# Patient Record
Sex: FEMALE | Race: WHITE | NOT HISPANIC OR LATINO | Employment: UNEMPLOYED | RURAL
[De-identification: names, ages, dates, MRNs, and addresses within clinical notes are randomized per-mention and may not be internally consistent; named-entity substitution may affect disease eponyms.]

---

## 2019-03-21 ENCOUNTER — HISTORICAL (OUTPATIENT)
Dept: ADMINISTRATIVE | Facility: HOSPITAL | Age: 49
End: 2019-03-21

## 2019-03-27 LAB
LAB AP CLINICAL INFORMATION: NORMAL
LAB AP DIAGNOSIS - HISTORICAL: NORMAL
LAB AP GROSS PATHOLOGY - HISTORICAL: NORMAL
LAB AP SPECIMEN SUBMITTED - HISTORICAL: NORMAL

## 2020-05-23 ENCOUNTER — HISTORICAL (OUTPATIENT)
Dept: ADMINISTRATIVE | Facility: HOSPITAL | Age: 50
End: 2020-05-23

## 2020-06-09 ENCOUNTER — HISTORICAL (OUTPATIENT)
Dept: ADMINISTRATIVE | Facility: HOSPITAL | Age: 50
End: 2020-06-09

## 2020-06-09 LAB
GLUCOSE SERPL-MCNC: 166 MG/DL (ref 70–105)
GLUCOSE SERPL-MCNC: 267 MG/DL (ref 70–105)
GLUCOSE SERPL-MCNC: 98 MG/DL (ref 70–105)

## 2020-06-16 ENCOUNTER — HISTORICAL (OUTPATIENT)
Dept: ADMINISTRATIVE | Facility: HOSPITAL | Age: 50
End: 2020-06-16

## 2020-06-16 LAB
GLUCOSE SERPL-MCNC: 183 MG/DL (ref 70–105)
GLUCOSE SERPL-MCNC: 256 MG/DL (ref 70–105)

## 2020-06-18 LAB
REPORT: 25
REPORT: NORMAL

## 2020-07-15 ENCOUNTER — HISTORICAL (OUTPATIENT)
Dept: ADMINISTRATIVE | Facility: HOSPITAL | Age: 50
End: 2020-07-15

## 2020-07-15 LAB — GLUCOSE SERPL-MCNC: 222 MG/DL (ref 70–105)

## 2020-07-17 LAB
REPORT: NORMAL

## 2020-11-22 ENCOUNTER — HISTORICAL (OUTPATIENT)
Dept: ADMINISTRATIVE | Facility: HOSPITAL | Age: 50
End: 2020-11-22

## 2021-06-03 RX ORDER — GABAPENTIN 300 MG/1
CAPSULE ORAL
COMMUNITY
Start: 2021-05-04 | End: 2021-06-03 | Stop reason: SDUPTHER

## 2021-06-03 RX ORDER — GABAPENTIN 300 MG/1
300 CAPSULE ORAL 3 TIMES DAILY
Qty: 90 CAPSULE | Refills: 2 | Status: SHIPPED | OUTPATIENT
Start: 2021-06-03 | End: 2021-08-13 | Stop reason: ALTCHOICE

## 2021-06-23 DIAGNOSIS — Z79.4 TYPE 2 DIABETES MELLITUS WITH HYPERGLYCEMIA, WITH LONG-TERM CURRENT USE OF INSULIN: Primary | ICD-10-CM

## 2021-06-23 DIAGNOSIS — E11.65 TYPE 2 DIABETES MELLITUS WITH HYPERGLYCEMIA, WITH LONG-TERM CURRENT USE OF INSULIN: Primary | ICD-10-CM

## 2021-06-23 RX ORDER — INSULIN ASPART 100 [IU]/ML
INJECTION, SOLUTION INTRAVENOUS; SUBCUTANEOUS
COMMUNITY
Start: 2021-02-08 | End: 2021-06-23 | Stop reason: SDUPTHER

## 2021-06-23 RX ORDER — INSULIN GLARGINE 100 [IU]/ML
INJECTION, SOLUTION SUBCUTANEOUS
COMMUNITY
Start: 2021-02-08 | End: 2021-06-23 | Stop reason: SDUPTHER

## 2021-06-24 RX ORDER — INSULIN ASPART 100 [IU]/ML
10 INJECTION, SOLUTION INTRAVENOUS; SUBCUTANEOUS 4 TIMES DAILY
Qty: 1 BOX | Refills: 2 | Status: SHIPPED | OUTPATIENT
Start: 2021-06-24 | End: 2022-01-04 | Stop reason: SDUPTHER

## 2021-06-24 RX ORDER — INSULIN GLARGINE 100 [IU]/ML
75 INJECTION, SOLUTION SUBCUTANEOUS NIGHTLY
Qty: 1 BOX | Refills: 2 | Status: SHIPPED | OUTPATIENT
Start: 2021-06-24 | End: 2021-08-13 | Stop reason: ALTCHOICE

## 2021-08-09 RX ORDER — DULOXETIN HYDROCHLORIDE 60 MG/1
CAPSULE, DELAYED RELEASE ORAL
COMMUNITY
Start: 2021-05-26 | End: 2021-08-09 | Stop reason: SDUPTHER

## 2021-08-10 RX ORDER — DULOXETIN HYDROCHLORIDE 60 MG/1
60 CAPSULE, DELAYED RELEASE ORAL DAILY
Qty: 90 CAPSULE | Refills: 1 | Status: SHIPPED | OUTPATIENT
Start: 2021-08-10 | End: 2021-10-29 | Stop reason: SDUPTHER

## 2021-08-13 ENCOUNTER — HOSPITAL ENCOUNTER (EMERGENCY)
Facility: HOSPITAL | Age: 51
Discharge: HOME | End: 2021-08-13
Payer: MEDICARE

## 2021-08-13 ENCOUNTER — HOSPITAL ENCOUNTER (EMERGENCY)
Facility: HOSPITAL | Age: 51
Discharge: ANOTHER HOSPITAL | End: 2021-08-13
Attending: INTERNAL MEDICINE
Payer: MEDICARE

## 2021-08-13 VITALS
BODY MASS INDEX: 27.32 KG/M2 | HEART RATE: 83 BPM | SYSTOLIC BLOOD PRESSURE: 115 MMHG | WEIGHT: 170 LBS | DIASTOLIC BLOOD PRESSURE: 59 MMHG | OXYGEN SATURATION: 96 % | RESPIRATION RATE: 15 BRPM | TEMPERATURE: 98 F | HEIGHT: 66 IN

## 2021-08-13 VITALS
DIASTOLIC BLOOD PRESSURE: 46 MMHG | TEMPERATURE: 99 F | BODY MASS INDEX: 27.32 KG/M2 | WEIGHT: 170 LBS | HEIGHT: 66 IN | OXYGEN SATURATION: 100 % | HEART RATE: 68 BPM | SYSTOLIC BLOOD PRESSURE: 108 MMHG | RESPIRATION RATE: 18 BRPM

## 2021-08-13 DIAGNOSIS — R10.9 ABDOMINAL PAIN, UNSPECIFIED ABDOMINAL LOCATION: ICD-10-CM

## 2021-08-13 DIAGNOSIS — R11.2 NON-INTRACTABLE VOMITING WITH NAUSEA, UNSPECIFIED VOMITING TYPE: ICD-10-CM

## 2021-08-13 DIAGNOSIS — K35.30 ACUTE APPENDICITIS WITH LOCALIZED PERITONITIS, WITHOUT PERFORATION, ABSCESS, OR GANGRENE: Primary | ICD-10-CM

## 2021-08-13 DIAGNOSIS — U07.1 COVID-19: ICD-10-CM

## 2021-08-13 DIAGNOSIS — R10.30 LOWER ABDOMINAL PAIN: Primary | ICD-10-CM

## 2021-08-13 DIAGNOSIS — R10.13 EPIGASTRIC PAIN: ICD-10-CM

## 2021-08-13 DIAGNOSIS — U07.1 COVID-19 VIRUS INFECTION: ICD-10-CM

## 2021-08-13 DIAGNOSIS — R07.9 CHEST PAIN: ICD-10-CM

## 2021-08-13 DIAGNOSIS — R11.0 NAUSEA: ICD-10-CM

## 2021-08-13 LAB
ALBUMIN SERPL BCP-MCNC: 3 G/DL (ref 3.5–5)
ALBUMIN/GLOB SERPL: 0.7 {RATIO}
ALP SERPL-CCNC: 72 U/L (ref 41–108)
ALT SERPL W P-5'-P-CCNC: 26 U/L (ref 13–56)
AMPHET UR QL SCN: POSITIVE
ANION GAP SERPL CALCULATED.3IONS-SCNC: 12 MMOL/L (ref 7–16)
AST SERPL W P-5'-P-CCNC: 20 U/L (ref 15–37)
BACTERIA #/AREA URNS HPF: ABNORMAL /HPF
BARBITURATES UR QL SCN: NEGATIVE
BASOPHILS # BLD AUTO: 0.01 K/UL (ref 0–0.2)
BASOPHILS NFR BLD AUTO: 0.2 % (ref 0–1)
BENZODIAZ METAB UR QL SCN: NEGATIVE
BILIRUB SERPL-MCNC: 0.4 MG/DL (ref 0–1.2)
BILIRUB UR QL STRIP: NEGATIVE
BUN SERPL-MCNC: 13 MG/DL (ref 7–18)
BUN/CREAT SERPL: 15 (ref 6–20)
CALCIUM SERPL-MCNC: 8.5 MG/DL (ref 8.5–10.1)
CANNABINOIDS UR QL SCN: POSITIVE
CHLORIDE SERPL-SCNC: 98 MMOL/L (ref 98–107)
CLARITY UR: CLEAR
CO2 SERPL-SCNC: 31 MMOL/L (ref 21–32)
COCAINE UR QL SCN: NEGATIVE
COLOR UR: YELLOW
CREAT SERPL-MCNC: 0.84 MG/DL (ref 0.55–1.02)
CRP SERPL-MCNC: 0.4 MG/DL (ref 0–0.8)
DIFFERENTIAL METHOD BLD: ABNORMAL
EOSINOPHIL # BLD AUTO: 0.13 K/UL (ref 0–0.5)
EOSINOPHIL NFR BLD AUTO: 2.4 % (ref 1–4)
ERYTHROCYTE [DISTWIDTH] IN BLOOD BY AUTOMATED COUNT: 14 % (ref 11.5–14.5)
FLUAV AG UPPER RESP QL IA.RAPID: NEGATIVE
FLUBV AG UPPER RESP QL IA.RAPID: NEGATIVE
GLOBULIN SER-MCNC: 4.1 G/DL (ref 2–4)
GLUCOSE SERPL-MCNC: 325 MG/DL (ref 74–106)
GLUCOSE UR STRIP-MCNC: >=1000 MG/DL
HCT VFR BLD AUTO: 45.5 % (ref 38–47)
HGB BLD-MCNC: 14.9 G/DL (ref 12–16)
IMM GRANULOCYTES # BLD AUTO: 0.01 K/UL (ref 0–0.04)
IMM GRANULOCYTES NFR BLD: 0.2 % (ref 0–0.4)
KETONES UR STRIP-SCNC: NEGATIVE MG/DL
LEUKOCYTE ESTERASE UR QL STRIP: NEGATIVE
LIPASE SERPL-CCNC: 108 U/L (ref 73–393)
LIPASE SERPL-CCNC: 82 U/L (ref 73–393)
LYMPHOCYTES # BLD AUTO: 1.24 K/UL (ref 1–4.8)
LYMPHOCYTES NFR BLD AUTO: 23.4 % (ref 27–41)
MCH RBC QN AUTO: 27.9 PG (ref 27–31)
MCHC RBC AUTO-ENTMCNC: 32.7 G/DL (ref 32–36)
MCV RBC AUTO: 85.2 FL (ref 80–96)
MONOCYTES # BLD AUTO: 0.47 K/UL (ref 0–0.8)
MONOCYTES NFR BLD AUTO: 8.9 % (ref 2–6)
MPC BLD CALC-MCNC: 11.7 FL (ref 9.4–12.4)
NEUTROPHILS # BLD AUTO: 3.45 K/UL (ref 1.8–7.7)
NEUTROPHILS NFR BLD AUTO: 64.9 % (ref 53–65)
NITRITE UR QL STRIP: NEGATIVE
OPIATES UR QL SCN: NEGATIVE
PCP UR QL SCN: NEGATIVE
PH UR STRIP: 5.5 PH UNITS
PLATELET # BLD AUTO: 143 K/UL (ref 150–400)
POTASSIUM SERPL-SCNC: 3.1 MMOL/L (ref 3.5–5.1)
PROT SERPL-MCNC: 7.1 G/DL (ref 6.4–8.2)
PROT UR QL STRIP: NEGATIVE
RBC # BLD AUTO: 5.34 M/UL (ref 4.2–5.4)
RBC # UR STRIP: ABNORMAL /UL
RBC #/AREA URNS HPF: ABNORMAL /HPF
SARS-COV+SARS-COV-2 AG RESP QL IA.RAPID: POSITIVE
SODIUM SERPL-SCNC: 138 MMOL/L (ref 136–145)
SP GR UR STRIP: 1.02
SQUAMOUS #/AREA URNS LPF: ABNORMAL /LPF
TROPONIN I SERPL-MCNC: <0.017 NG/ML
TROPONIN I SERPL-MCNC: <0.017 NG/ML
UROBILINOGEN UR STRIP-ACNC: 0.2 MG/DL
WBC # BLD AUTO: 5.31 K/UL (ref 4.5–11)
WBC #/AREA URNS HPF: ABNORMAL /HPF

## 2021-08-13 PROCEDURE — 87428 SARSCOV & INF VIR A&B AG IA: CPT | Performed by: INTERNAL MEDICINE

## 2021-08-13 PROCEDURE — 83690 ASSAY OF LIPASE: CPT | Performed by: INTERNAL MEDICINE

## 2021-08-13 PROCEDURE — 93005 ELECTROCARDIOGRAM TRACING: CPT

## 2021-08-13 PROCEDURE — 25000003 PHARM REV CODE 250: Performed by: INTERNAL MEDICINE

## 2021-08-13 PROCEDURE — 81001 URINALYSIS AUTO W/SCOPE: CPT | Performed by: INTERNAL MEDICINE

## 2021-08-13 PROCEDURE — 99284 EMERGENCY DEPT VISIT MOD MDM: CPT | Performed by: INTERNAL MEDICINE

## 2021-08-13 PROCEDURE — 99285 EMERGENCY DEPT VISIT HI MDM: CPT | Mod: 25,CS

## 2021-08-13 PROCEDURE — 83690 ASSAY OF LIPASE: CPT | Performed by: REGISTERED NURSE

## 2021-08-13 PROCEDURE — 84484 ASSAY OF TROPONIN QUANT: CPT | Performed by: REGISTERED NURSE

## 2021-08-13 PROCEDURE — 36415 COLL VENOUS BLD VENIPUNCTURE: CPT | Performed by: INTERNAL MEDICINE

## 2021-08-13 PROCEDURE — 81003 URINALYSIS AUTO W/O SCOPE: CPT | Performed by: INTERNAL MEDICINE

## 2021-08-13 PROCEDURE — 87040 BLOOD CULTURE FOR BACTERIA: CPT | Performed by: REGISTERED NURSE

## 2021-08-13 PROCEDURE — 93010 EKG 12-LEAD: ICD-10-PCS | Mod: ,,, | Performed by: HOSPITALIST

## 2021-08-13 PROCEDURE — 99283 PR EMERGENCY DEPT VISIT,LEVEL III: ICD-10-PCS | Mod: CR,,, | Performed by: REGISTERED NURSE

## 2021-08-13 PROCEDURE — 99284 EMERGENCY DEPT VISIT MOD MDM: CPT | Mod: ,,, | Performed by: SURGERY

## 2021-08-13 PROCEDURE — 99285 EMERGENCY DEPT VISIT HI MDM: CPT | Mod: 25

## 2021-08-13 PROCEDURE — 93010 ELECTROCARDIOGRAM REPORT: CPT | Performed by: INTERNAL MEDICINE

## 2021-08-13 PROCEDURE — 84484 ASSAY OF TROPONIN QUANT: CPT | Performed by: INTERNAL MEDICINE

## 2021-08-13 PROCEDURE — 96374 THER/PROPH/DIAG INJ IV PUSH: CPT

## 2021-08-13 PROCEDURE — 93010 ELECTROCARDIOGRAM REPORT: CPT | Mod: ,,, | Performed by: HOSPITALIST

## 2021-08-13 PROCEDURE — 86140 C-REACTIVE PROTEIN: CPT | Performed by: REGISTERED NURSE

## 2021-08-13 PROCEDURE — 63600175 PHARM REV CODE 636 W HCPCS: Performed by: INTERNAL MEDICINE

## 2021-08-13 PROCEDURE — 85025 COMPLETE CBC W/AUTO DIFF WBC: CPT | Performed by: INTERNAL MEDICINE

## 2021-08-13 PROCEDURE — 80053 COMPREHEN METABOLIC PANEL: CPT | Performed by: INTERNAL MEDICINE

## 2021-08-13 PROCEDURE — 99283 EMERGENCY DEPT VISIT LOW MDM: CPT | Mod: CR,,, | Performed by: REGISTERED NURSE

## 2021-08-13 PROCEDURE — 99284 PR EMERGENCY DEPT VISIT,LEVEL IV: ICD-10-PCS | Mod: ,,, | Performed by: SURGERY

## 2021-08-13 PROCEDURE — 80307 DRUG TEST PRSMV CHEM ANLYZR: CPT | Performed by: INTERNAL MEDICINE

## 2021-08-13 RX ORDER — SODIUM CHLORIDE 9 MG/ML
INJECTION, SOLUTION INTRAVENOUS
Status: COMPLETED | OUTPATIENT
Start: 2021-08-13 | End: 2021-08-13

## 2021-08-13 RX ORDER — DULAGLUTIDE 0.75 MG/.5ML
INJECTION, SOLUTION SUBCUTANEOUS
COMMUNITY
Start: 2021-08-04 | End: 2021-11-11 | Stop reason: SDUPTHER

## 2021-08-13 RX ORDER — ONDANSETRON 2 MG/ML
4 INJECTION INTRAMUSCULAR; INTRAVENOUS
Status: COMPLETED | OUTPATIENT
Start: 2021-08-13 | End: 2021-08-13

## 2021-08-13 RX ADMIN — SODIUM CHLORIDE: 0.9 INJECTION, SOLUTION INTRAVENOUS at 12:08

## 2021-08-13 RX ADMIN — ONDANSETRON 4 MG: 2 INJECTION INTRAMUSCULAR; INTRAVENOUS at 11:08

## 2021-08-20 ENCOUNTER — TELEPHONE (OUTPATIENT)
Dept: PRIMARY CARE CLINIC | Facility: CLINIC | Age: 51
End: 2021-08-20

## 2021-08-20 LAB
BACTERIA BLD CULT: NORMAL
BACTERIA BLD CULT: NORMAL

## 2021-10-29 DIAGNOSIS — F32.A DEPRESSION, UNSPECIFIED DEPRESSION TYPE: Primary | ICD-10-CM

## 2021-10-29 RX ORDER — DULOXETIN HYDROCHLORIDE 60 MG/1
60 CAPSULE, DELAYED RELEASE ORAL DAILY
Qty: 30 CAPSULE | Refills: 1 | Status: SHIPPED | OUTPATIENT
Start: 2021-10-29 | End: 2022-01-04 | Stop reason: SDUPTHER

## 2021-11-11 DIAGNOSIS — Z79.4 TYPE 2 DIABETES MELLITUS WITHOUT COMPLICATION, WITH LONG-TERM CURRENT USE OF INSULIN: Primary | ICD-10-CM

## 2021-11-11 DIAGNOSIS — E11.9 TYPE 2 DIABETES MELLITUS WITHOUT COMPLICATION, WITH LONG-TERM CURRENT USE OF INSULIN: Primary | ICD-10-CM

## 2021-11-11 RX ORDER — DULAGLUTIDE 0.75 MG/.5ML
0.75 INJECTION, SOLUTION SUBCUTANEOUS
Qty: 4 PEN | Refills: 0 | Status: SHIPPED | OUTPATIENT
Start: 2021-11-11 | End: 2021-12-06 | Stop reason: SDUPTHER

## 2021-11-29 ENCOUNTER — HOSPITAL ENCOUNTER (EMERGENCY)
Facility: HOSPITAL | Age: 51
Discharge: HOME | End: 2021-11-29
Attending: FAMILY MEDICINE
Payer: MEDICARE

## 2021-11-29 VITALS
HEIGHT: 66 IN | WEIGHT: 170 LBS | BODY MASS INDEX: 27.32 KG/M2 | RESPIRATION RATE: 18 BRPM | DIASTOLIC BLOOD PRESSURE: 91 MMHG | TEMPERATURE: 99 F | SYSTOLIC BLOOD PRESSURE: 142 MMHG | HEART RATE: 100 BPM | OXYGEN SATURATION: 98 %

## 2021-11-29 DIAGNOSIS — S61.412A LACERATION OF LEFT HAND WITHOUT FOREIGN BODY, INITIAL ENCOUNTER: Primary | ICD-10-CM

## 2021-11-29 PROCEDURE — 90715 TDAP VACCINE 7 YRS/> IM: CPT | Performed by: FAMILY MEDICINE

## 2021-11-29 PROCEDURE — 90471 IMMUNIZATION ADMIN: CPT | Performed by: FAMILY MEDICINE

## 2021-11-29 PROCEDURE — 99284 EMERGENCY DEPT VISIT MOD MDM: CPT

## 2021-11-29 PROCEDURE — 99282 EMERGENCY DEPT VISIT SF MDM: CPT | Mod: 25 | Performed by: FAMILY MEDICINE

## 2021-11-29 PROCEDURE — 25000003 PHARM REV CODE 250: Performed by: FAMILY MEDICINE

## 2021-11-29 PROCEDURE — 63600175 PHARM REV CODE 636 W HCPCS: Performed by: FAMILY MEDICINE

## 2021-11-29 RX ORDER — LIDOCAINE HYDROCHLORIDE 10 MG/ML
5 INJECTION, SOLUTION EPIDURAL; INFILTRATION; INTRACAUDAL; PERINEURAL
Status: COMPLETED | OUTPATIENT
Start: 2021-11-29 | End: 2021-11-29

## 2021-11-29 RX ADMIN — TETANUS TOXOID, REDUCED DIPHTHERIA TOXOID AND ACELLULAR PERTUSSIS VACCINE, ADSORBED 0.5 ML: 5; 2.5; 8; 8; 2.5 SUSPENSION INTRAMUSCULAR at 04:11

## 2021-11-29 RX ADMIN — LIDOCAINE HYDROCHLORIDE 50 MG: 10 INJECTION, SOLUTION EPIDURAL; INFILTRATION; INTRACAUDAL; PERINEURAL at 04:11

## 2021-11-30 ENCOUNTER — TELEPHONE (OUTPATIENT)
Dept: EMERGENCY MEDICINE | Facility: HOSPITAL | Age: 51
End: 2021-11-30
Payer: MEDICARE

## 2021-12-06 DIAGNOSIS — E11.9 TYPE 2 DIABETES MELLITUS WITHOUT COMPLICATION, WITH LONG-TERM CURRENT USE OF INSULIN: ICD-10-CM

## 2021-12-06 DIAGNOSIS — Z79.4 TYPE 2 DIABETES MELLITUS WITHOUT COMPLICATION, WITH LONG-TERM CURRENT USE OF INSULIN: ICD-10-CM

## 2021-12-06 RX ORDER — DULAGLUTIDE 0.75 MG/.5ML
0.75 INJECTION, SOLUTION SUBCUTANEOUS
Qty: 4 PEN | Refills: 0 | Status: SHIPPED | OUTPATIENT
Start: 2021-12-06 | End: 2022-01-04 | Stop reason: SDUPTHER

## 2022-01-04 ENCOUNTER — OFFICE VISIT (OUTPATIENT)
Dept: PRIMARY CARE CLINIC | Facility: CLINIC | Age: 52
End: 2022-01-04
Payer: MEDICARE

## 2022-01-04 VITALS
BODY MASS INDEX: 24.91 KG/M2 | TEMPERATURE: 97 F | RESPIRATION RATE: 20 BRPM | OXYGEN SATURATION: 98 % | HEIGHT: 66 IN | WEIGHT: 155 LBS | SYSTOLIC BLOOD PRESSURE: 140 MMHG | HEART RATE: 100 BPM | DIASTOLIC BLOOD PRESSURE: 72 MMHG

## 2022-01-04 DIAGNOSIS — M19.90 ARTHRITIS: ICD-10-CM

## 2022-01-04 DIAGNOSIS — Z79.4 TYPE 2 DIABETES MELLITUS WITH HYPERGLYCEMIA, WITH LONG-TERM CURRENT USE OF INSULIN: ICD-10-CM

## 2022-01-04 DIAGNOSIS — Z79.4 TYPE 2 DIABETES MELLITUS WITHOUT COMPLICATION, WITH LONG-TERM CURRENT USE OF INSULIN: ICD-10-CM

## 2022-01-04 DIAGNOSIS — E11.65 TYPE 2 DIABETES MELLITUS WITH HYPERGLYCEMIA, WITH LONG-TERM CURRENT USE OF INSULIN: ICD-10-CM

## 2022-01-04 DIAGNOSIS — E13.9 DIABETES 1.5, MANAGED AS TYPE 2: Primary | ICD-10-CM

## 2022-01-04 DIAGNOSIS — F32.A DEPRESSION, UNSPECIFIED DEPRESSION TYPE: ICD-10-CM

## 2022-01-04 DIAGNOSIS — E11.9 TYPE 2 DIABETES MELLITUS WITHOUT COMPLICATION, WITH LONG-TERM CURRENT USE OF INSULIN: ICD-10-CM

## 2022-01-04 LAB
ALBUMIN SERPL BCP-MCNC: 3.2 G/DL (ref 3.5–5)
ALBUMIN/GLOB SERPL: 0.7 {RATIO}
ALP SERPL-CCNC: 86 U/L (ref 41–108)
ALT SERPL W P-5'-P-CCNC: 21 U/L (ref 13–56)
ANION GAP SERPL CALCULATED.3IONS-SCNC: 10 MMOL/L (ref 7–16)
AST SERPL W P-5'-P-CCNC: 6 U/L (ref 15–37)
BASOPHILS # BLD AUTO: 0.08 K/UL (ref 0–0.2)
BASOPHILS NFR BLD AUTO: 0.8 % (ref 0–1)
BILIRUB SERPL-MCNC: 0.2 MG/DL (ref 0–1.2)
BUN SERPL-MCNC: 16 MG/DL (ref 7–18)
BUN/CREAT SERPL: 20 (ref 6–20)
CALCIUM SERPL-MCNC: 9.8 MG/DL (ref 8.5–10.1)
CHLORIDE SERPL-SCNC: 99 MMOL/L (ref 98–107)
CO2 SERPL-SCNC: 29 MMOL/L (ref 21–32)
CREAT SERPL-MCNC: 0.79 MG/DL (ref 0.55–1.02)
DIFFERENTIAL METHOD BLD: ABNORMAL
EOSINOPHIL # BLD AUTO: 0.3 K/UL (ref 0–0.5)
EOSINOPHIL NFR BLD AUTO: 3 % (ref 1–4)
ERYTHROCYTE [DISTWIDTH] IN BLOOD BY AUTOMATED COUNT: 13.4 % (ref 11.5–14.5)
EST. AVERAGE GLUCOSE BLD GHB EST-MCNC: 320 MG/DL
GLOBULIN SER-MCNC: 4.6 G/DL (ref 2–4)
GLUCOSE SERPL-MCNC: 434 MG/DL (ref 74–106)
GLUCOSE SERPL-MCNC: 435 MG/DL (ref 70–110)
HBA1C MFR BLD HPLC: 12.2 % (ref 4.5–6.6)
HCT VFR BLD AUTO: 48 % (ref 38–47)
HGB BLD-MCNC: 15.9 G/DL (ref 12–16)
IMM GRANULOCYTES # BLD AUTO: 0.02 K/UL (ref 0–0.04)
IMM GRANULOCYTES NFR BLD: 0.2 % (ref 0–0.4)
LYMPHOCYTES # BLD AUTO: 2.26 K/UL (ref 1–4.8)
LYMPHOCYTES NFR BLD AUTO: 22.6 % (ref 27–41)
MCH RBC QN AUTO: 29 PG (ref 27–31)
MCHC RBC AUTO-ENTMCNC: 33.1 G/DL (ref 32–36)
MCV RBC AUTO: 87.4 FL (ref 80–96)
MONOCYTES # BLD AUTO: 0.61 K/UL (ref 0–0.8)
MONOCYTES NFR BLD AUTO: 6.1 % (ref 2–6)
MPC BLD CALC-MCNC: 11.5 FL (ref 9.4–12.4)
NEUTROPHILS # BLD AUTO: 6.73 K/UL (ref 1.8–7.7)
NEUTROPHILS NFR BLD AUTO: 67.3 % (ref 53–65)
NRBC # BLD AUTO: 0 X10E3/UL
NRBC, AUTO (.00): 0 %
PLATELET # BLD AUTO: 247 K/UL (ref 150–400)
POTASSIUM SERPL-SCNC: 4.5 MMOL/L (ref 3.5–5.1)
PROT SERPL-MCNC: 7.8 G/DL (ref 6.4–8.2)
RBC # BLD AUTO: 5.49 M/UL (ref 4.2–5.4)
SODIUM SERPL-SCNC: 133 MMOL/L (ref 136–145)
WBC # BLD AUTO: 10 K/UL (ref 4.5–11)

## 2022-01-04 PROCEDURE — 83036 HEMOGLOBIN GLYCOSYLATED A1C: CPT | Mod: ,,, | Performed by: CLINICAL MEDICAL LABORATORY

## 2022-01-04 PROCEDURE — 85025 COMPLETE CBC W/AUTO DIFF WBC: CPT | Mod: ,,, | Performed by: CLINICAL MEDICAL LABORATORY

## 2022-01-04 PROCEDURE — 99214 OFFICE O/P EST MOD 30 MIN: CPT | Mod: 25,,, | Performed by: FAMILY MEDICINE

## 2022-01-04 PROCEDURE — 96372 THER/PROPH/DIAG INJ SC/IM: CPT | Mod: ,,, | Performed by: FAMILY MEDICINE

## 2022-01-04 PROCEDURE — 80053 COMPREHENSIVE METABOLIC PANEL: ICD-10-PCS | Mod: ,,, | Performed by: CLINICAL MEDICAL LABORATORY

## 2022-01-04 PROCEDURE — 83036 HEMOGLOBIN A1C: ICD-10-PCS | Mod: ,,, | Performed by: CLINICAL MEDICAL LABORATORY

## 2022-01-04 PROCEDURE — 82962 GLUCOSE BLOOD TEST: CPT | Mod: RHCUB | Performed by: FAMILY MEDICINE

## 2022-01-04 PROCEDURE — 80053 COMPREHEN METABOLIC PANEL: CPT | Mod: ,,, | Performed by: CLINICAL MEDICAL LABORATORY

## 2022-01-04 PROCEDURE — 85025 CBC WITH DIFFERENTIAL: ICD-10-PCS | Mod: ,,, | Performed by: CLINICAL MEDICAL LABORATORY

## 2022-01-04 PROCEDURE — 96372 PR INJECTION,THERAP/PROPH/DIAG2ST, IM OR SUBCUT: ICD-10-PCS | Mod: ,,, | Performed by: FAMILY MEDICINE

## 2022-01-04 PROCEDURE — 99214 PR OFFICE/OUTPT VISIT, EST, LEVL IV, 30-39 MIN: ICD-10-PCS | Mod: 25,,, | Performed by: FAMILY MEDICINE

## 2022-01-04 RX ORDER — INSULIN ASPART 100 [IU]/ML
20 INJECTION, SOLUTION INTRAVENOUS; SUBCUTANEOUS
Status: COMPLETED | OUTPATIENT
Start: 2022-01-04 | End: 2022-01-04

## 2022-01-04 RX ORDER — KETOROLAC TROMETHAMINE 30 MG/ML
60 INJECTION, SOLUTION INTRAMUSCULAR; INTRAVENOUS
Status: COMPLETED | OUTPATIENT
Start: 2022-01-04 | End: 2022-01-04

## 2022-01-04 RX ORDER — DULOXETIN HYDROCHLORIDE 60 MG/1
60 CAPSULE, DELAYED RELEASE ORAL DAILY
Qty: 90 CAPSULE | Refills: 3 | Status: SHIPPED | OUTPATIENT
Start: 2022-01-04

## 2022-01-04 RX ORDER — INSULIN ASPART 100 [IU]/ML
10 INJECTION, SOLUTION INTRAVENOUS; SUBCUTANEOUS 2 TIMES DAILY
Qty: 5 EACH | Refills: 11 | Status: SHIPPED | OUTPATIENT
Start: 2022-01-04 | End: 2022-08-01 | Stop reason: SDUPTHER

## 2022-01-04 RX ORDER — DULAGLUTIDE 0.75 MG/.5ML
0.75 INJECTION, SOLUTION SUBCUTANEOUS
Qty: 4 PEN | Refills: 11 | Status: ON HOLD | OUTPATIENT
Start: 2022-01-04 | End: 2023-03-20 | Stop reason: ALTCHOICE

## 2022-01-04 RX ADMIN — INSULIN ASPART 20 UNITS: 100 INJECTION, SOLUTION INTRAVENOUS; SUBCUTANEOUS at 12:01

## 2022-01-04 RX ADMIN — KETOROLAC TROMETHAMINE 60 MG: 30 INJECTION, SOLUTION INTRAMUSCULAR; INTRAVENOUS at 12:01

## 2022-01-04 NOTE — PROGRESS NOTES
Subjective:       Patient ID: Monica Walker is a 51 y.o. female.    Chief Complaint: Diabetes (Checkup, bloodwork, and refills.) and Back Pain      Pt. Ran out of her insulin. States has been falling. Legs don't feel right. BS-435 mg%    Diabetes  Pertinent negatives for hypoglycemia include no confusion or headaches. Pertinent negatives for diabetes include no chest pain and no fatigue.   Back Pain  Associated symptoms include leg pain. Pertinent negatives include no bladder incontinence, chest pain, fever or headaches.     Review of Systems   Constitutional: Negative for fatigue and fever.   HENT: Negative for nasal congestion and dental problem.    Eyes: Negative for discharge.   Respiratory: Negative for cough and shortness of breath.    Cardiovascular: Negative for chest pain.   Gastrointestinal: Negative for constipation, diarrhea, nausea and vomiting.   Genitourinary: Negative for bladder incontinence, difficulty urinating and hot flashes.   Musculoskeletal: Positive for arthralgias, back pain and leg pain.   Allergic/Immunologic: Negative for environmental allergies.   Neurological: Negative for headaches.   Psychiatric/Behavioral: Negative for behavioral problems and confusion.         Objective:      Physical Exam  Vitals and nursing note reviewed.   Constitutional:       Appearance: Normal appearance. She is normal weight.   HENT:      Head: Normocephalic and atraumatic.      Right Ear: Tympanic membrane normal.      Left Ear: Tympanic membrane normal.      Nose: Nose normal.      Mouth/Throat:      Mouth: Mucous membranes are moist.   Eyes:      Extraocular Movements: Extraocular movements intact.      Conjunctiva/sclera: Conjunctivae normal.      Pupils: Pupils are equal, round, and reactive to light.   Cardiovascular:      Rate and Rhythm: Normal rate and regular rhythm.      Pulses: Normal pulses.   Pulmonary:      Effort: Pulmonary effort is normal.      Breath sounds: Normal breath sounds.    Abdominal:      General: Abdomen is flat. Bowel sounds are normal.      Palpations: Abdomen is soft.   Musculoskeletal:         General: Normal range of motion.      Cervical back: Normal range of motion and neck supple.      Comments: Multiple site arthritis   Skin:     General: Skin is warm and dry.   Neurological:      General: No focal deficit present.      Mental Status: She is alert and oriented to person, place, and time.   Psychiatric:         Mood and Affect: Mood normal.         Assessment:       Diabetes 1.5, managed as type 2  -     CBC Auto Differential; Future; Expected date: 01/04/2022  -     Hemoglobin A1C; Future; Expected date: 01/04/2022  -     Comprehensive Metabolic Panel; Future; Expected date: 01/04/2022    Depression, unspecified depression type    Type 2 diabetes mellitus with hyperglycemia, with long-term current use of insulin    Type 2 diabetes mellitus without complication, with long-term current use of insulin

## 2022-01-05 NOTE — PROGRESS NOTES
Please give pt. Results - totally uncontrolled - not just from no insulin for a few days. Offer diabetic education referral

## 2022-01-06 ENCOUNTER — TELEPHONE (OUTPATIENT)
Dept: PRIMARY CARE CLINIC | Facility: CLINIC | Age: 52
End: 2022-01-06
Payer: MEDICARE

## 2022-01-06 NOTE — TELEPHONE ENCOUNTER
----- Message from Hannah Schulz MD sent at 1/5/2022  8:23 AM CST -----  Please give pt. Results - totally uncontrolled - not just from no insulin for a few days. Offer diabetic education referral

## 2022-03-10 ENCOUNTER — HOSPITAL ENCOUNTER (EMERGENCY)
Facility: HOSPITAL | Age: 52
Discharge: HOME | End: 2022-03-10
Attending: INTERNAL MEDICINE
Payer: MEDICARE

## 2022-03-10 VITALS
RESPIRATION RATE: 18 BRPM | BODY MASS INDEX: 25.31 KG/M2 | TEMPERATURE: 98 F | DIASTOLIC BLOOD PRESSURE: 80 MMHG | HEART RATE: 73 BPM | SYSTOLIC BLOOD PRESSURE: 147 MMHG | WEIGHT: 157.5 LBS | OXYGEN SATURATION: 98 % | HEIGHT: 66 IN

## 2022-03-10 DIAGNOSIS — Z79.4 TYPE 2 DIABETES MELLITUS WITH HYPERGLYCEMIA, WITH LONG-TERM CURRENT USE OF INSULIN: ICD-10-CM

## 2022-03-10 DIAGNOSIS — S09.90XA INJURY OF HEAD, INITIAL ENCOUNTER: ICD-10-CM

## 2022-03-10 DIAGNOSIS — S09.90XA TRAUMATIC INJURY OF HEAD, INITIAL ENCOUNTER: ICD-10-CM

## 2022-03-10 DIAGNOSIS — E11.65 TYPE 2 DIABETES MELLITUS WITH HYPERGLYCEMIA, WITH LONG-TERM CURRENT USE OF INSULIN: ICD-10-CM

## 2022-03-10 DIAGNOSIS — M54.2 MUSCULOSKELETAL NECK PAIN: ICD-10-CM
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
## 2022-03-10 DIAGNOSIS — S40.012A CONTUSION OF LEFT SHOULDER, INITIAL ENCOUNTER: Primary | ICD-10-CM

## 2022-03-10 DIAGNOSIS — T14.90XA TRAUMA: ICD-10-CM

## 2022-03-10 LAB
ACETONE SERPL QL SCN: NEGATIVE
ANION GAP SERPL CALCULATED.3IONS-SCNC: 11 MMOL/L (ref 7–16)
BASOPHILS # BLD AUTO: 0.03 K/UL (ref 0–0.2)
BASOPHILS NFR BLD AUTO: 0.3 % (ref 0–1)
BUN SERPL-MCNC: 13 MG/DL (ref 7–18)
BUN/CREAT SERPL: 15 (ref 6–20)
CALCIUM SERPL-MCNC: 9.6 MG/DL (ref 8.5–10.1)
CHLORIDE SERPL-SCNC: 93 MMOL/L (ref 98–107)
CO2 SERPL-SCNC: 27 MMOL/L (ref 21–32)
CREAT SERPL-MCNC: 0.84 MG/DL (ref 0.55–1.02)
DIFFERENTIAL METHOD BLD: ABNORMAL
EOSINOPHIL # BLD AUTO: 0.29 K/UL (ref 0–0.5)
EOSINOPHIL NFR BLD AUTO: 2.6 % (ref 1–4)
ERYTHROCYTE [DISTWIDTH] IN BLOOD BY AUTOMATED COUNT: 13.6 % (ref 11.5–14.5)
GLUCOSE SERPL-MCNC: 271 MG/DL (ref 70–105)
GLUCOSE SERPL-MCNC: 438 MG/DL (ref 74–106)
HCT VFR BLD AUTO: 46.7 % (ref 38–47)
HGB BLD-MCNC: 16 G/DL (ref 12–16)
IMM GRANULOCYTES # BLD AUTO: 0.03 K/UL (ref 0–0.04)
IMM GRANULOCYTES NFR BLD: 0.3 % (ref 0–0.4)
LYMPHOCYTES # BLD AUTO: 2.62 K/UL (ref 1–4.8)
LYMPHOCYTES NFR BLD AUTO: 23 % (ref 27–41)
MCH RBC QN AUTO: 28.9 PG (ref 27–31)
MCHC RBC AUTO-ENTMCNC: 34.3 G/DL (ref 32–36)
MCV RBC AUTO: 84.4 FL (ref 80–96)
MONOCYTES # BLD AUTO: 0.9 K/UL (ref 0–0.8)
MONOCYTES NFR BLD AUTO: 7.9 % (ref 2–6)
MPC BLD CALC-MCNC: 11.6 FL (ref 9.4–12.4)
NEUTROPHILS # BLD AUTO: 7.5 K/UL (ref 1.8–7.7)
NEUTROPHILS NFR BLD AUTO: 65.9 % (ref 53–65)
PLATELET # BLD AUTO: 183 K/UL (ref 150–400)
POTASSIUM SERPL-SCNC: 4.1 MMOL/L (ref 3.5–5.1)
RBC # BLD AUTO: 5.53 M/UL (ref 4.2–5.4)
SODIUM SERPL-SCNC: 127 MMOL/L (ref 136–145)
WBC # BLD AUTO: 11.37 K/UL (ref 4.5–11)

## 2022-03-10 PROCEDURE — 85025 COMPLETE CBC W/AUTO DIFF WBC: CPT | Performed by: INTERNAL MEDICINE

## 2022-03-10 PROCEDURE — 99284 EMERGENCY DEPT VISIT MOD MDM: CPT | Performed by: INTERNAL MEDICINE

## 2022-03-10 PROCEDURE — 36415 COLL VENOUS BLD VENIPUNCTURE: CPT | Performed by: INTERNAL MEDICINE

## 2022-03-10 PROCEDURE — 96361 HYDRATE IV INFUSION ADD-ON: CPT

## 2022-03-10 PROCEDURE — 80048 BASIC METABOLIC PNL TOTAL CA: CPT | Performed by: INTERNAL MEDICINE

## 2022-03-10 PROCEDURE — 99285 EMERGENCY DEPT VISIT HI MDM: CPT | Mod: 25

## 2022-03-10 PROCEDURE — 93005 ELECTROCARDIOGRAM TRACING: CPT

## 2022-03-10 PROCEDURE — 82962 GLUCOSE BLOOD TEST: CPT

## 2022-03-10 PROCEDURE — 96374 THER/PROPH/DIAG INJ IV PUSH: CPT

## 2022-03-10 PROCEDURE — 25000003 PHARM REV CODE 250: Performed by: INTERNAL MEDICINE

## 2022-03-10 PROCEDURE — 96375 TX/PRO/DX INJ NEW DRUG ADDON: CPT

## 2022-03-10 PROCEDURE — 93010 ELECTROCARDIOGRAM REPORT: CPT | Performed by: INTERNAL MEDICINE

## 2022-03-10 PROCEDURE — 63600175 PHARM REV CODE 636 W HCPCS: Performed by: INTERNAL MEDICINE

## 2022-03-10 PROCEDURE — 82009 KETONE BODYS QUAL: CPT | Performed by: INTERNAL MEDICINE

## 2022-03-10 RX ORDER — MELOXICAM 7.5 MG/1
7.5 TABLET ORAL DAILY
Qty: 15 TABLET | Refills: 0 | Status: SHIPPED | OUTPATIENT
Start: 2022-03-10 | End: 2022-03-25

## 2022-03-10 RX ORDER — KETOROLAC TROMETHAMINE 15 MG/ML
15 INJECTION, SOLUTION INTRAMUSCULAR; INTRAVENOUS
Status: COMPLETED | OUTPATIENT
Start: 2022-03-10 | End: 2022-03-10

## 2022-03-10 RX ORDER — PEN NEEDLE, DIABETIC 31 GX5/16"
NEEDLE, DISPOSABLE MISCELLANEOUS
COMMUNITY
Start: 2022-01-04

## 2022-03-10 RX ADMIN — HUMAN INSULIN 10 UNITS: 100 INJECTION, SOLUTION SUBCUTANEOUS at 07:03

## 2022-03-10 RX ADMIN — KETOROLAC TROMETHAMINE 15 MG: 15 INJECTION, SOLUTION INTRAMUSCULAR; INTRAVENOUS at 08:03

## 2022-03-10 RX ADMIN — SODIUM CHLORIDE 1000 ML: 9 INJECTION, SOLUTION INTRAVENOUS at 07:03

## 2022-03-10 NOTE — ED TRIAGE NOTES
Pt states that she fell approximately 10 feet out of a tractor bucket while putting tin on her roof yesterday evening. C/o left shoulder pain. Weak . Pt states she is unable to move left arm without extreme pain. No bruising noted.

## 2022-03-10 NOTE — ED PROVIDER NOTES
Encounter Date: 3/10/2022       History     Chief Complaint   Patient presents with    Shoulder Injury     PATIENT PRESENTS WITH COMPLAINTS OF LEFT SHOULDER PAIN AFTER A FALL YESTERDAY.  PATIENT STATES SHE WAS IN A LIFT BUCKET AT LEAST 10 FT IN THE AIR REPAIRING A ROOF.  SHE STATES SHE FELL OUT ONTO HER LEFT SHOULDER.  SHE COMPLAINS OF LEFT SHOULDER AND NECK PAIN TODAY.  SHE DENIES ANY HEAD TRAUMA LOSS OF CONSCIOUSNESS, INCONTINENCE URINE OR BOWEL, UP FOCAL NEUROLOGICAL DEFICITS.  SHE DENIES ANY CHEST PAIN ABDOMINAL PAIN NAUSEA VOMITING.    THE PATIENT HAS INSULIN-DEPENDENT DIABETES MELLITUS THAT ACCORDING TO RECORDS IS UNCONTROLLED.  HER BLOOD SUGAR NOW .         Review of patient's allergies indicates:   Allergen Reactions    Opioids - morphine analogues Hives     Past Medical History:   Diagnosis Date    Diabetes mellitus     Hypertension      Past Surgical History:   Procedure Laterality Date    BACK SURGERY      HYSTERECTOMY      TOTAL HIP ARTHROPLASTY      TOTAL KNEE ARTHROPLASTY       History reviewed. No pertinent family history.  Social History     Tobacco Use    Smoking status: Current Some Day Smoker    Smokeless tobacco: Current User     Types: Snuff   Substance Use Topics    Alcohol use: Never    Drug use: Never     Review of Systems   Constitutional: Negative for fever.   HENT: Negative for sore throat.    Respiratory: Negative for shortness of breath.    Cardiovascular: Negative for chest pain.   Gastrointestinal: Negative for nausea.   Genitourinary: Negative for dysuria.   Musculoskeletal: Negative for back pain.   Skin: Negative for rash.   Neurological: Negative for weakness.   Hematological: Does not bruise/bleed easily.       Physical Exam     Initial Vitals [03/10/22 0701]   BP Pulse Resp Temp SpO2   (!) 165/93 90 18 97.7 °F (36.5 °C) 98 %      MAP       --         Physical Exam    Vitals reviewed.  Constitutional: She appears well-developed.   Eyes: Pupils are equal, round,  and reactive to light.   Neck: Trachea normal. Neck supple.       Normal range of motion.  Cardiovascular: Normal rate, regular rhythm, normal heart sounds and normal pulses.   Pulmonary/Chest: Breath sounds normal. She exhibits no tenderness.     Abdominal: Abdomen is soft and flat. Bowel sounds are normal. There is no abdominal tenderness.   Musculoskeletal:      Left shoulder: Tenderness present. Decreased range of motion.        Arms:       Cervical back: Normal range of motion and neck supple. Muscular tenderness present. No spinous process tenderness. Normal range of motion.     Neurological: She is alert. She has normal strength and normal reflexes. No cranial nerve deficit or sensory deficit. GCS eye subscore is 4. GCS verbal subscore is 5. GCS motor subscore is 6.   Skin: Skin is warm.   Psychiatric: She has a normal mood and affect.         Medical Screening Exam   See Full Note    ED Course   Procedures  Labs Reviewed   BASIC METABOLIC PANEL - Abnormal; Notable for the following components:       Result Value    Sodium 127 (*)     Chloride 93 (*)     Glucose 438 (*)     All other components within normal limits   CBC WITH DIFFERENTIAL - Abnormal; Notable for the following components:    WBC 11.37 (*)     RBC 5.53 (*)     Neutrophils % 65.9 (*)     Lymphocytes % 23.0 (*)     Monocytes % 7.9 (*)     Monocytes, Absolute 0.90 (*)     All other components within normal limits   POCT GLUCOSE MONITORING CONTINUOUS - Abnormal; Notable for the following components:    POC Glucose 271 (*)     All other components within normal limits   CBC W/ AUTO DIFFERENTIAL    Narrative:     The following orders were created for panel order CBC auto differential.  Procedure                               Abnormality         Status                     ---------                               -----------         ------                     CBC with Differential[[id number]]        Abnormal            Final result                 Please  view results for these tests on the individual orders.   ACETONE        ECG Results          EKG 12-lead (Final result)  Result time 03/10/22 14:06:15    Final result by Zulma Andrade (03/10/22 14:06:15)                 Narrative:    Test Reason : W10.8XXA,    Vent. Rate : 079 BPM     Atrial Rate : 079 BPM     P-R Int : 138 ms          QRS Dur : 082 ms      QT Int : 372 ms       P-R-T Axes : 061 077 075 degrees     QTc Int : 426 ms    Normal sinus rhythm  Normal ECG  When compared with ECG of 13-AUG-2021 14:34,  PREVIOUS ECG IS PRESENT  Confirmed by Matthew German MD ([id number]) on 3/10/2022 10:35:27 AM    Referred By: [email or web address]   SELF           Confirmed By:Matthew German MD                  ED Interpretation by Matthew German MD (03/10/22 07:17:20, [facility] Department, Emergency Medicine)    NORMAL SINUS OF 79 AND NO ACUTE ISCHEMIC CHANGES                            Imaging Results          X-Ray Chest PA And Lateral (Final result)  Result time 03/10/22 08:04:13    Final result by Jacek Whitfield MD (03/10/22 08:04:13)                 Impression:      No acute findings.      Electronically signed by: Jacek Whitfield  Date:    03/10/2022  Time:    08:04             Narrative:    EXAMINATION:  XR CHEST PA AND LATERAL    CLINICAL HISTORY:  Fall (on) (from) other stairs and steps, initial encounter    TECHNIQUE:  PA and lateral views of the chest were performed.    COMPARISON:  08/13/2021    FINDINGS:  Heart size normal.  Lungs clear.  No pneumothorax or pleural effusion.  Pulmonary vessels show normal caliber.                               X-Ray Shoulder 2 or More Views Left (Final result)  Result time 03/10/22 08:07:27    Final result by Jackson Richardson MD (03/10/22 08:07:27)                 Impression:      No acute fracture    Degenerative changes of the left shoulder and thoracic spine      Electronically signed by: Jackson Richardson  Date:    03/10/2022  Time:    08:07             Narrative:     EXAMINATION:  XR SHOULDER COMPLETE 2 OR MORE VIEWS LEFT    CLINICAL HISTORY:  FALL;.    Blunt trauma    COMPARISON:  No previous similar    TECHNIQUE:  Left shoulder three views    FINDINGS:  There is no fracture or dislocation.  There is moderate osteophyte formation of the glenohumeral joint.  There is mild degenerative joint space narrowing and osteophyte formation of the acromioclavicular joint.  There is moderate thoracic spondylosis.                               CT Head Without Contrast (Final result)  Result time 03/10/22 07:59:34    Final result by Jackson Richardson MD (03/10/22 07:59:34)                 Impression:      No acute intracranial process      Electronically signed by: Jackson Richardson  Date:    03/10/2022  Time:    07:59             Narrative:    EXAMINATION:  CT head without contrast    CLINICAL HISTORY:  FALL; blunt trauma    TECHNIQUE:  Transaxial CT sections were obtained through the brain without contrast.    The CT examination was performed using one or more of the following dose reduction techniques: Automated exposure control, adjustment of the mA and kV according to patient's size, use of acute or iterative reconstruction techniques.    COMPARISON:  August 17, 2015 head CT    FINDINGS:  The ventricles are midline in position without evidence of hydrocephalus. There is no mass or area of parenchymal hemorrhage. There is no gross CT evidence of acute cortical stroke. There is no extra-axial hematoma. The partially visualized sinuses are generally clear. There is no obvious skull fracture.                               CT Cervical Spine Without Contrast (Final result)  Result time 03/10/22 08:04:19    Final result by Jackson Richardson MD (03/10/22 08:04:19)                 Impression:      No acute fracture    Degenerative disc disease      Electronically signed by: Jackson Richardson  Date:    03/10/2022  Time:    08:04             Narrative:    EXAMINATION:  CT CERVICAL SPINE WITHOUT  CONTRAST    CLINICAL HISTORY:  Ataxia, cervical trauma;FALL; blunt trauma    TECHNIQUE:  Thin spiral CT sections were obtained through the cervical spine without contrast. Multiplanar reconstruction images are also evaluated.    The CT examination was performed using one or more of the following dose reduction techniques: Automated exposure control, adjustment of the mA and kV according to patient's size, use of acute or iterative reconstruction techniques.    COMPARISON:  No previous similar    FINDINGS:  No acute fracture seen.  There is no subluxation.  There is scattered mild degenerative disc narrowing and mild to moderate anterior spondylosis throughout the cervical spine.    There is no gross disc extrusion or obvious focal high-grade spinal stenosis.  There is mild narrowing of spinal canal at C5-C6 and C6-C7 secondary to posterior bulging disc and osteophyte complex.                                 Medications   sodium chloride 0.9% bolus 1,000 mL (0 mLs Intravenous Stopped 3/10/22 0811)   insulin regular injection 10 Units (10 Units Intravenous Given 3/10/22 0720)   ketorolac injection 15 mg (15 mg Intravenous Given 3/10/22 0822)                 ED Course as of 03/21/22 2340   Thu Mar 10, 2022   0741 Basic metabolic panel(!) [PW]   0741 CBC auto differential(!) [PW]   0812 X-Ray Shoulder 2 or More Views Left [PW]   0812 CT Cervical Spine Without Contrast [PW]   0812 X-Ray Chest PA And Lateral [PW]   0812 CT Head Without Contrast [PW]   0812 POC Glucose(!): 271 [PW]   0816 Acetone, Bld: Negative [PW]      ED Course User Index  [PW] Matthew German MD          Clinical Impression:   Final diagnoses:  [W10.8XXA] Fall (on) (from) other stairs and steps, initial encounter  [S40.012A] Contusion of left shoulder, initial encounter (Primary)  [W19.XXXA] Fall, initial encounter  [M54.2] Musculoskeletal neck pain  [E11.65, Z79.4] Type 2 diabetes mellitus with hyperglycemia, with long-term current use of  insulin  [S09.90XA] Injury of head, initial encounter          ED Disposition Condition    Discharge Stable        ED Prescriptions     Medication Sig Dispense Start Date End Date Auth. Provider    meloxicam (MOBIC) 7.5 MG tablet Take 1 tablet (7.5 mg total) by mouth once daily. for 15 days 15 tablet 3/10/2022 3/25/2022 Matthew German MD        Follow-up Information     Follow up With Specialties Details Why Contact Info    Hannah Schulz MD Family Medicine In 3 days  [address] [facility] [address] [phone]             Matthew German MD  03/10/22 0815       Matthew German MD  03/10/22 0816       Matthew German MD  03/21/22 0840       Matthew German MD  03/24/22 7026

## 2022-03-11 ENCOUNTER — TELEPHONE (OUTPATIENT)
Dept: EMERGENCY MEDICINE | Facility: HOSPITAL | Age: 52
End: 2022-03-11
Payer: MEDICARE

## 2022-03-11 DIAGNOSIS — Z71.89 COMPLEX CARE COORDINATION: ICD-10-CM

## 2022-08-01 DIAGNOSIS — E11.65 TYPE 2 DIABETES MELLITUS WITH HYPERGLYCEMIA, WITH LONG-TERM CURRENT USE OF INSULIN: ICD-10-CM

## 2022-08-01 DIAGNOSIS — Z79.4 TYPE 2 DIABETES MELLITUS WITH HYPERGLYCEMIA, WITH LONG-TERM CURRENT USE OF INSULIN: ICD-10-CM

## 2022-08-01 RX ORDER — INSULIN ASPART 100 [IU]/ML
10 INJECTION, SOLUTION INTRAVENOUS; SUBCUTANEOUS 2 TIMES DAILY
Qty: 5 EACH | Refills: 11 | Status: ON HOLD | OUTPATIENT
Start: 2022-08-01 | End: 2023-02-08 | Stop reason: SDUPTHER

## 2022-08-07 ENCOUNTER — HOSPITAL ENCOUNTER (EMERGENCY)
Facility: HOSPITAL | Age: 52
Discharge: HOME | End: 2022-08-07
Attending: SPECIALIST
Payer: MEDICARE

## 2022-08-07 VITALS
RESPIRATION RATE: 16 BRPM | HEIGHT: 66 IN | BODY MASS INDEX: 25.66 KG/M2 | SYSTOLIC BLOOD PRESSURE: 129 MMHG | OXYGEN SATURATION: 99 % | WEIGHT: 159.63 LBS | HEART RATE: 86 BPM | DIASTOLIC BLOOD PRESSURE: 68 MMHG | TEMPERATURE: 98 F

## 2022-08-07 DIAGNOSIS — L02.415 ABSCESS OF RIGHT THIGH: Primary | ICD-10-CM

## 2022-08-07 DIAGNOSIS — E11.00 TYPE 2 DIABETES MELLITUS WITH HYPEROSMOLAR NONKETOTIC HYPERGLYCEMIA: ICD-10-CM

## 2022-08-07 DIAGNOSIS — E86.1 HYPOVOLEMIA: ICD-10-CM

## 2022-08-07 LAB
ACETONE SERPL QL SCN: NEGATIVE
ALBUMIN SERPL BCP-MCNC: 3.1 G/DL (ref 3.5–5)
ALBUMIN/GLOB SERPL: 0.8 {RATIO}
ALP SERPL-CCNC: 91 U/L (ref 41–108)
ALT SERPL W P-5'-P-CCNC: 25 U/L (ref 13–56)
ANION GAP SERPL CALCULATED.3IONS-SCNC: 15 MMOL/L (ref 7–16)
AST SERPL W P-5'-P-CCNC: 34 U/L (ref 15–37)
BASOPHILS # BLD AUTO: 0.06 K/UL (ref 0–0.2)
BASOPHILS NFR BLD AUTO: 0.6 % (ref 0–1)
BILIRUB SERPL-MCNC: 0.4 MG/DL (ref 0–1.2)
BUN SERPL-MCNC: 14 MG/DL (ref 7–18)
BUN/CREAT SERPL: 14 (ref 6–20)
CALCIUM SERPL-MCNC: 8.4 MG/DL (ref 8.5–10.1)
CHLORIDE SERPL-SCNC: 96 MMOL/L (ref 98–107)
CO2 SERPL-SCNC: 29 MMOL/L (ref 21–32)
CREAT SERPL-MCNC: 1.02 MG/DL (ref 0.55–1.02)
DIFFERENTIAL METHOD BLD: ABNORMAL
EOSINOPHIL # BLD AUTO: 0.37 K/UL (ref 0–0.5)
EOSINOPHIL NFR BLD AUTO: 3.8 % (ref 1–4)
ERYTHROCYTE [DISTWIDTH] IN BLOOD BY AUTOMATED COUNT: 13.8 % (ref 11.5–14.5)
GLOBULIN SER-MCNC: 4 G/DL (ref 2–4)
GLUCOSE SERPL-MCNC: 198 MG/DL (ref 70–105)
GLUCOSE SERPL-MCNC: 422 MG/DL (ref 70–105)
GLUCOSE SERPL-MCNC: 605 MG/DL (ref 74–106)
HCT VFR BLD AUTO: 43 % (ref 38–47)
HGB BLD-MCNC: 14.6 G/DL (ref 12–16)
IMM GRANULOCYTES # BLD AUTO: 0.01 K/UL (ref 0–0.04)
IMM GRANULOCYTES NFR BLD: 0.1 % (ref 0–0.4)
LACTATE SERPL-SCNC: 2.3 MMOL/L (ref 0.4–2)
LACTATE SERPL-SCNC: 2.4 MMOL/L (ref 0.4–2)
LYMPHOCYTES # BLD AUTO: 2.57 K/UL (ref 1–4.8)
LYMPHOCYTES NFR BLD AUTO: 26.2 % (ref 27–41)
MAGNESIUM SERPL-MCNC: 2 MG/DL (ref 1.7–2.3)
MCH RBC QN AUTO: 29.6 PG (ref 27–31)
MCHC RBC AUTO-ENTMCNC: 34 G/DL (ref 32–36)
MCV RBC AUTO: 87 FL (ref 80–96)
MONOCYTES # BLD AUTO: 0.98 K/UL (ref 0–0.8)
MONOCYTES NFR BLD AUTO: 10 % (ref 2–6)
MPC BLD CALC-MCNC: 12.1 FL (ref 9.4–12.4)
NEUTROPHILS # BLD AUTO: 5.81 K/UL (ref 1.8–7.7)
NEUTROPHILS NFR BLD AUTO: 59.3 % (ref 53–65)
PLATELET # BLD AUTO: 195 K/UL (ref 150–400)
POTASSIUM SERPL-SCNC: 4.1 MMOL/L (ref 3.5–5.1)
PROT SERPL-MCNC: 7.1 G/DL (ref 6.4–8.2)
RBC # BLD AUTO: 4.94 M/UL (ref 4.2–5.4)
SODIUM SERPL-SCNC: 136 MMOL/L (ref 136–145)
WBC # BLD AUTO: 9.8 K/UL (ref 4.5–11)

## 2022-08-07 PROCEDURE — 85025 COMPLETE CBC W/AUTO DIFF WBC: CPT | Performed by: SPECIALIST

## 2022-08-07 PROCEDURE — 96365 THER/PROPH/DIAG IV INF INIT: CPT

## 2022-08-07 PROCEDURE — 63600175 PHARM REV CODE 636 W HCPCS: Performed by: SPECIALIST

## 2022-08-07 PROCEDURE — 82962 GLUCOSE BLOOD TEST: CPT | Mod: 91

## 2022-08-07 PROCEDURE — 83735 ASSAY OF MAGNESIUM: CPT | Performed by: SPECIALIST

## 2022-08-07 PROCEDURE — 87040 BLOOD CULTURE FOR BACTERIA: CPT | Performed by: SPECIALIST

## 2022-08-07 PROCEDURE — 83605 ASSAY OF LACTIC ACID: CPT | Performed by: SPECIALIST

## 2022-08-07 PROCEDURE — 99283 EMERGENCY DEPT VISIT LOW MDM: CPT | Performed by: SPECIALIST

## 2022-08-07 PROCEDURE — 96376 TX/PRO/DX INJ SAME DRUG ADON: CPT

## 2022-08-07 PROCEDURE — 99284 EMERGENCY DEPT VISIT MOD MDM: CPT | Mod: 25

## 2022-08-07 PROCEDURE — 36415 COLL VENOUS BLD VENIPUNCTURE: CPT | Performed by: SPECIALIST

## 2022-08-07 PROCEDURE — 25000003 PHARM REV CODE 250: Performed by: SPECIALIST

## 2022-08-07 PROCEDURE — 82009 KETONE BODYS QUAL: CPT | Performed by: SPECIALIST

## 2022-08-07 PROCEDURE — 96375 TX/PRO/DX INJ NEW DRUG ADDON: CPT

## 2022-08-07 PROCEDURE — 80053 COMPREHEN METABOLIC PANEL: CPT | Performed by: SPECIALIST

## 2022-08-07 RX ORDER — CLINDAMYCIN PHOSPHATE 600 MG/50ML
600 INJECTION, SOLUTION INTRAVENOUS
Status: COMPLETED | OUTPATIENT
Start: 2022-08-07 | End: 2022-08-07

## 2022-08-07 RX ORDER — KETOROLAC TROMETHAMINE 15 MG/ML
15 INJECTION, SOLUTION INTRAMUSCULAR; INTRAVENOUS
Status: COMPLETED | OUTPATIENT
Start: 2022-08-07 | End: 2022-08-07

## 2022-08-07 RX ORDER — PHENTERMINE HYDROCHLORIDE 37.5 MG/1
37.5 CAPSULE ORAL EVERY MORNING
Status: ON HOLD | COMMUNITY
End: 2022-11-13 | Stop reason: HOSPADM

## 2022-08-07 RX ORDER — HYDROCODONE BITARTRATE AND ACETAMINOPHEN 10; 325 MG/1; MG/1
1 TABLET ORAL
Status: COMPLETED | OUTPATIENT
Start: 2022-08-07 | End: 2022-08-07

## 2022-08-07 RX ADMIN — CLINDAMYCIN PHOSPHATE 600 MG: 600 INJECTION, SOLUTION INTRAVENOUS at 04:08

## 2022-08-07 RX ADMIN — HUMAN INSULIN 15 UNITS: 100 INJECTION, SOLUTION SUBCUTANEOUS at 03:08

## 2022-08-07 RX ADMIN — SODIUM CHLORIDE 1000 ML: 9 INJECTION, SOLUTION INTRAVENOUS at 03:08

## 2022-08-07 RX ADMIN — KETOROLAC TROMETHAMINE 15 MG: 15 INJECTION, SOLUTION INTRAMUSCULAR; INTRAVENOUS at 03:08

## 2022-08-07 RX ADMIN — HUMAN INSULIN 15 UNITS: 100 INJECTION, SOLUTION SUBCUTANEOUS at 05:08

## 2022-08-07 RX ADMIN — HYDROCODONE BITARTRATE AND ACETAMINOPHEN 1 TABLET: 10; 325 TABLET ORAL at 04:08

## 2022-08-07 NOTE — ED TRIAGE NOTES
Pt states she snaggedn her hip on a screw on set 3 days noted inflammed area on back of right leg. Pain 10 on 0-10 scale.

## 2022-08-07 NOTE — DISCHARGE INSTRUCTIONS
Get your antibiotics as soon as pharmacy is open and start them immediately  Follow up with ER in 24 - 48 h for cellulitis and abscess reevaluation - when the area is soft we will do an I & D  Check BS frequently

## 2022-08-07 NOTE — ED PROVIDER NOTES
"Encounter Date: 8/7/2022       History     Chief Complaint   Patient presents with    Abscess     Patient is a 53 yo diabetic patient who comes in after "a while" for an area of infection posterior right thight approximately 6 cm x 5.5 cm that is hardened and painful.  Patient states that she thinks that a "screw caught my leg" and therefore it became infected.  Her blood sugars are running in 500's per patient for "2 weeks".  Patient is alert and oriented x 4 and cooperative and communicative.  Tetanus is UTD.        Review of patient's allergies indicates:   Allergen Reactions    Opioids - morphine analogues Hives     Past Medical History:   Diagnosis Date    Diabetes mellitus     Hypertension      Past Surgical History:   Procedure Laterality Date    BACK SURGERY      HYSTERECTOMY      TOTAL HIP ARTHROPLASTY      TOTAL KNEE ARTHROPLASTY       History reviewed. No pertinent family history.  Social History     Tobacco Use    Smoking status: Current Some Day Smoker    Smokeless tobacco: Current User     Types: Snuff   Substance Use Topics    Alcohol use: Never    Drug use: Never     Review of Systems   Constitutional: Negative.    HENT: Negative.    Eyes: Negative.    Respiratory: Negative.    Cardiovascular: Negative.    Gastrointestinal: Negative.    Endocrine: Negative.    Genitourinary: Negative.    Musculoskeletal: Negative.    Skin: Positive for wound.   Allergic/Immunologic: Negative.    Neurological: Negative.    Hematological: Negative.    Psychiatric/Behavioral: Negative.    All other systems reviewed and are negative.      Physical Exam     Initial Vitals [08/07/22 1512]   BP Pulse Resp Temp SpO2   (!) 157/79 102 16 98 °F (36.7 °C) 99 %      MAP       --         Physical Exam    Nursing note and vitals reviewed.  Constitutional: She appears well-developed and well-nourished. No distress.   HENT:   Head: Normocephalic and atraumatic.   Eyes: Pupils are equal, round, and reactive to light. "   Neck:   Normal range of motion.  Cardiovascular: Regular rhythm.   tachycardia   Pulmonary/Chest: Breath sounds normal.   Musculoskeletal:         General: Tenderness present. Normal range of motion.      Cervical back: Normal range of motion.      Comments: 6 cm x 5.5 firm indurated area (appears to have been there for a long time) on the right posterior thigh. No drainage.  Appears chronic     Neurological: She is alert and oriented to person, place, and time. She has normal strength. GCS score is 15. GCS eye subscore is 4. GCS verbal subscore is 5. GCS motor subscore is 6.   Skin: Skin is warm. Capillary refill takes 2 to 3 seconds.   Psychiatric: She has a normal mood and affect. Her behavior is normal. Judgment and thought content normal.         Medical Screening Exam   See Full Note    ED Course   Procedures  Labs Reviewed   COMPREHENSIVE METABOLIC PANEL - Abnormal; Notable for the following components:       Result Value    Chloride 96 (*)     Glucose 605 (*)     Calcium 8.4 (*)     Albumin 3.1 (*)     All other components within normal limits   CBC WITH DIFFERENTIAL - Abnormal; Notable for the following components:    Lymphocytes % 26.2 (*)     Monocytes % 10.0 (*)     Monocytes, Absolute 0.98 (*)     All other components within normal limits   LACTIC ACID, PLASMA - Abnormal; Notable for the following components:    Lactic Acid 2.3 (*)     All other components within normal limits   LACTIC ACID, PLASMA - Abnormal; Notable for the following components:    Lactic Acid 2.4 (*)     All other components within normal limits   POCT GLUCOSE MONITORING CONTINUOUS - Abnormal; Notable for the following components:    POC Glucose 422 (*)     All other components within normal limits   POCT GLUCOSE MONITORING CONTINUOUS - Abnormal; Notable for the following components:    POC Glucose 198 (*)     All other components within normal limits   MAGNESIUM - Normal   CULTURE, BLOOD   CULTURE, BLOOD   CBC W/ AUTO DIFFERENTIAL     Narrative:     The following orders were created for panel order CBC auto differential.  Procedure                               Abnormality         Status                     ---------                               -----------         ------                     CBC with Differential[[id number]]        Abnormal            Final result                 Please view results for these tests on the individual orders.   ACETONE   URINALYSIS, REFLEX TO URINE CULTURE   POCT GLUCOSE MONITORING CONTINUOUS          Imaging Results    None          Medications   sodium chloride 0.9% bolus 1,000 mL (has no administration in time range)   sodium chloride 0.9% bolus 1,000 mL (0 mLs Intravenous Stopped 8/7/22 1705)   clindamycin in D5W 600 mg/50 mL IVPB 600 mg (0 mg Intravenous Stopped 8/7/22 1705)   ketorolac injection 15 mg (15 mg Intravenous Given 8/7/22 1559)   insulin regular injection 15 Units 0.15 mL (15 Units Intravenous Given 8/7/22 1558)   HYDROcodone-acetaminophen  mg per tablet 1 tablet (1 tablet Oral Given 8/7/22 1600)   insulin regular injection 15 Units 0.15 mL (15 Units Intravenous Given 8/7/22 1706)                       Clinical Impression:   Final diagnoses:  [L02.415] Abscess of right thigh (Primary)  [E11.00] Type 2 diabetes mellitus with hyperosmolar nonketotic hyperglycemia  [E86.1] Hypovolemia          ED Disposition Condition    Discharge Stable        ED Prescriptions     None        Follow-up Information     Follow up With Specialties Details Why Contact Info    Hannah Schulz MD Family Medicine In 2 days  [address] [facility] [address] [phone]      [facility] - Emergency Department Emergency Medicine In 2 days For wound re-check [address] [place] [id number] [phone]           Rosalina Barth MD  08/07/22 6043

## 2022-08-08 ENCOUNTER — TELEPHONE (OUTPATIENT)
Dept: EMERGENCY MEDICINE | Facility: HOSPITAL | Age: 52
End: 2022-08-08
Payer: MEDICARE

## 2022-08-08 LAB — GLUCOSE SERPL-MCNC: 523 MG/DL (ref 70–105)

## 2022-08-09 ENCOUNTER — HOSPITAL ENCOUNTER (EMERGENCY)
Facility: HOSPITAL | Age: 52
Discharge: HOME | End: 2022-08-09
Attending: EMERGENCY MEDICINE
Payer: MEDICARE

## 2022-08-09 VITALS
HEIGHT: 66 IN | BODY MASS INDEX: 25.79 KG/M2 | DIASTOLIC BLOOD PRESSURE: 81 MMHG | RESPIRATION RATE: 16 BRPM | OXYGEN SATURATION: 97 % | WEIGHT: 160.5 LBS | HEART RATE: 83 BPM | SYSTOLIC BLOOD PRESSURE: 166 MMHG | TEMPERATURE: 98 F

## 2022-08-09 DIAGNOSIS — L02.91 ABSCESS: Primary | ICD-10-CM

## 2022-08-09 DIAGNOSIS — E11.65 UNCONTROLLED TYPE 2 DIABETES MELLITUS WITH HYPERGLYCEMIA: ICD-10-CM

## 2022-08-09 LAB
GLUCOSE SERPL-MCNC: 210 MG/DL (ref 70–105)
GLUCOSE SERPL-MCNC: 304 MG/DL (ref 70–105)
GLUCOSE SERPL-MCNC: 409 MG/DL (ref 70–105)

## 2022-08-09 PROCEDURE — 96376 TX/PRO/DX INJ SAME DRUG ADON: CPT

## 2022-08-09 PROCEDURE — 10061 I&D ABSCESS COMP/MULTIPLE: CPT

## 2022-08-09 PROCEDURE — 99283 EMERGENCY DEPT VISIT LOW MDM: CPT | Mod: 25

## 2022-08-09 PROCEDURE — 96365 THER/PROPH/DIAG IV INF INIT: CPT | Mod: 59

## 2022-08-09 PROCEDURE — 25000003 PHARM REV CODE 250: Performed by: EMERGENCY MEDICINE

## 2022-08-09 PROCEDURE — 82962 GLUCOSE BLOOD TEST: CPT

## 2022-08-09 PROCEDURE — 10060 I&D ABSCESS SIMPLE/SINGLE: CPT

## 2022-08-09 PROCEDURE — 63600175 PHARM REV CODE 636 W HCPCS: Performed by: EMERGENCY MEDICINE

## 2022-08-09 PROCEDURE — 99284 EMERGENCY DEPT VISIT MOD MDM: CPT | Mod: 25

## 2022-08-09 PROCEDURE — 87070 CULTURE OTHR SPECIMN AEROBIC: CPT | Performed by: EMERGENCY MEDICINE

## 2022-08-09 PROCEDURE — 96375 TX/PRO/DX INJ NEW DRUG ADDON: CPT

## 2022-08-09 RX ORDER — CLINDAMYCIN PHOSPHATE 900 MG/50ML
900 INJECTION, SOLUTION INTRAVENOUS
Status: COMPLETED | OUTPATIENT
Start: 2022-08-09 | End: 2022-08-09

## 2022-08-09 RX ORDER — SULFAMETHOXAZOLE AND TRIMETHOPRIM 800; 160 MG/1; MG/1
1 TABLET ORAL 2 TIMES DAILY
Qty: 14 TABLET | Refills: 0 | Status: SHIPPED | OUTPATIENT
Start: 2022-08-09 | End: 2022-08-16

## 2022-08-09 RX ADMIN — HUMAN INSULIN 10 UNITS: 100 INJECTION, SOLUTION SUBCUTANEOUS at 04:08

## 2022-08-09 RX ADMIN — CLINDAMYCIN PHOSPHATE 900 MG: 900 INJECTION, SOLUTION INTRAVENOUS at 04:08

## 2022-08-09 RX ADMIN — SODIUM CHLORIDE 1000 ML: 9 INJECTION, SOLUTION INTRAVENOUS at 04:08

## 2022-08-09 RX ADMIN — HUMAN INSULIN 10 UNITS: 100 INJECTION, SOLUTION SUBCUTANEOUS at 05:08

## 2022-08-09 NOTE — ED TRIAGE NOTES
Problem: OCCUPATIONAL THERAPY ADULT  Goal: Performs self-care activities at highest level of function for planned discharge setting  See evaluation for individualized goals  Description: Treatment Interventions: ADL retraining, Functional transfer training, Endurance training, Patient/family training, Cognitive reorientation, Equipment evaluation/education, Compensatory technique education, Continued evaluation, Energy conservation, Activityengagement          See flowsheet documentation for full assessment, interventions and recommendations  Note: Limitation: Decreased ADL status, Decreased cognition, Decreased endurance, Decreased high-level ADLs, Decreased self-care trans  Prognosis: Good  Assessment: Patient is a 65 y o  male seen for OT evaluation s/p admit to [address] [facility] on 4/4/2021 w/CIERA (acute kidney injury) ([facility] [address] )  Commorbidities affecting patient's functional performance at time of assessment include:hyperlipidemia, hypertension, type 2 DM, tobacco abuse, CAD, hyperparathyroidism, history of aortic regurgitation, hyperkalemia, metabolic acidosis, UTI, hematuria, and dehydration with hyponatremia  Orders placed for OT evaluation and treatment  Performed at least two patient identifiers during session including name and wristband  Prior to admission, patient reported independent with ADls/ IADLs, lives with spouse in a 2 story house, 3 INES and full flight to second floor bedroom  patient ambulates with walker PRN  Personal factors affecting patient at time of initial evaluation include: steps to enter, decreased initiation and engagement, difficulty performing ADLs and difficulty performing IADLs  Upon evaluation, patient requires minimal  assist for UB ADLs, moderate and maximal assist for LB ADLs, transfers and functional ambulation in room and bathroom with moderate assist, with the use of Rolling Walker    Patient is alert, oriented to name,, oriented to place,, disoriented to time, Pt back to the ER for a 2 day wound recheck. Noted inflamed and redness to abscess on right thigh noted drainage. Pt states pain 10 on 0-10 scale. Onset of symptoms 1.5 weeks   and disoriented to situation,, presents with limited ability to focus on a task over time (attention span) and limited ability to recall information after a brief period of time (short term memory) / working memory   Therapist completed Short Blessed Cognitive test, patient obtained a score of 9 indicative of need for further assessment (evaluate for dementing disorder)  Occupational performance is affected by the following deficits: orientation, degenerative arthritic joint changes, impaired gross motor coordination, dynamic sit/ stand balance deficit with poor standing tolerance time for self care and functional mobility, decreased activity tolerance, impaired memory and decreased safety awareness  Patient to benefit from continued Occupational Therapy treatment while in the hospital to address deficits as defined above and maximize level of functional independence with ADLs and functional mobility  Occupational Performance areas to address include: grooming , bathing/ shower, dressing, toilet hygiene, transfer to all surfaces, functional ambulation, functional mobility, health maintenance, medication routine/ management, IADLS: Household maintenance, IADLs: safety procedures, Leisure Participation and Social participation        OT Discharge Recommendation: Post-Acute Rehabilitation Services

## 2022-08-09 NOTE — ED PROVIDER NOTES
Encounter Date: 8/9/2022       History     Chief Complaint   Patient presents with    Wound Check    Abscess     Patient returns for recheck of abscess right posterolateral thigh, was seen in the emergency dark apartment 2 days ago and given clindamycin and had her sugars corrected with insulin.  Patient does not appear to be on any oral antibiotic as an outpatient.  Area was felt to be chronic per the note and no incision and drainage was attempted.  Review of previous visits indicates patient has had MRSA infection in the past resistant to multiple antibiotics, sensitive only to vancomycin and Bactrim in the past in 2020.         Review of patient's allergies indicates:   Allergen Reactions    Opioids - morphine analogues Hives     Past Medical History:   Diagnosis Date    Diabetes mellitus     Hypertension      Past Surgical History:   Procedure Laterality Date    BACK SURGERY      HYSTERECTOMY      TOTAL HIP ARTHROPLASTY      TOTAL KNEE ARTHROPLASTY       History reviewed. No pertinent family history.  Social History     Tobacco Use    Smoking status: Current Some Day Smoker    Smokeless tobacco: Current User     Types: Snuff   Substance Use Topics    Alcohol use: Never    Drug use: Never     Review of Systems   Constitutional: Negative for fever.   HENT: Negative.    Eyes: Negative.    Respiratory: Negative.    Cardiovascular: Negative.    Gastrointestinal: Negative.    Genitourinary: Negative.    Musculoskeletal: Negative.    Skin: Positive for wound ( patient reports abscess of posterior right thigh.).   Neurological: Negative.    Psychiatric/Behavioral: Negative.    All other systems reviewed and are negative.      Physical Exam     Initial Vitals [08/09/22 1609]   BP Pulse Resp Temp SpO2   (!) 148/83 94 16 98.2 °F (36.8 °C) 97 %      MAP       --         Physical Exam    Nursing note and vitals reviewed.  Constitutional: She appears well-developed and well-nourished.   HENT:   Mouth/Throat:  Oropharynx is clear and moist.   Neck: Neck supple.   Normal range of motion.  Cardiovascular: Normal rate, regular rhythm and normal heart sounds.   No murmur heard.  Pulmonary/Chest: Breath sounds normal. No respiratory distress. She has no wheezes. She has no rhonchi. She has no rales.   Abdominal: Abdomen is soft. Bowel sounds are normal. She exhibits no distension. There is no abdominal tenderness.   Musculoskeletal:         General: No edema. Normal range of motion.      Cervical back: Normal range of motion and neck supple.     Neurological: She is alert. She has normal strength. No cranial nerve deficit. GCS score is 15. GCS eye subscore is 4. GCS verbal subscore is 5. GCS motor subscore is 6.   Skin: Skin is warm and dry. Capillary refill takes less than 2 seconds. Abscess (Patient has an indurated area of erythema measuring 4-5 cm in diameter with some superficial erosions overlying this area.  No active drainage noted.  There is minimal fluctuance at the center.) noted.   Psychiatric: She has a normal mood and affect. Her behavior is normal.         Medical Screening Exam   See Full Note    ED Course   I & D - Incision and Drainage    Date/Time: 8/9/2022 4:33 PM  Location procedure was performed: [facility] EMERGENCY DEPARTMENT  Performed by: Jadiel Robert DO  Authorized by: Jadiel Robert DO   Pre-operative diagnosis: Abscess  Post-operative diagnosis: Cellulitis  Consent Done: Emergent Situation  Type: abscess  Body area: lower extremity  Location details: right buttock  Description of findings: Minimal bloody drainage obtained   Scalpel size: 11  Incision type: single straight  Complexity: simple  Drainage: bloody  Drainage amount: scant  Wound treatment: wound packed  Complications: No  Estimated blood loss (mL): 0  DO NOT USE SPECIMENS: Culture obtained.  Patient tolerance: Patient tolerated the procedure well with no immediate complications        Labs Reviewed   POCT GLUCOSE MONITORING  CONTINUOUS - Abnormal; Notable for the following components:       Result Value    POC Glucose 409 (*)     All other components within normal limits   POCT GLUCOSE MONITORING CONTINUOUS - Abnormal; Notable for the following components:    POC Glucose 304 (*)     All other components within normal limits   POCT GLUCOSE MONITORING CONTINUOUS - Abnormal; Notable for the following components:    POC Glucose 210 (*)     All other components within normal limits   CULTURE, WOUND   POCT GLUCOSE MONITORING CONTINUOUS   POCT GLUCOSE MONITORING CONTINUOUS          Imaging Results    None          Medications   sodium chloride 0.9% bolus 1,000 mL (0 mLs Intravenous Stopped 8/9/22 1731)   clindamycin in D5W 900 mg/50 mL IVPB 900 mg (0 mg Intravenous Stopped 8/9/22 1731)   insulin regular injection 10 Units 0.1 mL (10 Units Intravenous Given 8/9/22 1658)   insulin regular injection 10 Units 0.1 mL (10 Units Intravenous Given 8/9/22 1741)     Medical Decision Making:   ED Management:  Patient was given IV clindamycin and will be prescribed oral Bactrim for outpatient treatment, with follow-up with her primary physician in 2 days for packing removal.                   Clinical Impression:   Final diagnoses:  [L02.91] Abscess (Primary)  [E11.65] Uncontrolled type 2 diabetes mellitus with hyperglycemia          ED Disposition Condition    Discharge Stable        ED Prescriptions     Medication Sig Dispense Start Date End Date Auth. Provider    sulfamethoxazole-trimethoprim 800-160mg (BACTRIM DS) 800-160 mg Tab Take 1 tablet by mouth 2 (two) times daily. for 7 days 14 tablet 8/9/2022 8/16/2022 Jadiel Robert DO    mupirocin calcium 2% nasl oint (BACTROBAN) 2 % Oint by Nasal route 2 (two) times daily. for 5 days 10 g 8/9/2022 8/14/2022 Jadiel Beltran Robert DO        Follow-up Information     Follow up With Specialties Details Why Contact Info    Hannah Schulz MD Family Medicine Schedule an appointment as soon as possible  for a visit in 2 days To recheck and have packing removed. [id number] [id number]  [phone]             Jadiel Robert DO  08/09/22 1819

## 2022-08-11 ENCOUNTER — TELEPHONE (OUTPATIENT)
Dept: EMERGENCY MEDICINE | Facility: HOSPITAL | Age: 52
End: 2022-08-11
Payer: MEDICARE

## 2022-08-12 ENCOUNTER — TELEPHONE (OUTPATIENT)
Dept: EMERGENCY MEDICINE | Facility: HOSPITAL | Age: 52
End: 2022-08-12
Payer: MEDICARE

## 2022-08-12 LAB — MICROORGANISM SPEC CULT: ABNORMAL

## 2022-08-14 LAB
BACTERIA BLD CULT: NORMAL
BACTERIA BLD CULT: NORMAL

## 2022-10-09 DIAGNOSIS — Z71.89 COMPLEX CARE COORDINATION: ICD-10-CM

## 2022-11-09 ENCOUNTER — HOSPITAL ENCOUNTER (OUTPATIENT)
Facility: HOSPITAL | Age: 52
Discharge: HOME | End: 2022-11-13
Attending: EMERGENCY MEDICINE | Admitting: SPECIALIST
Payer: MEDICARE

## 2022-11-09 DIAGNOSIS — R07.9 CHEST PAIN: ICD-10-CM

## 2022-11-09 DIAGNOSIS — N12 PYELONEPHRITIS: Primary | ICD-10-CM

## 2022-11-09 PROBLEM — E11.65 UNCONTROLLED TYPE 2 DIABETES MELLITUS WITH HYPERGLYCEMIA: Status: ACTIVE | Noted: 2022-11-09

## 2022-11-09 LAB
ALBUMIN SERPL BCP-MCNC: 2 G/DL (ref 3.5–5)
ALBUMIN/GLOB SERPL: 0.4 {RATIO}
ALP SERPL-CCNC: 165 U/L (ref 41–108)
ALT SERPL W P-5'-P-CCNC: 27 U/L (ref 13–56)
AMORPH PHOS CRY #/AREA URNS LPF: ABNORMAL /LPF
AMPHET UR QL SCN: POSITIVE
AMYLASE SERPL-CCNC: 16 U/L (ref 25–115)
ANION GAP SERPL CALCULATED.3IONS-SCNC: 16 MMOL/L (ref 7–16)
AST SERPL W P-5'-P-CCNC: 15 U/L (ref 15–37)
BACTERIA #/AREA URNS HPF: ABNORMAL /HPF
BARBITURATES UR QL SCN: NEGATIVE
BASOPHILS # BLD AUTO: 0.04 K/UL (ref 0–0.2)
BASOPHILS NFR BLD AUTO: 0.2 % (ref 0–1)
BENZODIAZ METAB UR QL SCN: NEGATIVE
BILIRUB SERPL-MCNC: 0.7 MG/DL (ref ?–1.2)
BILIRUB UR QL STRIP: ABNORMAL
BUN SERPL-MCNC: 11 MG/DL (ref 7–18)
BUN/CREAT SERPL: 15 (ref 6–20)
CALCIUM SERPL-MCNC: 8.9 MG/DL (ref 8.5–10.1)
CANNABINOIDS UR QL SCN: POSITIVE
CHLORIDE SERPL-SCNC: 88 MMOL/L (ref 98–107)
CLARITY UR: ABNORMAL
CO2 SERPL-SCNC: 28 MMOL/L (ref 21–32)
COCAINE UR QL SCN: NEGATIVE
COLOR UR: YELLOW
CREAT SERPL-MCNC: 0.75 MG/DL (ref 0.55–1.02)
DIFFERENTIAL METHOD BLD: ABNORMAL
EGFR (NO RACE VARIABLE) (RUSH/TITUS): 95 ML/MIN/1.73M²
EOSINOPHIL # BLD AUTO: 0.22 K/UL (ref 0–0.5)
EOSINOPHIL NFR BLD AUTO: 1 % (ref 1–4)
ERYTHROCYTE [DISTWIDTH] IN BLOOD BY AUTOMATED COUNT: 12.8 % (ref 11.5–14.5)
EST. AVERAGE GLUCOSE BLD GHB EST-MCNC: 327 MG/DL
GLOBULIN SER-MCNC: 5.4 G/DL (ref 2–4)
GLUCOSE SERPL-MCNC: 316 MG/DL (ref 70–105)
GLUCOSE SERPL-MCNC: 318 MG/DL (ref 70–105)
GLUCOSE SERPL-MCNC: 330 MG/DL (ref 70–105)
GLUCOSE SERPL-MCNC: 358 MG/DL (ref 70–105)
GLUCOSE SERPL-MCNC: 410 MG/DL (ref 70–105)
GLUCOSE SERPL-MCNC: 428 MG/DL (ref 74–106)
GLUCOSE UR STRIP-MCNC: 500 MG/DL
HBA1C MFR BLD HPLC: 12.4 % (ref 4.5–6.6)
HCT VFR BLD AUTO: 39.1 % (ref 38–47)
HGB BLD-MCNC: 13 G/DL (ref 12–16)
IMM GRANULOCYTES # BLD AUTO: 0.16 K/UL (ref 0–0.04)
IMM GRANULOCYTES NFR BLD: 0.7 % (ref 0–0.4)
KETONES UR STRIP-SCNC: >=160 MG/DL
LEUKOCYTE ESTERASE UR QL STRIP: NEGATIVE
LIPASE SERPL-CCNC: 62 U/L (ref 73–393)
LYMPHOCYTES # BLD AUTO: 0.82 K/UL (ref 1–4.8)
LYMPHOCYTES NFR BLD AUTO: 3.6 % (ref 27–41)
LYMPHOCYTES NFR BLD MANUAL: 5 % (ref 27–41)
MAGNESIUM SERPL-MCNC: 2.9 MG/DL (ref 1.7–2.3)
MCH RBC QN AUTO: 28.6 PG (ref 27–31)
MCHC RBC AUTO-ENTMCNC: 33.2 G/DL (ref 32–36)
MCV RBC AUTO: 86.1 FL (ref 80–96)
MONOCYTES # BLD AUTO: 1.75 K/UL (ref 0–0.8)
MONOCYTES NFR BLD AUTO: 7.8 % (ref 2–6)
MONOCYTES NFR BLD MANUAL: 5 % (ref 2–6)
MPC BLD CALC-MCNC: 10.7 FL (ref 9.4–12.4)
NEUTROPHILS # BLD AUTO: 19.53 K/UL (ref 1.8–7.7)
NEUTROPHILS NFR BLD AUTO: 86.7 % (ref 53–65)
NEUTS BAND NFR BLD MANUAL: 20 % (ref 1–5)
NEUTS SEG NFR BLD MANUAL: 70 % (ref 50–62)
NITRITE UR QL STRIP: NEGATIVE
NRBC BLD MANUAL-RTO: ABNORMAL %
OPIATES UR QL SCN: NEGATIVE
PCP UR QL SCN: NEGATIVE
PH UR STRIP: 5.5 PH UNITS
PLATELET # BLD AUTO: 247 K/UL (ref 150–400)
POTASSIUM SERPL-SCNC: 4.2 MMOL/L (ref 3.5–5.1)
PROT SERPL-MCNC: 7.4 G/DL (ref 6.4–8.2)
PROT UR QL STRIP: 30
RBC # BLD AUTO: 4.54 M/UL (ref 4.2–5.4)
RBC # UR STRIP: ABNORMAL /UL
RBC #/AREA URNS HPF: ABNORMAL /HPF
SODIUM SERPL-SCNC: 128 MMOL/L (ref 136–145)
SP GR UR STRIP: 1.02
SQUAMOUS #/AREA URNS LPF: ABNORMAL /LPF
TOXIC GRANULES BLD QL SMEAR: ABNORMAL
UROBILINOGEN UR STRIP-ACNC: 0.2 MG/DL
WBC # BLD AUTO: 22.52 K/UL (ref 4.5–11)
WBC #/AREA URNS HPF: ABNORMAL /HPF
WBC TOXIC VACUOLES BLD QL SMEAR: ABNORMAL

## 2022-11-09 PROCEDURE — 83735 ASSAY OF MAGNESIUM: CPT | Performed by: EMERGENCY MEDICINE

## 2022-11-09 PROCEDURE — 63600175 PHARM REV CODE 636 W HCPCS: Performed by: EMERGENCY MEDICINE

## 2022-11-09 PROCEDURE — 25000003 PHARM REV CODE 250: Performed by: EMERGENCY MEDICINE

## 2022-11-09 PROCEDURE — 99284 EMERGENCY DEPT VISIT MOD MDM: CPT | Performed by: EMERGENCY MEDICINE

## 2022-11-09 PROCEDURE — G0378 HOSPITAL OBSERVATION PER HR: HCPCS

## 2022-11-09 PROCEDURE — 81003 URINALYSIS AUTO W/O SCOPE: CPT | Performed by: EMERGENCY MEDICINE

## 2022-11-09 PROCEDURE — 82962 GLUCOSE BLOOD TEST: CPT

## 2022-11-09 PROCEDURE — 85025 COMPLETE CBC W/AUTO DIFF WBC: CPT | Performed by: EMERGENCY MEDICINE

## 2022-11-09 PROCEDURE — 81001 URINALYSIS AUTO W/SCOPE: CPT | Performed by: EMERGENCY MEDICINE

## 2022-11-09 PROCEDURE — 82150 ASSAY OF AMYLASE: CPT | Performed by: EMERGENCY MEDICINE

## 2022-11-09 PROCEDURE — 36592 COLLECT BLOOD FROM PICC: CPT | Performed by: EMERGENCY MEDICINE

## 2022-11-09 PROCEDURE — 94761 N-INVAS EAR/PLS OXIMETRY MLT: CPT

## 2022-11-09 PROCEDURE — 83690 ASSAY OF LIPASE: CPT | Performed by: EMERGENCY MEDICINE

## 2022-11-09 PROCEDURE — 96375 TX/PRO/DX INJ NEW DRUG ADDON: CPT

## 2022-11-09 PROCEDURE — 99285 EMERGENCY DEPT VISIT HI MDM: CPT | Mod: 25

## 2022-11-09 PROCEDURE — 80307 DRUG TEST PRSMV CHEM ANLYZR: CPT | Performed by: EMERGENCY MEDICINE

## 2022-11-09 PROCEDURE — 96365 THER/PROPH/DIAG IV INF INIT: CPT

## 2022-11-09 PROCEDURE — 83036 HEMOGLOBIN GLYCOSYLATED A1C: CPT | Performed by: EMERGENCY MEDICINE

## 2022-11-09 PROCEDURE — 99219 PR INITIAL OBSERVATION CARE,LEVL II: CPT | Mod: GT,25 | Performed by: EMERGENCY MEDICINE

## 2022-11-09 PROCEDURE — 96361 HYDRATE IV INFUSION ADD-ON: CPT

## 2022-11-09 PROCEDURE — 87086 URINE CULTURE/COLONY COUNT: CPT | Performed by: EMERGENCY MEDICINE

## 2022-11-09 PROCEDURE — 80053 COMPREHEN METABOLIC PANEL: CPT | Performed by: EMERGENCY MEDICINE

## 2022-11-09 RX ORDER — DULOXETIN HYDROCHLORIDE 30 MG/1
60 CAPSULE, DELAYED RELEASE ORAL DAILY
Status: DISCONTINUED | OUTPATIENT
Start: 2022-11-09 | End: 2022-11-13 | Stop reason: HOSPADM

## 2022-11-09 RX ORDER — GLUCAGON 1 MG
1 KIT INJECTION
Status: DISCONTINUED | OUTPATIENT
Start: 2022-11-09 | End: 2022-11-13 | Stop reason: HOSPADM

## 2022-11-09 RX ORDER — ONDANSETRON 2 MG/ML
4 INJECTION INTRAMUSCULAR; INTRAVENOUS EVERY 8 HOURS PRN
Status: DISCONTINUED | OUTPATIENT
Start: 2022-11-09 | End: 2022-11-13 | Stop reason: HOSPADM

## 2022-11-09 RX ORDER — IBUPROFEN 200 MG
16 TABLET ORAL
Status: DISCONTINUED | OUTPATIENT
Start: 2022-11-09 | End: 2022-11-13 | Stop reason: HOSPADM

## 2022-11-09 RX ORDER — SODIUM CHLORIDE 0.9 % (FLUSH) 0.9 %
10 SYRINGE (ML) INJECTION
Status: DISCONTINUED | OUTPATIENT
Start: 2022-11-09 | End: 2022-11-13 | Stop reason: HOSPADM

## 2022-11-09 RX ORDER — AMOXICILLIN 250 MG
1 CAPSULE ORAL 2 TIMES DAILY
Status: DISCONTINUED | OUTPATIENT
Start: 2022-11-09 | End: 2022-11-13 | Stop reason: HOSPADM

## 2022-11-09 RX ORDER — POLYETHYLENE GLYCOL 3350 17 G/17G
17 POWDER, FOR SOLUTION ORAL DAILY
Status: DISCONTINUED | OUTPATIENT
Start: 2022-11-09 | End: 2022-11-13 | Stop reason: HOSPADM

## 2022-11-09 RX ORDER — ONDANSETRON 4 MG/1
8 TABLET, ORALLY DISINTEGRATING ORAL EVERY 8 HOURS PRN
Status: DISCONTINUED | OUTPATIENT
Start: 2022-11-09 | End: 2022-11-13 | Stop reason: HOSPADM

## 2022-11-09 RX ORDER — GABAPENTIN 300 MG/1
300 CAPSULE ORAL 2 TIMES DAILY
Status: ON HOLD | COMMUNITY
End: 2023-05-09

## 2022-11-09 RX ORDER — DEXTROMETHORPHAN POLISTIREX 30 MG/5 ML
1 SUSPENSION, EXTENDED RELEASE 12 HR ORAL DAILY PRN
Status: DISCONTINUED | OUTPATIENT
Start: 2022-11-09 | End: 2022-11-11

## 2022-11-09 RX ORDER — IBUPROFEN 200 MG
24 TABLET ORAL
Status: DISCONTINUED | OUTPATIENT
Start: 2022-11-09 | End: 2022-11-13 | Stop reason: HOSPADM

## 2022-11-09 RX ORDER — IBUPROFEN 200 MG
600 TABLET ORAL EVERY 6 HOURS PRN
Status: DISCONTINUED | OUTPATIENT
Start: 2022-11-09 | End: 2022-11-13 | Stop reason: HOSPADM

## 2022-11-09 RX ORDER — KETOROLAC TROMETHAMINE 30 MG/ML
15 INJECTION, SOLUTION INTRAMUSCULAR; INTRAVENOUS
Status: COMPLETED | OUTPATIENT
Start: 2022-11-09 | End: 2022-11-09

## 2022-11-09 RX ORDER — FAMOTIDINE 20 MG/1
20 TABLET, FILM COATED ORAL 2 TIMES DAILY
Status: DISCONTINUED | OUTPATIENT
Start: 2022-11-09 | End: 2022-11-13 | Stop reason: HOSPADM

## 2022-11-09 RX ORDER — ENOXAPARIN SODIUM 100 MG/ML
40 INJECTION SUBCUTANEOUS EVERY 24 HOURS
Status: DISCONTINUED | OUTPATIENT
Start: 2022-11-09 | End: 2022-11-13 | Stop reason: HOSPADM

## 2022-11-09 RX ORDER — INSULIN ASPART 100 [IU]/ML
1-10 INJECTION, SOLUTION INTRAVENOUS; SUBCUTANEOUS
Status: DISCONTINUED | OUTPATIENT
Start: 2022-11-09 | End: 2022-11-13 | Stop reason: HOSPADM

## 2022-11-09 RX ORDER — TALC
6 POWDER (GRAM) TOPICAL NIGHTLY PRN
Status: DISCONTINUED | OUTPATIENT
Start: 2022-11-09 | End: 2022-11-13 | Stop reason: HOSPADM

## 2022-11-09 RX ADMIN — IBUPROFEN 600 MG: 200 TABLET, FILM COATED ORAL at 03:11

## 2022-11-09 RX ADMIN — INSULIN ASPART 8 UNITS: 100 INJECTION, SOLUTION INTRAVENOUS; SUBCUTANEOUS at 03:11

## 2022-11-09 RX ADMIN — FAMOTIDINE 20 MG: 20 TABLET ORAL at 12:11

## 2022-11-09 RX ADMIN — IBUPROFEN 600 MG: 200 TABLET, FILM COATED ORAL at 10:11

## 2022-11-09 RX ADMIN — FAMOTIDINE 20 MG: 20 TABLET ORAL at 08:11

## 2022-11-09 RX ADMIN — SODIUM CHLORIDE 1000 ML: 9 INJECTION, SOLUTION INTRAVENOUS at 09:11

## 2022-11-09 RX ADMIN — SENNOSIDES AND DOCUSATE SODIUM 1 TABLET: 50; 8.6 TABLET ORAL at 12:11

## 2022-11-09 RX ADMIN — POLYETHYLENE GLYCOL 3350 17 G: 17 POWDER, FOR SOLUTION ORAL at 12:11

## 2022-11-09 RX ADMIN — SENNOSIDES AND DOCUSATE SODIUM 1 TABLET: 50; 8.6 TABLET ORAL at 08:11

## 2022-11-09 RX ADMIN — INSULIN ASPART 4 UNITS: 100 INJECTION, SOLUTION INTRAVENOUS; SUBCUTANEOUS at 08:11

## 2022-11-09 RX ADMIN — HUMAN INSULIN 10 UNITS: 100 INJECTION, SOLUTION SUBCUTANEOUS at 11:11

## 2022-11-09 RX ADMIN — DULOXETINE 60 MG: 30 CAPSULE, DELAYED RELEASE ORAL at 12:11

## 2022-11-09 RX ADMIN — CEFTRIAXONE SODIUM 1 G: 1 INJECTION, POWDER, FOR SOLUTION INTRAMUSCULAR; INTRAVENOUS at 11:11

## 2022-11-09 RX ADMIN — KETOROLAC TROMETHAMINE 15 MG: 30 INJECTION, SOLUTION INTRAMUSCULAR at 09:11

## 2022-11-09 NOTE — PLAN OF CARE
Problem: Adult Inpatient Plan of Care  Goal: Plan of Care Review  Outcome: Ongoing, Progressing  Goal: Patient-Specific Goal (Individualized)  Outcome: Ongoing, Progressing     Problem: Fall Injury Risk  Goal: Absence of Fall and Fall-Related Injury  Outcome: Ongoing, Progressing  Intervention: Identify and Manage Contributors  Flowsheets (Taken 11/9/2022 1753)  Self-Care Promotion:   independence encouraged   BADL personal objects within reach   BADL personal routines maintained  Medication Review/Management:   medications reviewed   high-risk medications identified  Intervention: Promote Injury-Free Environment  Flowsheets (Taken 11/9/2022 1753)  Safety Promotion/Fall Prevention:   assistive device/personal item within reach   diversional activities provided   high risk medications identified   medications reviewed   muscle strengthening facilitated   nonskid shoes/socks when out of bed   side rails raised x 3   instructed to call staff for mobility

## 2022-11-09 NOTE — NURSING
1215 Rec'd pt to room via stretcher. Pt drowsy but alert to voice. A&O x4. R AC #20 CDI. Pt c/o lower back pain + lower abd pain upon palpation ( BS hypoactive).Pt requesting food. Diabetic diet tray ordered. Call bell in reach. Bed in lowest position. All safety measures met at this time.

## 2022-11-09 NOTE — ASSESSMENT & PLAN NOTE
Patient's FSGs are uncontrolled due to hyperglycemia on current medication regimen.  Last A1c reviewed-   Lab Results   Component Value Date    HGBA1C 12.2 (H) 01/04/2022     Most recent fingerstick glucose reviewed- No results for input(s): POCTGLUCOSE in the last 24 hours.  Current correctional scale  Medium  Maintain anti-hyperglycemic dose as follows-   Antihyperglycemics (From admission, onward)    None        Hold Oral hypoglycemics while patient is in the hospital.

## 2022-11-09 NOTE — H&P
"[facility] - Emergency Department    History & Physical      Patient Name: Monica Walker  MRN: [id number]  Admission Date: 11/9/2022  Attending Physician: Jadiel Robert DO   Primary Care Provider: Hannah Schulz MD         Patient information was obtained from patient, parent, past medical records, and ER records.     Subjective:     Principal Problem:Pyelonephritis    Chief Complaint:   Chief Complaint   Patient presents with    Nephrolithiasis    Back Pain     C/o back pain for 2 days        HPI:  Patient admitted from the emergency department where she presented with right flank and midline low back pain for 2 days.  Patient found to have uncontrolled type 2 diabetes as well as pyelonephritis on the left.  Patient is admitted for IV fluids, IV antibiotics, and blood sugar control.    Past Medical History:   Diagnosis Date    Diabetes mellitus     Hypertension        Past Surgical History:   Procedure Laterality Date    BACK SURGERY      HYSTERECTOMY      TOTAL HIP ARTHROPLASTY      TOTAL KNEE ARTHROPLASTY         Review of patient's allergies indicates:   Allergen Reactions    Opioids - morphine analogues Hives       No current facility-administered medications on file prior to encounter.     Current Outpatient Medications on File Prior to Encounter   Medication Sig    BD ULTRA-FINE SHORT PEN NEEDLE 31 gauge x 5/16" Ndle     DULoxetine (CYMBALTA) 60 MG capsule Take 1 capsule (60 mg total) by mouth once daily.    gabapentin (NEURONTIN ORAL) Take by mouth 2 (two) times a day.    NOVOLOG FLEXPEN U-100 INSULIN 100 unit/mL (3 mL) InPn pen Inject 10 Units into the skin 2 (two) times a day.    phentermine 37.5 MG capsule Take 37.5 mg by mouth every morning.    TRULICITY 0.75 mg/0.5 mL pen injector Inject 0.75 mg into the skin every 7 days.     Family History    None       Tobacco Use    Smoking status: Some Days     Types: Cigarettes    Smokeless tobacco: Current     Types: Snuff   Substance " and Sexual Activity    Alcohol use: Never    Drug use: Never    Sexual activity: Not on file     Review of Systems   Constitutional: Negative.  Negative for fatigue and fever.   HENT: Negative.     Eyes: Negative.    Respiratory: Negative.  Negative for apnea, cough, choking, chest tightness, shortness of breath, wheezing and stridor.    Cardiovascular: Negative.  Negative for chest pain, palpitations and leg swelling.   Gastrointestinal:  Positive for abdominal pain (Right flank and right mid abdominal pain reported.) and constipation (patient reports she thought she was constipated when the pain started.). Negative for blood in stool, diarrhea, nausea and vomiting.   Genitourinary:  Positive for flank pain. Negative for decreased urine volume, difficulty urinating, dysuria, frequency, hematuria, urgency, vaginal bleeding, vaginal discharge and vaginal pain.   Musculoskeletal:  Positive for back pain. Negative for gait problem, joint swelling, myalgias, neck pain and neck stiffness.   Skin:  Negative for color change, pallor, rash and wound.   Neurological:  Negative for dizziness, tremors, seizures, syncope, facial asymmetry, speech difficulty, weakness, light-headedness, numbness and headaches.   Hematological: Negative.    Psychiatric/Behavioral: Negative.     All other systems reviewed and are negative.  Objective:     Vital Signs (Most Recent):  Temp: 98 °F (36.7 °C) (11/09/22 0857)  Pulse: 93 (11/09/22 1115)  Resp: 18 (11/09/22 1115)  BP: (!) 145/67 (11/09/22 1115)  SpO2: 95 % (11/09/22 1115)   Vital Signs (24h Range):  Temp:  [98 °F (36.7 °C)] 98 °F (36.7 °C)  Pulse:  [90-95] 93  Resp:  [18-20] 18  SpO2:  [94 %-97 %] 95 %  BP: (139-150)/(67-75) 145/67     Weight: 65.8 kg (145 lb)  Body mass index is 23.4 kg/m².    Physical Exam  Vitals and nursing note reviewed.   Constitutional:       General: She is not in acute distress.     Appearance: Normal appearance. She is ill-appearing. She is not toxic-appearing.    HENT:      Head: Normocephalic.      Right Ear: Tympanic membrane, ear canal and external ear normal.      Left Ear: Tympanic membrane, ear canal and external ear normal.      Nose: Nose normal. No congestion or rhinorrhea.      Mouth/Throat:      Mouth: Mucous membranes are moist.      Pharynx: No oropharyngeal exudate or posterior oropharyngeal erythema.   Eyes:      General: No scleral icterus.        Right eye: No discharge.         Left eye: No discharge.      Extraocular Movements: Extraocular movements intact.      Conjunctiva/sclera: Conjunctivae normal.      Pupils: Pupils are equal, round, and reactive to light.   Cardiovascular:      Rate and Rhythm: Normal rate and regular rhythm.      Pulses: Normal pulses.      Heart sounds: Normal heart sounds. No murmur heard.    No friction rub. No gallop.   Pulmonary:      Effort: Pulmonary effort is normal. No respiratory distress.      Breath sounds: Normal breath sounds. No stridor. No wheezing, rhonchi or rales.   Abdominal:      General: Abdomen is flat. Bowel sounds are normal. There is no distension.      Palpations: Abdomen is soft. There is no mass.      Tenderness: There is abdominal tenderness (patient has right CVA tenderness on percussion and right mid abdominal pain which is mild.). There is right CVA tenderness. There is no left CVA tenderness, guarding or rebound.      Hernia: No hernia is present.   Musculoskeletal:         General: Tenderness (positive tenderness midline lower back on palpation.) present. No swelling, deformity or signs of injury. Normal range of motion.      Cervical back: Normal range of motion and neck supple. No rigidity or tenderness.      Right lower leg: No edema.      Left lower leg: No edema.   Lymphadenopathy:      Cervical: No cervical adenopathy.   Skin:     General: Skin is warm and dry.      Capillary Refill: Capillary refill takes less than 2 seconds.      Coloration: Skin is not jaundiced or pale.      Findings:  No bruising, erythema, lesion or rash.   Neurological:      General: No focal deficit present.      Mental Status: She is alert and oriented to person, place, and time.      Cranial Nerves: No cranial nerve deficit.      Sensory: No sensory deficit.      Motor: No weakness.      Coordination: Coordination normal.   Psychiatric:         Mood and Affect: Mood normal.         Behavior: Behavior normal.         CRANIAL NERVES     CN III, IV, VI   Pupils are equal, round, and reactive to light.    Significant Labs: All pertinent labs within the past 24 hours have been reviewed.    Significant Imaging: I have reviewed all pertinent imaging results/findings within the past 24 hours.    Assessment/Plan:     Active Diagnoses:    Diagnosis Date Noted POA    PRINCIPAL PROBLEM:  Pyelonephritis [N12] 11/09/2022 Yes    Uncontrolled type 2 diabetes mellitus with hyperglycemia [E11.65] 11/09/2022 Yes      Problems Resolved During this Admission:     VTE Risk Mitigation (From admission, onward)      Mary Robert DO  Department of Hospital Medicine   [facility] - Emergency Department

## 2022-11-09 NOTE — PLAN OF CARE
Problem: Infection  Goal: Absence of Infection Signs and Symptoms  Outcome: Ongoing, Progressing     Problem: Adult Inpatient Plan of Care  Goal: Plan of Care Review  Outcome: Ongoing, Progressing  Goal: Patient-Specific Goal (Individualized)  Outcome: Ongoing, Progressing  Goal: Absence of Hospital-Acquired Illness or Injury  Outcome: Ongoing, Progressing  Goal: Optimal Comfort and Wellbeing  Outcome: Ongoing, Progressing     Problem: Diabetes Comorbidity  Goal: Blood Glucose Level Within Targeted Range  Outcome: Ongoing, Progressing

## 2022-11-09 NOTE — ED NOTES
Pt asked to given urine sample on arrival - pt states I cannot pee at this time - pt asked again - pt states I can't go right now but maybe later- explained the need for urine sample- explained would have to cath if unable to give sample

## 2022-11-09 NOTE — ED NOTES
Dr walters reviewed all findings and spoke with pt and mother- pt and mother aware of md elects to admit pt - new orders received-

## 2022-11-09 NOTE — ED PROVIDER NOTES
Encounter Date: 11/9/2022       History     Chief Complaint   Patient presents with    Nephrolithiasis    Back Pain     C/o back pain for 2 days     Patient presents with right flank and midline low back pain.  Pain radiates to right mid and lower abdomen to a small degree.  No nausea or vomiting.  No history of renal stones.    Review of patient's allergies indicates:   Allergen Reactions    Opioids - morphine analogues Hives     Past Medical History:   Diagnosis Date    Diabetes mellitus     Hypertension      Past Surgical History:   Procedure Laterality Date    BACK SURGERY      HYSTERECTOMY      TOTAL HIP ARTHROPLASTY      TOTAL KNEE ARTHROPLASTY       History reviewed. No pertinent family history.  Social History     Tobacco Use    Smoking status: Some Days     Types: Cigarettes    Smokeless tobacco: Current     Types: Snuff   Substance Use Topics    Alcohol use: Never    Drug use: Never     Review of Systems   Constitutional: Negative.    HENT: Negative.     Eyes: Negative.    Respiratory: Negative.     Cardiovascular: Negative.    Gastrointestinal:  Positive for abdominal pain (Right flank pain) and constipation (patient reports she thought she was constipated when this pain started 2 days ago). Negative for abdominal distention, blood in stool, diarrhea, nausea, rectal pain and vomiting.   Genitourinary:  Positive for flank pain. Negative for decreased urine volume, difficulty urinating, dysuria, frequency, hematuria, pelvic pain, urgency, vaginal bleeding, vaginal discharge and vaginal pain.   Musculoskeletal:  Positive for back pain (right flank and low back pain.). Negative for gait problem, joint swelling, myalgias, neck pain and neck stiffness.   Skin: Negative.    Neurological: Negative.    Psychiatric/Behavioral: Negative.     All other systems reviewed and are negative.    Physical Exam     Initial Vitals [11/09/22 0857]   BP Pulse Resp Temp SpO2   139/67 90 20 98 °F (36.7 °C) 97 %      MAP       --          Physical Exam    Nursing note and vitals reviewed.  Constitutional: She appears well-developed and well-nourished. She is not diaphoretic. No distress.   Patient appears to be in significant discomfort.     HENT:   Head: Normocephalic.   Right Ear: External ear normal.   Left Ear: External ear normal.   Nose: Nose normal.   Mouth/Throat: Oropharynx is clear and moist. No oropharyngeal exudate.   Eyes: Conjunctivae and EOM are normal. Pupils are equal, round, and reactive to light. Right eye exhibits no discharge. Left eye exhibits no discharge. No scleral icterus.   Neck: Neck supple. No JVD present.   Normal range of motion.  Cardiovascular:  Normal rate, regular rhythm, normal heart sounds and intact distal pulses.           No murmur heard.  Pulmonary/Chest: Breath sounds normal. No stridor. No respiratory distress. She has no wheezes. She has no rhonchi. She has no rales.   Abdominal: Abdomen is soft. Bowel sounds are normal. She exhibits no mass. There is abdominal tenderness (patient has right mid abdominal tenderness, mild, no peritoneal signs.). There is no rebound and no guarding.   Musculoskeletal:         General: Tenderness (patient has tenderness on percussion of the right flank area, has subjective tenderness as well with palpation of the upper lumbar spine at the L1-L2 level in the midline and just to the right of the midline.) present. No edema. Normal range of motion.      Cervical back: Normal range of motion and neck supple.     Lymphadenopathy:     She has no cervical adenopathy.   Neurological: She is alert and oriented to person, place, and time. She has normal strength. No cranial nerve deficit. GCS score is 15. GCS eye subscore is 4. GCS verbal subscore is 5. GCS motor subscore is 6.   Skin: Skin is warm and dry. Capillary refill takes less than 2 seconds. No rash noted. No erythema. No pallor.   Psychiatric: She has a normal mood and affect. Her behavior is normal.       Medical  Screening Exam   See Full Note    ED Course   Procedures  Labs Reviewed   COMPREHENSIVE METABOLIC PANEL - Abnormal; Notable for the following components:       Result Value    Sodium 128 (*)     Chloride 88 (*)     Glucose 428 (*)     Albumin 2.0 (*)     Globulin 5.4 (*)     Alk Phos 165 (*)     All other components within normal limits   DRUG SCREEN, URINE (BEAKER) - Abnormal; Notable for the following components:    Amphetamine Positive (*)     Cannabinoid, Urine Positive (*)     All other components within normal limits    Narrative:     The results of screening tests should be considered presumptive. Confirmatory testing is available upon request.    Cutoff Points:  PCP:         25ng/mL  AMPH:        500ng/mL  ELVIRA:        200ng/mL  DEJUAN:        200ng/mL  THC:         50ng/mL  SAMANTHA:         300ng/mL  OPI:         2000ng/mL   AMYLASE - Abnormal; Notable for the following components:    Amylase 16 (*)     All other components within normal limits   LIPASE - Abnormal; Notable for the following components:    Lipase 62 (*)     All other components within normal limits   MAGNESIUM - Abnormal; Notable for the following components:    Magnesium 2.9 (*)     All other components within normal limits   URINALYSIS, REFLEX TO URINE CULTURE - Abnormal; Notable for the following components:    Protein, UA 30 (*)     Glucose,  (*)     Ketones, UA >=160 (*)     Bilirubin, UA Small (*)     Blood, UA Trace-Lysed (*)     All other components within normal limits   CBC WITH DIFFERENTIAL - Abnormal; Notable for the following components:    WBC 22.52 (*)     Neutrophils % 86.7 (*)     Lymphocytes % 3.6 (*)     Neutrophils, Abs 19.53 (*)     Lymphocytes, Absolute 0.82 (*)     Monocytes % 7.8 (*)     Immature Granulocytes % 0.7 (*)     Monocytes, Absolute 1.75 (*)     Immature Granulocytes, Absolute 0.16 (*)     All other components within normal limits   MANUAL DIFFERENTIAL - Abnormal; Notable for the following components:     Segmented Neutrophils, Man % 70 (*)     Bands, Man % 20 (*)     Lymphocytes, Man % 5 (*)     All other components within normal limits   URINALYSIS, MICROSCOPIC - Abnormal; Notable for the following components:    Bacteria, UA Many (*)     Squamous Epithelial Cells, UA Moderate (*)     Amorphous Crystals, UA Moderate (*)     All other components within normal limits   POCT GLUCOSE MONITORING CONTINUOUS - Abnormal; Notable for the following components:    POC Glucose 410 (*)     All other components within normal limits   POCT GLUCOSE MONITORING CONTINUOUS - Abnormal; Notable for the following components:    POC Glucose 358 (*)     All other components within normal limits   CULTURE, URINE   CBC W/ AUTO DIFFERENTIAL    Narrative:     The following orders were created for panel order CBC auto differential.  Procedure                               Abnormality         Status                     ---------                               -----------         ------                     CBC with Differential[[id number]]        Abnormal            Final result               Manual Differential[[id number]]          Abnormal            Final result                 Please view results for these tests on the individual orders.   POCT GLUCOSE MONITORING CONTINUOUS          Imaging Results              CT Renal Stone Study ABD Pelvis WO (Edited Result - FINAL)  Result time 11/09/22 10:32:22      Addendum (preliminary) 1 of 1 by Corey Boateng II, MD (11/09/22 10:32:22)      Surgical changes at L4-5 appear within normal limits.  Minimal degenerative changes present the remaining lumbar spine.      Electronically signed by: Corey Boateng  Date:    11/09/2022  Time:    10:32                 Final result by Corey Boateng II, MD (11/09/22 10:10:26)                   Impression:      Mild-to-moderate left hydronephrosis without obstructing calculus visualized.  There is perinephric stranding enlargement of the left kidney, renal  infection cannot be excluded.      Electronically signed by: Corey Boateng  Date:    11/09/2022  Time:    10:10               Narrative:    EXAMINATION:  CT RENAL STONE STUDY ABD PELVIS WO    CLINICAL HISTORY:  Flank pain, kidney stone suspected;Patient also has low back pain.;    TECHNIQUE:  Axial CT imaging of the abdomen and pelvis is performed without contrast.    CT dose reduction technique used - Dose Rite and tube current modulation.    COMPARISON:  13 August 2021    FINDINGS:  Cardiac and lung bases are within normal limits    CT abdomen: The liver, spleen, pancreas and adrenal glands are normal in size and density.  No evidence of focal lesion is demonstrated in these solid organs.    There is mild-to-moderate left hydronephrosis with enlargement of the left kidney and perinephric stranding.  There is minimal hydroureter without ureteral calculus identified.    The bowel caliber is normal and no wall thickening or adjacent inflammatory change is seen.  No evidence of free fluid or free air is present.  Appendix is normal.    CT pelvis: The bowel and bladder appear within normal limits.  The pelvic organs show no evidence of abnormality                                       Medications   insulin regular injection 10 Units 0.1 mL (has no administration in time range)   cefTRIAXone (ROCEPHIN) 1 g in dextrose 5 % in water (D5W) 5 % 50 mL IVPB (MB+) (has no administration in time range)   sodium chloride 0.9% bolus 1,000 mL (0 mLs Intravenous Stopped 11/9/22 1001)   ketorolac injection 15 mg (15 mg Intravenous Given 11/9/22 0924)     Medical Decision Making:   Clinical Tests:   Lab Tests: Reviewed       <> Summary of Lab: Urinalysis is consistent urinary infection.  Radiological Study: Reviewed  ED Management:  Patient treated with IV fluids, IV insulin and IV Rocephin.  Recommended admission for continued IV antibiotics and blood sugar control.            Radiologist report for CT scan of the abdomen for  renal stone search as well as review of bony lumbar spine, indicates left-sided hydronephrosis and some stranding suggestive of renal infection.  Bony spine shows previous surgical changes and minimal or mild degenerative changes.  No other abnormality reported.       Clinical Impression:   Final diagnoses:  [N12] Pyelonephritis (Primary)      ED Disposition Condition    Observation Stable                Jadiel Robert DO  11/09/22 111

## 2022-11-10 LAB
ALBUMIN SERPL BCP-MCNC: 1.7 G/DL (ref 3.5–5)
ALBUMIN/GLOB SERPL: 0.3 {RATIO}
ALP SERPL-CCNC: 174 U/L (ref 41–108)
ALT SERPL W P-5'-P-CCNC: 25 U/L (ref 13–56)
ANION GAP SERPL CALCULATED.3IONS-SCNC: 16 MMOL/L (ref 7–16)
AST SERPL W P-5'-P-CCNC: 16 U/L (ref 15–37)
BASOPHILS # BLD AUTO: 0.03 K/UL (ref 0–0.2)
BASOPHILS NFR BLD AUTO: 0.2 % (ref 0–1)
BILIRUB SERPL-MCNC: 0.6 MG/DL (ref ?–1.2)
BUN SERPL-MCNC: 15 MG/DL (ref 7–18)
BUN/CREAT SERPL: 21 (ref 6–20)
CALCIUM SERPL-MCNC: 8.5 MG/DL (ref 8.5–10.1)
CHLORIDE SERPL-SCNC: 93 MMOL/L (ref 98–107)
CO2 SERPL-SCNC: 25 MMOL/L (ref 21–32)
CREAT SERPL-MCNC: 0.73 MG/DL (ref 0.55–1.02)
DIFFERENTIAL METHOD BLD: ABNORMAL
EGFR (NO RACE VARIABLE) (RUSH/TITUS): 98 ML/MIN/1.73M²
EOSINOPHIL # BLD AUTO: 0.22 K/UL (ref 0–0.5)
EOSINOPHIL NFR BLD AUTO: 1.1 % (ref 1–4)
ERYTHROCYTE [DISTWIDTH] IN BLOOD BY AUTOMATED COUNT: 13 % (ref 11.5–14.5)
GLOBULIN SER-MCNC: 5.2 G/DL (ref 2–4)
GLUCOSE SERPL-MCNC: 301 MG/DL (ref 70–105)
GLUCOSE SERPL-MCNC: 319 MG/DL (ref 70–105)
GLUCOSE SERPL-MCNC: 326 MG/DL (ref 74–106)
GLUCOSE SERPL-MCNC: 422 MG/DL (ref 70–105)
HCT VFR BLD AUTO: 38.2 % (ref 38–47)
HGB BLD-MCNC: 12.6 G/DL (ref 12–16)
IMM GRANULOCYTES # BLD AUTO: 0.09 K/UL (ref 0–0.04)
IMM GRANULOCYTES NFR BLD: 0.5 % (ref 0–0.4)
LYMPHOCYTES # BLD AUTO: 0.62 K/UL (ref 1–4.8)
LYMPHOCYTES NFR BLD AUTO: 3.2 % (ref 27–41)
MCH RBC QN AUTO: 28.4 PG (ref 27–31)
MCHC RBC AUTO-ENTMCNC: 33 G/DL (ref 32–36)
MCV RBC AUTO: 86 FL (ref 80–96)
MONOCYTES # BLD AUTO: 1.05 K/UL (ref 0–0.8)
MONOCYTES NFR BLD AUTO: 5.4 % (ref 2–6)
MPC BLD CALC-MCNC: 10.3 FL (ref 9.4–12.4)
NEUTROPHILS # BLD AUTO: 17.28 K/UL (ref 1.8–7.7)
NEUTROPHILS NFR BLD AUTO: 89.6 % (ref 53–65)
PLATELET # BLD AUTO: 219 K/UL (ref 150–400)
POTASSIUM SERPL-SCNC: 3.5 MMOL/L (ref 3.5–5.1)
PROT SERPL-MCNC: 6.9 G/DL (ref 6.4–8.2)
RBC # BLD AUTO: 4.44 M/UL (ref 4.2–5.4)
SODIUM SERPL-SCNC: 130 MMOL/L (ref 136–145)
WBC # BLD AUTO: 19.29 K/UL (ref 4.5–11)

## 2022-11-10 PROCEDURE — 63600175 PHARM REV CODE 636 W HCPCS: Performed by: FAMILY MEDICINE

## 2022-11-10 PROCEDURE — G0378 HOSPITAL OBSERVATION PER HR: HCPCS

## 2022-11-10 PROCEDURE — 99225 PR SUBSEQUENT OBSERVATION CARE,LEVEL II: CPT | Mod: ,,, | Performed by: FAMILY MEDICINE

## 2022-11-10 PROCEDURE — 80053 COMPREHEN METABOLIC PANEL: CPT | Performed by: EMERGENCY MEDICINE

## 2022-11-10 PROCEDURE — 63600175 PHARM REV CODE 636 W HCPCS: Mod: GZ | Performed by: EMERGENCY MEDICINE

## 2022-11-10 PROCEDURE — 85025 COMPLETE CBC W/AUTO DIFF WBC: CPT | Performed by: EMERGENCY MEDICINE

## 2022-11-10 PROCEDURE — 82962 GLUCOSE BLOOD TEST: CPT | Mod: 91

## 2022-11-10 PROCEDURE — 25000003 PHARM REV CODE 250: Performed by: EMERGENCY MEDICINE

## 2022-11-10 PROCEDURE — 99225 PR SUBSEQUENT OBSERVATION CARE,LEVEL II: ICD-10-PCS | Mod: ,,, | Performed by: FAMILY MEDICINE

## 2022-11-10 PROCEDURE — 94761 N-INVAS EAR/PLS OXIMETRY MLT: CPT

## 2022-11-10 PROCEDURE — 36415 COLL VENOUS BLD VENIPUNCTURE: CPT | Performed by: EMERGENCY MEDICINE

## 2022-11-10 RX ORDER — KETOROLAC TROMETHAMINE 30 MG/ML
60 INJECTION, SOLUTION INTRAMUSCULAR; INTRAVENOUS ONCE
Status: COMPLETED | OUTPATIENT
Start: 2022-11-10 | End: 2022-11-10

## 2022-11-10 RX ADMIN — FAMOTIDINE 20 MG: 20 TABLET ORAL at 08:11

## 2022-11-10 RX ADMIN — SENNOSIDES AND DOCUSATE SODIUM 1 TABLET: 50; 8.6 TABLET ORAL at 08:11

## 2022-11-10 RX ADMIN — INSULIN ASPART 8 UNITS: 100 INJECTION, SOLUTION INTRAVENOUS; SUBCUTANEOUS at 06:11

## 2022-11-10 RX ADMIN — INSULIN ASPART 8 UNITS: 100 INJECTION, SOLUTION INTRAVENOUS; SUBCUTANEOUS at 04:11

## 2022-11-10 RX ADMIN — INSULIN ASPART 10 UNITS: 100 INJECTION, SOLUTION INTRAVENOUS; SUBCUTANEOUS at 10:11

## 2022-11-10 RX ADMIN — ENOXAPARIN SODIUM 40 MG: 40 INJECTION SUBCUTANEOUS at 04:11

## 2022-11-10 RX ADMIN — DULOXETINE 60 MG: 30 CAPSULE, DELAYED RELEASE ORAL at 08:11

## 2022-11-10 RX ADMIN — INSULIN DETEMIR 40 UNITS: 100 INJECTION, SOLUTION SUBCUTANEOUS at 08:11

## 2022-11-10 RX ADMIN — CEFTRIAXONE SODIUM 1 G: 1 INJECTION, POWDER, FOR SOLUTION INTRAMUSCULAR; INTRAVENOUS at 10:11

## 2022-11-10 RX ADMIN — IBUPROFEN 600 MG: 200 TABLET, FILM COATED ORAL at 08:11

## 2022-11-10 RX ADMIN — INSULIN ASPART 4 UNITS: 100 INJECTION, SOLUTION INTRAVENOUS; SUBCUTANEOUS at 08:11

## 2022-11-10 RX ADMIN — KETOROLAC TROMETHAMINE 60 MG: 30 INJECTION, SOLUTION INTRAMUSCULAR at 10:11

## 2022-11-10 NOTE — PROGRESS NOTES
[facility] Medicine  Progress Note    Patient Name: Monica Walker  MRN: [id number]  Patient Class: OP- Observation   Admission Date: 11/9/2022  Length of Stay: 0 days  Attending Physician: Jadiel Robert DO  Primary Care Provider: Hannah Schulz MD        Subjective:     Principal Problem:Pyelonephritis        HPI:  No notes on file    Overview/Hospital Course:  11/10/22 - pt. Is lying in bed. Does not feel well. Urine culture is pending.      Interval History: not feeling well. Will continue present treatment.    Review of Systems  Objective:     Vital Signs (Most Recent):  Temp: 97.8 °F (36.6 °C) (11/09/22 1915)  Pulse: 97 (11/10/22 0715)  Resp: 20 (11/10/22 0715)  BP: 138/76 (11/10/22 0400)  SpO2: 96 % (11/10/22 0715) Vital Signs (24h Range):  Temp:  [97.6 °F (36.4 °C)-99.2 °F (37.3 °C)] 97.8 °F (36.6 °C)  Pulse:  [] 97  Resp:  [17-20] 20  SpO2:  [94 %-97 %] 96 %  BP: (127-150)/(67-82) 138/76     Weight: 65.8 kg (145 lb)  Body mass index is 23.4 kg/m².    Intake/Output Summary (Last 24 hours) at 11/10/2022 0811  Last data filed at 11/9/2022 1625  Gross per 24 hour   Intake 1290 ml   Output --   Net 1290 ml      Physical Exam    Significant Labs: All pertinent labs within the past 24 hours have been reviewed.    Significant Imaging: I have reviewed all pertinent imaging results/findings within the past 24 hours.      Assessment/Plan:      * Pyelonephritis    Admit for IV fluids, IV Rocephin, await urine culture and sensitivity results.    Uncontrolled type 2 diabetes mellitus with hyperglycemia  Patient's FSGs are uncontrolled due to hyperglycemia on current medication regimen.  Last A1c reviewed-   Lab Results   Component Value Date    HGBA1C 12.2 (H) 01/04/2022     Most recent fingerstick glucose reviewed- No results for input(s): POCTGLUCOSE in the last 24 hours.  Current correctional scale  Medium  Maintain anti-hyperglycemic dose as follows-    Antihyperglycemics (From admission, onward)    None        Hold Oral hypoglycemics while patient is in the hospital.      VTE Risk Mitigation (From admission, onward)         Ordered     enoxaparin injection 40 mg  Daily         11/09/22 1214     IP VTE HIGH RISK PATIENT  Once         11/09/22 1214                Discharge Planning   SUZANNA:      Code Status: Full Code   Is the patient medically ready for discharge?:     Reason for patient still in hospital (select all that apply): Patient trending condition                     Hannah Schulz MD  Department of Hospital Medicine   [facility]

## 2022-11-10 NOTE — SUBJECTIVE & OBJECTIVE
Interval History: not feeling well. Will continue present treatment.    Review of Systems  Objective:     Vital Signs (Most Recent):  Temp: 97.8 °F (36.6 °C) (11/09/22 1915)  Pulse: 97 (11/10/22 0715)  Resp: 20 (11/10/22 0715)  BP: 138/76 (11/10/22 0400)  SpO2: 96 % (11/10/22 0715) Vital Signs (24h Range):  Temp:  [97.6 °F (36.4 °C)-99.2 °F (37.3 °C)] 97.8 °F (36.6 °C)  Pulse:  [] 97  Resp:  [17-20] 20  SpO2:  [94 %-97 %] 96 %  BP: (127-150)/(67-82) 138/76     Weight: 65.8 kg (145 lb)  Body mass index is 23.4 kg/m².    Intake/Output Summary (Last 24 hours) at 11/10/2022 0811  Last data filed at 11/9/2022 1625  Gross per 24 hour   Intake 1290 ml   Output --   Net 1290 ml      Physical Exam    Significant Labs: All pertinent labs within the past 24 hours have been reviewed.    Significant Imaging: I have reviewed all pertinent imaging results/findings within the past 24 hours.

## 2022-11-10 NOTE — HOSPITAL COURSE
11/10/22 - pt. Is lying in bed. Does not feel well. Urine culture is pending.    11/11/22 - no growth in culture to date. She ate junk food yesterday and had another blood sugar over 400 mg%. States that she is staying in pain. Orders revised.    11/13/22: patient is ready for discharge this am.  Patient has reached maximum benefit of hospital services.  She has pain issues and will need to see if her primary can refer her for psychological services to help her addiction issues.  Throughout her hospital stay she has repeatedly asked for pain medicines and despite multiple physicians stating that this is not necessary she continued pressuring techniques seeking narcotics.

## 2022-11-11 LAB
ALBUMIN SERPL BCP-MCNC: 1.5 G/DL (ref 3.5–5)
ALBUMIN/GLOB SERPL: 0.3 {RATIO}
ALP SERPL-CCNC: 215 U/L (ref 41–108)
ALT SERPL W P-5'-P-CCNC: 37 U/L (ref 13–56)
ANION GAP SERPL CALCULATED.3IONS-SCNC: 11 MMOL/L (ref 7–16)
AST SERPL W P-5'-P-CCNC: 39 U/L (ref 15–37)
BASOPHILS # BLD AUTO: 0.05 K/UL (ref 0–0.2)
BASOPHILS NFR BLD AUTO: 0.3 % (ref 0–1)
BILIRUB SERPL-MCNC: 0.4 MG/DL (ref ?–1.2)
BUN SERPL-MCNC: 18 MG/DL (ref 7–18)
BUN/CREAT SERPL: 22 (ref 6–20)
CALCIUM SERPL-MCNC: 8.3 MG/DL (ref 8.5–10.1)
CHLORIDE SERPL-SCNC: 94 MMOL/L (ref 98–107)
CO2 SERPL-SCNC: 28 MMOL/L (ref 21–32)
CREAT SERPL-MCNC: 0.83 MG/DL (ref 0.55–1.02)
DIFFERENTIAL METHOD BLD: ABNORMAL
EGFR (NO RACE VARIABLE) (RUSH/TITUS): 84 ML/MIN/1.73M²
EOSINOPHIL # BLD AUTO: 0.23 K/UL (ref 0–0.5)
EOSINOPHIL NFR BLD AUTO: 1.2 % (ref 1–4)
EOSINOPHIL NFR BLD MANUAL: 2 % (ref 1–4)
ERYTHROCYTE [DISTWIDTH] IN BLOOD BY AUTOMATED COUNT: 13.3 % (ref 11.5–14.5)
GLOBULIN SER-MCNC: 5.1 G/DL (ref 2–4)
GLUCOSE SERPL-MCNC: 208 MG/DL (ref 74–106)
GLUCOSE SERPL-MCNC: 285 MG/DL (ref 70–105)
GLUCOSE SERPL-MCNC: 338 MG/DL (ref 70–105)
GLUCOSE SERPL-MCNC: 350 MG/DL (ref 70–105)
HCT VFR BLD AUTO: 37.3 % (ref 38–47)
HGB BLD-MCNC: 12.4 G/DL (ref 12–16)
IMM GRANULOCYTES # BLD AUTO: 0.27 K/UL (ref 0–0.04)
IMM GRANULOCYTES NFR BLD: 1.4 % (ref 0–0.4)
LYMPHOCYTES # BLD AUTO: 0.92 K/UL (ref 1–4.8)
LYMPHOCYTES NFR BLD AUTO: 4.7 % (ref 27–41)
LYMPHOCYTES NFR BLD MANUAL: 4 % (ref 27–41)
MCH RBC QN AUTO: 28.2 PG (ref 27–31)
MCHC RBC AUTO-ENTMCNC: 33.2 G/DL (ref 32–36)
MCV RBC AUTO: 84.8 FL (ref 80–96)
METAMYELOCYTES NFR BLD MANUAL: 1 %
MONOCYTES # BLD AUTO: 1.25 K/UL (ref 0–0.8)
MONOCYTES NFR BLD AUTO: 6.4 % (ref 2–6)
MONOCYTES NFR BLD MANUAL: 4 % (ref 2–6)
MPC BLD CALC-MCNC: 10 FL (ref 9.4–12.4)
NEUTROPHILS # BLD AUTO: 16.8 K/UL (ref 1.8–7.7)
NEUTROPHILS NFR BLD AUTO: 86 % (ref 53–65)
NEUTS BAND NFR BLD MANUAL: 6 % (ref 1–5)
NEUTS SEG NFR BLD MANUAL: 83 % (ref 50–62)
NRBC BLD MANUAL-RTO: ABNORMAL %
PLATELET # BLD AUTO: 262 K/UL (ref 150–400)
PLATELET MORPHOLOGY: NORMAL
POTASSIUM SERPL-SCNC: 3.3 MMOL/L (ref 3.5–5.1)
PROT SERPL-MCNC: 6.6 G/DL (ref 6.4–8.2)
RBC # BLD AUTO: 4.4 M/UL (ref 4.2–5.4)
RBC MORPH BLD: NORMAL
SODIUM SERPL-SCNC: 130 MMOL/L (ref 136–145)
UA COMPLETE W REFLEX CULTURE PNL UR: NO GROWTH
WBC # BLD AUTO: 19.52 K/UL (ref 4.5–11)

## 2022-11-11 PROCEDURE — 25000003 PHARM REV CODE 250: Performed by: FAMILY MEDICINE

## 2022-11-11 PROCEDURE — 99225 PR SUBSEQUENT OBSERVATION CARE,LEVEL II: ICD-10-PCS | Mod: ,,, | Performed by: FAMILY MEDICINE

## 2022-11-11 PROCEDURE — G0378 HOSPITAL OBSERVATION PER HR: HCPCS

## 2022-11-11 PROCEDURE — 82962 GLUCOSE BLOOD TEST: CPT | Mod: 91

## 2022-11-11 PROCEDURE — 99225 PR SUBSEQUENT OBSERVATION CARE,LEVEL II: CPT | Mod: ,,, | Performed by: FAMILY MEDICINE

## 2022-11-11 PROCEDURE — 63600175 PHARM REV CODE 636 W HCPCS: Performed by: EMERGENCY MEDICINE

## 2022-11-11 PROCEDURE — 94761 N-INVAS EAR/PLS OXIMETRY MLT: CPT

## 2022-11-11 PROCEDURE — 36415 COLL VENOUS BLD VENIPUNCTURE: CPT | Performed by: EMERGENCY MEDICINE

## 2022-11-11 PROCEDURE — 80053 COMPREHEN METABOLIC PANEL: CPT | Performed by: EMERGENCY MEDICINE

## 2022-11-11 PROCEDURE — 25000003 PHARM REV CODE 250: Performed by: EMERGENCY MEDICINE

## 2022-11-11 PROCEDURE — 85025 COMPLETE CBC W/AUTO DIFF WBC: CPT | Performed by: EMERGENCY MEDICINE

## 2022-11-11 PROCEDURE — 63600175 PHARM REV CODE 636 W HCPCS: Performed by: FAMILY MEDICINE

## 2022-11-11 RX ORDER — TRAMADOL HYDROCHLORIDE 50 MG/1
50 TABLET ORAL EVERY 6 HOURS PRN
Status: DISCONTINUED | OUTPATIENT
Start: 2022-11-11 | End: 2022-11-13 | Stop reason: HOSPADM

## 2022-11-11 RX ADMIN — IBUPROFEN 600 MG: 200 TABLET, FILM COATED ORAL at 11:11

## 2022-11-11 RX ADMIN — INSULIN ASPART 3 UNITS: 100 INJECTION, SOLUTION INTRAVENOUS; SUBCUTANEOUS at 09:11

## 2022-11-11 RX ADMIN — TRAMADOL HYDROCHLORIDE 50 MG: 50 TABLET, COATED ORAL at 08:11

## 2022-11-11 RX ADMIN — POLYETHYLENE GLYCOL 3350 17 G: 17 POWDER, FOR SOLUTION ORAL at 08:11

## 2022-11-11 RX ADMIN — INSULIN DETEMIR 40 UNITS: 100 INJECTION, SOLUTION SUBCUTANEOUS at 08:11

## 2022-11-11 RX ADMIN — INSULIN ASPART 8 UNITS: 100 INJECTION, SOLUTION INTRAVENOUS; SUBCUTANEOUS at 10:11

## 2022-11-11 RX ADMIN — DULOXETINE 60 MG: 30 CAPSULE, DELAYED RELEASE ORAL at 08:11

## 2022-11-11 RX ADMIN — INSULIN ASPART 4 UNITS: 100 INJECTION, SOLUTION INTRAVENOUS; SUBCUTANEOUS at 06:11

## 2022-11-11 RX ADMIN — CEFTRIAXONE SODIUM 1 G: 1 INJECTION, POWDER, FOR SOLUTION INTRAMUSCULAR; INTRAVENOUS at 11:11

## 2022-11-11 RX ADMIN — SENNOSIDES AND DOCUSATE SODIUM 1 TABLET: 50; 8.6 TABLET ORAL at 08:11

## 2022-11-11 RX ADMIN — FAMOTIDINE 20 MG: 20 TABLET ORAL at 08:11

## 2022-11-11 RX ADMIN — IBUPROFEN 600 MG: 200 TABLET, FILM COATED ORAL at 07:11

## 2022-11-11 RX ADMIN — TRAMADOL HYDROCHLORIDE 50 MG: 50 TABLET, COATED ORAL at 02:11

## 2022-11-11 RX ADMIN — TRAMADOL HYDROCHLORIDE 50 MG: 50 TABLET, COATED ORAL at 09:11

## 2022-11-11 RX ADMIN — INSULIN ASPART 8 UNITS: 100 INJECTION, SOLUTION INTRAVENOUS; SUBCUTANEOUS at 05:11

## 2022-11-11 RX ADMIN — ENOXAPARIN SODIUM 40 MG: 40 INJECTION SUBCUTANEOUS at 05:11

## 2022-11-11 NOTE — PROGRESS NOTES
[facility] Medicine  Progress Note    Patient Name: Monica Walker  MRN: [id number]  Patient Class: OP- Observation   Admission Date: 11/9/2022  Length of Stay: 0 days  Attending Physician: Jadiel Robert DO  Primary Care Provider: Hannah Schulz MD        Subjective:     Principal Problem:Pyelonephritis        HPI:  No notes on file    Overview/Hospital Course:  11/10/22 - pt. Is lying in bed. Does not feel well. Urine culture is pending.    11/11/22 - no growth in culture to date. She ate junk food yesterday and had another blood sugar over 400 mg%. States that she is staying in pain. Orders revised.      Interval History: staying in pain. Orders revised.    Review of Systems  Objective:     Vital Signs (Most Recent):  Temp: 98.8 °F (37.1 °C) (11/11/22 0734)  Pulse: 98 (11/11/22 0734)  Resp: 20 (11/11/22 0734)  BP: (!) 116/58 (11/11/22 0734)  SpO2: 96 % (11/11/22 0734)   Vital Signs (24h Range):  Temp:  [97.5 °F (36.4 °C)-98.8 °F (37.1 °C)] 98.8 °F (37.1 °C)  Pulse:  [88-98] 98  Resp:  [16-20] 20  SpO2:  [94 %-98 %] 96 %  BP: (107-158)/(58-81) 116/58     Weight: 65.8 kg (145 lb)  Body mass index is 23.4 kg/m².  No intake or output data in the 24 hours ending 11/11/22 0834   Physical Exam    Significant Labs: All pertinent labs within the past 24 hours have been reviewed.    Significant Imaging: I have reviewed all pertinent imaging results/findings within the past 24 hours.      Assessment/Plan:      * Pyelonephritis    Admit for IV fluids, IV Rocephin, await urine culture and sensitivity results.    Uncontrolled type 2 diabetes mellitus with hyperglycemia  Patient's FSGs are uncontrolled due to hyperglycemia on current medication regimen.  Last A1c reviewed-   Lab Results   Component Value Date    HGBA1C 12.2 (H) 01/04/2022     Most recent fingerstick glucose reviewed- No results for input(s): POCTGLUCOSE in the last 24 hours.  Current correctional scale   Medium  Maintain anti-hyperglycemic dose as follows-   Antihyperglycemics (From admission, onward)    None        Hold Oral hypoglycemics while patient is in the hospital.      VTE Risk Mitigation (From admission, onward)         Ordered     enoxaparin injection 40 mg  Daily         11/09/22 1214     IP VTE HIGH RISK PATIENT  Once         11/09/22 1214                Discharge Planning   SUZANNA:      Code Status: Full Code   Is the patient medically ready for discharge?:     Reason for patient still in hospital (select all that apply): Patient trending condition                     Hannah Schulz MD  Department of Hospital Medicine   [facility]

## 2022-11-11 NOTE — PLAN OF CARE
Problem: Adult Inpatient Plan of Care  Goal: Plan of Care Review  Outcome: Ongoing, Progressing  Goal: Patient-Specific Goal (Individualized)  Outcome: Ongoing, Progressing     Problem: Fall Injury Risk  Goal: Absence of Fall and Fall-Related Injury  Outcome: Ongoing, Progressing  Intervention: Identify and Manage Contributors  Flowsheets (Taken 11/10/2022 1806)  Self-Care Promotion:   independence encouraged   BADL personal objects within reach   BADL personal routines maintained  Medication Review/Management:   medications reviewed   high-risk medications identified  Intervention: Promote Injury-Free Environment  Flowsheets (Taken 11/10/2022 1806)  Safety Promotion/Fall Prevention:   assistive device/personal item within reach   diversional activities provided   high risk medications identified   medications reviewed   side rails raised x 3   instructed to call staff for mobility   muscle strengthening facilitated   nonskid shoes/socks when out of bed

## 2022-11-11 NOTE — PLAN OF CARE
Problem: Diabetes Comorbidity  Goal: Blood Glucose Level Within Targeted Range  Outcome: Ongoing, Progressing  Intervention: Monitor and Manage Glycemia  Flowsheets (Taken 11/11/2022 1710)  Glycemic Management: blood glucose monitored     Problem: Fall Injury Risk  Goal: Absence of Fall and Fall-Related Injury  Outcome: Ongoing, Progressing  Intervention: Identify and Manage Contributors  Flowsheets (Taken 11/11/2022 1710)  Self-Care Promotion: independence encouraged  Medication Review/Management: medications reviewed  Intervention: Promote Injury-Free Environment  Flowsheets (Taken 11/11/2022 1710)  Safety Promotion/Fall Prevention: assistive device/personal item within reach

## 2022-11-11 NOTE — SUBJECTIVE & OBJECTIVE
Interval History: staying in pain. Orders revised.    Review of Systems  Objective:     Vital Signs (Most Recent):  Temp: 98.8 °F (37.1 °C) (11/11/22 0734)  Pulse: 98 (11/11/22 0734)  Resp: 20 (11/11/22 0734)  BP: (!) 116/58 (11/11/22 0734)  SpO2: 96 % (11/11/22 0734)   Vital Signs (24h Range):  Temp:  [97.5 °F (36.4 °C)-98.8 °F (37.1 °C)] 98.8 °F (37.1 °C)  Pulse:  [88-98] 98  Resp:  [16-20] 20  SpO2:  [94 %-98 %] 96 %  BP: (107-158)/(58-81) 116/58     Weight: 65.8 kg (145 lb)  Body mass index is 23.4 kg/m².  No intake or output data in the 24 hours ending 11/11/22 0834   Physical Exam    Significant Labs: All pertinent labs within the past 24 hours have been reviewed.    Significant Imaging: I have reviewed all pertinent imaging results/findings within the past 24 hours.

## 2022-11-12 PROBLEM — N12 PYELONEPHRITIS: Status: RESOLVED | Noted: 2022-11-09 | Resolved: 2022-11-12

## 2022-11-12 LAB
ALBUMIN SERPL BCP-MCNC: 1.7 G/DL (ref 3.5–5)
ALBUMIN/GLOB SERPL: 0.3 {RATIO}
ALP SERPL-CCNC: 250 U/L (ref 41–108)
ALT SERPL W P-5'-P-CCNC: 44 U/L (ref 13–56)
ANION GAP SERPL CALCULATED.3IONS-SCNC: 12 MMOL/L (ref 7–16)
AST SERPL W P-5'-P-CCNC: 23 U/L (ref 15–37)
BASOPHILS # BLD AUTO: 0.08 K/UL (ref 0–0.2)
BASOPHILS NFR BLD AUTO: 0.4 % (ref 0–1)
BILIRUB SERPL-MCNC: 0.4 MG/DL (ref ?–1.2)
BUN SERPL-MCNC: 18 MG/DL (ref 7–18)
BUN/CREAT SERPL: 19 (ref 6–20)
CALCIUM SERPL-MCNC: 9.1 MG/DL (ref 8.5–10.1)
CHLORIDE SERPL-SCNC: 93 MMOL/L (ref 98–107)
CO2 SERPL-SCNC: 30 MMOL/L (ref 21–32)
CREAT SERPL-MCNC: 0.94 MG/DL (ref 0.55–1.02)
DIFFERENTIAL METHOD BLD: ABNORMAL
EGFR (NO RACE VARIABLE) (RUSH/TITUS): 72 ML/MIN/1.73M²
EOSINOPHIL # BLD AUTO: 0.29 K/UL (ref 0–0.5)
EOSINOPHIL NFR BLD AUTO: 1.6 % (ref 1–4)
ERYTHROCYTE [DISTWIDTH] IN BLOOD BY AUTOMATED COUNT: 13.9 % (ref 11.5–14.5)
GLOBULIN SER-MCNC: 5.8 G/DL (ref 2–4)
GLUCOSE SERPL-MCNC: 237 MG/DL (ref 70–105)
GLUCOSE SERPL-MCNC: 290 MG/DL (ref 70–105)
GLUCOSE SERPL-MCNC: 291 MG/DL (ref 70–105)
GLUCOSE SERPL-MCNC: 337 MG/DL (ref 74–106)
GLUCOSE SERPL-MCNC: 403 MG/DL (ref 70–105)
HCT VFR BLD AUTO: 41.8 % (ref 38–47)
HGB BLD-MCNC: 13.8 G/DL (ref 12–16)
IMM GRANULOCYTES # BLD AUTO: 0.43 K/UL (ref 0–0.04)
IMM GRANULOCYTES NFR BLD: 2.4 % (ref 0–0.4)
LYMPHOCYTES # BLD AUTO: 1.46 K/UL (ref 1–4.8)
LYMPHOCYTES NFR BLD AUTO: 8 % (ref 27–41)
LYMPHOCYTES NFR BLD MANUAL: 7 % (ref 27–41)
MCH RBC QN AUTO: 28.6 PG (ref 27–31)
MCHC RBC AUTO-ENTMCNC: 33 G/DL (ref 32–36)
MCV RBC AUTO: 86.7 FL (ref 80–96)
MONOCYTES # BLD AUTO: 1.71 K/UL (ref 0–0.8)
MONOCYTES NFR BLD AUTO: 9.4 % (ref 2–6)
MONOCYTES NFR BLD MANUAL: 9 % (ref 2–6)
MPC BLD CALC-MCNC: 10.1 FL (ref 9.4–12.4)
NEUTROPHILS # BLD AUTO: 14.27 K/UL (ref 1.8–7.7)
NEUTROPHILS NFR BLD AUTO: 78.2 % (ref 53–65)
NEUTS BAND NFR BLD MANUAL: 7 % (ref 1–5)
NEUTS SEG NFR BLD MANUAL: 77 % (ref 50–62)
NRBC BLD MANUAL-RTO: ABNORMAL %
PLATELET # BLD AUTO: 275 K/UL (ref 150–400)
POTASSIUM SERPL-SCNC: 3.2 MMOL/L (ref 3.5–5.1)
PROT SERPL-MCNC: 7.5 G/DL (ref 6.4–8.2)
RBC # BLD AUTO: 4.82 M/UL (ref 4.2–5.4)
SODIUM SERPL-SCNC: 132 MMOL/L (ref 136–145)
TROPONIN I SERPL HS-MCNC: <4 PG/ML
WBC # BLD AUTO: 18.24 K/UL (ref 4.5–11)

## 2022-11-12 PROCEDURE — 25000003 PHARM REV CODE 250: Performed by: FAMILY MEDICINE

## 2022-11-12 PROCEDURE — 94761 N-INVAS EAR/PLS OXIMETRY MLT: CPT

## 2022-11-12 PROCEDURE — 36415 COLL VENOUS BLD VENIPUNCTURE: CPT | Performed by: EMERGENCY MEDICINE

## 2022-11-12 PROCEDURE — 93005 ELECTROCARDIOGRAM TRACING: CPT

## 2022-11-12 PROCEDURE — 93010 ELECTROCARDIOGRAM REPORT: CPT | Mod: ,,, | Performed by: INTERNAL MEDICINE

## 2022-11-12 PROCEDURE — 25000003 PHARM REV CODE 250: Performed by: SPECIALIST

## 2022-11-12 PROCEDURE — S0030 INJECTION, METRONIDAZOLE: HCPCS | Performed by: FAMILY MEDICINE

## 2022-11-12 PROCEDURE — 84484 ASSAY OF TROPONIN QUANT: CPT | Performed by: SPECIALIST

## 2022-11-12 PROCEDURE — G0378 HOSPITAL OBSERVATION PER HR: HCPCS

## 2022-11-12 PROCEDURE — 27000221 HC OXYGEN, UP TO 24 HOURS

## 2022-11-12 PROCEDURE — 82962 GLUCOSE BLOOD TEST: CPT | Mod: 91

## 2022-11-12 PROCEDURE — 80053 COMPREHEN METABOLIC PANEL: CPT | Performed by: EMERGENCY MEDICINE

## 2022-11-12 PROCEDURE — 63600175 PHARM REV CODE 636 W HCPCS: Mod: GZ | Performed by: FAMILY MEDICINE

## 2022-11-12 PROCEDURE — 63600175 PHARM REV CODE 636 W HCPCS: Performed by: EMERGENCY MEDICINE

## 2022-11-12 PROCEDURE — 25000003 PHARM REV CODE 250: Performed by: EMERGENCY MEDICINE

## 2022-11-12 PROCEDURE — 93010 EKG 12-LEAD: ICD-10-PCS | Mod: ,,, | Performed by: INTERNAL MEDICINE

## 2022-11-12 PROCEDURE — 85025 COMPLETE CBC W/AUTO DIFF WBC: CPT | Performed by: EMERGENCY MEDICINE

## 2022-11-12 RX ORDER — METRONIDAZOLE 500 MG/100ML
500 INJECTION, SOLUTION INTRAVENOUS
Status: DISCONTINUED | OUTPATIENT
Start: 2022-11-12 | End: 2022-11-13 | Stop reason: HOSPADM

## 2022-11-12 RX ORDER — SIMETHICONE 80 MG
1 TABLET,CHEWABLE ORAL 3 TIMES DAILY PRN
Status: DISCONTINUED | OUTPATIENT
Start: 2022-11-12 | End: 2022-11-13 | Stop reason: HOSPADM

## 2022-11-12 RX ADMIN — FAMOTIDINE 20 MG: 20 TABLET ORAL at 08:11

## 2022-11-12 RX ADMIN — ENOXAPARIN SODIUM 40 MG: 40 INJECTION SUBCUTANEOUS at 05:11

## 2022-11-12 RX ADMIN — SIMETHICONE 80 MG: 80 TABLET, CHEWABLE ORAL at 05:11

## 2022-11-12 RX ADMIN — INSULIN ASPART 10 UNITS: 100 INJECTION, SOLUTION INTRAVENOUS; SUBCUTANEOUS at 10:11

## 2022-11-12 RX ADMIN — IBUPROFEN 600 MG: 200 TABLET, FILM COATED ORAL at 12:11

## 2022-11-12 RX ADMIN — TRAMADOL HYDROCHLORIDE 50 MG: 50 TABLET, COATED ORAL at 03:11

## 2022-11-12 RX ADMIN — TRAMADOL HYDROCHLORIDE 50 MG: 50 TABLET, COATED ORAL at 11:11

## 2022-11-12 RX ADMIN — POLYETHYLENE GLYCOL 3350 17 G: 17 POWDER, FOR SOLUTION ORAL at 08:11

## 2022-11-12 RX ADMIN — CEFTRIAXONE SODIUM 1 G: 1 INJECTION, POWDER, FOR SOLUTION INTRAMUSCULAR; INTRAVENOUS at 10:11

## 2022-11-12 RX ADMIN — TRAMADOL HYDROCHLORIDE 50 MG: 50 TABLET, COATED ORAL at 08:11

## 2022-11-12 RX ADMIN — METRONIDAZOLE 500 MG: 500 INJECTION, SOLUTION INTRAVENOUS at 09:11

## 2022-11-12 RX ADMIN — IBUPROFEN 600 MG: 200 TABLET, FILM COATED ORAL at 04:11

## 2022-11-12 RX ADMIN — TRAMADOL HYDROCHLORIDE 50 MG: 50 TABLET, COATED ORAL at 02:11

## 2022-11-12 RX ADMIN — SENNOSIDES AND DOCUSATE SODIUM 1 TABLET: 50; 8.6 TABLET ORAL at 08:11

## 2022-11-12 RX ADMIN — INSULIN ASPART 4 UNITS: 100 INJECTION, SOLUTION INTRAVENOUS; SUBCUTANEOUS at 05:11

## 2022-11-12 RX ADMIN — DULOXETINE 60 MG: 30 CAPSULE, DELAYED RELEASE ORAL at 08:11

## 2022-11-12 RX ADMIN — INSULIN DETEMIR 40 UNITS: 100 INJECTION, SOLUTION SUBCUTANEOUS at 08:11

## 2022-11-12 RX ADMIN — IBUPROFEN 600 MG: 200 TABLET, FILM COATED ORAL at 06:11

## 2022-11-12 NOTE — PLAN OF CARE
Problem: Infection  Goal: Absence of Infection Signs and Symptoms  Outcome: Ongoing, Progressing     Problem: Adult Inpatient Plan of Care  Goal: Plan of Care Review  Outcome: Ongoing, Progressing  Goal: Patient-Specific Goal (Individualized)  Outcome: Ongoing, Progressing  Goal: Absence of Hospital-Acquired Illness or Injury  Outcome: Ongoing, Progressing  Goal: Optimal Comfort and Wellbeing  Outcome: Ongoing, Progressing  Goal: Readiness for Transition of Care  Outcome: Ongoing, Progressing     Problem: Fall Injury Risk  Goal: Absence of Fall and Fall-Related Injury  Outcome: Ongoing, Progressing     Problem: Diabetes Comorbidity  Goal: Blood Glucose Level Within Targeted Range  Outcome: Ongoing, Not Progressing

## 2022-11-12 NOTE — PLAN OF CARE
Problem: Diabetes Comorbidity  Goal: Blood Glucose Level Within Targeted Range  Outcome: Ongoing, Progressing     Problem: Fall Injury Risk  Goal: Absence of Fall and Fall-Related Injury  Outcome: Ongoing, Progressing

## 2022-11-12 NOTE — NURSING
"0435 Pt c/o of chest pain + "cant catch her breath and needs a coke." Pt states this is not a new symptom. /92 HR 85. O2 90% on RA, 2L NC applied 96%. STAT EKG ordered. Blood glucose 299.  Ashley HUBBARD at bedside. Pt thinks it may be "gas or indigestion". PRN Simethicone ordered.    0445 Pt walking around room c/o SOB. Pt instructed to sit down and relax + O2 reapplied. Education provided referring caffeine intake with chest pain/diabetes.    0505 Pt lying in bed. Eyes closed.Resp even/unlabored. Lights dimmed. Calm environment provided  "

## 2022-11-12 NOTE — NURSING
Rec'd pt resting in bed with eyes closed. Resp even/unlabored. Pt c/o pain in upper abd. Bowel sounds active with tenderness to palpation. All safety measures met.

## 2022-11-13 VITALS
HEIGHT: 66 IN | DIASTOLIC BLOOD PRESSURE: 80 MMHG | BODY MASS INDEX: 23.84 KG/M2 | OXYGEN SATURATION: 95 % | HEART RATE: 90 BPM | RESPIRATION RATE: 18 BRPM | WEIGHT: 148.38 LBS | SYSTOLIC BLOOD PRESSURE: 142 MMHG | TEMPERATURE: 98 F

## 2022-11-13 LAB
ALBUMIN SERPL BCP-MCNC: 1.4 G/DL (ref 3.5–5)
ALBUMIN/GLOB SERPL: 0.3 {RATIO}
ALP SERPL-CCNC: 240 U/L (ref 41–108)
ALT SERPL W P-5'-P-CCNC: 52 U/L (ref 13–56)
ANION GAP SERPL CALCULATED.3IONS-SCNC: 11 MMOL/L (ref 7–16)
AST SERPL W P-5'-P-CCNC: 35 U/L (ref 15–37)
BASOPHILS # BLD AUTO: 0.05 K/UL (ref 0–0.2)
BASOPHILS NFR BLD AUTO: 0.4 % (ref 0–1)
BILIRUB SERPL-MCNC: 0.3 MG/DL (ref ?–1.2)
BUN SERPL-MCNC: 17 MG/DL (ref 7–18)
BUN/CREAT SERPL: 20 (ref 6–20)
CALCIUM SERPL-MCNC: 8.7 MG/DL (ref 8.5–10.1)
CHLORIDE SERPL-SCNC: 96 MMOL/L (ref 98–107)
CO2 SERPL-SCNC: 32 MMOL/L (ref 21–32)
CREAT SERPL-MCNC: 0.83 MG/DL (ref 0.55–1.02)
DIFFERENTIAL METHOD BLD: ABNORMAL
EGFR (NO RACE VARIABLE) (RUSH/TITUS): 84 ML/MIN/1.73M²
EOSINOPHIL # BLD AUTO: 0.31 K/UL (ref 0–0.5)
EOSINOPHIL NFR BLD AUTO: 2.2 % (ref 1–4)
EOSINOPHIL NFR BLD MANUAL: 2 % (ref 1–4)
ERYTHROCYTE [DISTWIDTH] IN BLOOD BY AUTOMATED COUNT: 14.3 % (ref 11.5–14.5)
GLOBULIN SER-MCNC: 5.5 G/DL (ref 2–4)
GLUCOSE SERPL-MCNC: 208 MG/DL (ref 70–105)
GLUCOSE SERPL-MCNC: 215 MG/DL (ref 74–106)
HCT VFR BLD AUTO: 39.7 % (ref 38–47)
HGB BLD-MCNC: 13.1 G/DL (ref 12–16)
IMM GRANULOCYTES # BLD AUTO: 0.5 K/UL (ref 0–0.04)
IMM GRANULOCYTES NFR BLD: 3.6 % (ref 0–0.4)
LYMPHOCYTES # BLD AUTO: 1.07 K/UL (ref 1–4.8)
LYMPHOCYTES NFR BLD AUTO: 7.6 % (ref 27–41)
LYMPHOCYTES NFR BLD MANUAL: 9 % (ref 27–41)
MCH RBC QN AUTO: 28.4 PG (ref 27–31)
MCHC RBC AUTO-ENTMCNC: 33 G/DL (ref 32–36)
MCV RBC AUTO: 85.9 FL (ref 80–96)
MONOCYTES # BLD AUTO: 1.73 K/UL (ref 0–0.8)
MONOCYTES NFR BLD AUTO: 12.3 % (ref 2–6)
MONOCYTES NFR BLD MANUAL: 11 % (ref 2–6)
MPC BLD CALC-MCNC: 9.7 FL (ref 9.4–12.4)
NEUTROPHILS # BLD AUTO: 10.38 K/UL (ref 1.8–7.7)
NEUTROPHILS NFR BLD AUTO: 73.9 % (ref 53–65)
NEUTS BAND NFR BLD MANUAL: 8 % (ref 1–5)
NEUTS SEG NFR BLD MANUAL: 70 % (ref 50–62)
NRBC BLD MANUAL-RTO: ABNORMAL %
PLATELET # BLD AUTO: 262 K/UL (ref 150–400)
POTASSIUM SERPL-SCNC: 3.2 MMOL/L (ref 3.5–5.1)
PROT SERPL-MCNC: 6.9 G/DL (ref 6.4–8.2)
RBC # BLD AUTO: 4.62 M/UL (ref 4.2–5.4)
SODIUM SERPL-SCNC: 136 MMOL/L (ref 136–145)
WBC # BLD AUTO: 14.04 K/UL (ref 4.5–11)

## 2022-11-13 PROCEDURE — 27000221 HC OXYGEN, UP TO 24 HOURS

## 2022-11-13 PROCEDURE — 85025 COMPLETE CBC W/AUTO DIFF WBC: CPT | Performed by: EMERGENCY MEDICINE

## 2022-11-13 PROCEDURE — 63600175 PHARM REV CODE 636 W HCPCS: Performed by: EMERGENCY MEDICINE

## 2022-11-13 PROCEDURE — G0378 HOSPITAL OBSERVATION PER HR: HCPCS

## 2022-11-13 PROCEDURE — 25000003 PHARM REV CODE 250: Performed by: EMERGENCY MEDICINE

## 2022-11-13 PROCEDURE — 63600175 PHARM REV CODE 636 W HCPCS: Performed by: FAMILY MEDICINE

## 2022-11-13 PROCEDURE — 25000003 PHARM REV CODE 250: Performed by: FAMILY MEDICINE

## 2022-11-13 PROCEDURE — 80053 COMPREHEN METABOLIC PANEL: CPT | Performed by: EMERGENCY MEDICINE

## 2022-11-13 PROCEDURE — 25000003 PHARM REV CODE 250: Performed by: SPECIALIST

## 2022-11-13 PROCEDURE — 82962 GLUCOSE BLOOD TEST: CPT

## 2022-11-13 PROCEDURE — 36415 COLL VENOUS BLD VENIPUNCTURE: CPT | Performed by: EMERGENCY MEDICINE

## 2022-11-13 PROCEDURE — 94761 N-INVAS EAR/PLS OXIMETRY MLT: CPT

## 2022-11-13 PROCEDURE — S0030 INJECTION, METRONIDAZOLE: HCPCS | Performed by: FAMILY MEDICINE

## 2022-11-13 RX ORDER — METRONIDAZOLE 500 MG/1
500 TABLET ORAL 3 TIMES DAILY
Qty: 21 TABLET | Refills: 0 | Status: SHIPPED | OUTPATIENT
Start: 2022-11-13 | End: 2022-11-19 | Stop reason: CLARIF

## 2022-11-13 RX ORDER — NITROFURANTOIN 25; 75 MG/1; MG/1
100 CAPSULE ORAL 2 TIMES DAILY
Qty: 10 CAPSULE | Refills: 0 | Status: SHIPPED | OUTPATIENT
Start: 2022-11-13 | End: 2022-11-18

## 2022-11-13 RX ORDER — NITROFURANTOIN 25; 75 MG/1; MG/1
100 CAPSULE ORAL EVERY 12 HOURS
Status: DISCONTINUED | OUTPATIENT
Start: 2022-11-13 | End: 2022-11-13 | Stop reason: HOSPADM

## 2022-11-13 RX ADMIN — NITROFURANTOIN (MONOHYDRATE/MACROCRYSTALS) 100 MG: 75; 25 CAPSULE ORAL at 09:11

## 2022-11-13 RX ADMIN — INSULIN DETEMIR 40 UNITS: 100 INJECTION, SOLUTION SUBCUTANEOUS at 08:11

## 2022-11-13 RX ADMIN — DULOXETINE 60 MG: 30 CAPSULE, DELAYED RELEASE ORAL at 08:11

## 2022-11-13 RX ADMIN — METRONIDAZOLE 500 MG: 500 INJECTION, SOLUTION INTRAVENOUS at 04:11

## 2022-11-13 RX ADMIN — FAMOTIDINE 20 MG: 20 TABLET ORAL at 08:11

## 2022-11-13 RX ADMIN — SENNOSIDES AND DOCUSATE SODIUM 1 TABLET: 50; 8.6 TABLET ORAL at 08:11

## 2022-11-13 RX ADMIN — POLYETHYLENE GLYCOL 3350 17 G: 17 POWDER, FOR SOLUTION ORAL at 08:11

## 2022-11-13 RX ADMIN — INSULIN ASPART 4 UNITS: 100 INJECTION, SOLUTION INTRAVENOUS; SUBCUTANEOUS at 05:11

## 2022-11-13 RX ADMIN — TRAMADOL HYDROCHLORIDE 50 MG: 50 TABLET, COATED ORAL at 06:11

## 2022-11-13 RX ADMIN — SIMETHICONE 80 MG: 80 TABLET, CHEWABLE ORAL at 06:11

## 2022-11-13 NOTE — PROGRESS NOTES
[facility]  Hospital Medicine  Progress Note    Patient Name: Monica Walker  MRN: [id number]  Patient Class: OP- Observation   Admission Date: 11/9/2022  Length of Stay: 0 days  Attending Physician: Felicitas Sanchez MD  Primary Care Provider: Hannah Schulz MD        Subjective:     Principal Problem:Pyelonephritis        HPI:  No notes on file    Overview/Hospital Course:  11/10/22 - pt. Is lying in bed. Does not feel well. Urine culture is pending.    11/11/22 - no growth in culture to date. She ate junk food yesterday and had another blood sugar over 400 mg%. States that she is staying in pain. Orders revised.    11/12/22 - Patient reports that she continue to have abdominal pain and distention. Patient report constipation for few days  Subjective & objective note cannot be loaded without a specified hospital service.    Physical exam:    Patient alert and oriented x 3  Lungs clear to Auscultation bilateral  Heat regular diane and rhythm  Abdomen: Distended, tympanic, mild tenderness  Neurology: Grossly intact  Lower extremity: No edema or clubbing  Skin: No rash          Assessment/Plan:      Pyelonephritis     Continue IV fluids, IV Rocephin, urine culture no growth for 3 days     Uncontrolled type 2 diabetes mellitus with hyperglycemia  Patient's FSGs are uncontrolled due to hyperglycemia on current medication regimen.  Last A1c reviewed-         Lab Results   Component Value Date     HGBA1C 12.2 (H) 01/04/2022      Most recent fingerstick glucose reviewed- No results for input(s): POCTGLUCOSE in the last 24 hours.  Current correctional scale  Medium  Maintain anti-hyperglycemic dose as follows-       Antihyperglycemics (From admission, onward)     None          Hold Oral hypoglycemics while patient is in the hospital.    VTE Risk Mitigation (From admission, onward)           Ordered     enoxaparin injection 40 mg  Daily         11/09/22 1214     IP VTE HIGH RISK  PATIENT  Once         11/09/22 1214                    Discharge Planning   SUZANNA:  Pending culture and sensitivity    Code Status: Full Code   Is the patient medically ready for discharge?:     Reason for patient still in hospital (select all that apply): Patient trending condition                     Felicitas Sanchez MD  Department of Hospital Medicine   [facility]

## 2022-11-13 NOTE — SUBJECTIVE & OBJECTIVE
Interval History: Patient is ready for discharge after uneventful stay for the diagnosis of pyleonephritis.  Patient is stable to go home.  All of her health concerns will be managed at home with  oral medications at this time.      Review of Systems   Gastrointestinal:         Abdominal gas   All other systems reviewed and are negative.  Objective:     Vital Signs (Most Recent):  Temp: 98.4 °F (36.9 °C) (11/13/22 0723)  Pulse: 85 (11/13/22 0723)  Resp: 18 (11/13/22 0723)  BP: (!) 142/80 (11/13/22 0723)  SpO2: 96 % (11/13/22 0723)   Vital Signs (24h Range):  Temp:  [97.9 °F (36.6 °C)-98.8 °F (37.1 °C)] 98.4 °F (36.9 °C)  Pulse:  [72-85] 85  Resp:  [18-20] 18  SpO2:  [92 %-96 %] 96 %  BP: (120-143)/(65-88) 142/80     Weight: 67.3 kg (148 lb 5.9 oz)  Body mass index is 23.95 kg/m².  No intake or output data in the 24 hours ending 11/13/22 0839   Physical Exam  Vitals and nursing note reviewed. Exam conducted with a chaperone present.   Constitutional:       Appearance: Normal appearance. She is not ill-appearing, toxic-appearing or diaphoretic.   HENT:      Head: Normocephalic and atraumatic.      Mouth/Throat:      Mouth: Mucous membranes are moist.   Eyes:      Pupils: Pupils are equal, round, and reactive to light.   Cardiovascular:      Rate and Rhythm: Normal rate.   Pulmonary:      Effort: Pulmonary effort is normal.   Abdominal:      Palpations: Abdomen is soft.   Musculoskeletal:         General: Normal range of motion.      Cervical back: Normal range of motion and neck supple.   Skin:     General: Skin is warm.   Neurological:      General: No focal deficit present.      Mental Status: She is alert and oriented to person, place, and time. Mental status is at baseline.   Psychiatric:         Thought Content: Thought content normal.       Significant Labs: All pertinent labs within the past 24 hours have been reviewed.    Significant Imaging: I have reviewed all pertinent imaging results/findings within the  past 24 hours.

## 2022-11-13 NOTE — PROGRESS NOTES
[facility] Medicine  Progress Note    Patient Name: Monica Walker  MRN: [id number]  Patient Class: OP- Observation   Admission Date: 11/9/2022  Length of Stay: 0 days  Attending Physician: Jadiel Robert DO  Primary Care Provider: Hannah Schulz MD        Subjective:     Principal Problem:Pyelonephritis        HPI:  No notes on file    Overview/Hospital Course:  11/10/22 - pt. Is lying in bed. Does not feel well. Urine culture is pending.    11/11/22 - no growth in culture to date. She ate junk food yesterday and had another blood sugar over 400 mg%. States that she is staying in pain. Orders revised.    11/13/22: patient is ready for discharge this am.  Patient has reached maximum benefit of hospital services.  She has pain issues and will need to see if her primary can refer her for psychological services to help her addiction issues.  Throughout her hospital stay she has repeatedly asked for pain medicines and despite multiple physicians stating that this is not necessary she continued pressuring techniques seeking narcotics.       Interval History: Patient is ready for discharge after uneventful stay for the diagnosis of pyleonephritis.  Patient is stable to go home.  All of her health concerns will be managed at home with  oral medications at this time.      Review of Systems   Gastrointestinal:         Abdominal gas   All other systems reviewed and are negative.  Objective:     Vital Signs (Most Recent):  Temp: 98.4 °F (36.9 °C) (11/13/22 0723)  Pulse: 85 (11/13/22 0723)  Resp: 18 (11/13/22 0723)  BP: (!) 142/80 (11/13/22 0723)  SpO2: 96 % (11/13/22 0723)   Vital Signs (24h Range):  Temp:  [97.9 °F (36.6 °C)-98.8 °F (37.1 °C)] 98.4 °F (36.9 °C)  Pulse:  [72-85] 85  Resp:  [18-20] 18  SpO2:  [92 %-96 %] 96 %  BP: (120-143)/(65-88) 142/80     Weight: 67.3 kg (148 lb 5.9 oz)  Body mass index is 23.95 kg/m².  No intake or output data in the 24 hours  ending 11/13/22 0839   Physical Exam  Vitals and nursing note reviewed. Exam conducted with a chaperone present.   Constitutional:       Appearance: Normal appearance. She is not ill-appearing, toxic-appearing or diaphoretic.   HENT:      Head: Normocephalic and atraumatic.      Mouth/Throat:      Mouth: Mucous membranes are moist.   Eyes:      Pupils: Pupils are equal, round, and reactive to light.   Cardiovascular:      Rate and Rhythm: Normal rate.   Pulmonary:      Effort: Pulmonary effort is normal.   Abdominal:      Palpations: Abdomen is soft.   Musculoskeletal:         General: Normal range of motion.      Cervical back: Normal range of motion and neck supple.   Skin:     General: Skin is warm.   Neurological:      General: No focal deficit present.      Mental Status: She is alert and oriented to person, place, and time. Mental status is at baseline.   Psychiatric:         Thought Content: Thought content normal.       Significant Labs: All pertinent labs within the past 24 hours have been reviewed.    Significant Imaging: I have reviewed all pertinent imaging results/findings within the past 24 hours.      Assessment/Plan:      Uncontrolled type 2 diabetes mellitus with hyperglycemia  Patient's FSGs are uncontrolled due to hyperglycemia on current medication regimen.  Last A1c reviewed-   Lab Results   Component Value Date    HGBA1C 12.2 (H) 01/04/2022     Most recent fingerstick glucose reviewed- No results for input(s): POCTGLUCOSE in the last 24 hours.  Current correctional scale  Medium  Maintain anti-hyperglycemic dose as follows-   Antihyperglycemics (From admission, onward)    None        Hold Oral hypoglycemics while patient is in the hospital.      VTE Risk Mitigation (From admission, onward)         Ordered     enoxaparin injection 40 mg  Daily         11/09/22 1214     IP VTE HIGH RISK PATIENT  Once         11/09/22 1214                Discharge Planning   SUZANNA: 11/13/2022     Code Status: Full  Code   Is the patient medically ready for discharge?: Yes    Reason for patient still in hospital (select all that apply): Pending disposition                     Rosalina Barth MD  Department of Hospital Medicine   [facility]

## 2022-11-13 NOTE — DISCHARGE INSTRUCTIONS
Pt being sent home with macrobid and flagyl continue taking as ordered.    Pt needs to call and followup with Dr. Schulz next week.

## 2022-11-13 NOTE — PLAN OF CARE
Problem: Infection  Goal: Absence of Infection Signs and Symptoms  Outcome: Ongoing, Progressing     Problem: Adult Inpatient Plan of Care  Goal: Plan of Care Review  Outcome: Ongoing, Progressing  Goal: Patient-Specific Goal (Individualized)  Outcome: Ongoing, Progressing  Goal: Absence of Hospital-Acquired Illness or Injury  Outcome: Ongoing, Progressing  Goal: Optimal Comfort and Wellbeing  Outcome: Ongoing, Progressing  Goal: Readiness for Transition of Care  Outcome: Ongoing, Progressing     Problem: Diabetes Comorbidity  Goal: Blood Glucose Level Within Targeted Range  Outcome: Ongoing, Not Progressing  Intervention: Monitor and Manage Glycemia  Flowsheets (Taken 11/13/2022 5465)  Glycemic Management:   blood glucose monitored   carbohydrate replacement provided     Problem: Fall Injury Risk  Goal: Absence of Fall and Fall-Related Injury  Outcome: Ongoing, Progressing

## 2022-11-14 ENCOUNTER — OFFICE VISIT (OUTPATIENT)
Dept: PRIMARY CARE CLINIC | Facility: CLINIC | Age: 52
End: 2022-11-14
Payer: MEDICARE

## 2022-11-14 VITALS
BODY MASS INDEX: 24.59 KG/M2 | WEIGHT: 153 LBS | DIASTOLIC BLOOD PRESSURE: 80 MMHG | RESPIRATION RATE: 20 BRPM | HEIGHT: 66 IN | SYSTOLIC BLOOD PRESSURE: 120 MMHG | OXYGEN SATURATION: 96 % | TEMPERATURE: 98 F | HEART RATE: 88 BPM

## 2022-11-14 DIAGNOSIS — Z79.4 TYPE 2 DIABETES MELLITUS WITH HYPERGLYCEMIA, WITH LONG-TERM CURRENT USE OF INSULIN: Primary | ICD-10-CM

## 2022-11-14 DIAGNOSIS — E11.65 TYPE 2 DIABETES MELLITUS WITH HYPERGLYCEMIA, WITH LONG-TERM CURRENT USE OF INSULIN: Primary | ICD-10-CM

## 2022-11-14 DIAGNOSIS — M19.90 ARTHRITIS: ICD-10-CM

## 2022-11-14 LAB — GLUCOSE SERPL-MCNC: 301 MG/DL (ref 70–110)

## 2022-11-14 PROCEDURE — 82962 GLUCOSE BLOOD TEST: CPT | Mod: RHCUB | Performed by: FAMILY MEDICINE

## 2022-11-14 PROCEDURE — 99213 PR OFFICE/OUTPT VISIT, EST, LEVL III, 20-29 MIN: ICD-10-PCS | Mod: ,,, | Performed by: FAMILY MEDICINE

## 2022-11-14 PROCEDURE — 99213 OFFICE O/P EST LOW 20 MIN: CPT | Mod: ,,, | Performed by: FAMILY MEDICINE

## 2022-11-14 RX ORDER — INSULIN GLARGINE 100 [IU]/ML
40 INJECTION, SOLUTION SUBCUTANEOUS DAILY
Qty: 4 EACH | Refills: 5 | Status: ON HOLD | OUTPATIENT
Start: 2022-11-14 | End: 2023-02-08 | Stop reason: SDUPTHER

## 2022-11-14 NOTE — DISCHARGE SUMMARY
"[facility]  Discharge Note  Short Stay    Patient had a rather uneventful stay in hospital for "pyleonephritis" and is now ready for discharge on oral medications.  Patient's mother will be helping her at home.  She has had no fever or abnormal labs since 24 hrs.  Patient will be rechecked in her primary doctors clinic.  Sending her home on Macrodantin 100 mg bid x 5 ds and Flagyl 500 mg q 8 hrs x 7 days to complete medication protocol.        OUTCOME: Condition has improved and patient is now ready for discharge.    DISPOSITION: Home or Self Care    FINAL DIAGNOSIS:  Pyelonephritis    FOLLOWUP: In clinic    DISCHARGE INSTRUCTIONS:  No discharge procedures on file.     TIME SPENT ON DISCHARGE: 10  minutes  "

## 2022-11-14 NOTE — DISCHARGE SUMMARY
[facility]  Hospital Medicine  Discharge Summary      Patient Name: Monica Walker  MRN: [id number]  [facility]: [id number]  Patient Class: OP- Observation  Admission Date: 11/9/2022  Hospital Length of Stay: 0 days  Discharge Date and Time: 11/13/2022  9:00 AM  Attending Physician: No att. providers found   Discharging Provider: Rosalina Barth MD  Primary Care Provider: Hannah Schulz MD    Primary Care Team: Networked reference to record PCT     HPI:   No notes on file    * No surgery found *      Hospital Course:   11/10/22 - pt. Is lying in bed. Does not feel well. Urine culture is pending.    11/11/22 - no growth in culture to date. She ate junk food yesterday and had another blood sugar over 400 mg%. States that she is staying in pain. Orders revised.    11/13/22: patient is ready for discharge this am.  Patient has reached maximum benefit of hospital services.  She has pain issues and will need to see if her primary can refer her for psychological services to help her addiction issues.  Throughout her hospital stay she has repeatedly asked for pain medicines and despite multiple physicians stating that this is not necessary she continued pressuring techniques seeking narcotics.        Goals of Care Treatment Preferences:  Code Status: Full Code      Consults:     No new Assessment & Plan notes have been filed under this hospital service since the last note was generated.  Service: Hospital Medicine    Final Active Diagnoses:    Diagnosis Date Noted POA    Uncontrolled type 2 diabetes mellitus with hyperglycemia [E11.65] 11/09/2022 Yes      Problems Resolved During this Admission:    Diagnosis Date Noted Date Resolved POA    PRINCIPAL PROBLEM:  Pyelonephritis [N12] 11/09/2022 11/12/2022 Yes       Discharged Condition: stable    Disposition: Home or Self Care    Follow Up:   Follow-up Information     Hannah Schulz MD Follow up.    Specialty: Family  "Medicine  Why: pt needs to call and make followup appt for next week.  Contact information:  [address] [facility] [phone]                       Patient Instructions:   No discharge procedures on file.    Significant Diagnostic Studies: Labs: All labs within the past 24 hours have been reviewed    Pending Diagnostic Studies:     None         Medications:  Reconciled Home Medications:      Medication List      START taking these medications    metroNIDAZOLE 500 MG tablet  Commonly known as: FLAGYL  Take 1 tablet (500 mg total) by mouth 3 (three) times daily. for 7 days     nitrofurantoin (macrocrystal-monohydrate) 100 MG capsule  Commonly known as: MACROBID  Take 1 capsule (100 mg total) by mouth 2 (two) times daily. for 5 days        CONTINUE taking these medications    BD ULTRA-FINE SHORT PEN NEEDLE 31 gauge x 5/16" Ndle  Generic drug: pen needle, diabetic     DULoxetine 60 MG capsule  Commonly known as: CYMBALTA  Take 1 capsule (60 mg total) by mouth once daily.     NEURONTIN ORAL  Take by mouth 2 (two) times a day.     NovoLOG Flexpen U-100 Insulin 100 unit/mL (3 mL) Inpn pen  Generic drug: insulin aspart U-100  Inject 10 Units into the skin 2 (two) times a day.     TRULICITY 0.75 mg/0.5 mL pen injector  Generic drug: dulaglutide  Inject 0.75 mg into the skin every 7 days.        STOP taking these medications    phentermine 37.5 MG capsule            Indwelling Lines/Drains at time of discharge:   Lines/Drains/Airways     None                 Time spent on the discharge of patient: 10 minutes         Rosalina Barth MD  Department of Hospital Medicine  [facility]  "

## 2022-11-14 NOTE — PROGRESS NOTES
Subjective:       Patient ID: Monica Walker is a 52 y.o. female.    Chief Complaint: Hospital Follow Up (From [facility]) and Fatigue    Pt. States that she is weak. Pt. Is acedotic. States that she has not checked her BS since leaving the hospital.    Fatigue  Associated symptoms include arthralgias, fatigue and weakness. Pertinent negatives include no chest pain, congestion, coughing, fever, headaches, nausea or vomiting.   Review of Systems   Constitutional:  Positive for fatigue. Negative for fever.   HENT:  Negative for nasal congestion and dental problem.    Eyes:  Negative for discharge.   Respiratory:  Negative for cough and shortness of breath.    Cardiovascular:  Negative for chest pain.   Gastrointestinal:  Negative for constipation, diarrhea, nausea and vomiting.   Genitourinary:  Negative for bladder incontinence, difficulty urinating and hot flashes.   Musculoskeletal:  Positive for arthralgias.   Allergic/Immunologic: Negative for environmental allergies.   Neurological:  Positive for weakness. Negative for headaches.   Psychiatric/Behavioral:  Negative for behavioral problems and confusion.        Objective:      Physical Exam  Vitals and nursing note reviewed.   Constitutional:       Appearance: Normal appearance. She is normal weight.   HENT:      Head: Normocephalic and atraumatic.      Right Ear: Tympanic membrane normal.      Left Ear: Tympanic membrane normal.      Nose: Nose normal.      Mouth/Throat:      Mouth: Mucous membranes are moist.   Eyes:      Extraocular Movements: Extraocular movements intact.      Conjunctiva/sclera: Conjunctivae normal.      Pupils: Pupils are equal, round, and reactive to light.   Cardiovascular:      Rate and Rhythm: Normal rate and regular rhythm.      Pulses: Normal pulses.   Pulmonary:      Effort: Pulmonary effort is normal.      Breath sounds: Normal breath sounds.   Abdominal:      General: Abdomen is flat. Bowel sounds are normal.      Palpations: Abdomen is  soft.   Musculoskeletal:         General: Normal range of motion.      Cervical back: Normal range of motion and neck supple.      Comments: Multiple site arthritis   Skin:     General: Skin is warm and dry.   Neurological:      General: No focal deficit present.      Mental Status: She is alert and oriented to person, place, and time.   Psychiatric:         Mood and Affect: Mood normal.       Assessment:       Problem List Items Addressed This Visit          Endocrine    Type 2 diabetes mellitus with hyperglycemia, with long-term current use of insulin - Primary    Relevant Medications    insulin (LANTUS SOLOSTAR U-100 INSULIN) glargine 100 units/mL SubQ pen       Orthopedic    Arthritis       Plan:       Pt. Has not been taking her insulin. Long discussion. She will continue to lose muscle mass and have nerve impairment as long as she does not control her diabetes. Literature given to patient.  RTC 2 weeks or before if needed

## 2022-11-19 ENCOUNTER — HOSPITAL ENCOUNTER (EMERGENCY)
Facility: HOSPITAL | Age: 52
Discharge: ANOTHER HOSPITAL | End: 2022-11-20
Attending: EMERGENCY MEDICINE
Payer: MEDICARE

## 2022-11-19 DIAGNOSIS — J85.2 ABSCESS OF LOWER LOBE OF LEFT LUNG WITHOUT PNEUMONIA: ICD-10-CM

## 2022-11-19 DIAGNOSIS — D72.9 NEUTROPHILIA: ICD-10-CM

## 2022-11-19 DIAGNOSIS — J85.2: Primary | ICD-10-CM

## 2022-11-19 DIAGNOSIS — R10.9 RIGHT FLANK PAIN: ICD-10-CM

## 2022-11-19 DIAGNOSIS — M25.562 ACUTE PAIN OF LEFT KNEE: ICD-10-CM

## 2022-11-19 DIAGNOSIS — E83.42 HYPOMAGNESEMIA: ICD-10-CM

## 2022-11-19 DIAGNOSIS — E11.65 UNCONTROLLED TYPE 2 DIABETES MELLITUS WITH HYPERGLYCEMIA: ICD-10-CM

## 2022-11-19 LAB
ALBUMIN SERPL BCP-MCNC: 1.2 G/DL (ref 3.5–5)
ALBUMIN/GLOB SERPL: 0.2 {RATIO}
ALP SERPL-CCNC: 245 U/L (ref 41–108)
ALT SERPL W P-5'-P-CCNC: 18 U/L (ref 13–56)
AMPHET UR QL SCN: POSITIVE
ANION GAP SERPL CALCULATED.3IONS-SCNC: 13 MMOL/L (ref 7–16)
AST SERPL W P-5'-P-CCNC: 26 U/L (ref 15–37)
BARBITURATES UR QL SCN: NEGATIVE
BASOPHILS # BLD AUTO: 0.06 K/UL (ref 0–0.2)
BASOPHILS NFR BLD AUTO: 0.2 % (ref 0–1)
BENZODIAZ METAB UR QL SCN: NEGATIVE
BILIRUB SERPL-MCNC: 0.4 MG/DL (ref ?–1.2)
BILIRUB UR QL STRIP: NEGATIVE
BUN SERPL-MCNC: 17 MG/DL (ref 7–18)
BUN/CREAT SERPL: 22 (ref 6–20)
CALCIUM SERPL-MCNC: 8.3 MG/DL (ref 8.5–10.1)
CANNABINOIDS UR QL SCN: NEGATIVE
CHLORIDE SERPL-SCNC: 90 MMOL/L (ref 98–107)
CLARITY UR: CLEAR
CO2 SERPL-SCNC: 32 MMOL/L (ref 21–32)
COCAINE UR QL SCN: NEGATIVE
COLOR UR: YELLOW
CREAT SERPL-MCNC: 0.78 MG/DL (ref 0.55–1.02)
DIFFERENTIAL METHOD BLD: ABNORMAL
EGFR (NO RACE VARIABLE) (RUSH/TITUS): 90 ML/MIN/1.73M²
EOSINOPHIL # BLD AUTO: 0.35 K/UL (ref 0–0.5)
EOSINOPHIL NFR BLD AUTO: 1.1 % (ref 1–4)
EOSINOPHIL NFR BLD MANUAL: 2 % (ref 1–4)
ERYTHROCYTE [DISTWIDTH] IN BLOOD BY AUTOMATED COUNT: 14.6 % (ref 11.5–14.5)
GLOBULIN SER-MCNC: 6 G/DL (ref 2–4)
GLUCOSE SERPL-MCNC: 381 MG/DL (ref 70–105)
GLUCOSE SERPL-MCNC: 416 MG/DL (ref 74–106)
GLUCOSE SERPL-MCNC: 446 MG/DL (ref 70–105)
GLUCOSE UR STRIP-MCNC: 500 MG/DL
HCT VFR BLD AUTO: 34.8 % (ref 38–47)
HGB BLD-MCNC: 11.3 G/DL (ref 12–16)
IMM GRANULOCYTES # BLD AUTO: 0.45 K/UL (ref 0–0.04)
IMM GRANULOCYTES NFR BLD: 1.4 % (ref 0–0.4)
KETONES UR STRIP-SCNC: 80 MG/DL
LACTATE SERPL-SCNC: 1.6 MMOL/L (ref 0.4–2)
LEUKOCYTE ESTERASE UR QL STRIP: NEGATIVE
LYMPHOCYTES # BLD AUTO: 1.62 K/UL (ref 1–4.8)
LYMPHOCYTES NFR BLD AUTO: 5 % (ref 27–41)
LYMPHOCYTES NFR BLD MANUAL: 6 % (ref 27–41)
MAGNESIUM SERPL-MCNC: 1.5 MG/DL (ref 1.7–2.3)
MCH RBC QN AUTO: 27.8 PG (ref 27–31)
MCHC RBC AUTO-ENTMCNC: 32.5 G/DL (ref 32–36)
MCV RBC AUTO: 85.7 FL (ref 80–96)
MONOCYTES # BLD AUTO: 1.41 K/UL (ref 0–0.8)
MONOCYTES NFR BLD AUTO: 4.3 % (ref 2–6)
MONOCYTES NFR BLD MANUAL: 2 % (ref 2–6)
MPC BLD CALC-MCNC: 9.5 FL (ref 9.4–12.4)
NEUTROPHILS # BLD AUTO: 28.56 K/UL (ref 1.8–7.7)
NEUTROPHILS NFR BLD AUTO: 88 % (ref 53–65)
NEUTS SEG NFR BLD MANUAL: 90 % (ref 50–62)
NITRITE UR QL STRIP: NEGATIVE
NRBC BLD MANUAL-RTO: ABNORMAL %
OPIATES UR QL SCN: NEGATIVE
PCP UR QL SCN: NEGATIVE
PH UR STRIP: 6.5 PH UNITS
PLATELET # BLD AUTO: 408 K/UL (ref 150–400)
PLATELET MORPHOLOGY: ABNORMAL
POTASSIUM SERPL-SCNC: 3.8 MMOL/L (ref 3.5–5.1)
PROT SERPL-MCNC: 7.2 G/DL (ref 6.4–8.2)
PROT UR QL STRIP: NEGATIVE
RBC # BLD AUTO: 4.06 M/UL (ref 4.2–5.4)
RBC # UR STRIP: NEGATIVE /UL
RBC MORPH BLD: NORMAL
SODIUM SERPL-SCNC: 131 MMOL/L (ref 136–145)
SP GR UR STRIP: 1.01
UROBILINOGEN UR STRIP-ACNC: 0.2 MG/DL
WBC # BLD AUTO: 32.45 K/UL (ref 4.5–11)

## 2022-11-19 PROCEDURE — 99285 EMERGENCY DEPT VISIT HI MDM: CPT | Performed by: EMERGENCY MEDICINE

## 2022-11-19 PROCEDURE — 63600175 PHARM REV CODE 636 W HCPCS: Performed by: EMERGENCY MEDICINE

## 2022-11-19 PROCEDURE — 87040 BLOOD CULTURE FOR BACTERIA: CPT | Performed by: EMERGENCY MEDICINE

## 2022-11-19 PROCEDURE — 81003 URINALYSIS AUTO W/O SCOPE: CPT | Performed by: EMERGENCY MEDICINE

## 2022-11-19 PROCEDURE — 83735 ASSAY OF MAGNESIUM: CPT | Performed by: EMERGENCY MEDICINE

## 2022-11-19 PROCEDURE — 83605 ASSAY OF LACTIC ACID: CPT | Performed by: EMERGENCY MEDICINE

## 2022-11-19 PROCEDURE — 25000003 PHARM REV CODE 250: Performed by: EMERGENCY MEDICINE

## 2022-11-19 PROCEDURE — 96365 THER/PROPH/DIAG IV INF INIT: CPT

## 2022-11-19 PROCEDURE — 96376 TX/PRO/DX INJ SAME DRUG ADON: CPT

## 2022-11-19 PROCEDURE — 80307 DRUG TEST PRSMV CHEM ANLYZR: CPT | Performed by: EMERGENCY MEDICINE

## 2022-11-19 PROCEDURE — 96366 THER/PROPH/DIAG IV INF ADDON: CPT

## 2022-11-19 PROCEDURE — 96361 HYDRATE IV INFUSION ADD-ON: CPT

## 2022-11-19 PROCEDURE — 96375 TX/PRO/DX INJ NEW DRUG ADDON: CPT

## 2022-11-19 PROCEDURE — 36415 COLL VENOUS BLD VENIPUNCTURE: CPT | Performed by: EMERGENCY MEDICINE

## 2022-11-19 PROCEDURE — 82009 KETONE BODYS QUAL: CPT | Performed by: EMERGENCY MEDICINE

## 2022-11-19 PROCEDURE — 82962 GLUCOSE BLOOD TEST: CPT | Mod: 91

## 2022-11-19 PROCEDURE — 96368 THER/DIAG CONCURRENT INF: CPT

## 2022-11-19 PROCEDURE — 80053 COMPREHEN METABOLIC PANEL: CPT | Performed by: EMERGENCY MEDICINE

## 2022-11-19 PROCEDURE — 87149 DNA/RNA DIRECT PROBE: CPT | Mod: 59 | Performed by: EMERGENCY MEDICINE

## 2022-11-19 PROCEDURE — 96367 TX/PROPH/DG ADDL SEQ IV INF: CPT

## 2022-11-19 PROCEDURE — 85025 COMPLETE CBC W/AUTO DIFF WBC: CPT | Performed by: EMERGENCY MEDICINE

## 2022-11-19 PROCEDURE — 99285 EMERGENCY DEPT VISIT HI MDM: CPT | Mod: 25

## 2022-11-19 RX ORDER — KETOROLAC TROMETHAMINE 30 MG/ML
15 INJECTION, SOLUTION INTRAMUSCULAR; INTRAVENOUS
Status: COMPLETED | OUTPATIENT
Start: 2022-11-19 | End: 2022-11-19

## 2022-11-19 RX ORDER — MAGNESIUM SULFATE HEPTAHYDRATE 40 MG/ML
2 INJECTION, SOLUTION INTRAVENOUS
Status: COMPLETED | OUTPATIENT
Start: 2022-11-19 | End: 2022-11-20

## 2022-11-19 RX ADMIN — MAGNESIUM SULFATE HEPTAHYDRATE 2 G: 40 INJECTION, SOLUTION INTRAVENOUS at 10:11

## 2022-11-19 RX ADMIN — DEXTROSE MONOHYDRATE 1750 MG: 50 INJECTION, SOLUTION INTRAVENOUS at 10:11

## 2022-11-19 RX ADMIN — HUMAN INSULIN 10 UNITS: 100 INJECTION, SOLUTION SUBCUTANEOUS at 10:11

## 2022-11-19 RX ADMIN — KETOROLAC TROMETHAMINE 15 MG: 30 INJECTION, SOLUTION INTRAMUSCULAR at 10:11

## 2022-11-19 RX ADMIN — SODIUM CHLORIDE 1000 ML: 9 INJECTION, SOLUTION INTRAVENOUS at 09:11

## 2022-11-19 RX ADMIN — HUMAN INSULIN 10 UNITS: 100 INJECTION, SOLUTION SUBCUTANEOUS at 11:11

## 2022-11-19 RX ADMIN — SODIUM CHLORIDE 1000 ML: 9 INJECTION, SOLUTION INTRAVENOUS at 11:11

## 2022-11-20 ENCOUNTER — HOSPITAL ENCOUNTER (INPATIENT)
Facility: HOSPITAL | Age: 52
LOS: 16 days | Discharge: NURSING, REHAB OR LONG-TERM CARE | DRG: 853 | End: 2022-12-06
Attending: HOSPITALIST | Admitting: INTERNAL MEDICINE
Payer: MEDICARE

## 2022-11-20 VITALS
DIASTOLIC BLOOD PRESSURE: 77 MMHG | WEIGHT: 154.31 LBS | HEIGHT: 66 IN | HEART RATE: 111 BPM | BODY MASS INDEX: 24.8 KG/M2 | SYSTOLIC BLOOD PRESSURE: 166 MMHG | RESPIRATION RATE: 20 BRPM | OXYGEN SATURATION: 95 % | TEMPERATURE: 98 F

## 2022-11-20 DIAGNOSIS — M00.9 SEPTIC ARTHRITIS, DUE TO UNSPECIFIED ORGANISM, SEPTIC ARTHRITIS OF UNSPECIFIED LOCATION: ICD-10-CM

## 2022-11-20 DIAGNOSIS — L02.415 ABSCESS OF RIGHT THIGH: ICD-10-CM

## 2022-11-20 DIAGNOSIS — F32.A DEPRESSION, UNSPECIFIED DEPRESSION TYPE: ICD-10-CM

## 2022-11-20 DIAGNOSIS — R78.81 BACTEREMIA: ICD-10-CM

## 2022-11-20 DIAGNOSIS — J85.2 LUNG ABSCESS: ICD-10-CM

## 2022-11-20 DIAGNOSIS — J85.1 ABSCESS OF LUNG WITH PNEUMONIA, UNSPECIFIED LATERALITY: Primary | ICD-10-CM

## 2022-11-20 DIAGNOSIS — E87.0: ICD-10-CM

## 2022-11-20 DIAGNOSIS — E10.69: ICD-10-CM

## 2022-11-20 DIAGNOSIS — M00.9: ICD-10-CM

## 2022-11-20 DIAGNOSIS — E10.65: ICD-10-CM

## 2022-11-20 DIAGNOSIS — M00.9 SEPTIC JOINT OF LEFT KNEE JOINT: ICD-10-CM

## 2022-11-20 DIAGNOSIS — R00.0 TACHYCARDIA: ICD-10-CM

## 2022-11-20 DIAGNOSIS — L02.416 ABSCESS OF LEFT THIGH: ICD-10-CM

## 2022-11-20 DIAGNOSIS — R07.9 CHEST PAIN: ICD-10-CM

## 2022-11-20 PROBLEM — E11.9 DM (DIABETES MELLITUS): Status: ACTIVE | Noted: 2022-11-20

## 2022-11-20 LAB
ACETONE SERPL QL SCN: NEGATIVE
CLARITY FLD: ABNORMAL
COLOR FLD: YELLOW
CRYSTALS SNV MICRO: NORMAL
GLUCOSE SERPL-MCNC: 277 MG/DL (ref 70–105)
GLUCOSE SERPL-MCNC: 287 MG/DL (ref 70–105)
GLUCOSE SERPL-MCNC: 318 MG/DL (ref 70–105)
GRANULOCYTES NFR SNV MANUAL: 96 % (ref 0–25)
HAV IGM SER QL: NORMAL
HBV CORE IGM SER QL: NORMAL
HBV SURFACE AG SERPL QL IA: NORMAL
HCV AB SER QL: NORMAL
HIV 1+O+2 AB SERPL QL: NORMAL
LYMPHOCYTES NFR SNV MANUAL: 1 %
MONOCYTES NFR SNV MANUAL: 3 %
RBC # SNV MANUAL: 8000 /CUMM
WBC # SNV MANUAL: ABNORMAL /CUMM

## 2022-11-20 PROCEDURE — 25000003 PHARM REV CODE 250: Performed by: HOSPITALIST

## 2022-11-20 PROCEDURE — 87389 HIV-1 AG W/HIV-1&-2 AB AG IA: CPT | Performed by: INTERNAL MEDICINE

## 2022-11-20 PROCEDURE — 25500020 PHARM REV CODE 255: Performed by: EMERGENCY MEDICINE

## 2022-11-20 PROCEDURE — 93005 ELECTROCARDIOGRAM TRACING: CPT

## 2022-11-20 PROCEDURE — 25000003 PHARM REV CODE 250: Performed by: EMERGENCY MEDICINE

## 2022-11-20 PROCEDURE — 36415 COLL VENOUS BLD VENIPUNCTURE: CPT | Performed by: INTERNAL MEDICINE

## 2022-11-20 PROCEDURE — 82962 GLUCOSE BLOOD TEST: CPT

## 2022-11-20 PROCEDURE — 97165 OT EVAL LOW COMPLEX 30 MIN: CPT

## 2022-11-20 PROCEDURE — 11000001 HC ACUTE MED/SURG PRIVATE ROOM

## 2022-11-20 PROCEDURE — 93010 ELECTROCARDIOGRAM REPORT: CPT | Mod: ,,, | Performed by: INTERNAL MEDICINE

## 2022-11-20 PROCEDURE — 63600175 PHARM REV CODE 636 W HCPCS: Performed by: HOSPITALIST

## 2022-11-20 PROCEDURE — 97162 PT EVAL MOD COMPLEX 30 MIN: CPT

## 2022-11-20 PROCEDURE — 89060 EXAM SYNOVIAL FLUID CRYSTALS: CPT | Performed by: ORTHOPAEDIC SURGERY

## 2022-11-20 PROCEDURE — 25000003 PHARM REV CODE 250: Performed by: GENERAL PRACTICE

## 2022-11-20 PROCEDURE — 89050 BODY FLUID CELL COUNT: CPT | Performed by: ORTHOPAEDIC SURGERY

## 2022-11-20 PROCEDURE — 63600175 PHARM REV CODE 636 W HCPCS: Performed by: EMERGENCY MEDICINE

## 2022-11-20 PROCEDURE — 80074 ACUTE HEPATITIS PANEL: CPT | Performed by: INTERNAL MEDICINE

## 2022-11-20 PROCEDURE — 63600175 PHARM REV CODE 636 W HCPCS: Performed by: GENERAL PRACTICE

## 2022-11-20 PROCEDURE — 63600175 PHARM REV CODE 636 W HCPCS

## 2022-11-20 PROCEDURE — 93010 EKG 12-LEAD: ICD-10-PCS | Mod: ,,, | Performed by: INTERNAL MEDICINE

## 2022-11-20 RX ORDER — SODIUM CHLORIDE 0.9 % (FLUSH) 0.9 %
10 SYRINGE (ML) INJECTION EVERY 12 HOURS PRN
Status: DISCONTINUED | OUTPATIENT
Start: 2022-11-20 | End: 2022-12-06 | Stop reason: HOSPADM

## 2022-11-20 RX ORDER — SODIUM CHLORIDE 9 MG/ML
1000 INJECTION, SOLUTION INTRAVENOUS
Status: COMPLETED | OUTPATIENT
Start: 2022-11-20 | End: 2022-11-20

## 2022-11-20 RX ORDER — IBUPROFEN 200 MG
16 TABLET ORAL
Status: DISCONTINUED | OUTPATIENT
Start: 2022-11-20 | End: 2022-11-20

## 2022-11-20 RX ORDER — MAGNESIUM SULFATE HEPTAHYDRATE 40 MG/ML
2 INJECTION, SOLUTION INTRAVENOUS ONCE
Status: COMPLETED | OUTPATIENT
Start: 2022-11-20 | End: 2022-11-20

## 2022-11-20 RX ORDER — IBUPROFEN 200 MG
24 TABLET ORAL
Status: DISCONTINUED | OUTPATIENT
Start: 2022-11-20 | End: 2022-11-22

## 2022-11-20 RX ORDER — IBUPROFEN 200 MG
16 TABLET ORAL
Status: DISCONTINUED | OUTPATIENT
Start: 2022-11-20 | End: 2022-11-22

## 2022-11-20 RX ORDER — DULOXETIN HYDROCHLORIDE 30 MG/1
60 CAPSULE, DELAYED RELEASE ORAL DAILY
Status: DISCONTINUED | OUTPATIENT
Start: 2022-11-20 | End: 2022-12-06 | Stop reason: HOSPADM

## 2022-11-20 RX ORDER — POLYETHYLENE GLYCOL 3350 17 G/17G
17 POWDER, FOR SOLUTION ORAL DAILY
Status: DISCONTINUED | OUTPATIENT
Start: 2022-11-20 | End: 2022-11-27

## 2022-11-20 RX ORDER — NALOXONE HCL 0.4 MG/ML
0.02 VIAL (ML) INJECTION
Status: DISCONTINUED | OUTPATIENT
Start: 2022-11-20 | End: 2022-12-06 | Stop reason: HOSPADM

## 2022-11-20 RX ORDER — INSULIN ASPART 100 [IU]/ML
5 INJECTION, SOLUTION INTRAVENOUS; SUBCUTANEOUS
Status: DISCONTINUED | OUTPATIENT
Start: 2022-11-20 | End: 2022-11-22

## 2022-11-20 RX ORDER — OXYCODONE HYDROCHLORIDE 5 MG/1
5 TABLET ORAL EVERY 4 HOURS PRN
Status: DISCONTINUED | OUTPATIENT
Start: 2022-11-20 | End: 2022-11-21

## 2022-11-20 RX ORDER — BISACODYL 5 MG
10 TABLET, DELAYED RELEASE (ENTERIC COATED) ORAL DAILY PRN
Status: DISCONTINUED | OUTPATIENT
Start: 2022-11-20 | End: 2022-12-06 | Stop reason: HOSPADM

## 2022-11-20 RX ORDER — GLUCAGON 1 MG
1 KIT INJECTION
Status: DISCONTINUED | OUTPATIENT
Start: 2022-11-20 | End: 2022-11-22

## 2022-11-20 RX ORDER — SODIUM CHLORIDE 9 MG/ML
INJECTION, SOLUTION INTRAVENOUS CONTINUOUS
Status: DISCONTINUED | OUTPATIENT
Start: 2022-11-20 | End: 2022-11-22

## 2022-11-20 RX ORDER — ACETAMINOPHEN 500 MG
1000 TABLET ORAL EVERY 6 HOURS PRN
Status: DISCONTINUED | OUTPATIENT
Start: 2022-11-20 | End: 2022-12-06 | Stop reason: HOSPADM

## 2022-11-20 RX ORDER — DIPHENHYDRAMINE HYDROCHLORIDE 50 MG/ML
25 INJECTION INTRAMUSCULAR; INTRAVENOUS ONCE
Status: DISCONTINUED | OUTPATIENT
Start: 2022-11-20 | End: 2022-11-20

## 2022-11-20 RX ORDER — SIMETHICONE 80 MG
1 TABLET,CHEWABLE ORAL 3 TIMES DAILY PRN
Status: DISCONTINUED | OUTPATIENT
Start: 2022-11-20 | End: 2022-12-06 | Stop reason: HOSPADM

## 2022-11-20 RX ORDER — ENOXAPARIN SODIUM 100 MG/ML
40 INJECTION SUBCUTANEOUS EVERY 24 HOURS
Status: DISPENSED | OUTPATIENT
Start: 2022-11-20 | End: 2022-11-21

## 2022-11-20 RX ORDER — TRAZODONE HYDROCHLORIDE 50 MG/1
50 TABLET ORAL NIGHTLY PRN
Status: DISCONTINUED | OUTPATIENT
Start: 2022-11-20 | End: 2022-12-06 | Stop reason: HOSPADM

## 2022-11-20 RX ORDER — MORPHINE SULFATE 4 MG/ML
4 INJECTION, SOLUTION INTRAMUSCULAR; INTRAVENOUS ONCE
Status: COMPLETED | OUTPATIENT
Start: 2022-11-20 | End: 2022-11-20

## 2022-11-20 RX ORDER — SODIUM CHLORIDE 0.9 % (FLUSH) 0.9 %
10 SYRINGE (ML) INJECTION EVERY 12 HOURS PRN
Status: DISCONTINUED | OUTPATIENT
Start: 2022-11-20 | End: 2022-11-22

## 2022-11-20 RX ORDER — MUPIROCIN 20 MG/G
OINTMENT TOPICAL 2 TIMES DAILY
Status: DISPENSED | OUTPATIENT
Start: 2022-11-20 | End: 2022-11-25

## 2022-11-20 RX ORDER — DIPHENHYDRAMINE HYDROCHLORIDE 50 MG/ML
50 INJECTION INTRAMUSCULAR; INTRAVENOUS ONCE
Status: COMPLETED | OUTPATIENT
Start: 2022-11-20 | End: 2022-11-20

## 2022-11-20 RX ORDER — GUAIFENESIN/DEXTROMETHORPHAN 100-10MG/5
10 SYRUP ORAL EVERY 6 HOURS PRN
Status: DISCONTINUED | OUTPATIENT
Start: 2022-11-20 | End: 2022-12-06 | Stop reason: HOSPADM

## 2022-11-20 RX ORDER — INSULIN ASPART 100 [IU]/ML
1-10 INJECTION, SOLUTION INTRAVENOUS; SUBCUTANEOUS
Status: DISCONTINUED | OUTPATIENT
Start: 2022-11-20 | End: 2022-11-22

## 2022-11-20 RX ORDER — ONDANSETRON 2 MG/ML
8 INJECTION INTRAMUSCULAR; INTRAVENOUS EVERY 6 HOURS PRN
Status: DISCONTINUED | OUTPATIENT
Start: 2022-11-20 | End: 2022-12-06 | Stop reason: HOSPADM

## 2022-11-20 RX ORDER — IBUPROFEN 200 MG
24 TABLET ORAL
Status: DISCONTINUED | OUTPATIENT
Start: 2022-11-20 | End: 2022-11-20

## 2022-11-20 RX ORDER — LEVOFLOXACIN 500 MG/1
500 TABLET, FILM COATED ORAL DAILY
Status: DISCONTINUED | OUTPATIENT
Start: 2022-11-20 | End: 2022-11-25

## 2022-11-20 RX ADMIN — OXYCODONE HYDROCHLORIDE 5 MG: 5 TABLET ORAL at 02:11

## 2022-11-20 RX ADMIN — VANCOMYCIN HYDROCHLORIDE 1500 MG: 1 INJECTION, POWDER, LYOPHILIZED, FOR SOLUTION INTRAVENOUS at 03:11

## 2022-11-20 RX ADMIN — MAGNESIUM SULFATE HEPTAHYDRATE 2 G: 40 INJECTION, SOLUTION INTRAVENOUS at 11:11

## 2022-11-20 RX ADMIN — INSULIN ASPART 6 UNITS: 100 INJECTION, SOLUTION INTRAVENOUS; SUBCUTANEOUS at 05:11

## 2022-11-20 RX ADMIN — OXYCODONE HYDROCHLORIDE 5 MG: 5 TABLET ORAL at 08:11

## 2022-11-20 RX ADMIN — INSULIN ASPART 5 UNITS: 100 INJECTION, SOLUTION INTRAVENOUS; SUBCUTANEOUS at 09:11

## 2022-11-20 RX ADMIN — TRAZODONE HYDROCHLORIDE 50 MG: 50 TABLET ORAL at 08:11

## 2022-11-20 RX ADMIN — ENOXAPARIN SODIUM 40 MG: 100 INJECTION SUBCUTANEOUS at 04:11

## 2022-11-20 RX ADMIN — INSULIN ASPART 5 UNITS: 100 INJECTION, SOLUTION INTRAVENOUS; SUBCUTANEOUS at 05:11

## 2022-11-20 RX ADMIN — OXYCODONE HYDROCHLORIDE 5 MG: 5 TABLET ORAL at 09:11

## 2022-11-20 RX ADMIN — HUMAN INSULIN 10 UNITS: 100 INJECTION, SOLUTION SUBCUTANEOUS at 01:11

## 2022-11-20 RX ADMIN — MUPIROCIN: 20 OINTMENT TOPICAL at 08:11

## 2022-11-20 RX ADMIN — INSULIN ASPART 6 UNITS: 100 INJECTION, SOLUTION INTRAVENOUS; SUBCUTANEOUS at 12:11

## 2022-11-20 RX ADMIN — MEROPENEM 500 MG: 500 INJECTION, POWDER, FOR SOLUTION INTRAVENOUS at 01:11

## 2022-11-20 RX ADMIN — PIPERACILLIN AND TAZOBACTAM 4.5 G: 4; .5 INJECTION, POWDER, LYOPHILIZED, FOR SOLUTION INTRAVENOUS; PARENTERAL at 08:11

## 2022-11-20 RX ADMIN — MUPIROCIN: 20 OINTMENT TOPICAL at 09:11

## 2022-11-20 RX ADMIN — INSULIN ASPART 5 UNITS: 100 INJECTION, SOLUTION INTRAVENOUS; SUBCUTANEOUS at 12:11

## 2022-11-20 RX ADMIN — INSULIN DETEMIR 20 UNITS: 100 INJECTION, SOLUTION SUBCUTANEOUS at 08:11

## 2022-11-20 RX ADMIN — PIPERACILLIN AND TAZOBACTAM 4.5 G: 4; .5 INJECTION, POWDER, LYOPHILIZED, FOR SOLUTION INTRAVENOUS; PARENTERAL at 02:11

## 2022-11-20 RX ADMIN — SODIUM CHLORIDE 1000 ML: 9 INJECTION, SOLUTION INTRAVENOUS at 01:11

## 2022-11-20 RX ADMIN — INSULIN ASPART 2 UNITS: 100 INJECTION, SOLUTION INTRAVENOUS; SUBCUTANEOUS at 08:11

## 2022-11-20 RX ADMIN — MORPHINE SULFATE 4 MG: 4 INJECTION INTRAVENOUS at 04:11

## 2022-11-20 RX ADMIN — LEVOFLOXACIN 500 MG: 500 TABLET, FILM COATED ORAL at 09:11

## 2022-11-20 RX ADMIN — IOPAMIDOL 85 ML: 755 INJECTION, SOLUTION INTRAVENOUS at 01:11

## 2022-11-20 RX ADMIN — DULOXETINE 60 MG: 30 CAPSULE, DELAYED RELEASE ORAL at 09:11

## 2022-11-20 RX ADMIN — HUMAN INSULIN 5 UNITS: 100 INJECTION, SOLUTION SUBCUTANEOUS at 01:11

## 2022-11-20 RX ADMIN — POLYETHYLENE GLYCOL 3350 17 G: 17 POWDER, FOR SOLUTION ORAL at 09:11

## 2022-11-20 RX ADMIN — OXYCODONE HYDROCHLORIDE 5 MG: 5 TABLET ORAL at 05:11

## 2022-11-20 RX ADMIN — SODIUM CHLORIDE: 9 INJECTION, SOLUTION INTRAVENOUS at 04:11

## 2022-11-20 RX ADMIN — PIPERACILLIN AND TAZOBACTAM 4.5 G: 4; .5 INJECTION, POWDER, LYOPHILIZED, FOR SOLUTION INTRAVENOUS; PARENTERAL at 05:11

## 2022-11-20 RX ADMIN — DIPHENHYDRAMINE HYDROCHLORIDE 50 MG: 50 INJECTION, SOLUTION INTRAMUSCULAR; INTRAVENOUS at 06:11

## 2022-11-20 NOTE — SUBJECTIVE & OBJECTIVE
"Past Medical History:   Diagnosis Date    Abscess of right thigh + MRSA 08/20/2021    healed    Adnexal cyst     Degenerative disc disease     Depression 10/29/2021    Everyday Smoker/Tobacco User     Gastroenteritis 11/12/2022    Hypertension     Osteoarthritis     Uncontrolled Type 2 Diabetes mellitus        Past Surgical History:   Procedure Laterality Date    BACK SURGERY      HYSTERECTOMY N/A     TOTAL HIP ARTHROPLASTY Left     TOTAL KNEE ARTHROPLASTY Bilateral        Review of patient's allergies indicates:  No Known Allergies    Current Facility-Administered Medications on File Prior to Encounter   Medication    [COMPLETED] 0.9%  NaCl infusion    [COMPLETED] insulin regular injection 10 Units 0.1 mL    [COMPLETED] insulin regular injection 10 Units 0.1 mL    [COMPLETED] insulin regular injection 10 Units 0.1 mL    [COMPLETED] insulin regular injection 5 Units 0.05 mL    [COMPLETED] iopamidoL (ISOVUE-370) injection 100 mL    [COMPLETED] ketorolac injection 15 mg    [COMPLETED] magnesium sulfate 2g in water 50mL IVPB (premix)    [COMPLETED] meropenem (MERREM) 500 mg in sodium chloride 0.9 % 100 mL IVPB (MB+)    [COMPLETED] sodium chloride 0.9% bolus 1,000 mL    [COMPLETED] sodium chloride 0.9% bolus 1,000 mL    [COMPLETED] vancomycin (VANCOCIN) 1,750 mg in dextrose 5 % 500 mL IVPB    [DISCONTINUED] sodium chloride 0.9% bolus 1,000 mL     Current Outpatient Medications on File Prior to Encounter   Medication Sig    BD ULTRA-FINE SHORT PEN NEEDLE 31 gauge x 5/16" Ndle     DULoxetine (CYMBALTA) 60 MG capsule Take 1 capsule (60 mg total) by mouth once daily.    insulin (LANTUS SOLOSTAR U-100 INSULIN) glargine 100 units/mL SubQ pen Inject 40 Units into the skin once daily.    NOVOLOG FLEXPEN U-100 INSULIN 100 unit/mL (3 mL) InPn pen Inject 10 Units into the skin 2 (two) times a day.    gabapentin (NEURONTIN ORAL) Take by mouth 2 (two) times a day.    TRULICITY 0.75 mg/0.5 mL pen injector Inject 0.75 mg into the skin " every 7 days.     Family History    None       Tobacco Use    Smoking status: Every Day     Types: Cigarettes    Smokeless tobacco: Current     Types: Snuff   Substance and Sexual Activity    Alcohol use: Never    Drug use: Never    Sexual activity: Not on file     Review of Systems   Constitutional:  Positive for activity change and appetite change. Negative for chills and fever.   HENT:  Negative for hearing loss, nosebleeds, rhinorrhea, sinus pressure, sneezing, sore throat and trouble swallowing.    Eyes:  Negative for photophobia, pain, discharge, itching and visual disturbance.   Respiratory:  Negative for apnea, cough, chest tightness and shortness of breath.    Cardiovascular:  Negative for chest pain, palpitations and leg swelling.   Gastrointestinal:  Negative for abdominal distention, abdominal pain, blood in stool, constipation, diarrhea, nausea and vomiting.   Endocrine: Negative for polydipsia, polyphagia and polyuria.   Genitourinary:  Negative for difficulty urinating, dysuria, frequency, hematuria and urgency. Vaginal bleeding: left knee.  Musculoskeletal:  Positive for arthralgias (left knee), back pain and joint swelling (left knee). Negative for myalgias.   Skin:  Negative for rash.   Neurological:  Negative for dizziness, tremors, seizures, syncope, light-headedness, numbness and headaches.   Psychiatric/Behavioral:  Negative for agitation, confusion, hallucinations and sleep disturbance. The patient is not nervous/anxious.    Objective:     Vital Signs (Most Recent):  Temp: 97.5 °F (36.4 °C) (11/20/22 0330)  Pulse: (!) 118 (11/20/22 0330)  Resp: 20 (11/20/22 0416)  BP: (!) 178/81 (11/20/22 0330)  SpO2: 95 % (11/20/22 0330)   Vital Signs (24h Range):  Temp:  [97.5 °F (36.4 °C)-98.2 °F (36.8 °C)] 97.5 °F (36.4 °C)  Pulse:  [111-118] 118  Resp:  [20] 20  SpO2:  [94 %-97 %] 95 %  BP: (162-186)/(77-89) 178/81     Weight: 70 kg (154 lb 5.2 oz)  Body mass index is 24.91 kg/m².    Physical Exam  Vitals  and nursing note reviewed.   Constitutional:       General: She is not in acute distress.     Appearance: Normal appearance. She is not ill-appearing, toxic-appearing or diaphoretic.   HENT:      Head: Normocephalic and atraumatic.      Right Ear: External ear normal.      Left Ear: External ear normal.      Nose: Nose normal. No congestion or rhinorrhea.      Mouth/Throat:      Pharynx: Oropharynx is clear.   Eyes:      Extraocular Movements: Extraocular movements intact.      Conjunctiva/sclera: Conjunctivae normal.      Pupils: Pupils are equal, round, and reactive to light.   Cardiovascular:      Rate and Rhythm: Normal rate and regular rhythm.      Pulses: Normal pulses.      Heart sounds: Normal heart sounds. No murmur heard.  Pulmonary:      Effort: Pulmonary effort is normal. No respiratory distress.      Breath sounds: Normal breath sounds. No wheezing, rhonchi or rales.   Abdominal:      General: Bowel sounds are normal. There is no distension.      Tenderness: There is no abdominal tenderness. There is no guarding or rebound.   Musculoskeletal:         General: Swelling (left knee) and tenderness (left knee) present. Normal range of motion.      Cervical back: Normal range of motion and neck supple.   Skin:     General: Skin is warm and dry.      Capillary Refill: Capillary refill takes less than 2 seconds.      Findings: No rash.   Neurological:      General: No focal deficit present.      Mental Status: She is alert and oriented to person, place, and time. Mental status is at baseline.      Cranial Nerves: No cranial nerve deficit.   Psychiatric:         Mood and Affect: Mood normal.         Behavior: Behavior normal.         Thought Content: Thought content normal.         CRANIAL NERVES     CN III, IV, VI   Pupils are equal, round, and reactive to light.     Significant Labs: All pertinent labs within the past 24 hours have been reviewed.  Recent Lab Results  (Last 5 results in the past 24 hours)         11/20/22  0137   11/19/22  2356   11/19/22  2308   11/19/22  2200   11/19/22  2159        Benzodiazepines         Negative       Cocaine         Negative       BARBITURATES         Negative       Albumin/Globulin Ratio       0.2         Acetone, Bld       Negative         Albumin       1.2         Alkaline Phosphatase       245         ALT       18         Amphetamine         Positive       Anion Gap       13         Appearance, UA         Clear       AST       26         Baso #       0.06         Basophil %       0.2         Bilirubin (UA)         Negative       BILIRUBIN TOTAL       0.4         BUN       17         BUN/CREAT RATIO       22         Calcium       8.3         Cannabinoid Scrn, Ur         Negative       Chloride       90         CO2       32         Color, UA         Yellow       Creatinine       0.78         Differential Type       Manual         eGFR       90         Eos #       0.35         Eosinophil %       1.1                2         Globulin, Total       6.0         Glucose       416         Glucose, UA         500       Hematocrit       34.8         Hemoglobin       11.3         Immature Grans (Abs)       0.45         Immature Granulocytes       1.4         Ketones, UA         80       Lactate, Chase       1.6         Leukocytes, UA         Negative       Lymph #       1.62         Lymph %       5.0                6         Magnesium       1.5         MCH       27.8         MCHC       32.5         MCV       85.7         Mono #       1.41         Mono %       4.3                2         MPV       9.5         Neutrophils, Abs       28.56         Neutrophils Relative       88.0         NITRITE UA         Negative       nRBC               Occult Blood UA         Negative       Opiate Scrn, Ur         Negative       pH, UA         6.5       Phencyclidine         Negative       PLATELET MORPHOLOGY       Increased         Platelets       408         POC Glucose 277   318   381           Potassium        3.8         PROTEIN TOTAL       7.2         Protein, UA         Negative       RBC       4.06         RBC Morphology       Normal         RDW       14.6         Segmented Neutrophils, Man %       90         Sodium       131         Specific Cardale, UA         1.010       UROBILINOGEN UA         0.2       WBC       32.45                                Significant Imaging: I have reviewed all pertinent imaging results/findings within the past 24 hours.  I have reviewed and interpreted all pertinent imaging results/findings within the past 24 hours.

## 2022-11-20 NOTE — ASSESSMENT & PLAN NOTE
Patient's FSGs are uncontrolled due to hyperglycemia on current medication regimen.  Last A1c reviewed-   Lab Results   Component Value Date    HGBA1C 12.4 (H) 11/09/2022     Most recent fingerstick glucose reviewed- No results for input(s): POCTGLUCOSE in the last 24 hours.  Current correctional scale  Medium  Increase anti-hyperglycemic dose as follows-   Antihyperglycemics (From admission, onward)    Start     Stop Route Frequency Ordered    11/20/22 2100  insulin detemir U-100 injection 20 Units         -- SubQ Nightly 11/20/22 0535    11/20/22 0715  insulin aspart U-100 injection 5 Units         -- SubQ 3 times daily with meals 11/20/22 0535    11/20/22 0634  insulin aspart U-100 injection 1-10 Units         -- SubQ Before meals & nightly PRN 11/20/22 0535        Continue nutritional and correctional insulin  Diabetic educator consult

## 2022-11-20 NOTE — ASSESSMENT & PLAN NOTE
- Concern for septic arthritis. S/p fall onto L knee.  - Orthopedics consulted. Dr. Mendoza aspirated off cloudy fluid and sent it for culture and analysis. Patient has past history of TKA done by Dr. Noyola. He is going to discuss with him further when fluid analysis come back about plan

## 2022-11-20 NOTE — PROGRESS NOTES
Pharmacy consulted to assist with management of vancomycin for pneumonia in this 51 y/o female patient.    A one-time dose of 1750 mg was given in the ER at [place].    Will begin 1500mg every 18 hours and draw a trough before the 4th dose.    Pharmacy will continue to monitor and make adjustments as needed.    Thank you for the consult,  Tammy Young, PharmD  [phone]

## 2022-11-20 NOTE — ASSESSMENT & PLAN NOTE
CT scan of the abdomen pelvis and chest with IV contrast indicates bilateral peripherally dense cavitary lesions left lower lobe and right middle lobe bilateral pleural effusions left greater than right with possible loculation these areas are concerning for abscesses although a neoplastic process can not be entirely excluded.  Abnormal appearance of the left kidney concerning for pyelonephritis.     Started IV Zosyn, Vancomycin and Levofloxacin  IR to be consulted for possible drainage on Monday  Will continue to monitor vital signs and physical exam

## 2022-11-20 NOTE — HPI
Patient is a 51 yo female who presents to [facility] transferred from [facility] c/o L knee and R flank pain x 2 weeks. R flank pain is 10/10, squeezing in quality, without radiation, no aggravating or relieving factors. L knee pain started after a fall, which she is unable to recall date of the injury. The knee pain is 10/10, located in the medial joint line, aggravated by movement and weight-bearing, squeezing in quality, radiates down to L foot and without relieving factors including heat and ice. Patient has a history of type 2 diabetes, poorly controlled.    10 days ago patient was admitted at [facility] and treated for pyelonephritis on the left side with IV antibiotics although her urine culture did not grow any bacteria. 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 Today a CT scan of the abdomen pelvis and chest with IV contrast indicates bilateral peripherally dense cavitary lesions left lower lobe and right middle lobe bilateral pleural effusions left greater than right with possible loculation these areas are concerning for abscesses although a neoplastic process can not be entirely excluded. Abnormal appearance of the left kidney concerning for pyelonephritis. Patient was transferred to [facility] for further medical evaluation and management.

## 2022-11-20 NOTE — ASSESSMENT & PLAN NOTE
MRSA thigh abscess   Tender left knee concerning for septic arthritis    IV antibiotics started  Orthopedics to be consulted in the AM (Dr. Mendoza)  Will continue to monitor vital signs and physical exam

## 2022-11-20 NOTE — PROGRESS NOTES
11/20/22 0331        Altered Skin Integrity 11/20/22 0330 Right anterior;lower;proximal Arm   Date First Assessed/Time First Assessed: 11/20/22 0330   Side: Right  Orientation: anterior;lower;proximal  Location: Arm   Wound Image Images linked

## 2022-11-20 NOTE — NURSING
"0324 Patient to room 467 via [facility]. Pt is alert and oriented, complains of right flank pain and left leg pain. VS as followed: /81, P 118, RR 20, T 97.5, O2 95% room air. Dr. Batista notified of patient arrival. Upon 4 eye skin assessment, marks to right AC noted. Pt states, "I got these from thorns yesterday." Marks at this time are fresh looking with small amount of blood seeping out still. No IV or blood draw was attempted at this site. Picture taken and placed in chart under altered skin integrity. 4 eye skin assessment completed with HAYDEE Youssef.Safety measures ongoing.   "

## 2022-11-20 NOTE — PROGRESS NOTES
[facility]  [facility] Medicine  Progress Note    Patient Name: Monica Walker  MRN: [id number]  Patient Class: IP- Inpatient   Admission Date: 11/20/2022  Length of Stay: 0 days  Attending Physician: Cecil Chappell MD  Primary Care Provider: Hannah Schulz MD        Subjective:     Principal Problem:Lung abscess        HPI:  Patient is a 51 yo female who presents to [facility] transferred from [facility] c/o L knee and R flank pain x 2 weeks. R flank pain is 10/10, squeezing in quality, without radiation, no aggravating or relieving factors. L knee pain started after a fall, which she is unable to recall date of the injury. The knee pain is 10/10, located in the medial joint line, aggravated by movement and weight-bearing, squeezing in quality, radiates down to L foot and without relieving factors including heat and ice. Patient has a history of type 2 diabetes, poorly controlled.    10 days ago patient was admitted at [facility] and treated for pyelonephritis on the left side with IV antibiotics although her urine culture did not grow any bacteria. 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 Today a CT scan of the abdomen pelvis and chest with IV contrast indicates bilateral peripherally dense cavitary lesions left lower lobe and right middle lobe bilateral pleural effusions left greater than right with possible loculation these areas are concerning for abscesses although a neoplastic process can not be entirely excluded. Abnormal appearance of the left kidney concerning for pyelonephritis. Patient was transferred to [facility] for further medical evaluation and management.         Overview/Hospital Course:  No notes on file    Interval History: Patient appeared slightly altered. She was a little out of this morning  on morning rounds. She was concerned about her breakfast and did not really want to answer any of my questions.     Review of Systems   Constitutional:  Positive for activity change and appetite change. Negative for chills and fever.   HENT:  Negative for hearing loss, nosebleeds, rhinorrhea, sinus pressure, sneezing, sore throat and trouble swallowing.    Eyes:  Negative for photophobia, pain, discharge, itching and visual disturbance.   Respiratory:  Negative for apnea, cough, chest tightness and shortness of breath.    Cardiovascular:  Negative for chest pain, palpitations and leg swelling.   Gastrointestinal:  Negative for abdominal distention, abdominal pain, blood in stool, constipation, diarrhea, nausea and vomiting.   Endocrine: Negative for polydipsia, polyphagia and polyuria.   Genitourinary:  Negative for difficulty urinating, dysuria, frequency, hematuria and urgency.   Musculoskeletal:  Positive for arthralgias (left knee), back pain and joint swelling (left knee). Negative for myalgias.   Skin:  Negative for rash.   Neurological:  Negative for dizziness, tremors, seizures, syncope, light-headedness, numbness and headaches.   Psychiatric/Behavioral:  Negative for agitation, confusion, hallucinations and sleep disturbance. The patient is not nervous/anxious.    Objective:     Vital Signs (Most Recent):  Temp: 97.5 °F (36.4 °C) (11/20/22 0330)  Pulse: (!) 118 (11/20/22 0330)  Resp: 18 (11/20/22 0932)  BP: (!) 178/81 (11/20/22 0330)  SpO2: 95 % (11/20/22 0330)   Vital Signs (24h Range):  Temp:  [97.5 °F (36.4 °C)-98.2 °F (36.8 °C)] 97.5 °F (36.4 °C)  Pulse:  [111-118] 118  Resp:  [18-20] 18  SpO2:  [94 %-97 %] 95 %  BP: (162-186)/(77-89) 178/81     Weight: 70 kg (154 lb 5.2 oz)  Body mass index is 24.91 kg/m².  No intake or output data in the 24 hours ending 11/20/22 1116   Physical Exam  Vitals and nursing note reviewed.   Constitutional:       General: She is not in acute distress.     Appearance: Normal  appearance. She is not ill-appearing, toxic-appearing or diaphoretic.   HENT:      Head: Normocephalic and atraumatic.      Right Ear: External ear normal.      Left Ear: External ear normal.      Nose: Nose normal. No congestion or rhinorrhea.      Mouth/Throat:      Pharynx: Oropharynx is clear.   Eyes:      Extraocular Movements: Extraocular movements intact.      Conjunctiva/sclera: Conjunctivae normal.      Pupils: Pupils are equal, round, and reactive to light.   Cardiovascular:      Rate and Rhythm: Normal rate and regular rhythm.      Pulses: Normal pulses.      Heart sounds: Normal heart sounds. No murmur heard.  Pulmonary:      Effort: Pulmonary effort is normal. No respiratory distress.      Breath sounds: Normal breath sounds. No wheezing, rhonchi or rales.   Abdominal:      General: Bowel sounds are normal. There is no distension.      Tenderness: There is no abdominal tenderness. There is no guarding or rebound.   Musculoskeletal:         General: Swelling (left knee) and tenderness (left knee) present. Normal range of motion.      Cervical back: Normal range of motion and neck supple.   Skin:     General: Skin is warm and dry.      Capillary Refill: Capillary refill takes less than 2 seconds.      Findings: No rash.   Neurological:      Mental Status: She is alert and oriented to person, place, and time.      Cranial Nerves: No cranial nerve deficit.       Significant Labs: All pertinent labs within the past 24 hours have been reviewed.    Significant Imaging: I have reviewed all pertinent imaging results/findings within the past 24 hours.      Assessment/Plan:      * Lung abscess  CT scan of the abdomen pelvis and chest with IV contrast indicates bilateral peripherally dense cavitary lesions left lower lobe and right middle lobe bilateral pleural effusions left greater than right with possible loculation these areas are concerning for abscesses although a neoplastic process can not be entirely  excluded.  Abnormal appearance of the left kidney concerning for pyelonephritis.     Started IV Zosyn, Vancomycin and Levofloxacin  Pulmonology  consulted for possible drainage on Monday.   Will continue to monitor vital signs and physical exam            DM (diabetes mellitus)  Patient's FSGs are uncontrolled due to hyperglycemia on current medication regimen.  Last A1c reviewed-   Lab Results   Component Value Date    HGBA1C 12.4 (H) 11/09/2022     Most recent fingerstick glucose reviewed- No results for input(s): POCTGLUCOSE in the last 24 hours.  Current correctional scale  Medium  Increase anti-hyperglycemic dose as follows-   Antihyperglycemics (From admission, onward)    Start     Stop Route Frequency Ordered    11/20/22 2100  insulin detemir U-100 injection 20 Units         -- SubQ Nightly 11/20/22 0535 11/20/22 0715  insulin aspart U-100 injection 5 Units         -- SubQ 3 times daily with meals 11/20/22 0535 11/20/22 0634  insulin aspart U-100 injection 1-10 Units         -- SubQ Before meals & nightly PRN 11/20/22 0535        Continue nutritional and correctional insulin  Diabetic educator consult    Arthritis  - Concern for septic arthritis. S/p fall onto L knee.  - Orthopedics consulted. Dr. Mendoza aspirated off cloudy fluid and sent it for culture and analysis. Patient has past history of TKA done by Dr. Noyola. He is going to discuss with him further when fluid analysis come back about plan    Depression  Patient has persistent depression which is unknown and is currently controlled. Will Continue anti-depressant medications. We will not consult psychiatry at this time. Patient does not display psychosis at this time. Continue to monitor closely and adjust plan of care as needed.    - Continue home Cymbalta          VTE Risk Mitigation (From admission, onward)         Ordered     enoxaparin injection 40 mg  Daily         11/20/22 0519     IP VTE LOW RISK PATIENT  Once         11/20/22 0519      Place sequential compression device  Until discontinued         11/20/22 0519     IP VTE LOW RISK PATIENT  Once         11/20/22 0519                Discharge Planning   SUZANNA:      Code Status: Full Code   Is the patient medically ready for discharge?:     Reason for patient still in hospital (select all that apply): Treatment                     Brett Chinchilla DO  Department of Hospital Medicine   [facility]

## 2022-11-20 NOTE — ASSESSMENT & PLAN NOTE
CT scan of the abdomen pelvis and chest with IV contrast indicates bilateral peripherally dense cavitary lesions left lower lobe and right middle lobe bilateral pleural effusions left greater than right with possible loculation these areas are concerning for abscesses although a neoplastic process can not be entirely excluded.  Abnormal appearance of the left kidney concerning for pyelonephritis.     Started IV Zosyn, Vancomycin and Levofloxacin  Pulmonology  consulted for possible drainage on Monday.   Will continue to monitor vital signs and physical exam

## 2022-11-20 NOTE — ED NOTES
PT OFF UNIT WITH [facility]/NS BOLUS INFUSING/MERREM 500MG/100ML INFUSING TO LEFT HAND 20G ON TRANSFER

## 2022-11-20 NOTE — ASSESSMENT & PLAN NOTE
Patient has persistent depression which is unknown and is currently controlled. Will Continue anti-depressant medications. We will not consult psychiatry at this time. Patient does not display psychosis at this time. Continue to monitor closely and adjust plan of care as needed.    - Continue home Cymbalta

## 2022-11-20 NOTE — SUBJECTIVE & OBJECTIVE
Past Medical History:   Diagnosis Date    Abscess of right thigh + MRSA 08/20/2021    healed    Adnexal cyst     Degenerative disc disease     Depression 10/29/2021    Hypertension     Nicotine dependence     Osteoarthritis        Past Surgical History:   Procedure Laterality Date    BACK SURGERY      HYSTERECTOMY N/A     TOTAL HIP ARTHROPLASTY Left     TOTAL KNEE ARTHROPLASTY Bilateral        Review of patient's allergies indicates:  No Known Allergies    Current Facility-Administered Medications   Medication    0.9%  NaCl infusion    acetaminophen tablet 1,000 mg    bisacodyL EC tablet 10 mg    dextromethorphan-guaiFENesin  mg/5 ml liquid 10 mL    dextrose 50% injection 12.5 g    dextrose 50% injection 12.5 g    dextrose 50% injection 12.5 g    dextrose 50% injection 25 g    dextrose 50% injection 25 g    dextrose 50% injection 25 g    DULoxetine DR capsule 60 mg    enoxaparin injection 40 mg    glucagon (human recombinant) injection 1 mg    glucagon (human recombinant) injection 1 mg    glucagon (human recombinant) injection 1 mg    glucose chewable tablet 16 g    glucose chewable tablet 16 g    glucose chewable tablet 24 g    glucose chewable tablet 24 g    insulin aspart U-100 injection 1-10 Units    insulin aspart U-100 injection 5 Units    insulin detemir U-100 injection 20 Units    levoFLOXacin tablet 500 mg    mupirocin 2 % ointment    naloxone 0.4 mg/mL injection 0.02 mg    ondansetron injection 8 mg    oxyCODONE immediate release tablet 5 mg    piperacillin-tazobactam (ZOSYN) 4.5 g in dextrose 5 % in water (D5W) 5 % 100 mL IVPB (MB+)    polyethylene glycol packet 17 g    simethicone chewable tablet 80 mg    sodium chloride 0.9% flush 10 mL    sodium chloride 0.9% flush 10 mL    traZODone tablet 50 mg    vancomycin (VANCOCIN) 1,500 mg in dextrose 5 % 250 mL IVPB    vancomycin - pharmacy to dose     Family History    None       Tobacco Use    Smoking status: Every Day     Types: Cigarettes     "Smokeless tobacco: Current     Types: Snuff   Substance and Sexual Activity    Alcohol use: Never    Drug use: Never    Sexual activity: Not on file     Review of Systems   Musculoskeletal:  Positive for joint pain and joint swelling.   Objective:     Vital Signs (Most Recent):  Temp: 97.5 °F (36.4 °C) (11/20/22 0330)  Pulse: (!) 118 (11/20/22 0330)  Resp: 20 (11/20/22 0519)  BP: (!) 178/81 (11/20/22 0330)  SpO2: 95 % (11/20/22 0330)   Vital Signs (24h Range):  Temp:  [97.5 °F (36.4 °C)-98.2 °F (36.8 °C)] 97.5 °F (36.4 °C)  Pulse:  [111-118] 118  Resp:  [20] 20  SpO2:  [94 %-97 %] 95 %  BP: (162-186)/(77-89) 178/81     Weight: 70 kg (154 lb 5.2 oz)  Height: 5' 6" (167.6 cm)  Body mass index is 24.91 kg/m².    No intake or output data in the 24 hours ending 11/20/22 0831                Left Knee Exam     Inspection   Effusion: present    Tenderness   The patient tender to palpation of the medial joint line and lateral joint line.    Other   Sensation: normal    Vascular Exam       Left Pulses  Dorsalis Pedis:      2+          Significant Labs: All pertinent labs within the past 24 hours have been reviewed.    Significant Imaging: I have reviewed all pertinent imaging results/findings.  "

## 2022-11-20 NOTE — PT/OT/SLP EVAL
Physical Therapy Evaluation    Patient Name:  Monica Walker   MRN:  [id number]    Recommendations:     Discharge Recommendations:  home with home health, home health PT, nursing facility, skilled, rehabilitation facility   Discharge Equipment Recommendations: none   Barriers to discharge:  ongoing medical treatment    Assessment:     Monica Walker is a 52 y.o. female admitted with a medical diagnosis of Lung abscess; possible septic left knee (remote TKR)  She presents with the following impairments/functional limitations:  weakness, impaired endurance, impaired self care skills, impaired functional mobility, gait instability, impaired balance, impaired cognition, decreased upper extremity function, decreased lower extremity function, decreased safety awareness, pain, decreased ROM, impaired skin, edema .    Patient demonstrated better than anticipated mobility given multiple medical problems but is far from independent baseline mobility. PT to follow whilst pt hospitalized. D/c disposition to be determined by medical necessity (IV Abx)    Rehab Prognosis: Good; patient would benefit from acute skilled PT services to address these deficits and reach maximum level of function.    Recent Surgery: * No surgery found *      Plan:     During this hospitalization, patient to be seen 5 x/week to address the identified rehab impairments via gait training, therapeutic activities, therapeutic exercises and progress toward the following goals:    Plan of Care Expires:  12/20/22    Subjective     Chief Complaint: cavitary pneumonia; probable septic left knee (remote TKR)  Patient/Family Comments/goals: Patient agreeable to PT treatment; mildly impulsive  Pain/Comfort:  Pain Rating 1: 4/10 (at rest but up to 8/10 with certain movements)  Location - Orientation 1: lower  Location 1: back (left knee; left shoulder)  Pain Addressed 1: Pre-medicate for activity, Reposition, Distraction, Cessation of Activity, Nurse  notified  Pain Rating Post-Intervention 1: 4/10    Patients cultural, spiritual, Bahai conflicts given the current situation: no    Living Environment:  Pt lives in a single level home with 1 step to enter. Pt son, girlfriend live with pt but pt's mother reports this is not the best situation and she plans to have pt come to her home at d/c ( single level, 1 step)  Prior to admission, patients level of function was independent but declined sharply over the last week with weakness, AMS, knee pain.  Equipment used at home: walker, rolling, bedside commode, shower chair, cane, straight.  DME owned (not currently used): none.  Upon discharge, patient will have assistance from mother and HHPT vs swing bed staff.    Objective:     Communicated with Alba Jean Baptiste RN prior to session.  Patient found supine with peripheral IV, telemetry  upon PT entry to room.    General Precautions: Standard, fall, contact (hx of MRSA and likely septic L TKR)   Orthopedic Precautions:N/A   Braces: N/A  Respiratory Status: Room air    Exams:  Cognitive Exam:  Patient is oriented to Person, Place, and inconsistent historian, easily distracted, mildly impulsive, questionable safety awareness  Sensation:    -       Impaired  peripheral neuropathy with decreased sensation bilateral feet and absent proprioception left great toe  Skin Integrity/Edema:      -       Skin integrity: redness left lateral shin/knee   -       Edema: Moderate left knee  RLE ROM: WFL  RLE Strength: Deficits: 4+/5  LLE ROM: WFL  LLE Strength: Deficits: hip 4+/5; knee 4/5; anle 4+/5    Functional Mobility:  Bed Mobility:     Supine to Sit: minimum assistance  Transfers:     Sit to Stand:  contact guard assistance with rolling walker  Bed to Chair: contact guard assistance with  rolling walker  using  Step Transfer  Gait: 20' using RW, step to/antalgic pattern, slow theo, mild axial flexion, no acute LOB       AM-PAC 6 CLICK MOBILITY  Total  Score:16       Treatment & Education:  Pt educated on PT role/POC.   Importance of OOB activity with staff assistance.  Importance of sitting up in the chair throughout the day as tolerated, especially for meals   Safety during functional t/f and mobility with use of RW  White board updated   Multiple self-care tasks/functional mobility completed- assistance level noted above   All questions/concerns answered within PT scope of practice     Patient left up in chair with all lines intact, call button in reach, Alba Jean Baptiste, RN notified, and mother present.    GOALS:   Multidisciplinary Problems       Physical Therapy Goals          Problem: Physical Therapy    Goal Priority Disciplines Outcome Goal Variances Interventions   Physical Therapy Goal     PT, PT/OT Ongoing, Progressing     Description: Short Term Goals to be met by: 2022    Patient will increase functional independence with mobility by performin. Supine to sit with independently  2. Sit to stand transfer with independently using Rolling walker  3. Bed to chair transfer with independently using Rolling walker  4. Gait  x 200 feet with independently using Rolling walker  5. Lower extremity exercise program x30 reps per handout, with assistance as needed    Long Term Goals to be met by: 2022    Pt will regain full independent functional mobility with lowest level of assistive device to return to home situation and prior activities of daily living.                        History:     Past Medical History:   Diagnosis Date    Abscess of right thigh + MRSA 2021    healed    Adnexal cyst     Degenerative disc disease     Depression 10/29/2021    Hypertension     Nicotine dependence     Osteoarthritis        Past Surgical History:   Procedure Laterality Date    BACK SURGERY      HYSTERECTOMY N/A     TOTAL HIP ARTHROPLASTY Left     TOTAL KNEE ARTHROPLASTY Bilateral        Time Tracking:     PT Received On: 22  PT Start Time: 1400      PT Stop Time: 1421  PT Total Time (min): 21 min     Billable Minutes: Evaluation Moderate complexity      11/20/2022

## 2022-11-20 NOTE — PLAN OF CARE
Problem: Occupational Therapy  Goal: Occupational Therapy Goal  Description: STG:  Pt will perform grooming with access to supplies  Pt will bathe with setup and CGA  Pt will perform UE dressing with setup  Pt will perform LE dressing with CGA and AD if needed  Pt will transfer bed/chair/bsc with RW and SBA  Pt will perform standing task x 2 min with SBA and RW  Pt will tolerate 15 minutes of tx without fatigue      LT.Restore to max I with self care and mobility.     Outcome: Ongoing, Progressing     OT low complexity evaluation performed. Pt is limited primarily by severe (L) knee pain, and back pain. Ot will treat pt daily 5x/wk to address the above treatment goals. Pt plans to D/C to her mother's home when medically ready.

## 2022-11-20 NOTE — PLAN OF CARE
Problem: Physical Therapy  Goal: Physical Therapy Goal  Description: Short Term Goals to be met by: 2022    Patient will increase functional independence with mobility by performin. Supine to sit with independently  2. Sit to stand transfer with independently using Rolling walker  3. Bed to chair transfer with independently using Rolling walker  4. Gait  x 200 feet with independently using Rolling walker  5. Lower extremity exercise program x30 reps per handout, with assistance as needed    Long Term Goals to be met by: 2022    Pt will regain full independent functional mobility with lowest level of assistive device to return to home situation and prior activities of daily living.   Outcome: Ongoing, Progressing     Patient demonstrated better than anticipated mobility given multiple medical problems but is far from independent baseline mobility. PT to follow whilst pt hospitalized. D/c disposition to be determined by medical necessity (IV Abx)

## 2022-11-20 NOTE — CONSULTS
[facility]  Orthopedics  Consult Note    Patient Name: Monica Walker  MRN: [id number]  Admission Date: 11/20/2022  Hospital Length of Stay: 0 days  Attending Provider: Cecil Chappell MD  Primary Care Provider: Hannah Schulz MD    Patient information was obtained from patient and ER records.     Consults  Subjective:     Principal Problem:Lung abscess    Chief Complaint:   Chief Complaint   Patient presents with    Flank Pain        HPI:   52-year-old female who has been complaining of left knee pain for proximally 3 weeks.  She has a total knee arthroplasty in place by Dr. STOVER. at this time her left lower extremity she has an effusion.  I aspirated off some cloudy fluid.  She has some slight increased warmth.  No instability in the total knee is noted.  She does have tenderness over the joint.  Some pain extremes of motion.  Will obtain x-rays of the knee.  At this time is no instability.  Sent the fluid for culture cell count and crystal analysis.  Patient has lung abscesses is on antibiotics.  If patient does have septic knee she has a total knee arthroplasty in place.  Will discuss with her primary physician whether he would like to do the 2 stage procedure will possibly do a arthroscopic irrigation debridement of the knee until definitive surgical intervention planned.  Will obtain x-ray of the knee.  Awaiting culture and cell count results.      Past Medical History:   Diagnosis Date    Abscess of right thigh + MRSA 08/20/2021    healed    Adnexal cyst     Degenerative disc disease     Depression 10/29/2021    Hypertension     Nicotine dependence     Osteoarthritis        Past Surgical History:   Procedure Laterality Date    BACK SURGERY      HYSTERECTOMY N/A     TOTAL HIP ARTHROPLASTY Left     TOTAL KNEE ARTHROPLASTY Bilateral        Review of patient's allergies indicates:  No Known Allergies    Current Facility-Administered Medications   Medication    0.9%  NaCl  infusion    acetaminophen tablet 1,000 mg    bisacodyL EC tablet 10 mg    dextromethorphan-guaiFENesin  mg/5 ml liquid 10 mL    dextrose 50% injection 12.5 g    dextrose 50% injection 12.5 g    dextrose 50% injection 12.5 g    dextrose 50% injection 25 g    dextrose 50% injection 25 g    dextrose 50% injection 25 g    DULoxetine DR capsule 60 mg    enoxaparin injection 40 mg    glucagon (human recombinant) injection 1 mg    glucagon (human recombinant) injection 1 mg    glucagon (human recombinant) injection 1 mg    glucose chewable tablet 16 g    glucose chewable tablet 16 g    glucose chewable tablet 24 g    glucose chewable tablet 24 g    insulin aspart U-100 injection 1-10 Units    insulin aspart U-100 injection 5 Units    insulin detemir U-100 injection 20 Units    levoFLOXacin tablet 500 mg    mupirocin 2 % ointment    naloxone 0.4 mg/mL injection 0.02 mg    ondansetron injection 8 mg    oxyCODONE immediate release tablet 5 mg    piperacillin-tazobactam (ZOSYN) 4.5 g in dextrose 5 % in water (D5W) 5 % 100 mL IVPB (MB+)    polyethylene glycol packet 17 g    simethicone chewable tablet 80 mg    sodium chloride 0.9% flush 10 mL    sodium chloride 0.9% flush 10 mL    traZODone tablet 50 mg    vancomycin (VANCOCIN) 1,500 mg in dextrose 5 % 250 mL IVPB    vancomycin - pharmacy to dose     Family History    None       Tobacco Use    Smoking status: Every Day     Types: Cigarettes    Smokeless tobacco: Current     Types: Snuff   Substance and Sexual Activity    Alcohol use: Never    Drug use: Never    Sexual activity: Not on file     Review of Systems   Musculoskeletal:  Positive for joint pain and joint swelling.   Objective:     Vital Signs (Most Recent):  Temp: 97.5 °F (36.4 °C) (11/20/22 0330)  Pulse: (!) 118 (11/20/22 0330)  Resp: 20 (11/20/22 0519)  BP: (!) 178/81 (11/20/22 0330)  SpO2: 95 % (11/20/22 0330)   Vital Signs (24h Range):  Temp:  [97.5 °F (36.4 °C)-98.2 °F  "(36.8 °C)] 97.5 °F (36.4 °C)  Pulse:  [111-118] 118  Resp:  [20] 20  SpO2:  [94 %-97 %] 95 %  BP: (162-186)/(77-89) 178/81     Weight: 70 kg (154 lb 5.2 oz)  Height: 5' 6" (167.6 cm)  Body mass index is 24.91 kg/m².    No intake or output data in the 24 hours ending 11/20/22 0831                Left Knee Exam     Inspection   Effusion: present    Tenderness   The patient tender to palpation of the medial joint line and lateral joint line.    Other   Sensation: normal    Vascular Exam       Left Pulses  Dorsalis Pedis:      2+          Significant Labs: All pertinent labs within the past 24 hours have been reviewed.    Significant Imaging: I have reviewed all pertinent imaging results/findings.    Assessment/Plan:     No notes have been filed under this hospital service.  Service: Orthopedic Surgery      Thank you for your consult. Will obtain x-ray of left knee as well as await culture and cell count results.  Continue antibiotics currently.    Papa Mendoza MD  Orthopedics  [facility]      "

## 2022-11-20 NOTE — ED PROVIDER NOTES
Encounter Date: 11/19/2022       History     Chief Complaint   Patient presents with    Flank Pain     RIGHT     Patient presents with report of right flank pain for the past few days, as well as severe left knee pain that started 1-2 weeks ago.  Patient was admitted here and treated for pyelonephritis on the left side 10 days ago and improved on IV antibiotics although her urine culture did not grow any bacteria.  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  Patient has a history of type 2 diabetes, poorly controlled.  Arrived via EMS who reported her blood sugar was over 500 and she was given Zofran for nausea and morphine for pain by EMS.    Review of patient's allergies indicates:  No Known Allergies  Past Medical History:   Diagnosis Date    Abscess of right thigh + MRSA 08/20/2021    healed    Adnexal cyst     Degenerative disc disease     Depression 10/29/2021    Everyday Smoker/Tobacco User     Gastroenteritis 11/12/2022    Hypertension     Osteoarthritis     Uncontrolled Type 2 Diabetes mellitus      Past Surgical History:   Procedure Laterality Date    BACK SURGERY      HYSTERECTOMY N/A     TOTAL HIP ARTHROPLASTY Left     TOTAL KNEE ARTHROPLASTY Bilateral      History reviewed. No pertinent family history.  Social History     Tobacco Use    Smoking status: Every Day     Types: Cigarettes    Smokeless tobacco: Current     Types: Snuff   Substance Use Topics    Alcohol use: Never    Drug use: Never     Review of Systems   Constitutional:  Positive for fatigue. Negative for activity change, appetite change, chills, diaphoresis and fever.        Reports malaise and body aches for several days.   HENT: Negative.     Eyes: Negative.    Respiratory: Negative.  Negative for apnea, cough, choking, chest tightness, shortness of breath, wheezing and stridor.     Cardiovascular: Negative.  Negative for chest pain, palpitations and leg swelling.   Gastrointestinal:  Positive for abdominal pain (reports right flank pain) and nausea. Negative for abdominal distention, blood in stool, constipation, diarrhea and vomiting.   Genitourinary:  Positive for flank pain (reports right flank pain). Negative for difficulty urinating, dysuria, frequency, hematuria, pelvic pain, urgency, vaginal bleeding, vaginal discharge and vaginal pain.   Musculoskeletal:  Positive for gait problem (patient states she can not stand to ambulate due to the severe left knee pain) and joint swelling (reports left knee severe pain and mild swelling and redness.  Patient states for 1-2 weeks). Negative for neck pain and neck stiffness.   Skin:  Positive for color change (reports redness of the left lateral knee area.). Negative for pallor, rash and wound.   Neurological:  Negative for dizziness, tremors, seizures, syncope, facial asymmetry, speech difficulty, weakness, light-headedness, numbness and headaches.   Hematological: Negative.    Psychiatric/Behavioral: Negative.     All other systems reviewed and are negative.    Physical Exam     Initial Vitals [11/19/22 2126]   BP Pulse Resp Temp SpO2   (!) 166/77 (!) 111 20 98.2 °F (36.8 °C) 97 %      MAP       --         Physical Exam    Nursing note and vitals reviewed.  Constitutional: She appears well-developed and well-nourished. She is not diaphoretic. She appears ill. No distress.   HENT:   Head: Normocephalic.   Right Ear: External ear normal.   Left Ear: External ear normal.   Nose: Nose normal.   Mouth/Throat: Mucous membranes are dry.   Eyes: Conjunctivae and EOM are normal. Pupils are equal, round, and reactive to light.   Neck: Neck supple. No tracheal deviation present. No JVD present.   Normal range of motion.  Cardiovascular:  Regular rhythm and normal heart sounds.   Tachycardia present.         No murmur heard.  Pulmonary/Chest: Breath sounds  normal. No stridor. No respiratory distress. She has no wheezes. She has no rhonchi. She has no rales.   Abdominal: Abdomen is soft. Bowel sounds are normal. She exhibits no distension. There is no abdominal tenderness.   Musculoskeletal:         General: Tenderness (patient has tenderness of the left knee area, limited range of motion of left knee due to pain and lack of patient cooperation.  Patient resists attempts at passive range of motion left knee.  No knee effusion or swelling noted.) present. No edema.      Cervical back: Normal range of motion and neck supple.     Lymphadenopathy:     She has no cervical adenopathy.   Neurological: She is alert and oriented to person, place, and time. She has normal strength. No cranial nerve deficit or sensory deficit. GCS score is 15. GCS eye subscore is 4. GCS verbal subscore is 5. GCS motor subscore is 6.   Skin: Skin is warm and dry. There is erythema (there is erythema of the skin of the left lateral knee area with tenderness in that area as well.).       Medical Screening Exam   See Full Note    ED Course   Procedures  Labs Reviewed   COMPREHENSIVE METABOLIC PANEL - Abnormal; Notable for the following components:       Result Value    Sodium 131 (*)     Chloride 90 (*)     Glucose 416 (*)     BUN/Creatinine Ratio 22 (*)     Calcium 8.3 (*)     Albumin 1.2 (*)     Globulin 6.0 (*)     Alk Phos 245 (*)     All other components within normal limits   DRUG SCREEN, URINE (BEAKER) - Abnormal; Notable for the following components:    Amphetamine Positive (*)     All other components within normal limits    Narrative:     The results of screening tests should be considered presumptive. Confirmatory testing is available upon request.    Cutoff Points:  PCP:         25ng/mL  AMPH:        500ng/mL  ELVIRA:        200ng/mL  DEJUAN:        200ng/mL  THC:         50ng/mL  SAMANTHA:         300ng/mL  OPI:         2000ng/mL   URINALYSIS, REFLEX TO URINE CULTURE - Abnormal; Notable for the  following components:    Glucose,  (*)     Ketones, UA 80 (*)     All other components within normal limits   MAGNESIUM - Abnormal; Notable for the following components:    Magnesium 1.5 (*)     All other components within normal limits   CBC WITH DIFFERENTIAL - Abnormal; Notable for the following components:    WBC 32.45 (*)     RBC 4.06 (*)     Hemoglobin 11.3 (*)     Hematocrit 34.8 (*)     RDW 14.6 (*)     Platelet Count 408 (*)     Neutrophils % 88.0 (*)     Lymphocytes % 5.0 (*)     Neutrophils, Abs 28.56 (*)     Immature Granulocytes % 1.4 (*)     Monocytes, Absolute 1.41 (*)     Immature Granulocytes, Absolute 0.45 (*)     All other components within normal limits   MANUAL DIFFERENTIAL - Abnormal; Notable for the following components:    Segmented Neutrophils, Man % 90 (*)     Lymphocytes, Man % 6 (*)     Platelet Morphology Increased (*)     All other components within normal limits   POCT GLUCOSE MONITORING CONTINUOUS - Abnormal; Notable for the following components:    POC Glucose 446 (*)     All other components within normal limits   POCT GLUCOSE MONITORING CONTINUOUS - Abnormal; Notable for the following components:    POC Glucose 381 (*)     All other components within normal limits   POCT GLUCOSE MONITORING CONTINUOUS - Abnormal; Notable for the following components:    POC Glucose 318 (*)     All other components within normal limits   LACTIC ACID, PLASMA - Normal   CULTURE, BLOOD   CULTURE, BLOOD   CBC W/ AUTO DIFFERENTIAL    Narrative:     The following orders were created for panel order CBC auto differential.  Procedure                               Abnormality         Status                     ---------                               -----------         ------                     CBC with Differential[[id number]]        Abnormal            Final result               Manual Differential[[id number]]          Abnormal            Final result                 Please view results for these tests  on the individual orders.   ACETONE   POCT GLUCOSE MONITORING CONTINUOUS   POCT GLUCOSE MONITORING CONTINUOUS   POCT GLUCOSE MONITORING CONTINUOUS          Imaging Results              X-Ray Chest AP Portable (In process)                      CT Renal Stone Study ABD Pelvis WO (In process)                      Medications   meropenem (MERREM) 500 mg in sodium chloride 0.9 % 100 mL IVPB (MB+) (has no administration in time range)   insulin regular injection 10 Units 0.1 mL (has no administration in time range)   sodium chloride 0.9% bolus 1,000 mL (has no administration in time range)   sodium chloride 0.9% bolus 1,000 mL (0 mLs Intravenous Stopped 11/19/22 2310)   vancomycin (VANCOCIN) 1,750 mg in dextrose 5 % 500 mL IVPB (1,750 mg Intravenous New Bag 11/19/22 2237)   insulin regular injection 10 Units 0.1 mL (10 Units Intravenous Given 11/19/22 2232)   magnesium sulfate 2g in water 50mL IVPB (premix) (0 g Intravenous Stopped 11/20/22 0042)   ketorolac injection 15 mg (15 mg Intravenous Given 11/19/22 2249)   sodium chloride 0.9% bolus 1,000 mL (0 mLs Intravenous Stopped 11/20/22 0042)   insulin regular injection 10 Units 0.1 mL (10 Units Intravenous Given 11/19/22 2329)   0.9%  NaCl infusion (1,000 mLs Intravenous New Bag 11/20/22 0045)     Medical Decision Making:   Clinical Tests:   Radiological Study: Reviewed  ED Management:  Patient was treated with IV meropenem and IV vancomycin, she was also given IV magnesium for borderline low magnesium level.  She was given 2 L IV normal saline bolus as well as IV normal saline infusion.  She was given multiple doses of IV insulin and glucose is trending downward.  Patient will need transfer to higher level of care, as CT scan of the abdomen and pelvis does indicate probable bilateral pulmonary abscesses or other cavitary process and will need further evaluation by pulmonology.  Clinically patient may have an infection of the left knee joint as well.  Patient does have a  history of MRSA in the past.  Patient denies IV drug use.    CT chest abdomen and pelvis with IV contrast ordered at request of Dr. Batista,the accepting physician for transfer.  Other:   I have discussed this case with another health care provider.       <> Summary of the Discussion: Patient discussed with  at [facility] in [place], patient also discussed with Dr. Mendoza on-call for Orthopedics.  Patient accepted by hospitalist Dr. Batista in transfer. radiologist's report for CT scan of the abdomen and pelvis indicates mild hydronephrosis left kidney without an obstructing stone, findings concerning for pulmonary abscess or other cavitary process right and left lung with bilateral pleural effusions., neoplasm can not be excluded.    Radiologist's report for CT scan of the abdomen pelvis and chest with IV contrast indicates bilateral peripherally dense cavitary lesions left lower lobe and right middle lobe bilateral pleural effusions left greater than right with possible loculation these areas are concerning for abscesses although a neoplastic process can not be entirely excluded.  Abnormal appearance of the left kidney concerning for pyelonephritis.                 Clinical Impression:   Final diagnoses:  [R10.9] Right flank pain  [D72.9] Neutrophilia - Rule out sepsis, patient with a known history of recurrent MRSA.  [E83.42] Hypomagnesemia  [E11.65] Uncontrolled type 2 diabetes mellitus with hyperglycemia  [M25.562] Acute pain of left knee - Needs evaluation to rule out septic joint  [J85.2] Abscess of middle lobe of right lung without pneumonia (Primary)  [J85.2] Abscess of lower lobe of left lung without pneumonia      ED Disposition Condition    Transfer to Another Facility Stable                Jadiel Beltran Robert, DO  11/20/22 0100       Jadiel Robert, DO  11/20/22 0100       Jadiel Robert, DO  11/20/22 0109       Jadiel Robert, DO  11/20/22 0209

## 2022-11-20 NOTE — HPI
52-year-old female who has been complaining of left knee pain for proximally 3 weeks.  She has a total knee arthroplasty in place by Dr. STOVER. at this time her left lower extremity she has an effusion.  I aspirated off some cloudy fluid.  She has some slight increased warmth.  No instability in the total knee is noted.  She does have tenderness over the joint.  Some pain extremes of motion.  Will obtain x-rays of the knee.  At this time is no instability.  Sent the fluid for culture cell count and crystal analysis.  Patient has lung abscesses is on antibiotics.  If patient does have septic knee she has a total knee arthroplasty in place.  Will discuss with her primary physician whether he would like to do the 2 stage procedure will possibly do a arthroscopic irrigation debridement of the knee until definitive surgical intervention planned.  Will obtain x-ray of the knee.  Awaiting culture and cell count results.

## 2022-11-20 NOTE — ED NOTES
[facility] RETURNED CALL: DR BENTLEY ACCEPTING PT ROOM 467 [phone]/CYDNEY SPOKE WITH DR BENTLEY AND WE MAY SEND PT WITHOUT CT CHEST, ABD, PELVIS REPORT, TO FAX TO UNIT.

## 2022-11-20 NOTE — ED TRIAGE NOTES
PT BROUGHT IN BY BY [facility] WITH C/O RIGHT FLANK PAIN AND LEFT KNEE RED AND SWOLLEN/PT REC'D MORPHINE 2MG, ZOFRAN 4MG AND [id number] BOLUS TO LEFT HAND 20G PER [facility]

## 2022-11-20 NOTE — SUBJECTIVE & OBJECTIVE
Interval History: Patient appeared slightly altered. She was a little out of this morning on morning rounds. She was concerned about her breakfast and did not really want to answer any of my questions.     Review of Systems   Constitutional:  Positive for activity change and appetite change. Negative for chills and fever.   HENT:  Negative for hearing loss, nosebleeds, rhinorrhea, sinus pressure, sneezing, sore throat and trouble swallowing.    Eyes:  Negative for photophobia, pain, discharge, itching and visual disturbance.   Respiratory:  Negative for apnea, cough, chest tightness and shortness of breath.    Cardiovascular:  Negative for chest pain, palpitations and leg swelling.   Gastrointestinal:  Negative for abdominal distention, abdominal pain, blood in stool, constipation, diarrhea, nausea and vomiting.   Endocrine: Negative for polydipsia, polyphagia and polyuria.   Genitourinary:  Negative for difficulty urinating, dysuria, frequency, hematuria and urgency.   Musculoskeletal:  Positive for arthralgias (left knee), back pain and joint swelling (left knee). Negative for myalgias.   Skin:  Negative for rash.   Neurological:  Negative for dizziness, tremors, seizures, syncope, light-headedness, numbness and headaches.   Psychiatric/Behavioral:  Negative for agitation, confusion, hallucinations and sleep disturbance. The patient is not nervous/anxious.    Objective:     Vital Signs (Most Recent):  Temp: 97.5 °F (36.4 °C) (11/20/22 0330)  Pulse: (!) 118 (11/20/22 0330)  Resp: 18 (11/20/22 0932)  BP: (!) 178/81 (11/20/22 0330)  SpO2: 95 % (11/20/22 0330)   Vital Signs (24h Range):  Temp:  [97.5 °F (36.4 °C)-98.2 °F (36.8 °C)] 97.5 °F (36.4 °C)  Pulse:  [111-118] 118  Resp:  [18-20] 18  SpO2:  [94 %-97 %] 95 %  BP: (162-186)/(77-89) 178/81     Weight: 70 kg (154 lb 5.2 oz)  Body mass index is 24.91 kg/m².  No intake or output data in the 24 hours ending 11/20/22 1116   Physical Exam  Vitals and nursing note  reviewed.   Constitutional:       General: She is not in acute distress.     Appearance: Normal appearance. She is not ill-appearing, toxic-appearing or diaphoretic.   HENT:      Head: Normocephalic and atraumatic.      Right Ear: External ear normal.      Left Ear: External ear normal.      Nose: Nose normal. No congestion or rhinorrhea.      Mouth/Throat:      Pharynx: Oropharynx is clear.   Eyes:      Extraocular Movements: Extraocular movements intact.      Conjunctiva/sclera: Conjunctivae normal.      Pupils: Pupils are equal, round, and reactive to light.   Cardiovascular:      Rate and Rhythm: Normal rate and regular rhythm.      Pulses: Normal pulses.      Heart sounds: Normal heart sounds. No murmur heard.  Pulmonary:      Effort: Pulmonary effort is normal. No respiratory distress.      Breath sounds: Normal breath sounds. No wheezing, rhonchi or rales.   Abdominal:      General: Bowel sounds are normal. There is no distension.      Tenderness: There is no abdominal tenderness. There is no guarding or rebound.   Musculoskeletal:         General: Swelling (left knee) and tenderness (left knee) present. Normal range of motion.      Cervical back: Normal range of motion and neck supple.   Skin:     General: Skin is warm and dry.      Capillary Refill: Capillary refill takes less than 2 seconds.      Findings: No rash.   Neurological:      Mental Status: She is alert and oriented to person, place, and time.      Cranial Nerves: No cranial nerve deficit.       Significant Labs: All pertinent labs within the past 24 hours have been reviewed.    Significant Imaging: I have reviewed all pertinent imaging results/findings within the past 24 hours.

## 2022-11-20 NOTE — H&P
[facility]  [facility]  History & Physical    Patient Name: Monica Walker  MRN: [id number]  Patient Class: IP- Inpatient  Admission Date: 11/20/2022  Attending Physician: Cecil Chappell MD   Primary Care Provider: Hannah Schulz MD         Patient information was obtained from patient, past medical records and ER records.     Subjective:     Principal Problem:Lung abscess    Chief Complaint:   Chief Complaint   Patient presents with    Flank Pain        HPI: Patient is a 53 yo female who presents to [facility] transferred from [facility] c/o L knee and R flank pain x 2 weeks. R flank pain is 10/10, squeezing in quality, without radiation, no aggravating or relieving factors. L knee pain started after a fall, which she is unable to recall date of the injury. The knee pain is 10/10, located in the medial joint line, aggravated by movement and weight-bearing, squeezing in quality, radiates down to L foot and without relieving factors including heat and ice. Patient has a history of type 2 diabetes, poorly controlled.    10 days ago patient was admitted at [facility] and treated for pyelonephritis on the left side with IV antibiotics although her urine culture did not grow any bacteria. 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 Today a CT scan of the abdomen pelvis and chest with IV contrast indicates bilateral peripherally dense cavitary lesions left lower lobe and right middle lobe bilateral pleural effusions left greater than right with possible loculation these areas are concerning for abscesses although a neoplastic process can not be entirely excluded. Abnormal appearance of the left kidney concerning for pyelonephritis. Patient was transferred to [facility] for further medical evaluation and management.    "      Past Medical History:   Diagnosis Date    Abscess of right thigh + MRSA 08/20/2021    healed    Adnexal cyst     Degenerative disc disease     Depression 10/29/2021    Everyday Smoker/Tobacco User     Gastroenteritis 11/12/2022    Hypertension     Osteoarthritis     Uncontrolled Type 2 Diabetes mellitus        Past Surgical History:   Procedure Laterality Date    BACK SURGERY      HYSTERECTOMY N/A     TOTAL HIP ARTHROPLASTY Left     TOTAL KNEE ARTHROPLASTY Bilateral        Review of patient's allergies indicates:  No Known Allergies    Current Facility-Administered Medications on File Prior to Encounter   Medication    [COMPLETED] 0.9%  NaCl infusion    [COMPLETED] insulin regular injection 10 Units 0.1 mL    [COMPLETED] insulin regular injection 10 Units 0.1 mL    [COMPLETED] insulin regular injection 10 Units 0.1 mL    [COMPLETED] insulin regular injection 5 Units 0.05 mL    [COMPLETED] iopamidoL (ISOVUE-370) injection 100 mL    [COMPLETED] ketorolac injection 15 mg    [COMPLETED] magnesium sulfate 2g in water 50mL IVPB (premix)    [COMPLETED] meropenem (MERREM) 500 mg in sodium chloride 0.9 % 100 mL IVPB (MB+)    [COMPLETED] sodium chloride 0.9% bolus 1,000 mL    [COMPLETED] sodium chloride 0.9% bolus 1,000 mL    [COMPLETED] vancomycin (VANCOCIN) 1,750 mg in dextrose 5 % 500 mL IVPB    [DISCONTINUED] sodium chloride 0.9% bolus 1,000 mL     Current Outpatient Medications on File Prior to Encounter   Medication Sig    BD ULTRA-FINE SHORT PEN NEEDLE 31 gauge x 5/16" Ndle     DULoxetine (CYMBALTA) 60 MG capsule Take 1 capsule (60 mg total) by mouth once daily.    insulin (LANTUS SOLOSTAR U-100 INSULIN) glargine 100 units/mL SubQ pen Inject 40 Units into the skin once daily.    NOVOLOG FLEXPEN U-100 INSULIN 100 unit/mL (3 mL) InPn pen Inject 10 Units into the skin 2 (two) times a day.    gabapentin (NEURONTIN ORAL) Take by mouth 2 (two) times a day.    TRULICITY 0.75 mg/0.5 mL pen injector Inject 0.75 mg into " the skin every 7 days.     Family History    None       Tobacco Use    Smoking status: Every Day     Types: Cigarettes    Smokeless tobacco: Current     Types: Snuff   Substance and Sexual Activity    Alcohol use: Never    Drug use: Never    Sexual activity: Not on file     Review of Systems   Constitutional:  Positive for activity change and appetite change. Negative for chills and fever.   HENT:  Negative for hearing loss, nosebleeds, rhinorrhea, sinus pressure, sneezing, sore throat and trouble swallowing.    Eyes:  Negative for photophobia, pain, discharge, itching and visual disturbance.   Respiratory:  Negative for apnea, cough, chest tightness and shortness of breath.    Cardiovascular:  Negative for chest pain, palpitations and leg swelling.   Gastrointestinal:  Negative for abdominal distention, abdominal pain, blood in stool, constipation, diarrhea, nausea and vomiting.   Endocrine: Negative for polydipsia, polyphagia and polyuria.   Genitourinary:  Negative for difficulty urinating, dysuria, frequency, hematuria and urgency. Vaginal bleeding: left knee.  Musculoskeletal:  Positive for arthralgias (left knee), back pain and joint swelling (left knee). Negative for myalgias.   Skin:  Negative for rash.   Neurological:  Negative for dizziness, tremors, seizures, syncope, light-headedness, numbness and headaches.   Psychiatric/Behavioral:  Negative for agitation, confusion, hallucinations and sleep disturbance. The patient is not nervous/anxious.    Objective:     Vital Signs (Most Recent):  Temp: 97.5 °F (36.4 °C) (11/20/22 0330)  Pulse: (!) 118 (11/20/22 0330)  Resp: 20 (11/20/22 0416)  BP: (!) 178/81 (11/20/22 0330)  SpO2: 95 % (11/20/22 0330)   Vital Signs (24h Range):  Temp:  [97.5 °F (36.4 °C)-98.2 °F (36.8 °C)] 97.5 °F (36.4 °C)  Pulse:  [111-118] 118  Resp:  [20] 20  SpO2:  [94 %-97 %] 95 %  BP: (162-186)/(77-89) 178/81     Weight: 70 kg (154 lb 5.2 oz)  Body mass index is 24.91 kg/m².    Physical  Exam  Vitals and nursing note reviewed.   Constitutional:       General: She is not in acute distress.     Appearance: Normal appearance. She is not ill-appearing, toxic-appearing or diaphoretic.   HENT:      Head: Normocephalic and atraumatic.      Right Ear: External ear normal.      Left Ear: External ear normal.      Nose: Nose normal. No congestion or rhinorrhea.      Mouth/Throat:      Pharynx: Oropharynx is clear.   Eyes:      Extraocular Movements: Extraocular movements intact.      Conjunctiva/sclera: Conjunctivae normal.      Pupils: Pupils are equal, round, and reactive to light.   Cardiovascular:      Rate and Rhythm: Normal rate and regular rhythm.      Pulses: Normal pulses.      Heart sounds: Normal heart sounds. No murmur heard.  Pulmonary:      Effort: Pulmonary effort is normal. No respiratory distress.      Breath sounds: Normal breath sounds. No wheezing, rhonchi or rales.   Abdominal:      General: Bowel sounds are normal. There is no distension.      Tenderness: There is no abdominal tenderness. There is no guarding or rebound.   Musculoskeletal:         General: Swelling (left knee) and tenderness (left knee) present. Normal range of motion.      Cervical back: Normal range of motion and neck supple.   Skin:     General: Skin is warm and dry.      Capillary Refill: Capillary refill takes less than 2 seconds.      Findings: No rash.   Neurological:      General: No focal deficit present.      Mental Status: She is alert and oriented to person, place, and time. Mental status is at baseline.      Cranial Nerves: No cranial nerve deficit.   Psychiatric:         Mood and Affect: Mood normal.         Behavior: Behavior normal.         Thought Content: Thought content normal.         CRANIAL NERVES     CN III, IV, VI   Pupils are equal, round, and reactive to light.     Significant Labs: All pertinent labs within the past 24 hours have been reviewed.  Recent Lab Results  (Last 5 results in the past  24 hours)        11/20/22  0137   11/19/22  2356   11/19/22  2308   11/19/22  2200   11/19/22  2159        Benzodiazepines         Negative       Cocaine         Negative       BARBITURATES         Negative       Albumin/Globulin Ratio       0.2         Acetone, Bld       Negative         Albumin       1.2         Alkaline Phosphatase       245         ALT       18         Amphetamine         Positive       Anion Gap       13         Appearance, UA         Clear       AST       26         Baso #       0.06         Basophil %       0.2         Bilirubin (UA)         Negative       BILIRUBIN TOTAL       0.4         BUN       17         BUN/CREAT RATIO       22         Calcium       8.3         Cannabinoid Scrn, Ur         Negative       Chloride       90         CO2       32         Color, UA         Yellow       Creatinine       0.78         Differential Type       Manual         eGFR       90         Eos #       0.35         Eosinophil %       1.1                2         Globulin, Total       6.0         Glucose       416         Glucose, UA         500       Hematocrit       34.8         Hemoglobin       11.3         Immature Grans (Abs)       0.45         Immature Granulocytes       1.4         Ketones, UA         80       Lactate, [place]       1.6         Leukocytes, UA         Negative       Lymph #       1.62         Lymph %       5.0                6         Magnesium       1.5         MCH       27.8         MCHC       32.5         MCV       85.7         Mono #       1.41         Mono %       4.3                2         MPV       9.5         Neutrophils, Abs       28.56         Neutrophils Relative       88.0         NITRITE UA         Negative       nRBC               Occult Blood UA         Negative       Opiate Scrn, Ur         Negative       pH, UA         6.5       Phencyclidine         Negative       PLATELET MORPHOLOGY       Increased         Platelets       408         POC Glucose 277   318   381            Potassium       3.8         PROTEIN TOTAL       7.2         Protein, UA         Negative       RBC       4.06         RBC Morphology       Normal         RDW       14.6         Segmented Neutrophils, Man %       90         Sodium       131         Specific [place], UA         1.010       UROBILINOGEN UA         0.2       WBC       32.45                                Significant Imaging: I have reviewed all pertinent imaging results/findings within the past 24 hours.  I have reviewed and interpreted all pertinent imaging results/findings within the past 24 hours.    Assessment/Plan:     * Lung abscess  CT scan of the abdomen pelvis and chest with IV contrast indicates bilateral peripherally dense cavitary lesions left lower lobe and right middle lobe bilateral pleural effusions left greater than right with possible loculation these areas are concerning for abscesses although a neoplastic process can not be entirely excluded.  Abnormal appearance of the left kidney concerning for pyelonephritis.     Started IV Zosyn, Vancomycin and Levofloxacin  IR to be consulted for possible drainage on Monday  Will continue to monitor vital signs and physical exam            Arthritis  - Concern for septic arthritis. S/p fall onto L knee.  - Orthopedics consulted    DM (diabetes mellitus)  Patient's FSGs are uncontrolled due to hyperglycemia on current medication regimen.  Last A1c reviewed-   Lab Results   Component Value Date    HGBA1C 12.4 (H) 11/09/2022     Most recent fingerstick glucose reviewed- No results for input(s): POCTGLUCOSE in the last 24 hours.  Current correctional scale  Medium  Increase anti-hyperglycemic dose as follows-   Antihyperglycemics (From admission, onward)      Start     Stop Route Frequency Ordered    11/20/22 2100  insulin detemir U-100 injection 20 Units         -- SubQ Nightly 11/20/22 0535    11/20/22 0715  insulin aspart U-100 injection 5 Units         -- SubQ 3 times daily with meals  11/20/22 0535 11/20/22 0634  insulin aspart U-100 injection 1-10 Units         -- SubQ Before meals & nightly PRN 11/20/22 0535          Continue nutritional and correctional insulin  Diabetic educator consult    Depression  Patient has persistent depression which is unknown and is currently controlled. Will Continue anti-depressant medications. We will not consult psychiatry at this time. Patient does not display psychosis at this time. Continue to monitor closely and adjust plan of care as needed.    - Continue home Cymbalta          VTE Risk Mitigation (From admission, onward)           Ordered     enoxaparin injection 40 mg  Daily         11/20/22 0519     IP VTE LOW RISK PATIENT  Once         11/20/22 0519     Place sequential compression device  Until discontinued         11/20/22 0519     IP VTE LOW RISK PATIENT  Once         11/20/22 0519                       Adama Romano MD  Department of Hospital Medicine   [facility]

## 2022-11-21 ENCOUNTER — ANESTHESIA EVENT (OUTPATIENT)
Dept: SURGERY | Facility: HOSPITAL | Age: 52
DRG: 853 | End: 2022-11-21
Payer: MEDICARE

## 2022-11-21 PROBLEM — R78.81 BACTEREMIA: Status: ACTIVE | Noted: 2022-11-21

## 2022-11-21 PROBLEM — M00.9 SEPTIC ARTHRITIS OF KNEE, LEFT: Status: ACTIVE | Noted: 2022-01-04

## 2022-11-21 LAB
ANION GAP SERPL CALCULATED.3IONS-SCNC: 10 MMOL/L (ref 7–16)
AORTIC ROOT ANNULUS: 2.2 CM
AORTIC VALVE CUSP SEPERATION: 1.81 CM
AV INDEX (PROSTH): 0.73
AV MEAN GRADIENT: 7 MMHG
AV PEAK GRADIENT: 10 MMHG
AV VALVE AREA: 2.29 CM2
AV VELOCITY RATIO: 0.69
BASOPHILS # BLD AUTO: 0.06 K/UL (ref 0–0.2)
BASOPHILS NFR BLD AUTO: 0.2 % (ref 0–1)
BSA FOR ECHO PROCEDURE: 1.81 M2
BUN SERPL-MCNC: 6 MG/DL (ref 7–18)
BUN/CREAT SERPL: 11 (ref 6–20)
CALCIUM SERPL-MCNC: 7.3 MG/DL (ref 8.5–10.1)
CHLORIDE SERPL-SCNC: 88 MMOL/L (ref 98–107)
CO2 SERPL-SCNC: 34 MMOL/L (ref 21–32)
CREAT SERPL-MCNC: 0.57 MG/DL (ref 0.55–1.02)
CRP SERPL-MCNC: 32.7 MG/DL (ref 0–0.8)
CV ECHO LV RWT: 0.39 CM
DIFFERENTIAL METHOD BLD: ABNORMAL
DOP CALC AO PEAK VEL: 1.6 M/S
DOP CALC AO VTI: 26 CM
DOP CALC LVOT AREA: 3.1 CM2
DOP CALC LVOT DIAMETER: 2 CM
DOP CALC LVOT PEAK VEL: 1.1 M/S
DOP CALC LVOT STROKE VOLUME: 59.66 CM3
DOP CALCLVOT PEAK VEL VTI: 19 CM
E WAVE DECELERATION TIME: 146 MSEC
ECHO EF ESTIMATED: 55 %
ECHO LV POSTERIOR WALL: 0.89 CM (ref 0.6–1.1)
EGFR (NO RACE VARIABLE) (RUSH/TITUS): 108 ML/MIN/1.73M²
EJECTION FRACTION: 55 %
EOSINOPHIL # BLD AUTO: 0.15 K/UL (ref 0–0.5)
EOSINOPHIL NFR BLD AUTO: 0.5 % (ref 1–4)
ERYTHROCYTE [DISTWIDTH] IN BLOOD BY AUTOMATED COUNT: 14.5 % (ref 11.5–14.5)
ERYTHROCYTE [SEDIMENTATION RATE] IN BLOOD BY WESTERGREN METHOD: 128 MM/HR (ref 0–30)
FRACTIONAL SHORTENING: 25 % (ref 28–44)
GLUCOSE SERPL-MCNC: 155 MG/DL (ref 70–105)
GLUCOSE SERPL-MCNC: 164 MG/DL (ref 70–105)
GLUCOSE SERPL-MCNC: 164 MG/DL (ref 70–105)
GLUCOSE SERPL-MCNC: 192 MG/DL (ref 70–105)
GLUCOSE SERPL-MCNC: 205 MG/DL (ref 70–105)
GLUCOSE SERPL-MCNC: 212 MG/DL (ref 70–105)
GLUCOSE SERPL-MCNC: 220 MG/DL (ref 74–106)
GLUCOSE SERPL-MCNC: 225 MG/DL (ref 70–105)
GLUCOSE SERPL-MCNC: 225 MG/DL (ref 70–105)
HCT VFR BLD AUTO: 29.8 % (ref 38–47)
HGB BLD-MCNC: 9.8 G/DL (ref 12–16)
IMM GRANULOCYTES # BLD AUTO: 0.42 K/UL (ref 0–0.04)
IMM GRANULOCYTES NFR BLD: 1.5 % (ref 0–0.4)
INDIRECT COOMBS: NORMAL
INR BLD: 1.28
INTERVENTRICULAR SEPTUM: 0.87 CM (ref 0.6–1.1)
IVC OSTIUM: 0.8 CM
LEFT ATRIUM SIZE: 2.9 CM
LEFT INTERNAL DIMENSION IN SYSTOLE: 3.4 CM (ref 2.1–4)
LEFT VENTRICLE DIASTOLIC VOLUME INDEX: 52.46 ML/M2
LEFT VENTRICLE DIASTOLIC VOLUME: 93.9 ML
LEFT VENTRICLE MASS INDEX: 73 G/M2
LEFT VENTRICLE SYSTOLIC VOLUME INDEX: 26.5 ML/M2
LEFT VENTRICLE SYSTOLIC VOLUME: 47.4 ML
LEFT VENTRICULAR INTERNAL DIMENSION IN DIASTOLE: 4.53 CM (ref 3.5–6)
LEFT VENTRICULAR MASS: 130.31 G
LVOT MG: 3 MMHG
LYMPHOCYTES # BLD AUTO: 1.29 K/UL (ref 1–4.8)
LYMPHOCYTES NFR BLD AUTO: 4.7 % (ref 27–41)
LYMPHOCYTES NFR BLD MANUAL: 6 % (ref 27–41)
MCH RBC QN AUTO: 27.9 PG (ref 27–31)
MCHC RBC AUTO-ENTMCNC: 32.9 G/DL (ref 32–36)
MCV RBC AUTO: 84.9 FL (ref 80–96)
MONOCYTES # BLD AUTO: 1.64 K/UL (ref 0–0.8)
MONOCYTES NFR BLD AUTO: 6 % (ref 2–6)
MONOCYTES NFR BLD MANUAL: 4 % (ref 2–6)
MPC BLD CALC-MCNC: 9.7 FL (ref 9.4–12.4)
MV PEAK E VEL: 0.73 M/S
NEUTROPHILS # BLD AUTO: 23.99 K/UL (ref 1.8–7.7)
NEUTROPHILS NFR BLD AUTO: 87.1 % (ref 53–65)
NEUTS BAND NFR BLD MANUAL: 6 % (ref 1–5)
NEUTS SEG NFR BLD MANUAL: 84 % (ref 50–62)
NRBC # BLD AUTO: 0 X10E3/UL
NRBC, AUTO (.00): 0 %
PLATELET # BLD AUTO: 415 K/UL (ref 150–400)
PLATELET MORPHOLOGY: ABNORMAL
POTASSIUM SERPL-SCNC: 2.9 MMOL/L (ref 3.5–5.1)
PROTHROMBIN TIME: 15.5 SECONDS (ref 11.7–14.7)
RA MAJOR: 2.9 CM
RA PRESSURE: 3 MMHG
RBC # BLD AUTO: 3.51 M/UL (ref 4.2–5.4)
RBC MORPH BLD: NORMAL
RH BLD: NORMAL
RIGHT VENTRICULAR END-DIASTOLIC DIMENSION: 2.7 CM
SODIUM SERPL-SCNC: 129 MMOL/L (ref 136–145)
TRICUSPID ANNULAR PLANE SYSTOLIC EXCURSION: 1.9 CM
VERIGENE RESULT: ABNORMAL
WBC # BLD AUTO: 27.55 K/UL (ref 4.5–11)

## 2022-11-21 PROCEDURE — 87040 BLOOD CULTURE FOR BACTERIA: CPT | Performed by: GENERAL PRACTICE

## 2022-11-21 PROCEDURE — 63600175 PHARM REV CODE 636 W HCPCS: Performed by: GENERAL PRACTICE

## 2022-11-21 PROCEDURE — 99222 1ST HOSP IP/OBS MODERATE 55: CPT | Mod: ,,, | Performed by: INTERNAL MEDICINE

## 2022-11-21 PROCEDURE — 99222 PR INITIAL HOSPITAL CARE,LEVL II: ICD-10-PCS | Mod: ,,, | Performed by: INTERNAL MEDICINE

## 2022-11-21 PROCEDURE — 11000001 HC ACUTE MED/SURG PRIVATE ROOM

## 2022-11-21 PROCEDURE — 36415 COLL VENOUS BLD VENIPUNCTURE: CPT

## 2022-11-21 PROCEDURE — 82962 GLUCOSE BLOOD TEST: CPT

## 2022-11-21 PROCEDURE — 36415 COLL VENOUS BLD VENIPUNCTURE: CPT | Performed by: GENERAL PRACTICE

## 2022-11-21 PROCEDURE — 99223 1ST HOSP IP/OBS HIGH 75: CPT | Mod: ,,, | Performed by: INTERNAL MEDICINE

## 2022-11-21 PROCEDURE — 94761 N-INVAS EAR/PLS OXIMETRY MLT: CPT

## 2022-11-21 PROCEDURE — A9575 INJ GADOTERATE MEGLUMI 0.1ML: HCPCS | Performed by: INTERNAL MEDICINE

## 2022-11-21 PROCEDURE — 85651 RBC SED RATE NONAUTOMATED: CPT | Performed by: HOSPITALIST

## 2022-11-21 PROCEDURE — 99232 SBSQ HOSP IP/OBS MODERATE 35: CPT | Mod: ,,, | Performed by: INTERNAL MEDICINE

## 2022-11-21 PROCEDURE — 99232 PR SUBSEQUENT HOSPITAL CARE,LEVL II: ICD-10-PCS | Mod: ,,, | Performed by: INTERNAL MEDICINE

## 2022-11-21 PROCEDURE — 85025 COMPLETE CBC W/AUTO DIFF WBC: CPT

## 2022-11-21 PROCEDURE — 25000003 PHARM REV CODE 250: Performed by: GENERAL PRACTICE

## 2022-11-21 PROCEDURE — 86140 C-REACTIVE PROTEIN: CPT | Performed by: HOSPITALIST

## 2022-11-21 PROCEDURE — 86901 BLOOD TYPING SEROLOGIC RH(D): CPT | Performed by: GENERAL PRACTICE

## 2022-11-21 PROCEDURE — 99223 PR INITIAL HOSPITAL CARE,LEVL III: ICD-10-PCS | Mod: ,,, | Performed by: INTERNAL MEDICINE

## 2022-11-21 PROCEDURE — 85610 PROTHROMBIN TIME: CPT | Performed by: GENERAL PRACTICE

## 2022-11-21 PROCEDURE — 25500020 PHARM REV CODE 255: Performed by: INTERNAL MEDICINE

## 2022-11-21 PROCEDURE — 25000003 PHARM REV CODE 250: Performed by: INTERNAL MEDICINE

## 2022-11-21 PROCEDURE — 80048 BASIC METABOLIC PNL TOTAL CA: CPT | Performed by: HOSPITALIST

## 2022-11-21 RX ORDER — OXYCODONE HYDROCHLORIDE 5 MG/1
5 TABLET ORAL EVERY 6 HOURS PRN
Status: DISCONTINUED | OUTPATIENT
Start: 2022-11-21 | End: 2022-11-24

## 2022-11-21 RX ORDER — POTASSIUM CHLORIDE 20 MEQ/1
40 TABLET, EXTENDED RELEASE ORAL 2 TIMES DAILY
Status: COMPLETED | OUTPATIENT
Start: 2022-11-21 | End: 2022-11-22

## 2022-11-21 RX ORDER — GADOTERATE MEGLUMINE 376.9 MG/ML
14 INJECTION INTRAVENOUS
Status: COMPLETED | OUTPATIENT
Start: 2022-11-21 | End: 2022-11-21

## 2022-11-21 RX ADMIN — OXYCODONE HYDROCHLORIDE 5 MG: 5 TABLET ORAL at 07:11

## 2022-11-21 RX ADMIN — POTASSIUM CHLORIDE 40 MEQ: 1500 TABLET, EXTENDED RELEASE ORAL at 08:11

## 2022-11-21 RX ADMIN — SODIUM CHLORIDE: 9 INJECTION, SOLUTION INTRAVENOUS at 07:11

## 2022-11-21 RX ADMIN — INSULIN DETEMIR 20 UNITS: 100 INJECTION, SOLUTION SUBCUTANEOUS at 08:11

## 2022-11-21 RX ADMIN — GADOTERATE MEGLUMINE 14 ML: 376.9 INJECTION, SOLUTION INTRAVENOUS at 07:11

## 2022-11-21 RX ADMIN — VANCOMYCIN HYDROCHLORIDE 1500 MG: 1 INJECTION, POWDER, LYOPHILIZED, FOR SOLUTION INTRAVENOUS at 09:11

## 2022-11-21 RX ADMIN — OXYCODONE HYDROCHLORIDE 5 MG: 5 TABLET ORAL at 08:11

## 2022-11-21 RX ADMIN — INSULIN ASPART 2 UNITS: 100 INJECTION, SOLUTION INTRAVENOUS; SUBCUTANEOUS at 11:11

## 2022-11-21 RX ADMIN — INSULIN ASPART 5 UNITS: 100 INJECTION, SOLUTION INTRAVENOUS; SUBCUTANEOUS at 06:11

## 2022-11-21 RX ADMIN — OXYCODONE HYDROCHLORIDE 5 MG: 5 TABLET ORAL at 05:11

## 2022-11-21 RX ADMIN — MUPIROCIN: 20 OINTMENT TOPICAL at 08:11

## 2022-11-21 RX ADMIN — LEVOFLOXACIN 500 MG: 500 TABLET, FILM COATED ORAL at 09:11

## 2022-11-21 RX ADMIN — INSULIN ASPART 5 UNITS: 100 INJECTION, SOLUTION INTRAVENOUS; SUBCUTANEOUS at 11:11

## 2022-11-21 RX ADMIN — INSULIN ASPART 2 UNITS: 100 INJECTION, SOLUTION INTRAVENOUS; SUBCUTANEOUS at 08:11

## 2022-11-21 RX ADMIN — TRAZODONE HYDROCHLORIDE 50 MG: 50 TABLET ORAL at 07:11

## 2022-11-21 RX ADMIN — INSULIN ASPART 5 UNITS: 100 INJECTION, SOLUTION INTRAVENOUS; SUBCUTANEOUS at 04:11

## 2022-11-21 RX ADMIN — PIPERACILLIN AND TAZOBACTAM 4.5 G: 4; .5 INJECTION, POWDER, LYOPHILIZED, FOR SOLUTION INTRAVENOUS; PARENTERAL at 01:11

## 2022-11-21 RX ADMIN — PIPERACILLIN AND TAZOBACTAM 4.5 G: 4; .5 INJECTION, POWDER, LYOPHILIZED, FOR SOLUTION INTRAVENOUS; PARENTERAL at 05:11

## 2022-11-21 RX ADMIN — POLYETHYLENE GLYCOL 3350 17 G: 17 POWDER, FOR SOLUTION ORAL at 08:11

## 2022-11-21 RX ADMIN — ACETAMINOPHEN 1000 MG: 500 TABLET ORAL at 01:11

## 2022-11-21 RX ADMIN — DULOXETINE 60 MG: 30 CAPSULE, DELAYED RELEASE ORAL at 08:11

## 2022-11-21 RX ADMIN — OXYCODONE HYDROCHLORIDE 5 MG: 5 TABLET ORAL at 02:11

## 2022-11-21 RX ADMIN — MUPIROCIN: 20 OINTMENT TOPICAL at 09:11

## 2022-11-21 NOTE — PT/OT/SLP PROGRESS
Occupational Therapy      Patient Name:  Monica Walker   MRN:  [id number]    Patient not seen today secondary to Patient fatigue (pt reports fatigue and not feeling well following bathing). Will follow-up 11/22/22.    11/21/2022

## 2022-11-21 NOTE — ASSESSMENT & PLAN NOTE
Patient has past history of TKA done by Dr. Noyola.  Arthrocentesis showed WBC 85.000     Orthopedics (Dr. Mendoza) planning for irrigation/debridement tomorrow.  Will continue to monitor vital signs,  physical exam and BMP and cultures

## 2022-11-21 NOTE — PROGRESS NOTES
[facility]  Pulmonology  Progress Note    Patient Name: Monica Walker  MRN: [id number]  Admission Date: 11/20/2022  Hospital Length of Stay: 1 days  Code Status: Full Code  Attending Provider: Cecil Chappell MD  Primary Care Provider: Hannah Schulz MD   Principal Problem: Lung abscess    Subjective:     Past Medical History:   Diagnosis Date    Abscess of right thigh + MRSA 08/20/2021    healed    Adnexal cyst     Degenerative disc disease     Depression 10/29/2021    Hypertension     Nicotine dependence     Osteoarthritis        Past Surgical History:   Procedure Laterality Date    BACK SURGERY      HYSTERECTOMY N/A     TOTAL HIP ARTHROPLASTY Left     TOTAL KNEE ARTHROPLASTY Bilateral        Review of patient's allergies indicates:  No Known Allergies    Family History    None       Tobacco Use    Smoking status: Every Day     Types: Cigarettes    Smokeless tobacco: Current     Types: Snuff   Substance and Sexual Activity    Alcohol use: Never    Drug use: Never    Sexual activity: Not on file         Review of Systems   Constitutional:  Negative for activity change and appetite change.   HENT:  Negative for congestion and dental problem.    Eyes:  Negative for discharge and itching.   Respiratory:  Negative for apnea and chest tightness.    Cardiovascular:  Negative for chest pain and leg swelling.   Gastrointestinal:  Negative for abdominal distention.   Endocrine: Negative for cold intolerance and heat intolerance.   Genitourinary:  Negative for difficulty urinating and dysuria.   Musculoskeletal:  Negative for arthralgias and back pain.   Skin:  Negative for color change.   Allergic/Immunologic: Negative for environmental allergies.   Neurological:  Negative for dizziness and facial asymmetry.   Hematological:  Negative for adenopathy. Does not bruise/bleed easily.   Psychiatric/Behavioral:  Negative for agitation and behavioral problems.    Objective:     Vital  Signs (Most Recent):  Temp: 97.4 °F (36.3 °C) (11/21/22 0400)  Pulse: (!) 117 (11/21/22 0400)  Resp: 20 (11/21/22 0512)  BP: (!) 164/80 (11/21/22 0400)  SpO2: (!) 93 % (11/21/22 0400)   Vital Signs (24h Range):  Temp:  [97.4 °F (36.3 °C)-99.1 °F (37.3 °C)] 97.4 °F (36.3 °C)  Pulse:  [111-117] 117  Resp:  [17-21] 20  SpO2:  [90 %-97 %] 93 %  BP: (146-168)/(68-89) 164/80     Weight: 70 kg (154 lb 5.2 oz)  Body mass index is 24.91 kg/m².      Intake/Output Summary (Last 24 hours) at 11/21/2022 0550  Last data filed at 11/20/2022 1559  Gross per 24 hour   Intake 1000 ml   Output --   Net 1000 ml       Physical Exam  Vitals reviewed.   Constitutional:       Appearance: Normal appearance.      Interventions: She is not intubated.  HENT:      Head: Normocephalic and atraumatic.      Nose: Nose normal.      Mouth/Throat:      Mouth: Mucous membranes are dry.      Pharynx: Oropharynx is clear.   Eyes:      Extraocular Movements: Extraocular movements intact.      Conjunctiva/sclera: Conjunctivae normal.      Pupils: Pupils are equal, round, and reactive to light.   Cardiovascular:      Rate and Rhythm: Normal rate.      Heart sounds: Normal heart sounds. No murmur heard.  Pulmonary:      Effort: Pulmonary effort is normal. She is not intubated.      Breath sounds: Normal breath sounds.   Abdominal:      General: Abdomen is flat. Bowel sounds are normal.      Palpations: Abdomen is soft.   Musculoskeletal:         General: Normal range of motion.      Cervical back: Normal range of motion and neck supple.      Right lower leg: No edema.      Left lower leg: No edema.   Skin:     General: Skin is warm and dry.      Capillary Refill: Capillary refill takes less than 2 seconds.   Neurological:      General: No focal deficit present.      Mental Status: She is alert and oriented to person, place, and time.   Psychiatric:         Mood and Affect: Mood normal.         Behavior: Behavior normal.       Vents:        Lines/Drains/Airways       Peripheral Intravenous Line  Duration                  Peripheral IV - Single Lumen 11/20/22 0336 20 G Left;Posterior Hand 1 day                    Significant Labs:    CBC/Anemia Profile:  Recent Labs   Lab 11/19/22 2200 11/21/22 0303   WBC 32.45* 27.55*   HGB 11.3* 9.8*   HCT 34.8* 29.8*   * 415*   MCV 85.7 84.9   RDW 14.6* 14.5        Chemistries:  Recent Labs   Lab 11/19/22 2200 11/21/22 0303   * 129*   K 3.8 2.9*   CL 90* 88*   CO2 32 34*   BUN 17 6*   CREATININE 0.78 0.57   CALCIUM 8.3* 7.3*   ALBUMIN 1.2*  --    PROT 7.2  --    BILITOT 0.4  --    ALKPHOS 245*  --    ALT 18  --    AST 26  --    MG 1.5*  --        Recent Lab Results  (Last 5 results in the past 24 hours)        11/21/22  0303   11/21/22  0109   11/20/22  1157   11/20/22  0959   11/20/22  0836        SED RATE (WESTERGREN) 128               Anion Gap 10               Bands 6               Baso # 0.06               Basophil % 0.2               BUN 6               BUN/CREAT RATIO 11               Calcium 7.3               Cell Count Excluding RBCs         85,270       Chloride 88               Clarity, Synovial         Turbid       CO2 34               Color, Synovial         Yellow       Creatinine 0.57               CRP 32.70               Crystals, Synovial         None Seen       Differential Type Manual               eGFR 108               Eos # 0.15               Eosinophil % 0.5               Glucose 220               Hematocrit 29.8               Hemoglobin 9.8               Hep A IgM       Non-Reactive         Hep B C IgM       Non-Reactive         Hepatitis B Surface Ag       Non-Reactive         Hepatitis C Ab       Non-Reactive         HIV 1/2 Ag/Ab       Non-Reactive         Immature Grans (Abs) 0.42               Immature Granulocytes 1.5               Lymph # 1.29               Lymph % 4.7                6               Lymphocytes, Syn Man %         1       MCH 27.9               MCHC  32.9               MCV 84.9               Mono # 1.64               Mono % 6.0                4               Monocytes, Syn Man %         3       MPV 9.7               Neutrophils, Abs 23.99               Neutrophils Relative 87.1               nRBC 0.0               NUCLEATED RBC ABSOLUTE 0.00               PLATELET MORPHOLOGY Large & Giant Platelets               Platelets 415               POC Glucose   155   287           Polys, Syn Man %         96       Potassium 2.9               RBC 3.51               RBC Morphology Normal               RBC, Synovial         8,000       RDW 14.5               Segmented Neutrophils, Man % 84               Sodium 129               WBC 27.55                                      Significant Imaging:   I have reviewed all pertinent imaging results/findings within the past 24 hours.    Assessment/Plan:     * Lung abscess  Patient has bilateral peripheral cavitary lesions.  Patient has a positive culture so these could be infectious certainly this is what staff would look like although I am told that it was Streptococcus I do not see that on the computer.  Emboli can do this check Dopplers but there were no emboli on CT.  Patient is a smoker and you have to consider malignancy all proceed with bronchoscopy in the morning.                   aSvage Sylvester MD  Pulmonology  [facility]

## 2022-11-21 NOTE — NURSING
Secure chat Dr. Knox regarding MRI and patient still having headache's. Paged Dr. Knox awaiting call back at this time.

## 2022-11-21 NOTE — SUBJECTIVE & OBJECTIVE
Past Medical History:   Diagnosis Date    Abscess of right thigh + MRSA 08/20/2021    healed    Adnexal cyst     Degenerative disc disease     Depression 10/29/2021    Hypertension     Nicotine dependence     Osteoarthritis        Past Surgical History:   Procedure Laterality Date    BACK SURGERY      HYSTERECTOMY N/A     TOTAL HIP ARTHROPLASTY Left     TOTAL KNEE ARTHROPLASTY Bilateral        Review of patient's allergies indicates:  No Known Allergies    Family History    None       Tobacco Use    Smoking status: Every Day     Types: Cigarettes    Smokeless tobacco: Current     Types: Snuff   Substance and Sexual Activity    Alcohol use: Never    Drug use: Never    Sexual activity: Not on file         Review of Systems   Constitutional:  Negative for activity change and appetite change.   HENT:  Negative for congestion and dental problem.    Eyes:  Negative for discharge and itching.   Respiratory:  Negative for apnea and chest tightness.    Cardiovascular:  Negative for chest pain and leg swelling.   Gastrointestinal:  Negative for abdominal distention.   Endocrine: Negative for cold intolerance and heat intolerance.   Genitourinary:  Negative for difficulty urinating and dysuria.   Musculoskeletal:  Negative for arthralgias and back pain.   Skin:  Negative for color change.   Allergic/Immunologic: Negative for environmental allergies.   Neurological:  Negative for dizziness and facial asymmetry.   Hematological:  Negative for adenopathy. Does not bruise/bleed easily.   Psychiatric/Behavioral:  Negative for agitation and behavioral problems.    Objective:     Vital Signs (Most Recent):  Temp: 97.4 °F (36.3 °C) (11/21/22 0400)  Pulse: (!) 117 (11/21/22 0400)  Resp: 20 (11/21/22 0512)  BP: (!) 164/80 (11/21/22 0400)  SpO2: (!) 93 % (11/21/22 0400)   Vital Signs (24h Range):  Temp:  [97.4 °F (36.3 °C)-99.1 °F (37.3 °C)] 97.4 °F (36.3 °C)  Pulse:  [111-117] 117  Resp:  [17-21] 20  SpO2:  [90 %-97 %] 93 %  BP:  (146-168)/(68-89) 164/80     Weight: 70 kg (154 lb 5.2 oz)  Body mass index is 24.91 kg/m².      Intake/Output Summary (Last 24 hours) at 11/21/2022 0607  Last data filed at 11/20/2022 1559  Gross per 24 hour   Intake 1000 ml   Output --   Net 1000 ml       Physical Exam  Vitals reviewed.   Constitutional:       Appearance: Normal appearance.      Interventions: She is not intubated.  HENT:      Head: Normocephalic and atraumatic.      Nose: Nose normal.      Mouth/Throat:      Mouth: Mucous membranes are dry.      Pharynx: Oropharynx is clear.   Eyes:      Extraocular Movements: Extraocular movements intact.      Conjunctiva/sclera: Conjunctivae normal.      Pupils: Pupils are equal, round, and reactive to light.   Cardiovascular:      Rate and Rhythm: Normal rate.      Heart sounds: Normal heart sounds. No murmur heard.  Pulmonary:      Effort: Pulmonary effort is normal. She is not intubated.      Breath sounds: Normal breath sounds.   Abdominal:      General: Abdomen is flat. Bowel sounds are normal.      Palpations: Abdomen is soft.   Musculoskeletal:         General: Normal range of motion.      Cervical back: Normal range of motion and neck supple.      Right lower leg: No edema.      Left lower leg: No edema.   Skin:     General: Skin is warm and dry.      Capillary Refill: Capillary refill takes less than 2 seconds.   Neurological:      General: No focal deficit present.      Mental Status: She is alert and oriented to person, place, and time.   Psychiatric:         Mood and Affect: Mood normal.         Behavior: Behavior normal.       Vents:       Lines/Drains/Airways       Peripheral Intravenous Line  Duration                  Peripheral IV - Single Lumen 11/20/22 0336 20 G Left;Posterior Hand 1 day                    Significant Labs:    CBC/Anemia Profile:  Recent Labs   Lab 11/19/22  2200 11/21/22  0303   WBC 32.45* 27.55*   HGB 11.3* 9.8*   HCT 34.8* 29.8*   * 415*   MCV 85.7 84.9   RDW 14.6*  14.5        Chemistries:  Recent Labs   Lab 11/19/22  2200 11/21/22  0303   * 129*   K 3.8 2.9*   CL 90* 88*   CO2 32 34*   BUN 17 6*   CREATININE 0.78 0.57   CALCIUM 8.3* 7.3*   ALBUMIN 1.2*  --    PROT 7.2  --    BILITOT 0.4  --    ALKPHOS 245*  --    ALT 18  --    AST 26  --    MG 1.5*  --        Recent Lab Results  (Last 5 results in the past 24 hours)        11/21/22  0303   11/21/22  0109   11/20/22  1157   11/20/22  0959   11/20/22  0836        SED RATE (WESTERGREN) 128               Anion Gap 10               Bands 6               Baso # 0.06               Basophil % 0.2               BUN 6               BUN/CREAT RATIO 11               Calcium 7.3               Cell Count Excluding RBCs         85,270       Chloride 88               Clarity, Synovial         Turbid       CO2 34               Color, Synovial         Yellow       Creatinine 0.57               CRP 32.70               Crystals, Synovial         None Seen       Differential Type Manual               eGFR 108               Eos # 0.15               Eosinophil % 0.5               Glucose 220               Hematocrit 29.8               Hemoglobin 9.8               Hep A IgM       Non-Reactive         Hep B C IgM       Non-Reactive         Hepatitis B Surface Ag       Non-Reactive         Hepatitis C Ab       Non-Reactive         HIV 1/2 Ag/Ab       Non-Reactive         Immature Grans (Abs) 0.42               Immature Granulocytes 1.5               Lymph # 1.29               Lymph % 4.7                6               Lymphocytes, Syn Man %         1       MCH 27.9               MCHC 32.9               MCV 84.9               Mono # 1.64               Mono % 6.0                4               Monocytes, Syn Man %         3       MPV 9.7               Neutrophils, Abs 23.99               Neutrophils Relative 87.1               nRBC 0.0               NUCLEATED RBC ABSOLUTE 0.00               PLATELET MORPHOLOGY Large & Giant Platelets                Platelets 415               POC Glucose   155   287           Polys, Syn Man %         96       Potassium 2.9               RBC 3.51               RBC Morphology Normal               RBC, Synovial         8,000       RDW 14.5               Segmented Neutrophils, Man % 84               Sodium 129               WBC 27.55                                      Significant Imaging:   I have reviewed all pertinent imaging results/findings within the past 24 hours.

## 2022-11-21 NOTE — ASSESSMENT & PLAN NOTE
Blood glucose improving from admission  Last Hb A1C 12.4 (11/9)  Antihyperglycemics (From admission, onward)    Start     Stop Route Frequency Ordered    11/20/22 2100  insulin detemir U-100 injection 20 Units         -- SubQ Nightly 11/20/22 0535    11/20/22 0715  insulin aspart U-100 injection 5 Units         -- SubQ 3 times daily with meals 11/20/22 0535    11/20/22 0634  insulin aspart U-100 injection 1-10 Units         -- SubQ Before meals & nightly PRN 11/20/22 0535        Continue medium correctional scale insulin  Continue IVF  Diabetic educator consulted  Will continue to monitor blood glucose

## 2022-11-21 NOTE — PT/OT/SLP PROGRESS
Physical Therapy      Patient Name:  Monica Walker   MRN:  [id number]    Patient not seen today secondary to Patient unwilling to participate (Pt refused PT stating she was hurting too bad in her knee and is having I&D of left knee tomorrow). Will follow-up 11/22/22.

## 2022-11-21 NOTE — PLAN OF CARE
[facility]  Initial Discharge Assessment       Primary Care Provider: Hannah Schulz MD    Admission Diagnosis: Lung abscess [J85.2]  Septic joint of left knee joint [M00.9]    Admission Date: 11/20/2022  Expected Discharge Date:     Discharge Barriers Identified: None    Payor: MEDICARE / Plan: MEDICARE PART A & B / Product Type: Government /     Extended Emergency Contact Information  Primary Emergency Contact: Isai,April  Mobile Phone: [phone]  Relation: Sister  Preferred language: English   needed? No  Secondary Emergency Contact: Alejandrina Walker  Home Phone: [phone]  Relation: Mother  Preferred language: English   needed? No    Discharge Plan A: Home Health, Home with family  Discharge Plan B: Home with family, Home Health      [facility] [place] [address]  [address]  [place] [address]  Phone: [phone] Fax: [phone]      Initial Assessment (most recent)       Adult Discharge Assessment - 11/21/22 1010          Discharge Assessment    Assessment Type Discharge Planning Assessment     Source of Information family     If unable to respond/provide information was family/caregiver contacted? Yes     Contact Name/Number Alejandrina Walker (mother) [phone]     Lives With alone     Do you expect to return to your current living situation? No     Do you have help at home or someone to help you manage your care at home? No     Prior to hospitilization cognitive status: Alert/Oriented     Walking or Climbing Stairs Difficulty none     Dressing/Bathing Difficulty none     Home Accessibility not wheelchair accessible     Home Layout Able to live on 1st floor     Equipment Currently Used at Home none     Readmission within 30 days? Yes     Patient currently being followed by outpatient case management? Unable to determine (comments)     Do you currently have service(s) that help you manage your care at home? No      Do you take prescription medications? Yes     Do you have prescription coverage? Yes     Do you have any problems affording any of your prescribed medications? No     Who is going to help you get home at discharge? Alejandrina Walker (mother) [phone]     How do you get to doctors appointments? car, drives self;family or friend will provide     Are you on dialysis? No     Do you take coumadin? No     Discharge Plan A Home Health;Home with family     Discharge Plan B Home with family;Home Health     Discharge Plan discussed with: Parent(s)     Name(s) and Number(s) Alejandrina Walker (mother) [phone]     Discharge Barriers Identified None        Physical Activity    On average, how many days per week do you engage in moderate to strenuous exercise (like a brisk walk)? 3 days     On average, how many minutes do you engage in exercise at this level? 30 min        Financial Resource Strain    How hard is it for you to pay for the very basics like food, housing, medical care, and heating? Not very hard        Housing Stability    In the last 12 months, was there a time when you were not able to pay the mortgage or rent on time? No     In the last 12 months, how many places have you lived? 1     In the last 12 months, was there a time when you did not have a steady place to sleep or slept in a shelter (including now)? No        Transportation Needs    In the past 12 months, has lack of transportation kept you from medical appointments or from getting medications? No     In the past 12 months, has lack of transportation kept you from meetings, work, or from getting things needed for daily living? No        Food Insecurity    Within the past 12 months, you worried that your food would run out before you got the money to buy more. Never true     Within the past 12 months, the food you bought just didn't last and you didn't have money to get more. Never true        Stress    Do you feel stress - tense, restless, nervous, or  anxious, or unable to sleep at night because your mind is troubled all the time - these days? To some extent        Social Connections    In a typical week, how many times do you talk on the phone with family, friends, or neighbors? More than three times a week     How often do you get together with friends or relatives? More than three times a week     How often do you attend Caodaism or Confucianism services? Never     Do you belong to any clubs or organizations such as Caodaism groups, unions, fraternal or athletic groups, or school groups? No     How often do you attend meetings of the clubs or organizations you belong to? Never     Are you , , , , never , or living with a partner? Never         Alcohol Use    Q1: How often do you have a drink containing alcohol? Never     Q2: How many drinks containing alcohol do you have on a typical day when you are drinking? Patient does not drink     Q3: How often do you have six or more drinks on one occasion? Never                      SW spoke with pt and pt's mother (Alejandrina Walker [phone]) at bedside to complete dcp initial assessment. Pt lives alone with no hh nor dme. Dcp is home with mother and hh services, if recommended by MD. IMM reviewed and signature obtained from pt's mother. SS following for dc needs.

## 2022-11-21 NOTE — ASSESSMENT & PLAN NOTE
Patient has bilateral peripheral cavitary lesions.  Patient has a positive culture so these could be infectious certainly this is what staff would look like although I am told that it was Streptococcus I do not see that on the computer.  Emboli can do this check Dopplers but there were no emboli on CT.  Patient is a smoker and you have to consider malignancy all proceed with bronchoscopy in the morning.

## 2022-11-21 NOTE — SUBJECTIVE & OBJECTIVE
Interval History: Patient seen and evaluated during AM rounds. Patient is in NAD. Patient is somnolent this morning c/o pain left knee. Orthopedics planning irrigation/debridement tomorrow. Pulmonology planning for bronchoscopy tomorrow. NPO after midnight. Pre op labs done. Ech pending. Continuing IV antibiotics. Repeat blood cultures. ID consulted. Will continue to monitor patient.    Review of Systems   Constitutional:  Positive for activity change and appetite change. Negative for chills and fever.   HENT:  Negative for hearing loss, nosebleeds, rhinorrhea, sinus pressure, sneezing, sore throat and trouble swallowing.    Eyes:  Negative for photophobia, pain, discharge, itching and visual disturbance.   Respiratory:  Negative for apnea, cough, chest tightness and shortness of breath.    Cardiovascular:  Negative for chest pain, palpitations and leg swelling.   Gastrointestinal:  Negative for abdominal distention, abdominal pain, blood in stool, constipation, diarrhea, nausea and vomiting.   Endocrine: Negative for polydipsia, polyphagia and polyuria.   Genitourinary:  Negative for difficulty urinating, dysuria, frequency, hematuria and urgency. Vaginal bleeding: left knee.  Musculoskeletal:  Positive for arthralgias (left knee), back pain and joint swelling (left knee). Negative for myalgias.   Skin:  Negative for rash.   Neurological:  Negative for dizziness, tremors, seizures, syncope, light-headedness, numbness and headaches.   Psychiatric/Behavioral:  Negative for agitation, confusion, hallucinations and sleep disturbance. The patient is not nervous/anxious.    Objective:     Vital Signs (Most Recent):  Temp: 99.7 °F (37.6 °C) (11/21/22 0630)  Pulse: 109 (11/21/22 0630)  Resp: 18 (11/21/22 0849)  BP: (!) 153/76 (11/21/22 0630)  SpO2: (!) 94 % (11/21/22 0630)   Vital Signs (24h Range):  Temp:  [97.4 °F (36.3 °C)-99.7 °F (37.6 °C)] 99.7 °F (37.6 °C)  Pulse:  [109-117] 109  Resp:  [17-21] 18  SpO2:  [90 %-97 %]  94 %  BP: (146-168)/(68-89) 153/76     Weight: 70 kg (154 lb 5.2 oz)  Body mass index is 24.91 kg/m².    Intake/Output Summary (Last 24 hours) at 11/21/2022 0936  Last data filed at 11/20/2022 1559  Gross per 24 hour   Intake 1000 ml   Output --   Net 1000 ml      Physical Exam  Vitals and nursing note reviewed.   Constitutional:       General: She is not in acute distress.     Appearance: Normal appearance. She is not ill-appearing, toxic-appearing or diaphoretic.   HENT:      Head: Normocephalic and atraumatic.      Right Ear: External ear normal.      Left Ear: External ear normal.      Nose: Nose normal. No congestion or rhinorrhea.      Mouth/Throat:      Pharynx: Oropharynx is clear.   Eyes:      Extraocular Movements: Extraocular movements intact.      Conjunctiva/sclera: Conjunctivae normal.      Pupils: Pupils are equal, round, and reactive to light.   Cardiovascular:      Rate and Rhythm: Normal rate and regular rhythm.      Pulses: Normal pulses.      Heart sounds: Normal heart sounds. No murmur heard.  Pulmonary:      Effort: Pulmonary effort is normal. No respiratory distress.      Breath sounds: Normal breath sounds. No wheezing, rhonchi or rales.   Abdominal:      General: Bowel sounds are normal. There is no distension.      Tenderness: There is no abdominal tenderness. There is no guarding or rebound.   Musculoskeletal:         General: Swelling (left knee) and tenderness (left knee) present. Normal range of motion.      Cervical back: Normal range of motion and neck supple.   Skin:     General: Skin is warm and dry.      Capillary Refill: Capillary refill takes less than 2 seconds.      Findings: No rash.   Neurological:      General: No focal deficit present.      Mental Status: She is alert and oriented to person, place, and time. Mental status is at baseline.      Cranial Nerves: No cranial nerve deficit.   Psychiatric:         Mood and Affect: Mood normal.         Behavior: Behavior normal.          Thought Content: Thought content normal.       Significant Labs: All pertinent labs within the past 24 hours have been reviewed.  Recent Lab Results  (Last 5 results in the past 24 hours)        11/21/22  0619   11/21/22  0303   11/21/22  0109   11/20/22  1157   11/20/22  0959        SED RATE (WESTERGREN)   128             Anion Gap   10             Bands   6             Baso #   0.06             Basophil %   0.2             BUN   6             BUN/CREAT RATIO   11             Calcium   7.3             Chloride   88             CO2   34             Creatinine   0.57             CRP   32.70             Differential Type   Manual             eGFR   108             Eos #   0.15             Eosinophil %   0.5             Glucose   220             Hematocrit   29.8             Hemoglobin   9.8             Hep A IgM         Non-Reactive       Hep B C IgM         Non-Reactive       Hepatitis B Surface Ag         Non-Reactive       Hepatitis C Ab         Non-Reactive       HIV 1/2 Ag/Ab         Non-Reactive       Immature Grans (Abs)   0.42             Immature Granulocytes   1.5             Lymph #   1.29             Lymph %   4.7                6             MCH   27.9             MCHC   32.9             MCV   84.9             Mono #   1.64             Mono %   6.0                4             MPV   9.7             Neutrophils, Abs   23.99             Neutrophils Relative   87.1             nRBC   0.0             NUCLEATED RBC ABSOLUTE   0.00             PLATELET MORPHOLOGY   Large & Giant Platelets             Platelets   415             POC Glucose 164     155   287         Potassium   2.9             RBC   3.51             RBC Morphology   Normal             RDW   14.5             Segmented Neutrophils, Man %   84             Sodium   129             WBC   27.55                                    Significant Imaging: I have reviewed all pertinent imaging results/findings within the past 24 hours.  I have reviewed  and interpreted all pertinent imaging results/findings within the past 24 hours.

## 2022-11-21 NOTE — CONSULTS
INFECTIOUS DISEASE CONSULT NOTE   Admit Date: 11/20/2022  CONSULT FOR :  MRSA bacteremia with lung abscess  Chief Complaint:  Bacteremia    HPI:      History obtained mainly from the patient's mother as she was drowsy.  52-year-old with longstanding severely uncontrolled diabetes who generally does not take care of herself according to her mother, was admitted to an outside hospital about 2 weeks ago because of left-sided pyelonephritis.  After discharge home she continued to be unwell and subsequently developed worsening left knee pain.  She has history of bilateral knee and left hip replacements.  Because of the inability to walk she was brought to the outside hospital by her mother and subsequently transferred to our facility.  Arthrocentesis was done yesterday which showed over 80,000 white blood cells which were predominantly neutrophils.  CT chest showed cavitary infiltrates.  Patient currently on vancomycin, Zosyn and levofloxacin and I am asked to assist with management.  Of note patient has long history of meth use but she denies intravenous drug injection.    ROS:      Constitutional: + fever - chills, + mental status changes (on and off mild disorientation per her mother)  Eyes: no visual changes. No redness or jaundice, no blurred vision   Neck: Normal range of motion. No neck stiffiness   ENT: no nasal congestion or sore throat, no nose bleeds, no ear ache   Respiratory: no cough or shortness of breath, no wheezing, no stridor   Cardiovascular: no chest pain or palpitations  Gastrointestinal: no nausea / vomiting / diarrhea, no abdominal pain, no constipation   Genitourinary: no hematuria or dysuria, no frequency or urgency, however she is still having left flank tenderness although much improved from before  Hematologic/Lymphatic: no easy bruising or lymphadenopathy   Musculoskeletal:  Severe left shoulder pain with decreased range of motion over the past 2 weeks.  She has chronic  arthritis of left shoulder but it acutely worsened    Neurological: no seizures or tremors, no  loss of consciousness  Behavioral/Psych: no auditory or visual hallucinations, said mood   Endocrine: no heat or cold intolerance   All 14 systems reviewed and negative except as noted above.    PMHx:      Past Medical History:   Diagnosis Date    Abscess of right thigh + MRSA 08/20/2021    healed    Adnexal cyst     Degenerative disc disease     Depression 10/29/2021    Hypertension     Nicotine dependence     Osteoarthritis         PMSx:      Past Surgical History:   Procedure Laterality Date    BACK SURGERY      HYSTERECTOMY N/A     TOTAL HIP ARTHROPLASTY Left     TOTAL KNEE ARTHROPLASTY Bilateral         Social Hx:      Social History     Socioeconomic History    Marital status: Single   Tobacco Use    Smoking status: Every Day     Types: Cigarettes    Smokeless tobacco: Current     Types: Snuff   Substance and Sexual Activity    Alcohol use: Never    Drug use: Never     Social Determinants of Health     Financial Resource Strain: Low Risk     Difficulty of Paying Living Expenses: Not very hard   Food Insecurity: No Food Insecurity    Worried About Running Out of Food in the Last Year: Never true    Ran Out of Food in the Last Year: Never true   Transportation Needs: No Transportation Needs    Lack of Transportation (Medical): No    Lack of Transportation (Non-Medical): No   Physical Activity: Insufficiently Active    Days of Exercise per Week: 3 days    Minutes of Exercise per Session: 30 min   Stress: Stress Concern Present    Feeling of Stress : To some extent   Social Connections: Socially Isolated    Frequency of Communication with Friends and Family: More than three times a week    Frequency of Social Gatherings with Friends and Family: More than three times a week    Attends Restorationist Services: Never    Active Member of Clubs or Organizations: No    Attends Club or Organization Meetings: Never    Marital  Status: Never    Housing Stability: Low Risk     Unable to Pay for Housing in the Last Year: No    Number of Places Lived in the Last Year: 1    Unstable Housing in the Last Year: No        Family Hx:      History reviewed. No pertinent family history. `  Review of patient's allergies indicates:  No Known Allergies    Medications:      Current Facility-Administered Medications   Medication    0.9%  NaCl infusion    acetaminophen tablet 1,000 mg    bisacodyL EC tablet 10 mg    dextromethorphan-guaiFENesin  mg/5 ml liquid 10 mL    dextrose 50% injection 12.5 g    dextrose 50% injection 12.5 g    dextrose 50% injection 12.5 g    dextrose 50% injection 25 g    dextrose 50% injection 25 g    dextrose 50% injection 25 g    DULoxetine DR capsule 60 mg    enoxaparin injection 40 mg    glucagon (human recombinant) injection 1 mg    glucagon (human recombinant) injection 1 mg    glucagon (human recombinant) injection 1 mg    glucose chewable tablet 16 g    glucose chewable tablet 16 g    glucose chewable tablet 24 g    glucose chewable tablet 24 g    insulin aspart U-100 injection 1-10 Units    insulin aspart U-100 injection 5 Units    insulin detemir U-100 injection 20 Units    levoFLOXacin tablet 500 mg    mupirocin 2 % ointment    naloxone 0.4 mg/mL injection 0.02 mg    ondansetron injection 8 mg    oxyCODONE immediate release tablet 5 mg    piperacillin-tazobactam (ZOSYN) 4.5 g in dextrose 5 % in water (D5W) 5 % 100 mL IVPB (MB+)    polyethylene glycol packet 17 g    potassium chloride SA CR tablet 40 mEq    simethicone chewable tablet 80 mg    sodium chloride 0.9% flush 10 mL    sodium chloride 0.9% flush 10 mL    traZODone tablet 50 mg    vancomycin (VANCOCIN) 1,500 mg in dextrose 5 % 250 mL IVPB    vancomycin - pharmacy to dose       VITALS:      Vital Signs Range (Last 24H):  Temp:  [96.7 °F (35.9 °C)-100.6 °F (38.1 °C)]   Pulse:  [109-117]   Resp:  [17-21]   BP: (146-169)/(68-80)   SpO2:  [89 %-94 %]      I & O (Last 24H):    Intake/Output Summary (Last 24 hours) at 11/21/2022 1404  Last data filed at 11/20/2022 1559  Gross per 24 hour   Intake 350 ml   Output --   Net 350 ml       Physical Exam   Constitutional: Pt is oriented to person and place. Appears well-developed and overweight, chronically ill.    Head: Normocephalic and atraumatic.   Eyes, nose and throat:  Pale moist mucosa, anicteric and acyanotic, JUNE, no oral or pharyngeal exudates, most of her teeth are missing  Neck: Neck supple, no cervical lymphadenopathy, no thyroid gland enlargement   Cardiovascular: Normal rate and regular rhythm.  S1 and S2 appreciated by ascultation, no murmurs appreciated  Pulmonary/Chest: Effort normal, no crepitations or wheezes auscultated   Abdomen:  Soft, not distended, normal bowel sounds. Soft. Non-tender.  No organomegaly or mass appreciated.  No CVA tenderness appreciated.  Neurological: Pt is alert and oriented to person, place, and time.  No obvious focal deficits.  Extremities: No edema. No clubbing or cyanosis  Musculoskeletal:  Left shoulder appears slightly swollen compared to the right in my opinion, significantly reduced range of motion because of severe pain.  Left knee is swollen and warm but not red, also bit painful with range of motion.  No tenderness on palpation along the length of her spine, healed spinal surgical wounds noted.  Skin: Warm and dry. No rashes but she has some excoriations to her forearms with mild surrounding erythema.  Psychiatric:  Flat mood and affect, judgment questionable    Laboratory Data:  Reviewed and noted in plan where applicable- Please see chart for full laboratory data.    No results for input(s): CPK, CPKMB, TROPONINI, MB in the last 24 hours. No results for input(s): POCTGLUCOSE in the last 24 hours.     Lab Results   Component Value Date    INR 1.28 11/21/2022       Lab Results   Component Value Date    WBC 27.55 (HH) 11/21/2022    HGB 9.8 (L) 11/21/2022    HCT  29.8 (L) 11/21/2022    MCV 84.9 11/21/2022     (H) 11/21/2022       BMP  Recent Labs   Lab 11/21/22  0303   *   *   K 2.9*   CL 88*   CO2 34*   BUN 6*   CREATININE 0.57   CALCIUM 7.3*     Microbiology Results (last 7 days)       Procedure Component Value Units Date/Time    Blood culture (site 1) [[id number]] Collected: 11/21/22 0735    Order Status: Sent Specimen: Blood Updated: 11/21/22 0743    Blood culture (site 2) [[id number]] Collected: 11/21/22 0738    Order Status: Sent Specimen: Blood Updated: 11/21/22 0743          Admission blood cultures x2 positive for MRSA    Radiology:  Reviewed and noted in plan where applicable-TTE negative for endocarditis, CT chest images personally reviewed with cavitary infiltrate seen.      ASSESSMENT/PLAN:     ACTIVE PROBLEMS:    Patient Active Problem List   Diagnosis    Depression    Septic arthritis of knee, left    Lung abscess    DM (diabetes mellitus)    Bacteremia       ASSESSMENT:  MRSA bacteremia.  TTE negative for endocarditis but given extensive foci of presumed MRSA infection, I do not think we can rule out endocarditis without a MONICA.  Hardware associated left knee septic arthritis  Acute worsening of left shoulder pain with history of solar surgery, I am concerned about possible septic arthritis to the left shoulder  Cavitary bilateral pneumonia, most likely due to same MRSA in blood    PLAN:  Agree with vancomycin  Discontinue Zosyn  Continue levofloxacin for now until we get back the rest of the cultures and after bronchoscopy  MRI left shoulder to get a better idea of the presence of septic arthritis (per my communication with Dr. Mendoza)  MONICA when able  Repeat blood cultures in 48 hours    Thank you very much for the consult.  Will follow.    Family discussion - YES, discussed with the patient's mother at bedside

## 2022-11-21 NOTE — SUBJECTIVE & OBJECTIVE
Past Medical History:   Diagnosis Date    Abscess of right thigh + MRSA 08/20/2021    healed    Adnexal cyst     Degenerative disc disease     Depression 10/29/2021    Hypertension     Nicotine dependence     Osteoarthritis        Past Surgical History:   Procedure Laterality Date    BACK SURGERY      HYSTERECTOMY N/A     TOTAL HIP ARTHROPLASTY Left     TOTAL KNEE ARTHROPLASTY Bilateral        Review of patient's allergies indicates:  No Known Allergies    Family History    None       Tobacco Use    Smoking status: Every Day     Types: Cigarettes    Smokeless tobacco: Current     Types: Snuff   Substance and Sexual Activity    Alcohol use: Never    Drug use: Never    Sexual activity: Not on file         Review of Systems   Constitutional:  Negative for activity change and appetite change.   HENT:  Negative for congestion and dental problem.    Eyes:  Negative for discharge and itching.   Respiratory:  Negative for apnea and chest tightness.    Cardiovascular:  Negative for chest pain and leg swelling.   Gastrointestinal:  Negative for abdominal distention.   Endocrine: Negative for cold intolerance and heat intolerance.   Genitourinary:  Negative for difficulty urinating and dysuria.   Musculoskeletal:  Negative for arthralgias and back pain.   Skin:  Negative for color change.   Allergic/Immunologic: Negative for environmental allergies.   Neurological:  Negative for dizziness and facial asymmetry.   Hematological:  Negative for adenopathy. Does not bruise/bleed easily.   Psychiatric/Behavioral:  Negative for agitation and behavioral problems.    Objective:     Vital Signs (Most Recent):  Temp: 97.4 °F (36.3 °C) (11/21/22 0400)  Pulse: (!) 117 (11/21/22 0400)  Resp: 20 (11/21/22 0512)  BP: (!) 164/80 (11/21/22 0400)  SpO2: (!) 93 % (11/21/22 0400)   Vital Signs (24h Range):  Temp:  [97.4 °F (36.3 °C)-99.1 °F (37.3 °C)] 97.4 °F (36.3 °C)  Pulse:  [111-117] 117  Resp:  [17-21] 20  SpO2:  [90 %-97 %] 93 %  BP:  (146-168)/(68-89) 164/80     Weight: 70 kg (154 lb 5.2 oz)  Body mass index is 24.91 kg/m².      Intake/Output Summary (Last 24 hours) at 11/21/2022 0550  Last data filed at 11/20/2022 1559  Gross per 24 hour   Intake 1000 ml   Output --   Net 1000 ml       Physical Exam  Vitals reviewed.   Constitutional:       Appearance: Normal appearance.      Interventions: She is not intubated.  HENT:      Head: Normocephalic and atraumatic.      Nose: Nose normal.      Mouth/Throat:      Mouth: Mucous membranes are dry.      Pharynx: Oropharynx is clear.   Eyes:      Extraocular Movements: Extraocular movements intact.      Conjunctiva/sclera: Conjunctivae normal.      Pupils: Pupils are equal, round, and reactive to light.   Cardiovascular:      Rate and Rhythm: Normal rate.      Heart sounds: Normal heart sounds. No murmur heard.  Pulmonary:      Effort: Pulmonary effort is normal. She is not intubated.      Breath sounds: Normal breath sounds.   Abdominal:      General: Abdomen is flat. Bowel sounds are normal.      Palpations: Abdomen is soft.   Musculoskeletal:         General: Normal range of motion.      Cervical back: Normal range of motion and neck supple.      Right lower leg: No edema.      Left lower leg: No edema.   Skin:     General: Skin is warm and dry.      Capillary Refill: Capillary refill takes less than 2 seconds.   Neurological:      General: No focal deficit present.      Mental Status: She is alert and oriented to person, place, and time.   Psychiatric:         Mood and Affect: Mood normal.         Behavior: Behavior normal.       Vents:       Lines/Drains/Airways       Peripheral Intravenous Line  Duration                  Peripheral IV - Single Lumen 11/20/22 0336 20 G Left;Posterior Hand 1 day                    Significant Labs:    CBC/Anemia Profile:  Recent Labs   Lab 11/19/22  2200 11/21/22  0303   WBC 32.45* 27.55*   HGB 11.3* 9.8*   HCT 34.8* 29.8*   * 415*   MCV 85.7 84.9   RDW 14.6*  14.5        Chemistries:  Recent Labs   Lab 11/19/22  2200 11/21/22  0303   * 129*   K 3.8 2.9*   CL 90* 88*   CO2 32 34*   BUN 17 6*   CREATININE 0.78 0.57   CALCIUM 8.3* 7.3*   ALBUMIN 1.2*  --    PROT 7.2  --    BILITOT 0.4  --    ALKPHOS 245*  --    ALT 18  --    AST 26  --    MG 1.5*  --        Recent Lab Results  (Last 5 results in the past 24 hours)        11/21/22  0303   11/21/22  0109   11/20/22  1157   11/20/22  0959   11/20/22  0836        SED RATE (WESTERGREN) 128               Anion Gap 10               Bands 6               Baso # 0.06               Basophil % 0.2               BUN 6               BUN/CREAT RATIO 11               Calcium 7.3               Cell Count Excluding RBCs         85,270       Chloride 88               Clarity, Synovial         Turbid       CO2 34               Color, Synovial         Yellow       Creatinine 0.57               CRP 32.70               Crystals, Synovial         None Seen       Differential Type Manual               eGFR 108               Eos # 0.15               Eosinophil % 0.5               Glucose 220               Hematocrit 29.8               Hemoglobin 9.8               Hep A IgM       Non-Reactive         Hep B C IgM       Non-Reactive         Hepatitis B Surface Ag       Non-Reactive         Hepatitis C Ab       Non-Reactive         HIV 1/2 Ag/Ab       Non-Reactive         Immature Grans (Abs) 0.42               Immature Granulocytes 1.5               Lymph # 1.29               Lymph % 4.7                6               Lymphocytes, Syn Man %         1       MCH 27.9               MCHC 32.9               MCV 84.9               Mono # 1.64               Mono % 6.0                4               Monocytes, Syn Man %         3       MPV 9.7               Neutrophils, Abs 23.99               Neutrophils Relative 87.1               nRBC 0.0               NUCLEATED RBC ABSOLUTE 0.00               PLATELET MORPHOLOGY Large & Giant Platelets                Platelets 415               POC Glucose   155   287           Polys, Syn Man %         96       Potassium 2.9               RBC 3.51               RBC Morphology Normal               RBC, Synovial         8,000       RDW 14.5               Segmented Neutrophils, Man % 84               Sodium 129               WBC 27.55                                      Significant Imaging:   I have reviewed all pertinent imaging results/findings within the past 24 hours.

## 2022-11-21 NOTE — ASSESSMENT & PLAN NOTE
BC positive for methicillin resistant Staph Aureus    Repeat BC   Continue IV Zosyn, Vancomycin and Levofloxacin  Continue IV NS at 75cc/h  ID consulted, thanks for the assistance  Will continue to monitor vital signs, physical exam, AM labs and cultures/sensitivity

## 2022-11-21 NOTE — PROGRESS NOTES
[facility]  [facility] Medicine  Progress Note    Patient Name: Monica Walker  MRN: [id number]  Patient Class: IP- Inpatient   Admission Date: 11/20/2022  Length of Stay: 1 days  Attending Physician: Cecil Chappell MD  Primary Care Provider: Hannah Schulz MD        Subjective:     Principal Problem:Bacteremia        HPI:  Patient is a 51 yo female who presents to [facility] transferred from [facility] c/o L knee and R flank pain x 2 weeks. R flank pain is 10/10, squeezing in quality, without radiation, no aggravating or relieving factors. L knee pain started after a fall, which she is unable to recall date of the injury. The knee pain is 10/10, located in the medial joint line, aggravated by movement and weight-bearing, squeezing in quality, radiates down to L foot and without relieving factors including heat and ice. Patient has a history of type 2 diabetes, poorly controlled.    10 days ago patient was admitted at [facility] and treated for pyelonephritis on the left side with IV antibiotics although her urine culture did not grow any bacteria. 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 Today a CT scan of the abdomen pelvis and chest with IV contrast indicates bilateral peripherally dense cavitary lesions left lower lobe and right middle lobe bilateral pleural effusions left greater than right with possible loculation these areas are concerning for abscesses although a neoplastic process can not be entirely excluded. Abnormal appearance of the left kidney concerning for pyelonephritis. Patient was transferred to [facility] for further medical evaluation and management.       Interval History: Patient seen and evaluated during AM rounds. Patient is in NAD. Patient is somnolent this morning c/o pain left knee.  Orthopedics planning irrigation/debridement tomorrow. Pulmonology planning for bronchoscopy tomorrow. NPO after midnight. Pre op labs done. Echo pending. Continuing IV antibiotics. Repeat blood cultures. ID consulted. Will continue to monitor patient.    Review of Systems   Constitutional:  Positive for activity change and appetite change. Negative for chills and fever.   HENT:  Negative for hearing loss, nosebleeds, rhinorrhea, sinus pressure, sneezing, sore throat and trouble swallowing.    Eyes:  Negative for photophobia, pain, discharge, itching and visual disturbance.   Respiratory:  Negative for apnea, cough, chest tightness and shortness of breath.    Cardiovascular:  Negative for chest pain, palpitations and leg swelling.   Gastrointestinal:  Negative for abdominal distention, abdominal pain, blood in stool, constipation, diarrhea, nausea and vomiting.   Endocrine: Negative for polydipsia, polyphagia and polyuria.   Genitourinary:  Negative for difficulty urinating, dysuria, frequency, hematuria and urgency. Vaginal bleeding: left knee.  Musculoskeletal:  Positive for arthralgias (left knee), back pain and joint swelling (left knee). Negative for myalgias.   Skin:  Negative for rash.   Neurological:  Negative for dizziness, tremors, seizures, syncope, light-headedness, numbness and headaches.   Psychiatric/Behavioral:  Negative for agitation, confusion, hallucinations and sleep disturbance. The patient is not nervous/anxious.    Objective:     Vital Signs (Most Recent):  Temp: 99.7 °F (37.6 °C) (11/21/22 0630)  Pulse: 109 (11/21/22 0630)  Resp: 18 (11/21/22 0849)  BP: (!) 153/76 (11/21/22 0630)  SpO2: (!) 94 % (11/21/22 0630)   Vital Signs (24h Range):  Temp:  [97.4 °F (36.3 °C)-99.7 °F (37.6 °C)] 99.7 °F (37.6 °C)  Pulse:  [109-117] 109  Resp:  [17-21] 18  SpO2:  [90 %-97 %] 94 %  BP: (146-168)/(68-89) 153/76     Weight: 70 kg (154 lb 5.2 oz)  Body mass index is 24.91 kg/m².    Intake/Output Summary (Last  24 hours) at 11/21/2022 0936  Last data filed at 11/20/2022 1559  Gross per 24 hour   Intake 1000 ml   Output --   Net 1000 ml      Physical Exam  Vitals and nursing note reviewed.   Constitutional:       General: She is not in acute distress.     Appearance: Normal appearance. She is not ill-appearing, toxic-appearing or diaphoretic.   HENT:      Head: Normocephalic and atraumatic.      Right Ear: External ear normal.      Left Ear: External ear normal.      Nose: Nose normal. No congestion or rhinorrhea.      Mouth/Throat:      Pharynx: Oropharynx is clear.   Eyes:      Extraocular Movements: Extraocular movements intact.      Conjunctiva/sclera: Conjunctivae normal.      Pupils: Pupils are equal, round, and reactive to light.   Cardiovascular:      Rate and Rhythm: Normal rate and regular rhythm.      Pulses: Normal pulses.      Heart sounds: Normal heart sounds. No murmur heard.  Pulmonary:      Effort: Pulmonary effort is normal. No respiratory distress.      Breath sounds: Normal breath sounds. No wheezing, rhonchi or rales.   Abdominal:      General: Bowel sounds are normal. There is no distension.      Tenderness: There is no abdominal tenderness. There is no guarding or rebound.   Musculoskeletal:         General: Swelling (left knee) and tenderness (left knee) present. Normal range of motion.      Cervical back: Normal range of motion and neck supple.   Skin:     General: Skin is warm and dry.      Capillary Refill: Capillary refill takes less than 2 seconds.      Findings: No rash.   Neurological:      General: No focal deficit present.      Mental Status: She is alert and oriented to person, place, and time. Mental status is at baseline.      Cranial Nerves: No cranial nerve deficit.   Psychiatric:         Mood and Affect: Mood normal.         Behavior: Behavior normal.         Thought Content: Thought content normal.       Significant Labs: All pertinent labs within the past 24 hours have been  reviewed.  Recent Lab Results  (Last 5 results in the past 24 hours)        11/21/22  0619   11/21/22  0303   11/21/22  0109   11/20/22  1157   11/20/22  0959        SED RATE (WESTERGREN)   128             Anion Gap   10             Bands   6             Baso #   0.06             Basophil %   0.2             BUN   6             BUN/CREAT RATIO   11             Calcium   7.3             Chloride   88             CO2   34             Creatinine   0.57             CRP   32.70             Differential Type   Manual             eGFR   108             Eos #   0.15             Eosinophil %   0.5             Glucose   220             Hematocrit   29.8             Hemoglobin   9.8             Hep A IgM         Non-Reactive       Hep B C IgM         Non-Reactive       Hepatitis B Surface Ag         Non-Reactive       Hepatitis C Ab         Non-Reactive       HIV 1/2 Ag/Ab         Non-Reactive       Immature Grans (Abs)   0.42             Immature Granulocytes   1.5             Lymph #   1.29             Lymph %   4.7                6             MCH   27.9             MCHC   32.9             MCV   84.9             Mono #   1.64             Mono %   6.0                4             MPV   9.7             Neutrophils, Abs   23.99             Neutrophils Relative   87.1             nRBC   0.0             NUCLEATED RBC ABSOLUTE   0.00             PLATELET MORPHOLOGY   Large & Giant Platelets             Platelets   415             POC Glucose 164     155   287         Potassium   2.9             RBC   3.51             RBC Morphology   Normal             RDW   14.5             Segmented Neutrophils, Man %   84             Sodium   129             WBC   27.55                                    Significant Imaging: I have reviewed all pertinent imaging results/findings within the past 24 hours.  I have reviewed and interpreted all pertinent imaging results/findings within the past 24 hours.      Assessment/Plan:      * Bacteremia  BC  positive for methicillin resistant Staph Aureus    Repeat BC   Continue IV Zosyn, Vancomycin and Levofloxacin  Continue IV NS at 75cc/h  ID consulted, thanks for the assistance  Will continue to monitor vital signs, physical exam, AM labs and cultures/sensitivity      Lung abscess  CT scan of the abdomen pelvis and chest concerning for abscesses although a neoplastic process can not be entirely excluded.    Pulmonology (Dr. Sylvester) planning for bronchoscopy tomorrow  Continue IV antibiotics  Will continue to monitor vital signs, physical exam AM labs and cultures      Septic arthritis of knee, left  Patient has past history of TKA done by Dr. Noyola.  Arthrocentesis showed WBC 85.000     Orthopedics (Dr. Mendoza) planning for irrigation/debridement tomorrow.  Will continue to monitor vital signs,  physical exam and BMP and cultures    DM (diabetes mellitus)  Blood glucose improving from admission  Last Hb A1C 12.4 (11/9)  Antihyperglycemics (From admission, onward)      Start     Stop Route Frequency Ordered    11/20/22 2100  insulin detemir U-100 injection 20 Units         -- SubQ Nightly 11/20/22 0535    11/20/22 0715  insulin aspart U-100 injection 5 Units         -- SubQ 3 times daily with meals 11/20/22 0535    11/20/22 0634  insulin aspart U-100 injection 1-10 Units         -- SubQ Before meals & nightly PRN 11/20/22 0535          Continue medium correctional scale insulin  Continue IVF  Diabetic educator consulted  Will continue to monitor blood glucose    Depression  Continue home Cymbalta  Continue to monitor         VTE Risk Mitigation (From admission, onward)           Ordered     enoxaparin injection 40 mg  Daily         11/20/22 0519     IP VTE LOW RISK PATIENT  Once         11/20/22 0519     Place sequential compression device  Until discontinued         11/20/22 0519     IP VTE LOW RISK PATIENT  Once         11/20/22 0519                    Discharge Planning   SUZANNA:      Code Status: Full Code   Is  the patient medically ready for discharge?:     Reason for patient still in hospital (select all that apply): Treatment  Discharge Plan A: Home Health, Home with family                  Adama Romano MD  Department of Hospital Medicine   [facility]

## 2022-11-21 NOTE — HPI
52-year-old female presents to the hospital chart talked with right flank pain for 2 weeks and left knee pain.  Recently treated for pyelonephritis with negative cultures.  Patient currently is moist without complaints.  Patient has a history of diabetes.  She continues to smoke

## 2022-11-21 NOTE — CONSULTS
[facility]  Pulmonology  Consult Note    Patient Name: Monica Walker  MRN: [id number]  Admission Date: 11/20/2022  Hospital Length of Stay: 1 days  Code Status: Full Code  Attending Physician: Cecil Chappell MD  Primary Care Provider: Hannah Schulz MD   Principal Problem: Lung abscess    Consults  Subjective:     HPI:  52-year-old female presents to the hospital chart talked with right flank pain for 2 weeks and left knee pain.  Recently treated for pyelonephritis with negative cultures.  Patient currently is moist without complaints.  Patient has a history of diabetes.  She continues to smoke      Past Medical History:   Diagnosis Date    Abscess of right thigh + MRSA 08/20/2021    healed    Adnexal cyst     Degenerative disc disease     Depression 10/29/2021    Hypertension     Nicotine dependence     Osteoarthritis        Past Surgical History:   Procedure Laterality Date    BACK SURGERY      HYSTERECTOMY N/A     TOTAL HIP ARTHROPLASTY Left     TOTAL KNEE ARTHROPLASTY Bilateral        Review of patient's allergies indicates:  No Known Allergies    Family History    None       Tobacco Use    Smoking status: Every Day     Types: Cigarettes    Smokeless tobacco: Current     Types: Snuff   Substance and Sexual Activity    Alcohol use: Never    Drug use: Never    Sexual activity: Not on file         Review of Systems   Constitutional:  Negative for activity change and appetite change.   HENT:  Negative for congestion and dental problem.    Eyes:  Negative for discharge and itching.   Respiratory:  Negative for apnea and chest tightness.    Cardiovascular:  Negative for chest pain and leg swelling.   Gastrointestinal:  Negative for abdominal distention.   Endocrine: Negative for cold intolerance and heat intolerance.   Genitourinary:  Negative for difficulty urinating and dysuria.   Musculoskeletal:  Negative for arthralgias and back pain.   Skin:  Negative for color  change.   Allergic/Immunologic: Negative for environmental allergies.   Neurological:  Negative for dizziness and facial asymmetry.   Hematological:  Negative for adenopathy. Does not bruise/bleed easily.   Psychiatric/Behavioral:  Negative for agitation and behavioral problems.    Objective:     Vital Signs (Most Recent):  Temp: 97.4 °F (36.3 °C) (11/21/22 0400)  Pulse: (!) 117 (11/21/22 0400)  Resp: 20 (11/21/22 0512)  BP: (!) 164/80 (11/21/22 0400)  SpO2: (!) 93 % (11/21/22 0400)   Vital Signs (24h Range):  Temp:  [97.4 °F (36.3 °C)-99.1 °F (37.3 °C)] 97.4 °F (36.3 °C)  Pulse:  [111-117] 117  Resp:  [17-21] 20  SpO2:  [90 %-97 %] 93 %  BP: (146-168)/(68-89) 164/80     Weight: 70 kg (154 lb 5.2 oz)  Body mass index is 24.91 kg/m².      Intake/Output Summary (Last 24 hours) at 11/21/2022 0607  Last data filed at 11/20/2022 1559  Gross per 24 hour   Intake 1000 ml   Output --   Net 1000 ml       Physical Exam  Vitals reviewed.   Constitutional:       Appearance: Normal appearance.      Interventions: She is not intubated.  HENT:      Head: Normocephalic and atraumatic.      Nose: Nose normal.      Mouth/Throat:      Mouth: Mucous membranes are dry.      Pharynx: Oropharynx is clear.   Eyes:      Extraocular Movements: Extraocular movements intact.      Conjunctiva/sclera: Conjunctivae normal.      Pupils: Pupils are equal, round, and reactive to light.   Cardiovascular:      Rate and Rhythm: Normal rate.      Heart sounds: Normal heart sounds. No murmur heard.  Pulmonary:      Effort: Pulmonary effort is normal. She is not intubated.      Breath sounds: Normal breath sounds.   Abdominal:      General: Abdomen is flat. Bowel sounds are normal.      Palpations: Abdomen is soft.   Musculoskeletal:         General: Normal range of motion.      Cervical back: Normal range of motion and neck supple.      Right lower leg: No edema.      Left lower leg: No edema.   Skin:     General: Skin is warm and dry.      Capillary  Refill: Capillary refill takes less than 2 seconds.   Neurological:      General: No focal deficit present.      Mental Status: She is alert and oriented to person, place, and time.   Psychiatric:         Mood and Affect: Mood normal.         Behavior: Behavior normal.       Vents:       Lines/Drains/Airways       Peripheral Intravenous Line  Duration                  Peripheral IV - Single Lumen 11/20/22 0336 20 G Left;Posterior Hand 1 day                    Significant Labs:    CBC/Anemia Profile:  Recent Labs   Lab 11/19/22 2200 11/21/22  0303   WBC 32.45* 27.55*   HGB 11.3* 9.8*   HCT 34.8* 29.8*   * 415*   MCV 85.7 84.9   RDW 14.6* 14.5        Chemistries:  Recent Labs   Lab 11/19/22 2200 11/21/22  0303   * 129*   K 3.8 2.9*   CL 90* 88*   CO2 32 34*   BUN 17 6*   CREATININE 0.78 0.57   CALCIUM 8.3* 7.3*   ALBUMIN 1.2*  --    PROT 7.2  --    BILITOT 0.4  --    ALKPHOS 245*  --    ALT 18  --    AST 26  --    MG 1.5*  --        Recent Lab Results  (Last 5 results in the past 24 hours)        11/21/22  0303   11/21/22  0109   11/20/22  1157   11/20/22  0959   11/20/22  0836        SED RATE (WESTERGREN) 128               Anion Gap 10               Bands 6               Baso # 0.06               Basophil % 0.2               BUN 6               BUN/CREAT RATIO 11               Calcium 7.3               Cell Count Excluding RBCs         85,270       Chloride 88               Clarity, Synovial         Turbid       CO2 34               Color, Synovial         Yellow       Creatinine 0.57               CRP 32.70               Crystals, Synovial         None Seen       Differential Type Manual               eGFR 108               Eos # 0.15               Eosinophil % 0.5               Glucose 220               Hematocrit 29.8               Hemoglobin 9.8               Hep A IgM       Non-Reactive         Hep B C IgM       Non-Reactive         Hepatitis B Surface Ag       Non-Reactive         Hepatitis C Ab        Non-Reactive         HIV 1/2 Ag/Ab       Non-Reactive         Immature Grans (Abs) 0.42               Immature Granulocytes 1.5               Lymph # 1.29               Lymph % 4.7                6               Lymphocytes, Syn Man %         1       MCH 27.9               MCHC 32.9               MCV 84.9               Mono # 1.64               Mono % 6.0                4               Monocytes, Syn Man %         3       MPV 9.7               Neutrophils, Abs 23.99               Neutrophils Relative 87.1               nRBC 0.0               NUCLEATED RBC ABSOLUTE 0.00               PLATELET MORPHOLOGY Large & Giant Platelets               Platelets 415               POC Glucose   155   287           Polys, Syn Man %         96       Potassium 2.9               RBC 3.51               RBC Morphology Normal               RBC, Synovial         8,000       RDW 14.5               Segmented Neutrophils, Man % 84               Sodium 129               WBC 27.55                                      Significant Imaging:   I have reviewed all pertinent imaging results/findings within the past 24 hours.    Assessment/Plan:     * Lung abscess  Patient has bilateral peripheral cavitary lesions.  Patient has a positive culture so these could be infectious certainly this is what staff would look like although I am told that it was Streptococcus I do not see that on the computer.  Emboli can do this check Dopplers but there were no emboli on CT.  Patient is a smoker and you have to consider malignancy all proceed with bronchoscopy in the morning.            Thank you for your consult. I will follow-up with patient. Please contact us if you have any additional questions.     Savage Sylvester MD  Pulmonology  [facility]

## 2022-11-21 NOTE — ASSESSMENT & PLAN NOTE
CT scan of the abdomen pelvis and chest concerning for abscesses although a neoplastic process can not be entirely excluded.    Pulmonology planning for bronchoscopy tomorrow  Continue IV antibiotics  Will continue to monitor vital signs, physical exam AM labs and cultures

## 2022-11-21 NOTE — CARE UPDATE
Patient has aspiration revealed 85,000 white cells.  Patient has a unicompartment knee in place.  Will plan on irrigation debridement in the operating room tomorrow.  Patient has the implant in place.  Will do the irrigation and debridement to get some relief of the knee pain see if we can get this to calm down with IV antibiotics.  If not she will need a revision with removal of the implants and later reimplantation with a total knee arthroplasty.  Will place her on IV antibiotics after cultures were obtained.

## 2022-11-22 ENCOUNTER — ANESTHESIA (OUTPATIENT)
Dept: SURGERY | Facility: HOSPITAL | Age: 52
DRG: 853 | End: 2022-11-22
Payer: MEDICARE

## 2022-11-22 VITALS — RESPIRATION RATE: 10 BRPM

## 2022-11-22 LAB
AFB SMEAR (FA): NORMAL
ANION GAP SERPL CALCULATED.3IONS-SCNC: 3 MMOL/L (ref 7–16)
ANION GAP SERPL CALCULATED.3IONS-SCNC: 7 MMOL/L (ref 7–16)
BASOPHILS # BLD AUTO: 0.07 K/UL (ref 0–0.2)
BASOPHILS # BLD AUTO: 0.08 K/UL (ref 0–0.2)
BASOPHILS NFR BLD AUTO: 0.3 % (ref 0–1)
BASOPHILS NFR BLD AUTO: 0.4 % (ref 0–1)
BUN SERPL-MCNC: 5 MG/DL (ref 7–18)
BUN SERPL-MCNC: 6 MG/DL (ref 7–18)
BUN/CREAT SERPL: 11 (ref 6–20)
BUN/CREAT SERPL: 11 (ref 6–20)
CALCIUM SERPL-MCNC: 7.2 MG/DL (ref 8.5–10.1)
CALCIUM SERPL-MCNC: 8.2 MG/DL (ref 8.5–10.1)
CHLORIDE SERPL-SCNC: 101 MMOL/L (ref 98–107)
CHLORIDE SERPL-SCNC: 98 MMOL/L (ref 98–107)
CLARITY FLD: ABNORMAL
CO2 SERPL-SCNC: 32 MMOL/L (ref 21–32)
CO2 SERPL-SCNC: 35 MMOL/L (ref 21–32)
COLOR FLD: YELLOW
CREAT SERPL-MCNC: 0.44 MG/DL (ref 0.55–1.02)
CREAT SERPL-MCNC: 0.57 MG/DL (ref 0.55–1.02)
DIFFERENTIAL METHOD BLD: ABNORMAL
DIFFERENTIAL METHOD BLD: ABNORMAL
DIRECT PREP: NORMAL
EGFR (NO RACE VARIABLE) (RUSH/TITUS): 108 ML/MIN/1.73M²
EGFR (NO RACE VARIABLE) (RUSH/TITUS): 115 ML/MIN/1.73M²
EOSINOPHIL # BLD AUTO: 0.24 K/UL (ref 0–0.5)
EOSINOPHIL # BLD AUTO: 0.28 K/UL (ref 0–0.5)
EOSINOPHIL NFR BLD AUTO: 1.2 % (ref 1–4)
EOSINOPHIL NFR BLD AUTO: 1.3 % (ref 1–4)
ERYTHROCYTE [DISTWIDTH] IN BLOOD BY AUTOMATED COUNT: 14.6 % (ref 11.5–14.5)
ERYTHROCYTE [DISTWIDTH] IN BLOOD BY AUTOMATED COUNT: 14.8 % (ref 11.5–14.5)
GLUCOSE SERPL-MCNC: 104 MG/DL (ref 70–105)
GLUCOSE SERPL-MCNC: 111 MG/DL (ref 70–105)
GLUCOSE SERPL-MCNC: 134 MG/DL (ref 70–105)
GLUCOSE SERPL-MCNC: 151 MG/DL (ref 74–106)
GLUCOSE SERPL-MCNC: 179 MG/DL (ref 74–106)
GLUCOSE SERPL-MCNC: 60 MG/DL (ref 70–105)
GRANULOCYTES NFR SNV MANUAL: 98 % (ref 0–25)
HCO3 UR-SCNC: 33.9 MMOL/L (ref 21–28)
HCT VFR BLD AUTO: 27.4 % (ref 38–47)
HCT VFR BLD AUTO: 29.9 % (ref 38–47)
HGB BLD-MCNC: 8.7 G/DL (ref 12–16)
HGB BLD-MCNC: 9.6 G/DL (ref 12–16)
IMM GRANULOCYTES # BLD AUTO: 0.22 K/UL (ref 0–0.04)
IMM GRANULOCYTES # BLD AUTO: 0.28 K/UL (ref 0–0.04)
IMM GRANULOCYTES NFR BLD: 1 % (ref 0–0.4)
IMM GRANULOCYTES NFR BLD: 1.4 % (ref 0–0.4)
LYMPHOCYTES # BLD AUTO: 0.84 K/UL (ref 1–4.8)
LYMPHOCYTES # BLD AUTO: 1.29 K/UL (ref 1–4.8)
LYMPHOCYTES NFR BLD AUTO: 4.1 % (ref 27–41)
LYMPHOCYTES NFR BLD AUTO: 5.8 % (ref 27–41)
MCH RBC QN AUTO: 28 PG (ref 27–31)
MCH RBC QN AUTO: 28 PG (ref 27–31)
MCHC RBC AUTO-ENTMCNC: 31.8 G/DL (ref 32–36)
MCHC RBC AUTO-ENTMCNC: 32.1 G/DL (ref 32–36)
MCV RBC AUTO: 87.2 FL (ref 80–96)
MCV RBC AUTO: 88.1 FL (ref 80–96)
MONOCYTES # BLD AUTO: 1.41 K/UL (ref 0–0.8)
MONOCYTES # BLD AUTO: 1.48 K/UL (ref 0–0.8)
MONOCYTES NFR BLD AUTO: 6.7 % (ref 2–6)
MONOCYTES NFR BLD AUTO: 6.8 % (ref 2–6)
MONOCYTES NFR SNV MANUAL: 2 %
MPC BLD CALC-MCNC: 9.1 FL (ref 9.4–12.4)
MPC BLD CALC-MCNC: 9.3 FL (ref 9.4–12.4)
NEUTROPHILS # BLD AUTO: 17.86 K/UL (ref 1.8–7.7)
NEUTROPHILS # BLD AUTO: 18.72 K/UL (ref 1.8–7.7)
NEUTROPHILS NFR BLD AUTO: 84.9 % (ref 53–65)
NEUTROPHILS NFR BLD AUTO: 86.1 % (ref 53–65)
NRBC # BLD AUTO: 0 X10E3/UL
NRBC # BLD AUTO: 0 X10E3/UL
NRBC, AUTO (.00): 0 %
NRBC, AUTO (.00): 0 %
PCO2 BLDA: 56 MMHG (ref 35–48)
PH SMN: 7.39 [PH] (ref 7.35–7.45)
PLATELET # BLD AUTO: 495 K/UL (ref 150–400)
PLATELET # BLD AUTO: 499 K/UL (ref 150–400)
PO2 BLDA: 252 MMHG (ref 83–108)
POC BASE EXCESS: 7.3 MMOL/L (ref -2–3)
POC SATURATED O2: 100 % (ref 95–98)
POTASSIUM SERPL-SCNC: 3.4 MMOL/L (ref 3.5–5.1)
POTASSIUM SERPL-SCNC: 4.1 MMOL/L (ref 3.5–5.1)
RBC # BLD AUTO: 3.11 M/UL (ref 4.2–5.4)
RBC # BLD AUTO: 3.43 M/UL (ref 4.2–5.4)
RBC # SNV MANUAL: ABNORMAL /CUMM
SODIUM SERPL-SCNC: 133 MMOL/L (ref 136–145)
SODIUM SERPL-SCNC: 136 MMOL/L (ref 136–145)
VANCOMYCIN TROUGH SERPL-MCNC: 5.1 ΜG/ML (ref 10–20)
WBC # BLD AUTO: 20.71 K/UL (ref 4.5–11)
WBC # BLD AUTO: 22.06 K/UL (ref 4.5–11)
WBC # SNV MANUAL: ABNORMAL /CUMM

## 2022-11-22 PROCEDURE — 80048 BASIC METABOLIC PNL TOTAL CA: CPT | Performed by: ORTHOPAEDIC SURGERY

## 2022-11-22 PROCEDURE — 20000000 HC ICU ROOM

## 2022-11-22 PROCEDURE — 36000710: Performed by: ORTHOPAEDIC SURGERY

## 2022-11-22 PROCEDURE — 87106 FUNGI IDENTIFICATION YEAST: CPT | Performed by: INTERNAL MEDICINE

## 2022-11-22 PROCEDURE — 87205 SMEAR GRAM STAIN: CPT | Performed by: INTERNAL MEDICINE

## 2022-11-22 PROCEDURE — 87653 STREP B DNA AMP PROBE: CPT | Performed by: ORTHOPAEDIC SURGERY

## 2022-11-22 PROCEDURE — D9220A PRA ANESTHESIA: ICD-10-PCS | Mod: CRNA,,, | Performed by: NURSE ANESTHETIST, CERTIFIED REGISTERED

## 2022-11-22 PROCEDURE — 88108 CYTOPATH CONCENTRATE TECH: CPT | Mod: 26,,, | Performed by: PATHOLOGY

## 2022-11-22 PROCEDURE — 36415 COLL VENOUS BLD VENIPUNCTURE: CPT | Performed by: ORTHOPAEDIC SURGERY

## 2022-11-22 PROCEDURE — 31622 DX BRONCHOSCOPE/WASH: CPT

## 2022-11-22 PROCEDURE — 25000003 PHARM REV CODE 250: Performed by: INTERNAL MEDICINE

## 2022-11-22 PROCEDURE — 25000003 PHARM REV CODE 250: Performed by: ORTHOPAEDIC SURGERY

## 2022-11-22 PROCEDURE — 88305 NON-GYN CYTOLOGY: ICD-10-PCS | Mod: 26,,, | Performed by: PATHOLOGY

## 2022-11-22 PROCEDURE — 27000165 HC TUBE, ETT CUFFED: Performed by: NURSE ANESTHETIST, CERTIFIED REGISTERED

## 2022-11-22 PROCEDURE — 85025 COMPLETE CBC W/AUTO DIFF WBC: CPT

## 2022-11-22 PROCEDURE — 85025 COMPLETE CBC W/AUTO DIFF WBC: CPT | Performed by: ORTHOPAEDIC SURGERY

## 2022-11-22 PROCEDURE — 99231 PR SUBSEQUENT HOSPITAL CARE,LEVL I: ICD-10-PCS | Mod: 25,,, | Performed by: INTERNAL MEDICINE

## 2022-11-22 PROCEDURE — 27000689 HC BLADE LARYNGOSCOPE ANY SIZE: Performed by: NURSE ANESTHETIST, CERTIFIED REGISTERED

## 2022-11-22 PROCEDURE — 94761 N-INVAS EAR/PLS OXIMETRY MLT: CPT

## 2022-11-22 PROCEDURE — 27201142 HC SET RADIAL ARTERY: Performed by: ANESTHESIOLOGY

## 2022-11-22 PROCEDURE — 63600175 PHARM REV CODE 636 W HCPCS: Performed by: GENERAL PRACTICE

## 2022-11-22 PROCEDURE — C1776 JOINT DEVICE (IMPLANTABLE): HCPCS | Performed by: ORTHOPAEDIC SURGERY

## 2022-11-22 PROCEDURE — 94002 VENT MGMT INPAT INIT DAY: CPT

## 2022-11-22 PROCEDURE — 99233 PR SUBSEQUENT HOSPITAL CARE,LEVL III: ICD-10-PCS | Mod: ,,, | Performed by: INTERNAL MEDICINE

## 2022-11-22 PROCEDURE — D9220A PRA ANESTHESIA: ICD-10-PCS | Mod: ANES,,, | Performed by: ANESTHESIOLOGY

## 2022-11-22 PROCEDURE — 25000003 PHARM REV CODE 250

## 2022-11-22 PROCEDURE — 63600175 PHARM REV CODE 636 W HCPCS: Performed by: ORTHOPAEDIC SURGERY

## 2022-11-22 PROCEDURE — 87206 SMEAR FLUORESCENT/ACID STAI: CPT | Performed by: INTERNAL MEDICINE

## 2022-11-22 PROCEDURE — 87015 SPECIMEN INFECT AGNT CONCNTJ: CPT | Performed by: INTERNAL MEDICINE

## 2022-11-22 PROCEDURE — 87070 CULTURE OTHR SPECIMN AEROBIC: CPT | Performed by: ORTHOPAEDIC SURGERY

## 2022-11-22 PROCEDURE — 63600175 PHARM REV CODE 636 W HCPCS: Performed by: NURSE ANESTHETIST, CERTIFIED REGISTERED

## 2022-11-22 PROCEDURE — 88305 TISSUE EXAM BY PATHOLOGIST: CPT | Mod: MCY | Performed by: INTERNAL MEDICINE

## 2022-11-22 PROCEDURE — 82962 GLUCOSE BLOOD TEST: CPT

## 2022-11-22 PROCEDURE — 27000716 HC OXISENSOR PROBE, ANY SIZE: Performed by: NURSE ANESTHETIST, CERTIFIED REGISTERED

## 2022-11-22 PROCEDURE — 27000221 HC OXYGEN, UP TO 24 HOURS

## 2022-11-22 PROCEDURE — D9220A PRA ANESTHESIA: Mod: ANES,,, | Performed by: ANESTHESIOLOGY

## 2022-11-22 PROCEDURE — 82803 BLOOD GASES ANY COMBINATION: CPT

## 2022-11-22 PROCEDURE — 37000009 HC ANESTHESIA EA ADD 15 MINS: Performed by: ORTHOPAEDIC SURGERY

## 2022-11-22 PROCEDURE — P9045 ALBUMIN (HUMAN), 5%, 250 ML: HCPCS | Performed by: NURSE ANESTHETIST, CERTIFIED REGISTERED

## 2022-11-22 PROCEDURE — 88305 TISSUE EXAM BY PATHOLOGIST: CPT | Mod: 26,,, | Performed by: PATHOLOGY

## 2022-11-22 PROCEDURE — 36410 PERIPHERAL IV INSERTION: ICD-10-PCS | Mod: ,,, | Performed by: NURSE ANESTHETIST, CERTIFIED REGISTERED

## 2022-11-22 PROCEDURE — 36410 VNPNXR 3YR/> PHY/QHP DX/THER: CPT | Mod: ,,, | Performed by: NURSE ANESTHETIST, CERTIFIED REGISTERED

## 2022-11-22 PROCEDURE — 87075 CULTR BACTERIA EXCEPT BLOOD: CPT | Performed by: ORTHOPAEDIC SURGERY

## 2022-11-22 PROCEDURE — 25000003 PHARM REV CODE 250: Performed by: NURSE ANESTHETIST, CERTIFIED REGISTERED

## 2022-11-22 PROCEDURE — 27000510 HC BLANKET BAIR HUGGER ANY SIZE: Performed by: NURSE ANESTHETIST, CERTIFIED REGISTERED

## 2022-11-22 PROCEDURE — 89050 BODY FLUID CELL COUNT: CPT | Performed by: ORTHOPAEDIC SURGERY

## 2022-11-22 PROCEDURE — 25000003 PHARM REV CODE 250: Performed by: GENERAL PRACTICE

## 2022-11-22 PROCEDURE — 99900035 HC TECH TIME PER 15 MIN (STAT)

## 2022-11-22 PROCEDURE — 31622 PR BRONCHOSCOPY,DIAGNOSTIC: ICD-10-PCS | Mod: ,,, | Performed by: INTERNAL MEDICINE

## 2022-11-22 PROCEDURE — 63600175 PHARM REV CODE 636 W HCPCS: Performed by: INTERNAL MEDICINE

## 2022-11-22 PROCEDURE — 87102 FUNGUS ISOLATION CULTURE: CPT | Performed by: INTERNAL MEDICINE

## 2022-11-22 PROCEDURE — 80048 BASIC METABOLIC PNL TOTAL CA: CPT | Performed by: HOSPITALIST

## 2022-11-22 PROCEDURE — 37000008 HC ANESTHESIA 1ST 15 MINUTES: Performed by: ORTHOPAEDIC SURGERY

## 2022-11-22 PROCEDURE — 88108 NON-GYN CYTOLOGY: ICD-10-PCS | Mod: 26,,, | Performed by: PATHOLOGY

## 2022-11-22 PROCEDURE — C1713 ANCHOR/SCREW BN/BN,TIS/BN: HCPCS | Performed by: ORTHOPAEDIC SURGERY

## 2022-11-22 PROCEDURE — 99233 SBSQ HOSP IP/OBS HIGH 50: CPT | Mod: ,,, | Performed by: INTERNAL MEDICINE

## 2022-11-22 PROCEDURE — 27000666 HC INFUSOR PRESSURE BAG: Performed by: ANESTHESIOLOGY

## 2022-11-22 PROCEDURE — D9220A PRA ANESTHESIA: Mod: CRNA,,, | Performed by: NURSE ANESTHETIST, CERTIFIED REGISTERED

## 2022-11-22 PROCEDURE — 99231 SBSQ HOSP IP/OBS SF/LOW 25: CPT | Mod: 25,,, | Performed by: INTERNAL MEDICINE

## 2022-11-22 PROCEDURE — 27201423 OPTIME MED/SURG SUP & DEVICES STERILE SUPPLY: Performed by: ORTHOPAEDIC SURGERY

## 2022-11-22 PROCEDURE — 80202 ASSAY OF VANCOMYCIN: CPT | Performed by: INTERNAL MEDICINE

## 2022-11-22 PROCEDURE — 63600175 PHARM REV CODE 636 W HCPCS: Performed by: NURSE PRACTITIONER

## 2022-11-22 PROCEDURE — 36415 COLL VENOUS BLD VENIPUNCTURE: CPT

## 2022-11-22 PROCEDURE — 31622 DX BRONCHOSCOPE/WASH: CPT | Mod: ,,, | Performed by: INTERNAL MEDICINE

## 2022-11-22 PROCEDURE — 25000003 PHARM REV CODE 250: Performed by: NURSE PRACTITIONER

## 2022-11-22 PROCEDURE — 87210 SMEAR WET MOUNT SALINE/INK: CPT | Performed by: INTERNAL MEDICINE

## 2022-11-22 PROCEDURE — 27000655: Performed by: NURSE ANESTHETIST, CERTIFIED REGISTERED

## 2022-11-22 PROCEDURE — 27201074 HC S PRESSURE TRANSDUCER: Performed by: ANESTHESIOLOGY

## 2022-11-22 PROCEDURE — 36000711: Performed by: ORTHOPAEDIC SURGERY

## 2022-11-22 DEVICE — TIBIAL ONLAY INSRT MCK SZ5 8MM: Type: IMPLANTABLE DEVICE | Site: KNEE | Status: FUNCTIONAL

## 2022-11-22 RX ORDER — PROPOFOL 10 MG/ML
VIAL (ML) INTRAVENOUS
Status: DISCONTINUED | OUTPATIENT
Start: 2022-11-22 | End: 2022-11-22

## 2022-11-22 RX ORDER — LIDOCAINE HYDROCHLORIDE 20 MG/ML
INJECTION, SOLUTION EPIDURAL; INFILTRATION; INTRACAUDAL; PERINEURAL
Status: DISCONTINUED | OUTPATIENT
Start: 2022-11-22 | End: 2022-11-22

## 2022-11-22 RX ORDER — MEPERIDINE HYDROCHLORIDE 100 MG/ML
INJECTION INTRAMUSCULAR; INTRAVENOUS; SUBCUTANEOUS
Status: COMPLETED | OUTPATIENT
Start: 2022-11-22 | End: 2022-11-22

## 2022-11-22 RX ORDER — ROCURONIUM BROMIDE 10 MG/ML
INJECTION, SOLUTION INTRAVENOUS
Status: DISCONTINUED | OUTPATIENT
Start: 2022-11-22 | End: 2022-11-22

## 2022-11-22 RX ORDER — SODIUM CHLORIDE 9 MG/ML
75 INJECTION, SOLUTION INTRAVENOUS CONTINUOUS
Status: DISCONTINUED | OUTPATIENT
Start: 2022-11-22 | End: 2022-11-24

## 2022-11-22 RX ORDER — ENOXAPARIN SODIUM 100 MG/ML
30 INJECTION SUBCUTANEOUS EVERY 12 HOURS
Status: DISCONTINUED | OUTPATIENT
Start: 2022-11-23 | End: 2022-12-06 | Stop reason: HOSPADM

## 2022-11-22 RX ORDER — DOCUSATE SODIUM 100 MG/1
100 CAPSULE, LIQUID FILLED ORAL EVERY 12 HOURS
Status: DISCONTINUED | OUTPATIENT
Start: 2022-11-22 | End: 2022-12-06 | Stop reason: HOSPADM

## 2022-11-22 RX ORDER — BISACODYL 10 MG
10 SUPPOSITORY, RECTAL RECTAL DAILY PRN
Status: DISCONTINUED | OUTPATIENT
Start: 2022-11-22 | End: 2022-12-06 | Stop reason: HOSPADM

## 2022-11-22 RX ORDER — LIDOCAINE HYDROCHLORIDE 20 MG/ML
10 SOLUTION OROPHARYNGEAL ONCE
Status: COMPLETED | OUTPATIENT
Start: 2022-11-22 | End: 2022-11-22

## 2022-11-22 RX ORDER — FENTANYL CITRATE 50 UG/ML
INJECTION, SOLUTION INTRAMUSCULAR; INTRAVENOUS
Status: DISCONTINUED | OUTPATIENT
Start: 2022-11-22 | End: 2022-11-22

## 2022-11-22 RX ORDER — PANTOPRAZOLE SODIUM 40 MG/10ML
40 INJECTION, POWDER, LYOPHILIZED, FOR SOLUTION INTRAVENOUS DAILY
Status: DISCONTINUED | OUTPATIENT
Start: 2022-11-23 | End: 2022-11-30

## 2022-11-22 RX ORDER — LABETALOL HYDROCHLORIDE 5 MG/ML
10 INJECTION, SOLUTION INTRAVENOUS EVERY 6 HOURS PRN
Status: DISCONTINUED | OUTPATIENT
Start: 2022-11-22 | End: 2022-12-06 | Stop reason: HOSPADM

## 2022-11-22 RX ORDER — MORPHINE SULFATE 4 MG/ML
4 INJECTION, SOLUTION INTRAMUSCULAR; INTRAVENOUS EVERY 4 HOURS PRN
Status: DISCONTINUED | OUTPATIENT
Start: 2022-11-22 | End: 2022-11-22

## 2022-11-22 RX ORDER — PHENYLEPHRINE HYDROCHLORIDE 10 MG/ML
INJECTION INTRAVENOUS
Status: DISCONTINUED | OUTPATIENT
Start: 2022-11-22 | End: 2022-11-22

## 2022-11-22 RX ORDER — ONDANSETRON 2 MG/ML
INJECTION INTRAMUSCULAR; INTRAVENOUS
Status: DISCONTINUED | OUTPATIENT
Start: 2022-11-22 | End: 2022-11-22

## 2022-11-22 RX ORDER — HYDROCODONE BITARTRATE AND ACETAMINOPHEN 5; 325 MG/1; MG/1
1 TABLET ORAL EVERY 4 HOURS PRN
Status: DISCONTINUED | OUTPATIENT
Start: 2022-11-22 | End: 2022-11-24

## 2022-11-22 RX ORDER — ONDANSETRON 2 MG/ML
4 INJECTION INTRAMUSCULAR; INTRAVENOUS EVERY 8 HOURS PRN
Status: DISCONTINUED | OUTPATIENT
Start: 2022-11-22 | End: 2022-12-06 | Stop reason: HOSPADM

## 2022-11-22 RX ORDER — PROPOFOL 10 MG/ML
0-50 INJECTION, EMULSION INTRAVENOUS CONTINUOUS
Status: DISCONTINUED | OUTPATIENT
Start: 2022-11-22 | End: 2022-11-23

## 2022-11-22 RX ORDER — MORPHINE SULFATE 2 MG/ML
2 INJECTION, SOLUTION INTRAMUSCULAR; INTRAVENOUS
Status: DISCONTINUED | OUTPATIENT
Start: 2022-11-22 | End: 2022-11-26

## 2022-11-22 RX ORDER — MIDAZOLAM HYDROCHLORIDE 1 MG/ML
INJECTION INTRAMUSCULAR; INTRAVENOUS
Status: COMPLETED | OUTPATIENT
Start: 2022-11-22 | End: 2022-11-22

## 2022-11-22 RX ORDER — LIDOCAINE HYDROCHLORIDE 40 MG/ML
SOLUTION TOPICAL
Status: DISCONTINUED | OUTPATIENT
Start: 2022-11-22 | End: 2022-11-22

## 2022-11-22 RX ORDER — POTASSIUM CHLORIDE 20 MEQ/1
40 TABLET, EXTENDED RELEASE ORAL ONCE
Status: CANCELLED | OUTPATIENT
Start: 2022-11-22 | End: 2022-11-22

## 2022-11-22 RX ORDER — ALBUMIN HUMAN 50 G/1000ML
SOLUTION INTRAVENOUS CONTINUOUS PRN
Status: DISCONTINUED | OUTPATIENT
Start: 2022-11-22 | End: 2022-11-22

## 2022-11-22 RX ORDER — LIDOCAINE HYDROCHLORIDE 10 MG/ML
1 INJECTION INFILTRATION; PERINEURAL ONCE
Status: COMPLETED | OUTPATIENT
Start: 2022-11-22 | End: 2022-11-22

## 2022-11-22 RX ADMIN — SODIUM CHLORIDE: 9 INJECTION, SOLUTION INTRAVENOUS at 12:11

## 2022-11-22 RX ADMIN — PROPOFOL 25 MG: 10 INJECTION, EMULSION INTRAVENOUS at 02:11

## 2022-11-22 RX ADMIN — DEXTROSE MONOHYDRATE 12.5 G: 25 INJECTION, SOLUTION INTRAVENOUS at 01:11

## 2022-11-22 RX ADMIN — MIDAZOLAM HYDROCHLORIDE 2 MG: 1 INJECTION, SOLUTION INTRAMUSCULAR; INTRAVENOUS at 07:11

## 2022-11-22 RX ADMIN — DEXTROSE MONOHYDRATE 12.5 G: 25 INJECTION, SOLUTION INTRAVENOUS at 04:11

## 2022-11-22 RX ADMIN — LEVOFLOXACIN 500 MG: 500 TABLET, FILM COATED ORAL at 10:11

## 2022-11-22 RX ADMIN — SODIUM CHLORIDE, POTASSIUM CHLORIDE, SODIUM LACTATE AND CALCIUM CHLORIDE: 600; 310; 30; 20 INJECTION, SOLUTION INTRAVENOUS at 02:11

## 2022-11-22 RX ADMIN — ROCURONIUM BROMIDE 20 MG: 10 INJECTION, SOLUTION INTRAVENOUS at 03:11

## 2022-11-22 RX ADMIN — Medication 25 MG: at 07:11

## 2022-11-22 RX ADMIN — VANCOMYCIN HYDROCHLORIDE 1500 MG: 1 INJECTION, POWDER, LYOPHILIZED, FOR SOLUTION INTRAVENOUS at 02:11

## 2022-11-22 RX ADMIN — ALBUMIN (HUMAN): 12.5 INJECTION, SOLUTION INTRAVENOUS at 02:11

## 2022-11-22 RX ADMIN — POTASSIUM CHLORIDE 40 MEQ: 1500 TABLET, EXTENDED RELEASE ORAL at 10:11

## 2022-11-22 RX ADMIN — FENTANYL CITRATE 100 MCG: 50 INJECTION INTRAMUSCULAR; INTRAVENOUS at 02:11

## 2022-11-22 RX ADMIN — LABETALOL HYDROCHLORIDE 10 MG: 5 INJECTION INTRAVENOUS at 06:11

## 2022-11-22 RX ADMIN — LIDOCAINE HYDROCHLORIDE 1 ML: 10 INJECTION, SOLUTION INFILTRATION; PERINEURAL at 07:11

## 2022-11-22 RX ADMIN — HYDROCORTISONE SODIUM SUCCINATE 50 MG: 100 INJECTION, POWDER, FOR SOLUTION INTRAMUSCULAR; INTRAVENOUS at 04:11

## 2022-11-22 RX ADMIN — TRAZODONE HYDROCHLORIDE 50 MG: 50 TABLET ORAL at 08:11

## 2022-11-22 RX ADMIN — LIDOCAINE HYDROCHLORIDE 10 ML: 20 SOLUTION ORAL at 07:11

## 2022-11-22 RX ADMIN — PHENYLEPHRINE HYDROCHLORIDE 200 MCG: 10 INJECTION INTRAVENOUS at 03:11

## 2022-11-22 RX ADMIN — PHENYLEPHRINE HYDROCHLORIDE 200 MCG: 10 INJECTION INTRAVENOUS at 02:11

## 2022-11-22 RX ADMIN — MORPHINE SULFATE 2 MG: 2 INJECTION, SOLUTION INTRAMUSCULAR; INTRAVENOUS at 09:11

## 2022-11-22 RX ADMIN — CALCIUM CHLORIDE 0.5 G: 100 INJECTION, SOLUTION INTRAVENOUS at 03:11

## 2022-11-22 RX ADMIN — ONDANSETRON 4 MG: 2 INJECTION INTRAMUSCULAR; INTRAVENOUS at 02:11

## 2022-11-22 RX ADMIN — SODIUM CHLORIDE 75 ML/HR: 9 INJECTION, SOLUTION INTRAVENOUS at 05:11

## 2022-11-22 RX ADMIN — OXYCODONE HYDROCHLORIDE 5 MG: 5 TABLET ORAL at 01:11

## 2022-11-22 RX ADMIN — DOCUSATE SODIUM 100 MG: 100 CAPSULE, LIQUID FILLED ORAL at 08:11

## 2022-11-22 RX ADMIN — VANCOMYCIN HYDROCHLORIDE 1500 MG: 1 INJECTION, POWDER, LYOPHILIZED, FOR SOLUTION INTRAVENOUS at 03:11

## 2022-11-22 RX ADMIN — LIDOCAINE HYDROCHLORIDE 120 MG: 40 SOLUTION TOPICAL at 02:11

## 2022-11-22 RX ADMIN — PROPOFOL 5 MCG/KG/MIN: 10 INJECTION, EMULSION INTRAVENOUS at 05:11

## 2022-11-22 RX ADMIN — ROCURONIUM BROMIDE 30 MG: 10 INJECTION, SOLUTION INTRAVENOUS at 04:11

## 2022-11-22 RX ADMIN — MORPHINE SULFATE 4 MG: 4 INJECTION INTRAVENOUS at 05:11

## 2022-11-22 RX ADMIN — LIDOCAINE HYDROCHLORIDE 80 MG: 20 INJECTION, SOLUTION INTRAVENOUS at 02:11

## 2022-11-22 RX ADMIN — MUPIROCIN: 20 OINTMENT TOPICAL at 08:11

## 2022-11-22 RX ADMIN — MUPIROCIN: 20 OINTMENT TOPICAL at 10:11

## 2022-11-22 RX ADMIN — ROCURONIUM BROMIDE 50 MG: 10 INJECTION, SOLUTION INTRAVENOUS at 02:11

## 2022-11-22 RX ADMIN — OXYCODONE HYDROCHLORIDE 5 MG: 5 TABLET ORAL at 10:11

## 2022-11-22 RX ADMIN — DULOXETINE 60 MG: 30 CAPSULE, DELAYED RELEASE ORAL at 10:11

## 2022-11-22 NOTE — PROGRESS NOTES
[facility]  Pulmonology  Progress Note    Patient Name: Monica Walker  MRN: [id number]  Admission Date: 11/20/2022  Hospital Length of Stay: 2 days  Code Status: Full Code  Attending Provider: Cecil Chappell MD  Primary Care Provider: Hannah Schulz MD   Principal Problem: Bacteremia    Subjective:     Interval History:  Patient without complaints this morning    Objective:     Vital Signs (Most Recent):  Temp: 98.3 °F (36.8 °C) (11/21/22 2300)  Pulse: 105 (11/21/22 2300)  Resp: 18 (11/22/22 0137)  BP: (!) 146/72 (11/21/22 2300)  SpO2: 95 % (11/21/22 2300)   Vital Signs (24h Range):  Temp:  [96.4 °F (35.8 °C)-100.6 °F (38.1 °C)] 98.3 °F (36.8 °C)  Pulse:  [] 105  Resp:  [18-20] 18  SpO2:  [89 %-95 %] 95 %  BP: (117-169)/(59-78) 146/72     Weight: 70 kg (154 lb 5.2 oz)  Body mass index is 24.91 kg/m².    No intake or output data in the 24 hours ending 11/22/22 0522    Physical Exam  Vitals reviewed.   Constitutional:       Appearance: Normal appearance.      Interventions: She is not intubated.  HENT:      Head: Normocephalic and atraumatic.      Nose: Nose normal.      Mouth/Throat:      Mouth: Mucous membranes are dry.      Pharynx: Oropharynx is clear.   Eyes:      Extraocular Movements: Extraocular movements intact.      Conjunctiva/sclera: Conjunctivae normal.      Pupils: Pupils are equal, round, and reactive to light.   Cardiovascular:      Rate and Rhythm: Normal rate.      Heart sounds: Normal heart sounds. No murmur heard.  Pulmonary:      Effort: Pulmonary effort is normal. She is not intubated.      Breath sounds: Normal breath sounds.   Abdominal:      General: Abdomen is flat. Bowel sounds are normal.      Palpations: Abdomen is soft.   Musculoskeletal:         General: Normal range of motion.      Cervical back: Normal range of motion and neck supple.      Right lower leg: No edema.      Left lower leg: No edema.   Skin:     General: Skin is warm and dry.       Capillary Refill: Capillary refill takes less than 2 seconds.   Neurological:      General: No focal deficit present.      Mental Status: She is alert and oriented to person, place, and time.   Psychiatric:         Mood and Affect: Mood normal.         Behavior: Behavior normal.       Vents:       Lines/Drains/Airways       Peripheral Intravenous Line  Duration                  Peripheral IV - Single Lumen 11/20/22 0336 20 G Left;Posterior Hand 2 days                    Significant Labs:    CBC/Anemia Profile:  Recent Labs   Lab 11/21/22 0303 11/22/22 0239   WBC 27.55* 22.06*   HGB 9.8* 9.6*   HCT 29.8* 29.9*   * 499*   MCV 84.9 87.2   RDW 14.5 14.6*        Chemistries:  Recent Labs   Lab 11/21/22 0303 11/22/22 0239   * 133*   K 2.9* 3.4*   CL 88* 98   CO2 34* 35*   BUN 6* 6*   CREATININE 0.57 0.57   CALCIUM 7.3* 7.2*       Recent Lab Results  (Last 5 results in the past 24 hours)        11/22/22  0239   11/21/22  1944   11/21/22  1529   11/21/22  1038   11/21/22  1028        Anion Gap 3               Ao root annulus         2.20       Ao peak gaetano         1.6       Ao VTI         26.00       AV valve area         2.29       AORTIC VALVE CUSP SEPERATION         1.81       AV mean gradient         7       AV index (prosthetic)         0.73       AV peak gradient         10       AV Velocity Ratio         0.69       Baso # 0.07               Basophil % 0.3               BSA         1.81       BUN 6               BUN/CREAT RATIO 11               Calcium 7.2               Chloride 98               CO2 35               Creatinine 0.57               Left Ventricle Relative Wall Thickness         0.39       Differential Type Auto               Echo EF Estimated         55       eGFR 108               EF         55       Eos # 0.28               Eosinophil % 1.3               E wave deceleration time         146.00       FS         25       Glucose 179               Gram Stain Result               Group & Rh                Hematocrit 29.9               Hemoglobin 9.6               Immature Grans (Abs) 0.22               Immature Granulocytes 1.0               INDIRECT SHIRLEY               INR               IVC ostium         0.8       IVSd         0.87       LA size         2.90       LVOT area         3.1       LV EDV BP         93.90       LV Diastolic Volume Index         52.46       LVIDd         4.53       LVIDs         3.40       LV mass         130.31       LV Mass Index         73       Left Ventricular Outflow Tract Mean Gradient         3.00       LVOT diameter         2.00       LVOT peak kingsley         1.1       LVOT stroke volume         59.66       LVOT peak VTI         19.00       LV ESV BP         47.40       LV Systolic Volume Index         26.5       Lymph # 1.29               Lymph % 5.8               MCH 28.0               MCHC 32.1               MCV 87.2               Mono # 1.48               Mono % 6.7               MPV 9.3               MV Peak E Kingsley         0.73       Neutrophils, Abs 18.72               Neutrophils Relative 84.9               nRBC 0.0               NUCLEATED RBC ABSOLUTE 0.00               Platelets 499               POC Glucose   205   164   192         Potassium 3.4               Protime               Posterior Wall         0.89       Right Atrial Pressure (from IVC)         3       RA Major Axis         2.90       RBC 3.43               RDW 14.6               RVDD         2.70       Sodium 133               TAPSE         1.90       Vancomycin-Trough 5.1               WBC 22.06                                      Significant Imaging:  I have reviewed all pertinent imaging results/findings within the past 24 hours.    Assessment/Plan:     Lung abscess  Patient has bilateral peripheral cavitary lesions.  Patient has a positive culture so these could be infectious certainly this is what staff would look like although I am told that it was Streptococcus I do not see that on the  computer.  Emboli can do this check Dopplers but there were no emboli on CT.  Patient is a smoker and you have to consider malignancy. bronchoscopy this morning                  Savage Sylvester MD  Pulmonology  [facility]

## 2022-11-22 NOTE — TRANSFER OF CARE
"Anesthesia Transfer of Care Note    Patient: Monica Walker    Procedure(s) Performed: Procedure(s) (LRB):  IRRIGATION AND DEBRIDEMENT, LOWER EXTREMITY (Left)  IRRIGATION AND DEBRIDEMENT, UPPER EXTREMITY (Left)    Patient location: ICU    Anesthesia Type: general    Transport from OR: Upon arrival to PACU/ICU, patient attached to ventilator and auscultated to confirm bilateral breath sounds and adequate TV. Transported from OR intubated on 100% O2 by AMBU with adequate controlled ventilation. Continuous SpO2 monitoring in transport. Continuous ECG monitoring in transport. Continuos invasive BP monitoring in transport    Post pain: adequate analgesia    Post assessment: no apparent anesthetic complications    Post vital signs: stable    Level of consciousness: sedated    Nausea/Vomiting: no nausea/vomiting    Complications: none    Transfer of care protocol was followedComments: To ICU 12, connected to Vent, settings deferred to RT at bedside, report to RT and receiving RN.  VSS, NAD noted.  Offered time and opportunity for any questions.  Receiving staff voiced appreciation.      Last vitals:   Visit Vitals  BP (!) 164/85 (BP Location: Right arm, Patient Position: Lying)   Pulse 90   Temp 36.9 °C (98.5 °F)   Resp 13   Ht 5' 6" (1.676 m)   Wt 70 kg (154 lb 5.2 oz)   SpO2 100%   Breastfeeding No   BMI 24.91 kg/m²     "

## 2022-11-22 NOTE — CARE UPDATE
Complaining left shoulder pain today.  She has a fusion left shoulder.  MRI shows large effusion she does have rotator cuff tear.  I obtained consent from the patient aspirated off proximally 2-3 cc of cloudy purulent material.  I will send this for culture and cell count.  Plan on doing an I and D of her left knee I will also do an irrigation debridement arthroscopically of her left shoulder while in operating room.  I will not plan on doing a rotator cuff repair her MRI shows a cuff tear but in the face of infection mobile not put hardware.  Plan irrigation debridement later today.  Continue antibiotics.  Awaiting culture results.  At this time with her history of MRSA will treat empirically for that at this time.

## 2022-11-22 NOTE — PROGRESS NOTES
Vancomycin trough subtherapeutic @5.1.     Will change regimen to 1500mg every 12 hours.    Pharmacy will continue to monitor and make adjustments as needed.    Tammy Young, PharmD  [id number]

## 2022-11-22 NOTE — ANESTHESIA PROCEDURE NOTES
Peripheral IV Insertion    Diagnosis: I87.9 Disorder of vein, unspecified.    Patient location during procedure: OR  Procedure start time: 11/22/2022 3:00 PM  Timeout: 11/22/2022 3:00 PM  Procedure end time: 11/22/2022 3:03 PM    Staffing  Authorizing Provider: Haroldo Aldridge MD  Performing Provider: Adams Anne CRNA    Anesthesiologist was present at the time of the procedure.    Preanesthetic Checklist  Completed: IV checkedPeripheral IV Insertion  Skin Prep: alcohol swabs  Orientation: right  Location: forearm  Catheter Type: peripheral IV (single lumen)  Catheter Size: 20 G  Catheter placement by Anatomical landmarks. Heme positive aspiration all ports. Insertion Attempts: 1  Assessment  Dressing: secured with tape and tegaderm  Patient: Tolerated wellLine did not flush easily

## 2022-11-22 NOTE — ASSESSMENT & PLAN NOTE
Patient has bilateral peripheral cavitary lesions.  Patient has a positive culture so these could be infectious certainly this is what staff would look like although I am told that it was Streptococcus I do not see that on the computer.  Emboli can do this check Dopplers but there were no emboli on CT.  Patient is a smoker and you have to consider malignancy. bronchoscopy this morning

## 2022-11-22 NOTE — SUBJECTIVE & OBJECTIVE
Interval History:  Patient without complaints this morning    Objective:     Vital Signs (Most Recent):  Temp: 98.3 °F (36.8 °C) (11/21/22 2300)  Pulse: 105 (11/21/22 2300)  Resp: 18 (11/22/22 0137)  BP: (!) 146/72 (11/21/22 2300)  SpO2: 95 % (11/21/22 2300)   Vital Signs (24h Range):  Temp:  [96.4 °F (35.8 °C)-100.6 °F (38.1 °C)] 98.3 °F (36.8 °C)  Pulse:  [] 105  Resp:  [18-20] 18  SpO2:  [89 %-95 %] 95 %  BP: (117-169)/(59-78) 146/72     Weight: 70 kg (154 lb 5.2 oz)  Body mass index is 24.91 kg/m².    No intake or output data in the 24 hours ending 11/22/22 0522    Physical Exam  Vitals reviewed.   Constitutional:       Appearance: Normal appearance.      Interventions: She is not intubated.  HENT:      Head: Normocephalic and atraumatic.      Nose: Nose normal.      Mouth/Throat:      Mouth: Mucous membranes are dry.      Pharynx: Oropharynx is clear.   Eyes:      Extraocular Movements: Extraocular movements intact.      Conjunctiva/sclera: Conjunctivae normal.      Pupils: Pupils are equal, round, and reactive to light.   Cardiovascular:      Rate and Rhythm: Normal rate.      Heart sounds: Normal heart sounds. No murmur heard.  Pulmonary:      Effort: Pulmonary effort is normal. She is not intubated.      Breath sounds: Normal breath sounds.   Abdominal:      General: Abdomen is flat. Bowel sounds are normal.      Palpations: Abdomen is soft.   Musculoskeletal:         General: Normal range of motion.      Cervical back: Normal range of motion and neck supple.      Right lower leg: No edema.      Left lower leg: No edema.   Skin:     General: Skin is warm and dry.      Capillary Refill: Capillary refill takes less than 2 seconds.   Neurological:      General: No focal deficit present.      Mental Status: She is alert and oriented to person, place, and time.   Psychiatric:         Mood and Affect: Mood normal.         Behavior: Behavior normal.       Vents:       Lines/Drains/Airways       Peripheral  Intravenous Line  Duration                  Peripheral IV - Single Lumen 11/20/22 0336 20 G Left;Posterior Hand 2 days                    Significant Labs:    CBC/Anemia Profile:  Recent Labs   Lab 11/21/22 0303 11/22/22 0239   WBC 27.55* 22.06*   HGB 9.8* 9.6*   HCT 29.8* 29.9*   * 499*   MCV 84.9 87.2   RDW 14.5 14.6*        Chemistries:  Recent Labs   Lab 11/21/22 0303 11/22/22 0239   * 133*   K 2.9* 3.4*   CL 88* 98   CO2 34* 35*   BUN 6* 6*   CREATININE 0.57 0.57   CALCIUM 7.3* 7.2*       Recent Lab Results  (Last 5 results in the past 24 hours)        11/22/22 0239 11/21/22  1944   11/21/22  1529   11/21/22  1038   11/21/22  1028        Anion Gap 3               Ao root annulus         2.20       Ao peak gaetano         1.6       Ao VTI         26.00       AV valve area         2.29       AORTIC VALVE CUSP SEPERATION         1.81       AV mean gradient         7       AV index (prosthetic)         0.73       AV peak gradient         10       AV Velocity Ratio         0.69       Baso # 0.07               Basophil % 0.3               BSA         1.81       BUN 6               BUN/CREAT RATIO 11               Calcium 7.2               Chloride 98               CO2 35               Creatinine 0.57               Left Ventricle Relative Wall Thickness         0.39       Differential Type Auto               Echo EF Estimated         55       eGFR 108               EF         55       Eos # 0.28               Eosinophil % 1.3               E wave deceleration time         146.00       FS         25       Glucose 179               Gram Stain Result               Group & Rh               Hematocrit 29.9               Hemoglobin 9.6               Immature Grans (Abs) 0.22               Immature Granulocytes 1.0               INDIRECT SHIRLEY               INR               IVC ostium         0.8       IVSd         0.87       LA size         2.90       LVOT area         3.1       LV EDV BP         93.90        LV Diastolic Volume Index         52.46       LVIDd         4.53       LVIDs         3.40       LV mass         130.31       LV Mass Index         73       Left Ventricular Outflow Tract Mean Gradient         3.00       LVOT diameter         2.00       LVOT peak kingsley         1.1       LVOT stroke volume         59.66       LVOT peak VTI         19.00       LV ESV BP         47.40       LV Systolic Volume Index         26.5       Lymph # 1.29               Lymph % 5.8               MCH 28.0               MCHC 32.1               MCV 87.2               Mono # 1.48               Mono % 6.7               MPV 9.3               MV Peak E Kingsley         0.73       Neutrophils, Abs 18.72               Neutrophils Relative 84.9               nRBC 0.0               NUCLEATED RBC ABSOLUTE 0.00               Platelets 499               POC Glucose   205   164   192         Potassium 3.4               Protime               Posterior Wall         0.89       Right Atrial Pressure (from IVC)         3       RA Major Axis         2.90       RBC 3.43               RDW 14.6               RVDD         2.70       Sodium 133               TAPSE         1.90       Vancomycin-Trough 5.1               WBC 22.06                                      Significant Imaging:  I have reviewed all pertinent imaging results/findings within the past 24 hours.

## 2022-11-22 NOTE — OP NOTE
[facility] ICU  General Surgery  Operative Note    SUMMARY     Date of Procedure: 11/22/2022     Procedure: Procedure(s) (LRB):  IRRIGATION AND DEBRIDEMENT, LOWER EXTREMITY (Left)  IRRIGATION AND DEBRIDEMENT, UPPER EXTREMITY (Left)       Surgeon(s) and Role:     * Papa Mendoza MD - Primary    Assisting Surgeon: None    Pre-Operative Diagnosis: Septic joint of left knee joint [M00.9]    Post-Operative Diagnosis: Post-Op Diagnosis Codes:     * Septic joint of left knee joint [M00.9]     * Septic joint of left shoulder region [M00.9]    Anesthesia: GETA    Operative Findings (including complications, if any):  After risks and benefits of procedure explained at length patient family patient stating she understands the risks and benefits written informed consent was obtained patient was taken operating room placed in supine position on operative table anesthesia induced per Anesthesia.  She was then positioned beach chair position left upper extremity prepped draped sterile fashion.  Posterior anterior portals for shoulder arthroscopy marked on the skin at this time posterior portal made with a 11. Blade placing it 1 cm inferior 1 cm medial to the posterolateral corner the left acromion.  Blunt trocar used to introduce the cannula into the shoulder joint.  Camera introduced prior to placing the camera with cannula in place fluid was cultured.  Camera was placed in shoulder inflated with saline there was noted be a lot of turbid fluid large amount of synovitis noted.  There was large rotator cuff tear this was did not appear acute.  At this time no glenohumeral arthritis noted.  Biceps tendon appeared intact there was some fraying of the superior labrum the rotator cuff tear was noted there was large amount of synovitis.  An anterior portal was made probe placed in supraspinatus and infraspinatus tendon tear noted subscapularis tendon intact biceps anchor was intact biceps was intact there was some  fraying of the superior labrum type 1 slap tear anterior-inferior labrum intact at this time shaver placed in debridement of synovitis as well as the superior labrum and the rotator cuff were performed through the arthroscope.  The shoulder was flushed with copious amounts normal saline through the arthroscope once this was completed a medium Hemovac drain was placed in the shoulder joint all the instruments been removed the shoulder portals were closed with 4-0 nylon suture.  Sterile occlusive dressing placed.  Patient was next positioned supine on the table and left lower extremities prepped draped sterile fashion after tourniquet was placed over the proximal thigh.  Leg was elevated flexed and tourniquet inflated 250 mm Hg.  This time going through the previous medial incision on the knee on the anterior medial aspect the skin was opened with 15. Blade for approximately 10 cm.  Medial parapatellar arthrotomy was then performed.  And there was large amount of purulent fluid in the knee joint this was cultured.  There was noted to be a small area of fluid collection over the anterior medial aspect of the knee near the pes anserine insertion.  This was also debrided culture sent.  At this time the polyethylene was removed from the medial unicompartmental arthroplasty.  Knee was thoroughly debrided using a curette and rongeur.  Wound was then irrigated out copious amounts normal saline.  Then had a mixture of saline peroxide and Betadine placed in allowed to sit for 5 minutes.  This was then flushed out normal saline.  A bag of Bactisure was then irrigated into the knee.  It was once again irrigated out with copious amounts normal saline.  The polyethylene was then reimplanted an 8 x 5 for the restore his unicompartmental knee was then locked into position on the tibia.  Antibiotic dissolvable beads were placed into the knee 2 medium Hemovac drains placed in the knee and the medial arthrotomy was then repaired with  interrupted 1. PDS suture subcuticular 2-0 Monocryl followed by skin staples.  Tourniquet limb been let down prior to wound closure.  Hemostasis maintained Bovie electrocautery.  All counts correct.  There were no complications.  Patient was less than left intubated and taken to the intensive care unit.    Description of Technical Procedures:     Significant Surgical Tasks Conducted by the Assistant(s), if Applicable:     Estimated Blood Loss (EBL): 50 mL           Implants:   Implant Name Type Inv. Item Serial No.  Lot No. LRB No. Used Action   [employer] SELECT BEADS     [id number] Left 1 Implanted   TIBIAL ONLAY INSRT MCK SZ5 8MM - [id number]  TIBIAL ONLAY INSRT MCK SZ5 8MM  [facility] SURGICAL [id number] Left 1 Implanted       Specimens:   Specimen (24h ago, onward)       Start     Ordered    11/22/22 0752  Non-Gyn Cytology  RELEASE UPON ORDERING        Question:  Release to patient  Answer:  Immediate    11/22/22 1582                            Condition: Good    Disposition: ICU - intubated and hemodynamically stable.    Attestation: I was present and scrubbed for the entire procedure.

## 2022-11-22 NOTE — ANESTHESIA PREPROCEDURE EVALUATION
11/22/2022  Monica Walker is a 52 y.o., female.      Pre-op Assessment    I have reviewed the Patient Summary Reports.     I have reviewed the Nursing Notes. I have reviewed the NPO Status.   I have reviewed the Medications.     Review of Systems  Anesthesia Hx:  No problems with previous Anesthesia  Denies Family Hx of Anesthesia complications.   Denies Personal Hx of Anesthesia complications.   Social:  Smoker, No Alcohol Use  Tobacco Use:  of cigarette Illicit Drug Use: Types of drugs include Methamphetamines,   Cardiovascular:   Exercise tolerance: poor Hypertension ECG has been reviewed.    Pulmonary:   Shortness of breath    Musculoskeletal:   Arthritis     Endocrine:   Diabetes, poorly controlled    Psych:   Psychiatric History          Physical Exam  General: Well nourished, Cooperative and Alert    Airway:  Mallampati: II   Mouth Opening: Normal  TM Distance: Normal  Tongue: Normal  Neck ROM: Normal ROM    Dental:  Intact    Chest/Lungs:  Clear to auscultation, Normal Respiratory Rate    Heart:  Rate: Normal  Rhythm: Regular Rhythm        Chemistry        Component Value Date/Time     (L) 11/22/2022 0239    K 3.4 (L) 11/22/2022 0239    CL 98 11/22/2022 0239    CO2 35 (H) 11/22/2022 0239    BUN 6 (L) 11/22/2022 0239    CREATININE 0.57 11/22/2022 0239     (H) 11/22/2022 0239        Component Value Date/Time    CALCIUM 7.2 (L) 11/22/2022 0239    ALKPHOS 245 (H) 11/19/2022 2200    AST 26 11/19/2022 2200    ALT 18 11/19/2022 2200    BILITOT 0.4 11/19/2022 2200    EGFRNONAA 60 08/07/2022 1547        Lab Results   Component Value Date    WBC 22.06 (H) 11/22/2022    RBC 3.43 (L) 11/22/2022    HGB 9.6 (L) 11/22/2022    MCV 87.2 11/22/2022    MCH 28.0 11/22/2022    MCHC 32.1 11/22/2022    RDW 14.6 (H) 11/22/2022     (H) 11/22/2022    MPV 9.3 (L) 11/22/2022    LYMPH 5.8 (L) 11/22/2022     LYMPH 1.29 11/22/2022    MONO 6.7 (H) 11/22/2022    EOS 0.28 11/22/2022    BASO 0.07 11/22/2022     Results for orders placed or performed during the hospital encounter of 11/20/22   EKG 12-lead    Collection Time: 11/20/22 12:42 PM    Narrative    Test Reason : R00.0,    Vent. Rate : 123 BPM     Atrial Rate : 123 BPM     P-R Int : 140 ms          QRS Dur : 078 ms      QT Int : 360 ms       P-R-T Axes : 061 059 030 degrees     QTc Int : 515 ms    Sinus tachycardia  Nonspecific ST and T wave abnormality  Abnormal ECG  When compared with ECG of 12-NOV-2022 04:44,  Vent. rate has increased BY  42 BPM  Nonspecific T wave abnormality now evident in Anterior leads    Referred By: GINA VIERA           Confirmed By:      TTE 11/21/22  Summary     The left ventricle is normal in size with normal systolic function.   The estimated ejection fraction is 55%.   Normal right ventricular size with normal right ventricular systolic function.   Mild mitral regurgitation.   Normal central venous pressure (3 mmHg).   No obvious vegetation noted on aortic or mitral leaflet; cannot exclude a small vegetation. Consider MONICA if clinical suspicion remains high for endocarditits           Anesthesia Plan  Type of Anesthesia, risks & benefits discussed:    Anesthesia Type: Gen Supraglottic Airway  Intra-op Monitoring Plan: Standard ASA Monitors  Post Op Pain Control Plan: multimodal analgesia  Induction:  IV  Airway Plan: Direct, Post-Induction  Informed Consent: Informed consent signed with the Patient and all parties understand the risks and agree with anesthesia plan.  All questions answered.   ASA Score: 3  Day of Surgery Review of History & Physical: H&P Update referred to the surgeon/provider.I have interviewed and examined the patient. I have reviewed the patient's H&P dated: There are no significant changes.   Anesthesia Plan Notes: 52yF with h/o MRSA, bilateral lung abscesses, multiple joint effusions with infectious material -  patient on IV Vancomycin scheduled for washouts.    Bacteremia vs sepsis - BP stable with mild tachycardia    Ready For Surgery From Anesthesia Perspective.     .

## 2022-11-22 NOTE — PT/OT/SLP PROGRESS
Physical Therapy      Patient Name:  Monica Walker   MRN:  [id number]    Patient not seen today secondary to Off the floor for procedure/surgery (Pt off the floor for knee and shoulder I&D). Will follow-up 11/23/22.

## 2022-11-22 NOTE — PLAN OF CARE
LTAC consult acknowledged. SW will speak with pt post operation, on tomorrow to discuss LTAC placement. LTAC likely will be arranged for next week. SS following for further dc planning.

## 2022-11-22 NOTE — PROGRESS NOTES
[facility]  [facility] Medicine  Progress Note    Patient Name: Monica Walker  MRN: [id number]  Patient Class: IP- Inpatient   Admission Date: 11/20/2022  Length of Stay: 2 days  Attending Physician: Cecil Chappell MD  Primary Care Provider: Hannah Schulz MD        Subjective:     Principal Problem:Bacteremia        HPI:  Patient is a 53 yo female who presents to [facility] transferred from [facility] c/o L knee and R flank pain x 2 weeks. R flank pain is 10/10, squeezing in quality, without radiation, no aggravating or relieving factors. L knee pain started after a fall, which she is unable to recall date of the injury. The knee pain is 10/10, located in the medial joint line, aggravated by movement and weight-bearing, squeezing in quality, radiates down to L foot and without relieving factors including heat and ice. Patient has a history of type 2 diabetes, poorly controlled.    10 days ago patient was admitted at [facility] and treated for pyelonephritis on the left side with IV antibiotics although her urine culture did not grow any bacteria. 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 Today a CT scan of the abdomen pelvis and chest with IV contrast indicates bilateral peripherally dense cavitary lesions left lower lobe and right middle lobe bilateral pleural effusions left greater than right with possible loculation these areas are concerning for abscesses although a neoplastic process can not be entirely excluded. Abnormal appearance of the left kidney concerning for pyelonephritis. Patient was transferred to [facility] for further medical evaluation and management.         Interval History: Patient seen and evaluated during AM rounds. Patient c/o left knee and left shoulder pain. Orthopedics planning  irrigation/debridement of knee and shoulder today. Pulmonology onboard for bronchoscopy. Cardiology planning for MONICA tomorrow. Will keep NPO. Continuing with IV Vancomycin and Levofloxacin. Repeat blood cultures growing Gram positive cocci. ID onboard recommended repeat BC in 48 hours. Will continue to monitor patient.    Review of Systems   Constitutional:  Positive for activity change and appetite change. Negative for chills and fever.   HENT:  Negative for hearing loss, nosebleeds, rhinorrhea, sinus pressure, sneezing, sore throat and trouble swallowing.    Eyes:  Negative for photophobia, pain, discharge, itching and visual disturbance.   Respiratory:  Negative for apnea, cough, chest tightness and shortness of breath.    Cardiovascular:  Negative for chest pain, palpitations and leg swelling.   Gastrointestinal:  Negative for abdominal distention, abdominal pain, blood in stool, constipation, diarrhea, nausea and vomiting.   Endocrine: Negative for polydipsia, polyphagia and polyuria.   Genitourinary:  Negative for difficulty urinating, dysuria, frequency, hematuria and urgency. Vaginal bleeding: left knee.  Musculoskeletal:  Positive for arthralgias (left knee and left shoulder), back pain and joint swelling (left knee). Negative for myalgias.   Skin:  Negative for rash.   Neurological:  Negative for dizziness, tremors, seizures, syncope, light-headedness, numbness and headaches.   Psychiatric/Behavioral:  Negative for agitation, confusion, hallucinations and sleep disturbance. The patient is not nervous/anxious.    Objective:     Vital Signs (Most Recent):  Temp: 98.5 °F (36.9 °C) (11/22/22 0400)  Pulse: (!) 114 (11/22/22 0821)  Resp: 17 (11/22/22 1027)  BP: (!) 113/53 (11/22/22 0819)  SpO2: (!) 94 % (11/22/22 0821)   Vital Signs (24h Range):  Temp:  [96.4 °F (35.8 °C)-100.6 °F (38.1 °C)] 98.5 °F (36.9 °C)  Pulse:  [] 114  Resp:  [11-34] 17  SpO2:  [89 %-97 %] 94 %  BP: ()/() 113/53      Weight: 70 kg (154 lb 5.2 oz)  Body mass index is 24.91 kg/m².    Intake/Output Summary (Last 24 hours) at 11/22/2022 1048  Last data filed at 11/22/2022 0814  Gross per 24 hour   Intake 0 ml   Output --   Net 0 ml      Physical Exam  Vitals and nursing note reviewed.   Constitutional:       General: She is not in acute distress.     Appearance: Normal appearance. She is not ill-appearing, toxic-appearing or diaphoretic.   HENT:      Head: Normocephalic and atraumatic.      Right Ear: External ear normal.      Left Ear: External ear normal.      Nose: Nose normal. No congestion or rhinorrhea.      Mouth/Throat:      Pharynx: Oropharynx is clear.   Eyes:      Extraocular Movements: Extraocular movements intact.      Conjunctiva/sclera: Conjunctivae normal.      Pupils: Pupils are equal, round, and reactive to light.   Cardiovascular:      Rate and Rhythm: Normal rate and regular rhythm.      Pulses: Normal pulses.      Heart sounds: Normal heart sounds. No murmur heard.  Pulmonary:      Effort: Pulmonary effort is normal. No respiratory distress.      Breath sounds: Normal breath sounds. No wheezing, rhonchi or rales.   Abdominal:      General: Bowel sounds are normal. There is no distension.      Tenderness: There is no abdominal tenderness. There is no guarding or rebound.   Musculoskeletal:         General: Swelling (left knee) and tenderness (left knee and left shoulder) present. Normal range of motion.      Cervical back: Normal range of motion and neck supple.   Skin:     General: Skin is warm and dry.      Capillary Refill: Capillary refill takes less than 2 seconds.      Findings: No rash.   Neurological:      General: No focal deficit present.      Mental Status: She is alert and oriented to person, place, and time. Mental status is at baseline.      Cranial Nerves: No cranial nerve deficit.   Psychiatric:         Mood and Affect: Mood normal.         Behavior: Behavior normal.         Thought Content:  Thought content normal.       Significant Labs: All pertinent labs within the past 24 hours have been reviewed.    Significant Imaging: I have reviewed all pertinent imaging results/findings within the past 24 hours.      Assessment/Plan:      * Bacteremia  BC positive for methicillin resistant Staph Aureus. Repeated BC growing Repeat blood cultures growing Gram positive cocci.    Pulmonology onboard for bronchoscopy  Orthopedics onboard planning for left knee and left shoulder irrigation/debridement   ID onboard recommending stop Zosyn, continue IV Vancomycin and Levofloxacin and repeat BC in 48H  Cardiology planning for MONICA tomorrow to r/u endocarditis  IV NS at 150cc/h  Will continue to monitor vital signs, physical exam, AM labs and cultures/sensitivity      Lung abscess  CT scan of the abdomen pelvis and chest concerning for abscesses although a neoplastic process can not be entirely excluded.    Pulmonology onboard for bronchoscopy   Continue IV antibiotics  Will continue to monitor vital signs, physical exam AM labs and cultures            Septic arthritis  Patient has past history of left TKA. Arthrocentesis showed WBC 85.000   Arthrocentesis of left shoulder performed pending synovial studies per Ortho    Orthopedics (Dr. Mendoza) planning irrigation/debridement of left knee and left shoulder today.  Continue IV antibiotics  Will continue to monitor vital signs,  physical exam and BMP and cultures    DM (diabetes mellitus)  Blood glucose improving from admission  Last Hb A1C 12.4 (11/9)  Antihyperglycemics (From admission, onward)    Start     Stop Route Frequency Ordered    11/20/22 2100  insulin detemir U-100 injection 20 Units         -- SubQ Nightly 11/20/22 0535    11/20/22 0715  insulin aspart U-100 injection 5 Units         -- SubQ 3 times daily with meals 11/20/22 0535    11/20/22 0634  insulin aspart U-100 injection 1-10 Units         -- SubQ Before meals & nightly PRN 11/20/22 0535        Continue  medium correctional scale insulin  Continue IVF  Diabetic educator consulted  Will continue to monitor blood glucose    Depression  Continue home Cymbalta  Continue to monitor         VTE Risk Mitigation (From admission, onward)         Ordered     enoxaparin injection 40 mg  Daily         11/20/22 0519     IP VTE LOW RISK PATIENT  Once         11/20/22 0519     Place sequential compression device  Until discontinued         11/20/22 0519     IP VTE LOW RISK PATIENT  Once         11/20/22 0519                Discharge Planning   SUZANNA:      Code Status: Full Code   Is the patient medically ready for discharge?:     Reason for patient still in hospital (select all that apply): Treatment  Discharge Plan A: Home Health, Home with family                  Adama Romano MD  Department of Hospital Medicine   [facility]

## 2022-11-22 NOTE — ASSESSMENT & PLAN NOTE
CT scan of the abdomen pelvis and chest concerning for abscesses although a neoplastic process can not be entirely excluded.    Pulmonology onboard for bronchoscopy   Continue IV antibiotics  Will continue to monitor vital signs, physical exam AM labs and cultures

## 2022-11-22 NOTE — NURSING
1918 Patient back from MRI to room 467 via wheelchair and nurse assist X1. Pt is alert and oriented. Pt states she is having pain and would like pain medication. See MAR for further details. Pt assisted to bed. HOB elevated 35-40 degrees, side rails up X2, bed in lowest position, call bell in reach and family member at bedside. Telemetry placed back on patient at this time. Safety measures ongoing.

## 2022-11-22 NOTE — PT/OT/SLP PROGRESS
Occupational Therapy      Patient Name:  Monica Walker   MRN:  [id number]    Patient not seen today secondary to Off the floor for procedure/surgery (pt gone for bronchoscopy AM and left shoulder/left knee irrigation and debridement PM). Will follow-up 11/23/22.    11/22/2022

## 2022-11-22 NOTE — SUBJECTIVE & OBJECTIVE
Interval History: Patient seen and evaluated during AM rounds. Patient c/o left knee and left shoulder pain. Orthopedics planning irrigation/debridement of knee and shoulder today. Pulmonology onboard for bronchoscopy. Cardiology planning for MONICA tomorrow. Will keep NPO. Continuing with IV Vancomycin and Levofloxacin. Repeat blood cultures growing Gram positive cocci. ID onboard recommended repeat BC in 48 hours. Will continue to monitor patient.    Review of Systems   Constitutional:  Positive for activity change and appetite change. Negative for chills and fever.   HENT:  Negative for hearing loss, nosebleeds, rhinorrhea, sinus pressure, sneezing, sore throat and trouble swallowing.    Eyes:  Negative for photophobia, pain, discharge, itching and visual disturbance.   Respiratory:  Negative for apnea, cough, chest tightness and shortness of breath.    Cardiovascular:  Negative for chest pain, palpitations and leg swelling.   Gastrointestinal:  Negative for abdominal distention, abdominal pain, blood in stool, constipation, diarrhea, nausea and vomiting.   Endocrine: Negative for polydipsia, polyphagia and polyuria.   Genitourinary:  Negative for difficulty urinating, dysuria, frequency, hematuria and urgency. Vaginal bleeding: left knee.  Musculoskeletal:  Positive for arthralgias (left knee and left shoulder), back pain and joint swelling (left knee). Negative for myalgias.   Skin:  Negative for rash.   Neurological:  Negative for dizziness, tremors, seizures, syncope, light-headedness, numbness and headaches.   Psychiatric/Behavioral:  Negative for agitation, confusion, hallucinations and sleep disturbance. The patient is not nervous/anxious.    Objective:     Vital Signs (Most Recent):  Temp: 98.5 °F (36.9 °C) (11/22/22 0400)  Pulse: (!) 114 (11/22/22 0821)  Resp: 17 (11/22/22 1027)  BP: (!) 113/53 (11/22/22 0819)  SpO2: (!) 94 % (11/22/22 0821)   Vital Signs (24h Range):  Temp:  [96.4 °F (35.8 °C)-100.6 °F  (38.1 °C)] 98.5 °F (36.9 °C)  Pulse:  [] 114  Resp:  [11-34] 17  SpO2:  [89 %-97 %] 94 %  BP: ()/() 113/53     Weight: 70 kg (154 lb 5.2 oz)  Body mass index is 24.91 kg/m².    Intake/Output Summary (Last 24 hours) at 11/22/2022 1048  Last data filed at 11/22/2022 0814  Gross per 24 hour   Intake 0 ml   Output --   Net 0 ml      Physical Exam  Vitals and nursing note reviewed.   Constitutional:       General: She is not in acute distress.     Appearance: Normal appearance. She is not ill-appearing, toxic-appearing or diaphoretic.   HENT:      Head: Normocephalic and atraumatic.      Right Ear: External ear normal.      Left Ear: External ear normal.      Nose: Nose normal. No congestion or rhinorrhea.      Mouth/Throat:      Pharynx: Oropharynx is clear.   Eyes:      Extraocular Movements: Extraocular movements intact.      Conjunctiva/sclera: Conjunctivae normal.      Pupils: Pupils are equal, round, and reactive to light.   Cardiovascular:      Rate and Rhythm: Normal rate and regular rhythm.      Pulses: Normal pulses.      Heart sounds: Normal heart sounds. No murmur heard.  Pulmonary:      Effort: Pulmonary effort is normal. No respiratory distress.      Breath sounds: Normal breath sounds. No wheezing, rhonchi or rales.   Abdominal:      General: Bowel sounds are normal. There is no distension.      Tenderness: There is no abdominal tenderness. There is no guarding or rebound.   Musculoskeletal:         General: Swelling (left knee) and tenderness (left knee and left shoulder) present. Normal range of motion.      Cervical back: Normal range of motion and neck supple.   Skin:     General: Skin is warm and dry.      Capillary Refill: Capillary refill takes less than 2 seconds.      Findings: No rash.   Neurological:      General: No focal deficit present.      Mental Status: She is alert and oriented to person, place, and time. Mental status is at baseline.      Cranial Nerves: No cranial  nerve deficit.   Psychiatric:         Mood and Affect: Mood normal.         Behavior: Behavior normal.         Thought Content: Thought content normal.       Significant Labs: All pertinent labs within the past 24 hours have been reviewed.    Significant Imaging: I have reviewed all pertinent imaging results/findings within the past 24 hours.

## 2022-11-22 NOTE — ASSESSMENT & PLAN NOTE
BC positive for methicillin resistant Staph Aureus. Repeated BC growing Repeat blood cultures growing Gram positive cocci.    Pulmonology onboard for bronchoscopy  Orthopedics onboard planning for left knee and left shoulder irrigation/debridement   ID onboard recommending stop Zosyn, continue IV Vancomycin and Levofloxacin and repeat BC in 48H  Cardiology planning for MONICA tomorrow to r/u endocarditis  IV NS at 150cc/h  Will continue to monitor vital signs, physical exam, AM labs and cultures/sensitivity

## 2022-11-22 NOTE — ANESTHESIA PROCEDURE NOTES
Intubation    Date/Time: 11/22/2022 2:45 PM  Performed by: Adams Anne CRNA  Authorized by: Haroldo Aldridge MD     Intubation:     Induction:  Intravenous    Intubated:  Postinduction    Mask Ventilation:  Easy mask    Attempts:  1    Attempted By:  CRNA    Method of Intubation:  Direct    Blade:  Celso 4    Laryngeal View Grade: Grade IIA - cords partially seen      Difficult Airway Encountered?: No      Complications:  None    Airway Device:  Oral endotracheal tube    Airway Device Size:  7.5    Style/Cuff Inflation:  Cuffed    Inflation Amount (mL):  8    Tube secured:  20    Secured at:  The lips    Placement Verified By:  Capnometry    Complicating Factors:  None    Findings Post-Intubation:  BS equal bilateral and atraumatic/condition of teeth unchanged

## 2022-11-22 NOTE — ASSESSMENT & PLAN NOTE
Patient has past history of left TKA. Arthrocentesis showed WBC 85.000   Arthrocentesis of left shoulder performed pending synovial studies per Ortho    Orthopedics (Dr. Mendoza) planning irrigation/debridement of left knee and left shoulder today.  Continue IV antibiotics  Will continue to monitor vital signs,  physical exam and BMP and cultures

## 2022-11-23 LAB
ALBUMIN SERPL BCP-MCNC: 1.2 G/DL (ref 3.5–5)
ALBUMIN/GLOB SERPL: 0.3 {RATIO}
ALP SERPL-CCNC: 317 U/L (ref 41–108)
ALT SERPL W P-5'-P-CCNC: 19 U/L (ref 13–56)
ANION GAP SERPL CALCULATED.3IONS-SCNC: 13 MMOL/L (ref 7–16)
AST SERPL W P-5'-P-CCNC: 19 U/L (ref 15–37)
BACTERIA BLD CULT: ABNORMAL
BASOPHILS # BLD AUTO: 0.09 K/UL (ref 0–0.2)
BASOPHILS NFR BLD AUTO: 0.4 % (ref 0–1)
BILIRUB SERPL-MCNC: 0.7 MG/DL (ref ?–1.2)
BSA FOR ECHO PROCEDURE: 1.81 M2
BUN SERPL-MCNC: 9 MG/DL (ref 7–18)
BUN/CREAT SERPL: 18 (ref 6–20)
CALCIUM SERPL-MCNC: 8 MG/DL (ref 8.5–10.1)
CHLORIDE SERPL-SCNC: 98 MMOL/L (ref 98–107)
CO2 SERPL-SCNC: 28 MMOL/L (ref 21–32)
CREAT SERPL-MCNC: 0.5 MG/DL (ref 0.55–1.02)
DIFFERENTIAL METHOD BLD: ABNORMAL
EGFR (NO RACE VARIABLE) (RUSH/TITUS): 112 ML/MIN/1.73M²
EJECTION FRACTION: 60 %
EOSINOPHIL # BLD AUTO: 0.01 K/UL (ref 0–0.5)
EOSINOPHIL NFR BLD AUTO: 0 % (ref 1–4)
ERYTHROCYTE [DISTWIDTH] IN BLOOD BY AUTOMATED COUNT: 15 % (ref 11.5–14.5)
GLOBULIN SER-MCNC: 4.5 G/DL (ref 2–4)
GLUCOSE SERPL-MCNC: 402 MG/DL (ref 74–106)
GLUCOSE SERPL-MCNC: 63 MG/DL (ref 70–105)
GRAM STN SPEC: ABNORMAL
GRAM STN SPEC: ABNORMAL
HCT VFR BLD AUTO: 30.7 % (ref 38–47)
HGB BLD-MCNC: 9.6 G/DL (ref 12–16)
IMM GRANULOCYTES # BLD AUTO: 0.26 K/UL (ref 0–0.04)
IMM GRANULOCYTES NFR BLD: 1.2 % (ref 0–0.4)
INSULIN SERPL-ACNC: NORMAL U[IU]/ML
LAB AP GROSS DESCRIPTION: NORMAL
LAB AP LABORATORY NOTES: NORMAL
LAB AP SPECIMEN A NON-GYN GENERAL CATEGORIZATION: NORMAL
LAB AP SPECIMEN A NON-GYN INTERPETATION: NORMAL
LYMPHOCYTES # BLD AUTO: 1.39 K/UL (ref 1–4.8)
LYMPHOCYTES NFR BLD AUTO: 6.4 % (ref 27–41)
MCH RBC QN AUTO: 27.7 PG (ref 27–31)
MCHC RBC AUTO-ENTMCNC: 31.3 G/DL (ref 32–36)
MCV RBC AUTO: 88.7 FL (ref 80–96)
MONOCYTES # BLD AUTO: 1.44 K/UL (ref 0–0.8)
MONOCYTES NFR BLD AUTO: 6.7 % (ref 2–6)
MPC BLD CALC-MCNC: 9.7 FL (ref 9.4–12.4)
NEUTROPHILS # BLD AUTO: 18.38 K/UL (ref 1.8–7.7)
NEUTROPHILS NFR BLD AUTO: 85.3 % (ref 53–65)
NRBC # BLD AUTO: 0 X10E3/UL
NRBC, AUTO (.00): 0 %
PLATELET # BLD AUTO: 619 K/UL (ref 150–400)
POTASSIUM SERPL-SCNC: 5 MMOL/L (ref 3.5–5.1)
PROT SERPL-MCNC: 5.7 G/DL (ref 6.4–8.2)
RBC # BLD AUTO: 3.46 M/UL (ref 4.2–5.4)
SODIUM SERPL-SCNC: 134 MMOL/L (ref 136–145)
WBC # BLD AUTO: 21.57 K/UL (ref 4.5–11)

## 2022-11-23 PROCEDURE — 80053 COMPREHEN METABOLIC PANEL: CPT | Performed by: ORTHOPAEDIC SURGERY

## 2022-11-23 PROCEDURE — 25000003 PHARM REV CODE 250: Performed by: GENERAL PRACTICE

## 2022-11-23 PROCEDURE — 99152 MOD SED SAME PHYS/QHP 5/>YRS: CPT | Performed by: STUDENT IN AN ORGANIZED HEALTH CARE EDUCATION/TRAINING PROGRAM

## 2022-11-23 PROCEDURE — 87040 BLOOD CULTURE FOR BACTERIA: CPT | Performed by: NURSE PRACTITIONER

## 2022-11-23 PROCEDURE — 11000001 HC ACUTE MED/SURG PRIVATE ROOM

## 2022-11-23 PROCEDURE — 63600175 PHARM REV CODE 636 W HCPCS: Performed by: ORTHOPAEDIC SURGERY

## 2022-11-23 PROCEDURE — 25000003 PHARM REV CODE 250: Performed by: INTERNAL MEDICINE

## 2022-11-23 PROCEDURE — 85025 COMPLETE CBC W/AUTO DIFF WBC: CPT | Performed by: ORTHOPAEDIC SURGERY

## 2022-11-23 PROCEDURE — 99233 SBSQ HOSP IP/OBS HIGH 50: CPT | Mod: ,,, | Performed by: INTERNAL MEDICINE

## 2022-11-23 PROCEDURE — 25000003 PHARM REV CODE 250: Performed by: ORTHOPAEDIC SURGERY

## 2022-11-23 PROCEDURE — 63600175 PHARM REV CODE 636 W HCPCS: Performed by: INTERNAL MEDICINE

## 2022-11-23 PROCEDURE — 99233 PR SUBSEQUENT HOSPITAL CARE,LEVL III: ICD-10-PCS | Mod: ,,, | Performed by: INTERNAL MEDICINE

## 2022-11-23 PROCEDURE — C9113 INJ PANTOPRAZOLE SODIUM, VIA: HCPCS | Performed by: NURSE PRACTITIONER

## 2022-11-23 PROCEDURE — 25000003 PHARM REV CODE 250: Performed by: STUDENT IN AN ORGANIZED HEALTH CARE EDUCATION/TRAINING PROGRAM

## 2022-11-23 PROCEDURE — 36415 COLL VENOUS BLD VENIPUNCTURE: CPT | Performed by: ORTHOPAEDIC SURGERY

## 2022-11-23 PROCEDURE — 36591 DRAW BLOOD OFF VENOUS DEVICE: CPT | Performed by: NURSE PRACTITIONER

## 2022-11-23 PROCEDURE — 63600175 PHARM REV CODE 636 W HCPCS: Performed by: STUDENT IN AN ORGANIZED HEALTH CARE EDUCATION/TRAINING PROGRAM

## 2022-11-23 PROCEDURE — 63600175 PHARM REV CODE 636 W HCPCS: Performed by: NURSE PRACTITIONER

## 2022-11-23 PROCEDURE — 87149 DNA/RNA DIRECT PROBE: CPT | Performed by: NURSE PRACTITIONER

## 2022-11-23 PROCEDURE — 97110 THERAPEUTIC EXERCISES: CPT

## 2022-11-23 RX ORDER — LIDOCAINE HYDROCHLORIDE 20 MG/ML
SOLUTION OROPHARYNGEAL
Status: DISCONTINUED | OUTPATIENT
Start: 2022-11-23 | End: 2022-11-23 | Stop reason: HOSPADM

## 2022-11-23 RX ORDER — MORPHINE SULFATE 2 MG/ML
INJECTION, SOLUTION INTRAMUSCULAR; INTRAVENOUS
Status: ACTIVE
Start: 2022-11-23 | End: 2022-11-24

## 2022-11-23 RX ORDER — MIDAZOLAM HYDROCHLORIDE 1 MG/ML
INJECTION INTRAMUSCULAR; INTRAVENOUS
Status: DISCONTINUED | OUTPATIENT
Start: 2022-11-23 | End: 2022-11-23 | Stop reason: HOSPADM

## 2022-11-23 RX ORDER — FENTANYL CITRATE 50 UG/ML
INJECTION, SOLUTION INTRAMUSCULAR; INTRAVENOUS
Status: DISCONTINUED | OUTPATIENT
Start: 2022-11-23 | End: 2022-11-23 | Stop reason: HOSPADM

## 2022-11-23 RX ADMIN — DULOXETINE 60 MG: 30 CAPSULE, DELAYED RELEASE ORAL at 09:11

## 2022-11-23 RX ADMIN — PANTOPRAZOLE SODIUM 40 MG: 40 INJECTION, POWDER, LYOPHILIZED, FOR SOLUTION INTRAVENOUS at 09:11

## 2022-11-23 RX ADMIN — ENOXAPARIN SODIUM 30 MG: 30 INJECTION SUBCUTANEOUS at 09:11

## 2022-11-23 RX ADMIN — MORPHINE SULFATE 2 MG: 2 INJECTION, SOLUTION INTRAMUSCULAR; INTRAVENOUS at 02:11

## 2022-11-23 RX ADMIN — MORPHINE SULFATE 2 MG: 2 INJECTION, SOLUTION INTRAMUSCULAR; INTRAVENOUS at 07:11

## 2022-11-23 RX ADMIN — SODIUM CHLORIDE 75 ML/HR: 9 INJECTION, SOLUTION INTRAVENOUS at 08:11

## 2022-11-23 RX ADMIN — DOCUSATE SODIUM 100 MG: 100 CAPSULE, LIQUID FILLED ORAL at 09:11

## 2022-11-23 RX ADMIN — MUPIROCIN: 20 OINTMENT TOPICAL at 08:11

## 2022-11-23 RX ADMIN — TRAZODONE HYDROCHLORIDE 50 MG: 50 TABLET ORAL at 08:11

## 2022-11-23 RX ADMIN — MUPIROCIN: 20 OINTMENT TOPICAL at 09:11

## 2022-11-23 RX ADMIN — VANCOMYCIN HYDROCHLORIDE 1500 MG: 1 INJECTION, POWDER, LYOPHILIZED, FOR SOLUTION INTRAVENOUS at 02:11

## 2022-11-23 RX ADMIN — DOCUSATE SODIUM 100 MG: 100 CAPSULE, LIQUID FILLED ORAL at 08:11

## 2022-11-23 RX ADMIN — VANCOMYCIN HYDROCHLORIDE 1500 MG: 1 INJECTION, POWDER, LYOPHILIZED, FOR SOLUTION INTRAVENOUS at 04:11

## 2022-11-23 RX ADMIN — OXYCODONE HYDROCHLORIDE 5 MG: 5 TABLET ORAL at 06:11

## 2022-11-23 RX ADMIN — ENOXAPARIN SODIUM 30 MG: 30 INJECTION SUBCUTANEOUS at 08:11

## 2022-11-23 RX ADMIN — MORPHINE SULFATE 2 MG: 2 INJECTION, SOLUTION INTRAMUSCULAR; INTRAVENOUS at 09:11

## 2022-11-23 RX ADMIN — LEVOFLOXACIN 500 MG: 500 TABLET, FILM COATED ORAL at 09:11

## 2022-11-23 NOTE — NURSING
"1935- family member at bedside running out of patients room stating "the patient is pulling that thing out of her mouth" upon assessment pt was self extubating with ett bulb intact and in patients mouth. Diprivan gtt stopped. Pt immediately instructed to cough all phlegm up into tonsil tip suction. Pt has weak cough but placed on 02 at 2L nc and tolerating well. 02 sats 94-95%. Pt is aaox4 but groggy. Pt complains of pain.     1944- dr orosco notified of extubation and pain complaints. Prn morphine not given at this time as it was recently given. Awaiting new orders from dr orosco.  "

## 2022-11-23 NOTE — PROGRESS NOTES
[facility]  Pulmonology  Progress Note    Patient Name: Monica Walker  MRN: [id number]  Admission Date: 11/20/2022  Hospital Length of Stay: 3 days  Code Status: Full Code  Attending Provider: Savage Sylvester MD  Primary Care Provider: Hannah Schulz MD   Principal Problem: Bacteremia    Subjective:     Interval History:  Patient without complaints    Objective:     Vital Signs (Most Recent):  Temp: 98.8 °F (37.1 °C) (11/23/22 0315)  Pulse: 98 (11/23/22 0600)  Resp: (!) 22 (11/23/22 0600)  BP: (!) 143/69 (11/23/22 0600)  SpO2: (!) 94 % (11/23/22 0600)   Vital Signs (24h Range):  Temp:  [97.6 °F (36.4 °C)-98.8 °F (37.1 °C)] 98.8 °F (37.1 °C)  Pulse:  [] 98  Resp:  [10-45] 22  SpO2:  [84 %-100 %] 94 %  BP: ()/() 143/69     Weight: 73.8 kg (162 lb 11.2 oz)  Body mass index is 26.26 kg/m².      Intake/Output Summary (Last 24 hours) at 11/23/2022 0657  Last data filed at 11/23/2022 0603  Gross per 24 hour   Intake 3217.13 ml   Output 2700 ml   Net 517.13 ml       Physical Exam  Vitals reviewed.   Constitutional:       Appearance: Normal appearance.      Interventions: She is not intubated.  HENT:      Head: Normocephalic and atraumatic.      Nose: Nose normal.      Mouth/Throat:      Mouth: Mucous membranes are dry.      Pharynx: Oropharynx is clear.   Eyes:      Extraocular Movements: Extraocular movements intact.      Conjunctiva/sclera: Conjunctivae normal.      Pupils: Pupils are equal, round, and reactive to light.   Cardiovascular:      Rate and Rhythm: Normal rate.      Heart sounds: Normal heart sounds. No murmur heard.  Pulmonary:      Effort: Pulmonary effort is normal. She is not intubated.      Breath sounds: Normal breath sounds.   Abdominal:      General: Abdomen is flat. Bowel sounds are normal.      Palpations: Abdomen is soft.   Musculoskeletal:         General: Normal range of motion.      Cervical back: Normal range of motion and neck supple.       Right lower leg: No edema.      Left lower leg: No edema.   Skin:     General: Skin is warm and dry.      Capillary Refill: Capillary refill takes less than 2 seconds.   Neurological:      General: No focal deficit present.      Mental Status: She is alert and oriented to person, place, and time.   Psychiatric:         Mood and Affect: Mood normal.         Behavior: Behavior normal.       Vents:  Vent Mode: A/C (11/22/22 1700)  Ventilator Initiated: Yes (11/22/22 1700)  Set Rate: 14 BPM (11/22/22 1700)  Vt Set: 500 mL (11/22/22 1700)  PEEP/CPAP: 5 cmH20 (11/22/22 1700)  Oxygen Concentration (%): 70 (11/22/22 1915)  Peak Airway Pressure: 27 cmH20 (11/22/22 1700)  Total Ve: 6.2 L/m (11/22/22 1700)    Lines/Drains/Airways       Drain  Duration                  Closed/Suction Drain 11/22/22 1600 Left;Anterior Shoulder Accordion <1 day         Closed/Suction Drain 11/22/22 1600 Left;Proximal;Anterior Knee Accordion <1 day         Urethral Catheter 11/22/22 1515 <1 day              Arterial Line  Duration             Arterial Line 11/22/22 1510 Right Radial <1 day              Peripheral Intravenous Line  Duration                  Peripheral IV - Single Lumen 11/22/22 1500 20 G Right Forearm <1 day                    Significant Labs:    CBC/Anemia Profile:  Recent Labs   Lab 11/22/22 0239 11/22/22 1816 11/23/22  0528   WBC 22.06* 20.71* 21.57*   HGB 9.6* 8.7* 9.6*   HCT 29.9* 27.4* 30.7*   * 495* 619*   MCV 87.2 88.1 88.7   RDW 14.6* 14.8* 15.0*        Chemistries:  Recent Labs   Lab 11/22/22 0239 11/22/22 1816 11/23/22  0528   * 136 134*   K 3.4* 4.1 5.0   CL 98 101 98   CO2 35* 32 28   BUN 6* 5* 9   CREATININE 0.57 0.44* 0.50*   CALCIUM 7.2* 8.2* 8.0*   ALBUMIN  --   --  1.2*   PROT  --   --  5.7*   BILITOT  --   --  0.7   ALKPHOS  --   --  317*   ALT  --   --  19   AST  --   --  19       Recent Lab Results  (Last 5 results in the past 24 hours)        11/23/22  0528   11/22/22  1824   11/22/22  1816    11/22/22  1726   11/22/22  1346        Albumin/Globulin Ratio 0.3               Albumin 1.2               Alkaline Phosphatase 317               ALT 19               Anion Gap 13     7           AST 19               Baso # 0.09     0.08           Basophil % 0.4     0.4           BILIRUBIN TOTAL 0.7               BUN 9     5           BUN/CREAT RATIO 18     11           Calcium 8.0     8.2           Cell Count Excluding RBCs               Chloride 98     101           Clarity, Synovial               CO2 28     32           Color, Synovial               Creatinine 0.50     0.44           Differential Type Auto     Auto           eGFR 112     115           Eos # 0.01     0.24           Eosinophil % 0.0     1.2           Globulin, Total 4.5               Glucose 402     151           Hematocrit 30.7     27.4           Hemoglobin 9.6     8.7           Immature Grans (Abs) 0.26     0.28           Immature Granulocytes 1.2     1.4           Lymph # 1.39     0.84           Lymph % 6.4     4.1           MCH 27.7     28.0           MCHC 31.3     31.8           MCV 88.7     88.1           Mono # 1.44     1.41           Mono % 6.7     6.8           Monocytes, Syn Man %               MPV 9.7     9.1           Neutrophils, Abs 18.38     17.86           Neutrophils Relative 85.3     86.1           nRBC 0.0     0.0           NUCLEATED RBC ABSOLUTE 0.00     0.00           Platelets 619     495           POC Base Excess   7.3             POC Glucose       111   104       POC HCO3   33.9             POC PCO2   56             POC PH   7.39             POC PO2   252             POC SATURATED O2   100             Polys, Syn Man %               Potassium 5.0     4.1           PROTEIN TOTAL 5.7               RBC 3.46     3.11           RBC, Synovial               RDW 15.0     14.8           Sodium 134     136           WBC 21.57     20.71                                  Significant Imaging:  I have reviewed all pertinent imaging  results/findings within the past 24 hours.    Assessment/Plan:     * Bacteremia  Patient has methicillin resistance Staph aureus in knee probably in the lung and in the shoulder status post surgery yesterday continue broad-spectrum antibiotics after a MONICA looking for endocarditis she can go to the floor today    Lung abscess  Patient has bilateral peripheral cavitary lesions.  Patient has a positive culture so these could be infectious certainly this is what staff would look like although I am told that it was Streptococcus I do not see that on the computer.  Emboli can do this check Dopplers but there were no emboli on CT.  Bronchoscopy done yesterday waiting results    Septic arthritis  Status post surgery yesterday                 Savage Sylvester MD  Pulmonology  [facility]

## 2022-11-23 NOTE — PROGRESS NOTES
Progress Note                                                           Infectious disease   Admit Date: 11/20/2022    SUBJECTIVE:     Follow-up For:  Bacteremia    HPI/Interval history:  Patient without fever for the past 24 hours, she was extubated this morning.  Apart from left knee pain had no complaints.  Drains removed from left shoulder and left knee this morning by ortho.      Review of Systems:    To obtain as patient is too ill      OBJECTIVE:     Vital Signs Range (Last 24H):  Temp:  [97.2 °F (36.2 °C)-98.8 °F (37.1 °C)]   Pulse:  []   Resp:  [10-45]   BP: (126-173)/(62-89)   SpO2:  [84 %-100 %]     Physical Exam:  Constitutional:  Drowsy but arousable, responding appropriately to simple questions.  HENT: supple   Head: normocephalic, atraumatic   Eyes: Conjunctivae normal. Pupils are equal, round, and reactive to light, no drainage   Cardiovascular: regular rate and rhythm, S1, S2 normal, no murmur, click, rub or gallop  Pulmonary: normal respiratory effort, no crepitations or wheezing  Gastrointestinal: non-distended, bowel sounds normal; non-tender, no masses or organomegaly appreciated  Muscular/Skeletal:  Left shoulder bandaged, left knee bandaged  Skin: Skin color, texture normal. No rashes, ulcers or new lesions. Skin warm and dry.     Laboratory:  CBC:   Recent Labs   Lab 11/23/22  0528   WBC 21.57*   RBC 3.46*   HGB 9.6*   HCT 30.7*   *   MCV 88.7   MCH 27.7   MCHC 31.3*     BMP:   Recent Labs   Lab 11/19/22  2200 11/21/22  0303 11/23/22  0528   *   < > 402*   *   < > 134*   K 3.8   < > 5.0   CL 90*   < > 98   CO2 32   < > 28   BUN 17   < > 9   CREATININE 0.78   < > 0.50*   CALCIUM 8.3*   < > 8.0*   MG 1.5*  --   --     < > = values in this interval not displayed.     CMP:   Recent Labs   Lab 11/23/22  0528   *   CALCIUM 8.0*   ALBUMIN 1.2*   PROT 5.7*   *   K 5.0   CO2 28   CL 98   BUN 9   CREATININE 0.50*   ALKPHOS 317*   ALT 19   AST 19   BILITOT 0.7      Microbiology Results (last 7 days)       Procedure Component Value Units Date/Time    Culture, Lower Respiratory [[id number]]  (Abnormal) Collected: 11/22/22 0751    Order Status: Completed Specimen: Respiratory from Lung, Right Updated: 11/23/22 0750     Culture, Lower Respiratory Light Growth Staphylococcus aureus     Comment: Susceptibility To Follow        Gram Stain Result No organisms seen      Rare WBC seen    Culture, Body Fluid [[id number]]  (Abnormal) Collected: 11/22/22 1029    Order Status: Completed Specimen: Body Fluid from Synovial Fluid Updated: 11/23/22 0731     Culture, Body Fluid Moderate Growth Staphylococcus aureus     Comment: Susceptibility To Follow       Blood culture [[id number]] Collected: 11/23/22 0533    Order Status: Sent Specimen: Blood Updated: 11/23/22 0551    Blood culture [[id number]] Collected: 11/23/22 0528    Order Status: Sent Specimen: Blood Updated: 11/23/22 0551    Blood culture (site 1) [[id number]]  (Abnormal) Collected: 11/21/22 0735    Order Status: Completed Specimen: Blood Updated: 11/23/22 0550     Culture, Blood Staphylococcus aureus     Comment: Susceptibility To Follow        Gram Stain Result Gram positive cocci     Comment: From Aerobic Bottle         Gram positive cocci     Comment: From Anaerobe bottle       Blood culture (site 2) [[id number]]  (Abnormal) Collected: 11/21/22 0738    Order Status: Completed Specimen: Blood Updated: 11/23/22 0549     Culture, Blood Staphylococcus aureus     Comment: For Susceptibility See Previous Report        Gram Stain Result Gram positive cocci     Comment: From Aerobic Bottle         Gram positive cocci     Comment: From Anaerobe bottle       AFB Smear Only [[id number]] Collected: 11/22/22 0751    Order Status: Completed Specimen: Respiratory from Lung, Right Updated: 11/22/22 2328     AFB Smear No acid fast bacilli seen    Culture, Anaerobe [[id number]] Collected: 11/22/22 1530    Order Status: Sent Specimen: Wound from Knee,  Left Updated: 11/22/22 1727    Culture, Wound [[id number]] Collected: 11/22/22 1530    Order Status: Sent Specimen: Wound from Knee, Left Updated: 11/22/22 1727    Culture, Wound [[id number]] Collected: 11/22/22 1527    Order Status: Sent Specimen: Wound from Shoulder, Left Updated: 11/22/22 1727    Culture, Anaerobe [[id number]] Collected: 11/22/22 1527    Order Status: Sent Specimen: Wound from Shoulder, Left Updated: 11/22/22 1727    Culture, Wound [[id number]]     Order Status: Sent Specimen: Wound from Shoulder, Left     Culture, Wound [[id number]]     Order Status: Sent Specimen: Wound from Knee, Left     Culture, Anaerobe [[id number]]     Order Status: Sent Specimen: Body Fluid from Shoulder, Left     Culture, Anaerobe [[id number]]     Order Status: Sent Specimen: Body Fluid from Knee, Left     Direct Prep (Other) Fungus [[id number]] Collected: 11/22/22 0751    Order Status: Completed Specimen: Respiratory from Lung, Right Updated: 11/22/22 1116     Direct Prep No fungal elements seen    Culture, AFB [[id number]] Collected: 11/22/22 0751    Order Status: Resulted Specimen: Respiratory from Lung, Right Updated: 11/22/22 1036    Culture, Fungus (Other) [[id number]] Collected: 11/22/22 0751    Order Status: Resulted Specimen: Respiratory from Lung, Right Updated: 11/22/22 1036            Diagnostic Results:  MRI left shoulder showed enhancing collection and significant inflammation of the joint  Labs: Reviewed    ASSESSMENT/PLAN:     Active Hospital Problems    Diagnosis  POA    *Bacteremia [R78.81]  Yes    Lung abscess [J85.2]  Yes    DM (diabetes mellitus) [E11.9]  Yes    Septic arthritis [M00.9]  Yes    Depression [F32.A]  Yes      Resolved Hospital Problems    Diagnosis Date Resolved POA    Abscess of left thigh [L02.416] 11/20/2022 Yes    DM hyperosmolarity type I, uncontrolled [E10.69, E10.65, E87.0] 11/20/2022 Unknown    Abscess of right thigh [L02.415] 11/20/2022 Yes       ASSESSMENT:  MRSA bacteremia.  TTE  negative for endocarditis but given extensive foci of MRSA infection, MONICA requested and scheduled for this afternoon.  Hardware associated left knee septic arthritis, status post I&D 11/22  Septic arthritis to left shoulder status post I&D 11/22  Cavitary bilateral pneumonia, most likely due to same MRSA in blood     PLAN:  Continue with vancomycin to achieve a goal trough level of 15-20; pharmacy increase dose today due to subtherapeutic trough level  Discontinue levofloxacin after dose tomorrow   Follow-up results of repeat blood cultures from today  Vancomycin therapy will be needed for 6 weeks from the date of her 1st set of negative blood cultures  Hold PICC placement if possible until we document negative blood cultures  After completion of vancomycin I would put the patient on an oral antibiotic for 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 b.i.d. and she can stay on that indefinitely or until instructed to discontinue by orthopedic surgeon.

## 2022-11-23 NOTE — NURSING
1720 rec'd from OR to ICUS 13. Orally intubated. Peterson to gravity. Pt remains under anesthesia. Ng tube inserted. A line leveled and zeroed. Good wave form.  1735 propofol started.  1757 grimacing some, heart rate and b/p elevated. Morphine 4 mg ivp given for pain.  1811 still hypertensive. Labetalol 10mg ivp given for same  1820 abg's done and reported to JHOAN Nelson. Heart rate and b/p improved.  1825 decreased fio2 to 70% per orders. No other vent changes. Family in to see pt. Update give. Dr cho here and spoke with family..

## 2022-11-23 NOTE — PLAN OF CARE
Problem: Fall Injury Risk  Goal: Absence of Fall and Fall-Related Injury  Outcome: Ongoing, Progressing  Intervention: Identify and Manage Contributors  Flowsheets (Taken 11/22/2022 1930)  Self-Care Promotion:   independence encouraged   safe use of adaptive equipment encouraged  Medication Review/Management:   medications reviewed   high-risk medications identified  Intervention: Promote Injury-Free Environment  Flowsheets (Taken 11/22/2022 1930)  Safety Promotion/Fall Prevention:   assistive device/personal item within reach   bed alarm set   Fall Risk reviewed with patient/family   Fall Risk signage in place   pulse ox   side rails raised x 2   instructed to call staff for mobility     Problem: Restraint, Nonbehavioral (Nonviolent)  Goal: Absence of Harm or Injury  Outcome: Ongoing, Progressing  Intervention: Implement Least Restrictive Safety Strategies  Flowsheets (Taken 11/22/2022 1930)  Medical Device Protection:   tubing secured   IV pole/bag removed from visual field  Less Restrictive Alternative:   security enhancements provided   self-care activities encouraged   sensory stimulation limited  Diversional Activities: television  De-Escalation Techniques:   stimulation decreased   quiet time facilitated   reoriented  Intervention: Protect Dignity, Rights, and Personal Wellbeing  Flowsheets (Taken 11/22/2022 1930)  Trust Relationship/Rapport:   reassurance provided   care explained  Intervention: Protect Skin and Joint Integrity  Flowsheets (Taken 11/22/2022 1930)  Body Position: turned  Range of Motion: ROM (range of motion) performed

## 2022-11-23 NOTE — ANESTHESIA POSTPROCEDURE EVALUATION
Anesthesia Post Evaluation    Patient: Monica Walker    Procedure(s) Performed: Procedure(s) (LRB):  IRRIGATION AND DEBRIDEMENT, LOWER EXTREMITY (Left)  IRRIGATION AND DEBRIDEMENT, UPPER EXTREMITY (Left)    Final Anesthesia Type: general      Patient location during evaluation: ICU  Patient participation: No - Unable to Participate, Intubation  Level of consciousness: sedated  Post-procedure vital signs: reviewed and stable  Pain management: adequate  Airway patency: patent  MARILU mitigation strategies: Multimodal analgesia  PONV status at discharge: No PONV  Anesthetic complications: no      Cardiovascular status: blood pressure returned to baseline  Respiratory status: ventilator and ETT  Hydration status: euvolemic  Follow-up not needed.          Vitals Value Taken Time   /65 11/22/22 2000   Temp 36.4 °C (97.6 °F) 11/22/22 1745   Pulse 84 11/22/22 2030   Resp 36 11/22/22 2030   SpO2 94 % 11/22/22 2030   Vitals shown include unvalidated device data.      No case tracking events are documented in the log.      Pain/Inder Score: Pain Rating Prior to Med Admin: 7 (11/22/2022  8:23 PM)  Pain Rating Post Med Admin: 5 (11/22/2022  2:37 AM)  Inder Score: 6 (11/22/2022  6:10 PM)

## 2022-11-23 NOTE — PLAN OF CARE
Per IDT meeting pt will need ABX for 6 weeks. Possibly will have PICC line placed this day. Chronic MRSA. Continue monitoring labs with tx prn. Will follow dc needs as arise.

## 2022-11-23 NOTE — CARE UPDATE
Patient awake alert without any new complaints today.  Drain is DC from the left shoulder and left knee.  Cultures were obtained.  She is had I&D.  At this time continue IV antibiotics.  Would suggest a PICC line for her continued antibiotics she will need 6 weeks of IV antibiotics to preserve the knee implant.  She will be followed as needed by orthopedics.  We will notify Dr. STOVER of patient's admission to the hospital next week.

## 2022-11-23 NOTE — NURSING
Report given to PERFECTO Zazueta. Pt transferred to room 562 from 13. Pt vitals stable before departure no sign of distress. Pt transferred to 562 bed without incident and adjusted per pt request.

## 2022-11-23 NOTE — ASSESSMENT & PLAN NOTE
Patient has bilateral peripheral cavitary lesions.  Patient has a positive culture so these could be infectious certainly this is what staff would look like although I am told that it was Streptococcus I do not see that on the computer.  Emboli can do this check Dopplers but there were no emboli on CT.  Bronchoscopy done yesterday waiting results

## 2022-11-23 NOTE — PT/OT/SLP PROGRESS
Occupational Therapy      Patient Name:  Monica Walker   MRN:  [id number]    Patient not seen today secondary to Pain (OT attempted tx this PM; pt reports 10/10 pain. OT returned following administration of pain medication and pt unarousable to participate in OT tx. Pt family member in room instructed on A/AAROM of LUE elbow, wrist, and hand.). Will follow-up 11/25/22.    11/23/2022

## 2022-11-23 NOTE — SUBJECTIVE & OBJECTIVE
Interval History:  Patient without complaints    Objective:     Vital Signs (Most Recent):  Temp: 98.8 °F (37.1 °C) (11/23/22 0315)  Pulse: 98 (11/23/22 0600)  Resp: (!) 22 (11/23/22 0600)  BP: (!) 143/69 (11/23/22 0600)  SpO2: (!) 94 % (11/23/22 0600)   Vital Signs (24h Range):  Temp:  [97.6 °F (36.4 °C)-98.8 °F (37.1 °C)] 98.8 °F (37.1 °C)  Pulse:  [] 98  Resp:  [10-45] 22  SpO2:  [84 %-100 %] 94 %  BP: ()/() 143/69     Weight: 73.8 kg (162 lb 11.2 oz)  Body mass index is 26.26 kg/m².      Intake/Output Summary (Last 24 hours) at 11/23/2022 0657  Last data filed at 11/23/2022 0603  Gross per 24 hour   Intake 3217.13 ml   Output 2700 ml   Net 517.13 ml       Physical Exam  Vitals reviewed.   Constitutional:       Appearance: Normal appearance.      Interventions: She is not intubated.  HENT:      Head: Normocephalic and atraumatic.      Nose: Nose normal.      Mouth/Throat:      Mouth: Mucous membranes are dry.      Pharynx: Oropharynx is clear.   Eyes:      Extraocular Movements: Extraocular movements intact.      Conjunctiva/sclera: Conjunctivae normal.      Pupils: Pupils are equal, round, and reactive to light.   Cardiovascular:      Rate and Rhythm: Normal rate.      Heart sounds: Normal heart sounds. No murmur heard.  Pulmonary:      Effort: Pulmonary effort is normal. She is not intubated.      Breath sounds: Normal breath sounds.   Abdominal:      General: Abdomen is flat. Bowel sounds are normal.      Palpations: Abdomen is soft.   Musculoskeletal:         General: Normal range of motion.      Cervical back: Normal range of motion and neck supple.      Right lower leg: No edema.      Left lower leg: No edema.   Skin:     General: Skin is warm and dry.      Capillary Refill: Capillary refill takes less than 2 seconds.   Neurological:      General: No focal deficit present.      Mental Status: She is alert and oriented to person, place, and time.   Psychiatric:         Mood and Affect: Mood  normal.         Behavior: Behavior normal.       Vents:  Vent Mode: A/C (11/22/22 1700)  Ventilator Initiated: Yes (11/22/22 1700)  Set Rate: 14 BPM (11/22/22 1700)  Vt Set: 500 mL (11/22/22 1700)  PEEP/CPAP: 5 cmH20 (11/22/22 1700)  Oxygen Concentration (%): 70 (11/22/22 1915)  Peak Airway Pressure: 27 cmH20 (11/22/22 1700)  Total Ve: 6.2 L/m (11/22/22 1700)    Lines/Drains/Airways       Drain  Duration                  Closed/Suction Drain 11/22/22 1600 Left;Anterior Shoulder Accordion <1 day         Closed/Suction Drain 11/22/22 1600 Left;Proximal;Anterior Knee Accordion <1 day         Urethral Catheter 11/22/22 1515 <1 day              Arterial Line  Duration             Arterial Line 11/22/22 1510 Right Radial <1 day              Peripheral Intravenous Line  Duration                  Peripheral IV - Single Lumen 11/22/22 1500 20 G Right Forearm <1 day                    Significant Labs:    CBC/Anemia Profile:  Recent Labs   Lab 11/22/22 0239 11/22/22 1816 11/23/22  0528   WBC 22.06* 20.71* 21.57*   HGB 9.6* 8.7* 9.6*   HCT 29.9* 27.4* 30.7*   * 495* 619*   MCV 87.2 88.1 88.7   RDW 14.6* 14.8* 15.0*        Chemistries:  Recent Labs   Lab 11/22/22 0239 11/22/22 1816 11/23/22  0528   * 136 134*   K 3.4* 4.1 5.0   CL 98 101 98   CO2 35* 32 28   BUN 6* 5* 9   CREATININE 0.57 0.44* 0.50*   CALCIUM 7.2* 8.2* 8.0*   ALBUMIN  --   --  1.2*   PROT  --   --  5.7*   BILITOT  --   --  0.7   ALKPHOS  --   --  317*   ALT  --   --  19   AST  --   --  19       Recent Lab Results  (Last 5 results in the past 24 hours)        11/23/22 0528 11/22/22 1824 11/22/22 1816 11/22/22  1726   11/22/22  1346        Albumin/Globulin Ratio 0.3               Albumin 1.2               Alkaline Phosphatase 317               ALT 19               Anion Gap 13     7           AST 19               Baso # 0.09     0.08           Basophil % 0.4     0.4           BILIRUBIN TOTAL 0.7               BUN 9     5            BUN/CREAT RATIO 18     11           Calcium 8.0     8.2           Cell Count Excluding RBCs               Chloride 98     101           Clarity, Synovial               CO2 28     32           Color, Synovial               Creatinine 0.50     0.44           Differential Type Auto     Auto           eGFR 112     115           Eos # 0.01     0.24           Eosinophil % 0.0     1.2           Globulin, Total 4.5               Glucose 402     151           Hematocrit 30.7     27.4           Hemoglobin 9.6     8.7           Immature Grans (Abs) 0.26     0.28           Immature Granulocytes 1.2     1.4           Lymph # 1.39     0.84           Lymph % 6.4     4.1           MCH 27.7     28.0           MCHC 31.3     31.8           MCV 88.7     88.1           Mono # 1.44     1.41           Mono % 6.7     6.8           Monocytes, Syn Man %               MPV 9.7     9.1           Neutrophils, Abs 18.38     17.86           Neutrophils Relative 85.3     86.1           nRBC 0.0     0.0           NUCLEATED RBC ABSOLUTE 0.00     0.00           Platelets 619     495           POC Base Excess   7.3             POC Glucose       111   104       POC HCO3   33.9             POC PCO2   56             POC PH   7.39             POC PO2   252             POC SATURATED O2   100             Polys, Syn Man %               Potassium 5.0     4.1           PROTEIN TOTAL 5.7               RBC 3.46     3.11           RBC, Synovial               RDW 15.0     14.8           Sodium 134     136           WBC 21.57     20.71                                  Significant Imaging:  I have reviewed all pertinent imaging results/findings within the past 24 hours.

## 2022-11-23 NOTE — ASSESSMENT & PLAN NOTE
Patient has methicillin resistance Staph aureus in knee probably in the lung and in the shoulder status post surgery yesterday continue broad-spectrum antibiotics after a MONICA looking for endocarditis she can go to the floor today

## 2022-11-23 NOTE — PROGRESS NOTES
Progress Note                                                           Infectious disease   Admit Date: 11/20/2022    SUBJECTIVE:     Follow-up For:  Bacteremia    HPI/Interval history:  Patient had arthrocentesis of left shoulder with sidney pus aspirated.  Taken to operating room today for I and D of left shoulder and left knee.  Seen this evening in the ICU postop.    Review of Systems:    To obtain as patient is sedated on the vent      OBJECTIVE:     Vital Signs Range (Last 24H):  Temp:  [97.3 °F (36.3 °C)-98.5 °F (36.9 °C)]   Pulse:  []   Resp:  [10-34]   BP: ()/()   SpO2:  [90 %-100 %]     Physical Exam:  Constitutional:  Sedated on the vent  HENT: supple   Head: normocephalic, atraumatic   Eyes: Conjunctivae normal. Pupils are equal, round, and reactive to light, no drainage   Cardiovascular: regular rate and rhythm, S1, S2 normal, no murmur, click, rub or gallop  Pulmonary: normal respiratory effort, no crepitations or wheezing  Gastrointestinal: non-distended, bowel sounds normal; non-tender, no masses or organomegaly appreciated  Muscular/Skeletal:  Left shoulder bandaged, left knee bandaged, Hemovac in-situ  Skin: Skin color, texture normal. No rashes, ulcers or new lesions. Skin warm and dry.     Laboratory:  CBC:   Recent Labs   Lab 11/22/22 1816   WBC 20.71*   RBC 3.11*   HGB 8.7*   HCT 27.4*   *   MCV 88.1   MCH 28.0   MCHC 31.8*     BMP:   Recent Labs   Lab 11/19/22 2200 11/21/22 0303 11/22/22 1816   *   < > 151*   *   < > 136   K 3.8   < > 4.1   CL 90*   < > 101   CO2 32   < > 32   BUN 17   < > 5*   CREATININE 0.78   < > 0.44*   CALCIUM 8.3*   < > 8.2*   MG 1.5*  --   --     < > = values in this interval not displayed.     CMP:   Recent Labs   Lab 11/19/22 2200 11/21/22 0303 11/22/22 1816   *   < > 151*   CALCIUM 8.3*   < > 8.2*   ALBUMIN 1.2*  --   --    PROT 7.2  --   --    *   < > 136   K 3.8   < > 4.1   CO2 32   < > 32   CL 90*   < > 101    BUN 17   < > 5*   CREATININE 0.78   < > 0.44*   ALKPHOS 245*  --   --    ALT 18  --   --    AST 26  --   --    BILITOT 0.4  --   --     < > = values in this interval not displayed.     Microbiology Results (last 7 days)       Procedure Component Value Units Date/Time    Culture, Anaerobe [[id number]] Collected: 11/22/22 1530    Order Status: Sent Specimen: Wound from Knee, Left Updated: 11/22/22 1727    Culture, Wound [[id number]] Collected: 11/22/22 1530    Order Status: Sent Specimen: Wound from Knee, Left Updated: 11/22/22 1727    Culture, Wound [[id number]] Collected: 11/22/22 1527    Order Status: Sent Specimen: Wound from Shoulder, Left Updated: 11/22/22 1727    Culture, Anaerobe [[id number]] Collected: 11/22/22 1527    Order Status: Sent Specimen: Wound from Shoulder, Left Updated: 11/22/22 1727    Blood culture (site 1) [[id number]]  (Abnormal) Collected: 11/21/22 0735    Order Status: Completed Specimen: Blood Updated: 11/22/22 1609     Gram Stain Result Gram positive cocci     Comment: From Aerobic Bottle         Gram positive cocci     Comment: From Anaerobe bottle       Culture, Wound [[id number]]     Order Status: Sent Specimen: Wound from Shoulder, Left     Culture, Wound [[id number]]     Order Status: Sent Specimen: Wound from Knee, Left     Culture, Anaerobe [[id number]]     Order Status: Sent Specimen: Body Fluid from Shoulder, Left     Culture, Anaerobe [[id number]]     Order Status: Sent Specimen: Body Fluid from Knee, Left     Culture, Body Fluid [[id number]] Collected: 11/22/22 1029    Order Status: Resulted Specimen: Body Fluid from Synovial Fluid Updated: 11/22/22 1224    Direct Prep (Other) Fungus [[id number]] Collected: 11/22/22 0751    Order Status: Completed Specimen: Respiratory from Lung, Right Updated: 11/22/22 1116     Direct Prep No fungal elements seen    Culture, AFB [[id number]] Collected: 11/22/22 0751    Order Status: Resulted Specimen: Respiratory from Lung, Right Updated: 11/22/22 1036     AFB Smear Only [[id number]] Collected: 11/22/22 0751    Order Status: Sent Specimen: Respiratory from Lung, Right Updated: 11/22/22 1036    Culture, Lower Respiratory [[id number]] Collected: 11/22/22 0751    Order Status: Resulted Specimen: Respiratory from Lung, Right Updated: 11/22/22 1036    Culture, Fungus (Other) [[id number]] Collected: 11/22/22 0751    Order Status: Resulted Specimen: Respiratory from Lung, Right Updated: 11/22/22 1036    Blood culture (site 2) [[id number]]  (Abnormal) Collected: 11/21/22 0738    Order Status: Completed Specimen: Blood Updated: 11/22/22 0415     Gram Stain Result Gram positive cocci     Comment: From Aerobic Bottle         Gram positive cocci     Comment: From Anaerobe bottle               Diagnostic Results:  MRI left shoulder showed enhancing collection and significant inflammation of the joint  Labs: Reviewed    ASSESSMENT/PLAN:     Active Hospital Problems    Diagnosis  POA    *Bacteremia [R78.81]  Yes    Lung abscess [J85.2]  Yes    DM (diabetes mellitus) [E11.9]  Yes    Septic arthritis [M00.9]  Yes    Depression [F32.A]  Yes      Resolved Hospital Problems    Diagnosis Date Resolved POA    Abscess of left thigh [L02.416] 11/20/2022 Yes    DM hyperosmolarity type I, uncontrolled [E10.69, E10.65, E87.0] 11/20/2022 Unknown    Abscess of right thigh [L02.415] 11/20/2022 Yes       ASSESSMENT:  MRSA bacteremia.  TTE negative for endocarditis but given extensive foci of MRSA infection, we need a MONICA to definitively rule out endocarditis.  Hardware associated left knee septic arthritis, status post I&D today  Septic arthritis to left shoulder status post I&D today  Cavitary bilateral pneumonia, most likely due to same MRSA in blood     PLAN:  Continue with vancomycin; pharmacy increase dose today due to subtherapeutic trough level  Complete 5 days of empiric levofloxacin   Repeat blood cultures again tomorrow  MONICA when able    Discussed with the patient's family (mother, sister  and friend) at bedside    Discussed with Dr. Mendoza

## 2022-11-24 LAB
ALBUMIN SERPL BCP-MCNC: 1.2 G/DL (ref 3.5–5)
ALBUMIN/GLOB SERPL: 0.2 {RATIO}
ALP SERPL-CCNC: 388 U/L (ref 41–108)
ALT SERPL W P-5'-P-CCNC: 18 U/L (ref 13–56)
ANION GAP SERPL CALCULATED.3IONS-SCNC: 8 MMOL/L (ref 7–16)
AST SERPL W P-5'-P-CCNC: 20 U/L (ref 15–37)
BACTERIA SPEC BFLD CULT: ABNORMAL
BASOPHILS # BLD AUTO: 0.08 K/UL (ref 0–0.2)
BASOPHILS NFR BLD AUTO: 0.5 % (ref 0–1)
BILIRUB SERPL-MCNC: 0.5 MG/DL (ref ?–1.2)
BUN SERPL-MCNC: 7 MG/DL (ref 7–18)
BUN/CREAT SERPL: 11 (ref 6–20)
CALCIUM SERPL-MCNC: 7.9 MG/DL (ref 8.5–10.1)
CHLORIDE SERPL-SCNC: 93 MMOL/L (ref 98–107)
CO2 SERPL-SCNC: 31 MMOL/L (ref 21–32)
CREAT SERPL-MCNC: 0.61 MG/DL (ref 0.55–1.02)
CULTURE, LOWER RESPIRATORY: ABNORMAL
DIFFERENTIAL METHOD BLD: ABNORMAL
EGFR (NO RACE VARIABLE) (RUSH/TITUS): 106 ML/MIN/1.73M²
EOSINOPHIL # BLD AUTO: 0.14 K/UL (ref 0–0.5)
EOSINOPHIL NFR BLD AUTO: 0.9 % (ref 1–4)
ERYTHROCYTE [DISTWIDTH] IN BLOOD BY AUTOMATED COUNT: 14.8 % (ref 11.5–14.5)
GLOBULIN SER-MCNC: 5 G/DL (ref 2–4)
GLUCOSE SERPL-MCNC: 277 MG/DL (ref 74–106)
GLUCOSE SERPL-MCNC: 290 MG/DL (ref 70–105)
GLUCOSE SERPL-MCNC: 333 MG/DL (ref 70–105)
GLUCOSE SERPL-MCNC: 372 MG/DL (ref 70–105)
GLUCOSE SERPL-MCNC: 393 MG/DL (ref 70–105)
GLUCOSE SERPL-MCNC: 398 MG/DL (ref 70–105)
GRAM STN SPEC: ABNORMAL
GRAM STN SPEC: ABNORMAL
HCT VFR BLD AUTO: 30.7 % (ref 38–47)
HGB BLD-MCNC: 9.5 G/DL (ref 12–16)
IMM GRANULOCYTES # BLD AUTO: 0.2 K/UL (ref 0–0.04)
IMM GRANULOCYTES NFR BLD: 1.2 % (ref 0–0.4)
LYMPHOCYTES # BLD AUTO: 1.57 K/UL (ref 1–4.8)
LYMPHOCYTES NFR BLD AUTO: 9.6 % (ref 27–41)
MCH RBC QN AUTO: 27.2 PG (ref 27–31)
MCHC RBC AUTO-ENTMCNC: 30.9 G/DL (ref 32–36)
MCV RBC AUTO: 88 FL (ref 80–96)
MICROORGANISM SPEC CULT: ABNORMAL
MICROORGANISM SPEC CULT: ABNORMAL
MONOCYTES # BLD AUTO: 1.63 K/UL (ref 0–0.8)
MONOCYTES NFR BLD AUTO: 10 % (ref 2–6)
MPC BLD CALC-MCNC: 9.4 FL (ref 9.4–12.4)
NEUTROPHILS # BLD AUTO: 12.7 K/UL (ref 1.8–7.7)
NEUTROPHILS NFR BLD AUTO: 77.8 % (ref 53–65)
NRBC # BLD AUTO: 0 X10E3/UL
NRBC, AUTO (.00): 0 %
PLATELET # BLD AUTO: 572 K/UL (ref 150–400)
POTASSIUM SERPL-SCNC: 4.1 MMOL/L (ref 3.5–5.1)
PROT SERPL-MCNC: 6.2 G/DL (ref 6.4–8.2)
RBC # BLD AUTO: 3.49 M/UL (ref 4.2–5.4)
SODIUM SERPL-SCNC: 128 MMOL/L (ref 136–145)
VANCOMYCIN TROUGH SERPL-MCNC: 13.5 ΜG/ML (ref 10–20)
VERIGENE RESULT: ABNORMAL
WBC # BLD AUTO: 16.32 K/UL (ref 4.5–11)

## 2022-11-24 PROCEDURE — 97110 THERAPEUTIC EXERCISES: CPT

## 2022-11-24 PROCEDURE — 97530 THERAPEUTIC ACTIVITIES: CPT

## 2022-11-24 PROCEDURE — 11000001 HC ACUTE MED/SURG PRIVATE ROOM

## 2022-11-24 PROCEDURE — 99232 PR SUBSEQUENT HOSPITAL CARE,LEVL II: ICD-10-PCS | Mod: ,,, | Performed by: STUDENT IN AN ORGANIZED HEALTH CARE EDUCATION/TRAINING PROGRAM

## 2022-11-24 PROCEDURE — 80053 COMPREHEN METABOLIC PANEL: CPT | Performed by: ORTHOPAEDIC SURGERY

## 2022-11-24 PROCEDURE — 25000003 PHARM REV CODE 250: Performed by: GENERAL PRACTICE

## 2022-11-24 PROCEDURE — 63600175 PHARM REV CODE 636 W HCPCS: Performed by: ORTHOPAEDIC SURGERY

## 2022-11-24 PROCEDURE — 63600175 PHARM REV CODE 636 W HCPCS: Performed by: INTERNAL MEDICINE

## 2022-11-24 PROCEDURE — 99232 SBSQ HOSP IP/OBS MODERATE 35: CPT | Mod: ,,, | Performed by: STUDENT IN AN ORGANIZED HEALTH CARE EDUCATION/TRAINING PROGRAM

## 2022-11-24 PROCEDURE — 82962 GLUCOSE BLOOD TEST: CPT

## 2022-11-24 PROCEDURE — 25000003 PHARM REV CODE 250: Performed by: ORTHOPAEDIC SURGERY

## 2022-11-24 PROCEDURE — 63600175 PHARM REV CODE 636 W HCPCS: Performed by: STUDENT IN AN ORGANIZED HEALTH CARE EDUCATION/TRAINING PROGRAM

## 2022-11-24 PROCEDURE — 36415 COLL VENOUS BLD VENIPUNCTURE: CPT | Performed by: ORTHOPAEDIC SURGERY

## 2022-11-24 PROCEDURE — 87040 BLOOD CULTURE FOR BACTERIA: CPT | Performed by: STUDENT IN AN ORGANIZED HEALTH CARE EDUCATION/TRAINING PROGRAM

## 2022-11-24 PROCEDURE — 80202 ASSAY OF VANCOMYCIN: CPT | Performed by: INTERNAL MEDICINE

## 2022-11-24 PROCEDURE — 25000003 PHARM REV CODE 250: Performed by: INTERNAL MEDICINE

## 2022-11-24 PROCEDURE — 63600175 PHARM REV CODE 636 W HCPCS: Performed by: NURSE PRACTITIONER

## 2022-11-24 PROCEDURE — 25000003 PHARM REV CODE 250: Performed by: STUDENT IN AN ORGANIZED HEALTH CARE EDUCATION/TRAINING PROGRAM

## 2022-11-24 PROCEDURE — C9113 INJ PANTOPRAZOLE SODIUM, VIA: HCPCS | Performed by: NURSE PRACTITIONER

## 2022-11-24 PROCEDURE — 85025 COMPLETE CBC W/AUTO DIFF WBC: CPT | Performed by: ORTHOPAEDIC SURGERY

## 2022-11-24 PROCEDURE — 36415 COLL VENOUS BLD VENIPUNCTURE: CPT | Performed by: STUDENT IN AN ORGANIZED HEALTH CARE EDUCATION/TRAINING PROGRAM

## 2022-11-24 RX ORDER — GLUCAGON 1 MG
1 KIT INJECTION
Status: DISCONTINUED | OUTPATIENT
Start: 2022-11-24 | End: 2022-12-06 | Stop reason: HOSPADM

## 2022-11-24 RX ORDER — DEXTROSE MONOHYDRATE 100 MG/ML
12.5 INJECTION, SOLUTION INTRAVENOUS
Status: DISCONTINUED | OUTPATIENT
Start: 2022-11-24 | End: 2022-12-06 | Stop reason: HOSPADM

## 2022-11-24 RX ORDER — DEXTROSE MONOHYDRATE 100 MG/ML
25 INJECTION, SOLUTION INTRAVENOUS
Status: DISCONTINUED | OUTPATIENT
Start: 2022-11-24 | End: 2022-12-06 | Stop reason: HOSPADM

## 2022-11-24 RX ORDER — OXYCODONE HYDROCHLORIDE 5 MG/1
10 TABLET ORAL EVERY 6 HOURS PRN
Status: DISCONTINUED | OUTPATIENT
Start: 2022-11-24 | End: 2022-12-06 | Stop reason: HOSPADM

## 2022-11-24 RX ORDER — INSULIN ASPART 100 [IU]/ML
1-10 INJECTION, SOLUTION INTRAVENOUS; SUBCUTANEOUS EVERY 4 HOURS PRN
Status: DISCONTINUED | OUTPATIENT
Start: 2022-11-24 | End: 2022-12-06 | Stop reason: HOSPADM

## 2022-11-24 RX ORDER — IBUPROFEN 200 MG
24 TABLET ORAL
Status: DISCONTINUED | OUTPATIENT
Start: 2022-11-24 | End: 2022-12-06 | Stop reason: HOSPADM

## 2022-11-24 RX ORDER — IBUPROFEN 200 MG
16 TABLET ORAL
Status: DISCONTINUED | OUTPATIENT
Start: 2022-11-24 | End: 2022-12-06 | Stop reason: HOSPADM

## 2022-11-24 RX ADMIN — MORPHINE SULFATE 2 MG: 2 INJECTION, SOLUTION INTRAMUSCULAR; INTRAVENOUS at 07:11

## 2022-11-24 RX ADMIN — SODIUM CHLORIDE 75 ML/HR: 9 INJECTION, SOLUTION INTRAVENOUS at 02:11

## 2022-11-24 RX ADMIN — ENOXAPARIN SODIUM 30 MG: 30 INJECTION SUBCUTANEOUS at 09:11

## 2022-11-24 RX ADMIN — MORPHINE SULFATE 2 MG: 2 INJECTION, SOLUTION INTRAMUSCULAR; INTRAVENOUS at 11:11

## 2022-11-24 RX ADMIN — TRAZODONE HYDROCHLORIDE 50 MG: 50 TABLET ORAL at 09:11

## 2022-11-24 RX ADMIN — VANCOMYCIN HYDROCHLORIDE 1500 MG: 1 INJECTION, POWDER, LYOPHILIZED, FOR SOLUTION INTRAVENOUS at 04:11

## 2022-11-24 RX ADMIN — INSULIN ASPART 2 UNITS: 100 INJECTION, SOLUTION INTRAVENOUS; SUBCUTANEOUS at 09:11

## 2022-11-24 RX ADMIN — POLYETHYLENE GLYCOL 3350 17 G: 17 POWDER, FOR SOLUTION ORAL at 09:11

## 2022-11-24 RX ADMIN — INSULIN ASPART 10 UNITS: 100 INJECTION, SOLUTION INTRAVENOUS; SUBCUTANEOUS at 11:11

## 2022-11-24 RX ADMIN — DOCUSATE SODIUM 100 MG: 100 CAPSULE, LIQUID FILLED ORAL at 09:11

## 2022-11-24 RX ADMIN — DULOXETINE 60 MG: 30 CAPSULE, DELAYED RELEASE ORAL at 09:11

## 2022-11-24 RX ADMIN — OXYCODONE HYDROCHLORIDE 10 MG: 5 TABLET ORAL at 09:11

## 2022-11-24 RX ADMIN — MORPHINE SULFATE 2 MG: 2 INJECTION, SOLUTION INTRAMUSCULAR; INTRAVENOUS at 01:11

## 2022-11-24 RX ADMIN — INSULIN ASPART 10 UNITS: 100 INJECTION, SOLUTION INTRAVENOUS; SUBCUTANEOUS at 02:11

## 2022-11-24 RX ADMIN — INSULIN ASPART 5 UNITS: 100 INJECTION, SOLUTION INTRAVENOUS; SUBCUTANEOUS at 09:11

## 2022-11-24 RX ADMIN — MORPHINE SULFATE 2 MG: 2 INJECTION, SOLUTION INTRAMUSCULAR; INTRAVENOUS at 04:11

## 2022-11-24 RX ADMIN — VANCOMYCIN HYDROCHLORIDE 1500 MG: 1 INJECTION, POWDER, LYOPHILIZED, FOR SOLUTION INTRAVENOUS at 02:11

## 2022-11-24 RX ADMIN — PANTOPRAZOLE SODIUM 40 MG: 40 INJECTION, POWDER, LYOPHILIZED, FOR SOLUTION INTRAVENOUS at 09:11

## 2022-11-24 RX ADMIN — LEVOFLOXACIN 500 MG: 500 TABLET, FILM COATED ORAL at 09:11

## 2022-11-24 RX ADMIN — INSULIN DETEMIR 10 UNITS: 100 INJECTION, SOLUTION SUBCUTANEOUS at 06:11

## 2022-11-24 NOTE — ASSESSMENT & PLAN NOTE
S/P irrigation and debridement of left shoulder and knee  No veg on MONICA  Vanco  Repeat cultures pending

## 2022-11-24 NOTE — PLAN OF CARE
Problem: Adult Inpatient Plan of Care  Goal: Plan of Care Review  Outcome: Ongoing, Progressing  Goal: Patient-Specific Goal (Individualized)  Outcome: Ongoing, Progressing  Goal: Absence of Hospital-Acquired Illness or Injury  Outcome: Ongoing, Progressing  Goal: Optimal Comfort and Wellbeing  Outcome: Ongoing, Progressing  Goal: Readiness for Transition of Care  Outcome: Ongoing, Progressing     Problem: Diabetes Comorbidity  Goal: Blood Glucose Level Within Targeted Range  Outcome: Ongoing, Progressing     Problem: Impaired Wound Healing  Goal: Optimal Wound Healing  Outcome: Ongoing, Progressing     Problem: Fall Injury Risk  Goal: Absence of Fall and Fall-Related Injury  Outcome: Ongoing, Progressing     Problem: Restraint, Nonbehavioral (Nonviolent)  Goal: Absence of Harm or Injury  Outcome: Ongoing, Progressing     Problem: Infection  Goal: Absence of Infection Signs and Symptoms  Outcome: Ongoing, Progressing     Problem: Communication Impairment (Mechanical Ventilation, Invasive)  Goal: Effective Communication  Outcome: Ongoing, Progressing     Problem: Device-Related Complication Risk (Mechanical Ventilation, Invasive)  Goal: Optimal Device Function  Outcome: Ongoing, Progressing     Problem: Inability to Wean (Mechanical Ventilation, Invasive)  Goal: Mechanical Ventilation Liberation  Outcome: Ongoing, Progressing     Problem: Nutrition Impairment (Mechanical Ventilation, Invasive)  Goal: Optimal Nutrition Delivery  Outcome: Ongoing, Progressing     Problem: Skin and Tissue Injury (Mechanical Ventilation, Invasive)  Goal: Absence of Device-Related Skin and Tissue Injury  Outcome: Ongoing, Progressing     Problem: Ventilator-Induced Lung Injury (Mechanical Ventilation, Invasive)  Goal: Absence of Ventilator-Induced Lung Injury  Outcome: Ongoing, Progressing     Problem: Communication Impairment (Artificial Airway)  Goal: Effective Communication  Outcome: Ongoing, Progressing     Problem: Device-Related  Complication Risk (Artificial Airway)  Goal: Optimal Device Function  Outcome: Ongoing, Progressing     Problem: Skin and Tissue Injury (Artificial Airway)  Goal: Absence of Device-Related Skin or Tissue Injury  Outcome: Ongoing, Progressing     Problem: Skin Injury Risk Increased  Goal: Skin Health and Integrity  Outcome: Ongoing, Progressing

## 2022-11-24 NOTE — PROGRESS NOTES
[facility] - [address] [place] [address] Medicine  Progress Note    Patient Name: Monica Walker  MRN: [id number]  Patient Class: IP- Inpatient   Admission Date: 11/20/2022  Length of Stay: 4 days  Attending Physician: Adilson Fontanez DO  Primary Care Provider: Hannah Schulz MD        Subjective:     Principal Problem:Bacteremia        HPI:  Patient is a 53 yo female who presents to [facility] transferred from [facility] c/o L knee and R flank pain x 2 weeks. R flank pain is 10/10, squeezing in quality, without radiation, no aggravating or relieving factors. L knee pain started after a fall, which she is unable to recall date of the injury. The knee pain is 10/10, located in the medial joint line, aggravated by movement and weight-bearing, squeezing in quality, radiates down to L foot and without relieving factors including heat and ice. Patient has a history of type 2 diabetes, poorly controlled.    10 days ago patient was admitted at [facility] and treated for pyelonephritis on the left side with IV antibiotics although her urine culture did not grow any bacteria. 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 Today a CT scan of the abdomen pelvis and chest with IV contrast indicates bilateral peripherally dense cavitary lesions left lower lobe and right middle lobe bilateral pleural effusions left greater than right with possible loculation these areas are concerning for abscesses although a neoplastic process can not be entirely excluded. Abnormal appearance of the left kidney concerning for pyelonephritis. Patient was transferred to [facility] for further medical evaluation and management.         Overview/Hospital Course:  11/24-will need IV abx for 6 weeks from first negative blood cultures. Repeat today.  Requesting  swingbed in [place]. Will ask CM to assist      Interval History: NAEO    Review of Systems   Constitutional:  Positive for appetite change. Negative for chills and fever.   HENT:  Negative for hearing loss, nosebleeds, rhinorrhea, sinus pressure, sneezing, sore throat and trouble swallowing.    Eyes:  Negative for photophobia, pain, discharge, itching and visual disturbance.   Respiratory:  Negative for apnea, cough, chest tightness and shortness of breath.    Cardiovascular:  Negative for chest pain, palpitations and leg swelling.   Gastrointestinal:  Negative for abdominal distention, abdominal pain, blood in stool, constipation, diarrhea, nausea and vomiting.   Endocrine: Negative for polydipsia, polyphagia and polyuria.   Genitourinary:  Negative for difficulty urinating, dysuria, frequency, hematuria and urgency. Vaginal bleeding: left knee.  Musculoskeletal:  Positive for arthralgias (left knee and left shoulder), back pain and joint swelling (left knee). Negative for myalgias.   Skin:  Negative for rash.   Neurological:  Negative for dizziness, tremors, seizures, syncope, light-headedness, numbness and headaches.   Psychiatric/Behavioral:  Negative for agitation, confusion, hallucinations and sleep disturbance. The patient is not nervous/anxious.    Objective:     Vital Signs (Most Recent):  Temp: 98.3 °F (36.8 °C) (11/24/22 1200)  Pulse: (!) 113 (11/24/22 1200)  Resp: 18 (11/24/22 1200)  BP: (!) 149/69 (11/24/22 1200)  SpO2: (!) 94 % (11/24/22 1200)   Vital Signs (24h Range):  Temp:  [97.9 °F (36.6 °C)-98.4 °F (36.9 °C)] 98.3 °F (36.8 °C)  Pulse:  [] 113  Resp:  [18-20] 18  SpO2:  [93 %-95 %] 94 %  BP: (123-154)/(50-70) 149/69     Weight: 73.8 kg (162 lb 11.2 oz)  Body mass index is 26.26 kg/m².    Intake/Output Summary (Last 24 hours) at 11/24/2022 1541  Last data filed at 11/24/2022 1306  Gross per 24 hour   Intake 250 ml   Output 400 ml   Net -150 ml      Physical Exam  Vitals reviewed.    Constitutional:       Appearance: Normal appearance.   HENT:      Head: Normocephalic and atraumatic.      Right Ear: External ear normal.      Left Ear: External ear normal.   Eyes:      Extraocular Movements: Extraocular movements intact.      Conjunctiva/sclera: Conjunctivae normal.   Cardiovascular:      Rate and Rhythm: Normal rate and regular rhythm.      Pulses: Normal pulses.   Pulmonary:      Effort: Pulmonary effort is normal.   Abdominal:      General: Bowel sounds are normal.   Musculoskeletal:         General: Swelling (left knee) and tenderness (left knee and left shoulder) present.      Cervical back: Normal range of motion and neck supple.      Comments: Normal tone, ROM limited by pain   Skin:     General: Skin is warm.      Capillary Refill: Capillary refill takes less than 2 seconds.   Neurological:      General: No focal deficit present.      Mental Status: She is alert and oriented to person, place, and time.   Psychiatric:         Mood and Affect: Mood normal.         Behavior: Behavior normal.         Thought Content: Thought content normal.       Significant Labs: All pertinent labs within the past 24 hours have been reviewed.    Significant Imaging: I have reviewed all pertinent imaging results/findings within the past 24 hours.      Assessment/Plan:      * Bacteremia  Repeat cultures today  Continue anneliese murillo    DM (diabetes mellitus)  Basal, SSI  Prandial if needed    Lung abscess  Likely seeding  Continue vanco            Septic arthritis  S/P irrigation and debridement of left shoulder and knee  No veg on MONICA  Vanco  Repeat cultures pending      Depression  Continue home Cymbalta  Continue to monitor           VTE Risk Mitigation (From admission, onward)         Ordered     enoxaparin injection 30 mg  Every 12 hours         11/22/22 1524     IP VTE HIGH RISK PATIENT  Once         11/22/22 1524     Place DONOVAN hose  Until discontinued         11/22/22 1524     enoxaparin injection  40 mg  Daily         11/20/22 0519     Place sequential compression device  Until discontinued         11/20/22 0519                Discharge Planning   SUZANNA:      Code Status: Full Code   Is the patient medically ready for discharge?:     Reason for patient still in hospital (select all that apply): Treatment  Discharge Plan A: Home Health, Home with family                  Adilson Fontanez DO  Department of Hospital Medicine   [facility] - [address] [place] [address]

## 2022-11-24 NOTE — PT/OT/SLP PROGRESS
Physical Therapy Treatment    Patient Name:  Monica Walker   MRN:  [id number]    Recommendations:     Discharge Recommendations:  rehabilitation facility, home health PT, home with hospice   Discharge Equipment Recommendations: none   Barriers to discharge: None    Assessment:     Monica Walker is a 52 y.o. female admitted with a medical diagnosis of Bacteremia.  She presents with the following impairments/functional limitations:    Patient s/p I/D of left shoulder and left knee. Patient having moderate pain. Currently on pressors with rocco awaiting heart cath. Patient is agreeable to a little exercise in bed. .    Rehab Prognosis: Good; patient would benefit from acute skilled PT services to address these deficits and reach maximum level of function.    Recent Surgery: Procedure(s) (LRB):  Transesophageal echo (MONICA) intra-procedure log documentation (N/A) Day of Surgery    Plan:     During this hospitalization, patient to be seen daily to address the identified rehab impairments via gait training, therapeutic activities and progress toward the following goals:    Plan of Care Expires:  12/20/22    Subjective     Chief Complaint: post op soreness  Patient/Family Comments/goals: Patient having heart cath later today  Pain/Comfort:  Pain Rating 1: 4/10  Location - Side 1: Left  Location 1: leg  Pain Addressed 2: Cessation of Activity      Objective:     Communicated with nruse prior to session.  Patient found supine with peripheral IV, telemetry upon PT entry to room.     General Precautions: Standard, fall, contact (hx of MRSA and likely septic L TKR)   Orthopedic Precautions:N/A   Braces:    Respiratory Status: nasal canula 2L/min     Functional Mobility:  NT due to low BP and awaiting heart cath      AM-PAC 6 CLICK MOBILITY  Turning over in bed (including adjusting bedclothes, sheets and blankets)?: 3  Sitting down on and standing up from a chair with arms (e.g., wheelchair, bedside commode, etc.): 3  Moving  from lying on back to sitting on the side of the bed?: 3  Moving to and from a bed to a chair (including a wheelchair)?: 3  Need to walk in hospital room?: 3  Climbing 3-5 steps with a railing?: 3  Basic Mobility Total Score: 18       Treatment & Education:  LLE: aps, slr, abd-add, qs 3x10    Patient left supine with all lines intact and call button in reach..    GOALS:   Multidisciplinary Problems       Physical Therapy Goals          Problem: Physical Therapy    Goal Priority Disciplines Outcome Goal Variances Interventions   Physical Therapy Goal     PT, PT/OT Ongoing, Progressing     Description: Short Term Goals to be met by: 2022    Patient will increase functional independence with mobility by performin. Supine to sit with independently  2. Sit to stand transfer with independently using Rolling walker  3. Bed to chair transfer with independently using Rolling walker  4. Gait  x 200 feet with independently using Rolling walker  5. Lower extremity exercise program x30 reps per handout, with assistance as needed    Long Term Goals to be met by: 2022    Pt will regain full independent functional mobility with lowest level of assistive device to return to home situation and prior activities of daily living.                        Time Tracking:     PT Received On: 22  PT Start Time: 1145     PT Stop Time: 1200  PT Total Time (min): 15 min     Billable Minutes: Therapeutic Exercise 10    Treatment Type: Treatment  PT/PTA: PT     PTA Visit Number: 0     2022

## 2022-11-24 NOTE — HOSPITAL COURSE
11/24-will need IV abx for 6 weeks from first negative blood cultures. Repeat today.  Requesting swingbed in [place]. Will ask CM to assist  11/25- pain better controlled today. Will need definitive plan from Ortho prior to DC  11/26-febrile overnight, continues to have pain. Suspect poor source control.  Repeat cultures  11/27- afebrile, having some right hip pain today.  Worse lying flat, better with flexion  11/28-if blood cultures remain negative will place picc tomorrow and DC to [place] swingbed for 6 weeks abx  11/29- persistently positive blood cultures.  2 days now of complaining of right lower abdominal pain and right hip pain.  Exam not consistent with septic arthritis however given persistently positive cultures will scan for effusion.  Psoas abscess consideration as well. Discussed with Radiology.  11/30-awaiting fluid from right hip.  12/1-no infection in right hip, still denies back pain. Will check for epidural abscess if most recent blood cultures positive.  12/2- Discussed case with Dr. Mendoza yesterday.  Recommending tagged WBC scan.  He believes this may be a discitis or osteo seeding- will await latest blood cultures. Order this imaging if they return positive.  Staff reports some tenderness and drainage from left knee.  Ortho will see today.  12/3- blood cultures remain negative.  Lower knee incision red and tender with some drainage.  12/4-blood cultures negative. Less drainage today from left knee.  12/5/2022 -patient complains of left knee pain.  Dressing noted over the left knee.  Right lower extremity pain present.  Continue current pain regimen.  Aggressive physical therapy.  Patient blood cultures negative for 72 hours, collected at 11/30.  We will order PICC line today.   has arranged placement at [place].    12/6: PICC line placed today. Patient will be discharged to [facility] for 6 weeks of IV ATB therapy with Vancomycin. 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

## 2022-11-24 NOTE — NURSING
Patient is alert and orientedx4 lying in bed with her girlfriend. Resp even and unlabored. Patient complains of left knee pain rated as a 7/10 which has been addressed with prn morphine. Patient has a dry and intact dressing to left shoulder and her left knee is in a brace.both of the patients heels are padded with foam dressings.vitals are stable. Patient does not have on a heart monitor her girlfriend stated that she was taken off her heart monitor yesterday by the nurse. Bed low, cb in reach,

## 2022-11-24 NOTE — SUBJECTIVE & OBJECTIVE
Interval History: NAEO    Review of Systems   Constitutional:  Positive for appetite change. Negative for chills and fever.   HENT:  Negative for hearing loss, nosebleeds, rhinorrhea, sinus pressure, sneezing, sore throat and trouble swallowing.    Eyes:  Negative for photophobia, pain, discharge, itching and visual disturbance.   Respiratory:  Negative for apnea, cough, chest tightness and shortness of breath.    Cardiovascular:  Negative for chest pain, palpitations and leg swelling.   Gastrointestinal:  Negative for abdominal distention, abdominal pain, blood in stool, constipation, diarrhea, nausea and vomiting.   Endocrine: Negative for polydipsia, polyphagia and polyuria.   Genitourinary:  Negative for difficulty urinating, dysuria, frequency, hematuria and urgency. Vaginal bleeding: left knee.  Musculoskeletal:  Positive for arthralgias (left knee and left shoulder), back pain and joint swelling (left knee). Negative for myalgias.   Skin:  Negative for rash.   Neurological:  Negative for dizziness, tremors, seizures, syncope, light-headedness, numbness and headaches.   Psychiatric/Behavioral:  Negative for agitation, confusion, hallucinations and sleep disturbance. The patient is not nervous/anxious.    Objective:     Vital Signs (Most Recent):  Temp: 98.3 °F (36.8 °C) (11/24/22 1200)  Pulse: (!) 113 (11/24/22 1200)  Resp: 18 (11/24/22 1200)  BP: (!) 149/69 (11/24/22 1200)  SpO2: (!) 94 % (11/24/22 1200)   Vital Signs (24h Range):  Temp:  [97.9 °F (36.6 °C)-98.4 °F (36.9 °C)] 98.3 °F (36.8 °C)  Pulse:  [] 113  Resp:  [18-20] 18  SpO2:  [93 %-95 %] 94 %  BP: (123-154)/(50-70) 149/69     Weight: 73.8 kg (162 lb 11.2 oz)  Body mass index is 26.26 kg/m².    Intake/Output Summary (Last 24 hours) at 11/24/2022 1541  Last data filed at 11/24/2022 1306  Gross per 24 hour   Intake 250 ml   Output 400 ml   Net -150 ml      Physical Exam  Vitals reviewed.   Constitutional:       Appearance: Normal appearance.    HENT:      Head: Normocephalic and atraumatic.      Right Ear: External ear normal.      Left Ear: External ear normal.   Eyes:      Extraocular Movements: Extraocular movements intact.      Conjunctiva/sclera: Conjunctivae normal.   Cardiovascular:      Rate and Rhythm: Normal rate and regular rhythm.      Pulses: Normal pulses.   Pulmonary:      Effort: Pulmonary effort is normal.   Abdominal:      General: Bowel sounds are normal.   Musculoskeletal:         General: Swelling (left knee) and tenderness (left knee and left shoulder) present.      Cervical back: Normal range of motion and neck supple.      Comments: Normal tone, ROM limited by pain   Skin:     General: Skin is warm.      Capillary Refill: Capillary refill takes less than 2 seconds.   Neurological:      General: No focal deficit present.      Mental Status: She is alert and oriented to person, place, and time.   Psychiatric:         Mood and Affect: Mood normal.         Behavior: Behavior normal.         Thought Content: Thought content normal.       Significant Labs: All pertinent labs within the past 24 hours have been reviewed.    Significant Imaging: I have reviewed all pertinent imaging results/findings within the past 24 hours.

## 2022-11-24 NOTE — PT/OT/SLP PROGRESS
Physical Therapy Treatment    Patient Name:  Monica Walker   MRN:  [id number]    Recommendations:     Discharge Recommendations:  nursing facility, skilled, home health PT, LTACH (long-term acute care hospital)   Discharge Equipment Recommendations: walker, rolling   Barriers to discharge: Inaccessible home    Assessment:     Monica Walker is a 52 y.o. female admitted with a medical diagnosis of Bacteremia.  She presents with the following impairments/functional limitations:  weakness, impaired endurance, impaired functional mobility, gait instability, decreased lower extremity function, decreased upper extremity function, pain, impaired balance, orthopedic precautions Pt was more alert today and wanting to get out of bed. Still having pain to left LE. Pt was able to stand and step to chair with minimal assistance using rolling walker. She is somewhat impulsive and requires assistance for safety. Will need antibiotics for 6 weeks per MD. Will progress mobility as able    Rehab Prognosis: Fair; patient would benefit from acute skilled PT services to address these deficits and reach maximum level of function.    Recent Surgery: Procedure(s) (LRB):  Transesophageal echo (MONICA) intra-procedure log documentation (N/A) 1 Day Post-Op    Plan:     During this hospitalization, patient to be seen daily to address the identified rehab impairments via gait training, therapeutic activities, therapeutic exercises and progress toward the following goals:    Plan of Care Expires:  12/20/22    Subjective     Chief Complaint: bacteremia  Patient/Family Comments/goals: Pt wants to get out of bed  Pain/Comfort:  Pain Rating 1: 7/10  Location - Side 1: Left  Location - Orientation 1: lower  Location 1: back  Pain Addressed 1: Reposition, Pre-medicate for activity  Pain Rating Post-Intervention 1: 7/10      Objective:     Communicated with ETHAN Damon RN prior to session.  Patient found supine with peripheral IV, telemetry, knee  immobilizer upon PT entry to room.     General Precautions: Standard, fall   Orthopedic Precautions:N/A   Braces: N/A  Respiratory Status: Room air     Functional Mobility:  Bed Mobility:     Scooting: contact guard assistance  Supine to Sit: minimum assistance  Transfers:     Sit to Stand:  minimum assistance with rolling walker  Bed to Chair: minimum assistance with  rolling walker  using  Step Transfer  Gait: 5 ft minimal assistance with rolling walker, step to pattern  Balance: fair      AM-PAC 6 CLICK MOBILITY  Turning over in bed (including adjusting bedclothes, sheets and blankets)?: 3  Sitting down on and standing up from a chair with arms (e.g., wheelchair, bedside commode, etc.): 3  Moving from lying on back to sitting on the side of the bed?: 3  Moving to and from a bed to a chair (including a wheelchair)?: 3  Need to walk in hospital room?: 3  Climbing 3-5 steps with a railing?: 2  Basic Mobility Total Score: 17       Treatment & Education:  Pt performed left LE: ankle pumps, Quad sets, hip abduction/adduction, and straight leg raises x 30 each  Pt assisted with bed mobility for hygiene due to wet brief. Pt was able to stand with minimal assistance with verbal cues for sequencing, sit to stand x 3 trials, assisted with dressing, stepped to chair with minimal assistance       Patient left up in chair with all lines intact and call button in reach..    GOALS:   Multidisciplinary Problems       Physical Therapy Goals          Problem: Physical Therapy    Goal Priority Disciplines Outcome Goal Variances Interventions   Physical Therapy Goal     PT, PT/OT Ongoing, Progressing     Description: Short Term Goals to be met by: 2022    Patient will increase functional independence with mobility by performin. Supine to sit with independently  2. Sit to stand transfer with independently using Rolling walker  3. Bed to chair transfer with independently using Rolling walker  4. Gait  x 200 feet with  independently using Rolling walker  5. Lower extremity exercise program x30 reps per handout, with assistance as needed    Long Term Goals to be met by: 1/20/2022    Pt will regain full independent functional mobility with lowest level of assistive device to return to home situation and prior activities of daily living.                        Time Tracking:     PT Received On: 11/24/22  PT Start Time: 0912     PT Stop Time: 0945  PT Total Time (min): 33 min     Billable Minutes: Therapeutic Activity 15 and Therapeutic Exercise 10    Treatment Type: Treatment  PT/PTA: PT     PTA Visit Number: 0     11/24/2022

## 2022-11-25 LAB
ANION GAP SERPL CALCULATED.3IONS-SCNC: 8 MMOL/L (ref 7–16)
BACTERIA SPEC ANAEROBE CULT: NORMAL
BACTERIA SPEC ANAEROBE CULT: NORMAL
BUN SERPL-MCNC: 5 MG/DL (ref 7–18)
BUN/CREAT SERPL: 9 (ref 6–20)
CALCIUM SERPL-MCNC: 8.2 MG/DL (ref 8.5–10.1)
CHLORIDE SERPL-SCNC: 90 MMOL/L (ref 98–107)
CO2 SERPL-SCNC: 33 MMOL/L (ref 21–32)
CREAT SERPL-MCNC: 0.58 MG/DL (ref 0.55–1.02)
EGFR (NO RACE VARIABLE) (RUSH/TITUS): 108 ML/MIN/1.73M²
GLUCOSE SERPL-MCNC: 235 MG/DL (ref 70–105)
GLUCOSE SERPL-MCNC: 239 MG/DL (ref 74–106)
GLUCOSE SERPL-MCNC: 266 MG/DL (ref 70–105)
GLUCOSE SERPL-MCNC: 268 MG/DL (ref 70–105)
GLUCOSE SERPL-MCNC: 324 MG/DL (ref 70–105)
GLUCOSE SERPL-MCNC: 381 MG/DL (ref 70–105)
GLUCOSE SERPL-MCNC: 513 MG/DL (ref 70–105)
POTASSIUM SERPL-SCNC: 3.2 MMOL/L (ref 3.5–5.1)
SODIUM SERPL-SCNC: 128 MMOL/L (ref 136–145)
VANCOMYCIN TROUGH SERPL-MCNC: 15.1 ΜG/ML (ref 10–20)

## 2022-11-25 PROCEDURE — 99232 PR SUBSEQUENT HOSPITAL CARE,LEVL II: ICD-10-PCS | Mod: ,,, | Performed by: STUDENT IN AN ORGANIZED HEALTH CARE EDUCATION/TRAINING PROGRAM

## 2022-11-25 PROCEDURE — 11000001 HC ACUTE MED/SURG PRIVATE ROOM

## 2022-11-25 PROCEDURE — 80048 BASIC METABOLIC PNL TOTAL CA: CPT | Performed by: HOSPITALIST

## 2022-11-25 PROCEDURE — 97110 THERAPEUTIC EXERCISES: CPT | Mod: CO

## 2022-11-25 PROCEDURE — 63600175 PHARM REV CODE 636 W HCPCS: Performed by: STUDENT IN AN ORGANIZED HEALTH CARE EDUCATION/TRAINING PROGRAM

## 2022-11-25 PROCEDURE — 63600175 PHARM REV CODE 636 W HCPCS: Performed by: NURSE PRACTITIONER

## 2022-11-25 PROCEDURE — C9113 INJ PANTOPRAZOLE SODIUM, VIA: HCPCS | Performed by: NURSE PRACTITIONER

## 2022-11-25 PROCEDURE — 36415 COLL VENOUS BLD VENIPUNCTURE: CPT | Performed by: HOSPITALIST

## 2022-11-25 PROCEDURE — 25000003 PHARM REV CODE 250: Performed by: GENERAL PRACTICE

## 2022-11-25 PROCEDURE — 94761 N-INVAS EAR/PLS OXIMETRY MLT: CPT

## 2022-11-25 PROCEDURE — 63600175 PHARM REV CODE 636 W HCPCS: Performed by: INTERNAL MEDICINE

## 2022-11-25 PROCEDURE — 82962 GLUCOSE BLOOD TEST: CPT

## 2022-11-25 PROCEDURE — 99232 SBSQ HOSP IP/OBS MODERATE 35: CPT | Mod: ,,, | Performed by: STUDENT IN AN ORGANIZED HEALTH CARE EDUCATION/TRAINING PROGRAM

## 2022-11-25 PROCEDURE — 25000003 PHARM REV CODE 250: Performed by: ORTHOPAEDIC SURGERY

## 2022-11-25 PROCEDURE — 97110 THERAPEUTIC EXERCISES: CPT

## 2022-11-25 PROCEDURE — 63600175 PHARM REV CODE 636 W HCPCS: Performed by: ORTHOPAEDIC SURGERY

## 2022-11-25 PROCEDURE — 25000003 PHARM REV CODE 250: Performed by: STUDENT IN AN ORGANIZED HEALTH CARE EDUCATION/TRAINING PROGRAM

## 2022-11-25 PROCEDURE — 27000221 HC OXYGEN, UP TO 24 HOURS

## 2022-11-25 PROCEDURE — 80202 ASSAY OF VANCOMYCIN: CPT | Performed by: STUDENT IN AN ORGANIZED HEALTH CARE EDUCATION/TRAINING PROGRAM

## 2022-11-25 RX ADMIN — PANTOPRAZOLE SODIUM 40 MG: 40 INJECTION, POWDER, LYOPHILIZED, FOR SOLUTION INTRAVENOUS at 09:11

## 2022-11-25 RX ADMIN — VANCOMYCIN HYDROCHLORIDE 1250 MG: 1 INJECTION, POWDER, LYOPHILIZED, FOR SOLUTION INTRAVENOUS at 04:11

## 2022-11-25 RX ADMIN — ENOXAPARIN SODIUM 30 MG: 30 INJECTION SUBCUTANEOUS at 09:11

## 2022-11-25 RX ADMIN — OXYCODONE HYDROCHLORIDE 10 MG: 5 TABLET ORAL at 09:11

## 2022-11-25 RX ADMIN — VANCOMYCIN HYDROCHLORIDE 1250 MG: 1 INJECTION, POWDER, LYOPHILIZED, FOR SOLUTION INTRAVENOUS at 08:11

## 2022-11-25 RX ADMIN — VANCOMYCIN HYDROCHLORIDE 1250 MG: 1 INJECTION, POWDER, LYOPHILIZED, FOR SOLUTION INTRAVENOUS at 01:11

## 2022-11-25 RX ADMIN — INSULIN ASPART 4 UNITS: 100 INJECTION, SOLUTION INTRAVENOUS; SUBCUTANEOUS at 09:11

## 2022-11-25 RX ADMIN — INSULIN ASPART 10 UNITS: 100 INJECTION, SOLUTION INTRAVENOUS; SUBCUTANEOUS at 12:11

## 2022-11-25 RX ADMIN — TRAZODONE HYDROCHLORIDE 50 MG: 50 TABLET ORAL at 08:11

## 2022-11-25 RX ADMIN — DULOXETINE 60 MG: 30 CAPSULE, DELAYED RELEASE ORAL at 09:11

## 2022-11-25 RX ADMIN — ENOXAPARIN SODIUM 30 MG: 30 INJECTION SUBCUTANEOUS at 08:11

## 2022-11-25 RX ADMIN — INSULIN ASPART 6 UNITS: 100 INJECTION, SOLUTION INTRAVENOUS; SUBCUTANEOUS at 06:11

## 2022-11-25 RX ADMIN — INSULIN ASPART 4 UNITS: 100 INJECTION, SOLUTION INTRAVENOUS; SUBCUTANEOUS at 04:11

## 2022-11-25 RX ADMIN — VANCOMYCIN HYDROCHLORIDE 1250 MG: 1 INJECTION, POWDER, LYOPHILIZED, FOR SOLUTION INTRAVENOUS at 11:11

## 2022-11-25 RX ADMIN — OXYCODONE HYDROCHLORIDE 10 MG: 5 TABLET ORAL at 04:11

## 2022-11-25 RX ADMIN — OXYCODONE HYDROCHLORIDE 10 MG: 5 TABLET ORAL at 11:11

## 2022-11-25 RX ADMIN — INSULIN ASPART 3 UNITS: 100 INJECTION, SOLUTION INTRAVENOUS; SUBCUTANEOUS at 01:11

## 2022-11-25 RX ADMIN — INSULIN DETEMIR 15 UNITS: 100 INJECTION, SOLUTION SUBCUTANEOUS at 06:11

## 2022-11-25 RX ADMIN — LEVOFLOXACIN 500 MG: 500 TABLET, FILM COATED ORAL at 09:11

## 2022-11-25 RX ADMIN — DOCUSATE SODIUM 100 MG: 100 CAPSULE, LIQUID FILLED ORAL at 09:11

## 2022-11-25 NOTE — PLAN OF CARE
SS spoke with patient regarding LTAC/swb placement. Patient gave choice for [facility] or [facility] in [place]. SS spoke with Dr. Fontanez, patient not ready for discharge. SS will send referral once closer to discharge. SS following.

## 2022-11-25 NOTE — PROGRESS NOTES
[facility] [address] [place] [address] Medicine  Progress Note    Patient Name: Monica Walker  MRN: [id number]  Patient Class: IP- Inpatient   Admission Date: 11/20/2022  Length of Stay: 5 days  Attending Physician: Adilson Fontanez DO  Primary Care Provider: Hannah Schulz MD        Subjective:     Principal Problem:Bacteremia        HPI:  Patient is a 51 yo female who presents to [facility] transferred from [facility] c/o L knee and R flank pain x 2 weeks. R flank pain is 10/10, squeezing in quality, without radiation, no aggravating or relieving factors. L knee pain started after a fall, which she is unable to recall date of the injury. The knee pain is 10/10, located in the medial joint line, aggravated by movement and weight-bearing, squeezing in quality, radiates down to L foot and without relieving factors including heat and ice. Patient has a history of type 2 diabetes, poorly controlled.    10 days ago patient was admitted at [facility] and treated for pyelonephritis on the left side with IV antibiotics although her urine culture did not grow any bacteria. 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 Today a CT scan of the abdomen pelvis and chest with IV contrast indicates bilateral peripherally dense cavitary lesions left lower lobe and right middle lobe bilateral pleural effusions left greater than right with possible loculation these areas are concerning for abscesses although a neoplastic process can not be entirely excluded. Abnormal appearance of the left kidney concerning for pyelonephritis. Patient was transferred to [facility] for further medical evaluation and management.         Overview/Hospital Course:  11/24-will need IV abx for 6 weeks from first negative blood cultures. Repeat today.  Requesting  swingbed in [place]. Will ask CM to assist  11/25- pain better controlled today. Will need definitive plan from Ortho prior to DC      Interval History: NAEO    Review of Systems   Constitutional:  Positive for appetite change. Negative for chills and fever.   HENT:  Negative for hearing loss, nosebleeds, rhinorrhea, sinus pressure, sneezing, sore throat and trouble swallowing.    Eyes:  Negative for photophobia, pain, discharge, itching and visual disturbance.   Respiratory:  Negative for apnea, cough, chest tightness and shortness of breath.    Cardiovascular:  Negative for chest pain, palpitations and leg swelling.   Gastrointestinal:  Negative for abdominal distention, abdominal pain, blood in stool, constipation, diarrhea, nausea and vomiting.   Endocrine: Negative for polydipsia, polyphagia and polyuria.   Genitourinary:  Negative for difficulty urinating, dysuria, frequency, hematuria and urgency. Vaginal bleeding: left knee.  Musculoskeletal:  Positive for arthralgias (left knee and left shoulder), back pain and joint swelling (left knee). Negative for myalgias.   Skin:  Negative for rash.   Neurological:  Negative for dizziness, tremors, seizures, syncope, light-headedness, numbness and headaches.   Psychiatric/Behavioral:  Negative for agitation, confusion, hallucinations and sleep disturbance. The patient is not nervous/anxious.    Objective:     Vital Signs (Most Recent):  Temp: 97.8 °F (36.6 °C) (11/25/22 1200)  Pulse: 98 (11/25/22 1200)  Resp: 18 (11/25/22 1200)  BP: (!) 154/79 (11/25/22 1200)  SpO2: (!) 94 % (11/25/22 1200)   Vital Signs (24h Range):  Temp:  [97.8 °F (36.6 °C)-99.9 °F (37.7 °C)] 97.8 °F (36.6 °C)  Pulse:  [] 98  Resp:  [18-20] 18  SpO2:  [94 %-99 %] 94 %  BP: (117-162)/(57-85) 154/79     Weight: 73.8 kg (162 lb 11.2 oz)  Body mass index is 26.26 kg/m².  No intake or output data in the 24 hours ending 11/25/22 1610     Physical Exam  Vitals reviewed.   Constitutional:        Appearance: Normal appearance.   HENT:      Head: Normocephalic and atraumatic.      Right Ear: External ear normal.      Left Ear: External ear normal.   Eyes:      Extraocular Movements: Extraocular movements intact.      Conjunctiva/sclera: Conjunctivae normal.   Cardiovascular:      Rate and Rhythm: Normal rate and regular rhythm.      Pulses: Normal pulses.   Pulmonary:      Effort: Pulmonary effort is normal.   Abdominal:      General: Bowel sounds are normal.   Musculoskeletal:         General: Swelling (left knee) and tenderness (left knee and left shoulder) present.      Cervical back: Normal range of motion and neck supple.      Comments: Normal tone, ROM limited by pain   Skin:     General: Skin is warm.      Capillary Refill: Capillary refill takes less than 2 seconds.   Neurological:      General: No focal deficit present.      Mental Status: She is alert and oriented to person, place, and time.   Psychiatric:         Mood and Affect: Mood normal.         Behavior: Behavior normal.         Thought Content: Thought content normal.       Significant Labs: All pertinent labs within the past 24 hours have been reviewed.    Significant Imaging: I have reviewed all pertinent imaging results/findings within the past 24 hours.      Assessment/Plan:      * Bacteremia  Repeat cultures without growth  Continue vanco  Bronchial cultures with MRSA, stop Levaquin    DM (diabetes mellitus)  Basal, SSI  Prandial if needed    Lung abscess  Likely seeding  Continue vanco            Septic arthritis  S/P irrigation and debridement of left shoulder and knee  No veg on MONICA  Vanco  Repeat cultures pending      Depression  Continue home Cymbalta            VTE Risk Mitigation (From admission, onward)         Ordered     enoxaparin injection 30 mg  Every 12 hours         11/22/22 1524     IP VTE HIGH RISK PATIENT  Once         11/22/22 1524     Place DONOVAN hose  Until discontinued         11/22/22 1524     enoxaparin injection  40 mg  Daily         11/20/22 0519     Place sequential compression device  Until discontinued         11/20/22 0519                Discharge Planning   SUZANNA:      Code Status: Full Code   Is the patient medically ready for discharge?:     Reason for patient still in hospital (select all that apply): Treatment  Discharge Plan A: Home Health, Home with family                  Adilson Fontanez DO  Department of Hospital Medicine   [facility] - [address] [place] [address]

## 2022-11-25 NOTE — PROGRESS NOTES
Pharmacy consulted for vancomycin dosing. Currently dosing vancomycin 1500 mg IV every 12 hours which resulted in a subtherapeutic trough. We will increase to vancomycin 1250 mg IV every 8 hours and check another trough 11/25/22 1530. Pharmacy will monitor daily and adjust as necessary.

## 2022-11-25 NOTE — SUBJECTIVE & OBJECTIVE
Interval History: NAEO    Review of Systems   Constitutional:  Positive for appetite change. Negative for chills and fever.   HENT:  Negative for hearing loss, nosebleeds, rhinorrhea, sinus pressure, sneezing, sore throat and trouble swallowing.    Eyes:  Negative for photophobia, pain, discharge, itching and visual disturbance.   Respiratory:  Negative for apnea, cough, chest tightness and shortness of breath.    Cardiovascular:  Negative for chest pain, palpitations and leg swelling.   Gastrointestinal:  Negative for abdominal distention, abdominal pain, blood in stool, constipation, diarrhea, nausea and vomiting.   Endocrine: Negative for polydipsia, polyphagia and polyuria.   Genitourinary:  Negative for difficulty urinating, dysuria, frequency, hematuria and urgency. Vaginal bleeding: left knee.  Musculoskeletal:  Positive for arthralgias (left knee and left shoulder), back pain and joint swelling (left knee). Negative for myalgias.   Skin:  Negative for rash.   Neurological:  Negative for dizziness, tremors, seizures, syncope, light-headedness, numbness and headaches.   Psychiatric/Behavioral:  Negative for agitation, confusion, hallucinations and sleep disturbance. The patient is not nervous/anxious.    Objective:     Vital Signs (Most Recent):  Temp: 97.8 °F (36.6 °C) (11/25/22 1200)  Pulse: 98 (11/25/22 1200)  Resp: 18 (11/25/22 1200)  BP: (!) 154/79 (11/25/22 1200)  SpO2: (!) 94 % (11/25/22 1200)   Vital Signs (24h Range):  Temp:  [97.8 °F (36.6 °C)-99.9 °F (37.7 °C)] 97.8 °F (36.6 °C)  Pulse:  [] 98  Resp:  [18-20] 18  SpO2:  [94 %-99 %] 94 %  BP: (117-162)/(57-85) 154/79     Weight: 73.8 kg (162 lb 11.2 oz)  Body mass index is 26.26 kg/m².  No intake or output data in the 24 hours ending 11/25/22 1610     Physical Exam  Vitals reviewed.   Constitutional:       Appearance: Normal appearance.   HENT:      Head: Normocephalic and atraumatic.      Right Ear: External ear normal.      Left Ear: External  ear normal.   Eyes:      Extraocular Movements: Extraocular movements intact.      Conjunctiva/sclera: Conjunctivae normal.   Cardiovascular:      Rate and Rhythm: Normal rate and regular rhythm.      Pulses: Normal pulses.   Pulmonary:      Effort: Pulmonary effort is normal.   Abdominal:      General: Bowel sounds are normal.   Musculoskeletal:         General: Swelling (left knee) and tenderness (left knee and left shoulder) present.      Cervical back: Normal range of motion and neck supple.      Comments: Normal tone, ROM limited by pain   Skin:     General: Skin is warm.      Capillary Refill: Capillary refill takes less than 2 seconds.   Neurological:      General: No focal deficit present.      Mental Status: She is alert and oriented to person, place, and time.   Psychiatric:         Mood and Affect: Mood normal.         Behavior: Behavior normal.         Thought Content: Thought content normal.       Significant Labs: All pertinent labs within the past 24 hours have been reviewed.    Significant Imaging: I have reviewed all pertinent imaging results/findings within the past 24 hours.

## 2022-11-25 NOTE — PT/OT/SLP PROGRESS
Physical Therapy Treatment    Patient Name:  Monica Walker   MRN:  [id number]    Recommendations:     Discharge Recommendations:  nursing facility, skilled, home health PT, LTACH (long-term acute care hospital)   Discharge Equipment Recommendations: walker, rolling   Barriers to discharge: Inaccessible home and Decreased caregiver support    Assessment:     Monica Walker is a 52 y.o. female admitted with a medical diagnosis of Bacteremia.  She presents with the following impairments/functional limitations:  weakness, impaired endurance, impaired functional mobility, gait instability, decreased lower extremity function, decreased upper extremity function, pain, impaired balance, orthopedic precautions Pt agreeable to bed exercises only as she has been out of bed multiple times today. Pt is improving in LLE strength but still painful. Will continue to increase activity per pt tolerance.    Rehab Prognosis: Fair; patient would benefit from acute skilled PT services to address these deficits and reach maximum level of function.    Recent Surgery: Procedure(s) (LRB):  Transesophageal echo (MONICA) intra-procedure log documentation (N/A) 2 Days Post-Op    Plan:     During this hospitalization, patient to be seen daily to address the identified rehab impairments via gait training, therapeutic activities, therapeutic exercises and progress toward the following goals:    Plan of Care Expires:  12/20/22    Subjective     Chief Complaint: sepsis  Patient/Family Comments/goals: Pt doesn't want to get out of bed   Pain/Comfort:  Pain Rating 1: 7/10  Location - Side 1: Left  Location 1: knee  Pain Addressed 1: Pre-medicate for activity  Pain Rating Post-Intervention 1: 7/10      Objective:     Communicated with MOSES West RN prior to session.  Patient found supine with peripheral IV, telemetry, knee immobilizer upon PT entry to room.     General Precautions: Standard, fall   Orthopedic Precautions:N/A   Braces: N/A  Respiratory  Status: Room air     Functional Mobility:  Not performed      AM-PAC 6 CLICK MOBILITY  Turning over in bed (including adjusting bedclothes, sheets and blankets)?: 3  Sitting down on and standing up from a chair with arms (e.g., wheelchair, bedside commode, etc.): 3  Moving from lying on back to sitting on the side of the bed?: 3  Moving to and from a bed to a chair (including a wheelchair)?: 3  Need to walk in hospital room?: 3  Climbing 3-5 steps with a railing?: 2  Basic Mobility Total Score: 17       Treatment & Education:  Pt performed left LE: ankle pumps, Quad sets, hip abduction/adduction, and straight leg raises x 30 each  DF stretch, HS stretch 2 x 30 seconds each    Patient left supine with all lines intact and call button in reach..    GOALS:   Multidisciplinary Problems       Physical Therapy Goals          Problem: Physical Therapy    Goal Priority Disciplines Outcome Goal Variances Interventions   Physical Therapy Goal     PT, PT/OT Ongoing, Progressing     Description: Short Term Goals to be met by: 2022    Patient will increase functional independence with mobility by performin. Supine to sit with independently  2. Sit to stand transfer with independently using Rolling walker  3. Bed to chair transfer with independently using Rolling walker  4. Gait  x 200 feet with independently using Rolling walker  5. Lower extremity exercise program x30 reps per handout, with assistance as needed    Long Term Goals to be met by: 2022    Pt will regain full independent functional mobility with lowest level of assistive device to return to home situation and prior activities of daily living.                        Time Tracking:     PT Received On: 22  PT Start Time: 1345     PT Stop Time: 1402  PT Total Time (min): 17 min     Billable Minutes: Therapeutic Exercise 15    Treatment Type: Treatment  PT/PTA: PT     PTA Visit Number: 0     2022

## 2022-11-25 NOTE — PT/OT/SLP PROGRESS
Occupational Therapy   Treatment    Name: Monica Walker  MRN: [id number]  Admitting Diagnosis:  Bacteremia  2 Days Post-Op    Recommendations:     Discharge Recommendations: nursing facility, skilled, home with home health, LTACH (long-term acute care hospital)  Discharge Equipment Recommendations:  none  Barriers to discharge:       Assessment:     Monica Walker is a 52 y.o. female with a medical diagnosis of Bacteremia.  Performance deficits affecting function are weakness, impaired endurance, impaired self care skills.     Rehab Prognosis:  Good; patient would benefit from acute skilled OT services to address these deficits and reach maximum level of function.       Plan:     Patient to be seen 5 x/week to address the above listed problems via self-care/home management, therapeutic activities, therapeutic exercises  Plan of Care Expires: 12/26/22  Plan of Care Reviewed with: patient, mother    Subjective     Pain/Comfort:  Pain Rating 1: 0/10    Objective:     Communicated with: HAYDEE West prior to session.  Patient found HOB elevated with peripheral IV, telemetry, knee immobilizer upon OT entry to room.    General Precautions: Standard, fall, contact   Orthopedic Precautions:N/A   Braces: N/A  Respiratory Status: Room air     Occupational Performance:     Bed Mobility:         Functional Mobility/Transfers:    Functional Mobility:     Activities of Daily Living:        [facility] 6 Click ADL:      Treatment & Education:  Pt performed rom ex's 2 lb wt on R 15 reps x 2 shld flex,abd/add,elbow flex/ext,wrist flex/ext,sup/pron, rom ex's w/o wt 15 reps xc 2 on L elbow flex/ext,wrist flex/ext, hand helper with 2 rubber band resistances 20 reps.  Pt drowsy form pain medicine but tolerated tx well      Patient left HOB elevated with all lines intact, call button in reach, and family present    GOALS:   Multidisciplinary Problems       Occupational Therapy Goals          Problem: Occupational Therapy    Goal Priority  Disciplines Outcome Interventions   Occupational Therapy Goal     OT, PT/OT Ongoing, Progressing    Description: STG:  Pt will perform grooming with access to supplies  Pt will bathe with setup and CGA  Pt will perform UE dressing with setup  Pt will perform LE dressing with CGA and AD if needed  Pt will transfer bed/chair/bsc with RW and SBA  Pt will perform standing task x 2 min with SBA and RW  Pt will tolerate 15 minutes of tx without fatigue      LT.Restore to max I with self care and mobility.                          Time Tracking:     OT Date of Treatment: 22  OT Start Time: 1322  OT Stop Time: 1337  OT Total Time (min): 15 min    Billable Minutes:Therapeutic Exercise 14    OT/TRISH: TRISH          2022

## 2022-11-26 LAB
ANION GAP SERPL CALCULATED.3IONS-SCNC: 6 MMOL/L (ref 7–16)
BUN SERPL-MCNC: 7 MG/DL (ref 7–18)
BUN/CREAT SERPL: 10 (ref 6–20)
CALCIUM SERPL-MCNC: 8.1 MG/DL (ref 8.5–10.1)
CHLORIDE SERPL-SCNC: 89 MMOL/L (ref 98–107)
CO2 SERPL-SCNC: 32 MMOL/L (ref 21–32)
CREAT SERPL-MCNC: 0.67 MG/DL (ref 0.55–1.02)
EGFR (NO RACE VARIABLE) (RUSH/TITUS): 104 ML/MIN/1.73M²
GLUCOSE SERPL-MCNC: 195 MG/DL (ref 70–105)
GLUCOSE SERPL-MCNC: 290 MG/DL (ref 70–105)
GLUCOSE SERPL-MCNC: 295 MG/DL (ref 70–105)
GLUCOSE SERPL-MCNC: 301 MG/DL (ref 70–105)
GLUCOSE SERPL-MCNC: 315 MG/DL (ref 74–106)
GLUCOSE SERPL-MCNC: 332 MG/DL (ref 70–105)
POTASSIUM SERPL-SCNC: 2.6 MMOL/L (ref 3.5–5.1)
SODIUM SERPL-SCNC: 124 MMOL/L (ref 136–145)
VANCOMYCIN TROUGH SERPL-MCNC: 22.8 ΜG/ML (ref 10–20)

## 2022-11-26 PROCEDURE — 63600175 PHARM REV CODE 636 W HCPCS: Performed by: ORTHOPAEDIC SURGERY

## 2022-11-26 PROCEDURE — 94761 N-INVAS EAR/PLS OXIMETRY MLT: CPT

## 2022-11-26 PROCEDURE — 25000003 PHARM REV CODE 250: Performed by: GENERAL PRACTICE

## 2022-11-26 PROCEDURE — 11000001 HC ACUTE MED/SURG PRIVATE ROOM

## 2022-11-26 PROCEDURE — 36415 COLL VENOUS BLD VENIPUNCTURE: CPT | Performed by: HOSPITALIST

## 2022-11-26 PROCEDURE — 63600175 PHARM REV CODE 636 W HCPCS: Performed by: NURSE PRACTITIONER

## 2022-11-26 PROCEDURE — 99233 PR SUBSEQUENT HOSPITAL CARE,LEVL III: ICD-10-PCS | Mod: ,,, | Performed by: STUDENT IN AN ORGANIZED HEALTH CARE EDUCATION/TRAINING PROGRAM

## 2022-11-26 PROCEDURE — 80048 BASIC METABOLIC PNL TOTAL CA: CPT | Performed by: HOSPITALIST

## 2022-11-26 PROCEDURE — 97110 THERAPEUTIC EXERCISES: CPT

## 2022-11-26 PROCEDURE — 25000003 PHARM REV CODE 250: Performed by: STUDENT IN AN ORGANIZED HEALTH CARE EDUCATION/TRAINING PROGRAM

## 2022-11-26 PROCEDURE — 82962 GLUCOSE BLOOD TEST: CPT

## 2022-11-26 PROCEDURE — C9113 INJ PANTOPRAZOLE SODIUM, VIA: HCPCS | Performed by: NURSE PRACTITIONER

## 2022-11-26 PROCEDURE — 63600175 PHARM REV CODE 636 W HCPCS: Performed by: INTERNAL MEDICINE

## 2022-11-26 PROCEDURE — 87040 BLOOD CULTURE FOR BACTERIA: CPT | Performed by: STUDENT IN AN ORGANIZED HEALTH CARE EDUCATION/TRAINING PROGRAM

## 2022-11-26 PROCEDURE — 63600175 PHARM REV CODE 636 W HCPCS: Performed by: STUDENT IN AN ORGANIZED HEALTH CARE EDUCATION/TRAINING PROGRAM

## 2022-11-26 PROCEDURE — 25000003 PHARM REV CODE 250: Performed by: ORTHOPAEDIC SURGERY

## 2022-11-26 PROCEDURE — 36415 COLL VENOUS BLD VENIPUNCTURE: CPT | Performed by: STUDENT IN AN ORGANIZED HEALTH CARE EDUCATION/TRAINING PROGRAM

## 2022-11-26 PROCEDURE — 99233 SBSQ HOSP IP/OBS HIGH 50: CPT | Mod: ,,, | Performed by: STUDENT IN AN ORGANIZED HEALTH CARE EDUCATION/TRAINING PROGRAM

## 2022-11-26 PROCEDURE — 80202 ASSAY OF VANCOMYCIN: CPT | Performed by: STUDENT IN AN ORGANIZED HEALTH CARE EDUCATION/TRAINING PROGRAM

## 2022-11-26 RX ORDER — INSULIN ASPART 100 [IU]/ML
3 INJECTION, SOLUTION INTRAVENOUS; SUBCUTANEOUS
Status: DISCONTINUED | OUTPATIENT
Start: 2022-11-26 | End: 2022-11-26

## 2022-11-26 RX ORDER — SODIUM CHLORIDE 9 MG/ML
INJECTION, SOLUTION INTRAVENOUS CONTINUOUS
Status: DISPENSED | OUTPATIENT
Start: 2022-11-26 | End: 2022-11-26

## 2022-11-26 RX ORDER — MORPHINE SULFATE 4 MG/ML
4 INJECTION, SOLUTION INTRAMUSCULAR; INTRAVENOUS EVERY 4 HOURS PRN
Status: DISCONTINUED | OUTPATIENT
Start: 2022-11-26 | End: 2022-12-05

## 2022-11-26 RX ORDER — POTASSIUM CHLORIDE 20 MEQ/1
40 TABLET, EXTENDED RELEASE ORAL EVERY 4 HOURS
Status: COMPLETED | OUTPATIENT
Start: 2022-11-26 | End: 2022-11-26

## 2022-11-26 RX ADMIN — OXYCODONE HYDROCHLORIDE 10 MG: 5 TABLET ORAL at 07:11

## 2022-11-26 RX ADMIN — INSULIN ASPART 1 UNITS: 100 INJECTION, SOLUTION INTRAVENOUS; SUBCUTANEOUS at 09:11

## 2022-11-26 RX ADMIN — DOCUSATE SODIUM 100 MG: 100 CAPSULE, LIQUID FILLED ORAL at 08:11

## 2022-11-26 RX ADMIN — PANTOPRAZOLE SODIUM 40 MG: 40 INJECTION, POWDER, LYOPHILIZED, FOR SOLUTION INTRAVENOUS at 10:11

## 2022-11-26 RX ADMIN — ENOXAPARIN SODIUM 30 MG: 30 INJECTION SUBCUTANEOUS at 10:11

## 2022-11-26 RX ADMIN — VANCOMYCIN HYDROCHLORIDE 1250 MG: 1 INJECTION, POWDER, LYOPHILIZED, FOR SOLUTION INTRAVENOUS at 07:11

## 2022-11-26 RX ADMIN — MORPHINE SULFATE 2 MG: 2 INJECTION, SOLUTION INTRAMUSCULAR; INTRAVENOUS at 02:11

## 2022-11-26 RX ADMIN — OXYCODONE HYDROCHLORIDE 10 MG: 5 TABLET ORAL at 02:11

## 2022-11-26 RX ADMIN — DULOXETINE 60 MG: 30 CAPSULE, DELAYED RELEASE ORAL at 10:11

## 2022-11-26 RX ADMIN — TRAZODONE HYDROCHLORIDE 50 MG: 50 TABLET ORAL at 08:11

## 2022-11-26 RX ADMIN — INSULIN ASPART 6 UNITS: 100 INJECTION, SOLUTION INTRAVENOUS; SUBCUTANEOUS at 02:11

## 2022-11-26 RX ADMIN — POTASSIUM CHLORIDE 40 MEQ: 1500 TABLET, EXTENDED RELEASE ORAL at 10:11

## 2022-11-26 RX ADMIN — SODIUM CHLORIDE: 9 INJECTION, SOLUTION INTRAVENOUS at 02:11

## 2022-11-26 RX ADMIN — VANCOMYCIN HYDROCHLORIDE 1250 MG: 1 INJECTION, POWDER, LYOPHILIZED, FOR SOLUTION INTRAVENOUS at 05:11

## 2022-11-26 RX ADMIN — MORPHINE SULFATE 4 MG: 4 INJECTION, SOLUTION INTRAMUSCULAR; INTRAVENOUS at 05:11

## 2022-11-26 RX ADMIN — POTASSIUM CHLORIDE 40 MEQ: 1500 TABLET, EXTENDED RELEASE ORAL at 02:11

## 2022-11-26 RX ADMIN — POTASSIUM CHLORIDE 40 MEQ: 1500 TABLET, EXTENDED RELEASE ORAL at 06:11

## 2022-11-26 RX ADMIN — DOCUSATE SODIUM 100 MG: 100 CAPSULE, LIQUID FILLED ORAL at 10:11

## 2022-11-26 RX ADMIN — INSULIN ASPART 8 UNITS: 100 INJECTION, SOLUTION INTRAVENOUS; SUBCUTANEOUS at 05:11

## 2022-11-26 RX ADMIN — OXYCODONE HYDROCHLORIDE 10 MG: 5 TABLET ORAL at 08:11

## 2022-11-26 RX ADMIN — ENOXAPARIN SODIUM 30 MG: 30 INJECTION SUBCUTANEOUS at 08:11

## 2022-11-26 RX ADMIN — MORPHINE SULFATE 2 MG: 2 INJECTION, SOLUTION INTRAMUSCULAR; INTRAVENOUS at 12:11

## 2022-11-26 RX ADMIN — INSULIN ASPART 3 UNITS: 100 INJECTION, SOLUTION INTRAVENOUS; SUBCUTANEOUS at 01:11

## 2022-11-26 RX ADMIN — POLYETHYLENE GLYCOL 3350 17 G: 17 POWDER, FOR SOLUTION ORAL at 10:11

## 2022-11-26 NOTE — SUBJECTIVE & OBJECTIVE
Interval History: NAEO    Review of Systems   Constitutional:  Positive for appetite change. Negative for chills and fever.   HENT:  Negative for hearing loss, nosebleeds, rhinorrhea, sinus pressure, sneezing, sore throat and trouble swallowing.    Eyes:  Negative for photophobia, pain, discharge, itching and visual disturbance.   Respiratory:  Negative for apnea, cough, chest tightness and shortness of breath.    Cardiovascular:  Negative for chest pain, palpitations and leg swelling.   Gastrointestinal:  Negative for abdominal distention, abdominal pain, blood in stool, constipation, diarrhea, nausea and vomiting.   Endocrine: Negative for polydipsia, polyphagia and polyuria.   Genitourinary:  Negative for difficulty urinating, dysuria, frequency, hematuria and urgency. Vaginal bleeding: left knee.  Musculoskeletal:  Positive for arthralgias (left knee and left shoulder), back pain and joint swelling (left knee). Negative for myalgias.   Skin:  Negative for rash.   Neurological:  Negative for dizziness, tremors, seizures, syncope, light-headedness, numbness and headaches.   Psychiatric/Behavioral:  Negative for agitation, confusion, hallucinations and sleep disturbance. The patient is not nervous/anxious.    Objective:     Vital Signs (Most Recent):  Temp: 99.2 °F (37.3 °C) (11/26/22 1100)  Pulse: 105 (11/26/22 1100)  Resp: 18 (11/26/22 1213)  BP: (!) 179/82 (11/26/22 1100)  SpO2: 97 % (11/26/22 1100)   Vital Signs (24h Range):  Temp:  [98.1 °F (36.7 °C)-101 °F (38.3 °C)] 99.2 °F (37.3 °C)  Pulse:  [] 105  Resp:  [18-21] 18  SpO2:  [91 %-98 %] 97 %  BP: (123-179)/(54-82) 179/82     Weight: 71.8 kg (158 lb 4.6 oz)  Body mass index is 25.55 kg/m².    Intake/Output Summary (Last 24 hours) at 11/26/2022 1352  Last data filed at 11/26/2022 0102  Gross per 24 hour   Intake 500.05 ml   Output --   Net 500.05 ml        Physical Exam  Vitals reviewed.   Constitutional:       Appearance: Normal appearance.   HENT:       Head: Normocephalic and atraumatic.      Right Ear: External ear normal.      Left Ear: External ear normal.   Eyes:      Extraocular Movements: Extraocular movements intact.      Conjunctiva/sclera: Conjunctivae normal.   Cardiovascular:      Rate and Rhythm: Normal rate and regular rhythm.      Pulses: Normal pulses.   Pulmonary:      Effort: Pulmonary effort is normal.   Abdominal:      General: Bowel sounds are normal.   Musculoskeletal:         General: Swelling (left knee) and tenderness (left knee and left shoulder) present.      Cervical back: Normal range of motion and neck supple.      Comments: Normal tone, ROM limited by pain   Skin:     General: Skin is warm.      Capillary Refill: Capillary refill takes less than 2 seconds.   Neurological:      General: No focal deficit present.      Mental Status: She is alert and oriented to person, place, and time.   Psychiatric:         Mood and Affect: Mood normal.         Behavior: Behavior normal.         Thought Content: Thought content normal.       Significant Labs: All pertinent labs within the past 24 hours have been reviewed.    Significant Imaging: I have reviewed all pertinent imaging results/findings within the past 24 hours.

## 2022-11-26 NOTE — PROGRESS NOTES
Pharmacy dosing vancomycin 1250 mg IV every 8 hours which resulted in an elevated trough. We will hold vancomycin for now and resume when vancomycin level < 20 mcg/mL. Pharmacy will continue to monitor and adjust.

## 2022-11-26 NOTE — PROGRESS NOTES
[facility] - [address] [place] [address] Medicine  Progress Note    Patient Name: Monica Walker  MRN: [id number]  Patient Class: IP- Inpatient   Admission Date: 11/20/2022  Length of Stay: 6 days  Attending Physician: Adilson Fontanez DO  Primary Care Provider: Hannah Schulz MD        Subjective:     Principal Problem:Bacteremia        HPI:  Patient is a 51 yo female who presents to [facility] transferred from [facility] c/o L knee and R flank pain x 2 weeks. R flank pain is 10/10, squeezing in quality, without radiation, no aggravating or relieving factors. L knee pain started after a fall, which she is unable to recall date of the injury. The knee pain is 10/10, located in the medial joint line, aggravated by movement and weight-bearing, squeezing in quality, radiates down to L foot and without relieving factors including heat and ice. Patient has a history of type 2 diabetes, poorly controlled.    10 days ago patient was admitted at [facility] and treated for pyelonephritis on the left side with IV antibiotics although her urine culture did not grow any bacteria. 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 Today a CT scan of the abdomen pelvis and chest with IV contrast indicates bilateral peripherally dense cavitary lesions left lower lobe and right middle lobe bilateral pleural effusions left greater than right with possible loculation these areas are concerning for abscesses although a neoplastic process can not be entirely excluded. Abnormal appearance of the left kidney concerning for pyelonephritis. Patient was transferred to [facility] for further medical evaluation and management.         Overview/Hospital Course:  11/24-will need IV abx for 6 weeks from first negative blood cultures. Repeat today.  Requesting  swingbed in [place]. Will ask CM to assist  11/25- pain better controlled today. Will need definitive plan from Ortho prior to DC  11/26-febrile overnight, continues to have pain. Suspect poor source control.  Repeat cultures      Interval History: NAEO    Review of Systems   Constitutional:  Positive for appetite change. Negative for chills and fever.   HENT:  Negative for hearing loss, nosebleeds, rhinorrhea, sinus pressure, sneezing, sore throat and trouble swallowing.    Eyes:  Negative for photophobia, pain, discharge, itching and visual disturbance.   Respiratory:  Negative for apnea, cough, chest tightness and shortness of breath.    Cardiovascular:  Negative for chest pain, palpitations and leg swelling.   Gastrointestinal:  Negative for abdominal distention, abdominal pain, blood in stool, constipation, diarrhea, nausea and vomiting.   Endocrine: Negative for polydipsia, polyphagia and polyuria.   Genitourinary:  Negative for difficulty urinating, dysuria, frequency, hematuria and urgency. Vaginal bleeding: left knee.  Musculoskeletal:  Positive for arthralgias (left knee and left shoulder), back pain and joint swelling (left knee). Negative for myalgias.   Skin:  Negative for rash.   Neurological:  Negative for dizziness, tremors, seizures, syncope, light-headedness, numbness and headaches.   Psychiatric/Behavioral:  Negative for agitation, confusion, hallucinations and sleep disturbance. The patient is not nervous/anxious.    Objective:     Vital Signs (Most Recent):  Temp: 99.2 °F (37.3 °C) (11/26/22 1100)  Pulse: 105 (11/26/22 1100)  Resp: 18 (11/26/22 1213)  BP: (!) 179/82 (11/26/22 1100)  SpO2: 97 % (11/26/22 1100)   Vital Signs (24h Range):  Temp:  [98.1 °F (36.7 °C)-101 °F (38.3 °C)] 99.2 °F (37.3 °C)  Pulse:  [] 105  Resp:  [18-21] 18  SpO2:  [91 %-98 %] 97 %  BP: (123-179)/(54-82) 179/82     Weight: 71.8 kg (158 lb 4.6 oz)  Body mass index is 25.55 kg/m².    Intake/Output Summary (Last 24  hours) at 11/26/2022 1352  Last data filed at 11/26/2022 0102  Gross per 24 hour   Intake 500.05 ml   Output --   Net 500.05 ml        Physical Exam  Vitals reviewed.   Constitutional:       Appearance: Normal appearance.   HENT:      Head: Normocephalic and atraumatic.      Right Ear: External ear normal.      Left Ear: External ear normal.   Eyes:      Extraocular Movements: Extraocular movements intact.      Conjunctiva/sclera: Conjunctivae normal.   Cardiovascular:      Rate and Rhythm: Normal rate and regular rhythm.      Pulses: Normal pulses.   Pulmonary:      Effort: Pulmonary effort is normal.   Abdominal:      General: Bowel sounds are normal.   Musculoskeletal:         General: Swelling (left knee) and tenderness (left knee and left shoulder) present.      Cervical back: Normal range of motion and neck supple.      Comments: Normal tone, ROM limited by pain   Skin:     General: Skin is warm.      Capillary Refill: Capillary refill takes less than 2 seconds.   Neurological:      General: No focal deficit present.      Mental Status: She is alert and oriented to person, place, and time.   Psychiatric:         Mood and Affect: Mood normal.         Behavior: Behavior normal.         Thought Content: Thought content normal.       Significant Labs: All pertinent labs within the past 24 hours have been reviewed.    Significant Imaging: I have reviewed all pertinent imaging results/findings within the past 24 hours.      Assessment/Plan:      * Bacteremia  Repeat cultures +  Continue vanco  Bronchial cultures with MRSA, stop Levaquin    DM (diabetes mellitus)  Basal, SSI  Prandial if needed    Lung abscess  Likely seeding  Continue vanco            Septic arthritis  S/P irrigation and debridement of left shoulder and knee  No veg on MONICA  Vanco  Cultures positive  repeat        Depression  Continue home Cymbalta            VTE Risk Mitigation (From admission, onward)         Ordered     enoxaparin injection 30 mg   Every 12 hours         11/22/22 1524     IP VTE HIGH RISK PATIENT  Once         11/22/22 1524     Place DONOVAN hose  Until discontinued         11/22/22 1524     enoxaparin injection 40 mg  Daily         11/20/22 0519     Place sequential compression device  Until discontinued         11/20/22 0519                Discharge Planning   SUZANNA:      Code Status: Full Code   Is the patient medically ready for discharge?:     Reason for patient still in hospital (select all that apply): Treatment  Discharge Plan A: Home Health, Home with family                  Adilson Fontanez DO  Department of [facility] Medicine   [facility] - [facility] Medical Telemetry

## 2022-11-26 NOTE — PT/OT/SLP PROGRESS
Physical Therapy Treatment    Patient Name:  Monica Walker   MRN:  [id number]    Recommendations:     Discharge Recommendations:  nursing facility, skilled, home health PT, LTACH (long-term acute care hospital)   Discharge Equipment Recommendations: walker, rolling   Barriers to discharge: None    Assessment:     Monica Walker is a 52 y.o. female admitted with a medical diagnosis of Bacteremia.  She presents with the following impairments/functional limitations:    decreased strength, increased pain, impaired LE function, decreased ambulation ability overall limited functional independence. Attempted gait with pt, pt refused stating she has already been up to chair today and recently got back in bed. Pt stated she has recently had pain meds and is having difficulty remaining awake. Nurse Marguerite West RN confirmed.    Rehab Prognosis: Good; patient would benefit from acute skilled PT services to address these deficits and reach maximum level of function.    Recent Surgery: Procedure(s) (LRB):  Transesophageal echo (MONICA) intra-procedure log documentation (N/A) 3 Days Post-Op    Plan:     During this hospitalization, patient to be seen daily to address the identified rehab impairments via gait training, therapeutic activities, therapeutic exercises and progress toward the following goals:    Plan of Care Expires:  12/20/22    Subjective     Chief Complaint: bacteremia  Patient/Family Comments/goals: agreeable to supine Ex  Pain/Comfort: 5/10 back         Objective:     Communicated with Marguerite West RN prior to session.  Patient found supine with   upon PT entry to room.     General Precautions: Standard, fall   Orthopedic Precautions:N/A   Braces:    Respiratory Status: Room air     Functional Mobility:  Not assessed per pt request      AM-PAC 6 CLICK MOBILITY          Treatment & Education:  Bilateral lower extremity exercise x 20 reps: ankle pumps, Quad sets, glut sets, hip abduction/adduction, and straight leg  raises with active assist ROM and with LLE  and intermittent resting b/w sets.    Patient left supine with all lines intact, call button in reach, RN notified, and mother present..    GOALS:   Multidisciplinary Problems       Physical Therapy Goals          Problem: Physical Therapy    Goal Priority Disciplines Outcome Goal Variances Interventions   Physical Therapy Goal     PT, PT/OT Ongoing, Progressing     Description: Short Term Goals to be met by: 2022    Patient will increase functional independence with mobility by performin. Supine to sit with independently  2. Sit to stand transfer with independently using Rolling walker  3. Bed to chair transfer with independently using Rolling walker  4. Gait  x 200 feet with independently using Rolling walker  5. Lower extremity exercise program x30 reps per handout, with assistance as needed    Long Term Goals to be met by: 2022    Pt will regain full independent functional mobility with lowest level of assistive device to return to home situation and prior activities of daily living.                        Time Tracking:     PT Received On: 22  PT Start Time: 1505     PT Stop Time: 1522  PT Total Time (min): 17 min     Billable Minutes: Therapeutic Exercise 15             PTA Visit Number: 0     2022

## 2022-11-26 NOTE — ASSESSMENT & PLAN NOTE
S/P irrigation and debridement of left shoulder and knee  No veg on MONICA  Vanco  Cultures positive  repeat

## 2022-11-27 LAB
ANION GAP SERPL CALCULATED.3IONS-SCNC: 8 MMOL/L (ref 7–16)
BASOPHILS # BLD AUTO: 0.04 K/UL (ref 0–0.2)
BASOPHILS NFR BLD AUTO: 0.3 % (ref 0–1)
BUN SERPL-MCNC: 5 MG/DL (ref 7–18)
BUN/CREAT SERPL: 10 (ref 6–20)
CALCIUM SERPL-MCNC: 8.5 MG/DL (ref 8.5–10.1)
CHLORIDE SERPL-SCNC: 93 MMOL/L (ref 98–107)
CO2 SERPL-SCNC: 31 MMOL/L (ref 21–32)
CREAT SERPL-MCNC: 0.49 MG/DL (ref 0.55–1.02)
DIFFERENTIAL METHOD BLD: ABNORMAL
EGFR (NO RACE VARIABLE) (RUSH/TITUS): 112 ML/MIN/1.73M²
EOSINOPHIL # BLD AUTO: 0.47 K/UL (ref 0–0.5)
EOSINOPHIL NFR BLD AUTO: 3.2 % (ref 1–4)
ERYTHROCYTE [DISTWIDTH] IN BLOOD BY AUTOMATED COUNT: 14.5 % (ref 11.5–14.5)
GLUCOSE SERPL-MCNC: 235 MG/DL (ref 74–106)
GLUCOSE SERPL-MCNC: 297 MG/DL (ref 70–105)
GLUCOSE SERPL-MCNC: 332 MG/DL (ref 70–105)
GLUCOSE SERPL-MCNC: 355 MG/DL (ref 70–105)
GLUCOSE SERPL-MCNC: 364 MG/DL (ref 70–105)
HCT VFR BLD AUTO: 29.8 % (ref 38–47)
HGB BLD-MCNC: 9.4 G/DL (ref 12–16)
IMM GRANULOCYTES # BLD AUTO: 0.19 K/UL (ref 0–0.04)
IMM GRANULOCYTES NFR BLD: 1.3 % (ref 0–0.4)
LYMPHOCYTES # BLD AUTO: 1.61 K/UL (ref 1–4.8)
LYMPHOCYTES NFR BLD AUTO: 10.8 % (ref 27–41)
MCH RBC QN AUTO: 27.3 PG (ref 27–31)
MCHC RBC AUTO-ENTMCNC: 31.5 G/DL (ref 32–36)
MCV RBC AUTO: 86.6 FL (ref 80–96)
MONOCYTES # BLD AUTO: 1.55 K/UL (ref 0–0.8)
MONOCYTES NFR BLD AUTO: 10.4 % (ref 2–6)
MPC BLD CALC-MCNC: 9.2 FL (ref 9.4–12.4)
NEUTROPHILS # BLD AUTO: 11.03 K/UL (ref 1.8–7.7)
NEUTROPHILS NFR BLD AUTO: 74 % (ref 53–65)
NRBC # BLD AUTO: 0 X10E3/UL
NRBC, AUTO (.00): 0 %
PLATELET # BLD AUTO: 584 K/UL (ref 150–400)
POTASSIUM SERPL-SCNC: 3.9 MMOL/L (ref 3.5–5.1)
RBC # BLD AUTO: 3.44 M/UL (ref 4.2–5.4)
SODIUM SERPL-SCNC: 128 MMOL/L (ref 136–145)
VANCOMYCIN SERPL-MCNC: 4.8 ΜG/ML (ref 0–20)
WBC # BLD AUTO: 14.89 K/UL (ref 4.5–11)

## 2022-11-27 PROCEDURE — 63600175 PHARM REV CODE 636 W HCPCS: Performed by: INTERNAL MEDICINE

## 2022-11-27 PROCEDURE — 85025 COMPLETE CBC W/AUTO DIFF WBC: CPT | Performed by: STUDENT IN AN ORGANIZED HEALTH CARE EDUCATION/TRAINING PROGRAM

## 2022-11-27 PROCEDURE — 36415 COLL VENOUS BLD VENIPUNCTURE: CPT | Performed by: HOSPITALIST

## 2022-11-27 PROCEDURE — 25000003 PHARM REV CODE 250: Performed by: ORTHOPAEDIC SURGERY

## 2022-11-27 PROCEDURE — 82962 GLUCOSE BLOOD TEST: CPT

## 2022-11-27 PROCEDURE — 80048 BASIC METABOLIC PNL TOTAL CA: CPT | Performed by: HOSPITALIST

## 2022-11-27 PROCEDURE — 36415 COLL VENOUS BLD VENIPUNCTURE: CPT | Performed by: STUDENT IN AN ORGANIZED HEALTH CARE EDUCATION/TRAINING PROGRAM

## 2022-11-27 PROCEDURE — 63600175 PHARM REV CODE 636 W HCPCS: Performed by: NURSE PRACTITIONER

## 2022-11-27 PROCEDURE — C9113 INJ PANTOPRAZOLE SODIUM, VIA: HCPCS | Performed by: NURSE PRACTITIONER

## 2022-11-27 PROCEDURE — 11000001 HC ACUTE MED/SURG PRIVATE ROOM

## 2022-11-27 PROCEDURE — 80202 ASSAY OF VANCOMYCIN: CPT | Performed by: STUDENT IN AN ORGANIZED HEALTH CARE EDUCATION/TRAINING PROGRAM

## 2022-11-27 PROCEDURE — 25000003 PHARM REV CODE 250: Performed by: STUDENT IN AN ORGANIZED HEALTH CARE EDUCATION/TRAINING PROGRAM

## 2022-11-27 PROCEDURE — 99232 SBSQ HOSP IP/OBS MODERATE 35: CPT | Mod: ,,, | Performed by: STUDENT IN AN ORGANIZED HEALTH CARE EDUCATION/TRAINING PROGRAM

## 2022-11-27 PROCEDURE — 97530 THERAPEUTIC ACTIVITIES: CPT

## 2022-11-27 PROCEDURE — 97110 THERAPEUTIC EXERCISES: CPT

## 2022-11-27 PROCEDURE — 27000221 HC OXYGEN, UP TO 24 HOURS

## 2022-11-27 PROCEDURE — 25000003 PHARM REV CODE 250: Performed by: INTERNAL MEDICINE

## 2022-11-27 PROCEDURE — 99232 PR SUBSEQUENT HOSPITAL CARE,LEVL II: ICD-10-PCS | Mod: ,,, | Performed by: STUDENT IN AN ORGANIZED HEALTH CARE EDUCATION/TRAINING PROGRAM

## 2022-11-27 PROCEDURE — 63600175 PHARM REV CODE 636 W HCPCS: Performed by: ORTHOPAEDIC SURGERY

## 2022-11-27 PROCEDURE — 63600175 PHARM REV CODE 636 W HCPCS: Performed by: STUDENT IN AN ORGANIZED HEALTH CARE EDUCATION/TRAINING PROGRAM

## 2022-11-27 PROCEDURE — 94761 N-INVAS EAR/PLS OXIMETRY MLT: CPT

## 2022-11-27 PROCEDURE — 25000003 PHARM REV CODE 250: Performed by: GENERAL PRACTICE

## 2022-11-27 RX ORDER — POLYETHYLENE GLYCOL 3350 17 G/17G
34 POWDER, FOR SOLUTION ORAL 2 TIMES DAILY
Status: DISCONTINUED | OUTPATIENT
Start: 2022-11-27 | End: 2022-12-06 | Stop reason: HOSPADM

## 2022-11-27 RX ORDER — POLYETHYLENE GLYCOL 3350 17 G/17G
34 POWDER, FOR SOLUTION ORAL 2 TIMES DAILY
Status: DISCONTINUED | OUTPATIENT
Start: 2022-11-27 | End: 2022-11-27

## 2022-11-27 RX ORDER — SENNOSIDES 8.6 MG/1
8.6 TABLET ORAL DAILY
Status: DISCONTINUED | OUTPATIENT
Start: 2022-11-27 | End: 2022-12-06 | Stop reason: HOSPADM

## 2022-11-27 RX ORDER — SODIUM CHLORIDE 9 MG/ML
INJECTION, SOLUTION INTRAVENOUS CONTINUOUS
Status: DISPENSED | OUTPATIENT
Start: 2022-11-27 | End: 2022-11-27

## 2022-11-27 RX ADMIN — INSULIN ASPART 3 UNITS: 100 INJECTION, SOLUTION INTRAVENOUS; SUBCUTANEOUS at 01:11

## 2022-11-27 RX ADMIN — MORPHINE SULFATE 4 MG: 4 INJECTION, SOLUTION INTRAMUSCULAR; INTRAVENOUS at 08:11

## 2022-11-27 RX ADMIN — VANCOMYCIN HYDROCHLORIDE 1500 MG: 1 INJECTION, POWDER, LYOPHILIZED, FOR SOLUTION INTRAVENOUS at 03:11

## 2022-11-27 RX ADMIN — OXYCODONE HYDROCHLORIDE 10 MG: 5 TABLET ORAL at 12:11

## 2022-11-27 RX ADMIN — SENNOSIDES 8.6 MG: 8.6 TABLET, FILM COATED ORAL at 02:11

## 2022-11-27 RX ADMIN — MORPHINE SULFATE 4 MG: 4 INJECTION, SOLUTION INTRAMUSCULAR; INTRAVENOUS at 01:11

## 2022-11-27 RX ADMIN — INSULIN DETEMIR 30 UNITS: 100 INJECTION, SOLUTION SUBCUTANEOUS at 09:11

## 2022-11-27 RX ADMIN — MORPHINE SULFATE 4 MG: 4 INJECTION, SOLUTION INTRAMUSCULAR; INTRAVENOUS at 05:11

## 2022-11-27 RX ADMIN — PANTOPRAZOLE SODIUM 40 MG: 40 INJECTION, POWDER, LYOPHILIZED, FOR SOLUTION INTRAVENOUS at 09:11

## 2022-11-27 RX ADMIN — ENOXAPARIN SODIUM 30 MG: 30 INJECTION SUBCUTANEOUS at 08:11

## 2022-11-27 RX ADMIN — SODIUM CHLORIDE: 9 INJECTION, SOLUTION INTRAVENOUS at 09:11

## 2022-11-27 RX ADMIN — POLYETHYLENE GLYCOL 3350 17 G: 17 POWDER, FOR SOLUTION ORAL at 09:11

## 2022-11-27 RX ADMIN — INSULIN ASPART 5 UNITS: 100 INJECTION, SOLUTION INTRAVENOUS; SUBCUTANEOUS at 08:11

## 2022-11-27 RX ADMIN — INSULIN ASPART 8 UNITS: 100 INJECTION, SOLUTION INTRAVENOUS; SUBCUTANEOUS at 05:11

## 2022-11-27 RX ADMIN — POLYETHYLENE GLYCOL 3350 34 G: 17 POWDER, FOR SOLUTION ORAL at 02:11

## 2022-11-27 RX ADMIN — DOCUSATE SODIUM 100 MG: 100 CAPSULE, LIQUID FILLED ORAL at 08:11

## 2022-11-27 RX ADMIN — OXYCODONE HYDROCHLORIDE 10 MG: 5 TABLET ORAL at 03:11

## 2022-11-27 RX ADMIN — DOCUSATE SODIUM 100 MG: 100 CAPSULE, LIQUID FILLED ORAL at 09:11

## 2022-11-27 RX ADMIN — DULOXETINE 60 MG: 30 CAPSULE, DELAYED RELEASE ORAL at 09:11

## 2022-11-27 RX ADMIN — BISACODYL 10 MG: 5 TABLET, COATED ORAL at 05:11

## 2022-11-27 RX ADMIN — INSULIN ASPART 8 UNITS: 100 INJECTION, SOLUTION INTRAVENOUS; SUBCUTANEOUS at 12:11

## 2022-11-27 RX ADMIN — ENOXAPARIN SODIUM 30 MG: 30 INJECTION SUBCUTANEOUS at 09:11

## 2022-11-27 NOTE — ASSESSMENT & PLAN NOTE
Repeat cultures +  Continue vanco  Bronchial cultures with MRSA, stop Levaquin    Now with right hip pain- not painful with ROM so I do not suspect septic arthritis.  Relieved with urination- she has been constipated, could be related to this. Given description of alleviating and aggravating factors with bacteremia, psoas abscess a consideration.  Will see how she does overnight.  May need imaging.

## 2022-11-27 NOTE — PROGRESS NOTES
[facility] - [address] [place] [address] Medicine  Progress Note    Patient Name: Monica Walker  MRN: [id number]  Patient Class: IP- Inpatient   Admission Date: 11/20/2022  Length of Stay: 7 days  Attending Physician: Adilson Fontanez DO  Primary Care Provider: Hannah Schulz MD        Subjective:     Principal Problem:Bacteremia        HPI:  Patient is a 53 yo female who presents to [facility] transferred from [facility] c/o L knee and R flank pain x 2 weeks. R flank pain is 10/10, squeezing in quality, without radiation, no aggravating or relieving factors. L knee pain started after a fall, which she is unable to recall date of the injury. The knee pain is 10/10, located in the medial joint line, aggravated by movement and weight-bearing, squeezing in quality, radiates down to L foot and without relieving factors including heat and ice. Patient has a history of type 2 diabetes, poorly controlled.    10 days ago patient was admitted at [facility] and treated for pyelonephritis on the left side with IV antibiotics although her urine culture did not grow any bacteria. 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 Today a CT scan of the abdomen pelvis and chest with IV contrast indicates bilateral peripherally dense cavitary lesions left lower lobe and right middle lobe bilateral pleural effusions left greater than right with possible loculation these areas are concerning for abscesses although a neoplastic process can not be entirely excluded. Abnormal appearance of the left kidney concerning for pyelonephritis. Patient was transferred to [facility] for further medical evaluation and management.         Overview/Hospital Course:  11/24-will need IV abx for 6 weeks from first negative blood cultures. Repeat today.  Requesting  swingbed in [place]. Will ask CM to assist  11/25- pain better controlled today. Will need definitive plan from Ortho prior to DC  11/26-febrile overnight, continues to have pain. Suspect poor source control.  Repeat cultures  11/27- afebrile, having some right hip pain today.  Worse lying flat, better with flexion        Interval History: NAEO    Review of Systems   Constitutional:  Positive for appetite change. Negative for chills and fever.   HENT:  Negative for hearing loss, nosebleeds, rhinorrhea, sinus pressure, sneezing, sore throat and trouble swallowing.    Eyes:  Negative for photophobia, pain, discharge, itching and visual disturbance.   Respiratory:  Negative for apnea, cough, chest tightness and shortness of breath.    Cardiovascular:  Negative for chest pain, palpitations and leg swelling.   Gastrointestinal:  Negative for abdominal distention, abdominal pain, blood in stool, constipation, diarrhea, nausea and vomiting.   Endocrine: Negative for polydipsia, polyphagia and polyuria.   Genitourinary:  Negative for difficulty urinating, dysuria, frequency, hematuria and urgency. Vaginal bleeding: left knee.  Musculoskeletal:  Positive for arthralgias (left knee and left shoulder), back pain and joint swelling (left knee). Negative for myalgias.   Skin:  Negative for rash.   Neurological:  Negative for dizziness, tremors, seizures, syncope, light-headedness, numbness and headaches.   Psychiatric/Behavioral:  Negative for agitation, confusion, hallucinations and sleep disturbance. The patient is not nervous/anxious.    Objective:     Vital Signs (Most Recent):  Temp: 98.1 °F (36.7 °C) (11/27/22 1055)  Pulse: 99 (11/27/22 1055)  Resp: 18 (11/27/22 1215)  BP: (!) 144/71 (11/27/22 1055)  SpO2: 95 % (11/27/22 1055)   Vital Signs (24h Range):  Temp:  [97.9 °F (36.6 °C)-98.6 °F (37 °C)] 98.1 °F (36.7 °C)  Pulse:  [] 99  Resp:  [16-19] 18  SpO2:  [91 %-100 %] 95 %  BP: (115-171)/(66-80) 144/71     Weight:  71.8 kg (158 lb 4.6 oz)  Body mass index is 25.55 kg/m².    Intake/Output Summary (Last 24 hours) at 11/27/2022 1427  Last data filed at 11/26/2022 2230  Gross per 24 hour   Intake 1250 ml   Output --   Net 1250 ml        Physical Exam  Vitals reviewed.   Constitutional:       Appearance: Normal appearance.   HENT:      Head: Normocephalic and atraumatic.      Right Ear: External ear normal.      Left Ear: External ear normal.   Eyes:      Extraocular Movements: Extraocular movements intact.      Conjunctiva/sclera: Conjunctivae normal.   Cardiovascular:      Rate and Rhythm: Normal rate and regular rhythm.      Pulses: Normal pulses.   Pulmonary:      Effort: Pulmonary effort is normal.   Abdominal:      General: Bowel sounds are normal.   Musculoskeletal:         General: Swelling (left knee) and tenderness (left knee and left shoulder) present.      Cervical back: Normal range of motion and neck supple.      Comments: Normal tone, ROM limited by pain   Skin:     General: Skin is warm.      Capillary Refill: Capillary refill takes less than 2 seconds.   Neurological:      General: No focal deficit present.      Mental Status: She is alert and oriented to person, place, and time.   Psychiatric:         Mood and Affect: Mood normal.         Behavior: Behavior normal.         Thought Content: Thought content normal.       Significant Labs: All pertinent labs within the past 24 hours have been reviewed.    Significant Imaging: I have reviewed all pertinent imaging results/findings within the past 24 hours.      Assessment/Plan:      * Bacteremia  Repeat cultures +  Continue vanco  Bronchial cultures with MRSA, stop Levaquin    Now with right hip pain- not painful with ROM so I do not suspect septic arthritis.  Relieved with urination- she has been constipated, could be related to this. Given description of alleviating and aggravating factors with bacteremia, psoas abscess a consideration.  Will see how she does  overnight.  May need imaging.    DM (diabetes mellitus)  Basal, SSI  Prandial if needed    Lung abscess  Likely seeding  Continue vanco            Septic arthritis  S/P irrigation and debridement of left shoulder and knee  No veg on MONICA  Vanco  Cultures positive  Repeat cultures pending        Depression  Continue home Cymbalta          VTE Risk Mitigation (From admission, onward)         Ordered     enoxaparin injection 30 mg  Every 12 hours         11/22/22 1524     IP VTE HIGH RISK PATIENT  Once         11/22/22 1524     Place DONOVAN hose  Until discontinued         11/22/22 1524     enoxaparin injection 40 mg  Daily         11/20/22 0519     Place sequential compression device  Until discontinued         11/20/22 0519                Discharge Planning   SUZANNA:      Code Status: Full Code   Is the patient medically ready for discharge?:     Reason for patient still in hospital (select all that apply): Treatment  Discharge Plan A: Home Health, Home with family                  Adilson Fontanez DO  Department of Hospital Medicine   [facility] - [facility] Medical Telemetry

## 2022-11-27 NOTE — PT/OT/SLP PROGRESS
"Physical Therapy Treatment    Patient Name:  Monica Walker   MRN:  [id number]    Recommendations:     Discharge Recommendations:  nursing facility, skilled, rehabilitation facility, LTACH (long-term acute care hospital)   Discharge Equipment Recommendations: walker, rolling, bedside commode   Barriers to discharge:  ongoing medical treatment; decreased functional mobility    Assessment:     Monica Walker is a 52 y.o. female admitted with a medical diagnosis of Bacteremia.  She presents with the following impairments/functional limitations:  weakness, impaired endurance, impaired self care skills, impaired functional mobility, gait instability, impaired balance, decreased lower extremity function, decreased upper extremity function, decreased safety awareness, pain .    Patient demonstrates a decline in functional mobility compared with the last time this therapist worked with patient due to c/o increased right hip/anterior thigh pain during attempted stance. Patient wanting left UE in her sling for attempted stance even though she was using left UE freely in the bed. Patient will require swing bed at d/c to assist with return to independent mobility as patient currently unsafe/unable to return to home situation.    Rehab Prognosis: Good; patient would benefit from acute skilled PT services to address these deficits and reach maximum level of function.    Recent Surgery: Procedure(s) (LRB):  Transesophageal echo (MONICA) intra-procedure log documentation (N/A) 4 Days Post-Op    Plan:     During this hospitalization, patient to be seen daily to address the identified rehab impairments via gait training, therapeutic activities, therapeutic exercises and progress toward the following goals:    Plan of Care Expires:  12/20/22    Subjective     Chief Complaint: bacteremia, septic arthritis  Patient/Family Comments/goals: "I want my left arm in the sling. I don't know if my right leg will hold me up." Left LE in " KI.  Pain/Comfort:  Pain Rating 1: 4/10 (at rest; increased to 8/10 with attempted stance)  Location - Side 1: Right  Location 1: hip (and anterior thigh)  Pain Addressed 1: Reposition, Distraction, Cessation of Activity  Pain Rating Post-Intervention 1: 4/10      Objective:     Communicated with Marguerite West RN prior to session.  Patient found supine with peripheral IV, telemetry, knee immobilizer upon PT entry to room.     General Precautions: Standard, fall, contact (MRSA)   Orthopedic Precautions:N/A   Braces: Knee immobilizer  Respiratory Status: Room air     Functional Mobility:  Bed Mobility:     Supine to Sit: contact guard assistance  Sit to Supine: minimum assistance  Transfers:     Sit to Stand:  moderate assistance, of 2 persons, and heavy verbal/tactile cues with eloy-walker. Patient unable to achieve upright posture and reports pain in right hip/anterior thigh is the limiting factor  Gait: unable to take steps      AM-PAC 6 CLICK MOBILITY  Turning over in bed (including adjusting bedclothes, sheets and blankets)?: 3  Sitting down on and standing up from a chair with arms (e.g., wheelchair, bedside commode, etc.): 2  Moving from lying on back to sitting on the side of the bed?: 3  Moving to and from a bed to a chair (including a wheelchair)?: 1  Need to walk in hospital room?: 1  Climbing 3-5 steps with a railing?: 1  Basic Mobility Total Score: 11       Treatment & Education:  Sitting EOB x 10 minutes, SBA, pt tending to lean posteriorly and to the right to decrease right hip flexion  Sit to stand x 4 attempts using HW; left UE sling; left knee in KI. Unable to achieve upright posture due to c/o right hip/anterior thigh pain    Bilateral lower extremity exercise x 30 reps: ankle pumps, Quad sets, glut sets, hip abduction/adduction, and straight leg raises with active assist ROM, verbal cues, and tactile cues   Right lower extremity exercise x 30 reps: heel slides with active assist ROM, verbal cues, and  tactile cues       Patient left supine with all lines intact, call button in reach, bed alarm on, Marguerite West RN notified, and friend present..    GOALS:   Multidisciplinary Problems       Physical Therapy Goals          Problem: Physical Therapy    Goal Priority Disciplines Outcome Goal Variances Interventions   Physical Therapy Goal     PT, PT/OT Ongoing, Progressing     Description: Short Term Goals to be met by: 2022    Patient will increase functional independence with mobility by performin. Supine to sit with independently  2. Sit to stand transfer with independently using Rolling walker  3. Bed to chair transfer with independently using Rolling walker  4. Gait  x 200 feet with independently using Rolling walker  5. Lower extremity exercise program x30 reps per handout, with assistance as needed    Long Term Goals to be met by: 2022    Pt will regain full independent functional mobility with lowest level of assistive device to return to home situation and prior activities of daily living.                        Time Tracking:     PT Received On: 22  PT Start Time: 1300     PT Stop Time: 1327  PT Total Time (min): 27 min     Billable Minutes: Therapeutic Activity 15 minutes and Therapeutic Exercise 10 minutes    Treatment Type: Treatment  PT/PTA: PT     PTA Visit Number: 0     2022

## 2022-11-27 NOTE — PROGRESS NOTES
Vancomycin level now < 20 mcg/mL. We will resume with vancomycin 1500 mg IV every 12 hours and check another trough 11/29/22 1430. Pharmacy will continue to monitor daily and adjust as necessary.

## 2022-11-27 NOTE — SUBJECTIVE & OBJECTIVE
Interval History: NAEO    Review of Systems   Constitutional:  Positive for appetite change. Negative for chills and fever.   HENT:  Negative for hearing loss, nosebleeds, rhinorrhea, sinus pressure, sneezing, sore throat and trouble swallowing.    Eyes:  Negative for photophobia, pain, discharge, itching and visual disturbance.   Respiratory:  Negative for apnea, cough, chest tightness and shortness of breath.    Cardiovascular:  Negative for chest pain, palpitations and leg swelling.   Gastrointestinal:  Negative for abdominal distention, abdominal pain, blood in stool, constipation, diarrhea, nausea and vomiting.   Endocrine: Negative for polydipsia, polyphagia and polyuria.   Genitourinary:  Negative for difficulty urinating, dysuria, frequency, hematuria and urgency. Vaginal bleeding: left knee.  Musculoskeletal:  Positive for arthralgias (left knee and left shoulder), back pain and joint swelling (left knee). Negative for myalgias.   Skin:  Negative for rash.   Neurological:  Negative for dizziness, tremors, seizures, syncope, light-headedness, numbness and headaches.   Psychiatric/Behavioral:  Negative for agitation, confusion, hallucinations and sleep disturbance. The patient is not nervous/anxious.    Objective:     Vital Signs (Most Recent):  Temp: 98.1 °F (36.7 °C) (11/27/22 1055)  Pulse: 99 (11/27/22 1055)  Resp: 18 (11/27/22 1215)  BP: (!) 144/71 (11/27/22 1055)  SpO2: 95 % (11/27/22 1055)   Vital Signs (24h Range):  Temp:  [97.9 °F (36.6 °C)-98.6 °F (37 °C)] 98.1 °F (36.7 °C)  Pulse:  [] 99  Resp:  [16-19] 18  SpO2:  [91 %-100 %] 95 %  BP: (115-171)/(66-80) 144/71     Weight: 71.8 kg (158 lb 4.6 oz)  Body mass index is 25.55 kg/m².    Intake/Output Summary (Last 24 hours) at 11/27/2022 1427  Last data filed at 11/26/2022 2230  Gross per 24 hour   Intake 1250 ml   Output --   Net 1250 ml        Physical Exam  Vitals reviewed.   Constitutional:       Appearance: Normal appearance.   HENT:       Head: Normocephalic and atraumatic.      Right Ear: External ear normal.      Left Ear: External ear normal.   Eyes:      Extraocular Movements: Extraocular movements intact.      Conjunctiva/sclera: Conjunctivae normal.   Cardiovascular:      Rate and Rhythm: Normal rate and regular rhythm.      Pulses: Normal pulses.   Pulmonary:      Effort: Pulmonary effort is normal.   Abdominal:      General: Bowel sounds are normal.   Musculoskeletal:         General: Swelling (left knee) and tenderness (left knee and left shoulder) present.      Cervical back: Normal range of motion and neck supple.      Comments: Normal tone, ROM limited by pain   Skin:     General: Skin is warm.      Capillary Refill: Capillary refill takes less than 2 seconds.   Neurological:      General: No focal deficit present.      Mental Status: She is alert and oriented to person, place, and time.   Psychiatric:         Mood and Affect: Mood normal.         Behavior: Behavior normal.         Thought Content: Thought content normal.       Significant Labs: All pertinent labs within the past 24 hours have been reviewed.    Significant Imaging: I have reviewed all pertinent imaging results/findings within the past 24 hours.

## 2022-11-27 NOTE — ASSESSMENT & PLAN NOTE
S/P irrigation and debridement of left shoulder and knee  No veg on MONICA  Vanco  Cultures positive  Repeat cultures pending

## 2022-11-28 LAB
ANION GAP SERPL CALCULATED.3IONS-SCNC: 11 MMOL/L (ref 7–16)
BACTERIA BLD CULT: ABNORMAL
BASOPHILS # BLD AUTO: 0.09 K/UL (ref 0–0.2)
BASOPHILS NFR BLD AUTO: 0.6 % (ref 0–1)
BUN SERPL-MCNC: 6 MG/DL (ref 7–18)
BUN/CREAT SERPL: 12 (ref 6–20)
CALCIUM SERPL-MCNC: 8.1 MG/DL (ref 8.5–10.1)
CHLORIDE SERPL-SCNC: 90 MMOL/L (ref 98–107)
CO2 SERPL-SCNC: 28 MMOL/L (ref 21–32)
CREAT SERPL-MCNC: 0.51 MG/DL (ref 0.55–1.02)
DIFFERENTIAL METHOD BLD: ABNORMAL
EGFR (NO RACE VARIABLE) (RUSH/TITUS): 111 ML/MIN/1.73M²
EOSINOPHIL # BLD AUTO: 0.37 K/UL (ref 0–0.5)
EOSINOPHIL NFR BLD AUTO: 2.3 % (ref 1–4)
ERYTHROCYTE [DISTWIDTH] IN BLOOD BY AUTOMATED COUNT: 14.5 % (ref 11.5–14.5)
GLUCOSE SERPL-MCNC: 100 MG/DL (ref 70–105)
GLUCOSE SERPL-MCNC: 215 MG/DL (ref 70–105)
GLUCOSE SERPL-MCNC: 274 MG/DL (ref 74–106)
GLUCOSE SERPL-MCNC: 284 MG/DL (ref 70–105)
GLUCOSE SERPL-MCNC: 331 MG/DL (ref 70–105)
GLUCOSE SERPL-MCNC: 371 MG/DL (ref 70–105)
GRAM STN SPEC: ABNORMAL
HCT VFR BLD AUTO: 28.1 % (ref 38–47)
HGB BLD-MCNC: 8.8 G/DL (ref 12–16)
IMM GRANULOCYTES # BLD AUTO: 0.26 K/UL (ref 0–0.04)
IMM GRANULOCYTES NFR BLD: 1.6 % (ref 0–0.4)
LYMPHOCYTES # BLD AUTO: 1.9 K/UL (ref 1–4.8)
LYMPHOCYTES NFR BLD AUTO: 11.9 % (ref 27–41)
MCH RBC QN AUTO: 27.2 PG (ref 27–31)
MCHC RBC AUTO-ENTMCNC: 31.3 G/DL (ref 32–36)
MCV RBC AUTO: 87 FL (ref 80–96)
MONOCYTES # BLD AUTO: 1.77 K/UL (ref 0–0.8)
MONOCYTES NFR BLD AUTO: 11.1 % (ref 2–6)
MPC BLD CALC-MCNC: 9.4 FL (ref 9.4–12.4)
NEUTROPHILS # BLD AUTO: 11.55 K/UL (ref 1.8–7.7)
NEUTROPHILS NFR BLD AUTO: 72.5 % (ref 53–65)
NRBC # BLD AUTO: 0 X10E3/UL
NRBC, AUTO (.00): 0 %
PLATELET # BLD AUTO: 617 K/UL (ref 150–400)
POTASSIUM SERPL-SCNC: 3.5 MMOL/L (ref 3.5–5.1)
RBC # BLD AUTO: 3.23 M/UL (ref 4.2–5.4)
SODIUM SERPL-SCNC: 125 MMOL/L (ref 136–145)
WBC # BLD AUTO: 15.94 K/UL (ref 4.5–11)

## 2022-11-28 PROCEDURE — 36415 COLL VENOUS BLD VENIPUNCTURE: CPT | Performed by: HOSPITALIST

## 2022-11-28 PROCEDURE — 63600175 PHARM REV CODE 636 W HCPCS: Performed by: INTERNAL MEDICINE

## 2022-11-28 PROCEDURE — 63600175 PHARM REV CODE 636 W HCPCS: Performed by: STUDENT IN AN ORGANIZED HEALTH CARE EDUCATION/TRAINING PROGRAM

## 2022-11-28 PROCEDURE — 63600175 PHARM REV CODE 636 W HCPCS: Performed by: NURSE PRACTITIONER

## 2022-11-28 PROCEDURE — 85025 COMPLETE CBC W/AUTO DIFF WBC: CPT | Performed by: STUDENT IN AN ORGANIZED HEALTH CARE EDUCATION/TRAINING PROGRAM

## 2022-11-28 PROCEDURE — 99232 PR SUBSEQUENT HOSPITAL CARE,LEVL II: ICD-10-PCS | Mod: ,,, | Performed by: STUDENT IN AN ORGANIZED HEALTH CARE EDUCATION/TRAINING PROGRAM

## 2022-11-28 PROCEDURE — 82962 GLUCOSE BLOOD TEST: CPT

## 2022-11-28 PROCEDURE — 25000003 PHARM REV CODE 250: Performed by: GENERAL PRACTICE

## 2022-11-28 PROCEDURE — 11000001 HC ACUTE MED/SURG PRIVATE ROOM

## 2022-11-28 PROCEDURE — C9113 INJ PANTOPRAZOLE SODIUM, VIA: HCPCS | Performed by: NURSE PRACTITIONER

## 2022-11-28 PROCEDURE — 80048 BASIC METABOLIC PNL TOTAL CA: CPT | Performed by: HOSPITALIST

## 2022-11-28 PROCEDURE — 25000003 PHARM REV CODE 250: Performed by: INTERNAL MEDICINE

## 2022-11-28 PROCEDURE — 27000221 HC OXYGEN, UP TO 24 HOURS

## 2022-11-28 PROCEDURE — 63600175 PHARM REV CODE 636 W HCPCS: Performed by: ORTHOPAEDIC SURGERY

## 2022-11-28 PROCEDURE — 94761 N-INVAS EAR/PLS OXIMETRY MLT: CPT

## 2022-11-28 PROCEDURE — 99232 SBSQ HOSP IP/OBS MODERATE 35: CPT | Mod: ,,, | Performed by: STUDENT IN AN ORGANIZED HEALTH CARE EDUCATION/TRAINING PROGRAM

## 2022-11-28 PROCEDURE — 25000003 PHARM REV CODE 250: Performed by: ORTHOPAEDIC SURGERY

## 2022-11-28 PROCEDURE — 25000003 PHARM REV CODE 250: Performed by: STUDENT IN AN ORGANIZED HEALTH CARE EDUCATION/TRAINING PROGRAM

## 2022-11-28 RX ORDER — INSULIN ASPART 100 [IU]/ML
3 INJECTION, SOLUTION INTRAVENOUS; SUBCUTANEOUS
Status: DISCONTINUED | OUTPATIENT
Start: 2022-11-28 | End: 2022-12-02

## 2022-11-28 RX ORDER — SODIUM CHLORIDE 9 MG/ML
INJECTION, SOLUTION INTRAVENOUS CONTINUOUS
Status: DISPENSED | OUTPATIENT
Start: 2022-11-28 | End: 2022-11-28

## 2022-11-28 RX ADMIN — PANTOPRAZOLE SODIUM 40 MG: 40 INJECTION, POWDER, LYOPHILIZED, FOR SOLUTION INTRAVENOUS at 09:11

## 2022-11-28 RX ADMIN — INSULIN ASPART 4 UNITS: 100 INJECTION, SOLUTION INTRAVENOUS; SUBCUTANEOUS at 04:11

## 2022-11-28 RX ADMIN — DULOXETINE 60 MG: 30 CAPSULE, DELAYED RELEASE ORAL at 09:11

## 2022-11-28 RX ADMIN — INSULIN DETEMIR 40 UNITS: 100 INJECTION, SOLUTION SUBCUTANEOUS at 09:11

## 2022-11-28 RX ADMIN — INSULIN ASPART 3 UNITS: 100 INJECTION, SOLUTION INTRAVENOUS; SUBCUTANEOUS at 04:11

## 2022-11-28 RX ADMIN — INSULIN ASPART 3 UNITS: 100 INJECTION, SOLUTION INTRAVENOUS; SUBCUTANEOUS at 12:11

## 2022-11-28 RX ADMIN — ENOXAPARIN SODIUM 30 MG: 30 INJECTION SUBCUTANEOUS at 08:11

## 2022-11-28 RX ADMIN — INSULIN ASPART 6 UNITS: 100 INJECTION, SOLUTION INTRAVENOUS; SUBCUTANEOUS at 12:11

## 2022-11-28 RX ADMIN — OXYCODONE HYDROCHLORIDE 10 MG: 5 TABLET ORAL at 05:11

## 2022-11-28 RX ADMIN — OXYCODONE HYDROCHLORIDE 10 MG: 5 TABLET ORAL at 03:11

## 2022-11-28 RX ADMIN — DOCUSATE SODIUM 100 MG: 100 CAPSULE, LIQUID FILLED ORAL at 08:11

## 2022-11-28 RX ADMIN — ENOXAPARIN SODIUM 30 MG: 30 INJECTION SUBCUTANEOUS at 09:11

## 2022-11-28 RX ADMIN — SODIUM CHLORIDE: 9 INJECTION, SOLUTION INTRAVENOUS at 12:11

## 2022-11-28 RX ADMIN — VANCOMYCIN HYDROCHLORIDE 1500 MG: 1 INJECTION, POWDER, LYOPHILIZED, FOR SOLUTION INTRAVENOUS at 03:11

## 2022-11-28 RX ADMIN — TRAZODONE HYDROCHLORIDE 50 MG: 50 TABLET ORAL at 12:11

## 2022-11-28 RX ADMIN — INSULIN ASPART 5 UNITS: 100 INJECTION, SOLUTION INTRAVENOUS; SUBCUTANEOUS at 12:11

## 2022-11-28 RX ADMIN — INSULIN ASPART 8 UNITS: 100 INJECTION, SOLUTION INTRAVENOUS; SUBCUTANEOUS at 05:11

## 2022-11-28 RX ADMIN — MORPHINE SULFATE 4 MG: 4 INJECTION, SOLUTION INTRAMUSCULAR; INTRAVENOUS at 12:11

## 2022-11-28 RX ADMIN — MORPHINE SULFATE 4 MG: 4 INJECTION, SOLUTION INTRAMUSCULAR; INTRAVENOUS at 08:11

## 2022-11-28 RX ADMIN — MORPHINE SULFATE 4 MG: 4 INJECTION, SOLUTION INTRAMUSCULAR; INTRAVENOUS at 09:11
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
## 2022-11-28 NOTE — SUBJECTIVE & OBJECTIVE
Interval History: NAEO    Review of Systems   Constitutional:  Positive for appetite change. Negative for chills and fever.   HENT:  Negative for hearing loss, nosebleeds, rhinorrhea, sinus pressure, sneezing, sore throat and trouble swallowing.    Eyes:  Negative for photophobia, pain, discharge, itching and visual disturbance.   Respiratory:  Negative for apnea, cough, chest tightness and shortness of breath.    Cardiovascular:  Negative for chest pain, palpitations and leg swelling.   Gastrointestinal:  Negative for abdominal distention, abdominal pain, blood in stool, constipation, diarrhea, nausea and vomiting.   Endocrine: Negative for polydipsia, polyphagia and polyuria.   Genitourinary:  Negative for difficulty urinating, dysuria, frequency, hematuria and urgency. Vaginal bleeding: left knee.  Musculoskeletal:  Positive for back pain. Negative for myalgias. Arthralgias: left knee and left shoulder. Joint swelling: left knee.  Skin:  Negative for rash.   Neurological:  Negative for dizziness, tremors, seizures, syncope, light-headedness, numbness and headaches.   Psychiatric/Behavioral:  Negative for agitation, confusion, hallucinations and sleep disturbance. The patient is not nervous/anxious.    Objective:     Vital Signs (Most Recent):  Temp: 98.2 °F (36.8 °C) (11/28/22 1200)  Pulse: 97 (11/28/22 1200)  Resp: 18 (11/28/22 1200)  BP: (!) 150/74 (11/28/22 1200)  SpO2: 97 % (11/28/22 0701)   Vital Signs (24h Range):  Temp:  [98.2 °F (36.8 °C)-99.6 °F (37.6 °C)] 98.2 °F (36.8 °C)  Pulse:  [] 97  Resp:  [18-19] 18  SpO2:  [93 %-100 %] 97 %  BP: (150-175)/(74-88) 150/74     Weight: 71.8 kg (158 lb 4.6 oz)  Body mass index is 25.55 kg/m².  No intake or output data in the 24 hours ending 11/28/22 1216     Physical Exam  Vitals reviewed.   Constitutional:       Appearance: Normal appearance.   HENT:      Head: Normocephalic and atraumatic.      Right Ear: External ear normal.      Left Ear: External ear  normal.   Eyes:      Extraocular Movements: Extraocular movements intact.      Conjunctiva/sclera: Conjunctivae normal.   Cardiovascular:      Rate and Rhythm: Normal rate and regular rhythm.      Pulses: Normal pulses.   Pulmonary:      Effort: Pulmonary effort is normal.   Abdominal:      General: Bowel sounds are normal.   Musculoskeletal:         General: Swelling (left knee) and tenderness (left knee and left shoulder) present.      Cervical back: Normal range of motion and neck supple.      Comments: Normal tone, ROM limited by pain   Skin:     General: Skin is warm.      Capillary Refill: Capillary refill takes less than 2 seconds.   Neurological:      General: No focal deficit present.      Mental Status: She is alert and oriented to person, place, and time.   Psychiatric:         Mood and Affect: Mood normal.         Behavior: Behavior normal.         Thought Content: Thought content normal.       Significant Labs: All pertinent labs within the past 24 hours have been reviewed.    Significant Imaging: I have reviewed all pertinent imaging results/findings within the past 24 hours.

## 2022-11-28 NOTE — PT/OT/SLP PROGRESS
Occupational Therapy      Patient Name:  Monica Walker   MRN:  [id number]    Patient not seen today secondary to Unarousable. Will follow-up on next treatment day.    11/28/2022

## 2022-11-28 NOTE — CARE UPDATE
Patient awake alert without any new complaints.  Has weakness in left shoulder due to rotator cuff tear.  As far as motion on her shoulder she has no pain on passive motion.  Minimal if any swelling.  No drainage from her wounds.  No redness.  From her knee wound there is no drainage no redness.  Swelling is decreased in the knee swelling decreased shoulder she is doing well on her IV antibiotics.  Recommend 6 weeks of IV antibiotics.  Patient can go home with home health with her antibiotics recommendation per Infectious Disease.  Will need a PICC line.  Can follow up with Orthopedics in 2 weeks for wound check.

## 2022-11-28 NOTE — PLAN OF CARE
SS spoke with Dr. Fontanez, patient possibly ready for discharge tomorrow. Patient would like to go to [facility] for swingbed. SS sent referral to Kim at [facility] at this time.     [id number] SS spoke with Desire at [facility], she has received referral and is reviewing.

## 2022-11-28 NOTE — PT/OT/SLP PROGRESS
Physical Therapy      Patient Name:  Monica Walker   MRN:  [id number]    Patient not seen today secondary to Patient fatigue, Patient unwilling to participate. Patient roused easily but briefly to verbal stimulation and quickly dropped off to sleep. Patient refused any and all offers of PT interventions. Will follow-up tomorrow.

## 2022-11-28 NOTE — PROGRESS NOTES
[facility] - [address] [place] [address] Medicine  Progress Note    Patient Name: Monica Walker  MRN: [id number]  Patient Class: IP- Inpatient   Admission Date: 11/20/2022  Length of Stay: 8 days  Attending Physician: Adilson Fontanez DO  Primary Care Provider: Hannah Schulz MD        Subjective:     Principal Problem:Bacteremia        HPI:  Patient is a 53 yo female who presents to [facility] transferred from [facility] c/o L knee and R flank pain x 2 weeks. R flank pain is 10/10, squeezing in quality, without radiation, no aggravating or relieving factors. L knee pain started after a fall, which she is unable to recall date of the injury. The knee pain is 10/10, located in the medial joint line, aggravated by movement and weight-bearing, squeezing in quality, radiates down to L foot and without relieving factors including heat and ice. Patient has a history of type 2 diabetes, poorly controlled.    10 days ago patient was admitted at [facility] and treated for pyelonephritis on the left side with IV antibiotics although her urine culture did not grow any bacteria. 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 Today a CT scan of the abdomen pelvis and chest with IV contrast indicates bilateral peripherally dense cavitary lesions left lower lobe and right middle lobe bilateral pleural effusions left greater than right with possible loculation these areas are concerning for abscesses although a neoplastic process can not be entirely excluded. Abnormal appearance of the left kidney concerning for pyelonephritis. Patient was transferred to [facility] for further medical evaluation and management.         Overview/Hospital Course:  11/24-will need IV abx for 6 weeks from first negative blood cultures. Repeat today.  Requesting  swingbed in [place]. Will ask CM to assist  11/25- pain better controlled today. Will need definitive plan from Ortho prior to DC  11/26-febrile overnight, continues to have pain. Suspect poor source control.  Repeat cultures  11/27- afebrile, having some right hip pain today.  Worse lying flat, better with flexion  11/28-if blood cultures remain negative will place picc tomorrow and DC to [place] swingbed for 6 weeks abx      Interval History: NAEO    Review of Systems   Constitutional:  Positive for appetite change. Negative for chills and fever.   HENT:  Negative for hearing loss, nosebleeds, rhinorrhea, sinus pressure, sneezing, sore throat and trouble swallowing.    Eyes:  Negative for photophobia, pain, discharge, itching and visual disturbance.   Respiratory:  Negative for apnea, cough, chest tightness and shortness of breath.    Cardiovascular:  Negative for chest pain, palpitations and leg swelling.   Gastrointestinal:  Negative for abdominal distention, abdominal pain, blood in stool, constipation, diarrhea, nausea and vomiting.   Endocrine: Negative for polydipsia, polyphagia and polyuria.   Genitourinary:  Negative for difficulty urinating, dysuria, frequency, hematuria and urgency. Vaginal bleeding: left knee.  Musculoskeletal:  Positive for back pain. Negative for myalgias. Arthralgias: left knee and left shoulder. Joint swelling: left knee.  Skin:  Negative for rash.   Neurological:  Negative for dizziness, tremors, seizures, syncope, light-headedness, numbness and headaches.   Psychiatric/Behavioral:  Negative for agitation, confusion, hallucinations and sleep disturbance. The patient is not nervous/anxious.    Objective:     Vital Signs (Most Recent):  Temp: 98.2 °F (36.8 °C) (11/28/22 1200)  Pulse: 97 (11/28/22 1200)  Resp: 18 (11/28/22 1200)  BP: (!) 150/74 (11/28/22 1200)  SpO2: 97 % (11/28/22 0701)   Vital Signs (24h Range):  Temp:  [98.2 °F (36.8 °C)-99.6 °F (37.6 °C)] 98.2 °F (36.8 °C)  Pulse:   [] 97  Resp:  [18-19] 18  SpO2:  [93 %-100 %] 97 %  BP: (150-175)/(74-88) 150/74     Weight: 71.8 kg (158 lb 4.6 oz)  Body mass index is 25.55 kg/m².  No intake or output data in the 24 hours ending 11/28/22 1216     Physical Exam  Vitals reviewed.   Constitutional:       Appearance: Normal appearance.   HENT:      Head: Normocephalic and atraumatic.      Right Ear: External ear normal.      Left Ear: External ear normal.   Eyes:      Extraocular Movements: Extraocular movements intact.      Conjunctiva/sclera: Conjunctivae normal.   Cardiovascular:      Rate and Rhythm: Normal rate and regular rhythm.      Pulses: Normal pulses.   Pulmonary:      Effort: Pulmonary effort is normal.   Abdominal:      General: Bowel sounds are normal.   Musculoskeletal:         General: Swelling (left knee) and tenderness (left knee and left shoulder) present.      Cervical back: Normal range of motion and neck supple.      Comments: Normal tone, ROM limited by pain   Skin:     General: Skin is warm.      Capillary Refill: Capillary refill takes less than 2 seconds.   Neurological:      General: No focal deficit present.      Mental Status: She is alert and oriented to person, place, and time.   Psychiatric:         Mood and Affect: Mood normal.         Behavior: Behavior normal.         Thought Content: Thought content normal.       Significant Labs: All pertinent labs within the past 24 hours have been reviewed.    Significant Imaging: I have reviewed all pertinent imaging results/findings within the past 24 hours.      Assessment/Plan:      * Bacteremia    Continue vanco  Bronchial cultures with MRSA          DM (diabetes mellitus)  Basal, SSI  Prandial if needed    Lung abscess  Likely seeding  Continue vanco            Septic arthritis  [place] swingbed tomorrow if cultures remain negative.   Will need 6 weeks from day of negative blood cultures    Depression  Continue home Cymbalta            VTE Risk Mitigation (From  admission, onward)         Ordered     enoxaparin injection 30 mg  Every 12 hours         11/22/22 1524     IP VTE HIGH RISK PATIENT  Once         11/22/22 1524     Place DONOVAN hose  Until discontinued         11/22/22 1524     enoxaparin injection 40 mg  Daily         11/20/22 0519     Place sequential compression device  Until discontinued         11/20/22 0519                Discharge Planning   SUZANNA:      Code Status: Full Code   Is the patient medically ready for discharge?:     Reason for patient still in hospital (select all that apply): Treatment  Discharge Plan A: Home Health, Home with family                  Adilson Fontanez DO  Department of Hospital Medicine   [facility] - [facility]

## 2022-11-28 NOTE — ASSESSMENT & PLAN NOTE
[place] swingbed tomorrow if cultures remain negative.   Will need 6 weeks from day of negative blood cultures

## 2022-11-29 LAB
ANION GAP SERPL CALCULATED.3IONS-SCNC: 9 MMOL/L (ref 7–16)
BACTERIA BLD CULT: NORMAL
BASOPHILS # BLD AUTO: 0.08 K/UL (ref 0–0.2)
BASOPHILS NFR BLD AUTO: 0.5 % (ref 0–1)
BUN SERPL-MCNC: 7 MG/DL (ref 7–18)
BUN/CREAT SERPL: 13 (ref 6–20)
CALCIUM SERPL-MCNC: 8.4 MG/DL (ref 8.5–10.1)
CHLORIDE SERPL-SCNC: 95 MMOL/L (ref 98–107)
CO2 SERPL-SCNC: 30 MMOL/L (ref 21–32)
CREAT SERPL-MCNC: 0.56 MG/DL (ref 0.55–1.02)
DIFFERENTIAL METHOD BLD: ABNORMAL
EGFR (NO RACE VARIABLE) (RUSH/TITUS): 109 ML/MIN/1.73M²
EOSINOPHIL # BLD AUTO: 0.44 K/UL (ref 0–0.5)
EOSINOPHIL NFR BLD AUTO: 2.7 % (ref 1–4)
ERYTHROCYTE [DISTWIDTH] IN BLOOD BY AUTOMATED COUNT: 14.5 % (ref 11.5–14.5)
GLUCOSE SERPL-MCNC: 100 MG/DL (ref 70–105)
GLUCOSE SERPL-MCNC: 102 MG/DL (ref 70–105)
GLUCOSE SERPL-MCNC: 175 MG/DL (ref 70–105)
GLUCOSE SERPL-MCNC: 325 MG/DL (ref 70–105)
GLUCOSE SERPL-MCNC: 50 MG/DL (ref 70–105)
GLUCOSE SERPL-MCNC: 88 MG/DL (ref 74–106)
GLUCOSE SERPL-MCNC: 92 MG/DL (ref 70–105)
GLUCOSE SERPL-MCNC: 93 MG/DL (ref 70–105)
HCT VFR BLD AUTO: 28.7 % (ref 38–47)
HGB BLD-MCNC: 8.9 G/DL (ref 12–16)
IMM GRANULOCYTES # BLD AUTO: 0.27 K/UL (ref 0–0.04)
IMM GRANULOCYTES NFR BLD: 1.7 % (ref 0–0.4)
LYMPHOCYTES # BLD AUTO: 2.05 K/UL (ref 1–4.8)
LYMPHOCYTES NFR BLD AUTO: 12.8 % (ref 27–41)
MCH RBC QN AUTO: 27.1 PG (ref 27–31)
MCHC RBC AUTO-ENTMCNC: 31 G/DL (ref 32–36)
MCV RBC AUTO: 87.2 FL (ref 80–96)
MONOCYTES # BLD AUTO: 1.68 K/UL (ref 0–0.8)
MONOCYTES NFR BLD AUTO: 10.5 % (ref 2–6)
MPC BLD CALC-MCNC: 9.2 FL (ref 9.4–12.4)
NEUTROPHILS # BLD AUTO: 11.53 K/UL (ref 1.8–7.7)
NEUTROPHILS NFR BLD AUTO: 71.8 % (ref 53–65)
NRBC # BLD AUTO: 0 X10E3/UL
NRBC, AUTO (.00): 0 %
PLATELET # BLD AUTO: 692 K/UL (ref 150–400)
POTASSIUM SERPL-SCNC: 2.9 MMOL/L (ref 3.5–5.1)
RBC # BLD AUTO: 3.29 M/UL (ref 4.2–5.4)
SODIUM SERPL-SCNC: 131 MMOL/L (ref 136–145)
VANCOMYCIN TROUGH SERPL-MCNC: 14.2 ΜG/ML (ref 10–20)
WBC # BLD AUTO: 16.05 K/UL (ref 4.5–11)

## 2022-11-29 PROCEDURE — 63600175 PHARM REV CODE 636 W HCPCS: Performed by: INTERNAL MEDICINE

## 2022-11-29 PROCEDURE — 11000001 HC ACUTE MED/SURG PRIVATE ROOM

## 2022-11-29 PROCEDURE — 25000003 PHARM REV CODE 250: Performed by: ORTHOPAEDIC SURGERY

## 2022-11-29 PROCEDURE — 82962 GLUCOSE BLOOD TEST: CPT

## 2022-11-29 PROCEDURE — 99232 SBSQ HOSP IP/OBS MODERATE 35: CPT | Mod: ,,, | Performed by: INTERNAL MEDICINE

## 2022-11-29 PROCEDURE — 63600175 PHARM REV CODE 636 W HCPCS: Performed by: STUDENT IN AN ORGANIZED HEALTH CARE EDUCATION/TRAINING PROGRAM

## 2022-11-29 PROCEDURE — 97110 THERAPEUTIC EXERCISES: CPT

## 2022-11-29 PROCEDURE — 99232 PR SUBSEQUENT HOSPITAL CARE,LEVL II: ICD-10-PCS | Mod: ,,, | Performed by: INTERNAL MEDICINE

## 2022-11-29 PROCEDURE — C9113 INJ PANTOPRAZOLE SODIUM, VIA: HCPCS | Performed by: NURSE PRACTITIONER

## 2022-11-29 PROCEDURE — 97530 THERAPEUTIC ACTIVITIES: CPT

## 2022-11-29 PROCEDURE — 94761 N-INVAS EAR/PLS OXIMETRY MLT: CPT

## 2022-11-29 PROCEDURE — 36415 COLL VENOUS BLD VENIPUNCTURE: CPT | Performed by: STUDENT IN AN ORGANIZED HEALTH CARE EDUCATION/TRAINING PROGRAM

## 2022-11-29 PROCEDURE — 25000003 PHARM REV CODE 250: Performed by: GENERAL PRACTICE

## 2022-11-29 PROCEDURE — 25000003 PHARM REV CODE 250: Performed by: INTERNAL MEDICINE

## 2022-11-29 PROCEDURE — 99233 PR SUBSEQUENT HOSPITAL CARE,LEVL III: ICD-10-PCS | Mod: ,,, | Performed by: STUDENT IN AN ORGANIZED HEALTH CARE EDUCATION/TRAINING PROGRAM

## 2022-11-29 PROCEDURE — 36415 COLL VENOUS BLD VENIPUNCTURE: CPT | Performed by: HOSPITALIST

## 2022-11-29 PROCEDURE — 25000003 PHARM REV CODE 250: Performed by: STUDENT IN AN ORGANIZED HEALTH CARE EDUCATION/TRAINING PROGRAM

## 2022-11-29 PROCEDURE — 85025 COMPLETE CBC W/AUTO DIFF WBC: CPT | Performed by: STUDENT IN AN ORGANIZED HEALTH CARE EDUCATION/TRAINING PROGRAM

## 2022-11-29 PROCEDURE — 99233 SBSQ HOSP IP/OBS HIGH 50: CPT | Mod: ,,, | Performed by: STUDENT IN AN ORGANIZED HEALTH CARE EDUCATION/TRAINING PROGRAM

## 2022-11-29 PROCEDURE — 63600175 PHARM REV CODE 636 W HCPCS: Performed by: ORTHOPAEDIC SURGERY

## 2022-11-29 PROCEDURE — 80048 BASIC METABOLIC PNL TOTAL CA: CPT | Performed by: HOSPITALIST

## 2022-11-29 PROCEDURE — 63600175 PHARM REV CODE 636 W HCPCS: Performed by: NURSE PRACTITIONER

## 2022-11-29 PROCEDURE — 80202 ASSAY OF VANCOMYCIN: CPT | Performed by: INTERNAL MEDICINE

## 2022-11-29 RX ORDER — POTASSIUM CHLORIDE 20 MEQ/1
40 TABLET, EXTENDED RELEASE ORAL EVERY 4 HOURS
Status: COMPLETED | OUTPATIENT
Start: 2022-11-29 | End: 2022-11-29

## 2022-11-29 RX ADMIN — MORPHINE SULFATE 4 MG: 4 INJECTION, SOLUTION INTRAMUSCULAR; INTRAVENOUS at 10:11

## 2022-11-29 RX ADMIN — POTASSIUM CHLORIDE 40 MEQ: 1500 TABLET, EXTENDED RELEASE ORAL at 06:11

## 2022-11-29 RX ADMIN — DULOXETINE 60 MG: 30 CAPSULE, DELAYED RELEASE ORAL at 08:11

## 2022-11-29 RX ADMIN — MORPHINE SULFATE 4 MG: 4 INJECTION, SOLUTION INTRAMUSCULAR; INTRAVENOUS at 06:11

## 2022-11-29 RX ADMIN — INSULIN ASPART 3 UNITS: 100 INJECTION, SOLUTION INTRAVENOUS; SUBCUTANEOUS at 04:11

## 2022-11-29 RX ADMIN — POTASSIUM CHLORIDE 40 MEQ: 1500 TABLET, EXTENDED RELEASE ORAL at 10:11

## 2022-11-29 RX ADMIN — INSULIN ASPART 2 UNITS: 100 INJECTION, SOLUTION INTRAVENOUS; SUBCUTANEOUS at 04:11

## 2022-11-29 RX ADMIN — INSULIN DETEMIR 40 UNITS: 100 INJECTION, SOLUTION SUBCUTANEOUS at 08:11

## 2022-11-29 RX ADMIN — MORPHINE SULFATE 4 MG: 4 INJECTION, SOLUTION INTRAMUSCULAR; INTRAVENOUS at 11:11

## 2022-11-29 RX ADMIN — VANCOMYCIN HYDROCHLORIDE 1500 MG: 1 INJECTION, POWDER, LYOPHILIZED, FOR SOLUTION INTRAVENOUS at 03:11

## 2022-11-29 RX ADMIN — ENOXAPARIN SODIUM 30 MG: 30 INJECTION SUBCUTANEOUS at 08:11

## 2022-11-29 RX ADMIN — PANTOPRAZOLE SODIUM 40 MG: 40 INJECTION, POWDER, LYOPHILIZED, FOR SOLUTION INTRAVENOUS at 08:11

## 2022-11-29 RX ADMIN — POTASSIUM CHLORIDE 40 MEQ: 1500 TABLET, EXTENDED RELEASE ORAL at 02:11

## 2022-11-29 RX ADMIN — OXYCODONE HYDROCHLORIDE 10 MG: 5 TABLET ORAL at 08:11

## 2022-11-29 RX ADMIN — DOCUSATE SODIUM 100 MG: 100 CAPSULE, LIQUID FILLED ORAL at 08:11

## 2022-11-29 RX ADMIN — MORPHINE SULFATE 4 MG: 4 INJECTION, SOLUTION INTRAMUSCULAR; INTRAVENOUS at 05:11

## 2022-11-29 RX ADMIN — OXYCODONE HYDROCHLORIDE 10 MG: 5 TABLET ORAL at 04:11

## 2022-11-29 RX ADMIN — INSULIN ASPART 3 UNITS: 100 INJECTION, SOLUTION INTRAVENOUS; SUBCUTANEOUS at 06:11

## 2022-11-29 RX ADMIN — Medication 16 G: at 07:11

## 2022-11-29 NOTE — SUBJECTIVE & OBJECTIVE
Interval History:  Patient without complaint except it hurts to stand on her right leg is weak    Objective:     Vital Signs (Most Recent):  Temp: 98.5 °F (36.9 °C) (11/29/22 0000)  Pulse: 100 (11/29/22 0000)  Resp: 17 (11/29/22 0503)  BP: (!) 154/70 (11/29/22 0000)  SpO2: (!) 93 % (11/29/22 0000)   Vital Signs (24h Range):  Temp:  [98.1 °F (36.7 °C)-99 °F (37.2 °C)] 98.5 °F (36.9 °C)  Pulse:  [] 100  Resp:  [17-20] 17  SpO2:  [93 %-97 %] 93 %  BP: (145-161)/(59-80) 154/70     Weight: 70 kg (154 lb 5.2 oz)  Body mass index is 24.91 kg/m².    No intake or output data in the 24 hours ending 11/29/22 0544    Physical Exam  Vitals reviewed.   Constitutional:       Appearance: Normal appearance.      Interventions: She is not intubated.  HENT:      Head: Normocephalic and atraumatic.      Nose: Nose normal.      Mouth/Throat:      Mouth: Mucous membranes are dry.      Pharynx: Oropharynx is clear.   Eyes:      Extraocular Movements: Extraocular movements intact.      Conjunctiva/sclera: Conjunctivae normal.      Pupils: Pupils are equal, round, and reactive to light.   Cardiovascular:      Rate and Rhythm: Normal rate.      Heart sounds: Normal heart sounds. No murmur heard.  Pulmonary:      Effort: Pulmonary effort is normal. She is not intubated.      Breath sounds: Normal breath sounds.   Abdominal:      General: Abdomen is flat. Bowel sounds are normal.      Palpations: Abdomen is soft.   Musculoskeletal:         General: Normal range of motion.      Cervical back: Normal range of motion and neck supple.      Right lower leg: No edema.      Left lower leg: No edema.   Skin:     General: Skin is warm and dry.      Capillary Refill: Capillary refill takes less than 2 seconds.   Neurological:      General: No focal deficit present.      Mental Status: She is alert and oriented to person, place, and time.   Psychiatric:         Mood and Affect: Mood normal.         Behavior: Behavior normal.       Vents:  Vent  Mode: A/C (11/22/22 1700)  Ventilator Initiated: Yes (11/22/22 1700)  Set Rate: 14 BPM (11/22/22 1700)  Vt Set: 500 mL (11/22/22 1700)  PEEP/CPAP: 5 cmH20 (11/22/22 1700)  Oxygen Concentration (%): 28 (11/27/22 0720)  Peak Airway Pressure: 27 cmH20 (11/22/22 1700)  Total Ve: 6.2 L/m (11/22/22 1700)    Lines/Drains/Airways       Drain  Duration                  Closed/Suction Drain 11/22/22 1600 Left;Anterior Shoulder Accordion 6 days         Closed/Suction Drain 11/22/22 1600 Left;Proximal;Anterior Knee Accordion 6 days              Peripheral Intravenous Line  Duration                  Peripheral IV - Single Lumen 11/27/22 1540 20 G Anterior;Distal;Right Forearm 1 day                    Significant Labs:    CBC/Anemia Profile:  Recent Labs   Lab 11/28/22  0343 11/29/22  0329   WBC 15.94* 16.05*   HGB 8.8* 8.9*   HCT 28.1* 28.7*   * 692*   MCV 87.0 87.2   RDW 14.5 14.5        Chemistries:  Recent Labs   Lab 11/28/22  0343   *   K 3.5   CL 90*   CO2 28   BUN 6*   CREATININE 0.51*   CALCIUM 8.1*       Recent Lab Results  (Last 5 results in the past 24 hours)        11/29/22  0435   11/29/22  0329   11/29/22  0027   11/28/22  2041   11/28/22  1604        Baso #   0.08             Basophil %   0.5             Differential Type   Auto             Eos #   0.44             Eosinophil %   2.7             Hematocrit   28.7             Hemoglobin   8.9             Immature Grans (Abs)   0.27             Immature Granulocytes   1.7             Lymph #   2.05             Lymph %   12.8             MCH   27.1             MCHC   31.0             MCV   87.2             Mono #   1.68             Mono %   10.5             MPV   9.2             Neutrophils, Abs   11.53             Neutrophils Relative   71.8             nRBC   0.0             NUCLEATED RBC ABSOLUTE   0.00             Platelets   692             POC Glucose 102     93   100   215       RBC   3.29             RDW   14.5             WBC   16.05                                     Significant Imaging:  I have reviewed all pertinent imaging results/findings within the past 24 hours.

## 2022-11-29 NOTE — PROGRESS NOTES
Vancomycin trough resulted as 14.2, drawn appropriately before dose. Continue current regimen of 1500 mg IV every 12 hours. BUN/Scr stable. Trough ordered for 12/2 1430. Pharmacy will continue to follow and make necessary adjustments.    Alesia Carey, PharmD  Ext. 2028

## 2022-11-29 NOTE — PROGRESS NOTES
[facility] - [facility]  Pulmonology  Progress Note    Patient Name: Monica Walker  MRN: [id number]  Admission Date: 11/20/2022  Hospital Length of Stay: 9 days  Code Status: Full Code  Attending Provider: Adilson Fontanez DO  Primary Care Provider: Hannah Schulz MD   Principal Problem: Bacteremia    Subjective:     Interval History:  Patient without complaint except it hurts to stand on her right leg is weak    Objective:     Vital Signs (Most Recent):  Temp: 98.5 °F (36.9 °C) (11/29/22 0000)  Pulse: 100 (11/29/22 0000)  Resp: 17 (11/29/22 0503)  BP: (!) 154/70 (11/29/22 0000)  SpO2: (!) 93 % (11/29/22 0000)   Vital Signs (24h Range):  Temp:  [98.1 °F (36.7 °C)-99 °F (37.2 °C)] 98.5 °F (36.9 °C)  Pulse:  [] 100  Resp:  [17-20] 17  SpO2:  [93 %-97 %] 93 %  BP: (145-161)/(59-80) 154/70     Weight: 70 kg (154 lb 5.2 oz)  Body mass index is 24.91 kg/m².    No intake or output data in the 24 hours ending 11/29/22 0544    Physical Exam  Vitals reviewed.   Constitutional:       Appearance: Normal appearance.      Interventions: She is not intubated.  HENT:      Head: Normocephalic and atraumatic.      Nose: Nose normal.      Mouth/Throat:      Mouth: Mucous membranes are dry.      Pharynx: Oropharynx is clear.   Eyes:      Extraocular Movements: Extraocular movements intact.      Conjunctiva/sclera: Conjunctivae normal.      Pupils: Pupils are equal, round, and reactive to light.   Cardiovascular:      Rate and Rhythm: Normal rate.      Heart sounds: Normal heart sounds. No murmur heard.  Pulmonary:      Effort: Pulmonary effort is normal. She is not intubated.      Breath sounds: Normal breath sounds.   Abdominal:      General: Abdomen is flat. Bowel sounds are normal.      Palpations: Abdomen is soft.   Musculoskeletal:         General: Normal range of motion.      Cervical back: Normal range of motion and neck supple.      Right lower leg: No edema.      Left lower leg: No edema.    Skin:     General: Skin is warm and dry.      Capillary Refill: Capillary refill takes less than 2 seconds.   Neurological:      General: No focal deficit present.      Mental Status: She is alert and oriented to person, place, and time.   Psychiatric:         Mood and Affect: Mood normal.         Behavior: Behavior normal.       Vents:  Vent Mode: A/C (11/22/22 1700)  Ventilator Initiated: Yes (11/22/22 1700)  Set Rate: 14 BPM (11/22/22 1700)  Vt Set: 500 mL (11/22/22 1700)  PEEP/CPAP: 5 cmH20 (11/22/22 1700)  Oxygen Concentration (%): 28 (11/27/22 0720)  Peak Airway Pressure: 27 cmH20 (11/22/22 1700)  Total Ve: 6.2 L/m (11/22/22 1700)    Lines/Drains/Airways       Drain  Duration                  Closed/Suction Drain 11/22/22 1600 Left;Anterior Shoulder Accordion 6 days         Closed/Suction Drain 11/22/22 1600 Left;Proximal;Anterior Knee Accordion 6 days              Peripheral Intravenous Line  Duration                  Peripheral IV - Single Lumen 11/27/22 1540 20 G Anterior;Distal;Right Forearm 1 day                    Significant Labs:    CBC/Anemia Profile:  Recent Labs   Lab 11/28/22  0343 11/29/22  0329   WBC 15.94* 16.05*   HGB 8.8* 8.9*   HCT 28.1* 28.7*   * 692*   MCV 87.0 87.2   RDW 14.5 14.5        Chemistries:  Recent Labs   Lab 11/28/22  0343   *   K 3.5   CL 90*   CO2 28   BUN 6*   CREATININE 0.51*   CALCIUM 8.1*       Recent Lab Results  (Last 5 results in the past 24 hours)        11/29/22  0435   11/29/22  0329   11/29/22  0027   11/28/22  2041   11/28/22  1604        Baso #   0.08             Basophil %   0.5             Differential Type   Auto             Eos #   0.44             Eosinophil %   2.7             Hematocrit   28.7             Hemoglobin   8.9             Immature Grans (Abs)   0.27             Immature Granulocytes   1.7             Lymph #   2.05             Lymph %   12.8             MCH   27.1             MCHC   31.0             MCV   87.2             Mono #    1.68             Mono %   10.5             MPV   9.2             Neutrophils, Abs   11.53             Neutrophils Relative   71.8             nRBC   0.0             NUCLEATED RBC ABSOLUTE   0.00             Platelets   692             POC Glucose 102     93   100   215       RBC   3.29             RDW   14.5             WBC   16.05                                    Significant Imaging:  I have reviewed all pertinent imaging results/findings within the past 24 hours.    Assessment/Plan:     Lung abscess  Patient has bilateral peripheral cavitary lesions.  Bronchoscopy was negative there were some atypical cells and grew out methicillin-resistant Staph aureus continue antibiotics.  Also patient has new pain in her right leg wonder if she could have a leave epidural will discuss with primary                 Savage Sylvester MD  Pulmonology  [facility] - [address] [place] [address]

## 2022-11-29 NOTE — PROGRESS NOTES
[facility] - [address] [place] [address] Medicine  Progress Note    Patient Name: Monica Walker  MRN: [id number]  Patient Class: IP- Inpatient   Admission Date: 11/20/2022  Length of Stay: 9 days  Attending Physician: Adilson Fontanez DO  Primary Care Provider: Hannah Schulz MD        Subjective:     Principal Problem:Bacteremia        HPI:  Patient is a 51 yo female who presents to [facility] transferred from [facility] c/o L knee and R flank pain x 2 weeks. R flank pain is 10/10, squeezing in quality, without radiation, no aggravating or relieving factors. L knee pain started after a fall, which she is unable to recall date of the injury. The knee pain is 10/10, located in the medial joint line, aggravated by movement and weight-bearing, squeezing in quality, radiates down to L foot and without relieving factors including heat and ice. Patient has a history of type 2 diabetes, poorly controlled.    10 days ago patient was admitted at [facility] and treated for pyelonephritis on the left side with IV antibiotics although her urine culture did not grow any bacteria. 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 Today a CT scan of the abdomen pelvis and chest with IV contrast indicates bilateral peripherally dense cavitary lesions left lower lobe and right middle lobe bilateral pleural effusions left greater than right with possible loculation these areas are concerning for abscesses although a neoplastic process can not be entirely excluded. Abnormal appearance of the left kidney concerning for pyelonephritis. Patient was transferred to [facility] for further medical evaluation and management.         Overview/Hospital Course:  11/24-will need IV abx for 6 weeks from first negative blood cultures. Repeat today.  Requesting  swingbed in [place]. Will ask CM to assist  11/25- pain better controlled today. Will need definitive plan from Ortho prior to DC  11/26-febrile overnight, continues to have pain. Suspect poor source control.  Repeat cultures  11/27- afebrile, having some right hip pain today.  Worse lying flat, better with flexion  11/28-if blood cultures remain negative will place picc tomorrow and DC to [place] swingbed for 6 weeks abx  11/29- persistently positive blood cultures.  2 days now of complaining of right lower abdominal pain and right hip pain.  Exam not consistent with septic arthritis however given persistently positive cultures will scan for effusion.  Psoas abscess consideration as well. Discussed with Radiology.      Interval History: NAEO    Review of Systems   Constitutional:  Positive for appetite change. Negative for chills and fever.   HENT:  Negative for hearing loss, nosebleeds, rhinorrhea, sinus pressure, sneezing, sore throat and trouble swallowing.    Eyes:  Negative for photophobia, pain, discharge, itching and visual disturbance.   Respiratory:  Negative for apnea, cough, chest tightness and shortness of breath.    Cardiovascular:  Negative for chest pain, palpitations and leg swelling.   Gastrointestinal:  Negative for abdominal distention, abdominal pain, blood in stool, constipation, diarrhea, nausea and vomiting.   Endocrine: Negative for polydipsia, polyphagia and polyuria.   Genitourinary:  Negative for difficulty urinating, dysuria, frequency, hematuria and urgency. Vaginal bleeding: left knee.  Musculoskeletal:  Positive for arthralgias (right hip. Denies back pain). Negative for myalgias. Joint swelling: left knee.  Skin:  Negative for rash.   Neurological:  Negative for dizziness, tremors, seizures, syncope, light-headedness, numbness and headaches.   Psychiatric/Behavioral:  Negative for agitation, confusion, hallucinations and sleep disturbance. The patient is not nervous/anxious.     Objective:     Vital Signs (Most Recent):  Temp: 97.7 °F (36.5 °C) (11/29/22 0701)  Pulse: 91 (11/29/22 0701)  Resp: 18 (11/29/22 0839)  BP: (!) 159/78 (11/29/22 0701)  SpO2: (!) 94 % (11/29/22 0701)   Vital Signs (24h Range):  Temp:  [97.5 °F (36.4 °C)-99 °F (37.2 °C)] 97.7 °F (36.5 °C)  Pulse:  [] 91  Resp:  [17-20] 18  SpO2:  [93 %-96 %] 94 %  BP: (145-163)/(59-80) 159/78     Weight: 70 kg (154 lb 5.2 oz)  Body mass index is 24.91 kg/m².  No intake or output data in the 24 hours ending 11/29/22 1035     Physical Exam  Vitals reviewed.   Constitutional:       Appearance: Normal appearance.   HENT:      Head: Normocephalic and atraumatic.      Right Ear: External ear normal.      Left Ear: External ear normal.   Eyes:      Extraocular Movements: Extraocular movements intact.      Conjunctiva/sclera: Conjunctivae normal.   Cardiovascular:      Rate and Rhythm: Normal rate and regular rhythm.      Pulses: Normal pulses.   Pulmonary:      Effort: Pulmonary effort is normal.   Abdominal:      General: Bowel sounds are normal.   Musculoskeletal:         General: Swelling (left knee) and tenderness (left knee and left shoulder) present.      Cervical back: Normal range of motion and neck supple.      Comments: Normal tone, ROM limited by pain    Negative for point tenderness anywhere along the spinal column.    Skin:     General: Skin is warm.      Capillary Refill: Capillary refill takes less than 2 seconds.   Neurological:      General: No focal deficit present.      Mental Status: She is alert and oriented to person, place, and time.   Psychiatric:         Mood and Affect: Mood normal.         Behavior: Behavior normal.         Thought Content: Thought content normal.       Significant Labs: All pertinent labs within the past 24 hours have been reviewed.    Significant Imaging: I have reviewed all pertinent imaging results/findings within the past 24 hours.      Assessment/Plan:      * Bacteremia    Continue  vanco  Bronchial cultures with MRSA          DM (diabetes mellitus)  Basal, SSI  Prandial if needed    Lung abscess  Likely seeding  Continue vanco            Septic arthritis  Re imaging today  Blood cultures positive.    Depression  Continue home Cymbalta            VTE Risk Mitigation (From admission, onward)         Ordered     enoxaparin injection 30 mg  Every 12 hours         11/22/22 1524     IP VTE HIGH RISK PATIENT  Once         11/22/22 1524     Place DONOVAN hose  Until discontinued         11/22/22 1524     enoxaparin injection 40 mg  Daily         11/20/22 0519     Place sequential compression device  Until discontinued         11/20/22 0519                Discharge Planning   SUZANNA:      Code Status: Full Code   Is the patient medically ready for discharge?:     Reason for patient still in hospital (select all that apply): Treatment  Discharge Plan A: Home Health, Home with family                  Adilson Fontanez DO  Department of Hospital Medicine   [facility] - [facility]

## 2022-11-29 NOTE — PLAN OF CARE
0921  spoke with Kim at [facility]. She states they can accept patient today. However, they will only be able to give patient oral pain medicine (they can not do IV morphine). Dr. Fontanez notified of the above. Packet made and placed in patient's cubby.     0928 SS spoke with Dr. Fontanez, patient's blood cultures turned positive last night. Patient not ready for discharge to [facility]. SS notified Kim at [facility]. SS to notify Kim when patient is ready for discharge.

## 2022-11-29 NOTE — PLAN OF CARE
Problem: Adult Inpatient Plan of Care  Goal: Plan of Care Review  Outcome: Ongoing, Progressing  Goal: Patient-Specific Goal (Individualized)  Outcome: Ongoing, Progressing  Flowsheets (Taken 11/28/2022 3412)  Individualized Care Needs: increase activity and indepant performance of ADL's, clear infection  Patient-Specific Goals (Include Timeframe): d/c to swing bed when stable  Goal: Absence of Hospital-Acquired Illness or Injury  Outcome: Ongoing, Progressing  Goal: Optimal Comfort and Wellbeing  Outcome: Ongoing, Progressing  Goal: Readiness for Transition of Care  Outcome: Ongoing, Progressing     Problem: Diabetes Comorbidity  Goal: Blood Glucose Level Within Targeted Range  Outcome: Ongoing, Progressing     Problem: Impaired Wound Healing  Goal: Optimal Wound Healing  Outcome: Ongoing, Progressing     Problem: Fall Injury Risk  Goal: Absence of Fall and Fall-Related Injury  Outcome: Ongoing, Progressing     Problem: Restraint, Nonbehavioral (Nonviolent)  Goal: Absence of Harm or Injury  Outcome: Ongoing, Progressing     Problem: Infection  Goal: Absence of Infection Signs and Symptoms  Outcome: Ongoing, Progressing     Problem: Communication Impairment (Mechanical Ventilation, Invasive)  Goal: Effective Communication  Outcome: Ongoing, Progressing     Problem: Device-Related Complication Risk (Mechanical Ventilation, Invasive)  Goal: Optimal Device Function  Outcome: Ongoing, Progressing     Problem: Inability to Wean (Mechanical Ventilation, Invasive)  Goal: Mechanical Ventilation Liberation  Outcome: Ongoing, Progressing     Problem: Nutrition Impairment (Mechanical Ventilation, Invasive)  Goal: Optimal Nutrition Delivery  Outcome: Ongoing, Progressing     Problem: Skin and Tissue Injury (Mechanical Ventilation, Invasive)  Goal: Absence of Device-Related Skin and Tissue Injury  Outcome: Ongoing, Progressing     Problem: Ventilator-Induced Lung Injury (Mechanical Ventilation, Invasive)  Goal: Absence of  Ventilator-Induced Lung Injury  Outcome: Ongoing, Progressing     Problem: Communication Impairment (Artificial Airway)  Goal: Effective Communication  Outcome: Ongoing, Progressing     Problem: Device-Related Complication Risk (Artificial Airway)  Goal: Optimal Device Function  Outcome: Ongoing, Progressing     Problem: Skin and Tissue Injury (Artificial Airway)  Goal: Absence of Device-Related Skin or Tissue Injury  Outcome: Ongoing, Progressing     Problem: Skin Injury Risk Increased  Goal: Skin Health and Integrity  Outcome: Ongoing, Progressing  POC reviewed and continues

## 2022-11-29 NOTE — SUBJECTIVE & OBJECTIVE
Interval History: NAEO    Review of Systems   Constitutional:  Positive for appetite change. Negative for chills and fever.   HENT:  Negative for hearing loss, nosebleeds, rhinorrhea, sinus pressure, sneezing, sore throat and trouble swallowing.    Eyes:  Negative for photophobia, pain, discharge, itching and visual disturbance.   Respiratory:  Negative for apnea, cough, chest tightness and shortness of breath.    Cardiovascular:  Negative for chest pain, palpitations and leg swelling.   Gastrointestinal:  Negative for abdominal distention, abdominal pain, blood in stool, constipation, diarrhea, nausea and vomiting.   Endocrine: Negative for polydipsia, polyphagia and polyuria.   Genitourinary:  Negative for difficulty urinating, dysuria, frequency, hematuria and urgency. Vaginal bleeding: left knee.  Musculoskeletal:  Positive for arthralgias (right hip. Denies back pain). Negative for myalgias. Joint swelling: left knee.  Skin:  Negative for rash.   Neurological:  Negative for dizziness, tremors, seizures, syncope, light-headedness, numbness and headaches.   Psychiatric/Behavioral:  Negative for agitation, confusion, hallucinations and sleep disturbance. The patient is not nervous/anxious.    Objective:     Vital Signs (Most Recent):  Temp: 97.7 °F (36.5 °C) (11/29/22 0701)  Pulse: 91 (11/29/22 0701)  Resp: 18 (11/29/22 0839)  BP: (!) 159/78 (11/29/22 0701)  SpO2: (!) 94 % (11/29/22 0701)   Vital Signs (24h Range):  Temp:  [97.5 °F (36.4 °C)-99 °F (37.2 °C)] 97.7 °F (36.5 °C)  Pulse:  [] 91  Resp:  [17-20] 18  SpO2:  [93 %-96 %] 94 %  BP: (145-163)/(59-80) 159/78     Weight: 70 kg (154 lb 5.2 oz)  Body mass index is 24.91 kg/m².  No intake or output data in the 24 hours ending 11/29/22 1035     Physical Exam  Vitals reviewed.   Constitutional:       Appearance: Normal appearance.   HENT:      Head: Normocephalic and atraumatic.      Right Ear: External ear normal.      Left Ear: External ear normal.   Eyes:       Extraocular Movements: Extraocular movements intact.      Conjunctiva/sclera: Conjunctivae normal.   Cardiovascular:      Rate and Rhythm: Normal rate and regular rhythm.      Pulses: Normal pulses.   Pulmonary:      Effort: Pulmonary effort is normal.   Abdominal:      General: Bowel sounds are normal.   Musculoskeletal:         General: Swelling (left knee) and tenderness (left knee and left shoulder) present.      Cervical back: Normal range of motion and neck supple.      Comments: Normal tone, ROM limited by pain    Negative for point tenderness anywhere along the spinal column.    Skin:     General: Skin is warm.      Capillary Refill: Capillary refill takes less than 2 seconds.   Neurological:      General: No focal deficit present.      Mental Status: She is alert and oriented to person, place, and time.   Psychiatric:         Mood and Affect: Mood normal.         Behavior: Behavior normal.         Thought Content: Thought content normal.       Significant Labs: All pertinent labs within the past 24 hours have been reviewed.    Significant Imaging: I have reviewed all pertinent imaging results/findings within the past 24 hours.

## 2022-11-29 NOTE — PT/OT/SLP PROGRESS
"Physical Therapy Treatment    Patient Name:  Monica Walker   MRN:  [id number]    Recommendations:     Discharge Recommendations:  rehabilitation facility, nursing facility, skilled   Discharge Equipment Recommendations: other (see comments) (to be determined)   Barriers to discharge:  positive blood cultures; ongoing medical work up    Assessment:     Monica Walker is a 52 y.o. female admitted with a medical diagnosis of Bacteremia.  She presents with the following impairments/functional limitations:  weakness, impaired endurance, impaired self care skills, impaired functional mobility, gait instability, impaired balance, impaired cognition, decreased upper extremity function, decreased lower extremity function, decreased safety awareness, pain .    Patient reluctant to sit EOB and became very vocal re: pain and returning to bed once sitting EOB. Once back in supine pt immediately calm and although reported pain, no outward signs of same.  Strong encouragement for patient to tolerate limited time sitting EOB. Patient able to complete LE exercises with AAROM to keep pt on task and minimal c/o pain    Rehab Prognosis: Good and Fair; patient would benefit from acute skilled PT services to address these deficits and reach maximum level of function.    Recent Surgery: Procedure(s) (LRB):  Transesophageal echo (MONICA) intra-procedure log documentation (N/A) 6 Days Post-Op    Plan:     During this hospitalization, patient to be seen 5 x/week to address the identified rehab impairments via gait training, therapeutic activities, therapeutic exercises and progress toward the following goals:    Plan of Care Expires:  12/20/22    Subjective     Chief Complaint: bacteremia  Patient/Family Comments/goals: "I can't do anything." Patient convinced to attempt PT with strong family encouragement  Pain/Comfort:  Pain Rating 1: 0/10 (patient sleeping on PT arrival when pt roused, pt reported pain 8/10)  Location - Side 1: " Right  Location 1: hip  Pain Addressed 1: Reposition, Distraction, Cessation of Activity, Pre-medicate for activity  Pain Rating Post-Intervention 1: 0/10 (patient back asleep once LE ex completed)      Objective:     Communicated with Michelet De Anda RN prior to session.  Patient found supine with peripheral IV, telemetry upon PT entry to room. Patient had pulled gown off.    General Precautions: Standard, fall, contact (MRSA)   Orthopedic Precautions:N/A   Braces: N/A (d/c of KI per Dr Mendoza- family report)  Respiratory Status: Room air     Functional Mobility:  Bed Mobility:     Supine to Sit: maximal assistance and of 2 persons  Sit to Supine: minimum assistance and of 2 persons  Transfers:     Sit to Stand:  patient refused to attempt       AM-PAC 6 CLICK MOBILITY  Turning over in bed (including adjusting bedclothes, sheets and blankets)?: 2  Sitting down on and standing up from a chair with arms (e.g., wheelchair, bedside commode, etc.): 1  Moving from lying on back to sitting on the side of the bed?: 2  Moving to and from a bed to a chair (including a wheelchair)?: 1  Need to walk in hospital room?: 1  Climbing 3-5 steps with a railing?: 1  Basic Mobility Total Score: 8       Treatment & Education:  Sitting EOB, patient attempting to return to supine immediately upon sitting EOB but managed to last 5 minutes with strong encouragement from PT and family  Bilateral lower extremity exercise x 30 reps: ankle pumps, Quad sets, glut sets, short arc quads, heel slides, hip abduction/adduction, and straight leg raises with active assist ROM, verbal cues, and tactile cues     Patient left supine with all lines intact, call button in reach, and Michelet De Anda RN notified, family present    GOALS:   Multidisciplinary Problems       Physical Therapy Goals          Problem: Physical Therapy    Goal Priority Disciplines Outcome Goal Variances Interventions   Physical Therapy Goal     PT, PT/OT Ongoing, Progressing      Description: Short Term Goals to be met by: 2022    Patient will increase functional independence with mobility by performin. Supine to sit with independently  2. Sit to stand transfer with independently using Rolling walker  3. Bed to chair transfer with independently using Rolling walker  4. Gait  x 200 feet with independently using Rolling walker  5. Lower extremity exercise program x30 reps per handout, with assistance as needed    Long Term Goals to be met by: 2022    Pt will regain full independent functional mobility with lowest level of assistive device to return to home situation and prior activities of daily living.                        Time Tracking:     PT Received On: 22  PT Start Time: 0948     PT Stop Time: 1014  PT Total Time (min): 26 min     Billable Minutes: Therapeutic Activity 8 minutes and Therapeutic Exercise 15 minutes    Treatment Type: Treatment  PT/PTA: PT     PTA Visit Number: 0     2022

## 2022-11-29 NOTE — ASSESSMENT & PLAN NOTE
Patient has bilateral peripheral cavitary lesions.  Bronchoscopy was negative there were some atypical cells and grew out methicillin-resistant Staph aureus continue antibiotics.  Also patient has new pain in her right leg wonder if she could have a leave epidural will discuss with primary

## 2022-11-30 LAB
ANION GAP SERPL CALCULATED.3IONS-SCNC: 12 MMOL/L (ref 7–16)
BACTERIA BLD CULT: ABNORMAL
BASOPHILS # BLD AUTO: 0.1 K/UL (ref 0–0.2)
BASOPHILS NFR BLD AUTO: 0.7 % (ref 0–1)
BUN SERPL-MCNC: 8 MG/DL (ref 7–18)
BUN/CREAT SERPL: 11 (ref 6–20)
CALCIUM SERPL-MCNC: 8.5 MG/DL (ref 8.5–10.1)
CHLORIDE SERPL-SCNC: 94 MMOL/L (ref 98–107)
CLARITY FLD: CLEAR
CO2 SERPL-SCNC: 29 MMOL/L (ref 21–32)
COLOR FLD: COLORLESS
CREAT SERPL-MCNC: 0.74 MG/DL (ref 0.55–1.02)
DIFFERENTIAL METHOD BLD: ABNORMAL
EGFR (NO RACE VARIABLE) (RUSH/TITUS): 96 ML/MIN/1.73M²
EOSINOPHIL # BLD AUTO: 0.41 K/UL (ref 0–0.5)
EOSINOPHIL NFR BLD AUTO: 2.8 % (ref 1–4)
ERYTHROCYTE [DISTWIDTH] IN BLOOD BY AUTOMATED COUNT: 14.8 % (ref 11.5–14.5)
GLUCOSE SERPL-MCNC: 139 MG/DL (ref 70–105)
GLUCOSE SERPL-MCNC: 191 MG/DL (ref 70–105)
GLUCOSE SERPL-MCNC: 229 MG/DL (ref 70–105)
GLUCOSE SERPL-MCNC: 232 MG/DL (ref 74–106)
GLUCOSE SERPL-MCNC: 260 MG/DL (ref 70–105)
GLUCOSE SERPL-MCNC: 47 MG/DL (ref 70–105)
GLUCOSE SERPL-MCNC: 72 MG/DL (ref 70–105)
GRAM STN SPEC: ABNORMAL
GRAM STN SPEC: NORMAL
GRANULOCYTES NFR SNV MANUAL: NORMAL %
HCT VFR BLD AUTO: 33 % (ref 38–47)
HGB BLD-MCNC: 10.1 G/DL (ref 12–16)
IMM GRANULOCYTES # BLD AUTO: 0.35 K/UL (ref 0–0.04)
IMM GRANULOCYTES NFR BLD: 2.4 % (ref 0–0.4)
LYMPHOCYTES # BLD AUTO: 2.36 K/UL (ref 1–4.8)
LYMPHOCYTES NFR BLD AUTO: 16.2 % (ref 27–41)
MCH RBC QN AUTO: 27.2 PG (ref 27–31)
MCHC RBC AUTO-ENTMCNC: 30.6 G/DL (ref 32–36)
MCV RBC AUTO: 88.7 FL (ref 80–96)
MONOCYTES # BLD AUTO: 1.67 K/UL (ref 0–0.8)
MONOCYTES NFR BLD AUTO: 11.5 % (ref 2–6)
MPC BLD CALC-MCNC: 9.2 FL (ref 9.4–12.4)
NEUTROPHILS # BLD AUTO: 9.69 K/UL (ref 1.8–7.7)
NEUTROPHILS NFR BLD AUTO: 66.4 % (ref 53–65)
NRBC # BLD AUTO: 0 X10E3/UL
NRBC, AUTO (.00): 0 %
PLATELET # BLD AUTO: 706 K/UL (ref 150–400)
POTASSIUM SERPL-SCNC: 4.8 MMOL/L (ref 3.5–5.1)
RBC # BLD AUTO: 3.72 M/UL (ref 4.2–5.4)
RBC # SNV MANUAL: <3000 /CUMM
SODIUM SERPL-SCNC: 130 MMOL/L (ref 136–145)
WBC # BLD AUTO: 14.58 K/UL (ref 4.5–11)
WBC # SNV MANUAL: <2 /CUMM

## 2022-11-30 PROCEDURE — 89050 BODY FLUID CELL COUNT: CPT | Performed by: STUDENT IN AN ORGANIZED HEALTH CARE EDUCATION/TRAINING PROGRAM

## 2022-11-30 PROCEDURE — 63600175 PHARM REV CODE 636 W HCPCS: Performed by: INTERNAL MEDICINE

## 2022-11-30 PROCEDURE — 87653 STREP B DNA AMP PROBE: CPT | Performed by: STUDENT IN AN ORGANIZED HEALTH CARE EDUCATION/TRAINING PROGRAM

## 2022-11-30 PROCEDURE — 99232 PR SUBSEQUENT HOSPITAL CARE,LEVL II: ICD-10-PCS | Mod: ,,, | Performed by: STUDENT IN AN ORGANIZED HEALTH CARE EDUCATION/TRAINING PROGRAM

## 2022-11-30 PROCEDURE — 94761 N-INVAS EAR/PLS OXIMETRY MLT: CPT

## 2022-11-30 PROCEDURE — 99232 SBSQ HOSP IP/OBS MODERATE 35: CPT | Mod: ,,, | Performed by: STUDENT IN AN ORGANIZED HEALTH CARE EDUCATION/TRAINING PROGRAM

## 2022-11-30 PROCEDURE — 25000003 PHARM REV CODE 250: Performed by: INTERNAL MEDICINE

## 2022-11-30 PROCEDURE — 97530 THERAPEUTIC ACTIVITIES: CPT

## 2022-11-30 PROCEDURE — 85025 COMPLETE CBC W/AUTO DIFF WBC: CPT | Performed by: STUDENT IN AN ORGANIZED HEALTH CARE EDUCATION/TRAINING PROGRAM

## 2022-11-30 PROCEDURE — 25000003 PHARM REV CODE 250: Performed by: STUDENT IN AN ORGANIZED HEALTH CARE EDUCATION/TRAINING PROGRAM

## 2022-11-30 PROCEDURE — 97110 THERAPEUTIC EXERCISES: CPT | Mod: CQ

## 2022-11-30 PROCEDURE — 36415 COLL VENOUS BLD VENIPUNCTURE: CPT | Performed by: HOSPITALIST

## 2022-11-30 PROCEDURE — 82962 GLUCOSE BLOOD TEST: CPT

## 2022-11-30 PROCEDURE — 87205 SMEAR GRAM STAIN: CPT | Performed by: STUDENT IN AN ORGANIZED HEALTH CARE EDUCATION/TRAINING PROGRAM

## 2022-11-30 PROCEDURE — C9113 INJ PANTOPRAZOLE SODIUM, VIA: HCPCS | Performed by: NURSE PRACTITIONER

## 2022-11-30 PROCEDURE — 87040 BLOOD CULTURE FOR BACTERIA: CPT | Performed by: STUDENT IN AN ORGANIZED HEALTH CARE EDUCATION/TRAINING PROGRAM

## 2022-11-30 PROCEDURE — 25000003 PHARM REV CODE 250: Performed by: ORTHOPAEDIC SURGERY

## 2022-11-30 PROCEDURE — 25000003 PHARM REV CODE 250: Performed by: GENERAL PRACTICE

## 2022-11-30 PROCEDURE — 97110 THERAPEUTIC EXERCISES: CPT

## 2022-11-30 PROCEDURE — 80048 BASIC METABOLIC PNL TOTAL CA: CPT | Performed by: HOSPITALIST

## 2022-11-30 PROCEDURE — 36415 COLL VENOUS BLD VENIPUNCTURE: CPT | Performed by: STUDENT IN AN ORGANIZED HEALTH CARE EDUCATION/TRAINING PROGRAM

## 2022-11-30 PROCEDURE — 63600175 PHARM REV CODE 636 W HCPCS: Performed by: STUDENT IN AN ORGANIZED HEALTH CARE EDUCATION/TRAINING PROGRAM

## 2022-11-30 PROCEDURE — 63600175 PHARM REV CODE 636 W HCPCS: Performed by: NURSE PRACTITIONER

## 2022-11-30 PROCEDURE — 11000001 HC ACUTE MED/SURG PRIVATE ROOM

## 2022-11-30 PROCEDURE — 63600175 PHARM REV CODE 636 W HCPCS: Performed by: ORTHOPAEDIC SURGERY

## 2022-11-30 RX ORDER — PANTOPRAZOLE SODIUM 40 MG/1
40 TABLET, DELAYED RELEASE ORAL DAILY
Status: DISCONTINUED | OUTPATIENT
Start: 2022-12-01 | End: 2022-12-06 | Stop reason: HOSPADM

## 2022-11-30 RX ADMIN — ENOXAPARIN SODIUM 30 MG: 30 INJECTION SUBCUTANEOUS at 08:11

## 2022-11-30 RX ADMIN — PANTOPRAZOLE SODIUM 40 MG: 40 INJECTION, POWDER, LYOPHILIZED, FOR SOLUTION INTRAVENOUS at 08:11

## 2022-11-30 RX ADMIN — MORPHINE SULFATE 4 MG: 4 INJECTION, SOLUTION INTRAMUSCULAR; INTRAVENOUS at 01:11

## 2022-11-30 RX ADMIN — INSULIN DETEMIR 40 UNITS: 100 INJECTION, SOLUTION SUBCUTANEOUS at 08:11

## 2022-11-30 RX ADMIN — DOCUSATE SODIUM 100 MG: 100 CAPSULE, LIQUID FILLED ORAL at 08:11

## 2022-11-30 RX ADMIN — MORPHINE SULFATE 4 MG: 4 INJECTION, SOLUTION INTRAMUSCULAR; INTRAVENOUS at 08:11

## 2022-11-30 RX ADMIN — OXYCODONE HYDROCHLORIDE 10 MG: 5 TABLET ORAL at 08:11

## 2022-11-30 RX ADMIN — INSULIN ASPART 4 UNITS: 100 INJECTION, SOLUTION INTRAVENOUS; SUBCUTANEOUS at 11:11

## 2022-11-30 RX ADMIN — VANCOMYCIN HYDROCHLORIDE 1500 MG: 1 INJECTION, POWDER, LYOPHILIZED, FOR SOLUTION INTRAVENOUS at 03:11

## 2022-11-30 RX ADMIN — INSULIN ASPART 3 UNITS: 100 INJECTION, SOLUTION INTRAVENOUS; SUBCUTANEOUS at 11:11

## 2022-11-30 RX ADMIN — DULOXETINE 60 MG: 30 CAPSULE, DELAYED RELEASE ORAL at 08:11

## 2022-11-30 RX ADMIN — SENNOSIDES 8.6 MG: 8.6 TABLET, FILM COATED ORAL at 08:11

## 2022-11-30 RX ADMIN — INSULIN ASPART 6 UNITS: 100 INJECTION, SOLUTION INTRAVENOUS; SUBCUTANEOUS at 05:11

## 2022-11-30 RX ADMIN — INSULIN ASPART 3 UNITS: 100 INJECTION, SOLUTION INTRAVENOUS; SUBCUTANEOUS at 08:11

## 2022-11-30 RX ADMIN — MORPHINE SULFATE 4 MG: 4 INJECTION, SOLUTION INTRAMUSCULAR; INTRAVENOUS at 05:11

## 2022-11-30 RX ADMIN — TRAZODONE HYDROCHLORIDE 50 MG: 50 TABLET ORAL at 08:11

## 2022-11-30 RX ADMIN — POLYETHYLENE GLYCOL 3350 34 G: 17 POWDER, FOR SOLUTION ORAL at 08:11

## 2022-11-30 NOTE — NURSING
Tech reported a blood sugar of 47. Patient awake and alert stating she was hungry. Apple juice and shane crackers given until supper trays arrive. Will recheck blood sugar in 15 minutes.    1600: blood sugar increased to 72. Will hold the 3 units scheduled at 1630 and recheck after eating supper.

## 2022-11-30 NOTE — PROGRESS NOTES
[facility] - [address] [place] [address] Medicine  Progress Note    Patient Name: Monica Walker  MRN: [id number]  Patient Class: IP- Inpatient   Admission Date: 11/20/2022  Length of Stay: 10 days  Attending Physician: Adilson Fontanez DO  Primary Care Provider: Hannah Schulz MD        Subjective:     Principal Problem:Bacteremia        HPI:  Patient is a 53 yo female who presents to [facility] transferred from [facility] c/o L knee and R flank pain x 2 weeks. R flank pain is 10/10, squeezing in quality, without radiation, no aggravating or relieving factors. L knee pain started after a fall, which she is unable to recall date of the injury. The knee pain is 10/10, located in the medial joint line, aggravated by movement and weight-bearing, squeezing in quality, radiates down to L foot and without relieving factors including heat and ice. Patient has a history of type 2 diabetes, poorly controlled.    10 days ago patient was admitted at [facility] and treated for pyelonephritis on the left side with IV antibiotics although her urine culture did not grow any bacteria. 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 Today a CT scan of the abdomen pelvis and chest with IV contrast indicates bilateral peripherally dense cavitary lesions left lower lobe and right middle lobe bilateral pleural effusions left greater than right with possible loculation these areas are concerning for abscesses although a neoplastic process can not be entirely excluded. Abnormal appearance of the left kidney concerning for pyelonephritis. Patient was transferred to [facility] for further medical evaluation and management.         Overview/Hospital Course:  11/24-will need IV abx for 6 weeks from first negative blood cultures. Repeat today.  Requesting  swingbed in [place]. Will ask CM to assist  11/25- pain better controlled today. Will need definitive plan from Ortho prior to DC  11/26-febrile overnight, continues to have pain. Suspect poor source control.  Repeat cultures  11/27- afebrile, having some right hip pain today.  Worse lying flat, better with flexion  11/28-if blood cultures remain negative will place picc tomorrow and DC to [place] swingbed for 6 weeks abx  11/29- persistently positive blood cultures.  2 days now of complaining of right lower abdominal pain and right hip pain.  Exam not consistent with septic arthritis however given persistently positive cultures will scan for effusion.  Psoas abscess consideration as well. Discussed with Radiology.  11/30-awaiting fluid from right hip.      Interval History: NAEO    Review of Systems   Constitutional:  Positive for appetite change. Negative for chills and fever.   HENT:  Negative for hearing loss, nosebleeds, rhinorrhea, sinus pressure, sneezing, sore throat and trouble swallowing.    Eyes:  Negative for photophobia, pain, discharge, itching and visual disturbance.   Respiratory:  Negative for apnea, cough, chest tightness and shortness of breath.    Cardiovascular:  Negative for chest pain, palpitations and leg swelling.   Gastrointestinal:  Negative for abdominal distention, abdominal pain, blood in stool, constipation, diarrhea, nausea and vomiting.   Endocrine: Negative for polydipsia, polyphagia and polyuria.   Genitourinary:  Negative for difficulty urinating, dysuria, frequency, hematuria and urgency. Vaginal bleeding: left knee.  Musculoskeletal:  Positive for arthralgias (right hip. Denies back pain). Negative for myalgias. Joint swelling: left knee.  Skin:  Negative for rash.   Neurological:  Negative for dizziness, tremors, seizures, syncope, light-headedness, numbness and headaches.   Psychiatric/Behavioral:  Negative for agitation, confusion, hallucinations and sleep disturbance. The  patient is not nervous/anxious.    Objective:     Vital Signs (Most Recent):  Temp: 97.8 °F (36.6 °C) (11/30/22 1100)  Pulse: 92 (11/30/22 1100)  Resp: 18 (11/30/22 1100)  BP: (!) 163/83 (11/30/22 1100)  SpO2: 97 % (11/30/22 1100)   Vital Signs (24h Range):  Temp:  [96.7 °F (35.9 °C)-99.6 °F (37.6 °C)] 97.8 °F (36.6 °C)  Pulse:  [] 92  Resp:  [17-20] 18  SpO2:  [93 %-100 %] 97 %  BP: (133-167)/(64-84) 163/83     Weight: 70 kg (154 lb 5.2 oz)  Body mass index is 24.91 kg/m².  No intake or output data in the 24 hours ending 11/30/22 1305     Physical Exam  Vitals reviewed.   Constitutional:       Appearance: Normal appearance.   HENT:      Head: Normocephalic and atraumatic.      Right Ear: External ear normal.      Left Ear: External ear normal.   Eyes:      Extraocular Movements: Extraocular movements intact.      Conjunctiva/sclera: Conjunctivae normal.   Cardiovascular:      Rate and Rhythm: Normal rate and regular rhythm.      Pulses: Normal pulses.   Pulmonary:      Effort: Pulmonary effort is normal.   Abdominal:      General: Bowel sounds are normal.   Musculoskeletal:         General: Swelling (left knee) and tenderness (left knee and left shoulder) present.      Cervical back: Normal range of motion and neck supple.      Comments: Normal tone, ROM limited by pain    Negative for point tenderness anywhere along the spinal column.    Skin:     General: Skin is warm.      Capillary Refill: Capillary refill takes less than 2 seconds.   Neurological:      General: No focal deficit present.      Mental Status: She is alert and oriented to person, place, and time.   Psychiatric:         Mood and Affect: Mood normal.         Behavior: Behavior normal.         Thought Content: Thought content normal.       Significant Labs: All pertinent labs within the past 24 hours have been reviewed.    Significant Imaging: I have reviewed all pertinent imaging results/findings within the past 24 hours.      Assessment/Plan:       * Bacteremia    Continue vanco  Bronchial cultures with MRSA          DM (diabetes mellitus)  Basal, SSI  Prandial if needed    Lung abscess  Cause of persistent positive cutlures??  Improved on imaging yesterday            Septic arthritis  Right hip fluid today  Repeat cultures    Depression  Continue home Cymbalta            VTE Risk Mitigation (From admission, onward)         Ordered     enoxaparin injection 30 mg  Every 12 hours         11/22/22 1524     IP VTE HIGH RISK PATIENT  Once         11/22/22 1524     Place DONOVAN hose  Until discontinued         11/22/22 1524     enoxaparin injection 40 mg  Daily         11/20/22 0519     Place sequential compression device  Until discontinued         11/20/22 0519                Discharge Planning   SUZANNA:      Code Status: Full Code   Is the patient medically ready for discharge?:     Reason for patient still in hospital (select all that apply): Treatment  Discharge Plan A: Home Health, Home with family                  Adilson Fontanez DO  Department of Hospital Medicine   [facility] - [facility] Telemetry

## 2022-11-30 NOTE — PT/OT/SLP PROGRESS
Occupational Therapy   Treatment    Name: Monica Walker  MRN: [id number]  Admitting Diagnosis:  Bacteremia  6 Days Post-Op    Recommendations:     Discharge Recommendations: nursing facility, skilled, rehabilitation facility  Discharge Equipment Recommendations:   (to be determined)  Barriers to discharge:       Assessment:     Monica Walker is a 52 y.o. female with a medical diagnosis of Bacteremia.  She presents with alert and agreeable to treatment. Performance deficits affecting function are weakness, impaired endurance, impaired functional mobility, impaired self care skills, gait instability, impaired balance, pain, decreased lower extremity function, decreased upper extremity function, decreased ROM.     Rehab Prognosis:  Good; patient would benefit from acute skilled OT services to address these deficits and reach maximum level of function.       Plan:     Patient to be seen 5 x/week to address the above listed problems via self-care/home management, therapeutic exercises, therapeutic activities  Plan of Care Expires: 12/26/22  Plan of Care Reviewed with: patient, family    Subjective     Pain/Comfort:  Pain Rating 1: 2/10  Location - Side 1: Right  Location 1: hip  Pain Addressed 1: Reposition, Distraction, Cessation of Activity, Pre-medicate for activity  Pain Rating Post-Intervention 1: 2/10    Objective:     Communicated with: HAYDEE De Anda prior to session.  Patient found supine with peripheral IV, telemetry upon OT entry to room.    General Precautions: Standard, fall, contact   Orthopedic Precautions:N/A   Braces: N/A  Respiratory Status: Room air     Occupational Performance:     Bed Mobility:    Pt refused    Functional Mobility/Transfers:  Pt refused, stating that she had already been up today    Activities of Daily Living:  Feeding:  independence        [facility] 6 Click ADL:      Treatment & Education:  Pt performed UE exercises for 20 reps of each:  (R) shoulder flexion with 2# db  (R)  shoulder ER/IR with 2# db  (B) elbow flexion with 2# db  (B) supination/pronation with 2# db  (B) wrist extension with 2# db  (B) handhelper with 3 bands of resistance  (L) shoulder gentle low level flexion/extension self-ROM      Patient left supine with all lines intact, call button in reach, bed alarm on, RN Michelet notified, and sister present    GOALS:   Multidisciplinary Problems       Occupational Therapy Goals          Problem: Occupational Therapy    Goal Priority Disciplines Outcome Interventions   Occupational Therapy Goal     OT, PT/OT Ongoing, Progressing    Description: STG:  Pt will perform grooming with access to supplies  Pt will bathe with setup and CGA  Pt will perform UE dressing with setup  Pt will perform LE dressing with CGA and AD if needed  Pt will transfer bed/chair/bsc with RW and SBA  Pt will perform standing task x 2 min with SBA and RW  Pt will tolerate 15 minutes of tx without fatigue      LT.Restore to max I with self care and mobility.                          Time Tracking:     OT Date of Treatment: 22  OT Start Time: 1327  OT Stop Time: 1336  OT Total Time (min): 9 min    Billable Minutes:Therapeutic Exercise 9 min    OT/TRISH: OT          2022

## 2022-11-30 NOTE — SUBJECTIVE & OBJECTIVE
Interval History: NAEO    Review of Systems   Constitutional:  Positive for appetite change. Negative for chills and fever.   HENT:  Negative for hearing loss, nosebleeds, rhinorrhea, sinus pressure, sneezing, sore throat and trouble swallowing.    Eyes:  Negative for photophobia, pain, discharge, itching and visual disturbance.   Respiratory:  Negative for apnea, cough, chest tightness and shortness of breath.    Cardiovascular:  Negative for chest pain, palpitations and leg swelling.   Gastrointestinal:  Negative for abdominal distention, abdominal pain, blood in stool, constipation, diarrhea, nausea and vomiting.   Endocrine: Negative for polydipsia, polyphagia and polyuria.   Genitourinary:  Negative for difficulty urinating, dysuria, frequency, hematuria and urgency. Vaginal bleeding: left knee.  Musculoskeletal:  Positive for arthralgias (right hip. Denies back pain). Negative for myalgias. Joint swelling: left knee.  Skin:  Negative for rash.   Neurological:  Negative for dizziness, tremors, seizures, syncope, light-headedness, numbness and headaches.   Psychiatric/Behavioral:  Negative for agitation, confusion, hallucinations and sleep disturbance. The patient is not nervous/anxious.    Objective:     Vital Signs (Most Recent):  Temp: 97.8 °F (36.6 °C) (11/30/22 1100)  Pulse: 92 (11/30/22 1100)  Resp: 18 (11/30/22 1100)  BP: (!) 163/83 (11/30/22 1100)  SpO2: 97 % (11/30/22 1100)   Vital Signs (24h Range):  Temp:  [96.7 °F (35.9 °C)-99.6 °F (37.6 °C)] 97.8 °F (36.6 °C)  Pulse:  [] 92  Resp:  [17-20] 18  SpO2:  [93 %-100 %] 97 %  BP: (133-167)/(64-84) 163/83     Weight: 70 kg (154 lb 5.2 oz)  Body mass index is 24.91 kg/m².  No intake or output data in the 24 hours ending 11/30/22 1305     Physical Exam  Vitals reviewed.   Constitutional:       Appearance: Normal appearance.   HENT:      Head: Normocephalic and atraumatic.      Right Ear: External ear normal.      Left Ear: External ear normal.   Eyes:       Extraocular Movements: Extraocular movements intact.      Conjunctiva/sclera: Conjunctivae normal.   Cardiovascular:      Rate and Rhythm: Normal rate and regular rhythm.      Pulses: Normal pulses.   Pulmonary:      Effort: Pulmonary effort is normal.   Abdominal:      General: Bowel sounds are normal.   Musculoskeletal:         General: Swelling (left knee) and tenderness (left knee and left shoulder) present.      Cervical back: Normal range of motion and neck supple.      Comments: Normal tone, ROM limited by pain    Negative for point tenderness anywhere along the spinal column.    Skin:     General: Skin is warm.      Capillary Refill: Capillary refill takes less than 2 seconds.   Neurological:      General: No focal deficit present.      Mental Status: She is alert and oriented to person, place, and time.   Psychiatric:         Mood and Affect: Mood normal.         Behavior: Behavior normal.         Thought Content: Thought content normal.       Significant Labs: All pertinent labs within the past 24 hours have been reviewed.    Significant Imaging: I have reviewed all pertinent imaging results/findings within the past 24 hours.

## 2022-11-30 NOTE — PLAN OF CARE
Problem: Adult Inpatient Plan of Care  Goal: Plan of Care Review  Outcome: Ongoing, Progressing  Goal: Patient-Specific Goal (Individualized)  Outcome: Ongoing, Progressing  Goal: Absence of Hospital-Acquired Illness or Injury  Outcome: Ongoing, Progressing  Goal: Optimal Comfort and Wellbeing  Outcome: Ongoing, Progressing  Goal: Readiness for Transition of Care  Outcome: Ongoing, Progressing     Problem: Diabetes Comorbidity  Goal: Blood Glucose Level Within Targeted Range  Outcome: Ongoing, Progressing     Problem: Impaired Wound Healing  Goal: Optimal Wound Healing  Outcome: Ongoing, Progressing     Problem: Fall Injury Risk  Goal: Absence of Fall and Fall-Related Injury  Outcome: Ongoing, Progressing     Problem: Restraint, Nonbehavioral (Nonviolent)  Goal: Absence of Harm or Injury  Outcome: Ongoing, Progressing     Problem: Infection  Goal: Absence of Infection Signs and Symptoms  Outcome: Ongoing, Progressing     Problem: Communication Impairment (Mechanical Ventilation, Invasive)  Goal: Effective Communication  Outcome: Ongoing, Progressing     Problem: Device-Related Complication Risk (Mechanical Ventilation, Invasive)  Goal: Optimal Device Function  Outcome: Ongoing, Progressing     Problem: Inability to Wean (Mechanical Ventilation, Invasive)  Goal: Mechanical Ventilation Liberation  Outcome: Ongoing, Progressing     Problem: Nutrition Impairment (Mechanical Ventilation, Invasive)  Goal: Optimal Nutrition Delivery  Outcome: Ongoing, Progressing     Problem: Skin and Tissue Injury (Mechanical Ventilation, Invasive)  Goal: Absence of Device-Related Skin and Tissue Injury  Outcome: Ongoing, Progressing     Problem: Ventilator-Induced Lung Injury (Mechanical Ventilation, Invasive)  Goal: Absence of Ventilator-Induced Lung Injury  Outcome: Ongoing, Progressing     Problem: Communication Impairment (Artificial Airway)  Goal: Effective Communication  Outcome: Ongoing, Progressing     Problem: Device-Related  Complication Risk (Artificial Airway)  Goal: Optimal Device Function  Outcome: Ongoing, Progressing     Problem: Skin and Tissue Injury (Artificial Airway)  Goal: Absence of Device-Related Skin or Tissue Injury  Outcome: Ongoing, Progressing     Problem: Skin Injury Risk Increased  Goal: Skin Health and Integrity  Outcome: Ongoing, Progressing  POC reviewed and continues

## 2022-11-30 NOTE — PROCEDURES
Informed consent obtained.  Procedure performed by Dr. Richardson with assistance by Ernesto REYES.  Patient was prepped with chlorprep and draped in a sterile manner. 5 cc of 1% lidocaine infused. With CT guidance a 18 gauge needle was advanced into right hip and less than 1 cc of fluid was aspirated. Needle withdrawn and pressure held on puncture site. Bandaid then placed. Patient tolerated procedure well with no immediate complication.

## 2022-11-30 NOTE — PLAN OF CARE
SS spoke with Dr. Fontanez, awaiting culture results before patient can discharge to [facility]. Patient may need to be seen again by orthopedic surgeon, therefore, patient can not go to [facility]. SS updated Kim at [facility] via secure chat. SS to notify Kim when patient is ready for discharge. SS following.

## 2022-11-30 NOTE — PT/OT/SLP PROGRESS
Physical Therapy Treatment    Patient Name:  Monica Walker   MRN:  [id number]    Recommendations:     Discharge Recommendations:  nursing facility, skilled, rehabilitation facility   Discharge Equipment Recommendations: other (see comments) (to be determined)   Barriers to discharge: Inaccessible home and Decreased caregiver support    Assessment:     Monica Walker is a 52 y.o. female admitted with a medical diagnosis of Bacteremia.  She presents with the following impairments/functional limitations:  weakness, impaired endurance, impaired functional mobility, impaired self care skills, gait instability, impaired balance, pain, decreased lower extremity function, decreased upper extremity function, decreased ROM.    Rehab Prognosis: Fair; patient would benefit from acute skilled PT services to address these deficits and reach maximum level of function.    Recent Surgery: Procedure(s) (LRB):  Transesophageal echo ([facility]) intra-procedure log documentation (N/A) 7 Days Post-Op    Plan:     During this hospitalization, patient to be seen 5 x/week to address the identified rehab impairments via gait training, therapeutic activities, therapeutic exercises and progress toward the following goals:    Plan of Care Expires:  12/20/22    Subjective     Chief Complaint: R hip Pain  Patient/Family Comments/goals: Pt. Reports unable to stand, states she needs to lay down due to pain, leans back to relieve R hip Pain during sitting/ is unable to stand due to pain.   Pain/Comfort:  Pain Rating 1: 8/10  Location - Side 1: Right  Location 1: hip  Pain Addressed 1: Reposition, Distraction, Cessation of Activity  Pain Rating Post-Intervention 1: 8/10      Objective:     Communicated with    Michelet De Anda RN    prior to session.  Patient found supine with peripheral IV, telemetry upon PT entry to room.     General Precautions: Standard, fall, contact   Orthopedic Precautions:N/A   Braces: N/A  Respiratory Status: Room air      Functional Mobility:  Bed Mobility:     Rolling Right: stand by assistance  Scooting: minimum assistance  Supine to Sit: minimum assistance  Sit to Supine: moderate assistance  Transfers:     Sit to Stand:  maximal assistance and of 2 persons with hand-held assist      AM-PAC 6 CLICK MOBILITY  Turning over in bed (including adjusting bedclothes, sheets and blankets)?: 3  Sitting down on and standing up from a chair with arms (e.g., wheelchair, bedside commode, etc.): 2  Moving from lying on back to sitting on the side of the bed?: 3  Moving to and from a bed to a chair (including a wheelchair)?: 1  Need to walk in hospital room?: 1  Climbing 3-5 steps with a railing?: 1  Basic Mobility Total Score: 11       Treatment & Education:  Pt. Participated in limited bed ex's after mobility per above; Heel Slides and Hip ABD/ ADD with assist x 10 reps each, AP x 20     Patient left supine with all lines intact and call button in reach..    GOALS:   Multidisciplinary Problems       Physical Therapy Goals          Problem: Physical Therapy    Goal Priority Disciplines Outcome Goal Variances Interventions   Physical Therapy Goal     PT, PT/OT Ongoing, Progressing     Description: Short Term Goals to be met by: 2022    Patient will increase functional independence with mobility by performin. Supine to sit with independently  2. Sit to stand transfer with independently using Rolling walker  3. Bed to chair transfer with independently using Rolling walker  4. Gait  x 200 feet with independently using Rolling walker  5. Lower extremity exercise program x30 reps per handout, with assistance as needed    Long Term Goals to be met by: 2022    Pt will regain full independent functional mobility with lowest level of assistive device to return to home situation and prior activities of daily living.                        Time Tracking:     PT Received On: 22  PT Start Time: 1345     PT Stop Time: 1420  PT Total  Time (min): 35 min     Billable Minutes: Therapeutic Exercise 20    Treatment Type: Treatment  PT/PTA: PTA     PTA Visit Number: 1     11/30/2022

## 2022-11-30 NOTE — PLAN OF CARE
Problem: Adult Inpatient Plan of Care  Goal: Plan of Care Review  Outcome: Ongoing, Progressing  Goal: Patient-Specific Goal (Individualized)  Outcome: Ongoing, Progressing  Flowsheets (Taken 11/29/2022 7586)  Anxieties, Fears or Concerns: pain  Goal: Absence of Hospital-Acquired Illness or Injury  Outcome: Ongoing, Progressing  Goal: Optimal Comfort and Wellbeing  Outcome: Ongoing, Progressing  Goal: Readiness for Transition of Care  Outcome: Ongoing, Progressing     Problem: Diabetes Comorbidity  Goal: Blood Glucose Level Within Targeted Range  Outcome: Ongoing, Progressing     Problem: Impaired Wound Healing  Goal: Optimal Wound Healing  Outcome: Ongoing, Progressing     Problem: Fall Injury Risk  Goal: Absence of Fall and Fall-Related Injury  Outcome: Ongoing, Progressing     Problem: Restraint, Nonbehavioral (Nonviolent)  Goal: Absence of Harm or Injury  Outcome: Ongoing, Progressing     Problem: Infection  Goal: Absence of Infection Signs and Symptoms  Outcome: Ongoing, Progressing     Problem: Communication Impairment (Mechanical Ventilation, Invasive)  Goal: Effective Communication  Outcome: Ongoing, Progressing     Problem: Device-Related Complication Risk (Mechanical Ventilation, Invasive)  Goal: Optimal Device Function  Outcome: Ongoing, Progressing     Problem: Inability to Wean (Mechanical Ventilation, Invasive)  Goal: Mechanical Ventilation Liberation  Outcome: Ongoing, Progressing     Problem: Nutrition Impairment (Mechanical Ventilation, Invasive)  Goal: Optimal Nutrition Delivery  Outcome: Ongoing, Progressing     Problem: Skin and Tissue Injury (Mechanical Ventilation, Invasive)  Goal: Absence of Device-Related Skin and Tissue Injury  Outcome: Ongoing, Progressing     Problem: Ventilator-Induced Lung Injury (Mechanical Ventilation, Invasive)  Goal: Absence of Ventilator-Induced Lung Injury  Outcome: Ongoing, Progressing     Problem: Communication Impairment (Artificial Airway)  Goal: Effective  Communication  Outcome: Ongoing, Progressing     Problem: Device-Related Complication Risk (Artificial Airway)  Goal: Optimal Device Function  Outcome: Ongoing, Progressing     Problem: Skin and Tissue Injury (Artificial Airway)  Goal: Absence of Device-Related Skin or Tissue Injury  Outcome: Ongoing, Progressing     Problem: Skin Injury Risk Increased  Goal: Skin Health and Integrity  Outcome: Ongoing, Progressing  POC reviewed and continues

## 2022-12-01 LAB
ANION GAP SERPL CALCULATED.3IONS-SCNC: 11 MMOL/L (ref 7–16)
BASOPHILS # BLD AUTO: 0.1 K/UL (ref 0–0.2)
BASOPHILS NFR BLD AUTO: 0.8 % (ref 0–1)
BUN SERPL-MCNC: 9 MG/DL (ref 7–18)
BUN/CREAT SERPL: 13 (ref 6–20)
CALCIUM SERPL-MCNC: 8.7 MG/DL (ref 8.5–10.1)
CHLORIDE SERPL-SCNC: 95 MMOL/L (ref 98–107)
CO2 SERPL-SCNC: 30 MMOL/L (ref 21–32)
CREAT SERPL-MCNC: 0.72 MG/DL (ref 0.55–1.02)
DIFFERENTIAL METHOD BLD: ABNORMAL
EGFR (NO RACE VARIABLE) (RUSH/TITUS): 100 ML/MIN/1.73M²
EOSINOPHIL # BLD AUTO: 0.43 K/UL (ref 0–0.5)
EOSINOPHIL NFR BLD AUTO: 3.5 % (ref 1–4)
ERYTHROCYTE [DISTWIDTH] IN BLOOD BY AUTOMATED COUNT: 14.9 % (ref 11.5–14.5)
GLUCOSE SERPL-MCNC: 121 MG/DL (ref 70–105)
GLUCOSE SERPL-MCNC: 121 MG/DL (ref 70–105)
GLUCOSE SERPL-MCNC: 180 MG/DL (ref 70–105)
GLUCOSE SERPL-MCNC: 353 MG/DL (ref 70–105)
GLUCOSE SERPL-MCNC: 53 MG/DL (ref 70–105)
GLUCOSE SERPL-MCNC: 74 MG/DL (ref 70–105)
GLUCOSE SERPL-MCNC: 90 MG/DL (ref 74–106)
HCT VFR BLD AUTO: 32.6 % (ref 38–47)
HGB BLD-MCNC: 9.8 G/DL (ref 12–16)
IMM GRANULOCYTES # BLD AUTO: 0.45 K/UL (ref 0–0.04)
IMM GRANULOCYTES NFR BLD: 3.6 % (ref 0–0.4)
LYMPHOCYTES # BLD AUTO: 2.21 K/UL (ref 1–4.8)
LYMPHOCYTES NFR BLD AUTO: 17.8 % (ref 27–41)
MCH RBC QN AUTO: 26.4 PG (ref 27–31)
MCHC RBC AUTO-ENTMCNC: 30.1 G/DL (ref 32–36)
MCV RBC AUTO: 87.9 FL (ref 80–96)
MONOCYTES # BLD AUTO: 1.27 K/UL (ref 0–0.8)
MONOCYTES NFR BLD AUTO: 10.2 % (ref 2–6)
MPC BLD CALC-MCNC: 8.7 FL (ref 9.4–12.4)
NEUTROPHILS # BLD AUTO: 7.98 K/UL (ref 1.8–7.7)
NEUTROPHILS NFR BLD AUTO: 64.1 % (ref 53–65)
NRBC # BLD AUTO: 0 X10E3/UL
NRBC, AUTO (.00): 0 %
PLATELET # BLD AUTO: 675 K/UL (ref 150–400)
POTASSIUM SERPL-SCNC: 4.2 MMOL/L (ref 3.5–5.1)
RBC # BLD AUTO: 3.71 M/UL (ref 4.2–5.4)
SODIUM SERPL-SCNC: 132 MMOL/L (ref 136–145)
WBC # BLD AUTO: 12.44 K/UL (ref 4.5–11)

## 2022-12-01 PROCEDURE — 80048 BASIC METABOLIC PNL TOTAL CA: CPT | Performed by: HOSPITALIST

## 2022-12-01 PROCEDURE — 63600175 PHARM REV CODE 636 W HCPCS: Performed by: INTERNAL MEDICINE

## 2022-12-01 PROCEDURE — 85025 COMPLETE CBC W/AUTO DIFF WBC: CPT | Performed by: STUDENT IN AN ORGANIZED HEALTH CARE EDUCATION/TRAINING PROGRAM

## 2022-12-01 PROCEDURE — 94761 N-INVAS EAR/PLS OXIMETRY MLT: CPT

## 2022-12-01 PROCEDURE — 25000003 PHARM REV CODE 250: Performed by: ORTHOPAEDIC SURGERY

## 2022-12-01 PROCEDURE — 36415 COLL VENOUS BLD VENIPUNCTURE: CPT | Performed by: HOSPITALIST

## 2022-12-01 PROCEDURE — 99232 SBSQ HOSP IP/OBS MODERATE 35: CPT | Mod: ,,, | Performed by: STUDENT IN AN ORGANIZED HEALTH CARE EDUCATION/TRAINING PROGRAM

## 2022-12-01 PROCEDURE — 63600175 PHARM REV CODE 636 W HCPCS: Performed by: ORTHOPAEDIC SURGERY

## 2022-12-01 PROCEDURE — 97110 THERAPEUTIC EXERCISES: CPT

## 2022-12-01 PROCEDURE — 11000001 HC ACUTE MED/SURG PRIVATE ROOM

## 2022-12-01 PROCEDURE — 97530 THERAPEUTIC ACTIVITIES: CPT

## 2022-12-01 PROCEDURE — 82962 GLUCOSE BLOOD TEST: CPT

## 2022-12-01 PROCEDURE — 99232 PR SUBSEQUENT HOSPITAL CARE,LEVL II: ICD-10-PCS | Mod: ,,, | Performed by: STUDENT IN AN ORGANIZED HEALTH CARE EDUCATION/TRAINING PROGRAM

## 2022-12-01 PROCEDURE — 63600175 PHARM REV CODE 636 W HCPCS: Performed by: STUDENT IN AN ORGANIZED HEALTH CARE EDUCATION/TRAINING PROGRAM

## 2022-12-01 PROCEDURE — 25000003 PHARM REV CODE 250: Performed by: INTERNAL MEDICINE

## 2022-12-01 PROCEDURE — 25000003 PHARM REV CODE 250: Performed by: GENERAL PRACTICE

## 2022-12-01 PROCEDURE — 25000003 PHARM REV CODE 250: Performed by: STUDENT IN AN ORGANIZED HEALTH CARE EDUCATION/TRAINING PROGRAM

## 2022-12-01 RX ADMIN — VANCOMYCIN HYDROCHLORIDE 1500 MG: 1 INJECTION, POWDER, LYOPHILIZED, FOR SOLUTION INTRAVENOUS at 03:12

## 2022-12-01 RX ADMIN — MORPHINE SULFATE 4 MG: 4 INJECTION, SOLUTION INTRAMUSCULAR; INTRAVENOUS at 03:12

## 2022-12-01 RX ADMIN — INSULIN DETEMIR 45 UNITS: 100 INJECTION, SOLUTION SUBCUTANEOUS at 10:12

## 2022-12-01 RX ADMIN — ENOXAPARIN SODIUM 30 MG: 30 INJECTION SUBCUTANEOUS at 10:12

## 2022-12-01 RX ADMIN — ENOXAPARIN SODIUM 30 MG: 30 INJECTION SUBCUTANEOUS at 08:12

## 2022-12-01 RX ADMIN — DULOXETINE 60 MG: 30 CAPSULE, DELAYED RELEASE ORAL at 10:12

## 2022-12-01 RX ADMIN — INSULIN ASPART 10 UNITS: 100 INJECTION, SOLUTION INTRAVENOUS; SUBCUTANEOUS at 10:12

## 2022-12-01 RX ADMIN — MORPHINE SULFATE 4 MG: 4 INJECTION, SOLUTION INTRAMUSCULAR; INTRAVENOUS at 10:12

## 2022-12-01 RX ADMIN — INSULIN ASPART 3 UNITS: 100 INJECTION, SOLUTION INTRAVENOUS; SUBCUTANEOUS at 10:12

## 2022-12-01 RX ADMIN — PANTOPRAZOLE SODIUM 40 MG: 40 TABLET, DELAYED RELEASE ORAL at 10:12

## 2022-12-01 RX ADMIN — DOCUSATE SODIUM 100 MG: 100 CAPSULE, LIQUID FILLED ORAL at 08:12

## 2022-12-01 NOTE — NURSING
Moderate amount of drainage noted to the lower 1/4 of dressing to left knee. Dressing removed and drainage appeared purulent. Cleaned staples with vashe and applied barrier spray and new dressing. Dr. Fontanez notified.

## 2022-12-01 NOTE — SUBJECTIVE & OBJECTIVE
Interval History: NAEO    Review of Systems   Constitutional:  Positive for appetite change. Negative for chills and fever.   HENT:  Negative for hearing loss, nosebleeds, rhinorrhea, sinus pressure, sneezing, sore throat and trouble swallowing.    Eyes:  Negative for photophobia, pain, discharge, itching and visual disturbance.   Respiratory:  Negative for apnea, cough, chest tightness and shortness of breath.    Cardiovascular:  Negative for chest pain, palpitations and leg swelling.   Gastrointestinal:  Negative for abdominal distention, abdominal pain, blood in stool, constipation, diarrhea, nausea and vomiting.   Endocrine: Negative for polydipsia, polyphagia and polyuria.   Genitourinary:  Negative for difficulty urinating, dysuria, frequency, hematuria and urgency. Vaginal bleeding: left knee.  Musculoskeletal:  Positive for arthralgias (right hip. Denies back pain). Negative for myalgias. Joint swelling: left knee.  Skin:  Negative for rash.   Neurological:  Negative for dizziness, tremors, seizures, syncope, light-headedness, numbness and headaches.   Psychiatric/Behavioral:  Negative for agitation, confusion, hallucinations and sleep disturbance. The patient is not nervous/anxious.    Objective:     Vital Signs (Most Recent):  Temp: 98.7 °F (37.1 °C) (12/01/22 0812)  Pulse: 102 (12/01/22 0812)  Resp: 18 (12/01/22 1022)  BP: (!) 143/73 (12/01/22 0812)  SpO2: 97 % (12/01/22 0812)   Vital Signs (24h Range):  Temp:  [97.8 °F (36.6 °C)-98.7 °F (37.1 °C)] 98.7 °F (37.1 °C)  Pulse:  [] 102  Resp:  [18-20] 18  SpO2:  [95 %-97 %] 97 %  BP: (143-163)/(71-83) 143/73     Weight: 70 kg (154 lb 5.2 oz)  Body mass index is 24.91 kg/m².  No intake or output data in the 24 hours ending 12/01/22 1047     Physical Exam  Vitals reviewed.   Constitutional:       Appearance: Normal appearance.   HENT:      Head: Normocephalic and atraumatic.      Right Ear: External ear normal.      Left Ear: External ear normal.   Eyes:       Extraocular Movements: Extraocular movements intact.      Conjunctiva/sclera: Conjunctivae normal.   Cardiovascular:      Rate and Rhythm: Normal rate and regular rhythm.      Pulses: Normal pulses.   Pulmonary:      Effort: Pulmonary effort is normal.   Abdominal:      General: Bowel sounds are normal.   Musculoskeletal:         General: Swelling (left knee) and tenderness (left knee and left shoulder) present.      Cervical back: Normal range of motion and neck supple.      Comments: Normal tone, ROM limited by pain    Negative for point tenderness anywhere along the spinal column.    Skin:     General: Skin is warm.      Capillary Refill: Capillary refill takes less than 2 seconds.   Neurological:      General: No focal deficit present.      Mental Status: She is alert and oriented to person, place, and time.   Psychiatric:         Mood and Affect: Mood normal.         Behavior: Behavior normal.         Thought Content: Thought content normal.       Significant Labs: All pertinent labs within the past 24 hours have been reviewed.    Significant Imaging: I have reviewed all pertinent imaging results/findings within the past 24 hours.

## 2022-12-01 NOTE — PT/OT/SLP PROGRESS
"Physical Therapy Treatment    Patient Name:  Monica Walker   MRN:  [id number]    Recommendations:     Discharge Recommendations:  nursing facility, skilled, rehabilitation facility   Discharge Equipment Recommendations: other (see comments) (to be determined)   Barriers to discharge:  ongoing IV abx    Assessment:     Monica Walker is a 52 y.o. female admitted with a medical diagnosis of Bacteremia.  She presents with the following impairments/functional limitations:  weakness, impaired endurance, impaired self care skills, impaired functional mobility, gait instability, impaired balance, decreased upper extremity function, decreased lower extremity function, decreased safety awareness, pain, impaired skin .    Patient able to transfer to the EOB much more easily than on previous treatments with decreased c/o pain. Patient required substantial assistance to transfer to the chair but was able to get OOB today. Pain continues to be a limiting factor. Patient will require extended rehab to regain independent function. Drainage noted from left knee dressing- Michelet De Anda RN advised and will address.    Rehab Prognosis: Good; patient would benefit from acute skilled PT services to address these deficits and reach maximum level of function.    Recent Surgery: Procedure(s) (LRB):  Transesophageal echo ([facility]) intra-procedure log documentation (N/A) 8 Days Post-Op    Plan:     During this hospitalization, patient to be seen 5 x/week to address the identified rehab impairments via gait training, therapeutic activities, therapeutic exercises and progress toward the following goals:    Plan of Care Expires:  01/20/23    Subjective     Chief Complaint: bacteremia; septic joints  Patient/Family Comments/goals: "I am waiting for some pain medicine." Michelet De Anda RN reported she was bringing pt pain medication  Pain/Comfort:  Pain Rating 1: 6/10  Location - Side 1: Right  Location 1: hip  Pain Addressed 1: Reposition, " Distraction, Cessation of Activity, Nurse notified  Pain Rating Post-Intervention 2: 6/10      Objective:     Communicated with Michelet De Anda RN prior to session.  Patient found supine with peripheral IV upon PT entry to room.     General Precautions: Standard, fall, contact   Orthopedic Precautions:LLE weight bearing as tolerated   Braces: N/A  Respiratory Status: Room air     Functional Mobility:  Bed Mobility:     Supine to Sit: minimum assistance  Transfers:     Sit to Stand:  moderate assistance with manual assist via gait belt  Bed to Chair: moderate assistance with  manual assist  using  Step Transfer and tiny steps with hip/knee flexion   Gait: bed to chair only  Several different chair positions trialled but pt reported she was most comfortable upright with feet on the floor. Table in front of pt for ease of access to water etc.      AM-PAC 6 CLICK MOBILITY  Turning over in bed (including adjusting bedclothes, sheets and blankets)?: 3  Sitting down on and standing up from a chair with arms (e.g., wheelchair, bedside commode, etc.): 2  Moving from lying on back to sitting on the side of the bed?: 3  Moving to and from a bed to a chair (including a wheelchair)?: 2  Need to walk in hospital room?: 1  Climbing 3-5 steps with a railing?: 1  Basic Mobility Total Score: 12       Treatment & Education:  Transfers as above  Sitting EOB patient did not report any excessive increase in pain and was calm throughout  Bilateral lower extremity exercise x 30 reps: ankle pumps, heel slides, hip abduction/adduction, and straight leg raises with active assist ROM, verbal cues, and tactile cues     Patient left up in chair with all lines intact, call button in reach, and Michelet De Anda RN notified..    GOALS:   Multidisciplinary Problems       Physical Therapy Goals          Problem: Physical Therapy    Goal Priority Disciplines Outcome Goal Variances Interventions   Physical Therapy Goal     PT, PT/OT Ongoing, Progressing      Description: Short Term Goals to be met by: 2022    Patient will increase functional independence with mobility by performin. Supine to sit with independently  2. Sit to stand transfer with independently using Rolling walker  3. Bed to chair transfer with independently using Rolling walker  4. Gait  x 200 feet with independently using Rolling walker  5. Lower extremity exercise program x30 reps per handout, with assistance as needed    Long Term Goals to be met by: 2022    Pt will regain full independent functional mobility with lowest level of assistive device to return to home situation and prior activities of daily living.                        Time Tracking:     PT Received On: 22  PT Start Time: 939     PT Stop Time: 1003  PT Total Time (min): 24 min     Billable Minutes: Therapeutic Activity 15 minutes and Therapeutic Exercise 8 minutes    Treatment Type: Treatment  PT/PTA: PT     PTA Visit Number: 0     2022

## 2022-12-01 NOTE — PROGRESS NOTES
[facility] - [address] [place] [address] Medicine  Progress Note    Patient Name: Monica Walker  MRN: [id number]  Patient Class: IP- Inpatient   Admission Date: 11/20/2022  Length of Stay: 11 days  Attending Physician: Adilson Fontanez DO  Primary Care Provider: Hannah Schulz MD        Subjective:     Principal Problem:Bacteremia        HPI:  Patient is a 51 yo female who presents to [facility] transferred from [facility] c/o L knee and R flank pain x 2 weeks. R flank pain is 10/10, squeezing in quality, without radiation, no aggravating or relieving factors. L knee pain started after a fall, which she is unable to recall date of the injury. The knee pain is 10/10, located in the medial joint line, aggravated by movement and weight-bearing, squeezing in quality, radiates down to L foot and without relieving factors including heat and ice. Patient has a history of type 2 diabetes, poorly controlled.    10 days ago patient was admitted at [facility] and treated for pyelonephritis on the left side with IV antibiotics although her urine culture did not grow any bacteria. 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 Today a CT scan of the abdomen pelvis and chest with IV contrast indicates bilateral peripherally dense cavitary lesions left lower lobe and right middle lobe bilateral pleural effusions left greater than right with possible loculation these areas are concerning for abscesses although a neoplastic process can not be entirely excluded. Abnormal appearance of the left kidney concerning for pyelonephritis. Patient was transferred to [facility] for further medical evaluation and management.         Overview/Hospital Course:  11/24-will need IV abx for 6 weeks from first negative blood cultures. Repeat today.  Requesting  swingbed in [place]. Will ask CM to assist  11/25- pain better controlled today. Will need definitive plan from Ortho prior to DC  11/26-febrile overnight, continues to have pain. Suspect poor source control.  Repeat cultures  11/27- afebrile, having some right hip pain today.  Worse lying flat, better with flexion  11/28-if blood cultures remain negative will place picc tomorrow and DC to [place] swingbed for 6 weeks abx  11/29- persistently positive blood cultures.  2 days now of complaining of right lower abdominal pain and right hip pain.  Exam not consistent with septic arthritis however given persistently positive cultures will scan for effusion.  Psoas abscess consideration as well. Discussed with Radiology.  11/30-awaiting fluid from right hip.  12/1-no infection in right hip, still denies back pain. Will check for epidural abscess if most recent blood cultures positive.      Interval History: NAEO    Review of Systems   Constitutional:  Positive for appetite change. Negative for chills and fever.   HENT:  Negative for hearing loss, nosebleeds, rhinorrhea, sinus pressure, sneezing, sore throat and trouble swallowing.    Eyes:  Negative for photophobia, pain, discharge, itching and visual disturbance.   Respiratory:  Negative for apnea, cough, chest tightness and shortness of breath.    Cardiovascular:  Negative for chest pain, palpitations and leg swelling.   Gastrointestinal:  Negative for abdominal distention, abdominal pain, blood in stool, constipation, diarrhea, nausea and vomiting.   Endocrine: Negative for polydipsia, polyphagia and polyuria.   Genitourinary:  Negative for difficulty urinating, dysuria, frequency, hematuria and urgency. Vaginal bleeding: left knee.  Musculoskeletal:  Positive for arthralgias (right hip. Denies back pain). Negative for myalgias. Joint swelling: left knee.  Skin:  Negative for rash.   Neurological:  Negative for dizziness, tremors, seizures, syncope, light-headedness,  numbness and headaches.   Psychiatric/Behavioral:  Negative for agitation, confusion, hallucinations and sleep disturbance. The patient is not nervous/anxious.    Objective:     Vital Signs (Most Recent):  Temp: 98.7 °F (37.1 °C) (12/01/22 0812)  Pulse: 102 (12/01/22 0812)  Resp: 18 (12/01/22 1022)  BP: (!) 143/73 (12/01/22 0812)  SpO2: 97 % (12/01/22 0812)   Vital Signs (24h Range):  Temp:  [97.8 °F (36.6 °C)-98.7 °F (37.1 °C)] 98.7 °F (37.1 °C)  Pulse:  [] 102  Resp:  [18-20] 18  SpO2:  [95 %-97 %] 97 %  BP: (143-163)/(71-83) 143/73     Weight: 70 kg (154 lb 5.2 oz)  Body mass index is 24.91 kg/m².  No intake or output data in the 24 hours ending 12/01/22 1047     Physical Exam  Vitals reviewed.   Constitutional:       Appearance: Normal appearance.   HENT:      Head: Normocephalic and atraumatic.      Right Ear: External ear normal.      Left Ear: External ear normal.   Eyes:      Extraocular Movements: Extraocular movements intact.      Conjunctiva/sclera: Conjunctivae normal.   Cardiovascular:      Rate and Rhythm: Normal rate and regular rhythm.      Pulses: Normal pulses.   Pulmonary:      Effort: Pulmonary effort is normal.   Abdominal:      General: Bowel sounds are normal.   Musculoskeletal:         General: Swelling (left knee) and tenderness (left knee and left shoulder) present.      Cervical back: Normal range of motion and neck supple.      Comments: Normal tone, ROM limited by pain    Negative for point tenderness anywhere along the spinal column.    Skin:     General: Skin is warm.      Capillary Refill: Capillary refill takes less than 2 seconds.   Neurological:      General: No focal deficit present.      Mental Status: She is alert and oriented to person, place, and time.   Psychiatric:         Mood and Affect: Mood normal.         Behavior: Behavior normal.         Thought Content: Thought content normal.       Significant Labs: All pertinent labs within the past 24 hours have been  reviewed.    Significant Imaging: I have reviewed all pertinent imaging results/findings within the past 24 hours.      Assessment/Plan:      * Bacteremia    Continue vanco  Bronchial cultures with MRSA          DM (diabetes mellitus)  Basal, SSI  Prandial if needed    Lung abscess  Cause of persistent positive cutlures??  Improved on imaging yesterday            Septic arthritis  Right hip fluid not infected  Repeat cultures    Depression  Continue home Cymbalta            VTE Risk Mitigation (From admission, onward)         Ordered     enoxaparin injection 30 mg  Every 12 hours         11/22/22 1524     IP VTE HIGH RISK PATIENT  Once         11/22/22 1524     Place DONOVAN hose  Until discontinued         11/22/22 1524     enoxaparin injection 40 mg  Daily         11/20/22 0519     Place sequential compression device  Until discontinued         11/20/22 0519                Discharge Planning   SUZANNA:      Code Status: Full Code   Is the patient medically ready for discharge?:     Reason for patient still in hospital (select all that apply): Treatment  Discharge Plan A: Home Health, Home with family                  Adilson Fontanez DO  Department of Hospital Medicine   [facility] - [facility] Medical Telemetry

## 2022-12-01 NOTE — PT/OT/SLP PROGRESS
Occupational Therapy   Treatment    Name: Monica Walker  MRN: [id number]  Admitting Diagnosis:  Bacteremia  7 Days Post-Op    Recommendations:     Discharge Recommendations: nursing facility, skilled, rehabilitation facility  Discharge Equipment Recommendations:   (to be determined)  Barriers to discharge:  None    Assessment:     Monica Walker is a 52 y.o. female with a medical diagnosis of Bacteremia.  She presents with complaint of (L) knee pain. Pt agreed to OT treatment. Performance deficits affecting function are weakness, impaired endurance, impaired self care skills, impaired functional mobility, gait instability, impaired balance, pain.     Rehab Prognosis:  Good; patient would benefit from acute skilled OT services to address these deficits and reach maximum level of function.       Plan:     Patient to be seen 5 x/week to address the above listed problems via therapeutic activities, therapeutic exercises  Plan of Care Expires: 12/26/22  Plan of Care Reviewed with: patient    Subjective     Pain/Comfort:  Pain Rating 1: 8/10  Location - Side 1: Left  Location 1: knee  Pain Rating Post-Intervention 1: 8/10  Pain Addressed 2: Reposition, Distraction, Cessation of Activity    Objective:     Communicated with: HAYDEE De Anda prior to session.  Patient found HOB elevated with peripheral IV, telemetry upon OT entry to room.    General Precautions: Standard, fall   Orthopedic Precautions:N/A   Braces: N/A  Respiratory Status: Room air     Occupational Performance:     Bed Mobility:    Patient completed Supine to Sit with minimum assistance  Patient completed Sit to Supine with moderate assistance     Functional Mobility/Transfers:  Patient completed Sit <> Stand Transfer with maximal assistance and of x 2 persons  with  rolling walker and gait belt for assistance   Functional Mobility: Unable  Pt sat EOB x 10 min with strong encouragement with level of assistance  CGA to Mod a to correct balance due to  leaning posteriorly.    Activities of Daily Living:  Grooming: independence washing face      AMPAC 6 Click ADL:      Treatment & Education:  Pt performed UE strengthening 2 lb hand wt x 2 sets of 15 reps (R) shoulder flexion/extension,adduction/abduction and (B) elbow and wrist flexion/extension. Hand exerciser x 2 sets of 25 reps (B) x 3 bands.   Pt with improved participation with tx today. Plan is to continue tx addressing goals.    Patient left HOB elevated with all lines intact, call button in reach, and PT  present    GOALS:   Multidisciplinary Problems       Occupational Therapy Goals          Problem: Occupational Therapy    Goal Priority Disciplines Outcome Interventions   Occupational Therapy Goal     OT, PT/OT Ongoing, Progressing    Description: STG:  Pt will perform grooming with access to supplies  Pt will bathe with setup and CGA  Pt will perform UE dressing with setup  Pt will perform LE dressing with CGA and AD if needed  Pt will transfer bed/chair/bsc with RW and SBA  Pt will perform standing task x 2 min with SBA and RW  Pt will tolerate 15 minutes of tx without fatigue      LT.Restore to max I with self care and mobility.                          Time Tracking:     OT Date of Treatment: 22  OT Start Time: 1343  OT Stop Time: 1416  OT Total Time (min): 33 min    Billable Minutes:Therapeutic Activity 15  Therapeutic Exercise 15               2022

## 2022-12-02 LAB
ANION GAP SERPL CALCULATED.3IONS-SCNC: 12 MMOL/L (ref 7–16)
BACTERIA BLD CULT: NORMAL
BACTERIA SPEC BFLD CULT: NORMAL
BASOPHILS # BLD AUTO: 0.13 K/UL (ref 0–0.2)
BASOPHILS NFR BLD AUTO: 1 % (ref 0–1)
BUN SERPL-MCNC: 9 MG/DL (ref 7–18)
BUN/CREAT SERPL: 13 (ref 6–20)
CALCIUM SERPL-MCNC: 8.9 MG/DL (ref 8.5–10.1)
CHLORIDE SERPL-SCNC: 95 MMOL/L (ref 98–107)
CO2 SERPL-SCNC: 28 MMOL/L (ref 21–32)
CREAT SERPL-MCNC: 0.71 MG/DL (ref 0.55–1.02)
DIFFERENTIAL METHOD BLD: ABNORMAL
EGFR (NO RACE VARIABLE) (RUSH/TITUS): 101 ML/MIN/1.73M²
EOSINOPHIL # BLD AUTO: 0.4 K/UL (ref 0–0.5)
EOSINOPHIL NFR BLD AUTO: 3.1 % (ref 1–4)
ERYTHROCYTE [DISTWIDTH] IN BLOOD BY AUTOMATED COUNT: 14.9 % (ref 11.5–14.5)
GLUCOSE SERPL-MCNC: 122 MG/DL (ref 70–105)
GLUCOSE SERPL-MCNC: 187 MG/DL (ref 70–105)
GLUCOSE SERPL-MCNC: 328 MG/DL (ref 70–105)
GLUCOSE SERPL-MCNC: 359 MG/DL (ref 70–105)
GLUCOSE SERPL-MCNC: 89 MG/DL (ref 74–106)
GLUCOSE SERPL-MCNC: 92 MG/DL (ref 70–105)
GLUCOSE SERPL-MCNC: 96 MG/DL (ref 70–105)
HCT VFR BLD AUTO: 32.2 % (ref 38–47)
HGB BLD-MCNC: 9.7 G/DL (ref 12–16)
IMM GRANULOCYTES # BLD AUTO: 0.32 K/UL (ref 0–0.04)
IMM GRANULOCYTES NFR BLD: 2.5 % (ref 0–0.4)
LYMPHOCYTES # BLD AUTO: 2.61 K/UL (ref 1–4.8)
LYMPHOCYTES NFR BLD AUTO: 20.1 % (ref 27–41)
MCH RBC QN AUTO: 26.8 PG (ref 27–31)
MCHC RBC AUTO-ENTMCNC: 30.1 G/DL (ref 32–36)
MCV RBC AUTO: 89 FL (ref 80–96)
MONOCYTES # BLD AUTO: 1.39 K/UL (ref 0–0.8)
MONOCYTES NFR BLD AUTO: 10.7 % (ref 2–6)
MPC BLD CALC-MCNC: 9 FL (ref 9.4–12.4)
NEUTROPHILS # BLD AUTO: 8.13 K/UL (ref 1.8–7.7)
NEUTROPHILS NFR BLD AUTO: 62.6 % (ref 53–65)
NRBC # BLD AUTO: 0 X10E3/UL
NRBC, AUTO (.00): 0 %
PLATELET # BLD AUTO: 730 K/UL (ref 150–400)
POTASSIUM SERPL-SCNC: 4 MMOL/L (ref 3.5–5.1)
RBC # BLD AUTO: 3.62 M/UL (ref 4.2–5.4)
SODIUM SERPL-SCNC: 131 MMOL/L (ref 136–145)
VANCOMYCIN TROUGH SERPL-MCNC: 23.9 ΜG/ML (ref 10–20)
WBC # BLD AUTO: 12.98 K/UL (ref 4.5–11)

## 2022-12-02 PROCEDURE — 99231 SBSQ HOSP IP/OBS SF/LOW 25: CPT | Mod: ,,, | Performed by: INTERNAL MEDICINE

## 2022-12-02 PROCEDURE — 82962 GLUCOSE BLOOD TEST: CPT

## 2022-12-02 PROCEDURE — 25000003 PHARM REV CODE 250: Performed by: GENERAL PRACTICE

## 2022-12-02 PROCEDURE — 63600175 PHARM REV CODE 636 W HCPCS: Performed by: INTERNAL MEDICINE

## 2022-12-02 PROCEDURE — 11000001 HC ACUTE MED/SURG PRIVATE ROOM

## 2022-12-02 PROCEDURE — 99231 PR SUBSEQUENT HOSPITAL CARE,LEVL I: ICD-10-PCS | Mod: ,,, | Performed by: INTERNAL MEDICINE

## 2022-12-02 PROCEDURE — 99232 PR SUBSEQUENT HOSPITAL CARE,LEVL II: ICD-10-PCS | Mod: ,,, | Performed by: STUDENT IN AN ORGANIZED HEALTH CARE EDUCATION/TRAINING PROGRAM

## 2022-12-02 PROCEDURE — 25000003 PHARM REV CODE 250: Performed by: INTERNAL MEDICINE

## 2022-12-02 PROCEDURE — 99232 SBSQ HOSP IP/OBS MODERATE 35: CPT | Mod: ,,, | Performed by: STUDENT IN AN ORGANIZED HEALTH CARE EDUCATION/TRAINING PROGRAM

## 2022-12-02 PROCEDURE — 80202 ASSAY OF VANCOMYCIN: CPT | Performed by: STUDENT IN AN ORGANIZED HEALTH CARE EDUCATION/TRAINING PROGRAM

## 2022-12-02 PROCEDURE — 80048 BASIC METABOLIC PNL TOTAL CA: CPT | Performed by: HOSPITALIST

## 2022-12-02 PROCEDURE — 25000003 PHARM REV CODE 250: Performed by: ORTHOPAEDIC SURGERY

## 2022-12-02 PROCEDURE — 63600175 PHARM REV CODE 636 W HCPCS: Performed by: ORTHOPAEDIC SURGERY

## 2022-12-02 PROCEDURE — 63600175 PHARM REV CODE 636 W HCPCS: Performed by: STUDENT IN AN ORGANIZED HEALTH CARE EDUCATION/TRAINING PROGRAM

## 2022-12-02 PROCEDURE — 97530 THERAPEUTIC ACTIVITIES: CPT

## 2022-12-02 PROCEDURE — 85025 COMPLETE CBC W/AUTO DIFF WBC: CPT | Performed by: STUDENT IN AN ORGANIZED HEALTH CARE EDUCATION/TRAINING PROGRAM

## 2022-12-02 PROCEDURE — 36415 COLL VENOUS BLD VENIPUNCTURE: CPT | Performed by: HOSPITALIST

## 2022-12-02 PROCEDURE — 36415 COLL VENOUS BLD VENIPUNCTURE: CPT | Performed by: STUDENT IN AN ORGANIZED HEALTH CARE EDUCATION/TRAINING PROGRAM

## 2022-12-02 PROCEDURE — 25000003 PHARM REV CODE 250: Performed by: STUDENT IN AN ORGANIZED HEALTH CARE EDUCATION/TRAINING PROGRAM

## 2022-12-02 PROCEDURE — 97110 THERAPEUTIC EXERCISES: CPT

## 2022-12-02 RX ADMIN — ENOXAPARIN SODIUM 30 MG: 30 INJECTION SUBCUTANEOUS at 09:12

## 2022-12-02 RX ADMIN — INSULIN ASPART 3 UNITS: 100 INJECTION, SOLUTION INTRAVENOUS; SUBCUTANEOUS at 12:12

## 2022-12-02 RX ADMIN — DULOXETINE 60 MG: 30 CAPSULE, DELAYED RELEASE ORAL at 09:12

## 2022-12-02 RX ADMIN — VANCOMYCIN HYDROCHLORIDE 1500 MG: 1 INJECTION, POWDER, LYOPHILIZED, FOR SOLUTION INTRAVENOUS at 03:12

## 2022-12-02 RX ADMIN — MORPHINE SULFATE 4 MG: 4 INJECTION, SOLUTION INTRAMUSCULAR; INTRAVENOUS at 12:12

## 2022-12-02 RX ADMIN — ENOXAPARIN SODIUM 30 MG: 30 INJECTION SUBCUTANEOUS at 08:12

## 2022-12-02 RX ADMIN — OXYCODONE HYDROCHLORIDE 10 MG: 5 TABLET ORAL at 01:12

## 2022-12-02 RX ADMIN — MORPHINE SULFATE 4 MG: 4 INJECTION, SOLUTION INTRAMUSCULAR; INTRAVENOUS at 09:12

## 2022-12-02 RX ADMIN — MORPHINE SULFATE 4 MG: 4 INJECTION, SOLUTION INTRAMUSCULAR; INTRAVENOUS at 08:12

## 2022-12-02 RX ADMIN — INSULIN DETEMIR 45 UNITS: 100 INJECTION, SOLUTION SUBCUTANEOUS at 09:12

## 2022-12-02 RX ADMIN — MORPHINE SULFATE 4 MG: 4 INJECTION, SOLUTION INTRAMUSCULAR; INTRAVENOUS at 04:12

## 2022-12-02 RX ADMIN — VANCOMYCIN HYDROCHLORIDE 1500 MG: 1 INJECTION, POWDER, LYOPHILIZED, FOR SOLUTION INTRAVENOUS at 02:12

## 2022-12-02 RX ADMIN — MORPHINE SULFATE 4 MG: 4 INJECTION, SOLUTION INTRAMUSCULAR; INTRAVENOUS at 05:12

## 2022-12-02 RX ADMIN — PANTOPRAZOLE SODIUM 40 MG: 40 TABLET, DELAYED RELEASE ORAL at 09:12

## 2022-12-02 RX ADMIN — INSULIN ASPART 1 UNITS: 100 INJECTION, SOLUTION INTRAVENOUS; SUBCUTANEOUS at 08:12

## 2022-12-02 RX ADMIN — INSULIN ASPART 10 UNITS: 100 INJECTION, SOLUTION INTRAVENOUS; SUBCUTANEOUS at 12:12

## 2022-12-02 RX ADMIN — DOCUSATE SODIUM 100 MG: 100 CAPSULE, LIQUID FILLED ORAL at 08:12

## 2022-12-02 RX ADMIN — TRAZODONE HYDROCHLORIDE 50 MG: 50 TABLET ORAL at 08:12

## 2022-12-02 NOTE — PROGRESS NOTES
Pharmacy Consult    Consulted to assist in the management of  Vanc  therapy.  Patient is currently receiving Vanc 1500mg iv q12hr. Trough drawn at 1411 on 12/2 reported as 23.9 mcg/ml.    BUN/SCR = 0/0.71.  Based on this lab data, will change Vanc  to 1500mg iv q18hr. Will recheck trough prior to 3rd dose of new regimen and make further adjustments as necessary.    PUSHPA Molina.Ph.

## 2022-12-02 NOTE — ASSESSMENT & PLAN NOTE
Patient has bilateral peripheral cavitary lesions.  Bronchoscopy was negative there were some atypical cells and grew out methicillin-resistant Staph aureus continue antibiotics.  Have patient follow me in clinic 6 weeks after discharge with a chest x-ray may needle these lesions if they do not continue to resolve

## 2022-12-02 NOTE — PROGRESS NOTES
[facility] - [facility] Tele[facility]  Wound Care    Patient Name:  Monica Walker   MRN:  [id number]  Date: 12/2/2022  Diagnosis: Bacteremia    History:     Past Medical History:   Diagnosis Date    Abscess of right thigh + MRSA 08/20/2021    healed    Adnexal cyst     Degenerative disc disease     Depression 10/29/2021    Hypertension     Nicotine dependence     Osteoarthritis        Social History     Socioeconomic History    Marital status: Single   Tobacco Use    Smoking status: Every Day     Types: Cigarettes    Smokeless tobacco: Current     Types: Snuff   Substance and Sexual Activity    Alcohol use: Never    Drug use: Never     Social Determinants of Health     Financial Resource Strain: Low Risk     Difficulty of Paying Living Expenses: Not very hard   Food Insecurity: No Food Insecurity    Worried About Running Out of Food in the Last Year: Never true    Ran Out of Food in the Last Year: Never true   Transportation Needs: No Transportation Needs    Lack of Transportation (Medical): No    Lack of Transportation (Non-Medical): No   Physical Activity: Insufficiently Active    Days of Exercise per Week: 3 days    Minutes of Exercise per Session: 30 min   Stress: Stress Concern Present    Feeling of Stress : To some extent   Social Connections: Socially Isolated    Frequency of Communication with Friends and Family: More than three times a week    Frequency of Social Gatherings with Friends and Family: More than three times a week    Attends Church Services: Never    Active Member of Clubs or Organizations: No    Attends Club or Organization Meetings: Never    Marital Status: Never    Housing Stability: Low Risk     Unable to Pay for Housing in the Last Year: No    Number of Places Lived in the Last Year: 1    Unstable Housing in the Last Year: No       Precautions:     Allergies as of 11/20/2022    (No Known Allergies)       WO Assessment Details/Treatment        12/02/22 1012   WO  Assessment   WOCN Total Time (mins) 75   Visit Date 12/02/22   Visit Time 0830   Consult Type New   WOCN Speciality Wound   Wound moisture;shearing   Number of Wounds 1   Intervention chart review;assessed;changed;applied;coordination of care;team conference;orders   Teaching complication   Pressure Injury Prevention    Check Moisture Management Pad Done        Altered Skin Integrity 12/01/22 1926 posterior;midline Coccyx #1 Other (comment)    Date First Assessed/Time First Assessed: 12/01/22 1926   Altered Skin Integrity Present on Admission: (c)   Orientation: posterior;midline  Location: Coccyx  Wound Number: #1  Primary Wound Type: (c) Other (comment)  Description of Altered Skin Integr...   Wound Image    Dressing Appearance Open to air   Drainage Amount None   Appearance Pink;Red   Tissue loss description Partial thickness   Care Cleansed with:;Applied:;Skin Barrier  (pH balanced foam)       [facility] Team consulted for altered skin integrity sacrum    Narrative: Pt received alert and oriented. Sacral area cleansed with pH balanced wipes; Removed zinc barrier; skin sloughing noted. Instructed pt re: increased risk of skin breakdown due to immobility and remaining positioned on back.  Inst to turn side to side and prone if possible to decrease amount of time spent on back.  Verbalizes understanding of risk for additional skin breakdown and pressure injury.  Per primary nurse, Kayy pt refuses to use bedside commode and requests bedpan; refuses to spend significant time out of bed.  Nurse also reports change in drainage characteristic to knee debridement site.  Dr. Mendoza, Orthopedist had written dressing change orders [facility] to notify primary  Or Dr. Mendoza for change in orders, or consultation to Wound Care.  Nurse verbalized understanding.    Active Wounds: IAD, Shearing, Pressure to sacral area & buttocks.    Goals per TIME Model:  Moisture management; decreased pressure; skin protection       Barriers to Wound Healing: fragile skin history of poor compliance smoking and immobility    Recommendations made to primary team for Increased activity, frequent position changes, routine skin care. Avoid bedpan use. Notify Primary or Ortho re: knee    Orders placed.    Thank you for the consult.     We will continue to follow. See additional note under Notes TAB for tentative f/u plan/dates.        12/02/2022

## 2022-12-02 NOTE — PLAN OF CARE
SS spoke with Dr. Fontanez, still awaiting culture results. Patient to discharge to [facility] once medically stable. SS spoke with Kim at [facility] to see if they could accept patient over the weekend if patient discharges Saturday or Sunday. Kim stated that she would ask and call me back. SS has not received a call back. SS attempted to reach Kim and Dory at [facility], no answer, left message.

## 2022-12-02 NOTE — SUBJECTIVE & OBJECTIVE
Interval History:  Patient without complaints    Objective:     Vital Signs (Most Recent):  Temp: 97.6 °F (36.4 °C) (12/02/22 0400)  Pulse: 97 (12/02/22 0400)  Resp: 20 (12/02/22 0446)  BP: 132/72 (12/02/22 0400)  SpO2: 97 % (12/02/22 0400) Vital Signs (24h Range):  Temp:  [97.6 °F (36.4 °C)-98.7 °F (37.1 °C)] 97.6 °F (36.4 °C)  Pulse:  [] 97  Resp:  [18-20] 20  SpO2:  [96 %-97 %] 97 %  BP: (120-143)/(60-73) 132/72     Weight: 70 kg (154 lb 5.2 oz)  Body mass index is 24.91 kg/m².      Intake/Output Summary (Last 24 hours) at 12/2/2022 0536  Last data filed at 12/1/2022 1926  Gross per 24 hour   Intake 480 ml   Output --   Net 480 ml       Physical Exam  Vitals reviewed.   Constitutional:       Appearance: Normal appearance.      Interventions: She is not intubated.  HENT:      Head: Normocephalic and atraumatic.      Nose: Nose normal.      Mouth/Throat:      Mouth: Mucous membranes are dry.      Pharynx: Oropharynx is clear.   Eyes:      Extraocular Movements: Extraocular movements intact.      Conjunctiva/sclera: Conjunctivae normal.      Pupils: Pupils are equal, round, and reactive to light.   Cardiovascular:      Rate and Rhythm: Normal rate.      Heart sounds: Normal heart sounds. No murmur heard.  Pulmonary:      Effort: Pulmonary effort is normal. She is not intubated.      Breath sounds: Normal breath sounds.   Abdominal:      General: Abdomen is flat. Bowel sounds are normal.      Palpations: Abdomen is soft.   Musculoskeletal:         General: Normal range of motion.      Cervical back: Normal range of motion and neck supple.      Right lower leg: No edema.      Left lower leg: No edema.   Skin:     General: Skin is warm and dry.      Capillary Refill: Capillary refill takes less than 2 seconds.   Neurological:      General: No focal deficit present.      Mental Status: She is alert and oriented to person, place, and time.   Psychiatric:         Mood and Affect: Mood normal.         Behavior:  Behavior normal.       Vents:  Vent Mode: A/C (11/22/22 1700)  Ventilator Initiated: Yes (11/22/22 1700)  Set Rate: 14 BPM (11/22/22 1700)  Vt Set: 500 mL (11/22/22 1700)  PEEP/CPAP: 5 cmH20 (11/22/22 1700)  Oxygen Concentration (%): 28 (11/27/22 0720)  Peak Airway Pressure: 27 cmH20 (11/22/22 1700)  Total Ve: 6.2 L/m (11/22/22 1700)    Lines/Drains/Airways       Peripheral Intravenous Line  Duration                  Peripheral IV - Single Lumen 11/30/22 0425 20 G Anterior;Proximal;Right Antecubital 2 days                    Significant Labs:    CBC/Anemia Profile:  Recent Labs   Lab 12/01/22 0342 12/02/22  0513   WBC 12.44* 12.98*   HGB 9.8* 9.7*   HCT 32.6* 32.2*   * 730*   MCV 87.9 89.0   RDW 14.9* 14.9*        Chemistries:  Recent Labs   Lab 12/01/22 0342   *   K 4.2   CL 95*   CO2 30   BUN 9   CREATININE 0.72   CALCIUM 8.7       Recent Lab Results  (Last 5 results in the past 24 hours)        12/02/22  0513   12/02/22  0405   12/02/22  0003   12/01/22 2050 12/01/22  1915        Baso # 0.13               Basophil % 1.0               Differential Type Auto               Eos # 0.40               Eosinophil % 3.1               Hematocrit 32.2               Hemoglobin 9.7               Immature Grans (Abs) 0.32               Immature Granulocytes 2.5               Lymph # 2.61               Lymph % 20.1               MCH 26.8               MCHC 30.1               MCV 89.0               Mono # 1.39               Mono % 10.7               MPV 9.0               Neutrophils, Abs 8.13               Neutrophils Relative 62.6               nRBC 0.0               NUCLEATED RBC ABSOLUTE 0.00               Platelets 730               POC Glucose   122   96   180   53       RBC 3.62               RDW 14.9               WBC 12.98                                      Significant Imaging:  I have reviewed all pertinent imaging results/findings within the past 24 hours.

## 2022-12-02 NOTE — PT/OT/SLP PROGRESS
Occupational Therapy   Treatment    Name: Monica Walker  MRN: [id number]  Admitting Diagnosis:  Bacteremia  9 Days Post-Op    Recommendations:     Discharge Recommendations: nursing facility, skilled, rehabilitation facility  Discharge Equipment Recommendations:   (to be determined)  Barriers to discharge:  None    Assessment:     Monica Walker is a 52 y.o. female with a medical diagnosis of Bacteremia.  She presents lethargic, c/o pain, requiring heavy encouragement to participate with OT. Performance deficits affecting function are weakness, impaired endurance, impaired functional mobility, impaired self care skills, impaired skin, impaired cognition, decreased ROM, decreased upper extremity function, pain.     Rehab Prognosis:  Good and Fair; patient would benefit from acute skilled OT services to address these deficits and reach maximum level of function.       Plan:     Patient to be seen 5 x/week to address the above listed problems via self-care/home management, therapeutic activities, therapeutic exercises  Plan of Care Expires: 12/26/22  Plan of Care Reviewed with: patient    Subjective     Pain/Comfort:       Objective:     Communicated with: HAYDEE De Anda prior to session.  Patient found supine with peripheral IV upon OT entry to room.    General Precautions: Standard, fall, contact   Orthopedic Precautions:LLE weight bearing as tolerated   Braces:    Respiratory Status: Room air     Occupational Performance:     Bed Mobility:    NT    Functional Mobility/Transfers:  NT    Activities of Daily Living:  NT      [facility] 6 Click ADL:      Treatment & Education:  Pt performed (R) UE ex for 2x10 reps:  Shoulder flexion with 2# db  Elbow flexion with 2# db  Shoulder IR/ ER with 2# db  Supination/pronation with 1# db  Wrist flexion with 1# db  Handhelper with 2 bands of resistance for (B) UE for 2x15 reps    (L) UE AROM/AAROM x 15 reps of each:  Shoulder flexion (within comfort range)  Elbow flexion/  extension  Supination/pronation with 1# db  Wrist flexion with 1# db    Patient left supine with all lines intact, call button in reach, bed alarm on, and RN Michelet notified    GOALS:   Multidisciplinary Problems       Occupational Therapy Goals          Problem: Occupational Therapy    Goal Priority Disciplines Outcome Interventions   Occupational Therapy Goal     OT, PT/OT Ongoing, Progressing    Description: STG:  Pt will perform grooming with access to supplies  Pt will bathe with setup and CGA  Pt will perform UE dressing with setup  Pt will perform LE dressing with CGA and AD if needed  Pt will transfer bed/chair/bsc with RW and SBA  Pt will perform standing task x 2 min with SBA and RW  Pt will tolerate 15 minutes of tx without fatigue      LT.Restore to max I with self care and mobility.                          Time Tracking:     OT Date of Treatment: 22  OT Start Time: 1448  OT Stop Time: 1505  OT Total Time (min): 17 min    Billable Minutes:Therapeutic Exercise 17 min    OT/TRISH: OT          2022

## 2022-12-02 NOTE — SUBJECTIVE & OBJECTIVE
Interval History: NAEO    Review of Systems   Constitutional:  Positive for appetite change. Negative for chills and fever.   HENT:  Negative for hearing loss, nosebleeds, rhinorrhea, sinus pressure, sneezing, sore throat and trouble swallowing.    Eyes:  Negative for photophobia, pain, discharge, itching and visual disturbance.   Respiratory:  Negative for apnea, cough, chest tightness and shortness of breath.    Cardiovascular:  Negative for chest pain, palpitations and leg swelling.   Gastrointestinal:  Negative for abdominal distention, abdominal pain, blood in stool, constipation, diarrhea, nausea and vomiting.   Endocrine: Negative for polydipsia, polyphagia and polyuria.   Genitourinary:  Negative for difficulty urinating, dysuria, frequency, hematuria and urgency. Vaginal bleeding: left knee.  Musculoskeletal:  Positive for arthralgias (right hip. Denies back pain). Negative for myalgias. Joint swelling: left knee.  Skin:  Negative for rash.   Neurological:  Negative for dizziness, tremors, seizures, syncope, light-headedness, numbness and headaches.   Psychiatric/Behavioral:  Negative for agitation, confusion, hallucinations and sleep disturbance. The patient is not nervous/anxious.    Objective:     Vital Signs (Most Recent):  Temp: 98 °F (36.7 °C) (12/02/22 0701)  Pulse: 90 (12/02/22 0701)  Resp: 18 (12/02/22 0929)  BP: (!) 149/74 (12/02/22 0701)  SpO2: 96 % (12/02/22 0701)   Vital Signs (24h Range):  Temp:  [97.6 °F (36.4 °C)-98 °F (36.7 °C)] 98 °F (36.7 °C)  Pulse:  [] 90  Resp:  [18-20] 18  SpO2:  [96 %-97 %] 96 %  BP: (123-149)/(61-74) 149/74     Weight: 70 kg (154 lb 5.2 oz)  Body mass index is 24.91 kg/m².    Intake/Output Summary (Last 24 hours) at 12/2/2022 1129  Last data filed at 12/1/2022 1926  Gross per 24 hour   Intake 480 ml   Output --   Net 480 ml        Physical Exam  Vitals reviewed.   Constitutional:       Appearance: Normal appearance.   HENT:      Head: Normocephalic and  atraumatic.      Right Ear: External ear normal.      Left Ear: External ear normal.   Eyes:      Extraocular Movements: Extraocular movements intact.      Conjunctiva/sclera: Conjunctivae normal.   Cardiovascular:      Rate and Rhythm: Normal rate and regular rhythm.      Pulses: Normal pulses.   Pulmonary:      Effort: Pulmonary effort is normal.   Abdominal:      General: Bowel sounds are normal.   Musculoskeletal:         General: Swelling (left knee) and tenderness (left knee and left shoulder) present.      Cervical back: Normal range of motion and neck supple.      Comments: Normal tone, ROM limited by pain    Negative for point tenderness anywhere along the spinal column.    Skin:     General: Skin is warm.      Capillary Refill: Capillary refill takes less than 2 seconds.   Neurological:      General: No focal deficit present.      Mental Status: She is alert and oriented to person, place, and time.   Psychiatric:         Mood and Affect: Mood normal.         Behavior: Behavior normal.         Thought Content: Thought content normal.       Significant Labs: All pertinent labs within the past 24 hours have been reviewed.    Significant Imaging: I have reviewed all pertinent imaging results/findings within the past 24 hours.

## 2022-12-02 NOTE — PROGRESS NOTES
12/02/22 1024   Wound Care Follow Up   Wound Care Follow-up? Yes   Wound Care- Next Visit Date 12/06/22   Follow Up Plan skin assessment and evaluation of plan

## 2022-12-02 NOTE — PROGRESS NOTES
[facility] - [address] [place] [address] Medicine  Progress Note    Patient Name: Monica Walker  MRN: [id number]  Patient Class: IP- Inpatient   Admission Date: 11/20/2022  Length of Stay: 12 days  Attending Physician: Adilson Fontanez DO  Primary Care Provider: Hannah Schulz MD        Subjective:     Principal Problem:Bacteremia        HPI:  Patient is a 51 yo female who [facility] from [facility] c/o L knee and R flank pain x 2 weeks. R flank pain is 10/10, squeezing in quality, without radiation, no aggravating or relieving factors. L knee pain started after a fall, which she is unable to recall date of the injury. The knee pain is 10/10, located in the medial joint line, aggravated by movement and weight-bearing, squeezing in quality, radiates down to L foot and without relieving factors including heat and ice. Patient has a history of type 2 diabetes, poorly controlled.    10 days ago patient was admitted at [facility] and treated for pyelonephritis on the left side with IV antibiotics although her urine culture did not grow any bacteria. 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 Today a CT scan of the abdomen pelvis and chest with IV contrast indicates bilateral peripherally dense cavitary lesions left lower lobe and right middle lobe bilateral pleural effusions left greater than right with possible loculation these areas are concerning for abscesses although a neoplastic process can not be entirely excluded. Abnormal appearance of the left kidney concerning for pyelonephritis. Patient was transferred to [facility] for further medical evaluation and management.         Overview/Hospital Course:  11/24-will need IV abx for 6 weeks from first negative blood cultures. Repeat today.  Requesting  swingbed in [place]. Will ask CM to assist  11/25- pain better controlled today. Will need definitive plan from Ortho prior to DC  11/26-febrile overnight, continues to have pain. Suspect poor source control.  Repeat cultures  11/27- afebrile, having some right hip pain today.  Worse lying flat, better with flexion  11/28-if blood cultures remain negative will place picc tomorrow and DC to [facility] for 6 weeks abx  11/29- persistently positive blood cultures.  2 days now of complaining of right lower abdominal pain and right hip pain.  Exam not consistent with septic arthritis however given persistently positive cultures will scan for effusion.  Psoas abscess consideration as well. Discussed with Radiology.  11/30-awaiting fluid from right hip.  12/1-no infection in right hip, still denies back pain. Will check for epidural abscess if most recent blood cultures positive.  12/2- Discussed case with Dr. Mendoza yesterday.  Recommending tagged WBC scan.  He believes this may be a discitis or osteo seeding- will await latest blood cultures. Order this imaging if they return positive.  Staff reports some tenderness and drainage from left knee.  Ortho will see today.      Interval History: NAEO    Review of Systems   Constitutional:  Positive for appetite change. Negative for chills and fever.   HENT:  Negative for hearing loss, nosebleeds, rhinorrhea, sinus pressure, sneezing, sore throat and trouble swallowing.    Eyes:  Negative for photophobia, pain, discharge, itching and visual disturbance.   Respiratory:  Negative for apnea, cough, chest tightness and shortness of breath.    Cardiovascular:  Negative for chest pain, palpitations and leg swelling.   Gastrointestinal:  Negative for abdominal distention, abdominal pain, blood in stool, constipation, diarrhea, nausea and vomiting.   Endocrine: Negative for polydipsia, polyphagia and polyuria.   Genitourinary:  Negative for difficulty urinating, dysuria, frequency,  hematuria and urgency. Vaginal bleeding: left knee.  Musculoskeletal:  Positive for arthralgias (right hip. Denies back pain). Negative for myalgias. Joint swelling: left knee.  Skin:  Negative for rash.   Neurological:  Negative for dizziness, tremors, seizures, syncope, light-headedness, numbness and headaches.   Psychiatric/Behavioral:  Negative for agitation, confusion, hallucinations and sleep disturbance. The patient is not nervous/anxious.    Objective:     Vital Signs (Most Recent):  Temp: 98 °F (36.7 °C) (12/02/22 0701)  Pulse: 90 (12/02/22 0701)  Resp: 18 (12/02/22 0929)  BP: (!) 149/74 (12/02/22 0701)  SpO2: 96 % (12/02/22 0701)   Vital Signs (24h Range):  Temp:  [97.6 °F (36.4 °C)-98 °F (36.7 °C)] 98 °F (36.7 °C)  Pulse:  [] 90  Resp:  [18-20] 18  SpO2:  [96 %-97 %] 96 %  BP: (123-149)/(61-74) 149/74     Weight: 70 kg (154 lb 5.2 oz)  Body mass index is 24.91 kg/m².    Intake/Output Summary (Last 24 hours) at 12/2/2022 1129  Last data filed at 12/1/2022 1926  Gross per 24 hour   Intake 480 ml   Output --   Net 480 ml        Physical Exam  Vitals reviewed.   Constitutional:       Appearance: Normal appearance.   HENT:      Head: Normocephalic and atraumatic.      Right Ear: External ear normal.      Left Ear: External ear normal.   Eyes:      Extraocular Movements: Extraocular movements intact.      Conjunctiva/sclera: Conjunctivae normal.   Cardiovascular:      Rate and Rhythm: Normal rate and regular rhythm.      Pulses: Normal pulses.   Pulmonary:      Effort: Pulmonary effort is normal.   Abdominal:      General: Bowel sounds are normal.   Musculoskeletal:         General: Swelling (left knee) and tenderness (left knee and left shoulder) present.      Cervical back: Normal range of motion and neck supple.      Comments: Normal tone, ROM limited by pain    Negative for point tenderness anywhere along the spinal column.    Skin:     General: Skin is warm.      Capillary Refill: Capillary refill  takes less than 2 seconds.   Neurological:      General: No focal deficit present.      Mental Status: She is alert and oriented to person, place, and time.   Psychiatric:         Mood and Affect: Mood normal.         Behavior: Behavior normal.         Thought Content: Thought content normal.       Significant Labs: All pertinent labs within the past 24 hours have been reviewed.    Significant Imaging: I have reviewed all pertinent imaging results/findings within the past 24 hours.      Assessment/Plan:      * Bacteremia    Continue vanco  Bronchial cultures with MRSA          DM (diabetes mellitus)  Basal, SSI  Prandial if needed    Lung abscess  OP follow with Dr. Sylvester  Continue abx          Septic arthritis  Right hip fluid not infected  Repeat cultures    Depression  Continue home Cymbalta            VTE Risk Mitigation (From admission, onward)         Ordered     enoxaparin injection 30 mg  Every 12 hours         11/22/22 1524     IP VTE HIGH RISK PATIENT  Once         11/22/22 1524     Place DONOVAN hose  Until discontinued         11/22/22 1524     enoxaparin injection 40 mg  Daily         11/20/22 0519     Place sequential compression device  Until discontinued         11/20/22 0519                Discharge Planning   SUZANNA:      Code Status: Full Code   Is the patient medically ready for discharge?:     Reason for patient still in hospital (select all that apply): Treatment  Discharge Plan A: Home Health, Home with family                  Adilson Fontanez DO  Department of Hospital Medicine   [facility] - [facility]

## 2022-12-02 NOTE — PROGRESS NOTES
[facility]  Pulmonology  Progress Note    Patient Name: Monica Walker  MRN: [id number]  Admission Date: 11/20/2022  Hospital Length of Stay: 12 days  Code Status: Full Code  Attending Provider: Adilson Fontanez DO  Primary Care Provider: Hannah Schulz MD   Principal Problem: Bacteremia    Subjective:     Interval History:  Patient without complaints    Objective:     Vital Signs (Most Recent):  Temp: 97.6 °F (36.4 °C) (12/02/22 0400)  Pulse: 97 (12/02/22 0400)  Resp: 20 (12/02/22 0446)  BP: 132/72 (12/02/22 0400)  SpO2: 97 % (12/02/22 0400) Vital Signs (24h Range):  Temp:  [97.6 °F (36.4 °C)-98.7 °F (37.1 °C)] 97.6 °F (36.4 °C)  Pulse:  [] 97  Resp:  [18-20] 20  SpO2:  [96 %-97 %] 97 %  BP: (120-143)/(60-73) 132/72     Weight: 70 kg (154 lb 5.2 oz)  Body mass index is 24.91 kg/m².      Intake/Output Summary (Last 24 hours) at 12/2/2022 0536  Last data filed at 12/1/2022 1926  Gross per 24 hour   Intake 480 ml   Output --   Net 480 ml       Physical Exam  Vitals reviewed.   Constitutional:       Appearance: Normal appearance.      Interventions: She is not intubated.  HENT:      Head: Normocephalic and atraumatic.      Nose: Nose normal.      Mouth/Throat:      Mouth: Mucous membranes are dry.      Pharynx: Oropharynx is clear.   Eyes:      Extraocular Movements: Extraocular movements intact.      Conjunctiva/sclera: Conjunctivae normal.      Pupils: Pupils are equal, round, and reactive to light.   Cardiovascular:      Rate and Rhythm: Normal rate.      Heart sounds: Normal heart sounds. No murmur heard.  Pulmonary:      Effort: Pulmonary effort is normal. She is not intubated.      Breath sounds: Normal breath sounds.   Abdominal:      General: Abdomen is flat. Bowel sounds are normal.      Palpations: Abdomen is soft.   Musculoskeletal:         General: Normal range of motion.      Cervical back: Normal range of motion and neck supple.      Right lower leg: No  edema.      Left lower leg: No edema.   Skin:     General: Skin is warm and dry.      Capillary Refill: Capillary refill takes less than 2 seconds.   Neurological:      General: No focal deficit present.      Mental Status: She is alert and oriented to person, place, and time.   Psychiatric:         Mood and Affect: Mood normal.         Behavior: Behavior normal.       Vents:  Vent Mode: A/C (11/22/22 1700)  Ventilator Initiated: Yes (11/22/22 1700)  Set Rate: 14 BPM (11/22/22 1700)  Vt Set: 500 mL (11/22/22 1700)  PEEP/CPAP: 5 cmH20 (11/22/22 1700)  Oxygen Concentration (%): 28 (11/27/22 0720)  Peak Airway Pressure: 27 cmH20 (11/22/22 1700)  Total Ve: 6.2 L/m (11/22/22 1700)    Lines/Drains/Airways       Peripheral Intravenous Line  Duration                  Peripheral IV - Single Lumen 11/30/22 0425 20 G Anterior;Proximal;Right Antecubital 2 days                    Significant Labs:    CBC/Anemia Profile:  Recent Labs   Lab 12/01/22  0342 12/02/22  0513   WBC 12.44* 12.98*   HGB 9.8* 9.7*   HCT 32.6* 32.2*   * 730*   MCV 87.9 89.0   RDW 14.9* 14.9*        Chemistries:  Recent Labs   Lab 12/01/22 0342   *   K 4.2   CL 95*   CO2 30   BUN 9   CREATININE 0.72   CALCIUM 8.7       Recent Lab Results  (Last 5 results in the past 24 hours)        12/02/22  0513   12/02/22  0405   12/02/22  0003   12/01/22 2050 12/01/22  1915        Baso # 0.13               Basophil % 1.0               Differential Type Auto               Eos # 0.40               Eosinophil % 3.1               Hematocrit 32.2               Hemoglobin 9.7               Immature Grans (Abs) 0.32               Immature Granulocytes 2.5               Lymph # 2.61               Lymph % 20.1               MCH 26.8               MCHC 30.1               MCV 89.0               Mono # 1.39               Mono % 10.7               MPV 9.0               Neutrophils, Abs 8.13               Neutrophils Relative 62.6               nRBC 0.0                NUCLEATED RBC ABSOLUTE 0.00               Platelets 730               POC Glucose   122   96   180   53       RBC 3.62               RDW 14.9               WBC 12.98                                      Significant Imaging:  I have reviewed all pertinent imaging results/findings within the past 24 hours.    Assessment/Plan:     Lung abscess  Patient has bilateral peripheral cavitary lesions.  Bronchoscopy was negative there were some atypical cells and grew out methicillin-resistant Staph aureus continue antibiotics.  Have patient follow me in clinic 6 weeks after discharge with a chest x-ray may needle these lesions if they do not continue to resolve                 Savage Sylvester MD  Pulmonology  [facility] - [address] [place] [address]

## 2022-12-02 NOTE — PT/OT/SLP PROGRESS
"Physical Therapy Treatment    Patient Name:  Monica Walker   MRN:  [id number]    Recommendations:     Discharge Recommendations:  nursing facility, skilled, rehabilitation facility   Discharge Equipment Recommendations: other (see comments) (to be determined)   Barriers to discharge:  ongoing medical work up    Assessment:     Monica Walker is a 52 y.o. female admitted with a medical diagnosis of Bacteremia.  She presents with the following impairments/functional limitations:  weakness, impaired endurance, impaired self care skills, impaired functional mobility, gait instability, impaired balance, decreased upper extremity function, decreased lower extremity function, pain, impaired skin .    Patient able to use the RW to help t/f bed to BSC to recliner but limited ability to weight bear on right LE due to pain/weakness. Hip aspiration showed no infection. Patient demonstrating drainage from left knee incision on dressing. RN aware.     Rehab Prognosis: Fair; patient would benefit from acute skilled PT services to address these deficits and reach maximum level of function.    Recent Surgery: Procedure(s) (LRB):  Transesophageal echo (MONICA) intra-procedure log documentation (N/A) 9 Days Post-Op    Plan:     During this hospitalization, patient to be seen 5 x/week to address the identified rehab impairments via gait training, therapeutic activities, therapeutic exercises and progress toward the following goals:    Plan of Care Expires:  01/20/23    Subjective     Chief Complaint: bacteremia  Patient/Family Comments/goals: "I need to go pee." Patient convinced to use the BSC with assistance  Pain/Comfort:  Pain Rating 1: 6/10  Location - Side 1: Right  Location 1: hip  Pain Addressed 1: Reposition, Distraction, Cessation of Activity, Pre-medicate for activity  Pain Rating Post-Intervention 1: 6/10      Objective:     Communicated with Michelet De Anda RN prior to session.  Patient found supine with peripheral IV upon " PT entry to room.     General Precautions: Standard, fall, contact (MRSA)   Orthopedic Precautions:LLE weight bearing as tolerated   Braces: N/A  Respiratory Status: Room air     Functional Mobility:  Bed Mobility:     Supine to Sit: stand by assistance  Transfers:     Sit to Stand:  moderate assistance with rolling walker  Bed to BSC to chair transfer: moderate assistance with  rolling walker  using  Step Transfer.patient required assist to complete hygiene      AM-PAC 6 CLICK MOBILITY  Turning over in bed (including adjusting bedclothes, sheets and blankets)?: 4  Sitting down on and standing up from a chair with arms (e.g., wheelchair, bedside commode, etc.): 2  Moving from lying on back to sitting on the side of the bed?: 4  Moving to and from a bed to a chair (including a wheelchair)?: 2  Need to walk in hospital room?: 1  Climbing 3-5 steps with a railing?: 1  Basic Mobility Total Score: 14       Treatment & Education:  Transfers as above  Bilateral lower extremity exercise x 30 reps: ankle pumps, Quad sets, glut sets, heel slides, hip abduction/adduction, straight leg raises, and Long arc quads with active assist ROM, verbal cues, and tactile cues     Patient left up in chair with all lines intact, call button in reach, and Michelet De Anda RN notified..    GOALS:   Multidisciplinary Problems       Physical Therapy Goals          Problem: Physical Therapy    Goal Priority Disciplines Outcome Goal Variances Interventions   Physical Therapy Goal     PT, PT/OT Ongoing, Progressing     Description: Short Term Goals to be met by: 2022    Patient will increase functional independence with mobility by performin. Supine to sit with independently  2. Sit to stand transfer with independently using Rolling walker  3. Bed to chair transfer with independently using Rolling walker  4. Gait  x 200 feet with independently using Rolling walker  5. Lower extremity exercise program x30 reps per handout, with  assistance as needed    Long Term Goals to be met by: 1/20/2022    Pt will regain full independent functional mobility with lowest level of assistive device to return to home situation and prior activities of daily living.                        Time Tracking:     PT Received On: 12/02/22  PT Start Time: 1111     PT Stop Time: 1137  PT Total Time (min): 26 min     Billable Minutes: Therapeutic Activity 10 minutes and Therapeutic Exercise 15 minutes    Treatment Type: Treatment  PT/PTA: PT     PTA Visit Number: 0     12/02/2022

## 2022-12-03 LAB
ANION GAP SERPL CALCULATED.3IONS-SCNC: 9 MMOL/L (ref 7–16)
BASOPHILS # BLD AUTO: 0.1 K/UL (ref 0–0.2)
BASOPHILS NFR BLD AUTO: 0.8 % (ref 0–1)
BUN SERPL-MCNC: 12 MG/DL (ref 7–18)
BUN/CREAT SERPL: 15 (ref 6–20)
CALCIUM SERPL-MCNC: 8.8 MG/DL (ref 8.5–10.1)
CHLORIDE SERPL-SCNC: 96 MMOL/L (ref 98–107)
CO2 SERPL-SCNC: 30 MMOL/L (ref 21–32)
CREAT SERPL-MCNC: 0.8 MG/DL (ref 0.55–1.02)
DIFFERENTIAL METHOD BLD: ABNORMAL
EGFR (NO RACE VARIABLE) (RUSH/TITUS): 88 ML/MIN/1.73M²
EOSINOPHIL # BLD AUTO: 0.38 K/UL (ref 0–0.5)
EOSINOPHIL NFR BLD AUTO: 2.9 % (ref 1–4)
ERYTHROCYTE [DISTWIDTH] IN BLOOD BY AUTOMATED COUNT: 14.5 % (ref 11.5–14.5)
GLUCOSE SERPL-MCNC: 123 MG/DL (ref 70–105)
GLUCOSE SERPL-MCNC: 124 MG/DL (ref 74–106)
GLUCOSE SERPL-MCNC: 153 MG/DL (ref 70–105)
GLUCOSE SERPL-MCNC: 181 MG/DL (ref 70–105)
GLUCOSE SERPL-MCNC: 282 MG/DL (ref 70–105)
GLUCOSE SERPL-MCNC: 348 MG/DL (ref 70–105)
GLUCOSE SERPL-MCNC: 70 MG/DL (ref 70–105)
HCT VFR BLD AUTO: 32.4 % (ref 38–47)
HGB BLD-MCNC: 9.6 G/DL (ref 12–16)
IMM GRANULOCYTES # BLD AUTO: 0.33 K/UL (ref 0–0.04)
IMM GRANULOCYTES NFR BLD: 2.5 % (ref 0–0.4)
LYMPHOCYTES # BLD AUTO: 2.8 K/UL (ref 1–4.8)
LYMPHOCYTES NFR BLD AUTO: 21.1 % (ref 27–41)
MCH RBC QN AUTO: 26.2 PG (ref 27–31)
MCHC RBC AUTO-ENTMCNC: 29.6 G/DL (ref 32–36)
MCV RBC AUTO: 88.3 FL (ref 80–96)
MONOCYTES # BLD AUTO: 1.44 K/UL (ref 0–0.8)
MONOCYTES NFR BLD AUTO: 10.9 % (ref 2–6)
MPC BLD CALC-MCNC: 8.6 FL (ref 9.4–12.4)
NEUTROPHILS # BLD AUTO: 8.21 K/UL (ref 1.8–7.7)
NEUTROPHILS NFR BLD AUTO: 61.8 % (ref 53–65)
NRBC # BLD AUTO: 0 X10E3/UL
NRBC, AUTO (.00): 0 %
PLATELET # BLD AUTO: 680 K/UL (ref 150–400)
POTASSIUM SERPL-SCNC: 4.2 MMOL/L (ref 3.5–5.1)
RBC # BLD AUTO: 3.67 M/UL (ref 4.2–5.4)
SODIUM SERPL-SCNC: 131 MMOL/L (ref 136–145)
WBC # BLD AUTO: 13.26 K/UL (ref 4.5–11)

## 2022-12-03 PROCEDURE — 94761 N-INVAS EAR/PLS OXIMETRY MLT: CPT

## 2022-12-03 PROCEDURE — 63600175 PHARM REV CODE 636 W HCPCS: Performed by: STUDENT IN AN ORGANIZED HEALTH CARE EDUCATION/TRAINING PROGRAM

## 2022-12-03 PROCEDURE — 25000003 PHARM REV CODE 250: Performed by: STUDENT IN AN ORGANIZED HEALTH CARE EDUCATION/TRAINING PROGRAM

## 2022-12-03 PROCEDURE — 97110 THERAPEUTIC EXERCISES: CPT

## 2022-12-03 PROCEDURE — 63600175 PHARM REV CODE 636 W HCPCS: Performed by: ORTHOPAEDIC SURGERY

## 2022-12-03 PROCEDURE — 82962 GLUCOSE BLOOD TEST: CPT

## 2022-12-03 PROCEDURE — 99232 PR SUBSEQUENT HOSPITAL CARE,LEVL II: ICD-10-PCS | Mod: ,,, | Performed by: STUDENT IN AN ORGANIZED HEALTH CARE EDUCATION/TRAINING PROGRAM

## 2022-12-03 PROCEDURE — 99232 SBSQ HOSP IP/OBS MODERATE 35: CPT | Mod: ,,, | Performed by: STUDENT IN AN ORGANIZED HEALTH CARE EDUCATION/TRAINING PROGRAM

## 2022-12-03 PROCEDURE — 85025 COMPLETE CBC W/AUTO DIFF WBC: CPT | Performed by: STUDENT IN AN ORGANIZED HEALTH CARE EDUCATION/TRAINING PROGRAM

## 2022-12-03 PROCEDURE — 80048 BASIC METABOLIC PNL TOTAL CA: CPT | Performed by: HOSPITALIST

## 2022-12-03 PROCEDURE — 11000001 HC ACUTE MED/SURG PRIVATE ROOM

## 2022-12-03 PROCEDURE — 25000003 PHARM REV CODE 250: Performed by: ORTHOPAEDIC SURGERY

## 2022-12-03 PROCEDURE — 63600175 PHARM REV CODE 636 W HCPCS: Performed by: INTERNAL MEDICINE

## 2022-12-03 PROCEDURE — 25000003 PHARM REV CODE 250: Performed by: GENERAL PRACTICE

## 2022-12-03 PROCEDURE — 36415 COLL VENOUS BLD VENIPUNCTURE: CPT | Performed by: HOSPITALIST

## 2022-12-03 RX ADMIN — OXYCODONE HYDROCHLORIDE 10 MG: 5 TABLET ORAL at 09:12

## 2022-12-03 RX ADMIN — MORPHINE SULFATE 4 MG: 4 INJECTION, SOLUTION INTRAMUSCULAR; INTRAVENOUS at 06:12

## 2022-12-03 RX ADMIN — MORPHINE SULFATE 4 MG: 4 INJECTION, SOLUTION INTRAMUSCULAR; INTRAVENOUS at 10:12

## 2022-12-03 RX ADMIN — INSULIN DETEMIR 40 UNITS: 100 INJECTION, SOLUTION SUBCUTANEOUS at 09:12

## 2022-12-03 RX ADMIN — DOCUSATE SODIUM 100 MG: 100 CAPSULE, LIQUID FILLED ORAL at 07:12

## 2022-12-03 RX ADMIN — INSULIN ASPART 8 UNITS: 100 INJECTION, SOLUTION INTRAVENOUS; SUBCUTANEOUS at 12:12

## 2022-12-03 RX ADMIN — ENOXAPARIN SODIUM 30 MG: 30 INJECTION SUBCUTANEOUS at 07:12

## 2022-12-03 RX ADMIN — ENOXAPARIN SODIUM 30 MG: 30 INJECTION SUBCUTANEOUS at 09:12

## 2022-12-03 RX ADMIN — MORPHINE SULFATE 4 MG: 4 INJECTION, SOLUTION INTRAMUSCULAR; INTRAVENOUS at 07:12

## 2022-12-03 RX ADMIN — DULOXETINE 60 MG: 30 CAPSULE, DELAYED RELEASE ORAL at 09:12

## 2022-12-03 RX ADMIN — PANTOPRAZOLE SODIUM 40 MG: 40 TABLET, DELAYED RELEASE ORAL at 09:12

## 2022-12-03 RX ADMIN — TRAZODONE HYDROCHLORIDE 50 MG: 50 TABLET ORAL at 07:12

## 2022-12-03 RX ADMIN — VANCOMYCIN HYDROCHLORIDE 1500 MG: 1 INJECTION, POWDER, LYOPHILIZED, FOR SOLUTION INTRAVENOUS at 09:12

## 2022-12-03 RX ADMIN — MORPHINE SULFATE 4 MG: 4 INJECTION, SOLUTION INTRAMUSCULAR; INTRAVENOUS at 02:12

## 2022-12-03 RX ADMIN — MORPHINE SULFATE 4 MG: 4 INJECTION, SOLUTION INTRAMUSCULAR; INTRAVENOUS at 01:12

## 2022-12-03 NOTE — SUBJECTIVE & OBJECTIVE
Interval History: NAEO    Review of Systems   Constitutional:  Positive for appetite change. Negative for chills and fever.   HENT:  Negative for hearing loss, nosebleeds, rhinorrhea, sinus pressure, sneezing, sore throat and trouble swallowing.    Eyes:  Negative for photophobia, pain, discharge, itching and visual disturbance.   Respiratory:  Negative for apnea, cough, chest tightness and shortness of breath.    Cardiovascular:  Negative for chest pain, palpitations and leg swelling.   Gastrointestinal:  Negative for abdominal distention, abdominal pain, blood in stool, constipation, diarrhea, nausea and vomiting.   Endocrine: Negative for polydipsia, polyphagia and polyuria.   Genitourinary:  Negative for difficulty urinating, dysuria, frequency, hematuria and urgency. Vaginal bleeding: left knee.  Musculoskeletal:  Positive for arthralgias (right hip. Denies back pain). Negative for myalgias. Joint swelling: left knee.  Skin:  Negative for rash.   Neurological:  Negative for dizziness, tremors, seizures, syncope, light-headedness, numbness and headaches.   Psychiatric/Behavioral:  Negative for agitation, confusion, hallucinations and sleep disturbance. The patient is not nervous/anxious.    Objective:     Vital Signs (Most Recent):  Temp: 98.4 °F (36.9 °C) (12/03/22 0701)  Pulse: 98 (12/03/22 0701)  Resp: 18 (12/03/22 0907)  BP: 128/72 (12/03/22 0701)  SpO2: (!) 93 % (12/03/22 0701)   Vital Signs (24h Range):  Temp:  [97.1 °F (36.2 °C)-98.7 °F (37.1 °C)] 98.4 °F (36.9 °C)  Pulse:  [] 98  Resp:  [18-20] 18  SpO2:  [93 %-96 %] 93 %  BP: (109-138)/(64-72) 128/72     Weight: 65.4 kg (144 lb 2.9 oz)  Body mass index is 23.27 kg/m².  No intake or output data in the 24 hours ending 12/03/22 1026     Physical Exam  Vitals reviewed.   Constitutional:       Appearance: Normal appearance.   HENT:      Head: Normocephalic and atraumatic.      Right Ear: External ear normal.      Left Ear: External ear normal.   Eyes:       Extraocular Movements: Extraocular movements intact.      Conjunctiva/sclera: Conjunctivae normal.   Cardiovascular:      Rate and Rhythm: Normal rate and regular rhythm.      Pulses: Normal pulses.   Pulmonary:      Effort: Pulmonary effort is normal.   Abdominal:      General: Bowel sounds are normal.   Musculoskeletal:         General: Swelling (left knee) and tenderness (left knee and left shoulder) present.      Cervical back: Normal range of motion and neck supple.      Comments: Normal tone, ROM limited by pain    Negative for point tenderness anywhere along the spinal column.    Skin:     General: Skin is warm.      Capillary Refill: Capillary refill takes less than 2 seconds.   Neurological:      General: No focal deficit present.      Mental Status: She is alert and oriented to person, place, and time.   Psychiatric:         Mood and Affect: Mood normal.         Behavior: Behavior normal.         Thought Content: Thought content normal.       Significant Labs: All pertinent labs within the past 24 hours have been reviewed.    Significant Imaging: I have reviewed all pertinent imaging results/findings within the past 24 hours.

## 2022-12-03 NOTE — PLAN OF CARE
Problem: Diabetes Comorbidity  Goal: Blood Glucose Level Within Targeted Range  Outcome: Ongoing, Progressing  Intervention: Monitor and Manage Glycemia  Flowsheets (Taken 12/3/2022 0252)  Glycemic Management:   blood glucose monitored   supplemental insulin given     Problem: Impaired Wound Healing  Goal: Optimal Wound Healing  Outcome: Ongoing, Progressing  Intervention: Promote Wound Healing  Flowsheets (Taken 12/3/2022 0252)  Oral Nutrition Promotion: rest periods promoted  Sleep/Rest Enhancement:   awakenings minimized   regular sleep/rest pattern promoted  Pain Management Interventions:   position adjusted   around-the-clock dosing utilized     Problem: Infection  Goal: Absence of Infection Signs and Symptoms  Outcome: Ongoing, Progressing  Intervention: Prevent or Manage Infection  Flowsheets (Taken 12/3/2022 0252)  Infection Management: aseptic technique maintained  Isolation Precautions: precautions maintained

## 2022-12-03 NOTE — PROGRESS NOTES
[facility] - [address] [place] [address] Medicine  Progress Note    Patient Name: Monica Walker  MRN: [id number]  Patient Class: IP- Inpatient   Admission Date: 11/20/2022  Length of Stay: 13 days  Attending Physician: Adilson Fontanez DO  Primary Care Provider: Hannah Schulz MD        Subjective:     Principal Problem:Bacteremia        HPI:  Patient is a 51 yo female who presents to [facility] transferred from [facility] c/o L knee and R flank pain x 2 weeks. R flank pain is 10/10, squeezing in quality, without radiation, no aggravating or relieving factors. L knee pain started after a fall, which she is unable to recall date of the injury. The knee pain is 10/10, located in the medial joint line, aggravated by movement and weight-bearing, squeezing in quality, radiates down to L foot and without relieving factors including heat and ice. Patient has a history of type 2 diabetes, poorly controlled.    10 days ago patient was admitted at [facility] and treated for pyelonephritis on the left side with IV antibiotics although her urine culture did not grow any bacteria. 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 Today a CT scan of the abdomen pelvis and chest with IV contrast indicates bilateral peripherally dense cavitary lesions left lower lobe and right middle lobe bilateral pleural effusions left greater than right with possible loculation these areas are concerning for abscesses although a neoplastic process can not be entirely excluded. Abnormal appearance of the left kidney concerning for pyelonephritis. Patient was transferred to [facility] for further medical evaluation and management.         Overview/Hospital Course:  11/24-will need IV abx for 6 weeks from first negative blood cultures. Repeat today.  Requesting  swingbed in [place]. Will ask CM to assist  11/25- pain better controlled today. Will need definitive plan from Ortho prior to DC  11/26-febrile overnight, continues to have pain. Suspect poor source control.  Repeat cultures  11/27- afebrile, having some right hip pain today.  Worse lying flat, better with flexion  11/28-if blood cultures remain negative will place picc tomorrow and DC to [place] swingbed for 6 weeks abx  11/29- persistently positive blood cultures.  2 days now of complaining of right lower abdominal pain and right hip pain.  Exam not consistent with septic arthritis however given persistently positive cultures will scan for effusion.  Psoas abscess consideration as well. Discussed with Radiology.  11/30-awaiting fluid from right hip.  12/1-no infection in right hip, still denies back pain. Will check for epidural abscess if most recent blood cultures positive.  12/2- Discussed case with Dr. Mendoza yesterday.  Recommending tagged WBC scan.  He believes this may be a discitis or osteo seeding- will await latest blood cultures. Order this imaging if they return positive.  Staff reports some tenderness and drainage from left knee.  Ortho will see today.  12/3- blood cultures remain negative.  Lower knee incision red and tender with some drainage.      Interval History: NAEO    Review of Systems   Constitutional:  Positive for appetite change. Negative for chills and fever.   HENT:  Negative for hearing loss, nosebleeds, rhinorrhea, sinus pressure, sneezing, sore throat and trouble swallowing.    Eyes:  Negative for photophobia, pain, discharge, itching and visual disturbance.   Respiratory:  Negative for apnea, cough, chest tightness and shortness of breath.    Cardiovascular:  Negative for chest pain, palpitations and leg swelling.   Gastrointestinal:  Negative for abdominal distention, abdominal pain, blood in stool, constipation, diarrhea, nausea and vomiting.   Endocrine: Negative for polydipsia,  polyphagia and polyuria.   Genitourinary:  Negative for difficulty urinating, dysuria, frequency, hematuria and urgency. Vaginal bleeding: left knee.  Musculoskeletal:  Positive for arthralgias (right hip. Denies back pain). Negative for myalgias. Joint swelling: left knee.  Skin:  Negative for rash.   Neurological:  Negative for dizziness, tremors, seizures, syncope, light-headedness, numbness and headaches.   Psychiatric/Behavioral:  Negative for agitation, confusion, hallucinations and sleep disturbance. The patient is not nervous/anxious.    Objective:     Vital Signs (Most Recent):  Temp: 98.4 °F (36.9 °C) (12/03/22 0701)  Pulse: 98 (12/03/22 0701)  Resp: 18 (12/03/22 0907)  BP: 128/72 (12/03/22 0701)  SpO2: (!) 93 % (12/03/22 0701)   Vital Signs (24h Range):  Temp:  [97.1 °F (36.2 °C)-98.7 °F (37.1 °C)] 98.4 °F (36.9 °C)  Pulse:  [] 98  Resp:  [18-20] 18  SpO2:  [93 %-96 %] 93 %  BP: (109-138)/(64-72) 128/72     Weight: 65.4 kg (144 lb 2.9 oz)  Body mass index is 23.27 kg/m².  No intake or output data in the 24 hours ending 12/03/22 1026     Physical Exam  Vitals reviewed.   Constitutional:       Appearance: Normal appearance.   HENT:      Head: Normocephalic and atraumatic.      Right Ear: External ear normal.      Left Ear: External ear normal.   Eyes:      Extraocular Movements: Extraocular movements intact.      Conjunctiva/sclera: Conjunctivae normal.   Cardiovascular:      Rate and Rhythm: Normal rate and regular rhythm.      Pulses: Normal pulses.   Pulmonary:      Effort: Pulmonary effort is normal.   Abdominal:      General: Bowel sounds are normal.   Musculoskeletal:         General: Swelling (left knee) and tenderness (left knee and left shoulder) present.      Cervical back: Normal range of motion and neck supple.      Comments: Normal tone, ROM limited by pain    Negative for point tenderness anywhere along the spinal column.    Skin:     General: Skin is warm.      Capillary Refill:  Capillary refill takes less than 2 seconds.   Neurological:      General: No focal deficit present.      Mental Status: She is alert and oriented to person, place, and time.   Psychiatric:         Mood and Affect: Mood normal.         Behavior: Behavior normal.         Thought Content: Thought content normal.       Significant Labs: All pertinent labs within the past 24 hours have been reviewed.    Significant Imaging: I have reviewed all pertinent imaging results/findings within the past 24 hours.      Assessment/Plan:      * Bacteremia    Continue vanco            DM (diabetes mellitus)  Basal, SSI  Prandial if needed    Lung abscess  OP follow with Dr. Sylvester  Continue abx          Septic arthritis  Cultures remain negative    Depression  Continue home Cymbalta            VTE Risk Mitigation (From admission, onward)         Ordered     enoxaparin injection 30 mg  Every 12 hours         11/22/22 1524     IP VTE HIGH RISK PATIENT  Once         11/22/22 1524     Place DONOVAN hose  Until discontinued         11/22/22 1524     enoxaparin injection 40 mg  Daily         11/20/22 0519     Place sequential compression device  Until discontinued         11/20/22 0519                Discharge Planning   SUZANNA:      Code Status: Full Code   Is the patient medically ready for discharge?:     Reason for patient still in hospital (select all that apply): Treatment  Discharge Plan A: Home Health, Home with family                  Adilson Fontanez DO  Department of Hospital Medicine   [facility] - [facility] [address] [facility] Tele[facility]

## 2022-12-04 LAB
ANION GAP SERPL CALCULATED.3IONS-SCNC: 10 MMOL/L (ref 7–16)
BASOPHILS # BLD AUTO: 0.09 K/UL (ref 0–0.2)
BASOPHILS NFR BLD AUTO: 0.8 % (ref 0–1)
BUN SERPL-MCNC: 10 MG/DL (ref 7–18)
BUN/CREAT SERPL: 13 (ref 6–20)
CALCIUM SERPL-MCNC: 8.3 MG/DL (ref 8.5–10.1)
CHLORIDE SERPL-SCNC: 95 MMOL/L (ref 98–107)
CO2 SERPL-SCNC: 31 MMOL/L (ref 21–32)
CREAT SERPL-MCNC: 0.79 MG/DL (ref 0.55–1.02)
DIFFERENTIAL METHOD BLD: ABNORMAL
EGFR (NO RACE VARIABLE) (RUSH/TITUS): 89 ML/MIN/1.73M²
EOSINOPHIL # BLD AUTO: 0.43 K/UL (ref 0–0.5)
EOSINOPHIL NFR BLD AUTO: 3.8 % (ref 1–4)
ERYTHROCYTE [DISTWIDTH] IN BLOOD BY AUTOMATED COUNT: 14.2 % (ref 11.5–14.5)
GLUCOSE SERPL-MCNC: 206 MG/DL (ref 70–105)
GLUCOSE SERPL-MCNC: 208 MG/DL (ref 74–106)
GLUCOSE SERPL-MCNC: 211 MG/DL (ref 70–105)
GLUCOSE SERPL-MCNC: 250 MG/DL (ref 70–105)
GLUCOSE SERPL-MCNC: 291 MG/DL (ref 70–105)
GLUCOSE SERPL-MCNC: 58 MG/DL (ref 70–105)
GLUCOSE SERPL-MCNC: 89 MG/DL (ref 70–105)
HCT VFR BLD AUTO: 30.4 % (ref 38–47)
HGB BLD-MCNC: 9.4 G/DL (ref 12–16)
IMM GRANULOCYTES # BLD AUTO: 0.2 K/UL (ref 0–0.04)
IMM GRANULOCYTES NFR BLD: 1.7 % (ref 0–0.4)
LYMPHOCYTES # BLD AUTO: 2.38 K/UL (ref 1–4.8)
LYMPHOCYTES NFR BLD AUTO: 20.8 % (ref 27–41)
MCH RBC QN AUTO: 26.6 PG (ref 27–31)
MCHC RBC AUTO-ENTMCNC: 30.9 G/DL (ref 32–36)
MCV RBC AUTO: 85.9 FL (ref 80–96)
MONOCYTES # BLD AUTO: 1.09 K/UL (ref 0–0.8)
MONOCYTES NFR BLD AUTO: 9.5 % (ref 2–6)
MPC BLD CALC-MCNC: 8.7 FL (ref 9.4–12.4)
NEUTROPHILS # BLD AUTO: 7.27 K/UL (ref 1.8–7.7)
NEUTROPHILS NFR BLD AUTO: 63.4 % (ref 53–65)
NRBC # BLD AUTO: 0 X10E3/UL
NRBC, AUTO (.00): 0 %
PLATELET # BLD AUTO: 616 K/UL (ref 150–400)
POTASSIUM SERPL-SCNC: 4.7 MMOL/L (ref 3.5–5.1)
RBC # BLD AUTO: 3.54 M/UL (ref 4.2–5.4)
SODIUM SERPL-SCNC: 131 MMOL/L (ref 136–145)
VANCOMYCIN TROUGH SERPL-MCNC: 15.1 ΜG/ML (ref 10–20)
VANCOMYCIN TROUGH SERPL-MCNC: 37.2 ΜG/ML (ref 10–20)
WBC # BLD AUTO: 11.46 K/UL (ref 4.5–11)

## 2022-12-04 PROCEDURE — 99232 SBSQ HOSP IP/OBS MODERATE 35: CPT | Mod: ,,, | Performed by: STUDENT IN AN ORGANIZED HEALTH CARE EDUCATION/TRAINING PROGRAM

## 2022-12-04 PROCEDURE — 82962 GLUCOSE BLOOD TEST: CPT

## 2022-12-04 PROCEDURE — 80202 ASSAY OF VANCOMYCIN: CPT | Performed by: STUDENT IN AN ORGANIZED HEALTH CARE EDUCATION/TRAINING PROGRAM

## 2022-12-04 PROCEDURE — 80048 BASIC METABOLIC PNL TOTAL CA: CPT | Performed by: HOSPITALIST

## 2022-12-04 PROCEDURE — 25000003 PHARM REV CODE 250: Performed by: ORTHOPAEDIC SURGERY

## 2022-12-04 PROCEDURE — 85025 COMPLETE CBC W/AUTO DIFF WBC: CPT | Performed by: STUDENT IN AN ORGANIZED HEALTH CARE EDUCATION/TRAINING PROGRAM

## 2022-12-04 PROCEDURE — 25000003 PHARM REV CODE 250: Performed by: STUDENT IN AN ORGANIZED HEALTH CARE EDUCATION/TRAINING PROGRAM

## 2022-12-04 PROCEDURE — 99232 PR SUBSEQUENT HOSPITAL CARE,LEVL II: ICD-10-PCS | Mod: ,,, | Performed by: STUDENT IN AN ORGANIZED HEALTH CARE EDUCATION/TRAINING PROGRAM

## 2022-12-04 PROCEDURE — 11000001 HC ACUTE MED/SURG PRIVATE ROOM

## 2022-12-04 PROCEDURE — 25000003 PHARM REV CODE 250: Performed by: GENERAL PRACTICE

## 2022-12-04 PROCEDURE — 63600175 PHARM REV CODE 636 W HCPCS: Performed by: ORTHOPAEDIC SURGERY

## 2022-12-04 PROCEDURE — 63600175 PHARM REV CODE 636 W HCPCS: Performed by: INTERNAL MEDICINE

## 2022-12-04 PROCEDURE — 36415 COLL VENOUS BLD VENIPUNCTURE: CPT | Performed by: STUDENT IN AN ORGANIZED HEALTH CARE EDUCATION/TRAINING PROGRAM

## 2022-12-04 PROCEDURE — 63600175 PHARM REV CODE 636 W HCPCS: Performed by: STUDENT IN AN ORGANIZED HEALTH CARE EDUCATION/TRAINING PROGRAM

## 2022-12-04 RX ADMIN — DOCUSATE SODIUM 100 MG: 100 CAPSULE, LIQUID FILLED ORAL at 08:12

## 2022-12-04 RX ADMIN — ENOXAPARIN SODIUM 30 MG: 30 INJECTION SUBCUTANEOUS at 09:12

## 2022-12-04 RX ADMIN — PANTOPRAZOLE SODIUM 40 MG: 40 TABLET, DELAYED RELEASE ORAL at 09:12

## 2022-12-04 RX ADMIN — TRAZODONE HYDROCHLORIDE 50 MG: 50 TABLET ORAL at 08:12

## 2022-12-04 RX ADMIN — VANCOMYCIN HYDROCHLORIDE 1500 MG: 1 INJECTION, POWDER, LYOPHILIZED, FOR SOLUTION INTRAVENOUS at 08:12

## 2022-12-04 RX ADMIN — VANCOMYCIN HYDROCHLORIDE 1500 MG: 1 INJECTION, POWDER, LYOPHILIZED, FOR SOLUTION INTRAVENOUS at 03:12

## 2022-12-04 RX ADMIN — INSULIN ASPART 4 UNITS: 100 INJECTION, SOLUTION INTRAVENOUS; SUBCUTANEOUS at 05:12

## 2022-12-04 RX ADMIN — MORPHINE SULFATE 4 MG: 4 INJECTION, SOLUTION INTRAMUSCULAR; INTRAVENOUS at 04:12

## 2022-12-04 RX ADMIN — INSULIN DETEMIR 30 UNITS: 100 INJECTION, SOLUTION SUBCUTANEOUS at 09:12

## 2022-12-04 RX ADMIN — ENOXAPARIN SODIUM 30 MG: 30 INJECTION SUBCUTANEOUS at 08:12

## 2022-12-04 RX ADMIN — OXYCODONE HYDROCHLORIDE 10 MG: 5 TABLET ORAL at 08:12

## 2022-12-04 RX ADMIN — INSULIN ASPART 4 UNITS: 100 INJECTION, SOLUTION INTRAVENOUS; SUBCUTANEOUS at 04:12

## 2022-12-04 RX ADMIN — DULOXETINE 60 MG: 30 CAPSULE, DELAYED RELEASE ORAL at 09:12

## 2022-12-04 RX ADMIN — MORPHINE SULFATE 4 MG: 4 INJECTION, SOLUTION INTRAMUSCULAR; INTRAVENOUS at 12:12

## 2022-12-04 RX ADMIN — INSULIN ASPART 4 UNITS: 100 INJECTION, SOLUTION INTRAVENOUS; SUBCUTANEOUS at 12:12

## 2022-12-04 RX ADMIN — MORPHINE SULFATE 4 MG: 4 INJECTION, SOLUTION INTRAMUSCULAR; INTRAVENOUS at 05:12

## 2022-12-04 RX ADMIN — MORPHINE SULFATE 4 MG: 4 INJECTION, SOLUTION INTRAMUSCULAR; INTRAVENOUS at 01:12

## 2022-12-04 RX ADMIN — MORPHINE SULFATE 4 MG: 4 INJECTION, SOLUTION INTRAMUSCULAR; INTRAVENOUS at 09:12

## 2022-12-04 NOTE — PROGRESS NOTES
Vancomycin trough drawn while drug infusing resulting in level of 37.2.    Will schedule another level with next dose.    Pharmacy will continue to monitor and make adjustments as needed.    Tammy Young, PharmD  [phone]

## 2022-12-04 NOTE — PROGRESS NOTES
[facility] - [address] [place] [address] Medicine  Progress Note    Patient Name: Monica Walker  MRN: [id number]  Patient Class: IP- Inpatient   Admission Date: 11/20/2022  Length of Stay: 14 days  Attending Physician: Adilson Fontanez DO  Primary Care Provider: Hannah Schulz MD        Subjective:     Principal Problem:Bacteremia        HPI:  Patient is a 53 yo female who presents to [facility] transferred from [facility] c/o L knee and R flank pain x 2 weeks. R flank pain is 10/10, squeezing in quality, without radiation, no aggravating or relieving factors. L knee pain started after a fall, which she is unable to recall date of the injury. The knee pain is 10/10, located in the medial joint line, aggravated by movement and weight-bearing, squeezing in quality, radiates down to L foot and without relieving factors including heat and ice. Patient has a history of type 2 diabetes, poorly controlled.    10 days ago patient was admitted at [facility] and treated for pyelonephritis on the left side with IV antibiotics although her urine culture did not grow any bacteria. 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 Today a CT scan of the abdomen pelvis and chest with IV contrast indicates bilateral peripherally dense cavitary lesions left lower lobe and right middle lobe bilateral pleural effusions left greater than right with possible loculation these areas are concerning for abscesses although a neoplastic process can not be entirely excluded. Abnormal appearance of the left kidney concerning for pyelonephritis. Patient was transferred to [facility] for further medical evaluation and management.         Overview/Hospital Course:  11/24-will need IV abx for 6 weeks from first negative blood cultures. Repeat today.  Requesting  swingbed in [place]. Will ask CM to assist  11/25- pain better controlled today. Will need definitive plan from Ortho prior to DC  11/26-febrile overnight, continues to have pain. Suspect poor source control.  Repeat cultures  11/27- afebrile, having some right hip pain today.  Worse lying flat, better with flexion  11/28-if blood cultures remain negative will place picc tomorrow and DC to [place] swingbed for 6 weeks abx  11/29- persistently positive blood cultures.  2 days now of complaining of right lower abdominal pain and right hip pain.  Exam not consistent with septic arthritis however given persistently positive cultures will scan for effusion.  Psoas abscess consideration as well. Discussed with Radiology.  11/30-awaiting fluid from right hip.  12/1-no infection in right hip, still denies back pain. Will check for epidural abscess if most recent blood cultures positive.  12/2- Discussed case with Dr. Mendoza yesterday.  Recommending tagged WBC scan.  He believes this may be a discitis or osteo seeding- will await latest blood cultures. Order this imaging if they return positive.  Staff reports some tenderness and drainage from left knee.  Ortho will see today.  12/3- blood cultures remain negative.  Lower knee incision red and tender with some drainage.  12/4-blood cultures negative. Less drainage today from left knee.      Interval History: NAEO    Review of Systems   Constitutional:  Positive for appetite change. Negative for chills and fever.   HENT:  Negative for hearing loss, nosebleeds, rhinorrhea, sinus pressure, sneezing, sore throat and trouble swallowing.    Eyes:  Negative for photophobia, pain, discharge, itching and visual disturbance.   Respiratory:  Negative for apnea, cough, chest tightness and shortness of breath.    Cardiovascular:  Negative for chest pain, palpitations and leg swelling.   Gastrointestinal:  Negative for abdominal distention, abdominal pain, blood in stool, constipation,  diarrhea, nausea and vomiting.   Endocrine: Negative for polydipsia, polyphagia and polyuria.   Genitourinary:  Negative for difficulty urinating, dysuria, frequency, hematuria and urgency. Vaginal bleeding: left knee.  Musculoskeletal:  Positive for arthralgias (right hip. Denies back pain). Negative for myalgias. Joint swelling: left knee.  Skin:  Negative for rash.   Neurological:  Negative for dizziness, tremors, seizures, syncope, light-headedness, numbness and headaches.   Psychiatric/Behavioral:  Negative for agitation, confusion, hallucinations and sleep disturbance. The patient is not nervous/anxious.    Objective:     Vital Signs (Most Recent):  Temp: 98.2 °F (36.8 °C) (12/04/22 1200)  Pulse: 105 (12/04/22 1200)  Resp: 18 (12/04/22 1211)  BP: 139/75 (12/04/22 1200)  SpO2: (!) 93 % (12/04/22 1200)   Vital Signs (24h Range):  Temp:  [97.1 °F (36.2 °C)-99.1 °F (37.3 °C)] 98.2 °F (36.8 °C)  Pulse:  [] 105  Resp:  [18] 18  SpO2:  [93 %-97 %] 93 %  BP: (120-152)/(61-75) 139/75     Weight: 65.4 kg (144 lb 2.9 oz)  Body mass index is 23.27 kg/m².  No intake or output data in the 24 hours ending 12/04/22 1249     Physical Exam  Vitals reviewed.   Constitutional:       Appearance: Normal appearance.   HENT:      Head: Normocephalic and atraumatic.      Right Ear: External ear normal.      Left Ear: External ear normal.   Eyes:      Extraocular Movements: Extraocular movements intact.      Conjunctiva/sclera: Conjunctivae normal.   Cardiovascular:      Rate and Rhythm: Normal rate and regular rhythm.      Pulses: Normal pulses.   Pulmonary:      Effort: Pulmonary effort is normal.   Abdominal:      General: Bowel sounds are normal.   Musculoskeletal:         General: Swelling (left knee) and tenderness (left knee and left shoulder) present.      Cervical back: Normal range of motion and neck supple.      Comments: Normal tone, ROM limited by pain    Negative for point tenderness anywhere along the spinal  column.    Skin:     General: Skin is warm.      Capillary Refill: Capillary refill takes less than 2 seconds.   Neurological:      General: No focal deficit present.      Mental Status: She is alert and oriented to person, place, and time.   Psychiatric:         Mood and Affect: Mood normal.         Behavior: Behavior normal.         Thought Content: Thought content normal.       Significant Labs: All pertinent labs within the past 24 hours have been reviewed.    Significant Imaging: I have reviewed all pertinent imaging results/findings within the past 24 hours.      Assessment/Plan:      * Bacteremia    Continue vanco            DM (diabetes mellitus)  Basal, SSI  Prandial if needed    Lung abscess  OP follow with Dr. Sylvester  Continue abx          Septic arthritis  Cultures remain negative    Depression  Continue home Cymbalta            VTE Risk Mitigation (From admission, onward)         Ordered     enoxaparin injection 30 mg  Every 12 hours         11/22/22 1524     IP VTE HIGH RISK PATIENT  Once         11/22/22 1524     Place DONOVAN hose  Until discontinued         11/22/22 1524     enoxaparin injection 40 mg  Daily         11/20/22 0519     Place sequential compression device  Until discontinued         11/20/22 0519                Discharge Planning   SUZANNA:      Code Status: Full Code   Is the patient medically ready for discharge?:     Reason for patient still in hospital (select all that apply): Treatment  Discharge Plan A: Home Health, Home with family                  Adilson Fontanez DO  Department of Hospital Medicine   [facility] - [facility] Tele[facility]

## 2022-12-04 NOTE — PLAN OF CARE
Problem: Diabetes Comorbidity  Goal: Blood Glucose Level Within Targeted Range  Outcome: Ongoing, Progressing  Intervention: Monitor and Manage Glycemia  Flowsheets (Taken 12/4/2022 0057)  Glycemic Management: (held 2000 insulin)   blood glucose monitored   carbohydrate replacement provided   other (see comments)     Problem: Impaired Wound Healing  Goal: Optimal Wound Healing  Outcome: Ongoing, Progressing  Intervention: Promote Wound Healing  Flowsheets (Taken 12/4/2022 0057)  Oral Nutrition Promotion:   rest periods promoted   calorie-dense foods provided  Sleep/Rest Enhancement:   awakenings minimized   regular sleep/rest pattern promoted  Pain Management Interventions:   care clustered   pain management plan reviewed with patient/caregiver   position adjusted   quiet environment facilitated     Problem: Fall Injury Risk  Goal: Absence of Fall and Fall-Related Injury  Outcome: Ongoing, Progressing  Intervention: Identify and Manage Contributors  Flowsheets (Taken 12/4/2022 0057)  Self-Care Promotion: independence encouraged  Intervention: Promote Injury-Free Environment  Flowsheets (Taken 12/4/2022 0057)  Safety Promotion/Fall Prevention:   assistive device/personal item within reach   medications reviewed     Problem: Infection  Goal: Absence of Infection Signs and Symptoms  Outcome: Ongoing, Progressing  Intervention: Prevent or Manage Infection  Flowsheets (Taken 12/4/2022 0057)  Infection Management: aseptic technique maintained  Isolation Precautions: precautions maintained

## 2022-12-04 NOTE — NURSING
1940 HTT assessment performed. IV to RAC INT. Flush w/o difficulty. Tele in place. NSR. HR 88. Dressing to L knee with drainage noted. Area marked. Reports pain is 10 out of 10. Will bring pain medication shortly. Family at side. Cb and personal items in reach.     1947 Administered hs medications early per patient request. Administered PRN IVP pain medication, as well as trazadone for sleep. . Refused one unit of insulin, as her blood glucose has a tendency to drop quickly over night. V/u. Left comfortable in bed with NADN. CB and personal items in reach. Family at bedside.  Will f/u using pain scale to ensure efficacy within the hour.     2040 f/u on prn pain med admin and sleep med admin. Pt is resting in bed with NADN. Eyes closed. Appears to be sleeping with rise and fall of chest noted. Family at side. CB and personal items in reach.    0040 Midnight accucheck 58. Gave two juices with sugar packets and shane crackers with peanut butter. Denies further needs. Cb and personal items in reach    0116 administered ivp morphine for pain per patient request. Changed dressing. Cleaned with vashe and applied new aquacel ag to L knee. Will f/u within hour to ensure efficacy. Family at side. Cb and personal items in reach.     0210 f/u on prn pain med admin. Resting in bed with eyes closed.nadn. cb and personal items in reach.f amily at side.    0305 hung iv vanc. Denies needs. Cb and personal items in reach. Family at side    0435 int'd line. Denies needs. Cb and personal items in reach.    0525 messaged Dr. Batista via secure chat to inform of 37.2 vanc trough. Trough was drawn at 0335 while vanc was hung. Awaiting response.    0535 adminsiterd 4 units of ss insulin for fsg of [address]. Ave ivp morphine. Denies needs. Cb and personal items in reach    0540 md aware. Pharmacy is retiming draw with next admin.

## 2022-12-04 NOTE — SUBJECTIVE & OBJECTIVE
Interval History: NAEO    Review of Systems   Constitutional:  Positive for appetite change. Negative for chills and fever.   HENT:  Negative for hearing loss, nosebleeds, rhinorrhea, sinus pressure, sneezing, sore throat and trouble swallowing.    Eyes:  Negative for photophobia, pain, discharge, itching and visual disturbance.   Respiratory:  Negative for apnea, cough, chest tightness and shortness of breath.    Cardiovascular:  Negative for chest pain, palpitations and leg swelling.   Gastrointestinal:  Negative for abdominal distention, abdominal pain, blood in stool, constipation, diarrhea, nausea and vomiting.   Endocrine: Negative for polydipsia, polyphagia and polyuria.   Genitourinary:  Negative for difficulty urinating, dysuria, frequency, hematuria and urgency. Vaginal bleeding: left knee.  Musculoskeletal:  Positive for arthralgias (right hip. Denies back pain). Negative for myalgias. Joint swelling: left knee.  Skin:  Negative for rash.   Neurological:  Negative for dizziness, tremors, seizures, syncope, light-headedness, numbness and headaches.   Psychiatric/Behavioral:  Negative for agitation, confusion, hallucinations and sleep disturbance. The patient is not nervous/anxious.    Objective:     Vital Signs (Most Recent):  Temp: 98.2 °F (36.8 °C) (12/04/22 1200)  Pulse: 105 (12/04/22 1200)  Resp: 18 (12/04/22 1211)  BP: 139/75 (12/04/22 1200)  SpO2: (!) 93 % (12/04/22 1200)   Vital Signs (24h Range):  Temp:  [97.1 °F (36.2 °C)-99.1 °F (37.3 °C)] 98.2 °F (36.8 °C)  Pulse:  [] 105  Resp:  [18] 18  SpO2:  [93 %-97 %] 93 %  BP: (120-152)/(61-75) 139/75     Weight: 65.4 kg (144 lb 2.9 oz)  Body mass index is 23.27 kg/m².  No intake or output data in the 24 hours ending 12/04/22 1249     Physical Exam  Vitals reviewed.   Constitutional:       Appearance: Normal appearance.   HENT:      Head: Normocephalic and atraumatic.      Right Ear: External ear normal.      Left Ear: External ear normal.   Eyes:       Extraocular Movements: Extraocular movements intact.      Conjunctiva/sclera: Conjunctivae normal.   Cardiovascular:      Rate and Rhythm: Normal rate and regular rhythm.      Pulses: Normal pulses.   Pulmonary:      Effort: Pulmonary effort is normal.   Abdominal:      General: Bowel sounds are normal.   Musculoskeletal:         General: Swelling (left knee) and tenderness (left knee and left shoulder) present.      Cervical back: Normal range of motion and neck supple.      Comments: Normal tone, ROM limited by pain    Negative for point tenderness anywhere along the spinal column.    Skin:     General: Skin is warm.      Capillary Refill: Capillary refill takes less than 2 seconds.   Neurological:      General: No focal deficit present.      Mental Status: She is alert and oriented to person, place, and time.   Psychiatric:         Mood and Affect: Mood normal.         Behavior: Behavior normal.         Thought Content: Thought content normal.       Significant Labs: All pertinent labs within the past 24 hours have been reviewed.    Significant Imaging: I have reviewed all pertinent imaging results/findings within the past 24 hours.

## 2022-12-04 NOTE — PT/OT/SLP PROGRESS
Occupational Therapy   Treatment    Name: Monica Walker  MRN: [id number]  Admitting Diagnosis:  Bacteremia  10 Days Post-Op    Recommendations:     Discharge Recommendations: nursing facility, skilled, rehabilitation facility  Discharge Equipment Recommendations:   (to be determined)  Barriers to discharge:  None    Assessment:     Monica Walker is a 52 y.o. female with a medical diagnosis of Bacteremia.  She presents alert, but c/o pain, asking for pain medication, but is not eligible to take more for 1 hour.. Performance deficits affecting function are weakness, impaired balance, impaired cognition, impaired endurance, impaired functional mobility, impaired self care skills, decreased lower extremity function, decreased upper extremity function, decreased ROM, pain, orthopedic precautions.     Rehab Prognosis:  Fair; patient would benefit from acute skilled OT services to address these deficits and reach maximum level of function.       Plan:     Patient to be seen 5 x/week to address the above listed problems via self-care/home management, therapeutic activities, therapeutic exercises  Plan of Care Expires: 12/26/22  Plan of Care Reviewed with: patient    Subjective     Pain/Comfort:  Pain Rating 1: 8/10  Location - Side 1: Right  Location 1: hip  Pain Addressed 1: Nurse notified  Pain Rating Post-Intervention 1: 8/10    Objective:     Communicated with: HAYDEE De Anda prior to session.  Patient found supine with peripheral IV upon OT entry to room.    General Precautions: Standard, fall, contact   Orthopedic Precautions:LLE weight bearing as tolerated   Braces: N/A  Respiratory Status: Room air     Occupational Performance:     Bed Mobility:    NT due to pain    Functional Mobility/Transfers:  NT due to pain    Activities of Daily Living:  NT      [facility] 6 Click ADL:      Treatment & Education:  Pt performed (R)  UE ex for 2x15 reps of each:  Shoulder flexion with 2# db  Elbow flexion with 2# db  Shoulder  horizontal abd/adduction with 2# db   Supination/pronation with 2# db  Wrist flex/extension with 2# db    (L) elbow flexion /extension AROM x 15 reps  Supination/pronation with 1#db x 15 reps  Wrist flexion/extension with 1#db x 15 reps    (B) handhelper with 2 bands of resistance for 25 reps    Patient left supine with all lines intact, call button in reach, bed alarm on, RN Michelet notified, and mother present    GOALS:   Multidisciplinary Problems       Occupational Therapy Goals          Problem: Occupational Therapy    Goal Priority Disciplines Outcome Interventions   Occupational Therapy Goal     OT, PT/OT Ongoing, Progressing    Description: STG:  Pt will perform grooming with access to supplies  Pt will bathe with setup and CGA  Pt will perform UE dressing with setup  Pt will perform LE dressing with CGA and AD if needed  Pt will transfer bed/chair/bsc with RW and SBA  Pt will perform standing task x 2 min with SBA and RW  Pt will tolerate 15 minutes of tx without fatigue      LT.Restore to max I with self care and mobility.                          Time Tracking:     OT Date of Treatment: 22  OT Start Time: 1352  OT Stop Time: 1402  OT Total Time (min): 10 min    Billable Minutes:Therapeutic Exercise 10 min    OT/TRISH: OT          12/3/2022

## 2022-12-05 LAB
ANION GAP SERPL CALCULATED.3IONS-SCNC: 10 MMOL/L (ref 7–16)
BASOPHILS # BLD AUTO: 0.1 K/UL (ref 0–0.2)
BASOPHILS NFR BLD AUTO: 0.9 % (ref 0–1)
BUN SERPL-MCNC: 11 MG/DL (ref 7–18)
BUN/CREAT SERPL: 13 (ref 6–20)
CALCIUM SERPL-MCNC: 9.6 MG/DL (ref 8.5–10.1)
CHLORIDE SERPL-SCNC: 96 MMOL/L (ref 98–107)
CO2 SERPL-SCNC: 34 MMOL/L (ref 21–32)
CREAT SERPL-MCNC: 0.84 MG/DL (ref 0.55–1.02)
DIFFERENTIAL METHOD BLD: ABNORMAL
EGFR (NO RACE VARIABLE) (RUSH/TITUS): 83 ML/MIN/1.73M²
EOSINOPHIL # BLD AUTO: 0.38 K/UL (ref 0–0.5)
EOSINOPHIL NFR BLD AUTO: 3.5 % (ref 1–4)
ERYTHROCYTE [DISTWIDTH] IN BLOOD BY AUTOMATED COUNT: 14.1 % (ref 11.5–14.5)
GLUCOSE SERPL-MCNC: 138 MG/DL (ref 74–106)
GLUCOSE SERPL-MCNC: 147 MG/DL (ref 70–105)
GLUCOSE SERPL-MCNC: 167 MG/DL (ref 70–105)
GLUCOSE SERPL-MCNC: 216 MG/DL (ref 70–105)
GLUCOSE SERPL-MCNC: 257 MG/DL (ref 70–105)
GLUCOSE SERPL-MCNC: 383 MG/DL (ref 70–105)
HCT VFR BLD AUTO: 33.9 % (ref 38–47)
HGB BLD-MCNC: 10.2 G/DL (ref 12–16)
IMM GRANULOCYTES # BLD AUTO: 0.16 K/UL (ref 0–0.04)
IMM GRANULOCYTES NFR BLD: 1.5 % (ref 0–0.4)
LYMPHOCYTES # BLD AUTO: 2.21 K/UL (ref 1–4.8)
LYMPHOCYTES NFR BLD AUTO: 20.3 % (ref 27–41)
MCH RBC QN AUTO: 26.3 PG (ref 27–31)
MCHC RBC AUTO-ENTMCNC: 30.1 G/DL (ref 32–36)
MCV RBC AUTO: 87.4 FL (ref 80–96)
MONOCYTES # BLD AUTO: 1 K/UL (ref 0–0.8)
MONOCYTES NFR BLD AUTO: 9.2 % (ref 2–6)
MPC BLD CALC-MCNC: 8.6 FL (ref 9.4–12.4)
NEUTROPHILS # BLD AUTO: 7.06 K/UL (ref 1.8–7.7)
NEUTROPHILS NFR BLD AUTO: 64.6 % (ref 53–65)
NRBC # BLD AUTO: 0 X10E3/UL
NRBC, AUTO (.00): 0 %
PLATELET # BLD AUTO: 708 K/UL (ref 150–400)
POTASSIUM SERPL-SCNC: 4.8 MMOL/L (ref 3.5–5.1)
RBC # BLD AUTO: 3.88 M/UL (ref 4.2–5.4)
SODIUM SERPL-SCNC: 135 MMOL/L (ref 136–145)
WBC # BLD AUTO: 10.91 K/UL (ref 4.5–11)

## 2022-12-05 PROCEDURE — 63600175 PHARM REV CODE 636 W HCPCS: Performed by: STUDENT IN AN ORGANIZED HEALTH CARE EDUCATION/TRAINING PROGRAM

## 2022-12-05 PROCEDURE — 97535 SELF CARE MNGMENT TRAINING: CPT

## 2022-12-05 PROCEDURE — 82962 GLUCOSE BLOOD TEST: CPT

## 2022-12-05 PROCEDURE — 63600175 PHARM REV CODE 636 W HCPCS: Performed by: INTERNAL MEDICINE

## 2022-12-05 PROCEDURE — 36415 COLL VENOUS BLD VENIPUNCTURE: CPT | Performed by: STUDENT IN AN ORGANIZED HEALTH CARE EDUCATION/TRAINING PROGRAM

## 2022-12-05 PROCEDURE — 25000003 PHARM REV CODE 250: Performed by: STUDENT IN AN ORGANIZED HEALTH CARE EDUCATION/TRAINING PROGRAM

## 2022-12-05 PROCEDURE — 94761 N-INVAS EAR/PLS OXIMETRY MLT: CPT

## 2022-12-05 PROCEDURE — 25000003 PHARM REV CODE 250: Performed by: ORTHOPAEDIC SURGERY

## 2022-12-05 PROCEDURE — 63600175 PHARM REV CODE 636 W HCPCS: Performed by: ORTHOPAEDIC SURGERY

## 2022-12-05 PROCEDURE — 99233 SBSQ HOSP IP/OBS HIGH 50: CPT | Mod: ,,, | Performed by: FAMILY MEDICINE

## 2022-12-05 PROCEDURE — 25000003 PHARM REV CODE 250: Performed by: GENERAL PRACTICE

## 2022-12-05 PROCEDURE — 11000001 HC ACUTE MED/SURG PRIVATE ROOM

## 2022-12-05 PROCEDURE — 63600175 PHARM REV CODE 636 W HCPCS: Performed by: FAMILY MEDICINE

## 2022-12-05 PROCEDURE — 99233 PR SUBSEQUENT HOSPITAL CARE,LEVL III: ICD-10-PCS | Mod: ,,, | Performed by: FAMILY MEDICINE

## 2022-12-05 PROCEDURE — 85025 COMPLETE CBC W/AUTO DIFF WBC: CPT | Performed by: STUDENT IN AN ORGANIZED HEALTH CARE EDUCATION/TRAINING PROGRAM

## 2022-12-05 PROCEDURE — 80048 BASIC METABOLIC PNL TOTAL CA: CPT | Performed by: HOSPITALIST

## 2022-12-05 PROCEDURE — 97530 THERAPEUTIC ACTIVITIES: CPT

## 2022-12-05 PROCEDURE — 97110 THERAPEUTIC EXERCISES: CPT

## 2022-12-05 RX ORDER — MORPHINE SULFATE 2 MG/ML
2 INJECTION, SOLUTION INTRAMUSCULAR; INTRAVENOUS EVERY 4 HOURS PRN
Status: DISCONTINUED | OUTPATIENT
Start: 2022-12-05 | End: 2022-12-06

## 2022-12-05 RX ADMIN — MORPHINE SULFATE 4 MG: 4 INJECTION, SOLUTION INTRAMUSCULAR; INTRAVENOUS at 04:12

## 2022-12-05 RX ADMIN — ENOXAPARIN SODIUM 30 MG: 30 INJECTION SUBCUTANEOUS at 08:12

## 2022-12-05 RX ADMIN — TRAZODONE HYDROCHLORIDE 50 MG: 50 TABLET ORAL at 08:12

## 2022-12-05 RX ADMIN — OXYCODONE HYDROCHLORIDE 10 MG: 5 TABLET ORAL at 12:12

## 2022-12-05 RX ADMIN — MORPHINE SULFATE 2 MG: 2 INJECTION, SOLUTION INTRAMUSCULAR; INTRAVENOUS at 08:12

## 2022-12-05 RX ADMIN — INSULIN ASPART 8 UNITS: 100 INJECTION, SOLUTION INTRAVENOUS; SUBCUTANEOUS at 03:12

## 2022-12-05 RX ADMIN — PANTOPRAZOLE SODIUM 40 MG: 40 TABLET, DELAYED RELEASE ORAL at 08:12

## 2022-12-05 RX ADMIN — INSULIN DETEMIR 30 UNITS: 100 INJECTION, SOLUTION SUBCUTANEOUS at 08:12

## 2022-12-05 RX ADMIN — DOCUSATE SODIUM 100 MG: 100 CAPSULE, LIQUID FILLED ORAL at 08:12

## 2022-12-05 RX ADMIN — MORPHINE SULFATE 4 MG: 4 INJECTION, SOLUTION INTRAMUSCULAR; INTRAVENOUS at 08:12

## 2022-12-05 RX ADMIN — SENNOSIDES 8.6 MG: 8.6 TABLET, FILM COATED ORAL at 08:12

## 2022-12-05 RX ADMIN — INSULIN ASPART 6 UNITS: 100 INJECTION, SOLUTION INTRAVENOUS; SUBCUTANEOUS at 11:12

## 2022-12-05 RX ADMIN — DULOXETINE 60 MG: 30 CAPSULE, DELAYED RELEASE ORAL at 08:12

## 2022-12-05 RX ADMIN — VANCOMYCIN HYDROCHLORIDE 1500 MG: 1 INJECTION, POWDER, LYOPHILIZED, FOR SOLUTION INTRAVENOUS at 03:12

## 2022-12-05 RX ADMIN — MORPHINE SULFATE 4 MG: 4 INJECTION, SOLUTION INTRAMUSCULAR; INTRAVENOUS at 03:12

## 2022-12-05 NOTE — PROGRESS NOTES
[facility] - [facility]  Adult Nutrition  Follow-up Note         Reason for Assessment  Reason For Assessment: identified at risk by screening criteria   Nutrition Risk Screen: no indicators present    Patient seen for follow up.   She continues on a diabetic diet with 50-75% meal intake per flowsheets. Patient being treated for bacteremia.   11/29- persistently positive blood cultures.  2 days now of complaining of right lower abdominal pain and right hip pain.  Exam not consistent with septic arthritis however given persistently positive cultures will scan for effusion.  Psoas abscess consideration as well. Discussed with Radiology.  11/30-awaiting fluid from right hip.  12/1-no infection in right hip, still denies back pain. Will check for epidural abscess if most recent blood cultures positive.  12/2- Discussed case with Dr. Mendoza yesterday.  Recommending tagged WBC scan.  He believes this may be a discitis or osteo seeding- will await latest blood cultures. Order this imaging if they return positive.  Staff reports some tenderness and drainage from left knee.  Ortho will see today.  12/3- blood cultures remain negative.  Lower knee incision red and tender with some drainage.  12/4-blood cultures negative. Less drainage today from left knee.      Malnutrition  Is Patient Malnourished: No  Skin Integrity  De Risk Assessment  Sensory Perception: 3-->slightly limited  Moisture: 3-->occasionally moist  Activity: 2-->chairfast  Mobility: 2-->very limited  Nutrition: 2-->probably inadequate  Friction and Shear: 2-->potential problem  De Score: 14    Nutrition Diagnosis  Altered Nutrition Related Lab Values   related to Diabetes Mellitus as evidenced by elevated blood glucose  Nutrition Diagnosis Status: Chronic/ continues        Nutrition Risk  Level of Risk/Frequency of Follow-up: low  Comments on nutrition risk: intake adequate   Recent Labs   Lab 12/05/22  0437 12/05/22  0506   GLU   --  138*   POCGLU 216*  --      Comments on Glucose: elevated with dx of diabetes  Nutrition Prescription / Recommendations  Recommendation/Intervention: Recommend continue with diabetic diet  Goals: weight maintenance, intake %  Nutrition Goal Status: new  Current Diet Order: diabetic diet  Chewing or Swallowing Difficulty?: No Chewing or swallowing difficulty  Recommended Diet: Consistent Carbohydrate 1800 (60g Carbs)  Recommended Oral Supplement: No Oral Supplements  Is Nutrition Support Recommended: No  Is Education Recommended: No  Monitor and Evaluation  % current Intake: P.O. intake of 50 - 75 %  % intake to meet estimated needs: 75 - 100 %  Food and Nutrient Intake: food and beverage intake  Food and Nutrient Adminstration: diet order  Anthropometric Measurements: weight, weight change, body mass index  Biochemical Data, Medical Tests and Procedures: electrolyte and renal panel, glucose/endocrine profile, lipid profile, gastrointestinal profile, inflammatory profile  Nutrition-Focused Physical Findings: overall appearance     Current Medical Diagnosis and Past Medical History     Past Medical History:   Diagnosis Date    Abscess of right thigh + MRSA 08/20/2021    healed    Adnexal cyst     Degenerative disc disease     Depression 10/29/2021    Hypertension     Nicotine dependence     Osteoarthritis      Nutrition/Diet History  Spiritual, Cultural Beliefs, Synagogue Practices, Values that Affect Care: no  Food Allergies: NKFA  Factors Affecting Nutritional Intake: None identified at this time  Lab/Procedures/Meds  Recent Labs   Lab 12/05/22  0506   *   K 4.8   BUN 11   CREATININE 0.84   CALCIUM 9.6   CL 96*     Last A1c:   Lab Results   Component Value Date    HGBA1C 12.4 (H) 11/09/2022     Lab Results   Component Value Date    RBC 3.88 (L) 12/05/2022    HGB 10.2 (L) 12/05/2022    HCT 33.9 (L) 12/05/2022    MCV 87.4 12/05/2022    MCH 26.3 (L) 12/05/2022    MCHC 30.1 (L) 12/05/2022  "    Pertinent Labs Reviewed: reviewed  Pertinent Labs Comments: 11/28/22 03:43  Sodium: 125 (L)  Potassium: 3.5  Chloride: 90 (L)  CO2: 28  Anion Gap: 11  BUN: 6 (L)  Creatinine: 0.51 (L)  BUN/CREAT RATIO: 12  eGFR: 111  Glucose: 274 (H)  Calcium: 8.1 (L)      (L): Data is abnormally low  (H): Data is abnormally high  Pertinent Medications Reviewed: reviewed  Pertinent Medications Comments: insulin, vanc, senna, enoxaparin  Anthropometrics  Temp: 98.3 °F (36.8 °C)  Height Method: Stated  Height: 5' 6" (167.6 cm)  Height (inches): 66 in  Weight Method: Bed Scale  Weight: 65 kg (143 lb 4.8 oz)  Weight (lb): 143.3 lb  Ideal Body Weight (IBW), Female: 130 lb  % Ideal Body Weight, Female (lb): 118.72 %  BMI (Calculated): 23.1     Estimated/Assessed Needs    Total Ve: 6.2 L/m Temp: 98.3 °F (36.8 °C)Oral  Weight Used For Calorie Calculations: 71.8 kg (158 lb 4.6 oz)   Energy Need Method: Kcal/kg Energy Calorie Requirements (kcal): 2470-1841  Weight Used For Protein Calculations: 71.8 kg (158 lb 4.6 oz)  Protein Requirements: 71-86  Estimated Fluid Requirement Method: RDA Method    RDA Method (mL): 1795     Nutrition by Nursing  Diet/Nutrition Received: consistent carb/diabetic diet  Intake (%): 50%  Diet/Feeding Assistance: none     Last Bowel Movement: 12/01/22              Nutrition Follow-Up  RD Follow-up?: Yes    "

## 2022-12-05 NOTE — PLAN OF CARE
SS spoke with Dr. Black, patient not ready for discharge to swingbed yet. Patient has been accepted to [facility] and will discharge there for swingbed once medically stable. SS gave update to Dory at [facility]. SS following.

## 2022-12-05 NOTE — SUBJECTIVE & OBJECTIVE
Interval History: NAEO    Review of Systems   Constitutional:  Positive for appetite change. Negative for chills and fever.   HENT:  Negative for hearing loss, nosebleeds, rhinorrhea, sinus pressure, sneezing, sore throat and trouble swallowing.    Eyes:  Negative for photophobia, pain, discharge, itching and visual disturbance.   Respiratory:  Negative for apnea, cough, chest tightness and shortness of breath.    Cardiovascular:  Negative for chest pain, palpitations and leg swelling.   Gastrointestinal:  Negative for abdominal distention, abdominal pain, blood in stool, constipation, diarrhea, nausea and vomiting.   Endocrine: Negative for polydipsia, polyphagia and polyuria.   Genitourinary:  Negative for difficulty urinating, dysuria, frequency, hematuria and urgency. Vaginal bleeding: left knee.  Musculoskeletal:  Positive for arthralgias (right hip. Denies back pain). Negative for myalgias. Joint swelling: left knee.  Skin:  Negative for rash.   Neurological:  Negative for dizziness, tremors, seizures, syncope, light-headedness, numbness and headaches.   Psychiatric/Behavioral:  Negative for agitation, confusion, hallucinations and sleep disturbance. The patient is not nervous/anxious.    Objective:     Vital Signs (Most Recent):  Temp: 97.7 °F (36.5 °C) (12/05/22 1223)  Pulse: 96 (12/05/22 1223)  Resp: 18 (12/05/22 1505)  BP: (!) 128/97 (12/05/22 1223)  SpO2: 97 % (12/05/22 1223)   Vital Signs (24h Range):  Temp:  [97 °F (36.1 °C)-98.9 °F (37.2 °C)] 97.7 °F (36.5 °C)  Pulse:  [] 96  Resp:  [18-20] 18  SpO2:  [94 %-100 %] 97 %  BP: (116-143)/(68-97) 128/97     Weight: 65 kg (143 lb 4.8 oz)  Body mass index is 23.13 kg/m².  No intake or output data in the 24 hours ending 12/05/22 1617     Physical Exam  Vitals reviewed.   Constitutional:       Appearance: Normal appearance.   HENT:      Head: Normocephalic and atraumatic.      Right Ear: External ear normal.      Left Ear: External ear normal.   Eyes:       Extraocular Movements: Extraocular movements intact.      Conjunctiva/sclera: Conjunctivae normal.   Cardiovascular:      Rate and Rhythm: Normal rate and regular rhythm.      Pulses: Normal pulses.   Pulmonary:      Effort: Pulmonary effort is normal.   Abdominal:      General: Bowel sounds are normal.   Musculoskeletal:         General: Swelling (left knee) and tenderness (left knee and left shoulder) present.      Cervical back: Normal range of motion and neck supple.      Comments: Normal tone, ROM limited by pain    Negative for point tenderness anywhere along the spinal column.    Skin:     General: Skin is warm.      Capillary Refill: Capillary refill takes less than 2 seconds.   Neurological:      General: No focal deficit present.      Mental Status: She is alert and oriented to person, place, and time.   Psychiatric:         Mood and Affect: Mood normal.         Behavior: Behavior normal.         Thought Content: Thought content normal.       Significant Labs: All pertinent labs within the past 24 hours have been reviewed.    Significant Imaging: I have reviewed all pertinent imaging results/findings within the past 24 hours.

## 2022-12-05 NOTE — PLAN OF CARE
Problem: Adult Inpatient Plan of Care  Goal: Plan of Care Review  Outcome: Ongoing, Progressing  Flowsheets (Taken 12/5/2022 1714)  Plan of Care Reviewed With: patient  Goal: Patient-Specific Goal (Individualized)  Outcome: Ongoing, Progressing  Flowsheets (Taken 12/5/2022 1714)  Anxieties, Fears or Concerns: Pain control  Individualized Care Needs: pain control with activity  Patient-Specific Goals (Include Timeframe): DC to swingbed and get IV abts  Goal: Absence of Hospital-Acquired Illness or Injury  Outcome: Ongoing, Progressing  Goal: Optimal Comfort and Wellbeing  Outcome: Ongoing, Progressing  Goal: Readiness for Transition of Care  Outcome: Ongoing, Progressing

## 2022-12-05 NOTE — PT/OT/SLP PROGRESS
"Physical Therapy Treatment    Patient Name:  Monica Walker   MRN:  [id number]    Recommendations:     Discharge Recommendations: nursing facility, skilled, rehabilitation facility  Discharge Equipment Recommendations: other (see comments) (to be determined)  Barriers to discharge:  ongoingmedical work up    Assessment:     Monica Walker is a 52 y.o. female admitted with a medical diagnosis of Bacteremia.  She presents with the following impairments/functional limitations: weakness, impaired endurance, impaired self care skills, impaired functional mobility, gait instability, impaired balance, decreased lower extremity function, decreased upper extremity function, pain, impaired skin .    Raised red area over knee scope access site left lateral knee. Patient able to demo muscle recruitment bilateral LE's during LE exercises with no c/o pain but when attempting to stand/take steps pt L> R  LE buckling with pt unable to stabilize self to take steps. Patient declined in stance and transfer ability from last treatment. Patient unsafe to transfer to the chair today. Discussed with Dr Black.     Rehab Prognosis: Good; patient would benefit from acute skilled PT services to address these deficits and reach maximum level of function.    Recent Surgery: Procedure(s) (LRB):  Transesophageal echo (MONICA) intra-procedure log documentation (N/A) 12 Days Post-Op    Plan:     During this hospitalization, patient to be seen 5 x/week to address the identified rehab impairments via gait training, therapeutic activities, therapeutic exercises and progress toward the following goals:    Plan of Care Expires:  01/20/23    Subjective     Chief Complaint: bacteremia  Patient/Family Comments/goals: "I can not make this leg take any steps or hold me. I am really trying"  Pain/Comfort:  Pain Rating 1: 0/10 (pt asleep on PT arrival. once awake pt reports "a little bit of pain")  Location - Orientation 1: generalized  Pain Addressed 1: " Pre-medicate for activity, Reposition, Distraction  Pain Rating Post-Intervention 1: 0/10 (patient dozing off once PT treatment completed)      Objective:     Communicated with Saeed Batista LPN prior to session.  Patient found supine with peripheral IV, telemetry upon PT entry to room.     General Precautions: Standard, fall, contact  Orthopedic Precautions: LLE weight bearing as tolerated  Braces: N/A  Respiratory Status: Room air     Functional Mobility:  Bed Mobility:     Supine to Sit: minimum assistance  Sit to Supine: minimum assistance  Transfers:     Sit to Stand:  moderate assistance, maximal assistance, and verbal cues, L>R LE's buckling with rolling walker  Gait: unable to take steps today      AM-PAC 6 CLICK MOBILITY  Turning over in bed (including adjusting bedclothes, sheets and blankets)?: 4  Sitting down on and standing up from a chair with arms (e.g., wheelchair, bedside commode, etc.): 2  Moving from lying on back to sitting on the side of the bed?: 3  Moving to and from a bed to a chair (including a wheelchair)?: 1  Need to walk in hospital room?: 1  Climbing 3-5 steps with a railing?: 1  Basic Mobility Total Score: 12       Treatment & Education:  Sitting EOB x 8 minutes SBA  Sit to stand x 3 attempts with RW L>R LE buckling mod to max A  Bilateral lower extremity exercise x 30 reps: ankle pumps, Quad sets, glut sets, heel slides, hip abduction/adduction, straight leg raises, and Long arc quads with active assist ROM, verbal cues, tactile cues, and no c/o pain during exercises      Patient left supine with all lines intact, call button in reach, and Saeed Batista LPN notified..    GOALS:   Multidisciplinary Problems       Physical Therapy Goals          Problem: Physical Therapy    Goal Priority Disciplines Outcome Goal Variances Interventions   Physical Therapy Goal     PT, PT/OT Ongoing, Progressing     Description: Short Term Goals to be met by: 12/4/2022    Patient will increase  functional independence with mobility by performin. Supine to sit with independently  2. Sit to stand transfer with independently using Rolling walker  3. Bed to chair transfer with independently using Rolling walker  4. Gait  x 200 feet with independently using Rolling walker  5. Lower extremity exercise program x30 reps per handout, with assistance as needed    Long Term Goals to be met by: 2022    Pt will regain full independent functional mobility with lowest level of assistive device to return to home situation and prior activities of daily living.                        Time Tracking:     PT Received On: 22  PT Start Time: 935     PT Stop Time: 958  PT Total Time (min): 23 min     Billable Minutes: Gait Training 15 minutes and Therapeutic Activity 8 minutes    Treatment Type: Treatment  PT/PTA: PT     PTA Visit Number: 0     2022

## 2022-12-06 ENCOUNTER — HOSPITAL ENCOUNTER (INPATIENT)
Facility: HOSPITAL | Age: 52
LOS: 7 days | Discharge: HOME HEALTH | DRG: 549 | End: 2022-12-13
Attending: FAMILY MEDICINE | Admitting: FAMILY MEDICINE
Payer: MEDICARE

## 2022-12-06 VITALS
RESPIRATION RATE: 18 BRPM | DIASTOLIC BLOOD PRESSURE: 72 MMHG | OXYGEN SATURATION: 96 % | HEIGHT: 66 IN | HEART RATE: 94 BPM | BODY MASS INDEX: 23.03 KG/M2 | TEMPERATURE: 99 F | WEIGHT: 143.31 LBS | SYSTOLIC BLOOD PRESSURE: 132 MMHG

## 2022-12-06 DIAGNOSIS — M00.9 SEPTIC ARTHRITIS: ICD-10-CM

## 2022-12-06 DIAGNOSIS — M00.9 PYOGENIC ARTHRITIS OF MULTIPLE SITES, DUE TO UNSPECIFIED ORGANISM: Primary | ICD-10-CM
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
## 2022-12-06 DIAGNOSIS — R07.89 CHEST DISCOMFORT: ICD-10-CM

## 2022-12-06 DIAGNOSIS — Z79.2 ENCOUNTER FOR AFTERCARE FOR LONG-TERM (CURRENT) USE OF ANTIBIOTICS: ICD-10-CM

## 2022-12-06 DIAGNOSIS — R07.9 CHEST PAIN: ICD-10-CM

## 2022-12-06 LAB
ANION GAP SERPL CALCULATED.3IONS-SCNC: 5 MMOL/L (ref 7–16)
BACTERIA BLD CULT: NORMAL
BACTERIA BLD CULT: NORMAL
BASOPHILS # BLD AUTO: 0.04 K/UL (ref 0–0.2)
BASOPHILS # BLD AUTO: 0.1 K/UL (ref 0–0.2)
BASOPHILS NFR BLD AUTO: 0.3 % (ref 0–1)
BASOPHILS NFR BLD AUTO: 0.8 % (ref 0–1)
BUN SERPL-MCNC: 10 MG/DL (ref 7–18)
BUN/CREAT SERPL: 13 (ref 6–20)
CALCIUM SERPL-MCNC: 9.3 MG/DL (ref 8.5–10.1)
CHLORIDE SERPL-SCNC: 95 MMOL/L (ref 98–107)
CO2 SERPL-SCNC: 33 MMOL/L (ref 21–32)
CREAT SERPL-MCNC: 0.76 MG/DL (ref 0.55–1.02)
DIFFERENTIAL METHOD BLD: ABNORMAL
DIFFERENTIAL METHOD BLD: ABNORMAL
EGFR (NO RACE VARIABLE) (RUSH/TITUS): 93 ML/MIN/1.73M²
EOSINOPHIL # BLD AUTO: 0.27 K/UL (ref 0–0.5)
EOSINOPHIL # BLD AUTO: 0.39 K/UL (ref 0–0.5)
EOSINOPHIL NFR BLD AUTO: 2.2 % (ref 1–4)
EOSINOPHIL NFR BLD AUTO: 3.2 % (ref 1–4)
ERYTHROCYTE [DISTWIDTH] IN BLOOD BY AUTOMATED COUNT: 14 % (ref 11.5–14.5)
ERYTHROCYTE [DISTWIDTH] IN BLOOD BY AUTOMATED COUNT: 14.1 % (ref 11.5–14.5)
GLUCOSE SERPL-MCNC: 155 MG/DL (ref 74–106)
GLUCOSE SERPL-MCNC: 205 MG/DL (ref 70–105)
GLUCOSE SERPL-MCNC: 219 MG/DL (ref 70–105)
GLUCOSE SERPL-MCNC: 234 MG/DL (ref 70–105)
GLUCOSE SERPL-MCNC: 429 MG/DL (ref 70–105)
HCT VFR BLD AUTO: 29.1 % (ref 38–47)
HCT VFR BLD AUTO: 31.2 % (ref 38–47)
HGB BLD-MCNC: 9 G/DL (ref 12–16)
HGB BLD-MCNC: 9.4 G/DL (ref 12–16)
IMM GRANULOCYTES # BLD AUTO: 0.07 K/UL (ref 0–0.04)
IMM GRANULOCYTES # BLD AUTO: 0.19 K/UL (ref 0–0.04)
IMM GRANULOCYTES NFR BLD: 0.6 % (ref 0–0.4)
IMM GRANULOCYTES NFR BLD: 1.5 % (ref 0–0.4)
LYMPHOCYTES # BLD AUTO: 2.48 K/UL (ref 1–4.8)
LYMPHOCYTES # BLD AUTO: 2.49 K/UL (ref 1–4.8)
LYMPHOCYTES NFR BLD AUTO: 20.1 % (ref 27–41)
LYMPHOCYTES NFR BLD AUTO: 20.6 % (ref 27–41)
MCH RBC QN AUTO: 26.2 PG (ref 27–31)
MCH RBC QN AUTO: 26.2 PG (ref 27–31)
MCHC RBC AUTO-ENTMCNC: 30.1 G/DL (ref 32–36)
MCHC RBC AUTO-ENTMCNC: 30.9 G/DL (ref 32–36)
MCV RBC AUTO: 84.6 FL (ref 80–96)
MCV RBC AUTO: 86.9 FL (ref 80–96)
MONOCYTES # BLD AUTO: 1.15 K/UL (ref 0–0.8)
MONOCYTES # BLD AUTO: 1.22 K/UL (ref 0–0.8)
MONOCYTES NFR BLD AUTO: 10.1 % (ref 2–6)
MONOCYTES NFR BLD AUTO: 9.3 % (ref 2–6)
MPC BLD CALC-MCNC: 8.4 FL (ref 9.4–12.4)
MPC BLD CALC-MCNC: 8.8 FL (ref 9.4–12.4)
NEUTROPHILS # BLD AUTO: 8 K/UL (ref 1.8–7.7)
NEUTROPHILS # BLD AUTO: 8.01 K/UL (ref 1.8–7.7)
NEUTROPHILS NFR BLD AUTO: 65.1 % (ref 53–65)
NEUTROPHILS NFR BLD AUTO: 66.2 % (ref 53–65)
NRBC # BLD AUTO: 0 X10E3/UL
NRBC, AUTO (.00): 0 %
PLATELET # BLD AUTO: 574 K/UL (ref 150–400)
PLATELET # BLD AUTO: 609 K/UL (ref 150–400)
POTASSIUM SERPL-SCNC: 4 MMOL/L (ref 3.5–5.1)
RBC # BLD AUTO: 3.44 M/UL (ref 4.2–5.4)
RBC # BLD AUTO: 3.59 M/UL (ref 4.2–5.4)
SODIUM SERPL-SCNC: 129 MMOL/L (ref 136–145)
WBC # BLD AUTO: 12.1 K/UL (ref 4.5–11)
WBC # BLD AUTO: 12.31 K/UL (ref 4.5–11)

## 2022-12-06 PROCEDURE — 99239 PR HOSPITAL DISCHARGE DAY,>30 MIN: ICD-10-PCS | Mod: ,,, | Performed by: FAMILY MEDICINE

## 2022-12-06 PROCEDURE — 99239 HOSP IP/OBS DSCHRG MGMT >30: CPT | Mod: ,,, | Performed by: FAMILY MEDICINE

## 2022-12-06 PROCEDURE — 82962 GLUCOSE BLOOD TEST: CPT

## 2022-12-06 PROCEDURE — 63600175 PHARM REV CODE 636 W HCPCS: Performed by: STUDENT IN AN ORGANIZED HEALTH CARE EDUCATION/TRAINING PROGRAM

## 2022-12-06 PROCEDURE — 94761 N-INVAS EAR/PLS OXIMETRY MLT: CPT

## 2022-12-06 PROCEDURE — 63600175 PHARM REV CODE 636 W HCPCS: Performed by: ORTHOPAEDIC SURGERY

## 2022-12-06 PROCEDURE — 36415 COLL VENOUS BLD VENIPUNCTURE: CPT | Performed by: HOSPITALIST

## 2022-12-06 PROCEDURE — 80048 BASIC METABOLIC PNL TOTAL CA: CPT | Performed by: HOSPITALIST

## 2022-12-06 PROCEDURE — 36415 COLL VENOUS BLD VENIPUNCTURE: CPT | Performed by: FAMILY MEDICINE

## 2022-12-06 PROCEDURE — 80048 BASIC METABOLIC PNL TOTAL CA: CPT | Performed by: FAMILY MEDICINE

## 2022-12-06 PROCEDURE — 85025 COMPLETE CBC W/AUTO DIFF WBC: CPT | Performed by: STUDENT IN AN ORGANIZED HEALTH CARE EDUCATION/TRAINING PROGRAM

## 2022-12-06 PROCEDURE — 99305 1ST NF CARE MODERATE MDM 35: CPT | Mod: ,,, | Performed by: INTERNAL MEDICINE

## 2022-12-06 PROCEDURE — 11000004 HC SNF PRIVATE

## 2022-12-06 PROCEDURE — 25000003 PHARM REV CODE 250: Performed by: ORTHOPAEDIC SURGERY

## 2022-12-06 PROCEDURE — 25000003 PHARM REV CODE 250: Performed by: STUDENT IN AN ORGANIZED HEALTH CARE EDUCATION/TRAINING PROGRAM

## 2022-12-06 PROCEDURE — 63600175 PHARM REV CODE 636 W HCPCS: Performed by: INTERNAL MEDICINE

## 2022-12-06 PROCEDURE — 25000003 PHARM REV CODE 250: Performed by: FAMILY MEDICINE

## 2022-12-06 PROCEDURE — 25000003 PHARM REV CODE 250: Performed by: INTERNAL MEDICINE

## 2022-12-06 PROCEDURE — 25000003 PHARM REV CODE 250: Performed by: GENERAL PRACTICE

## 2022-12-06 PROCEDURE — 99305 PR NURSING FACILITY CARE, INIT, MOD SEVERITY: ICD-10-PCS | Mod: ,,, | Performed by: INTERNAL MEDICINE

## 2022-12-06 PROCEDURE — 85025 COMPLETE CBC W/AUTO DIFF WBC: CPT | Performed by: FAMILY MEDICINE

## 2022-12-06 RX ORDER — ONDANSETRON 2 MG/ML
4 INJECTION INTRAMUSCULAR; INTRAVENOUS EVERY 8 HOURS PRN
Status: DISCONTINUED | OUTPATIENT
Start: 2022-12-06 | End: 2022-12-13 | Stop reason: HOSPADM

## 2022-12-06 RX ORDER — INSULIN ASPART 100 [IU]/ML
1 INJECTION, SOLUTION INTRAVENOUS; SUBCUTANEOUS EVERY 4 HOURS PRN
Status: DISCONTINUED | OUTPATIENT
Start: 2022-12-06 | End: 2022-12-07

## 2022-12-06 RX ORDER — MUPIROCIN 20 MG/G
OINTMENT TOPICAL 2 TIMES DAILY
Status: DISPENSED | OUTPATIENT
Start: 2022-12-06 | End: 2022-12-11

## 2022-12-06 RX ORDER — LABETALOL HYDROCHLORIDE 5 MG/ML
10 INJECTION, SOLUTION INTRAVENOUS EVERY 6 HOURS PRN
Status: DISCONTINUED | OUTPATIENT
Start: 2022-12-06 | End: 2022-12-07

## 2022-12-06 RX ORDER — SODIUM CHLORIDE 0.9 % (FLUSH) 0.9 %
10 SYRINGE (ML) INJECTION EVERY 12 HOURS PRN
Status: DISCONTINUED | OUTPATIENT
Start: 2022-12-06 | End: 2022-12-13 | Stop reason: HOSPADM

## 2022-12-06 RX ORDER — CALCIUM CARBONATE 200(500)MG
500 TABLET,CHEWABLE ORAL 2 TIMES DAILY PRN
Status: DISCONTINUED | OUTPATIENT
Start: 2022-12-06 | End: 2022-12-13 | Stop reason: HOSPADM

## 2022-12-06 RX ORDER — IBUPROFEN 200 MG
16 TABLET ORAL
Status: DISCONTINUED | OUTPATIENT
Start: 2022-12-06 | End: 2022-12-07

## 2022-12-06 RX ORDER — DULOXETIN HYDROCHLORIDE 30 MG/1
60 CAPSULE, DELAYED RELEASE ORAL DAILY
Status: DISCONTINUED | OUTPATIENT
Start: 2022-12-07 | End: 2022-12-13 | Stop reason: HOSPADM

## 2022-12-06 RX ORDER — POLYETHYLENE GLYCOL 3350 17 G/17G
34 POWDER, FOR SOLUTION ORAL 2 TIMES DAILY
Status: DISCONTINUED | OUTPATIENT
Start: 2022-12-06 | End: 2022-12-13

## 2022-12-06 RX ORDER — ACETAMINOPHEN 325 MG/1
650 TABLET ORAL EVERY 6 HOURS PRN
Status: DISCONTINUED | OUTPATIENT
Start: 2022-12-06 | End: 2022-12-13 | Stop reason: HOSPADM

## 2022-12-06 RX ORDER — GLUCAGON 1 MG
1 KIT INJECTION
Status: DISCONTINUED | OUTPATIENT
Start: 2022-12-06 | End: 2022-12-07

## 2022-12-06 RX ORDER — NALOXONE HCL 0.4 MG/ML
0.02 VIAL (ML) INJECTION
Status: DISCONTINUED | OUTPATIENT
Start: 2022-12-06 | End: 2022-12-07

## 2022-12-06 RX ORDER — GUAIFENESIN/DEXTROMETHORPHAN 100-10MG/5
10 SYRUP ORAL EVERY 6 HOURS PRN
Status: DISCONTINUED | OUTPATIENT
Start: 2022-12-06 | End: 2022-12-13 | Stop reason: HOSPADM

## 2022-12-06 RX ORDER — DEXTROSE MONOHYDRATE 100 MG/ML
INJECTION, SOLUTION INTRAVENOUS
Status: DISCONTINUED | OUTPATIENT
Start: 2022-12-06 | End: 2022-12-07

## 2022-12-06 RX ORDER — OXYCODONE HYDROCHLORIDE 5 MG/1
10 TABLET ORAL EVERY 6 HOURS PRN
Status: DISCONTINUED | OUTPATIENT
Start: 2022-12-06 | End: 2022-12-11

## 2022-12-06 RX ORDER — IBUPROFEN 200 MG
24 TABLET ORAL
Status: DISCONTINUED | OUTPATIENT
Start: 2022-12-06 | End: 2022-12-07

## 2022-12-06 RX ORDER — DOCUSATE SODIUM 100 MG/1
100 CAPSULE, LIQUID FILLED ORAL EVERY 12 HOURS
Status: DISCONTINUED | OUTPATIENT
Start: 2022-12-06 | End: 2022-12-13

## 2022-12-06 RX ORDER — ACETAMINOPHEN 500 MG
1000 TABLET ORAL EVERY 6 HOURS PRN
Status: DISCONTINUED | OUTPATIENT
Start: 2022-12-06 | End: 2022-12-13 | Stop reason: HOSPADM

## 2022-12-06 RX ORDER — ENOXAPARIN SODIUM 100 MG/ML
65 INJECTION SUBCUTANEOUS EVERY 12 HOURS
Status: DISCONTINUED | OUTPATIENT
Start: 2022-12-06 | End: 2022-12-07

## 2022-12-06 RX ORDER — PANTOPRAZOLE SODIUM 40 MG/1
40 TABLET, DELAYED RELEASE ORAL DAILY
Status: DISCONTINUED | OUTPATIENT
Start: 2022-12-07 | End: 2022-12-13 | Stop reason: HOSPADM

## 2022-12-06 RX ORDER — BISACODYL 5 MG
10 TABLET, DELAYED RELEASE (ENTERIC COATED) ORAL DAILY PRN
Status: DISCONTINUED | OUTPATIENT
Start: 2022-12-06 | End: 2022-12-13

## 2022-12-06 RX ORDER — AMOXICILLIN 250 MG
1 CAPSULE ORAL 2 TIMES DAILY
Status: DISCONTINUED | OUTPATIENT
Start: 2022-12-06 | End: 2022-12-13

## 2022-12-06 RX ORDER — ONDANSETRON 2 MG/ML
8 INJECTION INTRAMUSCULAR; INTRAVENOUS EVERY 6 HOURS PRN
Status: DISCONTINUED | OUTPATIENT
Start: 2022-12-06 | End: 2022-12-07

## 2022-12-06 RX ORDER — TALC
6 POWDER (GRAM) TOPICAL NIGHTLY PRN
Status: DISCONTINUED | OUTPATIENT
Start: 2022-12-06 | End: 2022-12-13 | Stop reason: HOSPADM

## 2022-12-06 RX ORDER — TRAZODONE HYDROCHLORIDE 50 MG/1
50 TABLET ORAL NIGHTLY PRN
Status: DISCONTINUED | OUTPATIENT
Start: 2022-12-06 | End: 2022-12-13 | Stop reason: HOSPADM

## 2022-12-06 RX ADMIN — DOCUSATE SODIUM 100 MG: 100 CAPSULE, LIQUID FILLED ORAL at 08:12

## 2022-12-06 RX ADMIN — OXYCODONE HYDROCHLORIDE 10 MG: 5 TABLET ORAL at 01:12

## 2022-12-06 RX ADMIN — MUPIROCIN 1 G: 20 OINTMENT TOPICAL at 11:12

## 2022-12-06 RX ADMIN — TRAZODONE HYDROCHLORIDE 50 MG: 50 TABLET ORAL at 11:12

## 2022-12-06 RX ADMIN — DULOXETINE 60 MG: 30 CAPSULE, DELAYED RELEASE ORAL at 08:12

## 2022-12-06 RX ADMIN — VANCOMYCIN HYDROCHLORIDE 1500 MG: 1 INJECTION, POWDER, LYOPHILIZED, FOR SOLUTION INTRAVENOUS at 08:12

## 2022-12-06 RX ADMIN — OXYCODONE HYDROCHLORIDE 10 MG: 5 TABLET ORAL at 11:12

## 2022-12-06 RX ADMIN — INSULIN ASPART 10 UNITS: 100 INJECTION, SOLUTION INTRAVENOUS; SUBCUTANEOUS at 11:12

## 2022-12-06 RX ADMIN — OXYCODONE HYDROCHLORIDE 10 MG: 5 TABLET ORAL at 09:12

## 2022-12-06 RX ADMIN — INSULIN ASPART 10 UNITS: 100 INJECTION, SOLUTION INTRAVENOUS; SUBCUTANEOUS at 05:12

## 2022-12-06 RX ADMIN — INSULIN DETEMIR 30 UNITS: 100 INJECTION, SOLUTION SUBCUTANEOUS at 08:12

## 2022-12-06 RX ADMIN — OXYCODONE HYDROCHLORIDE 10 MG: 5 TABLET ORAL at 03:12

## 2022-12-06 RX ADMIN — SENNOSIDES AND DOCUSATE SODIUM 1 TABLET: 50; 8.6 TABLET ORAL at 11:12

## 2022-12-06 RX ADMIN — DOCUSATE SODIUM 100 MG: 100 CAPSULE, LIQUID FILLED ORAL at 11:12

## 2022-12-06 RX ADMIN — ENOXAPARIN SODIUM 30 MG: 30 INJECTION SUBCUTANEOUS at 08:12

## 2022-12-06 RX ADMIN — ACETAMINOPHEN 650 MG: 325 TABLET ORAL at 09:12

## 2022-12-06 RX ADMIN — PANTOPRAZOLE SODIUM 40 MG: 40 TABLET, DELAYED RELEASE ORAL at 08:12

## 2022-12-06 NOTE — PT/OT/SLP PROGRESS
Occupational Therapy   Treatment    Name: Monica Walker  MRN: [id number]  Admitting Diagnosis:  Bacteremia  12 Days Post-Op    Recommendations:     Discharge Recommendations: nursing facility, skilled, rehabilitation facility, LTACH (long-term acute care hospital)  Discharge Equipment Recommendations:   (to be determined)  Barriers to discharge:  None    Assessment:     Monica Walker is a 52 y.o. female with a medical diagnosis of Bacteremia.  She presents with alert, but c/o not feeling well, painful and tired. Performance deficits affecting function are weakness, impaired endurance, impaired functional mobility, impaired self care skills, impaired skin, decreased ROM, pain.     Rehab Prognosis:  Good and Fair; patient would benefit from acute skilled OT services to address these deficits and reach maximum level of function.       Plan:     Patient to be seen 5 x/week to address the above listed problems via self-care/home management, therapeutic activities, therapeutic exercises  Plan of Care Expires: 12/26/22  Plan of Care Reviewed with: patient    Subjective     Pain/Comfort:  Pain Rating 1: 6/10  Location - Side 1: Right  Location 1: hip  Pain Addressed 1: Pre-medicate for activity, Distraction  Pain Rating Post-Intervention 1: 6/10    Objective:     Communicated with: HAYDEE Batista prior to session.  Patient found supine with peripheral IV, telemetry upon OT entry to room.    General Precautions: Standard, fall, contact    Orthopedic Precautions:LLE weight bearing as tolerated  Braces: N/A  Respiratory Status: Room air     Occupational Performance:     Bed Mobility:    NT    Functional Mobility/Transfers:  NT    Activities of Daily Living:  Grooming: moderate assistance to wash with shampoo shower cap and to towel dry hair. Pt combed hair independently      [facility] 6 Click ADL:      Treatment & Education:  Pt educated on OT role/POC.   Importance of sitting up in the chair throughout the day as  tolerated, especially for meals   Importance of assisting with self-care activities   All questions/concerns answered within OT scope of practice     Patient left supine with all lines intact, call button in reach, and bed alarm on    GOALS:   Multidisciplinary Problems       Occupational Therapy Goals          Problem: Occupational Therapy    Goal Priority Disciplines Outcome Interventions   Occupational Therapy Goal     OT, PT/OT Ongoing, Progressing    Description: STG:  Pt will perform grooming with access to supplies  Pt will bathe with setup and CGA  Pt will perform UE dressing with setup  Pt will perform LE dressing with CGA and AD if needed  Pt will transfer bed/chair/bsc with RW and SBA  Pt will perform standing task x 2 min with SBA and RW  Pt will tolerate 15 minutes of tx without fatigue      LT.Restore to max I with self care and mobility.                          Time Tracking:     OT Date of Treatment: 22  OT Start Time: 1552  OT Stop Time: 1605  OT Total Time (min): 13 min    Billable Minutes:Self Care/Home Management 13 min    OT/TRISH: OT          2022

## 2022-12-06 NOTE — PLAN OF CARE
[facility] - [facility] Telemetry  Discharge Final Note    Primary Care Provider: Hannah Schulz MD    Expected Discharge Date: 12/6/2022    Final Discharge Note (most recent)       Final Note - 12/06/22 1417          Final Note    Assessment Type Final Discharge Note     Anticipated Discharge Disposition Swing Bed     What phone number can be called within the next 1-3 days to see how you are doing after discharge? [phone]        Post-Acute Status    Post-Acute Authorization Placement     Post-Acute Placement Status Set-up Complete/Auth obtained     Patient choice form signed by patient/caregiver List from CMS Compare;List from System Post-Acute Care;List with quality metrics by geographic area provided     Discharge Delays None known at this time                   Patient discharging to [facility] today. Per notes, PICC line has been placed. Packet is in patient's cubby. Patient aware of discharge. SS attempted to contact sister and mother to notify them of discharge, no answer, unable to leave message. No further discharge needs noted.     1430 SS spoke with patient's sister, April, and notified her of patient's discharge.     Important Message from Medicare  Important Message from Medicare regarding Discharge Appeal Rights: Explained to patient/caregiver     Date IMM was signed: 12/06/22  Time IMM was signed: 1416

## 2022-12-06 NOTE — PLAN OF CARE
0930 SS spoke with Dr. Black, patient will be discharged to [facility] for swingbed today. Patient will need PICC line placement prior to discharge. SS discussed with floor nurse, Nila, she is checking with PICC team to see what time PICC line will be placed. Dory at [facility] notified.

## 2022-12-06 NOTE — DISCHARGE SUMMARY
[facility] - [address] [place] [address] Medicine  Discharge Summary      Patient Name: Monica Walker  MRN: [id number]  [facility]: [id number]  Patient Class: IP- Inpatient  Admission Date: 11/20/2022  Hospital Length of Stay: 16 days  Discharge Date and Time:  12/06/2022 3:09 PM  Attending Physician: Jose Black MD   Discharging Provider: NATALIA Cervantes  Primary Care Provider: Hannah Schulz MD    Primary Care Team: Networked reference to record PCT     HPI:   Patient is a 53 yo female who presents to [facility] transferred from [facility] c/o L knee and R flank pain x 2 weeks. R flank pain is 10/10, squeezing in quality, without radiation, no aggravating or relieving factors. L knee pain started after a fall, which she is unable to recall date of the injury. The knee pain is 10/10, located in the medial joint line, aggravated by movement and weight-bearing, squeezing in quality, radiates down to L foot and without relieving factors including heat and ice. Patient has a history of type 2 diabetes, poorly controlled.    10 days ago patient was admitted at [facility] and treated for pyelonephritis on the left side with IV antibiotics although her urine culture did not grow any bacteria. 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 Today a CT scan of the abdomen pelvis and chest with IV contrast indicates bilateral peripherally dense cavitary lesions left lower lobe and right middle lobe bilateral pleural effusions left greater than right with possible loculation these areas are concerning for abscesses although a neoplastic process can not be entirely excluded. Abnormal appearance of the left kidney concerning for pyelonephritis. Patient was transferred to [facility] for further medical evaluation and management.          Procedure(s) (LRB):  Transesophageal echo (MONICA) intra-procedure log documentation (N/A)      Hospital Course:   11/24-will need IV abx for 6 weeks from first negative blood cultures. Repeat today.  Requesting swingbed in [place]. Will ask CM to assist  11/25- pain better controlled today. Will need definitive plan from Ortho prior to DC  11/26-febrile overnight, continues to have pain. Suspect poor source control.  Repeat cultures  11/27- afebrile, having some right hip pain today.  Worse lying flat, better with flexion  11/28-if blood cultures remain negative will place picc tomorrow and DC to [place] swingbed for 6 weeks abx  11/29- persistently positive blood cultures.  2 days now of complaining of right lower abdominal pain and right hip pain.  Exam not consistent with septic arthritis however given persistently positive cultures will scan for effusion.  Psoas abscess consideration as well. Discussed with Radiology.  11/30-awaiting fluid from right hip.  12/1-no infection in right hip, still denies back pain. Will check for epidural abscess if most recent blood cultures positive.  12/2- Discussed case with Dr. Mendoza yesterday.  Recommending tagged WBC scan.  He believes this may be a discitis or osteo seeding- will await latest blood cultures. Order this imaging if they return positive.  Staff reports some tenderness and drainage from left knee.  Ortho will see today.  12/3- blood cultures remain negative.  Lower knee incision red and tender with some drainage.  12/4-blood cultures negative. Less drainage today from left knee.  12/5/2022 -patient complains of left knee pain.  Dressing noted over the left knee.  Right lower extremity pain present.  Continue current pain regimen.  Aggressive physical therapy.  Patient blood cultures negative for 72 hours, collected at 11/30.  We will order PICC line today.   has arranged placement at [place] [facility].    12/6: PICC line placed today. Patient will be  discharged to [facility] for 6 weeks of IV ATB therapy with Vancomycin. 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        Goals of Care Treatment Preferences:  Code Status: Full Code      Consults:   Consults (From admission, onward)        Status Ordering Provider     Inpatient consult to PICC team ([facility])  Once        Provider:  (Not yet assigned)    Acknowledged STEFFANIE KWAN     IP consult case management/social work  Once        Provider:  (Not yet assigned)    Completed ERIN THAKKAR     Inpatient consult to Social Work  Once        Provider:  (Not yet assigned)    Completed ZION, REHMAT U     Inpatient consult to Infectious Diseases  Once        Provider:  Oneida Guidry MD    Completed ZION, REHMAT U     Inpatient consult to Pulmonology  Once        Provider:  Savage Sylvester MD    Acknowledged ZION, REHMAT U     Inpatient consult to Orthopedic Surgery  Once        Provider:  (Not yet assigned)    Acknowledged BILLY BENTLEY     Pharmacy to dose Vancomycin consult  Once        Provider:  (Not yet assigned)    Acknowledged STAN CEJA          * Bacteremia  Continue vancomycin            DM (diabetes mellitus)  Basal, SSI  Prandial if needed    Lung abscess  OP follow with Dr. Sylvester 6 weeks from d/c with repeat CXR  Continue abx          Septic arthritis  Cultures remain negative  Moderate PICC line to be placed 12/05/2022 12/6: PICC line placed today.    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 b.i.d. and she can stay on that indefinitely or until instructed to discontinue by orthopedic surgeon.    Depression  Continue home Cymbalta            Final Active Diagnoses:    Diagnosis Date Noted POA    PRINCIPAL PROBLEM:  Bacteremia [R78.81] 11/21/2022 Yes    Lung abscess [J85.2] 11/20/2022 Yes    DM (diabetes mellitus) [E11.9] 11/20/2022 Yes    Septic arthritis [M00.9] 01/04/2022 Yes    Depression [F32.A] 01/04/2022 Yes      Problems Resolved During this Admission:    Diagnosis Date Noted Date Resolved POA    Abscess of left thigh [L02.416] 11/20/2022 11/20/2022 Yes    DM hyperosmolarity type I, uncontrolled [E10.69, E10.65, E87.0] 11/20/2022 11/20/2022 Yes    Abscess of right thigh [L02.415] 08/07/2022 11/20/2022 Yes       Discharged Condition: stable    Disposition: Skilled Nursing Facility    Follow Up:   Follow-up Information     [facility] Follow up.    Why: Continued IV ATB/SWB  Contact information:  [address] [place] [id number]  [phone]           Hannah Schulz MD. Schedule an appointment as soon as possible for a visit in 2 week(s).    Specialty: Family Medicine  Why: [facility] d/c follow up bacteremia  Contact information:  [address] [place] [address]  [phone]             Savage Sylvester MD Follow up in 6 week(s).    Specialty: Pulmonary Disease  Why: Hospital d/c follow up bilateral peripheral cavitary lesions with repeat CXR  Contact information:  [address] [place] [address] [facility]  [facility] [address]  [phone]                       Patient Instructions:      Diet diabetic     Notify your health care provider if you experience any of the following:  temperature >100.4     Notify your health care provider if you experience any of the following:  persistent nausea and vomiting or diarrhea     Notify your health care provider if you experience any of the following:  severe  "uncontrolled pain     Notify your health care provider if you experience any of the following:  difficulty breathing or increased cough     Notify your health care provider if you experience any of the following:  persistent dizziness, light-headedness, or visual disturbances     Notify your health care provider if you experience any of the following:  increased confusion or weakness     Activity as tolerated       Significant Diagnostic Studies: Labs:   BMP:   Recent Labs   Lab 12/05/22  0506 12/06/22  0155   * 155*   * 129*   K 4.8 4.0   CL 96* 95*   CO2 34* 33*   BUN 11 10   CREATININE 0.84 0.76   CALCIUM 9.6 9.3   , CBC   Recent Labs   Lab 12/05/22  0506 12/06/22  0155   WBC 10.91 12.31*   HGB 10.2* 9.4*   HCT 33.9* 31.2*   * 609*   , A1C:   Recent Labs   Lab 11/09/22  0925   HGBA1C 12.4*    and All labs within the past 24 hours have been reviewed     Medications:  Reconciled Home Medications:      Medication List      CONTINUE taking these medications    BD ULTRA-FINE SHORT PEN NEEDLE 31 gauge x 5/16" Ndle  Generic drug: pen needle, diabetic     DULoxetine 60 MG capsule  Commonly known as: CYMBALTA  Take 1 capsule (60 mg total) by mouth once daily.     insulin glargine 100 units/mL SubQ pen  Commonly known as: LANTUS SOLOSTAR U-100 INSULIN  Inject 40 Units into the skin once daily.     NEURONTIN ORAL  Take by mouth 2 (two) times a day.     NovoLOG Flexpen U-100 Insulin 100 unit/mL (3 mL) Inpn pen  Generic drug: insulin aspart U-100  Inject 10 Units into the skin 2 (two) times a day.     TRULICITY 0.75 mg/0.5 mL pen injector  Generic drug: dulaglutide  Inject 0.75 mg into the skin every 7 days.            Indwelling Lines/Drains at time of discharge:   Lines/Drains/Airways     Peripherally Inserted Central Catheter Line  Duration           PICC Double Lumen 12/06/22 1344 <1 day            The patient has been seen and examined by both myself and Dr Jose Black MD on the date of discharge " and determined to be suitable/stable for discharge.    Time spent on the discharge of patient: Greater than 30 minutes         NATALIA Cervantes  Department of Hospital Medicine  [facility] - [address] [place] [address]

## 2022-12-06 NOTE — PLAN OF CARE
Problem: Adult Inpatient Plan of Care  Goal: Plan of Care Review  Outcome: Ongoing, Progressing  Goal: Patient-Specific Goal (Individualized)  Outcome: Ongoing, Progressing  Goal: Absence of Hospital-Acquired Illness or Injury  Outcome: Ongoing, Progressing  Goal: Optimal Comfort and Wellbeing  Outcome: Ongoing, Progressing  Goal: Readiness for Transition of Care  Outcome: Ongoing, Progressing     Problem: Diabetes Comorbidity  Goal: Blood Glucose Level Within Targeted Range  Outcome: Ongoing, Progressing     Problem: Impaired Wound Healing  Goal: Optimal Wound Healing  Outcome: Ongoing, Progressing     Problem: Fall Injury Risk  Goal: Absence of Fall and Fall-Related Injury  Outcome: Ongoing, Progressing     Problem: Infection  Goal: Absence of Infection Signs and Symptoms  Outcome: Ongoing, Progressing     Problem: Skin and Tissue Injury (Artificial Airway)  Goal: Absence of Device-Related Skin or Tissue Injury  Outcome: Ongoing, Progressing     Problem: Skin Injury Risk Increased  Goal: Skin Health and Integrity  Outcome: Ongoing, Progressing

## 2022-12-06 NOTE — ASSESSMENT & PLAN NOTE
Cultures remain negative  Moderate PICC line to be placed 12/05/2022 12/6: PICC line placed today.    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 b.i.d. and she can stay on that indefinitely or until instructed to discontinue by orthopedic surgeon.

## 2022-12-07 LAB
ALBUMIN SERPL BCP-MCNC: 1.7 G/DL (ref 3.5–5)
ALBUMIN/GLOB SERPL: 0.2 {RATIO}
ALP SERPL-CCNC: 190 U/L (ref 41–108)
ALT SERPL W P-5'-P-CCNC: 46 U/L (ref 13–56)
ANION GAP SERPL CALCULATED.3IONS-SCNC: 10 MMOL/L (ref 7–16)
ANION GAP SERPL CALCULATED.3IONS-SCNC: 15 MMOL/L (ref 7–16)
AST SERPL W P-5'-P-CCNC: 24 U/L (ref 15–37)
BILIRUB SERPL-MCNC: 0.3 MG/DL (ref ?–1.2)
BILIRUB UR QL STRIP: NEGATIVE
BUN SERPL-MCNC: 14 MG/DL (ref 7–18)
BUN SERPL-MCNC: 17 MG/DL (ref 7–18)
BUN/CREAT SERPL: 18 (ref 6–20)
BUN/CREAT SERPL: 22 (ref 6–20)
CALCIUM SERPL-MCNC: 8.9 MG/DL (ref 8.5–10.1)
CALCIUM SERPL-MCNC: 9.1 MG/DL (ref 8.5–10.1)
CHLORIDE SERPL-SCNC: 92 MMOL/L (ref 98–107)
CHLORIDE SERPL-SCNC: 94 MMOL/L (ref 98–107)
CLARITY UR: CLEAR
CO2 SERPL-SCNC: 28 MMOL/L (ref 21–32)
CO2 SERPL-SCNC: 32 MMOL/L (ref 21–32)
COLOR UR: YELLOW
CREAT SERPL-MCNC: 0.76 MG/DL (ref 0.55–1.02)
CREAT SERPL-MCNC: 0.78 MG/DL (ref 0.55–1.02)
EGFR (NO RACE VARIABLE) (RUSH/TITUS): 90 ML/MIN/1.73M²
EGFR (NO RACE VARIABLE) (RUSH/TITUS): 93 ML/MIN/1.73M²
GLOBULIN SER-MCNC: 7.3 G/DL (ref 2–4)
GLUCOSE SERPL-MCNC: 173 MG/DL (ref 70–105)
GLUCOSE SERPL-MCNC: 234 MG/DL (ref 74–106)
GLUCOSE SERPL-MCNC: 295 MG/DL (ref 70–105)
GLUCOSE SERPL-MCNC: 339 MG/DL (ref 74–106)
GLUCOSE SERPL-MCNC: 405 MG/DL (ref 70–105)
GLUCOSE UR STRIP-MCNC: 250 MG/DL
KETONES UR STRIP-SCNC: NEGATIVE MG/DL
LEUKOCYTE ESTERASE UR QL STRIP: NEGATIVE
NITRITE UR QL STRIP: NEGATIVE
PH UR STRIP: 6.5 PH UNITS
POTASSIUM SERPL-SCNC: 4.2 MMOL/L (ref 3.5–5.1)
POTASSIUM SERPL-SCNC: 4.4 MMOL/L (ref 3.5–5.1)
PROT SERPL-MCNC: 9 G/DL (ref 6.4–8.2)
PROT UR QL STRIP: NEGATIVE
RBC # UR STRIP: NEGATIVE /UL
SODIUM SERPL-SCNC: 131 MMOL/L (ref 136–145)
SODIUM SERPL-SCNC: 132 MMOL/L (ref 136–145)
SP GR UR STRIP: 1.01
UROBILINOGEN UR STRIP-ACNC: 0.2 MG/DL
VANCOMYCIN TROUGH SERPL-MCNC: 15.4 ΜG/ML (ref 10–20)

## 2022-12-07 PROCEDURE — 11000004 HC SNF PRIVATE

## 2022-12-07 PROCEDURE — 97165 OT EVAL LOW COMPLEX 30 MIN: CPT

## 2022-12-07 PROCEDURE — 99308 SBSQ NF CARE LOW MDM 20: CPT | Mod: ,,, | Performed by: FAMILY MEDICINE

## 2022-12-07 PROCEDURE — 82962 GLUCOSE BLOOD TEST: CPT

## 2022-12-07 PROCEDURE — 25000003 PHARM REV CODE 250: Performed by: FAMILY MEDICINE

## 2022-12-07 PROCEDURE — 94761 N-INVAS EAR/PLS OXIMETRY MLT: CPT

## 2022-12-07 PROCEDURE — 36415 COLL VENOUS BLD VENIPUNCTURE: CPT | Performed by: FAMILY MEDICINE

## 2022-12-07 PROCEDURE — 80053 COMPREHEN METABOLIC PANEL: CPT | Performed by: FAMILY MEDICINE

## 2022-12-07 PROCEDURE — 63600175 PHARM REV CODE 636 W HCPCS: Performed by: INTERNAL MEDICINE

## 2022-12-07 PROCEDURE — 80202 ASSAY OF VANCOMYCIN: CPT | Performed by: FAMILY MEDICINE

## 2022-12-07 PROCEDURE — 99308 PR NURSING FAC CARE, SUBSEQ, MINOR COMPLIC: ICD-10-PCS | Mod: ,,, | Performed by: FAMILY MEDICINE

## 2022-12-07 PROCEDURE — 97162 PT EVAL MOD COMPLEX 30 MIN: CPT

## 2022-12-07 PROCEDURE — 25000003 PHARM REV CODE 250: Performed by: INTERNAL MEDICINE

## 2022-12-07 PROCEDURE — 81003 URINALYSIS AUTO W/O SCOPE: CPT | Performed by: FAMILY MEDICINE

## 2022-12-07 PROCEDURE — 63600175 PHARM REV CODE 636 W HCPCS: Performed by: FAMILY MEDICINE

## 2022-12-07 RX ORDER — ENOXAPARIN SODIUM 100 MG/ML
30 INJECTION SUBCUTANEOUS EVERY 24 HOURS
Status: DISCONTINUED | OUTPATIENT
Start: 2022-12-07 | End: 2022-12-13 | Stop reason: HOSPADM

## 2022-12-07 RX ORDER — IBUPROFEN 200 MG
16 TABLET ORAL
Status: DISCONTINUED | OUTPATIENT
Start: 2022-12-07 | End: 2022-12-13 | Stop reason: HOSPADM

## 2022-12-07 RX ORDER — IBUPROFEN 200 MG
24 TABLET ORAL
Status: DISCONTINUED | OUTPATIENT
Start: 2022-12-07 | End: 2022-12-13 | Stop reason: HOSPADM

## 2022-12-07 RX ORDER — ONDANSETRON 4 MG/1
4 TABLET, ORALLY DISINTEGRATING ORAL EVERY 6 HOURS PRN
Status: DISCONTINUED | OUTPATIENT
Start: 2022-12-07 | End: 2022-12-13 | Stop reason: HOSPADM

## 2022-12-07 RX ORDER — GLUCAGON 1 MG
1 KIT INJECTION
Status: DISCONTINUED | OUTPATIENT
Start: 2022-12-07 | End: 2022-12-13 | Stop reason: HOSPADM

## 2022-12-07 RX ORDER — INSULIN ASPART 100 [IU]/ML
1-10 INJECTION, SOLUTION INTRAVENOUS; SUBCUTANEOUS
Status: DISCONTINUED | OUTPATIENT
Start: 2022-12-07 | End: 2022-12-13 | Stop reason: HOSPADM

## 2022-12-07 RX ADMIN — OXYCODONE HYDROCHLORIDE 10 MG: 5 TABLET ORAL at 08:12

## 2022-12-07 RX ADMIN — DOCUSATE SODIUM 100 MG: 100 CAPSULE, LIQUID FILLED ORAL at 08:12

## 2022-12-07 RX ADMIN — VANCOMYCIN HYDROCHLORIDE 1500 MG: 1.5 INJECTION, POWDER, LYOPHILIZED, FOR SOLUTION INTRAVENOUS at 02:12

## 2022-12-07 RX ADMIN — DULOXETINE 60 MG: 30 CAPSULE, DELAYED RELEASE ORAL at 08:12

## 2022-12-07 RX ADMIN — SENNOSIDES AND DOCUSATE SODIUM 1 TABLET: 50; 8.6 TABLET ORAL at 08:12

## 2022-12-07 RX ADMIN — MUPIROCIN: 20 OINTMENT TOPICAL at 09:12

## 2022-12-07 RX ADMIN — OXYCODONE HYDROCHLORIDE 10 MG: 5 TABLET ORAL at 02:12

## 2022-12-07 RX ADMIN — INSULIN DETEMIR 30 UNITS: 100 INJECTION, SOLUTION SUBCUTANEOUS at 08:12

## 2022-12-07 RX ADMIN — ENOXAPARIN SODIUM 30 MG: 30 INJECTION SUBCUTANEOUS at 05:12

## 2022-12-07 RX ADMIN — POLYETHYLENE GLYCOL 3350 34 G: 17 POWDER, FOR SOLUTION ORAL at 08:12

## 2022-12-07 RX ADMIN — INSULIN ASPART 2 UNITS: 100 INJECTION, SOLUTION INTRAVENOUS; SUBCUTANEOUS at 05:12

## 2022-12-07 RX ADMIN — TRAZODONE HYDROCHLORIDE 50 MG: 50 TABLET ORAL at 08:12

## 2022-12-07 RX ADMIN — VANCOMYCIN HYDROCHLORIDE 1500 MG: 1.5 INJECTION, POWDER, LYOPHILIZED, FOR SOLUTION INTRAVENOUS at 09:12

## 2022-12-07 RX ADMIN — INSULIN ASPART 10 UNITS: 100 INJECTION, SOLUTION INTRAVENOUS; SUBCUTANEOUS at 10:12

## 2022-12-07 RX ADMIN — PANTOPRAZOLE SODIUM 40 MG: 40 TABLET, DELAYED RELEASE ORAL at 08:12

## 2022-12-07 NOTE — H&P
The patient is a 52-year-old white female that was transferred [facility] to the [facility] bed for general physical therapy and for IV vancomycin for 6 weeks.  The patient was diagnosed with sepsis secondary to seeding abscess from MRSA.  She had infection and drainage in her left shoulder, left knee, right hip, and bilateral lungs with pleural effusion and cavitary lesions.  She was seen by infectious disease and by ortho Pediatrics with Dr. Mendoza recommend the IV antibiotics and transfer to [facility].  The patient has history of bilateral knee prostheses, with the infection recently in the left knee prosthesis which was drained.    Past medical history:  See old chart     Review of systems:  12 point review of systems noncontributory except for present illness     Physical exam:  Patient is alert orient no acute distress and lying in bed without any complaints     HEENT:  NEMO EOMI    Neck:  Supple no JVD bruits or masses     Lungs:  Some few scattered rhonchi but no wheezes or bronchospasm     Cardiovascular:  Normal sinus rhythm without murmur gallop rub     Abdomen:  Soft bowel sounds normal nontender     Neurology:  No gross focal neurologic deficits     Musculoskeletal:  Left shoulder so sutures in place but no evidence any drainage.  Left knee shows dressing in place without saturation.  Right hip shows wound with no evidence of draining.    Assessment:  Sepsis secondary to MRSA secondary seeding to lungs, left shoulder, left knee, and right hip.  Generalized weakness     Plan: IV vancomycin for 6 weeks then doxycycline indefinitely:  And physical therapy occupational therapy

## 2022-12-07 NOTE — NURSING
Spoke with pt's mother at bedside and notified her of pt's f/u appts., appts written on white board

## 2022-12-07 NOTE — NURSING
2100- Patient received to floor via ambulance for admission to . She is a/a/o x3. She voices no complaints upon arrival and no acute distress noted at this time.

## 2022-12-07 NOTE — SUBJECTIVE & OBJECTIVE
Interval History: pt. Not feeling well. Orders revised.    Review of Systems  Objective:     Vital Signs (Most Recent):  Temp: 98.4 °F (36.9 °C) (12/07/22 0700)  Pulse: 94 (12/07/22 0718)  Resp: 19 (12/07/22 0839)  BP: 123/70 (12/07/22 0700)  SpO2: (!) 94 % (12/07/22 0718)   Vital Signs (24h Range):  Temp:  [98.4 °F (36.9 °C)-99.5 °F (37.5 °C)] 98.4 °F (36.9 °C)  Pulse:  [] 94  Resp:  [18-20] 19  SpO2:  [94 %-96 %] 94 %  BP: (123-139)/(66-75) 123/70     Weight: 66.2 kg (146 lb)  Body mass index is 23.57 kg/m².  No intake or output data in the 24 hours ending 12/07/22 0848   Physical Exam    Significant Labs: All pertinent labs within the past 24 hours have been reviewed.    Significant Imaging: I have reviewed all pertinent imaging results/findings within the past 24 hours.

## 2022-12-07 NOTE — PT/OT/SLP EVAL
Occupational Therapy   Evaluation    Name: Monica Walker  MRN: [id number]  Admitting Diagnosis: Septic arthritis  Recent Surgery: * No surgery found *      Recommendations:     Discharge Recommendations: nursing facility, skilled  Discharge Equipment Recommendations:     Barriers to discharge:       Assessment:     Monica Walker is a 52 y.o. female with a medical diagnosis of Septic arthritis. Performance deficits affecting function: weakness, impaired endurance, impaired self care skills, impaired functional mobility, impaired balance, decreased lower extremity function, decreased safety awareness, decreased upper extremity function, pain.      Rehab Prognosis: Fair; patient would benefit from acute skilled OT services to address these deficits and reach maximum level of function.       Plan:     Patient to be seen 5 x/week to address the above listed problems via self-care/home management, therapeutic activities, therapeutic exercises  Plan of Care Expires:    Plan of Care Reviewed with: patient    Subjective     Chief Complaint: Pain  Patient/Family Comments/goals: Get stronger and go home    Occupational Profile:  Living Environment: alone  Previous level of function: independent  Roles and Routines: self-care, home management  Equipment Used at Home: none  Assistance upon Discharge: She will be moving in with her mother    Pain/Comfort:  Pain Rating 1: 6/10    Patients cultural, spiritual, Jehovah's witness conflicts given the current situation: no    Objective:     Communicated with: RN prior to session.  Patient found supine with peripheral IV upon OT entry to room.    General Precautions: Standard, fall, contact  Orthopedic Precautions: LLE weight bearing as tolerated  Braces:    Respiratory Status: Room air    Occupational Performance:    Bed Mobility:    Patient completed Rolling/Turning to Right with contact guard assistance    Functional Mobility/Transfers:  Pt refused to complete standing  Functional  Mobility: N/A due to refusal     Activities of Daily Living:  Feeding:  supervision      Cognitive/Visual Perceptual:  Cognitive/Psychosocial Skills:     -       Oriented to: Person, Place, Time, and Situation     Physical Exam:  Upper Extremity Range of Motion:     -       Right Upper Extremity: WFL  -       Left Upper Extremity: very limited ROM  Upper Extremity Strength:    -       Right Upper Extremity: WFL  -       Left Upper Extremity: NT due to pain    AMPAC 6 Click ADL:  AMPA Total Score: 16    Treatment & Education:  Pt educated on OT role/POC.   Importance of OOB activity with staff assistance.  Importance of sitting up in the chair throughout the day as tolerated, especially for meals   Safety during functional t/f and mobility  Importance of assisting with self-care activities       Patient left supine with call button in reach    GOALS:   Multidisciplinary Problems       Occupational Therapy Goals          Problem: Occupational Therapy    Goal Priority Disciplines Outcome Interventions   Occupational Therapy Goal     OT, PT/OT     Description: Description: Grooming Status:   Short Term Goal: Pt will perform grooming with s/u sitting EOB.   Long Term Goal: Pt will perform grooming/oral hygiene standing at sink with min a     LE dressing Status:   Short Term Goal: Pt will perform LE dressing with mod A.   Long Term Goal: Pt will perform LE dressing with min A.    Toileting Status:   Short Term Goal: Pt will perform toilet hygiene on BSC with mod a.  Long Term Goal: Pt will perform toilet hygiene on toilet with no AE with min a.    Commode Transfer:   Short Term Goal: Pt will perform BSC t/f with mod a.  Long Term Goal:  Pt will perform toilet t/f in bathroom with  min a.     Bathing Status:   Long Term Goal: Pt will perform sponge bath with s/u with no unsafe fatigue.     Strength Status:   Long Term Goal: Pt to perform BUE strengthening with weights and/or body weight to increase ADL independence and  safety    Endurance Status:   Short Term Goal:pt to perform 15 min OT treatment with 5 or greater rest breaks  Long Term Goal: pt to perform 30 min OT treat with 3 or less rest breaks                       History:     Past Medical History:   Diagnosis Date    Abscess of right thigh + MRSA 08/20/2021    healed    Adnexal cyst     Degenerative disc disease     Depression 10/29/2021    Hypertension     Nicotine dependence     Osteoarthritis          Past Surgical History:   Procedure Laterality Date    BACK SURGERY      HYSTERECTOMY N/A     IRRIGATION AND DEBRIDEMENT OF LOWER EXTREMITY Left 11/22/2022    Procedure: IRRIGATION AND DEBRIDEMENT, LOWER EXTREMITY;  Surgeon: Papa Mendzoa MD;  Location: [facility];  Service: Orthopedics;  Laterality: Left;    IRRIGATION AND DEBRIDEMENT OF UPPER EXTREMITY Left 11/22/2022    Procedure: IRRIGATION AND DEBRIDEMENT, UPPER EXTREMITY;  Surgeon: Papa Mendoza MD;  Location: [facility];  Service: Orthopedics;  Laterality: Left;    TOTAL HIP ARTHROPLASTY Left     TOTAL KNEE ARTHROPLASTY Bilateral        Time Tracking:     OT Date of Treatment: 12/07/22  OT Start Time: 1443  OT Stop Time: 1453  OT Total Time (min): 10 min    Billable Minutes:Evaluation 10 min  Kirti Bolton OTR/L      12/7/2022

## 2022-12-07 NOTE — PT/OT/SLP EVAL
"Physical Therapy Evaluation    Patient Name:  Monica Walker   MRN:  [id number]    Recommendations:     Discharge Recommendations: nursing facility, skilled   Discharge Equipment Recommendations: 3-in-1 commode, walker, rolling   Barriers to discharge: Decreased caregiver support    Assessment:     Monica Walker is a 52 y.o. female admitted with a medical diagnosis of Septic arthritis.  She presents with the following impairments/functional limitations: weakness, impaired endurance, impaired self care skills, impaired functional mobility, impaired balance, decreased lower extremity function, decreased safety awareness, decreased upper extremity function, pain.    Rehab Prognosis: Fair; patient would benefit from acute skilled PT services to address these deficits and reach maximum level of function.    Recent Surgery: * No surgery found *      Plan:     During this hospitalization, patient to be seen 5 x/week to address the identified rehab impairments via gait training, therapeutic activities, therapeutic exercises and progress toward the following goals:    Plan of Care Expires:  01/20/23    Subjective     Chief Complaint: "I hurt so bad."   Patient/Family Comments/goals: return home  Pain/Comfort:  Pain Rating 1: 7/10  Location - Side 1: Right  Location 1: knee  Pain Addressed 1: Nurse notified    Patients cultural, spiritual, Alevism conflicts given the current situation:      Living Environment:  Lives alone  Prior to admission, patients level of function was Independent.  Equipment used at home: none.  DME owned (not currently used):  unknown .  Upon discharge, patient will have assistance from mother.    Objective:     Communicated with RN prior to session.  Patient found supine with PICC line  upon PT entry to room.    General Precautions: Standard, fall  Orthopedic Precautions:LLE weight bearing as tolerated   Braces:    Respiratory Status: Room air    Exams:  RLE ROM: WFL  RLE Strength: 3-/5  LLE " ROM: WFL  LLE Strength: 3-/5    Functional Mobility:  Bed Mobility:     Rolling Right: stand by assistance  Supine to Sit: stand by assistance  Sit to Supine: moderate assistance      AM-PAC 6 CLICK MOBILITY  Total Score:11       Treatment & Education:  Evaluation only    Patient left supine with call button in reach.    GOALS:   Multidisciplinary Problems       Physical Therapy Goals          Problem: Physical Therapy    Goal Priority Disciplines Outcome Goal Variances Interventions   Physical Therapy Goal     PT, PT/OT      Description: Short Term Goals  1. Patient will complete 15 reps of B LE exercises with correct form.   2. Patient will complete sit<>stand transfers with min A x 1.  3. Patient will ambulate 20 feet with RW on level surfaces with CGA.     Long Term Goals   1. Patient will complete 30 reps of B LE exercises with correct form.   2. Patient will ambulate 80 feet with RW on level surfaces with CGA.   3. Patient will complete all functional transfers with CGA.                       History:     Past Medical History:   Diagnosis Date    Abscess of right thigh + MRSA 08/20/2021    healed    Adnexal cyst     Degenerative disc disease     Depression 10/29/2021    Hypertension     Nicotine dependence     Osteoarthritis        Past Surgical History:   Procedure Laterality Date    BACK SURGERY      HYSTERECTOMY N/A     IRRIGATION AND DEBRIDEMENT OF LOWER EXTREMITY Left 11/22/2022    Procedure: IRRIGATION AND DEBRIDEMENT, LOWER EXTREMITY;  Surgeon: Papa Mendoza MD;  Location: [facility];  Service: Orthopedics;  Laterality: Left;    IRRIGATION AND DEBRIDEMENT OF UPPER EXTREMITY Left 11/22/2022    Procedure: IRRIGATION AND DEBRIDEMENT, UPPER EXTREMITY;  Surgeon: Papa Mendoza MD;  Location: [facility];  Service: Orthopedics;  Laterality: Left;    TOTAL HIP ARTHROPLASTY Left     TOTAL KNEE ARTHROPLASTY Bilateral        Time Tracking:     PT Received On: 12/07/22  PT Start Time: 1500     PT  Stop Time: 1510  PT Total Time (min): 10 min     Billable Minutes: Evaluation 10    Kalina Ac, PT, DPT  12/07/2022

## 2022-12-07 NOTE — NURSING
Ems services her to take pt to [facility] at [place], picc line noted to left arm, drsg intact, pt left with all pt belongings, all discharge instructions given to , report called on previous shift

## 2022-12-07 NOTE — PLAN OF CARE
Description: Grooming Status:   Short Term Goal: Pt will perform grooming with s/u sitting EOB.   Long Term Goal: Pt will perform grooming/oral hygiene standing at sink with min a     LE dressing Status:   Short Term Goal: Pt will perform LE dressing with mod A.   Long Term Goal: Pt will perform LE dressing with min A.    Toileting Status:   Short Term Goal: Pt will perform toilet hygiene on BSC with mod a.  Long Term Goal: Pt will perform toilet hygiene on toilet with no AE with min a.    Commode Transfer:   Short Term Goal: Pt will perform BSC t/f with mod a.  Long Term Goal:  Pt will perform toilet t/f in bathroom with  min a.     Bathing Status:   Long Term Goal: Pt will perform sponge bath with s/u with no unsafe fatigue.     Strength Status:   Long Term Goal: Pt to perform BUE strengthening with weights and/or body weight to increase ADL independence and safety    Endurance Status:   Short Term Goal:pt to perform 15 min OT treatment with 5 or greater rest breaks  Long Term Goal: pt to perform 30 min OT treat with 3 or less rest breaks

## 2022-12-07 NOTE — HOSPITAL COURSE
12/7/22 - pt. Was admitted last night for continued IV antibiotics for a septic joint. She feels terrible. Orders revised.    12/9/22 - pt. Got a shower this morning. First time in a month according to patient. Requesting a toradol shot.    12/10/22- patient fell in shower this am stating knees feel painful. She has had a left knee surgery.     12/12/22 - pt. Wants to get antibiotics at home. She had a fall this weekend. Will continue vancomycin at home per patient's request.    12/13/22 - pt.'s family could not take her home yesterday. Pt. Is mad about this. Sister is here and states that they will try to get her today.

## 2022-12-07 NOTE — PLAN OF CARE
Problem: Adult Inpatient Plan of Care  Goal: Plan of Care Review  Outcome: Ongoing, Progressing  Goal: Patient-Specific Goal (Individualized)  Outcome: Ongoing, Progressing     Problem: Fall Injury Risk  Goal: Absence of Fall and Fall-Related Injury  Outcome: Ongoing, Progressing  Intervention: Identify and Manage Contributors  Flowsheets (Taken 12/7/2022 1728)  Self-Care Promotion:   independence encouraged   BADL personal objects within reach   BADL personal routines maintained  Medication Review/Management:   medications reviewed   high-risk medications identified  Intervention: Promote Injury-Free Environment  Flowsheets (Taken 12/7/2022 1728)  Safety Promotion/Fall Prevention:   assistive device/personal item within reach   diversional activities provided   high risk medications identified   medications reviewed   muscle strengthening facilitated   nonskid shoes/socks when out of bed   side rails raised x 3   instructed to call staff for mobility

## 2022-12-07 NOTE — PROGRESS NOTES
Pharmacy is assisting with management of vancomycin for bacteremia and abscesses in this 53 y/o female patient transferred from main [facility].    Will continue current vancomycin as ordered at [facility], 1500mg every 18 hours.    Troughs ordered appropriately.    Pharmacy will continue to monitor and make adjustments as needed.    Thank you for the consult,  Tammy Young, PharmD  [phone]

## 2022-12-07 NOTE — PT/OT/SLP PROGRESS
Occupational Therapy Treatment note    OT attempted treatment with pt. She is awaiting D/C to another facility. Pt is only agreeable to donning her shirt. Pt required max(A) to don pull over shirt. Pt educated to don (L) UE first due to (L) shoulder being very limited and painful. Pt declined any other activity. No charge for this session. Pt was left with HOB elevated, call bell in reach, family present and HAYDEE Tam notified.

## 2022-12-07 NOTE — PROGRESS NOTES
[facility] - Medical Surgical Unit  Hospital Medicine  Progress Note    Patient Name: Monica Walker  MRN: [id number]  Patient Class: IP- Swing   Admission Date: 12/6/2022  Length of Stay: 1 days  Attending Physician: Hannah Schulz, *  Primary Care Provider: Hannah Schulz MD        Subjective:     Principal Problem:Septic arthritis        HPI:  No notes on file    Overview/Hospital Course:  12/7/22 - pt. Was admitted last night for continued IV antibiotics for a septic joint. She feels terrible. Orders revised.      Interval History: pt. Not feeling well. Orders revised.    Review of Systems  Objective:     Vital Signs (Most Recent):  Temp: 98.4 °F (36.9 °C) (12/07/22 0700)  Pulse: 94 (12/07/22 0718)  Resp: 19 (12/07/22 0839)  BP: 123/70 (12/07/22 0700)  SpO2: (!) 94 % (12/07/22 0718)   Vital Signs (24h Range):  Temp:  [98.4 °F (36.9 °C)-99.5 °F (37.5 °C)] 98.4 °F (36.9 °C)  Pulse:  [] 94  Resp:  [18-20] 19  SpO2:  [94 %-96 %] 94 %  BP: (123-139)/(66-75) 123/70     Weight: 66.2 kg (146 lb)  Body mass index is 23.57 kg/m².  No intake or output data in the 24 hours ending 12/07/22 0848   Physical Exam    Significant Labs: All pertinent labs within the past 24 hours have been reviewed.    Significant Imaging: I have reviewed all pertinent imaging results/findings within the past 24 hours.      Assessment/Plan:      * Septic arthritis          VTE Risk Mitigation (From admission, onward)         Ordered     enoxaparin injection 70 mg  Every 12 hours         12/06/22 2242                Discharge Planning   SUZANNA:      Code Status: Full Code   Is the patient medically ready for discharge?:     Reason for patient still in hospital (select all that apply): Patient trending condition                     Hannah Schulz MD  Department of Hospital Medicine   [facility] - [facility] Surgical Unit

## 2022-12-07 NOTE — PLAN OF CARE
Problem: Adult Inpatient Plan of Care  Goal: Plan of Care Review  Outcome: Ongoing, Progressing  Goal: Patient-Specific Goal (Individualized)  Outcome: Ongoing, Progressing  Goal: Absence of Hospital-Acquired Illness or Injury  Outcome: Ongoing, Progressing  Goal: Optimal Comfort and Wellbeing  Outcome: Ongoing, Progressing     Problem: Infection  Goal: Absence of Infection Signs and Symptoms  Outcome: Ongoing, Progressing     Problem: Impaired Wound Healing  Goal: Optimal Wound Healing  Outcome: Ongoing, Progressing     Problem: Skin Injury Risk Increased  Goal: Skin Health and Integrity  Outcome: Ongoing, Progressing

## 2022-12-07 NOTE — NURSING
2110- Upon arrival to floor pt has dressing to left knee clean, dry, and intact. She also has sutures intact to left shoulder.

## 2022-12-08 LAB
ANION GAP SERPL CALCULATED.3IONS-SCNC: 11 MMOL/L (ref 7–16)
BUN SERPL-MCNC: 13 MG/DL (ref 7–18)
BUN/CREAT SERPL: 15 (ref 6–20)
CALCIUM SERPL-MCNC: 8.6 MG/DL (ref 8.5–10.1)
CHLORIDE SERPL-SCNC: 93 MMOL/L (ref 98–107)
CO2 SERPL-SCNC: 30 MMOL/L (ref 21–32)
CREAT SERPL-MCNC: 0.84 MG/DL (ref 0.55–1.02)
D DIMER PPP FEU-MCNC: 4.16 ΜG/ML (ref 0–0.47)
DIFFERENTIAL METHOD BLD: ABNORMAL
EGFR (NO RACE VARIABLE) (RUSH/TITUS): 83 ML/MIN/1.73M²
ERYTHROCYTE [DISTWIDTH] IN BLOOD BY AUTOMATED COUNT: 14.4 % (ref 11.5–14.5)
GLUCOSE SERPL-MCNC: 161 MG/DL (ref 70–105)
GLUCOSE SERPL-MCNC: 252 MG/DL (ref 70–105)
GLUCOSE SERPL-MCNC: 278 MG/DL (ref 70–105)
GLUCOSE SERPL-MCNC: 341 MG/DL (ref 74–106)
GLUCOSE SERPL-MCNC: 371 MG/DL (ref 70–105)
HCT VFR BLD AUTO: 29.7 % (ref 38–47)
HGB BLD-MCNC: 9.2 G/DL (ref 12–16)
MCH RBC QN AUTO: 26.3 PG (ref 27–31)
MCHC RBC AUTO-ENTMCNC: 31 G/DL (ref 32–36)
MCV RBC AUTO: 84.9 FL (ref 80–96)
MPC BLD CALC-MCNC: 8.5 FL (ref 9.4–12.4)
PLATELET # BLD AUTO: 526 K/UL (ref 150–400)
POTASSIUM SERPL-SCNC: 4.1 MMOL/L (ref 3.5–5.1)
RBC # BLD AUTO: 3.5 M/UL (ref 4.2–5.4)
SODIUM SERPL-SCNC: 130 MMOL/L (ref 136–145)
TROPONIN I SERPL HS-MCNC: 6.8 PG/ML
WBC # BLD AUTO: 10.65 K/UL (ref 4.5–11)

## 2022-12-08 PROCEDURE — 36415 COLL VENOUS BLD VENIPUNCTURE: CPT | Performed by: INTERNAL MEDICINE

## 2022-12-08 PROCEDURE — 80048 BASIC METABOLIC PNL TOTAL CA: CPT | Performed by: INTERNAL MEDICINE

## 2022-12-08 PROCEDURE — 82962 GLUCOSE BLOOD TEST: CPT

## 2022-12-08 PROCEDURE — 93010 EKG 12-LEAD: ICD-10-PCS | Mod: ,,, | Performed by: INTERNAL MEDICINE

## 2022-12-08 PROCEDURE — 25000003 PHARM REV CODE 250: Performed by: INTERNAL MEDICINE

## 2022-12-08 PROCEDURE — 97110 THERAPEUTIC EXERCISES: CPT

## 2022-12-08 PROCEDURE — 93005 ELECTROCARDIOGRAM TRACING: CPT

## 2022-12-08 PROCEDURE — 94761 N-INVAS EAR/PLS OXIMETRY MLT: CPT

## 2022-12-08 PROCEDURE — 63600175 PHARM REV CODE 636 W HCPCS: Performed by: FAMILY MEDICINE

## 2022-12-08 PROCEDURE — 25500020 PHARM REV CODE 255: Performed by: FAMILY MEDICINE

## 2022-12-08 PROCEDURE — 93010 ELECTROCARDIOGRAM REPORT: CPT | Mod: ,,, | Performed by: INTERNAL MEDICINE

## 2022-12-08 PROCEDURE — 99308 PR NURSING FAC CARE, SUBSEQ, MINOR COMPLIC: ICD-10-PCS | Mod: ,,, | Performed by: INTERNAL MEDICINE

## 2022-12-08 PROCEDURE — 85025 COMPLETE CBC W/AUTO DIFF WBC: CPT | Performed by: INTERNAL MEDICINE

## 2022-12-08 PROCEDURE — 97110 THERAPEUTIC EXERCISES: CPT | Mod: CQ

## 2022-12-08 PROCEDURE — 25000003 PHARM REV CODE 250: Performed by: FAMILY MEDICINE

## 2022-12-08 PROCEDURE — 84484 ASSAY OF TROPONIN QUANT: CPT | Performed by: INTERNAL MEDICINE

## 2022-12-08 PROCEDURE — 63600175 PHARM REV CODE 636 W HCPCS: Performed by: INTERNAL MEDICINE

## 2022-12-08 PROCEDURE — 97530 THERAPEUTIC ACTIVITIES: CPT | Mod: CQ

## 2022-12-08 PROCEDURE — 85379 FIBRIN DEGRADATION QUANT: CPT | Performed by: INTERNAL MEDICINE

## 2022-12-08 PROCEDURE — 11000004 HC SNF PRIVATE

## 2022-12-08 PROCEDURE — 99308 SBSQ NF CARE LOW MDM 20: CPT | Mod: ,,, | Performed by: INTERNAL MEDICINE

## 2022-12-08 RX ORDER — KETOROLAC TROMETHAMINE 30 MG/ML
15 INJECTION, SOLUTION INTRAMUSCULAR; INTRAVENOUS
Status: COMPLETED | OUTPATIENT
Start: 2022-12-08 | End: 2022-12-08

## 2022-12-08 RX ADMIN — DULOXETINE 60 MG: 30 CAPSULE, DELAYED RELEASE ORAL at 08:12

## 2022-12-08 RX ADMIN — VANCOMYCIN HYDROCHLORIDE 1500 MG: 1.5 INJECTION, POWDER, LYOPHILIZED, FOR SOLUTION INTRAVENOUS at 03:12

## 2022-12-08 RX ADMIN — PANTOPRAZOLE SODIUM 40 MG: 40 TABLET, DELAYED RELEASE ORAL at 08:12

## 2022-12-08 RX ADMIN — IOPAMIDOL 80 ML: 755 INJECTION, SOLUTION INTRAVENOUS at 07:12

## 2022-12-08 RX ADMIN — INSULIN ASPART 6 UNITS: 100 INJECTION, SOLUTION INTRAVENOUS; SUBCUTANEOUS at 07:12

## 2022-12-08 RX ADMIN — OXYCODONE HYDROCHLORIDE 10 MG: 5 TABLET ORAL at 08:12

## 2022-12-08 RX ADMIN — INSULIN ASPART 1 UNITS: 100 INJECTION, SOLUTION INTRAVENOUS; SUBCUTANEOUS at 09:12

## 2022-12-08 RX ADMIN — INSULIN ASPART 10 UNITS: 100 INJECTION, SOLUTION INTRAVENOUS; SUBCUTANEOUS at 05:12

## 2022-12-08 RX ADMIN — OXYCODONE HYDROCHLORIDE 10 MG: 5 TABLET ORAL at 12:12

## 2022-12-08 RX ADMIN — DOCUSATE SODIUM 100 MG: 100 CAPSULE, LIQUID FILLED ORAL at 08:12

## 2022-12-08 RX ADMIN — TRAZODONE HYDROCHLORIDE 50 MG: 50 TABLET ORAL at 08:12

## 2022-12-08 RX ADMIN — INSULIN DETEMIR 30 UNITS: 100 INJECTION, SOLUTION SUBCUTANEOUS at 08:12

## 2022-12-08 RX ADMIN — MUPIROCIN 1 G: 20 OINTMENT TOPICAL at 08:12

## 2022-12-08 RX ADMIN — INSULIN ASPART 6 UNITS: 100 INJECTION, SOLUTION INTRAVENOUS; SUBCUTANEOUS at 11:12

## 2022-12-08 RX ADMIN — KETOROLAC TROMETHAMINE 15 MG: 30 INJECTION, SOLUTION INTRAMUSCULAR at 04:12

## 2022-12-08 RX ADMIN — SENNOSIDES AND DOCUSATE SODIUM 1 TABLET: 50; 8.6 TABLET ORAL at 08:12

## 2022-12-08 RX ADMIN — ENOXAPARIN SODIUM 30 MG: 30 INJECTION SUBCUTANEOUS at 04:12

## 2022-12-08 RX ADMIN — OXYCODONE HYDROCHLORIDE 10 MG: 5 TABLET ORAL at 07:12

## 2022-12-08 RX ADMIN — Medication 6 MG: at 08:12

## 2022-12-08 NOTE — PLAN OF CARE
Problem: Impaired Wound Healing  Goal: Optimal Wound Healing  Outcome: Ongoing, Progressing  Intervention: Promote Wound Healing  Flowsheets (Taken 12/8/2022 0028)  Sleep/Rest Enhancement:   awakenings minimized   natural light exposure provided   relaxation techniques promoted   room darkened  Activity Management:   Rolling - L1   Arm raise - L1  Pain Management Interventions:   medication offered   pain management plan reviewed with patient/caregiver   pillow support provided     Problem: Diabetes Comorbidity  Goal: Blood Glucose Level Within Targeted Range  Outcome: Ongoing, Progressing  Intervention: Monitor and Manage Glycemia  Flowsheets (Taken 12/8/2022 0028)  Glycemic Management: blood glucose monitored     Problem: Adult Inpatient Plan of Care  Goal: Plan of Care Review  Outcome: Ongoing, Progressing  Goal: Patient-Specific Goal (Individualized)  Outcome: Ongoing, Progressing  Goal: Absence of Hospital-Acquired Illness or Injury  Outcome: Ongoing, Progressing  Goal: Optimal Comfort and Wellbeing  Outcome: Ongoing, Progressing  Goal: Readiness for Transition of Care  Outcome: Ongoing, Progressing

## 2022-12-08 NOTE — NURSING
In room to change PICC line dressing. During this time glucose was checked with a result of 371. Once finished with patient care, I was cleaning up in room, picking up candy wrappers off the floor. Explained to patient that she did not need to be eating candy, she says the candy belonged to her nephew that visited yesterday. I opened the night stand drawer looking for candy and snacks, there was a clear, blue box of bullets in the back far left corner of the drawer. JOHN Silva RN, DON was notified.

## 2022-12-08 NOTE — PROGRESS NOTES
Patient is a 52-year-old female male with a in the swing bed unit for sepsis and IV antibiotics complained of chest pain all over her anterior chest.  Said it started this morning.  Said it comes and goes sharp and intermittent.  Said this associated with some shortness of breath but no nausea vomiting.  Patient denies any cardiac history including coronary artery disease.  Patient denies any pulmonary lung disease.    Past medical history:  On old chart     Review of systems: See other notes     On physical exam:  Patient is alert oriented she is in no acute distress    Neck:  Supple no JVD bruits or masses     Lungs:  Clear no wheeze rhonchi bronchospasm     Heart:  Normal sinus rhythm without murmur gallop rub     Neurological:  No focal neurologic deficits    EKG showed a sinus tachycardia 103 with no acute ischemic changes     Assessment:  Anterior wall chest pain nonspecific     Plan:  CBC, BMP, troponin, D-dimer, chest x-ray, and give IV Toradol      ADDENDUM 0628 AM 12/9/2022:    THE PATIENT CARDIAC WORKUP WAS NEGATIVE.  THE D-DIMER WAS ELEVATED, CTA OF THE CHEST WAS DONE THAT SHOWED NO PE BUT THERE WAS ATELECTASIS AND POSSIBLY SOME INFILTRATIVE CHANGES THAT WERE NOT SIGNIFICANTLY CHANGED FROM PREVIOUS CHEST X-RAY AT [facility].  PATIENT ALREADY ON ANTIBIOTICS.  APPARENTLY RESPONDED TO TORADOL IV THIS MAY BE PRURITIC TYPE PAIN.

## 2022-12-08 NOTE — NURSING
Patient c/o shooting chest pain to middle left side that takes her breath away, hurting to take a deep breath. Reports it hurts to breathe. VSS, /68, Pulse 102, 02 sat 95% on room air. EKG done and brought to Dr. German, shows sinus tach. Dr. German at bedside after viewing the EKG, says he will order some labs and he will place those orders in [facility].

## 2022-12-08 NOTE — PT/OT/SLP PROGRESS
"Physical Therapy  Treatment    Monica Walker   MRN: [id number]   Admitting Diagnosis: Septic arthritis    PT Received On: 12/08/22  PT Start Time: 1021     PT Stop Time: 1049    PT Total Time (min): 28 min       Billable Minutes:  Gait Training 8, Therapeutic Activity 10, and Therapeutic Exercise 10    Treatment Type: Treatment  PT/PTA: PTA     PTA Visit Number: 1       General Precautions: Standard, fall  Orthopedic Precautions: LLE weight bearing as tolerated  Braces:    Respiratory Status: Room air    Spiritual, Cultural Beliefs, Anabaptism Practices, Values that Affect Care: no    Subjective:  Pt is agreeable to PT tx session.      Pain/Comfort  Pain Rating 1: 7/10  Location - Side 1: Right  Location - Orientation 1: generalized  Location 1: knee  Pain Addressed 1: Distraction    Objective:        Functional Mobility:  Bed Mobility: Supine<>Sit x 1 and Sit<>Supine x 1 with mod A        Transfers:Sit<>Stand 5 x with rolling walker and Mod A       Gait: Pt ambulated approximately 28 feet with rolling walker and CONTACT GUARD ASSIST        Stairs: Unable           Balance:   Static Sit: FAIR+: Able to take MINIMAL challenges from all directions  Dynamic Sit: FAIR+: Maintains balance through MINIMAL excursions of active trunk motion  Static Stand: POOR: Needs MODERATE assist to maintain  Dynamic stand: POOR: Needs MOD (moderate) assist during gait       Treatment and Education:  Ther Ex Reps   Hip ADD  30 x 3"   Hip ABD 3 x 10 yellow TB   Hamstring Curls 2 x 10 yellow TB   SAQ 2 x 10 mod A LLE   Seated Marching 2 x 10 B   Ther Act Reps   Sit<>Stand 5 x        AM-PAC 6 CLICK MOBILITY  How much help from another person does this patient currently need?   1 = Unable, Total/Dependent Assistance  2 = A lot, Maximum/Moderate Assistance  3 = A little, Minimum/Contact Guard/Supervision  4 = None, Modified [place]/Independent    Turning over in bed (including adjusting bedclothes, sheets and blankets)?: 4  Sitting down " on and standing up from a chair with arms (e.g., wheelchair, bedside commode, etc.): 2  Moving from lying on back to sitting on the side of the bed?: 3  Moving to and from a bed to a chair (including a wheelchair)?: 2  Need to walk in hospital room?: 2  Climbing 3-5 steps with a railing?: 1  Basic Mobility Total Score: 14    AM-PAC Raw Score CMS G-Code Modifier Level of Impairment Assistance   6 % Total / Unable   7 - 9 CM 80 - 100% Maximal Assist   10 - 14 CL 60 - 80% Moderate Assist   15 - 19 CK 40 - 60% Moderate Assist   20 - 22 CJ 20 - 40% Minimal Assist   23 CI 1-20% SBA / CGA   24 CH 0% Independent/ Mod I     Patient left supine with call button in reach.    Assessment:  Monica Walker is a 52 y.o. female with a medical diagnosis of Septic arthritis and presents with weakness, impaired endurance, impaired self care skills, impaired functional mobility, impaired balance, decreased lower extremity function, decreased upper extremity function, decreased safety awareness, pain. LPTA treated pt inside hospital room due to decreased pt mobility. Pt displayed increased weakness in LLE during seated marching but was able to complete the exercise. Pt displayed decreased ROM in LLE throughout tx. Pt required mod visual, verbal, and tactile cues for proper posture, form, hold times, and count. Pt required multiple rest breaks secondary to decreased endurance.  Pt presents with forward stooped posture upon initial standing. Pt ambulates with antalgic gait but ambulated 28 feet before having to sit secondary to pain.  Upon completion of tx session, pt left supine in bed with call bell handy.    Rehab identified problem list/impairments: weakness, impaired endurance, impaired self care skills, impaired functional mobility, impaired balance, decreased lower extremity function, decreased upper extremity function, decreased safety awareness, pain    Rehab potential is fair.    Activity tolerance: Fair    Discharge  recommendations: nursing facility, skilled      Barriers to discharge:      Equipment recommendations: 3-in-1 commode, walker, rolling     GOALS:   Multidisciplinary Problems       Physical Therapy Goals          Problem: Physical Therapy    Goal Priority Disciplines Outcome Goal Variances Interventions   Physical Therapy Goal     PT, PT/OT      Description: Short Term Goals  1. Patient will complete 15 reps of B LE exercises with correct form.   2. Patient will complete sit<>stand transfers with min A x 1.  3. Patient will ambulate 20 feet with RW on level surfaces with CGA.     Long Term Goals   1. Patient will complete 30 reps of B LE exercises with correct form.   2. Patient will ambulate 80 feet with RW on level surfaces with CGA.   3. Patient will complete all functional transfers with CGA.                       PLAN:    Patient to be seen 5 x/week to address the above listed problems via gait training, therapeutic exercises, therapeutic activities  Plan of Care expires: 01/20/23  Plan of Care reviewed with: patient    ZEINAB Scott  12/08/2022

## 2022-12-08 NOTE — PT/OT/SLP PROGRESS
"Occupational Therapy  Treatment    Monica Walker   MRN: [id number]   Admitting Diagnosis: Septic arthritis    OT Date of Treatment: 12/08/22   OT Start Time: 1308  OT Stop Time: 1317  OT Total Time (min): 9 min    Billable Minutes:  Therapeutic Exercise 9 min               General Precautions: Standard, fall  Orthopedic Precautions: LLE weight bearing as tolerated  Braces:    Respiratory Status: Room air         Subjective:  Communicated with RN prior to session.    Pain/Comfort  Pain Rating 1: 7/10    Objective:  Patient found with: PICC line     Functional Mobility:  Bed Mobility:       Transfers:        Functional Ambulation: Not attempted due to safety    Activities of Daily Living:         Therapeutic Activities and Exercises:  Patient completed the following for increased strength to increase I with ADLs: 2# DB- RUE chest press, bicep curls x 30, LUE bicep curls x 30; yellow TB- tricep extension BUE x 30; PROM and AAROM to RUE shoulder flexion     AM-PAC 6 CLICK ADL   How much help from another person does this patient currently need?   1 = Unable, Total/Dependent Assistance  2 = A lot, Maximum/Moderate Assistance  3 = A little, Minimum/Contact Guard/Supervision  4 = None, Modified [place]/Independent    Putting on and taking off regular lower body clothing? : 2  Bathing (including washing, rinsing, drying)?: 2  Toileting, which includes using toilet, bedpan, or urinal? : 3  Putting on and taking off regular upper body clothing?: 3  Taking care of personal grooming such as brushing teeth?: 4  Eating meals?: 4  Daily Activity Total Score: 18     AM-PAC Raw Score CMS "G-Code Modifier Level of Impairment Assistance   6 % Total / Unable   7 - 8 CM 80 - 100% Maximal Assist   9-13 CL 60 - 80% Moderate Assist   14 - 19 CK 40 - 60% Moderate Assist   20 - 22 CJ 20 - 40% Minimal Assist   23 CI 1-20% SBA / CGA   24 CH 0% Independent/ Mod I       Patient left supine with call button in " reach    ASSESSMENT:  Monica Walker is a 52 y.o. female with a medical diagnosis of Septic arthritis and presents with decreased tolerance to pain, decreased mobility, decreased strength, decreased endurance.    Rehab identified problem list/impairments:  weakness, impaired endurance, impaired self care skills, impaired functional mobility, impaired balance, decreased lower extremity function, decreased upper extremity function, decreased safety awareness    Rehab potential is fair.    Activity tolerance: Fair    Discharge recommendations: home with home health   Barriers to discharge:      Equipment recommendations: 3-in-1 commode, walker, rolling    GOALS:   Multidisciplinary Problems       Occupational Therapy Goals          Problem: Occupational Therapy    Goal Priority Disciplines Outcome Interventions   Occupational Therapy Goal     OT, PT/OT     Description: Description: Grooming Status:   Short Term Goal: Pt will perform grooming with s/u sitting EOB.   Long Term Goal: Pt will perform grooming/oral hygiene standing at sink with min a     LE dressing Status:   Short Term Goal: Pt will perform LE dressing with mod A.   Long Term Goal: Pt will perform LE dressing with min A.    Toileting Status:   Short Term Goal: Pt will perform toilet hygiene on BSC with mod a.  Long Term Goal: Pt will perform toilet hygiene on toilet with no AE with min a.    Commode Transfer:   Short Term Goal: Pt will perform BSC t/f with mod a.  Long Term Goal:  Pt will perform toilet t/f in bathroom with  min a.     Bathing Status:   Long Term Goal: Pt will perform sponge bath with s/u with no unsafe fatigue.     Strength Status:   Long Term Goal: Pt to perform BUE strengthening with weights and/or body weight to increase ADL independence and safety    Endurance Status:   Short Term Goal:pt to perform 15 min OT treatment with 5 or greater rest breaks  Long Term Goal: pt to perform 30 min OT treat with 3 or less rest breaks                        Plan:  Patient to be seen 5 x/week to address the above listed problems via therapeutic exercises  Plan of Care expires:    Plan of Care reviewed with: patient       Kirti Che, OTR/ETHAN    12/08/2022

## 2022-12-09 LAB
GLUCOSE SERPL-MCNC: 124 MG/DL (ref 70–105)
GLUCOSE SERPL-MCNC: 171 MG/DL (ref 70–105)
GLUCOSE SERPL-MCNC: 366 MG/DL (ref 70–105)
GLUCOSE SERPL-MCNC: 378 MG/DL (ref 70–105)

## 2022-12-09 PROCEDURE — 82962 GLUCOSE BLOOD TEST: CPT

## 2022-12-09 PROCEDURE — 11000004 HC SNF PRIVATE

## 2022-12-09 PROCEDURE — 25000003 PHARM REV CODE 250: Performed by: INTERNAL MEDICINE

## 2022-12-09 PROCEDURE — 99308 PR NURSING FAC CARE, SUBSEQ, MINOR COMPLIC: ICD-10-PCS | Mod: ,,, | Performed by: FAMILY MEDICINE

## 2022-12-09 PROCEDURE — 97116 GAIT TRAINING THERAPY: CPT | Mod: CQ

## 2022-12-09 PROCEDURE — 97110 THERAPEUTIC EXERCISES: CPT

## 2022-12-09 PROCEDURE — 99308 SBSQ NF CARE LOW MDM 20: CPT | Mod: ,,, | Performed by: FAMILY MEDICINE

## 2022-12-09 PROCEDURE — 63600175 PHARM REV CODE 636 W HCPCS: Performed by: FAMILY MEDICINE

## 2022-12-09 PROCEDURE — 25000003 PHARM REV CODE 250: Performed by: FAMILY MEDICINE

## 2022-12-09 PROCEDURE — 97110 THERAPEUTIC EXERCISES: CPT | Mod: CQ

## 2022-12-09 PROCEDURE — 94761 N-INVAS EAR/PLS OXIMETRY MLT: CPT

## 2022-12-09 PROCEDURE — 63600175 PHARM REV CODE 636 W HCPCS: Performed by: INTERNAL MEDICINE

## 2022-12-09 RX ORDER — KETOROLAC TROMETHAMINE 30 MG/ML
60 INJECTION, SOLUTION INTRAMUSCULAR; INTRAVENOUS ONCE
Status: COMPLETED | OUTPATIENT
Start: 2022-12-09 | End: 2022-12-09

## 2022-12-09 RX ADMIN — INSULIN DETEMIR 30 UNITS: 100 INJECTION, SOLUTION SUBCUTANEOUS at 08:12

## 2022-12-09 RX ADMIN — VANCOMYCIN HYDROCHLORIDE 1500 MG: 1.5 INJECTION, POWDER, LYOPHILIZED, FOR SOLUTION INTRAVENOUS at 08:12

## 2022-12-09 RX ADMIN — OXYCODONE HYDROCHLORIDE 10 MG: 5 TABLET ORAL at 11:12

## 2022-12-09 RX ADMIN — INSULIN ASPART 5 UNITS: 100 INJECTION, SOLUTION INTRAVENOUS; SUBCUTANEOUS at 07:12

## 2022-12-09 RX ADMIN — PANTOPRAZOLE SODIUM 40 MG: 40 TABLET, DELAYED RELEASE ORAL at 08:12

## 2022-12-09 RX ADMIN — OXYCODONE HYDROCHLORIDE 10 MG: 5 TABLET ORAL at 05:12

## 2022-12-09 RX ADMIN — DULOXETINE 60 MG: 30 CAPSULE, DELAYED RELEASE ORAL at 08:12

## 2022-12-09 RX ADMIN — KETOROLAC TROMETHAMINE 60 MG: 30 INJECTION, SOLUTION INTRAMUSCULAR at 08:12

## 2022-12-09 RX ADMIN — POLYETHYLENE GLYCOL 3350 34 G: 17 POWDER, FOR SOLUTION ORAL at 08:12

## 2022-12-09 RX ADMIN — SENNOSIDES AND DOCUSATE SODIUM 1 TABLET: 50; 8.6 TABLET ORAL at 08:12

## 2022-12-09 RX ADMIN — OXYCODONE HYDROCHLORIDE 10 MG: 5 TABLET ORAL at 07:12

## 2022-12-09 RX ADMIN — INSULIN ASPART 10 UNITS: 100 INJECTION, SOLUTION INTRAVENOUS; SUBCUTANEOUS at 10:12

## 2022-12-09 RX ADMIN — ENOXAPARIN SODIUM 30 MG: 30 INJECTION SUBCUTANEOUS at 05:12

## 2022-12-09 RX ADMIN — DOCUSATE SODIUM 100 MG: 100 CAPSULE, LIQUID FILLED ORAL at 08:12

## 2022-12-09 NOTE — NURSING
Notified Dr. German of elevated D-Dimer. He asked what her creatine level was and said to order CTA. X-ray notified of new order.

## 2022-12-09 NOTE — NURSING
Jacek Ordaz and I at bedside, 1/2 a box of bullets was removed from patient's room and placed in the purple bin in the med room. Upon looking, there doesn't seem to be a firearm in the room. Patient denies having a firearm. She says that someone will come get the bullets.

## 2022-12-09 NOTE — SUBJECTIVE & OBJECTIVE
Interval History: requesting a toradol shot.    Review of Systems  Objective:     Vital Signs (Most Recent):  Temp: 98.5 °F (36.9 °C) (12/09/22 0805)  Pulse: 96 (12/09/22 0805)  Resp: 18 (12/09/22 0805)  BP: 111/74 (12/09/22 0805)  SpO2: 98 % (12/09/22 0805) Vital Signs (24h Range):  Temp:  [97.3 °F (36.3 °C)-98.5 °F (36.9 °C)] 98.5 °F (36.9 °C)  Pulse:  [] 96  Resp:  [18-21] 18  SpO2:  [93 %-98 %] 98 %  BP: (111-123)/(68-82) 111/74     Weight: 58.5 kg (129 lb)  Body mass index is 20.82 kg/m².    Intake/Output Summary (Last 24 hours) at 12/9/2022 0826  Last data filed at 12/8/2022 1726  Gross per 24 hour   Intake 720 ml   Output --   Net 720 ml      Physical Exam    Significant Labs: All pertinent labs within the past 24 hours have been reviewed.    Significant Imaging: I have reviewed all pertinent imaging results/findings within the past 24 hours.

## 2022-12-09 NOTE — PT/OT/SLP PROGRESS
"Physical Therapy  Treatment    Monica Walker   MRN: [id number]   Admitting Diagnosis: Septic arthritis    PT Received On: 12/09/22  PT Start Time: 1305     PT Stop Time: 1342    PT Total Time (min): 37 min       Billable Minutes:  Gait Training 8, Therapeutic Activity 10, Therapeutic Exercise 19    Treatment Type: Treatment  PT/PTA: PTA     PTA Visit Number: 2       General Precautions: Standard, fall  Orthopedic Precautions: LLE weight bearing as tolerated  Braces: N/A  Respiratory Status: Room air    Spiritual, Cultural Beliefs, Lutheran Practices, Values that Affect Care: no    Subjective:  Pt reports minimal pain and is agreeable to PT tx.      Pain/Comfort  Pain Rating 1: 7/10  Location - Side 1: Left  Location - Orientation 1: generalized  Location 1: knee  Pain Addressed 1: Distraction    Objective:   Patient found with: PICC line    Functional Mobility:  Bed Mobility: Not Completed       Transfers:Sit<>Stand 5 x with rolling walker and Mod A       Gait: Pt ambulated approximately 22 feet with rolling walker and CONTACT GUARD ASSIST        Stairs: Unable           Balance:   Static Sit: FAIR+: Able to take MINIMAL challenges from all directions  Dynamic Sit: FAIR+: Maintains balance through MINIMAL excursions of active trunk motion  Static Stand: POOR: Needs MODERATE assist to maintain  Dynamic stand: POOR: Needs MOD (moderate) assist during gait       Treatment and Education:  Ther Ex Reps   Ankle Pumps 30 x   Quad Sets 30 x 3"   Glute Sets 30 x 3"   Hip ADD  30 x 3"   Hip ABD 3 x 10 yellow TB   Hamstring Curls 2 x 10 yellow TB   SAQ 2 x 10 mod A LLE   Seated Marching 2 x 10 B   Ther Act Reps   Sit<>Stand 5 x        AM-PAC 6 CLICK MOBILITY  How much help from another person does this patient currently need?   1 = Unable, Total/Dependent Assistance  2 = A lot, Maximum/Moderate Assistance  3 = A little, Minimum/Contact Guard/Supervision  4 = None, Modified [place]/Independent    Turning over in bed " (including adjusting bedclothes, sheets and blankets)?: 4  Sitting down on and standing up from a chair with arms (e.g., wheelchair, bedside commode, etc.): 2  Moving from lying on back to sitting on the side of the bed?: 3  Moving to and from a bed to a chair (including a wheelchair)?: 2  Need to walk in hospital room?: 2  Climbing 3-5 steps with a railing?: 1  Basic Mobility Total Score: 14    AM-PAC Raw Score CMS G-Code Modifier Level of Impairment Assistance   6 % Total / Unable   7 - 9 CM 80 - 100% Maximal Assist   10 - 14 CL 60 - 80% Moderate Assist   15 - 19 CK 40 - 60% Moderate Assist   20 - 22 CJ 20 - 40% Minimal Assist   23 CI 1-20% SBA / CGA   24 CH 0% Independent/ Mod I     Patient left supine with call button in reach.    Assessment:  Monica Walker is a 52 y.o. female with a medical diagnosis of Septic arthritis and presents with weakness, impaired endurance, impaired self care skills, impaired functional mobility, impaired balance, decreased lower extremity function, decreased upper extremity function, decreased safety awareness, pain. Pt required increased time and effort to progress through tx secondary to pain and lack of focus. Pt displayed increased strength in BLE compared to previous tx.  Pt required mod visual, verbal, and tactile cues for proper posture, form, hold times, count, and focus. Pt required multiple rest breaks secondary to decreased endurance.  Pt presents with forward stooped posture upon initial standing but corrected it after cueing.  Upon completion of tx session, pt returned to room and left on toilet in bathroom with Nurse Rivera notified.    Rehab identified problem list/impairments: weakness, impaired endurance, impaired self care skills, impaired functional mobility, impaired balance, decreased upper extremity function, decreased lower extremity function, decreased safety awareness    Rehab potential is fair.    Activity tolerance: Fair    Discharge  recommendations: home with home health      Barriers to discharge:      Equipment recommendations: 3-in-1 commode, walker, rolling     GOALS:   Multidisciplinary Problems       Physical Therapy Goals          Problem: Physical Therapy    Goal Priority Disciplines Outcome Goal Variances Interventions   Physical Therapy Goal     PT, PT/OT      Description: Short Term Goals  1. Patient will complete 15 reps of B LE exercises with correct form.   2. Patient will complete sit<>stand transfers with min A x 1.  3. Patient will ambulate 20 feet with RW on level surfaces with CGA.     Long Term Goals   1. Patient will complete 30 reps of B LE exercises with correct form.   2. Patient will ambulate 80 feet with RW on level surfaces with CGA.   3. Patient will complete all functional transfers with CGA.                       PLAN:    Patient to be seen 5 x/week to address the above listed problems via gait training, therapeutic exercises  Plan of Care expires: 01/20/23  Plan of Care reviewed with: ZEINAB Davison  12/09/2022

## 2022-12-09 NOTE — PLAN OF CARE
Problem: Infection  Goal: Absence of Infection Signs and Symptoms  Outcome: Ongoing, Progressing  Intervention: Prevent or Manage Infection  Flowsheets (Taken 12/9/2022 1407)  Fever Reduction/Comfort Measures: fluid intake increased  Infection Management: aseptic technique maintained  Isolation Precautions: precautions maintained

## 2022-12-09 NOTE — CONSULTS
"2  [facility]  Adult Nutrition  Consult Note    SUMMARY     Recommendations    Recommendation/Intervention: 1. Arginaid powder BID 2. F/U for intake support 3. MVM daily  Goals: Meet EEN >75%. BG <200mg/dL  Nutrition Goal Status: new    Assessment and Plan    No new Assessment & Plan notes have been filed under this hospital service since the last note was generated.  Service: Nutrition       Malnutrition Assessment  Malnutrition Type: chronic illness, acute illness or injury  Hair/Scalp (Micronutrient): dry, dull               Wallace Region (Muscle Loss): moderate depletion  Clavicle Bone Region (Muscle Loss): moderate depletion            Severe Weight Loss (Malnutrition): greater than 5% in 1 month (20% WL)    Reason for Assessment    Reason For Assessment: consult (swing bed)  Diagnosis: diabetes diagnosis/complications (septic arthritis with recent drainage of L shoulder and knee, skin breakdown to buttocks, hx of drug abuse)  Relevant Medical History: Depression, drug abuse, tobacco abuse, HTN, DM, recent gastroenteritis  Interdisciplinary Rounds: did not attend  General Information Comments: RDN visited pt today and she talked RDN but kept her eyes closed. She stated that her UBW: ~160#.  Meal intake ~45%.   She denied complaints or requests.    Nutrition Risk Screen    Nutrition Risk Screen: large or nonhealing wound, burn or pressure injury    Nutrition/Diet History    Patient Reported Diet/Restrictions/Preferences: general  Spiritual, Cultural Beliefs, Cheondoism Practices, Values that Affect Care: no  Food Allergies: NKFA  Factors Affecting Nutritional Intake: decreased appetite    Anthropometrics    Temp: 98.5 °F (36.9 °C)  Height Method: Stated  Height: 5' 6" (167.6 cm)  Height (inches): 66 in  Weight Method: Standard Scale  Weight: 66 kg (145 lb 8.1 oz)  Weight (lb): 145.51 lb  Ideal Body Weight (IBW), Female: 130 lb  % Ideal Body Weight, Female (lb): 111.93 %  BMI " (Calculated): 23.5  BMI Grade: 18.5-24.9 - normal  Weight Loss: unintentional  Usual Body Weight (UBW), k kg  % Usual Body Weight: 90.6  % Weight Change From Usual Weight: -9.59 %       Lab/Procedures/Meds    Pertinent Labs Reviewed: reviewed  Pertinent Labs Comments: B-378, Hgb 9.2, Hct 29.7, , Cl 93, Mg 1.5, alb 1.7  Pertinent Medications Reviewed: reviewed  Pertinent Medications Comments: Protonix, Insulin, Vancyomycin    Physical Findings/Assessment         Estimated/Assessed Needs    Weight Used For Calorie Calculations: 59 kg (130 lb)  Energy Calorie Requirements (kcal): 4386-2833  Energy Need Method: Kcal/kg  Protein Requirements: 59-71  Weight Used For Protein Calculations: 59 kg (130 lb 1.1 oz)     Estimated Fluid Requirement Method: RDA Method  RDA Method (mL): 1769         Nutrition Prescription Ordered    Current Diet Order: Diabetic diet    Evaluation of Received Nutrient/Fluid Intake    Energy Calories Required: not meeting needs  Protein Required: not meeting needs  Fluid Required: not meeting needs  Comments: RDN enc intake and voicing prefs  Tolerance: tolerating  % Intake of Estimated Energy Needs: 25 - 50 %  % Meal Intake: 25 - 50 %    Nutrition Risk    Level of Risk/Frequency of Follow-up: high       Monitor and Evaluation    Food and Nutrient Intake: food and beverage intake  Anthropometric Measurements: weight  Biochemical Data, Medical Tests and Procedures: electrolyte and renal panel, glucose/endocrine profile  Nutrition-Focused Physical Findings: overall appearance, skin       Nutrition Follow-Up    RD Follow-up?: Yes

## 2022-12-09 NOTE — PLAN OF CARE
Problem: Adult Inpatient Plan of Care  Goal: Plan of Care Review  Outcome: Ongoing, Progressing  Flowsheets (Taken 12/9/2022 0616)  Plan of Care Reviewed With: patient  Goal: Absence of Hospital-Acquired Illness or Injury  Outcome: Ongoing, Progressing  Intervention: Identify and Manage Fall Risk  Flowsheets (Taken 12/9/2022 0616)  Safety Promotion/Fall Prevention:   assistive device/personal item within reach   instructed to call staff for mobility   muscle strengthening facilitated   high risk medications identified   bedside commode chair   commode/urinal/bedpan at bedside   diversional activities provided   Fall Risk reviewed with patient/family   medications reviewed   side rails raised x 2   toileting scheduled  Intervention: Prevent Skin Injury  Flowsheets (Taken 12/9/2022 0616)  Body Position: position changed independently  Intervention: Prevent and Manage VTE (Venous Thromboembolism) Risk  Flowsheets (Taken 12/9/2022 0616)  Activity Management: Ambulated to bathroom - L4  VTE Prevention/Management: ROM (active) performed  Range of Motion: active ROM (range of motion) encouraged  Intervention: Prevent Infection  Flowsheets (Taken 12/9/2022 0616)  Infection Prevention: hand hygiene promoted  Goal: Optimal Comfort and Wellbeing  Outcome: Ongoing, Progressing  Intervention: Monitor Pain and Promote Comfort  Flowsheets (Taken 12/9/2022 0616)  Pain Management Interventions:   medication offered   pain management plan reviewed with patient/caregiver   pillow support provided     Problem: Diabetes Comorbidity  Goal: Blood Glucose Level Within Targeted Range  Outcome: Ongoing, Progressing  Intervention: Monitor and Manage Glycemia  Flowsheets (Taken 12/9/2022 0616)  Glycemic Management: blood glucose monitored     Problem: Infection  Goal: Absence of Infection Signs and Symptoms  Intervention: Prevent or Manage Infection  Flowsheets (Taken 12/9/2022 0616)  Isolation Precautions: precautions maintained

## 2022-12-09 NOTE — NURSING
During shift change with bedside report,ing patient was eating popcorn and box of [employer]. Nurse educated member on how sweets elevates blood sugar. Patient reported that she was not going to eat anymore [employer].

## 2022-12-09 NOTE — PT/OT/SLP PROGRESS
"Occupational Therapy  Treatment    Monica Walker   MRN: [id number]   Admitting Diagnosis: Septic arthritis    OT Date of Treatment: 12/09/22   OT Start Time: 1227  OT Stop Time: 1255  OT Total Time (min): 28 min    Billable Minutes:  Therapeutic Exercise 28 min               General Precautions: Standard, fall  Orthopedic Precautions: LLE weight bearing as tolerated  Braces:    Respiratory Status: Room air         Subjective:  Communicated with RN prior to session.    Pain/Comfort  Pain Rating 1: 7/10    Objective:  Patient found with: PICC line     Functional Mobility:  Bed Mobility:       Transfers: Bed to chair t/f x min a        Functional Ambulation:     Activities of Daily Living:         Therapeutic Activities and Exercises:  Patient completed the following for increased strength to increase I with ADLs: 2# DB- RUE shoulder press, chest press, bicep curls x 30, LUE 0# shoulder press, chest press, bicep curls x 30; yellow TB- tricep extension BUE x 30;     AM-PAC 6 CLICK ADL   How much help from another person does this patient currently need?   1 = Unable, Total/Dependent Assistance  2 = A lot, Maximum/Moderate Assistance  3 = A little, Minimum/Contact Guard/Supervision  4 = None, Modified [place]/Independent    Putting on and taking off regular lower body clothing? : 2  Bathing (including washing, rinsing, drying)?: 2  Toileting, which includes using toilet, bedpan, or urinal? : 3  Putting on and taking off regular upper body clothing?: 3  Taking care of personal grooming such as brushing teeth?: 4  Eating meals?: 4  Daily Activity Total Score: 18     AM-PAC Raw Score CMS "G-Code Modifier Level of Impairment Assistance   6 % Total / Unable   7 - 8 CM 80 - 100% Maximal Assist   9-13 CL 60 - 80% Moderate Assist   14 - 19 CK 40 - 60% Moderate Assist   20 - 22 CJ 20 - 40% Minimal Assist   23 CI 1-20% SBA / CGA   24 CH 0% Independent/ Mod I       Patient left supine with  PT " notified    ASSESSMENT:  Monica Walker is a 52 y.o. female with a medical diagnosis of Septic arthritis and presents with decreased tolerance to pain, decreased mobility, decreased strength, decreased endurance.    Rehab identified problem list/impairments:  weakness, impaired endurance, impaired self care skills, impaired functional mobility, impaired balance, decreased lower extremity function, decreased upper extremity function, decreased safety awareness    Rehab potential is fair.    Activity tolerance: Good    Discharge recommendations: home with home health   Barriers to discharge:      Equipment recommendations: 3-in-1 commode    GOALS:   Multidisciplinary Problems       Occupational Therapy Goals          Problem: Occupational Therapy    Goal Priority Disciplines Outcome Interventions   Occupational Therapy Goal     OT, PT/OT     Description: Description: Grooming Status:   Short Term Goal: Pt will perform grooming with s/u sitting EOB.   Long Term Goal: Pt will perform grooming/oral hygiene standing at sink with min a     LE dressing Status:   Short Term Goal: Pt will perform LE dressing with mod A.   Long Term Goal: Pt will perform LE dressing with min A.    Toileting Status:   Short Term Goal: Pt will perform toilet hygiene on BSC with mod a.  Long Term Goal: Pt will perform toilet hygiene on toilet with no AE with min a.    Commode Transfer:   Short Term Goal: Pt will perform BSC t/f with mod a.  Long Term Goal:  Pt will perform toilet t/f in bathroom with  min a.     Bathing Status:   Long Term Goal: Pt will perform sponge bath with s/u with no unsafe fatigue.     Strength Status:   Long Term Goal: Pt to perform BUE strengthening with weights and/or body weight to increase ADL independence and safety    Endurance Status:   Short Term Goal:pt to perform 15 min OT treatment with 5 or greater rest breaks  Long Term Goal: pt to perform 30 min OT treat with 3 or less rest breaks                        Plan:  Patient to be seen 5 x/week to address the above listed problems via therapeutic exercises  Plan of Care expires:    Plan of Care reviewed with: patient       Kirti Che, OTR/ETHAN    12/09/2022

## 2022-12-09 NOTE — NURSING
Pt told OT that she was ready to go home. I spoke with pt who states she wants to go home with home health, pt requested [facility], I contacted LOW Keen[place] with [facility] about referral. Information faxed to [facility] regarding Vancomycin infusions, Nursing Staff spoke with pt's mother who is willing to learn to give iv infusion but is currently taking care of sick children. It was discussed with pt and agreed on that it would be best to wait until Monday to d/c home.

## 2022-12-10 PROBLEM — M25.561 ACUTE PAIN OF BOTH KNEES: Status: ACTIVE | Noted: 2022-12-10

## 2022-12-10 PROBLEM — M25.562 ACUTE PAIN OF BOTH KNEES: Status: ACTIVE | Noted: 2022-12-10

## 2022-12-10 LAB
GLUCOSE SERPL-MCNC: 209 MG/DL (ref 70–105)
GLUCOSE SERPL-MCNC: 236 MG/DL (ref 70–105)
GLUCOSE SERPL-MCNC: 243 MG/DL (ref 70–105)
GLUCOSE SERPL-MCNC: 82 MG/DL (ref 70–105)

## 2022-12-10 PROCEDURE — 63600175 PHARM REV CODE 636 W HCPCS: Performed by: FAMILY MEDICINE

## 2022-12-10 PROCEDURE — 25000003 PHARM REV CODE 250: Performed by: INTERNAL MEDICINE

## 2022-12-10 PROCEDURE — 94761 N-INVAS EAR/PLS OXIMETRY MLT: CPT

## 2022-12-10 PROCEDURE — 25000003 PHARM REV CODE 250: Performed by: FAMILY MEDICINE

## 2022-12-10 PROCEDURE — 82962 GLUCOSE BLOOD TEST: CPT

## 2022-12-10 PROCEDURE — 63600175 PHARM REV CODE 636 W HCPCS: Performed by: INTERNAL MEDICINE

## 2022-12-10 PROCEDURE — 11000004 HC SNF PRIVATE

## 2022-12-10 RX ADMIN — MUPIROCIN 1 G: 20 OINTMENT TOPICAL at 08:12

## 2022-12-10 RX ADMIN — VANCOMYCIN HYDROCHLORIDE 1500 MG: 1.5 INJECTION, POWDER, LYOPHILIZED, FOR SOLUTION INTRAVENOUS at 09:12

## 2022-12-10 RX ADMIN — INSULIN ASPART 4 UNITS: 100 INJECTION, SOLUTION INTRAVENOUS; SUBCUTANEOUS at 10:12

## 2022-12-10 RX ADMIN — OXYCODONE HYDROCHLORIDE 10 MG: 5 TABLET ORAL at 03:12

## 2022-12-10 RX ADMIN — PANTOPRAZOLE SODIUM 40 MG: 40 TABLET, DELAYED RELEASE ORAL at 08:12

## 2022-12-10 RX ADMIN — OXYCODONE HYDROCHLORIDE 10 MG: 5 TABLET ORAL at 08:12

## 2022-12-10 RX ADMIN — ENOXAPARIN SODIUM 30 MG: 30 INJECTION SUBCUTANEOUS at 04:12

## 2022-12-10 RX ADMIN — INSULIN DETEMIR 30 UNITS: 100 INJECTION, SOLUTION SUBCUTANEOUS at 08:12

## 2022-12-10 RX ADMIN — VANCOMYCIN HYDROCHLORIDE 1500 MG: 1.5 INJECTION, POWDER, LYOPHILIZED, FOR SOLUTION INTRAVENOUS at 03:12

## 2022-12-10 RX ADMIN — OXYCODONE HYDROCHLORIDE 10 MG: 5 TABLET ORAL at 09:12

## 2022-12-10 RX ADMIN — OXYCODONE HYDROCHLORIDE 10 MG: 5 TABLET ORAL at 02:12

## 2022-12-10 RX ADMIN — DULOXETINE 60 MG: 30 CAPSULE, DELAYED RELEASE ORAL at 08:12

## 2022-12-10 NOTE — PROGRESS NOTES
[facility]  Hospital Medicine  Progress Note    Patient Name: Monica Walker  MRN: [id number]  Patient Class: IP- Swing   Admission Date: 12/6/2022  Length of Stay: 4 days  Attending Physician: Hannah Schulz, *  Primary Care Provider: Hannah Schulz MD        Subjective:     Principal Problem:Septic arthritis        HPI:  No notes on file    Overview/Hospital Course:  12/7/22 - pt. Was admitted last night for continued IV antibiotics for a septic joint. She feels terrible. Orders revised.    12/9/22 - pt. Got a shower this morning. First time in a month according to patient. Requesting a toradol shot.    12/10/22- patient fell in shower this am stating knees feel painful. She has had a left knee surgery.       Interval History: Patient fell in shower onto buttocks and right knee. No other complaints    Review of Systems   Constitutional: Negative.    HENT: Negative.     Eyes: Negative.    Respiratory: Negative.     Endocrine: Negative.    Genitourinary: Negative.    Musculoskeletal: Negative.         Left knee: soaked bandage no swelling  Right knee: no swelling or bruising noted   Skin: Negative.    Allergic/Immunologic: Negative.    Neurological: Negative.    Hematological: Negative.    Psychiatric/Behavioral: Negative.     All other systems reviewed and are negative.  Objective:     Vital Signs (Most Recent):  Temp: 98.9 °F (37.2 °C) (12/09/22 1930)  Pulse: 97 (12/09/22 1930)  Resp: 18 (12/10/22 0206)  BP: 132/72 (12/09/22 1930)  SpO2: 99 % (12/09/22 1930) Vital Signs (24h Range):  Temp:  [97.6 °F (36.4 °C)-98.9 °F (37.2 °C)] 98.9 °F (37.2 °C)  Pulse:  [86-97] 97  Resp:  [16-21] 18  SpO2:  [93 %-99 %] 99 %  BP: (111-132)/(72-82) 132/72     Weight: 66 kg (145 lb 8.1 oz)  Body mass index is 23.48 kg/m².  No intake or output data in the 24 hours ending 12/10/22 0523   Physical Exam  Vitals and nursing note reviewed. Exam conducted with a chaperone present.    Constitutional:       Appearance: Normal appearance.   HENT:      Head: Normocephalic and atraumatic.      Nose: Nose normal.      Mouth/Throat:      Mouth: Mucous membranes are moist.   Eyes:      Pupils: Pupils are equal, round, and reactive to light.   Pulmonary:      Effort: Pulmonary effort is normal.   Musculoskeletal:         General: Tenderness present. No swelling or deformity. Normal range of motion.      Cervical back: Normal range of motion and neck supple.      Right lower leg: No edema.      Left lower leg: No edema.      Comments: Left knee bandaged - nursing to change due to water soaked   Right knee: no swelling tender  ( patient fell directly on this knee)   Neurological:      General: No focal deficit present.      Mental Status: She is alert.   Psychiatric:         Thought Content: Thought content normal.       Significant Labs: All pertinent labs within the past 24 hours have been reviewed.    Significant Imaging: I have reviewed all pertinent imaging results/findings within the past 24 hours.      Assessment/Plan:      * Septic arthritis          VTE Risk Mitigation (From admission, onward)         Ordered     enoxaparin injection 30 mg  Daily         12/07/22 0855                Discharge Planning   SUZANNA:      Code Status: Full Code   Is the patient medically ready for discharge?:     Reason for patient still in hospital (select all that apply): Patient new problem  Discharge Plan A: Home with family                  Rosalina Barth MD  Department of Hospital Medicine   [facility]

## 2022-12-10 NOTE — NURSING
"0515: Nurse to room to help patient get out of shower. Patient got out of shower with nurse and staff X1. Patient dried off with assistance and attempted to put underwear on. Patient let go of walker and fell backwards and landed on buttocks and then rolled to her right side. Patient started crying and states that "my right knee hurts". Pupils are reactive and round, patient is A&OX4. D. Byron to room and helped this nurse get patient up to shower chair and notified physician. Patient denies any head injury. She states that her right knee no longer hurts but her left knee is hurting now. Xray ordered to assess for injury since patient is post op for left knee surgery. Assessment completed. No new swelling noted to either knee. No bruising, no new lacerations noted. VSS. Nurse to change knee dressing. Patients mother notified.   "

## 2022-12-10 NOTE — SUBJECTIVE & OBJECTIVE
Interval History: Patient fell in shower onto buttocks and right knee. No other complaints    Review of Systems   Constitutional: Negative.    HENT: Negative.     Eyes: Negative.    Respiratory: Negative.     Endocrine: Negative.    Genitourinary: Negative.    Musculoskeletal: Negative.         Left knee: soaked bandage no swelling  Right knee: no swelling or bruising noted   Skin: Negative.    Allergic/Immunologic: Negative.    Neurological: Negative.    Hematological: Negative.    Psychiatric/Behavioral: Negative.     All other systems reviewed and are negative.  Objective:     Vital Signs (Most Recent):  Temp: 98.9 °F (37.2 °C) (12/09/22 1930)  Pulse: 97 (12/09/22 1930)  Resp: 18 (12/10/22 0206)  BP: 132/72 (12/09/22 1930)  SpO2: 99 % (12/09/22 1930) Vital Signs (24h Range):  Temp:  [97.6 °F (36.4 °C)-98.9 °F (37.2 °C)] 98.9 °F (37.2 °C)  Pulse:  [86-97] 97  Resp:  [16-21] 18  SpO2:  [93 %-99 %] 99 %  BP: (111-132)/(72-82) 132/72     Weight: 66 kg (145 lb 8.1 oz)  Body mass index is 23.48 kg/m².  No intake or output data in the 24 hours ending 12/10/22 0523   Physical Exam  Vitals and nursing note reviewed. Exam conducted with a chaperone present.   Constitutional:       Appearance: Normal appearance.   HENT:      Head: Normocephalic and atraumatic.      Nose: Nose normal.      Mouth/Throat:      Mouth: Mucous membranes are moist.   Eyes:      Pupils: Pupils are equal, round, and reactive to light.   Pulmonary:      Effort: Pulmonary effort is normal.   Musculoskeletal:         General: Tenderness present. No swelling or deformity. Normal range of motion.      Cervical back: Normal range of motion and neck supple.      Right lower leg: No edema.      Left lower leg: No edema.      Comments: Left knee bandaged - nursing to change due to water soaked   Right knee: no swelling tender  ( patient fell directly on this knee)   Neurological:      General: No focal deficit present.      Mental Status: She is alert.    Psychiatric:         Thought Content: Thought content normal.       Significant Labs: All pertinent labs within the past 24 hours have been reviewed.    Significant Imaging: I have reviewed all pertinent imaging results/findings within the past 24 hours.

## 2022-12-10 NOTE — NURSING
Gosper a noise. Nurse went to room 109. Denise HUBBARD already in room. Noted patient lying on  right side on floor part way in shower. Assist patient up on shower chair per 2 nurses. Dr Barth notified.

## 2022-12-11 LAB
ANION GAP SERPL CALCULATED.3IONS-SCNC: 11 MMOL/L (ref 7–16)
BASOPHILS # BLD AUTO: 0.03 K/UL (ref 0–0.2)
BASOPHILS NFR BLD AUTO: 0.3 % (ref 0–1)
BUN SERPL-MCNC: 16 MG/DL (ref 7–18)
BUN/CREAT SERPL: 19 (ref 6–20)
CALCIUM SERPL-MCNC: 8.6 MG/DL (ref 8.5–10.1)
CHLORIDE SERPL-SCNC: 96 MMOL/L (ref 98–107)
CO2 SERPL-SCNC: 31 MMOL/L (ref 21–32)
CREAT SERPL-MCNC: 0.86 MG/DL (ref 0.55–1.02)
DIFFERENTIAL METHOD BLD: ABNORMAL
EGFR (NO RACE VARIABLE) (RUSH/TITUS): 80 ML/MIN/1.73M²
EOSINOPHIL # BLD AUTO: 0.26 K/UL (ref 0–0.5)
EOSINOPHIL NFR BLD AUTO: 3 % (ref 1–4)
ERYTHROCYTE [DISTWIDTH] IN BLOOD BY AUTOMATED COUNT: 14.5 % (ref 11.5–14.5)
GLUCOSE SERPL-MCNC: 217 MG/DL (ref 74–106)
GLUCOSE SERPL-MCNC: 225 MG/DL (ref 70–105)
GLUCOSE SERPL-MCNC: 256 MG/DL (ref 70–105)
GLUCOSE SERPL-MCNC: 326 MG/DL (ref 70–105)
GLUCOSE SERPL-MCNC: 343 MG/DL (ref 70–105)
HCT VFR BLD AUTO: 27.9 % (ref 38–47)
HGB BLD-MCNC: 8.4 G/DL (ref 12–16)
IMM GRANULOCYTES # BLD AUTO: 0.02 K/UL (ref 0–0.04)
IMM GRANULOCYTES NFR BLD: 0.2 % (ref 0–0.4)
LYMPHOCYTES # BLD AUTO: 1.94 K/UL (ref 1–4.8)
LYMPHOCYTES NFR BLD AUTO: 22 % (ref 27–41)
MCH RBC QN AUTO: 26 PG (ref 27–31)
MCHC RBC AUTO-ENTMCNC: 30.1 G/DL (ref 32–36)
MCV RBC AUTO: 86.4 FL (ref 80–96)
MONOCYTES # BLD AUTO: 0.86 K/UL (ref 0–0.8)
MONOCYTES NFR BLD AUTO: 9.8 % (ref 2–6)
MPC BLD CALC-MCNC: 8.9 FL (ref 9.4–12.4)
NEUTROPHILS # BLD AUTO: 5.7 K/UL (ref 1.8–7.7)
NEUTROPHILS NFR BLD AUTO: 64.7 % (ref 53–65)
PLATELET # BLD AUTO: 412 K/UL (ref 150–400)
POTASSIUM SERPL-SCNC: 4.2 MMOL/L (ref 3.5–5.1)
RBC # BLD AUTO: 3.23 M/UL (ref 4.2–5.4)
SODIUM SERPL-SCNC: 134 MMOL/L (ref 136–145)
WBC # BLD AUTO: 8.81 K/UL (ref 4.5–11)

## 2022-12-11 PROCEDURE — 25000003 PHARM REV CODE 250: Performed by: EMERGENCY MEDICINE

## 2022-12-11 PROCEDURE — 63600175 PHARM REV CODE 636 W HCPCS: Performed by: INTERNAL MEDICINE

## 2022-12-11 PROCEDURE — 80048 BASIC METABOLIC PNL TOTAL CA: CPT | Performed by: FAMILY MEDICINE

## 2022-12-11 PROCEDURE — 25000003 PHARM REV CODE 250: Performed by: INTERNAL MEDICINE

## 2022-12-11 PROCEDURE — 11000004 HC SNF PRIVATE

## 2022-12-11 PROCEDURE — 82962 GLUCOSE BLOOD TEST: CPT

## 2022-12-11 PROCEDURE — 85025 COMPLETE CBC W/AUTO DIFF WBC: CPT | Performed by: FAMILY MEDICINE

## 2022-12-11 PROCEDURE — 94761 N-INVAS EAR/PLS OXIMETRY MLT: CPT

## 2022-12-11 PROCEDURE — 36415 COLL VENOUS BLD VENIPUNCTURE: CPT | Performed by: FAMILY MEDICINE

## 2022-12-11 PROCEDURE — 63600175 PHARM REV CODE 636 W HCPCS: Performed by: FAMILY MEDICINE

## 2022-12-11 RX ORDER — HYDROCODONE BITARTRATE AND ACETAMINOPHEN 5; 325 MG/1; MG/1
1 TABLET ORAL EVERY 6 HOURS PRN
Status: DISCONTINUED | OUTPATIENT
Start: 2022-12-11 | End: 2022-12-13 | Stop reason: HOSPADM

## 2022-12-11 RX ADMIN — INSULIN ASPART 3 UNITS: 100 INJECTION, SOLUTION INTRAVENOUS; SUBCUTANEOUS at 08:12

## 2022-12-11 RX ADMIN — INSULIN DETEMIR 30 UNITS: 100 INJECTION, SOLUTION SUBCUTANEOUS at 08:12

## 2022-12-11 RX ADMIN — VANCOMYCIN HYDROCHLORIDE 1500 MG: 1.5 INJECTION, POWDER, LYOPHILIZED, FOR SOLUTION INTRAVENOUS at 03:12

## 2022-12-11 RX ADMIN — HYDROCODONE BITARTRATE AND ACETAMINOPHEN 1 TABLET: 5; 325 TABLET ORAL at 04:12

## 2022-12-11 RX ADMIN — DULOXETINE 60 MG: 30 CAPSULE, DELAYED RELEASE ORAL at 08:12

## 2022-12-11 RX ADMIN — HYDROCODONE BITARTRATE AND ACETAMINOPHEN 1 TABLET: 5; 325 TABLET ORAL at 09:12

## 2022-12-11 RX ADMIN — ENOXAPARIN SODIUM 30 MG: 30 INJECTION SUBCUTANEOUS at 04:12

## 2022-12-11 RX ADMIN — PANTOPRAZOLE SODIUM 40 MG: 40 TABLET, DELAYED RELEASE ORAL at 08:12

## 2022-12-11 RX ADMIN — INSULIN ASPART 8 UNITS: 100 INJECTION, SOLUTION INTRAVENOUS; SUBCUTANEOUS at 05:12

## 2022-12-11 RX ADMIN — INSULIN ASPART 8 UNITS: 100 INJECTION, SOLUTION INTRAVENOUS; SUBCUTANEOUS at 10:12

## 2022-12-12 LAB
GLUCOSE SERPL-MCNC: 226 MG/DL (ref 70–105)
GLUCOSE SERPL-MCNC: 232 MG/DL (ref 70–105)
GLUCOSE SERPL-MCNC: 335 MG/DL (ref 70–105)
GLUCOSE SERPL-MCNC: 339 MG/DL (ref 70–105)
VANCOMYCIN TROUGH SERPL-MCNC: 15.3 ΜG/ML (ref 10–20)

## 2022-12-12 PROCEDURE — 80202 ASSAY OF VANCOMYCIN: CPT | Performed by: FAMILY MEDICINE

## 2022-12-12 PROCEDURE — 25000003 PHARM REV CODE 250: Performed by: INTERNAL MEDICINE

## 2022-12-12 PROCEDURE — 63600175 PHARM REV CODE 636 W HCPCS: Performed by: FAMILY MEDICINE

## 2022-12-12 PROCEDURE — 94761 N-INVAS EAR/PLS OXIMETRY MLT: CPT

## 2022-12-12 PROCEDURE — 82962 GLUCOSE BLOOD TEST: CPT

## 2022-12-12 PROCEDURE — 99315 PR NURSING FAC DISCHRGE DAY,1-30 MIN: ICD-10-PCS | Mod: ,,, | Performed by: FAMILY MEDICINE

## 2022-12-12 PROCEDURE — 25000003 PHARM REV CODE 250: Performed by: EMERGENCY MEDICINE

## 2022-12-12 PROCEDURE — 97110 THERAPEUTIC EXERCISES: CPT

## 2022-12-12 PROCEDURE — 99315 NF DSCHRG MGMT 30 MIN/LESS: CPT | Mod: ,,, | Performed by: FAMILY MEDICINE

## 2022-12-12 PROCEDURE — 63600175 PHARM REV CODE 636 W HCPCS: Performed by: INTERNAL MEDICINE

## 2022-12-12 PROCEDURE — 11000004 HC SNF PRIVATE

## 2022-12-12 RX ADMIN — INSULIN DETEMIR 30 UNITS: 100 INJECTION, SOLUTION SUBCUTANEOUS at 09:12

## 2022-12-12 RX ADMIN — HYDROCODONE BITARTRATE AND ACETAMINOPHEN 1 TABLET: 5; 325 TABLET ORAL at 09:12

## 2022-12-12 RX ADMIN — ENOXAPARIN SODIUM 30 MG: 30 INJECTION SUBCUTANEOUS at 04:12

## 2022-12-12 RX ADMIN — DULOXETINE 60 MG: 30 CAPSULE, DELAYED RELEASE ORAL at 08:12

## 2022-12-12 RX ADMIN — PANTOPRAZOLE SODIUM 40 MG: 40 TABLET, DELAYED RELEASE ORAL at 08:12

## 2022-12-12 RX ADMIN — VANCOMYCIN HYDROCHLORIDE 1500 MG: 1.5 INJECTION, POWDER, LYOPHILIZED, FOR SOLUTION INTRAVENOUS at 09:12

## 2022-12-12 RX ADMIN — INSULIN ASPART 4 UNITS: 100 INJECTION, SOLUTION INTRAVENOUS; SUBCUTANEOUS at 04:12

## 2022-12-12 RX ADMIN — INSULIN ASPART 4 UNITS: 100 INJECTION, SOLUTION INTRAVENOUS; SUBCUTANEOUS at 06:12

## 2022-12-12 RX ADMIN — INSULIN ASPART 8 UNITS: 100 INJECTION, SOLUTION INTRAVENOUS; SUBCUTANEOUS at 10:12

## 2022-12-12 RX ADMIN — INSULIN ASPART 2 UNITS: 100 INJECTION, SOLUTION INTRAVENOUS; SUBCUTANEOUS at 08:12

## 2022-12-12 RX ADMIN — HYDROCODONE BITARTRATE AND ACETAMINOPHEN 1 TABLET: 5; 325 TABLET ORAL at 03:12

## 2022-12-12 NOTE — PLAN OF CARE
Problem: Adult Inpatient Plan of Care  Goal: Plan of Care Review  Outcome: Ongoing, Progressing  Goal: Absence of Hospital-Acquired Illness or Injury  Outcome: Ongoing, Progressing  Goal: Optimal Comfort and Wellbeing  Outcome: Ongoing, Progressing  Goal: Readiness for Transition of Care  Outcome: Ongoing, Progressing     Problem: Infection  Goal: Absence of Infection Signs and Symptoms  Outcome: Ongoing, Progressing     Problem: Impaired Wound Healing  Goal: Optimal Wound Healing  Outcome: Ongoing, Progressing     Problem: Skin Injury Risk Increased  Goal: Skin Health and Integrity  Outcome: Ongoing, Progressing     Problem: Fall Injury Risk  Goal: Absence of Fall and Fall-Related Injury  Outcome: Ongoing, Progressing

## 2022-12-12 NOTE — DISCHARGE SUMMARY
[facility]  Hospital Medicine  Discharge Summary      Patient Name: Monica Walker  MRN: [id number]  FAVIAN: [id number]  Patient Class: IP- Swing  Admission Date: 12/6/2022  Hospital Length of Stay: 6 days  Discharge Date and Time:  12/12/2022 8:37 AM  Attending Physician: Eliseo Freeman, *   Discharging Provider: Eliseo Freeman MD  Primary Care Provider: Eliseo Freeman MD    Primary Care Team: Networked reference to record PCT     HPI:   No notes on file    * No surgery found *      Hospital Course:   12/7/22 - pt. Was admitted last night for continued IV antibiotics for a septic joint. She feels terrible. Orders revised.    12/9/22 - pt. Got a shower this morning. First time in a month according to patient. Requesting a toradol shot.    12/10/22- patient fell in shower this am stating knees feel painful. She has had a left knee surgery.     12/12/22 - pt. Wants to get antibiotics at home. She had a fall this weekend. Will continue vancomycin at home per patient's request.       Goals of Care Treatment Preferences:  Code Status: Full Code      Consults:   Consults (From admission, onward)        Status Ordering Provider     Inpatient consult to Social Work  Once        Provider:  (Not yet assigned)    Acknowledged ELISEO FREEMAN     Inpatient consult to Registered Dietitian/Nutritionist  Once        Provider:  (Not yet assigned)    Completed ELSIEO FREEMAN          No new Assessment & Plan notes have been filed under this hospital service since the last note was generated.  Service: Hospital Medicine    Final Active Diagnoses:    Diagnosis Date Noted POA    PRINCIPAL PROBLEM:  Septic arthritis [M00.9] 01/04/2022 Yes    Acute pain of both knees [M25.561, M25.562] 12/10/2022 Unknown      Problems Resolved During this Admission:       Discharged Condition: stable    Disposition:     Follow Up:    Patient Instructions:   No discharge  "procedures on file.    Significant Diagnostic Studies: Labs: All labs within the past 24 hours have been reviewed    Pending Diagnostic Studies:     Procedure Component Value Units Date/Time    VANCOMYCIN, TROUGH [[id number]] Collected: 12/12/22 0835    Order Status: Sent Lab Status: In process Updated: 12/12/22 0837    Specimen: Blood          Medications:  Reconciled Home Medications:      Medication List      CONTINUE taking these medications    BD ULTRA-FINE SHORT PEN NEEDLE 31 gauge x 5/16" Ndle  Generic drug: pen needle, diabetic     DULoxetine 60 MG capsule  Commonly known as: CYMBALTA  Take 1 capsule (60 mg total) by mouth once daily.     insulin glargine 100 units/mL SubQ pen  Commonly known as: LANTUS SOLOSTAR U-100 INSULIN  Inject 40 Units into the skin once daily.     NEURONTIN ORAL  Take by mouth 2 (two) times a day.     NovoLOG Flexpen U-100 Insulin 100 unit/mL (3 mL) Inpn pen  Generic drug: insulin aspart U-100  Inject 10 Units into the skin 2 (two) times a day.     TRULICITY 0.75 mg/0.5 mL pen injector  Generic drug: dulaglutide  Inject 0.75 mg into the skin every 7 days.            Indwelling Lines/Drains at time of discharge:   Lines/Drains/Airways     Peripherally Inserted Central Catheter Line  Duration           PICC Double Lumen 12/06/22 1344 left basilic 5 days                Time spent on the discharge of patient: 30 minutes         Hannah Schulz MD  Department of Hospital Medicine  [facility]  "

## 2022-12-12 NOTE — PLAN OF CARE
Problem: Occupational Therapy  Goal: Occupational Therapy Goal  Description: Description: Grooming Status:   Short Term Goal: Pt will perform grooming with s/u sitting EOB.   Long Term Goal: Pt will perform grooming/oral hygiene standing at sink with min a     LE dressing Status:   Short Term Goal: Pt will perform LE dressing with mod A.   Long Term Goal: Pt will perform LE dressing with min A.    Toileting Status:   Short Term Goal: Pt will perform toilet hygiene on BSC with mod a.  Long Term Goal: Pt will perform toilet hygiene on toilet with no AE with min a.    Commode Transfer:   Short Term Goal: Pt will perform BSC t/f with mod a.  Long Term Goal:  Pt will perform toilet t/f in bathroom with  min a.     Bathing Status:   Long Term Goal: Pt will perform sponge bath with s/u with no unsafe fatigue.     Strength Status:   Long Term Goal: Pt to perform BUE strengthening with weights and/or body weight to increase ADL independence and safety    Endurance Status:   Short Term Goal:pt to perform 15 min OT treatment with 5 or greater rest breaks  Long Term Goal: pt to perform 30 min OT treat with 3 or less rest breaks  Outcome: Ongoing, Not Progressing

## 2022-12-12 NOTE — PT/OT/SLP PROGRESS
"Occupational Therapy  Treatment    Monica Walker   MRN: [id number]   Admitting Diagnosis: Septic arthritis    OT Date of Treatment: 12/12/22   OT Start Time: 0956  OT Stop Time: 1039  OT Total Time (min): 43 min    Billable Minutes:  Therapeutic Exercise 43 min               General Precautions: Standard, fall  Orthopedic Precautions: LLE weight bearing as tolerated  Braces:    Respiratory Status: Room air         Subjective:  Communicated with RN prior to session.    Pain/Comfort  Pain Rating 1: 7/10    Objective:  Patient found with: PICC line     Functional Mobility:  Bed Mobility:       Transfers: Bed to chair t/f x min a        Functional Ambulation:     Activities of Daily Living:         Therapeutic Activities and Exercises:  Patient completed the following for increased strength to increase I with ADLs: 2# DB- RUE shoulder press, chest press, bicep curls x 30, LUE 0# shoulder press, chest press, bicep curls x 30; yellow TB- tricep extension BUE x 30; UBE 6 min; yellow theraflex x 40 both ways    AM-PAC 6 CLICK ADL   How much help from another person does this patient currently need?   1 = Unable, Total/Dependent Assistance  2 = A lot, Maximum/Moderate Assistance  3 = A little, Minimum/Contact Guard/Supervision  4 = None, Modified [place]/Independent    Putting on and taking off regular lower body clothing? : 2  Bathing (including washing, rinsing, drying)?: 2  Toileting, which includes using toilet, bedpan, or urinal? : 3  Putting on and taking off regular upper body clothing?: 3  Taking care of personal grooming such as brushing teeth?: 4  Eating meals?: 4  Daily Activity Total Score: 18     AM-PAC Raw Score CMS "G-Code Modifier Level of Impairment Assistance   6 % Total / Unable   7 - 8 CM 80 - 100% Maximal Assist   9-13 CL 60 - 80% Moderate Assist   14 - 19 CK 40 - 60% Moderate Assist   20 - 22 CJ 20 - 40% Minimal Assist   23 CI 1-20% SBA / CGA   24 CH 0% Independent/ Mod I       Patient left " supine with  PT notified    ASSESSMENT:  Monica Walker is a 52 y.o. female with a medical diagnosis of Septic arthritis and presents with decreased tolerance to pain, decreased mobility, decreased strength, decreased endurance.    Rehab identified problem list/impairments:  weakness, impaired endurance, impaired self care skills, impaired functional mobility, impaired balance, decreased lower extremity function, decreased upper extremity function, decreased safety awareness    Rehab potential is fair.    Activity tolerance: Good    Discharge recommendations: home with home health   Barriers to discharge:      Equipment recommendations: 3-in-1 commode    GOALS:   Multidisciplinary Problems       Occupational Therapy Goals          Problem: Occupational Therapy    Goal Priority Disciplines Outcome Interventions   Occupational Therapy Goal     OT, PT/OT Ongoing, Not Progressing    Description: Description: Grooming Status:   Short Term Goal: Pt will perform grooming with s/u sitting EOB.   Long Term Goal: Pt will perform grooming/oral hygiene standing at sink with min a     LE dressing Status:   Short Term Goal: Pt will perform LE dressing with mod A.   Long Term Goal: Pt will perform LE dressing with min A.    Toileting Status:   Short Term Goal: Pt will perform toilet hygiene on BSC with mod a.  Long Term Goal: Pt will perform toilet hygiene on toilet with no AE with min a.    Commode Transfer:   Short Term Goal: Pt will perform BSC t/f with mod a.  Long Term Goal:  Pt will perform toilet t/f in bathroom with  min a.     Bathing Status:   Long Term Goal: Pt will perform sponge bath with s/u with no unsafe fatigue.     Strength Status:   Long Term Goal: Pt to perform BUE strengthening with weights and/or body weight to increase ADL independence and safety    Endurance Status:   Short Term Goal:pt to perform 15 min OT treatment with 5 or greater rest breaks  Long Term Goal: pt to perform 30 min OT treat with 3 or  less rest breaks                       Plan:  Patient to be seen 5 x/week to address the above listed problems via therapeutic exercises  Plan of Care expires:    Plan of Care reviewed with: patient       Kirtialan Bolton, OTR/ETHAN    12/12/2022

## 2022-12-13 VITALS
SYSTOLIC BLOOD PRESSURE: 125 MMHG | RESPIRATION RATE: 18 BRPM | OXYGEN SATURATION: 97 % | TEMPERATURE: 99 F | WEIGHT: 145.5 LBS | HEART RATE: 92 BPM | BODY MASS INDEX: 23.38 KG/M2 | DIASTOLIC BLOOD PRESSURE: 60 MMHG | HEIGHT: 66 IN

## 2022-12-13 PROBLEM — N12 PYELONEPHRITIS: Status: ACTIVE | Noted: 2022-12-13

## 2022-12-13 LAB
GLUCOSE SERPL-MCNC: 209 MG/DL (ref 70–105)
GLUCOSE SERPL-MCNC: 254 MG/DL (ref 70–105)

## 2022-12-13 PROCEDURE — 25000003 PHARM REV CODE 250: Performed by: EMERGENCY MEDICINE

## 2022-12-13 PROCEDURE — 82962 GLUCOSE BLOOD TEST: CPT

## 2022-12-13 PROCEDURE — 99308 PR NURSING FAC CARE, SUBSEQ, MINOR COMPLIC: ICD-10-PCS | Mod: ,,, | Performed by: FAMILY MEDICINE

## 2022-12-13 PROCEDURE — 27201920 HC DRESSING, AQUACEL AG

## 2022-12-13 PROCEDURE — 63600175 PHARM REV CODE 636 W HCPCS: Performed by: FAMILY MEDICINE

## 2022-12-13 PROCEDURE — 25000003 PHARM REV CODE 250: Performed by: INTERNAL MEDICINE

## 2022-12-13 PROCEDURE — 94761 N-INVAS EAR/PLS OXIMETRY MLT: CPT

## 2022-12-13 PROCEDURE — 99308 SBSQ NF CARE LOW MDM 20: CPT | Mod: ,,, | Performed by: FAMILY MEDICINE

## 2022-12-13 PROCEDURE — 63600175 PHARM REV CODE 636 W HCPCS: Performed by: INTERNAL MEDICINE

## 2022-12-13 RX ORDER — KETOROLAC TROMETHAMINE 30 MG/ML
60 INJECTION, SOLUTION INTRAMUSCULAR; INTRAVENOUS ONCE
Status: COMPLETED | OUTPATIENT
Start: 2022-12-13 | End: 2022-12-13

## 2022-12-13 RX ADMIN — INSULIN DETEMIR 30 UNITS: 100 INJECTION, SOLUTION SUBCUTANEOUS at 08:12

## 2022-12-13 RX ADMIN — VANCOMYCIN HYDROCHLORIDE 1500 MG: 1.5 INJECTION, POWDER, LYOPHILIZED, FOR SOLUTION INTRAVENOUS at 03:12

## 2022-12-13 RX ADMIN — INSULIN ASPART 6 UNITS: 100 INJECTION, SOLUTION INTRAVENOUS; SUBCUTANEOUS at 10:12

## 2022-12-13 RX ADMIN — KETOROLAC TROMETHAMINE 60 MG: 30 INJECTION, SOLUTION INTRAMUSCULAR at 09:12

## 2022-12-13 RX ADMIN — PANTOPRAZOLE SODIUM 40 MG: 40 TABLET, DELAYED RELEASE ORAL at 08:12

## 2022-12-13 RX ADMIN — INSULIN ASPART 4 UNITS: 100 INJECTION, SOLUTION INTRAVENOUS; SUBCUTANEOUS at 06:12

## 2022-12-13 RX ADMIN — DULOXETINE 60 MG: 30 CAPSULE, DELAYED RELEASE ORAL at 08:12

## 2022-12-13 RX ADMIN — HYDROCODONE BITARTRATE AND ACETAMINOPHEN 1 TABLET: 5; 325 TABLET ORAL at 06:12

## 2022-12-13 NOTE — NURSING
Discharge instructions reviewed with pt and family, found that IV antibiotics were not listed, added those and  family took pt out of the building and into vehicle,contacted home [facility], [employer] to advise that pt is being discharged to home and her dose of Vancomycin is due tonight at 2100, [facility] is already aware of this patient. Pt has all of her personal belongings with her and is discharged to home via w/c in pov with family. PICC line in place at time of d/c

## 2022-12-13 NOTE — NURSING
Spoke with Home Health RN Lynn Elizalde to discuss plan of care to continue with precert of vancomycin and they will follow up with pt once the medication is approved

## 2022-12-13 NOTE — NURSING
Pt's sister April is speaking with Dr Schulz at Nurses' station about discharging the pt this am. Pt is insistent on going home this am and pt's mother will come to get the patient following her doctors appt this am per April.  Discharge was discussed on the previous day and an order was entered but pt stayed through the night and this am the pt and family have made the decision to d/c to home and are very adamant that they will do so.   1159:  Reviewing d/c instructions in room with pt and her mother and sister and other family members, found that the order for vancomycin was not showing on d/c instructions and went out to Nurses' station to correct. Pt was in a wheelchair in the room and RN advised she would be back. Family members pushed pt out of the facility in the wheel chair and pt got into pov. Pt's mother stood at the Nurses' station awaiting the corrected instructions, RN Janet advised that the pt is not yet discharge and does not need to leave until discharge is complete. Pt's mother stated that the pt had gone to get her prescription filled but then stated the pt is still outside. Discharge instructions given to pt's mother and she left the facility. A family member returned the wheelchair to the building.  [facility] was contacted and is already aware of this pt. All of pt's belongings with pt and PICC line in place at time of d/c

## 2022-12-13 NOTE — DISCHARGE INSTRUCTIONS
[facility] will contact you to arrange home visits, if they have not contacted you by the am, then you should contact them; your home antibiotics will be filled by [facility]. You are being given a prescription for Norco 5mg for a total of 30 pills with no refills by Dr Schulz with instructions to use q 6 hr prn. For further doses you will need to keep your appt with Dr Donald on 12/20/22.

## 2022-12-13 NOTE — NURSING
Phone call to the pt and advised an update that the home antibiotic is still pending approval/ precert and [facility] is aware and is handling this process and is also going to contact Ortho doctor to see if an order for a different abx can be obtained. Advised that Home Health is also aware and will not visit pt today as they cannot visit pt on the day she is d/c from hospital, and they will make first visit once the medication is received. Pt verbalized understanding and voiced appreciation for call.

## 2022-12-13 NOTE — NURSING
Spoke with Dr Schulz in reference to possible riddle of abx to daptomycin, Dr Schulz advised that this will need to be ordered by infectios disease or original ordering physician.

## 2022-12-13 NOTE — NURSING
Spoke with Nora at [facility], advised that we are not able to get order changed to daptomycin so please proceed with precert for vanco and will need to seek change of abx with ortho doctor. Nora voiced agreement in plan.

## 2022-12-13 NOTE — NURSING
Spoke with Kimmie from [facility], she advised that vancomycin for home use required prior authorization and that has not yet been received. Updated that the pt has already discharged to home and Home Health has been notified. Will discuss with Swing coordiantor and  supervisor.

## 2022-12-13 NOTE — PROGRESS NOTES
[facility]  Hospital Medicine  Progress Note    Patient Name: Monica Walker  MRN: [id number]  Patient Class: IP- Swing   Admission Date: 12/6/2022  Length of Stay: 7 days  Attending Physician: Hannah Schulz, *  Primary Care Provider: Hannah Schulz MD        Subjective:     Principal Problem:Septic arthritis        HPI:  No notes on file    Overview/Hospital Course:  12/7/22 - pt. Was admitted last night for continued IV antibiotics for a septic joint. She feels terrible. Orders revised.    12/9/22 - pt. Got a shower this morning. First time in a month according to patient. Requesting a toradol shot.    12/10/22- patient fell in shower this am stating knees feel painful. She has had a left knee surgery.     12/12/22 - pt. Wants to get antibiotics at home. She had a fall this weekend. Will continue vancomycin at home per patient's request.    12/13/22 - pt.'s family could not take her home yesterday. Pt. Is mad about this. Sister is here and states that they will try to get her today.      Interval History: family still trying to get home ready for patient.    Review of Systems  Objective:     Vital Signs (Most Recent):  Temp: 99.1 °F (37.3 °C) (12/12/22 1921)  Pulse: 92 (12/13/22 0646)  Resp: 18 (12/13/22 0646)  BP: 125/60 (12/12/22 1921)  SpO2: 97 % (12/13/22 0646) Vital Signs (24h Range):  Temp:  [99.1 °F (37.3 °C)] 99.1 °F (37.3 °C)  Pulse:  [] 92  Resp:  [18-20] 18  SpO2:  [96 %-98 %] 97 %  BP: (125)/(60) 125/60     Weight: 66 kg (145 lb 8.1 oz)  Body mass index is 23.48 kg/m².  No intake or output data in the 24 hours ending 12/13/22 0839   Physical Exam    Significant Labs: All pertinent labs within the past 24 hours have been reviewed.    Significant Imaging: I have reviewed all pertinent imaging results/findings within the past 24 hours.      Assessment/Plan:      * Septic arthritis          VTE Risk Mitigation (From admission, onward)          Ordered     enoxaparin injection 30 mg  Daily         12/07/22 0855                Discharge Planning   SUZANNA: 12/12/2022     Code Status: Full Code   Is the patient medically ready for discharge?:     Reason for patient still in hospital (select all that apply): Patient trending condition  Discharge Plan A: Home with family                  Hannah Schulz MD  Department of Hospital Medicine   [facility]

## 2022-12-13 NOTE — SUBJECTIVE & OBJECTIVE
Interval History: family still trying to get home ready for patient.    Review of Systems  Objective:     Vital Signs (Most Recent):  Temp: 99.1 °F (37.3 °C) (12/12/22 1921)  Pulse: 92 (12/13/22 0646)  Resp: 18 (12/13/22 0646)  BP: 125/60 (12/12/22 1921)  SpO2: 97 % (12/13/22 0646) Vital Signs (24h Range):  Temp:  [99.1 °F (37.3 °C)] 99.1 °F (37.3 °C)  Pulse:  [] 92  Resp:  [18-20] 18  SpO2:  [96 %-98 %] 97 %  BP: (125)/(60) 125/60     Weight: 66 kg (145 lb 8.1 oz)  Body mass index is 23.48 kg/m².  No intake or output data in the 24 hours ending 12/13/22 0839   Physical Exam    Significant Labs: All pertinent labs within the past 24 hours have been reviewed.    Significant Imaging: I have reviewed all pertinent imaging results/findings within the past 24 hours.

## 2022-12-13 NOTE — PLAN OF CARE
Problem: Adult Inpatient Plan of Care  Goal: Optimal Comfort and Wellbeing  Outcome: Ongoing, Progressing  Goal: Readiness for Transition of Care  Outcome: Ongoing, Progressing     Problem: Diabetes Comorbidity  Goal: Blood Glucose Level Within Targeted Range  Outcome: Ongoing, Progressing     Problem: Impaired Wound Healing  Goal: Optimal Wound Healing  Outcome: Ongoing, Progressing     Problem: Fall Injury Risk  Goal: Absence of Fall and Fall-Related Injury  Outcome: Ongoing, Progressing

## 2022-12-14 ENCOUNTER — TELEPHONE (OUTPATIENT)
Dept: PRIMARY CARE CLINIC | Facility: CLINIC | Age: 52
End: 2022-12-14

## 2022-12-14 NOTE — NURSING
12/14/22 1314  Spoke with both pt and sister and they decided to wait on home health and antibiotics

## 2022-12-14 NOTE — TELEPHONE ENCOUNTER
Spoke with pt for [facility] call. Pt reports she is doing okay. Pt reports he continues to have knee pain, rates pain 7-9 prior to pain med. She reports hydrocodone brings pain level down. Discussed elevated A1C level of 12.4%. Pt reports she is working on getting it down. She reports she understands a diabetic diet. Pt reports blood sugar this morning was 139. Pt currently has a PICC line in place. Discussed care of PICC line, s/s of infection and when to notify HH or dr. Pt ely of all f/u appts. Pt reports she lives with her mother and has good support. She reports she has a walker and diabetic supplies, she denies any other DME needs. Medications reviewed. IV vancomycin is pending insurance approval. Instructed pt to notify her dr for any changes in her condition. She ely.

## 2022-12-14 NOTE — PLAN OF CARE
Pt demanded to leave before [facility] care had the PA for the home vancomycin, pt and family aware that home health will not be able to come out on the same day as d/c and it may take a few days to get approval for home vancomycin

## 2022-12-14 NOTE — NURSING
Pt's sister April called asking why home health hasn't seen pt and she why she hasn't had her antibiotics. I explained to her that pt and her mother were told on multiple occasions that if she left sb early that it might take days to get her home vancomycin approved.     I reached out to [facility] and spoke to René, [facility] is still waiting to hear back from Dr. Noyola's office with the authorization for a PA for vanc    I called April back and relayed the message, April states she wants to cancel the antibiotics and home health. I told her that I had to speak directly to the pt for that to happen, April is to call back later today when she is with her sister to further discuss this option.

## 2022-12-14 NOTE — NURSING
24 hour post d/c call: pt states she is doing fine but still doesn't have her antibiotic, pt reminded that it could take a few days for the PA for the home vancomycin and that is why we had encouraged her to stay in swingbed until that could be arranged, pt also informed that she still had bullets locked up in the med room and she could have someone pick those up, pt encouraged to call back for any questions

## 2022-12-15 NOTE — NURSING
Pt and sister had called back yesterday evening and left a message on my phone requesting hospice. I notified pt's sister today that they will need to follow up with pcp to have a referral sent for hospice and to have PICC line removed

## 2022-12-20 PROBLEM — G89.18 POSTOPERATIVE PAIN OF LEFT KNEE: Status: ACTIVE | Noted: 2022-12-10

## 2022-12-20 PROBLEM — Z09 S/P ORTHOPEDIC SURGERY, FOLLOW-UP EXAM: Status: ACTIVE | Noted: 2022-12-20

## 2022-12-20 NOTE — NURSING
Pt's sister called stating that pt had followed up with Dr. Noyola today and he wanted her to have home health, I informed the sister that she would have to follow up with a pcp to have that restarted because the pt had stated last week that she didn't want the home health or antibiotic

## 2022-12-21 ENCOUNTER — OFFICE VISIT (OUTPATIENT)
Dept: PRIMARY CARE CLINIC | Facility: CLINIC | Age: 52
End: 2022-12-21
Payer: MEDICARE

## 2022-12-21 ENCOUNTER — HOSPITAL ENCOUNTER (EMERGENCY)
Facility: HOSPITAL | Age: 52
Discharge: HOME | End: 2022-12-21
Payer: MEDICARE

## 2022-12-21 VITALS
SYSTOLIC BLOOD PRESSURE: 139 MMHG | BODY MASS INDEX: 19.29 KG/M2 | WEIGHT: 120 LBS | HEIGHT: 66 IN | DIASTOLIC BLOOD PRESSURE: 75 MMHG | OXYGEN SATURATION: 98 % | TEMPERATURE: 99 F | HEART RATE: 108 BPM | RESPIRATION RATE: 18 BRPM

## 2022-12-21 VITALS
OXYGEN SATURATION: 98 % | HEART RATE: 105 BPM | SYSTOLIC BLOOD PRESSURE: 140 MMHG | DIASTOLIC BLOOD PRESSURE: 78 MMHG | TEMPERATURE: 99 F

## 2022-12-21 DIAGNOSIS — M19.90 ARTHRITIS: Primary | ICD-10-CM

## 2022-12-21 DIAGNOSIS — Z78.9 PROBLEM WITH VASCULAR ACCESS: Primary | ICD-10-CM

## 2022-12-21 DIAGNOSIS — E11.69 TYPE 2 DIABETES MELLITUS WITH OTHER SPECIFIED COMPLICATION, WITH LONG-TERM CURRENT USE OF INSULIN: ICD-10-CM

## 2022-12-21 DIAGNOSIS — Z79.4 TYPE 2 DIABETES MELLITUS WITH OTHER SPECIFIED COMPLICATION, WITH LONG-TERM CURRENT USE OF INSULIN: ICD-10-CM

## 2022-12-21 PROCEDURE — 99282 PR EMERGENCY DEPT VISIT,LEVEL II: ICD-10-PCS | Mod: ,,, | Performed by: NURSE PRACTITIONER

## 2022-12-21 PROCEDURE — 99212 PR OFFICE/OUTPT VISIT, EST, LEVL II, 10-19 MIN: ICD-10-PCS | Mod: ,,, | Performed by: FAMILY MEDICINE

## 2022-12-21 PROCEDURE — 99281 EMR DPT VST MAYX REQ PHY/QHP: CPT

## 2022-12-21 PROCEDURE — 99212 OFFICE O/P EST SF 10 MIN: CPT | Mod: ,,, | Performed by: FAMILY MEDICINE

## 2022-12-21 PROCEDURE — 99282 EMERGENCY DEPT VISIT SF MDM: CPT | Mod: ,,, | Performed by: NURSE PRACTITIONER

## 2022-12-21 NOTE — ED TRIAGE NOTES
PRESENTS TO ER WITH REPORT OF BEING SENT HOME ON HOME HEALTH. REPORTS HOME HEALTH HASNT SHOWED UP AND HER PICC HASNT BEEN ACCESSED OR CLEANED SINCE IT WAS PLACED

## 2022-12-21 NOTE — NURSING
Unable to reach pt on her home phone but I was able to contact pt's sister April and informed her that pt is to be at [facility] ED tomorrow at 10:00 for her first dose of daptomycin and Luis Antonio  will follow after that to train pt and family how to do home infusions

## 2022-12-21 NOTE — DISCHARGE INSTRUCTIONS
Follow up c [facility] tomorrow 12/22/22 at 0945 for your first dose if IV antibotics. Do not miss this appointment.

## 2022-12-21 NOTE — NURSING
Per Dr. Schulz's request I reached out to [facility] to check the status of pt's home vancomycin, René with [email or web address] states that if could be after the first of the year before the vanc would be approved, he stated if I could verify that [facility] is still willing to accept pt he would reach out to Dr. Noyola's office and try to have the order changed to Daptomycin.    I spoke with Emily from [facility] and she verified that they would except pt but they couldn't give the first dose, René from [facility] was able to get order from Dr. Noyola's office for Daptomycin and was going to forward an order for the first dose to be given at [facility] ED.

## 2022-12-21 NOTE — PROGRESS NOTES
Subjective:       Patient ID: Monica Walker is a 52 y.o. female.    Chief Complaint: Hospital Follow Up and Consult (About Home Health)    This patient is supposed to receive vancomycin IV for about 1 month. However, she left the hospital AMA before arrangements could be made for her to get the antibiotics at home. Apparently, the arrangements with [facility] did not proceed due to the patient signing out AMA. Now no one over here knows how to rectify this. I suggested that she go back to [facility] and start over.    Review of Systems   Constitutional:  Negative for fatigue and fever.   HENT:  Negative for nasal congestion and dental problem.    Eyes:  Negative for discharge.   Respiratory:  Negative for cough and shortness of breath.    Cardiovascular:  Negative for chest pain.   Gastrointestinal:  Negative for constipation, diarrhea, nausea and vomiting.   Genitourinary:  Negative for bladder incontinence, difficulty urinating and hot flashes.   Musculoskeletal:  Positive for arthralgias and leg pain.   Allergic/Immunologic: Negative for environmental allergies.   Neurological:  Negative for headaches.   Psychiatric/Behavioral:  Negative for behavioral problems and confusion.        Objective:      Physical Exam  Vitals and nursing note reviewed.   Constitutional:       Appearance: Normal appearance. She is normal weight.   HENT:      Head: Normocephalic and atraumatic.      Right Ear: Tympanic membrane normal.      Left Ear: Tympanic membrane normal.      Nose: Nose normal.      Mouth/Throat:      Mouth: Mucous membranes are moist.   Eyes:      Extraocular Movements: Extraocular movements intact.      Conjunctiva/sclera: Conjunctivae normal.      Pupils: Pupils are equal, round, and reactive to light.   Cardiovascular:      Rate and Rhythm: Normal rate and regular rhythm.      Pulses: Normal pulses.   Pulmonary:      Effort: Pulmonary effort is normal.      Breath sounds: Normal breath sounds.    Abdominal:      General: Abdomen is flat. Bowel sounds are normal.      Palpations: Abdomen is soft.   Musculoskeletal:         General: Normal range of motion.        Arms:       Cervical back: Normal range of motion and neck supple.      Comments: Left leg pain   Skin:     General: Skin is warm and dry.   Neurological:      General: No focal deficit present.      Mental Status: She is alert and oriented to person, place, and time.   Psychiatric:         Mood and Affect: Mood normal.       Assessment:       Problem List Items Addressed This Visit          Endocrine    Type 2 diabetes mellitus, with long-term current use of insulin       Orthopedic    Arthritis - Primary       Plan:       Strongly recommended that she go back to the doctor that started all of this and begin a new plan.

## 2022-12-21 NOTE — ED PROVIDER NOTES
Encounter Date: 12/21/2022       History     Chief Complaint   Patient presents with    Vascular Access Problem     Ms. Walker presents to the ED from her PCP office needing her PICC line flushed and HH orders set up. I attempted to get the full history from patient and it appears she was discharged from [facility] to a swing bed in [place], [place] and was discharged from there to go home on  to receive home IV infusions. She states no one has came out to see her. Ms. Walker states she hasn't heard from  or [facility] and she has not called them either.     Review of patient's allergies indicates:  No Known Allergies  Past Medical History:   Diagnosis Date    Abscess of right thigh + MRSA 08/20/2021    healed    Adnexal cyst     Degenerative disc disease     Depression 10/29/2021    Hypertension     Nicotine dependence     Osteoarthritis      Past Surgical History:   Procedure Laterality Date    BACK SURGERY      HYSTERECTOMY N/A     IRRIGATION AND DEBRIDEMENT OF LOWER EXTREMITY Left 11/22/2022    Procedure: IRRIGATION AND DEBRIDEMENT, LOWER EXTREMITY;  Surgeon: Papa Mendoza MD;  Location: [facility] OR;  Service: Orthopedics;  Laterality: Left;    IRRIGATION AND DEBRIDEMENT OF UPPER EXTREMITY Left 11/22/2022    Procedure: IRRIGATION AND DEBRIDEMENT, UPPER EXTREMITY;  Surgeon: Papa Mendoza MD;  Location: [facility] OR;  Service: Orthopedics;  Laterality: Left;    TOTAL HIP ARTHROPLASTY Left     TOTAL KNEE ARTHROPLASTY Bilateral      Family History   Problem Relation Age of Onset    Hypertension Father     Diabetes Father      Social History     Tobacco Use    Smoking status: Every Day     Types: Cigarettes    Smokeless tobacco: Current     Types: Snuff   Substance Use Topics    Alcohol use: Never    Drug use: Never     Review of Systems   All other systems reviewed and are negative.    Physical Exam     Initial Vitals [12/21/22 1417]   BP Pulse Resp Temp SpO2   139/75 108 18 98.6 °F (37 °C) 98 %      MAP        --         Physical Exam    Vitals reviewed.  Constitutional: She appears well-developed.   HENT:   Head: Normocephalic.   Eyes: Pupils are equal, round, and reactive to light.   Neck:   Normal range of motion.  Cardiovascular:  Normal rate.           Pulmonary/Chest: Breath sounds normal.   Abdominal: Abdomen is soft.   Musculoskeletal:         General: Normal range of motion.      Cervical back: Normal range of motion.     Neurological: She is alert. GCS score is 15. GCS eye subscore is 4. GCS verbal subscore is 5. GCS motor subscore is 6.   Skin: Skin is warm. Capillary refill takes less than 2 seconds.   Psychiatric: She has a normal mood and affect. Her behavior is normal. Judgment and thought content normal.       Medical Screening Exam   See Full Note    ED Course   Procedures  Labs Reviewed - No data to display       Imaging Results    None          Medications - No data to display              ED Course as of 12/21/22 1533   Wed Dec 21, 2022   1437 I called [facility]. They are going to check on the status of her admission and figure out why they have not came to visit her yet.  [SW]   1512 I called Ms. Dory Méndez at [facility] and she states Ms. Walker has been non complaint for years.  agencies have discharged her and will not re-admit her due to non compliance. Ms. Connors states she has her first dose of IV medication to be given to her tomorrow at 0945 and states she has to be here. I notified Ms. Walker and family member of the importance of this.  [SW]      ED Course User Index  [SW] NATALIA Kaur          Clinical Impression:   Final diagnoses:  [Z78.9] Problem with vascular access (Primary)        ED Disposition Condition    Discharge Stable          ED Prescriptions    None       Follow-up Information    None          NATALIA Kaur  12/21/22 1448       NATALIA Kaur  12/21/22 1533

## 2022-12-21 NOTE — NURSING
I was notified by Emily from [facility] that they are now unable to take the pt because her insurance is changing on 1/1/23 to a [employer] that they aren't in network with, I contacted pt's sister and advised her of this change, [facility] contacted and asked if they would be able to take the pt    Fatimah with [facility] called back and excepted the pt, facesheet and clinicals faxed to [phone]

## 2022-12-22 ENCOUNTER — INFUSION (OUTPATIENT)
Dept: INFUSION THERAPY | Facility: HOSPITAL | Age: 52
End: 2022-12-22
Attending: FAMILY MEDICINE
Payer: MEDICARE

## 2022-12-22 VITALS
OXYGEN SATURATION: 97 % | RESPIRATION RATE: 16 BRPM | HEART RATE: 98 BPM | SYSTOLIC BLOOD PRESSURE: 107 MMHG | TEMPERATURE: 98 F | DIASTOLIC BLOOD PRESSURE: 64 MMHG

## 2022-12-22 DIAGNOSIS — A49.02 MRSA (METHICILLIN RESISTANT STAPHYLOCOCCUS AUREUS): Primary | ICD-10-CM

## 2022-12-22 DIAGNOSIS — R78.81 BACTEREMIA: Primary | ICD-10-CM

## 2022-12-22 PROCEDURE — 63600175 PHARM REV CODE 636 W HCPCS: Performed by: NURSE PRACTITIONER

## 2022-12-22 PROCEDURE — 96365 THER/PROPH/DIAG IV INF INIT: CPT

## 2022-12-22 PROCEDURE — 63600175 PHARM REV CODE 636 W HCPCS: Performed by: ORTHOPAEDIC SURGERY

## 2022-12-22 PROCEDURE — 25000003 PHARM REV CODE 250: Performed by: ORTHOPAEDIC SURGERY

## 2022-12-22 PROCEDURE — 96375 TX/PRO/DX INJ NEW DRUG ADDON: CPT

## 2022-12-22 RX ORDER — SODIUM CHLORIDE 0.9 % (FLUSH) 0.9 %
10 SYRINGE (ML) INJECTION
Status: CANCELLED | OUTPATIENT
Start: 2022-12-22

## 2022-12-22 RX ORDER — HEPARIN SODIUM,PORCINE/PF 10 UNIT/ML
100 SYRINGE (ML) INTRAVENOUS
Status: DISCONTINUED | OUTPATIENT
Start: 2022-12-22 | End: 2023-02-08 | Stop reason: HOSPADM

## 2022-12-22 RX ORDER — HEPARIN SODIUM (PORCINE) LOCK FLUSH IV SOLN 100 UNIT/ML 100 UNIT/ML
100 SOLUTION INTRAVENOUS
Status: DISCONTINUED | OUTPATIENT
Start: 2022-12-22 | End: 2023-06-21 | Stop reason: HOSPADM

## 2022-12-22 RX ORDER — SODIUM CHLORIDE 0.9 % (FLUSH) 0.9 %
10 SYRINGE (ML) INJECTION
Status: DISCONTINUED | OUTPATIENT
Start: 2022-12-22 | End: 2022-12-22 | Stop reason: HOSPADM

## 2022-12-22 RX ADMIN — HEPARIN, PORCINE (PF) 10 UNIT/ML INTRAVENOUS SYRINGE 100 UNITS: at 11:12

## 2022-12-22 RX ADMIN — DAPTOMYCIN 400 MG: 500 INJECTION, POWDER, LYOPHILIZED, FOR SOLUTION INTRAVENOUS at 10:12

## 2022-12-29 ENCOUNTER — LAB REQUISITION (OUTPATIENT)
Dept: LAB | Facility: HOSPITAL | Age: 52
End: 2022-12-29
Attending: FAMILY MEDICINE
Payer: MEDICARE

## 2022-12-29 DIAGNOSIS — B95.62 METHICILLIN RESISTANT STAPHYLOCOCCUS AUREUS INFECTION AS THE CAUSE OF DISEASES CLASSIFIED ELSEWHERE: ICD-10-CM

## 2022-12-29 DIAGNOSIS — I10 ESSENTIAL (PRIMARY) HYPERTENSION: ICD-10-CM

## 2022-12-29 DIAGNOSIS — M00.862 ARTHRITIS DUE TO OTHER BACTERIA, LEFT KNEE: ICD-10-CM

## 2022-12-29 DIAGNOSIS — M00.812 ARTHRITIS DUE TO OTHER BACTERIA, LEFT SHOULDER: ICD-10-CM

## 2022-12-29 LAB
ALBUMIN SERPL BCP-MCNC: 2 G/DL (ref 3.5–5)
ALBUMIN/GLOB SERPL: 0.4 {RATIO}
ALP SERPL-CCNC: 127 U/L (ref 41–108)
ALT SERPL W P-5'-P-CCNC: 21 U/L (ref 13–56)
ANION GAP SERPL CALCULATED.3IONS-SCNC: 13 MMOL/L (ref 7–16)
AST SERPL W P-5'-P-CCNC: 15 U/L (ref 15–37)
BASOPHILS # BLD AUTO: 0.02 K/UL (ref 0–0.2)
BASOPHILS NFR BLD AUTO: 0.2 % (ref 0–1)
BILIRUB SERPL-MCNC: 0.3 MG/DL (ref ?–1.2)
BUN SERPL-MCNC: 10 MG/DL (ref 7–18)
BUN/CREAT SERPL: 16 (ref 6–20)
CALCIUM SERPL-MCNC: 8.4 MG/DL (ref 8.5–10.1)
CHLORIDE SERPL-SCNC: 99 MMOL/L (ref 98–107)
CO2 SERPL-SCNC: 29 MMOL/L (ref 21–32)
CREAT SERPL-MCNC: 0.62 MG/DL (ref 0.55–1.02)
CRP SERPL-MCNC: 14.6 MG/DL (ref 0–0.8)
DIFFERENTIAL METHOD BLD: ABNORMAL
EGFR (NO RACE VARIABLE) (RUSH/TITUS): 106 ML/MIN/1.73M²
EOSINOPHIL # BLD AUTO: 0.39 K/UL (ref 0–0.5)
EOSINOPHIL NFR BLD AUTO: 3.6 % (ref 1–4)
ERYTHROCYTE [DISTWIDTH] IN BLOOD BY AUTOMATED COUNT: 14.8 % (ref 11.5–14.5)
ERYTHROCYTE [SEDIMENTATION RATE] IN BLOOD BY WESTERGREN METHOD: 140 MM/HR (ref 0–30)
GLOBULIN SER-MCNC: 5.6 G/DL (ref 2–4)
GLUCOSE SERPL-MCNC: 138 MG/DL (ref 74–106)
HCT VFR BLD AUTO: 26.5 % (ref 38–47)
HGB BLD-MCNC: 7.9 G/DL (ref 12–16)
IMM GRANULOCYTES # BLD AUTO: 0.03 K/UL (ref 0–0.04)
IMM GRANULOCYTES NFR BLD: 0.3 % (ref 0–0.4)
LYMPHOCYTES # BLD AUTO: 2.65 K/UL (ref 1–4.8)
LYMPHOCYTES NFR BLD AUTO: 24.8 % (ref 27–41)
MCH RBC QN AUTO: 23.7 PG (ref 27–31)
MCHC RBC AUTO-ENTMCNC: 29.8 G/DL (ref 32–36)
MCV RBC AUTO: 79.6 FL (ref 80–96)
MONOCYTES # BLD AUTO: 0.88 K/UL (ref 0–0.8)
MONOCYTES NFR BLD AUTO: 8.2 % (ref 2–6)
MPC BLD CALC-MCNC: 8.4 FL (ref 9.4–12.4)
NEUTROPHILS # BLD AUTO: 6.73 K/UL (ref 1.8–7.7)
NEUTROPHILS NFR BLD AUTO: 62.9 % (ref 53–65)
PLATELET # BLD AUTO: 604 K/UL (ref 150–400)
POTASSIUM SERPL-SCNC: 3.6 MMOL/L (ref 3.5–5.1)
PROT SERPL-MCNC: 7.6 G/DL (ref 6.4–8.2)
RBC # BLD AUTO: 3.33 M/UL (ref 4.2–5.4)
SODIUM SERPL-SCNC: 137 MMOL/L (ref 136–145)
WBC # BLD AUTO: 10.7 K/UL (ref 4.5–11)

## 2022-12-29 PROCEDURE — 80053 COMPREHEN METABOLIC PANEL: CPT | Performed by: FAMILY MEDICINE

## 2022-12-29 PROCEDURE — 85651 RBC SED RATE NONAUTOMATED: CPT | Performed by: FAMILY MEDICINE

## 2022-12-29 PROCEDURE — 86140 C-REACTIVE PROTEIN: CPT | Performed by: FAMILY MEDICINE

## 2022-12-29 PROCEDURE — 85025 COMPLETE CBC W/AUTO DIFF WBC: CPT | Performed by: FAMILY MEDICINE

## 2023-01-01 ENCOUNTER — HOSPITAL ENCOUNTER (EMERGENCY)
Facility: HOSPITAL | Age: 53
Discharge: HOME | End: 2023-01-01
Payer: MEDICARE

## 2023-01-01 VITALS
WEIGHT: 140 LBS | RESPIRATION RATE: 16 BRPM | OXYGEN SATURATION: 100 % | BODY MASS INDEX: 22.5 KG/M2 | HEIGHT: 66 IN | HEART RATE: 87 BPM | DIASTOLIC BLOOD PRESSURE: 88 MMHG | TEMPERATURE: 98 F | SYSTOLIC BLOOD PRESSURE: 170 MMHG

## 2023-01-01 DIAGNOSIS — G89.4 CHRONIC PAIN SYNDROME: Primary | ICD-10-CM

## 2023-01-01 DIAGNOSIS — G89.29 CHRONIC LOW BACK PAIN WITH SCIATICA, SCIATICA LATERALITY UNSPECIFIED, UNSPECIFIED BACK PAIN LATERALITY: ICD-10-CM

## 2023-01-01 DIAGNOSIS — M54.40 CHRONIC LOW BACK PAIN WITH SCIATICA, SCIATICA LATERALITY UNSPECIFIED, UNSPECIFIED BACK PAIN LATERALITY: ICD-10-CM

## 2023-01-01 LAB
CULTURE, FUNGUS (OTHER): ABNORMAL
CULTURE, FUNGUS (OTHER): ABNORMAL
GLUCOSE SERPL-MCNC: 179 MG/DL (ref 70–105)

## 2023-01-01 PROCEDURE — 63600175 PHARM REV CODE 636 W HCPCS: Performed by: SPECIALIST

## 2023-01-01 PROCEDURE — 96372 THER/PROPH/DIAG INJ SC/IM: CPT | Performed by: SPECIALIST

## 2023-01-01 PROCEDURE — 25000003 PHARM REV CODE 250: Performed by: SPECIALIST

## 2023-01-01 PROCEDURE — 99284 EMERGENCY DEPT VISIT MOD MDM: CPT

## 2023-01-01 PROCEDURE — 99284 EMERGENCY DEPT VISIT MOD MDM: CPT | Mod: EDII,,, | Performed by: SPECIALIST

## 2023-01-01 PROCEDURE — 82962 GLUCOSE BLOOD TEST: CPT

## 2023-01-01 PROCEDURE — 99284 PR EMERGENCY DEPT VISIT,LEVEL IV: ICD-10-PCS | Mod: EDII,,, | Performed by: SPECIALIST

## 2023-01-01 RX ORDER — HYDROCODONE BITARTRATE AND ACETAMINOPHEN 10; 325 MG/1; MG/1
1 TABLET ORAL
Status: COMPLETED | OUTPATIENT
Start: 2023-01-01 | End: 2023-01-01

## 2023-01-01 RX ORDER — ORPHENADRINE CITRATE 30 MG/ML
60 INJECTION INTRAMUSCULAR; INTRAVENOUS
Status: COMPLETED | OUTPATIENT
Start: 2023-01-01 | End: 2023-01-01

## 2023-01-01 RX ORDER — KETOROLAC TROMETHAMINE 30 MG/ML
60 INJECTION, SOLUTION INTRAMUSCULAR; INTRAVENOUS
Status: COMPLETED | OUTPATIENT
Start: 2023-01-01 | End: 2023-01-01

## 2023-01-01 RX ADMIN — KETOROLAC TROMETHAMINE 60 MG: 30 INJECTION, SOLUTION INTRAMUSCULAR at 03:01

## 2023-01-01 RX ADMIN — HYDROCODONE BITARTRATE AND ACETAMINOPHEN 1 TABLET: 10; 325 TABLET ORAL at 03:01

## 2023-01-01 RX ADMIN — ORPHENADRINE CITRATE 60 MG: 30 INJECTION INTRAMUSCULAR; INTRAVENOUS at 03:01

## 2023-01-01 NOTE — ED PROVIDER NOTES
Encounter Date: 1/1/2023       History     Chief Complaint   Patient presents with    Back Pain     Patient is a 51 yo with CPS/Chronic low back pain who is out of her NORCO.  She has called the ambulance to get her to the ER to obtain her medications.  I have explained that I can only give her shots and one pill here.  I can not write for her chronic pain meds due to her pain management contract.     Review of patient's allergies indicates:  No Known Allergies  Past Medical History:   Diagnosis Date    Abscess of right thigh + MRSA 08/20/2021    healed    Adnexal cyst     Degenerative disc disease     Depression 10/29/2021    Hypertension     Nicotine dependence     Osteoarthritis      Past Surgical History:   Procedure Laterality Date    BACK SURGERY      HYSTERECTOMY N/A     IRRIGATION AND DEBRIDEMENT OF LOWER EXTREMITY Left 11/22/2022    Procedure: IRRIGATION AND DEBRIDEMENT, LOWER EXTREMITY;  Surgeon: Papa Mendoza MD;  Location: [facility] OR;  Service: Orthopedics;  Laterality: Left;    IRRIGATION AND DEBRIDEMENT OF UPPER EXTREMITY Left 11/22/2022    Procedure: IRRIGATION AND DEBRIDEMENT, UPPER EXTREMITY;  Surgeon: Papa Mendoza MD;  Location: [facility] OR;  Service: Orthopedics;  Laterality: Left;    TOTAL HIP ARTHROPLASTY Left     TOTAL KNEE ARTHROPLASTY Bilateral      Family History   Problem Relation Age of Onset    Hypertension Father     Diabetes Father      Social History     Tobacco Use    Smoking status: Every Day     Types: Cigarettes    Smokeless tobacco: Current     Types: Snuff   Substance Use Topics    Alcohol use: Never    Drug use: Never     Review of Systems   Musculoskeletal:  Positive for back pain.        Chronic back pain out of her NORCO   All other systems reviewed and are negative.    Physical Exam     Initial Vitals [01/01/23 1527]   BP Pulse Resp Temp SpO2   (!) 159/72 88 16 98.2 °F (36.8 °C) 98 %      MAP       --         Physical Exam    Nursing note and vitals  reviewed.  Constitutional: She appears well-developed and well-nourished.   HENT:   Head: Normocephalic and atraumatic.   Eyes: Pupils are equal, round, and reactive to light.   Neck: Neck supple.   Normal range of motion.  Pulmonary/Chest: Breath sounds normal.   Abdominal: Abdomen is soft.   Musculoskeletal:         General: Normal range of motion.      Cervical back: Normal range of motion and neck supple.     Neurological: She is alert and oriented to person, place, and time. She has normal strength. GCS score is 15. GCS eye subscore is 4. GCS verbal subscore is 5. GCS motor subscore is 6.   Skin: Skin is warm.   Psychiatric: She has a normal mood and affect. Thought content normal.       Medical Screening Exam   See Full Note    ED Course   Procedures  Labs Reviewed - No data to display       Imaging Results    None          Medications   ketorolac injection 60 mg (has no administration in time range)   orphenadrine injection 60 mg (has no administration in time range)   HYDROcodone-acetaminophen  mg per tablet 1 tablet (has no administration in time range)                       Clinical Impression:   Final diagnoses:  [G89.4] Chronic pain syndrome (Primary)  [M54.40, G89.29] Chronic low back pain with sciatica, sciatica laterality unspecified, unspecified back pain laterality        ED Disposition Condition    Discharge Stable          ED Prescriptions    None       Follow-up Information       Follow up With Specialties Details Why Contact Info    Hannah Schulz MD Family Medicine In 1 day If symptoms worsen [address] [facility] [id number] [phone]               Rosalina Barth MD  01/01/23 9223

## 2023-01-01 NOTE — ED TRIAGE NOTES
PATIENT PRESENTED TO ER VIA AMBULANCE WITH C/O CHRONIC BACK PAIN & IS CURRENTLY OUT OF HER PAIN MEDICATION; PATIENT IS CURRENTLY BEING TREATED BY HOME HEALTH FOR SEPSIS

## 2023-01-03 ENCOUNTER — LAB REQUISITION (OUTPATIENT)
Dept: LAB | Facility: HOSPITAL | Age: 53
End: 2023-01-03
Attending: FAMILY MEDICINE
Payer: MEDICARE

## 2023-01-03 DIAGNOSIS — Z79.2 LONG TERM (CURRENT) USE OF ANTIBIOTICS: ICD-10-CM

## 2023-01-03 LAB
ALBUMIN SERPL BCP-MCNC: 1.8 G/DL (ref 3.5–5)
ALBUMIN/GLOB SERPL: 0.3 {RATIO}
ALP SERPL-CCNC: 124 U/L (ref 41–108)
ALT SERPL W P-5'-P-CCNC: 19 U/L (ref 13–56)
ANION GAP SERPL CALCULATED.3IONS-SCNC: 13 MMOL/L (ref 7–16)
AST SERPL W P-5'-P-CCNC: 16 U/L (ref 15–37)
BASOPHILS # BLD AUTO: 0.03 K/UL (ref 0–0.2)
BASOPHILS NFR BLD AUTO: 0.3 % (ref 0–1)
BILIRUB SERPL-MCNC: 0.3 MG/DL (ref ?–1.2)
BUN SERPL-MCNC: 10 MG/DL (ref 7–18)
BUN/CREAT SERPL: 15 (ref 6–20)
CALCIUM SERPL-MCNC: 8.5 MG/DL (ref 8.5–10.1)
CHLORIDE SERPL-SCNC: 96 MMOL/L (ref 98–107)
CO2 SERPL-SCNC: 30 MMOL/L (ref 21–32)
CREAT SERPL-MCNC: 0.68 MG/DL (ref 0.55–1.02)
CRP SERPL-MCNC: 14 MG/DL (ref 0–0.8)
DIFFERENTIAL METHOD BLD: ABNORMAL
EGFR (NO RACE VARIABLE) (RUSH/TITUS): 104 ML/MIN/1.73M²
EOSINOPHIL # BLD AUTO: 0.4 K/UL (ref 0–0.5)
EOSINOPHIL NFR BLD AUTO: 3.9 % (ref 1–4)
EOSINOPHIL NFR BLD MANUAL: 4 % (ref 1–4)
ERYTHROCYTE [DISTWIDTH] IN BLOOD BY AUTOMATED COUNT: 14.9 % (ref 11.5–14.5)
GLOBULIN SER-MCNC: 5.8 G/DL (ref 2–4)
GLUCOSE SERPL-MCNC: 280 MG/DL (ref 74–106)
HCT VFR BLD AUTO: 26.3 % (ref 38–47)
HGB BLD-MCNC: 7.9 G/DL (ref 12–16)
IMM GRANULOCYTES # BLD AUTO: 0.02 K/UL (ref 0–0.04)
IMM GRANULOCYTES NFR BLD: 0.2 % (ref 0–0.4)
LYMPHOCYTES # BLD AUTO: 2.77 K/UL (ref 1–4.8)
LYMPHOCYTES NFR BLD AUTO: 26.8 % (ref 27–41)
LYMPHOCYTES NFR BLD MANUAL: 23 % (ref 27–41)
MCH RBC QN AUTO: 23.3 PG (ref 27–31)
MCHC RBC AUTO-ENTMCNC: 30 G/DL (ref 32–36)
MCV RBC AUTO: 77.6 FL (ref 80–96)
MONOCYTES # BLD AUTO: 0.93 K/UL (ref 0–0.8)
MONOCYTES NFR BLD AUTO: 9 % (ref 2–6)
MONOCYTES NFR BLD MANUAL: 10 % (ref 2–6)
MPC BLD CALC-MCNC: 8.6 FL (ref 9.4–12.4)
NEUTROPHILS # BLD AUTO: 6.17 K/UL (ref 1.8–7.7)
NEUTROPHILS NFR BLD AUTO: 59.8 % (ref 53–65)
NEUTS SEG NFR BLD MANUAL: 63 % (ref 50–62)
NRBC BLD MANUAL-RTO: ABNORMAL %
PLATELET # BLD AUTO: 584 K/UL (ref 150–400)
PLATELET MORPHOLOGY: ABNORMAL
POTASSIUM SERPL-SCNC: 3.5 MMOL/L (ref 3.5–5.1)
PROT SERPL-MCNC: 7.6 G/DL (ref 6.4–8.2)
RBC # BLD AUTO: 3.39 M/UL (ref 4.2–5.4)
RBC MORPH BLD: NORMAL
SODIUM SERPL-SCNC: 135 MMOL/L (ref 136–145)
WBC # BLD AUTO: 10.32 K/UL (ref 4.5–11)

## 2023-01-03 PROCEDURE — 85651 RBC SED RATE NONAUTOMATED: CPT | Performed by: FAMILY MEDICINE

## 2023-01-03 PROCEDURE — 85025 COMPLETE CBC W/AUTO DIFF WBC: CPT | Performed by: FAMILY MEDICINE

## 2023-01-03 PROCEDURE — 86140 C-REACTIVE PROTEIN: CPT | Performed by: FAMILY MEDICINE

## 2023-01-03 PROCEDURE — 80053 COMPREHEN METABOLIC PANEL: CPT | Performed by: FAMILY MEDICINE

## 2023-01-04 ENCOUNTER — OFFICE VISIT (OUTPATIENT)
Dept: ORTHOPEDICS | Facility: CLINIC | Age: 53
End: 2023-01-04
Payer: MEDICARE

## 2023-01-04 ENCOUNTER — HOSPITAL ENCOUNTER (OUTPATIENT)
Dept: RADIOLOGY | Facility: HOSPITAL | Age: 53
Discharge: HOME | End: 2023-01-04
Attending: ORTHOPAEDIC SURGERY
Payer: MEDICARE

## 2023-01-04 DIAGNOSIS — Z47.89 ORTHOPEDIC AFTERCARE: ICD-10-CM

## 2023-01-04 DIAGNOSIS — M00.062 STAPHYLOCOCCAL ARTHRITIS OF LEFT KNEE: Primary | ICD-10-CM

## 2023-01-04 DIAGNOSIS — M54.16 LUMBAR RADICULOPATHY: ICD-10-CM

## 2023-01-04 PROCEDURE — 73562 X-RAY EXAM OF KNEE 3: CPT | Mod: TC,LT

## 2023-01-04 PROCEDURE — 73562 XR KNEE AP LAT WITH SUNRISE LEFT: ICD-10-PCS | Mod: 26,LT,, | Performed by: ORTHOPAEDIC SURGERY

## 2023-01-04 PROCEDURE — 73562 X-RAY EXAM OF KNEE 3: CPT | Mod: 26,LT,, | Performed by: ORTHOPAEDIC SURGERY

## 2023-01-04 PROCEDURE — 99203 PR OFFICE/OUTPT VISIT, NEW, LEVL III, 30-44 MIN: ICD-10-PCS | Mod: S$PBB,,, | Performed by: ORTHOPAEDIC SURGERY

## 2023-01-04 PROCEDURE — 99203 OFFICE O/P NEW LOW 30 MIN: CPT | Mod: S$PBB,,, | Performed by: ORTHOPAEDIC SURGERY

## 2023-01-04 PROCEDURE — 73562 X-RAY EXAM OF KNEE 3: CPT | Mod: PBBFAC | Performed by: ORTHOPAEDIC SURGERY

## 2023-01-04 PROCEDURE — 99214 OFFICE O/P EST MOD 30 MIN: CPT | Mod: PBBFAC,25 | Performed by: ORTHOPAEDIC SURGERY

## 2023-01-04 RX ORDER — HYDROCODONE BITARTRATE AND ACETAMINOPHEN 7.5; 325 MG/1; MG/1
1 TABLET ORAL EVERY 6 HOURS PRN
Qty: 28 TABLET | Refills: 0 | Status: SHIPPED | OUTPATIENT
Start: 2023-01-04 | End: 2023-01-13 | Stop reason: DRUGHIGH

## 2023-01-04 NOTE — PROGRESS NOTES
CLINIC NOTE       Chief Complaint   Patient presents with    Left Knee - Post-op Evaluation        Monica Walker is a 52 y.o. female seen today for evaluation of her left knee.  She is known to me having undergone Jovan robotic medial unicompartment replacement of the left knee performed 09/15/2015.  She did well postoperatively until 6 weeks ago.  She presented to the emergency room with hematogenous sepsis seating her left shoulder and left knee.  She underwent arthroscopic debridement and lavage of the left shoulder and open debridement and polyethylene exchange for her left knee performed by Dr. Papa Mendoza.  She is reportedly in her last week of IV antibiotic therapy.  Her left shoulder and left knee are improved.  She is having the greatest pain related to her low back region.  She has a history of chronic low back pain and for 5 surgeries in the past performed by Dr. Daniels and [facility].  She has not had an MRI examination of her lumbar spine.  Recommended MRI of the lumbar spine due to the possibility of abscess in that region.  She is in need of pain medications and is agreeable to Pain Center referral.  She would like to see Dr. Kirk.  Rx Norco 7.5 mg number 30 sent for use in the interim.  CRP yesterday is 14 decreased from 30/2 0.7.  ESR remains elevated at 140.  X-rays left knee today 01/04/2023 bones be adequate mineralized.  Metallic arthroplasty components are in expected position alignment for medial compartment replacement arthroplasty.  There appears to be some anterior translation of the femur on the tibia suggesting possible interim development of PCL deficiency.  Past Medical History:   Diagnosis Date    Abscess of right thigh + MRSA 08/20/2021    healed    Adnexal cyst     Degenerative disc disease     Depression 10/29/2021    Hypertension     Nicotine dependence     Osteoarthritis      Family History   Problem Relation Age of Onset    Hypertension Father     Diabetes Father   "    Current Outpatient Medications on File Prior to Visit   Medication Sig Dispense Refill    BD ULTRA-FINE SHORT PEN NEEDLE 31 gauge x 5/16" Ndle       DULoxetine (CYMBALTA) 60 MG capsule Take 1 capsule (60 mg total) by mouth once daily. 90 capsule 3    gabapentin (NEURONTIN ORAL) Take by mouth 2 (two) times a day.      HYDROcodone-acetaminophen (NORCO) 5-325 mg per tablet Take 1 tablet by mouth every 6 (six) hours as needed for Pain. 28 tablet 0    insulin (LANTUS SOLOSTAR U-100 INSULIN) glargine 100 units/mL SubQ pen Inject 40 Units into the skin once daily. 4 each 5    NOVOLOG FLEXPEN U-100 INSULIN 100 unit/mL (3 mL) InPn pen Inject 10 Units into the skin 2 (two) times a day. 5 each 11    TRULICITY 0.75 mg/0.5 mL pen injector Inject 0.75 mg into the skin every 7 days. 4 pen 11    vancomycin HCl (VANCOMYCIN 1.5 G/250 ML D5W) Inject 1,500 mg into the vein As instructed. Vancomycin q 18 hrs , last dose given at 0305 am, next dose due at 12/13/2100       Current Facility-Administered Medications on File Prior to Visit   Medication Dose Route Frequency Provider Last Rate Last Admin    heparin lock flush (porcine) injection 100 Units  100 Units Intravenous PRN NATALIA Ward        heparin, porcine (PF) injection 100 Units  100 Units Intravenous PRN NATALIA Ward   100 Units at 12/22/22 1130       ROS     There were no vitals filed for this visit.    Past Surgical History:   Procedure Laterality Date    BACK SURGERY      HYSTERECTOMY N/A     IRRIGATION AND DEBRIDEMENT OF LOWER EXTREMITY Left 11/22/2022    Procedure: IRRIGATION AND DEBRIDEMENT, LOWER EXTREMITY;  Surgeon: Papa Mendoza MD;  Location: [facility] OR;  Service: Orthopedics;  Laterality: Left;    IRRIGATION AND DEBRIDEMENT OF UPPER EXTREMITY Left 11/22/2022    Procedure: IRRIGATION AND DEBRIDEMENT, UPPER EXTREMITY;  Surgeon: Papa Mendoza MD;  Location: [facility] OR;  Service: Orthopedics;  Laterality: Left;    TOTAL HIP " ARTHROPLASTY Left     TOTAL KNEE ARTHROPLASTY Bilateral         Review of patient's allergies indicates:  No Known Allergies     Ortho Exam :  Well-developed well-nourished  female no acute distress.  She is alert oriented cooperative.  Neck is supple without JVD.  Breathing is regular nonlabored.  Skin is warm and dry no lesions seen.  She allows good gentle motion left shoulder with minimal discomfort.  Exam left knee shows healing longitudinal incision over the anterior aspect of the knee medial to the patella.  There is minimal effusion.  She is maintaining good knee motion and stability.    Assessment and Plan  Patient Active Problem List    Diagnosis Date Noted    S/P orthopedic surgery, follow-up exam 12/20/2022    Pyelonephritis 12/13/2022    Postoperative pain of left knee 12/10/2022    Bacteremia 11/21/2022    Lung abscess 11/20/2022    Type 2 diabetes mellitus, with long-term current use of insulin 11/20/2022    Depression 01/04/2022    Arthritis 01/04/2022    Impression:  Sepsis with seeding left shoulder and left unicompartmental knee replacement with MRSA.  Acute on chronic low back pain.  Plan 1. Rx Norco 7.5 mg 2.  Schedule MRI lumbar spine 3. Refer to Pain Center-Dr. Kirk 4. Recheck 3 weeks with repeat labs 5. We will start chronic suppressive                                  Radiology Interpretation        Patient Name: Monica Walker  Date: 1/4/2023  YOB: 1970  MRN# [id number]        ORDERING DIAGNOSIS:    Encounter Diagnosis   Name Primary?    Lumbar radiculopathy Yes             X-rays left knee today 01/04/2023 bones be adequate mineralized.  Metallic arthroplasty components are in expected position alignment for medial compartment replacement arthroplasty.  There appears to be some anterior translation of the femur on the tibia suggesting possible interim development of PCL deficiency.             Jacek Noyola MD                  oral antibiotic therapy in 1  week when IV therapy completed.    Jacek Noyola M.D.

## 2023-01-06 NOTE — ADDENDUM NOTE
Encounter addended by: oZe Levy on: 1/6/2023 9:51 AM   Actions taken: SmartForm saved, Flowsheet accepted

## 2023-01-09 ENCOUNTER — HOSPITAL ENCOUNTER (EMERGENCY)
Facility: HOSPITAL | Age: 53
Discharge: HOME | End: 2023-01-09
Attending: FAMILY MEDICINE
Payer: MEDICARE

## 2023-01-09 VITALS
TEMPERATURE: 98 F | SYSTOLIC BLOOD PRESSURE: 147 MMHG | HEART RATE: 90 BPM | DIASTOLIC BLOOD PRESSURE: 74 MMHG | RESPIRATION RATE: 16 BRPM | BODY MASS INDEX: 22.6 KG/M2 | HEIGHT: 66 IN | OXYGEN SATURATION: 95 %

## 2023-01-09 DIAGNOSIS — G89.29 CHRONIC LOW BACK PAIN, UNSPECIFIED BACK PAIN LATERALITY, UNSPECIFIED WHETHER SCIATICA PRESENT: ICD-10-CM

## 2023-01-09 DIAGNOSIS — E86.0 DEHYDRATION: ICD-10-CM

## 2023-01-09 DIAGNOSIS — R41.82 ALTERED MENTAL STATUS: ICD-10-CM

## 2023-01-09 DIAGNOSIS — M54.50 CHRONIC LOW BACK PAIN, UNSPECIFIED BACK PAIN LATERALITY, UNSPECIFIED WHETHER SCIATICA PRESENT: ICD-10-CM

## 2023-01-09 DIAGNOSIS — G89.4 CHRONIC PAIN SYNDROME: Primary | ICD-10-CM

## 2023-01-09 LAB
ALBUMIN SERPL BCP-MCNC: 2 G/DL (ref 3.5–5)
ALBUMIN/GLOB SERPL: 0.3 {RATIO}
ALP SERPL-CCNC: 141 U/L (ref 41–108)
ALT SERPL W P-5'-P-CCNC: 18 U/L (ref 13–56)
AMPHET UR QL SCN: NEGATIVE
ANION GAP SERPL CALCULATED.3IONS-SCNC: 13 MMOL/L (ref 7–16)
AST SERPL W P-5'-P-CCNC: 16 U/L (ref 15–37)
BACTERIA #/AREA URNS HPF: ABNORMAL /HPF
BARBITURATES UR QL SCN: NEGATIVE
BASOPHILS # BLD AUTO: 0.03 K/UL (ref 0–0.2)
BASOPHILS NFR BLD AUTO: 0.3 % (ref 0–1)
BENZODIAZ METAB UR QL SCN: NEGATIVE
BILIRUB SERPL-MCNC: 0.2 MG/DL (ref ?–1.2)
BILIRUB UR QL STRIP: ABNORMAL
BUN SERPL-MCNC: 13 MG/DL (ref 7–18)
BUN/CREAT SERPL: 15 (ref 6–20)
CALCIUM SERPL-MCNC: 9.4 MG/DL (ref 8.5–10.1)
CANNABINOIDS UR QL SCN: NEGATIVE
CHLORIDE SERPL-SCNC: 98 MMOL/L (ref 98–107)
CLARITY UR: CLEAR
CO2 SERPL-SCNC: 33 MMOL/L (ref 21–32)
COCAINE UR QL SCN: NEGATIVE
COLOR UR: YELLOW
CREAT SERPL-MCNC: 0.87 MG/DL (ref 0.55–1.02)
DIFFERENTIAL METHOD BLD: ABNORMAL
EGFR (NO RACE VARIABLE) (RUSH/TITUS): 79 ML/MIN/1.73M²
EOSINOPHIL # BLD AUTO: 0.41 K/UL (ref 0–0.5)
EOSINOPHIL NFR BLD AUTO: 3.8 % (ref 1–4)
EOSINOPHIL NFR BLD MANUAL: 1 % (ref 1–4)
ERYTHROCYTE [DISTWIDTH] IN BLOOD BY AUTOMATED COUNT: 15.5 % (ref 11.5–14.5)
ETHANOL, BLOOD (CATEGORY): NOT DETECTED
GLOBULIN SER-MCNC: 7.2 G/DL (ref 2–4)
GLUCOSE SERPL-MCNC: 241 MG/DL (ref 74–106)
GLUCOSE SERPL-MCNC: 287 MG/DL (ref 70–105)
GLUCOSE UR STRIP-MCNC: 100 MG/DL
HCT VFR BLD AUTO: 31.6 % (ref 38–47)
HGB BLD-MCNC: 9.4 G/DL (ref 12–16)
IMM GRANULOCYTES # BLD AUTO: 0.03 K/UL (ref 0–0.04)
IMM GRANULOCYTES NFR BLD: 0.3 % (ref 0–0.4)
KETONES UR STRIP-SCNC: NEGATIVE MG/DL
LEUKOCYTE ESTERASE UR QL STRIP: NEGATIVE
LYMPHOCYTES # BLD AUTO: 2.78 K/UL (ref 1–4.8)
LYMPHOCYTES NFR BLD AUTO: 25.7 % (ref 27–41)
LYMPHOCYTES NFR BLD MANUAL: 26 % (ref 27–41)
MCH RBC QN AUTO: 22.7 PG (ref 27–31)
MCHC RBC AUTO-ENTMCNC: 29.7 G/DL (ref 32–36)
MCV RBC AUTO: 76.3 FL (ref 80–96)
MICROCYTES BLD QL SMEAR: ABNORMAL
MONOCYTES # BLD AUTO: 0.91 K/UL (ref 0–0.8)
MONOCYTES NFR BLD AUTO: 8.4 % (ref 2–6)
MONOCYTES NFR BLD MANUAL: 7 % (ref 2–6)
MPC BLD CALC-MCNC: 8.8 FL (ref 9.4–12.4)
NEUTROPHILS # BLD AUTO: 6.65 K/UL (ref 1.8–7.7)
NEUTROPHILS NFR BLD AUTO: 61.5 % (ref 53–65)
NEUTS SEG NFR BLD MANUAL: 66 % (ref 50–62)
NITRITE UR QL STRIP: NEGATIVE
NRBC BLD MANUAL-RTO: ABNORMAL %
OPIATES UR QL SCN: POSITIVE
PCP UR QL SCN: NEGATIVE
PH UR STRIP: 5.5 PH UNITS
PLATELET # BLD AUTO: 607 K/UL (ref 150–400)
PLATELET MORPHOLOGY: ABNORMAL
POTASSIUM SERPL-SCNC: 3.8 MMOL/L (ref 3.5–5.1)
PROT SERPL-MCNC: 9.2 G/DL (ref 6.4–8.2)
PROT UR QL STRIP: 30
RBC # BLD AUTO: 4.14 M/UL (ref 4.2–5.4)
RBC # UR STRIP: NEGATIVE /UL
RBC #/AREA URNS HPF: ABNORMAL /HPF
SODIUM SERPL-SCNC: 140 MMOL/L (ref 136–145)
SP GR UR STRIP: >=1.03
SQUAMOUS #/AREA URNS LPF: ABNORMAL /LPF
STOMATOCYTES BLD QL SMEAR: ABNORMAL
UROBILINOGEN UR STRIP-ACNC: 1 MG/DL
WBC # BLD AUTO: 10.81 K/UL (ref 4.5–11)
WBC #/AREA URNS HPF: ABNORMAL /HPF

## 2023-01-09 PROCEDURE — 63600175 PHARM REV CODE 636 W HCPCS: Performed by: FAMILY MEDICINE

## 2023-01-09 PROCEDURE — 82077 ASSAY SPEC XCP UR&BREATH IA: CPT | Performed by: FAMILY MEDICINE

## 2023-01-09 PROCEDURE — 93010 ELECTROCARDIOGRAM REPORT: CPT | Mod: ,,, | Performed by: INTERNAL MEDICINE

## 2023-01-09 PROCEDURE — 82962 GLUCOSE BLOOD TEST: CPT

## 2023-01-09 PROCEDURE — 80307 DRUG TEST PRSMV CHEM ANLYZR: CPT | Performed by: FAMILY MEDICINE

## 2023-01-09 PROCEDURE — 80053 COMPREHEN METABOLIC PANEL: CPT | Performed by: FAMILY MEDICINE

## 2023-01-09 PROCEDURE — 25000003 PHARM REV CODE 250: Performed by: FAMILY MEDICINE

## 2023-01-09 PROCEDURE — 93005 ELECTROCARDIOGRAM TRACING: CPT

## 2023-01-09 PROCEDURE — 99284 EMERGENCY DEPT VISIT MOD MDM: CPT | Performed by: FAMILY MEDICINE

## 2023-01-09 PROCEDURE — 85025 COMPLETE CBC W/AUTO DIFF WBC: CPT | Performed by: FAMILY MEDICINE

## 2023-01-09 PROCEDURE — 99285 EMERGENCY DEPT VISIT HI MDM: CPT | Mod: 25

## 2023-01-09 PROCEDURE — 93010 EKG 12-LEAD: ICD-10-PCS | Mod: ,,, | Performed by: INTERNAL MEDICINE

## 2023-01-09 PROCEDURE — 81003 URINALYSIS AUTO W/O SCOPE: CPT | Mod: 59 | Performed by: FAMILY MEDICINE

## 2023-01-09 PROCEDURE — 96374 THER/PROPH/DIAG INJ IV PUSH: CPT

## 2023-01-09 RX ORDER — KETOROLAC TROMETHAMINE 30 MG/ML
30 INJECTION, SOLUTION INTRAMUSCULAR; INTRAVENOUS
Status: COMPLETED | OUTPATIENT
Start: 2023-01-09 | End: 2023-01-09

## 2023-01-09 RX ADMIN — SODIUM CHLORIDE 1000 ML: 9 INJECTION, SOLUTION INTRAVENOUS at 10:01

## 2023-01-09 RX ADMIN — KETOROLAC TROMETHAMINE 30 MG: 30 INJECTION, SOLUTION INTRAMUSCULAR; INTRAVENOUS at 12:01

## 2023-01-09 NOTE — ED NOTES
PATIENT REMAINS RESTING QUIETLY IN BED WITH EYES CLOSED; RESP EVEN/NON LABORED; NO S/S OF PAIN/DISTRESS NOTED @ THIS TIME

## 2023-01-09 NOTE — ED TRIAGE NOTES
PATIENT PRESENTED TO ER VIA EMS WITH C/O AMS & CHRONIC PAIN; PATIENT WAS SEEN ON 1/4/23 BY DR. AL FOR STAPHYLOCOCCAL ARTHRITIS OF LEFT KNEE & WAS PRESCRIBED 28 NORCO; EMS STATES PATIENT ONLY HAD 4 LEFT IN BOTTLE

## 2023-01-09 NOTE — ED NOTES
PATIENT APPEARS TO BE IN NO DISTRESS OR DISCOMFORT; WILL ANSWER QUESTIONS WITH ONE WORD ANSWERS; PATIENT KEEPS CLOSING EYES & RESTING WHILE TALKING WITH HER

## 2023-01-09 NOTE — ED NOTES
PATIENT RECEIVES DAPTOMYCIN DAILY @ HOME; THIS NURSE CLEANED/FLUSHED PICC LINE WITH 10CC NS; ADMINISTERED DAPTOMYCIN 400MG/100ML OVER 30 MINUTES; AFTER COMPLETION OF ANTIBIOTICS PORT CLEANED, FLUSHED WITH 10CC NS FOLLOWED BY HEPARIN FLUSH; CAP REPLACED & SECURED UNDER MESH SLEEVE

## 2023-01-09 NOTE — ED PROVIDER NOTES
Encounter Date: 1/9/2023       History     Chief Complaint   Patient presents with    Altered Mental Status    Chronic Pain     Pt brought to ED by EMS for altered MS (specifically increased grogginess) and pt c/o ongoing pain to her low back.  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 6-7 weeks ago, and was seen for routine follow up, and prescribed #28 [place] on 4Jan23.  The orthopedic surgeon following her up on that date noted that her shoulder and left knee sites were both greatly improved and that she was complaining mainly of LBP at that time.  Pt had #4 Norco left in the bottle this am, so she is using them mildly faster than prescribed.  Pt has been referred to pain management.    Review of patient's allergies indicates:  No Known Allergies  Past Medical History:   Diagnosis Date    Abscess of right thigh + MRSA 08/20/2021    healed    Adnexal cyst     Degenerative disc disease     Depression 10/29/2021    Hypertension     Nicotine dependence     Osteoarthritis      Past Surgical History:   Procedure Laterality Date    BACK SURGERY      HYSTERECTOMY N/A     IRRIGATION AND DEBRIDEMENT OF LOWER EXTREMITY Left 11/22/2022    Procedure: IRRIGATION AND DEBRIDEMENT, LOWER EXTREMITY;  Surgeon: Papa Mendoza MD;  Location: [facility];  Service: Orthopedics;  Laterality: Left;    IRRIGATION AND DEBRIDEMENT OF UPPER EXTREMITY Left 11/22/2022    Procedure: IRRIGATION AND DEBRIDEMENT, UPPER EXTREMITY;  Surgeon: Papa Mendoza MD;  Location: [facility];  Service: Orthopedics;  Laterality: Left;    TOTAL HIP ARTHROPLASTY Left     TOTAL KNEE ARTHROPLASTY Bilateral      Family History   Problem Relation Age of Onset    Hypertension Father     Diabetes Father      Social History     Tobacco Use    Smoking status: Every Day     Types: Cigarettes    Smokeless tobacco: Current     Types: Snuff   Substance Use Topics    Alcohol use: Never    Drug use: Never      Review of Systems   Constitutional:  Positive for fatigue.   Musculoskeletal:         Left knee pain   All other systems reviewed and are negative.    Physical Exam     Initial Vitals [01/09/23 0909]   BP Pulse Resp Temp SpO2   137/78 89 13 98.1 °F (36.7 °C) 98 %      MAP       --         Physical Exam    Nursing note and vitals reviewed.  Constitutional: She appears well-developed and well-nourished.   Pt is very groggy.  She awakens and responds to voice, follows basic commands and answers questions appropriately, but falls right back asleep as soon as she is left alone.     HENT:   Head: Normocephalic and atraumatic.   Mouth/Throat: Mucous membranes are dry.   Eyes: Conjunctivae and EOM are normal. Pupils are equal, round, and reactive to light.   Pupils are roughly midpoint at rest, and are reactive light with both direct and consensual reflexes normal.    Neck: Neck supple. No tracheal deviation present. No JVD present.   Cardiovascular:  Normal rate, regular rhythm, normal heart sounds and intact distal pulses.     Exam reveals no gallop and no friction rub.       No murmur heard.  Pulmonary/Chest: Breath sounds normal. No respiratory distress. She has no wheezes. She has no rhonchi. She has no rales.   Abdominal: Abdomen is soft. Bowel sounds are normal. She exhibits no distension. There is no abdominal tenderness. There is no rebound and no guarding.   Musculoskeletal:         General: No tenderness or edema. Normal range of motion.      Cervical back: Neck supple.     Neurological: She is alert and oriented to person, place, and time.   Skin: Skin is warm and dry. Capillary refill takes less than 2 seconds.   Psychiatric: She has a normal mood and affect.       Medical Screening Exam   See Full Note    ED Course   Procedures  Labs Reviewed   COMPREHENSIVE METABOLIC PANEL - Abnormal; Notable for the following components:       Result Value    CO2 33 (*)     Glucose 241 (*)     Total Protein 9.2 (*)      Albumin 2.0 (*)     Globulin 7.2 (*)     Alk Phos 141 (*)     All other components within normal limits   DRUG SCREEN, URINE (BEAKER) - Abnormal; Notable for the following components:    Opiates, Urine Positive (*)     All other components within normal limits    Narrative:     The results of screening tests should be considered presumptive. Confirmatory testing is available upon request.    Cutoff Points:  PCP:         25ng/mL  AMPH:        500ng/mL  ELVIRA:        200ng/mL  DEJUAN:        200ng/mL  THC:         50ng/mL  SAMANTHA:         300ng/mL  OPI:         2000ng/mL   URINALYSIS, REFLEX TO URINE CULTURE - Abnormal; Notable for the following components:    Protein, UA 30 (*)     Glucose,  (*)     Bilirubin, UA Small (*)     Specific Gravity, UA >=1.030 (*)     All other components within normal limits   CBC WITH DIFFERENTIAL - Abnormal; Notable for the following components:    RBC 4.14 (*)     Hemoglobin 9.4 (*)     Hematocrit 31.6 (*)     MCV 76.3 (*)     MCH 22.7 (*)     MCHC 29.7 (*)     RDW 15.5 (*)     Platelet Count 607 (*)     MPV 8.8 (*)     Lymphocytes % 25.7 (*)     Monocytes % 8.4 (*)     Monocytes, Absolute 0.91 (*)     All other components within normal limits   URINALYSIS, MICROSCOPIC - Abnormal; Notable for the following components:    Bacteria, UA Few (*)     Squamous Epithelial Cells, UA Few (*)     All other components within normal limits   MANUAL DIFFERENTIAL - Abnormal; Notable for the following components:    Segmented Neutrophils, Man % 66 (*)     Lymphocytes, Man % 26 (*)     Monocytes, Man % 7 (*)     Platelet Morphology Increased (*)     All other components within normal limits   POCT GLUCOSE MONITORING CONTINUOUS - Abnormal; Notable for the following components:    POC Glucose 287 (*)     All other components within normal limits   CBC W/ AUTO DIFFERENTIAL    Narrative:     The following orders were created for panel order CBC auto differential.  Procedure                                Abnormality         Status                     ---------                               -----------         ------                     CBC with Differential[[id number]]        Abnormal            Final result               Manual Differential[[id number]]          Abnormal            Final result                 Please view results for these tests on the individual orders.   ALCOHOL,MEDICAL (ETHANOL)        ECG Results              EKG 12-lead (Preliminary result)  Result time 01/09/23 09:36:56      ED Interpretation by Landon Headley MD (01/09/23 09:36:56, [facility] - Emergency Department, Emergency Medicine)    NSR, rate of 90 bpm.  Short DE interval.  Otherwise normal ECG.  No evidence of ischemia/infarct, pericarditis, PE or malignant dysrhythmia.                                    Imaging Results              CT Head Without Contrast (Final result)  Result time 01/09/23 12:01:59      Final result by Jacek Whitfield MD (01/09/23 12:01:59)                   Impression:      No acute intracranial abnormality.      Electronically signed by: Jacek Whitfield  Date:    01/09/2023  Time:    12:01               Narrative:    EXAMINATION:  CT HEAD WITHOUT CONTRAST    CLINICAL HISTORY:  Mental status change, unknown cause;    TECHNIQUE:  Low dose axial images were obtained through the head.  Coronal and sagittal reformations were also performed. Contrast was not administered.  The CT examination was performed using one or more of the following dose reduction techniques: Automated exposure control, adjustment of the mA and kV according to patient's size, use of acute or iterative reconstruction techniques.    COMPARISON:  03/10/2022    FINDINGS:  No acute intracranial hemorrhage, mass, infarct, or fluid collection.  Ventricles, sulci, and cisterns appear normal.  No mass effect or midline shift.  Calvarium intact.  Mastoid air cells and paranasal sinuses clear.                                       Medications    sodium chloride 0.9% bolus 1,000 mL 1,000 mL (1,000 mLs Intravenous New Bag 1/9/23 1004)   ketorolac injection 30 mg (30 mg Intravenous Given 1/9/23 1238)                       Clinical Impression:   Final diagnoses:  [R41.82] Altered mental status  [G89.4] Chronic pain syndrome (Primary)  [E86.0] Dehydration  [M54.50, G89.29] Chronic low back pain, unspecified back pain laterality, unspecified whether sciatica present        ED Disposition Condition    Discharge Stable          ED Prescriptions    None       Follow-up Information    None          Landno Headley MD  01/09/23 4265

## 2023-01-09 NOTE — ED NOTES
PATIENT REMAINS RESTING QUIETLY IN BED WITH EYES CLOSED; FAMILY @ BEDSIDE; FAMILY REQUESTED PATIENT BE ADMITTED TO HOSPITAL SINCE SHE HAS MRI TOMORROW & MAY NOT HAVE A WAY TO COME

## 2023-01-09 NOTE — DISCHARGE INSTRUCTIONS
Follow up with your pain management doctor for all narcotic pain medications when they are necessary.  If you get narcotic pain medicine from anyone else, it could get you kicked out of your pain management program that you get your pain medicines from.  Otherwise follow up with PCP for other non-emergent problems and keep any appointments already made.

## 2023-01-10 ENCOUNTER — HOSPITAL ENCOUNTER (OUTPATIENT)
Dept: RADIOLOGY | Facility: HOSPITAL | Age: 53
Discharge: HOME | End: 2023-01-10
Attending: ORTHOPAEDIC SURGERY
Payer: MEDICARE

## 2023-01-10 DIAGNOSIS — M54.16 LUMBAR RADICULOPATHY: ICD-10-CM

## 2023-01-10 DIAGNOSIS — M54.16 LUMBAR RADICULOPATHY: Primary | ICD-10-CM

## 2023-01-10 PROCEDURE — 72148 MRI LUMBAR SPINE W/O DYE: CPT | Mod: TC

## 2023-01-11 DIAGNOSIS — M54.9 BACK PAIN, UNSPECIFIED BACK LOCATION, UNSPECIFIED BACK PAIN LATERALITY, UNSPECIFIED CHRONICITY: ICD-10-CM

## 2023-01-11 DIAGNOSIS — M54.16 LUMBAR RADICULOPATHY: Primary | ICD-10-CM

## 2023-01-12 ENCOUNTER — LAB REQUISITION (OUTPATIENT)
Dept: LAB | Facility: HOSPITAL | Age: 53
End: 2023-01-12
Payer: MEDICARE

## 2023-01-12 DIAGNOSIS — Z79.2 LONG TERM (CURRENT) USE OF ANTIBIOTICS: ICD-10-CM

## 2023-01-12 LAB
ALBUMIN SERPL BCP-MCNC: 1.8 G/DL (ref 3.5–5)
ALBUMIN/GLOB SERPL: 0.3 {RATIO}
ALP SERPL-CCNC: 118 U/L (ref 41–108)
ALT SERPL W P-5'-P-CCNC: 16 U/L (ref 13–56)
ANION GAP SERPL CALCULATED.3IONS-SCNC: 13 MMOL/L (ref 7–16)
AST SERPL W P-5'-P-CCNC: 11 U/L (ref 15–37)
BASOPHILS # BLD AUTO: 0.03 K/UL (ref 0–0.2)
BASOPHILS NFR BLD AUTO: 0.3 % (ref 0–1)
BILIRUB SERPL-MCNC: 0.3 MG/DL (ref ?–1.2)
BUN SERPL-MCNC: 10 MG/DL (ref 7–18)
BUN/CREAT SERPL: 14 (ref 6–20)
CALCIUM SERPL-MCNC: 8.7 MG/DL (ref 8.5–10.1)
CHLORIDE SERPL-SCNC: 98 MMOL/L (ref 98–107)
CO2 SERPL-SCNC: 30 MMOL/L (ref 21–32)
CREAT SERPL-MCNC: 0.71 MG/DL (ref 0.55–1.02)
CRP SERPL-MCNC: 13.3 MG/DL (ref 0–0.8)
DIFFERENTIAL METHOD BLD: ABNORMAL
EGFR (NO RACE VARIABLE) (RUSH/TITUS): 101 ML/MIN/1.73M²
EOSINOPHIL # BLD AUTO: 0.41 K/UL (ref 0–0.5)
EOSINOPHIL NFR BLD AUTO: 3.9 % (ref 1–4)
ERYTHROCYTE [DISTWIDTH] IN BLOOD BY AUTOMATED COUNT: 15.9 % (ref 11.5–14.5)
ERYTHROCYTE [SEDIMENTATION RATE] IN BLOOD BY WESTERGREN METHOD: 128 MM/HR (ref 0–30)
GLOBULIN SER-MCNC: 6.4 G/DL (ref 2–4)
GLUCOSE SERPL-MCNC: 220 MG/DL (ref 74–106)
HCT VFR BLD AUTO: 28.3 % (ref 38–47)
HGB BLD-MCNC: 8.4 G/DL (ref 12–16)
IMM GRANULOCYTES # BLD AUTO: 0.03 K/UL (ref 0–0.04)
IMM GRANULOCYTES NFR BLD: 0.3 % (ref 0–0.4)
LYMPHOCYTES # BLD AUTO: 2.37 K/UL (ref 1–4.8)
LYMPHOCYTES NFR BLD AUTO: 22.7 % (ref 27–41)
MCH RBC QN AUTO: 22.6 PG (ref 27–31)
MCHC RBC AUTO-ENTMCNC: 29.7 G/DL (ref 32–36)
MCV RBC AUTO: 76.3 FL (ref 80–96)
MONOCYTES # BLD AUTO: 0.87 K/UL (ref 0–0.8)
MONOCYTES NFR BLD AUTO: 8.3 % (ref 2–6)
MPC BLD CALC-MCNC: 9 FL (ref 9.4–12.4)
NEUTROPHILS # BLD AUTO: 6.73 K/UL (ref 1.8–7.7)
NEUTROPHILS NFR BLD AUTO: 64.5 % (ref 53–65)
PLATELET # BLD AUTO: 500 K/UL (ref 150–400)
POTASSIUM SERPL-SCNC: 3.7 MMOL/L (ref 3.5–5.1)
PROT SERPL-MCNC: 8.2 G/DL (ref 6.4–8.2)
RBC # BLD AUTO: 3.71 M/UL (ref 4.2–5.4)
SODIUM SERPL-SCNC: 137 MMOL/L (ref 136–145)
WBC # BLD AUTO: 10.44 K/UL (ref 4.5–11)

## 2023-01-12 PROCEDURE — 85651 RBC SED RATE NONAUTOMATED: CPT | Performed by: FAMILY MEDICINE

## 2023-01-12 PROCEDURE — 80053 COMPREHEN METABOLIC PANEL: CPT | Performed by: FAMILY MEDICINE

## 2023-01-12 PROCEDURE — 86140 C-REACTIVE PROTEIN: CPT | Performed by: FAMILY MEDICINE

## 2023-01-12 PROCEDURE — 85025 COMPLETE CBC W/AUTO DIFF WBC: CPT | Performed by: FAMILY MEDICINE

## 2023-01-13 ENCOUNTER — HOSPITAL ENCOUNTER (OUTPATIENT)
Dept: RADIOLOGY | Facility: HOSPITAL | Age: 53
Discharge: HOME | End: 2023-01-13
Attending: ORTHOPAEDIC SURGERY
Payer: MEDICARE

## 2023-01-13 ENCOUNTER — OFFICE VISIT (OUTPATIENT)
Dept: SPINE | Facility: CLINIC | Age: 53
End: 2023-01-13
Payer: MEDICARE

## 2023-01-13 DIAGNOSIS — M54.9 BACK PAIN, UNSPECIFIED BACK LOCATION, UNSPECIFIED BACK PAIN LATERALITY, UNSPECIFIED CHRONICITY: ICD-10-CM

## 2023-01-13 DIAGNOSIS — M46.24 OSTEOMYELITIS OF THORACIC REGION: Primary | ICD-10-CM

## 2023-01-13 DIAGNOSIS — M54.16 LUMBAR RADICULOPATHY: ICD-10-CM

## 2023-01-13 LAB — ERYTHROCYTE [SEDIMENTATION RATE] IN BLOOD BY WESTERGREN METHOD: >140 MM/HR (ref 0–30)

## 2023-01-13 PROCEDURE — 3051F PR MOST RECENT HEMOGLOBIN A1C LEVEL 7.0 - < 8.0%: ICD-10-PCS | Mod: CPTII,,, | Performed by: ORTHOPAEDIC SURGERY

## 2023-01-13 PROCEDURE — 72120 X-RAY BEND ONLY L-S SPINE: CPT | Mod: TC,59

## 2023-01-13 PROCEDURE — 3051F HG A1C>EQUAL 7.0%<8.0%: CPT | Mod: CPTII,,, | Performed by: ORTHOPAEDIC SURGERY

## 2023-01-13 PROCEDURE — 72082 X-RAY EXAM ENTIRE SPI 2/3 VW: CPT | Mod: 26,,, | Performed by: ORTHOPAEDIC SURGERY

## 2023-01-13 PROCEDURE — 99204 OFFICE O/P NEW MOD 45 MIN: CPT | Mod: 25,S$PBB,, | Performed by: ORTHOPAEDIC SURGERY

## 2023-01-13 PROCEDURE — 99406 PR TOBACCO USE CESSATION INTERMEDIATE 3-10 MINUTES: ICD-10-PCS | Mod: S$PBB,,, | Performed by: ORTHOPAEDIC SURGERY

## 2023-01-13 PROCEDURE — 99204 PR OFFICE/OUTPT VISIT, NEW, LEVL IV, 45-59 MIN: ICD-10-PCS | Mod: 25,S$PBB,, | Performed by: ORTHOPAEDIC SURGERY

## 2023-01-13 PROCEDURE — 99214 OFFICE O/P EST MOD 30 MIN: CPT | Mod: PBBFAC | Performed by: ORTHOPAEDIC SURGERY

## 2023-01-13 PROCEDURE — 72082 X-RAY EXAM ENTIRE SPI 2/3 VW: CPT | Mod: TC

## 2023-01-13 PROCEDURE — 99406 BEHAV CHNG SMOKING 3-10 MIN: CPT | Mod: S$PBB,,, | Performed by: ORTHOPAEDIC SURGERY

## 2023-01-13 PROCEDURE — 72082 XR SCOLIOSIS COMPLETE: ICD-10-PCS | Mod: 26,,, | Performed by: ORTHOPAEDIC SURGERY

## 2023-01-13 NOTE — PROGRESS NOTES
AP and lateral views scoliosis series reviewed    On the AP there is normal coronal alignment of the thoracic and lumbar spine.  There are 5 non-rib-bearing lumbar vertebra.  On the lateral view there is decreased thoracic kyphosis and lumbar lordosis.  Thoracic and lumbar spondylosis noted.  T12 compression deformity seen.  Prior L4-5 spinous process wiring noted.  There is been left total hip arthroplasty and posterior acetabular fixation.      Impression:  Thoracolumbar spondylosis.  T12 compression deformity.  Prior L4-5 spinous process wiring

## 2023-01-13 NOTE — PROGRESS NOTES
Smoking Cessation counseling    Time spent:  3-10 minutes ([id number])    We discussed the importance, over both the short and long-term, of smoking cessation; reviewed the patient's willingness to quit; overall history and current use of tobacco smoking products; that there are a variety of methods to help with smoking diminution and cessation (stopping alone, using nicotine substitution medications/gums, Chantix, other medications, etcetera, which the patient will also discuss with his or her primary care physician); that the patient should set a date for smoking cessation; and the patient will follow up with his or her primary care physician for further support and oversight.    We also discussed that for the patient to have surgery while using nicotine, wound healing may be compromised relative to those who do not smoke; that recovery from anesthesia may sometimes be more difficult; and that quitting may decrease the heart, vascular, pulmonary effects in the short term as well as over the long term.  Smoking cessation may be useful and important for any patient who will have a fusion as part of surgery or have bone or tissue that has regrown heal (bone fusions, wound closures, etc.)  since surgical results with respect to wound healing, susceptibility to recovery from infection, bone fusion, and tissue and blood vessel health may be impaired or less optimal in smokers than nonsmokers.  We talked about the elevated risk of surgical bone fusion failure in the setting of smoking and the potential need for a higher-risk revision surgery should fusion not occur.    I reviewed this with the patient in detail and all questions were answered.  We discussed nature of the patient's condition; real alternatives and realistic options; pros and cons, risks and benefits of all the options, in detail.  I believe the patient understands the above and wishes to proceed as outlined.

## 2023-01-13 NOTE — ADDENDUM NOTE
Encounter addended by: Zoe Levy on: 1/13/2023 3:12 PM   Actions taken: SmartForm saved, Flowsheet accepted
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
## 2023-01-16 LAB — MYCOBACTERIUM SPEC CULT: NORMAL

## 2023-01-17 ENCOUNTER — TELEPHONE (OUTPATIENT)
Dept: ORTHOPEDICS | Facility: CLINIC | Age: 53
End: 2023-01-17
Payer: MEDICARE

## 2023-01-17 ENCOUNTER — LAB REQUISITION (OUTPATIENT)
Dept: LAB | Facility: HOSPITAL | Age: 53
End: 2023-01-17
Attending: FAMILY MEDICINE
Payer: MEDICARE

## 2023-01-17 ENCOUNTER — OUTSIDE PLACE OF SERVICE (OUTPATIENT)
Dept: ORTHOPEDICS | Facility: CLINIC | Age: 53
End: 2023-01-17
Payer: MEDICARE

## 2023-01-17 DIAGNOSIS — Z79.2 LONG TERM (CURRENT) USE OF ANTIBIOTICS: ICD-10-CM

## 2023-01-17 LAB
ALBUMIN SERPL BCP-MCNC: 2 G/DL (ref 3.5–5)
ALBUMIN/GLOB SERPL: 0.3 {RATIO}
ALP SERPL-CCNC: 149 U/L (ref 41–108)
ALT SERPL W P-5'-P-CCNC: 13 U/L (ref 13–56)
ANION GAP SERPL CALCULATED.3IONS-SCNC: 16 MMOL/L (ref 7–16)
AST SERPL W P-5'-P-CCNC: 9 U/L (ref 15–37)
BASOPHILS # BLD AUTO: 0.02 K/UL (ref 0–0.2)
BASOPHILS NFR BLD AUTO: 0.2 % (ref 0–1)
BILIRUB SERPL-MCNC: 0.2 MG/DL (ref ?–1.2)
BUN SERPL-MCNC: 11 MG/DL (ref 7–18)
BUN/CREAT SERPL: 14 (ref 6–20)
CALCIUM SERPL-MCNC: 8.5 MG/DL (ref 8.5–10.1)
CHLORIDE SERPL-SCNC: 95 MMOL/L (ref 98–107)
CO2 SERPL-SCNC: 29 MMOL/L (ref 21–32)
CREAT SERPL-MCNC: 0.77 MG/DL (ref 0.55–1.02)
CRP SERPL-MCNC: 12.9 MG/DL (ref 0–0.8)
DIFFERENTIAL METHOD BLD: ABNORMAL
EGFR (NO RACE VARIABLE) (RUSH/TITUS): 92 ML/MIN/1.73M²
EOSINOPHIL # BLD AUTO: 0.52 K/UL (ref 0–0.5)
EOSINOPHIL NFR BLD AUTO: 4.8 % (ref 1–4)
ERYTHROCYTE [DISTWIDTH] IN BLOOD BY AUTOMATED COUNT: 16.2 % (ref 11.5–14.5)
ERYTHROCYTE [SEDIMENTATION RATE] IN BLOOD BY WESTERGREN METHOD: 124 MM/HR (ref 0–30)
GLOBULIN SER-MCNC: 6.3 G/DL (ref 2–4)
GLUCOSE SERPL-MCNC: 277 MG/DL (ref 74–106)
HCT VFR BLD AUTO: 30.8 % (ref 38–47)
HGB BLD-MCNC: 9.1 G/DL (ref 12–16)
IMM GRANULOCYTES # BLD AUTO: 0.03 K/UL (ref 0–0.04)
IMM GRANULOCYTES NFR BLD: 0.3 % (ref 0–0.4)
LYMPHOCYTES # BLD AUTO: 2.38 K/UL (ref 1–4.8)
LYMPHOCYTES NFR BLD AUTO: 22 % (ref 27–41)
LYMPHOCYTES NFR BLD MANUAL: 21 % (ref 27–41)
MCH RBC QN AUTO: 22.5 PG (ref 27–31)
MCHC RBC AUTO-ENTMCNC: 29.5 G/DL (ref 32–36)
MCV RBC AUTO: 76 FL (ref 80–96)
MONOCYTES # BLD AUTO: 0.74 K/UL (ref 0–0.8)
MONOCYTES NFR BLD AUTO: 6.9 % (ref 2–6)
MONOCYTES NFR BLD MANUAL: 6 % (ref 2–6)
MPC BLD CALC-MCNC: 8.8 FL (ref 9.4–12.4)
NEUTROPHILS # BLD AUTO: 7.11 K/UL (ref 1.8–7.7)
NEUTROPHILS NFR BLD AUTO: 65.8 % (ref 53–65)
NEUTS BAND NFR BLD MANUAL: 1 % (ref 1–5)
NEUTS SEG NFR BLD MANUAL: 72 % (ref 50–62)
NRBC BLD MANUAL-RTO: ABNORMAL %
PLATELET # BLD AUTO: 661 K/UL (ref 150–400)
PLATELET MORPHOLOGY: ABNORMAL
POTASSIUM SERPL-SCNC: 4.1 MMOL/L (ref 3.5–5.1)
PROT SERPL-MCNC: 8.3 G/DL (ref 6.4–8.2)
RBC # BLD AUTO: 4.05 M/UL (ref 4.2–5.4)
RBC MORPH BLD: NORMAL
SODIUM SERPL-SCNC: 136 MMOL/L (ref 136–145)
WBC # BLD AUTO: 10.8 K/UL (ref 4.5–11)

## 2023-01-17 PROCEDURE — 86140 C-REACTIVE PROTEIN: CPT | Performed by: FAMILY MEDICINE

## 2023-01-17 PROCEDURE — 85651 RBC SED RATE NONAUTOMATED: CPT | Performed by: FAMILY MEDICINE

## 2023-01-17 PROCEDURE — 85025 COMPLETE CBC W/AUTO DIFF WBC: CPT | Performed by: FAMILY MEDICINE

## 2023-01-17 PROCEDURE — 80053 COMPREHEN METABOLIC PANEL: CPT | Performed by: FAMILY MEDICINE

## 2023-01-17 NOTE — TELEPHONE ENCOUNTER
1/17/23@ 1300 spoke with Dr. STOVER about pt completing iv antibiotic. New order rec'd to start po antibiotics ceftin 500mg one tab po bid #60 with 2 refills.   1/17/23 @ 1550 called [facility] spoke with bert new rx called in  1/17/23 @ 1555 called [phone] pt was unable to come to phone spoke with pt. Mother. Jasmeet left for pt to start po antibiotic today and to pick them up from [facility] [place].  1/17/23 @ 1600 called [facility] and spoke with geeta. No new orders for iv antibiotic. No new orders for flushes.  1/17/23 @ 1605 new order rec'd from Dr. STOVER to d/c picc line  1/17/23 @ 1608 called [facility] and spoke with sadiq. New order given to d/c picc line

## 2023-01-19 ENCOUNTER — OUTSIDE PLACE OF SERVICE (OUTPATIENT)
Dept: ADMINISTRATIVE | Facility: HOSPITAL | Age: 53
End: 2023-01-19
Payer: MEDICARE

## 2023-01-19 ENCOUNTER — HOSPITAL ENCOUNTER (INPATIENT)
Facility: HOSPITAL | Age: 53
LOS: 20 days | Discharge: HOME HEALTH | DRG: 029 | End: 2023-02-08
Attending: EMERGENCY MEDICINE | Admitting: ORTHOPAEDIC SURGERY
Payer: MEDICARE

## 2023-01-19 DIAGNOSIS — Z79.4 TYPE 2 DIABETES MELLITUS WITH HYPERGLYCEMIA, WITH LONG-TERM CURRENT USE OF INSULIN: ICD-10-CM

## 2023-01-19 DIAGNOSIS — G06.2 EPIDURAL ABSCESS: ICD-10-CM

## 2023-01-19 DIAGNOSIS — R78.81 BACTEREMIA: ICD-10-CM

## 2023-01-19 DIAGNOSIS — E11.65 TYPE 2 DIABETES MELLITUS WITH HYPERGLYCEMIA, WITH LONG-TERM CURRENT USE OF INSULIN: ICD-10-CM

## 2023-01-19 DIAGNOSIS — M25.552 LEFT HIP PAIN: ICD-10-CM

## 2023-01-19 DIAGNOSIS — M46.44 DISCITIS OF THORACIC REGION: Primary | ICD-10-CM

## 2023-01-19 DIAGNOSIS — D50.9 MICROCYTIC ANEMIA: ICD-10-CM

## 2023-01-19 DIAGNOSIS — R07.9 CHEST PAIN: ICD-10-CM

## 2023-01-19 LAB
ALBUMIN SERPL BCP-MCNC: 1.7 G/DL (ref 3.5–5)
ALBUMIN/GLOB SERPL: 0.2 {RATIO}
ALP SERPL-CCNC: 129 U/L (ref 41–108)
ALT SERPL W P-5'-P-CCNC: 10 U/L (ref 13–56)
ANION GAP SERPL CALCULATED.3IONS-SCNC: 12 MMOL/L (ref 7–16)
ANISOCYTOSIS BLD QL SMEAR: ABNORMAL
APTT PPP: 37.4 SECONDS (ref 25.2–37.3)
AST SERPL W P-5'-P-CCNC: 10 U/L (ref 15–37)
BASOPHILS # BLD AUTO: 0.05 K/UL (ref 0–0.2)
BASOPHILS # BLD AUTO: 0.05 K/UL (ref 0–0.2)
BASOPHILS NFR BLD AUTO: 0.3 % (ref 0–1)
BASOPHILS NFR BLD AUTO: 0.4 % (ref 0–1)
BILIRUB SERPL-MCNC: 0.4 MG/DL (ref ?–1.2)
BILIRUB UR QL STRIP: NEGATIVE
BUN SERPL-MCNC: 8 MG/DL (ref 7–18)
BUN/CREAT SERPL: 15 (ref 6–20)
CALCIUM SERPL-MCNC: 8.7 MG/DL (ref 8.5–10.1)
CHLORIDE SERPL-SCNC: 96 MMOL/L (ref 98–107)
CLARITY UR: CLEAR
CO2 SERPL-SCNC: 29 MMOL/L (ref 21–32)
COLOR UR: ABNORMAL
CREAT SERPL-MCNC: 0.54 MG/DL (ref 0.55–1.02)
CRP SERPL-MCNC: 22.9 MG/DL (ref 0–0.8)
DIFFERENTIAL METHOD BLD: ABNORMAL
DIFFERENTIAL METHOD BLD: ABNORMAL
EGFR (NO RACE VARIABLE) (RUSH/TITUS): 110 ML/MIN/1.73M²
EOSINOPHIL # BLD AUTO: 0.35 K/UL (ref 0–0.5)
EOSINOPHIL # BLD AUTO: 0.45 K/UL (ref 0–0.5)
EOSINOPHIL NFR BLD AUTO: 2.6 % (ref 1–4)
EOSINOPHIL NFR BLD AUTO: 3.1 % (ref 1–4)
ERYTHROCYTE [DISTWIDTH] IN BLOOD BY AUTOMATED COUNT: 16.2 % (ref 11.5–14.5)
ERYTHROCYTE [DISTWIDTH] IN BLOOD BY AUTOMATED COUNT: 16.6 % (ref 11.5–14.5)
ERYTHROCYTE [SEDIMENTATION RATE] IN BLOOD BY WESTERGREN METHOD: 130 MM/HR (ref 0–30)
EST. AVERAGE GLUCOSE BLD GHB EST-MCNC: 177 MG/DL
FERRITIN SERPL-MCNC: 380 NG/ML (ref 8–252)
GLOBULIN SER-MCNC: 6.9 G/DL (ref 2–4)
GLUCOSE SERPL-MCNC: 155 MG/DL (ref 70–105)
GLUCOSE SERPL-MCNC: 171 MG/DL (ref 74–106)
GLUCOSE SERPL-MCNC: 84 MG/DL (ref 70–105)
GLUCOSE UR STRIP-MCNC: NORMAL MG/DL
HBA1C MFR BLD HPLC: 7.9 % (ref 4.5–6.6)
HCT VFR BLD AUTO: 27.1 % (ref 38–47)
HCT VFR BLD AUTO: 27.6 % (ref 38–47)
HGB BLD-MCNC: 8 G/DL (ref 12–16)
HGB BLD-MCNC: 8.2 G/DL (ref 12–16)
HYPOCHROMIA BLD QL SMEAR: ABNORMAL
IMM GRANULOCYTES # BLD AUTO: 0.07 K/UL (ref 0–0.04)
IMM GRANULOCYTES # BLD AUTO: 0.07 K/UL (ref 0–0.04)
IMM GRANULOCYTES NFR BLD: 0.5 % (ref 0–0.4)
IMM GRANULOCYTES NFR BLD: 0.5 % (ref 0–0.4)
INR BLD: 1.11
INR BLD: 1.11
IRON SATN MFR SERPL: 6 % (ref 14–50)
IRON SERPL-MCNC: 12 ΜG/DL (ref 50–170)
KETONES UR STRIP-SCNC: NEGATIVE MG/DL
LEUKOCYTE ESTERASE UR QL STRIP: NEGATIVE
LYMPHOCYTES # BLD AUTO: 2.5 K/UL (ref 1–4.8)
LYMPHOCYTES # BLD AUTO: 2.69 K/UL (ref 1–4.8)
LYMPHOCYTES NFR BLD AUTO: 18.4 % (ref 27–41)
LYMPHOCYTES NFR BLD AUTO: 18.8 % (ref 27–41)
MAGNESIUM SERPL-MCNC: 1.4 MG/DL (ref 1.7–2.3)
MCH RBC QN AUTO: 21.7 PG (ref 27–31)
MCH RBC QN AUTO: 22.5 PG (ref 27–31)
MCHC RBC AUTO-ENTMCNC: 29 G/DL (ref 32–36)
MCHC RBC AUTO-ENTMCNC: 30.3 G/DL (ref 32–36)
MCV RBC AUTO: 74.2 FL (ref 80–96)
MCV RBC AUTO: 75 FL (ref 80–96)
MICROCYTES BLD QL SMEAR: ABNORMAL
MONOCYTES # BLD AUTO: 1.15 K/UL (ref 0–0.8)
MONOCYTES # BLD AUTO: 1.18 K/UL (ref 0–0.8)
MONOCYTES NFR BLD AUTO: 8.1 % (ref 2–6)
MONOCYTES NFR BLD AUTO: 8.7 % (ref 2–6)
MPC BLD CALC-MCNC: 8.6 FL (ref 9.4–12.4)
MPC BLD CALC-MCNC: 8.7 FL (ref 9.4–12.4)
NEUTROPHILS # BLD AUTO: 10.21 K/UL (ref 1.8–7.7)
NEUTROPHILS # BLD AUTO: 9.16 K/UL (ref 1.8–7.7)
NEUTROPHILS NFR BLD AUTO: 69 % (ref 53–65)
NEUTROPHILS NFR BLD AUTO: 69.6 % (ref 53–65)
NITRITE UR QL STRIP: NEGATIVE
NRBC # BLD AUTO: 0 X10E3/UL
NRBC # BLD AUTO: 0 X10E3/UL
NRBC, AUTO (.00): 0 %
NRBC, AUTO (.00): 0 %
PH UR STRIP: 8 PH UNITS
PLATELET # BLD AUTO: 544 K/UL (ref 150–400)
PLATELET # BLD AUTO: 572 K/UL (ref 150–400)
PLATELET MORPHOLOGY: ABNORMAL
POTASSIUM SERPL-SCNC: 3.5 MMOL/L (ref 3.5–5.1)
PROT SERPL-MCNC: 8.6 G/DL (ref 6.4–8.2)
PROT UR QL STRIP: NEGATIVE
PROTHROMBIN TIME: 13.9 SECONDS (ref 11.7–14.7)
PROTHROMBIN TIME: 13.9 SECONDS (ref 11.7–14.7)
RBC # BLD AUTO: 3.65 M/UL (ref 4.2–5.4)
RBC # BLD AUTO: 3.68 M/UL (ref 4.2–5.4)
RBC # UR STRIP: NEGATIVE /UL
SARS-COV-2 RDRP RESP QL NAA+PROBE: NEGATIVE
SODIUM SERPL-SCNC: 133 MMOL/L (ref 136–145)
SP GR UR STRIP: 1.01
TIBC SERPL-MCNC: 188 ΜG/DL (ref 250–450)
UROBILINOGEN UR STRIP-ACNC: 2 MG/DL
WBC # BLD AUTO: 13.28 K/UL (ref 4.5–11)
WBC # BLD AUTO: 14.65 K/UL (ref 4.5–11)

## 2023-01-19 PROCEDURE — 87040 BLOOD CULTURE FOR BACTERIA: CPT

## 2023-01-19 PROCEDURE — 83540 ASSAY OF IRON: CPT

## 2023-01-19 PROCEDURE — 82962 GLUCOSE BLOOD TEST: CPT

## 2023-01-19 PROCEDURE — 63600175 PHARM REV CODE 636 W HCPCS: Performed by: FAMILY MEDICINE

## 2023-01-19 PROCEDURE — 99285 EMERGENCY DEPT VISIT HI MDM: CPT | Mod: 25

## 2023-01-19 PROCEDURE — 36415 COLL VENOUS BLD VENIPUNCTURE: CPT

## 2023-01-19 PROCEDURE — 85025 COMPLETE CBC W/AUTO DIFF WBC: CPT | Performed by: EMERGENCY MEDICINE

## 2023-01-19 PROCEDURE — 80053 COMPREHEN METABOLIC PANEL: CPT | Performed by: EMERGENCY MEDICINE

## 2023-01-19 PROCEDURE — 63600175 PHARM REV CODE 636 W HCPCS

## 2023-01-19 PROCEDURE — 11000001 HC ACUTE MED/SURG PRIVATE ROOM

## 2023-01-19 PROCEDURE — 85610 PROTHROMBIN TIME: CPT | Performed by: NURSE PRACTITIONER

## 2023-01-19 PROCEDURE — 85025 COMPLETE CBC W/AUTO DIFF WBC: CPT | Performed by: NURSE PRACTITIONER

## 2023-01-19 PROCEDURE — 81003 URINALYSIS AUTO W/O SCOPE: CPT | Performed by: EMERGENCY MEDICINE

## 2023-01-19 PROCEDURE — 63600175 PHARM REV CODE 636 W HCPCS: Performed by: EMERGENCY MEDICINE

## 2023-01-19 PROCEDURE — 25000003 PHARM REV CODE 250

## 2023-01-19 PROCEDURE — 25000003 PHARM REV CODE 250: Performed by: FAMILY MEDICINE

## 2023-01-19 PROCEDURE — 86140 C-REACTIVE PROTEIN: CPT | Performed by: EMERGENCY MEDICINE

## 2023-01-19 PROCEDURE — 85651 RBC SED RATE NONAUTOMATED: CPT | Performed by: EMERGENCY MEDICINE

## 2023-01-19 PROCEDURE — 82728 ASSAY OF FERRITIN: CPT

## 2023-01-19 PROCEDURE — 83550 IRON BINDING TEST: CPT

## 2023-01-19 PROCEDURE — 99291 CRITICAL CARE FIRST HOUR: CPT | Mod: ,,, | Performed by: EMERGENCY MEDICINE

## 2023-01-19 PROCEDURE — 83735 ASSAY OF MAGNESIUM: CPT | Performed by: FAMILY MEDICINE

## 2023-01-19 PROCEDURE — 36415 COLL VENOUS BLD VENIPUNCTURE: CPT | Performed by: EMERGENCY MEDICINE

## 2023-01-19 PROCEDURE — 96374 THER/PROPH/DIAG INJ IV PUSH: CPT

## 2023-01-19 PROCEDURE — 87635 SARS-COV-2 COVID-19 AMP PRB: CPT | Performed by: EMERGENCY MEDICINE

## 2023-01-19 PROCEDURE — 99223 PR INITIAL HOSPITAL CARE,LEVL III: ICD-10-PCS | Mod: AI,,, | Performed by: INTERNAL MEDICINE

## 2023-01-19 PROCEDURE — 63600175 PHARM REV CODE 636 W HCPCS: Performed by: INTERNAL MEDICINE

## 2023-01-19 PROCEDURE — 83036 HEMOGLOBIN GLYCOSYLATED A1C: CPT

## 2023-01-19 PROCEDURE — 25000003 PHARM REV CODE 250: Performed by: INTERNAL MEDICINE

## 2023-01-19 PROCEDURE — 99291 PR CRITICAL CARE, E/M 30-74 MINUTES: ICD-10-PCS | Mod: ,,, | Performed by: EMERGENCY MEDICINE

## 2023-01-19 PROCEDURE — 99223 1ST HOSP IP/OBS HIGH 75: CPT | Mod: AI,,, | Performed by: INTERNAL MEDICINE

## 2023-01-19 RX ORDER — ACETAMINOPHEN 500 MG
1000 TABLET ORAL EVERY 8 HOURS PRN
Status: DISCONTINUED | OUTPATIENT
Start: 2023-01-19 | End: 2023-02-08 | Stop reason: HOSPADM

## 2023-01-19 RX ORDER — MUPIROCIN 20 MG/G
OINTMENT TOPICAL 2 TIMES DAILY
Status: COMPLETED | OUTPATIENT
Start: 2023-01-19 | End: 2023-01-23

## 2023-01-19 RX ORDER — MORPHINE SULFATE 2 MG/ML
2 INJECTION, SOLUTION INTRAMUSCULAR; INTRAVENOUS EVERY 4 HOURS PRN
Status: DISCONTINUED | OUTPATIENT
Start: 2023-01-19 | End: 2023-01-20

## 2023-01-19 RX ORDER — SODIUM CHLORIDE 0.9 % (FLUSH) 0.9 %
10 SYRINGE (ML) INJECTION EVERY 12 HOURS PRN
Status: DISCONTINUED | OUTPATIENT
Start: 2023-01-19 | End: 2023-02-08 | Stop reason: HOSPADM

## 2023-01-19 RX ORDER — GLUCAGON 1 MG
1 KIT INJECTION
Status: DISCONTINUED | OUTPATIENT
Start: 2023-01-19 | End: 2023-02-08 | Stop reason: HOSPADM

## 2023-01-19 RX ORDER — TALC
6 POWDER (GRAM) TOPICAL NIGHTLY PRN
Status: DISCONTINUED | OUTPATIENT
Start: 2023-01-19 | End: 2023-02-08 | Stop reason: HOSPADM

## 2023-01-19 RX ORDER — NALOXONE HCL 0.4 MG/ML
0.02 VIAL (ML) INJECTION
Status: DISCONTINUED | OUTPATIENT
Start: 2023-01-19 | End: 2023-02-08 | Stop reason: HOSPADM

## 2023-01-19 RX ORDER — DULOXETIN HYDROCHLORIDE 30 MG/1
60 CAPSULE, DELAYED RELEASE ORAL DAILY
Status: DISCONTINUED | OUTPATIENT
Start: 2023-01-19 | End: 2023-02-08 | Stop reason: HOSPADM

## 2023-01-19 RX ORDER — INSULIN ASPART 100 [IU]/ML
1-10 INJECTION, SOLUTION INTRAVENOUS; SUBCUTANEOUS EVERY 6 HOURS PRN
Status: DISCONTINUED | OUTPATIENT
Start: 2023-01-19 | End: 2023-01-24

## 2023-01-19 RX ORDER — INSULIN ASPART 100 [IU]/ML
0-5 INJECTION, SOLUTION INTRAVENOUS; SUBCUTANEOUS EVERY 6 HOURS PRN
Status: DISCONTINUED | OUTPATIENT
Start: 2023-01-19 | End: 2023-01-19

## 2023-01-19 RX ORDER — POTASSIUM CHLORIDE 20 MEQ/1
40 TABLET, EXTENDED RELEASE ORAL ONCE
Status: COMPLETED | OUTPATIENT
Start: 2023-01-19 | End: 2023-01-19

## 2023-01-19 RX ORDER — GLUCAGON 1 MG
1 KIT INJECTION
Status: DISCONTINUED | OUTPATIENT
Start: 2023-01-19 | End: 2023-01-25

## 2023-01-19 RX ORDER — HYDROCODONE BITARTRATE AND ACETAMINOPHEN 5; 325 MG/1; MG/1
1 TABLET ORAL EVERY 6 HOURS PRN
Status: DISCONTINUED | OUTPATIENT
Start: 2023-01-19 | End: 2023-01-20

## 2023-01-19 RX ORDER — SODIUM CHLORIDE, SODIUM LACTATE, POTASSIUM CHLORIDE, CALCIUM CHLORIDE 600; 310; 30; 20 MG/100ML; MG/100ML; MG/100ML; MG/100ML
INJECTION, SOLUTION INTRAVENOUS ONCE
Status: COMPLETED | OUTPATIENT
Start: 2023-01-19 | End: 2023-01-19

## 2023-01-19 RX ORDER — LANOLIN ALCOHOL/MO/W.PET/CERES
1 CREAM (GRAM) TOPICAL DAILY
Status: DISCONTINUED | OUTPATIENT
Start: 2023-01-19 | End: 2023-02-08 | Stop reason: HOSPADM

## 2023-01-19 RX ORDER — SODIUM CHLORIDE, SODIUM LACTATE, POTASSIUM CHLORIDE, CALCIUM CHLORIDE 600; 310; 30; 20 MG/100ML; MG/100ML; MG/100ML; MG/100ML
INJECTION, SOLUTION INTRAVENOUS CONTINUOUS
Status: DISCONTINUED | OUTPATIENT
Start: 2023-01-19 | End: 2023-01-19

## 2023-01-19 RX ORDER — KETOROLAC TROMETHAMINE 30 MG/ML
30 INJECTION, SOLUTION INTRAMUSCULAR; INTRAVENOUS
Status: COMPLETED | OUTPATIENT
Start: 2023-01-19 | End: 2023-01-19

## 2023-01-19 RX ORDER — MAGNESIUM SULFATE HEPTAHYDRATE 40 MG/ML
4 INJECTION, SOLUTION INTRAVENOUS ONCE
Status: COMPLETED | OUTPATIENT
Start: 2023-01-19 | End: 2023-01-19

## 2023-01-19 RX ORDER — SODIUM CHLORIDE 9 MG/ML
INJECTION, SOLUTION INTRAVENOUS CONTINUOUS
Status: DISCONTINUED | OUTPATIENT
Start: 2023-01-19 | End: 2023-01-21

## 2023-01-19 RX ORDER — ONDANSETRON 2 MG/ML
4 INJECTION INTRAMUSCULAR; INTRAVENOUS EVERY 8 HOURS PRN
Status: DISCONTINUED | OUTPATIENT
Start: 2023-01-19 | End: 2023-02-08 | Stop reason: HOSPADM

## 2023-01-19 RX ADMIN — Medication 1 EACH: at 06:01

## 2023-01-19 RX ADMIN — DULOXETINE 60 MG: 30 CAPSULE, DELAYED RELEASE ORAL at 08:01

## 2023-01-19 RX ADMIN — HYDROCODONE BITARTRATE AND ACETAMINOPHEN 1 TABLET: 5; 325 TABLET ORAL at 04:01

## 2023-01-19 RX ADMIN — SODIUM CHLORIDE, POTASSIUM CHLORIDE, SODIUM LACTATE AND CALCIUM CHLORIDE: 600; 310; 30; 20 INJECTION, SOLUTION INTRAVENOUS at 05:01

## 2023-01-19 RX ADMIN — KETOROLAC TROMETHAMINE 30 MG: 30 INJECTION, SOLUTION INTRAMUSCULAR; INTRAVENOUS at 12:01

## 2023-01-19 RX ADMIN — MUPIROCIN: 20 OINTMENT TOPICAL at 08:01

## 2023-01-19 RX ADMIN — VANCOMYCIN HYDROCHLORIDE 1250 MG: 500 INJECTION, POWDER, LYOPHILIZED, FOR SOLUTION INTRAVENOUS at 05:01

## 2023-01-19 RX ADMIN — POTASSIUM CHLORIDE 40 MEQ: 1500 TABLET, EXTENDED RELEASE ORAL at 05:01

## 2023-01-19 RX ADMIN — CEFTRIAXONE SODIUM 1 G: 1 INJECTION, POWDER, FOR SOLUTION INTRAMUSCULAR; INTRAVENOUS at 04:01

## 2023-01-19 RX ADMIN — HYDROCODONE BITARTRATE AND ACETAMINOPHEN 1 TABLET: 5; 325 TABLET ORAL at 09:01

## 2023-01-19 RX ADMIN — MAGNESIUM SULFATE HEPTAHYDRATE 4 G: 40 INJECTION, SOLUTION INTRAVENOUS at 08:01

## 2023-01-19 RX ADMIN — PIPERACILLIN AND TAZOBACTAM 4.5 G: 4; .5 INJECTION, POWDER, FOR SOLUTION INTRAVENOUS; PARENTERAL at 06:01

## 2023-01-19 RX ADMIN — VANCOMYCIN HYDROCHLORIDE 1250 MG: 500 INJECTION, POWDER, LYOPHILIZED, FOR SOLUTION INTRAVENOUS at 11:01

## 2023-01-19 RX ADMIN — MORPHINE SULFATE 2 MG: 2 INJECTION, SOLUTION INTRAMUSCULAR; INTRAVENOUS at 10:01

## 2023-01-19 RX ADMIN — SODIUM CHLORIDE: 9 INJECTION, SOLUTION INTRAVENOUS at 06:01

## 2023-01-19 RX ADMIN — MORPHINE SULFATE 2 MG: 2 INJECTION, SOLUTION INTRAMUSCULAR; INTRAVENOUS at 08:01

## 2023-01-19 RX ADMIN — MORPHINE SULFATE 2 MG: 2 INJECTION, SOLUTION INTRAMUSCULAR; INTRAVENOUS at 04:01

## 2023-01-19 RX ADMIN — Medication 6 MG: at 09:01

## 2023-01-19 NOTE — HPI
"Patient is a 51 y/o Female presenting to [facility] ED with a cc of back pain. She states the pain began to worsen approximately 16 hours ago. She states the pain is in her Mid back area and radiates to both legs. She describes the pain as "throbbing, constant and a 9/10." She states the pain is blunted by taking Hydrocodone but not relieved. She reports the pain is made worse by movement and standing.    Patient reports she was hospitalized in November for sepsis secondary to Pyelonephritis. She has been hospitalized several times over the past 3 months. She is currently being treated at home with IV Abx therapy for Osteomyelitis of the spine.  MRI of Lumbar spine on 1/10/2023 (Per radiology report). Findings indicative of osteomyelitis involving T11-12 with epidural abscess extending from the T11-12 disc level to the L1 level eccentric towards the left.  Additional site of infection of the right L2-3 facet joint as well as osteomyelitis of the right L3 transverse process.    Significant findings in the ED include:  Vitals: /84, , RR 16, Temp 98.2 F, O2 98% on RA  Labs: WBC 14.6, HGB 8.2, HCT 27.1, MCV 74.2, Na 133, K 3.5, Bun 8, Crt0.54, , Glucose 171, UA normal.  today;124 2 days ago, CRP  22.90 Today; 12.9 2 days ago  Interventions: Toradol 30 mg IV    Pt was subsequently admitted to the Inpatient Hospitalist Service for further evaluation and management.           "

## 2023-01-19 NOTE — ED PROVIDER NOTES
Encounter Date: 1/19/2023       History     Chief Complaint   Patient presents with    Back Pain     A 52-year-old female patient is brought to the emergency department via EMS because of back pain.  The patient states that she was diagnosed with infection in her back and she is being treated for it.  The patient states that her pain is getting worse.  And she states that she can not walk on her own.  Currently she does not have fever or chills.  There is no nausea or vomiting.    The history is provided by the patient. No  was used.   Review of patient's allergies indicates:  No Known Allergies  Past Medical History:   Diagnosis Date    Abscess of right thigh + MRSA 08/20/2021    healed    Adnexal cyst     Degenerative disc disease     Depression 10/29/2021    Hypertension     Nicotine dependence     Osteoarthritis      Past Surgical History:   Procedure Laterality Date    BACK SURGERY      HYSTERECTOMY N/A     IRRIGATION AND DEBRIDEMENT OF LOWER EXTREMITY Left 11/22/2022    Procedure: IRRIGATION AND DEBRIDEMENT, LOWER EXTREMITY;  Surgeon: Papa Mendoza MD;  Location: [facility];  Service: Orthopedics;  Laterality: Left;    IRRIGATION AND DEBRIDEMENT OF UPPER EXTREMITY Left 11/22/2022    Procedure: IRRIGATION AND DEBRIDEMENT, UPPER EXTREMITY;  Surgeon: Papa Mendoza MD;  Location: [facility];  Service: Orthopedics;  Laterality: Left;    TOTAL HIP ARTHROPLASTY Left     TOTAL KNEE ARTHROPLASTY Bilateral      Family History   Problem Relation Age of Onset    Hypertension Father     Diabetes Father      Social History     Tobacco Use    Smoking status: Every Day     Types: Cigarettes    Smokeless tobacco: Current     Types: Snuff   Substance Use Topics    Alcohol use: Never    Drug use: Never     Review of Systems   Constitutional:  Negative for fever.   HENT:  Negative for sore throat.    Respiratory:  Negative for shortness of breath.    Cardiovascular:  Negative for chest  pain.   Gastrointestinal:  Negative for nausea.   Genitourinary:  Negative for dysuria.   Musculoskeletal:  Negative for back pain.   Skin:  Negative for rash.   Neurological:  Negative for weakness.   Hematological:  Does not bruise/bleed easily.     Physical Exam     Initial Vitals   BP Pulse Resp Temp SpO2   01/19/23 0041 01/19/23 0041 01/19/23 0044 01/19/23 0044 01/19/23 0041   (!) 160/84 102 18 98.2 °F (36.8 °C) 98 %      MAP       --                Physical Exam    Nursing note and vitals reviewed.  Constitutional: She appears well-developed and well-nourished.   HENT:   Head: Normocephalic and atraumatic.   Eyes: EOM are normal. Pupils are equal, round, and reactive to light.   Neck: Neck supple. No thyromegaly present.   Normal range of motion.  Cardiovascular:  Normal rate.           No murmur heard.  Pulmonary/Chest: Breath sounds normal. No respiratory distress.   Abdominal: Abdomen is soft. Bowel sounds are normal. There is no abdominal tenderness.   Musculoskeletal:         General: No tenderness or edema. Normal range of motion.      Cervical back: Normal range of motion and neck supple.        Back:      Neurological: She is alert and oriented to person, place, and time. She has normal strength. No cranial nerve deficit. GCS score is 15. GCS eye subscore is 4. GCS verbal subscore is 5. GCS motor subscore is 6.   Reflex Scores:       Patellar reflexes are 0 on the right side and 0 on the left side.       Achilles reflexes are 0 on the right side and 0 on the left side.  Skin: Skin is warm and dry. Capillary refill takes less than 2 seconds. No rash noted.   Psychiatric: She has a normal mood and affect.       Medical Screening Exam   See Full Note    ED Course   Procedures  Labs Reviewed   COMPREHENSIVE METABOLIC PANEL - Abnormal; Notable for the following components:       Result Value    Sodium 133 (*)     Chloride 96 (*)     Glucose 171 (*)     Creatinine 0.54 (*)     Total Protein 8.6 (*)      Albumin 1.7 (*)     Globulin 6.9 (*)     Alk Phos 129 (*)     ALT 10 (*)     AST 10 (*)     All other components within normal limits   URINALYSIS, REFLEX TO URINE CULTURE - Abnormal; Notable for the following components:    Urobilinogen, UA 2 (*)     All other components within normal limits   SEDIMENTATION RATE, AUTOMATED - Abnormal; Notable for the following components:    ESR Westergren 130 (*)     All other components within normal limits   C-REACTIVE PROTEIN - Abnormal; Notable for the following components:    CRP 22.90 (*)     All other components within normal limits   CBC WITH DIFFERENTIAL - Abnormal; Notable for the following components:    WBC 14.65 (*)     RBC 3.65 (*)     Hemoglobin 8.2 (*)     Hematocrit 27.1 (*)     MCV 74.2 (*)     MCH 22.5 (*)     MCHC 30.3 (*)     RDW 16.2 (*)     Platelet Count 572 (*)     MPV 8.6 (*)     Neutrophils % 69.6 (*)     Lymphocytes % 18.4 (*)     Monocytes % 8.1 (*)     Immature Granulocytes % 0.5 (*)     Neutrophils, Abs 10.21 (*)     Monocytes, Absolute 1.18 (*)     Immature Granulocytes, Absolute 0.07 (*)     All other components within normal limits   CBC MORPHOLOGY - Abnormal; Notable for the following components:    Platelet Morphology Increased (*)     All other components within normal limits   SARS-COV-2 RNA AMPLIFICATION, QUAL - Normal    Narrative:     Negative SARS-CoV results should not be used as the sole basis for treatment or patient management decisions; negative results should be considered in the context of a patient's recent exposures, history and the presene of clinical signs and symptoms consistent with COVID-19.  Negative results should be treated as presumptive and confirmed by molecular assay, if necessary for patient management.   CBC W/ AUTO DIFFERENTIAL    Narrative:     The following orders were created for panel order CBC auto differential.  Procedure                               Abnormality         Status                     ---------                                -----------         ------                     CBC with Differential[[id number]]        Abnormal            Final result                 Please view results for these tests on the individual orders.   EXTRA TUBES    Narrative:     The following orders were created for panel order EXTRA TUBES.  Procedure                               Abnormality         Status                     ---------                               -----------         ------                     Light Blue Top Hold[[id number]]                              In process                 Gold Top Hold[[id number]]                                    In process                   Please view results for these tests on the individual orders.   LIGHT BLUE TOP HOLD   GOLD TOP HOLD          Imaging Results              MRI Thoracic Spine Without Contrast (Final result)  Result time 01/19/23 13:16:28      Final result by Jacek Whitfield MD (01/19/23 13:16:28)                   Impression:      Findings suggestive of osteomyelitis discitis of T11-12 with epidural phlegmon or extruded disc contributing to moderate spinal canal stenosis.  Additional epidural phlegmon extending in the left paracentral location and of the left T11-12 and T12-L1 foramina.      Electronically signed by: Jacek Whitfield  Date:    01/19/2023  Time:    13:16               Narrative:    EXAMINATION:  MRI THORACIC SPINE WITHOUT CONTRAST    CLINICAL HISTORY:  Osteomyelitis, thoracic;    TECHNIQUE:  Multiplanar, multisequence images were performed through the thoracic spine.  Contrast was not administered.    COMPARISON:  Lumbar spine MRI 01/10/2023    FINDINGS:  Better seen on this exam is diffuse abnormal hypointense T1 signal of T12 and also extending to involve the T11 vertebral body with corresponding T1 and STIR hyperintensity.  This also appears to involve the disc which has hyperintense T2 signal.  There is height loss of the T12 vertebral body with some retropulsion  of either disc or phlegmon into the ventral epidural space.  This posteriorly displaces the conus without discrete abnormal cord signal.  In total there is moderate spinal canal stenosis of the T11-12 disc level.  There is also some epidural phlegmon to the left anterior epidural space at the T12 level and abnormal signal of the left T11-12 and T12-L1 foramina.    The remainder of the vertebral body heights, alignment, and signal intensity are normal.  The radial to the disc signal normal.    Small bilateral pleural effusions partially assessed.                                       Medications   sodium chloride 0.9% flush 10 mL (has no administration in time range)   naloxone 0.4 mg/mL injection 0.02 mg (has no administration in time range)   glucagon (human recombinant) injection 1 mg (has no administration in time range)   dextrose 10% bolus 125 mL 125 mL (has no administration in time range)   dextrose 10% bolus 250 mL 250 mL (has no administration in time range)   acetaminophen tablet 1,000 mg (has no administration in time range)   ondansetron injection 4 mg (has no administration in time range)   melatonin tablet 6 mg (6 mg Oral Given 1/22/23 2254)   mupirocin 2 % ointment ( Nasal Given 1/22/23 2255)   vancomycin - pharmacy to dose (has no administration in time range)   DULoxetine DR capsule 60 mg (60 mg Oral Given 1/22/23 0817)   glucagon (human recombinant) injection 1 mg (has no administration in time range)   insulin aspart U-100 injection 1-10 Units (6 Units Subcutaneous Given 1/22/23 1213)   ferrous sulfate tablet 1 each (1 each Oral Given 1/22/23 0817)   HYDROmorphone (PF) injection 1 mg (1 mg Intravenous Given 1/22/23 2207)   HYDROcodone-acetaminophen 5-325 mg per tablet 1 tablet (1 tablet Oral Given 1/22/23 1903)   enoxaparin injection 40 mg (40 mg Subcutaneous Given 1/22/23 1649)   vancomycin (VANCOCIN) 1,250 mg in dextrose 5 % 250 mL IVPB (0 mg Intravenous Stopped 1/22/23 1343)   docusate sodium  capsule 100 mg (100 mg Oral Given 1/22/23 1354)   piperacillin-tazobactam (ZOSYN) 4.5 g in dextrose 5 % in water (D5W) 5 % 100 mL IVPB (MB+) (has no administration in time range)   potassium chloride SA CR tablet 40 mEq (40 mEq Oral Given 1/22/23 2254)   ketorolac injection 30 mg (30 mg Intravenous Given 1/19/23 0059)   potassium chloride SA CR tablet 40 mEq (40 mEq Oral Given 1/19/23 0529)   lactated ringers infusion ( Intravenous New Bag 1/19/23 0530)   magnesium sulfate 2g in water 50mL IVPB (premix) (0 g Intravenous Stopped 1/19/23 1217)   midazolam (VERSED) 1 mg/mL injection (1 mg  Given 1/20/23 1017)   fentaNYL 50 mcg/mL injection (50 mcg Intravenous Given 1/20/23 1018)   magnesium sulfate 2g in water 50mL IVPB (premix) (0 g Intravenous Stopped 1/21/23 0914)   magnesium sulfate 2g in water 50mL IVPB (premix) (0 g Intravenous Stopped 1/22/23 1017)     Medical Decision Making:   Clinical Tests:   Lab Tests: Ordered and Reviewed  ED Management:  The patient is a 50-year-old female patient with history of epidural abscess.  She came to the emergency department because of increasing pain in her middle back.  She has tenderness in the lower thoracic upper lumbar region  I ordered CBC which showed elevated WBC, elevated ESR and elevated CRP  Other:   I have discussed this case with another health care provider.       <> Summary of the Discussion: Discussed with  to admit the patient          Attending Attestation:         Attending Critical Care:   Critical Care Times:   ==============================================================  Total Critical Care Time - exclusive of procedural time: 35 minutes.  ==============================================================              Clinical Impression:   Final diagnoses:  [G06.2] Epidural abscess        ED Disposition Condition    Admit                 Nghia Garcia MD  01/22/23 6182

## 2023-01-19 NOTE — ASSESSMENT & PLAN NOTE
Patient's FSGs are uncontrolled due to hyperglycemia on current medication regimen.  Last A1c reviewed-   Lab Results   Component Value Date    HGBA1C 12.4 (H) 11/09/2022     Most recent fingerstick glucose reviewed- No results for input(s): POCTGLUCOSE in the last 24 hours.  Current correctional scale  Medium  Maintain anti-hyperglycemic dose as follows-   Antihyperglycemics (From admission, onward)    Start     Stop Route Frequency Ordered    01/19/23 0610  insulin aspart U-100 injection 1-10 Units         -- SubQ Every 6 hours PRN 01/19/23 0510        Hold Oral hypoglycemics while patient is in the hospital.  Diabetic Educator consulted, appreciate their help

## 2023-01-19 NOTE — PROGRESS NOTES
[facility] - [facility] Medical Telemetry  Adult Nutrition  First Assessment Note         Reason for Assessment  Reason For Assessment: identified at risk by screening criteria (MST score of 3)   Nutrition Risk Screen: no indicators present    Assessment and Plan  Pt is a 51 y/o Female presenting to [facility] with a cc of back pain. Patient reports she was hospitalized in November for sepsis secondary to Pyelonephritis. She has been hospitalized several times over the past 3 months. She is currently being treated at home with IV Abx therapy for Osteomyelitis of the spine.  MRI of Lumbar spine on 1/10/2023 (Per radiology report). Findings indicative of osteomyelitis involving T11-12 with epidural abscess extending from the T11-12 disc level to the L1 level eccentric towards the left.  Additional site of infection of the right L2-3 facet joint as well as osteomyelitis of the right L3 transverse process. Pt was subsequently admitted to the Inpatient Hospitalist Service for further evaluation and management.    RD assessment of pt due to MST score of 2. Pt weight history reviewed:   11/9: 145 lbs  12/6: 146 lbs  12/9: 145 lbs  1/4 139.7 lbs  1/19: 137 lbs    Loss of 8-9 lbs/5.4-5.5% body weight x 1 month, significant weight loss. RD visited pt today for full NFPE. Pt with mild-moderate fat mass loss present and moderate to severe muscle mass loss(severe in temporals and lower extremities). She reports a normal appetite, but currently NPO. Pt meeting criteria for moderate to severe acute protein calorie malnutrition.     NPO at this time. Recommend when appropriate, advance to a diabetic diet given PMH Dm2. Depending on pt intakes, could add Glucerna shakes with meals to supplement kcal/PRO intakes and aid in meeting needs. If pt unable to advance to an oral diet in the nexy 72 hours, recommend consider alternate means of nutrition support.    Last BM 1/17 per flowsheets. Monitor labs, meds, weights, diet  advancement/po intakes. RD following.     Malnutrition  Is Patient Malnourished: Yes Malnutrition Assessment  Malnutrition Type: acute illness or injury, chronic illness  Skin (Micronutrient): turgor reduced  Neck/Chest (Micronutrient): muscle wasting, subcutaneous fat loss  Musculoskeletal/Lower Extremities: muscle wasting, subcutaneous fat loss       Weight Loss (Malnutrition): 5% in 1 month  Subcutaneous Fat (Malnutrition): moderate depletion  Muscle Mass (Malnutrition): severe depletion   Orbital Region (Subcutaneous Fat Loss): mild depletion  Upper Arm Region (Subcutaneous Fat Loss): moderate depletion  Thoracic and Lumbar Region: moderate depletion   [place] Region (Muscle Loss): severe depletion  Clavicle Bone Region (Muscle Loss): moderate depletion  Clavicle and Acromion Bone Region (Muscle Loss): moderate depletion  Scapular Bone Region (Muscle Loss): moderate depletion  Dorsal Hand (Muscle Loss): moderate depletion  Patellar Region (Muscle Loss): severe depletion  Anterior Thigh Region (Muscle Loss): severe depletion  Posterior Calf Region (Muscle Loss): severe depletion       Subcutaneous Fat Loss (Final Summary): moderate protein-calorie malnutrition  Muscle Loss Evaluation (Final Summary): severe protein-calorie malnutrition    Moderate Weight Loss (Malnutrition): 5% in 1 month  Skin Integrity  De Risk Assessment  Sensory Perception: 3-->slightly limited  Moisture: 4-->rarely moist  Activity: 3-->walks occasionally  Mobility: 3-->slightly limited  Nutrition: 3-->adequate  Friction and Shear: 3-->no apparent problem  De Score: 19    Nutrition Diagnosis  Malnutrition (Moderate)   related to Chronic infection/ sepsis as evidenced by epidural abscess/osteomyelitis on IV abx, increased nutrition needs    Nutrition Diagnosis Status: Chronic/ continues    Nutrition Risk  Level of Risk/Frequency of Follow-up: high    Recent Labs   Lab 01/19/23  0123 01/19/23  1126   *  --    POCGLU  --  84        Nutrition Prescription / Recommendations  Recommendation/Intervention: NPO at this time. Recommend when appropriate, advance to a diabetic diet given PMH Dm2. Depending on pt intakes, could add Glucerna shakes with meals to supplement kcal/PRO intakes and aid in meeting needs. If pt unable to advance to an oral diet in the nexy 72 hours, recommend consider alternate means of nutrition support.  Goals: diet advancement or alternate means of nutrition support, weight stability/maintenance  Nutrition Goal Status: new  Current Diet Order: NPO  Nutrition Order Comments: given pt PMH, will recommend diabetic diet when appropriate for diet advancement  Chewing or Swallowing Difficulty?: No Chewing or swallowing difficulty  Recommended Diet: Consistent Carbohydrate 2000 (75g Carbs)  Recommended Oral Supplement: Glucerna Shake [220 kcals, 10g Protein, 26g Carbs(4g Fiber, 7g Sugar), 9g Fat] daily  Is Nutrition Support Recommended: No  Is Education Recommended: No  Monitor and Evaluation  % current Intake: NPO  % intake to meet estimated needs: 75 - 100 %  Food and Nutrient Intake: energy intake  Food and Nutrient Adminstration: diet order  Anthropometric Measurements: weight, weight change  Biochemical Data, Medical Tests and Procedures: electrolyte and renal panel, gastrointestinal profile, glucose/endocrine profile, inflammatory profile, lipid profile  Nutrition-Focused Physical Findings: overall appearance     Current Medical Diagnosis and Past Medical History  Diagnosis: other (see comments) (epidural abscess)  Past Medical History:   Diagnosis Date    Abscess of right thigh + MRSA 08/20/2021    healed    Adnexal cyst     Degenerative disc disease     Depression 10/29/2021    Hypertension     Nicotine dependence     Osteoarthritis      Nutrition/Diet History     Lab/Procedures/Meds  Recent Labs   Lab 01/19/23  0123   *   K 3.5   BUN 8   CREATININE 0.54*   CALCIUM 8.7   ALBUMIN 1.7*   CL 96*   ALT 10*   AST 10*  "    Last A1c:   Lab Results   Component Value Date    HGBA1C 7.9 (H) 01/19/2023     Lab Results   Component Value Date    RBC 3.65 (L) 01/19/2023    HGB 8.2 (L) 01/19/2023    HCT 27.1 (L) 01/19/2023    MCV 74.2 (L) 01/19/2023    MCH 22.5 (L) 01/19/2023    MCHC 30.3 (L) 01/19/2023    TIBC 188 (L) 01/19/2023     Pertinent Labs Reviewed: reviewed  Pertinent Labs Comments: Na 133(L), Cl 96(L), Creat 0.54(L)-possibly r/t volume status; (H), Alk Phos 129(H), Alb 1.7(H), AST 10(L), ALT 10(L), CRP 22.90(H)-suspect r/t inflammatory response; total PRO 8.6(H), globulin 6.9(H), Mg 1.4(L)-unsure etiology, will monitor trends  Pertinent Medications Reviewed: reviewed  Pertinent Medications Comments: rocephin, duloxetine DR, magnesium sulfate, vancomycin, 0.9%NaCl  Anthropometrics  Temp: 98.2 °F (36.8 °C)  Height Method: Stated  Height: 5' 6" (167.6 cm)  Height (inches): 66 in  Weight Method: Bed Scale  Weight: 62.2 kg (137 lb 3.2 oz)  Weight (lb): 137.2 lb  Ideal Body Weight (IBW), Female: 130 lb  % Ideal Body Weight, Female (lb): 105.54 %  BMI (Calculated): 22.2     Estimated/Assessed Needs  Weight Used For Calorie Calculations: 62.2 kg (137 lb 2 oz)   Energy Need Method: Kcal/kg Energy Calorie Requirements (kcal): 0808-7818 kcal (25-30 kcal/kg)  Weight Used For Protein Calculations: 62.2 kg (137 lb 2 oz)  Protein Requirements: 62-74 g PRO (1.0-1.2 g PRO/kg)       RDA Method (mL): 1555     Nutrition by Nursing    Last Bowel Movement: 01/17/23     Nutrition Follow-Up  RD Follow-up?: Yes        "

## 2023-01-19 NOTE — CONSULTS
Pharmacy consulted to dose Vancomycin.     Based on pt wt of 59 kg and Scr 0.54, the following therapy will be initiated:    Vancomycin 1250 mg IV q 18 hrs.    Trough prior 4th dose on 1/21.    Pharmacy to follow daily and make necessary adjustments.     Goal trough is 15-20 range.     Thank you.

## 2023-01-19 NOTE — SUBJECTIVE & OBJECTIVE
"Past Medical History:   Diagnosis Date    Abscess of right thigh + MRSA 08/20/2021    healed    Adnexal cyst     Degenerative disc disease     Depression 10/29/2021    Hypertension     Nicotine dependence     Osteoarthritis        Past Surgical History:   Procedure Laterality Date    BACK SURGERY      HYSTERECTOMY N/A     IRRIGATION AND DEBRIDEMENT OF LOWER EXTREMITY Left 11/22/2022    Procedure: IRRIGATION AND DEBRIDEMENT, LOWER EXTREMITY;  Surgeon: Papa Mendoza MD;  Location: [facility] OR;  Service: Orthopedics;  Laterality: Left;    IRRIGATION AND DEBRIDEMENT OF UPPER EXTREMITY Left 11/22/2022    Procedure: IRRIGATION AND DEBRIDEMENT, UPPER EXTREMITY;  Surgeon: Papa Mendoza MD;  Location: [facility] OR;  Service: Orthopedics;  Laterality: Left;    TOTAL HIP ARTHROPLASTY Left     TOTAL KNEE ARTHROPLASTY Bilateral        Review of patient's allergies indicates:  No Known Allergies    Current Facility-Administered Medications on File Prior to Encounter   Medication    heparin lock flush (porcine) injection 100 Units    heparin, porcine (PF) injection 100 Units     Current Outpatient Medications on File Prior to Encounter   Medication Sig    BD ULTRA-FINE SHORT PEN NEEDLE 31 gauge x 5/16" Ndle     DULoxetine (CYMBALTA) 60 MG capsule Take 1 capsule (60 mg total) by mouth once daily.    gabapentin (NEURONTIN ORAL) Take by mouth 2 (two) times a day.    HYDROcodone-acetaminophen (NORCO) 5-325 mg per tablet Take 1 tablet by mouth every 6 (six) hours as needed for Pain.    insulin (LANTUS SOLOSTAR U-100 INSULIN) glargine 100 units/mL SubQ pen Inject 40 Units into the skin once daily.    NOVOLOG FLEXPEN U-100 INSULIN 100 unit/mL (3 mL) InPn pen Inject 10 Units into the skin 2 (two) times a day.    TRULICITY 0.75 mg/0.5 mL pen injector Inject 0.75 mg into the skin every 7 days.    vancomycin HCl (VANCOMYCIN 1.5 G/250 ML D5W) Inject 1,500 mg into the vein As instructed. Vancomycin q 18 hrs , last dose given " at 0305 am, next dose due at 12/13/2100     Family History       Problem Relation (Age of Onset)    Diabetes Father    Hypertension Father          Tobacco Use    Smoking status: Every Day     Types: Cigarettes    Smokeless tobacco: Current     Types: Snuff   Substance and Sexual Activity    Alcohol use: Never    Drug use: Never    Sexual activity: Not on file     Review of Systems   Constitutional:  Negative for chills, diaphoresis, fatigue and fever.   HENT:  Negative for congestion, rhinorrhea, sinus pressure, sinus pain and sore throat.    Respiratory:  Negative for cough, chest tightness, shortness of breath and wheezing.    Cardiovascular:  Negative for chest pain, palpitations and leg swelling.   Gastrointestinal:  Negative for abdominal distention, abdominal pain, constipation, diarrhea, nausea and vomiting.   Musculoskeletal:  Positive for back pain. Negative for arthralgias and myalgias.   Skin:  Negative for rash and wound.   Neurological:  Positive for weakness. Negative for dizziness, syncope, light-headedness, numbness and headaches.   All other systems reviewed and are negative.  Objective:     Vital Signs (Most Recent):  Temp: 98.2 °F (36.8 °C) (01/19/23 0044)  Pulse: 99 (01/19/23 0231)  Resp: 18 (01/19/23 0424)  BP: (!) 158/83 (01/19/23 0231)  SpO2: 95 % (01/19/23 0131)   Vital Signs (24h Range):  Temp:  [98.2 °F (36.8 °C)] 98.2 °F (36.8 °C)  Pulse:  [] 99  Resp:  [18] 18  SpO2:  [95 %-98 %] 95 %  BP: (158-172)/(81-93) 158/83     Weight: 59 kg (130 lb)  Body mass index is 20.98 kg/m².    Physical Exam  Vitals reviewed.   Constitutional:       Appearance: She is normal weight.   HENT:      Head: Normocephalic and atraumatic.      Right Ear: External ear normal.      Left Ear: External ear normal.      Mouth/Throat:      Mouth: Mucous membranes are moist.      Pharynx: Oropharynx is clear.   Eyes:      Extraocular Movements: Extraocular movements intact.      Pupils: Pupils are equal, round, and  reactive to light.   Cardiovascular:      Rate and Rhythm: Regular rhythm.      Pulses: Normal pulses.      Heart sounds: Normal heart sounds.   Pulmonary:      Effort: Pulmonary effort is normal.      Breath sounds: Normal breath sounds.   Abdominal:      General: Bowel sounds are normal.      Palpations: Abdomen is soft.   Musculoskeletal:      Cervical back: Neck supple.      Thoracic back: Tenderness present.      Comments: tenderness to palpation from T6 to sacral area   Skin:            Comments: Stage 1 pressure wound over sacrum   Neurological:      Mental Status: She is alert.         CRANIAL NERVES     CN III, IV, VI   Pupils are equal, round, and reactive to light.     Significant Labs: All pertinent labs within the past 24 hours have been reviewed.  Recent Lab Results         01/19/23  0353   01/19/23  0123        SED RATE (WESTERGREN)   130       Albumin/Globulin Ratio   0.2       Albumin   1.7       Alkaline Phosphatase   129       ALT   10       Anion Gap   12       Aniso   1+       Appearance, UA   Clear       AST   10       Baso #   0.05       Basophil %   0.3       Bilirubin (UA)   Negative       BILIRUBIN TOTAL   0.4       BUN   8       BUN/CREAT RATIO   15       Calcium   8.7       Chloride   96       CO2   29       Color, UA   Light Yellow       ID NOW COVID-19, (FRANK) Negative         Creatinine   0.54       CRP   22.90       Differential Type   Scan Smear       eGFR   110       Eos #   0.45       Eosinophil %   3.1       Globulin, Total   6.9       Glucose   171       Glucose, UA   Normal       Hematocrit   27.1       Hemoglobin   8.2       Hypo   1+       Immature Grans (Abs)   0.07       Immature Granulocytes   0.5       Ketones, UA   Negative       Leukocytes, UA   Negative       Lymph #   2.69       Lymph %   18.4       MCH   22.5       MCHC   30.3       MCV   74.2       Microcytosis   1+       Mono #   1.18       Mono %   8.1       MPV   8.6       Neutrophils, Abs   10.21       Neutrophils  Relative   69.6       NITRITE UA   Negative       nRBC   0.0       NUCLEATED RBC ABSOLUTE   0.00       Occult Blood UA   Negative       pH, UA   8.0       PLATELET MORPHOLOGY   Increased       Platelets   572       Potassium   3.5       PROTEIN TOTAL   8.6       Protein, UA   Negative       RBC   3.65       RDW   16.2       Sodium   133       Specific Lares, UA   1.013       UROBILINOGEN UA   2       WBC   14.65               Significant Imaging: I have reviewed all pertinent imaging results/findings within the past 24 hours.

## 2023-01-19 NOTE — H&P
"[facility] - Emergency Department  Hospital Medicine  History & Physical    Patient Name: Monica Walker  MRN: [id number]  Patient Class: IP- Inpatient  Admission Date: 1/19/2023  Attending Physician: Vinay Rosen Jr., MD   Primary Care Provider: Hannah Schulz MD         Patient information was obtained from patient, past medical records and ER records.     Subjective:     Principal Problem:Epidural abscess    Chief Complaint:   Chief Complaint   Patient presents with    Back Pain        HPI: Patient is a 53 y/o Female presenting to [facility] with a cc of back pain. She states the pain began to worsen approximately 16 hours ago. She states the pain is in her Mid back area and radiates to both legs. She describes the pain as "throbbing, constant and a 9/10." She states the pain is blunted by taking Hydrocodone but not relieved. She reports the pain is made worse by movement and standing.    Patient reports she was hospitalized in November for sepsis secondary to Pyelonephritis. She has been hospitalized several times over the past 3 months. She is currently being treated at home with IV Abx therapy for Osteomyelitis of the spine.  MRI of Lumbar spine on 1/10/2023 (Per radiology report). Findings indicative of osteomyelitis involving T11-12 with epidural abscess extending from the T11-12 disc level to the L1 level eccentric towards the left.  Additional site of infection of the right L2-3 facet joint as well as osteomyelitis of the right L3 transverse process.    Significant findings in the ED include:  Vitals: /84, , RR 16, Temp 98.2 F, O2 98% on RA  Labs: WBC 14.6, HGB 8.2, HCT 27.1, MCV 74.2, Na 133, K 3.5, Bun 8, Crt0.54, , Glucose 171, UA normal.  today;124 2 days ago, CRP  22.90 Today; 12.9 2 days ago  Interventions: Toradol 30 mg IV    Pt was subsequently admitted to the Inpatient Hospitalist Service for further evaluation and management.     " "          Past Medical History:   Diagnosis Date    Abscess of right thigh + MRSA 08/20/2021    healed    Adnexal cyst     Degenerative disc disease     Depression 10/29/2021    Hypertension     Nicotine dependence     Osteoarthritis        Past Surgical History:   Procedure Laterality Date    BACK SURGERY      HYSTERECTOMY N/A     IRRIGATION AND DEBRIDEMENT OF LOWER EXTREMITY Left 11/22/2022    Procedure: IRRIGATION AND DEBRIDEMENT, LOWER EXTREMITY;  Surgeon: Papa Mendoza MD;  Location: [facility] OR;  Service: Orthopedics;  Laterality: Left;    IRRIGATION AND DEBRIDEMENT OF UPPER EXTREMITY Left 11/22/2022    Procedure: IRRIGATION AND DEBRIDEMENT, UPPER EXTREMITY;  Surgeon: Papa Mendoza MD;  Location: [facility] OR;  Service: Orthopedics;  Laterality: Left;    TOTAL HIP ARTHROPLASTY Left     TOTAL KNEE ARTHROPLASTY Bilateral        Review of patient's allergies indicates:  No Known Allergies    Current Facility-Administered Medications on File Prior to Encounter   Medication    heparin lock flush (porcine) injection 100 Units    heparin, porcine (PF) injection 100 Units     Current Outpatient Medications on File Prior to Encounter   Medication Sig    BD ULTRA-FINE SHORT PEN NEEDLE 31 gauge x 5/16" Ndle     DULoxetine (CYMBALTA) 60 MG capsule Take 1 capsule (60 mg total) by mouth once daily.    gabapentin (NEURONTIN ORAL) Take by mouth 2 (two) times a day.    HYDROcodone-acetaminophen (NORCO) 5-325 mg per tablet Take 1 tablet by mouth every 6 (six) hours as needed for Pain.    insulin (LANTUS SOLOSTAR U-100 INSULIN) glargine 100 units/mL SubQ pen Inject 40 Units into the skin once daily.    NOVOLOG FLEXPEN U-100 INSULIN 100 unit/mL (3 mL) InPn pen Inject 10 Units into the skin 2 (two) times a day.    TRULICITY 0.75 mg/0.5 mL pen injector Inject 0.75 mg into the skin every 7 days.    vancomycin HCl (VANCOMYCIN 1.5 G/250 ML D5W) Inject 1,500 mg into the vein As instructed. " Vancomycin q 18 hrs , last dose given at 0305 am, next dose due at 12/13/2100     Family History       Problem Relation (Age of Onset)    Diabetes Father    Hypertension Father          Tobacco Use    Smoking status: Every Day     Types: Cigarettes    Smokeless tobacco: Current     Types: Snuff   Substance and Sexual Activity    Alcohol use: Never    Drug use: Never    Sexual activity: Not on file     Review of Systems   Constitutional:  Negative for chills, diaphoresis, fatigue and fever.   HENT:  Negative for congestion, rhinorrhea, sinus pressure, sinus pain and sore throat.    Respiratory:  Negative for cough, chest tightness, shortness of breath and wheezing.    Cardiovascular:  Negative for chest pain, palpitations and leg swelling.   Gastrointestinal:  Negative for abdominal distention, abdominal pain, constipation, diarrhea, nausea and vomiting.   Musculoskeletal:  Positive for back pain. Negative for arthralgias and myalgias.   Skin:  Negative for rash and wound.   Neurological:  Positive for weakness. Negative for dizziness, syncope, light-headedness, numbness and headaches.   All other systems reviewed and are negative.  Objective:     Vital Signs (Most Recent):  Temp: 98.2 °F (36.8 °C) (01/19/23 0044)  Pulse: 99 (01/19/23 0231)  Resp: 18 (01/19/23 0424)  BP: (!) 158/83 (01/19/23 0231)  SpO2: 95 % (01/19/23 0131)   Vital Signs (24h Range):  Temp:  [98.2 °F (36.8 °C)] 98.2 °F (36.8 °C)  Pulse:  [] 99  Resp:  [18] 18  SpO2:  [95 %-98 %] 95 %  BP: (158-172)/(81-93) 158/83     Weight: 59 kg (130 lb)  Body mass index is 20.98 kg/m².    Physical Exam  Vitals reviewed.   Constitutional:       Appearance: She is normal weight.   HENT:      Head: Normocephalic and atraumatic.      Right Ear: External ear normal.      Left Ear: External ear normal.      Mouth/Throat:      Mouth: Mucous membranes are moist.      Pharynx: Oropharynx is clear.   Eyes:      Extraocular Movements: Extraocular movements  intact.      Pupils: Pupils are equal, round, and reactive to light.   Cardiovascular:      Rate and Rhythm: Regular rhythm.      Pulses: Normal pulses.      Heart sounds: Normal heart sounds.   Pulmonary:      Effort: Pulmonary effort is normal.      Breath sounds: Normal breath sounds.   Abdominal:      General: Bowel sounds are normal.      Palpations: Abdomen is soft.   Musculoskeletal:      Cervical back: Neck supple.      Thoracic back: Tenderness present.      Comments: tenderness to palpation from T6 to sacral area   Skin:            Comments: Stage 1 pressure wound over sacrum   Neurological:      Mental Status: She is alert.         CRANIAL NERVES     CN III, IV, VI   Pupils are equal, round, and reactive to light.     Significant Labs: All pertinent labs within the past 24 hours have been reviewed.  Recent Lab Results         01/19/23  0353   01/19/23  0123        SED RATE (WESTERGREN)   130       Albumin/Globulin Ratio   0.2       Albumin   1.7       Alkaline Phosphatase   129       ALT   10       Anion Gap   12       Aniso   1+       Appearance, UA   Clear       AST   10       Baso #   0.05       Basophil %   0.3       Bilirubin (UA)   Negative       BILIRUBIN TOTAL   0.4       BUN   8       BUN/CREAT RATIO   15       Calcium   8.7       Chloride   96       CO2   29       Color, UA   Light Yellow       ID NOW COVID-19, (FRANK) Negative         Creatinine   0.54       CRP   22.90       Differential Type   Scan Smear       eGFR   110       Eos #   0.45       Eosinophil %   3.1       Globulin, Total   6.9       Glucose   171       Glucose, UA   Normal       Hematocrit   27.1       Hemoglobin   8.2       Hypo   1+       Immature Grans (Abs)   0.07       Immature Granulocytes   0.5       Ketones, UA   Negative       Leukocytes, UA   Negative       Lymph #   2.69       Lymph %   18.4       MCH   22.5       MCHC   30.3       MCV   74.2       Microcytosis   1+       Mono #   1.18       Mono %   8.1       MPV    8.6       Neutrophils, Abs   10.21       Neutrophils Relative   69.6       NITRITE UA   Negative       nRBC   0.0       NUCLEATED RBC ABSOLUTE   0.00       Occult Blood UA   Negative       pH, UA   8.0       PLATELET MORPHOLOGY   Increased       Platelets   572       Potassium   3.5       PROTEIN TOTAL   8.6       Protein, UA   Negative       RBC   3.65       RDW   16.2       Sodium   133       Specific [place], UA   1.013       UROBILINOGEN UA   2       WBC   14.65               Significant Imaging: I have reviewed all pertinent imaging results/findings within the past 24 hours.    Assessment/Plan:     * Epidural abscess  - Pt known to Dr. Hernandes, has been receiving IV abx outpatient  - Empiric Vanc and Rocephin started  - Norco 5mg q6h for pain  - MRI Thoracic w/o Contrast Pending  - Blood Cultures x2 pending  - Pt NPO pending Ortho Spine Eval - Will need diabetic diet when appropriate  - Consult Dr. Hernandes, appreciate his expertise -- Awaiting Recommendations      Microcytic anemia  - Ferritin, Iron, TIBC pending        Type 2 diabetes mellitus, with long-term current use of insulin  Patient's FSGs are uncontrolled due to hyperglycemia on current medication regimen.  Last A1c reviewed-   Lab Results   Component Value Date    HGBA1C 12.4 (H) 11/09/2022     Most recent fingerstick glucose reviewed- No results for input(s): POCTGLUCOSE in the last 24 hours.  Current correctional scale  Medium  Maintain anti-hyperglycemic dose as follows-   Antihyperglycemics (From admission, onward)    Start     Stop Route Frequency Ordered    01/19/23 0610  insulin aspart U-100 injection 1-10 Units         -- SubQ Every 6 hours PRN 01/19/23 0510        Hold Oral hypoglycemics while patient is in the hospital.  Diabetic Educator consulted, appreciate their help    Depression  - Continue Cymbalta        VTE Risk Mitigation (From admission, onward)         Ordered     IP VTE LOW RISK PATIENT  Once         01/19/23 0417     Place  sequential compression device  Until discontinued         01/19/23 0417                   Duncan Patricio MD  Department of Hospital Medicine   [facility] - Emergency Department

## 2023-01-19 NOTE — PLAN OF CARE
[facility]  Initial Discharge Assessment       Primary Care Provider: Hannah Schulz MD    Admission Diagnosis: Epidural abscess [G06.2]  Chest pain [R07.9]    Admission Date: 1/19/2023  Expected Discharge Date:     Discharge Barriers Identified: None    Payor: HU[employer] MANAGED MEDICARE / Plan: HU[employer]NA SNP (SPECIAL NEEDS PLAN) / Product Type: Medicare Advantage /     Extended Emergency Contact Information  Primary Emergency Contact: Wilson,April  Mobile Phone: [phone]  Relation: Sister  Preferred language: English   needed? No  Secondary Emergency Contact: Alejandrina Walker  Home Phone: [phone]  Relation: Mother  Preferred language: English   needed? No    Discharge Plan A: Home with family, Home Health  Discharge Plan B: Home with family, Home Health      [facility] [place] - [address]  [address]  [place] [address]  Phone: [phone] Fax: [phone]    [facility] at [address] [address]  Phone: [phone] Fax: [phone]      Initial Assessment (most recent)       Adult Discharge Assessment - 01/19/23 1047          Discharge Assessment    Assessment Type Discharge Planning Assessment     Confirmed/corrected address, phone number and insurance Yes     Confirmed Demographics Correct on Facesheet     Source of Information patient;family     Communicated SUZANNA with patient/caregiver Date not available/Unable to determine     People in Home parent(s)     Facility Arrived From: home     Do you expect to return to your current living situation? Yes     Do you have help at home or someone to help you manage your care at home? Yes     Who are your caregiver(s) and their phone number(s)? Alejandrina Walker- mother [phone]     Prior to hospitilization cognitive status: Unable to Assess     Current cognitive status: Alert/Oriented     Walking or Climbing  Stairs ambulation difficulty, requires equipment     Mobility Management walker,crutches and wheelchair     Dressing/Bathing bathing difficulty, assistance 1 person     Home Layout Able to live on 1st floor     Equipment Currently Used at Home crutches;wheelchair;walker, rolling     Patient currently being followed by outpatient case management? No     Do you currently have service(s) that help you manage your care at home? Yes     Name and Contact number of agency unsure     Is the pt/caregiver preference to resume services with current agency Yes     Do you take prescription medications? Yes     Do you have prescription coverage? Yes     Coverage [employer]     Do you have any problems affording any of your prescribed medications? No     Is the patient taking medications as prescribed? yes     Who is going to help you get home at discharge? family     How do you get to doctors appointments? family or friend will provide     Are you on dialysis? No     Do you take coumadin? No     Discharge Plan A Home with family;Home Health     Discharge Plan B Home with family;Home Health     DME Needed Upon Discharge  none     Discharge Plan discussed with: Patient;Parent(s)     Discharge Barriers Identified None                      SS spoke with pt and family in room. Pt lives at home with her mom. Pt has home health but does not know the name of HH company. SS will attempt to find out. Pt has walker, wheelchair and crutches that she uses. IM obtained. SS will follow for discharge needs.
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
## 2023-01-19 NOTE — ED TRIAGE NOTES
Pt presents cc of pain in back, hips, and both legs. EMS brought her in for this pain and reported pt taking 5mg Norco an hour prior to them arriving.

## 2023-01-19 NOTE — ASSESSMENT & PLAN NOTE
- Pt known to Dr. Hernandes, has been receiving IV abx outpatient  - Empiric Vanc and Rocephin started  - Norco 5mg q6h for pain  - MRI Thoracic w/o Contrast Pending  - Blood Cultures x2 pending  - Pt NPO pending Ortho Spine Eval - Will need diabetic diet when appropriate  - Consult Dr. Hernandes, appreciate his expertise -- Awaiting Recommendations

## 2023-01-20 ENCOUNTER — TELEPHONE (OUTPATIENT)
Dept: PULMONOLOGY | Facility: CLINIC | Age: 53
End: 2023-01-20
Payer: MEDICARE

## 2023-01-20 PROBLEM — E83.42 HYPOMAGNESEMIA: Status: ACTIVE | Noted: 2023-01-20

## 2023-01-20 LAB
ANION GAP SERPL CALCULATED.3IONS-SCNC: 12 MMOL/L (ref 7–16)
ANISOCYTOSIS BLD QL SMEAR: ABNORMAL
BASOPHILS # BLD AUTO: 0.03 K/UL (ref 0–0.2)
BASOPHILS NFR BLD AUTO: 0.3 % (ref 0–1)
BUN SERPL-MCNC: 10 MG/DL (ref 7–18)
BUN/CREAT SERPL: 14 (ref 6–20)
CALCIUM SERPL-MCNC: 8.4 MG/DL (ref 8.5–10.1)
CHLORIDE SERPL-SCNC: 97 MMOL/L (ref 98–107)
CO2 SERPL-SCNC: 28 MMOL/L (ref 21–32)
CREAT SERPL-MCNC: 0.73 MG/DL (ref 0.55–1.02)
DIFFERENTIAL METHOD BLD: ABNORMAL
EGFR (NO RACE VARIABLE) (RUSH/TITUS): 98 ML/MIN/1.73M²
EOSINOPHIL # BLD AUTO: 0.36 K/UL (ref 0–0.5)
EOSINOPHIL NFR BLD AUTO: 3.5 % (ref 1–4)
ERYTHROCYTE [DISTWIDTH] IN BLOOD BY AUTOMATED COUNT: 16.7 % (ref 11.5–14.5)
GLUCOSE SERPL-MCNC: 145 MG/DL (ref 70–105)
GLUCOSE SERPL-MCNC: 218 MG/DL (ref 74–106)
GLUCOSE SERPL-MCNC: 243 MG/DL (ref 70–105)
GLUCOSE SERPL-MCNC: 369 MG/DL (ref 70–105)
HCT VFR BLD AUTO: 25.9 % (ref 38–47)
HGB BLD-MCNC: 7.6 G/DL (ref 12–16)
HYPOCHROMIA BLD QL SMEAR: ABNORMAL
IMM GRANULOCYTES # BLD AUTO: 0.05 K/UL (ref 0–0.04)
IMM GRANULOCYTES NFR BLD: 0.5 % (ref 0–0.4)
LYMPHOCYTES # BLD AUTO: 2.18 K/UL (ref 1–4.8)
LYMPHOCYTES NFR BLD AUTO: 21.4 % (ref 27–41)
MCH RBC QN AUTO: 21.8 PG (ref 27–31)
MCHC RBC AUTO-ENTMCNC: 29.3 G/DL (ref 32–36)
MCV RBC AUTO: 74.2 FL (ref 80–96)
MICROCYTES BLD QL SMEAR: ABNORMAL
MONOCYTES # BLD AUTO: 0.99 K/UL (ref 0–0.8)
MONOCYTES NFR BLD AUTO: 9.7 % (ref 2–6)
MPC BLD CALC-MCNC: 8.8 FL (ref 9.4–12.4)
NEUTROPHILS # BLD AUTO: 6.57 K/UL (ref 1.8–7.7)
NEUTROPHILS NFR BLD AUTO: 64.6 % (ref 53–65)
NRBC # BLD AUTO: 0 X10E3/UL
NRBC, AUTO (.00): 0 %
PLATELET # BLD AUTO: 538 K/UL (ref 150–400)
PLATELET MORPHOLOGY: ABNORMAL
POLYCHROMASIA BLD QL SMEAR: ABNORMAL
POTASSIUM SERPL-SCNC: 3.6 MMOL/L (ref 3.5–5.1)
RBC # BLD AUTO: 3.49 M/UL (ref 4.2–5.4)
SODIUM SERPL-SCNC: 133 MMOL/L (ref 136–145)
TARGETS BLD QL SMEAR: ABNORMAL
WBC # BLD AUTO: 10.18 K/UL (ref 4.5–11)

## 2023-01-20 PROCEDURE — 63600175 PHARM REV CODE 636 W HCPCS: Performed by: INTERNAL MEDICINE

## 2023-01-20 PROCEDURE — 99232 SBSQ HOSP IP/OBS MODERATE 35: CPT | Mod: GC,,, | Performed by: FAMILY MEDICINE

## 2023-01-20 PROCEDURE — 25000003 PHARM REV CODE 250: Performed by: FAMILY MEDICINE

## 2023-01-20 PROCEDURE — 63600175 PHARM REV CODE 636 W HCPCS: Performed by: FAMILY MEDICINE

## 2023-01-20 PROCEDURE — 25000003 PHARM REV CODE 250: Performed by: INTERNAL MEDICINE

## 2023-01-20 PROCEDURE — 82962 GLUCOSE BLOOD TEST: CPT

## 2023-01-20 PROCEDURE — 63600175 PHARM REV CODE 636 W HCPCS: Performed by: RADIOLOGY

## 2023-01-20 PROCEDURE — 87070 CULTURE OTHR SPECIMN AEROBIC: CPT | Performed by: RADIOLOGY

## 2023-01-20 PROCEDURE — 99232 PR SUBSEQUENT HOSPITAL CARE,LEVL II: ICD-10-PCS | Mod: GC,,, | Performed by: FAMILY MEDICINE

## 2023-01-20 PROCEDURE — 11000001 HC ACUTE MED/SURG PRIVATE ROOM

## 2023-01-20 PROCEDURE — 25000003 PHARM REV CODE 250

## 2023-01-20 PROCEDURE — 85025 COMPLETE CBC W/AUTO DIFF WBC: CPT

## 2023-01-20 PROCEDURE — 36415 COLL VENOUS BLD VENIPUNCTURE: CPT

## 2023-01-20 PROCEDURE — 63600175 PHARM REV CODE 636 W HCPCS

## 2023-01-20 PROCEDURE — 87205 SMEAR GRAM STAIN: CPT | Performed by: RADIOLOGY

## 2023-01-20 PROCEDURE — 80048 BASIC METABOLIC PNL TOTAL CA: CPT

## 2023-01-20 RX ORDER — MIDAZOLAM HYDROCHLORIDE 1 MG/ML
INJECTION INTRAMUSCULAR; INTRAVENOUS
Status: COMPLETED | OUTPATIENT
Start: 2023-01-20 | End: 2023-01-20

## 2023-01-20 RX ORDER — HYDROMORPHONE HYDROCHLORIDE 2 MG/ML
1 INJECTION, SOLUTION INTRAMUSCULAR; INTRAVENOUS; SUBCUTANEOUS EVERY 6 HOURS PRN
Status: DISCONTINUED | OUTPATIENT
Start: 2023-01-20 | End: 2023-01-25

## 2023-01-20 RX ORDER — FENTANYL CITRATE 50 UG/ML
INJECTION, SOLUTION INTRAMUSCULAR; INTRAVENOUS
Status: COMPLETED | OUTPATIENT
Start: 2023-01-20 | End: 2023-01-20

## 2023-01-20 RX ORDER — HYDROCODONE BITARTRATE AND ACETAMINOPHEN 5; 325 MG/1; MG/1
1 TABLET ORAL EVERY 6 HOURS PRN
Status: DISCONTINUED | OUTPATIENT
Start: 2023-01-20 | End: 2023-01-28

## 2023-01-20 RX ADMIN — INSULIN ASPART 10 UNITS: 100 INJECTION, SOLUTION INTRAVENOUS; SUBCUTANEOUS at 05:01

## 2023-01-20 RX ADMIN — PIPERACILLIN AND TAZOBACTAM 4.5 G: 4; .5 INJECTION, POWDER, FOR SOLUTION INTRAVENOUS; PARENTERAL at 05:01

## 2023-01-20 RX ADMIN — PIPERACILLIN AND TAZOBACTAM 4.5 G: 4; .5 INJECTION, POWDER, FOR SOLUTION INTRAVENOUS; PARENTERAL at 01:01

## 2023-01-20 RX ADMIN — PIPERACILLIN AND TAZOBACTAM 4.5 G: 4; .5 INJECTION, POWDER, FOR SOLUTION INTRAVENOUS; PARENTERAL at 11:01

## 2023-01-20 RX ADMIN — HYDROMORPHONE HYDROCHLORIDE 1 MG: 2 INJECTION, SOLUTION INTRAMUSCULAR; INTRAVENOUS; SUBCUTANEOUS at 07:01

## 2023-01-20 RX ADMIN — MORPHINE SULFATE 2 MG: 2 INJECTION, SOLUTION INTRAMUSCULAR; INTRAVENOUS at 04:01

## 2023-01-20 RX ADMIN — DULOXETINE 60 MG: 30 CAPSULE, DELAYED RELEASE ORAL at 08:01

## 2023-01-20 RX ADMIN — FENTANYL CITRATE 50 MCG: 50 INJECTION INTRAMUSCULAR; INTRAVENOUS at 10:01

## 2023-01-20 RX ADMIN — MIDAZOLAM HYDROCHLORIDE 1 MG: 1 INJECTION, SOLUTION INTRAMUSCULAR; INTRAVENOUS at 10:01

## 2023-01-20 RX ADMIN — HYDROCODONE BITARTRATE AND ACETAMINOPHEN 1 TABLET: 5; 325 TABLET ORAL at 10:01

## 2023-01-20 RX ADMIN — Medication 1 EACH: at 08:01

## 2023-01-20 RX ADMIN — SODIUM CHLORIDE: 9 INJECTION, SOLUTION INTRAVENOUS at 01:01

## 2023-01-20 RX ADMIN — MUPIROCIN: 20 OINTMENT TOPICAL at 08:01

## 2023-01-20 RX ADMIN — HYDROCODONE BITARTRATE AND ACETAMINOPHEN 1 TABLET: 5; 325 TABLET ORAL at 11:01

## 2023-01-20 RX ADMIN — INSULIN ASPART 4 UNITS: 100 INJECTION, SOLUTION INTRAVENOUS; SUBCUTANEOUS at 05:01

## 2023-01-20 RX ADMIN — MORPHINE SULFATE 2 MG: 2 INJECTION, SOLUTION INTRAMUSCULAR; INTRAVENOUS at 01:01

## 2023-01-20 RX ADMIN — VANCOMYCIN HYDROCHLORIDE 1250 MG: 500 INJECTION, POWDER, LYOPHILIZED, FOR SOLUTION INTRAVENOUS at 05:01

## 2023-01-20 RX ADMIN — MORPHINE SULFATE 2 MG: 2 INJECTION, SOLUTION INTRAMUSCULAR; INTRAVENOUS at 08:01

## 2023-01-20 RX ADMIN — MUPIROCIN: 20 OINTMENT TOPICAL at 09:01

## 2023-01-20 NOTE — PLAN OF CARE
Problem: Adult Inpatient Plan of Care  Goal: Plan of Care Review  1/19/2023 1827 by Sera Caldwell RN  Outcome: Ongoing, Progressing  1/19/2023 1350 by Sera Caldwell RN  Outcome: Ongoing, Progressing  Goal: Patient-Specific Goal (Individualized)  1/19/2023 1827 by Sera Caldwell RN  Outcome: Ongoing, Progressing  1/19/2023 1350 by Sera Caldwell RN  Outcome: Ongoing, Progressing  Goal: Absence of Hospital-Acquired Illness or Injury  1/19/2023 1827 by Sera Caldwell RN  Outcome: Ongoing, Progressing  1/19/2023 1350 by Sera Caldwell RN  Outcome: Ongoing, Progressing  Goal: Optimal Comfort and Wellbeing  1/19/2023 1827 by Sera Caldwell RN  Outcome: Ongoing, Progressing  1/19/2023 1350 by Sera Caldwell RN  Outcome: Ongoing, Progressing  Goal: Readiness for Transition of Care  1/19/2023 1827 by Sera Caldwell RN  Outcome: Ongoing, Progressing  1/19/2023 1350 by Sera Caldwell RN  Outcome: Ongoing, Progressing     Problem: Diabetes Comorbidity  Goal: Blood Glucose Level Within Targeted Range  Outcome: Ongoing, Progressing

## 2023-01-20 NOTE — PROGRESS NOTES
Bone biopsy and aspiration of T11  Performed by Dr. Jacek Whitfield  H&P has been reviewed.  Labs are within normal limits for procedure.  Procedure was explained to the patient including risks and possible complications.  Patient agreed to procedure consent is signed.  A formal timeout was called all staff present agree to patient and procedure.  The back was prepped with ChloraPrep and sterile field was established.  1% lidocaine was used as local anesthetic.  Conscious sedation was also utilized please see nurse's note.  Under CT guidance a bone marrow/bone biopsy kit was utilized to take several bone samples from body of T11.  Two aspirations were also taken.  The needle was removed and the puncture site was cleaned and bandaged.  The patient tolerated the procedure well there were no immediate postprocedure complications

## 2023-01-20 NOTE — ASSESSMENT & PLAN NOTE
- Pt known to Dr. Hernandes, has been receiving IV abx outpatient  - Empiric Vanc and Rocephin started  - Norco 5mg q6h for pain  - MRI Thoracic w/o Contrast Pending  - Blood Cultures x2 pending  - Pt NPO pending Ortho Spine Eval - Will need diabetic diet when appropriate  - Consult Dr. Hernandes, appreciate his expertise -- IR biopsy today

## 2023-01-20 NOTE — CARE UPDATE
Patient was originally put NPO until Dr. Hernandes was able to evaluate the patient. Plan is for biopsy of the spine in the area of osteomyelitis tomorrow. The patient was fed an evening meal and then placed again NPO at midnight for tomorrow. Will continue to monitor

## 2023-01-20 NOTE — ASSESSMENT & PLAN NOTE
Patient's FSGs are uncontrolled due to hyperglycemia on current medication regimen.  Last A1c reviewed-   Lab Results   Component Value Date    HGBA1C 7.9 (H) 01/19/2023     Most recent fingerstick glucose reviewed- No results for input(s): POCTGLUCOSE in the last 24 hours.  Current correctional scale  Medium  Maintain anti-hyperglycemic dose as follows-   Antihyperglycemics (From admission, onward)    Start     Stop Route Frequency Ordered    01/19/23 0610  insulin aspart U-100 injection 1-10 Units         -- SubQ Every 6 hours PRN 01/19/23 0510        Hold Oral hypoglycemics while patient is in the hospital.  Diabetic Educator consulted, appreciate their help

## 2023-01-20 NOTE — PLAN OF CARE
Chart reviewed. Pt still having a moderate amount of pain. Pt is NPO for procedure today. SS will follow for discharge needs.

## 2023-01-20 NOTE — CONSULTS
[facility]  Diabetes Education  Consult Note    Author: ARABELLA RAMIREZ RN  Date: 1/20/2023    Glucose past 3 days recorded:   Recent Labs   Lab 01/20/23  0317   *     Last A1c:   Lab Results   Component Value Date    HGBA1C 7.9 (H) 01/19/2023     Last Visit with Diabetes Educator: Last Education Visit: Not Found    A1C 12/4% on 11/9/2022             Health Maintenance Topics with due status: Not Due       Topic Last Completion Date    TETANUS VACCINE 11/29/2021    Hemoglobin A1c 01/19/2023     Health Maintenance Due   Topic Date Due    Lipid Panel  Never done    COVID-19 Vaccine (1) Never done    Diabetes Urine Screening  Never done    Pneumococcal Vaccines (Age 0-64) (1 - PCV) Never done    Foot Exam  Never done    Eye Exam  Never done    Mammogram  Never done    Low Dose Statin  Never done    Colorectal Cancer Screening  Never done    Shingles Vaccine (1 of 2) Never done    Influenza Vaccine (1) Never done       Patient didn't open her eyes much but her mother and sister is present at bedside and I spoke with them. Her mother is taking care of her at home but prior to that, she was active and independent. Her mother and all her siblings have diabetes. She's had diabetes for years. She smokes and we discussed the benefits of stopping. Her mother is checking her blood sugar and making sure she's getting her Lantus and Novolog as prescribed. They have an understanding of how to manage diabetes. Her current A1c is 7.9% ; this is down from 12.4% on 11/9/2022. Due to the infection mother states blood sugars have been uncontrolled.     Today's Self-Management Care Plan was developed with the family/patient's input and is based on barriers identified during today's assessment.      Family verbalized understanding of and all of today's instructions.      The patient / family was encouraged to communicate with her physician and care team regarding her condition(s) and treatment.   I provided the patient with my contact information today and encouraged her to contact me via phone portal as needed.

## 2023-01-20 NOTE — PROGRESS NOTES
"[facility] Hospital Medicine  Progress Note    Patient Name: Monica Walker  MRN: [id number]  Patient Class: IP- Inpatient   Admission Date: 1/19/2023  Length of Stay: 1 days  Attending Physician: Vinay Rosen Jr., MD  Primary Care Provider: Hannah Schulz MD        Subjective:     Principal Problem:Epidural abscess        HPI:  Patient is a 53 y/o Female presenting to [facility] with a cc of back pain. She states the pain began to worsen approximately 16 hours ago. She states the pain is in her Mid back area and radiates to both legs. She describes the pain as "throbbing, constant and a 9/10." She states the pain is blunted by taking Hydrocodone but not relieved. She reports the pain is made worse by movement and standing.    Patient reports she was hospitalized in November for sepsis secondary to Pyelonephritis. She has been hospitalized several times over the past 3 months. She is currently being treated at home with IV Abx therapy for Osteomyelitis of the spine.  MRI of Lumbar spine on 1/10/2023 (Per radiology report). Findings indicative of osteomyelitis involving T11-12 with epidural abscess extending from the T11-12 disc level to the L1 level eccentric towards the left.  Additional site of infection of the right L2-3 facet joint as well as osteomyelitis of the right L3 transverse process.    Significant findings in the ED include:  Vitals: /84, , RR 16, Temp 98.2 F, O2 98% on RA  Labs: WBC 14.6, HGB 8.2, HCT 27.1, MCV 74.2, Na 133, K 3.5, Bun 8, Crt0.54, , Glucose 171, UA normal.  today;124 2 days ago, CRP  22.90 Today; 12.9 2 days ago  Interventions: Toradol 30 mg IV    Pt was subsequently admitted to the Inpatient Hospitalist Service for further evaluation and management.               Overview/Hospital Course:  No notes on file    Interval History:  Patient was seen resting in bed. No overnight events. Patient NPO. Still in " moderate back pain. IR biopsy planned today. Will follow recommendations of Dr. Hernandes    Review of Systems   Constitutional:  Negative for chills, diaphoresis, fatigue and fever.   HENT:  Negative for congestion, rhinorrhea, sinus pressure, sinus pain and sore throat.    Respiratory:  Negative for cough, chest tightness, shortness of breath and wheezing.    Cardiovascular:  Negative for chest pain, palpitations and leg swelling.   Gastrointestinal:  Negative for abdominal distention, abdominal pain, constipation, diarrhea, nausea and vomiting.   Musculoskeletal:  Positive for back pain. Negative for arthralgias and myalgias.   Skin:  Negative for rash and wound.   Neurological:  Positive for weakness. Negative for dizziness, syncope, light-headedness, numbness and headaches.   All other systems reviewed and are negative.  Objective:     Vital Signs (Most Recent):  Temp: 98.3 °F (36.8 °C) (01/20/23 0716)  Pulse: 90 (01/20/23 1046)  Resp: (!) 22 (01/20/23 1126)  BP: 137/77 (01/20/23 1046)  SpO2: (!) 93 % (01/20/23 1046)   Vital Signs (24h Range):  Temp:  [97.6 °F (36.4 °C)-99.8 °F (37.7 °C)] 98.3 °F (36.8 °C)  Pulse:  [84-95] 90  Resp:  [15-25] 22  SpO2:  [93 %-99 %] 93 %  BP: (120-147)/(69-84) 137/77     Weight: 62.2 kg (137 lb 3.2 oz)  Body mass index is 22.14 kg/m².  No intake or output data in the 24 hours ending 01/20/23 1157   Physical Exam  Vitals reviewed.   Constitutional:       General: She is awake.      Appearance: Normal appearance. She is well-developed, well-groomed and normal weight.   HENT:      Head: Normocephalic and atraumatic.      Right Ear: External ear normal.      Left Ear: External ear normal.      Mouth/Throat:      Mouth: Mucous membranes are moist.      Pharynx: Oropharynx is clear.   Eyes:      Extraocular Movements: Extraocular movements intact.      Pupils: Pupils are equal, round, and reactive to light.   Cardiovascular:      Rate and Rhythm: Regular rhythm.      Pulses: Normal pulses.       Heart sounds: Normal heart sounds.   Pulmonary:      Effort: Pulmonary effort is normal.      Breath sounds: Normal breath sounds.   Abdominal:      General: Bowel sounds are normal.      Palpations: Abdomen is soft.   Musculoskeletal:      Cervical back: Neck supple.      Thoracic back: Tenderness present.      Comments: tenderness to palpation from T6 to sacral area   Skin:            Comments: Stage 1 pressure wound over sacrum   Neurological:      Mental Status: She is alert.   Psychiatric:         Behavior: Behavior is cooperative.       Significant Labs: All pertinent labs within the past 24 hours have been reviewed.    Significant Imaging: I have reviewed all pertinent imaging results/findings within the past 24 hours.      Assessment/Plan:      * Epidural abscess  - Pt known to Dr. Hernandes, has been receiving IV abx outpatient  - Empiric Vanc and Rocephin started  - Norco 5mg q6h for pain  - MRI Thoracic w/o Contrast Pending  - Blood Cultures x2 pending  - Pt NPO pending Ortho Spine Eval - Will need diabetic diet when appropriate  - Consult Dr. Hernandes, appreciate his expertise -- IR biopsy today    Microcytic anemia  Ferritin - 380  Iron - 12  Iron Sat - 6  TIBC - 188    Ferrous sulfate given Qd    Type 2 diabetes mellitus, with long-term current use of insulin  Patient's FSGs are uncontrolled due to hyperglycemia on current medication regimen.  Last A1c reviewed-   Lab Results   Component Value Date    HGBA1C 7.9 (H) 01/19/2023     Most recent fingerstick glucose reviewed- No results for input(s): POCTGLUCOSE in the last 24 hours.  Current correctional scale  Medium  Maintain anti-hyperglycemic dose as follows-   Antihyperglycemics (From admission, onward)    Start     Stop Route Frequency Ordered    01/19/23 0610  insulin aspart U-100 injection 1-10 Units         -- SubQ Every 6 hours PRN 01/19/23 0510        Hold Oral hypoglycemics while patient is in the hospital.  Diabetic Educator consulted,  appreciate their help    Hypomagnesemia  Mag 1.4 on admission    2g Mag given  Recheck tomorrow AM      Depression  - Continue Cymbalta        VTE Risk Mitigation (From admission, onward)         Ordered     IP VTE LOW RISK PATIENT  Once         01/19/23 0417     Place sequential compression device  Until discontinued         01/19/23 0417                Discharge Planning   SUZANNA:      Code Status: Full Code   Is the patient medically ready for discharge?:     Reason for patient still in hospital (select all that apply): Laboratory test, Treatment, Imaging and Consult recommendations  Discharge Plan A: Home with family, Home Health                  Reed Lloyd MD  Department of Hospital Medicine   [facility]

## 2023-01-20 NOTE — DISCHARGE INSTRUCTIONS
Call Mindi Yun RN Diabetes Care Specialist at [facility] for Outpatient Diabetes Education questions. ([phone])

## 2023-01-21 DIAGNOSIS — J85.2 ABSCESS OF LUNG WITHOUT PNEUMONIA, UNSPECIFIED LATERALITY: Primary | ICD-10-CM

## 2023-01-21 LAB
ANION GAP SERPL CALCULATED.3IONS-SCNC: 11 MMOL/L (ref 7–16)
BASOPHILS # BLD AUTO: 0.05 K/UL (ref 0–0.2)
BASOPHILS NFR BLD AUTO: 0.5 % (ref 0–1)
BUN SERPL-MCNC: 9 MG/DL (ref 7–18)
BUN/CREAT SERPL: 17 (ref 6–20)
CALCIUM SERPL-MCNC: 8.6 MG/DL (ref 8.5–10.1)
CHLORIDE SERPL-SCNC: 100 MMOL/L (ref 98–107)
CO2 SERPL-SCNC: 29 MMOL/L (ref 21–32)
CREAT SERPL-MCNC: 0.54 MG/DL (ref 0.55–1.02)
DIFFERENTIAL METHOD BLD: ABNORMAL
EGFR (NO RACE VARIABLE) (RUSH/TITUS): 110 ML/MIN/1.73M²
EOSINOPHIL # BLD AUTO: 0.44 K/UL (ref 0–0.5)
EOSINOPHIL NFR BLD AUTO: 4.3 % (ref 1–4)
ERYTHROCYTE [DISTWIDTH] IN BLOOD BY AUTOMATED COUNT: 16.4 % (ref 11.5–14.5)
GLUCOSE SERPL-MCNC: 109 MG/DL (ref 74–106)
GLUCOSE SERPL-MCNC: 111 MG/DL (ref 70–105)
GLUCOSE SERPL-MCNC: 145 MG/DL (ref 70–105)
GLUCOSE SERPL-MCNC: 251 MG/DL (ref 70–105)
GLUCOSE SERPL-MCNC: 298 MG/DL (ref 70–105)
GRAM STN SPEC: NORMAL
GRAM STN SPEC: NORMAL
HCT VFR BLD AUTO: 26.1 % (ref 38–47)
HGB BLD-MCNC: 7.5 G/DL (ref 12–16)
IMM GRANULOCYTES # BLD AUTO: 0.05 K/UL (ref 0–0.04)
IMM GRANULOCYTES NFR BLD: 0.5 % (ref 0–0.4)
LYMPHOCYTES # BLD AUTO: 2.36 K/UL (ref 1–4.8)
LYMPHOCYTES NFR BLD AUTO: 23.2 % (ref 27–41)
MAGNESIUM SERPL-MCNC: 1.6 MG/DL (ref 1.7–2.3)
MCH RBC QN AUTO: 21.8 PG (ref 27–31)
MCHC RBC AUTO-ENTMCNC: 28.7 G/DL (ref 32–36)
MCV RBC AUTO: 75.9 FL (ref 80–96)
MONOCYTES # BLD AUTO: 0.86 K/UL (ref 0–0.8)
MONOCYTES NFR BLD AUTO: 8.4 % (ref 2–6)
MPC BLD CALC-MCNC: 8.8 FL (ref 9.4–12.4)
NEUTROPHILS # BLD AUTO: 6.43 K/UL (ref 1.8–7.7)
NEUTROPHILS NFR BLD AUTO: 63.1 % (ref 53–65)
NRBC # BLD AUTO: 0 X10E3/UL
NRBC, AUTO (.00): 0 %
PLATELET # BLD AUTO: 453 K/UL (ref 150–400)
POTASSIUM SERPL-SCNC: 3.6 MMOL/L (ref 3.5–5.1)
RBC # BLD AUTO: 3.44 M/UL (ref 4.2–5.4)
SODIUM SERPL-SCNC: 136 MMOL/L (ref 136–145)
VANCOMYCIN TROUGH SERPL-MCNC: 8.6 ΜG/ML (ref 10–20)
WBC # BLD AUTO: 10.19 K/UL (ref 4.5–11)

## 2023-01-21 PROCEDURE — 99232 SBSQ HOSP IP/OBS MODERATE 35: CPT | Mod: GC,,, | Performed by: FAMILY MEDICINE

## 2023-01-21 PROCEDURE — 82962 GLUCOSE BLOOD TEST: CPT

## 2023-01-21 PROCEDURE — 25000003 PHARM REV CODE 250: Performed by: FAMILY MEDICINE

## 2023-01-21 PROCEDURE — 25000003 PHARM REV CODE 250

## 2023-01-21 PROCEDURE — 63600175 PHARM REV CODE 636 W HCPCS: Performed by: FAMILY MEDICINE

## 2023-01-21 PROCEDURE — 99232 PR SUBSEQUENT HOSPITAL CARE,LEVL II: ICD-10-PCS | Mod: GC,,, | Performed by: FAMILY MEDICINE

## 2023-01-21 PROCEDURE — 83735 ASSAY OF MAGNESIUM: CPT

## 2023-01-21 PROCEDURE — 36415 COLL VENOUS BLD VENIPUNCTURE: CPT

## 2023-01-21 PROCEDURE — 11000001 HC ACUTE MED/SURG PRIVATE ROOM

## 2023-01-21 PROCEDURE — 63600175 PHARM REV CODE 636 W HCPCS

## 2023-01-21 PROCEDURE — 25000003 PHARM REV CODE 250: Performed by: INTERNAL MEDICINE

## 2023-01-21 PROCEDURE — 85025 COMPLETE CBC W/AUTO DIFF WBC: CPT

## 2023-01-21 PROCEDURE — 80202 ASSAY OF VANCOMYCIN: CPT

## 2023-01-21 PROCEDURE — 80048 BASIC METABOLIC PNL TOTAL CA: CPT

## 2023-01-21 RX ORDER — MAGNESIUM SULFATE HEPTAHYDRATE 40 MG/ML
2 INJECTION, SOLUTION INTRAVENOUS ONCE
Status: COMPLETED | OUTPATIENT
Start: 2023-01-21 | End: 2023-01-21

## 2023-01-21 RX ORDER — ENOXAPARIN SODIUM 100 MG/ML
40 INJECTION SUBCUTANEOUS EVERY 24 HOURS
Status: DISCONTINUED | OUTPATIENT
Start: 2023-01-21 | End: 2023-01-23

## 2023-01-21 RX ADMIN — INSULIN ASPART 6 UNITS: 100 INJECTION, SOLUTION INTRAVENOUS; SUBCUTANEOUS at 10:01

## 2023-01-21 RX ADMIN — HYDROMORPHONE HYDROCHLORIDE 1 MG: 2 INJECTION, SOLUTION INTRAMUSCULAR; INTRAVENOUS; SUBCUTANEOUS at 08:01

## 2023-01-21 RX ADMIN — HYDROMORPHONE HYDROCHLORIDE 1 MG: 2 INJECTION, SOLUTION INTRAMUSCULAR; INTRAVENOUS; SUBCUTANEOUS at 07:01

## 2023-01-21 RX ADMIN — PIPERACILLIN AND TAZOBACTAM 4.5 G: 4; .5 INJECTION, POWDER, FOR SOLUTION INTRAVENOUS; PARENTERAL at 05:01

## 2023-01-21 RX ADMIN — HYDROCODONE BITARTRATE AND ACETAMINOPHEN 1 TABLET: 5; 325 TABLET ORAL at 10:01

## 2023-01-21 RX ADMIN — HYDROMORPHONE HYDROCHLORIDE 1 MG: 2 INJECTION, SOLUTION INTRAMUSCULAR; INTRAVENOUS; SUBCUTANEOUS at 01:01

## 2023-01-21 RX ADMIN — HYDROCODONE BITARTRATE AND ACETAMINOPHEN 1 TABLET: 5; 325 TABLET ORAL at 05:01

## 2023-01-21 RX ADMIN — INSULIN ASPART 6 UNITS: 100 INJECTION, SOLUTION INTRAVENOUS; SUBCUTANEOUS at 05:01

## 2023-01-21 RX ADMIN — Medication 1 EACH: at 09:01

## 2023-01-21 RX ADMIN — PIPERACILLIN AND TAZOBACTAM 4.5 G: 4; .5 INJECTION, POWDER, FOR SOLUTION INTRAVENOUS; PARENTERAL at 09:01

## 2023-01-21 RX ADMIN — MAGNESIUM SULFATE HEPTAHYDRATE 2 G: 40 INJECTION, SOLUTION INTRAVENOUS at 07:01

## 2023-01-21 RX ADMIN — PIPERACILLIN AND TAZOBACTAM 4.5 G: 4; .5 INJECTION, POWDER, FOR SOLUTION INTRAVENOUS; PARENTERAL at 01:01

## 2023-01-21 RX ADMIN — MUPIROCIN: 20 OINTMENT TOPICAL at 09:01

## 2023-01-21 RX ADMIN — ENOXAPARIN SODIUM 40 MG: 100 INJECTION SUBCUTANEOUS at 05:01

## 2023-01-21 RX ADMIN — DULOXETINE 60 MG: 30 CAPSULE, DELAYED RELEASE ORAL at 09:01

## 2023-01-21 RX ADMIN — VANCOMYCIN HYDROCHLORIDE 1250 MG: 500 INJECTION, POWDER, LYOPHILIZED, FOR SOLUTION INTRAVENOUS at 12:01

## 2023-01-21 RX ADMIN — MUPIROCIN: 20 OINTMENT TOPICAL at 08:01

## 2023-01-21 NOTE — ASSESSMENT & PLAN NOTE
- Pt known to Dr. Hernandes, has been receiving IV abx outpatient  - Empiric Vanc and Rocephin started  - Norco 5mg q6h for pain  - MRI Thoracic w/o Contrast Pending  - Blood Cultures x2 pending  - Pt NPO pending Ortho Spine Eval - Will need diabetic diet when appropriate  - Consult Dr. Hernandes, appreciate his expertise -- IR biopsy successful 1/20/2023 - awaiting results

## 2023-01-21 NOTE — PROGRESS NOTES
Pharmacy currently dosing vancomycin. We will increase to vancomycin 1250 mg IV every 12 hours due to subtherapeutic trough and check another trough 1/23/23 0030. Pharmacy will monitor daily and adjust as necessary.

## 2023-01-21 NOTE — ASSESSMENT & PLAN NOTE
Mag 1.4 on admission    2g Mag given  1/21/2023 mag was 1.6  1/21/2023 2g mag given  Recheck tomorrow

## 2023-01-21 NOTE — PROGRESS NOTES
"[facility] Hospital Medicine  Progress Note    Patient Name: Monica Walker  MRN: [id number]  Patient Class: IP- Inpatient   Admission Date: 1/19/2023  Length of Stay: 2 days  Attending Physician: Vinay Rosen Jr., MD  Primary Care Provider: Hannah Schulz MD        Subjective:     Principal Problem:Epidural abscess        HPI:  Patient is a 51 y/o Female presenting to [facility] with a cc of back pain. She states the pain began to worsen approximately 16 hours ago. She states the pain is in her Mid back area and radiates to both legs. She describes the pain as "throbbing, constant and a 9/10." She states the pain is blunted by taking Hydrocodone but not relieved. She reports the pain is made worse by movement and standing.    Patient reports she was hospitalized in November for sepsis secondary to Pyelonephritis. She has been hospitalized several times over the past 3 months. She is currently being treated at home with IV Abx therapy for Osteomyelitis of the spine.  MRI of Lumbar spine on 1/10/2023 (Per radiology report). Findings indicative of osteomyelitis involving T11-12 with epidural abscess extending from the T11-12 disc level to the L1 level eccentric towards the left.  Additional site of infection of the right L2-3 facet joint as well as osteomyelitis of the right L3 transverse process.    Significant findings in the ED include:  Vitals: /84, , RR 16, Temp 98.2 F, O2 98% on RA  Labs: WBC 14.6, HGB 8.2, HCT 27.1, MCV 74.2, Na 133, K 3.5, Bun 8, Crt0.54, , Glucose 171, UA normal.  today;124 2 days ago, CRP  22.90 Today; 12.9 2 days ago  Interventions: Toradol 30 mg IV    Pt was subsequently admitted to the Inpatient Hospitalist Service for further evaluation and management.               Overview/Hospital Course:  No notes on file    Interval History: Patient was seen resting in bed. No overnight events. IR biopsy planned " successful. Awaiting results. Will follow recommendations of Dr. Henrandes    Review of Systems   Constitutional:  Negative for chills, diaphoresis, fatigue and fever.   HENT:  Negative for congestion, rhinorrhea, sinus pressure, sinus pain and sore throat.    Respiratory:  Negative for cough, chest tightness, shortness of breath and wheezing.    Cardiovascular:  Negative for chest pain, palpitations and leg swelling.   Gastrointestinal:  Negative for abdominal distention, abdominal pain, constipation, diarrhea, nausea and vomiting.   Musculoskeletal:  Positive for back pain. Negative for arthralgias and myalgias.   Skin:  Negative for rash and wound.   Neurological:  Positive for weakness. Negative for dizziness, syncope, light-headedness, numbness and headaches.   All other systems reviewed and are negative.  Objective:     Vital Signs (Most Recent):  Temp: 97.7 °F (36.5 °C) (01/21/23 0712)  Pulse: 85 (01/21/23 0712)  Resp: 20 (01/21/23 0712)  BP: 118/67 (01/21/23 0712)  SpO2: 95 % (01/21/23 0712) Vital Signs (24h Range):  Temp:  [97.7 °F (36.5 °C)-98.2 °F (36.8 °C)] 97.7 °F (36.5 °C)  Pulse:  [61-95] 85  Resp:  [15-25] 20  SpO2:  [93 %-99 %] 95 %  BP: (105-160)/(53-89) 118/67     Weight: 61.3 kg (135 lb 2.3 oz)  Body mass index is 21.81 kg/m².  No intake or output data in the 24 hours ending 01/21/23 0945   Physical Exam  Vitals reviewed.   Constitutional:       General: She is awake.      Appearance: Normal appearance. She is well-developed, well-groomed and normal weight.   HENT:      Head: Normocephalic and atraumatic.      Right Ear: External ear normal.      Left Ear: External ear normal.      Mouth/Throat:      Mouth: Mucous membranes are moist.      Pharynx: Oropharynx is clear.   Eyes:      Extraocular Movements: Extraocular movements intact.      Pupils: Pupils are equal, round, and reactive to light.   Cardiovascular:      Rate and Rhythm: Regular rhythm.      Pulses: Normal pulses.      Heart sounds:  Normal heart sounds.   Pulmonary:      Effort: Pulmonary effort is normal.      Breath sounds: Normal breath sounds.   Abdominal:      General: Bowel sounds are normal.      Palpations: Abdomen is soft.   Musculoskeletal:      Cervical back: Neck supple.      Thoracic back: Tenderness present.      Comments: tenderness to palpation from T6 to sacral area   Skin:            Comments: Stage 1 pressure wound over sacrum   Neurological:      Mental Status: She is alert.   Psychiatric:         Behavior: Behavior is cooperative.       Significant Labs: All pertinent labs within the past 24 hours have been reviewed.    Significant Imaging: I have reviewed all pertinent imaging results/findings within the past 24 hours.      Assessment/Plan:      * Epidural abscess  - Pt known to Dr. Hernandes, has been receiving IV abx outpatient  - Empiric Vanc and Rocephin started  - Norco 5mg q6h for pain  - MRI Thoracic w/o Contrast Pending  - Blood Cultures x2 pending  - Pt NPO pending Ortho Spine Eval - Will need diabetic diet when appropriate  - Consult Dr. Hernandes, appreciate his expertise -- IR biopsy successful 1/20/2023 - awaiting results    Type 2 diabetes mellitus, with long-term current use of insulin  Patient's FSGs are uncontrolled due to hyperglycemia on current medication regimen.  Last A1c reviewed-   Lab Results   Component Value Date    HGBA1C 7.9 (H) 01/19/2023     Most recent fingerstick glucose reviewed- No results for input(s): POCTGLUCOSE in the last 24 hours.  Current correctional scale  Medium  Maintain anti-hyperglycemic dose as follows-   Antihyperglycemics (From admission, onward)    Start     Stop Route Frequency Ordered    01/19/23 0610  insulin aspart U-100 injection 1-10 Units         -- SubQ Every 6 hours PRN 01/19/23 0510        Hold Oral hypoglycemics while patient is in the hospital.  Diabetic Educator consulted, appreciate their help    Microcytic anemia  Ferritin - 380  Iron - 12  Iron Sat - 6  TIBC -  188    Ferrous sulfate given Qd    Depression  - Continue Cymbalta      Hypomagnesemia  Mag 1.4 on admission    2g Mag given  1/21/2023 mag was 1.6  1/21/2023 2g mag given  Recheck tomorrow        VTE Risk Mitigation (From admission, onward)         Ordered     enoxaparin injection 40 mg  Daily         01/21/23 0414     IP VTE LOW RISK PATIENT  Once         01/19/23 0417     Place sequential compression device  Until discontinued         01/19/23 0417                Discharge Planning   SUZANNA:      Code Status: Full Code   Is the patient medically ready for discharge?:     Reason for patient still in hospital (select all that apply): Laboratory test, Treatment, Imaging and Consult recommendations  Discharge Plan A: Home with family, Home Health                  Reed Lloyd MD  Department of Hospital Medicine   [facility]

## 2023-01-22 LAB
ANION GAP SERPL CALCULATED.3IONS-SCNC: 11 MMOL/L (ref 7–16)
ANISOCYTOSIS BLD QL SMEAR: ABNORMAL
BASOPHILS # BLD AUTO: 0.04 K/UL (ref 0–0.2)
BASOPHILS NFR BLD AUTO: 0.4 % (ref 0–1)
BUN SERPL-MCNC: 8 MG/DL (ref 7–18)
BUN/CREAT SERPL: 13 (ref 6–20)
CALCIUM SERPL-MCNC: 8.8 MG/DL (ref 8.5–10.1)
CHLORIDE SERPL-SCNC: 98 MMOL/L (ref 98–107)
CO2 SERPL-SCNC: 30 MMOL/L (ref 21–32)
CREAT SERPL-MCNC: 0.64 MG/DL (ref 0.55–1.02)
DIFFERENTIAL METHOD BLD: ABNORMAL
EGFR (NO RACE VARIABLE) (RUSH/TITUS): 105 ML/MIN/1.73M²
EOSINOPHIL # BLD AUTO: 0.48 K/UL (ref 0–0.5)
EOSINOPHIL NFR BLD AUTO: 4.7 % (ref 1–4)
ERYTHROCYTE [DISTWIDTH] IN BLOOD BY AUTOMATED COUNT: 16 % (ref 11.5–14.5)
GLUCOSE SERPL-MCNC: 166 MG/DL (ref 70–105)
GLUCOSE SERPL-MCNC: 171 MG/DL (ref 74–106)
GLUCOSE SERPL-MCNC: 178 MG/DL (ref 70–105)
GLUCOSE SERPL-MCNC: 192 MG/DL (ref 70–105)
GLUCOSE SERPL-MCNC: 214 MG/DL (ref 70–105)
GLUCOSE SERPL-MCNC: 269 MG/DL (ref 70–105)
HCT VFR BLD AUTO: 24 % (ref 38–47)
HCT VFR BLD AUTO: 25.7 % (ref 38–47)
HGB BLD-MCNC: 7.2 G/DL (ref 12–16)
HGB BLD-MCNC: 7.5 G/DL (ref 12–16)
HYPOCHROMIA BLD QL SMEAR: ABNORMAL
IMM GRANULOCYTES # BLD AUTO: 0.06 K/UL (ref 0–0.04)
IMM GRANULOCYTES NFR BLD: 0.6 % (ref 0–0.4)
LYMPHOCYTES # BLD AUTO: 2.45 K/UL (ref 1–4.8)
LYMPHOCYTES NFR BLD AUTO: 23.9 % (ref 27–41)
MAGNESIUM SERPL-MCNC: 1.7 MG/DL (ref 1.7–2.3)
MCH RBC QN AUTO: 22.2 PG (ref 27–31)
MCHC RBC AUTO-ENTMCNC: 30 G/DL (ref 32–36)
MCV RBC AUTO: 73.8 FL (ref 80–96)
MICROCYTES BLD QL SMEAR: ABNORMAL
MONOCYTES # BLD AUTO: 0.88 K/UL (ref 0–0.8)
MONOCYTES NFR BLD AUTO: 8.6 % (ref 2–6)
MPC BLD CALC-MCNC: 8.5 FL (ref 9.4–12.4)
NEUTROPHILS # BLD AUTO: 6.33 K/UL (ref 1.8–7.7)
NEUTROPHILS NFR BLD AUTO: 61.8 % (ref 53–65)
NRBC # BLD AUTO: 0 X10E3/UL
NRBC, AUTO (.00): 0 %
PLATELET # BLD AUTO: 446 K/UL (ref 150–400)
PLATELET MORPHOLOGY: ABNORMAL
POLYCHROMASIA BLD QL SMEAR: ABNORMAL
POTASSIUM SERPL-SCNC: 3.4 MMOL/L (ref 3.5–5.1)
RBC # BLD AUTO: 3.25 M/UL (ref 4.2–5.4)
SODIUM SERPL-SCNC: 136 MMOL/L (ref 136–145)
WBC # BLD AUTO: 10.24 K/UL (ref 4.5–11)

## 2023-01-22 PROCEDURE — 85014 HEMATOCRIT: CPT

## 2023-01-22 PROCEDURE — 99232 PR SUBSEQUENT HOSPITAL CARE,LEVL II: ICD-10-PCS | Mod: GC,,, | Performed by: FAMILY MEDICINE

## 2023-01-22 PROCEDURE — 25000003 PHARM REV CODE 250

## 2023-01-22 PROCEDURE — 80048 BASIC METABOLIC PNL TOTAL CA: CPT

## 2023-01-22 PROCEDURE — 25000003 PHARM REV CODE 250: Performed by: FAMILY MEDICINE

## 2023-01-22 PROCEDURE — 36415 COLL VENOUS BLD VENIPUNCTURE: CPT

## 2023-01-22 PROCEDURE — 63600175 PHARM REV CODE 636 W HCPCS

## 2023-01-22 PROCEDURE — 25000003 PHARM REV CODE 250: Performed by: INTERNAL MEDICINE

## 2023-01-22 PROCEDURE — 83735 ASSAY OF MAGNESIUM: CPT

## 2023-01-22 PROCEDURE — 63600175 PHARM REV CODE 636 W HCPCS: Performed by: FAMILY MEDICINE

## 2023-01-22 PROCEDURE — 99232 SBSQ HOSP IP/OBS MODERATE 35: CPT | Mod: GC,,, | Performed by: FAMILY MEDICINE

## 2023-01-22 PROCEDURE — 82962 GLUCOSE BLOOD TEST: CPT

## 2023-01-22 PROCEDURE — 85025 COMPLETE CBC W/AUTO DIFF WBC: CPT

## 2023-01-22 PROCEDURE — 80202 ASSAY OF VANCOMYCIN: CPT | Performed by: FAMILY MEDICINE

## 2023-01-22 PROCEDURE — 11000001 HC ACUTE MED/SURG PRIVATE ROOM

## 2023-01-22 RX ORDER — DOCUSATE SODIUM 100 MG/1
100 CAPSULE, LIQUID FILLED ORAL DAILY
Status: DISCONTINUED | OUTPATIENT
Start: 2023-01-22 | End: 2023-02-08 | Stop reason: HOSPADM

## 2023-01-22 RX ORDER — MAGNESIUM SULFATE HEPTAHYDRATE 40 MG/ML
2 INJECTION, SOLUTION INTRAVENOUS ONCE
Status: COMPLETED | OUTPATIENT
Start: 2023-01-22 | End: 2023-01-22

## 2023-01-22 RX ORDER — POTASSIUM CHLORIDE 20 MEQ/1
40 TABLET, EXTENDED RELEASE ORAL 2 TIMES DAILY
Status: COMPLETED | OUTPATIENT
Start: 2023-01-22 | End: 2023-01-23

## 2023-01-22 RX ADMIN — INSULIN ASPART 1 UNITS: 100 INJECTION, SOLUTION INTRAVENOUS; SUBCUTANEOUS at 12:01

## 2023-01-22 RX ADMIN — PIPERACILLIN AND TAZOBACTAM 4.5 G: 4; .5 INJECTION, POWDER, FOR SOLUTION INTRAVENOUS; PARENTERAL at 02:01

## 2023-01-22 RX ADMIN — HYDROMORPHONE HYDROCHLORIDE 1 MG: 2 INJECTION, SOLUTION INTRAMUSCULAR; INTRAVENOUS; SUBCUTANEOUS at 10:01

## 2023-01-22 RX ADMIN — INSULIN ASPART 4 UNITS: 100 INJECTION, SOLUTION INTRAVENOUS; SUBCUTANEOUS at 05:01

## 2023-01-22 RX ADMIN — HYDROCODONE BITARTRATE AND ACETAMINOPHEN 1 TABLET: 5; 325 TABLET ORAL at 12:01

## 2023-01-22 RX ADMIN — ENOXAPARIN SODIUM 40 MG: 100 INJECTION SUBCUTANEOUS at 04:01

## 2023-01-22 RX ADMIN — MUPIROCIN: 20 OINTMENT TOPICAL at 10:01

## 2023-01-22 RX ADMIN — Medication 6 MG: at 12:01

## 2023-01-22 RX ADMIN — PIPERACILLIN AND TAZOBACTAM 4.5 G: 4; .5 INJECTION, POWDER, FOR SOLUTION INTRAVENOUS; PARENTERAL at 09:01

## 2023-01-22 RX ADMIN — HYDROCODONE BITARTRATE AND ACETAMINOPHEN 1 TABLET: 5; 325 TABLET ORAL at 06:01

## 2023-01-22 RX ADMIN — MAGNESIUM SULFATE HEPTAHYDRATE 2 G: 40 INJECTION, SOLUTION INTRAVENOUS at 08:01

## 2023-01-22 RX ADMIN — DOCUSATE SODIUM 100 MG: 100 CAPSULE, LIQUID FILLED ORAL at 01:01

## 2023-01-22 RX ADMIN — HYDROMORPHONE HYDROCHLORIDE 1 MG: 2 INJECTION, SOLUTION INTRAMUSCULAR; INTRAVENOUS; SUBCUTANEOUS at 03:01

## 2023-01-22 RX ADMIN — Medication 1 EACH: at 08:01

## 2023-01-22 RX ADMIN — VANCOMYCIN HYDROCHLORIDE 1250 MG: 500 INJECTION, POWDER, LYOPHILIZED, FOR SOLUTION INTRAVENOUS at 12:01

## 2023-01-22 RX ADMIN — POTASSIUM CHLORIDE 40 MEQ: 1500 TABLET, EXTENDED RELEASE ORAL at 10:01

## 2023-01-22 RX ADMIN — Medication 6 MG: at 10:01

## 2023-01-22 RX ADMIN — MUPIROCIN: 20 OINTMENT TOPICAL at 08:01

## 2023-01-22 RX ADMIN — DULOXETINE 60 MG: 30 CAPSULE, DELAYED RELEASE ORAL at 08:01

## 2023-01-22 RX ADMIN — HYDROMORPHONE HYDROCHLORIDE 1 MG: 2 INJECTION, SOLUTION INTRAMUSCULAR; INTRAVENOUS; SUBCUTANEOUS at 09:01

## 2023-01-22 RX ADMIN — INSULIN ASPART 6 UNITS: 100 INJECTION, SOLUTION INTRAVENOUS; SUBCUTANEOUS at 12:01

## 2023-01-22 RX ADMIN — HYDROCODONE BITARTRATE AND ACETAMINOPHEN 1 TABLET: 5; 325 TABLET ORAL at 07:01

## 2023-01-22 NOTE — ASSESSMENT & PLAN NOTE
- Pt known to Dr. Hernandes, has been receiving IV abx outpatient  - Empiric Vanc and Rocephin started  - Norco 5mg q6h for pain  - Dilaudid added  - MRI Thoracic w/o Contrast - Findings suggestive of osteomyelitis discitis of T11-12 with epidural phlegmon or extruded disc contributing to moderate spinal canal stenosis.  Additional epidural phlegmon extending in the left paracentral location and of the left T11-12 and T12-L1 foramina.  - Blood Cultures x2 - No growth at 48 hours  - Pt NPO pending Ortho Spine Eval - Will need diabetic diet when appropriate  - Consult Dr. Hernandes, appreciate his expertise -- IR biopsy successful 1/20/2023 - awaiting results

## 2023-01-22 NOTE — PROGRESS NOTES
"[facility] Hospital Medicine  Progress Note    Patient Name: Monica Walker  MRN: [id number]  Patient Class: IP- Inpatient   Admission Date: 1/19/2023  Length of Stay: 3 days  Attending Physician: Vinay Rosen Jr., MD  Primary Care Provider: Hannah Schulz MD        Subjective:     Principal Problem:Epidural abscess        HPI:  Patient is a 51 y/o Female presenting to [facility] ED with a cc of back pain. She states the pain began to worsen approximately 16 hours ago. She states the pain is in her Mid back area and radiates to both legs. She describes the pain as "throbbing, constant and a 9/10." She states the pain is blunted by taking Hydrocodone but not relieved. She reports the pain is made worse by movement and standing.    Patient reports she was hospitalized in November for sepsis secondary to Pyelonephritis. She has been hospitalized several times over the past 3 months. She is currently being treated at home with IV Abx therapy for Osteomyelitis of the spine.  MRI of Lumbar spine on 1/10/2023 (Per radiology report). Findings indicative of osteomyelitis involving T11-12 with epidural abscess extending from the T11-12 disc level to the L1 level eccentric towards the left.  Additional site of infection of the right L2-3 facet joint as well as osteomyelitis of the right L3 transverse process.    Significant findings in the ED include:  Vitals: /84, , RR 16, Temp 98.2 F, O2 98% on RA  Labs: WBC 14.6, HGB 8.2, HCT 27.1, MCV 74.2, Na 133, K 3.5, Bun 8, Crt0.54, , Glucose 171, UA normal.  today;124 2 days ago, CRP  22.90 Today; 12.9 2 days ago  Interventions: Toradol 30 mg IV    Pt was subsequently admitted to the Inpatient Hospitalist Service for further evaluation and management.               Overview/Hospital Course:  No notes on file    Interval History: Patient was seen resting in bed. No overnight events. Biopsy showed staph. " Awaiting antibiotic sensitivities. Treating empirically.  Will follow recommendations of Dr. Hernandes    Review of Systems   Constitutional:  Negative for chills, diaphoresis, fatigue and fever.   HENT:  Negative for congestion, rhinorrhea, sinus pressure, sinus pain and sore throat.    Respiratory:  Negative for cough, chest tightness, shortness of breath and wheezing.    Cardiovascular:  Negative for chest pain, palpitations and leg swelling.   Gastrointestinal:  Negative for abdominal distention, abdominal pain, constipation, diarrhea, nausea and vomiting.   Musculoskeletal:  Positive for back pain. Negative for arthralgias and myalgias.   Skin:  Negative for rash and wound.   Neurological:  Positive for weakness. Negative for dizziness, syncope, light-headedness, numbness and headaches.   All other systems reviewed and are negative.  Objective:     Vital Signs (Most Recent):  Temp: 97.7 °F (36.5 °C) (01/22/23 0700)  Pulse: 79 (01/22/23 0700)  Resp: 16 (01/22/23 0951)  BP: 139/72 (01/22/23 0700)  SpO2: (!) 93 % (01/22/23 0700)   Vital Signs (24h Range):  Temp:  [97.7 °F (36.5 °C)-99.2 °F (37.3 °C)] 97.7 °F (36.5 °C)  Pulse:  [78-85] 79  Resp:  [16-18] 16  SpO2:  [93 %-98 %] 93 %  BP: (113-153)/(56-72) 139/72     Weight: 61.3 kg (135 lb 2.3 oz)  Body mass index is 21.81 kg/m².    Intake/Output Summary (Last 24 hours) at 1/22/2023 1020  Last data filed at 1/21/2023 1112  Gross per 24 hour   Intake 300 ml   Output 600 ml   Net -300 ml      Physical Exam  Vitals reviewed.   Constitutional:       General: She is awake.      Appearance: Normal appearance. She is well-developed, well-groomed and normal weight.   HENT:      Head: Normocephalic and atraumatic.      Right Ear: External ear normal.      Left Ear: External ear normal.      Mouth/Throat:      Mouth: Mucous membranes are moist.      Pharynx: Oropharynx is clear.   Eyes:      Extraocular Movements: Extraocular movements intact.      Pupils: Pupils are equal,  round, and reactive to light.   Cardiovascular:      Rate and Rhythm: Regular rhythm.      Pulses: Normal pulses.      Heart sounds: Normal heart sounds.   Pulmonary:      Effort: Pulmonary effort is normal.      Breath sounds: Normal breath sounds.   Abdominal:      General: Bowel sounds are normal.      Palpations: Abdomen is soft.   Musculoskeletal:      Cervical back: Neck supple.      Thoracic back: Tenderness present.      Comments: tenderness to palpation from T6 to sacral area   Skin:            Comments: Stage 1 pressure wound over sacrum   Neurological:      Mental Status: She is alert.   Psychiatric:         Behavior: Behavior is cooperative.       Significant Labs: All pertinent labs within the past 24 hours have been reviewed.    Significant Imaging: I have reviewed all pertinent imaging results/findings within the past 24 hours.      Assessment/Plan:      * Epidural abscess  - Pt known to Dr. Hernandes, has been receiving IV abx outpatient  - Empiric Vanc and Rocephin started  - Norco 5mg q6h for pain  - Dilaudid added  - MRI Thoracic w/o Contrast - Findings suggestive of osteomyelitis discitis of T11-12 with epidural phlegmon or extruded disc contributing to moderate spinal canal stenosis.  Additional epidural phlegmon extending in the left paracentral location and of the left T11-12 and T12-L1 foramina.  - Blood Cultures x2 - No growth at 48 hours  - Pt NPO pending Ortho Spine Eval - Will need diabetic diet when appropriate  - Consult Dr. Hernandes, appreciate his expertise -- IR biopsy successful 1/20/2023 - awaiting results    Type 2 diabetes mellitus, with long-term current use of insulin  Patient's FSGs are uncontrolled due to hyperglycemia on current medication regimen.  Last A1c reviewed-   Lab Results   Component Value Date    HGBA1C 7.9 (H) 01/19/2023     Most recent fingerstick glucose reviewed- No results for input(s): POCTGLUCOSE in the last 24 hours.  Current correctional scale  Medium  Maintain  anti-hyperglycemic dose as follows-   Antihyperglycemics (From admission, onward)    Start     Stop Route Frequency Ordered    01/19/23 0610  insulin aspart U-100 injection 1-10 Units         -- SubQ Every 6 hours PRN 01/19/23 0510        Hold Oral hypoglycemics while patient is in the hospital.  Diabetic Educator consulted, appreciate their help    Microcytic anemia  Ferritin - 380  Iron - 12  Iron Sat - 6  TIBC - 188    Ferrous sulfate given Qd    Depression  - Continue Cymbalta      Hypomagnesemia  Mag 1.4 on admission    2g Mag given  1/21/2023 mag was 1.6  1/21/2023 2g mag given  Recheck 1.7 - will give 2g  AM recheck        VTE Risk Mitigation (From admission, onward)         Ordered     enoxaparin injection 40 mg  Daily         01/21/23 0414     IP VTE LOW RISK PATIENT  Once         01/19/23 0417     Place sequential compression device  Until discontinued         01/19/23 0417                Discharge Planning   SUZANNA:      Code Status: Full Code   Is the patient medically ready for discharge?:     Reason for patient still in hospital (select all that apply): Laboratory test, Treatment and Consult recommendations  Discharge Plan A: Home with family, Home Health                  Reed Lloyd MD  Department of Hospital Medicine   [facility]

## 2023-01-22 NOTE — SUBJECTIVE & OBJECTIVE
Interval History: Patient was seen resting in bed. No overnight events. Biopsy showed staph. Awaiting antibiotic sensitivities. Treating empirically.  Will follow recommendations of Dr. Hernandes    Review of Systems   Constitutional:  Negative for chills, diaphoresis, fatigue and fever.   HENT:  Negative for congestion, rhinorrhea, sinus pressure, sinus pain and sore throat.    Respiratory:  Negative for cough, chest tightness, shortness of breath and wheezing.    Cardiovascular:  Negative for chest pain, palpitations and leg swelling.   Gastrointestinal:  Negative for abdominal distention, abdominal pain, constipation, diarrhea, nausea and vomiting.   Musculoskeletal:  Positive for back pain. Negative for arthralgias and myalgias.   Skin:  Negative for rash and wound.   Neurological:  Positive for weakness. Negative for dizziness, syncope, light-headedness, numbness and headaches.   All other systems reviewed and are negative.  Objective:     Vital Signs (Most Recent):  Temp: 97.7 °F (36.5 °C) (01/22/23 0700)  Pulse: 79 (01/22/23 0700)  Resp: 16 (01/22/23 0951)  BP: 139/72 (01/22/23 0700)  SpO2: (!) 93 % (01/22/23 0700)   Vital Signs (24h Range):  Temp:  [97.7 °F (36.5 °C)-99.2 °F (37.3 °C)] 97.7 °F (36.5 °C)  Pulse:  [78-85] 79  Resp:  [16-18] 16  SpO2:  [93 %-98 %] 93 %  BP: (113-153)/(56-72) 139/72     Weight: 61.3 kg (135 lb 2.3 oz)  Body mass index is 21.81 kg/m².    Intake/Output Summary (Last 24 hours) at 1/22/2023 1020  Last data filed at 1/21/2023 1112  Gross per 24 hour   Intake 300 ml   Output 600 ml   Net -300 ml      Physical Exam  Vitals reviewed.   Constitutional:       General: She is awake.      Appearance: Normal appearance. She is well-developed, well-groomed and normal weight.   HENT:      Head: Normocephalic and atraumatic.      Right Ear: External ear normal.      Left Ear: External ear normal.      Mouth/Throat:      Mouth: Mucous membranes are moist.      Pharynx: Oropharynx is clear.   Eyes:       Extraocular Movements: Extraocular movements intact.      Pupils: Pupils are equal, round, and reactive to light.   Cardiovascular:      Rate and Rhythm: Regular rhythm.      Pulses: Normal pulses.      Heart sounds: Normal heart sounds.   Pulmonary:      Effort: Pulmonary effort is normal.      Breath sounds: Normal breath sounds.   Abdominal:      General: Bowel sounds are normal.      Palpations: Abdomen is soft.   Musculoskeletal:      Cervical back: Neck supple.      Thoracic back: Tenderness present.      Comments: tenderness to palpation from T6 to sacral area   Skin:            Comments: Stage 1 pressure wound over sacrum   Neurological:      Mental Status: She is alert.   Psychiatric:         Behavior: Behavior is cooperative.       Significant Labs: All pertinent labs within the past 24 hours have been reviewed.    Significant Imaging: I have reviewed all pertinent imaging results/findings within the past 24 hours.

## 2023-01-22 NOTE — ASSESSMENT & PLAN NOTE
Mag 1.4 on admission    2g Mag given  1/21/2023 mag was 1.6  1/21/2023 2g mag given  Recheck 1.7 - will give 2g  AM recheck

## 2023-01-23 LAB
ANION GAP SERPL CALCULATED.3IONS-SCNC: 13 MMOL/L (ref 7–16)
BASOPHILS # BLD AUTO: 0.05 K/UL (ref 0–0.2)
BASOPHILS NFR BLD AUTO: 0.5 % (ref 0–1)
BUN SERPL-MCNC: 12 MG/DL (ref 7–18)
BUN/CREAT SERPL: 9 (ref 6–20)
CALCIUM SERPL-MCNC: 8.9 MG/DL (ref 8.5–10.1)
CHLORIDE SERPL-SCNC: 97 MMOL/L (ref 98–107)
CO2 SERPL-SCNC: 29 MMOL/L (ref 21–32)
CREAT SERPL-MCNC: 1.29 MG/DL (ref 0.55–1.02)
DIFFERENTIAL METHOD BLD: ABNORMAL
EGFR (NO RACE VARIABLE) (RUSH/TITUS): 49 ML/MIN/1.73M²
EOSINOPHIL # BLD AUTO: 0.45 K/UL (ref 0–0.5)
EOSINOPHIL NFR BLD AUTO: 4.8 % (ref 1–4)
ERYTHROCYTE [DISTWIDTH] IN BLOOD BY AUTOMATED COUNT: 16.4 % (ref 11.5–14.5)
GLUCOSE SERPL-MCNC: 102 MG/DL (ref 70–105)
GLUCOSE SERPL-MCNC: 181 MG/DL (ref 70–105)
GLUCOSE SERPL-MCNC: 304 MG/DL (ref 70–105)
GLUCOSE SERPL-MCNC: 330 MG/DL (ref 74–106)
GLUCOSE SERPL-MCNC: 350 MG/DL (ref 70–105)
HCT VFR BLD AUTO: 25.3 % (ref 38–47)
HGB BLD-MCNC: 7.2 G/DL (ref 12–16)
IMM GRANULOCYTES # BLD AUTO: 0.04 K/UL (ref 0–0.04)
IMM GRANULOCYTES NFR BLD: 0.4 % (ref 0–0.4)
LYMPHOCYTES # BLD AUTO: 1.81 K/UL (ref 1–4.8)
LYMPHOCYTES NFR BLD AUTO: 19.3 % (ref 27–41)
MAGNESIUM SERPL-MCNC: 1.9 MG/DL (ref 1.7–2.3)
MCH RBC QN AUTO: 21.8 PG (ref 27–31)
MCHC RBC AUTO-ENTMCNC: 28.5 G/DL (ref 32–36)
MCV RBC AUTO: 76.4 FL (ref 80–96)
MICROORGANISM SPEC CULT: ABNORMAL
MONOCYTES # BLD AUTO: 0.98 K/UL (ref 0–0.8)
MONOCYTES NFR BLD AUTO: 10.5 % (ref 2–6)
MPC BLD CALC-MCNC: 8.8 FL (ref 9.4–12.4)
NEUTROPHILS # BLD AUTO: 6.04 K/UL (ref 1.8–7.7)
NEUTROPHILS NFR BLD AUTO: 64.5 % (ref 53–65)
NRBC # BLD AUTO: 0 X10E3/UL
NRBC, AUTO (.00): 0 %
PLATELET # BLD AUTO: 473 K/UL (ref 150–400)
POTASSIUM SERPL-SCNC: 4.7 MMOL/L (ref 3.5–5.1)
RBC # BLD AUTO: 3.31 M/UL (ref 4.2–5.4)
SODIUM SERPL-SCNC: 134 MMOL/L (ref 136–145)
VANCOMYCIN SERPL-MCNC: 18.1 ΜG/ML (ref 0–20)
VANCOMYCIN TROUGH SERPL-MCNC: 28.3 ΜG/ML (ref 10–20)
WBC # BLD AUTO: 9.37 K/UL (ref 4.5–11)

## 2023-01-23 PROCEDURE — 36415 COLL VENOUS BLD VENIPUNCTURE: CPT | Performed by: INTERNAL MEDICINE

## 2023-01-23 PROCEDURE — 25000003 PHARM REV CODE 250

## 2023-01-23 PROCEDURE — 11000001 HC ACUTE MED/SURG PRIVATE ROOM

## 2023-01-23 PROCEDURE — 80048 BASIC METABOLIC PNL TOTAL CA: CPT

## 2023-01-23 PROCEDURE — 85025 COMPLETE CBC W/AUTO DIFF WBC: CPT

## 2023-01-23 PROCEDURE — 83735 ASSAY OF MAGNESIUM: CPT

## 2023-01-23 PROCEDURE — 82962 GLUCOSE BLOOD TEST: CPT

## 2023-01-23 PROCEDURE — 99232 SBSQ HOSP IP/OBS MODERATE 35: CPT | Mod: GC,,, | Performed by: FAMILY MEDICINE

## 2023-01-23 PROCEDURE — 63600175 PHARM REV CODE 636 W HCPCS

## 2023-01-23 PROCEDURE — 63600175 PHARM REV CODE 636 W HCPCS: Performed by: FAMILY MEDICINE

## 2023-01-23 PROCEDURE — 80202 ASSAY OF VANCOMYCIN: CPT | Performed by: INTERNAL MEDICINE

## 2023-01-23 PROCEDURE — 25000003 PHARM REV CODE 250: Performed by: FAMILY MEDICINE

## 2023-01-23 PROCEDURE — 99232 PR SUBSEQUENT HOSPITAL CARE,LEVL II: ICD-10-PCS | Mod: GC,,, | Performed by: FAMILY MEDICINE

## 2023-01-23 PROCEDURE — 36415 COLL VENOUS BLD VENIPUNCTURE: CPT

## 2023-01-23 PROCEDURE — 25000003 PHARM REV CODE 250: Performed by: INTERNAL MEDICINE

## 2023-01-23 RX ORDER — SODIUM CHLORIDE 9 MG/ML
INJECTION, SOLUTION INTRAVENOUS CONTINUOUS
Status: DISCONTINUED | OUTPATIENT
Start: 2023-01-23 | End: 2023-01-24

## 2023-01-23 RX ADMIN — HYDROMORPHONE HYDROCHLORIDE 1 MG: 2 INJECTION, SOLUTION INTRAMUSCULAR; INTRAVENOUS; SUBCUTANEOUS at 04:01

## 2023-01-23 RX ADMIN — MUPIROCIN: 20 OINTMENT TOPICAL at 08:01

## 2023-01-23 RX ADMIN — DULOXETINE 60 MG: 30 CAPSULE, DELAYED RELEASE ORAL at 08:01

## 2023-01-23 RX ADMIN — PIPERACILLIN AND TAZOBACTAM 4.5 G: 4; .5 INJECTION, POWDER, LYOPHILIZED, FOR SOLUTION INTRAVENOUS; PARENTERAL at 03:01

## 2023-01-23 RX ADMIN — Medication 1 EACH: at 08:01

## 2023-01-23 RX ADMIN — HYDROCODONE BITARTRATE AND ACETAMINOPHEN 1 TABLET: 5; 325 TABLET ORAL at 02:01

## 2023-01-23 RX ADMIN — VANCOMYCIN HYDROCHLORIDE 1250 MG: 500 INJECTION, POWDER, LYOPHILIZED, FOR SOLUTION INTRAVENOUS at 12:01

## 2023-01-23 RX ADMIN — PIPERACILLIN AND TAZOBACTAM 4.5 G: 4; .5 INJECTION, POWDER, LYOPHILIZED, FOR SOLUTION INTRAVENOUS; PARENTERAL at 12:01

## 2023-01-23 RX ADMIN — HYDROMORPHONE HYDROCHLORIDE 1 MG: 2 INJECTION, SOLUTION INTRAMUSCULAR; INTRAVENOUS; SUBCUTANEOUS at 10:01

## 2023-01-23 RX ADMIN — HYDROCODONE BITARTRATE AND ACETAMINOPHEN 1 TABLET: 5; 325 TABLET ORAL at 08:01

## 2023-01-23 RX ADMIN — HYDROCODONE BITARTRATE AND ACETAMINOPHEN 1 TABLET: 5; 325 TABLET ORAL at 07:01

## 2023-01-23 RX ADMIN — PIPERACILLIN AND TAZOBACTAM 4.5 G: 4; .5 INJECTION, POWDER, LYOPHILIZED, FOR SOLUTION INTRAVENOUS; PARENTERAL at 06:01

## 2023-01-23 RX ADMIN — HYDROCODONE BITARTRATE AND ACETAMINOPHEN 1 TABLET: 5; 325 TABLET ORAL at 01:01

## 2023-01-23 RX ADMIN — Medication 6 MG: at 09:01

## 2023-01-23 RX ADMIN — INSULIN ASPART 8 UNITS: 100 INJECTION, SOLUTION INTRAVENOUS; SUBCUTANEOUS at 04:01

## 2023-01-23 RX ADMIN — MUPIROCIN: 20 OINTMENT TOPICAL at 09:01

## 2023-01-23 RX ADMIN — POTASSIUM CHLORIDE 40 MEQ: 1500 TABLET, EXTENDED RELEASE ORAL at 08:01

## 2023-01-23 RX ADMIN — INSULIN ASPART 8 UNITS: 100 INJECTION, SOLUTION INTRAVENOUS; SUBCUTANEOUS at 06:01

## 2023-01-23 RX ADMIN — DOCUSATE SODIUM 100 MG: 100 CAPSULE, LIQUID FILLED ORAL at 08:01

## 2023-01-23 RX ADMIN — SODIUM CHLORIDE: 9 INJECTION, SOLUTION INTRAVENOUS at 08:01

## 2023-01-23 NOTE — PROGRESS NOTES
Vancomycin random resulted as 18.1, now within goal of 15-20. Will go ahead and resume vancomycin 1250 mg IV every 18 hours to begin at 1800. Trough ordered for 1/25 at 0530. Creatinine jumped significantly to 1.29. Will continue to monitor and follow daily and make necessary adjustments.    Alesia Carey, PharmD  Ext. [phone]

## 2023-01-23 NOTE — SUBJECTIVE & OBJECTIVE
Interval History: Patient was seen resting in bed. No overnight events. Biopsy showed MRSA, sensitive to Vancomycin. Will continue Vancomycin and stop Zosyn. Dr. Hernandes will take patient to surgery tomorrow    Review of Systems   Constitutional:  Negative for chills, diaphoresis, fatigue and fever.   HENT:  Negative for congestion, rhinorrhea, sinus pressure, sinus pain and sore throat.    Respiratory:  Negative for cough, chest tightness, shortness of breath and wheezing.    Cardiovascular:  Negative for chest pain, palpitations and leg swelling.   Gastrointestinal:  Negative for abdominal distention, abdominal pain, constipation, diarrhea, nausea and vomiting.   Musculoskeletal:  Positive for back pain. Negative for arthralgias and myalgias.   Skin:  Negative for rash and wound.   Neurological:  Positive for weakness. Negative for dizziness, syncope, light-headedness, numbness and headaches.   All other systems reviewed and are negative.  Objective:     Vital Signs (Most Recent):  Temp: 97.8 °F (36.6 °C) (01/23/23 0700)  Pulse: 71 (01/23/23 0700)  Resp: 17 (01/23/23 1016)  BP: 116/61 (01/23/23 0700)  SpO2: (!) 92 % (01/23/23 0700)   Vital Signs (24h Range):  Temp:  [97.3 °F (36.3 °C)-98.1 °F (36.7 °C)] 97.8 °F (36.6 °C)  Pulse:  [71-86] 71  Resp:  [16-20] 17  SpO2:  [92 %-96 %] 92 %  BP: (108-137)/(59-71) 116/61     Weight: 61.7 kg (136 lb 0.4 oz)  Body mass index is 21.95 kg/m².  No intake or output data in the 24 hours ending 01/23/23 1121   Physical Exam  Vitals reviewed.   Constitutional:       General: She is awake.      Appearance: Normal appearance. She is well-developed, well-groomed and normal weight.   HENT:      Head: Normocephalic and atraumatic.      Right Ear: External ear normal.      Left Ear: External ear normal.      Mouth/Throat:      Mouth: Mucous membranes are moist.      Pharynx: Oropharynx is clear.   Eyes:      Extraocular Movements: Extraocular movements intact.      Pupils: Pupils are  equal, round, and reactive to light.   Cardiovascular:      Rate and Rhythm: Regular rhythm.      Pulses: Normal pulses.      Heart sounds: Normal heart sounds.   Pulmonary:      Effort: Pulmonary effort is normal.      Breath sounds: Normal breath sounds.   Abdominal:      General: Bowel sounds are normal.      Palpations: Abdomen is soft.   Musculoskeletal:      Cervical back: Neck supple.      Thoracic back: Tenderness present.      Comments: tenderness to palpation from T6 to sacral area   Skin:     General: Skin is warm and dry.             Comments: Stage 1 pressure wound over sacrum   Neurological:      Mental Status: She is alert and oriented to person, place, and time.   Psychiatric:         Mood and Affect: Mood normal.         Behavior: Behavior normal. Behavior is cooperative.         Thought Content: Thought content normal.       Significant Labs: All pertinent labs within the past 24 hours have been reviewed.    Significant Imaging: I have reviewed all pertinent imaging results/findings within the past 24 hours.

## 2023-01-23 NOTE — PROGRESS NOTES
"[facility] Medicine  Progress Note    Patient Name: Monica Walker  MRN: [id number]  Patient Class: IP- Inpatient   Admission Date: 1/19/2023  Length of Stay: 4 days  Attending Physician: Vinay Rosen Jr., MD  Primary Care Provider: Hannah Schulz MD        Subjective:     Principal Problem:Epidural abscess        HPI:  Patient is a 51 y/o Female presenting to [facility] with a cc of back pain. She states the pain began to worsen approximately 16 hours ago. She states the pain is in her Mid back area and radiates to both legs. She describes the pain as "throbbing, constant and a 9/10." She states the pain is blunted by taking Hydrocodone but not relieved. She reports the pain is made worse by movement and standing.    Patient reports she was hospitalized in November for sepsis secondary to Pyelonephritis. She has been hospitalized several times over the past 3 months. She is currently being treated at home with IV Abx therapy for Osteomyelitis of the spine.  MRI of Lumbar spine on 1/10/2023 (Per radiology report). Findings indicative of osteomyelitis involving T11-12 with epidural abscess extending from the T11-12 disc level to the L1 level eccentric towards the left.  Additional site of infection of the right L2-3 facet joint as well as osteomyelitis of the right L3 transverse process.    Significant findings in the ED include:  Vitals: /84, , RR 16, Temp 98.2 F, O2 98% on RA  Labs: WBC 14.6, HGB 8.2, HCT 27.1, MCV 74.2, Na 133, K 3.5, Bun 8, Crt0.54, , Glucose 171, UA normal.  today;124 2 days ago, CRP  22.90 Today; 12.9 2 days ago  Interventions: Toradol 30 mg IV    Pt was subsequently admitted to the Inpatient Hospitalist Service for further evaluation and management.               Overview/Hospital Course:  No notes on file    Interval History: Patient was seen resting in bed. No overnight events. Biopsy showed MRSA, " sensitive to Vancomycin. Will continue Vancomycin and stop Zosyn. Dr. Hernandes will take patient to surgery tomorrow    Review of Systems   Constitutional:  Negative for chills, diaphoresis, fatigue and fever.   HENT:  Negative for congestion, rhinorrhea, sinus pressure, sinus pain and sore throat.    Respiratory:  Negative for cough, chest tightness, shortness of breath and wheezing.    Cardiovascular:  Negative for chest pain, palpitations and leg swelling.   Gastrointestinal:  Negative for abdominal distention, abdominal pain, constipation, diarrhea, nausea and vomiting.   Musculoskeletal:  Positive for back pain. Negative for arthralgias and myalgias.   Skin:  Negative for rash and wound.   Neurological:  Positive for weakness. Negative for dizziness, syncope, light-headedness, numbness and headaches.   All other systems reviewed and are negative.  Objective:     Vital Signs (Most Recent):  Temp: 97.8 °F (36.6 °C) (01/23/23 0700)  Pulse: 71 (01/23/23 0700)  Resp: 17 (01/23/23 1016)  BP: 116/61 (01/23/23 0700)  SpO2: (!) 92 % (01/23/23 0700)   Vital Signs (24h Range):  Temp:  [97.3 °F (36.3 °C)-98.1 °F (36.7 °C)] 97.8 °F (36.6 °C)  Pulse:  [71-86] 71  Resp:  [16-20] 17  SpO2:  [92 %-96 %] 92 %  BP: (108-137)/(59-71) 116/61     Weight: 61.7 kg (136 lb 0.4 oz)  Body mass index is 21.95 kg/m².  No intake or output data in the 24 hours ending 01/23/23 1121   Physical Exam  Vitals reviewed.   Constitutional:       General: She is awake.      Appearance: Normal appearance. She is well-developed, well-groomed and normal weight.   HENT:      Head: Normocephalic and atraumatic.      Right Ear: External ear normal.      Left Ear: External ear normal.      Mouth/Throat:      Mouth: Mucous membranes are moist.      Pharynx: Oropharynx is clear.   Eyes:      Extraocular Movements: Extraocular movements intact.      Pupils: Pupils are equal, round, and reactive to light.   Cardiovascular:      Rate and Rhythm: Regular rhythm.       Pulses: Normal pulses.      Heart sounds: Normal heart sounds.   Pulmonary:      Effort: Pulmonary effort is normal.      Breath sounds: Normal breath sounds.   Abdominal:      General: Bowel sounds are normal.      Palpations: Abdomen is soft.   Musculoskeletal:      Cervical back: Neck supple.      Thoracic back: Tenderness present.      Comments: tenderness to palpation from T6 to sacral area   Skin:     General: Skin is warm and dry.             Comments: Stage 1 pressure wound over sacrum   Neurological:      Mental Status: She is alert and oriented to person, place, and time.   Psychiatric:         Mood and Affect: Mood normal.         Behavior: Behavior normal. Behavior is cooperative.         Thought Content: Thought content normal.       Significant Labs: All pertinent labs within the past 24 hours have been reviewed.    Significant Imaging: I have reviewed all pertinent imaging results/findings within the past 24 hours.      Assessment/Plan:      * Epidural abscess  - Pt known to Dr. Hernandes, has been receiving IV abx outpatient  - Empiric Vanc and Rocephin started  - Norco 5mg q6h for pain  - Dilaudid added  - MRI Thoracic w/o Contrast - Findings suggestive of osteomyelitis discitis of T11-12 with epidural phlegmon or extruded disc contributing to moderate spinal canal stenosis.  Additional epidural phlegmon extending in the left paracentral location and of the left T11-12 and T12-L1 foramina.  - Blood Cultures x2 - No growth at 72 hours  - Pt NPO pending Ortho Spine Eval - Will need diabetic diet when appropriate  - Consult Dr. Hernandes, appreciate his expertise -- IR biopsy successful 1/20/2023 -   - MRSA sensitive to Vancomycin, will continue     Type 2 diabetes mellitus, with long-term current use of insulin  Patient's FSGs are uncontrolled due to hyperglycemia on current medication regimen.  Last A1c reviewed-   Lab Results   Component Value Date    HGBA1C 7.9 (H) 01/19/2023     Most recent fingerstick  glucose reviewed- No results for input(s): POCTGLUCOSE in the last 24 hours.  Current correctional scale  Medium  Maintain anti-hyperglycemic dose as follows-   Antihyperglycemics (From admission, onward)    Start     Stop Route Frequency Ordered    01/19/23 0610  insulin aspart U-100 injection 1-10 Units         -- SubQ Every 6 hours PRN 01/19/23 0510        Hold Oral hypoglycemics while patient is in the hospital.  Diabetic Educator consulted, appreciate their help    Microcytic anemia  Ferritin - 380  Iron - 12  Iron Sat - 6  TIBC - 188    Ferrous sulfate given Qd    Depression  - Continue Cymbalta      Hypomagnesemia  Mag 1.4 on admission    2g Mag given  1/21/2023 mag was 1.6  1/21/2023 2g mag given  Recheck 1.7 - will give 2g    Today mag 1.9        VTE Risk Mitigation (From admission, onward)         Ordered     enoxaparin injection 40 mg  Daily         01/21/23 0414     IP VTE LOW RISK PATIENT  Once         01/19/23 0417     Place sequential compression device  Until discontinued         01/19/23 0417                Discharge Planning   SUZANNA:      Code Status: Full Code   Is the patient medically ready for discharge?:     Reason for patient still in hospital (select all that apply): Laboratory test, Treatment, Imaging and Consult recommendations  Discharge Plan A: Home with family, Home Health                  Reed Lloyd MD  Department of Hospital Medicine   [facility]

## 2023-01-23 NOTE — PROGRESS NOTES
Office: [phone]    Subjective:   Cultures positive for MRSA.    I/O as of 7:29 AM:   Intake/Output - Last 3 Shifts         01/21 0700 01/22 0659 01/22 0700 01/23 0659 01/23 0700 01/24 0659    P.O. 300      Total Intake(mL/kg) 300 (4.9)      Urine (mL/kg/hr) 600 (0.4)      Total Output 600      Net -300             Urine Occurrence  4 x              Vitals / Physical Exam:  Vitals:    01/23/23 0219 01/23/23 0400 01/23/23 0432 01/23/23 0700   BP:  128/71  116/61   Pulse:  80  71   Resp: 18 18 18 18   Temp:  97.9 °F (36.6 °C)  97.8 °F (36.6 °C)   TempSrc:       SpO2:  95%  (!) 92%   Weight:  61.7 kg (136 lb 0.4 oz)     Height:            AOx3   NAD      Motor L2 L3 L4 L5 S1   Left 3 3 3 4 5   Right 5 5 5 5 5   Sensory        Left  + + + + +   Right + + + + +         Assessment / Plan:   Monica Walker is a 52 y.o. female with thoracolumbar osteo diskitis with MRSA on culture    - CT thoracic spine   - we will plan for laminectomy and fusion tomorrow  - NPO after midnight      Signature:  Jimmy Hernandes MD     Electronic Signature

## 2023-01-23 NOTE — PROGRESS NOTES
Trough elevated. Value of 28.3.     Will hold Vancomycin and check random level later today and redose and schedule at that time.    Thank you.

## 2023-01-23 NOTE — ASSESSMENT & PLAN NOTE
- Pt known to Dr. Hernandes, has been receiving IV abx outpatient  - Empiric Vanc and Rocephin started  - Norco 5mg q6h for pain  - Dilaudid added  - MRI Thoracic w/o Contrast - Findings suggestive of osteomyelitis discitis of T11-12 with epidural phlegmon or extruded disc contributing to moderate spinal canal stenosis.  Additional epidural phlegmon extending in the left paracentral location and of the left T11-12 and T12-L1 foramina.  - Blood Cultures x2 - No growth at 72 hours  - Pt NPO pending Ortho Spine Eval - Will need diabetic diet when appropriate  - Consult Dr. Hernandes, appreciate his expertise -- IR biopsy successful 1/20/2023 -   - MRSA sensitive to Vancomycin, will continue

## 2023-01-23 NOTE — PROGRESS NOTES
"[facility]  Adult Nutrition  Follow-up Note         Reason for Assessment  Reason For Assessment: RD follow-up  Nutrition Risk Screen: no indicators present    RD follow up.    Recommend continue current diet order as medically appropriate and tolerated. Recommend adding Ensure Max Protein daily to promote adequate oral and protein intake. Recommend Alfred (Arginaid is modular equivalent and can be used as Alfred out of stock) BID and 500mg Vit C BID + 220mg ZnSO4 BID + MVI daily to aid in wound healing. Encourage good PO intakes.    Per orthopedic surgery note 1/23:  "Monica Walker is a 52 y.o. female with thoracolumbar osteo diskitis with MRSA on culture     - CT thoracic spine   - we will plan for laminectomy and fusion tomorrow  - NPO after midnight"    Patient currently receiving Consistent Carbohydrate 1500kcal diet with 50% PO intake documented on 1/21. Encourage good PO intakes.     Patient with stg 1 pressure wound over sacrum per MD note. Recommend adding Ensure Max Protein daily to promote adequate oral and protein intake (provides 150kcal and 30g protein each). Recommend Alfred (Arginaid is modular equivalent and can be used as Alfred out of stock) BID and 500mg Vit C BID + 220mg ZnSO4 BID + MVI daily to aid in wound healing. Alfred provides 90kcal and 2.5g pro each. Encourage good PO intakes.    Last BM 1/18 per flowsheets - pt with docusate sodium on med list. Recommend continue bowel regimen as constipation can influence reduced appetite/PO intake. Will continue to monitor PO intakes, weight trend, meds, labs, updates in patient condition. RD following.    Malnutrition  Is Patient Malnourished: Yes     Loss of 8-9 lbs/5.4-5.5% body weight x 1 month, significant weight loss. RD visited pt 1/19 for full NFPE. Pt with mild-moderate fat mass loss present and moderate to severe muscle mass loss(severe in temporals and lower extremities). She reports a normal appetite, " but NPO on 1/19. Pt meeting criteria for moderate to severe acute protein calorie malnutrition.     Malnutrition Assessment  Malnutrition Type: acute illness or injury, chronic illness  Skin (Micronutrient): turgor reduced  Neck/Chest (Micronutrient): muscle wasting, subcutaneous fat loss  Musculoskeletal/Lower Extremities: muscle wasting, subcutaneous fat loss       Weight Loss (Malnutrition): 5% in 1 month  Subcutaneous Fat (Malnutrition): moderate depletion  Muscle Mass (Malnutrition): severe depletion   Orbital Region (Subcutaneous Fat Loss): mild depletion  Upper Arm Region (Subcutaneous Fat Loss): moderate depletion  Thoracic and Lumbar Region: moderate depletion   [place] Region (Muscle Loss): severe depletion  Clavicle Bone Region (Muscle Loss): moderate depletion  Clavicle and Acromion Bone Region (Muscle Loss): moderate depletion  Scapular Bone Region (Muscle Loss): moderate depletion  Dorsal Hand (Muscle Loss): moderate depletion  Patellar Region (Muscle Loss): severe depletion  Anterior Thigh Region (Muscle Loss): severe depletion  Posterior Calf Region (Muscle Loss): severe depletion       Subcutaneous Fat Loss (Final Summary): moderate protein-calorie malnutrition  Muscle Loss Evaluation (Final Summary): severe protein-calorie malnutrition    Moderate Weight Loss (Malnutrition): 5% in 1 month    Nutrition Diagnosis  Malnutrition (Moderate)   related to Chronic infection/ sepsis as evidenced by epidural abscess/osteomyelitis on IV abx, increased nutrition needs     Nutrition Diagnosis Status: Chronic/ continues    Nutrition Risk  Level of Risk/Frequency of Follow-up: moderate - high   Chewing or Swallowing Difficulty?: No Chewing or swallowing difficulty    Estimated/Assessed Needs    Temp: 97.8 °F (36.6 °C)Oral  Weight Used For Calorie Calculations: 62.2 kg (137 lb 2 oz)   Energy Need Method: Kcal/kg Energy Calorie Requirements (kcal): 2989-9897 kcal (25-30 kcal/kg)  Weight Used For Protein  Calculations: 62.2 kg (137 lb 2 oz)  Protein Requirements: 62-74 g PRO (1.0-1.2 g PRO/kg)       RDA Method (mL): 1555     Nutrition Prescription / Recommendations  Recommendation/Intervention: Recommend continue current diet order as medically appropriate and tolerated. Recommend adding Ensure Max Protein daily to promote adequate oral and protein intake. Recommend Alfred (Arginaid is modular equivalent and can be used as Alfred out of stock) BID and 500mg Vit C BID + 220mg ZnSO4 BID + MVI daily to aid in wound healing. Encourage good PO intakes.  Goals: PO intake %, weight maintenance, wound healing  Nutrition Goal Status: progressing towards goal  Current Diet Order: Diet consistent carbohydrate 1500 (45g Carbs/ meal)  Nutrition Order Comments: Appropriate; will be NPO 1/24 0001  Recommended Diet: Consistent Carbohydrate 1500 (45g Carbs)  Recommended Oral Supplement: Alfred [90 kcals, 2.5g Protein, 10g Carbs(3g Sugar), 7g L-Arginine, 7g L-Glutamine, Vitamin C 300mg, 9.5mg Zinc] twice daily and Ensure Max Protein [150 kcals, 30g Protein, 6g Carbs(2g Fiber, 1g Sugar), 1.5g Fat] daily  Is Nutrition Support Recommended: No  Is Education Recommended: No    Monitor and Evaluation  % current Intake: Other: 50% PO intake documented 1/21  % intake to meet estimated needs: 75 - 100 %  Food and Nutrient Intake: energy intake, food and beverage intake  Food and Nutrient Adminstration: diet order  Anthropometric Measurements: weight, weight change  Biochemical Data, Medical Tests and Procedures: electrolyte and renal panel, gastrointestinal profile, glucose/endocrine profile, inflammatory profile, lipid profile  Nutrition-Focused Physical Findings: overall appearance     Current Medical Diagnosis and Past Medical History  Diagnosis: other (see comments) (epidural abscess)  Past Medical History:   Diagnosis Date    Abscess of right thigh + MRSA 08/20/2021    healed    Adnexal cyst     Degenerative disc disease     Depression  "10/29/2021    Hypertension     Nicotine dependence     Osteoarthritis      Nutrition/Diet History  Food Allergies: NKFA    Lab/Procedures/Meds  Recent Labs   Lab 01/23/23  0415   *   K 4.7   BUN 12   CREATININE 1.29*   *   CALCIUM 8.9   CL 97*     Last A1c:   Lab Results   Component Value Date    HGBA1C 7.9 (H) 01/19/2023     Lab Results   Component Value Date    RBC 3.31 (L) 01/23/2023    HGB 7.2 (L) 01/23/2023    HCT 25.3 (L) 01/23/2023    MCV 76.4 (L) 01/23/2023    MCH 21.8 (L) 01/23/2023    MCHC 28.5 (L) 01/23/2023    TIBC 188 (L) 01/19/2023     Pertinent Labs Reviewed: reviewed  Pertinent Labs Comments: Na 134 (L), Cl 97 (L), Creat 1.29 (H), eGFR 49 (L) -possibly r/t volume status; (H) - pt with T2DM  Pertinent Medications Reviewed: reviewed  Pertinent Medications Comments: docusate sodium, duloxetine, enoxaparin, ferrous sulfate, mupirocin, zosyn, IVF 100ml/hr, hydromorphone PRN, insulin PRN    Anthropometrics  Temp: 97.8 °F (36.6 °C)  Height Method: Stated  Height: 5' 6" (167.6 cm)  Height (inches): 66 in  Weight Method: Bed Scale  Weight: 61.7 kg (136 lb 0.4 oz)  Weight (lb): 136.03 lb  Ideal Body Weight (IBW), Female: 130 lb  % Ideal Body Weight, Female (lb): 105.54 %  BMI (Calculated): 22     Nutrition by Nursing  Diet/Nutrition Received: consistent carb/diabetic diet  Intake (%): 50%  Last Bowel Movement: 01/18/23    Nutrition Follow-Up  RD Follow-up?: Yes  "

## 2023-01-23 NOTE — ASSESSMENT & PLAN NOTE
Mag 1.4 on admission    2g Mag given  1/21/2023 mag was 1.6  1/21/2023 2g mag given  Recheck 1.7 - will give 2g    Today mag 1.9

## 2023-01-24 ENCOUNTER — ANESTHESIA (OUTPATIENT)
Dept: SURGERY | Facility: HOSPITAL | Age: 53
DRG: 029 | End: 2023-01-24
Payer: MEDICARE

## 2023-01-24 ENCOUNTER — ANESTHESIA EVENT (OUTPATIENT)
Dept: SURGERY | Facility: HOSPITAL | Age: 53
DRG: 029 | End: 2023-01-24
Payer: MEDICARE

## 2023-01-24 LAB
ANION GAP SERPL CALCULATED.3IONS-SCNC: 13 MMOL/L (ref 7–16)
BASOPHILS # BLD AUTO: 0.07 K/UL (ref 0–0.2)
BASOPHILS NFR BLD AUTO: 0.7 % (ref 0–1)
BUN SERPL-MCNC: 17 MG/DL (ref 7–18)
BUN/CREAT SERPL: 10 (ref 6–20)
CALCIUM SERPL-MCNC: 9.2 MG/DL (ref 8.5–10.1)
CHLORIDE SERPL-SCNC: 100 MMOL/L (ref 98–107)
CO2 SERPL-SCNC: 28 MMOL/L (ref 21–32)
CREAT SERPL-MCNC: 1.71 MG/DL (ref 0.55–1.02)
DIFFERENTIAL METHOD BLD: ABNORMAL
EGFR (NO RACE VARIABLE) (RUSH/TITUS): 35 ML/MIN/1.73M²
EOSINOPHIL # BLD AUTO: 0.48 K/UL (ref 0–0.5)
EOSINOPHIL NFR BLD AUTO: 4.6 % (ref 1–4)
ERYTHROCYTE [DISTWIDTH] IN BLOOD BY AUTOMATED COUNT: 16.3 % (ref 11.5–14.5)
GLUCOSE SERPL-MCNC: 190 MG/DL (ref 74–106)
GLUCOSE SERPL-MCNC: 227 MG/DL (ref 70–105)
GLUCOSE SERPL-MCNC: 277 MG/DL (ref 70–105)
GLUCOSE SERPL-MCNC: 300 MG/DL (ref 70–105)
GLUCOSE SERPL-MCNC: 365 MG/DL (ref 70–105)
HCT VFR BLD AUTO: 26.3 % (ref 38–47)
HCT VFR BLD AUTO: 33.1 % (ref 38–47)
HGB BLD-MCNC: 7.6 G/DL (ref 12–16)
HGB BLD-MCNC: 9.8 G/DL (ref 12–16)
IMM GRANULOCYTES # BLD AUTO: 0.05 K/UL (ref 0–0.04)
IMM GRANULOCYTES NFR BLD: 0.5 % (ref 0–0.4)
INDIRECT COOMBS: NORMAL
LYMPHOCYTES # BLD AUTO: 2.16 K/UL (ref 1–4.8)
LYMPHOCYTES NFR BLD AUTO: 20.8 % (ref 27–41)
MCH RBC QN AUTO: 21.8 PG (ref 27–31)
MCHC RBC AUTO-ENTMCNC: 28.9 G/DL (ref 32–36)
MCV RBC AUTO: 75.4 FL (ref 80–96)
MONOCYTES # BLD AUTO: 0.95 K/UL (ref 0–0.8)
MONOCYTES NFR BLD AUTO: 9.1 % (ref 2–6)
MPC BLD CALC-MCNC: 8.6 FL (ref 9.4–12.4)
NEUTROPHILS # BLD AUTO: 6.69 K/UL (ref 1.8–7.7)
NEUTROPHILS NFR BLD AUTO: 64.3 % (ref 53–65)
NRBC # BLD AUTO: 0 X10E3/UL
NRBC, AUTO (.00): 0 %
PLATELET # BLD AUTO: 509 K/UL (ref 150–400)
POTASSIUM SERPL-SCNC: 5.1 MMOL/L (ref 3.5–5.1)
RBC # BLD AUTO: 3.49 M/UL (ref 4.2–5.4)
RH BLD: NORMAL
SODIUM SERPL-SCNC: 136 MMOL/L (ref 136–145)
WBC # BLD AUTO: 10.4 K/UL (ref 4.5–11)

## 2023-01-24 PROCEDURE — 25000003 PHARM REV CODE 250: Performed by: FAMILY MEDICINE

## 2023-01-24 PROCEDURE — D9220A PRA ANESTHESIA: ICD-10-PCS | Mod: CRNA,,, | Performed by: NURSE ANESTHETIST, CERTIFIED REGISTERED

## 2023-01-24 PROCEDURE — 63600175 PHARM REV CODE 636 W HCPCS

## 2023-01-24 PROCEDURE — C1713 ANCHOR/SCREW BN/BN,TIS/BN: HCPCS | Performed by: ORTHOPAEDIC SURGERY

## 2023-01-24 PROCEDURE — P9016 RBC LEUKOCYTES REDUCED: HCPCS | Performed by: NURSE ANESTHETIST, CERTIFIED REGISTERED

## 2023-01-24 PROCEDURE — 85014 HEMATOCRIT: CPT | Performed by: FAMILY MEDICINE

## 2023-01-24 PROCEDURE — 22854 INSJ BIOMECHANICAL DEVICE: CPT | Mod: ,,, | Performed by: ORTHOPAEDIC SURGERY

## 2023-01-24 PROCEDURE — 82962 GLUCOSE BLOOD TEST: CPT

## 2023-01-24 PROCEDURE — 37000009 HC ANESTHESIA EA ADD 15 MINS: Performed by: ORTHOPAEDIC SURGERY

## 2023-01-24 PROCEDURE — 20936 PR AUTOGRAFT SPINE SURGERY LOCAL FROM SAME INCISION: ICD-10-PCS | Mod: ,,, | Performed by: ORTHOPAEDIC SURGERY

## 2023-01-24 PROCEDURE — 80048 BASIC METABOLIC PNL TOTAL CA: CPT

## 2023-01-24 PROCEDURE — 27000689 HC BLADE LARYNGOSCOPE ANY SIZE: Performed by: NURSE ANESTHETIST, CERTIFIED REGISTERED

## 2023-01-24 PROCEDURE — 27000165 HC TUBE, ETT CUFFED: Performed by: NURSE ANESTHETIST, CERTIFIED REGISTERED

## 2023-01-24 PROCEDURE — 37000008 HC ANESTHESIA 1ST 15 MINUTES: Performed by: ORTHOPAEDIC SURGERY

## 2023-01-24 PROCEDURE — 86920 COMPATIBILITY TEST SPIN: CPT | Performed by: NURSE ANESTHETIST, CERTIFIED REGISTERED

## 2023-01-24 PROCEDURE — 63103 PR REMOVE VERTEBRAL BODY ADD-ON: ICD-10-PCS | Mod: ,,, | Performed by: ORTHOPAEDIC SURGERY

## 2023-01-24 PROCEDURE — 36415 COLL VENOUS BLD VENIPUNCTURE: CPT

## 2023-01-24 PROCEDURE — 25000003 PHARM REV CODE 250

## 2023-01-24 PROCEDURE — 36000710: Performed by: ORTHOPAEDIC SURGERY

## 2023-01-24 PROCEDURE — 88311 DECALCIFY TISSUE: CPT | Mod: TC,SUR | Performed by: ORTHOPAEDIC SURGERY

## 2023-01-24 PROCEDURE — 27202344 HC EYESHIELD: Performed by: NURSE ANESTHETIST, CERTIFIED REGISTERED

## 2023-01-24 PROCEDURE — 27800903 OPTIME MED/SURG SUP & DEVICES OTHER IMPLANTS: Performed by: ORTHOPAEDIC SURGERY

## 2023-01-24 PROCEDURE — 88307 TISSUE EXAM BY PATHOLOGIST: CPT | Mod: 26,,, | Performed by: PATHOLOGY

## 2023-01-24 PROCEDURE — 63600175 PHARM REV CODE 636 W HCPCS: Performed by: FAMILY MEDICINE

## 2023-01-24 PROCEDURE — 85025 COMPLETE CBC W/AUTO DIFF WBC: CPT

## 2023-01-24 PROCEDURE — 22612 PR ARTHRODESIS, POST/POSTLAT, SNGL INTERSPACE, LUMBAR: ICD-10-PCS | Mod: 51,,, | Performed by: ORTHOPAEDIC SURGERY

## 2023-01-24 PROCEDURE — 25000003 PHARM REV CODE 250: Performed by: ORTHOPAEDIC SURGERY

## 2023-01-24 PROCEDURE — 87070 CULTURE OTHR SPECIMN AEROBIC: CPT | Performed by: ORTHOPAEDIC SURGERY

## 2023-01-24 PROCEDURE — 63101 REMOVE VERT BODY DCMPRN THRC: CPT | Mod: ,,, | Performed by: ORTHOPAEDIC SURGERY

## 2023-01-24 PROCEDURE — 22614 ARTHRD PST TQ 1NTRSPC EA ADD: CPT | Mod: ,,, | Performed by: ORTHOPAEDIC SURGERY

## 2023-01-24 PROCEDURE — 20936 SP BONE AGRFT LOCAL ADD-ON: CPT | Mod: ,,, | Performed by: ORTHOPAEDIC SURGERY

## 2023-01-24 PROCEDURE — 63600175 PHARM REV CODE 636 W HCPCS: Performed by: NURSE ANESTHETIST, CERTIFIED REGISTERED

## 2023-01-24 PROCEDURE — 11000001 HC ACUTE MED/SURG PRIVATE ROOM

## 2023-01-24 PROCEDURE — 20930 PR ALLOGRAFT FOR SPINE SURGERY ONLY MORSELIZED: ICD-10-PCS | Mod: ,,, | Performed by: ORTHOPAEDIC SURGERY

## 2023-01-24 PROCEDURE — 88311 SURGICAL PATHOLOGY: ICD-10-PCS | Mod: 26,,, | Performed by: PATHOLOGY

## 2023-01-24 PROCEDURE — 63600175 PHARM REV CODE 636 W HCPCS: Performed by: ORTHOPAEDIC SURGERY

## 2023-01-24 PROCEDURE — 71000033 HC RECOVERY, INTIAL HOUR: Performed by: ORTHOPAEDIC SURGERY

## 2023-01-24 PROCEDURE — 36000711: Performed by: ORTHOPAEDIC SURGERY

## 2023-01-24 PROCEDURE — 27201423 OPTIME MED/SURG SUP & DEVICES STERILE SUPPLY: Performed by: ORTHOPAEDIC SURGERY

## 2023-01-24 PROCEDURE — 86900 BLOOD TYPING SEROLOGIC ABO: CPT | Performed by: NURSE ANESTHETIST, CERTIFIED REGISTERED

## 2023-01-24 PROCEDURE — 27000716 HC OXISENSOR PROBE, ANY SIZE: Performed by: NURSE ANESTHETIST, CERTIFIED REGISTERED

## 2023-01-24 PROCEDURE — 27000510 HC BLANKET BAIR HUGGER ANY SIZE: Performed by: NURSE ANESTHETIST, CERTIFIED REGISTERED

## 2023-01-24 PROCEDURE — 63101 PR REMV VERT BODY, LATERAL, THORACIC: ICD-10-PCS | Mod: ,,, | Performed by: ORTHOPAEDIC SURGERY

## 2023-01-24 PROCEDURE — 99232 PR SUBSEQUENT HOSPITAL CARE,LEVL II: ICD-10-PCS | Mod: GC,,, | Performed by: FAMILY MEDICINE

## 2023-01-24 PROCEDURE — 22614 PR ARTHRODESIS, POST/POSTLAT, SNGL INTERSPACE, EA ADDTL: ICD-10-PCS | Mod: ,,, | Performed by: ORTHOPAEDIC SURGERY

## 2023-01-24 PROCEDURE — 22842 PR POSTERIOR SEGMENTAL INSTRUMENTATION 3-6 VRT SEG: ICD-10-PCS | Mod: ,,, | Performed by: ORTHOPAEDIC SURGERY

## 2023-01-24 PROCEDURE — D9220A PRA ANESTHESIA: Mod: ANES,,, | Performed by: ANESTHESIOLOGY

## 2023-01-24 PROCEDURE — 88307 PR  SURG PATH,LEVEL V: ICD-10-PCS | Mod: 26,XU,, | Performed by: PATHOLOGY

## 2023-01-24 PROCEDURE — 20930 SP BONE ALGRFT MORSEL ADD-ON: CPT | Mod: ,,, | Performed by: ORTHOPAEDIC SURGERY

## 2023-01-24 PROCEDURE — 87075 CULTR BACTERIA EXCEPT BLOOD: CPT | Performed by: ORTHOPAEDIC SURGERY

## 2023-01-24 PROCEDURE — 25000003 PHARM REV CODE 250: Performed by: NURSE ANESTHETIST, CERTIFIED REGISTERED

## 2023-01-24 PROCEDURE — 36430 TRANSFUSION BLD/BLD COMPNT: CPT

## 2023-01-24 PROCEDURE — 27000655: Performed by: NURSE ANESTHETIST, CERTIFIED REGISTERED

## 2023-01-24 PROCEDURE — 22842 INSERT SPINE FIXATION DEVICE: CPT | Mod: ,,, | Performed by: ORTHOPAEDIC SURGERY

## 2023-01-24 PROCEDURE — 88311 DECALCIFY TISSUE: CPT | Mod: 26,,, | Performed by: PATHOLOGY

## 2023-01-24 PROCEDURE — 63103 REMOVE VERTEBRAL BODY ADD-ON: CPT | Mod: ,,, | Performed by: ORTHOPAEDIC SURGERY

## 2023-01-24 PROCEDURE — D9220A PRA ANESTHESIA: ICD-10-PCS | Mod: ANES,,, | Performed by: ANESTHESIOLOGY

## 2023-01-24 PROCEDURE — 22612 ARTHRD PST TQ 1NTRSPC LUMBAR: CPT | Mod: 51,,, | Performed by: ORTHOPAEDIC SURGERY

## 2023-01-24 PROCEDURE — 22854 PR INS BIOMECH DEV, VERT CORPECTOMY W/INTRBODY ARTHRODESIS EA INTERSPC: ICD-10-PCS | Mod: ,,, | Performed by: ORTHOPAEDIC SURGERY

## 2023-01-24 PROCEDURE — 88307 TISSUE EXAM BY PATHOLOGIST: CPT | Mod: TC,SUR | Performed by: ORTHOPAEDIC SURGERY

## 2023-01-24 PROCEDURE — D9220A PRA ANESTHESIA: Mod: CRNA,,, | Performed by: NURSE ANESTHETIST, CERTIFIED REGISTERED

## 2023-01-24 PROCEDURE — 36415 COLL VENOUS BLD VENIPUNCTURE: CPT | Performed by: ORTHOPAEDIC SURGERY

## 2023-01-24 PROCEDURE — 99232 SBSQ HOSP IP/OBS MODERATE 35: CPT | Mod: GC,,, | Performed by: FAMILY MEDICINE

## 2023-01-24 PROCEDURE — 88307 TISSUE EXAM BY PATHOLOGIST: CPT | Mod: 26,XU,, | Performed by: PATHOLOGY

## 2023-01-24 DEVICE — IMPLANTABLE DEVICE: Type: IMPLANTABLE DEVICE | Site: SPINE LUMBAR | Status: FUNCTIONAL

## 2023-01-24 RX ORDER — PHENYLEPHRINE HYDROCHLORIDE 10 MG/ML
INJECTION INTRAVENOUS
Status: DISCONTINUED | OUTPATIENT
Start: 2023-01-24 | End: 2023-01-24

## 2023-01-24 RX ORDER — SODIUM CHLORIDE 9 MG/ML
INJECTION, SOLUTION INTRAVENOUS ONCE
Status: COMPLETED | OUTPATIENT
Start: 2023-01-24 | End: 2023-01-24

## 2023-01-24 RX ORDER — FENTANYL CITRATE 50 UG/ML
INJECTION, SOLUTION INTRAMUSCULAR; INTRAVENOUS
Status: DISCONTINUED | OUTPATIENT
Start: 2023-01-24 | End: 2023-01-24

## 2023-01-24 RX ORDER — DEXAMETHASONE SODIUM PHOSPHATE 4 MG/ML
INJECTION, SOLUTION INTRA-ARTICULAR; INTRALESIONAL; INTRAMUSCULAR; INTRAVENOUS; SOFT TISSUE
Status: DISCONTINUED | OUTPATIENT
Start: 2023-01-24 | End: 2023-01-24

## 2023-01-24 RX ORDER — ROCURONIUM BROMIDE 10 MG/ML
INJECTION, SOLUTION INTRAVENOUS
Status: DISCONTINUED | OUTPATIENT
Start: 2023-01-24 | End: 2023-01-24

## 2023-01-24 RX ORDER — EPINEPHRINE CONVENIENCE KIT 1 MG/ML(1)
KIT INTRAMUSCULAR; SUBCUTANEOUS
Status: DISCONTINUED | OUTPATIENT
Start: 2023-01-24 | End: 2023-01-24 | Stop reason: HOSPADM

## 2023-01-24 RX ORDER — CEFAZOLIN SODIUM 1 G/3ML
INJECTION, POWDER, FOR SOLUTION INTRAMUSCULAR; INTRAVENOUS
Status: DISCONTINUED | OUTPATIENT
Start: 2023-01-24 | End: 2023-01-24

## 2023-01-24 RX ORDER — INSULIN ASPART 100 [IU]/ML
0-5 INJECTION, SOLUTION INTRAVENOUS; SUBCUTANEOUS
Status: DISCONTINUED | OUTPATIENT
Start: 2023-01-24 | End: 2023-01-24

## 2023-01-24 RX ORDER — HYDROMORPHONE HYDROCHLORIDE 2 MG/ML
0.5 INJECTION, SOLUTION INTRAMUSCULAR; INTRAVENOUS; SUBCUTANEOUS EVERY 5 MIN PRN
Status: DISCONTINUED | OUTPATIENT
Start: 2023-01-24 | End: 2023-01-24 | Stop reason: HOSPADM

## 2023-01-24 RX ORDER — HYDRALAZINE HYDROCHLORIDE 25 MG/1
25 TABLET, FILM COATED ORAL EVERY 8 HOURS PRN
Status: DISCONTINUED | OUTPATIENT
Start: 2023-01-24 | End: 2023-01-27

## 2023-01-24 RX ORDER — HYDROMORPHONE HYDROCHLORIDE 2 MG/ML
INJECTION, SOLUTION INTRAMUSCULAR; INTRAVENOUS; SUBCUTANEOUS
Status: DISCONTINUED | OUTPATIENT
Start: 2023-01-24 | End: 2023-01-24

## 2023-01-24 RX ORDER — OXYCODONE HYDROCHLORIDE 5 MG/1
5 TABLET ORAL
Status: DISCONTINUED | OUTPATIENT
Start: 2023-01-24 | End: 2023-01-24 | Stop reason: HOSPADM

## 2023-01-24 RX ORDER — MORPHINE SULFATE 10 MG/ML
4 INJECTION INTRAMUSCULAR; INTRAVENOUS; SUBCUTANEOUS EVERY 5 MIN PRN
Status: DISCONTINUED | OUTPATIENT
Start: 2023-01-24 | End: 2023-01-24 | Stop reason: HOSPADM

## 2023-01-24 RX ORDER — PROPOFOL 10 MG/ML
VIAL (ML) INTRAVENOUS
Status: DISCONTINUED | OUTPATIENT
Start: 2023-01-24 | End: 2023-01-24

## 2023-01-24 RX ORDER — DIPHENHYDRAMINE HYDROCHLORIDE 50 MG/ML
25 INJECTION INTRAMUSCULAR; INTRAVENOUS EVERY 6 HOURS PRN
Status: DISCONTINUED | OUTPATIENT
Start: 2023-01-24 | End: 2023-01-24 | Stop reason: HOSPADM

## 2023-01-24 RX ORDER — TRANEXAMIC ACID 100 MG/ML
INJECTION, SOLUTION INTRAVENOUS
Status: DISCONTINUED | OUTPATIENT
Start: 2023-01-24 | End: 2023-01-24

## 2023-01-24 RX ORDER — ONDANSETRON 2 MG/ML
4 INJECTION INTRAMUSCULAR; INTRAVENOUS DAILY PRN
Status: DISCONTINUED | OUTPATIENT
Start: 2023-01-24 | End: 2023-01-24 | Stop reason: HOSPADM

## 2023-01-24 RX ORDER — HYDROCODONE BITARTRATE AND ACETAMINOPHEN 500; 5 MG/1; MG/1
TABLET ORAL
Status: DISCONTINUED | OUTPATIENT
Start: 2023-01-24 | End: 2023-02-08 | Stop reason: HOSPADM

## 2023-01-24 RX ORDER — INSULIN ASPART 100 [IU]/ML
1-10 INJECTION, SOLUTION INTRAVENOUS; SUBCUTANEOUS
Status: DISCONTINUED | OUTPATIENT
Start: 2023-01-24 | End: 2023-02-08 | Stop reason: HOSPADM

## 2023-01-24 RX ORDER — MEPERIDINE HYDROCHLORIDE 25 MG/ML
25 INJECTION INTRAMUSCULAR; INTRAVENOUS; SUBCUTANEOUS EVERY 10 MIN PRN
Status: DISCONTINUED | OUTPATIENT
Start: 2023-01-24 | End: 2023-01-24 | Stop reason: HOSPADM

## 2023-01-24 RX ORDER — VANCOMYCIN HYDROCHLORIDE 1 G/20ML
INJECTION, POWDER, LYOPHILIZED, FOR SOLUTION INTRAVENOUS
Status: DISCONTINUED | OUTPATIENT
Start: 2023-01-24 | End: 2023-01-24 | Stop reason: HOSPADM

## 2023-01-24 RX ORDER — LIDOCAINE HYDROCHLORIDE 20 MG/ML
INJECTION, SOLUTION EPIDURAL; INFILTRATION; INTRACAUDAL; PERINEURAL
Status: DISCONTINUED | OUTPATIENT
Start: 2023-01-24 | End: 2023-01-24

## 2023-01-24 RX ORDER — MIDAZOLAM HYDROCHLORIDE 1 MG/ML
INJECTION INTRAMUSCULAR; INTRAVENOUS
Status: DISCONTINUED | OUTPATIENT
Start: 2023-01-24 | End: 2023-01-24

## 2023-01-24 RX ORDER — KETAMINE HYDROCHLORIDE 50 MG/ML
INJECTION, SOLUTION INTRAMUSCULAR; INTRAVENOUS
Status: DISCONTINUED | OUTPATIENT
Start: 2023-01-24 | End: 2023-01-24

## 2023-01-24 RX ADMIN — CEFAZOLIN 2 G: 1 INJECTION, POWDER, FOR SOLUTION INTRAMUSCULAR; INTRAVENOUS; PARENTERAL at 11:01

## 2023-01-24 RX ADMIN — HYDROCODONE BITARTRATE AND ACETAMINOPHEN 1 TABLET: 5; 325 TABLET ORAL at 03:01

## 2023-01-24 RX ADMIN — ROCURONIUM BROMIDE 20 MG: 10 INJECTION, SOLUTION INTRAVENOUS at 02:01

## 2023-01-24 RX ADMIN — HYDROMORPHONE HYDROCHLORIDE 1 MG: 2 INJECTION, SOLUTION INTRAMUSCULAR; INTRAVENOUS; SUBCUTANEOUS at 04:01

## 2023-01-24 RX ADMIN — ROCURONIUM BROMIDE 10 MG: 10 INJECTION, SOLUTION INTRAVENOUS at 12:01

## 2023-01-24 RX ADMIN — KETAMINE HYDROCHLORIDE 25 MG: 50 INJECTION INTRAMUSCULAR; INTRAVENOUS at 12:01

## 2023-01-24 RX ADMIN — HYDROCODONE BITARTRATE AND ACETAMINOPHEN 1 TABLET: 5; 325 TABLET ORAL at 09:01

## 2023-01-24 RX ADMIN — TRANEXAMIC ACID 1000 MG: 100 INJECTION, SOLUTION INTRAVENOUS at 11:01

## 2023-01-24 RX ADMIN — FENTANYL CITRATE 50 MCG: 50 INJECTION INTRAMUSCULAR; INTRAVENOUS at 01:01

## 2023-01-24 RX ADMIN — INSULIN ASPART 5 UNITS: 100 INJECTION, SOLUTION INTRAVENOUS; SUBCUTANEOUS at 08:01

## 2023-01-24 RX ADMIN — DEXAMETHASONE SODIUM PHOSPHATE 12 MG: 4 INJECTION, SOLUTION INTRA-ARTICULAR; INTRALESIONAL; INTRAMUSCULAR; INTRAVENOUS; SOFT TISSUE at 02:01

## 2023-01-24 RX ADMIN — LIDOCAINE HYDROCHLORIDE 75 MG: 20 INJECTION, SOLUTION EPIDURAL; INFILTRATION; INTRACAUDAL; PERINEURAL at 11:01

## 2023-01-24 RX ADMIN — PHENYLEPHRINE HYDROCHLORIDE 40 MCG/MIN: 10 INJECTION INTRAVENOUS at 12:01

## 2023-01-24 RX ADMIN — INSULIN ASPART 6 UNITS: 100 INJECTION, SOLUTION INTRAVENOUS; SUBCUTANEOUS at 04:01

## 2023-01-24 RX ADMIN — ONDANSETRON 4 MG: 2 INJECTION INTRAMUSCULAR; INTRAVENOUS at 02:01

## 2023-01-24 RX ADMIN — TRANEXAMIC ACID 1000 MG: 100 INJECTION, SOLUTION INTRAVENOUS at 02:01

## 2023-01-24 RX ADMIN — Medication 1 EACH: at 09:01

## 2023-01-24 RX ADMIN — SODIUM CHLORIDE: 9 INJECTION, SOLUTION INTRAVENOUS at 11:01

## 2023-01-24 RX ADMIN — HYDROMORPHONE HYDROCHLORIDE 1 MG: 2 INJECTION, SOLUTION INTRAMUSCULAR; INTRAVENOUS; SUBCUTANEOUS at 03:01

## 2023-01-24 RX ADMIN — DEXMEDETOMIDINE: 100 INJECTION, SOLUTION, CONCENTRATE INTRAVENOUS at 11:01

## 2023-01-24 RX ADMIN — SODIUM CHLORIDE: 9 INJECTION, SOLUTION INTRAVENOUS at 09:01

## 2023-01-24 RX ADMIN — PHENYLEPHRINE HYDROCHLORIDE 200 MCG: 10 INJECTION INTRAVENOUS at 11:01

## 2023-01-24 RX ADMIN — VANCOMYCIN HYDROCHLORIDE 1250 MG: 1 INJECTION, POWDER, LYOPHILIZED, FOR SOLUTION INTRAVENOUS at 04:01

## 2023-01-24 RX ADMIN — ROCURONIUM BROMIDE 30 MG: 10 INJECTION, SOLUTION INTRAVENOUS at 11:01

## 2023-01-24 RX ADMIN — ONDANSETRON 4 MG: 2 INJECTION INTRAMUSCULAR; INTRAVENOUS at 11:01

## 2023-01-24 RX ADMIN — HYDROMORPHONE HYDROCHLORIDE 1 MG: 2 INJECTION, SOLUTION INTRAMUSCULAR; INTRAVENOUS; SUBCUTANEOUS at 08:01

## 2023-01-24 RX ADMIN — ROCURONIUM BROMIDE 20 MG: 10 INJECTION, SOLUTION INTRAVENOUS at 11:01

## 2023-01-24 RX ADMIN — ROCURONIUM BROMIDE 20 MG: 10 INJECTION, SOLUTION INTRAVENOUS at 12:01

## 2023-01-24 RX ADMIN — MIDAZOLAM 2 MG: 1 INJECTION INTRAMUSCULAR; INTRAVENOUS at 11:01

## 2023-01-24 RX ADMIN — DULOXETINE 60 MG: 30 CAPSULE, DELAYED RELEASE ORAL at 09:01

## 2023-01-24 RX ADMIN — PROPOFOL 130 MG: 10 INJECTION, EMULSION INTRAVENOUS at 11:01

## 2023-01-24 RX ADMIN — FENTANYL CITRATE 100 MCG: 50 INJECTION INTRAMUSCULAR; INTRAVENOUS at 11:01

## 2023-01-24 RX ADMIN — DOCUSATE SODIUM 100 MG: 100 CAPSULE, LIQUID FILLED ORAL at 09:01

## 2023-01-24 RX ADMIN — ROCURONIUM BROMIDE 10 MG: 10 INJECTION, SOLUTION INTRAVENOUS at 01:01

## 2023-01-24 NOTE — NURSING
Pt has increasingly became more agitated through out this shift, in room to perform care, pt demanded to have pain shot, instructed pt that her iv medication was not due til approx 0440ish, pt had just received norco 5/325 @0313, pt became irate and kicking the air in the bed, threw covers off onto the floor, this nurse apologized and informed pt that we would be bringing medication at due time.

## 2023-01-24 NOTE — INTERVAL H&P NOTE
The patient has been examined and the H&P has been reviewed:    I concur with the findings and no changes have occurred since H&P was written.  Plan for T9-L2 fusion, T11-12 laminectomy and corpectomy    Anesthesia/Surgery risks, benefits and alternative options discussed and understood by patient/family.          Active Hospital Problems    Diagnosis  POA    *Epidural abscess [G06.2]  Yes    Hypomagnesemia [E83.42]  Yes    Microcytic anemia [D50.9]  Yes    Type 2 diabetes mellitus, with long-term current use of insulin [E11.9, Z79.4]  Not Applicable    Depression [F32.A]  Yes      Resolved Hospital Problems   No resolved problems to display.

## 2023-01-24 NOTE — ASSESSMENT & PLAN NOTE
- Pt known to Dr. Hernandes, has been receiving IV abx outpatient  - Empiric Vanc and Rocephin started  - Norco 5mg q6h for pain  - Dilaudid added  - MRI Thoracic w/o Contrast - Findings suggestive of osteomyelitis discitis of T11-12 with epidural phlegmon or extruded disc contributing to moderate spinal canal stenosis.  Additional epidural phlegmon extending in the left paracentral location and of the left T11-12 and T12-L1 foramina.  - Blood Cultures x2 - No growth at 72 hours  - Pt NPO pending Ortho Spine Eval - Will need diabetic diet when appropriate  - Consult Dr. Hernandes, appreciate his expertise -- IR biopsy successful 1/20/2023 -   - MRSA sensitive to Vancomycin, will continue   - Surgery today

## 2023-01-24 NOTE — OR NURSING
1535 INTO PACU IN STABLE COND. PT SLEEPING, WAKES TO VOICE. CONNECTED TO BP CUFF, EKG LEADS & SPO2 MONITOR. VS STABLE. PT HAD SPINAL SURGERY TODAY T9-L2 & T11-12. NO DISTRESS NOTED@ THIS TIME, WILL CONT TO MONITOR.        1605 TRANSFERRED TO ROOM 552 IN STABLE COND. ASSISTED ONTO HOSPITAL BED. VS STABLE. BEDSIDE REPORT GIVEN TO  KWAN HUBBARD. NO DISTRESS NOTED @ THIS TIME.

## 2023-01-24 NOTE — ASSESSMENT & PLAN NOTE
Mag 1.4 on admission    2g Mag given  1/21/2023 mag was 1.6  1/21/2023 2g mag given  Recheck 1.7 - will give 2g    1/23/2023 mag 1.9

## 2023-01-24 NOTE — PROGRESS NOTES
"[facility] Tele[facility] Hospital Medicine  Progress Note    Patient Name: Monica Walker  MRN: [id number]  Patient Class: IP- Inpatient   Admission Date: 1/19/2023  Length of Stay: 5 days  Attending Physician: Vinay Rosen Jr., MD  Primary Care Provider: Hannah Schulz MD        Subjective:     Principal Problem:Epidural abscess        HPI:  Patient is a 51 y/o Female presenting to [facility] with a cc of back pain. She states the pain began to worsen approximately 16 hours ago. She states the pain is in her Mid back area and radiates to both legs. She describes the pain as "throbbing, constant and a 9/10." She states the pain is blunted by taking Hydrocodone but not relieved. She reports the pain is made worse by movement and standing.    Patient reports she was hospitalized in November for sepsis secondary to Pyelonephritis. She has been hospitalized several times over the past 3 months. She is currently being treated at home with IV Abx therapy for Osteomyelitis of the spine.  MRI of Lumbar spine on 1/10/2023 (Per radiology report). Findings indicative of osteomyelitis involving T11-12 with epidural abscess extending from the T11-12 disc level to the L1 level eccentric towards the left.  Additional site of infection of the right L2-3 facet joint as well as osteomyelitis of the right L3 transverse process.    Significant findings in the ED include:  Vitals: /84, , RR 16, Temp 98.2 F, O2 98% on RA  Labs: WBC 14.6, HGB 8.2, HCT 27.1, MCV 74.2, Na 133, K 3.5, Bun 8, Crt0.54, , Glucose 171, UA normal.  today;124 2 days ago, CRP  22.90 Today; 12.9 2 days ago  Interventions: Toradol 30 mg IV    Pt was subsequently admitted to the Inpatient Hospitalist Service for further evaluation and management.               Overview/Hospital Course:  No notes on file    Interval History: Patient was seen resting in bed. No overnight events. Biopsy showed MRSA, " sensitive to Vancomycin. Will continue Vancomycin, Zosyn stopped. Dr. Hernandes will take patient to surgery today.    Review of Systems   Constitutional:  Negative for chills, diaphoresis, fatigue and fever.   HENT:  Negative for congestion, rhinorrhea, sinus pressure, sinus pain and sore throat.    Respiratory:  Negative for cough, chest tightness, shortness of breath and wheezing.    Cardiovascular:  Negative for chest pain, palpitations and leg swelling.   Gastrointestinal:  Negative for abdominal distention, abdominal pain, constipation, diarrhea, nausea and vomiting.   Musculoskeletal:  Positive for back pain. Negative for arthralgias and myalgias.   Skin:  Negative for rash and wound.   Neurological:  Positive for weakness. Negative for dizziness, syncope, light-headedness, numbness and headaches.   All other systems reviewed and are negative.  Objective:     Vital Signs (Most Recent):  Temp: 98 °F (36.7 °C) (01/24/23 0700)  Pulse: 80 (01/24/23 0700)  Resp: 18 (01/24/23 0700)  BP: 134/69 (01/24/23 0700)  SpO2: (!) 93 % (01/24/23 0700)   Vital Signs (24h Range):  Temp:  [97.3 °F (36.3 °C)-98 °F (36.7 °C)] 98 °F (36.7 °C)  Pulse:  [73-80] 80  Resp:  [17-20] 18  SpO2:  [93 %-98 %] 93 %  BP: (133-163)/(68-79) 134/69     Weight: 61.7 kg (136 lb 0.4 oz)  Body mass index is 21.95 kg/m².    Intake/Output Summary (Last 24 hours) at 1/24/2023 3642  Last data filed at 1/24/2023 0324  Gross per 24 hour   Intake --   Output 1000 ml   Net -1000 ml      Physical Exam  Vitals reviewed.   Constitutional:       General: She is awake.      Appearance: Normal appearance. She is well-developed, well-groomed and normal weight.   HENT:      Head: Normocephalic and atraumatic.      Right Ear: External ear normal.      Left Ear: External ear normal.      Mouth/Throat:      Mouth: Mucous membranes are moist.      Pharynx: Oropharynx is clear.   Eyes:      Extraocular Movements: Extraocular movements intact.      Pupils: Pupils are equal,  round, and reactive to light.   Cardiovascular:      Rate and Rhythm: Regular rhythm.      Pulses: Normal pulses.      Heart sounds: Normal heart sounds.   Pulmonary:      Effort: Pulmonary effort is normal.      Breath sounds: Normal breath sounds.   Abdominal:      General: Bowel sounds are normal.      Palpations: Abdomen is soft.   Musculoskeletal:      Cervical back: Neck supple.      Thoracic back: Tenderness present.      Comments: tenderness to palpation from T6 to sacral area   Skin:     General: Skin is warm and dry.             Comments: Stage 1 pressure wound over sacrum   Neurological:      Mental Status: She is alert and oriented to person, place, and time.   Psychiatric:         Mood and Affect: Mood normal.         Behavior: Behavior normal. Behavior is cooperative.         Thought Content: Thought content normal.       Significant Labs: All pertinent labs within the past 24 hours have been reviewed.    Significant Imaging: I have reviewed all pertinent imaging results/findings within the past 24 hours.      Assessment/Plan:      * Epidural abscess  - Pt known to Dr. Hernandes, has been receiving IV abx outpatient  - Empiric Vanc and Rocephin started  - Norco 5mg q6h for pain  - Dilaudid added  - MRI Thoracic w/o Contrast - Findings suggestive of osteomyelitis discitis of T11-12 with epidural phlegmon or extruded disc contributing to moderate spinal canal stenosis.  Additional epidural phlegmon extending in the left paracentral location and of the left T11-12 and T12-L1 foramina.  - Blood Cultures x2 - No growth at 72 hours  - Pt NPO pending Ortho Spine Eval - Will need diabetic diet when appropriate  - Consult Dr. Hernandes, appreciate his expertise -- IR biopsy successful 1/20/2023 -   - MRSA sensitive to Vancomycin, will continue   - Surgery today    Type 2 diabetes mellitus, with long-term current use of insulin  Patient's FSGs are uncontrolled due to hyperglycemia on current medication regimen.  Last  A1c reviewed-   Lab Results   Component Value Date    HGBA1C 7.9 (H) 01/19/2023     Most recent fingerstick glucose reviewed- No results for input(s): POCTGLUCOSE in the last 24 hours.  Current correctional scale  Medium  Maintain anti-hyperglycemic dose as follows-   Antihyperglycemics (From admission, onward)    Start     Stop Route Frequency Ordered    01/19/23 0610  insulin aspart U-100 injection 1-10 Units         -- SubQ Every 6 hours PRN 01/19/23 0510        Hold Oral hypoglycemics while patient is in the hospital.  Diabetic Educator consulted, appreciate their help    Microcytic anemia  Ferritin - 380  Iron - 12  Iron Sat - 6  TIBC - 188    Ferrous sulfate given Qd    Depression  - Continue Cymbalta      Hypomagnesemia  Mag 1.4 on admission    2g Mag given  1/21/2023 mag was 1.6  1/21/2023 2g mag given  Recheck 1.7 - will give 2g    1/23/2023 mag 1.9        VTE Risk Mitigation (From admission, onward)         Ordered     IP VTE LOW RISK PATIENT  Once         01/19/23 0417     Place sequential compression device  Until discontinued         01/19/23 0417                Discharge Planning   SUZANNA:      Code Status: Full Code   Is the patient medically ready for discharge?:     Reason for patient still in hospital (select all that apply): Laboratory test, Treatment, Imaging and Consult recommendations  Discharge Plan A: Home with family, Home Health                  Reed Lloyd MD  Department of Hospital Medicine   [facility]

## 2023-01-24 NOTE — ANESTHESIA POSTPROCEDURE EVALUATION
Anesthesia Post Evaluation    Patient: Monica Walker    Procedure(s) Performed: Procedure(s) (LRB):  T9-L2 fusion, T11-T12 laminectomy and corpectomy (N/A)    Final Anesthesia Type: general      Patient location during evaluation: PACU  Patient participation: Yes- Able to Participate  Level of consciousness: awake and alert  Post-procedure vital signs: reviewed and stable  Pain management: adequate  Airway patency: patent  MARILU mitigation strategies: Multimodal analgesia  PONV status at discharge: No PONV  Anesthetic complications: no      Cardiovascular status: blood pressure returned to baseline  Respiratory status: unassisted  Hydration status: euvolemic  Follow-up not needed.          Vitals Value Taken Time   /53 01/24/23 1615   Temp 36.2 °C (97.1 °F) 01/24/23 1615   Pulse 93 01/24/23 1615   Resp 12 01/24/23 1615   SpO2 92 % 01/24/23 1615         Event Time   Out of Recovery 01/24/2023 16:05:00         Pain/Inder Score: Pain Rating Prior to Med Admin: 10 (1/24/2023  9:04 AM)  Pain Rating Post Med Admin: 4 (1/24/2023 10:04 AM)  Inder Score: 10 (1/24/2023  4:05 PM)

## 2023-01-24 NOTE — ANESTHESIA PROCEDURE NOTES
Intubation    Date/Time: 1/24/2023 11:18 AM  Performed by: Richard Zazueta CRNA  Authorized by: Haroldo Aldridge MD     Intubation:     Induction:  Intravenous    Intubated:  Postinduction    Mask Ventilation:  Easy mask    Attempts:  1    Attempted By:  CRNA    Method of Intubation:  Direct    Blade:  Celso 3    Laryngeal View Grade: Grade I - full view of cords      Difficult Airway Encountered?: No      Complications:  None    Airway Device:  Oral endotracheal tube    Airway Device Size:  7.0    Inflation Amount (mL):  5    Tube secured:  21    Secured at:  The lips    Placement Verified By:  Capnometry    Complicating Factors:  None    Findings Post-Intubation:  BS equal bilateral

## 2023-01-24 NOTE — OP NOTE
[facility] - Periop Services  Surgery Department  Operative Note    SUMMARY     Date of Procedure: 1/24/2023     Procedure: Procedure(s) (LRB):  T9-L2 fusion, T11-T12 laminectomy and corpectomy (N/A)     Surgeon(s) and Role:     * Jimmy Hernandes MD - Primary    Assisting Surgeon: None    Pre-Operative Diagnosis: Epidural abscess [G06.2]  Discitis of thoracic region [M46.44]    Post-Operative Diagnosis: Post-Op Diagnosis Codes:     * Epidural abscess [G06.2]     * Discitis of thoracic region [M46.44]    Anesthesia: General    Technical Procedures Used:   1. Posterolateral fusion T9-L2   2. Laminectomy with excision of intraspinal lesion, extradural, thoracic - T11   3. Laminectomy for decompression of central canal, neural foramen T12   4. Lateral extra cavitary thoracic corpectomy T11 and T12  5. Posterior segmental instrumentation T9-L2   6. Insertion biomechanical device (expandable corpectomy cage)   7. Use of allograft and autograft for spine surgery     Description of the Findings of the Procedure:   Indications:  This is a 52 y.o. female with thoracic osteo diskitis with destruction of the T12 and to a lesser extent the T11 vertebral bodies.  Risks, benefits, and alternatives were discussed with the patient and they elected to proceed with surgery.    Procedure in detail:  The patient was identified in the preoperative holding area and the surgical site was marked. They were then taken back to the operating room where general endotracheal anesthesia was induced. They were then transitioned to the prone position on the Aly frame with the arms and legs in the physiologic position and all bony prominences well-padded. The posterior thoracolumbar spine was prepped and draped in the normal sterile fashion. A timeout was performed. The incision site was localized with intraoperative fluoroscopy. After the incision was marked out a solution of epinephrine was injected in the skin and subcutaneous  tissues.  An erector spinae block was performed bilaterally at T9. A posterior midline incision was made. This was carried down to the fascia which was divided in line with the incision. A subperiosteal dissection was performed exposing the posterior elements of T9-L2.    Pedicle screws were placed bilaterally at T9-T11 and L1-L2 using the fluoroscopically assisted freehand technique.    The lamina of T11 and T12 were removed with the use of Leksell rongeur and high-speed bur and Kerrison punch. The lateral recesses were decompressed with the use of the Kerrison punch. The nerve roots were followed out into the foramen and decompressed in the foramen with the Kerrison. The nerve roots were palpated and felt to be free of any compression.  Phlegmon was noted over the dura posteriorly.  No gross purulence was noted.    Complete facetectomy was performed on the left at the T11-T12 and T12-L1 facet joints.  The left T12 nerve root was tied off and divided to allow for access to the T12 vertebral body and T11-T12, T12-T1 interspaces.  The T12 pedicle was removed.  The T12 vertebral body was noted to be infected and necrotic.  The necrotic portions of the T12 vertebral body as well as the necrotic portions of the T11 vertebral body were removed with pituitary rongeur.  Approximately 60% of the T12 vertebral body and 33% of the T11 vertebral body were resected.  The L1 superior endplate and the remaining T11 inferior endplate were prepared with curettes and rongeur.  The appropriately sized Zavation cervical corpectomy cage was selected and inserted and then expanded into final position.    The appropriate size rods were selected and secured with set screws.  The set screws were final tightened using torque .  Final implant placement and spinal alignment was verified on fluoroscopic imaging found be satisfactory.  The wound was copiously irrigated with normal saline.  The remaining lamina were decorticated with a  high-speed bur.  Bone graft was placed into the lateral gutters bilaterally.  A medium Hemovac drain was placed in the depths of the wound.    The fascial layer was closed with interrupted figure-of-eight #1 Vicryl suture and a running #1 Stratafix symmetric suture.  Powdered vancomycin was placed in the subcutaneous layer.  The subcutaneous layer was closed with a running Stratafix #0 suture. A running subcuticular layer was performed with 2-0 Stratafix suture. Dermabond prineo was applied to cover the incision. A sterile dressing of gauze and Tegaderm was then applied.  The drain was secured with Steri-Strips and dressed with fluff gauze and Tegaderm dressing.    The patient was then transitioned back to supine position, extubated and awakened and taken to recovery in good condition having suffered no untoward event.      Complications: No    Estimated Blood Loss (EBL): 150 mL           Implants: * No implants in log * osseus pedicle screws and rods.  Zavation expandable cervical corpectomy cage    Specimens:   Specimen (24h ago, onward)       Start     Ordered    01/24/23 1345  Surgical Pathology  RELEASE UPON ORDERING         01/24/23 1341                            Condition: Good    Disposition: PACU - hemodynamically stable.    Attestation: I was present and scrubbed for the entire procedure.

## 2023-01-24 NOTE — TRANSFER OF CARE
"Anesthesia Transfer of Care Note    Patient: Monica Walker    Procedure(s) Performed: Procedure(s) (LRB):  T9-L2 fusion, T11-T12 laminectomy and corpectomy (N/A)    Patient location: PACU    Anesthesia Type: general    Transport from OR: Transported from OR on 6-10 L/min O2 by face mask with adequate spontaneous ventilation    Post pain: adequate analgesia    Post assessment: no apparent anesthetic complications    Post vital signs: stable    Level of consciousness: responds to stimulation and awake    Nausea/Vomiting: no nausea/vomiting    Complications: none    Transfer of care protocol was followedComments: Good SV continue, NAD noted, VSS, RTRN      Last vitals:   Visit Vitals  /68   Pulse 95   Temp 37.2 °C (99 °F) (Oral)   Resp 12   Ht 5' 6" (1.676 m)   Wt 61.7 kg (136 lb 0.4 oz)   SpO2 100%   Breastfeeding No   BMI 21.95 kg/m²     "

## 2023-01-24 NOTE — SUBJECTIVE & OBJECTIVE
Interval History: Patient was seen resting in bed. No overnight events. Biopsy showed MRSA, sensitive to Vancomycin. Will continue Vancomycin, Zosyn stopped. Dr. Hernandes will take patient to surgery today.    Review of Systems   Constitutional:  Negative for chills, diaphoresis, fatigue and fever.   HENT:  Negative for congestion, rhinorrhea, sinus pressure, sinus pain and sore throat.    Respiratory:  Negative for cough, chest tightness, shortness of breath and wheezing.    Cardiovascular:  Negative for chest pain, palpitations and leg swelling.   Gastrointestinal:  Negative for abdominal distention, abdominal pain, constipation, diarrhea, nausea and vomiting.   Musculoskeletal:  Positive for back pain. Negative for arthralgias and myalgias.   Skin:  Negative for rash and wound.   Neurological:  Positive for weakness. Negative for dizziness, syncope, light-headedness, numbness and headaches.   All other systems reviewed and are negative.  Objective:     Vital Signs (Most Recent):  Temp: 98 °F (36.7 °C) (01/24/23 0700)  Pulse: 80 (01/24/23 0700)  Resp: 18 (01/24/23 0700)  BP: 134/69 (01/24/23 0700)  SpO2: (!) 93 % (01/24/23 0700)   Vital Signs (24h Range):  Temp:  [97.3 °F (36.3 °C)-98 °F (36.7 °C)] 98 °F (36.7 °C)  Pulse:  [73-80] 80  Resp:  [17-20] 18  SpO2:  [93 %-98 %] 93 %  BP: (133-163)/(68-79) 134/69     Weight: 61.7 kg (136 lb 0.4 oz)  Body mass index is 21.95 kg/m².    Intake/Output Summary (Last 24 hours) at 1/24/2023 0752  Last data filed at 1/24/2023 0324  Gross per 24 hour   Intake --   Output 1000 ml   Net -1000 ml      Physical Exam  Vitals reviewed.   Constitutional:       General: She is awake.      Appearance: Normal appearance. She is well-developed, well-groomed and normal weight.   HENT:      Head: Normocephalic and atraumatic.      Right Ear: External ear normal.      Left Ear: External ear normal.      Mouth/Throat:      Mouth: Mucous membranes are moist.      Pharynx: Oropharynx is clear.    Eyes:      Extraocular Movements: Extraocular movements intact.      Pupils: Pupils are equal, round, and reactive to light.   Cardiovascular:      Rate and Rhythm: Regular rhythm.      Pulses: Normal pulses.      Heart sounds: Normal heart sounds.   Pulmonary:      Effort: Pulmonary effort is normal.      Breath sounds: Normal breath sounds.   Abdominal:      General: Bowel sounds are normal.      Palpations: Abdomen is soft.   Musculoskeletal:      Cervical back: Neck supple.      Thoracic back: Tenderness present.      Comments: tenderness to palpation from T6 to sacral area   Skin:     General: Skin is warm and dry.             Comments: Stage 1 pressure wound over sacrum   Neurological:      Mental Status: She is alert and oriented to person, place, and time.   Psychiatric:         Mood and Affect: Mood normal.         Behavior: Behavior normal. Behavior is cooperative.         Thought Content: Thought content normal.       Significant Labs: All pertinent labs within the past 24 hours have been reviewed.    Significant Imaging: I have reviewed all pertinent imaging results/findings within the past 24 hours.

## 2023-01-24 NOTE — ANESTHESIA PREPROCEDURE EVALUATION
01/24/2023  Monica Walker is a 52 y.o., female.      Pre-op Assessment    I have reviewed the Patient Summary Reports.     I have reviewed the Nursing Notes. I have reviewed the NPO Status.   I have reviewed the Medications.     Review of Systems  Anesthesia Hx:  No problems with previous Anesthesia  Denies Family Hx of Anesthesia complications.   Denies Personal Hx of Anesthesia complications.   Social:  Smoker, No Alcohol Use  Tobacco Use:  of cigarette Illicit Drug Use: Types of drugs include Methamphetamines,   Cardiovascular:   Exercise tolerance: poor Hypertension ECG has been reviewed.    Pulmonary:   Shortness of breath    Musculoskeletal:   Arthritis     Endocrine:   Diabetes, poorly controlled    Psych:   Psychiatric History          Physical Exam  General: Well nourished, Cooperative and Alert    Airway:  Mallampati: II   Mouth Opening: Normal  TM Distance: Normal  Tongue: Normal  Neck ROM: Normal ROM    Dental:  Intact    Chest/Lungs:  Clear to auscultation, Normal Respiratory Rate    Heart:  Rate: Normal  Rhythm: Regular Rhythm        Chemistry        Component Value Date/Time     01/24/2023 0255    K 5.1 01/24/2023 0255     01/24/2023 0255    CO2 28 01/24/2023 0255    BUN 17 01/24/2023 0255    CREATININE 1.71 (H) 01/24/2023 0255     (H) 01/24/2023 0255        Component Value Date/Time    CALCIUM 9.2 01/24/2023 0255    ALKPHOS 129 (H) 01/19/2023 0123    AST 10 (L) 01/19/2023 0123    ALT 10 (L) 01/19/2023 0123    BILITOT 0.4 01/19/2023 0123    EGFRNONAA 60 08/07/2022 1547        Lab Results   Component Value Date    WBC 10.40 01/24/2023    RBC 3.49 (L) 01/24/2023    HGB 7.6 (L) 01/24/2023    MCV 75.4 (L) 01/24/2023    MCH 21.8 (L) 01/24/2023    MCHC 28.9 (L) 01/24/2023    RDW 16.3 (H) 01/24/2023     (H) 01/24/2023    MPV 8.6 (L) 01/24/2023    LYMPH 20.8 (L) 01/24/2023     LYMPH 2.16 01/24/2023    MONO 9.1 (H) 01/24/2023    EOS 0.48 01/24/2023    BASO 0.07 01/24/2023     Results for orders placed or performed during the hospital encounter of 01/09/23   EKG 12-lead    Collection Time: 01/09/23  9:18 AM    Narrative    Test Reason : R41.82,    Vent. Rate : 090 BPM     Atrial Rate : 090 BPM     P-R Int : 094 ms          QRS Dur : 080 ms      QT Int : 370 ms       P-R-T Axes : 000 057 050 degrees     QTc Int : 452 ms    Sinus rhythm with short GA  Otherwise normal ECG  When compared with ECG of 08-DEC-2022 15:50,  GA interval has decreased  Confirmed by Fadi Gonzalez DO ([id number]) on 1/24/2023 9:49:54 AM    Referred By: THAIS   SELF           Confirmed By:Fadi Gonzalez DO     TTE 11/21/22  Summary     The left ventricle is normal in size with normal systolic function.   The estimated ejection fraction is 55%.   Normal right ventricular size with normal right ventricular systolic function.   Mild mitral regurgitation.   Normal central venous pressure (3 mmHg).   No obvious vegetation noted on aortic or mitral leaflet; cannot exclude a small vegetation. Consider MONICA if clinical suspicion remains high for endocarditits           Anesthesia Plan  Type of Anesthesia, risks & benefits discussed:    Anesthesia Type: Gen Supraglottic Airway  Intra-op Monitoring Plan: Standard ASA Monitors  Post Op Pain Control Plan: multimodal analgesia  Induction:  IV  Airway Plan: Direct, Post-Induction  Informed Consent: Informed consent signed with the Patient and all parties understand the risks and agree with anesthesia plan.  All questions answered.   ASA Score: 3  Day of Surgery Review of History & Physical: H&P Update referred to the surgeon/provider.I have interviewed and examined the patient. I have reviewed the patient's H&P dated: There are no significant changes.   Anesthesia Plan Notes: 52yF with h/o MRSA, bilateral lung abscesses, multiple joint effusions with infectious material -  patient on IV Vancomycin scheduled for washouts.    Bacteremia vs sepsis - BP stable with mild tachycardia    Ready For Surgery From Anesthesia Perspective.     .

## 2023-01-25 PROBLEM — E87.5 HYPERKALEMIA: Status: ACTIVE | Noted: 2023-01-25

## 2023-01-25 LAB
ABO + RH BLD: NORMAL
ABO + RH BLD: NORMAL
ANION GAP SERPL CALCULATED.3IONS-SCNC: 20 MMOL/L (ref 7–16)
BACTERIA BLD CULT: NORMAL
BACTERIA BLD CULT: NORMAL
BASOPHILS # BLD AUTO: 0.04 K/UL (ref 0–0.2)
BASOPHILS NFR BLD AUTO: 0.2 % (ref 0–1)
BLD PROD TYP BPU: NORMAL
BLD PROD TYP BPU: NORMAL
BLOOD UNIT EXPIRATION DATE: NORMAL
BLOOD UNIT EXPIRATION DATE: NORMAL
BLOOD UNIT TYPE CODE: 5100
BLOOD UNIT TYPE CODE: NORMAL
BUN SERPL-MCNC: 26 MG/DL (ref 7–18)
BUN/CREAT SERPL: 14 (ref 6–20)
CALCIUM SERPL-MCNC: 8.9 MG/DL (ref 8.5–10.1)
CHLORIDE SERPL-SCNC: 96 MMOL/L (ref 98–107)
CO2 SERPL-SCNC: 22 MMOL/L (ref 21–32)
CREAT SERPL-MCNC: 1.89 MG/DL (ref 0.55–1.02)
CROSSMATCH INTERPRETATION: NORMAL
CROSSMATCH INTERPRETATION: NORMAL
DIFFERENTIAL METHOD BLD: ABNORMAL
DISPENSE STATUS: NORMAL
DISPENSE STATUS: NORMAL
EGFR (NO RACE VARIABLE) (RUSH/TITUS): 31 ML/MIN/1.73M²
EOSINOPHIL # BLD AUTO: 0 K/UL (ref 0–0.5)
EOSINOPHIL NFR BLD AUTO: 0 % (ref 1–4)
ERYTHROCYTE [DISTWIDTH] IN BLOOD BY AUTOMATED COUNT: 17.1 % (ref 11.5–14.5)
GLUCOSE SERPL-MCNC: 237 MG/DL (ref 70–105)
GLUCOSE SERPL-MCNC: 261 MG/DL (ref 70–105)
GLUCOSE SERPL-MCNC: 285 MG/DL (ref 70–105)
GLUCOSE SERPL-MCNC: 317 MG/DL (ref 70–105)
GLUCOSE SERPL-MCNC: 319 MG/DL (ref 74–106)
GLUCOSE SERPL-MCNC: 342 MG/DL (ref 70–105)
HCT VFR BLD AUTO: 31.7 % (ref 38–47)
HGB BLD-MCNC: 9.6 G/DL (ref 12–16)
IMM GRANULOCYTES # BLD AUTO: 0.12 K/UL (ref 0–0.04)
IMM GRANULOCYTES NFR BLD: 0.7 % (ref 0–0.4)
LYMPHOCYTES # BLD AUTO: 1.57 K/UL (ref 1–4.8)
LYMPHOCYTES NFR BLD AUTO: 8.6 % (ref 27–41)
MCH RBC QN AUTO: 23.5 PG (ref 27–31)
MCHC RBC AUTO-ENTMCNC: 30.3 G/DL (ref 32–36)
MCV RBC AUTO: 77.5 FL (ref 80–96)
MONOCYTES # BLD AUTO: 0.74 K/UL (ref 0–0.8)
MONOCYTES NFR BLD AUTO: 4.1 % (ref 2–6)
MPC BLD CALC-MCNC: 8.8 FL (ref 9.4–12.4)
NEUTROPHILS # BLD AUTO: 15.72 K/UL (ref 1.8–7.7)
NEUTROPHILS NFR BLD AUTO: 86.4 % (ref 53–65)
NRBC # BLD AUTO: 0 X10E3/UL
NRBC, AUTO (.00): 0 %
PLATELET # BLD AUTO: 557 K/UL (ref 150–400)
POTASSIUM SERPL-SCNC: 4.9 MMOL/L (ref 3.5–5.1)
POTASSIUM SERPL-SCNC: 5.9 MMOL/L (ref 3.5–5.1)
RBC # BLD AUTO: 4.09 M/UL (ref 4.2–5.4)
SODIUM SERPL-SCNC: 132 MMOL/L (ref 136–145)
UNIT NUMBER: NORMAL
UNIT NUMBER: NORMAL
VANCOMYCIN TROUGH SERPL-MCNC: 24.1 ΜG/ML (ref 10–20)
WBC # BLD AUTO: 18.19 K/UL (ref 4.5–11)

## 2023-01-25 PROCEDURE — 25000003 PHARM REV CODE 250: Performed by: FAMILY MEDICINE

## 2023-01-25 PROCEDURE — 99232 SBSQ HOSP IP/OBS MODERATE 35: CPT | Mod: GC,,, | Performed by: FAMILY MEDICINE

## 2023-01-25 PROCEDURE — 25000003 PHARM REV CODE 250

## 2023-01-25 PROCEDURE — 99232 PR SUBSEQUENT HOSPITAL CARE,LEVL II: ICD-10-PCS | Mod: GC,,, | Performed by: FAMILY MEDICINE

## 2023-01-25 PROCEDURE — 63600175 PHARM REV CODE 636 W HCPCS: Performed by: FAMILY MEDICINE

## 2023-01-25 PROCEDURE — 82962 GLUCOSE BLOOD TEST: CPT

## 2023-01-25 PROCEDURE — 36415 COLL VENOUS BLD VENIPUNCTURE: CPT | Performed by: FAMILY MEDICINE

## 2023-01-25 PROCEDURE — 63600175 PHARM REV CODE 636 W HCPCS

## 2023-01-25 PROCEDURE — 36415 COLL VENOUS BLD VENIPUNCTURE: CPT

## 2023-01-25 PROCEDURE — 80048 BASIC METABOLIC PNL TOTAL CA: CPT

## 2023-01-25 PROCEDURE — 80202 ASSAY OF VANCOMYCIN: CPT | Performed by: FAMILY MEDICINE

## 2023-01-25 PROCEDURE — 85025 COMPLETE CBC W/AUTO DIFF WBC: CPT

## 2023-01-25 PROCEDURE — 97165 OT EVAL LOW COMPLEX 30 MIN: CPT

## 2023-01-25 PROCEDURE — 97162 PT EVAL MOD COMPLEX 30 MIN: CPT

## 2023-01-25 PROCEDURE — 84132 ASSAY OF SERUM POTASSIUM: CPT

## 2023-01-25 PROCEDURE — 11000001 HC ACUTE MED/SURG PRIVATE ROOM

## 2023-01-25 RX ORDER — HYDROMORPHONE HYDROCHLORIDE 2 MG/ML
1 INJECTION, SOLUTION INTRAMUSCULAR; INTRAVENOUS; SUBCUTANEOUS EVERY 4 HOURS PRN
Status: DISCONTINUED | OUTPATIENT
Start: 2023-01-25 | End: 2023-01-28

## 2023-01-25 RX ORDER — SODIUM CHLORIDE 9 MG/ML
INJECTION, SOLUTION INTRAVENOUS ONCE
Status: COMPLETED | OUTPATIENT
Start: 2023-01-25 | End: 2023-01-25

## 2023-01-25 RX ADMIN — DULOXETINE 60 MG: 30 CAPSULE, DELAYED RELEASE ORAL at 08:01

## 2023-01-25 RX ADMIN — INSULIN ASPART 4 UNITS: 100 INJECTION, SOLUTION INTRAVENOUS; SUBCUTANEOUS at 04:01

## 2023-01-25 RX ADMIN — INSULIN ASPART 6 UNITS: 100 INJECTION, SOLUTION INTRAVENOUS; SUBCUTANEOUS at 11:01

## 2023-01-25 RX ADMIN — Medication 1 EACH: at 08:01

## 2023-01-25 RX ADMIN — HYDROCODONE BITARTRATE AND ACETAMINOPHEN 1 TABLET: 5; 325 TABLET ORAL at 10:01

## 2023-01-25 RX ADMIN — SODIUM ZIRCONIUM CYCLOSILICATE 10 G: 10 POWDER, FOR SUSPENSION ORAL at 08:01

## 2023-01-25 RX ADMIN — INSULIN ASPART 6 UNITS: 100 INJECTION, SOLUTION INTRAVENOUS; SUBCUTANEOUS at 09:01

## 2023-01-25 RX ADMIN — INSULIN ASPART 8 UNITS: 100 INJECTION, SOLUTION INTRAVENOUS; SUBCUTANEOUS at 06:01

## 2023-01-25 RX ADMIN — VANCOMYCIN HYDROCHLORIDE 1250 MG: 1 INJECTION, POWDER, LYOPHILIZED, FOR SOLUTION INTRAVENOUS at 10:01

## 2023-01-25 RX ADMIN — HYDROMORPHONE HYDROCHLORIDE 1 MG: 2 INJECTION, SOLUTION INTRAMUSCULAR; INTRAVENOUS; SUBCUTANEOUS at 02:01

## 2023-01-25 RX ADMIN — HYDRALAZINE HYDROCHLORIDE 25 MG: 25 TABLET, FILM COATED ORAL at 01:01

## 2023-01-25 RX ADMIN — HYDROMORPHONE HYDROCHLORIDE 1 MG: 2 INJECTION, SOLUTION INTRAMUSCULAR; INTRAVENOUS; SUBCUTANEOUS at 04:01

## 2023-01-25 RX ADMIN — HYDROCODONE BITARTRATE AND ACETAMINOPHEN 1 TABLET: 5; 325 TABLET ORAL at 06:01

## 2023-01-25 RX ADMIN — DOCUSATE SODIUM 100 MG: 100 CAPSULE, LIQUID FILLED ORAL at 08:01

## 2023-01-25 RX ADMIN — SODIUM CHLORIDE: 9 INJECTION, SOLUTION INTRAVENOUS at 08:01

## 2023-01-25 RX ADMIN — HYDROMORPHONE HYDROCHLORIDE 1 MG: 2 INJECTION, SOLUTION INTRAMUSCULAR; INTRAVENOUS; SUBCUTANEOUS at 08:01

## 2023-01-25 RX ADMIN — SODIUM ZIRCONIUM CYCLOSILICATE 10 G: 10 POWDER, FOR SUSPENSION ORAL at 03:01

## 2023-01-25 RX ADMIN — HYDROMORPHONE HYDROCHLORIDE 1 MG: 2 INJECTION, SOLUTION INTRAMUSCULAR; INTRAVENOUS; SUBCUTANEOUS at 12:01

## 2023-01-25 RX ADMIN — HYDROCODONE BITARTRATE AND ACETAMINOPHEN 1 TABLET: 5; 325 TABLET ORAL at 01:01

## 2023-01-25 NOTE — PT/OT/SLP EVAL
Occupational Therapy   Evaluation    Name: Monica Walker  MRN: [id number]  Admitting Diagnosis: Epidural abscess  Recent Surgery: Procedure(s) (LRB):  T9-L2 fusion, T11-T12 laminectomy and corpectomy (N/A) 1 Day Post-Op    Recommendations:     Discharge Recommendations: nursing facility, skilled, home with home health  Discharge Equipment Recommendations:  none  Barriers to discharge:   (medical treatment)    Assessment:     Monica Walker is a 52 y.o. female with a medical diagnosis of Epidural abscess.  She presents with alert, but in pain. Pt is day 1 post op T9- L2 fusion, T11-T12 laminectomy and corpectomy. Performance deficits affecting function: weakness, impaired endurance, impaired self care skills, impaired functional mobility, pain, orthopedic precautions.      Rehab Prognosis: Good; patient would benefit from acute skilled OT services to address these deficits and reach maximum level of function.       Plan:     Patient to be seen 5 x/week to address the above listed problems via self-care/home management, therapeutic activities, therapeutic exercises  Plan of Care Expires: 02/28/23  Plan of Care Reviewed with: patient    Subjective     Chief Complaint: back pain  Patient/Family Comments/goals: Pt wants to return to her mother's home with family to assist her    Occupational Profile:  Living Environment: Pt has been staying with her mother since she has been sick from sepsis and septic joints (towards the end of 2022)  Previous level of function: Pt states that up until about 2 weeks ago she was able to shower herself on the shower seat and dress herself and ambulate with crutches around the house. Pt also states that she primarily stayed in bed.  Roles and Routines: Pt has been performing her own functional mobility and self- care until her back became too painful to manage. Pt's mother performs IADLs.  Equipment Used at Home: wheelchair, walker, rolling, bedside commode, shower chair,  crutches  Assistance upon Discharge: Pt plans to D/C to home with her mother to assist her    Pain/Comfort:  Pain Rating 1: 9/10  Location 1: back  Pain Addressed 1: Pre-medicate for activity, Reposition, Cessation of Activity  Pain Rating Post-Intervention 1: 9/10    Patients cultural, spiritual, Jewish conflicts given the current situation: no    Objective:     Communicated with: HAYDEE Lewis prior to session.  Patient found up in chair with peripheral IV, hemovac upon OT entry to room.    General Precautions: Standard, fall  Orthopedic Precautions: spinal precautions  Braces: N/A  Respiratory Status: Room air    Occupational Performance:    Bed Mobility:    Patient completed Sit to Supine with minimum assistance    Functional Mobility/Transfers:  Patient completed Sit <> Stand Transfer with minimum assistance  with  hand-held assist   Patient completed Bed <> Chair Transfer using Step Transfer technique with minimum assistance with hand-held assist  Functional Mobility: Pt appeared to have difficulty stepping with just hand held assist and would likely do better with a walker for support    Activities of Daily Living:  Feeding:  Pt has poor appetite, but is able to feed herself    Upper Body Dressing: minimum assistance    Lower Body Dressing: maximal assistance for socks    Cognitive/Visual Perceptual:  Cognitive/Psychosocial Skills:     -       Oriented to: Person, Place, and Situation   -       Follows Commands/attention:Follows one-step commands  -       Communication: clear/fluent  -       Safety awareness/insight to disability: impaired   -       Mood/Affect/Coping skills/emotional control: Cooperative, Blunted, and Flat affect    Physical Exam:  Balance:    -       sitting balance is good. Standing balance is fair  Sensation:    -       Intact  Motor Planning:    -       intact  Dominant hand:    -       right  Upper Extremity Range of Motion:     -       Right Upper Extremity: WFL  -        Left Upper Extremity: shoulder is impaired to about 90 degrees of flexion due to back pain  Upper Extremity Strength:    -       Right Upper Extremity: WFL  -       Left Upper Extremity: WFL   Strength:    -       Right Upper Extremity: WFL  -       Left Upper Extremity: WFL  Fine Motor Coordination:    -       Intact  Gross motor coordination:   WFL    AMPAC 6 Click ADL:  AMPAC Total Score: 19    Treatment & Education:  Pt educated on OT role/POC.   Importance of OOB activity with staff assistance.  Importance of sitting up in the chair throughout the day as tolerated, especially for meals   Safety during functional t/f and mobility with use of RW  Importance of assisting with self-care activities   All questions/concerns answered within OT scope of practice      Patient left supine with all lines intact, call button in reach, bed alarm on, and RN Maikol notified    GOALS:   Multidisciplinary Problems       Occupational Therapy Goals          Problem: Occupational Therapy    Goal Priority Disciplines Outcome Interventions   Occupational Therapy Goal     OT, PT/OT Ongoing, Progressing    Description: STG: (In 2 weeks)  Pt will perform grooming with setup  Pt will bathe with set up and min(A)  Pt will perform UE dressing with setup  Pt will perform LE dressing with min(A) and with AD if needed  Pt will transfer bed/chair/bsc with CGA with AD  Pt will perform standing task x 2 min with CGA and AD if needed  Pt will tolerate 20 minutes of tx without fatigue      LT.Restore to max I with self care and mobility.                          History:     Past Medical History:   Diagnosis Date    Abscess of right thigh + MRSA 2021    healed    Adnexal cyst     Degenerative disc disease     Depression 10/29/2021    Hypertension     Nicotine dependence     Osteoarthritis          Past Surgical History:   Procedure Laterality Date    BACK SURGERY      HYSTERECTOMY N/A     IRRIGATION AND DEBRIDEMENT OF LOWER  EXTREMITY Left 11/22/2022    Procedure: IRRIGATION AND DEBRIDEMENT, LOWER EXTREMITY;  Surgeon: Papa Mendoza MD;  Location: [facility] OR;  Service: Orthopedics;  Laterality: Left;    IRRIGATION AND DEBRIDEMENT OF UPPER EXTREMITY Left 11/22/2022    Procedure: IRRIGATION AND DEBRIDEMENT, UPPER EXTREMITY;  Surgeon: Papa Mendoza MD;  Location: [facility] OR;  Service: Orthopedics;  Laterality: Left;    THORACIC LAMINECTOMY WITH FUSION N/A 1/24/2023    Procedure: T9-L2 fusion, T11-T12 laminectomy and corpectomy;  Surgeon: Jimmy Hernandes MD;  Location: [facility] OR;  Service: Neurosurgery;  Laterality: N/A;    TOTAL HIP ARTHROPLASTY Left     TOTAL KNEE ARTHROPLASTY Bilateral        Time Tracking:     OT Date of Treatment: 01/25/23  OT Start Time: 1250  OT Stop Time: 1308  OT Total Time (min): 18 min    Billable Minutes:Evaluation low complexity    1/25/2023

## 2023-01-25 NOTE — ASSESSMENT & PLAN NOTE
- Pt known to Dr. Hernandes, has been receiving IV abx outpatient  - Empiric Vanc and Rocephin started  - Norco 5mg q6h for pain  - Dilaudid added Q4hrs PRN  - MRI Thoracic w/o Contrast - Findings suggestive of osteomyelitis discitis of T11-12 with epidural phlegmon or extruded disc contributing to moderate spinal canal stenosis.  Additional epidural phlegmon extending in the left paracentral location and of the left T11-12 and T12-L1 foramina.  - Blood Cultures x2 - No growth at 72 hours  - Pt NPO pending Ortho Spine Eval - Will need diabetic diet when appropriate  - Consult Dr. Hernandes, appreciate his expertise -- IR biopsy successful 1/20/2023 -   - MRSA sensitive to Vancomycin, will continue   - Surgery 1/24/2023

## 2023-01-25 NOTE — NURSING
Received report that patient received 1 unit of RBC after surgery. Order reads to adm 2 units of PRBC.  Notified MD once patient got to floor from surgery that patient had only received 1 unit in surgery. MD ordered repeat Hgb & Hct to determine whether or not 2nd unit of blood was needed. Lab results show Hgb 9.8 Hct 33.1. MD notified with orders to not adm 2nd unit of blood.

## 2023-01-25 NOTE — CHAPLAIN
Pt appears to be in physical pain stating the pain medicine helps some.  Her affect indicated that she is in emotional pain as well with increased sadness than in past visits.      Pt always appreciated prayer. We prayed together.    I will come back to visit her in the afternoon

## 2023-01-25 NOTE — ASSESSMENT & PLAN NOTE
K 5.9 today, after 2 units pRBCs yesterday in surgery  [facility], will continue to monitor

## 2023-01-25 NOTE — ASSESSMENT & PLAN NOTE
Patient's FSGs are uncontrolled due to hyperglycemia on current medication regimen.  Last A1c reviewed-   Lab Results   Component Value Date    HGBA1C 7.9 (H) 01/19/2023     Most recent fingerstick glucose reviewed- No results for input(s): POCTGLUCOSE in the last 24 hours.  Current correctional scale  Medium  Maintain anti-hyperglycemic dose as follows-   Antihyperglycemics (From admission, onward)    Start     Stop Route Frequency Ordered    01/24/23 2136  insulin aspart U-100 injection 1-10 Units         -- SubQ Before meals & nightly PRN 01/24/23 2036        Hold Oral hypoglycemics while patient is in the hospital.  Diabetic Educator consulted, appreciate their help

## 2023-01-25 NOTE — PLAN OF CARE
Problem: Occupational Therapy  Goal: Occupational Therapy Goal  Description: STG: (In 2 weeks)  Pt will perform grooming with setup  Pt will bathe with set up and min(A)  Pt will perform UE dressing with setup  Pt will perform LE dressing with min(A) and with AD if needed  Pt will transfer bed/chair/bsc with CGA with AD  Pt will perform standing task x 2 min with CGA and AD if needed  Pt will tolerate 20 minutes of tx without fatigue      LT.Restore to max I with self care and mobility.     Outcome: Ongoing, Progressing   OT low complexity evaluation performed. Pt wants to D/C to home with her mother when medically able.

## 2023-01-25 NOTE — PLAN OF CARE
Problem: Physical Therapy  Goal: Physical Therapy Goal  Description: Short Term Goals to be met by: 23    Patient will increase functional independence with mobility by performin. Supine to sit with Stand by assist  2. Sit to stand transfer with contact guard assist using Rolling walker  3. Bed to chair transfer with contact guard assist using Rolling walker  4. Gait  x 25 feet with contact guard assist using Rolling walker  5. Lower extremity exercise program x30 reps per handout, with assistance as needed  6. Pt to recall 3/3 spinal precautions     Long Term Goals to be met by: 23    Pt will regain full independent functional mobility with Rolling walker to return to home situation and prior activities of daily living.   Outcome: Ongoing, Progressing     Pt evaluation completed, see eval for details.

## 2023-01-25 NOTE — PROGRESS NOTES
"[facility] Hospital Medicine  Progress Note    Patient Name: Monica Walker  MRN: [id number]  Patient Class: IP- Inpatient   Admission Date: 1/19/2023  Length of Stay: 6 days  Attending Physician: Vinay Rosen Jr., MD  Primary Care Provider: Hannah Schulz MD        Subjective:     Principal Problem:Epidural abscess        HPI:  Patient is a 53 y/o Female presenting to [facility] with a cc of back pain. She states the pain began to worsen approximately 16 hours ago. She states the pain is in her Mid back area and radiates to both legs. She describes the pain as "throbbing, constant and a 9/10." She states the pain is blunted by taking Hydrocodone but not relieved. She reports the pain is made worse by movement and standing.    Patient reports she was hospitalized in November for sepsis secondary to Pyelonephritis. She has been hospitalized several times over the past 3 months. She is currently being treated at home with IV Abx therapy for Osteomyelitis of the spine.  MRI of Lumbar spine on 1/10/2023 (Per radiology report). Findings indicative of osteomyelitis involving T11-12 with epidural abscess extending from the T11-12 disc level to the L1 level eccentric towards the left.  Additional site of infection of the right L2-3 facet joint as well as osteomyelitis of the right L3 transverse process.    Significant findings in the ED include:  Vitals: /84, , RR 16, Temp 98.2 F, O2 98% on RA  Labs: WBC 14.6, HGB 8.2, HCT 27.1, MCV 74.2, Na 133, K 3.5, Bun 8, Crt0.54, , Glucose 171, UA normal.  today;124 2 days ago, CRP  22.90 Today; 12.9 2 days ago  Interventions: Toradol 30 mg IV    Pt was subsequently admitted to the Inpatient Hospitalist Service for further evaluation and management.               Overview/Hospital Course:  No notes on file    Interval History: Patient was seen resting in bed. No overnight events. Biopsy showed MRSA, " sensitive to Vancomycin. Will continue Vancomycin. Dr. Hernandes took the  patient to surgery yesterday. Patient is in considerable pain today in her back. Dilaudid 1mg was put to Q4hrs PRN. K was 5.9, lokelma TID was ordered. Patient received 2 u pRBCs during surgery yesterday. Will continue to monitor    Review of Systems   Constitutional:  Negative for chills, diaphoresis, fatigue and fever.   HENT:  Negative for congestion, rhinorrhea, sinus pressure, sinus pain and sore throat.    Respiratory:  Negative for cough, chest tightness, shortness of breath and wheezing.    Cardiovascular:  Negative for chest pain, palpitations and leg swelling.   Gastrointestinal:  Negative for abdominal distention, abdominal pain, constipation, diarrhea, nausea and vomiting.   Musculoskeletal:  Positive for back pain. Negative for arthralgias and myalgias.   Skin:  Negative for rash and wound.   Neurological:  Positive for weakness. Negative for dizziness, syncope, light-headedness, numbness and headaches.   All other systems reviewed and are negative.  Objective:     Vital Signs (Most Recent):  Temp: 98.1 °F (36.7 °C) (01/25/23 1000)  Pulse: 94 (01/25/23 1000)  Resp: 16 (01/25/23 1009)  BP: (!) 150/80 (01/25/23 1000)  SpO2: 98 % (01/25/23 1000)   Vital Signs (24h Range):  Temp:  [96.7 °F (35.9 °C)-99 °F (37.2 °C)] 98.1 °F (36.7 °C)  Pulse:  [88-98] 94  Resp:  [10-20] 16  SpO2:  [89 %-100 %] 98 %  BP: (123-182)/(53-98) 150/80     Weight: 61.7 kg (136 lb 0.4 oz)  Body mass index is 21.95 kg/m².    Intake/Output Summary (Last 24 hours) at 1/25/2023 1153  Last data filed at 1/24/2023 1727  Gross per 24 hour   Intake 1100 ml   Output 150 ml   Net 950 ml      Physical Exam  Vitals reviewed.   Constitutional:       General: She is awake.      Appearance: Normal appearance. She is well-developed, well-groomed and normal weight.      Interventions: Nasal cannula in place.   HENT:      Head: Normocephalic and atraumatic.      Right Ear: External  ear normal.      Left Ear: External ear normal.      Mouth/Throat:      Mouth: Mucous membranes are moist.      Pharynx: Oropharynx is clear.   Eyes:      Extraocular Movements: Extraocular movements intact.      Pupils: Pupils are equal, round, and reactive to light.   Cardiovascular:      Rate and Rhythm: Regular rhythm.      Pulses: Normal pulses.      Heart sounds: Normal heart sounds.   Pulmonary:      Effort: Pulmonary effort is normal.      Breath sounds: Normal breath sounds.   Abdominal:      General: Bowel sounds are normal.      Palpations: Abdomen is soft.   Musculoskeletal:      Cervical back: Neck supple.      Thoracic back: Tenderness present.      Comments: tenderness to palpation from T6 to sacral area   Skin:     General: Skin is warm and dry.             Comments: Stage 1 pressure wound over sacrum   Neurological:      Mental Status: She is alert and oriented to person, place, and time.   Psychiatric:         Mood and Affect: Mood normal.         Behavior: Behavior normal. Behavior is cooperative.         Thought Content: Thought content normal.       Significant Labs: All pertinent labs within the past 24 hours have been reviewed.    Significant Imaging: I have reviewed all pertinent imaging results/findings within the past 24 hours.      Assessment/Plan:      * Epidural abscess  - Pt known to Dr. Hernandes, has been receiving IV abx outpatient  - Empiric Vanc and Rocephin started  - Norco 5mg q6h for pain  - Dilaudid added Q4hrs PRN  - MRI Thoracic w/o Contrast - Findings suggestive of osteomyelitis discitis of T11-12 with epidural phlegmon or extruded disc contributing to moderate spinal canal stenosis.  Additional epidural phlegmon extending in the left paracentral location and of the left T11-12 and T12-L1 foramina.  - Blood Cultures x2 - No growth at 72 hours  - Pt NPO pending Ortho Spine Eval - Will need diabetic diet when appropriate  - Consult Dr. Hernandes, appreciate his expertise -- IR biopsy  successful 1/20/2023 -   - MRSA sensitive to Vancomycin, will continue   - Surgery 1/24/2023    Type 2 diabetes mellitus, with long-term current use of insulin  Patient's FSGs are uncontrolled due to hyperglycemia on current medication regimen.  Last A1c reviewed-   Lab Results   Component Value Date    HGBA1C 7.9 (H) 01/19/2023     Most recent fingerstick glucose reviewed- No results for input(s): POCTGLUCOSE in the last 24 hours.  Current correctional scale  Medium  Maintain anti-hyperglycemic dose as follows-   Antihyperglycemics (From admission, onward)    Start     Stop Route Frequency Ordered    01/24/23 2136  insulin aspart U-100 injection 1-10 Units         -- SubQ Before meals & nightly PRN 01/24/23 2036        Hold Oral hypoglycemics while patient is in the hospital.  Diabetic Educator consulted, appreciate their help    Microcytic anemia  Ferritin - 380  Iron - 12  Iron Sat - 6  TIBC - 188    Ferrous sulfate given Qd    Depression  - Continue Cymbalta      Hyperkalemia  K 5.9 today, after 2 units pRBCs yesterday in surgery  kelma TID today, will continue to monitor       Hypomagnesemia  Mag 1.4 on admission    2g Mag given  1/21/2023 mag was 1.6  1/21/2023 2g mag given  Recheck 1.7 - will give 2g    1/23/2023 mag 1.9        VTE Risk Mitigation (From admission, onward)         Ordered     IP VTE LOW RISK PATIENT  Once         01/19/23 0417     Place sequential compression device  Until discontinued         01/19/23 0417                Discharge Planning   SUZANNA:      Code Status: Full Code   Is the patient medically ready for discharge?:     Reason for patient still in hospital (select all that apply): Laboratory test, Treatment and Consult recommendations  Discharge Plan A: Home with family, Home Health                  Reed Lloyd MD  Department of Hospital Medicine   [facility]

## 2023-01-25 NOTE — PROGRESS NOTES
Office: [phone]    Subjective:   Postop thoracic laminectomy and corpectomy with T9-L2..  Reports appropriate postop pain    I/O as of 7:29 AM:   Intake/Output - Last 3 Shifts         01/23 0700 01/24 0659 01/24 0700 01/25 0659 01/25 0700 01/26 0659    P.O.  240 0    Blood  200     IV Piggyback  900     Total Intake(mL/kg)  1340 (21.7) 0 (0)    Urine (mL/kg/hr) 1000 (0.7)      Blood  150     Total Output 1000 150     Net -1000 +1190 0           Urine Occurrence  2 x              Vitals / Physical Exam:  Vitals:    01/25/23 0827 01/25/23 1000 01/25/23 1009 01/25/23 1246   BP: (!) 155/81 (!) 150/80     Pulse:  94     Resp: 19 20 16 16   Temp:  98.1 °F (36.7 °C)     TempSrc:       SpO2:  98%     Weight:       Height:            AOx3   NAD      Motor L2 L3 L4 L5 S1   Left 3 3 3 4 5   Right 5 5 5 5 5   Sensory        Left  + + + + +   Right + + + + +         Assessment / Plan:   Monica Walker is a 52 y.o. female with thoracolumbar osteo diskitis with MRSA on culture.  Now status post T9-L2 fusion, T11-T12 laminectomy and corpectomy    - medical management per hospitalist service  - continue antibiotics  - PT/OT      Signature:  Jimmy Hernandes MD     Electronic Signature

## 2023-01-25 NOTE — SUBJECTIVE & OBJECTIVE
Interval History: Patient was seen resting in bed. No overnight events. Biopsy showed MRSA, sensitive to Vancomycin. Will continue Vancomycin. Dr. Hernandes took the  patient to surgery yesterday. Patient is in considerable pain today in her back. Dilaudid 1mg was put to Q4hrs PRN. K was 5.9, lokelma TID was ordered. Patient received 2 u pRBCs during surgery yesterday. Will continue to monitor    Review of Systems   Constitutional:  Negative for chills, diaphoresis, fatigue and fever.   HENT:  Negative for congestion, rhinorrhea, sinus pressure, sinus pain and sore throat.    Respiratory:  Negative for cough, chest tightness, shortness of breath and wheezing.    Cardiovascular:  Negative for chest pain, palpitations and leg swelling.   Gastrointestinal:  Negative for abdominal distention, abdominal pain, constipation, diarrhea, nausea and vomiting.   Musculoskeletal:  Positive for back pain. Negative for arthralgias and myalgias.   Skin:  Negative for rash and wound.   Neurological:  Positive for weakness. Negative for dizziness, syncope, light-headedness, numbness and headaches.   All other systems reviewed and are negative.  Objective:     Vital Signs (Most Recent):  Temp: 98.1 °F (36.7 °C) (01/25/23 1000)  Pulse: 94 (01/25/23 1000)  Resp: 16 (01/25/23 1009)  BP: (!) 150/80 (01/25/23 1000)  SpO2: 98 % (01/25/23 1000)   Vital Signs (24h Range):  Temp:  [96.7 °F (35.9 °C)-99 °F (37.2 °C)] 98.1 °F (36.7 °C)  Pulse:  [88-98] 94  Resp:  [10-20] 16  SpO2:  [89 %-100 %] 98 %  BP: (123-182)/(53-98) 150/80     Weight: 61.7 kg (136 lb 0.4 oz)  Body mass index is 21.95 kg/m².    Intake/Output Summary (Last 24 hours) at 1/25/2023 1153  Last data filed at 1/24/2023 1727  Gross per 24 hour   Intake 1100 ml   Output 150 ml   Net 950 ml      Physical Exam  Vitals reviewed.   Constitutional:       General: She is awake.      Appearance: Normal appearance. She is well-developed, well-groomed and normal weight.      Interventions: Nasal  cannula in place.   HENT:      Head: Normocephalic and atraumatic.      Right Ear: External ear normal.      Left Ear: External ear normal.      Mouth/Throat:      Mouth: Mucous membranes are moist.      Pharynx: Oropharynx is clear.   Eyes:      Extraocular Movements: Extraocular movements intact.      Pupils: Pupils are equal, round, and reactive to light.   Cardiovascular:      Rate and Rhythm: Regular rhythm.      Pulses: Normal pulses.      Heart sounds: Normal heart sounds.   Pulmonary:      Effort: Pulmonary effort is normal.      Breath sounds: Normal breath sounds.   Abdominal:      General: Bowel sounds are normal.      Palpations: Abdomen is soft.   Musculoskeletal:      Cervical back: Neck supple.      Thoracic back: Tenderness present.      Comments: tenderness to palpation from T6 to sacral area   Skin:     General: Skin is warm and dry.             Comments: Stage 1 pressure wound over sacrum   Neurological:      Mental Status: She is alert and oriented to person, place, and time.   Psychiatric:         Mood and Affect: Mood normal.         Behavior: Behavior normal. Behavior is cooperative.         Thought Content: Thought content normal.       Significant Labs: All pertinent labs within the past 24 hours have been reviewed.    Significant Imaging: I have reviewed all pertinent imaging results/findings within the past 24 hours.

## 2023-01-25 NOTE — PT/OT/SLP EVAL
"Physical Therapy Evaluation    Patient Name:  Monica Walker   MRN:  [id number]    Recommendations:     Discharge Recommendations: nursing facility, skilled, home with home health   Discharge Equipment Recommendations:  (to be determined)   Barriers to discharge:  ongoing medical treatment    Assessment:     Monica Walker is a 52 y.o. female admitted with a medical diagnosis of Epidural abscess.  She presents with the following impairments/functional limitations: weakness, impaired endurance, gait instability, decreased lower extremity function, pain, orthopedic precautions. Pt lives home with mother and has not been functionally ambulatory in approx 5 monthd d/t onset of sepsis and varying other comorbidities per her report. Pt underwent a L TKA approx 2 months ago in this facility.    Rehab Prognosis: Fair; patient would benefit from acute skilled PT services to address these deficits and reach maximum level of function.    Recent Surgery: Procedure(s) (LRB):  T9-L2 fusion, T11-T12 laminectomy and corpectomy (N/A) 1 Day Post-Op    Plan:     During this hospitalization, patient to be seen daily to address the identified rehab impairments via gait training, therapeutic activities, therapeutic exercises and progress toward the following goals:    Plan of Care Expires:  02/25/23    Subjective     Chief Complaint: epidural abscess  Patient/Family Comments/goals: pt very reluctant to participate in eval. "I'm not getting up, Lady"  Pain/Comfort: 8/10 back and BLE       Patients cultural, spiritual, Congregation conflicts given the current situation: no    Living Environment:  Home with mom in 1 story home with no steps.  Prior to admission, patients level of function was ambulatory prior to 5 months ago, but has since been completing transfer only with assistance from her mother.  Equipment used at home: wheelchair, walker, rolling, bedside commode.  DME owned (not currently used): none.  Upon discharge, patient will " have assistance from rehab staff.    Objective:     Communicated with Maikol Lewsi RN prior to session.  Patient found supine with oxygen, pulse ox (continuous), blood pressure cuff, hemovac, peripheral IV  upon PT entry to room.    General Precautions: Standard, fall  Orthopedic Precautions:spinal precautions   Braces:    Respiratory Status: Nasal cannula, flow 1.5 L/min    Exams:  Cognitive Exam:  Patient is oriented to Person, Place, Time, and Situation  Sensation:    -       Intact  RLE ROM: WNL  RLE Strength: Deficits: 4-/5  LLE ROM: WFL  LLE Strength: Deficits: 2+/5    Functional Mobility:  Bed Mobility:     Rolling Left:  minimum assistance  Rolling Right: minimum assistance  Supine to Sit: moderate assistance  Transfers:     Sit to Stand:  minimum assistance and moderate assistance with hand-held assist  Bed to Chair: minimum assistance and moderate assistance with  hand-held assist  using  Step Transfer  Balance: Fair in stance      AM-PAC 6 CLICK MOBILITY  Total Score:18       Treatment & Education:  Patient educated in:  -PT role and POC  -spinal precautions (no bending, lifting, or twisting)  -log rolling during bed mobility  -safety with transfers including hand placement  -gait sequence and RW management  -OOB activity to maximize recovery including getting on a daily walking program at home   -ambulating in hallway 3x's/day with nursing staff assistance     Patient left up in chair with all lines intact, call button in reach, and RN notified.    GOALS:   Multidisciplinary Problems       Physical Therapy Goals          Problem: Physical Therapy    Goal Priority Disciplines Outcome Goal Variances Interventions   Physical Therapy Goal     PT, PT/OT Ongoing, Progressing     Description: Short Term Goals to be met by: 23    Patient will increase functional independence with mobility by performin. Supine to sit with Stand by assist  2. Sit to stand transfer with contact guard assist using  Rolling walker  3. Bed to chair transfer with contact guard assist using Rolling walker  4. Gait  x 25 feet with contact guard assist using Rolling walker  5. Lower extremity exercise program x30 reps per handout, with assistance as needed  6. Pt to recall 3/3 spinal precautions     Long Term Goals to be met by: 2/25/23    Pt will regain full independent functional mobility with Rolling walker to return to home situation and prior activities of daily living.                        History:     Past Medical History:   Diagnosis Date    Abscess of right thigh + MRSA 08/20/2021    healed    Adnexal cyst     Degenerative disc disease     Depression 10/29/2021    Hypertension     Nicotine dependence     Osteoarthritis        Past Surgical History:   Procedure Laterality Date    BACK SURGERY      HYSTERECTOMY N/A     IRRIGATION AND DEBRIDEMENT OF LOWER EXTREMITY Left 11/22/2022    Procedure: IRRIGATION AND DEBRIDEMENT, LOWER EXTREMITY;  Surgeon: Papa Mendoza MD;  Location: [facility];  Service: Orthopedics;  Laterality: Left;    IRRIGATION AND DEBRIDEMENT OF UPPER EXTREMITY Left 11/22/2022    Procedure: IRRIGATION AND DEBRIDEMENT, UPPER EXTREMITY;  Surgeon: Papa Mendoza MD;  Location: [facility];  Service: Orthopedics;  Laterality: Left;    THORACIC LAMINECTOMY WITH FUSION N/A 1/24/2023    Procedure: T9-L2 fusion, T11-T12 laminectomy and corpectomy;  Surgeon: Jimmy Hernandes MD;  Location: [facility] [place];  Service: Neurosurgery;  Laterality: N/A;    TOTAL HIP ARTHROPLASTY Left     TOTAL KNEE ARTHROPLASTY Bilateral        Time Tracking:     PT Received On: 01/25/23  PT Start Time: 0858     PT Stop Time: 0917  PT Total Time (min): 19 min     Billable Minutes: Evaluation moderate complexity      01/25/2023

## 2023-01-25 NOTE — PROGRESS NOTES
Vancomycin trough = 24.1 (desired 15-20)    We will adjust dosage to Vancomycin 1250mg IV q24hrs and begin that dosing tomorrow at 1800.      Renal function worsening.      We will continue to monitor daily and make adjustments as needed.

## 2023-01-26 LAB
ANION GAP SERPL CALCULATED.3IONS-SCNC: 17 MMOL/L (ref 7–16)
BASOPHILS # BLD AUTO: 0.06 K/UL (ref 0–0.2)
BASOPHILS NFR BLD AUTO: 0.4 % (ref 0–1)
BUN SERPL-MCNC: 25 MG/DL (ref 7–18)
BUN/CREAT SERPL: 16 (ref 6–20)
CALCIUM SERPL-MCNC: 8.4 MG/DL (ref 8.5–10.1)
CHLORIDE SERPL-SCNC: 98 MMOL/L (ref 98–107)
CO2 SERPL-SCNC: 25 MMOL/L (ref 21–32)
CREAT SERPL-MCNC: 1.61 MG/DL (ref 0.55–1.02)
CULTURE, BONE: ABNORMAL
CULTURE, BONE: NO GROWTH
DIFFERENTIAL METHOD BLD: ABNORMAL
EGFR (NO RACE VARIABLE) (RUSH/TITUS): 38 ML/MIN/1.73M²
EOSINOPHIL # BLD AUTO: 0.25 K/UL (ref 0–0.5)
EOSINOPHIL NFR BLD AUTO: 1.8 % (ref 1–4)
ERYTHROCYTE [DISTWIDTH] IN BLOOD BY AUTOMATED COUNT: 17.5 % (ref 11.5–14.5)
ESTROGEN SERPL-MCNC: NORMAL PG/ML
GLUCOSE SERPL-MCNC: 227 MG/DL (ref 74–106)
GLUCOSE SERPL-MCNC: 296 MG/DL (ref 70–105)
GLUCOSE SERPL-MCNC: 330 MG/DL (ref 70–105)
GLUCOSE SERPL-MCNC: 388 MG/DL (ref 70–105)
GLUCOSE SERPL-MCNC: 390 MG/DL (ref 70–105)
HCT VFR BLD AUTO: 25.6 % (ref 38–47)
HCT VFR BLD AUTO: 27.2 % (ref 38–47)
HGB BLD-MCNC: 7.7 G/DL (ref 12–16)
HGB BLD-MCNC: 8.1 G/DL (ref 12–16)
IMM GRANULOCYTES # BLD AUTO: 0.06 K/UL (ref 0–0.04)
IMM GRANULOCYTES NFR BLD: 0.4 % (ref 0–0.4)
INSULIN SERPL-ACNC: NORMAL U[IU]/ML
LAB AP GROSS DESCRIPTION: NORMAL
LAB AP LABORATORY NOTES: NORMAL
LYMPHOCYTES # BLD AUTO: 2.16 K/UL (ref 1–4.8)
LYMPHOCYTES NFR BLD AUTO: 15.7 % (ref 27–41)
MCH RBC QN AUTO: 23.3 PG (ref 27–31)
MCHC RBC AUTO-ENTMCNC: 29.8 G/DL (ref 32–36)
MCV RBC AUTO: 78.2 FL (ref 80–96)
MICROORGANISM SPEC CULT: ABNORMAL
MONOCYTES # BLD AUTO: 1.08 K/UL (ref 0–0.8)
MONOCYTES NFR BLD AUTO: 7.8 % (ref 2–6)
MPC BLD CALC-MCNC: 8.8 FL (ref 9.4–12.4)
NEUTROPHILS # BLD AUTO: 10.16 K/UL (ref 1.8–7.7)
NEUTROPHILS NFR BLD AUTO: 73.9 % (ref 53–65)
NRBC # BLD AUTO: 0 X10E3/UL
NRBC, AUTO (.00): 0 %
PLATELET # BLD AUTO: 442 K/UL (ref 150–400)
POTASSIUM SERPL-SCNC: 4.5 MMOL/L (ref 3.5–5.1)
RBC # BLD AUTO: 3.48 M/UL (ref 4.2–5.4)
SODIUM SERPL-SCNC: 135 MMOL/L (ref 136–145)
T3RU NFR SERPL: NORMAL %
WBC # BLD AUTO: 13.77 K/UL (ref 4.5–11)

## 2023-01-26 PROCEDURE — 80048 BASIC METABOLIC PNL TOTAL CA: CPT

## 2023-01-26 PROCEDURE — 11000001 HC ACUTE MED/SURG PRIVATE ROOM

## 2023-01-26 PROCEDURE — 99232 SBSQ HOSP IP/OBS MODERATE 35: CPT | Mod: GC,,, | Performed by: FAMILY MEDICINE

## 2023-01-26 PROCEDURE — 63600175 PHARM REV CODE 636 W HCPCS

## 2023-01-26 PROCEDURE — 82962 GLUCOSE BLOOD TEST: CPT

## 2023-01-26 PROCEDURE — 85025 COMPLETE CBC W/AUTO DIFF WBC: CPT

## 2023-01-26 PROCEDURE — 25000003 PHARM REV CODE 250

## 2023-01-26 PROCEDURE — 99232 PR SUBSEQUENT HOSPITAL CARE,LEVL II: ICD-10-PCS | Mod: GC,,, | Performed by: FAMILY MEDICINE

## 2023-01-26 PROCEDURE — 97116 GAIT TRAINING THERAPY: CPT

## 2023-01-26 PROCEDURE — 97535 SELF CARE MNGMENT TRAINING: CPT

## 2023-01-26 PROCEDURE — 25000003 PHARM REV CODE 250: Performed by: ORTHOPAEDIC SURGERY

## 2023-01-26 PROCEDURE — 63600175 PHARM REV CODE 636 W HCPCS: Performed by: FAMILY MEDICINE

## 2023-01-26 PROCEDURE — 85014 HEMATOCRIT: CPT

## 2023-01-26 PROCEDURE — 25000003 PHARM REV CODE 250: Performed by: FAMILY MEDICINE

## 2023-01-26 PROCEDURE — 97110 THERAPEUTIC EXERCISES: CPT

## 2023-01-26 RX ORDER — SODIUM CHLORIDE 9 MG/ML
INJECTION, SOLUTION INTRAVENOUS CONTINUOUS
Status: DISCONTINUED | OUTPATIENT
Start: 2023-01-26 | End: 2023-01-27

## 2023-01-26 RX ORDER — SODIUM CHLORIDE 9 MG/ML
INJECTION, SOLUTION INTRAVENOUS ONCE
Status: DISCONTINUED | OUTPATIENT
Start: 2023-01-26 | End: 2023-01-26

## 2023-01-26 RX ADMIN — SODIUM CHLORIDE: 9 INJECTION, SOLUTION INTRAVENOUS at 08:01

## 2023-01-26 RX ADMIN — INSULIN DETEMIR 20 UNITS: 100 INJECTION, SOLUTION SUBCUTANEOUS at 09:01

## 2023-01-26 RX ADMIN — HYDROMORPHONE HYDROCHLORIDE 1 MG: 2 INJECTION, SOLUTION INTRAMUSCULAR; INTRAVENOUS; SUBCUTANEOUS at 06:01

## 2023-01-26 RX ADMIN — INSULIN ASPART 8 UNITS: 100 INJECTION, SOLUTION INTRAVENOUS; SUBCUTANEOUS at 04:01

## 2023-01-26 RX ADMIN — HYDROCODONE BITARTRATE AND ACETAMINOPHEN 1 TABLET: 5; 325 TABLET ORAL at 02:01

## 2023-01-26 RX ADMIN — INSULIN ASPART 5 UNITS: 100 INJECTION, SOLUTION INTRAVENOUS; SUBCUTANEOUS at 09:01

## 2023-01-26 RX ADMIN — DOCUSATE SODIUM 100 MG: 100 CAPSULE, LIQUID FILLED ORAL at 08:01

## 2023-01-26 RX ADMIN — Medication 1 EACH: at 08:01

## 2023-01-26 RX ADMIN — HYDROMORPHONE HYDROCHLORIDE 1 MG: 2 INJECTION, SOLUTION INTRAMUSCULAR; INTRAVENOUS; SUBCUTANEOUS at 08:01

## 2023-01-26 RX ADMIN — HYDROCODONE BITARTRATE AND ACETAMINOPHEN 1 TABLET: 5; 325 TABLET ORAL at 12:01

## 2023-01-26 RX ADMIN — Medication 6 MG: at 12:01

## 2023-01-26 RX ADMIN — VANCOMYCIN HYDROCHLORIDE 1250 MG: 500 INJECTION, POWDER, LYOPHILIZED, FOR SOLUTION INTRAVENOUS at 06:01

## 2023-01-26 RX ADMIN — DULOXETINE 60 MG: 30 CAPSULE, DELAYED RELEASE ORAL at 08:01

## 2023-01-26 RX ADMIN — SODIUM CHLORIDE: 9 INJECTION, SOLUTION INTRAVENOUS at 06:01

## 2023-01-26 RX ADMIN — INSULIN ASPART 6 UNITS: 100 INJECTION, SOLUTION INTRAVENOUS; SUBCUTANEOUS at 06:01

## 2023-01-26 RX ADMIN — HYDROMORPHONE HYDROCHLORIDE 1 MG: 2 INJECTION, SOLUTION INTRAMUSCULAR; INTRAVENOUS; SUBCUTANEOUS at 04:01

## 2023-01-26 RX ADMIN — HYDROMORPHONE HYDROCHLORIDE 1 MG: 2 INJECTION, SOLUTION INTRAMUSCULAR; INTRAVENOUS; SUBCUTANEOUS at 10:01

## 2023-01-26 RX ADMIN — Medication 6 MG: at 09:01

## 2023-01-26 RX ADMIN — INSULIN ASPART 10 UNITS: 100 INJECTION, SOLUTION INTRAVENOUS; SUBCUTANEOUS at 11:01

## 2023-01-26 RX ADMIN — HYDROMORPHONE HYDROCHLORIDE 1 MG: 2 INJECTION, SOLUTION INTRAMUSCULAR; INTRAVENOUS; SUBCUTANEOUS at 12:01

## 2023-01-26 RX ADMIN — HYDROMORPHONE HYDROCHLORIDE 1 MG: 2 INJECTION, SOLUTION INTRAMUSCULAR; INTRAVENOUS; SUBCUTANEOUS at 02:01

## 2023-01-26 NOTE — PROGRESS NOTES
Office: [phone]    Subjective:    Plaining of postop pain    I/O as of 7:29 AM:   Intake/Output - Last 3 Shifts         01/24 0700 01/25 0659 01/25 0700 01/26 0659 01/26 0700 01/27 0659    P.O. 240 0 240    Blood 200      IV Piggyback 900      Total Intake(mL/kg) 1340 (21.7) 0 (0) 240 (3.9)    Urine (mL/kg/hr)  0 (0)     Drains  50     Blood 150      Total Output 150 50     Net +1190 -50 +240           Urine Occurrence 2 x 6 x              Vitals / Physical Exam:  Vitals:    01/26/23 0400 01/26/23 0435 01/26/23 0722 01/26/23 0834   BP: (!) 147/68  (!) 172/83    Pulse: 93  93    Resp: 18 18 18 17   Temp: 98.2 °F (36.8 °C)  98.1 °F (36.7 °C)    TempSrc:       SpO2: 95%  98%    Weight:       Height:            AOx3   NAD      Motor L2 L3 L4 L5 S1   Left 3 3 3 4 5   Right 5 5 5 5 5   Sensory        Left  + + + + +   Right + + + + +     X-rays taken today show improved alignment with restoration of T12 height and satisfactory  positioning of hardware      Assessment / Plan:   Monica Walker is a 52 y.o. female with thoracolumbar osteo diskitis with MRSA on culture.  Now status post T9-L2 fusion, T11-T12 laminectomy and corpectomy    - medical management per hospitalist service  - continue antibiotics  - PT/OT      Signature:  Jimmy Hernandes MD     Electronic Signature

## 2023-01-26 NOTE — PROGRESS NOTES
"[facility] Hospital Medicine  Progress Note    Patient Name: Moinca Walker  MRN: [id number]  Patient Class: IP- Inpatient   Admission Date: 1/19/2023  Length of Stay: 7 days  Attending Physician: Vinay Rosen Jr., MD  Primary Care Provider: Hannah Schulz MD        Subjective:     Principal Problem:Epidural abscess        HPI:  Patient is a 51 y/o Female presenting to [facility] with a cc of back pain. She states the pain began to worsen approximately 16 hours ago. She states the pain is in her Mid back area and radiates to both legs. She describes the pain as "throbbing, constant and a 9/10." She states the pain is blunted by taking Hydrocodone but not relieved. She reports the pain is made worse by movement and standing.    Patient reports she was hospitalized in November for sepsis secondary to Pyelonephritis. She has been hospitalized several times over the past 3 months. She is currently being treated at home with IV Abx therapy for Osteomyelitis of the spine.  MRI of Lumbar spine on 1/10/2023 (Per radiology report). Findings indicative of osteomyelitis involving T11-12 with epidural abscess extending from the T11-12 disc level to the L1 level eccentric towards the left.  Additional site of infection of the right L2-3 facet joint as well as osteomyelitis of the right L3 transverse process.    Significant findings in the ED include:  Vitals: /84, , RR 16, Temp 98.2 F, O2 98% on RA  Labs: WBC 14.6, HGB 8.2, HCT 27.1, MCV 74.2, Na 133, K 3.5, Bun 8, Crt0.54, , Glucose 171, UA normal.  today;124 2 days ago, CRP  22.90 Today; 12.9 2 days ago  Interventions: Toradol 30 mg IV    Pt was subsequently admitted to the Inpatient Hospitalist Service for further evaluation and management.               Overview/Hospital Course:  No notes on file    Interval History: Patient was seen resting in bed. No overnight events. Biopsy showed MRSA, " sensitive to Vancomycin. Will continue Vancomycin. Dr. Hernandes took the patient for a washout 1/24. Hyperkalemia resolved. Dr. Hernandes ordered Xrays again today. Will continue to monitor    Review of Systems   Constitutional:  Negative for chills, diaphoresis, fatigue and fever.   HENT:  Negative for congestion, rhinorrhea, sinus pressure, sinus pain and sore throat.    Respiratory:  Negative for cough, chest tightness, shortness of breath and wheezing.    Cardiovascular:  Negative for chest pain, palpitations and leg swelling.   Gastrointestinal:  Negative for abdominal distention, abdominal pain, constipation, diarrhea, nausea and vomiting.   Musculoskeletal:  Positive for back pain. Negative for arthralgias and myalgias.   Skin:  Negative for rash and wound.   Neurological:  Positive for weakness. Negative for dizziness, syncope, light-headedness, numbness and headaches.   All other systems reviewed and are negative.  Objective:     Vital Signs (Most Recent):  Temp: 98.1 °F (36.7 °C) (01/26/23 0722)  Pulse: 93 (01/26/23 0722)  Resp: 17 (01/26/23 0834)  BP: (!) 172/83 (01/26/23 0722)  SpO2: 98 % (01/26/23 0722)   Vital Signs (24h Range):  Temp:  [98.1 °F (36.7 °C)-98.4 °F (36.9 °C)] 98.1 °F (36.7 °C)  Pulse:  [84-96] 93  Resp:  [16-20] 17  SpO2:  [94 %-98 %] 98 %  BP: (124-172)/(65-83) 172/83     Weight: 61.7 kg (136 lb 0.4 oz)  Body mass index is 21.95 kg/m².    Intake/Output Summary (Last 24 hours) at 1/26/2023 0846  Last data filed at 1/26/2023 0800  Gross per 24 hour   Intake 240 ml   Output 50 ml   Net 190 ml      Physical Exam  Vitals reviewed.   Constitutional:       General: She is awake.      Appearance: Normal appearance. She is well-developed, well-groomed and normal weight.      Interventions: Nasal cannula in place.   HENT:      Head: Normocephalic and atraumatic.      Right Ear: External ear normal.      Left Ear: External ear normal.      Mouth/Throat:      Mouth: Mucous membranes are moist.       Pharynx: Oropharynx is clear.   Eyes:      Extraocular Movements: Extraocular movements intact.      Pupils: Pupils are equal, round, and reactive to light.   Cardiovascular:      Rate and Rhythm: Regular rhythm.      Pulses: Normal pulses.      Heart sounds: Normal heart sounds.   Pulmonary:      Effort: Pulmonary effort is normal.      Breath sounds: Normal breath sounds.   Abdominal:      General: Bowel sounds are normal.      Palpations: Abdomen is soft.   Musculoskeletal:      Cervical back: Neck supple.      Thoracic back: Tenderness present.      Comments: tenderness to palpation from T6 to sacral area   Skin:     General: Skin is warm and dry.             Comments: Stage 1 pressure wound over sacrum   Neurological:      Mental Status: She is alert and oriented to person, place, and time.   Psychiatric:         Mood and Affect: Mood normal.         Behavior: Behavior normal. Behavior is cooperative.         Thought Content: Thought content normal.       Significant Labs: All pertinent labs within the past 24 hours have been reviewed.    Significant Imaging: I have reviewed all pertinent imaging results/findings within the past 24 hours.      Assessment/Plan:      * Epidural abscess  - Pt known to Dr. Hernandes, has been receiving IV abx outpatient  - Empiric Vanc and Rocephin started  - Norco 5mg q6h for pain  - Dilaudid added Q4hrs PRN  - MRI Thoracic w/o Contrast - Findings suggestive of osteomyelitis discitis of T11-12 with epidural phlegmon or extruded disc contributing to moderate spinal canal stenosis.  Additional epidural phlegmon extending in the left paracentral location and of the left T11-12 and T12-L1 foramina.  - Blood Cultures x2 - No growth at 72 hours  - Pt NPO pending Ortho Spine Eval - Will need diabetic diet when appropriate  - Consult Dr. Hernandes, appreciate his expertise -- IR biopsy successful 1/20/2023 -   - MRSA sensitive to Vancomycin, will continue   - Surgery/washout 1/24/2023 successful      Type 2 diabetes mellitus, with long-term current use of insulin  Patient's FSGs are uncontrolled due to hyperglycemia on current medication regimen.  Last A1c reviewed-   Lab Results   Component Value Date    HGBA1C 7.9 (H) 01/19/2023     Most recent fingerstick glucose reviewed- No results for input(s): POCTGLUCOSE in the last 24 hours.  Current correctional scale  Medium  Maintain anti-hyperglycemic dose as follows-   Antihyperglycemics (From admission, onward)    Start     Stop Route Frequency Ordered    01/26/23 2100  insulin detemir U-100 injection 20 Units         -- SubQ Nightly 01/26/23 0753    01/24/23 2136  insulin aspart U-100 injection 1-10 Units         -- SubQ Before meals & nightly PRN 01/24/23 2036        Hold Oral hypoglycemics while patient is in the hospital.  Diabetic Educator consulted, appreciate their help    Microcytic anemia  Ferritin - 380  Iron - 12  Iron Sat - 6  TIBC - 188    Ferrous sulfate given Qd    Depression  - Continue Cymbalta      Hyperkalemia  K 5.9 today, after 2 units pRBCs yesterday in surgery  lokelma TID 1/25  K 4.5 today, resolved       Hypomagnesemia  Mag 1.4 on admission    2g Mag given  1/21/2023 mag was 1.6  1/21/2023 2g mag given  Recheck 1.7 - will give 2g    1/23/2023 mag 1.9        VTE Risk Mitigation (From admission, onward)         Ordered     IP VTE LOW RISK PATIENT  Once         01/19/23 0417     Place sequential compression device  Until discontinued         01/19/23 0417                Discharge Planning   SUZANNA:      Code Status: Full Code   Is the patient medically ready for discharge?:     Reason for patient still in hospital (select all that apply): Laboratory test, Treatment, Imaging and Consult recommendations  Discharge Plan A: Home with family, Home Health                  Reed Lloyd MD  Department of Hospital Medicine   [facility]

## 2023-01-26 NOTE — ASSESSMENT & PLAN NOTE
K 5.9 today, after 2 units pRBCs yesterday in surgery  [facility] 1/25  K 4.5 today, resolved

## 2023-01-26 NOTE — PT/OT/SLP PROGRESS
"Physical Therapy Treatment    Patient Name:  Monica Walker   MRN:  [id number]    Recommendations:     Discharge Recommendations: home with home health, LTACH (long-term acute care hospital), nursing facility, skilled  Discharge Equipment Recommendations: none  Barriers to discharge:  Ongoing medical treatment    Assessment:     Monica Walker is a 52 y.o. female admitted with a medical diagnosis of Epidural abscess.  She presents with the following impairments/functional limitations: weakness, impaired endurance, impaired functional mobility, pain, orthopedic precautions.    Pt was agreeable to PT treatment despite pain. Pt able to ambulate with CGA and was left sitting in reclining chair but complained of increased pain. Pt will benefit from LTAC or swing bed placement to increase functional mobility and endurance and manage pain symptoms while pt receiving IV antibiotics.     Rehab Prognosis: Good; patient would benefit from acute skilled PT services to address these deficits and reach maximum level of function.    Recent Surgery: Procedure(s) (LRB):  T9-L2 fusion, T11-T12 laminectomy and corpectomy (N/A) 2 Days Post-Op    Plan:     During this hospitalization, patient to be seen daily to address the identified rehab impairments via gait training, therapeutic activities, therapeutic exercises and progress toward the following goals:    Plan of Care Expires:  02/25/23    Subjective     Chief Complaint: Epidural abscess  Patient/Family Comments/goals: "It is less pressure on my back if I lay towards one side."  Pain/Comfort:  Pain Rating 1: 10/10  Location 1: back  Pain Addressed 1: Reposition, Distraction, Cessation of Activity  Pain Rating Post-Intervention 1: 10/10      Objective:     Communicated with Ne Saab RN prior to session.  Patient found supine with peripheral IV, hemovac upon PT entry to room.     General Precautions: Standard, fall  Orthopedic Precautions: spinal precautions  Braces: " N/A  Respiratory Status: Room air     Functional Mobility:  Bed Mobility:     Supine to Sit: minimum assistance  Transfers:     Sit to Stand:  contact guard assistance with rolling walker  Toilet Transfer: contact guard assistance with  rolling walker  using  Stand Pivot  Gait: Pt ambulated 15' using the RW with CGA but with increased time and effort due to pain. Pt demonstrated decreased step lengths, axial flexion, and slow theo but no LOB or SOB.       AM-PAC 6 CLICK MOBILITY  Turning over in bed (including adjusting bedclothes, sheets and blankets)?: 3  Sitting down on and standing up from a chair with arms (e.g., wheelchair, bedside commode, etc.): 3  Moving from lying on back to sitting on the side of the bed?: 3  Moving to and from a bed to a chair (including a wheelchair)?: 3  Need to walk in hospital room?: 3  Climbing 3-5 steps with a railing?: 1  Basic Mobility Total Score: 16       Treatment & Education:  Gait and transfers as above.  Bilateral lower extremity exercise x 30 reps: ankle pumps, heel slides, hip abduction/adduction, and straight leg raises with active assist ROM, verbal cues, and tactile cues; x20 reps of LAQ but had to cease activity due to increased pain    Patient left up in chair with all lines intact, call button in reach, and Ne Saab RN notified..    GOALS:   Multidisciplinary Problems       Physical Therapy Goals          Problem: Physical Therapy    Goal Priority Disciplines Outcome Goal Variances Interventions   Physical Therapy Goal     PT, PT/OT Ongoing, Progressing     Description: Short Term Goals to be met by: 23    Patient will increase functional independence with mobility by performin. Supine to sit with Stand by assist  2. Sit to stand transfer with contact guard assist using Rolling walker  3. Bed to chair transfer with contact guard assist using Rolling walker  4. Gait  x 25 feet with contact guard assist using Rolling walker  5. Lower extremity  exercise program x30 reps per handout, with assistance as needed  6. Pt to recall 3/3 spinal precautions     Long Term Goals to be met by: 2/25/23    Pt will regain full independent functional mobility with Rolling walker to return to home situation and prior activities of daily living.                        Time Tracking:     PT Received On: 01/26/23  PT Start Time: 1021     PT Stop Time: 1051  PT Total Time (min): 30 min     Billable Minutes: Gait Training 15 min and Therapeutic Exercise 15 min     Treatment Type: Treatment  PT/PTA: PT     PTA Visit Number: 0     01/26/2023

## 2023-01-26 NOTE — ASSESSMENT & PLAN NOTE
- Pt known to Dr. Hernandes, has been receiving IV abx outpatient  - Empiric Vanc and Rocephin started  - Norco 5mg q6h for pain  - Dilaudid added Q4hrs PRN  - MRI Thoracic w/o Contrast - Findings suggestive of osteomyelitis discitis of T11-12 with epidural phlegmon or extruded disc contributing to moderate spinal canal stenosis.  Additional epidural phlegmon extending in the left paracentral location and of the left T11-12 and T12-L1 foramina.  - Blood Cultures x2 - No growth at 72 hours  - Pt NPO pending Ortho Spine Eval - Will need diabetic diet when appropriate  - Consult Dr. Hernandes, appreciate his expertise -- IR biopsy successful 1/20/2023 -   - MRSA sensitive to Vancomycin, will continue   - Surgery/washout 1/24/2023 successful

## 2023-01-26 NOTE — SUBJECTIVE & OBJECTIVE
Interval History: Patient was seen resting in bed. No overnight events. Biopsy showed MRSA, sensitive to Vancomycin. Will continue Vancomycin. Dr. Hernandes took the patient for a washout 1/24. Hyperkalemia resolved. Dr. Hernandes ordered Xrays again today. Will continue to monitor    Review of Systems   Constitutional:  Negative for chills, diaphoresis, fatigue and fever.   HENT:  Negative for congestion, rhinorrhea, sinus pressure, sinus pain and sore throat.    Respiratory:  Negative for cough, chest tightness, shortness of breath and wheezing.    Cardiovascular:  Negative for chest pain, palpitations and leg swelling.   Gastrointestinal:  Negative for abdominal distention, abdominal pain, constipation, diarrhea, nausea and vomiting.   Musculoskeletal:  Positive for back pain. Negative for arthralgias and myalgias.   Skin:  Negative for rash and wound.   Neurological:  Positive for weakness. Negative for dizziness, syncope, light-headedness, numbness and headaches.   All other systems reviewed and are negative.  Objective:     Vital Signs (Most Recent):  Temp: 98.1 °F (36.7 °C) (01/26/23 0722)  Pulse: 93 (01/26/23 0722)  Resp: 17 (01/26/23 0834)  BP: (!) 172/83 (01/26/23 0722)  SpO2: 98 % (01/26/23 0722)   Vital Signs (24h Range):  Temp:  [98.1 °F (36.7 °C)-98.4 °F (36.9 °C)] 98.1 °F (36.7 °C)  Pulse:  [84-96] 93  Resp:  [16-20] 17  SpO2:  [94 %-98 %] 98 %  BP: (124-172)/(65-83) 172/83     Weight: 61.7 kg (136 lb 0.4 oz)  Body mass index is 21.95 kg/m².    Intake/Output Summary (Last 24 hours) at 1/26/2023 0846  Last data filed at 1/26/2023 0800  Gross per 24 hour   Intake 240 ml   Output 50 ml   Net 190 ml      Physical Exam  Vitals reviewed.   Constitutional:       General: She is awake.      Appearance: Normal appearance. She is well-developed, well-groomed and normal weight.      Interventions: Nasal cannula in place.   HENT:      Head: Normocephalic and atraumatic.      Right Ear: External ear normal.      Left Ear:  External ear normal.      Mouth/Throat:      Mouth: Mucous membranes are moist.      Pharynx: Oropharynx is clear.   Eyes:      Extraocular Movements: Extraocular movements intact.      Pupils: Pupils are equal, round, and reactive to light.   Cardiovascular:      Rate and Rhythm: Regular rhythm.      Pulses: Normal pulses.      Heart sounds: Normal heart sounds.   Pulmonary:      Effort: Pulmonary effort is normal.      Breath sounds: Normal breath sounds.   Abdominal:      General: Bowel sounds are normal.      Palpations: Abdomen is soft.   Musculoskeletal:      Cervical back: Neck supple.      Thoracic back: Tenderness present.      Comments: tenderness to palpation from T6 to sacral area   Skin:     General: Skin is warm and dry.             Comments: Stage 1 pressure wound over sacrum   Neurological:      Mental Status: She is alert and oriented to person, place, and time.   Psychiatric:         Mood and Affect: Mood normal.         Behavior: Behavior normal. Behavior is cooperative.         Thought Content: Thought content normal.       Significant Labs: All pertinent labs within the past 24 hours have been reviewed.    Significant Imaging: I have reviewed all pertinent imaging results/findings within the past 24 hours.

## 2023-01-26 NOTE — ASSESSMENT & PLAN NOTE
Patient's FSGs are uncontrolled due to hyperglycemia on current medication regimen.  Last A1c reviewed-   Lab Results   Component Value Date    HGBA1C 7.9 (H) 01/19/2023     Most recent fingerstick glucose reviewed- No results for input(s): POCTGLUCOSE in the last 24 hours.  Current correctional scale  Medium  Maintain anti-hyperglycemic dose as follows-   Antihyperglycemics (From admission, onward)    Start     Stop Route Frequency Ordered    01/26/23 2100  insulin detemir U-100 injection 20 Units         -- SubQ Nightly 01/26/23 0753    01/24/23 2136  insulin aspart U-100 injection 1-10 Units         -- SubQ Before meals & nightly PRN 01/24/23 2036        Hold Oral hypoglycemics while patient is in the hospital.  Diabetic Educator consulted, appreciate their help

## 2023-01-26 NOTE — PT/OT/SLP PROGRESS
Occupational Therapy   Treatment    Name: Monica Walker  MRN: [id number]  Admitting Diagnosis:  Epidural abscess  2 Days Post-Op    Recommendations:     Discharge Recommendations: home with home health, LTACH (long-term acute care hospital), nursing facility, skilled  Discharge Equipment Recommendations:  none  Barriers to discharge:   (medical treatment)    Assessment:     Monica Walker is a 52 y.o. female with a medical diagnosis of Epidural abscess.  She presents with c/o back pain. OT made 3 attempts until pt was willing to work with OT. Performance deficits affecting function are weakness, impaired endurance, impaired functional mobility, impaired self care skills, impaired skin, orthopedic precautions, pain.     Rehab Prognosis:  Good; patient would benefit from acute skilled OT services to address these deficits and reach maximum level of function.       Plan:     Patient to be seen 5 x/week to address the above listed problems via self-care/home management, therapeutic activities, therapeutic exercises  Plan of Care Expires: 02/28/23  Plan of Care Reviewed with: patient    Subjective     Pain/Comfort:  Pain Rating 1: 10/10  Location 1: back  Pain Addressed 1: Distraction, Cessation of Activity, Pre-medicate for activity  Pain Rating Post-Intervention 1: 10/10    Objective:     Communicated with: HAYDEE Saab prior to session.  Patient found supine with peripheral IV, hemovac upon OT entry to room.    General Precautions: Standard, fall    Orthopedic Precautions:spinal precautions  Braces: N/A  Respiratory Status: Room air     Occupational Performance:     Bed Mobility:    Patient completed Supine to Sit with modified independence and increased time and effort  Patient completed Sit to Supine with modified independence     Functional Mobility/Transfers:  NT- pt declined    Activities of Daily Living:  Grooming: Pt washed hair with max(A) with shampoo shower cap while sitting up EOB. Pt returned to  bed due to severe back pain and combed hair independently. Pt performed oral hygiene from bed level with access to supplies      [facility] 6 Click ADL: 19    Treatment & Education:  Pt was limited by her pain, but did demonstrate improved bed mobility in spite of her pain.    Patient left supine with all lines intact and call button in reach    GOALS:   Multidisciplinary Problems       Occupational Therapy Goals          Problem: Occupational Therapy    Goal Priority Disciplines Outcome Interventions   Occupational Therapy Goal     OT, PT/OT Ongoing, Progressing    Description: STG: (In 2 weeks)  Pt will perform grooming with setup  Pt will bathe with set up and min(A)  Pt will perform UE dressing with setup  Pt will perform LE dressing with min(A) and with AD if needed  Pt will transfer bed/chair/bsc with CGA with AD  Pt will perform standing task x 2 min with CGA and AD if needed  Pt will tolerate 20 minutes of tx without fatigue      LT.Restore to max I with self care and mobility.                          Time Tracking:     OT Date of Treatment: 23  OT Start Time: 1428  OT Stop Time: 1450  OT Total Time (min): 22 min    Billable Minutes:Self Care/Home Management 22 min    OT/TRISH: OT          2023

## 2023-01-27 PROBLEM — I10 HYPERTENSION: Status: ACTIVE | Noted: 2023-01-27

## 2023-01-27 LAB
ANION GAP SERPL CALCULATED.3IONS-SCNC: 12 MMOL/L (ref 7–16)
BACTERIA SPEC ANAEROBE CULT: NORMAL
BACTERIA SPEC ANAEROBE CULT: NORMAL
BASOPHILS # BLD AUTO: 0.05 K/UL (ref 0–0.2)
BASOPHILS NFR BLD AUTO: 0.4 % (ref 0–1)
BUN SERPL-MCNC: 24 MG/DL (ref 7–18)
BUN/CREAT SERPL: 19 (ref 6–20)
CALCIUM SERPL-MCNC: 8.9 MG/DL (ref 8.5–10.1)
CHLORIDE SERPL-SCNC: 99 MMOL/L (ref 98–107)
CO2 SERPL-SCNC: 28 MMOL/L (ref 21–32)
CREAT SERPL-MCNC: 1.29 MG/DL (ref 0.55–1.02)
DIFFERENTIAL METHOD BLD: ABNORMAL
EGFR (NO RACE VARIABLE) (RUSH/TITUS): 49 ML/MIN/1.73M²
EOSINOPHIL # BLD AUTO: 0.41 K/UL (ref 0–0.5)
EOSINOPHIL NFR BLD AUTO: 3.5 % (ref 1–4)
ERYTHROCYTE [DISTWIDTH] IN BLOOD BY AUTOMATED COUNT: 17.3 % (ref 11.5–14.5)
GLUCOSE SERPL-MCNC: 241 MG/DL (ref 74–106)
GLUCOSE SERPL-MCNC: 249 MG/DL (ref 70–105)
GLUCOSE SERPL-MCNC: 249 MG/DL (ref 70–105)
GLUCOSE SERPL-MCNC: 294 MG/DL (ref 70–105)
GLUCOSE SERPL-MCNC: 461 MG/DL (ref 70–105)
HCT VFR BLD AUTO: 26.4 % (ref 38–47)
HGB BLD-MCNC: 7.8 G/DL (ref 12–16)
IMM GRANULOCYTES # BLD AUTO: 0.07 K/UL (ref 0–0.04)
IMM GRANULOCYTES NFR BLD: 0.6 % (ref 0–0.4)
LYMPHOCYTES # BLD AUTO: 1.97 K/UL (ref 1–4.8)
LYMPHOCYTES NFR BLD AUTO: 16.7 % (ref 27–41)
MCH RBC QN AUTO: 23.1 PG (ref 27–31)
MCHC RBC AUTO-ENTMCNC: 29.5 G/DL (ref 32–36)
MCV RBC AUTO: 78.1 FL (ref 80–96)
MONOCYTES # BLD AUTO: 0.98 K/UL (ref 0–0.8)
MONOCYTES NFR BLD AUTO: 8.3 % (ref 2–6)
MPC BLD CALC-MCNC: 8.7 FL (ref 9.4–12.4)
NEUTROPHILS # BLD AUTO: 8.31 K/UL (ref 1.8–7.7)
NEUTROPHILS NFR BLD AUTO: 70.5 % (ref 53–65)
NRBC # BLD AUTO: 0 X10E3/UL
NRBC, AUTO (.00): 0 %
PLATELET # BLD AUTO: 417 K/UL (ref 150–400)
POTASSIUM SERPL-SCNC: 3.9 MMOL/L (ref 3.5–5.1)
RBC # BLD AUTO: 3.38 M/UL (ref 4.2–5.4)
SODIUM SERPL-SCNC: 135 MMOL/L (ref 136–145)
WBC # BLD AUTO: 11.79 K/UL (ref 4.5–11)

## 2023-01-27 PROCEDURE — 11000001 HC ACUTE MED/SURG PRIVATE ROOM

## 2023-01-27 PROCEDURE — 63600175 PHARM REV CODE 636 W HCPCS

## 2023-01-27 PROCEDURE — 25000003 PHARM REV CODE 250: Performed by: ORTHOPAEDIC SURGERY

## 2023-01-27 PROCEDURE — 25000003 PHARM REV CODE 250: Performed by: FAMILY MEDICINE

## 2023-01-27 PROCEDURE — 25000003 PHARM REV CODE 250

## 2023-01-27 PROCEDURE — 82962 GLUCOSE BLOOD TEST: CPT

## 2023-01-27 PROCEDURE — 63600175 PHARM REV CODE 636 W HCPCS: Performed by: FAMILY MEDICINE

## 2023-01-27 PROCEDURE — 85025 COMPLETE CBC W/AUTO DIFF WBC: CPT | Performed by: ORTHOPAEDIC SURGERY

## 2023-01-27 PROCEDURE — 99232 SBSQ HOSP IP/OBS MODERATE 35: CPT | Mod: GC,,, | Performed by: FAMILY MEDICINE

## 2023-01-27 PROCEDURE — 80048 BASIC METABOLIC PNL TOTAL CA: CPT | Performed by: ORTHOPAEDIC SURGERY

## 2023-01-27 PROCEDURE — 99232 PR SUBSEQUENT HOSPITAL CARE,LEVL II: ICD-10-PCS | Mod: GC,,, | Performed by: FAMILY MEDICINE

## 2023-01-27 PROCEDURE — 97110 THERAPEUTIC EXERCISES: CPT | Mod: CQ

## 2023-01-27 RX ORDER — SODIUM CHLORIDE 9 MG/ML
INJECTION, SOLUTION INTRAVENOUS CONTINUOUS
Status: DISCONTINUED | OUTPATIENT
Start: 2023-01-27 | End: 2023-01-27

## 2023-01-27 RX ORDER — HYDRALAZINE HYDROCHLORIDE 25 MG/1
25 TABLET, FILM COATED ORAL EVERY 8 HOURS
Status: DISCONTINUED | OUTPATIENT
Start: 2023-01-27 | End: 2023-02-03

## 2023-01-27 RX ORDER — DIPHENHYDRAMINE HYDROCHLORIDE 50 MG/ML
25 INJECTION INTRAMUSCULAR; INTRAVENOUS EVERY 6 HOURS PRN
Status: DISCONTINUED | OUTPATIENT
Start: 2023-01-27 | End: 2023-01-27

## 2023-01-27 RX ORDER — SODIUM CHLORIDE 9 MG/ML
INJECTION, SOLUTION INTRAVENOUS CONTINUOUS
Status: DISCONTINUED | OUTPATIENT
Start: 2023-01-27 | End: 2023-01-30

## 2023-01-27 RX ORDER — DIPHENHYDRAMINE HYDROCHLORIDE 50 MG/ML
25 INJECTION INTRAMUSCULAR; INTRAVENOUS EVERY 6 HOURS PRN
Status: DISCONTINUED | OUTPATIENT
Start: 2023-01-27 | End: 2023-02-02

## 2023-01-27 RX ADMIN — DIPHENHYDRAMINE HYDROCHLORIDE 25 MG: 50 INJECTION, SOLUTION INTRAMUSCULAR; INTRAVENOUS at 10:01

## 2023-01-27 RX ADMIN — INSULIN DETEMIR 20 UNITS: 100 INJECTION, SOLUTION SUBCUTANEOUS at 08:01

## 2023-01-27 RX ADMIN — HYDROMORPHONE HYDROCHLORIDE 1 MG: 2 INJECTION, SOLUTION INTRAMUSCULAR; INTRAVENOUS; SUBCUTANEOUS at 03:01

## 2023-01-27 RX ADMIN — INSULIN ASPART 10 UNITS: 100 INJECTION, SOLUTION INTRAVENOUS; SUBCUTANEOUS at 04:01

## 2023-01-27 RX ADMIN — Medication 6 MG: at 08:01

## 2023-01-27 RX ADMIN — SODIUM CHLORIDE: 9 INJECTION, SOLUTION INTRAVENOUS at 06:01

## 2023-01-27 RX ADMIN — DULOXETINE 60 MG: 30 CAPSULE, DELAYED RELEASE ORAL at 08:01

## 2023-01-27 RX ADMIN — HYDROMORPHONE HYDROCHLORIDE 1 MG: 2 INJECTION, SOLUTION INTRAMUSCULAR; INTRAVENOUS; SUBCUTANEOUS at 06:01

## 2023-01-27 RX ADMIN — HYDROMORPHONE HYDROCHLORIDE 1 MG: 2 INJECTION, SOLUTION INTRAMUSCULAR; INTRAVENOUS; SUBCUTANEOUS at 11:01

## 2023-01-27 RX ADMIN — INSULIN ASPART 3 UNITS: 100 INJECTION, SOLUTION INTRAVENOUS; SUBCUTANEOUS at 08:01

## 2023-01-27 RX ADMIN — HYDRALAZINE HYDROCHLORIDE 25 MG: 25 TABLET ORAL at 10:01

## 2023-01-27 RX ADMIN — INSULIN ASPART 4 UNITS: 100 INJECTION, SOLUTION INTRAVENOUS; SUBCUTANEOUS at 06:01

## 2023-01-27 RX ADMIN — DOCUSATE SODIUM 100 MG: 100 CAPSULE, LIQUID FILLED ORAL at 08:01

## 2023-01-27 RX ADMIN — HYDROMORPHONE HYDROCHLORIDE 1 MG: 2 INJECTION, SOLUTION INTRAMUSCULAR; INTRAVENOUS; SUBCUTANEOUS at 08:01

## 2023-01-27 RX ADMIN — VANCOMYCIN HYDROCHLORIDE 1250 MG: 500 INJECTION, POWDER, LYOPHILIZED, FOR SOLUTION INTRAVENOUS at 05:01

## 2023-01-27 RX ADMIN — INSULIN ASPART 4 UNITS: 100 INJECTION, SOLUTION INTRAVENOUS; SUBCUTANEOUS at 11:01

## 2023-01-27 RX ADMIN — Medication 1 EACH: at 08:01

## 2023-01-27 RX ADMIN — HYDRALAZINE HYDROCHLORIDE 25 MG: 25 TABLET ORAL at 01:01

## 2023-01-27 NOTE — ASSESSMENT & PLAN NOTE
Not currently controlled  Patient took no home meds  Occasionally correspond to pain  Hydralazine 25mg Q8hrs

## 2023-01-27 NOTE — PROGRESS NOTES
"[facility] Tele[facility] Hospital Medicine  Progress Note    Patient Name: Monica Walker  MRN: [id number]  Patient Class: IP- Inpatient   Admission Date: 1/19/2023  Length of Stay: 8 days  Attending Physician: Vinay Rosen Jr., MD  Primary Care Provider: Hannah Schulz MD        Subjective:     Principal Problem:Epidural abscess        HPI:  Patient is a 51 y/o Female presenting to [facility] with a cc of back pain. She states the pain began to worsen approximately 16 hours ago. She states the pain is in her Mid back area and radiates to both legs. She describes the pain as "throbbing, constant and a 9/10." She states the pain is blunted by taking Hydrocodone but not relieved. She reports the pain is made worse by movement and standing.    Patient reports she was hospitalized in November for sepsis secondary to Pyelonephritis. She has been hospitalized several times over the past 3 months. She is currently being treated at home with IV Abx therapy for Osteomyelitis of the spine.  MRI of Lumbar spine on 1/10/2023 (Per radiology report). Findings indicative of osteomyelitis involving T11-12 with epidural abscess extending from the T11-12 disc level to the L1 level eccentric towards the left.  Additional site of infection of the right L2-3 facet joint as well as osteomyelitis of the right L3 transverse process.    Significant findings in the ED include:  Vitals: /84, , RR 16, Temp 98.2 F, O2 98% on RA  Labs: WBC 14.6, HGB 8.2, HCT 27.1, MCV 74.2, Na 133, K 3.5, Bun 8, Crt0.54, , Glucose 171, UA normal.  today;124 2 days ago, CRP  22.90 Today; 12.9 2 days ago  Interventions: Toradol 30 mg IV    Pt was subsequently admitted to the Inpatient Hospitalist Service for further evaluation and management.               Overview/Hospital Course:  No notes on file    Interval History: Patient was seen resting in bed. No overnight events. Biopsy showed MRSA, " sensitive to Vancomycin. Will continue Vancomycin. Dr. Hernandes took the patient for a washout 1/24. Hyperkalemia resolved. Dr. Hernandes ordered Xrays again 1/26. Pain control improving. Patient occasionally hypertensive, hydralazine ordered. Will continue to monitor    Review of Systems   Constitutional:  Negative for chills, diaphoresis, fatigue and fever.   HENT:  Negative for congestion, rhinorrhea, sinus pressure, sinus pain and sore throat.    Respiratory:  Negative for cough, chest tightness, shortness of breath and wheezing.    Cardiovascular:  Negative for chest pain, palpitations and leg swelling.   Gastrointestinal:  Negative for abdominal distention, abdominal pain, constipation, diarrhea, nausea and vomiting.   Musculoskeletal:  Positive for back pain. Negative for arthralgias and myalgias.   Skin:  Negative for rash and wound.   Neurological:  Positive for weakness. Negative for dizziness, syncope, light-headedness, numbness and headaches.   All other systems reviewed and are negative.  Objective:     Vital Signs (Most Recent):  Temp: 98.1 °F (36.7 °C) (01/27/23 0701)  Pulse: 86 (01/27/23 0701)  Resp: 16 (01/27/23 0701)  BP: (!) 172/80 (01/27/23 0701)  SpO2: 98 % (01/27/23 0701)   Vital Signs (24h Range):  Temp:  [97.8 °F (36.6 °C)-98.2 °F (36.8 °C)] 98.1 °F (36.7 °C)  Pulse:  [] 86  Resp:  [16-19] 16  SpO2:  [95 %-98 %] 98 %  BP: (153-181)/(72-86) 172/80     Weight: 61.7 kg (136 lb 0.4 oz)  Body mass index is 21.95 kg/m².    Intake/Output Summary (Last 24 hours) at 1/27/2023 1054  Last data filed at 1/26/2023 1704  Gross per 24 hour   Intake 240 ml   Output --   Net 240 ml      Physical Exam  Vitals reviewed.   Constitutional:       General: She is awake.      Appearance: Normal appearance. She is well-developed, well-groomed and normal weight.      Interventions: Nasal cannula in place.   HENT:      Head: Normocephalic and atraumatic.      Right Ear: External ear normal.      Left Ear: External ear  normal.      Mouth/Throat:      Mouth: Mucous membranes are moist.      Pharynx: Oropharynx is clear.   Eyes:      Extraocular Movements: Extraocular movements intact.      Pupils: Pupils are equal, round, and reactive to light.   Cardiovascular:      Rate and Rhythm: Regular rhythm.      Pulses: Normal pulses.      Heart sounds: Normal heart sounds.   Pulmonary:      Effort: Pulmonary effort is normal.      Breath sounds: Normal breath sounds.   Abdominal:      General: Bowel sounds are normal.      Palpations: Abdomen is soft.   Musculoskeletal:      Cervical back: Neck supple.      Thoracic back: Tenderness present.      Comments: tenderness to palpation from T6 to sacral area   Skin:     General: Skin is warm and dry.             Comments: Stage 1 pressure wound over sacrum   Neurological:      Mental Status: She is alert and oriented to person, place, and time.   Psychiatric:         Mood and Affect: Mood normal.         Behavior: Behavior normal. Behavior is cooperative.         Thought Content: Thought content normal.       Significant Labs: All pertinent labs within the past 24 hours have been reviewed.    Significant Imaging: I have reviewed all pertinent imaging results/findings within the past 24 hours.      Assessment/Plan:      * Epidural abscess  - Pt known to Dr. Hernandes, has been receiving IV abx outpatient  - Empiric Vanc and Rocephin started  - Norco 5mg q6h for pain  - Dilaudid added Q4hrs PRN  - MRI Thoracic w/o Contrast - Findings suggestive of osteomyelitis discitis of T11-12 with epidural phlegmon or extruded disc contributing to moderate spinal canal stenosis.  Additional epidural phlegmon extending in the left paracentral location and of the left T11-12 and T12-L1 foramina.  - Blood Cultures x2 - No growth at 72 hours  - Pt NPO pending Ortho Spine Eval - Will need diabetic diet when appropriate  - Consult Dr. Hernandes, appreciate his expertise -- IR biopsy successful 1/20/2023 -   - MRSA  sensitive to Vancomycin, will continue   - Surgery/washout 1/24/2023 successful     Type 2 diabetes mellitus, with long-term current use of insulin  Patient's FSGs are uncontrolled due to hyperglycemia on current medication regimen.  Last A1c reviewed-   Lab Results   Component Value Date    HGBA1C 7.9 (H) 01/19/2023     Most recent fingerstick glucose reviewed- No results for input(s): POCTGLUCOSE in the last 24 hours.  Current correctional scale  Medium  Maintain anti-hyperglycemic dose as follows-   Antihyperglycemics (From admission, onward)    Start     Stop Route Frequency Ordered    01/26/23 2100  insulin detemir U-100 injection 20 Units         -- SubQ Nightly 01/26/23 0753    01/24/23 2136  insulin aspart U-100 injection 1-10 Units         -- SubQ Before meals & nightly PRN 01/24/23 2036        Hold Oral hypoglycemics while patient is in the hospital.  Diabetic Educator consulted, appreciate their help    Microcytic anemia  Ferritin - 380  Iron - 12  Iron Sat - 6  TIBC - 188    Ferrous sulfate given Qd    Depression  - Continue Cymbalta      Hypertension  Not currently controlled  Patient took no home meds  Occasionally correspond to pain  Hydralazine 25mg Q8hrs      Hyperkalemia  K 5.9 today, after 2 units pRBCs yesterday in surgery  lokelma TID 1/25  K 3.9 today, resolved       Hypomagnesemia  Mag 1.4 on admission    2g Mag given  1/21/2023 mag was 1.6  1/21/2023 2g mag given  Recheck 1.7 - will give 2g    1/23/2023 mag 1.9        VTE Risk Mitigation (From admission, onward)         Ordered     IP VTE LOW RISK PATIENT  Once         01/19/23 0417     Place sequential compression device  Until discontinued         01/19/23 0417                Discharge Planning   SUZANNA:      Code Status: Full Code   Is the patient medically ready for discharge?:     Reason for patient still in hospital (select all that apply): Laboratory test, Treatment and Consult recommendations  Discharge Plan A: Home with family, Home  Health                  Reed Lloyd MD  Department of Hospital Medicine   [facility] - [address] [place] [address]

## 2023-01-27 NOTE — ASSESSMENT & PLAN NOTE
K 5.9 today, after 2 units pRBCs yesterday in surgery  [facility] 1/25  K 3.9 today, resolved

## 2023-01-27 NOTE — SUBJECTIVE & OBJECTIVE
Interval History: Patient was seen resting in bed. No overnight events. Biopsy showed MRSA, sensitive to Vancomycin. Will continue Vancomycin. Dr. Hernandes took the patient for a washout 1/24. Hyperkalemia resolved. Dr. Hernandes ordered Xrays again 1/26. Pain control improving. Patient occasionally hypertensive, hydralazine ordered. Will continue to monitor    Review of Systems   Constitutional:  Negative for chills, diaphoresis, fatigue and fever.   HENT:  Negative for congestion, rhinorrhea, sinus pressure, sinus pain and sore throat.    Respiratory:  Negative for cough, chest tightness, shortness of breath and wheezing.    Cardiovascular:  Negative for chest pain, palpitations and leg swelling.   Gastrointestinal:  Negative for abdominal distention, abdominal pain, constipation, diarrhea, nausea and vomiting.   Musculoskeletal:  Positive for back pain. Negative for arthralgias and myalgias.   Skin:  Negative for rash and wound.   Neurological:  Positive for weakness. Negative for dizziness, syncope, light-headedness, numbness and headaches.   All other systems reviewed and are negative.  Objective:     Vital Signs (Most Recent):  Temp: 98.1 °F (36.7 °C) (01/27/23 0701)  Pulse: 86 (01/27/23 0701)  Resp: 16 (01/27/23 0701)  BP: (!) 172/80 (01/27/23 0701)  SpO2: 98 % (01/27/23 0701)   Vital Signs (24h Range):  Temp:  [97.8 °F (36.6 °C)-98.2 °F (36.8 °C)] 98.1 °F (36.7 °C)  Pulse:  [] 86  Resp:  [16-19] 16  SpO2:  [95 %-98 %] 98 %  BP: (153-181)/(72-86) 172/80     Weight: 61.7 kg (136 lb 0.4 oz)  Body mass index is 21.95 kg/m².    Intake/Output Summary (Last 24 hours) at 1/27/2023 1054  Last data filed at 1/26/2023 1704  Gross per 24 hour   Intake 240 ml   Output --   Net 240 ml      Physical Exam  Vitals reviewed.   Constitutional:       General: She is awake.      Appearance: Normal appearance. She is well-developed, well-groomed and normal weight.      Interventions: Nasal cannula in place.   HENT:      Head:  Normocephalic and atraumatic.      Right Ear: External ear normal.      Left Ear: External ear normal.      Mouth/Throat:      Mouth: Mucous membranes are moist.      Pharynx: Oropharynx is clear.   Eyes:      Extraocular Movements: Extraocular movements intact.      Pupils: Pupils are equal, round, and reactive to light.   Cardiovascular:      Rate and Rhythm: Regular rhythm.      Pulses: Normal pulses.      Heart sounds: Normal heart sounds.   Pulmonary:      Effort: Pulmonary effort is normal.      Breath sounds: Normal breath sounds.   Abdominal:      General: Bowel sounds are normal.      Palpations: Abdomen is soft.   Musculoskeletal:      Cervical back: Neck supple.      Thoracic back: Tenderness present.      Comments: tenderness to palpation from T6 to sacral area   Skin:     General: Skin is warm and dry.             Comments: Stage 1 pressure wound over sacrum   Neurological:      Mental Status: She is alert and oriented to person, place, and time.   Psychiatric:         Mood and Affect: Mood normal.         Behavior: Behavior normal. Behavior is cooperative.         Thought Content: Thought content normal.       Significant Labs: All pertinent labs within the past 24 hours have been reviewed.    Significant Imaging: I have reviewed all pertinent imaging results/findings within the past 24 hours.

## 2023-01-27 NOTE — PLAN OF CARE
Spoke with patient and confirmed pt current with [facility] and choice to cont at dc, clinicals sent to mykel with [facility]

## 2023-01-27 NOTE — PLAN OF CARE
Problem: Adult Inpatient Plan of Care  Goal: Plan of Care Review  Outcome: Ongoing, Not Progressing  Goal: Patient-Specific Goal (Individualized)  Outcome: Ongoing, Not Progressing  Goal: Absence of Hospital-Acquired Illness or Injury  Outcome: Ongoing, Not Progressing  Goal: Optimal Comfort and Wellbeing  Outcome: Ongoing, Not Progressing  Goal: Readiness for Transition of Care  Outcome: Ongoing, Not Progressing     Problem: Diabetes Comorbidity  Goal: Blood Glucose Level Within Targeted Range  Outcome: Ongoing, Not Progressing     Problem: Infection  Goal: Absence of Infection Signs and Symptoms  Outcome: Ongoing, Not Progressing     Problem: Impaired Wound Healing  Goal: Optimal Wound Healing  Outcome: Ongoing, Not Progressing     Problem: Skin Injury Risk Increased  Goal: Skin Health and Integrity  Outcome: Ongoing, Not Progressing     Problem: Fall Injury Risk  Goal: Absence of Fall and Fall-Related Injury  Outcome: Ongoing, Not Progressing

## 2023-01-27 NOTE — PT/OT/SLP PROGRESS
Physical Therapy Treatment    Patient Name:  Monica Walker   MRN:  [id number]    Recommendations:     Discharge Recommendations: home with home health, LTACH (long-term acute care hospital), nursing facility, skilled  Discharge Equipment Recommendations: none  Barriers to discharge: None    Assessment:     Monica Walker is a 52 y.o. female admitted with a medical diagnosis of Epidural abscess.  She presents with the following impairments/functional limitations: weakness, pain .    Rehab Prognosis: Good and Fair; patient would benefit from acute skilled PT services to address these deficits and reach maximum level of function.    Recent Surgery: Procedure(s) (LRB):  T9-L2 fusion, T11-T12 laminectomy and corpectomy (N/A) 3 Days Post-Op    Received Plan of Care per Rafaela Aggarwal, PT     Plan:     During this hospitalization, patient to be seen daily to address the identified rehab impairments via gait training, therapeutic activities, therapeutic exercises and progress toward the following goals:    Plan of Care Expires:  02/25/23    Subjective     Chief Complaint: pain as noted  Patient/Family Comments/goals: agrees to exercises in bed only today  Pain/Comfort:  Pain Rating 1: 8/10  Location 1: back  Pain Addressed 1: Reposition, Cessation of Activity  Pain Rating Post-Intervention 1: 8/10  Pain Rating 2: 8/10  Location - Side 2: Right  Location 2: hip  Pain Addressed 2: Reposition, Cessation of Activity  Pain Rating Post-Intervention 2: 8/10      Objective:     Communicated with HAYDEE Olivia prior to session.  Patient found supine with peripheral IV, hemovac upon PT entry to room.     General Precautions: Standard, fall  Orthopedic Precautions: spinal precautions  Braces: N/A  Respiratory Status: Room air     Functional Mobility:  Bed exercises only per pt request due to pain      AM-PAC 6 CLICK MOBILITY          Treatment & Education:  BLE exercises x 15-20 repetitions each: ankle pumps, quad sets, gluteal sets,  heel slides, short arc quads, straight leg raises, hip abduction; attempted bridging but this increased pain  Pt declined to sit up edge of bed, or any out of bed activities    Patient left supine with all lines intact and call button in reach..    GOALS:   Multidisciplinary Problems       Physical Therapy Goals          Problem: Physical Therapy    Goal Priority Disciplines Outcome Goal Variances Interventions   Physical Therapy Goal     PT, PT/OT Ongoing, Progressing     Description: Short Term Goals to be met by: 23    Patient will increase functional independence with mobility by performin. Supine to sit with Stand by assist  2. Sit to stand transfer with contact guard assist using Rolling walker  3. Bed to chair transfer with contact guard assist using Rolling walker  4. Gait  x 25 feet with contact guard assist using Rolling walker  5. Lower extremity exercise program x30 reps per handout, with assistance as needed  6. Pt to recall 3/3 spinal precautions     Long Term Goals to be met by: 23    Pt will regain full independent functional mobility with Rolling walker to return to home situation and prior activities of daily living.                        Time Tracking:     PT Received On: 23  PT Start Time: 1400     PT Stop Time: 1415  PT Total Time (min): 15 min     Billable Minutes: Therapeutic Exercise 15    Treatment Type: Treatment  PT/PTA: PTA     PTA Visit Number: 1     2023

## 2023-01-28 LAB
ANION GAP SERPL CALCULATED.3IONS-SCNC: 11 MMOL/L (ref 7–16)
BASOPHILS # BLD AUTO: 0.06 K/UL (ref 0–0.2)
BASOPHILS NFR BLD AUTO: 0.5 % (ref 0–1)
BUN SERPL-MCNC: 22 MG/DL (ref 7–18)
BUN/CREAT SERPL: 16 (ref 6–20)
CALCIUM SERPL-MCNC: 9.2 MG/DL (ref 8.5–10.1)
CHLORIDE SERPL-SCNC: 100 MMOL/L (ref 98–107)
CO2 SERPL-SCNC: 30 MMOL/L (ref 21–32)
CREAT SERPL-MCNC: 1.36 MG/DL (ref 0.55–1.02)
DIFFERENTIAL METHOD BLD: ABNORMAL
EGFR (NO RACE VARIABLE) (RUSH/TITUS): 46 ML/MIN/1.73M²
EOSINOPHIL # BLD AUTO: 0.51 K/UL (ref 0–0.5)
EOSINOPHIL NFR BLD AUTO: 4.4 % (ref 1–4)
ERYTHROCYTE [DISTWIDTH] IN BLOOD BY AUTOMATED COUNT: 17.4 % (ref 11.5–14.5)
GLUCOSE SERPL-MCNC: 124 MG/DL (ref 74–106)
GLUCOSE SERPL-MCNC: 125 MG/DL (ref 70–105)
GLUCOSE SERPL-MCNC: 230 MG/DL (ref 70–105)
GLUCOSE SERPL-MCNC: 270 MG/DL (ref 70–105)
GLUCOSE SERPL-MCNC: 336 MG/DL (ref 70–105)
GLUCOSE SERPL-MCNC: 352 MG/DL (ref 70–105)
HCT VFR BLD AUTO: 29.5 % (ref 38–47)
HGB BLD-MCNC: 8.6 G/DL (ref 12–16)
IMM GRANULOCYTES # BLD AUTO: 0.08 K/UL (ref 0–0.04)
IMM GRANULOCYTES NFR BLD: 0.7 % (ref 0–0.4)
LYMPHOCYTES # BLD AUTO: 2.52 K/UL (ref 1–4.8)
LYMPHOCYTES NFR BLD AUTO: 21.6 % (ref 27–41)
MCH RBC QN AUTO: 23 PG (ref 27–31)
MCHC RBC AUTO-ENTMCNC: 29.2 G/DL (ref 32–36)
MCV RBC AUTO: 78.9 FL (ref 80–96)
MONOCYTES # BLD AUTO: 0.74 K/UL (ref 0–0.8)
MONOCYTES NFR BLD AUTO: 6.3 % (ref 2–6)
MPC BLD CALC-MCNC: 8.8 FL (ref 9.4–12.4)
NEUTROPHILS # BLD AUTO: 7.75 K/UL (ref 1.8–7.7)
NEUTROPHILS NFR BLD AUTO: 66.5 % (ref 53–65)
NRBC # BLD AUTO: 0 X10E3/UL
NRBC, AUTO (.00): 0 %
PLATELET # BLD AUTO: 461 K/UL (ref 150–400)
POTASSIUM SERPL-SCNC: 3.5 MMOL/L (ref 3.5–5.1)
RBC # BLD AUTO: 3.74 M/UL (ref 4.2–5.4)
SODIUM SERPL-SCNC: 137 MMOL/L (ref 136–145)
VANCOMYCIN TROUGH SERPL-MCNC: 22.6 ΜG/ML (ref 10–20)
WBC # BLD AUTO: 11.66 K/UL (ref 4.5–11)

## 2023-01-28 PROCEDURE — 63600175 PHARM REV CODE 636 W HCPCS

## 2023-01-28 PROCEDURE — 80048 BASIC METABOLIC PNL TOTAL CA: CPT | Performed by: ORTHOPAEDIC SURGERY

## 2023-01-28 PROCEDURE — 82962 GLUCOSE BLOOD TEST: CPT

## 2023-01-28 PROCEDURE — 25000003 PHARM REV CODE 250: Performed by: ORTHOPAEDIC SURGERY

## 2023-01-28 PROCEDURE — 25000003 PHARM REV CODE 250: Performed by: FAMILY MEDICINE

## 2023-01-28 PROCEDURE — 99232 SBSQ HOSP IP/OBS MODERATE 35: CPT | Mod: GC,,, | Performed by: FAMILY MEDICINE

## 2023-01-28 PROCEDURE — 25000003 PHARM REV CODE 250

## 2023-01-28 PROCEDURE — 97535 SELF CARE MNGMENT TRAINING: CPT

## 2023-01-28 PROCEDURE — 63600175 PHARM REV CODE 636 W HCPCS: Performed by: FAMILY MEDICINE

## 2023-01-28 PROCEDURE — 85025 COMPLETE CBC W/AUTO DIFF WBC: CPT | Performed by: ORTHOPAEDIC SURGERY

## 2023-01-28 PROCEDURE — 80202 ASSAY OF VANCOMYCIN: CPT | Performed by: FAMILY MEDICINE

## 2023-01-28 PROCEDURE — 99232 PR SUBSEQUENT HOSPITAL CARE,LEVL II: ICD-10-PCS | Mod: GC,,, | Performed by: FAMILY MEDICINE

## 2023-01-28 PROCEDURE — 11000001 HC ACUTE MED/SURG PRIVATE ROOM

## 2023-01-28 RX ORDER — HYDROMORPHONE HYDROCHLORIDE 2 MG/ML
0.5 INJECTION, SOLUTION INTRAMUSCULAR; INTRAVENOUS; SUBCUTANEOUS 4 TIMES DAILY PRN
Status: DISCONTINUED | OUTPATIENT
Start: 2023-01-28 | End: 2023-02-02

## 2023-01-28 RX ORDER — HYDROCODONE BITARTRATE AND ACETAMINOPHEN 10; 325 MG/1; MG/1
1 TABLET ORAL EVERY 6 HOURS PRN
Status: DISCONTINUED | OUTPATIENT
Start: 2023-01-28 | End: 2023-02-05

## 2023-01-28 RX ORDER — INSULIN ASPART 100 [IU]/ML
5 INJECTION, SOLUTION INTRAVENOUS; SUBCUTANEOUS
Status: DISCONTINUED | OUTPATIENT
Start: 2023-01-28 | End: 2023-02-05

## 2023-01-28 RX ORDER — INSULIN ASPART 100 [IU]/ML
5 INJECTION, SOLUTION INTRAVENOUS; SUBCUTANEOUS
Status: DISCONTINUED | OUTPATIENT
Start: 2023-01-29 | End: 2023-01-28

## 2023-01-28 RX ADMIN — HYDRALAZINE HYDROCHLORIDE 25 MG: 25 TABLET ORAL at 01:01

## 2023-01-28 RX ADMIN — INSULIN ASPART 5 UNITS: 100 INJECTION, SOLUTION INTRAVENOUS; SUBCUTANEOUS at 08:01

## 2023-01-28 RX ADMIN — SODIUM CHLORIDE: 9 INJECTION, SOLUTION INTRAVENOUS at 05:01

## 2023-01-28 RX ADMIN — INSULIN DETEMIR 23 UNITS: 100 INJECTION, SOLUTION SUBCUTANEOUS at 08:01

## 2023-01-28 RX ADMIN — DIPHENHYDRAMINE HYDROCHLORIDE 25 MG: 50 INJECTION, SOLUTION INTRAMUSCULAR; INTRAVENOUS at 01:01

## 2023-01-28 RX ADMIN — VANCOMYCIN HYDROCHLORIDE 1250 MG: 500 INJECTION, POWDER, LYOPHILIZED, FOR SOLUTION INTRAVENOUS at 05:01

## 2023-01-28 RX ADMIN — Medication 1 EACH: at 08:01

## 2023-01-28 RX ADMIN — INSULIN ASPART 10 UNITS: 100 INJECTION, SOLUTION INTRAVENOUS; SUBCUTANEOUS at 04:01

## 2023-01-28 RX ADMIN — DOCUSATE SODIUM 100 MG: 100 CAPSULE, LIQUID FILLED ORAL at 08:01

## 2023-01-28 RX ADMIN — HYDROMORPHONE HYDROCHLORIDE 1 MG: 2 INJECTION, SOLUTION INTRAMUSCULAR; INTRAVENOUS; SUBCUTANEOUS at 12:01

## 2023-01-28 RX ADMIN — HYDROMORPHONE HYDROCHLORIDE 1 MG: 2 INJECTION, SOLUTION INTRAMUSCULAR; INTRAVENOUS; SUBCUTANEOUS at 05:01

## 2023-01-28 RX ADMIN — HYDROMORPHONE HYDROCHLORIDE 1 MG: 2 INJECTION, SOLUTION INTRAMUSCULAR; INTRAVENOUS; SUBCUTANEOUS at 09:01

## 2023-01-28 RX ADMIN — DIPHENHYDRAMINE HYDROCHLORIDE 25 MG: 50 INJECTION, SOLUTION INTRAMUSCULAR; INTRAVENOUS at 08:01

## 2023-01-28 RX ADMIN — Medication 6 MG: at 08:01

## 2023-01-28 RX ADMIN — INSULIN ASPART 6 UNITS: 100 INJECTION, SOLUTION INTRAVENOUS; SUBCUTANEOUS at 12:01

## 2023-01-28 RX ADMIN — SODIUM CHLORIDE: 9 INJECTION, SOLUTION INTRAVENOUS at 03:01

## 2023-01-28 RX ADMIN — HYDRALAZINE HYDROCHLORIDE 25 MG: 25 TABLET ORAL at 10:01

## 2023-01-28 RX ADMIN — HYDRALAZINE HYDROCHLORIDE 25 MG: 25 TABLET ORAL at 05:01

## 2023-01-28 RX ADMIN — HYDROMORPHONE HYDROCHLORIDE 0.5 MG: 2 INJECTION, SOLUTION INTRAMUSCULAR; INTRAVENOUS; SUBCUTANEOUS at 08:01

## 2023-01-28 RX ADMIN — DULOXETINE 60 MG: 30 CAPSULE, DELAYED RELEASE ORAL at 08:01

## 2023-01-28 RX ADMIN — HYDROMORPHONE HYDROCHLORIDE 0.5 MG: 2 INJECTION, SOLUTION INTRAMUSCULAR; INTRAVENOUS; SUBCUTANEOUS at 01:01

## 2023-01-28 NOTE — PROGRESS NOTES
"[facility] Hospital Medicine  Progress Note    Patient Name: Monica Walker  MRN: [id number]  Patient Class: IP- Inpatient   Admission Date: 1/19/2023  Length of Stay: 9 days  Attending Physician: Vinay Rosen Jr., MD  Primary Care Provider: Hannah Schulz MD        Subjective:     Principal Problem:Epidural abscess        HPI:  Patient is a 51 y/o Female presenting to [facility] with a cc of back pain. She states the pain began to worsen approximately 16 hours ago. She states the pain is in her Mid back area and radiates to both legs. She describes the pain as "throbbing, constant and a 9/10." She states the pain is blunted by taking Hydrocodone but not relieved. She reports the pain is made worse by movement and standing.    Patient reports she was hospitalized in November for sepsis secondary to Pyelonephritis. She has been hospitalized several times over the past 3 months. She is currently being treated at home with IV Abx therapy for Osteomyelitis of the spine.  MRI of Lumbar spine on 1/10/2023 (Per radiology report). Findings indicative of osteomyelitis involving T11-12 with epidural abscess extending from the T11-12 disc level to the L1 level eccentric towards the left.  Additional site of infection of the right L2-3 facet joint as well as osteomyelitis of the right L3 transverse process.    Significant findings in the ED include:  Vitals: /84, , RR 16, Temp 98.2 F, O2 98% on RA  Labs: WBC 14.6, HGB 8.2, HCT 27.1, MCV 74.2, Na 133, K 3.5, Bun 8, Crt0.54, , Glucose 171, UA normal.  today;124 2 days ago, CRP  22.90 Today; 12.9 2 days ago  Interventions: Toradol 30 mg IV    Pt was subsequently admitted to the Inpatient Hospitalist Service for further evaluation and management.               Overview/Hospital Course:  No notes on file    Interval History: Patient lying comfortably in bed. She is still complaining of some back pain " but states it is improving overall. She still has a drain in as well. Hopefully discharge in the next couple of days with home health.     Review of Systems   Constitutional:  Negative for chills, diaphoresis, fatigue and fever.   HENT:  Negative for congestion, rhinorrhea, sinus pressure, sinus pain and sore throat.    Respiratory:  Negative for cough, chest tightness, shortness of breath and wheezing.    Cardiovascular:  Negative for chest pain, palpitations and leg swelling.   Gastrointestinal:  Negative for abdominal distention, abdominal pain, constipation, diarrhea, nausea and vomiting.   Musculoskeletal:  Positive for back pain. Negative for arthralgias and myalgias.   Skin:  Negative for rash and wound.   Neurological:  Positive for weakness. Negative for dizziness, syncope, light-headedness, numbness and headaches.   All other systems reviewed and are negative.  Objective:     Vital Signs (Most Recent):  Temp: 98 °F (36.7 °C) (01/28/23 0700)  Pulse: 82 (01/28/23 0700)  Resp: 20 (01/28/23 0942)  BP: (!) 132/58 (01/28/23 0700)  SpO2: 96 % (01/28/23 0700)   Vital Signs (24h Range):  Temp:  [98 °F (36.7 °C)-98.7 °F (37.1 °C)] 98 °F (36.7 °C)  Pulse:  [70-88] 82  Resp:  [16-20] 20  SpO2:  [95 %-97 %] 96 %  BP: (132-189)/(58-89) 132/58     Weight: 61.7 kg (136 lb 0.4 oz)  Body mass index is 21.95 kg/m².    Intake/Output Summary (Last 24 hours) at 1/28/2023 0958  Last data filed at 1/27/2023 1707  Gross per 24 hour   Intake 240 ml   Output --   Net 240 ml      Physical Exam  Vitals and nursing note reviewed.   Constitutional:       General: She is awake.      Appearance: Normal appearance. She is well-developed, well-groomed and normal weight.      Interventions: Nasal cannula in place.   HENT:      Head: Normocephalic and atraumatic.      Right Ear: External ear normal.      Left Ear: External ear normal.      Mouth/Throat:      Mouth: Mucous membranes are moist.      Pharynx: Oropharynx is clear.   Eyes:       Extraocular Movements: Extraocular movements intact.      Pupils: Pupils are equal, round, and reactive to light.   Cardiovascular:      Rate and Rhythm: Regular rhythm.      Pulses: Normal pulses.      Heart sounds: Normal heart sounds.   Pulmonary:      Effort: Pulmonary effort is normal.      Breath sounds: Normal breath sounds.   Abdominal:      General: Bowel sounds are normal.      Palpations: Abdomen is soft.   Musculoskeletal:      Cervical back: Neck supple.      Thoracic back: Tenderness present.      Comments: tenderness to palpation from T6 to sacral area   Skin:     General: Skin is warm and dry.             Comments: Stage 1 pressure wound over sacrum   Neurological:      Mental Status: She is alert and oriented to person, place, and time.   Psychiatric:         Mood and Affect: Mood normal.         Behavior: Behavior normal. Behavior is cooperative.         Thought Content: Thought content normal.       Significant Labs: All pertinent labs within the past 24 hours have been reviewed.    Significant Imaging: I have reviewed all pertinent imaging results/findings within the past 24 hours.      Assessment/Plan:      * Epidural abscess  - Pt known to Dr. Hernandes, has been receiving IV abx outpatient  - Empiric Vanc and Rocephin started  - Norco 5mg q6h for pain  - Dilaudid added Q4hrs PRN  - MRI Thoracic w/o Contrast - Findings suggestive of osteomyelitis discitis of T11-12 with epidural phlegmon or extruded disc contributing to moderate spinal canal stenosis.  Additional epidural phlegmon extending in the left paracentral location and of the left T11-12 and T12-L1 foramina.  - Blood Cultures x2 - No growth at 72 hours  - Pt NPO pending Ortho Spine Eval - Will need diabetic diet when appropriate  - Consult Dr. Hernandes, appreciate his expertise -- IR biopsy successful 1/20/2023 -   - MRSA sensitive to Vancomycin, will continue   - Surgery/washout 1/24/2023 successful     Hypertension  Not currently  controlled  Patient took no home meds  Occasionally correspond to pain  Hydralazine 25mg Q8hrs      Hyperkalemia  K 5.9 today, after 2 units pRBCs yesterday in surgery  lokelma TID 1/25  K 3.5 today, resolved       Hypomagnesemia  Mag 1.4 on admission    2g Mag given  1/21/2023 mag was 1.6  1/21/2023 2g mag given  Recheck 1.7 - will give 2g    1/23/2023 mag 1.9      Microcytic anemia  Ferritin - 380  Iron - 12  Iron Sat - 6  TIBC - 188    Ferrous sulfate given Qd    Type 2 diabetes mellitus, with long-term current use of insulin  Patient's FSGs are uncontrolled due to hyperglycemia on current medication regimen.  Last A1c reviewed-   Lab Results   Component Value Date    HGBA1C 7.9 (H) 01/19/2023     Most recent fingerstick glucose reviewed- No results for input(s): POCTGLUCOSE in the last 24 hours.  Current correctional scale  Medium  Maintain anti-hyperglycemic dose as follows-   Antihyperglycemics (From admission, onward)    Start     Stop Route Frequency Ordered    01/28/23 2100  insulin detemir U-100 injection 23 Units         -- SubQ Nightly 01/27/23 2132    01/24/23 2136  insulin aspart U-100 injection 1-10 Units         -- SubQ Before meals & nightly PRN 01/24/23 2036        Hold Oral hypoglycemics while patient is in the hospital.  Diabetic Educator consulted, appreciate their help    Depression  - Continue Cymbalta        VTE Risk Mitigation (From admission, onward)         Ordered     IP VTE LOW RISK PATIENT  Once         01/19/23 0417     Place sequential compression device  Until discontinued         01/19/23 0417                Discharge Planning   SUZANNA:      Code Status: Full Code   Is the patient medically ready for discharge?:     Reason for patient still in hospital (select all that apply): Treatment  Discharge Plan A: Home with family, Home Health                  Brett Chinchilla DO  Department of Hospital Medicine   [facility]

## 2023-01-28 NOTE — ASSESSMENT & PLAN NOTE
Patient's FSGs are uncontrolled due to hyperglycemia on current medication regimen.  Last A1c reviewed-   Lab Results   Component Value Date    HGBA1C 7.9 (H) 01/19/2023     Most recent fingerstick glucose reviewed- No results for input(s): POCTGLUCOSE in the last 24 hours.  Current correctional scale  Medium  Maintain anti-hyperglycemic dose as follows-   Antihyperglycemics (From admission, onward)    Start     Stop Route Frequency Ordered    01/28/23 2100  insulin detemir U-100 injection 23 Units         -- SubQ Nightly 01/27/23 2132 01/24/23 2136  insulin aspart U-100 injection 1-10 Units         -- SubQ Before meals & nightly PRN 01/24/23 2036        Hold Oral hypoglycemics while patient is in the hospital.  Diabetic Educator consulted, appreciate their help

## 2023-01-28 NOTE — PLAN OF CARE
Problem: Adult Inpatient Plan of Care  Goal: Plan of Care Review  Outcome: Ongoing, Progressing  Goal: Patient-Specific Goal (Individualized)  Outcome: Ongoing, Progressing  Goal: Absence of Hospital-Acquired Illness or Injury  Outcome: Ongoing, Progressing  Goal: Optimal Comfort and Wellbeing  Outcome: Ongoing, Progressing  Goal: Readiness for Transition of Care  Outcome: Ongoing, Progressing     Problem: Diabetes Comorbidity  Goal: Blood Glucose Level Within Targeted Range  Outcome: Ongoing, Progressing     Problem: Infection  Goal: Absence of Infection Signs and Symptoms  Outcome: Ongoing, Progressing     Problem: Impaired Wound Healing  Goal: Optimal Wound Healing  Outcome: Ongoing, Progressing     Problem: Skin Injury Risk Increased  Goal: Skin Health and Integrity  Outcome: Ongoing, Progressing     Problem: Fall Injury Risk  Goal: Absence of Fall and Fall-Related Injury  Outcome: Ongoing, Progressing      unknown

## 2023-01-28 NOTE — PT/OT/SLP PROGRESS
Occupational Therapy   Treatment    Name: Monica Walker  MRN: [id number]  Admitting Diagnosis:  Epidural abscess  4 Days Post-Op    Recommendations:     Discharge Recommendations: home with home health, LTACH (long-term acute care hospital), nursing facility, skilled  Discharge Equipment Recommendations:  none  Barriers to discharge:   (medical treatment)    Assessment:     Monica Walker is a 52 y.o. female with a medical diagnosis of Epidural abscess.  She presents with alert and agreeable to treatment. Performance deficits affecting function are weakness, pain, orthopedic precautions, impaired self care skills, impaired functional mobility.     Rehab Prognosis:  Good; patient would benefit from acute skilled OT services to address these deficits and reach maximum level of function.       Plan:     Patient to be seen 5 x/week to address the above listed problems via self-care/home management, therapeutic activities, therapeutic exercises  Plan of Care Expires: 02/28/23  Plan of Care Reviewed with: patient    Subjective     Pain/Comfort:  Pain Rating 1: 7/10  Location 1: back  Pain Addressed 1: Pre-medicate for activity, Reposition, Cessation of Activity  Pain Rating Post-Intervention 1: 8/10    Objective:     Communicated with: HAYDEE Reyna prior to session.  Patient found supine with peripheral IV, hemovac upon OT entry to room.    General Precautions: Standard, fall    Orthopedic Precautions:spinal precautions  Braces: N/A  Respiratory Status: Room air     Occupational Performance:     Bed Mobility:    Patient completed Supine to Sit with modified independence and increased time and effort  Patient completed Sit to Supine with modified independence     Functional Mobility/Transfers:  Patient completed Sit <> Stand Transfer with stand by assistance  with  no assistive device   Patient completed Toilet Transfer Step Transfer technique with contact guard assistance with  furniture cruising some  and a few  steps without AD  Functional Mobility: Pt demonstrates flexed posture, wants to furniture cruise as she steps around, short small steps    Activities of Daily Living:  Grooming: independence with access to supplies  Bathing: Pt sat EOB to bathe and washed upper body anterior with mod (A) due to c/o back pain with sitting    Upper Body Dressing: contact guard assistance with hospital gown  Lower Body Dressing: moderate assistance due to c/o pain  Toileting: contact guard assistance for clothes management, SBA for hygiene      [facility] 6 Click ADL: 19    Treatment & Education:  Pt was limited by pain and pt's mother was present and very much wanting to assist her daughter. Pt encouraged to do as much as she can for herself.    Patient left supine with all lines intact, call button in reach, and pt's mother present and HAYDEE García notified.    GOALS:   Multidisciplinary Problems       Occupational Therapy Goals          Problem: Occupational Therapy    Goal Priority Disciplines Outcome Interventions   Occupational Therapy Goal     OT, PT/OT Ongoing, Progressing    Description: STG: (In 2 weeks)  Pt will perform grooming with setup  Pt will bathe with set up and min(A)  Pt will perform UE dressing with setup  Pt will perform LE dressing with min(A) and with AD if needed  Pt will transfer bed/chair/bsc with CGA with AD  Pt will perform standing task x 2 min with CGA and AD if needed  Pt will tolerate 20 minutes of tx without fatigue      LT.Restore to max I with self care and mobility.                          Time Tracking:     OT Date of Treatment: 23  OT Start Time: 1032  OT Stop Time: 1059  OT Total Time (min): 27 min    Billable Minutes:Self Care/Home Management 27 min    OT/TRISH: OT          2023

## 2023-01-28 NOTE — ASSESSMENT & PLAN NOTE
K 5.9 today, after 2 units pRBCs yesterday in surgery  [facility] 1/25  K 3.5 today, resolved

## 2023-01-29 LAB
ANION GAP SERPL CALCULATED.3IONS-SCNC: 12 MMOL/L (ref 7–16)
BASOPHILS # BLD AUTO: 0.05 K/UL (ref 0–0.2)
BASOPHILS NFR BLD AUTO: 0.4 % (ref 0–1)
BUN SERPL-MCNC: 22 MG/DL (ref 7–18)
BUN/CREAT SERPL: 17 (ref 6–20)
CALCIUM SERPL-MCNC: 8.7 MG/DL (ref 8.5–10.1)
CHLORIDE SERPL-SCNC: 99 MMOL/L (ref 98–107)
CO2 SERPL-SCNC: 29 MMOL/L (ref 21–32)
CREAT SERPL-MCNC: 1.32 MG/DL (ref 0.55–1.02)
DIFFERENTIAL METHOD BLD: ABNORMAL
EGFR (NO RACE VARIABLE) (RUSH/TITUS): 48 ML/MIN/1.73M²
EOSINOPHIL # BLD AUTO: 0.58 K/UL (ref 0–0.5)
EOSINOPHIL NFR BLD AUTO: 4.5 % (ref 1–4)
ERYTHROCYTE [DISTWIDTH] IN BLOOD BY AUTOMATED COUNT: 17.6 % (ref 11.5–14.5)
GLUCOSE SERPL-MCNC: 131 MG/DL (ref 74–106)
GLUCOSE SERPL-MCNC: 161 MG/DL (ref 70–105)
GLUCOSE SERPL-MCNC: 224 MG/DL (ref 70–105)
GLUCOSE SERPL-MCNC: 254 MG/DL (ref 70–105)
GLUCOSE SERPL-MCNC: 384 MG/DL (ref 70–105)
HCT VFR BLD AUTO: 26.7 % (ref 38–47)
HGB BLD-MCNC: 8.2 G/DL (ref 12–16)
IMM GRANULOCYTES # BLD AUTO: 0.11 K/UL (ref 0–0.04)
IMM GRANULOCYTES NFR BLD: 0.8 % (ref 0–0.4)
LYMPHOCYTES # BLD AUTO: 2.37 K/UL (ref 1–4.8)
LYMPHOCYTES NFR BLD AUTO: 18.3 % (ref 27–41)
MCH RBC QN AUTO: 23.4 PG (ref 27–31)
MCHC RBC AUTO-ENTMCNC: 30.7 G/DL (ref 32–36)
MCV RBC AUTO: 76.3 FL (ref 80–96)
MONOCYTES # BLD AUTO: 1.06 K/UL (ref 0–0.8)
MONOCYTES NFR BLD AUTO: 8.2 % (ref 2–6)
MPC BLD CALC-MCNC: 8.6 FL (ref 9.4–12.4)
NEUTROPHILS # BLD AUTO: 8.79 K/UL (ref 1.8–7.7)
NEUTROPHILS NFR BLD AUTO: 67.8 % (ref 53–65)
NRBC # BLD AUTO: 0 X10E3/UL
NRBC, AUTO (.00): 0 %
PLATELET # BLD AUTO: 400 K/UL (ref 150–400)
POTASSIUM SERPL-SCNC: 3.8 MMOL/L (ref 3.5–5.1)
RBC # BLD AUTO: 3.5 M/UL (ref 4.2–5.4)
SODIUM SERPL-SCNC: 136 MMOL/L (ref 136–145)
WBC # BLD AUTO: 12.96 K/UL (ref 4.5–11)

## 2023-01-29 PROCEDURE — 99232 SBSQ HOSP IP/OBS MODERATE 35: CPT | Mod: GC,,, | Performed by: FAMILY MEDICINE

## 2023-01-29 PROCEDURE — 94761 N-INVAS EAR/PLS OXIMETRY MLT: CPT

## 2023-01-29 PROCEDURE — 25000003 PHARM REV CODE 250

## 2023-01-29 PROCEDURE — 80048 BASIC METABOLIC PNL TOTAL CA: CPT | Performed by: ORTHOPAEDIC SURGERY

## 2023-01-29 PROCEDURE — 25000003 PHARM REV CODE 250: Performed by: ORTHOPAEDIC SURGERY

## 2023-01-29 PROCEDURE — 63600175 PHARM REV CODE 636 W HCPCS

## 2023-01-29 PROCEDURE — 82962 GLUCOSE BLOOD TEST: CPT

## 2023-01-29 PROCEDURE — 85025 COMPLETE CBC W/AUTO DIFF WBC: CPT | Performed by: ORTHOPAEDIC SURGERY

## 2023-01-29 PROCEDURE — 25000003 PHARM REV CODE 250: Performed by: FAMILY MEDICINE

## 2023-01-29 PROCEDURE — 63600175 PHARM REV CODE 636 W HCPCS: Performed by: FAMILY MEDICINE

## 2023-01-29 PROCEDURE — 99232 PR SUBSEQUENT HOSPITAL CARE,LEVL II: ICD-10-PCS | Mod: GC,,, | Performed by: FAMILY MEDICINE

## 2023-01-29 PROCEDURE — 11000001 HC ACUTE MED/SURG PRIVATE ROOM

## 2023-01-29 RX ADMIN — DULOXETINE 60 MG: 30 CAPSULE, DELAYED RELEASE ORAL at 08:01

## 2023-01-29 RX ADMIN — HYDRALAZINE HYDROCHLORIDE 25 MG: 25 TABLET ORAL at 09:01

## 2023-01-29 RX ADMIN — SODIUM CHLORIDE: 9 INJECTION, SOLUTION INTRAVENOUS at 03:01

## 2023-01-29 RX ADMIN — Medication 1 EACH: at 08:01

## 2023-01-29 RX ADMIN — HYDROMORPHONE HYDROCHLORIDE 0.5 MG: 2 INJECTION, SOLUTION INTRAMUSCULAR; INTRAVENOUS; SUBCUTANEOUS at 09:01

## 2023-01-29 RX ADMIN — SODIUM CHLORIDE: 9 INJECTION, SOLUTION INTRAVENOUS at 02:01

## 2023-01-29 RX ADMIN — DIPHENHYDRAMINE HYDROCHLORIDE 25 MG: 50 INJECTION, SOLUTION INTRAMUSCULAR; INTRAVENOUS at 09:01

## 2023-01-29 RX ADMIN — INSULIN ASPART 5 UNITS: 100 INJECTION, SOLUTION INTRAVENOUS; SUBCUTANEOUS at 11:01

## 2023-01-29 RX ADMIN — HYDRALAZINE HYDROCHLORIDE 25 MG: 25 TABLET ORAL at 02:01

## 2023-01-29 RX ADMIN — DIPHENHYDRAMINE HYDROCHLORIDE 25 MG: 50 INJECTION, SOLUTION INTRAMUSCULAR; INTRAVENOUS at 03:01

## 2023-01-29 RX ADMIN — INSULIN ASPART 5 UNITS: 100 INJECTION, SOLUTION INTRAVENOUS; SUBCUTANEOUS at 04:01

## 2023-01-29 RX ADMIN — DOCUSATE SODIUM 100 MG: 100 CAPSULE, LIQUID FILLED ORAL at 08:01

## 2023-01-29 RX ADMIN — INSULIN ASPART 3 UNITS: 100 INJECTION, SOLUTION INTRAVENOUS; SUBCUTANEOUS at 09:01

## 2023-01-29 RX ADMIN — INSULIN ASPART 2 UNITS: 100 INJECTION, SOLUTION INTRAVENOUS; SUBCUTANEOUS at 06:01

## 2023-01-29 RX ADMIN — VANCOMYCIN HYDROCHLORIDE 1000 MG: 1 INJECTION, POWDER, LYOPHILIZED, FOR SOLUTION INTRAVENOUS at 06:01

## 2023-01-29 RX ADMIN — HYDROMORPHONE HYDROCHLORIDE 0.5 MG: 2 INJECTION, SOLUTION INTRAMUSCULAR; INTRAVENOUS; SUBCUTANEOUS at 03:01

## 2023-01-29 RX ADMIN — HYDRALAZINE HYDROCHLORIDE 25 MG: 25 TABLET ORAL at 06:01

## 2023-01-29 RX ADMIN — INSULIN ASPART 10 UNITS: 100 INJECTION, SOLUTION INTRAVENOUS; SUBCUTANEOUS at 11:01

## 2023-01-29 RX ADMIN — INSULIN ASPART 4 UNITS: 100 INJECTION, SOLUTION INTRAVENOUS; SUBCUTANEOUS at 04:01

## 2023-01-29 RX ADMIN — INSULIN DETEMIR 23 UNITS: 100 INJECTION, SOLUTION SUBCUTANEOUS at 08:01

## 2023-01-29 RX ADMIN — INSULIN ASPART 5 UNITS: 100 INJECTION, SOLUTION INTRAVENOUS; SUBCUTANEOUS at 08:01

## 2023-01-29 NOTE — NURSING
Hemovac drain removed per Dr Cecilio chowdary. Dressed with sterile gauze and tegaderm.Scant serosanguinous drainage present in drain. Pt tolerated well and was relieved to have it removed

## 2023-01-29 NOTE — PROGRESS NOTES
"[facility] Hospital Medicine  Progress Note    Patient Name: Monica Walker  MRN: [id number]  Patient Class: IP- Inpatient   Admission Date: 1/19/2023  Length of Stay: 10 days  Attending Physician: Vinay Rosen Jr., MD  Primary Care Provider: Hannah Schulz MD        Subjective:     Principal Problem:Epidural abscess        HPI:  Patient is a 53 y/o Female presenting to [facility] with a cc of back pain. She states the pain began to worsen approximately 16 hours ago. She states the pain is in her Mid back area and radiates to both legs. She describes the pain as "throbbing, constant and a 9/10." She states the pain is blunted by taking Hydrocodone but not relieved. She reports the pain is made worse by movement and standing.    Patient reports she was hospitalized in November for sepsis secondary to Pyelonephritis. She has been hospitalized several times over the past 3 months. She is currently being treated at home with IV Abx therapy for Osteomyelitis of the spine.  MRI of Lumbar spine on 1/10/2023 (Per radiology report). Findings indicative of osteomyelitis involving T11-12 with epidural abscess extending from the T11-12 disc level to the L1 level eccentric towards the left.  Additional site of infection of the right L2-3 facet joint as well as osteomyelitis of the right L3 transverse process.    Significant findings in the ED include:  Vitals: /84, , RR 16, Temp 98.2 F, O2 98% on RA  Labs: WBC 14.6, HGB 8.2, HCT 27.1, MCV 74.2, Na 133, K 3.5, Bun 8, Crt0.54, , Glucose 171, UA normal.  today;124 2 days ago, CRP  22.90 Today; 12.9 2 days ago  Interventions: Toradol 30 mg IV    Pt was subsequently admitted to the Inpatient Hospitalist Service for further evaluation and management.               Overview/Hospital Course:  No notes on file    No new subjective & objective note has been filed under this hospital service since the last " note was generated.      Assessment/Plan:      * Epidural abscess  - Pt known to Dr. Hernandes, has been receiving IV abx outpatient  - Empiric Vanc and Rocephin started  - Norco 5mg q6h for pain  - Dilaudid added Q4hrs PRN  - MRI Thoracic w/o Contrast - Findings suggestive of osteomyelitis discitis of T11-12 with epidural phlegmon or extruded disc contributing to moderate spinal canal stenosis.  Additional epidural phlegmon extending in the left paracentral location and of the left T11-12 and T12-L1 foramina.  - Blood Cultures x2 - No growth at 72 hours  - Pt NPO pending Ortho Spine Eval - Will need diabetic diet when appropriate  - Consult Dr. Hernandes, appreciate his expertise -- IR biopsy successful 1/20/2023 -   - MRSA sensitive to Vancomycin, will continue   - Surgery/washout 1/24/2023 successful   - pathology revealed osteomyelitis patinet will need 6 weeks of abx coverage which will go until 3/7/23, we try to transfer her to LTAC for continued abx.     Hypertension  Not currently controlled  Patient took no home meds  Occasionally correspond to pain  Hydralazine 25mg Q8hrs      Hyperkalemia  K 5.9 today, after 2 units pRBCs yesterday in surgery  lokelma TID 1/25  K 3.8 today, resolved       Hypomagnesemia  Mag 1.4 on admission    2g Mag given  1/21/2023 mag was 1.6  1/21/2023 2g mag given  Recheck 1.7 - will give 2g    1/23/2023 mag 1.9      Microcytic anemia  Ferritin - 380  Iron - 12  Iron Sat - 6  TIBC - 188    Ferrous sulfate given Qd    Type 2 diabetes mellitus, with long-term current use of insulin  Patient's FSGs are uncontrolled due to hyperglycemia on current medication regimen.  Last A1c reviewed-   Lab Results   Component Value Date    HGBA1C 7.9 (H) 01/19/2023     Most recent fingerstick glucose reviewed- No results for input(s): POCTGLUCOSE in the last 24 hours.  Current correctional scale  Medium  Maintain anti-hyperglycemic dose as follows-   Antihyperglycemics (From admission, onward)    Start      Stop Route Frequency Ordered    01/28/23 2100  insulin detemir U-100 injection 23 Units         -- SubQ Nightly 01/27/23 2132 01/24/23 2136  insulin aspart U-100 injection 1-10 Units         -- SubQ Before meals & nightly PRN 01/24/23 2036        Hold Oral hypoglycemics while patient is in the hospital.  Diabetic Educator consulted, appreciate their help    Depression  - Continue Cymbalta        VTE Risk Mitigation (From admission, onward)         Ordered     IP VTE LOW RISK PATIENT  Once         01/19/23 0417     Place sequential compression device  Until discontinued         01/19/23 0417                Discharge Planning   SUZANNA:      Code Status: Full Code   Is the patient medically ready for discharge?:     Reason for patient still in hospital (select all that apply): Treatment  Discharge Plan A: Home with family, Home Health                  Brett Chinchilla DO  Department of Hospital Medicine   [facility]

## 2023-01-29 NOTE — ASSESSMENT & PLAN NOTE
K 5.9 today, after 2 units pRBCs yesterday in surgery  [facility] 1/25  K 3.8 today, resolved

## 2023-01-29 NOTE — ASSESSMENT & PLAN NOTE
- Pt known to Dr. Hernandes, has been receiving IV abx outpatient  - Empiric Vanc and Rocephin started  - Norco 5mg q6h for pain  - Dilaudid added Q4hrs PRN  - MRI Thoracic w/o Contrast - Findings suggestive of osteomyelitis discitis of T11-12 with epidural phlegmon or extruded disc contributing to moderate spinal canal stenosis.  Additional epidural phlegmon extending in the left paracentral location and of the left T11-12 and T12-L1 foramina.  - Blood Cultures x2 - No growth at 72 hours  - Pt NPO pending Ortho Spine Eval - Will need diabetic diet when appropriate  - Consult Dr. Hernandes, appreciate his expertise -- IR biopsy successful 1/20/2023 -   - MRSA sensitive to Vancomycin, will continue   - Surgery/washout 1/24/2023 successful   - pathology revealed osteomyelitis patinet will need 6 weeks of abx coverage which will go until 3/7/23, we try to transfer her to LTAC for continued abx.

## 2023-01-29 NOTE — PROGRESS NOTES
Vancomycin level resulted 22.6.     Although we want her trough around 20, I am going to change her dose to 1000mg every 24 hours. She doesn't seem to be clearing as fast as anticipated.     Trough ordered for 1/31 to recheck.    Pharmacy will continue to monitor and make adjustments as needed.

## 2023-01-30 PROBLEM — M25.552 LEFT HIP PAIN: Status: ACTIVE | Noted: 2023-01-30

## 2023-01-30 PROBLEM — E87.5 HYPERKALEMIA: Status: RESOLVED | Noted: 2023-01-25 | Resolved: 2023-01-30

## 2023-01-30 LAB
ANION GAP SERPL CALCULATED.3IONS-SCNC: 12 MMOL/L (ref 7–16)
BASOPHILS # BLD AUTO: 0.08 K/UL (ref 0–0.2)
BASOPHILS NFR BLD AUTO: 0.6 % (ref 0–1)
BUN SERPL-MCNC: 21 MG/DL (ref 7–18)
BUN/CREAT SERPL: 17 (ref 6–20)
CALCIUM SERPL-MCNC: 8.6 MG/DL (ref 8.5–10.1)
CHLORIDE SERPL-SCNC: 100 MMOL/L (ref 98–107)
CO2 SERPL-SCNC: 30 MMOL/L (ref 21–32)
CREAT SERPL-MCNC: 1.27 MG/DL (ref 0.55–1.02)
DIFFERENTIAL METHOD BLD: ABNORMAL
EGFR (NO RACE VARIABLE) (RUSH/TITUS): 50 ML/MIN/1.73M²
EOSINOPHIL # BLD AUTO: 0.63 K/UL (ref 0–0.5)
EOSINOPHIL NFR BLD AUTO: 5 % (ref 1–4)
ERYTHROCYTE [DISTWIDTH] IN BLOOD BY AUTOMATED COUNT: 17.9 % (ref 11.5–14.5)
GLUCOSE SERPL-MCNC: 100 MG/DL (ref 70–105)
GLUCOSE SERPL-MCNC: 128 MG/DL (ref 70–105)
GLUCOSE SERPL-MCNC: 250 MG/DL (ref 70–105)
GLUCOSE SERPL-MCNC: 346 MG/DL (ref 70–105)
GLUCOSE SERPL-MCNC: 68 MG/DL (ref 70–105)
GLUCOSE SERPL-MCNC: 92 MG/DL (ref 74–106)
HCT VFR BLD AUTO: 28 % (ref 38–47)
HGB BLD-MCNC: 8.5 G/DL (ref 12–16)
IMM GRANULOCYTES # BLD AUTO: 0.1 K/UL (ref 0–0.04)
IMM GRANULOCYTES NFR BLD: 0.8 % (ref 0–0.4)
LYMPHOCYTES # BLD AUTO: 2.42 K/UL (ref 1–4.8)
LYMPHOCYTES NFR BLD AUTO: 19.1 % (ref 27–41)
MCH RBC QN AUTO: 23.3 PG (ref 27–31)
MCHC RBC AUTO-ENTMCNC: 30.4 G/DL (ref 32–36)
MCV RBC AUTO: 76.7 FL (ref 80–96)
MONOCYTES # BLD AUTO: 1.06 K/UL (ref 0–0.8)
MONOCYTES NFR BLD AUTO: 8.3 % (ref 2–6)
MPC BLD CALC-MCNC: 8.8 FL (ref 9.4–12.4)
NEUTROPHILS # BLD AUTO: 8.41 K/UL (ref 1.8–7.7)
NEUTROPHILS NFR BLD AUTO: 66.2 % (ref 53–65)
NRBC # BLD AUTO: 0 X10E3/UL
NRBC, AUTO (.00): 0 %
PLATELET # BLD AUTO: 428 K/UL (ref 150–400)
POTASSIUM SERPL-SCNC: 3.9 MMOL/L (ref 3.5–5.1)
RBC # BLD AUTO: 3.65 M/UL (ref 4.2–5.4)
SODIUM SERPL-SCNC: 138 MMOL/L (ref 136–145)
WBC # BLD AUTO: 12.7 K/UL (ref 4.5–11)

## 2023-01-30 PROCEDURE — 85025 COMPLETE CBC W/AUTO DIFF WBC: CPT | Performed by: ORTHOPAEDIC SURGERY

## 2023-01-30 PROCEDURE — 25000003 PHARM REV CODE 250: Performed by: FAMILY MEDICINE

## 2023-01-30 PROCEDURE — 11000001 HC ACUTE MED/SURG PRIVATE ROOM

## 2023-01-30 PROCEDURE — 99232 PR SUBSEQUENT HOSPITAL CARE,LEVL II: ICD-10-PCS | Mod: GC,,, | Performed by: FAMILY MEDICINE

## 2023-01-30 PROCEDURE — 80048 BASIC METABOLIC PNL TOTAL CA: CPT | Performed by: ORTHOPAEDIC SURGERY

## 2023-01-30 PROCEDURE — 25000003 PHARM REV CODE 250: Performed by: ORTHOPAEDIC SURGERY

## 2023-01-30 PROCEDURE — 63600175 PHARM REV CODE 636 W HCPCS: Performed by: FAMILY MEDICINE

## 2023-01-30 PROCEDURE — 63600175 PHARM REV CODE 636 W HCPCS

## 2023-01-30 PROCEDURE — 25000003 PHARM REV CODE 250

## 2023-01-30 PROCEDURE — 99232 SBSQ HOSP IP/OBS MODERATE 35: CPT | Mod: GC,,, | Performed by: FAMILY MEDICINE

## 2023-01-30 PROCEDURE — 82962 GLUCOSE BLOOD TEST: CPT

## 2023-01-30 PROCEDURE — 94761 N-INVAS EAR/PLS OXIMETRY MLT: CPT

## 2023-01-30 RX ADMIN — DULOXETINE 60 MG: 30 CAPSULE, DELAYED RELEASE ORAL at 08:01

## 2023-01-30 RX ADMIN — HYDROCODONE BITARTRATE AND ACETAMINOPHEN 1 TABLET: 10; 325 TABLET ORAL at 12:01

## 2023-01-30 RX ADMIN — HYDROMORPHONE HYDROCHLORIDE 0.5 MG: 2 INJECTION, SOLUTION INTRAMUSCULAR; INTRAVENOUS; SUBCUTANEOUS at 07:01

## 2023-01-30 RX ADMIN — HYDRALAZINE HYDROCHLORIDE 25 MG: 25 TABLET ORAL at 09:01

## 2023-01-30 RX ADMIN — HYDRALAZINE HYDROCHLORIDE 25 MG: 25 TABLET ORAL at 02:01

## 2023-01-30 RX ADMIN — DIPHENHYDRAMINE HYDROCHLORIDE 25 MG: 50 INJECTION, SOLUTION INTRAMUSCULAR; INTRAVENOUS at 01:01

## 2023-01-30 RX ADMIN — DIPHENHYDRAMINE HYDROCHLORIDE 25 MG: 50 INJECTION, SOLUTION INTRAMUSCULAR; INTRAVENOUS at 08:01

## 2023-01-30 RX ADMIN — INSULIN ASPART 5 UNITS: 100 INJECTION, SOLUTION INTRAVENOUS; SUBCUTANEOUS at 11:01

## 2023-01-30 RX ADMIN — HYDROMORPHONE HYDROCHLORIDE 0.5 MG: 2 INJECTION, SOLUTION INTRAMUSCULAR; INTRAVENOUS; SUBCUTANEOUS at 03:01

## 2023-01-30 RX ADMIN — DIPHENHYDRAMINE HYDROCHLORIDE 25 MG: 50 INJECTION, SOLUTION INTRAMUSCULAR; INTRAVENOUS at 07:01

## 2023-01-30 RX ADMIN — VANCOMYCIN HYDROCHLORIDE 1000 MG: 1 INJECTION, POWDER, LYOPHILIZED, FOR SOLUTION INTRAVENOUS at 07:01

## 2023-01-30 RX ADMIN — Medication 1 EACH: at 08:01

## 2023-01-30 RX ADMIN — DIPHENHYDRAMINE HYDROCHLORIDE 25 MG: 50 INJECTION, SOLUTION INTRAMUSCULAR; INTRAVENOUS at 03:01

## 2023-01-30 RX ADMIN — INSULIN ASPART 5 UNITS: 100 INJECTION, SOLUTION INTRAVENOUS; SUBCUTANEOUS at 04:01

## 2023-01-30 RX ADMIN — HYDROMORPHONE HYDROCHLORIDE 0.5 MG: 2 INJECTION, SOLUTION INTRAMUSCULAR; INTRAVENOUS; SUBCUTANEOUS at 08:01

## 2023-01-30 RX ADMIN — HYDRALAZINE HYDROCHLORIDE 25 MG: 25 TABLET ORAL at 06:01

## 2023-01-30 RX ADMIN — INSULIN DETEMIR 23 UNITS: 100 INJECTION, SOLUTION SUBCUTANEOUS at 09:01

## 2023-01-30 RX ADMIN — SODIUM CHLORIDE: 9 INJECTION, SOLUTION INTRAVENOUS at 01:01

## 2023-01-30 RX ADMIN — INSULIN ASPART 4 UNITS: 100 INJECTION, SOLUTION INTRAVENOUS; SUBCUTANEOUS at 11:01

## 2023-01-30 RX ADMIN — HYDROMORPHONE HYDROCHLORIDE 0.5 MG: 2 INJECTION, SOLUTION INTRAMUSCULAR; INTRAVENOUS; SUBCUTANEOUS at 01:01

## 2023-01-30 RX ADMIN — DOCUSATE SODIUM 100 MG: 100 CAPSULE, LIQUID FILLED ORAL at 08:01

## 2023-01-30 NOTE — ASSESSMENT & PLAN NOTE
K 5.9 today, after 2 units pRBCs yesterday in surgery  [facility] 1/25  K 3.8 today, resolved

## 2023-01-30 NOTE — PHYSICIAN QUERY
PT Name: Monica Walker  MR #: [id number]    DOCUMENTATION CLARIFICATION     CDS: Dimitris HASSAN,RN        Contact information:[email or web address]     This form is a permanent document in the medical record.     Query Date: January 30, 2023    By submitting this query, we are merely seeking further clarification of documentation.. Please utilize your independent clinical judgment when addressing the question(s) below.    The medical record contains the following:   Indicators  Supporting Clinical Findings Location in Medical Record   x Energy Intake Intake (%): 50% 1/23  Adult Nutrition follow-up Note   x Weight Loss Loss of 8-9 lbs/5.4-5.5% body weight x 1 month, significant weight loss.  1/23  Adult Nutrition follow-up Note   x Fat Loss Subcutaneous Fat (Malnutrition): moderate depletion   1/23  Adult Nutrition follow-up Note   x Muscle Loss Muscle Mass (Malnutrition): severe depletion  1/23  Adult Nutrition follow-up Note    Edema/Fluid Accumulation      Reduced  Strength (by dynamometer)     x Weight, BMI, Usual Body Weight  WT: 136.03 lb  BMI: 22 1/23  Adult Nutrition follow-up Note    Delayed Wound Healing     x Registered Dietician Diagnosis Is Patient Malnourished: Yes. Pt meeting criteria for moderate to severe acute protein calorie malnutrition.      1/23  Adult Nutrition follow-up Note   x Acute or Chronic Illness Epidural abscess, left hip pain, hypertension, hypomagnesemia, microcytic anemia, type 2 diabetes mellitus, with long-term current use of insulin, depression 1/30    PN    Social or Environmental Circumstances     x Treatment  Recommend adding Ensure Max Protein daily to promote adequate oral and protein intake. Recommend Alfred (Arginaid is modular equivalent and can be used as Alfred out of stock) BID and 500mg Vit C BID + 220mg ZnSO4 BID + MVI daily to aid in wound healing. Encourage good PO intakes. 1/23  Adult Nutrition follow-up Note    Other       Academy of Nutrition and  Dietetics (Academy) and the American Society for Parenteral and Enteral Nutrition (A.S.P.E.N.) Clinical Characteristics to support Malnutrition   Malnutrition in the Context of Acute Illness or Injury Malnutrition in the Context of Chronic Illness or Injury Malnutrition in the Context of Social or Environmental Circumstances   Malnutrition Level Moderate Severe Moderate Severe   Moderate   Severe   Energy Intake <75%                   >7 days <50%                 >5 days <75%           >1 month <75%                      >1 month   <75% for >3 months   <50% for >1 month   Weight Loss   1-2% in 1 week >2% in 1 week 5% in 1 month >5% in 1 month 5% in 1 month >5% in 1 month    5% in 1 month >5% in 1 month 7.5% in 3 months >7.5% in 3 months 7.5% in 3 months >7.5% in 3 months    7.5% in 3 months >7.5% in 3 months 10% in 6 months >10% in 6 months 10% in 6 months >10% in 6 months        20% in 1 year                    >20% in 1 year                                                                  20% in 1 year                            >20% in 1 year                                                  Subcutaneous Fat Loss Mild  Moderate  Mild  Severe    Mild   Severe   Muscle Loss Mild  Moderate  Mild  Severe    Mild   Severe   Edema/Fluid Accumulation Mild Moderate to severe  Mild  Severe   Mild   Severe   Reduced  Strength         (based on standards supplied by  of dynamometer) N/A Measurably reduced N/A Measurably reduced N/A Measurably reduced     Criteria for mild malnutrition is defined as 1 characteristic outlined above within the established moderate or severe parameters.  A minimum of 2 out of the 6 characteristics noted above are recommended for a diagnosis of moderate or severe malnutrition.  Chronic illness/injury is a disease/condition lasting 3 months or longer.    The noted clinical guidelines are only system guidelines and do not replace the providers clinical judgment.    Provider, due to  "the conflicting documentation regarding "malnutrition",please specify associated with above clinical findings.    [x ] Moderate Malnutrition - a minimum of 2 of the 6 moderate malnutrition characteristics noted above    [  ] Severe Malnutrition - a minimum of 2 of the 6 severe malnutrition characteristics noted above    [  ] Malnutrition, Unspecified degree   [  ] Other Nutritional Diagnosis (please specify): _______     Please document in your progress notes daily for the duration of treatment until resolved and  include in your discharge summary.      References:    JAVIER Whitlock, & JOHN Obregon (2022, April). Assessment and management of anorexia and cachexia in palliative care. Retrieved May 23, 2022, from [email or web address]/assessment-and-management-of-anorexia-and-cachexia-in-palliative-care?[email or web address]&source=see_link     FLORECITA Goss, PhD, RD, Marian POLANCO P., PhD, RN, FRANCESCA Pham MD, PhD, Dani ALCARAZ A., MS, RD, [facility], MONET Sanabria, MS, RD, The Academy Malnutrition Work Group, The A.S.P.E.N. Board of Directors. (2012). Consensus Statement: Academy of Nutrition and Dietetics and American Society for Parenteral and Enteral Nutrition: Characteristics Recommended for the Identification and Documentation of Adult Malnutrition (Undernutrition). Journal of Parenteral and Enteral Nutrition, 36(3), 275-283. doi:10.1177/1047568513203947     Form No. 18850    "

## 2023-01-30 NOTE — PT/OT/SLP PROGRESS
Physical Therapy      Patient Name:  Monica Walker   MRN:  [id number]    Patient not seen today secondary to Patient unwilling to participate, Pain, X-ray. Pt refused in both AM and PM. Will follow-up tomorrow.

## 2023-01-30 NOTE — ASSESSMENT & PLAN NOTE
- MRI Thoracic w/o Contrast - Findings suggestive of osteomyelitis discitis of T11-12 with epidural phlegmon or extruded disc contributing to moderate spinal canal stenosis.  Additional epidural phlegmon extending in the left paracentral location and of the left T11-12 and T12-L1 foramina.  - MRSA sensitive to Vancomycin, will continue   - Surgery/washout 1/24/2023 successful   - pathology revealed osteomyelitis patinet will need 6 weeks of abx coverage which will go until 3/7/23, will try to transfer her to LTAC for continued abx.

## 2023-01-30 NOTE — PHYSICIAN QUERY
PT Name: Monica Walker  MR #: [id number]    DOCUMENTATION CLARIFICATION      CDS: Dimitris HASSAN,RN        Contact information:[email or web address]    This form is a permanent document in the medical record.      Query Date: January 30, 2023    By submitting this query, we are merely seeking further clarification of documentation. Please utilize your independent clinical judgment when addressing the question(s) below.    The Medical Record contains the following:   Indicators  Supporting Clinical Findings Location in Medical Record   x Anemia documented  Microcytic anemia  Ferritin - 380  Iron - 12  Iron Sat - 6  TIBC - 188             1/20  HM PN: Rinek      x H&H  Hgb: 9.4, 8.0---->7.6------>7.2----->9.8------>7.7------>8.6---->8.5   Hct: 31.6,27.6-->25.9---->24.0---->33.1---->25.6---->29.5--->28.0 1/9, 1/19---->1/30 Labs   x BP                    HR Vital Signs (24 h Range):  SBP: 123----->182  DBP: 53------>98  HR: 88-------->98    1/25 HM PN    GI bleeding documented      Acute bleeding (Non GI site)     x Transfusion(s)  Transfuse RBC 2 Units  completed 1/24/23 1542 200 ml Start 1/24/23@ 1436 . End 1/24/23@1437  1/24 Blood Transfusion Record   x Acute/Chronic illness Epidural abscess, type 2 diabetes mellitus, with long-term current use of insulin, microcytic anemia, depression, hyperkalemia, hypomagnesemia    1/25  HM PN   x Treatments Ferrous sulfate given Qd    Ferrous sulfate tablet 1 each 1 tablet Oral Daily  1/20  HM PN: Rinek     1/19----->present MAR   x Other Date of Procedure: 1/24/2023   Procedure: Procedure(s) (LRB):  T9-L2 fusion, T11-T12 laminectomy and corpectomy (N/A)   Surgeon(s) and Role:  Jimmy Hernandes MD - Primary  Estimated Blood Loss (EBL): 150 ml    1/24 Orthopedic Surgery Op Note     Provider, please  further specify  the type(s) of anemia associated with above clinical findings.   [   ] Acute blood loss anemia    [ x  ] Acute blood loss anemia expected post-operatively     [   ] Iron deficiency anemia    [   ] Chronic blood loss anemia     [   ] Anemia, unspecified    [   ] Other Hematological Diagnosis (please specify): _________________         Please document in your progress notes daily for the duration of treatment, until resolved, and include in your discharge summary.    Form No. [id number]

## 2023-01-30 NOTE — PROGRESS NOTES
"[facility] - [address] [place] [address]  Adult Nutrition  Follow-up Note         Reason for Assessment  Reason For Assessment: RD follow-up  Nutrition Risk Screen: no indicators present    RD follow up.    Monitor acceptance for appetite stimulant.    Recommend continue current diet order as medically appropriate and tolerated. Recommend increasing oral nutrition supplement Ensure Max Protein from daily to BID to promote adequate oral and protein intake. Recommend continuing Alfred (Arginaid is modular equivalent and can be used as Alfred out of stock) BID and 500mg Vit C BID + 220mg ZnSO4 BID + MVI daily to aid in wound healing. Encourage good PO intakes.     Per MD since last RD review:  "1/27: Patient was seen resting in bed. No overnight events. Biopsy showed MRSA, sensitive to Vancomycin. Will continue Vancomycin. Dr. Hernandes took the patient for a washout 1/24. Hyperkalemia resolved. Dr. Hernandes ordered Xrays again 1/26. Pain control improving. Patient occasionally hypertensive, hydralazine ordered. Will continue to monitor  1/28: Patient lying comfortably in bed. She is still complaining of some back pain but states it is improving overall. She still has a drain in as well. Hopefully discharge in the next couple of days with home health."    Per orthopedic surgery note 1/25:  "Monica Walker is a 52 y.o. female with thoracolumbar osteo diskitis with MRSA on culture.  Now status post T9-L2 fusion, T11-T12 laminectomy and corpectomy     - medical management per hospitalist service  - continue antibiotics  - PT/OT"     Patient currently receiving Consistent Carbohydrate 1800kcal diet with 0-25% PO intake documented recently. Encourage good PO intakes. Monitor acceptance for appetite stimulant.     Patient with stg 1 pressure wound over sacrum per MD note. Recommend changing oral nutrition supplement from Ensure Max Protein daily to BID to promote adequate oral and protein intake (provides 150kcal and 30g " protein each). Recommend continuing Alfred (Arginaid is modular equivalent and can be used as Alfred out of stock) BID and 500mg Vit C BID + 220mg ZnSO4 BID + MVI daily to aid in wound healing. Alfred provides 90kcal and 2.5g pro each; Arginaid provides 30kcal/each. Encourage good PO intakes.    Last BM 1/26 per flowsheets - pt with docusate sodium on med list. Recommend continue bowel regimen as constipation can influence reduced appetite/PO intake. Will continue to monitor PO intakes, weight trend, meds, labs, updates in patient condition. RD following.    Malnutrition  Is Patient Malnourished: Yes      Loss of 8-9 lbs/5.4-5.5% body weight x 1 month, significant weight loss. RD visited pt 1/19 for full NFPE. Pt with mild-moderate fat mass loss present and moderate to severe muscle mass loss(severe in temporals and lower extremities). She reports a normal appetite, but NPO on 1/19. Pt meeting criteria for moderate to severe acute protein calorie malnutrition.      Malnutrition Assessment  Malnutrition Type: acute illness or injury, chronic illness  Skin (Micronutrient): turgor reduced  Neck/Chest (Micronutrient): muscle wasting, subcutaneous fat loss  Musculoskeletal/Lower Extremities: muscle wasting, subcutaneous fat loss       Weight Loss (Malnutrition): 5% in 1 month  Subcutaneous Fat (Malnutrition): moderate depletion  Muscle Mass (Malnutrition): severe depletion   Orbital Region (Subcutaneous Fat Loss): mild depletion  Upper Arm Region (Subcutaneous Fat Loss): moderate depletion  Thoracic and Lumbar Region: moderate depletion   Pentecostalism Region (Muscle Loss): severe depletion  Clavicle Bone Region (Muscle Loss): moderate depletion  Clavicle and Acromion Bone Region (Muscle Loss): moderate depletion  Scapular Bone Region (Muscle Loss): moderate depletion  Dorsal Hand (Muscle Loss): moderate depletion  Patellar Region (Muscle Loss): severe depletion  Anterior Thigh Region (Muscle Loss): severe depletion  Posterior  Calf Region (Muscle Loss): severe depletion       Subcutaneous Fat Loss (Final Summary): moderate protein-calorie malnutrition  Muscle Loss Evaluation (Final Summary): severe protein-calorie malnutrition    Moderate Weight Loss (Malnutrition): 5% in 1 month     Nutrition Diagnosis  Malnutrition (Moderate)   related to Chronic infection/ sepsis as evidenced by epidural abscess/osteomyelitis on IV abx, increased nutrition needs     Nutrition Diagnosis Status: Chronic/ continues     Nutrition Risk  Level of Risk/Frequency of Follow-up: moderate - high   Chewing or Swallowing Difficulty?: No Chewing or swallowing difficulty    Estimated/Assessed Needs    Temp: 98.3 °F (36.8 °C)Oral  Weight Used For Calorie Calculations: 62.2 kg (137 lb 2 oz)   Energy Need Method: Kcal/kg Energy Calorie Requirements (kcal): 7124-1478 kcal (25-30 kcal/kg)  Weight Used For Protein Calculations: 62.2 kg (137 lb 2 oz)  Protein Requirements: 62-74 g PRO (1.0-1.2 g PRO/kg)       RDA Method (mL): 1555     Nutrition Prescription / Recommendations  Recommendation/Intervention: Monitor acceptance for appetite stimulant. Recommend continue current diet order as medically appropriate and tolerated. Recommend increasing oral nutrition supplement Ensure Max Protein from daily to BID to promote adequate oral and protein intake. Recommend continuing Alfred (Arginaid is modular equivalent and can be used as Alfred out of stock) BID and 500mg Vit C BID + 220mg ZnSO4 BID + MVI daily to aid in wound healing. Encourage good PO intakes.  Goals: PO intake %, weight maintenance, wound healing  Nutrition Goal Status: progressing towards goal  Current Diet Order: Diet consistent carbohydrate 1800 (60g Carbs/ meal); [facility]  Nutrition Order Comments: Appropriate  Oral Nutrition Supplement: Alfred (or Arginaid) BID + Ensure Max Protein daily  Recommended Diet: Consistent Carbohydrate 1800 (60g Carbs)  Recommended Oral Supplement: Alfred [90 kcals, 2.5g Protein, 10g  Carbs(3g Sugar), 7g L-Arginine, 7g L-Glutamine, Vitamin C 300mg, 9.5mg Zinc] twice daily and Ensure Max Protein [150 kcals, 30g Protein, 6g Carbs(2g Fiber, 1g Sugar), 1.5g Fat] twice daily  Is Nutrition Support Recommended: No  Is Education Recommended: No    Monitor and Evaluation  % current Intake: Other: PO intake 0-25%  % intake to meet estimated needs: 75 - 100 %  Food and Nutrient Intake: energy intake, food and beverage intake  Food and Nutrient Adminstration: diet order  Anthropometric Measurements: weight, weight change  Biochemical Data, Medical Tests and Procedures: electrolyte and renal panel, gastrointestinal profile, glucose/endocrine profile, inflammatory profile, lipid profile  Nutrition-Focused Physical Findings: overall appearance     Current Medical Diagnosis and Past Medical History  Diagnosis: other (see comments) (epidural abscess)  Past Medical History:   Diagnosis Date    Abscess of right thigh + MRSA 08/20/2021    healed    Adnexal cyst     Degenerative disc disease     Depression 10/29/2021    Hypertension     Nicotine dependence     Osteoarthritis      Nutrition/Diet History  Spiritual, Cultural Beliefs, Congregational Practices, Values that Affect Care: no  Food Allergies: NKFA    Lab/Procedures/Meds  Recent Labs   Lab 01/30/23  0302      K 3.9   BUN 21*   CREATININE 1.27*   GLU 92   CALCIUM 8.6        Last A1c:   Lab Results   Component Value Date    HGBA1C 7.9 (H) 01/19/2023     Lab Results   Component Value Date    RBC 3.65 (L) 01/30/2023    HGB 8.5 (L) 01/30/2023    HCT 28.0 (L) 01/30/2023    MCV 76.7 (L) 01/30/2023    MCH 23.3 (L) 01/30/2023    MCHC 30.4 (L) 01/30/2023    TIBC 188 (L) 01/19/2023     Pertinent Labs Reviewed: reviewed  Pertinent Labs Comments: BUN 21 (H), Creat 1.27 (H), eGFR 50 (L) -possibly r/t volume status  Pertinent Medications Reviewed: reviewed  Pertinent Medications Comments: docusate sodium, duloxetine, ferrous sulfate, hydralazine, insulin, vancocin,  "diphenhydramine PRN, hydromorphone PRN    Anthropometrics  Temp: 98.3 °F (36.8 °C)  Height Method: Stated  Height: 5' 6" (167.6 cm)  Height (inches): 66 in  Weight Method: Bed Scale  Weight: 65.7 kg (144 lb 13.5 oz)  Weight (lb): 144.84 lb  Ideal Body Weight (IBW), Female: 130 lb  % Ideal Body Weight, Female (lb): 105.54 %  BMI (Calculated): 23.4     Nutrition by Nursing  Diet/Nutrition Received: consistent carb/diabetic diet  Intake (%): 25%  Last Bowel Movement: 01/26/23    Nutrition Follow-Up  RD Follow-up?: Yes  "

## 2023-01-30 NOTE — PLAN OF CARE
Problem: Adult Inpatient Plan of Care  Goal: Plan of Care Review  Outcome: Ongoing, Progressing  Flowsheets (Taken 1/29/2023 2004)  Plan of Care Reviewed With: patient  Goal: Optimal Comfort and Wellbeing  Outcome: Ongoing, Progressing  Intervention: Monitor Pain and Promote Comfort  Flowsheets (Taken 1/29/2023 2004)  Pain Management Interventions:   pillow support provided   position adjusted   relaxation techniques promoted   quiet environment facilitated   pain management plan reviewed with patient/caregiver  Intervention: Provide Person-Centered Care  Flowsheets (Taken 1/29/2023 2004)  Trust Relationship/Rapport:   care explained   choices provided   emotional support provided   empathic listening provided   questions answered   questions encouraged   thoughts/feelings acknowledged   reassurance provided     Problem: Diabetes Comorbidity  Goal: Blood Glucose Level Within Targeted Range  Outcome: Ongoing, Progressing  Intervention: Monitor and Manage Glycemia  Flowsheets (Taken 1/29/2023 2004)  Glycemic Management:   blood glucose monitored   supplemental insulin given     Problem: Infection  Goal: Absence of Infection Signs and Symptoms  Outcome: Ongoing, Progressing  Intervention: Prevent or Manage Infection  Flowsheets (Taken 1/29/2023 2004)  Fever Reduction/Comfort Measures: lightweight bedding  Infection Management: aseptic technique maintained  Isolation Precautions: precautions maintained     Problem: Impaired Wound Healing  Goal: Optimal Wound Healing  Outcome: Ongoing, Progressing     Problem: Skin Injury Risk Increased  Goal: Skin Health and Integrity  Outcome: Ongoing, Progressing  Intervention: Optimize Skin Protection  Flowsheets (Taken 1/29/2023 2004)  Pressure Reduction Techniques:   frequent weight shift encouraged   heels elevated off bed   pressure points protected   positioned off wounds   weight shift assistance provided   rest period provided between sit times  Pressure Reduction Devices:  positioning supports utilized  Skin Protection:   adhesive use limited   incontinence pads utilized   tubing/devices free from skin contact   transparent dressing maintained  Head of Bed (HOB) Positioning: HOB elevated  Intervention: Promote and Optimize Oral Intake  Flowsheets (Taken 1/29/2023 2004)  Oral Nutrition Promotion:   physical activity promoted   rest periods promoted   safe use of adaptive equipment encouraged   social interaction promoted

## 2023-01-30 NOTE — PLAN OF CARE
SS was consulted on pt for LTAC. SS spoke with pt in room and verbal choice obtained for [facility]. Admissions in [facility] off today, SS will make referral to [facility] tomorrow due to pt will have to be precerted.

## 2023-01-30 NOTE — SUBJECTIVE & OBJECTIVE
Interval History: Patient AAOX3 lying in bed with no distress noted. Pt states shhas been having increase itching in her lower back near the abscess. On inspection patient does have another bump with puss started to grow. Patient informed to not scratch and we will treat. Plan for today is to potentially transfer patient to specialty to finish her 6 weeks of antibiotics. PT has an artificial left hip and complaining of L hip pain. We will get left Hip Xray.    Review of Systems   Constitutional:  Negative for fatigue and fever.   HENT:  Negative for ear discharge, ear pain, facial swelling, mouth sores, postnasal drip, rhinorrhea and sore throat.    Eyes:  Negative for pain, discharge and itching.   Respiratory:  Negative for cough.    Cardiovascular:  Negative for leg swelling.   Gastrointestinal:  Negative for abdominal distention, abdominal pain, anal bleeding, constipation and diarrhea.   Endocrine: Negative for cold intolerance, heat intolerance and polydipsia.   Genitourinary:  Negative for difficulty urinating, dyspareunia, flank pain, frequency, genital sores and hematuria.   Musculoskeletal:  Negative for arthralgias, back pain, gait problem and joint swelling.   Skin:  Positive for wound.   Allergic/Immunologic: Negative for environmental allergies and food allergies.   Neurological:  Negative for dizziness, facial asymmetry, light-headedness and headaches.   Hematological: Negative.    Psychiatric/Behavioral: Negative.     Objective:     Vital Signs (Most Recent):  Temp: 98.3 °F (36.8 °C) (01/30/23 0723)  Pulse: 80 (01/30/23 0723)  Resp: 18 (01/30/23 0804)  BP: (!) 152/71 (01/30/23 0723)  SpO2: 96 % (01/30/23 0751)   Vital Signs (24h Range):  Temp:  [97 °F (36.1 °C)-99.9 °F (37.7 °C)] 98.3 °F (36.8 °C)  Pulse:  [73-89] 80  Resp:  [18] 18  SpO2:  [96 %-97 %] 96 %  BP: (144-162)/(68-80) 152/71     Weight: 65.7 kg (144 lb 13.5 oz)  Body mass index is 23.38 kg/m².    Intake/Output Summary (Last 24 hours) at  1/30/2023 0930  Last data filed at 1/30/2023 0645  Gross per 24 hour   Intake 500 ml   Output --   Net 500 ml      Physical Exam  Constitutional:       Appearance: She is normal weight.   HENT:      Head: Normocephalic.      Right Ear: Tympanic membrane normal.      Left Ear: Tympanic membrane normal.      Nose: Nose normal.      Mouth/Throat:      Mouth: Mucous membranes are moist.      Pharynx: Oropharynx is clear.   Eyes:      Conjunctiva/sclera: Conjunctivae normal.      Pupils: Pupils are equal, round, and reactive to light.   Cardiovascular:      Rate and Rhythm: Normal rate and regular rhythm.      Pulses: Normal pulses.      Heart sounds: Normal heart sounds.   Pulmonary:      Effort: Pulmonary effort is normal.      Breath sounds: Rhonchi present.   Abdominal:      General: Abdomen is flat. Bowel sounds are normal.   Musculoskeletal:         General: No tenderness. Normal range of motion.      Cervical back: Normal range of motion.      Comments: Lower back itching and new bump   Skin:     General: Skin is warm and dry.      Capillary Refill: Capillary refill takes less than 2 seconds.   Neurological:      Mental Status: She is alert and oriented to person, place, and time.   Psychiatric:         Mood and Affect: Mood normal.         Behavior: Behavior normal.       Significant Labs: All pertinent labs within the past 24 hours have been reviewed.  BMP:   Recent Labs   Lab 01/30/23  0302   GLU 92      K 3.9      CO2 30   BUN 21*   CREATININE 1.27*   CALCIUM 8.6     CBC:   Recent Labs   Lab 01/29/23  0311 01/30/23  0302   WBC 12.96* 12.70*   HGB 8.2* 8.5*   HCT 26.7* 28.0*    428*       Significant Imaging: I have reviewed all pertinent imaging results/findings within the past 24 hours.

## 2023-01-30 NOTE — PT/OT/SLP PROGRESS
"Occupational Therapy      Patient Name:  Monica Walker   MRN:  [id number]    Patient not seen today secondary to Patient unwilling to participate, Pain (Pt c/o new pain in her hip and of not feeling well. Pt states that she feels like she has a "new spot" coming up and is awaiting x-ray. Pt declines all offers for therapy today). Will follow-up 1/30/2023.    1/30/2023  "

## 2023-01-30 NOTE — PROGRESS NOTES
"[facility] Hospital Medicine  Progress Note    Patient Name: Monica Walker  MRN: [id number]  Patient Class: IP- Inpatient   Admission Date: 1/19/2023  Length of Stay: 11 days  Attending Physician: Vinay Rosen Jr., MD  Primary Care Provider: Hannah Schulz MD        Subjective:     Principal Problem:Epidural abscess        HPI:  Patient is a 53 y/o Female presenting to [facility] with a cc of back pain. She states the pain began to worsen approximately 16 hours ago. She states the pain is in her Mid back area and radiates to both legs. She describes the pain as "throbbing, constant and a 9/10." She states the pain is blunted by taking Hydrocodone but not relieved. She reports the pain is made worse by movement and standing.    Patient reports she was hospitalized in November for sepsis secondary to Pyelonephritis. She has been hospitalized several times over the past 3 months. She is currently being treated at home with IV Abx therapy for Osteomyelitis of the spine.  MRI of Lumbar spine on 1/10/2023 (Per radiology report). Findings indicative of osteomyelitis involving T11-12 with epidural abscess extending from the T11-12 disc level to the L1 level eccentric towards the left.  Additional site of infection of the right L2-3 facet joint as well as osteomyelitis of the right L3 transverse process.    Significant findings in the ED include:  Vitals: /84, , RR 16, Temp 98.2 F, O2 98% on RA  Labs: WBC 14.6, HGB 8.2, HCT 27.1, MCV 74.2, Na 133, K 3.5, Bun 8, Crt0.54, , Glucose 171, UA normal.  today;124 2 days ago, CRP  22.90 Today; 12.9 2 days ago  Interventions: Toradol 30 mg IV    Pt was subsequently admitted to the Inpatient Hospitalist Service for further evaluation and management.               Overview/Hospital Course:  No notes on file    Interval History: Patient AAOX3 lying in bed with no distress noted. Pt states shhas been " having increase itching in her lower back near the abscess. On inspection patient does have another bump with puss started to grow. Patient informed to not scratch and we will treat. Plan for today is to potentially transfer patient to specialty to finish her 6 weeks of antibiotics. PT has an artificial left hip and complaining of L hip pain. We will get left Hip Xray.    Review of Systems   Constitutional:  Negative for fatigue and fever.   HENT:  Negative for ear discharge, ear pain, facial swelling, mouth sores, postnasal drip, rhinorrhea and sore throat.    Eyes:  Negative for pain, discharge and itching.   Respiratory:  Negative for cough.    Cardiovascular:  Negative for leg swelling.   Gastrointestinal:  Negative for abdominal distention, abdominal pain, anal bleeding, constipation and diarrhea.   Endocrine: Negative for cold intolerance, heat intolerance and polydipsia.   Genitourinary:  Negative for difficulty urinating, dyspareunia, flank pain, frequency, genital sores and hematuria.   Musculoskeletal:  Negative for arthralgias, back pain, gait problem and joint swelling.   Skin:  Positive for wound.   Allergic/Immunologic: Negative for environmental allergies and food allergies.   Neurological:  Negative for dizziness, facial asymmetry, light-headedness and headaches.   Hematological: Negative.    Psychiatric/Behavioral: Negative.     Objective:     Vital Signs (Most Recent):  Temp: 98.3 °F (36.8 °C) (01/30/23 0723)  Pulse: 80 (01/30/23 0723)  Resp: 18 (01/30/23 0804)  BP: (!) 152/71 (01/30/23 0723)  SpO2: 96 % (01/30/23 0751)   Vital Signs (24h Range):  Temp:  [97 °F (36.1 °C)-99.9 °F (37.7 °C)] 98.3 °F (36.8 °C)  Pulse:  [73-89] 80  Resp:  [18] 18  SpO2:  [96 %-97 %] 96 %  BP: (144-162)/(68-80) 152/71     Weight: 65.7 kg (144 lb 13.5 oz)  Body mass index is 23.38 kg/m².    Intake/Output Summary (Last 24 hours) at 1/30/2023 0959  Last data filed at 1/30/2023 0690  Gross per 24 hour   Intake 500 ml    Output --   Net 500 ml      Physical Exam  Constitutional:       Appearance: She is normal weight.   HENT:      Head: Normocephalic.      Right Ear: Tympanic membrane normal.      Left Ear: Tympanic membrane normal.      Nose: Nose normal.      Mouth/Throat:      Mouth: Mucous membranes are moist.      Pharynx: Oropharynx is clear.   Eyes:      Conjunctiva/sclera: Conjunctivae normal.      Pupils: Pupils are equal, round, and reactive to light.   Cardiovascular:      Rate and Rhythm: Normal rate and regular rhythm.      Pulses: Normal pulses.      Heart sounds: Normal heart sounds.   Pulmonary:      Effort: Pulmonary effort is normal.      Breath sounds: Rhonchi present.   Abdominal:      General: Abdomen is flat. Bowel sounds are normal.   Musculoskeletal:         General: No tenderness. Normal range of motion.      Cervical back: Normal range of motion.      Comments: Lower back itching and new bump   Skin:     General: Skin is warm and dry.      Capillary Refill: Capillary refill takes less than 2 seconds.   Neurological:      Mental Status: She is alert and oriented to person, place, and time.   Psychiatric:         Mood and Affect: Mood normal.         Behavior: Behavior normal.       Significant Labs: All pertinent labs within the past 24 hours have been reviewed.  BMP:   Recent Labs   Lab 01/30/23  0302   GLU 92      K 3.9      CO2 30   BUN 21*   CREATININE 1.27*   CALCIUM 8.6     CBC:   Recent Labs   Lab 01/29/23  0311 01/30/23  0302   WBC 12.96* 12.70*   HGB 8.2* 8.5*   HCT 26.7* 28.0*    428*       Significant Imaging: I have reviewed all pertinent imaging results/findings within the past 24 hours.      Assessment/Plan:      * Epidural abscess  - MRI Thoracic w/o Contrast - Findings suggestive of osteomyelitis discitis of T11-12 with epidural phlegmon or extruded disc contributing to moderate spinal canal stenosis.  Additional epidural phlegmon extending in the left paracentral  location and of the left T11-12 and T12-L1 foramina.  - MRSA sensitive to Vancomycin, will continue   - Surgery/washout 1/24/2023 successful   - pathology revealed osteomyelitis krystal will need 6 weeks of abx coverage which will go until 3/7/23, will try to transfer her to LTAC for continued abx.     Left hip pain  Left Hip X-ray pending      Hypertension  Not currently controlled  Patient took no home meds  Occasionally correspond to pain  Hydralazine 25mg Q8hrs      Hypomagnesemia  1/23/2023 mag 1.9    1/30/22  Will recheck in the am      Microcytic anemia  Ferritin - 380  Iron - 12  Iron Sat - 6  TIBC - 188    Ferrous sulfate given Qd    Type 2 diabetes mellitus, with long-term current use of insulin  Patient's FSGs are uncontrolled due to hyperglycemia on current medication regimen.  Last A1c reviewed-   Lab Results   Component Value Date    HGBA1C 7.9 (H) 01/19/2023     Most recent fingerstick glucose reviewed- No results for input(s): POCTGLUCOSE in the last 24 hours.  Current correctional scale  Medium  Maintain anti-hyperglycemic dose as follows-   Antihyperglycemics (From admission, onward)    Start     Stop Route Frequency Ordered    01/28/23 2100  insulin detemir U-100 injection 23 Units         -- SubQ Nightly 01/27/23 2132 01/24/23 2136  insulin aspart U-100 injection 1-10 Units         -- SubQ Before meals & nightly PRN 01/24/23 2036        Hold Oral hypoglycemics while patient is in the hospital.  Diabetic Educator consulted, appreciate their help    Depression  - Continue Cymbalta        VTE Risk Mitigation (From admission, onward)         Ordered     IP VTE LOW RISK PATIENT  Once         01/19/23 0417     Place sequential compression device  Until discontinued         01/19/23 0417                Discharge Planning   SUZANNA:      Code Status: Full Code   Is the patient medically ready for discharge?:     Reason for patient still in hospital (select all that apply): Treatment  Discharge Plan A:  Home with family, Home Health                  Jackson Lai MD  Department of Hospital Medicine   [facility] - [address] [place] [address]

## 2023-01-31 LAB
ANION GAP SERPL CALCULATED.3IONS-SCNC: 15 MMOL/L (ref 7–16)
BASOPHILS # BLD AUTO: 0.07 K/UL (ref 0–0.2)
BASOPHILS NFR BLD AUTO: 0.5 % (ref 0–1)
BUN SERPL-MCNC: 20 MG/DL (ref 7–18)
BUN/CREAT SERPL: 16 (ref 6–20)
CALCIUM SERPL-MCNC: 8.2 MG/DL (ref 8.5–10.1)
CHLORIDE SERPL-SCNC: 98 MMOL/L (ref 98–107)
CO2 SERPL-SCNC: 26 MMOL/L (ref 21–32)
CREAT SERPL-MCNC: 1.22 MG/DL (ref 0.55–1.02)
DIFFERENTIAL METHOD BLD: ABNORMAL
EGFR (NO RACE VARIABLE) (RUSH/TITUS): 53 ML/MIN/1.73M²
EOSINOPHIL # BLD AUTO: 0.69 K/UL (ref 0–0.5)
EOSINOPHIL NFR BLD AUTO: 5.4 % (ref 1–4)
ERYTHROCYTE [DISTWIDTH] IN BLOOD BY AUTOMATED COUNT: 18.1 % (ref 11.5–14.5)
GLUCOSE SERPL-MCNC: 124 MG/DL (ref 70–105)
GLUCOSE SERPL-MCNC: 137 MG/DL (ref 74–106)
GLUCOSE SERPL-MCNC: 149 MG/DL (ref 70–105)
GLUCOSE SERPL-MCNC: 151 MG/DL (ref 70–105)
GLUCOSE SERPL-MCNC: 220 MG/DL (ref 70–105)
HCT VFR BLD AUTO: 26.4 % (ref 38–47)
HGB BLD-MCNC: 7.8 G/DL (ref 12–16)
IMM GRANULOCYTES # BLD AUTO: 0.11 K/UL (ref 0–0.04)
IMM GRANULOCYTES NFR BLD: 0.9 % (ref 0–0.4)
LYMPHOCYTES # BLD AUTO: 2.3 K/UL (ref 1–4.8)
LYMPHOCYTES NFR BLD AUTO: 18 % (ref 27–41)
MAGNESIUM SERPL-MCNC: 1.6 MG/DL (ref 1.7–2.3)
MCH RBC QN AUTO: 23.1 PG (ref 27–31)
MCHC RBC AUTO-ENTMCNC: 29.5 G/DL (ref 32–36)
MCV RBC AUTO: 78.1 FL (ref 80–96)
MONOCYTES # BLD AUTO: 1.2 K/UL (ref 0–0.8)
MONOCYTES NFR BLD AUTO: 9.4 % (ref 2–6)
MPC BLD CALC-MCNC: 9.4 FL (ref 9.4–12.4)
NEUTROPHILS # BLD AUTO: 8.42 K/UL (ref 1.8–7.7)
NEUTROPHILS NFR BLD AUTO: 65.8 % (ref 53–65)
NRBC # BLD AUTO: 0 X10E3/UL
NRBC, AUTO (.00): 0 %
PLATELET # BLD AUTO: 404 K/UL (ref 150–400)
POTASSIUM SERPL-SCNC: 3.9 MMOL/L (ref 3.5–5.1)
RBC # BLD AUTO: 3.38 M/UL (ref 4.2–5.4)
SODIUM SERPL-SCNC: 135 MMOL/L (ref 136–145)
VANCOMYCIN TROUGH SERPL-MCNC: 40.3 ΜG/ML (ref 10–20)
WBC # BLD AUTO: 12.79 K/UL (ref 4.5–11)

## 2023-01-31 PROCEDURE — 85025 COMPLETE CBC W/AUTO DIFF WBC: CPT | Performed by: ORTHOPAEDIC SURGERY

## 2023-01-31 PROCEDURE — 99232 PR SUBSEQUENT HOSPITAL CARE,LEVL II: ICD-10-PCS | Mod: GC,,, | Performed by: FAMILY MEDICINE

## 2023-01-31 PROCEDURE — 11000001 HC ACUTE MED/SURG PRIVATE ROOM

## 2023-01-31 PROCEDURE — 80048 BASIC METABOLIC PNL TOTAL CA: CPT | Performed by: ORTHOPAEDIC SURGERY

## 2023-01-31 PROCEDURE — 63600175 PHARM REV CODE 636 W HCPCS: Performed by: FAMILY MEDICINE

## 2023-01-31 PROCEDURE — 25000003 PHARM REV CODE 250

## 2023-01-31 PROCEDURE — 99232 SBSQ HOSP IP/OBS MODERATE 35: CPT | Mod: GC,,, | Performed by: FAMILY MEDICINE

## 2023-01-31 PROCEDURE — 63600175 PHARM REV CODE 636 W HCPCS

## 2023-01-31 PROCEDURE — 80202 ASSAY OF VANCOMYCIN: CPT | Performed by: FAMILY MEDICINE

## 2023-01-31 PROCEDURE — 83735 ASSAY OF MAGNESIUM: CPT

## 2023-01-31 PROCEDURE — 25000003 PHARM REV CODE 250: Performed by: FAMILY MEDICINE

## 2023-01-31 PROCEDURE — 97110 THERAPEUTIC EXERCISES: CPT | Mod: CQ

## 2023-01-31 PROCEDURE — 97116 GAIT TRAINING THERAPY: CPT | Mod: CQ

## 2023-01-31 PROCEDURE — 25000003 PHARM REV CODE 250: Performed by: ORTHOPAEDIC SURGERY

## 2023-01-31 PROCEDURE — 82962 GLUCOSE BLOOD TEST: CPT

## 2023-01-31 PROCEDURE — 94761 N-INVAS EAR/PLS OXIMETRY MLT: CPT

## 2023-01-31 RX ORDER — MAGNESIUM SULFATE HEPTAHYDRATE 40 MG/ML
2 INJECTION, SOLUTION INTRAVENOUS ONCE
Status: COMPLETED | OUTPATIENT
Start: 2023-01-31 | End: 2023-01-31

## 2023-01-31 RX ADMIN — HYDROMORPHONE HYDROCHLORIDE 0.5 MG: 2 INJECTION, SOLUTION INTRAMUSCULAR; INTRAVENOUS; SUBCUTANEOUS at 12:01

## 2023-01-31 RX ADMIN — HYDRALAZINE HYDROCHLORIDE 25 MG: 25 TABLET ORAL at 05:01

## 2023-01-31 RX ADMIN — MAGNESIUM SULFATE HEPTAHYDRATE 2 G: 40 INJECTION, SOLUTION INTRAVENOUS at 07:01

## 2023-01-31 RX ADMIN — Medication 1 EACH: at 09:01

## 2023-01-31 RX ADMIN — INSULIN DETEMIR 23 UNITS: 100 INJECTION, SOLUTION SUBCUTANEOUS at 09:01

## 2023-01-31 RX ADMIN — DIPHENHYDRAMINE HYDROCHLORIDE 25 MG: 50 INJECTION, SOLUTION INTRAMUSCULAR; INTRAVENOUS at 07:01

## 2023-01-31 RX ADMIN — HYDROMORPHONE HYDROCHLORIDE 0.5 MG: 2 INJECTION, SOLUTION INTRAMUSCULAR; INTRAVENOUS; SUBCUTANEOUS at 07:01

## 2023-01-31 RX ADMIN — HYDROMORPHONE HYDROCHLORIDE 0.5 MG: 2 INJECTION, SOLUTION INTRAMUSCULAR; INTRAVENOUS; SUBCUTANEOUS at 06:01

## 2023-01-31 RX ADMIN — DIPHENHYDRAMINE HYDROCHLORIDE 25 MG: 50 INJECTION, SOLUTION INTRAMUSCULAR; INTRAVENOUS at 01:01

## 2023-01-31 RX ADMIN — INSULIN ASPART 5 UNITS: 100 INJECTION, SOLUTION INTRAVENOUS; SUBCUTANEOUS at 06:01

## 2023-01-31 RX ADMIN — DIPHENHYDRAMINE HYDROCHLORIDE 25 MG: 50 INJECTION, SOLUTION INTRAMUSCULAR; INTRAVENOUS at 06:01

## 2023-01-31 RX ADMIN — HYDRALAZINE HYDROCHLORIDE 25 MG: 25 TABLET ORAL at 01:01

## 2023-01-31 RX ADMIN — DULOXETINE 60 MG: 30 CAPSULE, DELAYED RELEASE ORAL at 09:01

## 2023-01-31 RX ADMIN — Medication 6 MG: at 09:01

## 2023-01-31 RX ADMIN — INSULIN ASPART 5 UNITS: 100 INJECTION, SOLUTION INTRAVENOUS; SUBCUTANEOUS at 04:01

## 2023-01-31 RX ADMIN — HYDROMORPHONE HYDROCHLORIDE 0.5 MG: 2 INJECTION, SOLUTION INTRAMUSCULAR; INTRAVENOUS; SUBCUTANEOUS at 01:01

## 2023-01-31 RX ADMIN — INSULIN ASPART 5 UNITS: 100 INJECTION, SOLUTION INTRAVENOUS; SUBCUTANEOUS at 12:01

## 2023-01-31 RX ADMIN — HYDRALAZINE HYDROCHLORIDE 25 MG: 25 TABLET ORAL at 09:01

## 2023-01-31 RX ADMIN — VANCOMYCIN HYDROCHLORIDE 1000 MG: 1 INJECTION, POWDER, LYOPHILIZED, FOR SOLUTION INTRAVENOUS at 05:01

## 2023-01-31 NOTE — SUBJECTIVE & OBJECTIVE
Interval History: Patient in bed AAOX3. Patient refused physical therapy yesterday because of left hip pain. Left Hip Xray done yesterday no acute process noted. Patient encouraged today to try and work with physical therapy to improve ROM.    Review of Systems   Constitutional:  Negative for fatigue and fever.   HENT:  Negative for ear discharge, ear pain, facial swelling, mouth sores, postnasal drip, rhinorrhea and sore throat.    Eyes:  Negative for pain, discharge and itching.   Respiratory:  Negative for cough.    Cardiovascular:  Negative for leg swelling.   Gastrointestinal:  Negative for abdominal distention, abdominal pain, anal bleeding, constipation and diarrhea.   Endocrine: Negative for cold intolerance, heat intolerance and polydipsia.   Genitourinary:  Negative for difficulty urinating, dyspareunia, flank pain, frequency, genital sores and hematuria.   Musculoskeletal:  Negative for arthralgias, back pain, gait problem and joint swelling.   Skin:  Positive for wound.   Allergic/Immunologic: Negative for environmental allergies and food allergies.   Neurological:  Negative for dizziness, facial asymmetry, light-headedness and headaches.   Hematological: Negative.    Psychiatric/Behavioral: Negative.     Objective:     Vital Signs (Most Recent):  Temp: 98.3 °F (36.8 °C) (01/31/23 1100)  Pulse: 82 (01/31/23 1100)  Resp: 18 (01/31/23 1208)  BP: 131/60 (01/31/23 1100)  SpO2: 97 % (01/31/23 1100) Vital Signs (24h Range):  Temp:  [97.9 °F (36.6 °C)-98.5 °F (36.9 °C)] 98.3 °F (36.8 °C)  Pulse:  [73-84] 82  Resp:  [18-20] 18  SpO2:  [95 %-97 %] 97 %  BP: (125-139)/(59-71) 131/60     Weight: 65.7 kg (144 lb 13.5 oz)  Body mass index is 23.38 kg/m².    Intake/Output Summary (Last 24 hours) at 1/31/2023 1237  Last data filed at 1/31/2023 0655  Gross per 24 hour   Intake 750 ml   Output 3 ml   Net 747 ml      Physical Exam  Constitutional:       Appearance: She is normal weight.   HENT:      Head: Normocephalic.       Right Ear: Tympanic membrane normal.      Left Ear: Tympanic membrane normal.      Nose: Nose normal.      Mouth/Throat:      Mouth: Mucous membranes are moist.      Pharynx: Oropharynx is clear.   Eyes:      Conjunctiva/sclera: Conjunctivae normal.      Pupils: Pupils are equal, round, and reactive to light.   Cardiovascular:      Rate and Rhythm: Normal rate and regular rhythm.      Pulses: Normal pulses.      Heart sounds: Normal heart sounds.   Pulmonary:      Effort: Pulmonary effort is normal.      Breath sounds: Rhonchi present.   Abdominal:      General: Abdomen is flat. Bowel sounds are normal.   Musculoskeletal:         General: No tenderness. Normal range of motion.      Cervical back: Normal range of motion.      Comments: Lower back itching and new bump   Skin:     General: Skin is warm and dry.      Capillary Refill: Capillary refill takes less than 2 seconds.   Neurological:      Mental Status: She is alert and oriented to person, place, and time.   Psychiatric:         Mood and Affect: Mood normal.         Behavior: Behavior normal.       Significant Labs: All pertinent labs within the past 24 hours have been reviewed.  BMP:   Recent Labs   Lab 01/31/23  0253   *   *   K 3.9   CL 98   CO2 26   BUN 20*   CREATININE 1.22*   CALCIUM 8.2*   MG 1.6*     CBC:   Recent Labs   Lab 01/30/23  0302 01/31/23  0253   WBC 12.70* 12.79*   HGB 8.5* 7.8*   HCT 28.0* 26.4*   * 404*       Significant Imaging: I have reviewed all pertinent imaging results/findings within the past 24 hours.

## 2023-01-31 NOTE — PROGRESS NOTES
"[facility] Hospital Medicine  Progress Note    Patient Name: Monica Walker  MRN: [id number]  Patient Class: IP- Inpatient   Admission Date: 1/19/2023  Length of Stay: 12 days  Attending Physician: Vinay Rosen Jr., MD  Primary Care Provider: Hannah Schulz MD        Subjective:     Principal Problem:Epidural abscess        HPI:  Patient is a 53 y/o Female presenting to [facility] with a cc of back pain. She states the pain began to worsen approximately 16 hours ago. She states the pain is in her Mid back area and radiates to both legs. She describes the pain as "throbbing, constant and a 9/10." She states the pain is blunted by taking Hydrocodone but not relieved. She reports the pain is made worse by movement and standing.    Patient reports she was hospitalized in November for sepsis secondary to Pyelonephritis. She has been hospitalized several times over the past 3 months. She is currently being treated at home with IV Abx therapy for Osteomyelitis of the spine.  MRI of Lumbar spine on 1/10/2023 (Per radiology report). Findings indicative of osteomyelitis involving T11-12 with epidural abscess extending from the T11-12 disc level to the L1 level eccentric towards the left.  Additional site of infection of the right L2-3 facet joint as well as osteomyelitis of the right L3 transverse process.    Significant findings in the ED include:  Vitals: /84, , RR 16, Temp 98.2 F, O2 98% on RA  Labs: WBC 14.6, HGB 8.2, HCT 27.1, MCV 74.2, Na 133, K 3.5, Bun 8, Crt0.54, , Glucose 171, UA normal.  today;124 2 days ago, CRP  22.90 Today; 12.9 2 days ago  Interventions: Toradol 30 mg IV    Pt was subsequently admitted to the Inpatient Hospitalist Service for further evaluation and management.               Overview/Hospital Course:  No notes on file    Interval History: Patient in bed AAOX3. Patient refused physical therapy yesterday because of " left hip pain. Left Hip Xray done yesterday no acute process noted. Patient encouraged today to try and work with physical therapy to improve ROM.    Review of Systems   Constitutional:  Negative for fatigue and fever.   HENT:  Negative for ear discharge, ear pain, facial swelling, mouth sores, postnasal drip, rhinorrhea and sore throat.    Eyes:  Negative for pain, discharge and itching.   Respiratory:  Negative for cough.    Cardiovascular:  Negative for leg swelling.   Gastrointestinal:  Negative for abdominal distention, abdominal pain, anal bleeding, constipation and diarrhea.   Endocrine: Negative for cold intolerance, heat intolerance and polydipsia.   Genitourinary:  Negative for difficulty urinating, dyspareunia, flank pain, frequency, genital sores and hematuria.   Musculoskeletal:  Negative for arthralgias, back pain, gait problem and joint swelling.   Skin:  Positive for wound.   Allergic/Immunologic: Negative for environmental allergies and food allergies.   Neurological:  Negative for dizziness, facial asymmetry, light-headedness and headaches.   Hematological: Negative.    Psychiatric/Behavioral: Negative.     Objective:     Vital Signs (Most Recent):  Temp: 98.3 °F (36.8 °C) (01/31/23 1100)  Pulse: 82 (01/31/23 1100)  Resp: 18 (01/31/23 1208)  BP: 131/60 (01/31/23 1100)  SpO2: 97 % (01/31/23 1100) Vital Signs (24h Range):  Temp:  [97.9 °F (36.6 °C)-98.5 °F (36.9 °C)] 98.3 °F (36.8 °C)  Pulse:  [73-84] 82  Resp:  [18-20] 18  SpO2:  [95 %-97 %] 97 %  BP: (125-139)/(59-71) 131/60     Weight: 65.7 kg (144 lb 13.5 oz)  Body mass index is 23.38 kg/m².    Intake/Output Summary (Last 24 hours) at 1/31/2023 1237  Last data filed at 1/31/2023 0655  Gross per 24 hour   Intake 750 ml   Output 3 ml   Net 747 ml      Physical Exam  Constitutional:       Appearance: She is normal weight.   HENT:      Head: Normocephalic.      Right Ear: Tympanic membrane normal.      Left Ear: Tympanic membrane normal.      Nose:  Nose normal.      Mouth/Throat:      Mouth: Mucous membranes are moist.      Pharynx: Oropharynx is clear.   Eyes:      Conjunctiva/sclera: Conjunctivae normal.      Pupils: Pupils are equal, round, and reactive to light.   Cardiovascular:      Rate and Rhythm: Normal rate and regular rhythm.      Pulses: Normal pulses.      Heart sounds: Normal heart sounds.   Pulmonary:      Effort: Pulmonary effort is normal.      Breath sounds: Rhonchi present.   Abdominal:      General: Abdomen is flat. Bowel sounds are normal.   Musculoskeletal:         General: No tenderness. Normal range of motion.      Cervical back: Normal range of motion.      Comments: Lower back itching and new bump   Skin:     General: Skin is warm and dry.      Capillary Refill: Capillary refill takes less than 2 seconds.   Neurological:      Mental Status: She is alert and oriented to person, place, and time.   Psychiatric:         Mood and Affect: Mood normal.         Behavior: Behavior normal.       Significant Labs: All pertinent labs within the past 24 hours have been reviewed.  BMP:   Recent Labs   Lab 01/31/23  0253   *   *   K 3.9   CL 98   CO2 26   BUN 20*   CREATININE 1.22*   CALCIUM 8.2*   MG 1.6*     CBC:   Recent Labs   Lab 01/30/23  0302 01/31/23  0253   WBC 12.70* 12.79*   HGB 8.5* 7.8*   HCT 28.0* 26.4*   * 404*       Significant Imaging: I have reviewed all pertinent imaging results/findings within the past 24 hours.      Assessment/Plan:      * Epidural abscess  - MRI Thoracic w/o Contrast - Findings suggestive of osteomyelitis discitis of T11-12 with epidural phlegmon or extruded disc contributing to moderate spinal canal stenosis.  Additional epidural phlegmon extending in the left paracentral location and of the left T11-12 and T12-L1 foramina.  - MRSA sensitive to Vancomycin, will continue   - Surgery/washout 1/24/2023 successful   - pathology revealed osteomyelitis patinet will need 6 weeks of abx coverage  which will go until 3/7/23, will try to transfer her to LTAC for continued abx.     Left hip pain  Left Hip X-ray -Left hip arthroplasty appears within normal limits.    PT/OT      Hypertension  Not currently controlled  Patient took no home meds  Occasionally correspond to pain  Hydralazine 25mg Q8hrs      Hypomagnesemia  2G magnesium  Will recheck in the am      Microcytic anemia  Ferritin - 380  Iron - 12  Iron Sat - 6  TIBC - 188    Ferrous sulfate given Qd    Type 2 diabetes mellitus, with long-term current use of insulin  Patient's FSGs are uncontrolled due to hyperglycemia on current medication regimen.  Last A1c reviewed-   Lab Results   Component Value Date    HGBA1C 7.9 (H) 01/19/2023     Most recent fingerstick glucose reviewed- No results for input(s): POCTGLUCOSE in the last 24 hours.  Current correctional scale  Medium  Maintain anti-hyperglycemic dose as follows-   Antihyperglycemics (From admission, onward)    Start     Stop Route Frequency Ordered    01/28/23 2100  insulin detemir U-100 injection 23 Units         -- SubQ Nightly 01/27/23 2132 01/24/23 2136  insulin aspart U-100 injection 1-10 Units         -- SubQ Before meals & nightly PRN 01/24/23 2036        Hold Oral hypoglycemics while patient is in the hospital.  Diabetic Educator consulted, appreciate their help    Depression  - Continue Cymbalta        VTE Risk Mitigation (From admission, onward)         Ordered     IP VTE LOW RISK PATIENT  Once         01/19/23 0417     Place sequential compression device  Until discontinued         01/19/23 0417                Discharge Planning   SUZANNA:      Code Status: Full Code   Is the patient medically ready for discharge?:     Reason for patient still in hospital (select all that apply): Treatment  Discharge Plan A: Home with family, Home Health                  Jackson Lai MD  Department of Hospital Medicine   [facility]

## 2023-01-31 NOTE — PT/OT/SLP PROGRESS
"Physical Therapy Treatment    Patient Name:  Monica Walker   MRN:  [id number]    Recommendations:     Discharge Recommendations: home with home health, LTACH (long-term acute care hospital), nursing facility, skilled  Discharge Equipment Recommendations: other (see comments) (to be determined)  Barriers to discharge:  ongoing medical treatment    Assessment:     Monica Walker is a 52 y.o. female admitted with a medical diagnosis of Epidural abscess.  She presents with the following impairments/functional limitations: weakness, impaired endurance.    Pt demo increased gait distance as compared to previous treatment session, however, reported "burning" sensation right lower extremity post gait    PT POC discussed with Rafaela Aggarwal DPT     Rehab Prognosis: Good and Fair; patient would benefit from acute skilled PT services to address these deficits and reach maximum level of function.    Recent Surgery: Procedure(s) (LRB):  T9-L2 fusion, T11-T12 laminectomy and corpectomy (N/A) 7 Days Post-Op    Plan:     During this hospitalization, patient to be seen 5 x/week to address the identified rehab impairments via gait training, therapeutic activities and progress toward the following goals:    Plan of Care Expires:  02/25/23    Subjective     Chief Complaint: epidural abscess  Patient/Family Comments/goals: "nobody has helped me with my bath"  Pain/Comfort:         Objective:     Communicated with Michelet De Anda RN prior to session.  Patient found supine with peripheral IV upon PT entry to room.     General Precautions: Standard, fall  Orthopedic Precautions: spinal precautions  Braces: N/A  Respiratory Status: Room air     Functional Mobility:  Bed Mobility:     Supine to Sit: stand by assistance and contact guard assistance  Transfers:     Sit to Stand:  stand by assistance with rolling walker  Gait: 32' contact guard assist with RW; slow theo, mild axial flexion, "burning" sensation right lower extremity      AM-PAC " 6 CLICK MOBILITY  Turning over in bed (including adjusting bedclothes, sheets and blankets)?: 4  Sitting down on and standing up from a chair with arms (e.g., wheelchair, bedside commode, etc.): 4  Moving from lying on back to sitting on the side of the bed?: 3  Moving to and from a bed to a chair (including a wheelchair)?: 4  Need to walk in hospital room?: 4  Climbing 3-5 steps with a railing?: 1  Basic Mobility Total Score: 20       Treatment & Education:  Pt performed 30 reps (B) LE exercises: ap, quad set, glut set, straight leg raise, hip ab/adduction, long arc quad, heel slide with active assist range of motion     Patient left up in chair with all lines intact, call button in reach, and student nurse entering room post treatment ..    GOALS:   Multidisciplinary Problems       Physical Therapy Goals          Problem: Physical Therapy    Goal Priority Disciplines Outcome Goal Variances Interventions   Physical Therapy Goal     PT, PT/OT Ongoing, Progressing     Description: Short Term Goals to be met by: 23    Patient will increase functional independence with mobility by performin. Supine to sit with Stand by assist  2. Sit to stand transfer with contact guard assist using Rolling walker  3. Bed to chair transfer with contact guard assist using Rolling walker  4. Gait  x 25 feet with contact guard assist using Rolling walker  5. Lower extremity exercise program x30 reps per handout, with assistance as needed  6. Pt to recall 3/3 spinal precautions     Long Term Goals to be met by: 23    Pt will regain full independent functional mobility with Rolling walker to return to home situation and prior activities of daily living.                        Time Tracking:     PT Received On: 23  PT Start Time: 1126     PT Stop Time: 1150  PT Total Time (min): 24 min     Billable Minutes: Gait Training 8 and Therapeutic Exercise 15    Treatment Type: Treatment  PT/PTA: PTA     PTA Visit Number: 1      01/31/2023

## 2023-01-31 NOTE — PLAN OF CARE
Problem: Adult Inpatient Plan of Care  Goal: Plan of Care Review  Outcome: Ongoing, Progressing  Goal: Patient-Specific Goal (Individualized)  Outcome: Ongoing, Progressing  Goal: Absence of Hospital-Acquired Illness or Injury  Outcome: Ongoing, Progressing  Goal: Optimal Comfort and Wellbeing  Outcome: Ongoing, Progressing  Goal: Readiness for Transition of Care  Outcome: Ongoing, Progressing     Problem: Diabetes Comorbidity  Goal: Blood Glucose Level Within Targeted Range  Outcome: Ongoing, Progressing     Problem: Infection  Goal: Absence of Infection Signs and Symptoms  Outcome: Ongoing, Progressing     Problem: Impaired Wound Healing  Goal: Optimal Wound Healing  Outcome: Ongoing, Progressing     Problem: Skin Injury Risk Increased  Goal: Skin Health and Integrity  Outcome: Ongoing, Progressing     Problem: Fall Injury Risk  Goal: Absence of Fall and Fall-Related Injury  Outcome: Ongoing, Progressing  POC reviewed and continues

## 2023-01-31 NOTE — PLAN OF CARE
Problem: Adult Inpatient Plan of Care  Goal: Plan of Care Review  Outcome: Ongoing, Progressing  Goal: Patient-Specific Goal (Individualized)  Outcome: Ongoing, Progressing  Flowsheets (Taken 1/30/2023 1840)  Anxieties, Fears or Concerns: pain  Individualized Care Needs: control pain, reslove infection  Goal: Absence of Hospital-Acquired Illness or Injury  Outcome: Ongoing, Progressing  Goal: Optimal Comfort and Wellbeing  Outcome: Ongoing, Progressing  Goal: Readiness for Transition of Care  Outcome: Ongoing, Progressing     Problem: Diabetes Comorbidity  Goal: Blood Glucose Level Within Targeted Range  Outcome: Ongoing, Progressing     Problem: Infection  Goal: Absence of Infection Signs and Symptoms  Outcome: Ongoing, Progressing     Problem: Impaired Wound Healing  Goal: Optimal Wound Healing  Outcome: Ongoing, Progressing     Problem: Skin Injury Risk Increased  Goal: Skin Health and Integrity  Outcome: Ongoing, Progressing     Problem: Fall Injury Risk  Goal: Absence of Fall and Fall-Related Injury  Outcome: Ongoing, Progressing  POC reviewed and continues

## 2023-01-31 NOTE — PLAN OF CARE
Problem: Adult Inpatient Plan of Care  Goal: Plan of Care Review  Outcome: Ongoing, Progressing  Flowsheets (Taken 1/30/2023 1954)  Plan of Care Reviewed With: patient  Goal: Optimal Comfort and Wellbeing  Outcome: Ongoing, Progressing  Intervention: Monitor Pain and Promote Comfort  Flowsheets (Taken 1/30/2023 1954)  Pain Management Interventions:   pain management plan reviewed with patient/caregiver   pillow support provided   position adjusted   relaxation techniques promoted   quiet environment facilitated  Intervention: Provide Person-Centered Care  Flowsheets (Taken 1/30/2023 1954)  Trust Relationship/Rapport:   care explained   choices provided   emotional support provided   questions answered   empathic listening provided   thoughts/feelings acknowledged   reassurance provided   questions encouraged     Problem: Diabetes Comorbidity  Goal: Blood Glucose Level Within Targeted Range  Outcome: Ongoing, Progressing  Intervention: Monitor and Manage Glycemia  Flowsheets (Taken 1/30/2023 1954)  Glycemic Management:   blood glucose monitored   supplemental insulin given     Problem: Infection  Goal: Absence of Infection Signs and Symptoms  Outcome: Ongoing, Progressing  Intervention: Prevent or Manage Infection  Flowsheets (Taken 1/30/2023 1954)  Fever Reduction/Comfort Measures:   lightweight bedding   lightweight clothing  Infection Management: aseptic technique maintained  Isolation Precautions: precautions maintained     Problem: Impaired Wound Healing  Goal: Optimal Wound Healing  Outcome: Ongoing, Progressing  Intervention: Promote Wound Healing  Flowsheets (Taken 1/30/2023 1954)  Oral Nutrition Promotion:   physical activity promoted   rest periods promoted   safe use of adaptive equipment encouraged   social interaction promoted  Sleep/Rest Enhancement:   awakenings minimized   relaxation techniques promoted   regular sleep/rest pattern promoted   noise level reduced  Activity Management: Rolling -  L1  Pain Management Interventions:   pain management plan reviewed with patient/caregiver   pillow support provided   position adjusted   relaxation techniques promoted   quiet environment facilitated     Problem: Skin Injury Risk Increased  Goal: Skin Health and Integrity  Outcome: Ongoing, Progressing  Intervention: Optimize Skin Protection  Flowsheets (Taken 1/30/2023 1954)  Pressure Reduction Techniques:   frequent weight shift encouraged   heels elevated off bed   positioned off wounds   pressure points protected   weight shift assistance provided   rest period provided between sit times  Pressure Reduction Devices: positioning supports utilized  Skin Protection:   adhesive use limited   incontinence pads utilized   transparent dressing maintained   tubing/devices free from skin contact  Head of Bed (HOB) Positioning: HOB elevated  Intervention: Promote and Optimize Oral Intake  Flowsheets (Taken 1/30/2023 1954)  Oral Nutrition Promotion:   physical activity promoted   rest periods promoted   safe use of adaptive equipment encouraged   social interaction promoted     Problem: Fall Injury Risk  Goal: Absence of Fall and Fall-Related Injury  Outcome: Ongoing, Progressing  Intervention: Identify and Manage Contributors  Flowsheets (Taken 1/30/2023 1954)  Self-Care Promotion:   independence encouraged   BADL personal objects within reach   BADL personal routines maintained   meal set-up provided   safe use of adaptive equipment encouraged  Medication Review/Management: medications reviewed

## 2023-02-01 LAB
ANION GAP SERPL CALCULATED.3IONS-SCNC: 12 MMOL/L (ref 7–16)
BASOPHILS # BLD AUTO: 0.06 K/UL (ref 0–0.2)
BASOPHILS NFR BLD AUTO: 0.5 % (ref 0–1)
BUN SERPL-MCNC: 28 MG/DL (ref 7–18)
BUN/CREAT SERPL: 19 (ref 6–20)
CALCIUM SERPL-MCNC: 8.3 MG/DL (ref 8.5–10.1)
CHLORIDE SERPL-SCNC: 98 MMOL/L (ref 98–107)
CLARITY FLD: ABNORMAL
CO2 SERPL-SCNC: 28 MMOL/L (ref 21–32)
COLOR FLD: ABNORMAL
CREAT SERPL-MCNC: 1.44 MG/DL (ref 0.55–1.02)
DIFFERENTIAL METHOD BLD: ABNORMAL
EGFR (NO RACE VARIABLE) (RUSH/TITUS): 43 ML/MIN/1.73M²
EOSINOPHIL # BLD AUTO: 0.57 K/UL (ref 0–0.5)
EOSINOPHIL NFR BLD AUTO: 4.4 % (ref 1–4)
ERYTHROCYTE [DISTWIDTH] IN BLOOD BY AUTOMATED COUNT: 17.9 % (ref 11.5–14.5)
GLUCOSE SERPL-MCNC: 134 MG/DL (ref 70–105)
GLUCOSE SERPL-MCNC: 205 MG/DL (ref 74–106)
GLUCOSE SERPL-MCNC: 272 MG/DL (ref 70–105)
GLUCOSE SERPL-MCNC: 296 MG/DL (ref 70–105)
GLUCOSE SERPL-MCNC: 340 MG/DL (ref 70–105)
GRANULOCYTES NFR SNV MANUAL: 35 % (ref 0–25)
HCT VFR BLD AUTO: 24.9 % (ref 38–47)
HGB BLD-MCNC: 7.5 G/DL (ref 12–16)
IMM GRANULOCYTES # BLD AUTO: 0.1 K/UL (ref 0–0.04)
IMM GRANULOCYTES NFR BLD: 0.8 % (ref 0–0.4)
LYMPHOCYTES # BLD AUTO: 2.28 K/UL (ref 1–4.8)
LYMPHOCYTES NFR BLD AUTO: 17.6 % (ref 27–41)
LYMPHOCYTES NFR SNV MANUAL: 36 %
MAGNESIUM SERPL-MCNC: 2.1 MG/DL (ref 1.7–2.3)
MCH RBC QN AUTO: 22.8 PG (ref 27–31)
MCHC RBC AUTO-ENTMCNC: 30.1 G/DL (ref 32–36)
MCV RBC AUTO: 75.7 FL (ref 80–96)
MONOCYTES # BLD AUTO: 1.09 K/UL (ref 0–0.8)
MONOCYTES NFR BLD AUTO: 8.4 % (ref 2–6)
MONOCYTES NFR SNV MANUAL: 29 %
MPC BLD CALC-MCNC: 9 FL (ref 9.4–12.4)
NEUTROPHILS # BLD AUTO: 8.86 K/UL (ref 1.8–7.7)
NEUTROPHILS NFR BLD AUTO: 68.3 % (ref 53–65)
NRBC # BLD AUTO: 0 X10E3/UL
NRBC, AUTO (.00): 0 %
PLATELET # BLD AUTO: 410 K/UL (ref 150–400)
POTASSIUM SERPL-SCNC: 4.3 MMOL/L (ref 3.5–5.1)
RBC # BLD AUTO: 3.29 M/UL (ref 4.2–5.4)
RBC # SNV MANUAL: <3000 /CUMM
SODIUM SERPL-SCNC: 134 MMOL/L (ref 136–145)
VANCOMYCIN TROUGH SERPL-MCNC: 21 ΜG/ML (ref 10–20)
WBC # BLD AUTO: 12.96 K/UL (ref 4.5–11)
WBC # SNV MANUAL: 127 /CUMM

## 2023-02-01 PROCEDURE — 80202 ASSAY OF VANCOMYCIN: CPT | Performed by: FAMILY MEDICINE

## 2023-02-01 PROCEDURE — 25000003 PHARM REV CODE 250: Performed by: ORTHOPAEDIC SURGERY

## 2023-02-01 PROCEDURE — 63600175 PHARM REV CODE 636 W HCPCS

## 2023-02-01 PROCEDURE — 99232 PR SUBSEQUENT HOSPITAL CARE,LEVL II: ICD-10-PCS | Mod: GC,,, | Performed by: FAMILY MEDICINE

## 2023-02-01 PROCEDURE — 97110 THERAPEUTIC EXERCISES: CPT

## 2023-02-01 PROCEDURE — 25000003 PHARM REV CODE 250

## 2023-02-01 PROCEDURE — 63600175 PHARM REV CODE 636 W HCPCS: Performed by: FAMILY MEDICINE

## 2023-02-01 PROCEDURE — 89050 BODY FLUID CELL COUNT: CPT | Performed by: ORTHOPAEDIC SURGERY

## 2023-02-01 PROCEDURE — 83735 ASSAY OF MAGNESIUM: CPT

## 2023-02-01 PROCEDURE — 25000003 PHARM REV CODE 250: Performed by: FAMILY MEDICINE

## 2023-02-01 PROCEDURE — 87070 CULTURE OTHR SPECIMN AEROBIC: CPT | Performed by: ORTHOPAEDIC SURGERY

## 2023-02-01 PROCEDURE — 85025 COMPLETE CBC W/AUTO DIFF WBC: CPT | Performed by: ORTHOPAEDIC SURGERY

## 2023-02-01 PROCEDURE — 80048 BASIC METABOLIC PNL TOTAL CA: CPT | Performed by: ORTHOPAEDIC SURGERY

## 2023-02-01 PROCEDURE — 99232 SBSQ HOSP IP/OBS MODERATE 35: CPT | Mod: GC,,, | Performed by: FAMILY MEDICINE

## 2023-02-01 PROCEDURE — 11000001 HC ACUTE MED/SURG PRIVATE ROOM

## 2023-02-01 PROCEDURE — 82962 GLUCOSE BLOOD TEST: CPT

## 2023-02-01 PROCEDURE — 87075 CULTR BACTERIA EXCEPT BLOOD: CPT | Performed by: ORTHOPAEDIC SURGERY

## 2023-02-01 RX ORDER — HYDROCORTISONE 1 %
CREAM (GRAM) TOPICAL 2 TIMES DAILY
Status: DISCONTINUED | OUTPATIENT
Start: 2023-02-01 | End: 2023-02-08 | Stop reason: HOSPADM

## 2023-02-01 RX ADMIN — HYDROMORPHONE HYDROCHLORIDE 0.5 MG: 2 INJECTION, SOLUTION INTRAMUSCULAR; INTRAVENOUS; SUBCUTANEOUS at 07:02

## 2023-02-01 RX ADMIN — HYDRALAZINE HYDROCHLORIDE 25 MG: 25 TABLET ORAL at 01:02

## 2023-02-01 RX ADMIN — HYDROMORPHONE HYDROCHLORIDE 0.5 MG: 2 INJECTION, SOLUTION INTRAMUSCULAR; INTRAVENOUS; SUBCUTANEOUS at 01:02

## 2023-02-01 RX ADMIN — INSULIN DETEMIR 25 UNITS: 100 INJECTION, SOLUTION SUBCUTANEOUS at 09:02

## 2023-02-01 RX ADMIN — INSULIN ASPART 5 UNITS: 100 INJECTION, SOLUTION INTRAVENOUS; SUBCUTANEOUS at 12:02

## 2023-02-01 RX ADMIN — HYDROMORPHONE HYDROCHLORIDE 0.5 MG: 2 INJECTION, SOLUTION INTRAMUSCULAR; INTRAVENOUS; SUBCUTANEOUS at 08:02

## 2023-02-01 RX ADMIN — INSULIN ASPART 5 UNITS: 100 INJECTION, SOLUTION INTRAVENOUS; SUBCUTANEOUS at 06:02

## 2023-02-01 RX ADMIN — INSULIN ASPART 5 UNITS: 100 INJECTION, SOLUTION INTRAVENOUS; SUBCUTANEOUS at 04:02

## 2023-02-01 RX ADMIN — HYDROCORTISONE: 1 CREAM TOPICAL at 09:02

## 2023-02-01 RX ADMIN — DOCUSATE SODIUM 100 MG: 100 CAPSULE, LIQUID FILLED ORAL at 08:02

## 2023-02-01 RX ADMIN — INSULIN ASPART 4 UNITS: 100 INJECTION, SOLUTION INTRAVENOUS; SUBCUTANEOUS at 06:02

## 2023-02-01 RX ADMIN — VANCOMYCIN HYDROCHLORIDE 1000 MG: 1 INJECTION, POWDER, LYOPHILIZED, FOR SOLUTION INTRAVENOUS at 05:02

## 2023-02-01 RX ADMIN — INSULIN ASPART 8 UNITS: 100 INJECTION, SOLUTION INTRAVENOUS; SUBCUTANEOUS at 12:02

## 2023-02-01 RX ADMIN — HYDROCODONE BITARTRATE AND ACETAMINOPHEN 1 TABLET: 10; 325 TABLET ORAL at 11:02

## 2023-02-01 RX ADMIN — DULOXETINE 60 MG: 30 CAPSULE, DELAYED RELEASE ORAL at 08:02

## 2023-02-01 RX ADMIN — INSULIN ASPART 3 UNITS: 100 INJECTION, SOLUTION INTRAVENOUS; SUBCUTANEOUS at 09:02

## 2023-02-01 RX ADMIN — Medication 1 EACH: at 08:02

## 2023-02-01 RX ADMIN — DIPHENHYDRAMINE HYDROCHLORIDE 25 MG: 50 INJECTION, SOLUTION INTRAMUSCULAR; INTRAVENOUS at 01:02

## 2023-02-01 RX ADMIN — HYDROCODONE BITARTRATE AND ACETAMINOPHEN 1 TABLET: 10; 325 TABLET ORAL at 10:02

## 2023-02-01 RX ADMIN — HYDRALAZINE HYDROCHLORIDE 25 MG: 25 TABLET ORAL at 09:02

## 2023-02-01 RX ADMIN — HYDROCORTISONE: 1 CREAM TOPICAL at 08:02

## 2023-02-01 RX ADMIN — DIPHENHYDRAMINE HYDROCHLORIDE 25 MG: 50 INJECTION, SOLUTION INTRAMUSCULAR; INTRAVENOUS at 08:02

## 2023-02-01 RX ADMIN — HYDRALAZINE HYDROCHLORIDE 25 MG: 25 TABLET ORAL at 05:02

## 2023-02-01 RX ADMIN — DIPHENHYDRAMINE HYDROCHLORIDE 25 MG: 50 INJECTION, SOLUTION INTRAMUSCULAR; INTRAVENOUS at 07:02

## 2023-02-01 NOTE — PT/OT/SLP PROGRESS
Occupational Therapy      Patient Name:  Monica Walker   MRN:  [id number]    Patient not seen today secondary to Patient unwilling to participate (Pt states that she walked to the door and back and sat up in her chair for a while. Pt had recently returned to bed and refused to do anything else.). Will follow-up 2/1/2023.    1/31/2023

## 2023-02-01 NOTE — PROGRESS NOTES
Aspiration of left hip joint  Performed by Dr. Jacek Whitfield  Consent obtained for left hip aspiration.  A formal timeout was called all staff present agreed to patient and procedure.  The left hip was prepped with ChloraPrep and sterile field was established.  1% lidocaine was used as local anesthetic.  Under fluoroscopic guidance a 22 gauge needle was placed into the left hip joint from an anterior approach.  Initial aspiration produced no fluid.  3 cc of normal saline was injected into the left hip joint.  2 cc of straw-colored fluid was then aspirated and sent for laboratory examination.  The needle was removed and the puncture site was cleaned and bandaged.  The patient tolerated the procedure well there were no immediate postprocedure complications.  Total fluoroscopy time was 36 seconds.

## 2023-02-01 NOTE — PLAN OF CARE
Problem: Adult Inpatient Plan of Care  Goal: Plan of Care Review  Outcome: Ongoing, Progressing  Flowsheets (Taken 1/31/2023 1945)  Plan of Care Reviewed With: patient  Goal: Optimal Comfort and Wellbeing  Outcome: Ongoing, Progressing  Intervention: Monitor Pain and Promote Comfort  Flowsheets (Taken 1/31/2023 1945)  Pain Management Interventions:   position adjusted   pain management plan reviewed with patient/caregiver   pillow support provided   quiet environment facilitated   relaxation techniques promoted  Intervention: Provide Person-Centered Care  Flowsheets (Taken 1/31/2023 1945)  Trust Relationship/Rapport:   care explained   choices provided   emotional support provided   empathic listening provided   questions answered   questions encouraged   reassurance provided   thoughts/feelings acknowledged     Problem: Diabetes Comorbidity  Goal: Blood Glucose Level Within Targeted Range  Outcome: Ongoing, Progressing  Intervention: Monitor and Manage Glycemia  Flowsheets (Taken 1/31/2023 1945)  Glycemic Management:   blood glucose monitored   supplemental insulin given     Problem: Infection  Goal: Absence of Infection Signs and Symptoms  Outcome: Ongoing, Progressing  Intervention: Prevent or Manage Infection  Flowsheets (Taken 1/31/2023 1945)  Fever Reduction/Comfort Measures:   lightweight bedding   lightweight clothing  Infection Management: aseptic technique maintained  Isolation Precautions: precautions maintained     Problem: Impaired Wound Healing  Goal: Optimal Wound Healing  Outcome: Ongoing, Progressing  Intervention: Promote Wound Healing  Flowsheets (Taken 1/31/2023 1945)  Oral Nutrition Promotion:   physical activity promoted   rest periods promoted   safe use of adaptive equipment encouraged   social interaction promoted  Sleep/Rest Enhancement:   awakenings minimized   noise level reduced   relaxation techniques promoted   regular sleep/rest pattern promoted  Pain Management Interventions:    position adjusted   pain management plan reviewed with patient/caregiver   pillow support provided   quiet environment facilitated   relaxation techniques promoted     Problem: Skin Injury Risk Increased  Goal: Skin Health and Integrity  Outcome: Ongoing, Progressing  Intervention: Optimize Skin Protection  Flowsheets (Taken 1/31/2023 1945)  Pressure Reduction Techniques:   frequent weight shift encouraged   heels elevated off bed   positioned off wounds   pressure points protected   weight shift assistance provided   rest period provided between sit times  Pressure Reduction Devices: positioning supports utilized  Skin Protection:   adhesive use limited   incontinence pads utilized   tubing/devices free from skin contact   transparent dressing maintained  Head of Bed (HOB) Positioning: HOB elevated     Problem: Fall Injury Risk  Goal: Absence of Fall and Fall-Related Injury  Outcome: Ongoing, Progressing  Intervention: Identify and Manage Contributors  Flowsheets (Taken 1/31/2023 1945)  Self-Care Promotion:   independence encouraged   BADL personal objects within reach   BADL personal routines maintained   meal set-up provided   safe use of adaptive equipment encouraged  Medication Review/Management: medications reviewed

## 2023-02-01 NOTE — PT/OT/SLP PROGRESS
Occupational Therapy   Treatment    Name: Monica Walker  MRN: [id number]  Admitting Diagnosis:  Epidural abscess      Recommendations:     Discharge Recommendations: home with home health, LTACH (long-term acute care hospital), nursing facility, skilled  Discharge Equipment Recommendations:  none  Barriers to discharge:       Assessment:     Monica Walker is a 52 y.o. female with a medical diagnosis of Epidural abscess.  She presents with c/o back pain. OT made 3 attempts until pt was willing to work with OT. Performance deficits affecting function are weakness, pain, orthopedic precautions, impaired self care skills, impaired functional mobility.     Patient tolerated tx well for UB exercises but refused to stand due to L hip pain.    Rehab Prognosis:  Good; patient would benefit from acute skilled OT services to address these deficits and reach maximum level of function.       Plan:     Patient to be seen 5 x/week to address the above listed problems via self-care/home management, therapeutic activities, therapeutic exercises  Plan of Care Expires: 02/28/23  Plan of Care Reviewed with: patient    Subjective     Pain/Comfort:   L hip pain reported     Objective:     Communicated with: Nursing staff prior to session.  Patient found supine   upon OT entry to room.    General Precautions: Standard, fall    Orthopedic Precautions:spinal precautions  Braces: N/A  Respiratory Status: Room air     Occupational Performance:     Bed Mobility:    Refused to sit EOB due to pain     Functional Mobility/Transfers:  NT- pt declined    Activities of Daily Living:  Not performed    [facility] 6 Click ADL:      Treatment & Education:  Patient completed the following exercises to work on UB strength in order to complete ADLs, bed mobility, and transfers with less assistance.  -Bicep curls: 3 sets of 10 with red theraband  -Triceps press: 3 sets of 10 with red theraband  -Shoulder flexion: 2 sets of 20 with red theraband  -Shoulder  horizontal abduction: 3 sets of 10 with red theraband  -Shoulder rows: 2 sets of 20 with red theraband        Increased time/effort needed to complete each exercise.       Patient left supine with all lines intact and call button in reach    GOALS:   Multidisciplinary Problems       Occupational Therapy Goals          Problem: Occupational Therapy    Goal Priority Disciplines Outcome Interventions   Occupational Therapy Goal     OT, PT/OT Ongoing, Progressing    Description: STG: (In 2 weeks)  Pt will perform grooming with setup  Pt will bathe with set up and min(A)  Pt will perform UE dressing with setup  Pt will perform LE dressing with min(A) and with AD if needed  Pt will transfer bed/chair/bsc with CGA with AD  Pt will perform standing task x 2 min with CGA and AD if needed  Pt will tolerate 20 minutes of tx without fatigue      LT.Restore to max I with self care and mobility.                          Time Tracking:     OT Date of Treatment: 23  OT Start Time: 1130  OT Stop Time: 1153  OT Total Time (min): 23 min    Billable Minutes:  Therapeutic Exercise 23 min               2023

## 2023-02-01 NOTE — ASSESSMENT & PLAN NOTE
Patient's FSGs are uncontrolled due to hyperglycemia on current medication regimen.  Last A1c reviewed-   Lab Results   Component Value Date    HGBA1C 7.9 (H) 01/19/2023     Most recent fingerstick glucose reviewed- No results for input(s): POCTGLUCOSE in the last 24 hours.  Current correctional scale  Medium  Maintain anti-hyperglycemic dose as follows-   Antihyperglycemics (From admission, onward)    Start     Stop Route Frequency Ordered    02/01/23 2100  insulin detemir U-100 injection 25 Units         -- SubQ Nightly 02/01/23 0833    01/28/23 1800  insulin aspart U-100 injection 5 Units         -- SubQ 3 times daily with meals 01/28/23 1757    01/24/23 2136  insulin aspart U-100 injection 1-10 Units         -- SubQ Before meals & nightly PRN 01/24/23 2036        Hold Oral hypoglycemics while patient is in the hospital.  Diabetic Educator consulted, appreciate their help

## 2023-02-01 NOTE — ASSESSMENT & PLAN NOTE
Ferritin - 380  Iron - 12  Iron Sat - 6  TIBC - 188    Ferrous sulfate given Qd    2/1/23  H/H 7.5/24/9 will continue to monitor will consider unit of PRBCs if hemoglobin continues to drop.

## 2023-02-01 NOTE — PROGRESS NOTES
Pharmacy Consult    Consulted to assist in the management of  Vanc  therapy.  Patient is currently receiving Vanc 1000mg iv q24hr. Trough drawn at 1740 on 1/31 reported as 40.3 mcg/ml.  It was discovered that this leve3l was drawn while infusing bc of Vanc being hung prior to level being drawn.  Will reschedule Vanc trough prior to next dose on 2/1      JOHN MolinaPh.

## 2023-02-01 NOTE — SUBJECTIVE & OBJECTIVE
Interval History: patient AAOX3 lying in bed. Patient states he left hip continues to hurt with ROM.  On Physical exam patient is tender on left hip. Patient has an artificial left hip. We will consult Orthopedics for recommendations.    Review of Systems   Constitutional:  Negative for fatigue and fever.   HENT:  Negative for ear discharge, ear pain, facial swelling, mouth sores, postnasal drip, rhinorrhea and sore throat.    Eyes:  Negative for pain, discharge and itching.   Respiratory:  Negative for cough.    Cardiovascular:  Negative for leg swelling.   Gastrointestinal:  Negative for abdominal distention, abdominal pain, anal bleeding, constipation and diarrhea.   Endocrine: Negative for cold intolerance, heat intolerance and polydipsia.   Genitourinary:  Negative for difficulty urinating, dyspareunia, flank pain, frequency, genital sores and hematuria.   Musculoskeletal:  Negative for arthralgias, back pain, gait problem and joint swelling.   Skin:  Positive for wound.   Allergic/Immunologic: Negative for environmental allergies and food allergies.   Neurological:  Negative for dizziness, facial asymmetry, light-headedness and headaches.   Hematological: Negative.    Psychiatric/Behavioral: Negative.     Objective:     Vital Signs (Most Recent):  Temp: 97.7 °F (36.5 °C) (02/01/23 0700)  Pulse: 70 (02/01/23 0700)  Resp: 16 (02/01/23 1029)  BP: 132/62 (02/01/23 0700)  SpO2: 98 % (02/01/23 0700) Vital Signs (24h Range):  Temp:  [97.7 °F (36.5 °C)-98.3 °F (36.8 °C)] 97.7 °F (36.5 °C)  Pulse:  [70-89] 70  Resp:  [16-18] 16  SpO2:  [96 %-99 %] 98 %  BP: (124-147)/(62-76) 132/62     Weight: 65.7 kg (144 lb 13.5 oz)  Body mass index is 23.38 kg/m².    Intake/Output Summary (Last 24 hours) at 2/1/2023 1148  Last data filed at 2/1/2023 0716  Gross per 24 hour   Intake 740 ml   Output --   Net 740 ml      Physical Exam  Constitutional:       Appearance: She is normal weight.   HENT:      Head: Normocephalic.      Right  Ear: Tympanic membrane normal.      Left Ear: Tympanic membrane normal.      Nose: Nose normal.      Mouth/Throat:      Mouth: Mucous membranes are moist.      Pharynx: Oropharynx is clear.   Eyes:      Conjunctiva/sclera: Conjunctivae normal.      Pupils: Pupils are equal, round, and reactive to light.   Cardiovascular:      Rate and Rhythm: Normal rate and regular rhythm.      Pulses: Normal pulses.      Heart sounds: Normal heart sounds.   Pulmonary:      Effort: Pulmonary effort is normal.      Breath sounds: Rhonchi present.   Abdominal:      General: Abdomen is flat. Bowel sounds are normal.   Musculoskeletal:         General: No tenderness. Normal range of motion.      Cervical back: Normal range of motion.      Comments: Lower back itching and new bump  Left hip tenderness increased    Skin:     General: Skin is warm and dry.      Capillary Refill: Capillary refill takes less than 2 seconds.   Neurological:      Mental Status: She is alert and oriented to person, place, and time.   Psychiatric:         Mood and Affect: Mood normal.         Behavior: Behavior normal.       Significant Labs: All pertinent labs within the past 24 hours have been reviewed.  BMP:   Recent Labs   Lab 02/01/23  0259   *   *   K 4.3   CL 98   CO2 28   BUN 28*   CREATININE 1.44*   CALCIUM 8.3*   MG 2.1     CBC:   Recent Labs   Lab 01/31/23  0253 02/01/23  0259   WBC 12.79* 12.96*   HGB 7.8* 7.5*   HCT 26.4* 24.9*   * 410*       Significant Imaging: I have reviewed all pertinent imaging results/findings within the past 24 hours.

## 2023-02-01 NOTE — ASSESSMENT & PLAN NOTE
Left Hip X-ray -Left hip arthroplasty appears within normal limits.    PT/OT    2/1/23  Patient complaining of more pain in left hip. Left hip is tender with Passive ROM  Orthopedics Consulted. We want to see if patient may have possible infected artificial hip from Epidural infection. MRSA.  Thank orthopedics for recommendations.

## 2023-02-01 NOTE — PROGRESS NOTES
"[facility] Hospital Medicine  Progress Note    Patient Name: Monica Walker  MRN: [id number]  Patient Class: IP- Inpatient   Admission Date: 1/19/2023  Length of Stay: 13 days  Attending Physician: Vinay Rosen Jr., MD  Primary Care Provider: Hannah Schulz MD        Subjective:     Principal Problem:Epidural abscess        HPI:  Patient is a 51 y/o Female presenting to [facility] with a cc of back pain. She states the pain began to worsen approximately 16 hours ago. She states the pain is in her Mid back area and radiates to both legs. She describes the pain as "throbbing, constant and a 9/10." She states the pain is blunted by taking Hydrocodone but not relieved. She reports the pain is made worse by movement and standing.    Patient reports she was hospitalized in November for sepsis secondary to Pyelonephritis. She has been hospitalized several times over the past 3 months. She is currently being treated at home with IV Abx therapy for Osteomyelitis of the spine.  MRI of Lumbar spine on 1/10/2023 (Per radiology report). Findings indicative of osteomyelitis involving T11-12 with epidural abscess extending from the T11-12 disc level to the L1 level eccentric towards the left.  Additional site of infection of the right L2-3 facet joint as well as osteomyelitis of the right L3 transverse process.    Significant findings in the ED include:  Vitals: /84, , RR 16, Temp 98.2 F, O2 98% on RA  Labs: WBC 14.6, HGB 8.2, HCT 27.1, MCV 74.2, Na 133, K 3.5, Bun 8, Crt0.54, , Glucose 171, UA normal.  today;124 2 days ago, CRP  22.90 Today; 12.9 2 days ago  Interventions: Toradol 30 mg IV    Pt was subsequently admitted to the Inpatient Hospitalist Service for further evaluation and management.               Overview/Hospital Course:  No notes on file    Interval History: patient AAOX3 lying in bed. Patient states he left hip continues to hurt with " ROM.  On Physical exam patient is tender on left hip. Patient has an artificial left hip. We will consult Orthopedics for recommendations.    Review of Systems   Constitutional:  Negative for fatigue and fever.   HENT:  Negative for ear discharge, ear pain, facial swelling, mouth sores, postnasal drip, rhinorrhea and sore throat.    Eyes:  Negative for pain, discharge and itching.   Respiratory:  Negative for cough.    Cardiovascular:  Negative for leg swelling.   Gastrointestinal:  Negative for abdominal distention, abdominal pain, anal bleeding, constipation and diarrhea.   Endocrine: Negative for cold intolerance, heat intolerance and polydipsia.   Genitourinary:  Negative for difficulty urinating, dyspareunia, flank pain, frequency, genital sores and hematuria.   Musculoskeletal:  Negative for arthralgias, back pain, gait problem and joint swelling.   Skin:  Positive for wound.   Allergic/Immunologic: Negative for environmental allergies and food allergies.   Neurological:  Negative for dizziness, facial asymmetry, light-headedness and headaches.   Hematological: Negative.    Psychiatric/Behavioral: Negative.     Objective:     Vital Signs (Most Recent):  Temp: 97.7 °F (36.5 °C) (02/01/23 0700)  Pulse: 70 (02/01/23 0700)  Resp: 16 (02/01/23 1029)  BP: 132/62 (02/01/23 0700)  SpO2: 98 % (02/01/23 0700) Vital Signs (24h Range):  Temp:  [97.7 °F (36.5 °C)-98.3 °F (36.8 °C)] 97.7 °F (36.5 °C)  Pulse:  [70-89] 70  Resp:  [16-18] 16  SpO2:  [96 %-99 %] 98 %  BP: (124-147)/(62-76) 132/62     Weight: 65.7 kg (144 lb 13.5 oz)  Body mass index is 23.38 kg/m².    Intake/Output Summary (Last 24 hours) at 2/1/2023 1148  Last data filed at 2/1/2023 0716  Gross per 24 hour   Intake 740 ml   Output --   Net 740 ml      Physical Exam  Constitutional:       Appearance: She is normal weight.   HENT:      Head: Normocephalic.      Right Ear: Tympanic membrane normal.      Left Ear: Tympanic membrane normal.      Nose: Nose normal.       Mouth/Throat:      Mouth: Mucous membranes are moist.      Pharynx: Oropharynx is clear.   Eyes:      Conjunctiva/sclera: Conjunctivae normal.      Pupils: Pupils are equal, round, and reactive to light.   Cardiovascular:      Rate and Rhythm: Normal rate and regular rhythm.      Pulses: Normal pulses.      Heart sounds: Normal heart sounds.   Pulmonary:      Effort: Pulmonary effort is normal.      Breath sounds: Rhonchi present.   Abdominal:      General: Abdomen is flat. Bowel sounds are normal.   Musculoskeletal:         General: No tenderness. Normal range of motion.      Cervical back: Normal range of motion.      Comments: Lower back itching and new bump  Left hip tenderness increased    Skin:     General: Skin is warm and dry.      Capillary Refill: Capillary refill takes less than 2 seconds.   Neurological:      Mental Status: She is alert and oriented to person, place, and time.   Psychiatric:         Mood and Affect: Mood normal.         Behavior: Behavior normal.       Significant Labs: All pertinent labs within the past 24 hours have been reviewed.  BMP:   Recent Labs   Lab 02/01/23 0259   *   *   K 4.3   CL 98   CO2 28   BUN 28*   CREATININE 1.44*   CALCIUM 8.3*   MG 2.1     CBC:   Recent Labs   Lab 01/31/23 0253 02/01/23  0259   WBC 12.79* 12.96*   HGB 7.8* 7.5*   HCT 26.4* 24.9*   * 410*       Significant Imaging: I have reviewed all pertinent imaging results/findings within the past 24 hours.      Assessment/Plan:      * Epidural abscess  - MRI Thoracic w/o Contrast - Findings suggestive of osteomyelitis discitis of T11-12 with epidural phlegmon or extruded disc contributing to moderate spinal canal stenosis.  Additional epidural phlegmon extending in the left paracentral location and of the left T11-12 and T12-L1 foramina.  - MRSA sensitive to Vancomycin, will continue   - Surgery/washout 1/24/2023 successful   - pathology revealed osteomyelitis patinet will need 6 weeks  of abx coverage which will go until 3/7/23, will try to transfer her to LTAC for continued abx.     Left hip pain  Left Hip X-ray -Left hip arthroplasty appears within normal limits.    PT/OT    2/1/23  Patient complaining of more pain in left hip. Left hip is tender with Passive ROM  Orthopedics Consulted. We want to see if patient may have possible infected artificial hip from Epidural infection. MRSA.  Thank orthopedics for recommendations.    Hypertension  Not currently controlled  Patient took no home meds  Occasionally correspond to pain  Hydralazine 25mg Q8hrs      Hypomagnesemia  2G magnesium  Will recheck in the am      Microcytic anemia  Ferritin - 380  Iron - 12  Iron Sat - 6  TIBC - 188    Ferrous sulfate given Qd    2/1/23  H/H 7.5/24/9 will continue to monitor will consider unit of PRBCs if hemoglobin continues to drop.    Type 2 diabetes mellitus, with long-term current use of insulin  Patient's FSGs are uncontrolled due to hyperglycemia on current medication regimen.  Last A1c reviewed-   Lab Results   Component Value Date    HGBA1C 7.9 (H) 01/19/2023     Most recent fingerstick glucose reviewed- No results for input(s): POCTGLUCOSE in the last 24 hours.  Current correctional scale  Medium  Maintain anti-hyperglycemic dose as follows-   Antihyperglycemics (From admission, onward)    Start     Stop Route Frequency Ordered    02/01/23 2100  insulin detemir U-100 injection 25 Units         -- SubQ Nightly 02/01/23 0833    01/28/23 1800  insulin aspart U-100 injection 5 Units         -- SubQ 3 times daily with meals 01/28/23 1757    01/24/23 2136  insulin aspart U-100 injection 1-10 Units         -- SubQ Before meals & nightly PRN 01/24/23 2036        Hold Oral hypoglycemics while patient is in the hospital.  Diabetic Educator consulted, appreciate their help    Depression  - Continue Cymbalta        VTE Risk Mitigation (From admission, onward)         Ordered     IP VTE LOW RISK PATIENT  Once          01/19/23 0417     Place sequential compression device  Until discontinued         01/19/23 0417                Discharge Planning   SUZANNA:      Code Status: Full Code   Is the patient medically ready for discharge?:     Reason for patient still in hospital (select all that apply): Treatment  Discharge Plan A: Home with family, Home Health                  Jackson Lai MD  Department of Hospital Medicine   [facility]

## 2023-02-02 LAB
ANION GAP SERPL CALCULATED.3IONS-SCNC: 15 MMOL/L (ref 7–16)
BASOPHILS # BLD AUTO: 0.08 K/UL (ref 0–0.2)
BASOPHILS NFR BLD AUTO: 0.6 % (ref 0–1)
BUN SERPL-MCNC: 32 MG/DL (ref 7–18)
BUN/CREAT SERPL: 21 (ref 6–20)
CALCIUM SERPL-MCNC: 9.3 MG/DL (ref 8.5–10.1)
CHLORIDE SERPL-SCNC: 97 MMOL/L (ref 98–107)
CO2 SERPL-SCNC: 29 MMOL/L (ref 21–32)
CREAT SERPL-MCNC: 1.53 MG/DL (ref 0.55–1.02)
DIFFERENTIAL METHOD BLD: ABNORMAL
EGFR (NO RACE VARIABLE) (RUSH/TITUS): 40 ML/MIN/1.73M²
EOSINOPHIL # BLD AUTO: 0.55 K/UL (ref 0–0.5)
EOSINOPHIL NFR BLD AUTO: 4.1 % (ref 1–4)
ERYTHROCYTE [DISTWIDTH] IN BLOOD BY AUTOMATED COUNT: 18.1 % (ref 11.5–14.5)
GLUCOSE SERPL-MCNC: 118 MG/DL (ref 74–106)
GLUCOSE SERPL-MCNC: 120 MG/DL (ref 70–105)
GLUCOSE SERPL-MCNC: 173 MG/DL (ref 70–105)
GLUCOSE SERPL-MCNC: 325 MG/DL (ref 70–105)
GLUCOSE SERPL-MCNC: 350 MG/DL (ref 70–105)
HCT VFR BLD AUTO: 32.1 % (ref 38–47)
HGB BLD-MCNC: 9.6 G/DL (ref 12–16)
IMM GRANULOCYTES # BLD AUTO: 0.06 K/UL (ref 0–0.04)
IMM GRANULOCYTES NFR BLD: 0.4 % (ref 0–0.4)
LYMPHOCYTES # BLD AUTO: 2.92 K/UL (ref 1–4.8)
LYMPHOCYTES NFR BLD AUTO: 21.9 % (ref 27–41)
MCH RBC QN AUTO: 23.1 PG (ref 27–31)
MCHC RBC AUTO-ENTMCNC: 29.9 G/DL (ref 32–36)
MCV RBC AUTO: 77.3 FL (ref 80–96)
MONOCYTES # BLD AUTO: 1.07 K/UL (ref 0–0.8)
MONOCYTES NFR BLD AUTO: 8 % (ref 2–6)
MPC BLD CALC-MCNC: 9.4 FL (ref 9.4–12.4)
NEUTROPHILS # BLD AUTO: 8.67 K/UL (ref 1.8–7.7)
NEUTROPHILS NFR BLD AUTO: 65 % (ref 53–65)
NRBC # BLD AUTO: 0 X10E3/UL
NRBC, AUTO (.00): 0 %
PLATELET # BLD AUTO: 531 K/UL (ref 150–400)
POTASSIUM SERPL-SCNC: 4.4 MMOL/L (ref 3.5–5.1)
RBC # BLD AUTO: 4.15 M/UL (ref 4.2–5.4)
SODIUM SERPL-SCNC: 137 MMOL/L (ref 136–145)
WBC # BLD AUTO: 13.35 K/UL (ref 4.5–11)

## 2023-02-02 PROCEDURE — 97110 THERAPEUTIC EXERCISES: CPT

## 2023-02-02 PROCEDURE — 25000003 PHARM REV CODE 250

## 2023-02-02 PROCEDURE — 99232 SBSQ HOSP IP/OBS MODERATE 35: CPT | Mod: GC,,, | Performed by: FAMILY MEDICINE

## 2023-02-02 PROCEDURE — 80048 BASIC METABOLIC PNL TOTAL CA: CPT | Performed by: ORTHOPAEDIC SURGERY

## 2023-02-02 PROCEDURE — 63600175 PHARM REV CODE 636 W HCPCS: Performed by: FAMILY MEDICINE

## 2023-02-02 PROCEDURE — 82962 GLUCOSE BLOOD TEST: CPT

## 2023-02-02 PROCEDURE — 85025 COMPLETE CBC W/AUTO DIFF WBC: CPT | Performed by: ORTHOPAEDIC SURGERY

## 2023-02-02 PROCEDURE — 25000003 PHARM REV CODE 250: Performed by: ORTHOPAEDIC SURGERY

## 2023-02-02 PROCEDURE — 63600175 PHARM REV CODE 636 W HCPCS

## 2023-02-02 PROCEDURE — 99232 PR SUBSEQUENT HOSPITAL CARE,LEVL II: ICD-10-PCS | Mod: GC,,, | Performed by: FAMILY MEDICINE

## 2023-02-02 PROCEDURE — 11000001 HC ACUTE MED/SURG PRIVATE ROOM

## 2023-02-02 RX ORDER — DIPHENHYDRAMINE HCL 25 MG
25 CAPSULE ORAL EVERY 6 HOURS PRN
Status: DISCONTINUED | OUTPATIENT
Start: 2023-02-02 | End: 2023-02-08 | Stop reason: HOSPADM

## 2023-02-02 RX ORDER — OXYCODONE HYDROCHLORIDE 5 MG/1
10 TABLET ORAL EVERY 4 HOURS PRN
Status: DISCONTINUED | OUTPATIENT
Start: 2023-02-02 | End: 2023-02-08 | Stop reason: HOSPADM

## 2023-02-02 RX ADMIN — DULOXETINE 60 MG: 30 CAPSULE, DELAYED RELEASE ORAL at 09:02

## 2023-02-02 RX ADMIN — Medication 1 EACH: at 09:02

## 2023-02-02 RX ADMIN — HYDROMORPHONE HYDROCHLORIDE 0.5 MG: 2 INJECTION, SOLUTION INTRAMUSCULAR; INTRAVENOUS; SUBCUTANEOUS at 03:02

## 2023-02-02 RX ADMIN — HYDROCODONE BITARTRATE AND ACETAMINOPHEN 1 TABLET: 10; 325 TABLET ORAL at 09:02

## 2023-02-02 RX ADMIN — INSULIN ASPART 5 UNITS: 100 INJECTION, SOLUTION INTRAVENOUS; SUBCUTANEOUS at 12:02

## 2023-02-02 RX ADMIN — DOCUSATE SODIUM 100 MG: 100 CAPSULE, LIQUID FILLED ORAL at 09:02

## 2023-02-02 RX ADMIN — HYDROCORTISONE: 1 CREAM TOPICAL at 09:02

## 2023-02-02 RX ADMIN — DIPHENHYDRAMINE HYDROCHLORIDE 25 MG: 50 INJECTION, SOLUTION INTRAMUSCULAR; INTRAVENOUS at 09:02

## 2023-02-02 RX ADMIN — DIPHENHYDRAMINE HYDROCHLORIDE 25 MG: 25 CAPSULE ORAL at 03:02

## 2023-02-02 RX ADMIN — HYDRALAZINE HYDROCHLORIDE 25 MG: 25 TABLET ORAL at 09:02

## 2023-02-02 RX ADMIN — INSULIN DETEMIR 25 UNITS: 100 INJECTION, SOLUTION SUBCUTANEOUS at 09:02

## 2023-02-02 RX ADMIN — DIPHENHYDRAMINE HYDROCHLORIDE 25 MG: 50 INJECTION, SOLUTION INTRAMUSCULAR; INTRAVENOUS at 03:02

## 2023-02-02 RX ADMIN — HYDROMORPHONE HYDROCHLORIDE 0.5 MG: 2 INJECTION, SOLUTION INTRAMUSCULAR; INTRAVENOUS; SUBCUTANEOUS at 10:02

## 2023-02-02 RX ADMIN — OXYCODONE HYDROCHLORIDE 10 MG: 5 TABLET ORAL at 06:02

## 2023-02-02 RX ADMIN — INSULIN ASPART 10 UNITS: 100 INJECTION, SOLUTION INTRAVENOUS; SUBCUTANEOUS at 03:02

## 2023-02-02 RX ADMIN — INSULIN ASPART 8 UNITS: 100 INJECTION, SOLUTION INTRAVENOUS; SUBCUTANEOUS at 12:02

## 2023-02-02 RX ADMIN — HYDRALAZINE HYDROCHLORIDE 25 MG: 25 TABLET ORAL at 01:02

## 2023-02-02 RX ADMIN — HYDROCODONE BITARTRATE AND ACETAMINOPHEN 1 TABLET: 10; 325 TABLET ORAL at 03:02

## 2023-02-02 RX ADMIN — INSULIN ASPART 5 UNITS: 100 INJECTION, SOLUTION INTRAVENOUS; SUBCUTANEOUS at 03:02

## 2023-02-02 RX ADMIN — INSULIN ASPART 1 UNITS: 100 INJECTION, SOLUTION INTRAVENOUS; SUBCUTANEOUS at 09:02

## 2023-02-02 RX ADMIN — HYDROCODONE BITARTRATE AND ACETAMINOPHEN 1 TABLET: 10; 325 TABLET ORAL at 10:02

## 2023-02-02 NOTE — PROGRESS NOTES
Pharmacy currently dosing vancomycin 1000 mg IV every 24 hours which resulted in an elevated trough. We will decrease to vancomycin 1250 mg IV every 36 hours and check another trough 2/6/23 0530. Pharmacy will monitor daily and adjust as necessary.

## 2023-02-02 NOTE — PLAN OF CARE
SS set up a peer to peer with pt's insurance and pt's MD here. Set up for 12:00. MD was notified.    Pt's insurance denied LTAC.

## 2023-02-02 NOTE — ASSESSMENT & PLAN NOTE
- MRI Thoracic w/o Contrast - Findings suggestive of osteomyelitis discitis of T11-12 with epidural phlegmon or extruded disc contributing to moderate spinal canal stenosis.  Additional epidural phlegmon extending in the left paracentral location and of the left T11-12 and T12-L1 foramina.  - MRSA sensitive to Vancomycin, will continue   - Surgery/washout 1/24/2023 successful   - pathology revealed osteomyelitis patinet will need 6 weeks of abx coverage which will go until 3/7/23, will try to transfer her to LTAC for continued abx.   -LTAC denied. Will try swingbed

## 2023-02-02 NOTE — CONSULTS
Cecelia Walker is a 52-year-old female known to me having undergone left total hip replacement arthroplasty in March of 2019 and unicompartmental replacement of her left knee in 2015.  She did well with both joints until she would developed hematogenous sepsis.  She seated her left shoulder and left knee.  She was treated in my absence by Dr. Mendoza with incision and drainage of both joints.  Subsequently she was found to have lumbar spine infection.  Dr. Juan Manuel Hernandes is attending.  She is currently receiving vancomycin IV antibiotics.  Over the past few days she she is complained of left hip pain.  X-rays left hip two views shows metallic Arthrex components femoral and acetabular to be well positioned and unchanged from prior x-rays.  This is superimposed on metallic hardware related to prior ORIF of the left acetabular fracture.  Yesterday left hip was aspirated by Radiology.  White blood count was reported as 127 and the % segs was 35%.  Both of these values would mitigate against a diagnosis of bacterial sepsis.  Aerobic and anaerobic cultures however are pending.   PE patient's left hip and buttock 7 normal contour.  No swelling or erythema.  She is tender over the left GT bursal region.  Impression probable GT bursitis-left hip.  No evidence to support bacterial sepsis at this time.  Cultures however are still pending.  We will follow with you.

## 2023-02-02 NOTE — ASSESSMENT & PLAN NOTE
Ferritin - 380  Iron - 12  Iron Sat - 6  TIBC - 188    Ferrous sulfate given Qd    2/2/23  H/H 9.6/32

## 2023-02-02 NOTE — SUBJECTIVE & OBJECTIVE
Interval History: Patient AAOX3 patient still has significant left hip pain. Left hip CT aspiration done yesterday awaiting results. Patient medicated with pain medication today. Patient refused Physical therapy. Patient insurance Denied LTAC will attempt swing bed.    Review of Systems   Constitutional:  Negative for fatigue and fever.   HENT:  Negative for ear discharge, ear pain, facial swelling, mouth sores, postnasal drip, rhinorrhea and sore throat.    Eyes:  Negative for pain, discharge and itching.   Respiratory:  Negative for cough.    Cardiovascular:  Negative for leg swelling.   Gastrointestinal:  Negative for abdominal distention, abdominal pain, anal bleeding, constipation and diarrhea.   Endocrine: Negative for cold intolerance, heat intolerance and polydipsia.   Genitourinary:  Negative for difficulty urinating, dyspareunia, flank pain, frequency, genital sores and hematuria.   Musculoskeletal:  Negative for arthralgias, back pain, gait problem and joint swelling.   Skin:  Positive for wound.   Allergic/Immunologic: Negative for environmental allergies and food allergies.   Neurological:  Negative for dizziness, facial asymmetry, light-headedness and headaches.   Hematological: Negative.    Psychiatric/Behavioral: Negative.     Objective:     Vital Signs (Most Recent):  Temp: 98.8 °F (37.1 °C) (02/02/23 1200)  Pulse: 85 (02/02/23 1200)  Resp: 20 (02/02/23 1200)  BP: (!) 128/92 (reported to nurse Rebecca) (02/02/23 1200)  SpO2: 98 % (02/02/23 1200)   Vital Signs (24h Range):  Temp:  [97.1 °F (36.2 °C)-98.8 °F (37.1 °C)] 98.8 °F (37.1 °C)  Pulse:  [70-85] 85  Resp:  [16-20] 20  SpO2:  [97 %-99 %] 98 %  BP: (114-139)/(53-92) 128/92     Weight: 65.7 kg (144 lb 13.5 oz)  Body mass index is 23.38 kg/m².  No intake or output data in the 24 hours ending 02/02/23 1234   Physical Exam  Constitutional:       Appearance: She is normal weight.   HENT:      Head: Normocephalic.      Right Ear: Tympanic membrane  normal.      Left Ear: Tympanic membrane normal.      Nose: Nose normal.      Mouth/Throat:      Mouth: Mucous membranes are moist.      Pharynx: Oropharynx is clear.   Eyes:      Conjunctiva/sclera: Conjunctivae normal.      Pupils: Pupils are equal, round, and reactive to light.   Cardiovascular:      Rate and Rhythm: Normal rate and regular rhythm.      Pulses: Normal pulses.      Heart sounds: Normal heart sounds.   Pulmonary:      Effort: Pulmonary effort is normal.      Breath sounds: Rhonchi present.   Abdominal:      General: Abdomen is flat. Bowel sounds are normal.   Musculoskeletal:         General: No tenderness. Normal range of motion.      Cervical back: Normal range of motion.      Comments: Lower back itching and new bump  Left hip tenderness increased    Skin:     General: Skin is warm and dry.      Capillary Refill: Capillary refill takes less than 2 seconds.   Neurological:      Mental Status: She is alert and oriented to person, place, and time.   Psychiatric:         Mood and Affect: Mood normal.         Behavior: Behavior normal.       Significant Labs: All pertinent labs within the past 24 hours have been reviewed.  BMP:   Recent Labs   Lab 02/01/23 0259 02/02/23  0309   * 118*   * 137   K 4.3 4.4   CL 98 97*   CO2 28 29   BUN 28* 32*   CREATININE 1.44* 1.53*   CALCIUM 8.3* 9.3   MG 2.1  --      CBC:   Recent Labs   Lab 02/01/23 0259 02/02/23  0309   WBC 12.96* 13.35*   HGB 7.5* 9.6*   HCT 24.9* 32.1*   * 531*       Significant Imaging: I have reviewed all pertinent imaging results/findings within the past 24 hours.

## 2023-02-02 NOTE — PLAN OF CARE
SS received consult for swingbed placement. Peer to peer for LTAC was completed by Dr. Lai and [employer] denied LTAC at this time. SS discussed swingbed options with patient. She is adamantly refusing swingbed at this time. Dr. Lai notified.

## 2023-02-02 NOTE — PT/OT/SLP PROGRESS
Physical Therapy      Patient Name:  Monica Walker   MRN:  [id number]    Patient not seen today secondary to Off the floor for procedure/surgery, Patient unwilling to participate, Other (Comment) (initial attempt this afternoon: patient gone for procedure; 2nd attempt: pt refused and requested to check back tomorrow). Will follow-up 2/2/2023.

## 2023-02-02 NOTE — PT/OT/SLP PROGRESS
Occupational Therapy   Treatment    Name: Monica Walker  MRN: [id number]  Admitting Diagnosis:  Epidural abscess  9 Days Post-Op    Recommendations:     Discharge Recommendations: home with home health, LTACH (long-term acute care hospital), nursing facility, skilled  Discharge Equipment Recommendations:  none  Barriers to discharge:       Assessment:     Monica Walker is a 52 y.o. female with a medical diagnosis of Epidural abscess.  She presents with weakness, decreased functional mobility, and decline in ADLs. Performance deficits affecting function are weakness, impaired endurance, impaired self care skills, impaired functional mobility, gait instability, impaired balance, pain. Pt limited with mobility d/t left hip pain. Pt agreeable to participate in UE strengthening and bed to chair t/f.     Rehab Prognosis:  Fair; patient would benefit from acute skilled OT services to address these deficits and reach maximum level of function.       Plan:     Patient to be seen 5 x/week to address the above listed problems via self-care/home management, therapeutic activities, therapeutic exercises  Plan of Care Expires: 02/28/23  Plan of Care Reviewed with: patient    Subjective     Pain/Comfort:  Pain Rating 1: 7/10  Location - Side 1: Left  Location 1: hip    Objective:     Communicated with: HAYDEE Lassiter prior to session.  Patient found supine with peripheral IV upon OT entry to room.    General Precautions: Standard, fall    Orthopedic Precautions:spinal precautions  Braces: N/A  Respiratory Status: Room air     Occupational Performance:     Bed Mobility:    Patient completed Supine to Sit with minimum assistance     Functional Mobility/Transfers:  Patient completed Sit <> Stand Transfer with minimum assistance  with  hand-held assist   Patient completed Bed <> Chair Transfer using Step Transfer technique with minimum assistance with hand-held assist  Functional Mobility: not performed    Activities of Daily  Living:  Lower Body Dressing: maximal assistance to serenity socks      [facility] 6 Click ADL:      Treatment & Education:  Pt performed 2 sets x 15 reps each bicep curls red tband, tricep press red tband, and rows red tband in order to improve BUE strength and endurance to perform ADL and ADL t/f with less assist.     Patient left up in chair with all lines intact, call button in reach, and HAYDEE Lassiter notified    GOALS:   Multidisciplinary Problems       Occupational Therapy Goals          Problem: Occupational Therapy    Goal Priority Disciplines Outcome Interventions   Occupational Therapy Goal     OT, PT/OT Ongoing, Progressing    Description: STG: (In 2 weeks)  Pt will perform grooming with setup  Pt will bathe with set up and min(A)  Pt will perform UE dressing with setup  Pt will perform LE dressing with min(A) and with AD if needed  Pt will transfer bed/chair/bsc with CGA with AD  Pt will perform standing task x 2 min with CGA and AD if needed  Pt will tolerate 20 minutes of tx without fatigue      LT.Restore to max I with self care and mobility.                          Time Tracking:     OT Date of Treatment: 23  OT Start Time: 1407  OT Stop Time: 1434  OT Total Time (min): 27 min    Billable Minutes:Therapeutic Exercise 15 minutes    OT/TRISH: OT          2023

## 2023-02-02 NOTE — PROGRESS NOTES
"[facility] Medicine  Progress Note    Patient Name: Monica Walker  MRN: [id number]  Patient Class: IP- Inpatient   Admission Date: 1/19/2023  Length of Stay: 14 days  Attending Physician: Vinay Rosen Jr., MD  Primary Care Provider: Hannah Schulz MD        Subjective:     Principal Problem:Epidural abscess        HPI:  Patient is a 53 y/o Female presenting to [facility] with a cc of back pain. She states the pain began to worsen approximately 16 hours ago. She states the pain is in her Mid back area and radiates to both legs. She describes the pain as "throbbing, constant and a 9/10." She states the pain is blunted by taking Hydrocodone but not relieved. She reports the pain is made worse by movement and standing.    Patient reports she was hospitalized in November for sepsis secondary to Pyelonephritis. She has been hospitalized several times over the past 3 months. She is currently being treated at home with IV Abx therapy for Osteomyelitis of the spine.  MRI of Lumbar spine on 1/10/2023 (Per radiology report). Findings indicative of osteomyelitis involving T11-12 with epidural abscess extending from the T11-12 disc level to the L1 level eccentric towards the left.  Additional site of infection of the right L2-3 facet joint as well as osteomyelitis of the right L3 transverse process.    Significant findings in the ED include:  Vitals: /84, , RR 16, Temp 98.2 F, O2 98% on RA  Labs: WBC 14.6, HGB 8.2, HCT 27.1, MCV 74.2, Na 133, K 3.5, Bun 8, Crt0.54, , Glucose 171, UA normal.  today;124 2 days ago, CRP  22.90 Today; 12.9 2 days ago  Interventions: Toradol 30 mg IV    Pt was subsequently admitted to the Inpatient Hospitalist Service for further evaluation and management.               Overview/Hospital Course:  No notes on file    Interval History: Patient AAOX3 patient still has significant left hip pain. Left hip CT " aspiration done yesterday awaiting results. Patient medicated with pain medication today. Patient refused Physical therapy. Patient insurance Denied LTAC will attempt swing bed.    Review of Systems   Constitutional:  Negative for fatigue and fever.   HENT:  Negative for ear discharge, ear pain, facial swelling, mouth sores, postnasal drip, rhinorrhea and sore throat.    Eyes:  Negative for pain, discharge and itching.   Respiratory:  Negative for cough.    Cardiovascular:  Negative for leg swelling.   Gastrointestinal:  Negative for abdominal distention, abdominal pain, anal bleeding, constipation and diarrhea.   Endocrine: Negative for cold intolerance, heat intolerance and polydipsia.   Genitourinary:  Negative for difficulty urinating, dyspareunia, flank pain, frequency, genital sores and hematuria.   Musculoskeletal:  Negative for arthralgias, back pain, gait problem and joint swelling.   Skin:  Positive for wound.   Allergic/Immunologic: Negative for environmental allergies and food allergies.   Neurological:  Negative for dizziness, facial asymmetry, light-headedness and headaches.   Hematological: Negative.    Psychiatric/Behavioral: Negative.     Objective:     Vital Signs (Most Recent):  Temp: 98.8 °F (37.1 °C) (02/02/23 1200)  Pulse: 85 (02/02/23 1200)  Resp: 20 (02/02/23 1200)  BP: (!) 128/92 (reported to nurse Rebecca) (02/02/23 1200)  SpO2: 98 % (02/02/23 1200)   Vital Signs (24h Range):  Temp:  [97.1 °F (36.2 °C)-98.8 °F (37.1 °C)] 98.8 °F (37.1 °C)  Pulse:  [70-85] 85  Resp:  [16-20] 20  SpO2:  [97 %-99 %] 98 %  BP: (114-139)/(53-92) 128/92     Weight: 65.7 kg (144 lb 13.5 oz)  Body mass index is 23.38 kg/m².  No intake or output data in the 24 hours ending 02/02/23 1234   Physical Exam  Constitutional:       Appearance: She is normal weight.   HENT:      Head: Normocephalic.      Right Ear: Tympanic membrane normal.      Left Ear: Tympanic membrane normal.      Nose: Nose normal.       Mouth/Throat:      Mouth: Mucous membranes are moist.      Pharynx: Oropharynx is clear.   Eyes:      Conjunctiva/sclera: Conjunctivae normal.      Pupils: Pupils are equal, round, and reactive to light.   Cardiovascular:      Rate and Rhythm: Normal rate and regular rhythm.      Pulses: Normal pulses.      Heart sounds: Normal heart sounds.   Pulmonary:      Effort: Pulmonary effort is normal.      Breath sounds: Rhonchi present.   Abdominal:      General: Abdomen is flat. Bowel sounds are normal.   Musculoskeletal:         General: No tenderness. Normal range of motion.      Cervical back: Normal range of motion.      Comments: Lower back itching and new bump  Left hip tenderness increased    Skin:     General: Skin is warm and dry.      Capillary Refill: Capillary refill takes less than 2 seconds.   Neurological:      Mental Status: She is alert and oriented to person, place, and time.   Psychiatric:         Mood and Affect: Mood normal.         Behavior: Behavior normal.       Significant Labs: All pertinent labs within the past 24 hours have been reviewed.  BMP:   Recent Labs   Lab 02/01/23 0259 02/02/23  0309   * 118*   * 137   K 4.3 4.4   CL 98 97*   CO2 28 29   BUN 28* 32*   CREATININE 1.44* 1.53*   CALCIUM 8.3* 9.3   MG 2.1  --      CBC:   Recent Labs   Lab 02/01/23 0259 02/02/23  0309   WBC 12.96* 13.35*   HGB 7.5* 9.6*   HCT 24.9* 32.1*   * 531*       Significant Imaging: I have reviewed all pertinent imaging results/findings within the past 24 hours.      Assessment/Plan:      * Epidural abscess  - MRI Thoracic w/o Contrast - Findings suggestive of osteomyelitis discitis of T11-12 with epidural phlegmon or extruded disc contributing to moderate spinal canal stenosis.  Additional epidural phlegmon extending in the left paracentral location and of the left T11-12 and T12-L1 foramina.  - MRSA sensitive to Vancomycin, will continue   - Surgery/washout 1/24/2023 successful   - pathology  revealed osteomyelitis krystal will need 6 weeks of abx coverage which will go until 3/7/23, will try to transfer her to LTAC for continued abx.   -LTAC denied. Will try swingbed    Left hip pain  Left Hip X-ray -Left hip arthroplasty appears within normal limits.    PT/OT    2/1/23  Patient complaining of more pain in left hip. Left hip is tender with Passive ROM  Orthopedics Consulted. We want to see if patient may have possible infected artificial hip from Epidural infection. MRSA.  Thank orthopedics for recommendations.    2/2/23  CT guided aspiration of left hip  Awaiting results    Hypertension  Not currently controlled  Patient took no home meds  Occasionally correspond to pain  Hydralazine 25mg Q8hrs      Hypomagnesemia  2G magnesium  Will recheck in the am      Microcytic anemia  Ferritin - 380  Iron - 12  Iron Sat - 6  TIBC - 188    Ferrous sulfate given Qd    2/2/23  H/H 9.6/32    Type 2 diabetes mellitus, with long-term current use of insulin  Patient's FSGs are uncontrolled due to hyperglycemia on current medication regimen.  Last A1c reviewed-   Lab Results   Component Value Date    HGBA1C 7.9 (H) 01/19/2023     Most recent fingerstick glucose reviewed- No results for input(s): POCTGLUCOSE in the last 24 hours.  Current correctional scale  Medium  Maintain anti-hyperglycemic dose as follows-   Antihyperglycemics (From admission, onward)    Start     Stop Route Frequency Ordered    02/01/23 2100  insulin detemir U-100 injection 25 Units         -- SubQ Nightly 02/01/23 0833    01/28/23 1800  insulin aspart U-100 injection 5 Units         -- SubQ 3 times daily with meals 01/28/23 1757    01/24/23 2136  insulin aspart U-100 injection 1-10 Units         -- SubQ Before meals & nightly PRN 01/24/23 2036        Hold Oral hypoglycemics while patient is in the hospital.  Diabetic Educator consulted, appreciate their help    Depression  - Continue Cymbalta        VTE Risk Mitigation (From admission, onward)          Ordered     IP VTE LOW RISK PATIENT  Once         01/19/23 0417     Place sequential compression device  Until discontinued         01/19/23 0417                Discharge Planning   SUZANNA:      Code Status: Full Code   Is the patient medically ready for discharge?:     Reason for patient still in hospital (select all that apply): Treatment  Discharge Plan A: Home with family, Home Health                  Jackson Lai MD  Department of Hospital Medicine   [facility]

## 2023-02-02 NOTE — PLAN OF CARE
Problem: Adult Inpatient Plan of Care  Goal: Plan of Care Review  Outcome: Ongoing, Progressing  Goal: Patient-Specific Goal (Individualized)  Outcome: Ongoing, Progressing  Goal: Absence of Hospital-Acquired Illness or Injury  Outcome: Ongoing, Progressing  Goal: Optimal Comfort and Wellbeing  Outcome: Ongoing, Progressing  Goal: Readiness for Transition of Care  Outcome: Ongoing, Progressing     Problem: Diabetes Comorbidity  Goal: Blood Glucose Level Within Targeted Range  Outcome: Ongoing, Progressing     Problem: Infection  Goal: Absence of Infection Signs and Symptoms  Outcome: Ongoing, Progressing     Problem: Impaired Wound Healing  Goal: Optimal Wound Healing  Outcome: Ongoing, Progressing     Problem: Skin Injury Risk Increased  Goal: Skin Health and Integrity  Outcome: Ongoing, Progressing     Problem: Fall Injury Risk  Goal: Absence of Fall and Fall-Related Injury  Outcome: Ongoing, Progressing      POC reviewed and continues.

## 2023-02-02 NOTE — ASSESSMENT & PLAN NOTE
Left Hip X-ray -Left hip arthroplasty appears within normal limits.    PT/OT    2/1/23  Patient complaining of more pain in left hip. Left hip is tender with Passive ROM  Orthopedics Consulted. We want to see if patient may have possible infected artificial hip from Epidural infection. MRSA.  Thank orthopedics for recommendations.    2/2/23  CT guided aspiration of left hip  Awaiting results

## 2023-02-03 LAB
ANION GAP SERPL CALCULATED.3IONS-SCNC: 12 MMOL/L (ref 7–16)
BASOPHILS # BLD AUTO: 0.09 K/UL (ref 0–0.2)
BASOPHILS NFR BLD AUTO: 0.6 % (ref 0–1)
BUN SERPL-MCNC: 28 MG/DL (ref 7–18)
BUN/CREAT SERPL: 19 (ref 6–20)
CALCIUM SERPL-MCNC: 9.1 MG/DL (ref 8.5–10.1)
CHLORIDE SERPL-SCNC: 97 MMOL/L (ref 98–107)
CO2 SERPL-SCNC: 31 MMOL/L (ref 21–32)
CREAT SERPL-MCNC: 1.44 MG/DL (ref 0.55–1.02)
DIFFERENTIAL METHOD BLD: ABNORMAL
EGFR (NO RACE VARIABLE) (RUSH/TITUS): 43 ML/MIN/1.73M²
EOSINOPHIL # BLD AUTO: 0.65 K/UL (ref 0–0.5)
EOSINOPHIL NFR BLD AUTO: 4.7 % (ref 1–4)
ERYTHROCYTE [DISTWIDTH] IN BLOOD BY AUTOMATED COUNT: 17.6 % (ref 11.5–14.5)
GLUCOSE SERPL-MCNC: 144 MG/DL (ref 70–105)
GLUCOSE SERPL-MCNC: 179 MG/DL (ref 74–106)
GLUCOSE SERPL-MCNC: 196 MG/DL (ref 70–105)
GLUCOSE SERPL-MCNC: 205 MG/DL (ref 70–105)
GLUCOSE SERPL-MCNC: 232 MG/DL (ref 70–105)
HCT VFR BLD AUTO: 29 % (ref 38–47)
HGB BLD-MCNC: 8.6 G/DL (ref 12–16)
IMM GRANULOCYTES # BLD AUTO: 0.11 K/UL (ref 0–0.04)
IMM GRANULOCYTES NFR BLD: 0.8 % (ref 0–0.4)
LYMPHOCYTES # BLD AUTO: 2.82 K/UL (ref 1–4.8)
LYMPHOCYTES NFR BLD AUTO: 20.3 % (ref 27–41)
MCH RBC QN AUTO: 23.1 PG (ref 27–31)
MCHC RBC AUTO-ENTMCNC: 29.7 G/DL (ref 32–36)
MCV RBC AUTO: 78 FL (ref 80–96)
MONOCYTES # BLD AUTO: 1.33 K/UL (ref 0–0.8)
MONOCYTES NFR BLD AUTO: 9.6 % (ref 2–6)
MPC BLD CALC-MCNC: 9.5 FL (ref 9.4–12.4)
NEUTROPHILS # BLD AUTO: 8.91 K/UL (ref 1.8–7.7)
NEUTROPHILS NFR BLD AUTO: 64 % (ref 53–65)
NRBC # BLD AUTO: 0 X10E3/UL
NRBC, AUTO (.00): 0 %
PLATELET # BLD AUTO: 443 K/UL (ref 150–400)
POTASSIUM SERPL-SCNC: 4.9 MMOL/L (ref 3.5–5.1)
RBC # BLD AUTO: 3.72 M/UL (ref 4.2–5.4)
SODIUM SERPL-SCNC: 135 MMOL/L (ref 136–145)
WBC # BLD AUTO: 13.91 K/UL (ref 4.5–11)

## 2023-02-03 PROCEDURE — 85025 COMPLETE CBC W/AUTO DIFF WBC: CPT | Performed by: ORTHOPAEDIC SURGERY

## 2023-02-03 PROCEDURE — 25000003 PHARM REV CODE 250

## 2023-02-03 PROCEDURE — 63600175 PHARM REV CODE 636 W HCPCS: Performed by: FAMILY MEDICINE

## 2023-02-03 PROCEDURE — 82962 GLUCOSE BLOOD TEST: CPT

## 2023-02-03 PROCEDURE — 25000003 PHARM REV CODE 250: Performed by: ORTHOPAEDIC SURGERY

## 2023-02-03 PROCEDURE — 80048 BASIC METABOLIC PNL TOTAL CA: CPT | Performed by: ORTHOPAEDIC SURGERY

## 2023-02-03 PROCEDURE — 25000003 PHARM REV CODE 250: Performed by: FAMILY MEDICINE

## 2023-02-03 PROCEDURE — 11000001 HC ACUTE MED/SURG PRIVATE ROOM

## 2023-02-03 PROCEDURE — 99232 PR SUBSEQUENT HOSPITAL CARE,LEVL II: ICD-10-PCS | Mod: GC,,, | Performed by: STUDENT IN AN ORGANIZED HEALTH CARE EDUCATION/TRAINING PROGRAM

## 2023-02-03 PROCEDURE — 99232 SBSQ HOSP IP/OBS MODERATE 35: CPT | Mod: GC,,, | Performed by: STUDENT IN AN ORGANIZED HEALTH CARE EDUCATION/TRAINING PROGRAM

## 2023-02-03 PROCEDURE — 63600175 PHARM REV CODE 636 W HCPCS

## 2023-02-03 RX ORDER — HYDRALAZINE HYDROCHLORIDE 25 MG/1
25 TABLET, FILM COATED ORAL EVERY 12 HOURS
Status: DISCONTINUED | OUTPATIENT
Start: 2023-02-03 | End: 2023-02-08 | Stop reason: HOSPADM

## 2023-02-03 RX ADMIN — HYDROCORTISONE: 1 CREAM TOPICAL at 09:02

## 2023-02-03 RX ADMIN — INSULIN ASPART 5 UNITS: 100 INJECTION, SOLUTION INTRAVENOUS; SUBCUTANEOUS at 11:02

## 2023-02-03 RX ADMIN — INSULIN DETEMIR 25 UNITS: 100 INJECTION, SOLUTION SUBCUTANEOUS at 09:02

## 2023-02-03 RX ADMIN — VANCOMYCIN HYDROCHLORIDE 1250 MG: 500 INJECTION, POWDER, LYOPHILIZED, FOR SOLUTION INTRAVENOUS at 05:02

## 2023-02-03 RX ADMIN — OXYCODONE HYDROCHLORIDE 10 MG: 5 TABLET ORAL at 01:02

## 2023-02-03 RX ADMIN — OXYCODONE HYDROCHLORIDE 10 MG: 5 TABLET ORAL at 09:02

## 2023-02-03 RX ADMIN — DIPHENHYDRAMINE HYDROCHLORIDE 25 MG: 25 CAPSULE ORAL at 09:02

## 2023-02-03 RX ADMIN — Medication 1 EACH: at 09:02

## 2023-02-03 RX ADMIN — INSULIN ASPART 5 UNITS: 100 INJECTION, SOLUTION INTRAVENOUS; SUBCUTANEOUS at 04:02

## 2023-02-03 RX ADMIN — DIPHENHYDRAMINE HYDROCHLORIDE 25 MG: 25 CAPSULE ORAL at 07:02

## 2023-02-03 RX ADMIN — OXYCODONE HYDROCHLORIDE 10 MG: 5 TABLET ORAL at 07:02

## 2023-02-03 RX ADMIN — HYDROCODONE BITARTRATE AND ACETAMINOPHEN 1 TABLET: 10; 325 TABLET ORAL at 05:02

## 2023-02-03 RX ADMIN — DULOXETINE 60 MG: 30 CAPSULE, DELAYED RELEASE ORAL at 09:02

## 2023-02-03 RX ADMIN — INSULIN ASPART 2 UNITS: 100 INJECTION, SOLUTION INTRAVENOUS; SUBCUTANEOUS at 09:02

## 2023-02-03 RX ADMIN — Medication 6 MG: at 09:02

## 2023-02-03 RX ADMIN — DOCUSATE SODIUM 100 MG: 100 CAPSULE, LIQUID FILLED ORAL at 09:02

## 2023-02-03 RX ADMIN — INSULIN ASPART 5 UNITS: 100 INJECTION, SOLUTION INTRAVENOUS; SUBCUTANEOUS at 06:02

## 2023-02-03 NOTE — ASSESSMENT & PLAN NOTE
- MRI Thoracic w/o Contrast - Findings suggestive of osteomyelitis discitis of T11-12 with epidural phlegmon or extruded disc contributing to moderate spinal canal stenosis.  Additional epidural phlegmon extending in the left paracentral location and of the left T11-12 and T12-L1 foramina.  - MRSA sensitive to Vancomycin, will continue   - Surgery/washout 1/24/2023 successful   - pathology revealed osteomyelitis patinet will need 6 weeks of abx coverage which will go until 3/7/23, will try to transfer her to LTAC for continued abx.   -LTAC denied. Will try swingbed    2/3/23  -Awaiting approval for swing bed. If no success will consider antibiotic treatment with home cash.

## 2023-02-03 NOTE — PT/OT/SLP PROGRESS
Physical Therapy      Patient Name:  Monica Walker   MRN:  [id number]    Patient not seen today secondary to Patient unwilling to participate. Pt stated she is not feeling well and her back and hip were giving her trouble. Will follow-up tomorrow.

## 2023-02-03 NOTE — SUBJECTIVE & OBJECTIVE
Interval History: Patient in bed AAOX3. Patient is in no acute distress. Patient does complain of left hip pain. Patient encouraged to participate with physical therapy today. Plan for patient is to try for swing bed placement. If no success with Swing bed we may have to consider other options for antibiotic therapy.    Review of Systems   Constitutional:  Negative for fatigue and fever.   HENT:  Negative for ear discharge, ear pain, facial swelling, mouth sores, postnasal drip, rhinorrhea and sore throat.    Eyes:  Negative for pain, discharge and itching.   Respiratory:  Negative for cough.    Cardiovascular:  Negative for leg swelling.   Gastrointestinal:  Negative for abdominal distention, abdominal pain, anal bleeding, constipation and diarrhea.   Endocrine: Negative for cold intolerance, heat intolerance and polydipsia.   Genitourinary:  Negative for difficulty urinating, dyspareunia, flank pain, frequency, genital sores and hematuria.   Musculoskeletal:  Positive for arthralgias. Negative for back pain, gait problem and joint swelling.   Skin:  Positive for wound.   Allergic/Immunologic: Negative for environmental allergies and food allergies.   Neurological:  Negative for dizziness, facial asymmetry, light-headedness and headaches.   Hematological: Negative.    Psychiatric/Behavioral: Negative.     Objective:     Vital Signs (Most Recent):  Temp: 98.2 °F (36.8 °C) (02/03/23 1015)  Pulse: 73 (02/03/23 1015)  Resp: 17 (02/03/23 1015)  BP: (!) 100/38 (02/03/23 1015)  SpO2: 96 % (02/03/23 1015)   Vital Signs (24h Range):  Temp:  [96.6 °F (35.9 °C)-98.8 °F (37.1 °C)] 98.2 °F (36.8 °C)  Pulse:  [73-96] 73  Resp:  [16-20] 17  SpO2:  [96 %-99 %] 96 %  BP: (100-141)/(38-92) 100/38     Weight: 65.7 kg (144 lb 13.5 oz)  Body mass index is 23.38 kg/m².    Intake/Output Summary (Last 24 hours) at 2/3/2023 5425  Last data filed at 2/3/2023 0821  Gross per 24 hour   Intake 0 ml   Output --   Net 0 ml      Physical  Exam  Constitutional:       Appearance: She is normal weight.   HENT:      Head: Normocephalic.      Right Ear: Tympanic membrane normal.      Left Ear: Tympanic membrane normal.      Nose: Nose normal.      Mouth/Throat:      Mouth: Mucous membranes are moist.      Pharynx: Oropharynx is clear.   Eyes:      Conjunctiva/sclera: Conjunctivae normal.      Pupils: Pupils are equal, round, and reactive to light.   Cardiovascular:      Rate and Rhythm: Normal rate and regular rhythm.      Pulses: Normal pulses.      Heart sounds: Normal heart sounds.   Pulmonary:      Effort: Pulmonary effort is normal.      Breath sounds: Rhonchi present.   Abdominal:      General: Abdomen is flat. Bowel sounds are normal.   Musculoskeletal:         General: No tenderness. Normal range of motion.      Cervical back: Normal range of motion.      Comments: Lower back itching and new bump  Left hip tenderness increased    Skin:     General: Skin is warm and dry.      Capillary Refill: Capillary refill takes less than 2 seconds.   Neurological:      Mental Status: She is alert and oriented to person, place, and time.   Psychiatric:         Mood and Affect: Mood normal.         Behavior: Behavior normal.       Significant Labs: All pertinent labs within the past 24 hours have been reviewed.  BMP:   Recent Labs   Lab 02/03/23  0323   *   *   K 4.9   CL 97*   CO2 31   BUN 28*   CREATININE 1.44*   CALCIUM 9.1     CBC:   Recent Labs   Lab 02/02/23  0309 02/03/23  0323   WBC 13.35* 13.91*   HGB 9.6* 8.6*   HCT 32.1* 29.0*   * 443*       Significant Imaging: I have reviewed all pertinent imaging results/findings within the past 24 hours.

## 2023-02-03 NOTE — PROGRESS NOTES
"[facility]  Adult Nutrition  Follow-up Note         Reason for Assessment  Reason For Assessment: RD follow-up  Nutrition Risk Screen: no indicators present    RD follow up. Recommend weight check.     Monitor acceptance for appetite stimulant.     Recommend continue current diet order as medically appropriate and tolerated. Encourage good PO intakes.     Recommend changing oral nutrition supplement from Ensure Max Protein BID to Ensure Enlive BID to promote adequate oral and protein intake. Per RN pt does not like Ensure Max Protein, prefers regular Ensure.  Recommend continuing Alfred (Arginaid is modular equivalent and can be used as Alfred out of stock) BID and 500mg Vit C BID + 220mg ZnSO4 BID + MVI daily to aid in wound healing. Recommend adding ProSource Gelatein 20 daily to promote protein intake.     Per MD interval history since last RD review:  "1/31: Patient in bed AAOX3. Patient refused physical therapy yesterday because of left hip pain. Left Hip Xray done yesterday no acute process noted. Patient encouraged today to try and work with physical therapy to improve ROM.  2/1: patient AAOX3 lying in bed. Patient states he left hip continues to hurt with ROM.  On Physical exam patient is tender on left hip. Patient has an artificial left hip. We will consult Orthopedics for recommendations.  2/2: Patient AAOX3 patient still has significant left hip pain. Left hip CT aspiration done yesterday awaiting results. Patient medicated with pain medication today. Patient refused Physical therapy. Patient insurance Denied LTAC will attempt swing bed.  2/3: Patient in bed AAOX3. Patient is in no acute distress. Patient does complain of left hip pain. Patient encouraged to participate with physical therapy today. Plan for patient is to try for swing bed placement. If no success with Swing bed we may have to consider other options for antibiotic therapy."      Patient currently " receiving Consistent Carbohydrate 1800kcal diet with no recent PO intake documented. ZURI roman RN 2/3, who reported pt with poor PO meal intake, reported that patient eats snack cakes and chips at bedside. RN reported patient doesn't like Ensure Max Protein, prefers Ensure Enlive. Encourage good PO intakes. Monitor acceptance for appetite stimulant.     Patient with stg 1 pressure wound over sacrum per MD note. Per RN, patient doesn't like Ensure Max Protein, prefers Ensure Enlive. Recommend changing oral nutrition supplement from Ensure Max Protein BID to Ensure Enlive BID (provides 150kcal and 30g protein each) to promote adequate oral and protein intake. Ensure Enlive provides 350kcal and 20g protein each. Will monitor need for Glucerna d/t pt with DM, but recommend Ensure Enlive now d/t pt preference and encouraging to promote PO intake. Recommend ProSource Gelatein 20 daily to promote protein intake - provides 90kcal and 20g protein.     Recommend continuing Alfred (Arginaid is modular equivalent and can be used as Alfred out of stock) BID and 500mg Vit C BID + 220mg ZnSO4 BID + MVI daily to aid in wound healing. Alfred provides 90kcal and 2.5g pro each; Arginaid provides 30kcal/each.     Wt Readings from Last 3 Encounters:   02/03/23 0455 65.7 kg (144 lb 13.5 oz)   02/02/23 0455 65.7 kg (144 lb 13.5 oz)   02/01/23 0455 65.7 kg (144 lb 13.5 oz)   01/29/23 0000 65.7 kg (144 lb 13.5 oz)   01/25/23 0455 61.7 kg (136 lb 0.4 oz)   01/23/23 0400 61.7 kg (136 lb 0.4 oz)   01/21/23 0400 61.3 kg (135 lb 2.3 oz)   01/19/23 0500 62.2 kg (137 lb 3.2 oz)   01/19/23 0044 59 kg (130 lb)   01/01/23 1527 63.5 kg (140 lb)   12/21/22 1417 54.4 kg (120 lb)   Recommend weight check. Pt with weight trended up from 1/25 to 1/29 - will continue to monitor weight trend.     Last BM 2/02 per flowsheets - pt with docusate sodium on med list. Will continue to monitor PO intakes, weight trend, meds, labs, updates in patient condition. RD  following.     Malnutrition  Is Patient Malnourished: Yes      Loss of 8-9 lbs/5.4-5.5% body weight x 1 month, significant weight loss. RD visited pt 1/19 for full NFPE. Pt with mild-moderate fat mass loss present and moderate to severe muscle mass loss(severe in temporals and lower extremities). She reports a normal appetite, but NPO on 1/19. Pt meeting criteria for moderate to severe acute protein calorie malnutrition.      Malnutrition Assessment  Malnutrition Type: acute illness or injury, chronic illness  Skin (Micronutrient): turgor reduced  Neck/Chest (Micronutrient): muscle wasting, subcutaneous fat loss  Musculoskeletal/Lower Extremities: muscle wasting, subcutaneous fat loss       Weight Loss (Malnutrition): 5% in 1 month  Subcutaneous Fat (Malnutrition): moderate depletion  Muscle Mass (Malnutrition): severe depletion   Orbital Region (Subcutaneous Fat Loss): mild depletion  Upper Arm Region (Subcutaneous Fat Loss): moderate depletion  Thoracic and Lumbar Region: moderate depletion   Voodoo Region (Muscle Loss): severe depletion  Clavicle Bone Region (Muscle Loss): moderate depletion  Clavicle and Acromion Bone Region (Muscle Loss): moderate depletion  Scapular Bone Region (Muscle Loss): moderate depletion  Dorsal Hand (Muscle Loss): moderate depletion  Patellar Region (Muscle Loss): severe depletion  Anterior Thigh Region (Muscle Loss): severe depletion  Posterior Calf Region (Muscle Loss): severe depletion       Subcutaneous Fat Loss (Final Summary): moderate protein-calorie malnutrition  Muscle Loss Evaluation (Final Summary): severe protein-calorie malnutrition    Moderate Weight Loss (Malnutrition): 5% in 1 month     Nutrition Diagnosis  Malnutrition (Moderate)   related to Chronic infection/ sepsis as evidenced by epidural abscess/osteomyelitis on IV abx, increased nutrition needs     Nutrition Diagnosis Status: Chronic/ continues     Nutrition Risk  Level of Risk/Frequency of Follow-up: moderate -  high   Chewing or Swallowing Difficulty?: No Chewing or swallowing difficulty    Estimated/Assessed Needs    Temp: 98.2 °F (36.8 °C)Tympanic  Weight Used For Calorie Calculations: 62.2 kg (137 lb 2 oz)   Energy Need Method: Kcal/kg Energy Calorie Requirements (kcal): 3965-6290 kcal (25-30 kcal/kg)  Weight Used For Protein Calculations: 62.2 kg (137 lb 2 oz)  Protein Requirements: 62-74 g PRO (1.0-1.2 g PRO/kg)       RDA Method (mL): 1555     Nutrition Prescription / Recommendations  Recommendation/Intervention: Monitor acceptance for appetite stimulant. Recommend continue current diet order as medically appropriate and tolerated. Recommend changing oral nutrition supplement from Ensure Max Protein BID to Ensure Enlive BID to promote adequate oral and protein intake. Per RN pt does not like Ensure Max Protein, prefers regular Ensure.  Recommend continuing Alfred (Arginaid is modular equivalent and can be used as Alfred out of stock) BID and 500mg Vit C BID + 220mg ZnSO4 BID + MVI daily to aid in wound healing. Encourage good PO intakes. Recommend adding ProSource Gelatein 20 daily to promote protein intake.  Goals: PO intake %, weight maintenance, wound healing  Nutrition Goal Status: progressing towards goal  Current Diet Order: Diet consistent carbohydrate 1800 (60g Carbs/ meal); [facility]  Nutrition Order Comments: Appropriate  Oral Nutrition Supplement: Alfred (or Arginaid) BID + Ensure Max Protein BID  Recommended Diet: Consistent Carbohydrate 1800 (60g Carbs)  Recommended Oral Supplement: Alfred [90 kcals, 2.5g Protein, 10g Carbs(3g Sugar), 7g L-Arginine, 7g L-Glutamine, Vitamin C 300mg, 9.5mg Zinc] twice daily, Ensure Enlive [360 kcals, 20g Protein, 44g Carbs(3g Fiber, 20g Sugar), 11g Fat twice daily, and Gelatein 20 [90 kcals, 20g Protein, 1g Carbs, 0g Fat] daily  Is Nutrition Support Recommended: No  Is Education Recommended: No    Monitor and Evaluation  % current Intake: Unknown/ No P.O. intake documented  %  "intake to meet estimated needs: 75 - 100 %  Food and Nutrient Intake: energy intake, food and beverage intake  Food and Nutrient Adminstration: diet order  Anthropometric Measurements: weight, weight change  Biochemical Data, Medical Tests and Procedures: electrolyte and renal panel, gastrointestinal profile, glucose/endocrine profile, inflammatory profile, lipid profile  Nutrition-Focused Physical Findings: overall appearance     Current Medical Diagnosis and Past Medical History  Diagnosis: other (see comments) (epidural abscess)  Past Medical History:   Diagnosis Date    Abscess of right thigh + MRSA 08/20/2021    healed    Adnexal cyst     Degenerative disc disease     Depression 10/29/2021    Hypertension     Nicotine dependence     Osteoarthritis      Nutrition/Diet History  Spiritual, Cultural Beliefs, Restorationist Practices, Values that Affect Care: no  Food Allergies: NKFA    Lab/Procedures/Meds  Recent Labs   Lab 02/03/23 0323   *   K 4.9   BUN 28*   CREATININE 1.44*   *   CALCIUM 9.1   CL 97*     Last A1c:   Lab Results   Component Value Date    HGBA1C 7.9 (H) 01/19/2023     Lab Results   Component Value Date    RBC 3.72 (L) 02/03/2023    HGB 8.6 (L) 02/03/2023    HCT 29.0 (L) 02/03/2023    MCV 78.0 (L) 02/03/2023    MCH 23.1 (L) 02/03/2023    MCHC 29.7 (L) 02/03/2023    TIBC 188 (L) 01/19/2023     Pertinent Labs Reviewed: reviewed  Pertinent Labs Comments:   Na 135 (L), Cl 97 (L) - replete to WNL as needed, BUN 28 (H), Creat 1.44 (H), eGFR 43 (L) -possibly r/t volume status,  (H) - pt with PMH of DM  Pertinent Medications Reviewed: reviewed  Pertinent Medications Comments: docusate sodium, duloxetine, ferrous sulfate, hydralazine, hydrocortisone, insulin, vancocin, diphenhydramine PRN, hydromorphone PRN, oxycodone PRN    Anthropometrics  Temp: 98.2 °F (36.8 °C)  Height Method: Stated  Height: 5' 6" (167.6 cm)  Height (inches): 66 in  Weight Method: Bed Scale  Weight: 65.7 kg (144 lb " 13.5 oz)  Weight (lb): 144.84 lb  Ideal Body Weight (IBW), Female: 130 lb  % Ideal Body Weight, Female (lb): 105.54 %  BMI (Calculated): 23.4     Nutrition by Nursing  Diet/Nutrition Received: consistent carb/diabetic diet  Intake (%): 25%  Last Bowel Movement: 02/02/23    Nutrition Follow-Up  RD Follow-up?: Yes

## 2023-02-03 NOTE — PT/OT/SLP PROGRESS
Occupational Therapy      Patient Name:  Monica Walker   MRN:  [id number]    Patient not seen today secondary to Patient unwilling to participate (OT attempted tx x 2 attempts. Pt states she does not feel well. Pt educated on importance of participating in therapy; however continued to decline OT tx.). Will follow-up 2/6/23.    2/3/2023

## 2023-02-03 NOTE — PT/OT/SLP PROGRESS
"Physical Therapy Treatment    Patient Name:  Monica Walker   MRN:  [id number]    Recommendations:     Discharge Recommendations: LTACH (long-term acute care hospital), nursing facility, skilled  Discharge Equipment Recommendations: other (see comments) (to be determined)  Barriers to discharge:  ongoing medical care, awaiting placement    Assessment:     Monica Walker is a 52 y.o. female admitted with a medical diagnosis of Epidural abscess.  She presents with the following impairments/functional limitations: weakness, impaired endurance, impaired functional mobility, gait instability, decreased lower extremity function, pain, impaired skin, orthopedic precautions. Pt tolerated act well but still c/o significant L hip pain.     Rehab Prognosis: Good and Fair; patient would benefit from acute skilled PT services to address these deficits and reach maximum level of function.    Recent Surgery: Procedure(s) (LRB):  T9-L2 fusion, T11-T12 laminectomy and corpectomy (N/A) 9 Days Post-Op    Plan:     During this hospitalization, patient to be seen daily to address the identified rehab impairments via gait training, therapeutic activities, therapeutic exercises and progress toward the following goals:    Plan of Care Expires:  02/25/23    Subjective     Chief Complaint: epidural abscess  Patient/Family Comments/goals: "i'm itching"  Pain/Comfort:  Pain Rating 1: 7/10  Location - Side 1: Left  Location 1: hip  Pain Addressed 1: Pre-medicate for activity, Reposition  Pain Rating Post-Intervention 1: 7/10      Objective:     Communicated with LOW Lewis RN prior to session.  Patient found HOB elevated with peripheral IV upon PT entry to room.     General Precautions: Standard, fall  Orthopedic Precautions: spinal precautions  Braces: N/A  Respiratory Status: Room air     Functional Mobility:  Bed Mobility:     Rolling Left:  contact guard assistance  Supine to Sit: contact guard assistance and minimum assistance  Transfers: "     Sit to Stand:  minimum assistance with no AD and hand-held assist  Bed to Chair: minimum assistance with  no AD and hand-held assist  using  Step Transfer  Balance: EOB sitting Good-      AM-PAC 6 CLICK MOBILITY  Turning over in bed (including adjusting bedclothes, sheets and blankets)?: 3  Sitting down on and standing up from a chair with arms (e.g., wheelchair, bedside commode, etc.): 3  Moving from lying on back to sitting on the side of the bed?: 3  Moving to and from a bed to a chair (including a wheelchair)?: 3  Need to walk in hospital room?: 3  Climbing 3-5 steps with a railing?: 1  Basic Mobility Total Score: 16       Treatment & Education:  Bilateral lower extremity exercise x 20 reps: ankle pumps, Quad sets, glut sets, and heel slides with Stand by assist and verbal cues for sequencing and safety     Patient left up in chair with all lines intact, call button in reach, and RN notified..    GOALS:   Multidisciplinary Problems       Physical Therapy Goals          Problem: Physical Therapy    Goal Priority Disciplines Outcome Goal Variances Interventions   Physical Therapy Goal     PT, PT/OT Ongoing, Progressing     Description: Short Term Goals to be met by: 23    Patient will increase functional independence with mobility by performin. Supine to sit with Stand by assist  2. Sit to stand transfer with contact guard assist using Rolling walker  3. Bed to chair transfer with contact guard assist using Rolling walker  4. Gait  x 25 feet with contact guard assist using Rolling walker  5. Lower extremity exercise program x30 reps per handout, with assistance as needed  6. Pt to recall 3/3 spinal precautions     Long Term Goals to be met by: 23    Pt will regain full independent functional mobility with Rolling walker to return to home situation and prior activities of daily living.                        Time Tracking:     PT Received On: 23  PT Start Time: 1418     PT Stop Time:  1434  PT Total Time (min): 16 min     Billable Minutes: Therapeutic Exercise 12    Treatment Type: Treatment  PT/PTA: PT     PTA Visit Number: 0     02/02/2023

## 2023-02-03 NOTE — PROGRESS NOTES
"[facility] Medicine  Progress Note    Patient Name: Monica Walker  MRN: [id number]  Patient Class: IP- Inpatient   Admission Date: 1/19/2023  Length of Stay: 15 days  Attending Physician: Adilson Fontanez DO  Primary Care Provider: Hannah Schulz MD        Subjective:     Principal Problem:Epidural abscess        HPI:  Patient is a 53 y/o Female presenting to [facility] with a cc of back pain. She states the pain began to worsen approximately 16 hours ago. She states the pain is in her Mid back area and radiates to both legs. She describes the pain as "throbbing, constant and a 9/10." She states the pain is blunted by taking Hydrocodone but not relieved. She reports the pain is made worse by movement and standing.    Patient reports she was hospitalized in November for sepsis secondary to Pyelonephritis. She has been hospitalized several times over the past 3 months. She is currently being treated at home with IV Abx therapy for Osteomyelitis of the spine.  MRI of Lumbar spine on 1/10/2023 (Per radiology report). Findings indicative of osteomyelitis involving T11-12 with epidural abscess extending from the T11-12 disc level to the L1 level eccentric towards the left.  Additional site of infection of the right L2-3 facet joint as well as osteomyelitis of the right L3 transverse process.    Significant findings in the ED include:  Vitals: /84, , RR 16, Temp 98.2 F, O2 98% on RA  Labs: WBC 14.6, HGB 8.2, HCT 27.1, MCV 74.2, Na 133, K 3.5, Bun 8, Crt0.54, , Glucose 171, UA normal.  today;124 2 days ago, CRP  22.90 Today; 12.9 2 days ago  Interventions: Toradol 30 mg IV    Pt was subsequently admitted to the Inpatient Hospitalist Service for further evaluation and management.               Overview/Hospital Course:  No notes on file    Interval History: Patient in bed AAOX3. Patient is in no acute distress. Patient does complain of left " hip pain. Patient encouraged to participate with physical therapy today. Plan for patient is to try for swing bed placement. If no success with Swing bed we may have to consider other options for antibiotic therapy.    Review of Systems   Constitutional:  Negative for fatigue and fever.   HENT:  Negative for ear discharge, ear pain, facial swelling, mouth sores, postnasal drip, rhinorrhea and sore throat.    Eyes:  Negative for pain, discharge and itching.   Respiratory:  Negative for cough.    Cardiovascular:  Negative for leg swelling.   Gastrointestinal:  Negative for abdominal distention, abdominal pain, anal bleeding, constipation and diarrhea.   Endocrine: Negative for cold intolerance, heat intolerance and polydipsia.   Genitourinary:  Negative for difficulty urinating, dyspareunia, flank pain, frequency, genital sores and hematuria.   Musculoskeletal:  Positive for arthralgias. Negative for back pain, gait problem and joint swelling.   Skin:  Positive for wound.   Allergic/Immunologic: Negative for environmental allergies and food allergies.   Neurological:  Negative for dizziness, facial asymmetry, light-headedness and headaches.   Hematological: Negative.    Psychiatric/Behavioral: Negative.     Objective:     Vital Signs (Most Recent):  Temp: 98.2 °F (36.8 °C) (02/03/23 1015)  Pulse: 73 (02/03/23 1015)  Resp: 17 (02/03/23 1015)  BP: (!) 100/38 (02/03/23 1015)  SpO2: 96 % (02/03/23 1015)   Vital Signs (24h Range):  Temp:  [96.6 °F (35.9 °C)-98.8 °F (37.1 °C)] 98.2 °F (36.8 °C)  Pulse:  [73-96] 73  Resp:  [16-20] 17  SpO2:  [96 %-99 %] 96 %  BP: (100-141)/(38-92) 100/38     Weight: 65.7 kg (144 lb 13.5 oz)  Body mass index is 23.38 kg/m².    Intake/Output Summary (Last 24 hours) at 2/3/2023 1134  Last data filed at 2/3/2023 0821  Gross per 24 hour   Intake 0 ml   Output --   Net 0 ml      Physical Exam  Constitutional:       Appearance: She is normal weight.   HENT:      Head: Normocephalic.      Right Ear:  Tympanic membrane normal.      Left Ear: Tympanic membrane normal.      Nose: Nose normal.      Mouth/Throat:      Mouth: Mucous membranes are moist.      Pharynx: Oropharynx is clear.   Eyes:      Conjunctiva/sclera: Conjunctivae normal.      Pupils: Pupils are equal, round, and reactive to light.   Cardiovascular:      Rate and Rhythm: Normal rate and regular rhythm.      Pulses: Normal pulses.      Heart sounds: Normal heart sounds.   Pulmonary:      Effort: Pulmonary effort is normal.      Breath sounds: Rhonchi present.   Abdominal:      General: Abdomen is flat. Bowel sounds are normal.   Musculoskeletal:         General: No tenderness. Normal range of motion.      Cervical back: Normal range of motion.      Comments: Lower back itching and new bump  Left hip tenderness increased    Skin:     General: Skin is warm and dry.      Capillary Refill: Capillary refill takes less than 2 seconds.   Neurological:      Mental Status: She is alert and oriented to person, place, and time.   Psychiatric:         Mood and Affect: Mood normal.         Behavior: Behavior normal.       Significant Labs: All pertinent labs within the past 24 hours have been reviewed.  BMP:   Recent Labs   Lab 02/03/23  0323   *   *   K 4.9   CL 97*   CO2 31   BUN 28*   CREATININE 1.44*   CALCIUM 9.1     CBC:   Recent Labs   Lab 02/02/23  0309 02/03/23  0323   WBC 13.35* 13.91*   HGB 9.6* 8.6*   HCT 32.1* 29.0*   * 443*       Significant Imaging: I have reviewed all pertinent imaging results/findings within the past 24 hours.      Assessment/Plan:      * Epidural abscess  - MRI Thoracic w/o Contrast - Findings suggestive of osteomyelitis discitis of T11-12 with epidural phlegmon or extruded disc contributing to moderate spinal canal stenosis.  Additional epidural phlegmon extending in the left paracentral location and of the left T11-12 and T12-L1 foramina.  - MRSA sensitive to Vancomycin, will continue   - Surgery/washout  1/24/2023 successful   - pathology revealed osteomyelitis krystal will need 6 weeks of abx coverage which will go until 3/7/23, will try to transfer her to LTAC for continued abx.   -LTAC denied. Will try swingbed    2/3/23  -Awaiting approval for swing bed. If no success will consider antibiotic treatment with home cash.       Left hip pain  Left Hip X-ray -Left hip arthroplasty appears within normal limits.    PT/OT    2/1/23  Patient complaining of more pain in left hip. Left hip is tender with Passive ROM  Orthopedics Consulted. We want to see if patient may have possible infected artificial hip from Epidural infection. MRSA.  Thank orthopedics for recommendations.    2/2/23  CT guided aspiration of left hip  Awaiting results    Hypertension  Not currently controlled  Patient took no home meds  Occasionally correspond to pain  Hydralazine 25mg Q8hrs      Hypomagnesemia  2G magnesium  Will recheck in the am      Microcytic anemia  Ferritin - 380  Iron - 12  Iron Sat - 6  TIBC - 188    Ferrous sulfate given Qd    2/2/23  H/H 9.6/32    Type 2 diabetes mellitus, with long-term current use of insulin  Patient's FSGs are uncontrolled due to hyperglycemia on current medication regimen.  Last A1c reviewed-   Lab Results   Component Value Date    HGBA1C 7.9 (H) 01/19/2023     Most recent fingerstick glucose reviewed- No results for input(s): POCTGLUCOSE in the last 24 hours.  Current correctional scale  Medium  Maintain anti-hyperglycemic dose as follows-   Antihyperglycemics (From admission, onward)    Start     Stop Route Frequency Ordered    02/01/23 2100  insulin detemir U-100 injection 25 Units         -- SubQ Nightly 02/01/23 0833    01/28/23 1800  insulin aspart U-100 injection 5 Units         -- SubQ 3 times daily with meals 01/28/23 1757    01/24/23 2136  insulin aspart U-100 injection 1-10 Units         -- SubQ Before meals & nightly PRN 01/24/23 2036        Hold Oral hypoglycemics while patient is in the  [facility].  Diabetic Educator consulted, appreciate their help    Depression  - Continue Cymbalta        VTE Risk Mitigation (From admission, onward)         Ordered     IP VTE LOW RISK PATIENT  Once         01/19/23 0417     Place sequential compression device  Until discontinued         01/19/23 0417                Discharge Planning   SUZANNA:      Code Status: Full Code   Is the patient medically ready for discharge?:     Reason for patient still in hospital (select all that apply): Treatment  Discharge Plan A: Home with family, Home Health                  Jackson Lai MD  Department of Hospital Medicine   [facility]

## 2023-02-04 LAB
ANION GAP SERPL CALCULATED.3IONS-SCNC: 13 MMOL/L (ref 7–16)
BACTERIA SPEC ANAEROBE CULT: NORMAL
BACTERIA SPEC BFLD CULT: NORMAL
BASOPHILS # BLD AUTO: 0.08 K/UL (ref 0–0.2)
BASOPHILS NFR BLD AUTO: 0.6 % (ref 0–1)
BUN SERPL-MCNC: 26 MG/DL (ref 7–18)
BUN/CREAT SERPL: 18 (ref 6–20)
CALCIUM SERPL-MCNC: 8.7 MG/DL (ref 8.5–10.1)
CHLORIDE SERPL-SCNC: 97 MMOL/L (ref 98–107)
CO2 SERPL-SCNC: 27 MMOL/L (ref 21–32)
CREAT SERPL-MCNC: 1.44 MG/DL (ref 0.55–1.02)
DIFFERENTIAL METHOD BLD: ABNORMAL
EGFR (NO RACE VARIABLE) (RUSH/TITUS): 43 ML/MIN/1.73M²
EOSINOPHIL # BLD AUTO: 0.52 K/UL (ref 0–0.5)
EOSINOPHIL NFR BLD AUTO: 4.1 % (ref 1–4)
ERYTHROCYTE [DISTWIDTH] IN BLOOD BY AUTOMATED COUNT: 18 % (ref 11.5–14.5)
GLUCOSE SERPL-MCNC: 204 MG/DL (ref 70–105)
GLUCOSE SERPL-MCNC: 210 MG/DL (ref 70–105)
GLUCOSE SERPL-MCNC: 217 MG/DL (ref 70–105)
GLUCOSE SERPL-MCNC: 239 MG/DL (ref 70–105)
GLUCOSE SERPL-MCNC: 260 MG/DL (ref 74–106)
HCT VFR BLD AUTO: 29 % (ref 38–47)
HGB BLD-MCNC: 8.5 G/DL (ref 12–16)
IMM GRANULOCYTES # BLD AUTO: 0.07 K/UL (ref 0–0.04)
IMM GRANULOCYTES NFR BLD: 0.6 % (ref 0–0.4)
LYMPHOCYTES # BLD AUTO: 2.58 K/UL (ref 1–4.8)
LYMPHOCYTES NFR BLD AUTO: 20.6 % (ref 27–41)
MCH RBC QN AUTO: 23 PG (ref 27–31)
MCHC RBC AUTO-ENTMCNC: 29.3 G/DL (ref 32–36)
MCV RBC AUTO: 78.6 FL (ref 80–96)
MONOCYTES # BLD AUTO: 1.16 K/UL (ref 0–0.8)
MONOCYTES NFR BLD AUTO: 9.2 % (ref 2–6)
MPC BLD CALC-MCNC: 9.4 FL (ref 9.4–12.4)
NEUTROPHILS # BLD AUTO: 8.14 K/UL (ref 1.8–7.7)
NEUTROPHILS NFR BLD AUTO: 64.9 % (ref 53–65)
NRBC # BLD AUTO: 0 X10E3/UL
NRBC, AUTO (.00): 0 %
PLATELET # BLD AUTO: 461 K/UL (ref 150–400)
POTASSIUM SERPL-SCNC: 4.4 MMOL/L (ref 3.5–5.1)
RBC # BLD AUTO: 3.69 M/UL (ref 4.2–5.4)
SODIUM SERPL-SCNC: 133 MMOL/L (ref 136–145)
WBC # BLD AUTO: 12.55 K/UL (ref 4.5–11)

## 2023-02-04 PROCEDURE — 25000003 PHARM REV CODE 250

## 2023-02-04 PROCEDURE — 97116 GAIT TRAINING THERAPY: CPT

## 2023-02-04 PROCEDURE — 80048 BASIC METABOLIC PNL TOTAL CA: CPT | Performed by: ORTHOPAEDIC SURGERY

## 2023-02-04 PROCEDURE — 11000001 HC ACUTE MED/SURG PRIVATE ROOM

## 2023-02-04 PROCEDURE — 94761 N-INVAS EAR/PLS OXIMETRY MLT: CPT

## 2023-02-04 PROCEDURE — 63600175 PHARM REV CODE 636 W HCPCS

## 2023-02-04 PROCEDURE — 99232 PR SUBSEQUENT HOSPITAL CARE,LEVL II: ICD-10-PCS | Mod: GC,,, | Performed by: STUDENT IN AN ORGANIZED HEALTH CARE EDUCATION/TRAINING PROGRAM

## 2023-02-04 PROCEDURE — 63600175 PHARM REV CODE 636 W HCPCS: Performed by: FAMILY MEDICINE

## 2023-02-04 PROCEDURE — 85025 COMPLETE CBC W/AUTO DIFF WBC: CPT | Performed by: ORTHOPAEDIC SURGERY

## 2023-02-04 PROCEDURE — 82962 GLUCOSE BLOOD TEST: CPT

## 2023-02-04 PROCEDURE — 25000003 PHARM REV CODE 250: Performed by: FAMILY MEDICINE

## 2023-02-04 PROCEDURE — 25000003 PHARM REV CODE 250: Performed by: ORTHOPAEDIC SURGERY

## 2023-02-04 PROCEDURE — 99232 SBSQ HOSP IP/OBS MODERATE 35: CPT | Mod: GC,,, | Performed by: STUDENT IN AN ORGANIZED HEALTH CARE EDUCATION/TRAINING PROGRAM

## 2023-02-04 RX ADMIN — OXYCODONE HYDROCHLORIDE 10 MG: 5 TABLET ORAL at 08:02

## 2023-02-04 RX ADMIN — OXYCODONE HYDROCHLORIDE 10 MG: 5 TABLET ORAL at 11:02

## 2023-02-04 RX ADMIN — INSULIN ASPART 4 UNITS: 100 INJECTION, SOLUTION INTRAVENOUS; SUBCUTANEOUS at 04:02

## 2023-02-04 RX ADMIN — INSULIN ASPART 5 UNITS: 100 INJECTION, SOLUTION INTRAVENOUS; SUBCUTANEOUS at 11:02

## 2023-02-04 RX ADMIN — HYDROCORTISONE: 1 CREAM TOPICAL at 09:02

## 2023-02-04 RX ADMIN — OXYCODONE HYDROCHLORIDE 10 MG: 5 TABLET ORAL at 02:02

## 2023-02-04 RX ADMIN — Medication 1 EACH: at 08:02

## 2023-02-04 RX ADMIN — HYDRALAZINE HYDROCHLORIDE 25 MG: 25 TABLET ORAL at 08:02

## 2023-02-04 RX ADMIN — INSULIN DETEMIR 25 UNITS: 100 INJECTION, SOLUTION SUBCUTANEOUS at 08:02

## 2023-02-04 RX ADMIN — INSULIN ASPART 2 UNITS: 100 INJECTION, SOLUTION INTRAVENOUS; SUBCUTANEOUS at 08:02

## 2023-02-04 RX ADMIN — DULOXETINE 60 MG: 30 CAPSULE, DELAYED RELEASE ORAL at 08:02

## 2023-02-04 RX ADMIN — DIPHENHYDRAMINE HYDROCHLORIDE 25 MG: 25 CAPSULE ORAL at 08:02

## 2023-02-04 RX ADMIN — HYDROCODONE BITARTRATE AND ACETAMINOPHEN 1 TABLET: 10; 325 TABLET ORAL at 04:02

## 2023-02-04 RX ADMIN — INSULIN ASPART 5 UNITS: 100 INJECTION, SOLUTION INTRAVENOUS; SUBCUTANEOUS at 08:02

## 2023-02-04 RX ADMIN — INSULIN ASPART 4 UNITS: 100 INJECTION, SOLUTION INTRAVENOUS; SUBCUTANEOUS at 11:02

## 2023-02-04 RX ADMIN — HYDROCODONE BITARTRATE AND ACETAMINOPHEN 1 TABLET: 10; 325 TABLET ORAL at 08:02

## 2023-02-04 RX ADMIN — INSULIN ASPART 5 UNITS: 100 INJECTION, SOLUTION INTRAVENOUS; SUBCUTANEOUS at 04:02

## 2023-02-04 RX ADMIN — VANCOMYCIN HYDROCHLORIDE 1250 MG: 500 INJECTION, POWDER, LYOPHILIZED, FOR SOLUTION INTRAVENOUS at 05:02

## 2023-02-04 NOTE — PROGRESS NOTES
"[facility] Medicine  Progress Note    Patient Name: Monica Walker  MRN: [id number]  Patient Class: IP- Inpatient   Admission Date: 1/19/2023  Length of Stay: 16 days  Attending Physician: Adilson Fontanez DO  Primary Care Provider: Hannah Schulz MD        Subjective:     Principal Problem:Epidural abscess        HPI:  Patient is a 51 y/o Female presenting to [facility] with a cc of back pain. She states the pain began to worsen approximately 16 hours ago. She states the pain is in her Mid back area and radiates to both legs. She describes the pain as "throbbing, constant and a 9/10." She states the pain is blunted by taking Hydrocodone but not relieved. She reports the pain is made worse by movement and standing.    Patient reports she was hospitalized in November for sepsis secondary to Pyelonephritis. She has been hospitalized several times over the past 3 months. She is currently being treated at home with IV Abx therapy for Osteomyelitis of the spine.  MRI of Lumbar spine on 1/10/2023 (Per radiology report). Findings indicative of osteomyelitis involving T11-12 with epidural abscess extending from the T11-12 disc level to the L1 level eccentric towards the left.  Additional site of infection of the right L2-3 facet joint as well as osteomyelitis of the right L3 transverse process.    Significant findings in the ED include:  Vitals: /84, , RR 16, Temp 98.2 F, O2 98% on RA  Labs: WBC 14.6, HGB 8.2, HCT 27.1, MCV 74.2, Na 133, K 3.5, Bun 8, Crt0.54, , Glucose 171, UA normal.  today;124 2 days ago, CRP  22.90 Today; 12.9 2 days ago  Interventions: Toradol 30 mg IV    Pt was subsequently admitted to the Inpatient Hospitalist Service for further evaluation and management.               Overview/Hospital Course:  No notes on file    Interval History: Patient AAOX3 this am patient states she feels better this morning her left hip " hurts still. Patient encouraged to continue physical therapy .    Review of Systems   Constitutional:  Negative for fatigue and fever.   HENT:  Negative for ear discharge, ear pain, facial swelling, mouth sores, postnasal drip, rhinorrhea and sore throat.    Eyes:  Negative for pain, discharge and itching.   Respiratory:  Negative for cough.    Cardiovascular:  Negative for leg swelling.   Gastrointestinal:  Negative for abdominal distention, abdominal pain, anal bleeding, constipation and diarrhea.   Endocrine: Negative for cold intolerance, heat intolerance and polydipsia.   Genitourinary:  Negative for difficulty urinating, dyspareunia, flank pain, frequency, genital sores and hematuria.   Musculoskeletal:  Positive for arthralgias. Negative for back pain, gait problem and joint swelling.   Skin:  Positive for wound.   Allergic/Immunologic: Negative for environmental allergies and food allergies.   Neurological:  Negative for dizziness, facial asymmetry, light-headedness and headaches.   Hematological: Negative.    Psychiatric/Behavioral: Negative.     Objective:     Vital Signs (Most Recent):  Temp: 98.7 °F (37.1 °C) (02/04/23 1020)  Pulse: 91 (02/04/23 1020)  Resp: 20 (02/04/23 1104)  BP: 129/70 (02/04/23 1020)  SpO2: 97 % (02/04/23 1020) Vital Signs (24h Range):  Temp:  [97.8 °F (36.6 °C)-98.8 °F (37.1 °C)] 98.7 °F (37.1 °C)  Pulse:  [71-91] 91  Resp:  [17-20] 20  SpO2:  [95 %-97 %] 97 %  BP: (105-155)/(54-75) 129/70     Weight: 65.7 kg (144 lb 13.5 oz)  Body mass index is 23.38 kg/m².  No intake or output data in the 24 hours ending 02/04/23 1420   Physical Exam  Constitutional:       Appearance: She is normal weight.   HENT:      Head: Normocephalic.      Right Ear: Tympanic membrane normal.      Left Ear: Tympanic membrane normal.      Nose: Nose normal.      Mouth/Throat:      Mouth: Mucous membranes are moist.      Pharynx: Oropharynx is clear.   Eyes:      Conjunctiva/sclera: Conjunctivae normal.       Pupils: Pupils are equal, round, and reactive to light.   Cardiovascular:      Rate and Rhythm: Normal rate and regular rhythm.      Pulses: Normal pulses.      Heart sounds: Normal heart sounds.   Pulmonary:      Effort: Pulmonary effort is normal.      Breath sounds: Rhonchi present.   Abdominal:      General: Abdomen is flat. Bowel sounds are normal.   Musculoskeletal:         General: No tenderness. Normal range of motion.      Cervical back: Normal range of motion.      Comments: Lower back itching and new bump  Left hip tenderness increased    Skin:     General: Skin is warm and dry.      Capillary Refill: Capillary refill takes less than 2 seconds.   Neurological:      Mental Status: She is alert and oriented to person, place, and time.   Psychiatric:         Mood and Affect: Mood normal.         Behavior: Behavior normal.       Significant Labs: All pertinent labs within the past 24 hours have been reviewed.  BMP:   Recent Labs   Lab 02/04/23  0312   *   *   K 4.4   CL 97*   CO2 27   BUN 26*   CREATININE 1.44*   CALCIUM 8.7     CBC:   Recent Labs   Lab 02/03/23  0323 02/04/23  0312   WBC 13.91* 12.55*   HGB 8.6* 8.5*   HCT 29.0* 29.0*   * 461*       Significant Imaging: I have reviewed all pertinent imaging results/findings within the past 24 hours.      Assessment/Plan:      * Epidural abscess  - MRI Thoracic w/o Contrast - Findings suggestive of osteomyelitis discitis of T11-12 with epidural phlegmon or extruded disc contributing to moderate spinal canal stenosis.  Additional epidural phlegmon extending in the left paracentral location and of the left T11-12 and T12-L1 foramina.  - MRSA sensitive to Vancomycin, will continue   - Surgery/washout 1/24/2023 successful   - pathology revealed osteomyelitis patinet will need 6 weeks of abx coverage which will go until 3/7/23, will try to transfer her to LTAC for continued abx.   -LTAC denied. Will try swingbed    2/3/23  -Awaiting approval  for swing bed. If no success will consider antibiotic treatment with home cash.       Left hip pain  Left Hip X-ray -Left hip arthroplasty appears within normal limits.    PT/OT    2/1/23  Patient complaining of more pain in left hip. Left hip is tender with Passive ROM  Orthopedics Consulted. We want to see if patient may have possible infected artificial hip from Epidural infection. MRSA.  Thank orthopedics for recommendations.    2/2/23  CT guided aspiration of left hip  Awaiting results    Hypertension  Not currently controlled  Patient took no home meds  Occasionally correspond to pain  Hydralazine 25mg Q8hrs      Hypomagnesemia  2G magnesium  Will recheck in the am      Microcytic anemia  Ferritin - 380  Iron - 12  Iron Sat - 6  TIBC - 188    Ferrous sulfate given Qd    2/2/23  H/H 9.6/32    Type 2 diabetes mellitus, with long-term current use of insulin  Patient's FSGs are uncontrolled due to hyperglycemia on current medication regimen.  Last A1c reviewed-   Lab Results   Component Value Date    HGBA1C 7.9 (H) 01/19/2023     Most recent fingerstick glucose reviewed- No results for input(s): POCTGLUCOSE in the last 24 hours.  Current correctional scale  Medium  Maintain anti-hyperglycemic dose as follows-   Antihyperglycemics (From admission, onward)    Start     Stop Route Frequency Ordered    02/01/23 2100  insulin detemir U-100 injection 25 Units         -- SubQ Nightly 02/01/23 0833    01/28/23 1800  insulin aspart U-100 injection 5 Units         -- SubQ 3 times daily with meals 01/28/23 1757    01/24/23 2136  insulin aspart U-100 injection 1-10 Units         -- SubQ Before meals & nightly PRN 01/24/23 2036        Hold Oral hypoglycemics while patient is in the hospital.  Diabetic Educator consulted, appreciate their help    Depression  - Continue Cymbalta        VTE Risk Mitigation (From admission, onward)         Ordered     IP VTE LOW RISK PATIENT  Once         01/19/23 0417     Place sequential  compression device  Until discontinued         01/19/23 0417                Discharge Planning   SUZANNA:      Code Status: Full Code   Is the patient medically ready for discharge?:     Reason for patient still in hospital (select all that apply): Treatment  Discharge Plan A: Home with family, Home Health                  Jackson Lai MD  Department of Hospital Medicine   [facility]

## 2023-02-04 NOTE — SUBJECTIVE & OBJECTIVE
Interval History: Patient AAOX3 this am patient states she feels better this morning her left hip hurts still. Patient encouraged to continue physical therapy .    Review of Systems   Constitutional:  Negative for fatigue and fever.   HENT:  Negative for ear discharge, ear pain, facial swelling, mouth sores, postnasal drip, rhinorrhea and sore throat.    Eyes:  Negative for pain, discharge and itching.   Respiratory:  Negative for cough.    Cardiovascular:  Negative for leg swelling.   Gastrointestinal:  Negative for abdominal distention, abdominal pain, anal bleeding, constipation and diarrhea.   Endocrine: Negative for cold intolerance, heat intolerance and polydipsia.   Genitourinary:  Negative for difficulty urinating, dyspareunia, flank pain, frequency, genital sores and hematuria.   Musculoskeletal:  Positive for arthralgias. Negative for back pain, gait problem and joint swelling.   Skin:  Positive for wound.   Allergic/Immunologic: Negative for environmental allergies and food allergies.   Neurological:  Negative for dizziness, facial asymmetry, light-headedness and headaches.   Hematological: Negative.    Psychiatric/Behavioral: Negative.     Objective:     Vital Signs (Most Recent):  Temp: 98.7 °F (37.1 °C) (02/04/23 1020)  Pulse: 91 (02/04/23 1020)  Resp: 20 (02/04/23 1104)  BP: 129/70 (02/04/23 1020)  SpO2: 97 % (02/04/23 1020) Vital Signs (24h Range):  Temp:  [97.8 °F (36.6 °C)-98.8 °F (37.1 °C)] 98.7 °F (37.1 °C)  Pulse:  [71-91] 91  Resp:  [17-20] 20  SpO2:  [95 %-97 %] 97 %  BP: (105-155)/(54-75) 129/70     Weight: 65.7 kg (144 lb 13.5 oz)  Body mass index is 23.38 kg/m².  No intake or output data in the 24 hours ending 02/04/23 1420   Physical Exam  Constitutional:       Appearance: She is normal weight.   HENT:      Head: Normocephalic.      Right Ear: Tympanic membrane normal.      Left Ear: Tympanic membrane normal.      Nose: Nose normal.      Mouth/Throat:      Mouth: Mucous membranes are  moist.      Pharynx: Oropharynx is clear.   Eyes:      Conjunctiva/sclera: Conjunctivae normal.      Pupils: Pupils are equal, round, and reactive to light.   Cardiovascular:      Rate and Rhythm: Normal rate and regular rhythm.      Pulses: Normal pulses.      Heart sounds: Normal heart sounds.   Pulmonary:      Effort: Pulmonary effort is normal.      Breath sounds: Rhonchi present.   Abdominal:      General: Abdomen is flat. Bowel sounds are normal.   Musculoskeletal:         General: No tenderness. Normal range of motion.      Cervical back: Normal range of motion.      Comments: Lower back itching and new bump  Left hip tenderness increased    Skin:     General: Skin is warm and dry.      Capillary Refill: Capillary refill takes less than 2 seconds.   Neurological:      Mental Status: She is alert and oriented to person, place, and time.   Psychiatric:         Mood and Affect: Mood normal.         Behavior: Behavior normal.       Significant Labs: All pertinent labs within the past 24 hours have been reviewed.  BMP:   Recent Labs   Lab 02/04/23  0312   *   *   K 4.4   CL 97*   CO2 27   BUN 26*   CREATININE 1.44*   CALCIUM 8.7     CBC:   Recent Labs   Lab 02/03/23  0323 02/04/23  0312   WBC 13.91* 12.55*   HGB 8.6* 8.5*   HCT 29.0* 29.0*   * 461*       Significant Imaging: I have reviewed all pertinent imaging results/findings within the past 24 hours.   6

## 2023-02-04 NOTE — PROGRESS NOTES
Office: [phone]    Subjective:   Complaining mostly left hip pain which has been limiting her mobility lately.  She had aspiration with cultures which were negative.  Cell count of aspirate also not indicative of septic arthritis of the hip.  Reports her back isn't hurting her too much    I/O as of 7:29 AM:   Intake/Output - Last 3 Shifts         02/02 0700 02/03 0659 02/03 0700 02/04 0659 02/04 0700 02/05 0659    P.O.  0     Total Intake(mL/kg)  0 (0)     Net  0            Urine Occurrence 5 x               Vitals / Physical Exam:  Vitals:    02/03/23 1946 02/04/23 0100 02/04/23 0232 02/04/23 0713   BP: (!) 105/54 (!) 155/74  127/64   BP Location:       Pulse: 84 85  77   Resp: 17 20 18 18   Temp: 98.8 °F (37.1 °C) 98.3 °F (36.8 °C)  97.8 °F (36.6 °C)   TempSrc: Oral      SpO2: 95% 95%  96%   Weight:       Height:            AOx3   NAD      Motor L2 L3 L4 L5 S1   Left 3 3 3 4 5   Right 5 5 5 5 5   Sensory        Left  + + + + +   Right + + + + +     X-rays taken today show improved alignment with restoration of T12 height and satisfactory  positioning of hardware      Assessment / Plan:   Monica Walker is a 52 y.o. female with thoracolumbar osteo diskitis with MRSA on culture.  Now status post T9-L2 fusion, T11-T12 laminectomy and corpectomy    - medical management per hospitalist service  - continue antibiotics  - PT/OT      Signature:  Jimmy Hernandes MD     Electronic Signature

## 2023-02-05 PROBLEM — E83.42 HYPOMAGNESEMIA: Status: RESOLVED | Noted: 2023-01-20 | Resolved: 2023-02-05

## 2023-02-05 PROBLEM — D75.839 THROMBOCYTOSIS: Status: ACTIVE | Noted: 2023-02-05

## 2023-02-05 PROBLEM — N17.9 AKI (ACUTE KIDNEY INJURY): Status: ACTIVE | Noted: 2023-02-05

## 2023-02-05 LAB
ANION GAP SERPL CALCULATED.3IONS-SCNC: 15 MMOL/L (ref 7–16)
APTT PPP: 34.4 SECONDS (ref 25.2–37.3)
BASOPHILS # BLD AUTO: 0.12 K/UL (ref 0–0.2)
BASOPHILS NFR BLD AUTO: 0.9 % (ref 0–1)
BUN SERPL-MCNC: 28 MG/DL (ref 7–18)
BUN/CREAT SERPL: 21 (ref 6–20)
CALCIUM SERPL-MCNC: 9.1 MG/DL (ref 8.5–10.1)
CHLORIDE SERPL-SCNC: 97 MMOL/L (ref 98–107)
CO2 SERPL-SCNC: 28 MMOL/L (ref 21–32)
CREAT SERPL-MCNC: 1.35 MG/DL (ref 0.55–1.02)
DIFFERENTIAL METHOD BLD: ABNORMAL
EGFR (NO RACE VARIABLE) (RUSH/TITUS): 47 ML/MIN/1.73M²
EOSINOPHIL # BLD AUTO: 0.49 K/UL (ref 0–0.5)
EOSINOPHIL NFR BLD AUTO: 3.7 % (ref 1–4)
ERYTHROCYTE [DISTWIDTH] IN BLOOD BY AUTOMATED COUNT: 17.6 % (ref 11.5–14.5)
GLUCOSE SERPL-MCNC: 146 MG/DL (ref 70–105)
GLUCOSE SERPL-MCNC: 171 MG/DL (ref 74–106)
GLUCOSE SERPL-MCNC: 179 MG/DL (ref 70–105)
GLUCOSE SERPL-MCNC: 196 MG/DL (ref 70–105)
GLUCOSE SERPL-MCNC: 258 MG/DL (ref 70–105)
HCT VFR BLD AUTO: 30.3 % (ref 38–47)
HGB BLD-MCNC: 9 G/DL (ref 12–16)
IMM GRANULOCYTES # BLD AUTO: 0.07 K/UL (ref 0–0.04)
IMM GRANULOCYTES NFR BLD: 0.5 % (ref 0–0.4)
INR BLD: 1.07
LYMPHOCYTES # BLD AUTO: 2.27 K/UL (ref 1–4.8)
LYMPHOCYTES NFR BLD AUTO: 17.2 % (ref 27–41)
MCH RBC QN AUTO: 23.4 PG (ref 27–31)
MCHC RBC AUTO-ENTMCNC: 29.7 G/DL (ref 32–36)
MCV RBC AUTO: 78.9 FL (ref 80–96)
MONOCYTES # BLD AUTO: 1 K/UL (ref 0–0.8)
MONOCYTES NFR BLD AUTO: 7.6 % (ref 2–6)
MPC BLD CALC-MCNC: 9.3 FL (ref 9.4–12.4)
NEUTROPHILS # BLD AUTO: 9.22 K/UL (ref 1.8–7.7)
NEUTROPHILS NFR BLD AUTO: 70.1 % (ref 53–65)
NRBC # BLD AUTO: 0 X10E3/UL
NRBC, AUTO (.00): 0 %
PLATELET # BLD AUTO: 502 K/UL (ref 150–400)
POTASSIUM SERPL-SCNC: 4.3 MMOL/L (ref 3.5–5.1)
PROTHROMBIN TIME: 13.5 SECONDS (ref 11.7–14.7)
RBC # BLD AUTO: 3.84 M/UL (ref 4.2–5.4)
SODIUM SERPL-SCNC: 136 MMOL/L (ref 136–145)
WBC # BLD AUTO: 13.17 K/UL (ref 4.5–11)

## 2023-02-05 PROCEDURE — 85025 COMPLETE CBC W/AUTO DIFF WBC: CPT | Performed by: ORTHOPAEDIC SURGERY

## 2023-02-05 PROCEDURE — 85610 PROTHROMBIN TIME: CPT | Performed by: STUDENT IN AN ORGANIZED HEALTH CARE EDUCATION/TRAINING PROGRAM

## 2023-02-05 PROCEDURE — 25000003 PHARM REV CODE 250

## 2023-02-05 PROCEDURE — 85730 THROMBOPLASTIN TIME PARTIAL: CPT | Performed by: STUDENT IN AN ORGANIZED HEALTH CARE EDUCATION/TRAINING PROGRAM

## 2023-02-05 PROCEDURE — 63600175 PHARM REV CODE 636 W HCPCS

## 2023-02-05 PROCEDURE — 63600175 PHARM REV CODE 636 W HCPCS: Performed by: STUDENT IN AN ORGANIZED HEALTH CARE EDUCATION/TRAINING PROGRAM

## 2023-02-05 PROCEDURE — 82962 GLUCOSE BLOOD TEST: CPT

## 2023-02-05 PROCEDURE — 11000001 HC ACUTE MED/SURG PRIVATE ROOM

## 2023-02-05 PROCEDURE — 97110 THERAPEUTIC EXERCISES: CPT

## 2023-02-05 PROCEDURE — 94761 N-INVAS EAR/PLS OXIMETRY MLT: CPT

## 2023-02-05 PROCEDURE — 99232 SBSQ HOSP IP/OBS MODERATE 35: CPT | Mod: GC,,, | Performed by: STUDENT IN AN ORGANIZED HEALTH CARE EDUCATION/TRAINING PROGRAM

## 2023-02-05 PROCEDURE — 63600175 PHARM REV CODE 636 W HCPCS: Performed by: FAMILY MEDICINE

## 2023-02-05 PROCEDURE — 25000003 PHARM REV CODE 250: Performed by: ORTHOPAEDIC SURGERY

## 2023-02-05 PROCEDURE — 99232 PR SUBSEQUENT HOSPITAL CARE,LEVL II: ICD-10-PCS | Mod: GC,,, | Performed by: STUDENT IN AN ORGANIZED HEALTH CARE EDUCATION/TRAINING PROGRAM

## 2023-02-05 PROCEDURE — 80048 BASIC METABOLIC PNL TOTAL CA: CPT | Performed by: ORTHOPAEDIC SURGERY

## 2023-02-05 RX ORDER — HYDROMORPHONE HYDROCHLORIDE 2 MG/ML
0.5 INJECTION, SOLUTION INTRAMUSCULAR; INTRAVENOUS; SUBCUTANEOUS EVERY 6 HOURS PRN
Status: DISCONTINUED | OUTPATIENT
Start: 2023-02-05 | End: 2023-02-06

## 2023-02-05 RX ORDER — INSULIN ASPART 100 [IU]/ML
7 INJECTION, SOLUTION INTRAVENOUS; SUBCUTANEOUS
Status: DISCONTINUED | OUTPATIENT
Start: 2023-02-05 | End: 2023-02-06

## 2023-02-05 RX ORDER — HEPARIN SODIUM 5000 [USP'U]/ML
5000 INJECTION, SOLUTION INTRAVENOUS; SUBCUTANEOUS EVERY 8 HOURS
Status: DISCONTINUED | OUTPATIENT
Start: 2023-02-05 | End: 2023-02-08 | Stop reason: HOSPADM

## 2023-02-05 RX ADMIN — ALTEPLASE 2 MG: 2.2 INJECTION, POWDER, LYOPHILIZED, FOR SOLUTION INTRAVENOUS at 02:02

## 2023-02-05 RX ADMIN — DOCUSATE SODIUM 100 MG: 100 CAPSULE, LIQUID FILLED ORAL at 08:02

## 2023-02-05 RX ADMIN — DULOXETINE 60 MG: 30 CAPSULE, DELAYED RELEASE ORAL at 08:02

## 2023-02-05 RX ADMIN — HYDRALAZINE HYDROCHLORIDE 25 MG: 25 TABLET ORAL at 08:02

## 2023-02-05 RX ADMIN — HYDROCORTISONE: 1 CREAM TOPICAL at 09:02

## 2023-02-05 RX ADMIN — HYDRALAZINE HYDROCHLORIDE 25 MG: 25 TABLET ORAL at 09:02

## 2023-02-05 RX ADMIN — OXYCODONE HYDROCHLORIDE 10 MG: 5 TABLET ORAL at 03:02

## 2023-02-05 RX ADMIN — HYDROCODONE BITARTRATE AND ACETAMINOPHEN 1 TABLET: 10; 325 TABLET ORAL at 08:02

## 2023-02-05 RX ADMIN — HEPARIN SODIUM 5000 UNITS: 5000 INJECTION INTRAVENOUS; SUBCUTANEOUS at 09:02

## 2023-02-05 RX ADMIN — HEPARIN SODIUM 5000 UNITS: 5000 INJECTION INTRAVENOUS; SUBCUTANEOUS at 02:02

## 2023-02-05 RX ADMIN — INSULIN DETEMIR 28 UNITS: 100 INJECTION, SOLUTION SUBCUTANEOUS at 09:02

## 2023-02-05 RX ADMIN — INSULIN ASPART 7 UNITS: 100 INJECTION, SOLUTION INTRAVENOUS; SUBCUTANEOUS at 04:02

## 2023-02-05 RX ADMIN — Medication 1 EACH: at 08:02

## 2023-02-05 RX ADMIN — INSULIN ASPART 1 UNITS: 100 INJECTION, SOLUTION INTRAVENOUS; SUBCUTANEOUS at 09:02

## 2023-02-05 RX ADMIN — INSULIN ASPART 2 UNITS: 100 INJECTION, SOLUTION INTRAVENOUS; SUBCUTANEOUS at 04:02

## 2023-02-05 RX ADMIN — INSULIN ASPART 5 UNITS: 100 INJECTION, SOLUTION INTRAVENOUS; SUBCUTANEOUS at 08:02

## 2023-02-05 RX ADMIN — Medication 6 MG: at 09:02

## 2023-02-05 RX ADMIN — OXYCODONE HYDROCHLORIDE 10 MG: 5 TABLET ORAL at 10:02

## 2023-02-05 RX ADMIN — HYDROMORPHONE HYDROCHLORIDE 0.5 MG: 2 INJECTION, SOLUTION INTRAMUSCULAR; INTRAVENOUS; SUBCUTANEOUS at 02:02

## 2023-02-05 RX ADMIN — INSULIN ASPART 5 UNITS: 100 INJECTION, SOLUTION INTRAVENOUS; SUBCUTANEOUS at 12:02

## 2023-02-05 RX ADMIN — DIPHENHYDRAMINE HYDROCHLORIDE 25 MG: 25 CAPSULE ORAL at 04:02

## 2023-02-05 RX ADMIN — HYDROMORPHONE HYDROCHLORIDE 0.5 MG: 2 INJECTION, SOLUTION INTRAMUSCULAR; INTRAVENOUS; SUBCUTANEOUS at 09:02

## 2023-02-05 RX ADMIN — INSULIN ASPART 6 UNITS: 100 INJECTION, SOLUTION INTRAVENOUS; SUBCUTANEOUS at 12:02

## 2023-02-05 NOTE — ASSESSMENT & PLAN NOTE
Left Hip X-ray -Left hip arthroplasty appears within normal limits.    PT/OT    2/1/23  Patient complaining of more pain in left hip. Left hip is tender with Passive ROM  Orthopedics Consulted. We want to see if patient may have possible infected artificial hip from Epidural infection. MRSA.  Thank orthopedics for recommendations.    2/2/23  CT guided aspiration of left hip    2/5/23  Culture anaerobe, body fluid from Aspiration of hip (2/1)- NGTD  -Will discuss with Dr. Noyola for insights

## 2023-02-05 NOTE — PLAN OF CARE
Problem: Adult Inpatient Plan of Care  Goal: Plan of Care Review  Outcome: Ongoing, Progressing  Goal: Optimal Comfort and Wellbeing  Outcome: Ongoing, Progressing     Problem: Skin Injury Risk Increased  Goal: Skin Health and Integrity  Outcome: Ongoing, Progressing     Problem: Fall Injury Risk  Goal: Absence of Fall and Fall-Related Injury  Outcome: Ongoing, Progressing

## 2023-02-05 NOTE — PT/OT/SLP PROGRESS
Physical Therapy Treatment    Patient Name:  Monica Walker   MRN:  [id number]    Recommendations:     Discharge Recommendations: LTACH (long-term acute care hospital), nursing facility, skilled  Discharge Equipment Recommendations: other (see comments) (to be determined)  Barriers to discharge:  ongoing medical care    Assessment:     Monica Walker is a 52 y.o. female admitted with a medical diagnosis of Epidural abscess.  She presents with the following impairments/functional limitations: weakness, impaired endurance, impaired functional mobility, gait instability, decreased lower extremity function, pain, impaired skin, orthopedic precautions. Pt agreeable to ambulation, demo ability to ambulate 30ft x 2 with RW, CGA.    Rehab Prognosis: Good and Fair; patient would benefit from acute skilled PT services to address these deficits and reach maximum level of function.    Recent Surgery: Procedure(s) (LRB):  T9-L2 fusion, T11-T12 laminectomy and corpectomy (N/A) 12 Days Post-Op    Plan:     During this hospitalization, patient to be seen daily to address the identified rehab impairments via gait training, therapeutic activities, therapeutic exercises and progress toward the following goals:    Plan of Care Expires:  02/25/23    Subjective     Chief Complaint: epidural abscess  Patient/Family Comments/goals: agreeable to ambulation  Pain/Comfort: none reported         Objective:     Communicated with nursing prior to session.  Patient found HOB elevated with peripheral IV upon PT entry to room.     General Precautions: Standard, fall  Orthopedic Precautions: spinal precautions  Braces: N/A  Respiratory Status: Room air     Functional Mobility:  Bed Mobility:     Supine to Sit: stand by assistance  Sit to Supine: stand by assistance  Transfers:     Sit to Stand:  contact guard assistance with rolling walker  Gait: 30ft x 2 with RW, CGA, slow antalgic gait with standing resting periods, step-to gait,  non-reciprocal  Balance: Fair to Fair+ in stance      AM-PAC 6 CLICK MOBILITY          Treatment & Education:  Mobility as stated above.     Patient left HOB elevated with all lines intact, call button in reach, and mother present..    GOALS:   Multidisciplinary Problems       Physical Therapy Goals          Problem: Physical Therapy    Goal Priority Disciplines Outcome Goal Variances Interventions   Physical Therapy Goal     PT, PT/OT Ongoing, Progressing     Description: Short Term Goals to be met by: 23    Patient will increase functional independence with mobility by performin. Supine to sit with Stand by assist  2. Sit to stand transfer with contact guard assist using Rolling walker  3. Bed to chair transfer with contact guard assist using Rolling walker  4. Gait  x 25 feet with contact guard assist using Rolling walker  5. Lower extremity exercise program x30 reps per handout, with assistance as needed  6. Pt to recall 3/3 spinal precautions     Long Term Goals to be met by: 23    Pt will regain full independent functional mobility with Rolling walker to return to home situation and prior activities of daily living.                        Time Tracking:     PT Received On: 23  PT Start Time: 1650     PT Stop Time: 1707  PT Total Time (min): 17 min     Billable Minutes: Gait Training 12    Treatment Type: Treatment  PT/PTA: PT     PTA Visit Number: 0     2023

## 2023-02-05 NOTE — ASSESSMENT & PLAN NOTE
Patient with acute kidney injury likely due to pre-renal azotemia GATO is currently improving. Labs reviewed- Renal function/electrolytes with Estimated Creatinine Clearance: 44.6 mL/min (A) (based on SCr of 1.35 mg/dL (H)). according to latest data. Monitor urine output and serial BMP and adjust therapy as needed. Avoid nephrotoxins and renally dose meds for GFR listed above.     -likely secondary to Vancomycin use     Cr on admission 0.54  Cr 1.35 from 1.44 (2/5/23)  Avoid nephrotoxic meds   Renally dose vancomycin

## 2023-02-05 NOTE — PROGRESS NOTES
"[facility] Hospital Medicine  Progress Note    Patient Name: Monica Walker  MRN: [id number]  Patient Class: IP- Inpatient   Admission Date: 1/19/2023  Length of Stay: 17 days  Attending Physician: Adilson Fontanez DO  Primary Care Provider: Hannah Schulz MD        Subjective:     Principal Problem:Epidural abscess        HPI:  Patient is a 51 y/o Female presenting to [facility] with a cc of back pain. She states the pain began to worsen approximately 16 hours ago. She states the pain is in her Mid back area and radiates to both legs. She describes the pain as "throbbing, constant and a 9/10." She states the pain is blunted by taking Hydrocodone but not relieved. She reports the pain is made worse by movement and standing.    Patient reports she was hospitalized in November for sepsis secondary to Pyelonephritis. She has been hospitalized several times over the past 3 months. She is currently being treated at home with IV Abx therapy for Osteomyelitis of the spine.  MRI of Lumbar spine on 1/10/2023 (Per radiology report). Findings indicative of osteomyelitis involving T11-12 with epidural abscess extending from the T11-12 disc level to the L1 level eccentric towards the left.  Additional site of infection of the right L2-3 facet joint as well as osteomyelitis of the right L3 transverse process.    Significant findings in the ED include:  Vitals: /84, , RR 16, Temp 98.2 F, O2 98% on RA  Labs: WBC 14.6, HGB 8.2, HCT 27.1, MCV 74.2, Na 133, K 3.5, Bun 8, Crt0.54, , Glucose 171, UA normal.  today;124 2 days ago, CRP  22.90 Today; 12.9 2 days ago  Interventions: Toradol 30 mg IV    Pt was subsequently admitted to the Inpatient Hospitalist Service for further evaluation and management.               Overview/Hospital Course:  No notes on file    Interval History:     Patient continues to complain of left inguinal, hip pain. Will ask Dr. Noyola " for insight about pain on Monday. Cultures of anaerobe and body fluid from hip aspiration (2/1) w/o any growth. Continuing Vancomycin until 3/7/23 for thoracolumbar osteo diskitis w/ MRSA.    Review of Systems   Constitutional:  Negative for fatigue and fever.   HENT:  Negative for ear discharge, ear pain, facial swelling, mouth sores, postnasal drip, rhinorrhea and sore throat.    Eyes:  Negative for pain, discharge and itching.   Respiratory:  Negative for cough.    Cardiovascular:  Negative for leg swelling.   Gastrointestinal:  Negative for abdominal distention, abdominal pain, anal bleeding, constipation and diarrhea.   Endocrine: Negative for cold intolerance, heat intolerance and polydipsia.   Genitourinary:  Negative for difficulty urinating, dyspareunia, flank pain, frequency, genital sores and hematuria.   Musculoskeletal:  Positive for arthralgias. Negative for back pain, gait problem and joint swelling.   Skin:  Positive for wound.   Allergic/Immunologic: Negative for environmental allergies and food allergies.   Neurological:  Negative for dizziness, facial asymmetry, light-headedness and headaches.   Hematological: Negative.    Psychiatric/Behavioral: Negative.     Objective:     Vital Signs (Most Recent):  Temp: 97.2 °F (36.2 °C) (02/05/23 1035)  Pulse: 87 (02/05/23 1050)  Resp: 18 (02/05/23 1050)  BP: 127/65 (02/05/23 1035)  SpO2: 95 % (02/05/23 1050) Vital Signs (24h Range):  Temp:  [97.2 °F (36.2 °C)-98.8 °F (37.1 °C)] 97.2 °F (36.2 °C)  Pulse:  [75-98] 87  Resp:  [18-20] 18  SpO2:  [95 %-100 %] 95 %  BP: ()/(50-69) 127/65     Weight: 65.7 kg (144 lb 13.5 oz)  Body mass index is 23.38 kg/m².  No intake or output data in the 24 hours ending 02/05/23 1311   Physical Exam  Constitutional:       Appearance: She is normal weight.   HENT:      Head: Normocephalic.      Right Ear: Tympanic membrane normal.      Left Ear: Tympanic membrane normal.      Nose: Nose normal.      Mouth/Throat:       Mouth: Mucous membranes are moist.      Pharynx: Oropharynx is clear.   Eyes:      Conjunctiva/sclera: Conjunctivae normal.      Pupils: Pupils are equal, round, and reactive to light.   Cardiovascular:      Rate and Rhythm: Normal rate and regular rhythm.      Pulses: Normal pulses.      Heart sounds: Normal heart sounds.   Pulmonary:      Effort: Pulmonary effort is normal.      Breath sounds: Normal breath sounds. No rhonchi.   Abdominal:      General: Abdomen is flat. Bowel sounds are normal.   Musculoskeletal:         General: No tenderness. Normal range of motion.      Cervical back: Normal range of motion.      Comments: Tenderness to palpation on left hip, inguinal region   Skin:     General: Skin is warm and dry.      Capillary Refill: Capillary refill takes less than 2 seconds.   Neurological:      Mental Status: She is alert and oriented to person, place, and time.   Psychiatric:         Mood and Affect: Mood normal.         Behavior: Behavior normal.       Significant Labs: All pertinent labs within the past 24 hours have been reviewed.  BMP:   Recent Labs   Lab 02/05/23  0550   *      K 4.3   CL 97*   CO2 28   BUN 28*   CREATININE 1.35*   CALCIUM 9.1       CBC:   Recent Labs   Lab 02/04/23  0312 02/05/23  0550   WBC 12.55* 13.17*   HGB 8.5* 9.0*   HCT 29.0* 30.3*   * 502*         Significant Imaging: I have reviewed all pertinent imaging results/findings within the past 24 hours.      Assessment/Plan:      * Epidural abscess  - MRI Thoracic w/o Contrast - Findings suggestive of osteomyelitis discitis of T11-12 with epidural phlegmon or extruded disc contributing to moderate spinal canal stenosis.  Additional epidural phlegmon extending in the left paracentral location and of the left T11-12 and T12-L1 foramina.  - MRSA sensitive to Vancomycin, will continue   - Surgery/washout 1/24/2023 successful   - pathology revealed osteomyelitis patinet will need 6 weeks of abx coverage which  will go until 3/7/23, will try to transfer her to LTAC for continued abx.   -LTAC denied. Will try swingbed    2/3/23  -Awaiting approval for swing bed. If no success will consider antibiotic treatment with home cash.     2/4/23  -Continuing vancomycin until 3/7/23  -Awaiting approval for SWB        Left hip pain  Left Hip X-ray -Left hip arthroplasty appears within normal limits.    PT/OT    2/1/23  Patient complaining of more pain in left hip. Left hip is tender with Passive ROM  Orthopedics Consulted. We want to see if patient may have possible infected artificial hip from Epidural infection. MRSA.  Thank orthopedics for recommendations.    2/2/23  CT guided aspiration of left hip    2/5/23  Culture anaerobe, body fluid from Aspiration of hip (2/1)- NGTD  -Will discuss with Dr. Noyola for insights    Thrombocytosis  Likely reactive secondary to infection, anemia     -continue to monitor         GATO (acute kidney injury)  Patient with acute kidney injury likely due to pre-renal azotemia GATO is currently improving. Labs reviewed- Renal function/electrolytes with Estimated Creatinine Clearance: 44.6 mL/min (A) (based on SCr of 1.35 mg/dL (H)). according to latest data. Monitor urine output and serial BMP and adjust therapy as needed. Avoid nephrotoxins and renally dose meds for GFR listed above.     -likely secondary to Vancomycin use     Cr on admission 0.54  Cr 1.35 from 1.44 (2/5/23)  Avoid nephrotoxic meds   Renally dose vancomycin         Hypertension  Not currently controlled  Patient took no home meds  Occasionally correspond to pain  Hydralazine 25mg Q8hrs      Microcytic anemia  Ferritin - 380  Iron - 12  Iron Sat - 6  TIBC - 188    Ferrous sulfate given Qd        Type 2 diabetes mellitus, with long-term current use of insulin  Patient's FSGs are uncontrolled due to hyperglycemia on current medication regimen.  Last A1c reviewed-   Lab Results   Component Value Date    HGBA1C 7.9 (H) 01/19/2023     Most  recent fingerstick glucose reviewed- No results for input(s): POCTGLUCOSE in the last 24 hours.  Current correctional scale  Medium  Maintain anti-hyperglycemic dose as follows-   Antihyperglycemics (From admission, onward)      Start     Stop Route Frequency Ordered    02/01/23 2100  insulin detemir U-100 injection 25 Units         -- SubQ Nightly 02/01/23 0833    01/28/23 1800  insulin aspart U-100 injection 5 Units         -- SubQ 3 times daily with meals 01/28/23 1757    01/24/23 2136  insulin aspart U-100 injection 1-10 Units         -- SubQ Before meals & nightly PRN 01/24/23 2036          Hold Oral hypoglycemics while patient is in the hospital.  Diabetic Educator consulted, appreciate their help    Depression  - Continue Cymbalta        VTE Risk Mitigation (From admission, onward)           Ordered     heparin (porcine) injection 5,000 Units  Every 8 hours         02/05/23 1112     IP VTE LOW RISK PATIENT  Once         01/19/23 0417     Place sequential compression device  Until discontinued         01/19/23 0417                    Discharge Planning   SUZANNA:      Code Status: Full Code   Is the patient medically ready for discharge?:     Reason for patient still in hospital (select all that apply): Patient trending condition  Discharge Plan A: Home with family, Home Health                  Karthikeyan Bain DO  Department of Hospital Medicine   [facility]

## 2023-02-05 NOTE — SUBJECTIVE & OBJECTIVE
Interval History:     Patient continues to complain of left inguinal, hip pain. Will ask Dr. Noyola for insight about pain on Monday. Cultures of anaerobe and body fluid from hip aspiration (2/1) w/o any growth. Continuing Vancomycin until 3/7/23 for thoracolumbar osteo diskitis w/ MRSA.    Review of Systems   Constitutional:  Negative for fatigue and fever.   HENT:  Negative for ear discharge, ear pain, facial swelling, mouth sores, postnasal drip, rhinorrhea and sore throat.    Eyes:  Negative for pain, discharge and itching.   Respiratory:  Negative for cough.    Cardiovascular:  Negative for leg swelling.   Gastrointestinal:  Negative for abdominal distention, abdominal pain, anal bleeding, constipation and diarrhea.   Endocrine: Negative for cold intolerance, heat intolerance and polydipsia.   Genitourinary:  Negative for difficulty urinating, dyspareunia, flank pain, frequency, genital sores and hematuria.   Musculoskeletal:  Positive for arthralgias. Negative for back pain, gait problem and joint swelling.   Skin:  Positive for wound.   Allergic/Immunologic: Negative for environmental allergies and food allergies.   Neurological:  Negative for dizziness, facial asymmetry, light-headedness and headaches.   Hematological: Negative.    Psychiatric/Behavioral: Negative.     Objective:     Vital Signs (Most Recent):  Temp: 97.2 °F (36.2 °C) (02/05/23 1035)  Pulse: 87 (02/05/23 1050)  Resp: 18 (02/05/23 1050)  BP: 127/65 (02/05/23 1035)  SpO2: 95 % (02/05/23 1050) Vital Signs (24h Range):  Temp:  [97.2 °F (36.2 °C)-98.8 °F (37.1 °C)] 97.2 °F (36.2 °C)  Pulse:  [75-98] 87  Resp:  [18-20] 18  SpO2:  [95 %-100 %] 95 %  BP: ()/(50-69) 127/65     Weight: 65.7 kg (144 lb 13.5 oz)  Body mass index is 23.38 kg/m².  No intake or output data in the 24 hours ending 02/05/23 1311   Physical Exam  Constitutional:       Appearance: She is normal weight.   HENT:      Head: Normocephalic.      Right Ear: Tympanic membrane  normal.      Left Ear: Tympanic membrane normal.      Nose: Nose normal.      Mouth/Throat:      Mouth: Mucous membranes are moist.      Pharynx: Oropharynx is clear.   Eyes:      Conjunctiva/sclera: Conjunctivae normal.      Pupils: Pupils are equal, round, and reactive to light.   Cardiovascular:      Rate and Rhythm: Normal rate and regular rhythm.      Pulses: Normal pulses.      Heart sounds: Normal heart sounds.   Pulmonary:      Effort: Pulmonary effort is normal.      Breath sounds: Normal breath sounds. No rhonchi.   Abdominal:      General: Abdomen is flat. Bowel sounds are normal.   Musculoskeletal:         General: No tenderness. Normal range of motion.      Cervical back: Normal range of motion.      Comments: Tenderness to palpation on left hip, inguinal region   Skin:     General: Skin is warm and dry.      Capillary Refill: Capillary refill takes less than 2 seconds.   Neurological:      Mental Status: She is alert and oriented to person, place, and time.   Psychiatric:         Mood and Affect: Mood normal.         Behavior: Behavior normal.       Significant Labs: All pertinent labs within the past 24 hours have been reviewed.  BMP:   Recent Labs   Lab 02/05/23  0550   *      K 4.3   CL 97*   CO2 28   BUN 28*   CREATININE 1.35*   CALCIUM 9.1       CBC:   Recent Labs   Lab 02/04/23  0312 02/05/23  0550   WBC 12.55* 13.17*   HGB 8.5* 9.0*   HCT 29.0* 30.3*   * 502*         Significant Imaging: I have reviewed all pertinent imaging results/findings within the past 24 hours.

## 2023-02-05 NOTE — PLAN OF CARE
Problem: Adult Inpatient Plan of Care  Goal: Plan of Care Review  Outcome: Ongoing, Progressing  Goal: Patient-Specific Goal (Individualized)  Outcome: Ongoing, Progressing  Goal: Absence of Hospital-Acquired Illness or Injury  Outcome: Ongoing, Progressing  Goal: Optimal Comfort and Wellbeing  Outcome: Ongoing, Progressing  Goal: Readiness for Transition of Care  Outcome: Ongoing, Progressing     Problem: Diabetes Comorbidity  Goal: Blood Glucose Level Within Targeted Range  Outcome: Ongoing, Progressing     Problem: Infection  Goal: Absence of Infection Signs and Symptoms  Outcome: Ongoing, Progressing     Problem: Impaired Wound Healing  Goal: Optimal Wound Healing  Outcome: Ongoing, Progressing     Problem: Skin Injury Risk Increased  Goal: Skin Health and Integrity  Outcome: Ongoing, Progressing     Problem: Fall Injury Risk  Goal: Absence of Fall and Fall-Related Injury  Outcome: Ongoing, Progressing     Problem: Fluid and Electrolyte Imbalance (Acute Kidney Injury/Impairment)  Goal: Fluid and Electrolyte Balance  Outcome: Ongoing, Progressing     Problem: Oral Intake Inadequate (Acute Kidney Injury/Impairment)  Goal: Optimal Nutrition Intake  Outcome: Ongoing, Progressing     Problem: Renal Function Impairment (Acute Kidney Injury/Impairment)  Goal: Effective Renal Function  Outcome: Ongoing, Progressing

## 2023-02-05 NOTE — ASSESSMENT & PLAN NOTE
- MRI Thoracic w/o Contrast - Findings suggestive of osteomyelitis discitis of T11-12 with epidural phlegmon or extruded disc contributing to moderate spinal canal stenosis.  Additional epidural phlegmon extending in the left paracentral location and of the left T11-12 and T12-L1 foramina.  - MRSA sensitive to Vancomycin, will continue   - Surgery/washout 1/24/2023 successful   - pathology revealed osteomyelitis patinet will need 6 weeks of abx coverage which will go until 3/7/23, will try to transfer her to LTAC for continued abx.   -LTAC denied. Will try swingbed    2/3/23  -Awaiting approval for swing bed. If no success will consider antibiotic treatment with home cash.     2/4/23  -Continuing vancomycin until 3/7/23  -Awaiting approval for SWB

## 2023-02-06 LAB
ANION GAP SERPL CALCULATED.3IONS-SCNC: 13 MMOL/L (ref 7–16)
BASOPHILS # BLD AUTO: 0.07 K/UL (ref 0–0.2)
BASOPHILS NFR BLD AUTO: 0.6 % (ref 0–1)
BUN SERPL-MCNC: 33 MG/DL (ref 7–18)
BUN/CREAT SERPL: 24 (ref 6–20)
CALCIUM SERPL-MCNC: 8.9 MG/DL (ref 8.5–10.1)
CHLORIDE SERPL-SCNC: 97 MMOL/L (ref 98–107)
CO2 SERPL-SCNC: 29 MMOL/L (ref 21–32)
CREAT SERPL-MCNC: 1.4 MG/DL (ref 0.55–1.02)
DIFFERENTIAL METHOD BLD: ABNORMAL
EGFR (NO RACE VARIABLE) (RUSH/TITUS): 45 ML/MIN/1.73M²
EOSINOPHIL # BLD AUTO: 0.55 K/UL (ref 0–0.5)
EOSINOPHIL NFR BLD AUTO: 4.5 % (ref 1–4)
ERYTHROCYTE [DISTWIDTH] IN BLOOD BY AUTOMATED COUNT: 17.5 % (ref 11.5–14.5)
GLUCOSE SERPL-MCNC: 183 MG/DL (ref 74–106)
GLUCOSE SERPL-MCNC: 185 MG/DL (ref 70–105)
GLUCOSE SERPL-MCNC: 195 MG/DL (ref 70–105)
GLUCOSE SERPL-MCNC: 260 MG/DL (ref 70–105)
GLUCOSE SERPL-MCNC: 280 MG/DL (ref 70–105)
HCT VFR BLD AUTO: 25 % (ref 38–47)
HCT VFR BLD AUTO: 25.8 % (ref 38–47)
HGB BLD-MCNC: 7.6 G/DL (ref 12–16)
HGB BLD-MCNC: 7.8 G/DL (ref 12–16)
IMM GRANULOCYTES # BLD AUTO: 0.07 K/UL (ref 0–0.04)
IMM GRANULOCYTES NFR BLD: 0.6 % (ref 0–0.4)
LYMPHOCYTES # BLD AUTO: 2.84 K/UL (ref 1–4.8)
LYMPHOCYTES NFR BLD AUTO: 23 % (ref 27–41)
MCH RBC QN AUTO: 23.2 PG (ref 27–31)
MCHC RBC AUTO-ENTMCNC: 30.2 G/DL (ref 32–36)
MCV RBC AUTO: 76.8 FL (ref 80–96)
MONOCYTES # BLD AUTO: 0.86 K/UL (ref 0–0.8)
MONOCYTES NFR BLD AUTO: 7 % (ref 2–6)
MPC BLD CALC-MCNC: 9 FL (ref 9.4–12.4)
NEUTROPHILS # BLD AUTO: 7.94 K/UL (ref 1.8–7.7)
NEUTROPHILS NFR BLD AUTO: 64.3 % (ref 53–65)
NRBC # BLD AUTO: 0 X10E3/UL
NRBC, AUTO (.00): 0 %
PLATELET # BLD AUTO: 453 K/UL (ref 150–400)
POTASSIUM SERPL-SCNC: 4.4 MMOL/L (ref 3.5–5.1)
RBC # BLD AUTO: 3.36 M/UL (ref 4.2–5.4)
SODIUM SERPL-SCNC: 135 MMOL/L (ref 136–145)
VANCOMYCIN TROUGH SERPL-MCNC: 13.3 ΜG/ML (ref 10–20)
WBC # BLD AUTO: 12.33 K/UL (ref 4.5–11)

## 2023-02-06 PROCEDURE — 80202 ASSAY OF VANCOMYCIN: CPT | Performed by: FAMILY MEDICINE

## 2023-02-06 PROCEDURE — 99232 PR SUBSEQUENT HOSPITAL CARE,LEVL II: ICD-10-PCS | Mod: GC,,, | Performed by: STUDENT IN AN ORGANIZED HEALTH CARE EDUCATION/TRAINING PROGRAM

## 2023-02-06 PROCEDURE — 25000003 PHARM REV CODE 250: Performed by: ORTHOPAEDIC SURGERY

## 2023-02-06 PROCEDURE — 97110 THERAPEUTIC EXERCISES: CPT

## 2023-02-06 PROCEDURE — 63600175 PHARM REV CODE 636 W HCPCS: Performed by: STUDENT IN AN ORGANIZED HEALTH CARE EDUCATION/TRAINING PROGRAM

## 2023-02-06 PROCEDURE — 25000003 PHARM REV CODE 250

## 2023-02-06 PROCEDURE — 99232 SBSQ HOSP IP/OBS MODERATE 35: CPT | Mod: GC,,, | Performed by: STUDENT IN AN ORGANIZED HEALTH CARE EDUCATION/TRAINING PROGRAM

## 2023-02-06 PROCEDURE — 63600175 PHARM REV CODE 636 W HCPCS: Performed by: FAMILY MEDICINE

## 2023-02-06 PROCEDURE — 97116 GAIT TRAINING THERAPY: CPT

## 2023-02-06 PROCEDURE — 11000001 HC ACUTE MED/SURG PRIVATE ROOM

## 2023-02-06 PROCEDURE — 82962 GLUCOSE BLOOD TEST: CPT

## 2023-02-06 PROCEDURE — 94761 N-INVAS EAR/PLS OXIMETRY MLT: CPT

## 2023-02-06 PROCEDURE — 85014 HEMATOCRIT: CPT

## 2023-02-06 PROCEDURE — 25000003 PHARM REV CODE 250: Performed by: FAMILY MEDICINE

## 2023-02-06 PROCEDURE — 80048 BASIC METABOLIC PNL TOTAL CA: CPT | Performed by: ORTHOPAEDIC SURGERY

## 2023-02-06 PROCEDURE — 85025 COMPLETE CBC W/AUTO DIFF WBC: CPT | Performed by: ORTHOPAEDIC SURGERY

## 2023-02-06 PROCEDURE — 63600175 PHARM REV CODE 636 W HCPCS

## 2023-02-06 RX ORDER — INSULIN ASPART 100 [IU]/ML
8 INJECTION, SOLUTION INTRAVENOUS; SUBCUTANEOUS
Status: DISCONTINUED | OUTPATIENT
Start: 2023-02-06 | End: 2023-02-08

## 2023-02-06 RX ADMIN — HYDROMORPHONE HYDROCHLORIDE 0.5 MG: 2 INJECTION, SOLUTION INTRAMUSCULAR; INTRAVENOUS; SUBCUTANEOUS at 05:02

## 2023-02-06 RX ADMIN — HYDROCORTISONE: 1 CREAM TOPICAL at 09:02

## 2023-02-06 RX ADMIN — OXYCODONE HYDROCHLORIDE 10 MG: 5 TABLET ORAL at 09:02

## 2023-02-06 RX ADMIN — HYDROMORPHONE HYDROCHLORIDE 0.5 MG: 2 INJECTION, SOLUTION INTRAMUSCULAR; INTRAVENOUS; SUBCUTANEOUS at 12:02

## 2023-02-06 RX ADMIN — DIPHENHYDRAMINE HYDROCHLORIDE 25 MG: 25 CAPSULE ORAL at 06:02

## 2023-02-06 RX ADMIN — HEPARIN SODIUM 5000 UNITS: 5000 INJECTION INTRAVENOUS; SUBCUTANEOUS at 02:02

## 2023-02-06 RX ADMIN — DOCUSATE SODIUM 100 MG: 100 CAPSULE, LIQUID FILLED ORAL at 09:02

## 2023-02-06 RX ADMIN — HYDROMORPHONE HYDROCHLORIDE 0.5 MG: 2 INJECTION, SOLUTION INTRAMUSCULAR; INTRAVENOUS; SUBCUTANEOUS at 06:02

## 2023-02-06 RX ADMIN — DULOXETINE 60 MG: 30 CAPSULE, DELAYED RELEASE ORAL at 09:02

## 2023-02-06 RX ADMIN — DIPHENHYDRAMINE HYDROCHLORIDE 25 MG: 25 CAPSULE ORAL at 03:02

## 2023-02-06 RX ADMIN — DIPHENHYDRAMINE HYDROCHLORIDE 25 MG: 25 CAPSULE ORAL at 09:02

## 2023-02-06 RX ADMIN — INSULIN ASPART 1 UNITS: 100 INJECTION, SOLUTION INTRAVENOUS; SUBCUTANEOUS at 09:02

## 2023-02-06 RX ADMIN — INSULIN ASPART 2 UNITS: 100 INJECTION, SOLUTION INTRAVENOUS; SUBCUTANEOUS at 12:02

## 2023-02-06 RX ADMIN — OXYCODONE HYDROCHLORIDE 10 MG: 5 TABLET ORAL at 02:02

## 2023-02-06 RX ADMIN — INSULIN ASPART 8 UNITS: 100 INJECTION, SOLUTION INTRAVENOUS; SUBCUTANEOUS at 12:02

## 2023-02-06 RX ADMIN — OXYCODONE HYDROCHLORIDE 10 MG: 5 TABLET ORAL at 03:02

## 2023-02-06 RX ADMIN — VANCOMYCIN HYDROCHLORIDE 1250 MG: 500 INJECTION, POWDER, LYOPHILIZED, FOR SOLUTION INTRAVENOUS at 05:02

## 2023-02-06 RX ADMIN — HYDRALAZINE HYDROCHLORIDE 25 MG: 25 TABLET ORAL at 09:02

## 2023-02-06 RX ADMIN — INSULIN ASPART 6 UNITS: 100 INJECTION, SOLUTION INTRAVENOUS; SUBCUTANEOUS at 05:02

## 2023-02-06 RX ADMIN — Medication 1 EACH: at 09:02

## 2023-02-06 RX ADMIN — INSULIN ASPART 7 UNITS: 100 INJECTION, SOLUTION INTRAVENOUS; SUBCUTANEOUS at 06:02

## 2023-02-06 RX ADMIN — INSULIN DETEMIR 30 UNITS: 100 INJECTION, SOLUTION SUBCUTANEOUS at 09:02

## 2023-02-06 RX ADMIN — HEPARIN SODIUM 5000 UNITS: 5000 INJECTION INTRAVENOUS; SUBCUTANEOUS at 09:02

## 2023-02-06 RX ADMIN — INSULIN ASPART 8 UNITS: 100 INJECTION, SOLUTION INTRAVENOUS; SUBCUTANEOUS at 05:02

## 2023-02-06 RX ADMIN — HEPARIN SODIUM 5000 UNITS: 5000 INJECTION INTRAVENOUS; SUBCUTANEOUS at 05:02

## 2023-02-06 NOTE — PROGRESS NOTES
Pharmacokinetic Initial Assessment: IV Vancomycin    Assessment/Plan:    Change Vancomycin to 1250mg q24 hrs.  Desired empiric serum trough concentration is 15 to 20 mcg/mL  Draw vancomycin trough level 30 min prior to fourth dose on 2/9/23 at approximately 0500  Pharmacy will continue to follow and monitor vancomycin.      Please contact pharmacy at extension [phone] with any questions regarding this assessment.     Thank you for the consult,   Inessa Batista       Patient brief summary:  Monica Walker is a 52 y.o. female initiated on antimicrobial therapy with IV Vancomycin for treatment of suspected  infection    Drug Allergies:   Review of patient's allergies indicates:  No Known Allergies    Actual Body Weight:   65.7kg    Renal Function:   Estimated Creatinine Clearance: 43 mL/min (A) (based on SCr of 1.4 mg/dL (H)).,     Dialysis Method (if applicable):  N/A    CBC (last 72 hours):  Recent Labs   Lab Result Units 02/04/23  0312 02/05/23  0550 02/06/23  0502   WBC K/uL 12.55* 13.17* 12.33*   Hemoglobin g/dL 8.5* 9.0* 7.8*   Hematocrit % 29.0* 30.3* 25.8*   Platelet Count K/uL 461* 502* 453*   Lymphocytes % % 20.6* 17.2* 23.0*   Monocytes % % 9.2* 7.6* 7.0*   Eosinophils % % 4.1* 3.7 4.5*   Basophils % % 0.6 0.9 0.6       Metabolic Panel (last 72 hours):  Recent Labs   Lab Result Units 02/04/23  0312 02/05/23  0550 02/06/23  0502   Sodium mmol/L 133* 136 135*   Potassium mmol/L 4.4 4.3 4.4   Chloride mmol/L 97* 97* 97*   CO2 mmol/L 27 28 29   Glucose mg/dL 260* 171* 183*   BUN mg/dL 26* 28* 33*   Creatinine mg/dL 1.44* 1.35* 1.40*       Drug levels (last 3 results):  Recent Labs   Lab Result Units 02/06/23  0502   Vancomycin, Trough µg/mL 13.3       Microbiologic Results:  Microbiology Results (last 7 days)       Procedure Component Value Units Date/Time    Culture, Body Fluid [[id number]] Collected: 02/01/23 1418    Order Status: Completed Specimen: Body Fluid from Synovial Fluid Updated: 02/04/23 0738      Culture, Body Fluid No Growth at 2 Days    Culture, Anaerobe [[id number]] Collected: 02/01/23 1418    Order Status: Completed Specimen: Body Fluid from Synovial Fluid Updated: 02/04/23 0659     Culture, Anaerobe No Anaerobes Isolated

## 2023-02-06 NOTE — ASSESSMENT & PLAN NOTE
Patient's FSGs are uncontrolled due to hyperglycemia on current medication regimen.  Last A1c reviewed-   Lab Results   Component Value Date    HGBA1C 7.9 (H) 01/19/2023     Most recent fingerstick glucose reviewed- No results for input(s): POCTGLUCOSE in the last 24 hours.  Current correctional scale  Medium  Maintain anti-hyperglycemic dose as follows-   Antihyperglycemics (From admission, onward)    Start     Stop Route Frequency Ordered    02/06/23 2100  insulin detemir U-100 injection 30 Units         -- SubQ Nightly 02/06/23 0936    02/06/23 1130  insulin aspart U-100 injection 8 Units         -- SubQ 3 times daily with meals 02/06/23 0938    01/24/23 2136  insulin aspart U-100 injection 1-10 Units         -- SubQ Before meals & nightly PRN 01/24/23 2036        Hold Oral hypoglycemics while patient is in the hospital.  Diabetic Educator consulted, appreciate their help

## 2023-02-06 NOTE — PT/OT/SLP PROGRESS
Physical Therapy Treatment    Patient Name:  Monica Walker   MRN:  [id number]    Recommendations:     Discharge Recommendations: LTACH (long-term acute care hospital), nursing facility, skilled  Discharge Equipment Recommendations: other (see comments) (to be determined)  Barriers to discharge:  ongoing medical treatment    Assessment:     Monica Walker is a 52 y.o. female admitted with a medical diagnosis of Epidural abscess.  She presents with the following impairments/functional limitations: weakness, impaired endurance, impaired functional mobility, gait instability, decreased lower extremity function, pain, impaired skin, orthopedic precautions. Pt declines all attempts at OOB act this session d/t not feeling well, not sleeping last night, headache, etc.     Rehab Prognosis: Fair; patient would benefit from acute skilled PT services to address these deficits and reach maximum level of function.    Recent Surgery: Procedure(s) (LRB):  T9-L2 fusion, T11-T12 laminectomy and corpectomy (N/A) 12 Days Post-Op    Plan:     During this hospitalization, patient to be seen daily to address the identified rehab impairments via gait training, therapeutic activities, therapeutic exercises and progress toward the following goals:    Plan of Care Expires:  02/25/23    Subjective     Chief Complaint: epidural abscess  Patient/Family Comments/goals: agreeable to bed ex  Pain/Comfort: not stated          Objective:     Communicated with HAYDEE Ruano prior to session.  Patient found supine with peripheral IV upon PT entry to room.     General Precautions: Standard, fall  Orthopedic Precautions: spinal precautions  Braces: N/A  Respiratory Status: Room air     Functional Mobility:  Not assessed      AM-PAC 6 CLICK MOBILITY          Treatment & Education:  Bilateral lower extremity exercise x 20 reps: ankle pumps, heel slides, hip abduction/adduction, and straight leg raises with active assist ROM and for LLE and cues for proper  technique      Patient left supine with all lines intact and call button in reach..    GOALS:   Multidisciplinary Problems       Physical Therapy Goals          Problem: Physical Therapy    Goal Priority Disciplines Outcome Goal Variances Interventions   Physical Therapy Goal     PT, PT/OT Ongoing, Progressing     Description: Short Term Goals to be met by: 23    Patient will increase functional independence with mobility by performin. Supine to sit with Stand by assist  2. Sit to stand transfer with contact guard assist using Rolling walker  3. Bed to chair transfer with contact guard assist using Rolling walker  4. Gait  x 25 feet with contact guard assist using Rolling walker  5. Lower extremity exercise program x30 reps per handout, with assistance as needed  6. Pt to recall 3/3 spinal precautions     Long Term Goals to be met by: 23    Pt will regain full independent functional mobility with Rolling walker to return to home situation and prior activities of daily living.                        Time Tracking:     PT Received On: 23  PT Start Time: 1448     PT Stop Time: 1459  PT Total Time (min): 11 min     Billable Minutes: Therapeutic Exercise 10    Treatment Type: Treatment  PT/PTA: PT     PTA Visit Number: 0     2023

## 2023-02-06 NOTE — SUBJECTIVE & OBJECTIVE
Interval History: Patient in bed AAOX3. Patient still has complaints about pain in her left hip. Will get recommendations from Orthopedics. We will attempt to get patient to swing bed soon.    Review of Systems   Constitutional:  Negative for fatigue and fever.   HENT:  Negative for ear discharge, ear pain, facial swelling, mouth sores, postnasal drip, rhinorrhea and sore throat.    Eyes:  Negative for pain, discharge and itching.   Respiratory:  Negative for cough.    Cardiovascular:  Negative for leg swelling.   Gastrointestinal:  Negative for abdominal distention, abdominal pain, anal bleeding, constipation and diarrhea.   Endocrine: Negative for cold intolerance, heat intolerance and polydipsia.   Genitourinary:  Negative for difficulty urinating, dyspareunia, flank pain, frequency, genital sores and hematuria.   Musculoskeletal:  Positive for arthralgias. Negative for back pain, gait problem and joint swelling.   Skin:  Positive for wound.   Allergic/Immunologic: Negative for environmental allergies and food allergies.   Neurological:  Negative for dizziness, facial asymmetry, light-headedness and headaches.   Hematological: Negative.    Psychiatric/Behavioral: Negative.     Objective:     Vital Signs (Most Recent):  Temp: 98.8 °F (37.1 °C) (02/06/23 1200)  Pulse: 87 (02/06/23 1200)  Resp: 17 (02/06/23 1517)  BP: 124/60 (02/06/23 1200)  SpO2: 95 % (02/06/23 1200) Vital Signs (24h Range):  Temp:  [97.9 °F (36.6 °C)-98.8 °F (37.1 °C)] 98.8 °F (37.1 °C)  Pulse:  [73-91] 87  Resp:  [17-20] 17  SpO2:  [95 %-96 %] 95 %  BP: (103-149)/(48-61) 124/60     Weight: 65.7 kg (144 lb 13.5 oz)  Body mass index is 23.38 kg/m².  No intake or output data in the 24 hours ending 02/06/23 1540   Physical Exam  Constitutional:       Appearance: She is normal weight.   HENT:      Head: Normocephalic.      Right Ear: Tympanic membrane normal.      Left Ear: Tympanic membrane normal.      Nose: Nose normal.      Mouth/Throat:       Mouth: Mucous membranes are moist.      Pharynx: Oropharynx is clear.   Eyes:      Conjunctiva/sclera: Conjunctivae normal.      Pupils: Pupils are equal, round, and reactive to light.   Cardiovascular:      Rate and Rhythm: Normal rate and regular rhythm.      Pulses: Normal pulses.      Heart sounds: Normal heart sounds.   Pulmonary:      Effort: Pulmonary effort is normal.      Breath sounds: No rhonchi.   Abdominal:      General: Abdomen is flat. Bowel sounds are normal.   Musculoskeletal:         General: No tenderness. Normal range of motion.      Cervical back: Normal range of motion.      Comments: Lower back itching and new bump  Left hip tenderness increased    Skin:     General: Skin is warm and dry.      Capillary Refill: Capillary refill takes less than 2 seconds.   Neurological:      Mental Status: She is alert and oriented to person, place, and time.   Psychiatric:         Mood and Affect: Mood normal.         Behavior: Behavior normal.       Significant Labs: All pertinent labs within the past 24 hours have been reviewed.  BMP:   Recent Labs   Lab 02/06/23  0502   *   *   K 4.4   CL 97*   CO2 29   BUN 33*   CREATININE 1.40*   CALCIUM 8.9     CBC:   Recent Labs   Lab 02/05/23  0550 02/06/23  0502   WBC 13.17* 12.33*   HGB 9.0* 7.8*   HCT 30.3* 25.8*   * 453*       Significant Imaging: I have reviewed all pertinent imaging results/findings within the past 24 hours.

## 2023-02-06 NOTE — PT/OT/SLP PROGRESS
Occupational Therapy   Treatment    Name: Monica Walker  MRN: [id number]  Admitting Diagnosis:  Epidural abscess  13 Days Post-Op    Recommendations:     Discharge Recommendations: home with home health, LTACH (long-term acute care hospital), nursing facility, skilled  Discharge Equipment Recommendations:  none  Barriers to discharge:       Assessment:     Monica Walker is a 52 y.o. female with a medical diagnosis of Epidural abscess.  She presents with weakness, decreased functional mobility, and decline in ADLs. Performance deficits affecting function are weakness, impaired endurance, impaired functional mobility, impaired self care skills, gait instability, impaired balance, pain. Pt refused all OOB activity d/t left hip pain. Pt agreeable to participate in BUE strengthening from bed.     Rehab Prognosis:  Fair; patient would benefit from acute skilled OT services to address these deficits and reach maximum level of function.       Plan:     Patient to be seen 5 x/week to address the above listed problems via self-care/home management, therapeutic activities, therapeutic exercises  Plan of Care Expires: 02/28/23  Plan of Care Reviewed with: patient    Subjective     Pain/Comfort:  Pain Rating 1: 7/10  Location - Side 1: Left  Location 1: hip  Pain Addressed 1: Nurse notified    Objective:     Communicated with: HAYDEE Lassiter prior to session.  Patient found supine with PICC line upon OT entry to room.    General Precautions: Standard, fall    Orthopedic Precautions:spinal precautions  Braces: N/A  Respiratory Status: Room air     Occupational Performance:     Bed Mobility:    Not performed     Functional Mobility/Transfers:  Not performed    Activities of Daily Living:  Not performed      [facility] 6 Click ADL:      Treatment & Education:  Pt performed 2 sets x 15 reps each bicep curls 2#db, chest press 2#db, shoulder flexion 2#db, IR/ER 2#db, rows red tband, and tricep press red tband in order to improve BUE  strength and endurance to perform ADL and ADL t/f with less assist.     Patient left supine with all lines intact, call button in reach, and HAYDEE Lassiter notified    GOALS:   Multidisciplinary Problems       Occupational Therapy Goals          Problem: Occupational Therapy    Goal Priority Disciplines Outcome Interventions   Occupational Therapy Goal     OT, PT/OT Ongoing, Progressing    Description: STG: (In 2 weeks)  Pt will perform grooming with setup  Pt will bathe with set up and min(A)  Pt will perform UE dressing with setup  Pt will perform LE dressing with min(A) and with AD if needed  Pt will transfer bed/chair/bsc with CGA with AD  Pt will perform standing task x 2 min with CGA and AD if needed  Pt will tolerate 20 minutes of tx without fatigue      LT.Restore to max I with self care and mobility.                          Time Tracking:     OT Date of Treatment: 23  OT Start Time: 1039  OT Stop Time: 1102  OT Total Time (min): 23 min    Billable Minutes:Therapeutic Exercise 23 minutes    OT/TRISH: OT          2023

## 2023-02-06 NOTE — PLAN OF CARE
1252 SS discussed with Dr. Fontanez, patient will need 6 weeks of IV abx and would benefit from swingbed placement. SS discussed with patient, she is now agreeable to swingbed. Choice obtained for [facility]. Referral called to Lana at [facility] at this time.     1410 Referral is under review at [facility]. Request for authorization has been submitted through patient's insurance.

## 2023-02-06 NOTE — ASSESSMENT & PLAN NOTE
- MRI Thoracic w/o Contrast - Findings suggestive of osteomyelitis discitis of T11-12 with epidural phlegmon or extruded disc contributing to moderate spinal canal stenosis.  Additional epidural phlegmon extending in the left paracentral location and of the left T11-12 and T12-L1 foramina.  - MRSA sensitive to Vancomycin, will continue   - Surgery/washout 1/24/2023 successful   - pathology revealed osteomyelitis patinet will need 6 weeks of abx coverage which will go until 3/7/23, will try to transfer her to LTAC for continued abx.   -LTAC denied. Will try swingbed    2/3/23  -Awaiting approval for swing bed. If no success will consider antibiotic treatment with home cash.     2/4/23  -Continuing vancomycin until 3/7/23  -Awaiting approval for SWB    2/6/23  Referral sent to [facility]

## 2023-02-06 NOTE — PROGRESS NOTES
"[facility] Hospital Medicine  Progress Note    Patient Name: Monica Walker  MRN: [id number]  Patient Class: IP- Inpatient   Admission Date: 1/19/2023  Length of Stay: 18 days  Attending Physician: Adilson Fontanez DO  Primary Care Provider: Hannah Schulz MD        Subjective:     Principal Problem:Epidural abscess        HPI:  Patient is a 53 y/o Female presenting to [facility] with a cc of back pain. She states the pain began to worsen approximately 16 hours ago. She states the pain is in her Mid back area and radiates to both legs. She describes the pain as "throbbing, constant and a 9/10." She states the pain is blunted by taking Hydrocodone but not relieved. She reports the pain is made worse by movement and standing.    Patient reports she was hospitalized in November for sepsis secondary to Pyelonephritis. She has been hospitalized several times over the past 3 months. She is currently being treated at home with IV Abx therapy for Osteomyelitis of the spine.  MRI of Lumbar spine on 1/10/2023 (Per radiology report). Findings indicative of osteomyelitis involving T11-12 with epidural abscess extending from the T11-12 disc level to the L1 level eccentric towards the left.  Additional site of infection of the right L2-3 facet joint as well as osteomyelitis of the right L3 transverse process.    Significant findings in the ED include:  Vitals: /84, , RR 16, Temp 98.2 F, O2 98% on RA  Labs: WBC 14.6, HGB 8.2, HCT 27.1, MCV 74.2, Na 133, K 3.5, Bun 8, Crt0.54, , Glucose 171, UA normal.  today;124 2 days ago, CRP  22.90 Today; 12.9 2 days ago  Interventions: Toradol 30 mg IV    Pt was subsequently admitted to the Inpatient Hospitalist Service for further evaluation and management.               Overview/Hospital Course:  No notes on file    Interval History: Patient in bed AAOX3. Patient still has complaints about pain in her left hip. Will " get recommendations from Orthopedics. We will attempt to get patient to swing bed soon.    Review of Systems   Constitutional:  Negative for fatigue and fever.   HENT:  Negative for ear discharge, ear pain, facial swelling, mouth sores, postnasal drip, rhinorrhea and sore throat.    Eyes:  Negative for pain, discharge and itching.   Respiratory:  Negative for cough.    Cardiovascular:  Negative for leg swelling.   Gastrointestinal:  Negative for abdominal distention, abdominal pain, anal bleeding, constipation and diarrhea.   Endocrine: Negative for cold intolerance, heat intolerance and polydipsia.   Genitourinary:  Negative for difficulty urinating, dyspareunia, flank pain, frequency, genital sores and hematuria.   Musculoskeletal:  Positive for arthralgias. Negative for back pain, gait problem and joint swelling.   Skin:  Positive for wound.   Allergic/Immunologic: Negative for environmental allergies and food allergies.   Neurological:  Negative for dizziness, facial asymmetry, light-headedness and headaches.   Hematological: Negative.    Psychiatric/Behavioral: Negative.     Objective:     Vital Signs (Most Recent):  Temp: 98.8 °F (37.1 °C) (02/06/23 1200)  Pulse: 87 (02/06/23 1200)  Resp: 17 (02/06/23 1517)  BP: 124/60 (02/06/23 1200)  SpO2: 95 % (02/06/23 1200) Vital Signs (24h Range):  Temp:  [97.9 °F (36.6 °C)-98.8 °F (37.1 °C)] 98.8 °F (37.1 °C)  Pulse:  [73-91] 87  Resp:  [17-20] 17  SpO2:  [95 %-96 %] 95 %  BP: (103-149)/(48-61) 124/60     Weight: 65.7 kg (144 lb 13.5 oz)  Body mass index is 23.38 kg/m².  No intake or output data in the 24 hours ending 02/06/23 1540   Physical Exam  Constitutional:       Appearance: She is normal weight.   HENT:      Head: Normocephalic.      Right Ear: Tympanic membrane normal.      Left Ear: Tympanic membrane normal.      Nose: Nose normal.      Mouth/Throat:      Mouth: Mucous membranes are moist.      Pharynx: Oropharynx is clear.   Eyes:      Conjunctiva/sclera:  Conjunctivae normal.      Pupils: Pupils are equal, round, and reactive to light.   Cardiovascular:      Rate and Rhythm: Normal rate and regular rhythm.      Pulses: Normal pulses.      Heart sounds: Normal heart sounds.   Pulmonary:      Effort: Pulmonary effort is normal.      Breath sounds: No rhonchi.   Abdominal:      General: Abdomen is flat. Bowel sounds are normal.   Musculoskeletal:         General: No tenderness. Normal range of motion.      Cervical back: Normal range of motion.      Comments: Lower back itching and new bump  Left hip tenderness increased    Skin:     General: Skin is warm and dry.      Capillary Refill: Capillary refill takes less than 2 seconds.   Neurological:      Mental Status: She is alert and oriented to person, place, and time.   Psychiatric:         Mood and Affect: Mood normal.         Behavior: Behavior normal.       Significant Labs: All pertinent labs within the past 24 hours have been reviewed.  BMP:   Recent Labs   Lab 02/06/23  0502   *   *   K 4.4   CL 97*   CO2 29   BUN 33*   CREATININE 1.40*   CALCIUM 8.9     CBC:   Recent Labs   Lab 02/05/23  0550 02/06/23  0502   WBC 13.17* 12.33*   HGB 9.0* 7.8*   HCT 30.3* 25.8*   * 453*       Significant Imaging: I have reviewed all pertinent imaging results/findings within the past 24 hours.      Assessment/Plan:      * Epidural abscess  - MRI Thoracic w/o Contrast - Findings suggestive of osteomyelitis discitis of T11-12 with epidural phlegmon or extruded disc contributing to moderate spinal canal stenosis.  Additional epidural phlegmon extending in the left paracentral location and of the left T11-12 and T12-L1 foramina.  - MRSA sensitive to Vancomycin, will continue   - Surgery/washout 1/24/2023 successful   - pathology revealed osteomyelitis patinet will need 6 weeks of abx coverage which will go until 3/7/23, will try to transfer her to LTAC for continued abx.   -LTAC denied. Will try  swingbed    2/3/23  -Awaiting approval for swing bed. If no success will consider antibiotic treatment with home cash.     2/4/23  -Continuing vancomycin until 3/7/23  -Awaiting approval for SWB    2/6/23  Referral sent to [facility]      Thrombocytosis  Likely reactive secondary to infection, anemia     -continue to monitor         GATO (acute kidney injury)  Patient with acute kidney injury likely due to pre-renal azotemia GATO is currently improving. Labs reviewed- Renal function/electrolytes with Estimated Creatinine Clearance: 44.6 mL/min (A) (based on SCr of 1.35 mg/dL (H)). according to latest data. Monitor urine output and serial BMP and adjust therapy as needed. Avoid nephrotoxins and renally dose meds for GFR listed above.     -likely secondary to Vancomycin use     Cr on admission 0.54  Cr 1.35 from 1.44 (2/5/23)  Avoid nephrotoxic meds   Renally dose vancomycin         Left hip pain  Left Hip X-ray -Left hip arthroplasty appears within normal limits.    PT/OT    2/1/23  Patient complaining of more pain in left hip. Left hip is tender with Passive ROM  Orthopedics Consulted. We want to see if patient may have possible infected artificial hip from Epidural infection. MRSA.  Thank orthopedics for recommendations.    2/2/23  CT guided aspiration of left hip    2/5/23  Culture anaerobe, body fluid from Aspiration of hip (2/1)- NGTD  -Will discuss with Dr. Noyola for insights    Hypertension  Not currently controlled  Patient took no home meds  Occasionally correspond to pain  Hydralazine 25mg Q8hrs      Microcytic anemia  Ferritin - 380  Iron - 12  Iron Sat - 6  TIBC - 188    Ferrous sulfate given Qd        Type 2 diabetes mellitus, with long-term current use of insulin  Patient's FSGs are uncontrolled due to hyperglycemia on current medication regimen.  Last A1c reviewed-   Lab Results   Component Value Date    HGBA1C 7.9 (H) 01/19/2023     Most recent fingerstick glucose reviewed- No results for input(s):  POCTGLUCOSE in the last 24 hours.  Current correctional scale  Medium  Maintain anti-hyperglycemic dose as follows-   Antihyperglycemics (From admission, onward)    Start     Stop Route Frequency Ordered    02/06/23 2100  insulin detemir U-100 injection 30 Units         -- SubQ Nightly 02/06/23 0936    02/06/23 1130  insulin aspart U-100 injection 8 Units         -- SubQ 3 times daily with meals 02/06/23 0938    01/24/23 2136  insulin aspart U-100 injection 1-10 Units         -- SubQ Before meals & nightly PRN 01/24/23 2036        Hold Oral hypoglycemics while patient is in the hospital.  Diabetic Educator consulted, appreciate their help    Depression  - Continue Cymbalta        VTE Risk Mitigation (From admission, onward)         Ordered     heparin (porcine) injection 5,000 Units  Every 8 hours         02/05/23 1112     IP VTE LOW RISK PATIENT  Once         01/19/23 0417     Place sequential compression device  Until discontinued         01/19/23 0417                Discharge Planning   SUZANNA:      Code Status: Full Code   Is the patient medically ready for discharge?:     Reason for patient still in hospital (select all that apply): Treatment  Discharge Plan A: Home with family, Home Health                  Jackson Lai MD  Department of Hospital Medicine   [facility]

## 2023-02-07 LAB
ANION GAP SERPL CALCULATED.3IONS-SCNC: 16 MMOL/L (ref 7–16)
BASOPHILS # BLD AUTO: 0.09 K/UL (ref 0–0.2)
BASOPHILS NFR BLD AUTO: 0.7 % (ref 0–1)
BUN SERPL-MCNC: 39 MG/DL (ref 7–18)
BUN/CREAT SERPL: 30 (ref 6–20)
CALCIUM SERPL-MCNC: 9.1 MG/DL (ref 8.5–10.1)
CHLORIDE SERPL-SCNC: 97 MMOL/L (ref 98–107)
CO2 SERPL-SCNC: 28 MMOL/L (ref 21–32)
CREAT SERPL-MCNC: 1.32 MG/DL (ref 0.55–1.02)
DIFFERENTIAL METHOD BLD: ABNORMAL
EGFR (NO RACE VARIABLE) (RUSH/TITUS): 48 ML/MIN/1.73M²
EOSINOPHIL # BLD AUTO: 0.62 K/UL (ref 0–0.5)
EOSINOPHIL NFR BLD AUTO: 4.8 % (ref 1–4)
ERYTHROCYTE [DISTWIDTH] IN BLOOD BY AUTOMATED COUNT: 17.3 % (ref 11.5–14.5)
GLUCOSE SERPL-MCNC: 104 MG/DL (ref 70–105)
GLUCOSE SERPL-MCNC: 135 MG/DL (ref 74–106)
GLUCOSE SERPL-MCNC: 150 MG/DL (ref 70–105)
GLUCOSE SERPL-MCNC: 256 MG/DL (ref 70–105)
GLUCOSE SERPL-MCNC: 257 MG/DL (ref 70–105)
HCT VFR BLD AUTO: 26.8 % (ref 38–47)
HGB BLD-MCNC: 8 G/DL (ref 12–16)
IMM GRANULOCYTES # BLD AUTO: 0.1 K/UL (ref 0–0.04)
IMM GRANULOCYTES NFR BLD: 0.8 % (ref 0–0.4)
LYMPHOCYTES # BLD AUTO: 2.75 K/UL (ref 1–4.8)
LYMPHOCYTES NFR BLD AUTO: 21.3 % (ref 27–41)
MCH RBC QN AUTO: 22.6 PG (ref 27–31)
MCHC RBC AUTO-ENTMCNC: 29.9 G/DL (ref 32–36)
MCV RBC AUTO: 75.7 FL (ref 80–96)
MONOCYTES # BLD AUTO: 0.98 K/UL (ref 0–0.8)
MONOCYTES NFR BLD AUTO: 7.6 % (ref 2–6)
MPC BLD CALC-MCNC: 8.9 FL (ref 9.4–12.4)
NEUTROPHILS # BLD AUTO: 8.37 K/UL (ref 1.8–7.7)
NEUTROPHILS NFR BLD AUTO: 64.8 % (ref 53–65)
NRBC # BLD AUTO: 0 X10E3/UL
NRBC, AUTO (.00): 0 %
PLATELET # BLD AUTO: 469 K/UL (ref 150–400)
POTASSIUM SERPL-SCNC: 4.6 MMOL/L (ref 3.5–5.1)
RBC # BLD AUTO: 3.54 M/UL (ref 4.2–5.4)
SODIUM SERPL-SCNC: 136 MMOL/L (ref 136–145)
WBC # BLD AUTO: 12.91 K/UL (ref 4.5–11)

## 2023-02-07 PROCEDURE — 11000001 HC ACUTE MED/SURG PRIVATE ROOM

## 2023-02-07 PROCEDURE — 85025 COMPLETE CBC W/AUTO DIFF WBC: CPT | Performed by: ORTHOPAEDIC SURGERY

## 2023-02-07 PROCEDURE — 63600175 PHARM REV CODE 636 W HCPCS: Performed by: STUDENT IN AN ORGANIZED HEALTH CARE EDUCATION/TRAINING PROGRAM

## 2023-02-07 PROCEDURE — 25000003 PHARM REV CODE 250: Performed by: ORTHOPAEDIC SURGERY

## 2023-02-07 PROCEDURE — 25000003 PHARM REV CODE 250

## 2023-02-07 PROCEDURE — 82962 GLUCOSE BLOOD TEST: CPT

## 2023-02-07 PROCEDURE — 25000003 PHARM REV CODE 250: Performed by: STUDENT IN AN ORGANIZED HEALTH CARE EDUCATION/TRAINING PROGRAM

## 2023-02-07 PROCEDURE — 63600175 PHARM REV CODE 636 W HCPCS: Performed by: FAMILY MEDICINE

## 2023-02-07 PROCEDURE — 97110 THERAPEUTIC EXERCISES: CPT

## 2023-02-07 PROCEDURE — 80048 BASIC METABOLIC PNL TOTAL CA: CPT | Performed by: ORTHOPAEDIC SURGERY

## 2023-02-07 PROCEDURE — 63600175 PHARM REV CODE 636 W HCPCS

## 2023-02-07 PROCEDURE — 99232 PR SUBSEQUENT HOSPITAL CARE,LEVL II: ICD-10-PCS | Mod: GC,,, | Performed by: STUDENT IN AN ORGANIZED HEALTH CARE EDUCATION/TRAINING PROGRAM

## 2023-02-07 PROCEDURE — 99232 SBSQ HOSP IP/OBS MODERATE 35: CPT | Mod: GC,,, | Performed by: STUDENT IN AN ORGANIZED HEALTH CARE EDUCATION/TRAINING PROGRAM

## 2023-02-07 RX ADMIN — HEPARIN SODIUM 5000 UNITS: 5000 INJECTION INTRAVENOUS; SUBCUTANEOUS at 09:02

## 2023-02-07 RX ADMIN — OXYCODONE HYDROCHLORIDE 10 MG: 5 TABLET ORAL at 09:02

## 2023-02-07 RX ADMIN — INSULIN DETEMIR 30 UNITS: 100 INJECTION, SOLUTION SUBCUTANEOUS at 09:02

## 2023-02-07 RX ADMIN — OXYCODONE HYDROCHLORIDE 10 MG: 5 TABLET ORAL at 01:02

## 2023-02-07 RX ADMIN — Medication 1 EACH: at 09:02

## 2023-02-07 RX ADMIN — INSULIN ASPART 3 UNITS: 100 INJECTION, SOLUTION INTRAVENOUS; SUBCUTANEOUS at 09:02

## 2023-02-07 RX ADMIN — Medication 6 MG: at 11:02

## 2023-02-07 RX ADMIN — OXYCODONE HYDROCHLORIDE 10 MG: 5 TABLET ORAL at 11:02

## 2023-02-07 RX ADMIN — VANCOMYCIN HYDROCHLORIDE 1250 MG: 500 INJECTION, POWDER, LYOPHILIZED, FOR SOLUTION INTRAVENOUS at 04:02

## 2023-02-07 RX ADMIN — HEPARIN SODIUM 5000 UNITS: 5000 INJECTION INTRAVENOUS; SUBCUTANEOUS at 05:02

## 2023-02-07 RX ADMIN — HYDRALAZINE HYDROCHLORIDE 25 MG: 25 TABLET ORAL at 09:02

## 2023-02-07 RX ADMIN — HEPARIN SODIUM 5000 UNITS: 5000 INJECTION INTRAVENOUS; SUBCUTANEOUS at 01:02

## 2023-02-07 RX ADMIN — DOCUSATE SODIUM 100 MG: 100 CAPSULE, LIQUID FILLED ORAL at 09:02

## 2023-02-07 RX ADMIN — OXYCODONE HYDROCHLORIDE 10 MG: 5 TABLET ORAL at 06:02

## 2023-02-07 RX ADMIN — INSULIN ASPART 8 UNITS: 100 INJECTION, SOLUTION INTRAVENOUS; SUBCUTANEOUS at 06:02

## 2023-02-07 RX ADMIN — OXYCODONE HYDROCHLORIDE 10 MG: 5 TABLET ORAL at 04:02

## 2023-02-07 RX ADMIN — INSULIN ASPART 8 UNITS: 100 INJECTION, SOLUTION INTRAVENOUS; SUBCUTANEOUS at 04:02

## 2023-02-07 RX ADMIN — DULOXETINE 60 MG: 30 CAPSULE, DELAYED RELEASE ORAL at 09:02

## 2023-02-07 NOTE — PT/OT/SLP PROGRESS
Physical Therapy Treatment    Patient Name:  Monica Walker   MRN:  [id number]    Recommendations:     Discharge Recommendations: LTACH (long-term acute care hospital), nursing facility, skilled  Discharge Equipment Recommendations: other (see comments) (to be determined)  Barriers to discharge:  ongoing medical care    Assessment:     Monica Walker is a 52 y.o. female admitted with a medical diagnosis of Epidural abscess.  She presents with the following impairments/functional limitations: weakness, impaired endurance, impaired functional mobility, gait instability, decreased lower extremity function, pain, impaired skin, orthopedic precautions. Pt reported being unable to get up tody d/t BSC spilling on floor and waiting for staff to clean it up. Pt reportedly has decided to d/c home instead of swingbed for more therapy, which would be greatly beneficial to her level of indep.     Rehab Prognosis: Fair; patient would benefit from acute skilled PT services to address these deficits and reach maximum level of function.    Recent Surgery: Procedure(s) (LRB):  T9-L2 fusion, T11-T12 laminectomy and corpectomy (N/A) 14 Days Post-Op    Plan:     During this hospitalization, patient to be seen daily to address the identified rehab impairments via gait training, therapeutic activities, therapeutic exercises and progress toward the following goals:    Plan of Care Expires:  02/25/23    Subjective     Chief Complaint: epidural abscess  Patient/Family Comments/goals: agreeablet o EOB ex  Pain/Comfort:         Objective:     Communicated with Radha Salazar RN prior to session.  Patient found sitting edge of bed with peripheral IV upon PT entry to room.     General Precautions: Standard, fall  Orthopedic Precautions: spinal precautions  Braces: N/A  Respiratory Status: Room air     Functional Mobility:  Balance: Good+ to Normal unsupported sitting balance      AM-PAC 6 CLICK MOBILITY          Treatment &  Education:  Bilateral lower extremity exercise x 20 reps: ankle pumps, heel slides, hip abduction/adduction, Long arc quads, and Marching with Stand by assist and verbal cues for sequencing and safety     Patient left sitting edge of bed with all lines intact, call button in reach, and nursing staff notified..    GOALS:   Multidisciplinary Problems       Physical Therapy Goals          Problem: Physical Therapy    Goal Priority Disciplines Outcome Goal Variances Interventions   Physical Therapy Goal     PT, PT/OT Ongoing, Progressing     Description: Short Term Goals to be met by: 23    Patient will increase functional independence with mobility by performin. Supine to sit with Stand by assist  2. Sit to stand transfer with contact guard assist using Rolling walker  3. Bed to chair transfer with contact guard assist using Rolling walker  4. Gait  x 25 feet with contact guard assist using Rolling walker  5. Lower extremity exercise program x30 reps per handout, with assistance as needed  6. Pt to recall 3/3 spinal precautions     Long Term Goals to be met by: 23    Pt will regain full independent functional mobility with Rolling walker to return to home situation and prior activities of daily living.                        Time Tracking:     PT Received On: 23  PT Start Time: 1502     PT Stop Time: 1514  PT Total Time (min): 12 min     Billable Minutes: Therapeutic Exercise 10    Treatment Type: Treatment  PT/PTA: PT     PTA Visit Number: 0     2023

## 2023-02-07 NOTE — PROGRESS NOTES
"[facility] Hospital Medicine  Progress Note    Patient Name: Monica Walker  MRN: [id number]  Patient Class: IP- Inpatient   Admission Date: 1/19/2023  Length of Stay: 19 days  Attending Physician: Adilson Fontanez DO  Primary Care Provider: Hannah Schulz MD        Subjective:     Principal Problem:Epidural abscess        HPI:  Patient is a 53 y/o Female presenting to [facility] with a cc of back pain. She states the pain began to worsen approximately 16 hours ago. She states the pain is in her Mid back area and radiates to both legs. She describes the pain as "throbbing, constant and a 9/10." She states the pain is blunted by taking Hydrocodone but not relieved. She reports the pain is made worse by movement and standing.    Patient reports she was hospitalized in November for sepsis secondary to Pyelonephritis. She has been hospitalized several times over the past 3 months. She is currently being treated at home with IV Abx therapy for Osteomyelitis of the spine.  MRI of Lumbar spine on 1/10/2023 (Per radiology report). Findings indicative of osteomyelitis involving T11-12 with epidural abscess extending from the T11-12 disc level to the L1 level eccentric towards the left.  Additional site of infection of the right L2-3 facet joint as well as osteomyelitis of the right L3 transverse process.    Significant findings in the ED include:  Vitals: /84, , RR 16, Temp 98.2 F, O2 98% on RA  Labs: WBC 14.6, HGB 8.2, HCT 27.1, MCV 74.2, Na 133, K 3.5, Bun 8, Crt0.54, , Glucose 171, UA normal.  today;124 2 days ago, CRP  22.90 Today; 12.9 2 days ago  Interventions: Toradol 30 mg IV    Pt was subsequently admitted to the Inpatient Hospitalist Service for further evaluation and management.               Overview/Hospital Course:  No notes on file    Interval History: Patient in bed resting no distress noted. Patient states her hip still hurts some. " We are planning to get patient home with Home Health soon. Awaiting recommendations from Dr. Noyola about patients hip.    Review of Systems   Constitutional:  Negative for fatigue and fever.   HENT:  Negative for ear discharge, ear pain, facial swelling, mouth sores, postnasal drip, rhinorrhea and sore throat.    Eyes:  Negative for pain, discharge and itching.   Respiratory:  Negative for cough.    Cardiovascular:  Negative for leg swelling.   Gastrointestinal:  Negative for abdominal distention, abdominal pain, anal bleeding, constipation and diarrhea.   Endocrine: Negative for cold intolerance, heat intolerance and polydipsia.   Genitourinary:  Negative for difficulty urinating, dyspareunia, flank pain, frequency, genital sores and hematuria.   Musculoskeletal:  Positive for arthralgias. Negative for back pain, gait problem and joint swelling.   Skin:  Positive for wound.   Allergic/Immunologic: Negative for environmental allergies and food allergies.   Neurological:  Negative for dizziness, facial asymmetry, light-headedness and headaches.   Hematological: Negative.    Psychiatric/Behavioral: Negative.     Objective:     Vital Signs (Most Recent):  Temp: 97.4 °F (36.3 °C) (02/07/23 1200)  Pulse: 90 (02/07/23 1200)  Resp: 18 (02/07/23 1200)  BP: 125/61 (02/07/23 1200)  SpO2: 98 % (02/07/23 1200) Vital Signs (24h Range):  Temp:  [97.1 °F (36.2 °C)-98.2 °F (36.8 °C)] 97.4 °F (36.3 °C)  Pulse:  [74-99] 90  Resp:  [17-18] 18  SpO2:  [96 %-99 %] 98 %  BP: (113-140)/(59-65) 125/61     Weight: 65.7 kg (144 lb 13.5 oz)  Body mass index is 23.38 kg/m².  No intake or output data in the 24 hours ending 02/07/23 1233   Physical Exam  Constitutional:       Appearance: She is normal weight.   HENT:      Head: Normocephalic.      Right Ear: Tympanic membrane normal.      Left Ear: Tympanic membrane normal.      Nose: Nose normal.      Mouth/Throat:      Mouth: Mucous membranes are moist.      Pharynx: Oropharynx is clear.    Eyes:      Conjunctiva/sclera: Conjunctivae normal.      Pupils: Pupils are equal, round, and reactive to light.   Cardiovascular:      Rate and Rhythm: Normal rate and regular rhythm.      Pulses: Normal pulses.      Heart sounds: Normal heart sounds.   Pulmonary:      Effort: Pulmonary effort is normal.      Breath sounds: No rhonchi.   Abdominal:      General: Abdomen is flat. Bowel sounds are normal.   Musculoskeletal:         General: No tenderness. Normal range of motion.      Cervical back: Normal range of motion.      Comments: Lower back itching and new bump  Left hip tenderness increased    Skin:     General: Skin is warm and dry.      Capillary Refill: Capillary refill takes less than 2 seconds.   Neurological:      Mental Status: She is alert and oriented to person, place, and time.   Psychiatric:         Mood and Affect: Mood normal.         Behavior: Behavior normal.       Significant Labs: All pertinent labs within the past 24 hours have been reviewed.  BMP:   Recent Labs   Lab 02/07/23  0402   *      K 4.6   CL 97*   CO2 28   BUN 39*   CREATININE 1.32*   CALCIUM 9.1     CBC:   Recent Labs   Lab 02/06/23  0502 02/06/23  1956 02/07/23  0402   WBC 12.33*  --  12.91*   HGB 7.8* 7.6* 8.0*   HCT 25.8* 25.0* 26.8*   *  --  469*       Significant Imaging: I have reviewed all pertinent imaging results/findings within the past 24 hours.      Assessment/Plan:      * Epidural abscess  - MRI Thoracic w/o Contrast - Findings suggestive of osteomyelitis discitis of T11-12 with epidural phlegmon or extruded disc contributing to moderate spinal canal stenosis.  Additional epidural phlegmon extending in the left paracentral location and of the left T11-12 and T12-L1 foramina.  - MRSA sensitive to Vancomycin, will continue   - Surgery/washout 1/24/2023 successful   - pathology revealed osteomyelitis patinet will need 6 weeks of abx coverage which will go until 3/7/23, will try to transfer her to  LTAC for continued abx.   -LTAC denied. Will try swingbed    2/3/23  -Awaiting approval for swing bed. If no success will consider antibiotic treatment with home cash.     2/4/23  -Continuing vancomycin until 3/7/23  -Awaiting approval for SWB    2/6/23  Referral sent to  [facility] denied    2/7/23  Attempting to get Home Health to accept patient so she can get her Antibiotics for the next 4 weeks  Will change Antibiotic to Daptomycin 6 mg/kg      Thrombocytosis  Likely reactive secondary to infection, anemia     -continue to monitor         GATO (acute kidney injury)  Patient with acute kidney injury likely due to pre-renal azotemia GATO is currently improving. Labs reviewed- Renal function/electrolytes with Estimated Creatinine Clearance: 44.6 mL/min (A) (based on SCr of 1.35 mg/dL (H)). according to latest data. Monitor urine output and serial BMP and adjust therapy as needed. Avoid nephrotoxins and renally dose meds for GFR listed above.     -likely secondary to Vancomycin use     Cr on admission 0.54  Cr 1.35 from 1.44 (2/5/23)  Avoid nephrotoxic meds   Renally dose vancomycin         Left hip pain  Left Hip X-ray -Left hip arthroplasty appears within normal limits.    PT/OT    2/1/23  Patient complaining of more pain in left hip. Left hip is tender with Passive ROM  Orthopedics Consulted. We want to see if patient may have possible infected artificial hip from Epidural infection. MRSA.  Thank orthopedics for recommendations.    2/2/23  CT guided aspiration of left hip    2/5/23  Culture anaerobe, body fluid from Aspiration of hip (2/1)- NGTD  -Will discuss with Dr. Noyola for insights    Hypertension  Not currently controlled  Patient took no home meds  Occasionally correspond to pain  Hydralazine 25mg Q8hrs      Microcytic anemia  Ferritin - 380  Iron - 12  Iron Sat - 6  TIBC - 188    Ferrous sulfate given Qd        Type 2 diabetes mellitus, with long-term current use of insulin  Patient's FSGs are  uncontrolled due to hyperglycemia on current medication regimen.  Last A1c reviewed-   Lab Results   Component Value Date    HGBA1C 7.9 (H) 01/19/2023     Most recent fingerstick glucose reviewed- No results for input(s): POCTGLUCOSE in the last 24 hours.  Current correctional scale  Medium  Maintain anti-hyperglycemic dose as follows-   Antihyperglycemics (From admission, onward)      Start     Stop Route Frequency Ordered    02/06/23 2100  insulin detemir U-100 injection 30 Units         -- SubQ Nightly 02/06/23 0936    02/06/23 1130  insulin aspart U-100 injection 8 Units         -- SubQ 3 times daily with meals 02/06/23 0938    01/24/23 2136  insulin aspart U-100 injection 1-10 Units         -- SubQ Before meals & nightly PRN 01/24/23 2036          Hold Oral hypoglycemics while patient is in the hospital.  Diabetic Educator consulted, appreciate their help    Depression  - Continue Cymbalta        VTE Risk Mitigation (From admission, onward)           Ordered     heparin (porcine) injection 5,000 Units  Every 8 hours         02/05/23 1112     IP VTE LOW RISK PATIENT  Once         01/19/23 0417     Place sequential compression device  Until discontinued         01/19/23 0417                    Discharge Planning   SUZANNA:      Code Status: Full Code   Is the patient medically ready for discharge?:     Reason for patient still in hospital (select all that apply): Treatment  Discharge Plan A: Home with family, Home Health                  Jackson Lai MD  Department of Hospital Medicine   [facility]

## 2023-02-07 NOTE — PT/OT/SLP PROGRESS
Occupational Therapy   Treatment    Name: Monica Walker  MRN: [id number]  Admitting Diagnosis:  Epidural abscess  14 Days Post-Op    Recommendations:     Discharge Recommendations: home with home health, LTACH (long-term acute care hospital), nursing facility, skilled  Discharge Equipment Recommendations:  none  Barriers to discharge:       Assessment:     Monica Walker is a 52 y.o. female with a medical diagnosis of Epidural abscess.  She presents with weakness, decreased functional mobility, and decline in ADLs. Performance deficits affecting function are weakness, impaired endurance, impaired functional mobility, impaired self care skills, gait instability, impaired balance, pain.     Rehab Prognosis:  Fair; patient would benefit from acute skilled OT services to address these deficits and reach maximum level of function.       Plan:     Patient to be seen 5 x/week to address the above listed problems via self-care/home management, therapeutic activities, therapeutic exercises  Plan of Care Expires: 02/28/23  Plan of Care Reviewed with: patient    Subjective     Pain/Comfort:  Pain Rating 1: 8/10  Location - Side 1: Left  Location 1: hip  Pain Addressed 1: Pre-medicate for activity    Objective:     Communicated with: HAYDEE Garcia prior to session.  Patient found supine with PICC line upon OT entry to room.    General Precautions: Standard, fall    Orthopedic Precautions:spinal precautions  Braces: N/A  Respiratory Status: Room air     Occupational Performance:     Bed Mobility:    Not performed     Functional Mobility/Transfers:  Not performed    Activities of Daily Living:  Not performed      [facility] 6 Click ADL:      Treatment & Education:  Pt performed 2 sets x 15 reps each bicep curls 2#db, chest press 2#db, shoulder flexion 2#db, IR/ER 2#db, rows red tband, and tricep press red tband in order to improve BUE strength and endurance to perform ADL and ADL t/f with less assist.     Patient left supine with all  lines intact, call button in reach, and HAYDEE Garcia notified    GOALS:   Multidisciplinary Problems       Occupational Therapy Goals          Problem: Occupational Therapy    Goal Priority Disciplines Outcome Interventions   Occupational Therapy Goal     OT, PT/OT Ongoing, Progressing    Description: STG: (In 2 weeks)  Pt will perform grooming with setup  Pt will bathe with set up and min(A)  Pt will perform UE dressing with setup  Pt will perform LE dressing with min(A) and with AD if needed  Pt will transfer bed/chair/bsc with CGA with AD  Pt will perform standing task x 2 min with CGA and AD if needed  Pt will tolerate 20 minutes of tx without fatigue      LT.Restore to max I with self care and mobility.                          Time Tracking:     OT Date of Treatment: 23  OT Start Time: 1324  OT Stop Time: 1348  OT Total Time (min): 24 min    Billable Minutes:Therapeutic Exercise 23 minutes    OT/TRISH: OT          2023

## 2023-02-07 NOTE — PLAN OF CARE
SS spoke with Lana at [facility], they are unable to accept patient at this time. SS discussed with patient, she would like to go home with home health/IV abx. SS also spoke with patient's mother, Alejandrina, who reports that she has helped patient with home IV abx in the past. SS received consult for home IV abx, order faxed to [facility]. Patient is current with [facility], order for home IV abx faxed to [facility].

## 2023-02-07 NOTE — PT/OT/SLP PROGRESS
"Physical Therapy Treatment    Patient Name:  Monica Walker   MRN:  [id number]    Recommendations:     Discharge Recommendations: LTACH (long-term acute care hospital), nursing facility, skilled  Discharge Equipment Recommendations: other (see comments) (to be determined)  Barriers to discharge:  ongoing medical care    Assessment:     Monica Walker is a 52 y.o. female admitted with a medical diagnosis of Epidural abscess.  She presents with the following impairments/functional limitations: weakness, impaired endurance, impaired functional mobility, gait instability, decreased lower extremity function, pain, impaired skin, orthopedic precautions. Pt continues to report pain in L hip and states she is unable to advance it in stance. With manual assistance, pt able to initiate gait and ambulate 40ft with RW. Pt ed on keeping L hip in neutral position when in R sidelying by placing a pillow between her thighs and knees, pt verbalized understanding.    Rehab Prognosis: Good and Fair; patient would benefit from acute skilled PT services to address these deficits and reach maximum level of function.    Recent Surgery: Procedure(s) (LRB):  T9-L2 fusion, T11-T12 laminectomy and corpectomy (N/A) 13 Days Post-Op    Plan:     During this hospitalization, patient to be seen daily to address the identified rehab impairments via gait training, therapeutic activities, therapeutic exercises and progress toward the following goals:    Plan of Care Expires:  02/25/23    Subjective     Chief Complaint: epidural abscess  Patient/Family Comments/goals: "I can't stand on that hip, it's locked up on me"  Pain/Comfort: not rated         Objective:     Communicated with LOW Lewis RN prior to session.  Patient found right sidelying with peripheral IV upon PT entry to room.     General Precautions: Standard, fall  Orthopedic Precautions: spinal precautions  Braces: N/A  Respiratory Status: Room air     Functional Mobility:  Bed Mobility:   "   Supine to Sit: stand by assistance  Transfers:     Sit to Stand:  contact guard assistance with hand-held assist  Gait: 40ft with RW, CGA with slow theo, discontinuous gait, antalgic gait, frequent rests      AM-PAC 6 CLICK MOBILITY          Treatment & Education:  Bilateral lower extremity exercise x 15 reps: ankle pumps, hip abduction/adduction, Long arc quads, and Marching with Stand by assist     Patient left up in chair with all lines intact and call button in reach..    GOALS:   Multidisciplinary Problems       Physical Therapy Goals          Problem: Physical Therapy    Goal Priority Disciplines Outcome Goal Variances Interventions   Physical Therapy Goal     PT, PT/OT Ongoing, Progressing     Description: Short Term Goals to be met by: 23    Patient will increase functional independence with mobility by performin. Supine to sit with Stand by assist  2. Sit to stand transfer with contact guard assist using Rolling walker  3. Bed to chair transfer with contact guard assist using Rolling walker  4. Gait  x 25 feet with contact guard assist using Rolling walker  5. Lower extremity exercise program x30 reps per handout, with assistance as needed  6. Pt to recall 3/3 spinal precautions     Long Term Goals to be met by: 23    Pt will regain full independent functional mobility with Rolling walker to return to home situation and prior activities of daily living.                        Time Tracking:     PT Received On: 23  PT Start Time: 1532     PT Stop Time: 1548  PT Total Time (min): 16 min     Billable Minutes: Gait Training 9    Treatment Type: Treatment  PT/PTA: PT     PTA Visit Number: 0     2023

## 2023-02-07 NOTE — ASSESSMENT & PLAN NOTE
- MRI Thoracic w/o Contrast - Findings suggestive of osteomyelitis discitis of T11-12 with epidural phlegmon or extruded disc contributing to moderate spinal canal stenosis.  Additional epidural phlegmon extending in the left paracentral location and of the left T11-12 and T12-L1 foramina.  - MRSA sensitive to Vancomycin, will continue   - Surgery/washout 1/24/2023 successful   - pathology revealed osteomyelitis patinet will need 6 weeks of abx coverage which will go until 3/7/23, will try to transfer her to LTAC for continued abx.   -LTAC denied. Will try swingbed    2/3/23  -Awaiting approval for swing bed. If no success will consider antibiotic treatment with home cash.     2/4/23  -Continuing vancomycin until 3/7/23  -Awaiting approval for SWB    2/6/23  Referral sent to [facility] denied    2/7/23  Attempting to get Home Health to accept patient so she can get her Antibiotics for the next 4 weeks  Will change Antibiotic to Zyvox or daptomycin

## 2023-02-07 NOTE — SUBJECTIVE & OBJECTIVE
Interval History: Patient in bed resting no distress noted. Patient states her hip still hurts some. We are planning to get patient home with Home Health soon. Awaiting recommendations from Dr. Noyola about patients hip.    Review of Systems   Constitutional:  Negative for fatigue and fever.   HENT:  Negative for ear discharge, ear pain, facial swelling, mouth sores, postnasal drip, rhinorrhea and sore throat.    Eyes:  Negative for pain, discharge and itching.   Respiratory:  Negative for cough.    Cardiovascular:  Negative for leg swelling.   Gastrointestinal:  Negative for abdominal distention, abdominal pain, anal bleeding, constipation and diarrhea.   Endocrine: Negative for cold intolerance, heat intolerance and polydipsia.   Genitourinary:  Negative for difficulty urinating, dyspareunia, flank pain, frequency, genital sores and hematuria.   Musculoskeletal:  Positive for arthralgias. Negative for back pain, gait problem and joint swelling.   Skin:  Positive for wound.   Allergic/Immunologic: Negative for environmental allergies and food allergies.   Neurological:  Negative for dizziness, facial asymmetry, light-headedness and headaches.   Hematological: Negative.    Psychiatric/Behavioral: Negative.     Objective:     Vital Signs (Most Recent):  Temp: 97.4 °F (36.3 °C) (02/07/23 1200)  Pulse: 90 (02/07/23 1200)  Resp: 18 (02/07/23 1200)  BP: 125/61 (02/07/23 1200)  SpO2: 98 % (02/07/23 1200) Vital Signs (24h Range):  Temp:  [97.1 °F (36.2 °C)-98.2 °F (36.8 °C)] 97.4 °F (36.3 °C)  Pulse:  [74-99] 90  Resp:  [17-18] 18  SpO2:  [96 %-99 %] 98 %  BP: (113-140)/(59-65) 125/61     Weight: 65.7 kg (144 lb 13.5 oz)  Body mass index is 23.38 kg/m².  No intake or output data in the 24 hours ending 02/07/23 1233   Physical Exam  Constitutional:       Appearance: She is normal weight.   HENT:      Head: Normocephalic.      Right Ear: Tympanic membrane normal.      Left Ear: Tympanic membrane normal.      Nose: Nose  normal.      Mouth/Throat:      Mouth: Mucous membranes are moist.      Pharynx: Oropharynx is clear.   Eyes:      Conjunctiva/sclera: Conjunctivae normal.      Pupils: Pupils are equal, round, and reactive to light.   Cardiovascular:      Rate and Rhythm: Normal rate and regular rhythm.      Pulses: Normal pulses.      Heart sounds: Normal heart sounds.   Pulmonary:      Effort: Pulmonary effort is normal.      Breath sounds: No rhonchi.   Abdominal:      General: Abdomen is flat. Bowel sounds are normal.   Musculoskeletal:         General: No tenderness. Normal range of motion.      Cervical back: Normal range of motion.      Comments: Lower back itching and new bump  Left hip tenderness increased    Skin:     General: Skin is warm and dry.      Capillary Refill: Capillary refill takes less than 2 seconds.   Neurological:      Mental Status: She is alert and oriented to person, place, and time.   Psychiatric:         Mood and Affect: Mood normal.         Behavior: Behavior normal.       Significant Labs: All pertinent labs within the past 24 hours have been reviewed.  BMP:   Recent Labs   Lab 02/07/23  0402   *      K 4.6   CL 97*   CO2 28   BUN 39*   CREATININE 1.32*   CALCIUM 9.1     CBC:   Recent Labs   Lab 02/06/23  0502 02/06/23  1956 02/07/23  0402   WBC 12.33*  --  12.91*   HGB 7.8* 7.6* 8.0*   HCT 25.8* 25.0* 26.8*   *  --  469*       Significant Imaging: I have reviewed all pertinent imaging results/findings within the past 24 hours.

## 2023-02-08 VITALS
SYSTOLIC BLOOD PRESSURE: 140 MMHG | OXYGEN SATURATION: 99 % | DIASTOLIC BLOOD PRESSURE: 65 MMHG | TEMPERATURE: 98 F | HEIGHT: 66 IN | HEART RATE: 76 BPM | BODY MASS INDEX: 23.27 KG/M2 | WEIGHT: 144.81 LBS | RESPIRATION RATE: 18 BRPM

## 2023-02-08 LAB
ANION GAP SERPL CALCULATED.3IONS-SCNC: 15 MMOL/L (ref 7–16)
BASOPHILS # BLD AUTO: 0.08 K/UL (ref 0–0.2)
BASOPHILS NFR BLD AUTO: 0.6 % (ref 0–1)
BUN SERPL-MCNC: 38 MG/DL (ref 7–18)
BUN/CREAT SERPL: 27 (ref 6–20)
CALCIUM SERPL-MCNC: 9.2 MG/DL (ref 8.5–10.1)
CHLORIDE SERPL-SCNC: 97 MMOL/L (ref 98–107)
CK SERPL-CCNC: 21 U/L (ref 26–192)
CO2 SERPL-SCNC: 28 MMOL/L (ref 21–32)
CREAT SERPL-MCNC: 1.4 MG/DL (ref 0.55–1.02)
DIFFERENTIAL METHOD BLD: ABNORMAL
EGFR (NO RACE VARIABLE) (RUSH/TITUS): 45 ML/MIN/1.73M²
EOSINOPHIL # BLD AUTO: 0.57 K/UL (ref 0–0.5)
EOSINOPHIL NFR BLD AUTO: 4.3 % (ref 1–4)
ERYTHROCYTE [DISTWIDTH] IN BLOOD BY AUTOMATED COUNT: 17.6 % (ref 11.5–14.5)
GLUCOSE SERPL-MCNC: 354 MG/DL (ref 70–105)
GLUCOSE SERPL-MCNC: 49 MG/DL (ref 70–105)
GLUCOSE SERPL-MCNC: 63 MG/DL (ref 74–106)
GLUCOSE SERPL-MCNC: 72 MG/DL (ref 70–105)
GLUCOSE SERPL-MCNC: 85 MG/DL (ref 70–105)
HCT VFR BLD AUTO: 28.3 % (ref 38–47)
HGB BLD-MCNC: 8.4 G/DL (ref 12–16)
IMM GRANULOCYTES # BLD AUTO: 0.09 K/UL (ref 0–0.04)
IMM GRANULOCYTES NFR BLD: 0.7 % (ref 0–0.4)
LYMPHOCYTES # BLD AUTO: 2.96 K/UL (ref 1–4.8)
LYMPHOCYTES NFR BLD AUTO: 22.4 % (ref 27–41)
MCH RBC QN AUTO: 23 PG (ref 27–31)
MCHC RBC AUTO-ENTMCNC: 29.7 G/DL (ref 32–36)
MCV RBC AUTO: 77.3 FL (ref 80–96)
MONOCYTES # BLD AUTO: 1.12 K/UL (ref 0–0.8)
MONOCYTES NFR BLD AUTO: 8.5 % (ref 2–6)
MPC BLD CALC-MCNC: 9.3 FL (ref 9.4–12.4)
NEUTROPHILS # BLD AUTO: 8.42 K/UL (ref 1.8–7.7)
NEUTROPHILS NFR BLD AUTO: 63.5 % (ref 53–65)
NRBC # BLD AUTO: 0 X10E3/UL
NRBC, AUTO (.00): 0 %
PLATELET # BLD AUTO: 505 K/UL (ref 150–400)
POTASSIUM SERPL-SCNC: 4.4 MMOL/L (ref 3.5–5.1)
RBC # BLD AUTO: 3.66 M/UL (ref 4.2–5.4)
SODIUM SERPL-SCNC: 136 MMOL/L (ref 136–145)
WBC # BLD AUTO: 13.24 K/UL (ref 4.5–11)

## 2023-02-08 PROCEDURE — 25000003 PHARM REV CODE 250: Performed by: ORTHOPAEDIC SURGERY

## 2023-02-08 PROCEDURE — 25000003 PHARM REV CODE 250

## 2023-02-08 PROCEDURE — 1111F DSCHRG MED/CURRENT MED MERGE: CPT | Mod: CPTII,GC,, | Performed by: STUDENT IN AN ORGANIZED HEALTH CARE EDUCATION/TRAINING PROGRAM

## 2023-02-08 PROCEDURE — 25000003 PHARM REV CODE 250: Performed by: STUDENT IN AN ORGANIZED HEALTH CARE EDUCATION/TRAINING PROGRAM

## 2023-02-08 PROCEDURE — 63600175 PHARM REV CODE 636 W HCPCS: Performed by: STUDENT IN AN ORGANIZED HEALTH CARE EDUCATION/TRAINING PROGRAM

## 2023-02-08 PROCEDURE — 63600175 PHARM REV CODE 636 W HCPCS: Performed by: ORTHOPAEDIC SURGERY

## 2023-02-08 PROCEDURE — 99239 PR HOSPITAL DISCHARGE DAY,>30 MIN: ICD-10-PCS | Mod: GC,,, | Performed by: STUDENT IN AN ORGANIZED HEALTH CARE EDUCATION/TRAINING PROGRAM

## 2023-02-08 PROCEDURE — 85025 COMPLETE CBC W/AUTO DIFF WBC: CPT | Performed by: ORTHOPAEDIC SURGERY

## 2023-02-08 PROCEDURE — 82550 ASSAY OF CK (CPK): CPT

## 2023-02-08 PROCEDURE — 63600175 PHARM REV CODE 636 W HCPCS

## 2023-02-08 PROCEDURE — 94761 N-INVAS EAR/PLS OXIMETRY MLT: CPT

## 2023-02-08 PROCEDURE — 1111F PR DISCHARGE MEDS RECONCILED W/ CURRENT OUTPATIENT MED LIST: ICD-10-PCS | Mod: CPTII,GC,, | Performed by: STUDENT IN AN ORGANIZED HEALTH CARE EDUCATION/TRAINING PROGRAM

## 2023-02-08 PROCEDURE — 82962 GLUCOSE BLOOD TEST: CPT

## 2023-02-08 PROCEDURE — 63600175 PHARM REV CODE 636 W HCPCS: Performed by: FAMILY MEDICINE

## 2023-02-08 PROCEDURE — 80048 BASIC METABOLIC PNL TOTAL CA: CPT | Performed by: ORTHOPAEDIC SURGERY

## 2023-02-08 PROCEDURE — 99239 HOSP IP/OBS DSCHRG MGMT >30: CPT | Mod: GC,,, | Performed by: STUDENT IN AN ORGANIZED HEALTH CARE EDUCATION/TRAINING PROGRAM

## 2023-02-08 RX ORDER — INSULIN ASPART 100 [IU]/ML
6 INJECTION, SOLUTION INTRAVENOUS; SUBCUTANEOUS
Status: DISCONTINUED | OUTPATIENT
Start: 2023-02-08 | End: 2023-02-08

## 2023-02-08 RX ORDER — INSULIN GLARGINE 100 [IU]/ML
30 INJECTION, SOLUTION SUBCUTANEOUS DAILY
Qty: 4 EACH | Refills: 5 | Status: ON HOLD | OUTPATIENT
Start: 2023-02-08 | End: 2023-12-11 | Stop reason: SDUPTHER

## 2023-02-08 RX ORDER — INSULIN ASPART 100 [IU]/ML
5 INJECTION, SOLUTION INTRAVENOUS; SUBCUTANEOUS
Qty: 5 EACH | Refills: 11 | Status: ON HOLD | OUTPATIENT
Start: 2023-02-08 | End: 2023-12-11 | Stop reason: SDUPTHER

## 2023-02-08 RX ORDER — HYDROCORTISONE 1 %
CREAM (GRAM) TOPICAL 2 TIMES DAILY
Qty: 56.7 G | Refills: 0 | Status: ON HOLD | OUTPATIENT
Start: 2023-02-08 | End: 2023-03-20 | Stop reason: ALTCHOICE

## 2023-02-08 RX ORDER — INSULIN ASPART 100 [IU]/ML
8 INJECTION, SOLUTION INTRAVENOUS; SUBCUTANEOUS
Status: DISCONTINUED | OUTPATIENT
Start: 2023-02-08 | End: 2023-02-08

## 2023-02-08 RX ORDER — INSULIN ASPART 100 [IU]/ML
8 INJECTION, SOLUTION INTRAVENOUS; SUBCUTANEOUS
Status: DISCONTINUED | OUTPATIENT
Start: 2023-02-08 | End: 2023-02-08 | Stop reason: HOSPADM

## 2023-02-08 RX ORDER — FERROUS SULFATE 325(65) MG
325 TABLET, DELAYED RELEASE (ENTERIC COATED) ORAL DAILY
Qty: 30 TABLET | Refills: 0 | Status: ON HOLD | OUTPATIENT
Start: 2023-02-08 | End: 2023-03-20 | Stop reason: HOSPADM

## 2023-02-08 RX ORDER — BUPIVACAINE HYDROCHLORIDE 2.5 MG/ML
1 INJECTION, SOLUTION EPIDURAL; INFILTRATION; INTRACAUDAL ONCE
Status: COMPLETED | OUTPATIENT
Start: 2023-02-08 | End: 2023-02-08

## 2023-02-08 RX ORDER — HYDRALAZINE HYDROCHLORIDE 25 MG/1
25 TABLET, FILM COATED ORAL EVERY 12 HOURS
Qty: 60 TABLET | Refills: 11 | Status: ON HOLD | OUTPATIENT
Start: 2023-02-08 | End: 2023-05-09

## 2023-02-08 RX ORDER — TRIAMCINOLONE ACETONIDE 40 MG/ML
40 INJECTION, SUSPENSION INTRA-ARTICULAR; INTRAMUSCULAR ONCE
Status: COMPLETED | OUTPATIENT
Start: 2023-02-08 | End: 2023-02-08

## 2023-02-08 RX ORDER — DOCUSATE SODIUM 100 MG/1
100 CAPSULE, LIQUID FILLED ORAL DAILY
Qty: 30 CAPSULE | Refills: 0 | Status: ON HOLD | OUTPATIENT
Start: 2023-02-09 | End: 2023-03-20 | Stop reason: HOSPADM

## 2023-02-08 RX ORDER — BUPIVACAINE HYDROCHLORIDE 2.5 MG/ML
1 INJECTION, SOLUTION INFILTRATION; PERINEURAL ONCE
Status: DISCONTINUED | OUTPATIENT
Start: 2023-02-08 | End: 2023-02-08

## 2023-02-08 RX ADMIN — VANCOMYCIN HYDROCHLORIDE 1250 MG: 500 INJECTION, POWDER, LYOPHILIZED, FOR SOLUTION INTRAVENOUS at 05:02

## 2023-02-08 RX ADMIN — HEPARIN SODIUM 5000 UNITS: 5000 INJECTION INTRAVENOUS; SUBCUTANEOUS at 05:02

## 2023-02-08 RX ADMIN — INSULIN ASPART 10 UNITS: 100 INJECTION, SOLUTION INTRAVENOUS; SUBCUTANEOUS at 09:02

## 2023-02-08 RX ADMIN — BUPIVACAINE HYDROCHLORIDE 2.5 MG: 2.5 INJECTION, SOLUTION EPIDURAL; INFILTRATION; INTRACAUDAL; PERINEURAL at 01:02

## 2023-02-08 RX ADMIN — DOCUSATE SODIUM 100 MG: 100 CAPSULE, LIQUID FILLED ORAL at 09:02

## 2023-02-08 RX ADMIN — Medication 1 EACH: at 09:02

## 2023-02-08 RX ADMIN — TRIAMCINOLONE ACETONIDE 40 MG: 40 INJECTION, SUSPENSION INTRA-ARTICULAR; INTRAMUSCULAR at 01:02

## 2023-02-08 RX ADMIN — HYDRALAZINE HYDROCHLORIDE 25 MG: 25 TABLET ORAL at 09:02

## 2023-02-08 RX ADMIN — OXYCODONE HYDROCHLORIDE 10 MG: 5 TABLET ORAL at 09:02

## 2023-02-08 RX ADMIN — DULOXETINE 60 MG: 30 CAPSULE, DELAYED RELEASE ORAL at 09:02

## 2023-02-08 RX ADMIN — DAPTOMYCIN 400 MG: 500 INJECTION, POWDER, LYOPHILIZED, FOR SOLUTION INTRAVENOUS at 10:02

## 2023-02-08 RX ADMIN — OXYCODONE HYDROCHLORIDE 10 MG: 5 TABLET ORAL at 05:02

## 2023-02-08 NOTE — ASSESSMENT & PLAN NOTE
- MRI Thoracic w/o Contrast - Findings suggestive of osteomyelitis discitis of T11-12 with epidural phlegmon or extruded disc contributing to moderate spinal canal stenosis.  Additional epidural phlegmon extending in the left paracentral location and of the left T11-12 and T12-L1 foramina.  - MRSA sensitive to Vancomycin, will continue   - Surgery/washout 1/24/2023 successful   - pathology revealed osteomyelitis patinet will need 6 weeks of abx coverage which will go until 3/7/23, will try to transfer her to LTAC for continued abx.   -LTAC denied. Will try swingbed    2/3/23  -Awaiting approval for swing bed. If no success will consider antibiotic treatment with home cash.     2/4/23  -Continuing vancomycin until 3/7/23  -Awaiting approval for SWB    2/6/23  Referral sent to [facility] denied    2/7/23  Attempting to get Home Health to accept patient so she can get her Antibiotics for the next 4 weeks  Will change Antibiotic to Daptomycin    2/8/23  Discharge home today  Daptomycin with home health until 3/7/23

## 2023-02-08 NOTE — ASSESSMENT & PLAN NOTE
- MRI Thoracic w/o Contrast - Findings suggestive of osteomyelitis discitis of T11-12 with epidural phlegmon or extruded disc contributing to moderate spinal canal stenosis.  Additional epidural phlegmon extending in the left paracentral location and of the left T11-12 and T12-L1 foramina.  - MRSA sensitive to Vancomycin, will continue   - Surgery/washout 1/24/2023 successful   - pathology revealed osteomyelitis patinet will need 6 weeks of abx coverage which will go until 3/7/23, will try to transfer her to LTAC for continued abx.   -LTAC denied. Will try swingbed    2/3/23  -Awaiting approval for swing bed. If no success will consider antibiotic treatment with home cash.     2/4/23  -Continuing vancomycin until 3/7/23  -Awaiting approval for SWB    2/6/23  Referral sent to [facility] denied    2/7/23  Attempting to get Home Health to accept patient so she can get her Antibiotics for the next 4 weeks  Will change Antibiotic to Daptomycin

## 2023-02-08 NOTE — ASSESSMENT & PLAN NOTE
Patient's FSGs are uncontrolled due to hyperglycemia on current medication regimen.  Last A1c reviewed-   Lab Results   Component Value Date    HGBA1C 7.9 (H) 01/19/2023     Most recent fingerstick glucose reviewed- No results for input(s): POCTGLUCOSE in the last 24 hours.  Current correctional scale  Medium  Maintain anti-hyperglycemic dose as follows-   Antihyperglycemics (From admission, onward)    Start     Stop Route Frequency Ordered    02/08/23 1130  insulin aspart U-100 injection 6 Units         -- SubQ 3 times daily with meals 02/08/23 0821    02/06/23 2100  insulin detemir U-100 injection 30 Units         -- SubQ Nightly 02/06/23 0936    01/24/23 2136  insulin aspart U-100 injection 1-10 Units         -- SubQ Before meals & nightly PRN 01/24/23 2036        Hold Oral hypoglycemics while patient is in the hospital.  Diabetic Educator consulted, appreciate their help

## 2023-02-08 NOTE — PT/OT/SLP PROGRESS
Physical Therapy      Patient Name:  Monica Walker   MRN:  [id number]    Patient not seen today secondary to Patient unwilling to participate, Pain (pt reports she cannot do anything d/t pain in her left hip and awaiting ortho to do an injection.). Will follow-up tomorrow if pt remains in facility.

## 2023-02-08 NOTE — PROGRESS NOTES
"[facility]  Adult Nutrition  Progress Note    SUMMARY       Recommendations    Recommendation/Intervention: Recommend continuing current diet order + nutritonal supplements as able and appropriate.  Goals: consume % of meals, tolerate PO intake, weight maintenance  Nutrition Goal Status: progressing towards goal    Assessment and Plan  RD follow up.     Per MD:   "2/3/23-Awaiting approval for swing bed. If no success will consider antibiotic treatment with home cash.   2/4/23-Continuing vancomycin until 3/7/23-Awaiting approval for SWB  2/6/23 - Referral sent to  [facility] denied  2/7/23 - Attempting to get Home Health to accept patient so she can get her Antibiotics for the next 4 weeks. Will change Antibiotic to Daptomycin"    Current weight is 144 lbs. Pt is currently receiving a Consistent Carbohydrate 1800 kcal diet. Encourage good PO intakes.     Continue Alfred BID to aid in wound healing. Arginaid may be used in place of Alfred as needed due to supply issues. Consider addition of 500mg Vit C BID + 220mg ZnSO4 BID + MVI daily to aid in wound healing as well.     Last BM 2/6 per flowsheets. Labs/meds reviewed. RD following.     Nutrition Diagnosis  Malnutrition (Moderate)   related to Chronic infection/ sepsis as evidenced by epidural abscess/osteomyelitis on IV abx, increased nutrition needs     Nutrition Diagnosis Status: Chronic/ continues    Reason for Assessment    Reason For Assessment: RD follow-up  Diagnosis: other (see comments) (epidural abscess)  Relevant Medical History: abscess of right thigh(MRSA), adnexal cyst, degenerative disc disease, depression, HTN, nicotene dependence, DM2    Nutrition Risk Screen    Nutrition Risk Screen: no indicators present    Nutrition/Diet History    Spiritual, Cultural Beliefs, Baptist Practices, Values that Affect Care: no  Food Allergies: NKFA    Anthropometrics    Temp: 97.8 °F (36.6 °C)  Height Method: Stated  Height: " "5' 6" (167.6 cm)  Height (inches): 66 in  Weight Method: Bed Scale  Weight: 65.7 kg (144 lb 13.5 oz)  Weight (lb): 144.84 lb  Ideal Body Weight (IBW), Female: 130 lb  % Ideal Body Weight, Female (lb): 105.54 %  BMI (Calculated): 23.4       Lab/Procedures/Meds   Latest Reference Range & Units 02/08/23 03:18   Sodium 136 - 145 mmol/L 136   Potassium 3.5 - 5.1 mmol/L 4.4   Chloride 98 - 107 mmol/L 97 (L)   CO2 21 - 32 mmol/L 28   Anion Gap 7 - 16 mmol/L 15   BUN 7 - 18 mg/dL 38 (H)   Creatinine 0.55 - 1.02 mg/dL 1.40 (H)   BUN/CREAT RATIO 6 - 20  27 (H)   eGFR >=60 mL/min/1.73m² 45 (L)   Glucose 74 - 106 mg/dL 63 (L)   Calcium 8.5 - 10.1 mg/dL 9.2   (L): Data is abnormally low  (H): Data is abnormally high    Note: Elevated BUN, Cr and low GFR - no PMH of CKD. Possibly related to GATO. Low glucose.     Pertinent Labs Reviewed: reviewed  Pertinent Medications Reviewed: reviewed  Pertinent Medications Comments: cubicin, docusate sodium, duloxetine, ferrous sulfate, heparin, hydralazine, insulin    Nutrition Prescription Ordered    Current Diet Order: Consistent Carbohydrate 1800 kcals  Nutrition Order Comments: Appropriate  Oral Nutrition Supplement: Gelatein Sugar Free + Ensure Enlive + Alfred    Evaluation of Received Nutrient/Fluid Intake       % Intake of Estimated Energy Needs/ % meals. Unable to assess due to no PO intake documented at this time.     Nutrition Risk    Level of Risk/Frequency of Follow-up: moderate     Monitor and Evaluation    Food and Nutrient Intake: energy intake, food and beverage intake  Food and Nutrient Adminstration: diet order  Knowledge/Beliefs/Attitudes: beliefs and attitudes, food and nutrition knowledge/skill  Anthropometric Measurements: weight, weight change, body mass index  Biochemical Data, Medical Tests and Procedures: electrolyte and renal panel, gastrointestinal profile, glucose/endocrine profile, inflammatory profile, lipid profile  Nutrition-Focused Physical Findings: overall " appearance     Nutrition Follow-Up    RD Follow-up?: Yes

## 2023-02-08 NOTE — ASSESSMENT & PLAN NOTE
Patient with acute kidney injury likely due to pre-renal azotemia GATO is currently improving. Labs reviewed- Renal function/electrolytes with Estimated Creatinine Clearance: 43 mL/min (A) (based on SCr of 1.4 mg/dL (H)). according to latest data. Monitor urine output and serial BMP and adjust therapy as needed. Avoid nephrotoxins and renally dose meds for GFR listed above.     -likely secondary to Vancomycin use     Cr on admission 0.54  Cr 1.35 from 1.44 (2/5/23)  Avoid nephrotoxic meds   Renally dose vancomycin

## 2023-02-08 NOTE — DISCHARGE SUMMARY
"[facility] - [address] [place] [address] Medicine  Discharge Summary      Patient Name: Monica Walker  MRN: [id number]  [facility]: [id number]  Patient Class: IP- Inpatient  Admission Date: 1/19/2023  Hospital Length of Stay: 20 days  Discharge Date and Time:  02/08/2023 1:52 PM  Attending Physician: Adilson Fontanez DO   Discharging Provider: Jackson Lai MD  Primary Care Provider: Hannah Schulz MD    Primary Care Team: Networked reference to record PCT     HPI:   Patient is a 53 y/o Female presenting to [facility] with a cc of back pain. She states the pain began to worsen approximately 16 hours ago. She states the pain is in her Mid back area and radiates to both legs. She describes the pain as "throbbing, constant and a 9/10." She states the pain is blunted by taking Hydrocodone but not relieved. She reports the pain is made worse by movement and standing.    Patient reports she was hospitalized in November for sepsis secondary to Pyelonephritis. She has been hospitalized several times over the past 3 months. She is currently being treated at home with IV Abx therapy for Osteomyelitis of the spine.  MRI of Lumbar spine on 1/10/2023 (Per radiology report). Findings indicative of osteomyelitis involving T11-12 with epidural abscess extending from the T11-12 disc level to the L1 level eccentric towards the left.  Additional site of infection of the right L2-3 facet joint as well as osteomyelitis of the right L3 transverse process.    Significant findings in the ED include:  Vitals: /84, , RR 16, Temp 98.2 F, O2 98% on RA  Labs: WBC 14.6, HGB 8.2, HCT 27.1, MCV 74.2, Na 133, K 3.5, Bun 8, Crt0.54, , Glucose 171, UA normal.  today;124 2 days ago, CRP  22.90 Today; 12.9 2 days ago  Interventions: Toradol 30 mg IV    Pt was subsequently admitted to the Inpatient Hospitalist Service for further evaluation and management.               Procedure(s) (LRB):  T9-L2 " fusion, T11-T12 laminectomy and corpectomy (N/A)      Hospital Course:   Patient is a 52-year-old female who is being admitted for possible epidural abscess/osteomyelitis of T12.  MRI Thoracic w/o Contrast - Findings suggestive of osteomyelitis discitis of T11-12 with epidural phlegmon or extruded disc contributing to moderate spinal canal stenosis.  Additional epidural phlegmon extending in the left paracentral location and of the left T11-12 and T12-L1 foramina. Will empirically start patient on antibiotics consisting of vancomycin and Rocephin and consult ortho spine. 1/24/23 orthopedic performed T9-L2 fusion, T11-T12 laminectomy and corpectomy.  Bone biopsy and wound culture grow MRSA. Sensitive to Vancomycin. Patient scheduled to continue vancomycin for 6 weeks. After surgery pain control was paramount in patient. Patient was eventually transitioned from IV pain medication to oral pain medication. Patient started to complain of L. Hip pain. Patient has artificial hip and left hip was tender to touch. Xray of Lip hip done did not show any abnormalities. Dr. Noyola consulted we did a left hip CT guided drainage and no anaerobic or aerobic growth noted. It was decided to treat patient with PT/OT and corticosteroid injection. We attempted to get [facility] accepted to [facility] and her insurance denied her. We tried swingbed and she was denied. Patient was deemed stable enough to go home with home cash to received antibiotics at home. Patient has a PICC line and needs 4 more weeks  3/7/23 of Antibiotics. Patients Vancomycin changed to Daptomycin for 4 weeks at home. Patient has reached maximum benefit for this hospitalization and ready for discharge home.    Patient is to follow up with PCP in 2 days  for hospital follow up, Labs and insulin regimen optimization and patient is to follow up with the pain clinic in 2 days for chronic pain follow up. Patient is to follow up with Dr. Noyola in 5 weeks  after antibiotics treatment is completed. Patient told if symtoms worsen to return to nearest emergency department ASAP. Patient verbalized understanding.       Goals of Care Treatment Preferences:  Code Status: Full Code      Consults:   Consults (From admission, onward)          Status Ordering Provider     Inpatient consult to Social Work  Once        Provider:  (Not yet assigned)    Completed ALIA KABA     Inpatient consult to Social Work  Once        Provider:  (Not yet assigned)    Completed ALIA KABA     Inpatient consult to Social Work  Once        Provider:  (Not yet assigned)    Completed ALIA KABA     Inpatient consult to Social Work  Once        Provider:  (Not yet assigned)    Completed ALIA KABA     Inpatient consult to Orthopedic Surgery  Once        Provider:  Jimmy Hernandes MD    Acknowledged ALIA KABA     Inpatient consult to Social Work  Once        Provider:  (Not yet assigned)    Completed PATSY MA JR     Inpatient consult to Orthopedic Surgery  Once        Provider:  Jimmy Hernandes MD    Completed MALIHA BAEZ     Inpatient consult to Diabetes educator  Once        Provider:  (Not yet assigned)    Completed JOSEPH FISHER     Pharmacy to dose Vancomycin consult  Once        Provider:  (Not yet assigned)    Completed JOSEPH FISHER            * Epidural abscess  - MRI Thoracic w/o Contrast - Findings suggestive of osteomyelitis discitis of T11-12 with epidural phlegmon or extruded disc contributing to moderate spinal canal stenosis.  Additional epidural phlegmon extending in the left paracentral location and of the left T11-12 and T12-L1 foramina.  - MRSA sensitive to Vancomycin, will continue   - Surgery/washout 1/24/2023 successful   - pathology revealed osteomyelitis krystal will need 6 weeks of abx coverage which will go until 3/7/23, will try to transfer her to LTAC for continued abx.   -[facility]AC denied. Will try swingbed    2/3/23  -Awaiting approval for swing  bed. If no success will consider antibiotic treatment with home cash.     2/4/23  -Continuing vancomycin until 3/7/23  -Awaiting approval for SWB    2/6/23  Referral sent to  [facility] denied    2/7/23  Attempting to get Home Health to accept patient so she can get her Antibiotics for the next 4 weeks  Will change Antibiotic to Daptomycin    2/8/23  Discharge home today  Daptomycin with home health until 3/7/23        Final Active Diagnoses:    Diagnosis Date Noted POA    PRINCIPAL PROBLEM:  Epidural abscess [G06.2] 01/19/2023 Yes    GATO (acute kidney injury) [N17.9] 02/05/2023 Yes    Thrombocytosis [D75.839] 02/05/2023 Yes    Left hip pain [M25.552] 01/30/2023 Yes    Hypertension [I10] 01/27/2023 Yes    Microcytic anemia [D50.9] 01/19/2023 Yes    Type 2 diabetes mellitus, with long-term current use of insulin [E11.9, Z79.4] 11/20/2022 Not Applicable    Depression [F32.A] 01/04/2022 Yes      Problems Resolved During this Admission:    Diagnosis Date Noted Date Resolved POA    Hyperkalemia [E87.5] 01/25/2023 01/30/2023 No    Hypomagnesemia [E83.42] 01/20/2023 02/05/2023 Yes       Discharged Condition: stable    Disposition: Home or Self Care    Follow Up:   Contact information for follow-up providers       Hannah Schulz MD. Schedule an appointment as soon as possible for a visit in 2 day(s).    Specialty: Family Medicine  Why: Hospital Follow up. MRSA wound and bone. Labs CBC CMP; 10:00 AM  Insulin regimen  Contact information:  [address] [facility] [address]  [phone]               Jacek Noyola MD. Schedule an appointment as soon as possible for a visit in 5 week(s).    Specialty: Orthopedic Surgery  Why: Hospital follow up/ Post antibiotic treatment for MRSA    march 20, 2023 at 10:30  Contact information:  [address] [facility] [address]  [facility] [address]  [phone]               Zaheer Cardenas MD. Schedule an appointment as soon as possible for a visit in 2 day(s).     Specialty: Pain Medicine  Why: Pain prescription follow up    2/9/23  at 1:15pm  Contact information:  [address] [facility] [address]  [phone]                       Contact information for after-discharge care       Dialysis/Infusion       [facility] .    Service: Home Infusion and Injection  Contact information:  [address] [place] [address]  [phone]                     Home Medical Care       [facility] .    Service: Home Nursing  Contact information:  [address] [facility] [address]  [phone]                                 Patient Instructions:   No discharge procedures on file.    Significant Diagnostic Studies: Labs:   BMP:   Recent Labs   Lab 02/07/23  0402 02/08/23 0318   * 63*    136   K 4.6 4.4   CL 97* 97*   CO2 28 28   BUN 39* 38*   CREATININE 1.32* 1.40*   CALCIUM 9.1 9.2    and CBC   Recent Labs   Lab 02/06/23 1956 02/07/23 0402 02/08/23 0318   WBC  --  12.91* 13.24*   HGB 7.6* 8.0* 8.4*   HCT 25.0* 26.8* 28.3*   PLT  --  469* 505*       Pending Diagnostic Studies:       Procedure Component Value Units Date/Time    CT Aspiration Injection Major Joint Left(XPD) [[id number]]     Order Status: Sent Lab Status: No result     EXTRA TUBES [[id number]] Collected: 01/25/23 0925    Order Status: Sent Lab Status: In process Updated: 01/25/23 0925    Specimen: Blood, Venous     Narrative:      The following orders were created for panel order EXTRA TUBES.  Procedure                               Abnormality         Status                     ---------                               -----------         ------                     Lavender Top Hold[[id number]]                                In process                   Please view results for these tests on the individual orders.    EXTRA TUBES [[id number]] Collected: 01/19/23 0123    Order Status: Sent Lab Status: In process Updated: 01/19/23 0130    Specimen:  "Blood, Venous     Narrative:      The following orders were created for panel order EXTRA TUBES.  Procedure                               Abnormality         Status                     ---------                               -----------         ------                     Light Blue Top Hold[[id number]]                              In process                 Gold Top Hold[[id number]]                                    In process                   Please view results for these tests on the individual orders.           Medications:  Reconciled Home Medications:      Medication List        START taking these medications      docusate sodium 100 MG capsule  Commonly known as: COLACE  Take 1 capsule (100 mg total) by mouth once daily.  Start taking on: February 9, 2023     ferrous sulfate 325 (65 FE) MG EC tablet  Take 1 tablet (325 mg total) by mouth once daily.     hydrALAZINE 25 MG tablet  Commonly known as: APRESOLINE  Take 1 tablet (25 mg total) by mouth every 12 (twelve) hours.     hydrocortisone 1 % cream  Apply topically 2 (two) times daily.     sodium chloride 0.9% SolP 50 mL with DAPTOmycin 500 mg SolR 394 mg  Inject 394 mg into the vein once daily. Daily given by Home health----Hand delivered medications---- for 26 doses            CHANGE how you take these medications      insulin glargine 100 units/mL SubQ pen  Commonly known as: LANTUS SOLOSTAR U-100 INSULIN  Inject 30 Units into the skin once daily.  What changed: how much to take     NovoLOG FlexPen U-100 Insulin 100 unit/mL (3 mL) Inpn pen  Generic drug: insulin aspart U-100  Inject 5 Units into the skin 3 (three) times daily with meals.  What changed:   how much to take  when to take this            CONTINUE taking these medications      BD ULTRA-FINE SHORT PEN NEEDLE 31 gauge x 5/16" Ndle  Generic drug: pen needle, diabetic     DULoxetine 60 MG capsule  Commonly known as: CYMBALTA  Take 1 capsule (60 mg total) by mouth once daily.   "   HYDROcodone-acetaminophen 5-325 mg per tablet  Commonly known as: NORCO  Take 1 tablet by mouth every 6 (six) hours as needed for Pain.     NEURONTIN ORAL  Take by mouth 2 (two) times a day.     TRULICITY 0.75 mg/0.5 mL pen injector  Generic drug: dulaglutide  Inject 0.75 mg into the skin every 7 days.            STOP taking these medications      VANCOMYCIN 1.5 G/250 ML D5W              Indwelling Lines/Drains at time of discharge:   Lines/Drains/Airways       Peripherally Inserted Central Catheter Line  Duration             PICC Double Lumen 12/06/22 1344 left basilic 64 days                    Time spent on the discharge of patient: 45   minutes         Jackson Lai MD  Department of Hospital Medicine  [facility]

## 2023-02-08 NOTE — PLAN OF CARE
[facility] Telemetry  Discharge Final Note    Primary Care Provider: Hannah Schulz MD    Expected Discharge Date: 2/8/2023    Final Discharge Note (most recent)       Final Note - 02/08/23 1147          Final Note    Assessment Type Final Discharge Note     Anticipated Discharge Disposition Home-Health Care Svc        Post-Acute Status    Post-Acute Authorization Home Health;IV Infusion     Home Health Status Set-up Complete/Auth obtained     IV Infusion Status Set-up Complete/Auth obtained     Patient choice form signed by patient/caregiver List with quality metrics by geographic area provided;List from CMS Compare;List from System Post-Acute Care     Discharge Delays None known at this time                   Patient discharging home today. SS spoke with René at [facility], patient's home IV abx will be delivered to her room prior to discharge. Floor nurse, Radha, made aware. SS spoke with David at [facility] and notified him of patient's discharge. [facility] is able to accept patient at home with home IV abx. Order for weekly lab work faxed to [facility] and David notified. Patient states she has all needed DME at home already. Patient's mother, Alejandrina, notified of patient's discharge and she is arranging transportation. Discharge information faxed to [facility].     Important Message from Medicare  Important Message from Medicare regarding Discharge Appeal Rights: Explained to patient/caregiver     Date IMM was signed: 02/08/23  Time IMM was signed: 1146    Contact Info       Hannah Schulz MD   Specialty: Family Medicine   Relationship: PCP - General    Mario Doyle [facility]   Phone: [phone]       Next Steps: Follow up on 2/15/2023    Instructions: Hospital Follow up. MRSA wound and bone. Labs CBC CMP; 10:00 AM

## 2023-02-08 NOTE — PROGRESS NOTES
"[facility] Hospital Medicine  Progress Note    Patient Name: Monica Walker  MRN: [id number]  Patient Class: IP- Inpatient   Admission Date: 1/19/2023  Length of Stay: 20 days  Attending Physician: Adilson Fontanez DO  Primary Care Provider: Hannah Schulz MD        Subjective:     Principal Problem:Epidural abscess        HPI:  Patient is a 53 y/o Female presenting to [facility] with a cc of back pain. She states the pain began to worsen approximately 16 hours ago. She states the pain is in her Mid back area and radiates to both legs. She describes the pain as "throbbing, constant and a 9/10." She states the pain is blunted by taking Hydrocodone but not relieved. She reports the pain is made worse by movement and standing.    Patient reports she was hospitalized in November for sepsis secondary to Pyelonephritis. She has been hospitalized several times over the past 3 months. She is currently being treated at home with IV Abx therapy for Osteomyelitis of the spine.  MRI of Lumbar spine on 1/10/2023 (Per radiology report). Findings indicative of osteomyelitis involving T11-12 with epidural abscess extending from the T11-12 disc level to the L1 level eccentric towards the left.  Additional site of infection of the right L2-3 facet joint as well as osteomyelitis of the right L3 transverse process.    Significant findings in the ED include:  Vitals: /84, , RR 16, Temp 98.2 F, O2 98% on RA  Labs: WBC 14.6, HGB 8.2, HCT 27.1, MCV 74.2, Na 133, K 3.5, Bun 8, Crt0.54, , Glucose 171, UA normal.  today;124 2 days ago, CRP  22.90 Today; 12.9 2 days ago  Interventions: Toradol 30 mg IV    Pt was subsequently admitted to the Inpatient Hospitalist Service for further evaluation and management.               Overview/Hospital Course:  No notes on file    Interval History: Patient in bed AAOX3. Patient stated she feel last night while trying to use the " bathroom. Xray of ankle showed no fractures or acute injury. Patients blood glucose 63 this am adjustment were made to insulin regimen.    Review of Systems   Constitutional:  Negative for fatigue and fever.   HENT:  Negative for ear discharge, ear pain, facial swelling, mouth sores, postnasal drip, rhinorrhea and sore throat.    Eyes:  Negative for pain, discharge and itching.   Respiratory:  Negative for cough.    Cardiovascular:  Negative for leg swelling.   Gastrointestinal:  Negative for abdominal distention, abdominal pain, anal bleeding, constipation and diarrhea.   Endocrine: Negative for cold intolerance, heat intolerance and polydipsia.   Genitourinary:  Negative for difficulty urinating, dyspareunia, flank pain, frequency, genital sores and hematuria.   Musculoskeletal:  Positive for arthralgias. Negative for back pain, gait problem and joint swelling.   Skin:  Positive for wound.   Allergic/Immunologic: Negative for environmental allergies and food allergies.   Neurological:  Negative for dizziness, facial asymmetry, light-headedness and headaches.   Hematological: Negative.    Psychiatric/Behavioral: Negative.     Objective:     Vital Signs (Most Recent):  Temp: 97.9 °F (36.6 °C) (02/08/23 0714)  Pulse: 71 (02/08/23 0714)  Resp: 18 (02/08/23 0714)  BP: 124/64 (02/08/23 0714)  SpO2: 95 % (02/08/23 0714) Vital Signs (24h Range):  Temp:  [97.2 °F (36.2 °C)-98.2 °F (36.8 °C)] 97.9 °F (36.6 °C)  Pulse:  [71-96] 71  Resp:  [18] 18  SpO2:  [95 %-99 %] 95 %  BP: (121-126)/(60-69) 124/64     Weight: 65.7 kg (144 lb 13.5 oz)  Body mass index is 23.38 kg/m².  No intake or output data in the 24 hours ending 02/08/23 0842   Physical Exam  Constitutional:       Appearance: She is normal weight.   HENT:      Head: Normocephalic.      Right Ear: Tympanic membrane normal.      Left Ear: Tympanic membrane normal.      Nose: Nose normal.      Mouth/Throat:      Mouth: Mucous membranes are moist.      Pharynx: Oropharynx  is clear.   Eyes:      Conjunctiva/sclera: Conjunctivae normal.      Pupils: Pupils are equal, round, and reactive to light.   Cardiovascular:      Rate and Rhythm: Normal rate and regular rhythm.      Pulses: Normal pulses.      Heart sounds: Normal heart sounds.   Pulmonary:      Effort: Pulmonary effort is normal.      Breath sounds: No rhonchi.   Abdominal:      General: Abdomen is flat. Bowel sounds are normal.   Musculoskeletal:         General: No tenderness. Normal range of motion.      Cervical back: Normal range of motion.      Comments: Lower back itching and new bump  Left hip tenderness increased    Skin:     General: Skin is warm and dry.      Capillary Refill: Capillary refill takes less than 2 seconds.   Neurological:      Mental Status: She is alert and oriented to person, place, and time.   Psychiatric:         Mood and Affect: Mood normal.         Behavior: Behavior normal.       Significant Labs: All pertinent labs within the past 24 hours have been reviewed.  BMP:   Recent Labs   Lab 02/08/23  0318   GLU 63*      K 4.4   CL 97*   CO2 28   BUN 38*   CREATININE 1.40*   CALCIUM 9.2     CBC:   Recent Labs   Lab 02/06/23  1956 02/07/23  0402 02/08/23  0318   WBC  --  12.91* 13.24*   HGB 7.6* 8.0* 8.4*   HCT 25.0* 26.8* 28.3*   PLT  --  469* 505*       Significant Imaging: I have reviewed all pertinent imaging results/findings within the past 24 hours.      Assessment/Plan:      * Epidural abscess  - MRI Thoracic w/o Contrast - Findings suggestive of osteomyelitis discitis of T11-12 with epidural phlegmon or extruded disc contributing to moderate spinal canal stenosis.  Additional epidural phlegmon extending in the left paracentral location and of the left T11-12 and T12-L1 foramina.  - MRSA sensitive to Vancomycin, will continue   - Surgery/washout 1/24/2023 successful   - pathology revealed osteomyelitis patinet will need 6 weeks of abx coverage which will go until 3/7/23, will try to transfer  her to LTAC for continued abx.   -LTAC denied. Will try swingbed    2/3/23  -Awaiting approval for swing bed. If no success will consider antibiotic treatment with home cash.     2/4/23  -Continuing vancomycin until 3/7/23  -Awaiting approval for SWB    2/6/23  Referral sent to  [facility] denied    2/7/23  Attempting to get Home Health to accept patient so she can get her Antibiotics for the next 4 weeks  Will change Antibiotic to Daptomycin      Thrombocytosis  Likely reactive secondary to infection, anemia     -continue to monitor         GATO (acute kidney injury)  Patient with acute kidney injury likely due to pre-renal azotemia GATO is currently improving. Labs reviewed- Renal function/electrolytes with Estimated Creatinine Clearance: 43 mL/min (A) (based on SCr of 1.4 mg/dL (H)). according to latest data. Monitor urine output and serial BMP and adjust therapy as needed. Avoid nephrotoxins and renally dose meds for GFR listed above.     -likely secondary to Vancomycin use     Cr on admission 0.54  Cr 1.35 from 1.44 (2/5/23)  Avoid nephrotoxic meds   Renally dose vancomycin         Left hip pain  Left Hip X-ray -Left hip arthroplasty appears within normal limits.    PT/OT    2/1/23  Patient complaining of more pain in left hip. Left hip is tender with Passive ROM  Orthopedics Consulted. We want to see if patient may have possible infected artificial hip from Epidural infection. MRSA.  Thank orthopedics for recommendations.    2/2/23  CT guided aspiration of left hip    2/5/23  Culture anaerobe, body fluid from Aspiration of hip (2/1)- NGTD  -Will discuss with Dr. Noyola for insights    2/8/23  Left Hip corticosteroid injection with Dr. oNyola today    Hypertension  Not currently controlled  Patient took no home meds  Occasionally correspond to pain  Hydralazine 25mg Q8hrs      Microcytic anemia  Ferritin - 380  Iron - 12  Iron Sat - 6  TIBC - 188    Ferrous sulfate given Qd        Type 2 diabetes mellitus,  with long-term current use of insulin  Patient's FSGs are uncontrolled due to hyperglycemia on current medication regimen.  Last A1c reviewed-   Lab Results   Component Value Date    HGBA1C 7.9 (H) 01/19/2023     Most recent fingerstick glucose reviewed- No results for input(s): POCTGLUCOSE in the last 24 hours.  Current correctional scale  Medium  Maintain anti-hyperglycemic dose as follows-   Antihyperglycemics (From admission, onward)    Start     Stop Route Frequency Ordered    02/08/23 1130  insulin aspart U-100 injection 6 Units         -- SubQ 3 times daily with meals 02/08/23 0821    02/06/23 2100  insulin detemir U-100 injection 30 Units         -- SubQ Nightly 02/06/23 0936    01/24/23 2136  insulin aspart U-100 injection 1-10 Units         -- SubQ Before meals & nightly PRN 01/24/23 2036        Hold Oral hypoglycemics while patient is in the hospital.  Diabetic Educator consulted, appreciate their help    Depression  - Continue Cymbalta        VTE Risk Mitigation (From admission, onward)         Ordered     heparin (porcine) injection 5,000 Units  Every 8 hours         02/05/23 1112     IP VTE LOW RISK PATIENT  Once         01/19/23 0417     Place sequential compression device  Until discontinued         01/19/23 0417                Discharge Planning   SUZANNA:      Code Status: Full Code   Is the patient medically ready for discharge?:     Reason for patient still in hospital (select all that apply): Treatment  Discharge Plan A: Home with family, Home Health                  Jackson Lai MD  Department of Hospital Medicine   [facility]

## 2023-02-08 NOTE — HOSPITAL COURSE
Patient is a 52-year-old female who is being admitted for possible epidural abscess/osteomyelitis of T12.  MRI Thoracic w/o Contrast - Findings suggestive of osteomyelitis discitis of T11-12 with epidural phlegmon or extruded disc contributing to moderate spinal canal stenosis.  Additional epidural phlegmon extending in the left paracentral location and of the left T11-12 and T12-L1 foramina. Will empirically start patient on antibiotics consisting of vancomycin and Rocephin and consult ortho spine. 1/24/23 orthopedic performed T9-L2 fusion, T11-T12 laminectomy and corpectomy.  Bone biopsy and wound culture grow MRSA. Sensitive to Vancomycin. Patient scheduled to continue vancomycin for 6 weeks. After surgery pain control was paramount in patient. Patient was eventually transitioned from IV pain medication to oral pain medication. Patient started to complain of L. Hip pain. Patient has artificial hip and left hip was tender to touch. Xray of Lip hip done did not show any abnormalities. Dr. Noyola consulted we did a left hip CT guided drainage and no anaerobic or aerobic growth noted. It was decided to treat patient with PT/OT and corticosteroid injection. We [facility] and her insurance denied her. We tried swingbed and she was denied. Patient was deemed stable enough to go home with home cash to received antibiotics at home. Patient has a PICC line and needs 4 more weeks  3/7/23 of Antibiotics. Patients Vancomycin changed to Daptomycin for 4 weeks at home. Patient has reached maximum benefit for this hospitalization and ready for discharge home.    Patient is to follow up with PCP in 2 days  for hospital follow up, Labs and insulin regimen optimization and patient is to follow up with the pain clinic in 2 days for chronic pain follow up. Patient is to follow up with Dr. Noyola in 5 weeks after antibiotics treatment is completed. Patient told if symtoms worsen to  return to nearest emergency department ASAP. Patient verbalized understanding.   intermittent

## 2023-02-08 NOTE — ASSESSMENT & PLAN NOTE
Left Hip X-ray -Left hip arthroplasty appears within normal limits.    PT/OT    2/1/23  Patient complaining of more pain in left hip. Left hip is tender with Passive ROM  Orthopedics Consulted. We want to see if patient may have possible infected artificial hip from Epidural infection. MRSA.  Thank orthopedics for recommendations.    2/2/23  CT guided aspiration of left hip    2/5/23  Culture anaerobe, body fluid from Aspiration of hip (2/1)- NGTD  -Will discuss with Dr. Noyola for insights    2/8/23  Left Hip corticosteroid injection with Dr. Noyola today

## 2023-02-08 NOTE — NURSING
Patient dc teaching done,NADN, patient transported via wheelchair to mother's vehicle , care released.

## 2023-02-08 NOTE — SUBJECTIVE & OBJECTIVE
Interval History: Patient in bed AAOX3. Patient stated she feel last night while trying to use the bathroom. Xray of ankle showed no fractures or acute injury. Patients blood glucose 63 this am adjustment were made to insulin regimen.    Review of Systems   Constitutional:  Negative for fatigue and fever.   HENT:  Negative for ear discharge, ear pain, facial swelling, mouth sores, postnasal drip, rhinorrhea and sore throat.    Eyes:  Negative for pain, discharge and itching.   Respiratory:  Negative for cough.    Cardiovascular:  Negative for leg swelling.   Gastrointestinal:  Negative for abdominal distention, abdominal pain, anal bleeding, constipation and diarrhea.   Endocrine: Negative for cold intolerance, heat intolerance and polydipsia.   Genitourinary:  Negative for difficulty urinating, dyspareunia, flank pain, frequency, genital sores and hematuria.   Musculoskeletal:  Positive for arthralgias. Negative for back pain, gait problem and joint swelling.   Skin:  Positive for wound.   Allergic/Immunologic: Negative for environmental allergies and food allergies.   Neurological:  Negative for dizziness, facial asymmetry, light-headedness and headaches.   Hematological: Negative.    Psychiatric/Behavioral: Negative.     Objective:     Vital Signs (Most Recent):  Temp: 97.9 °F (36.6 °C) (02/08/23 0714)  Pulse: 71 (02/08/23 0714)  Resp: 18 (02/08/23 0714)  BP: 124/64 (02/08/23 0714)  SpO2: 95 % (02/08/23 0714) Vital Signs (24h Range):  Temp:  [97.2 °F (36.2 °C)-98.2 °F (36.8 °C)] 97.9 °F (36.6 °C)  Pulse:  [71-96] 71  Resp:  [18] 18  SpO2:  [95 %-99 %] 95 %  BP: (121-126)/(60-69) 124/64     Weight: 65.7 kg (144 lb 13.5 oz)  Body mass index is 23.38 kg/m².  No intake or output data in the 24 hours ending 02/08/23 0842   Physical Exam  Constitutional:       Appearance: She is normal weight.   HENT:      Head: Normocephalic.      Right Ear: Tympanic membrane normal.      Left Ear: Tympanic membrane normal.      Nose:  Nose normal.      Mouth/Throat:      Mouth: Mucous membranes are moist.      Pharynx: Oropharynx is clear.   Eyes:      Conjunctiva/sclera: Conjunctivae normal.      Pupils: Pupils are equal, round, and reactive to light.   Cardiovascular:      Rate and Rhythm: Normal rate and regular rhythm.      Pulses: Normal pulses.      Heart sounds: Normal heart sounds.   Pulmonary:      Effort: Pulmonary effort is normal.      Breath sounds: No rhonchi.   Abdominal:      General: Abdomen is flat. Bowel sounds are normal.   Musculoskeletal:         General: No tenderness. Normal range of motion.      Cervical back: Normal range of motion.      Comments: Lower back itching and new bump  Left hip tenderness increased    Skin:     General: Skin is warm and dry.      Capillary Refill: Capillary refill takes less than 2 seconds.   Neurological:      Mental Status: She is alert and oriented to person, place, and time.   Psychiatric:         Mood and Affect: Mood normal.         Behavior: Behavior normal.       Significant Labs: All pertinent labs within the past 24 hours have been reviewed.  BMP:   Recent Labs   Lab 02/08/23  0318   GLU 63*      K 4.4   CL 97*   CO2 28   BUN 38*   CREATININE 1.40*   CALCIUM 9.2     CBC:   Recent Labs   Lab 02/06/23  1956 02/07/23  0402 02/08/23  0318   WBC  --  12.91* 13.24*   HGB 7.6* 8.0* 8.4*   HCT 25.0* 26.8* 28.3*   PLT  --  469* 505*       Significant Imaging: I have reviewed all pertinent imaging results/findings within the past 24 hours.   [id number] Detailed

## 2023-02-08 NOTE — PROGRESS NOTES
Aspiration of the left hip aerobic and anaerobic cultures were negative for bacterial growth.  This patient continues to experience pain that she localizes to the left GT bursal region inhibiting her ambulatory abilities.  Examination of her left hip shows well-healed surgical incision.  There is no soft tissue swelling.  There is tenderness palpation over the left greater trochanteric bursal region reproducing symptoms.  No significant groin pain elicited with passive hip motion.  Plan:  I have ordered supplies for planned left GT bursal corticosteroid injection tomorrow.  Hopefully this will address her symptoms and help with mobilization.

## 2023-02-08 NOTE — NURSING
Spoke to patient's mother, Mom stated that she is making arrangements to have some one pick her up, she also inquired about the patient needing a prescription for pain meds as well as an appointment to the pain clinic. I informed her that I had already spoken with Dr. Srivastava.

## 2023-02-08 NOTE — NURSING
Pt had a fall after getting off of the bedside commode. VS are stable and pt stated that right ankle was hurting, Dr. Batista was notified and an X-ray was ordered. Pt was also given pain medication. SR are up, non-skid socks were placed, and pt now knows to call before getting up off the bsc.

## 2023-02-09 ENCOUNTER — TELEPHONE (OUTPATIENT)
Dept: PRIMARY CARE CLINIC | Facility: CLINIC | Age: 53
End: 2023-02-09
Payer: MEDICARE

## 2023-02-09 LAB
BACTERIA BLD CULT: ABNORMAL
GRAM STN SPEC: ABNORMAL

## 2023-02-09 NOTE — TELEPHONE ENCOUNTER
Contacted pt for TCC call. Pt reports she is doing okay. She reports she has pain to her back, rates pain level 7 this morning. Pt reports she does not have pain medications but she will see pain management dr Cardenas today. Pt reports her incision looks good with no s/s of infection. Pt reports her blood sugar levels are stable. Pt lives with her mother, has good family support. She is current with . She is receiving home IV antibiotics. She reports HH nurse has made visit. She vu of care of PICC line and s/s of infection to report to [facility] or . Pt denies any needs at this time. Pt vu of all f/u appts. She reports she no longer see Dr Schulz, states her Pcp is Mari Myers and she has an appt to f/u on 28/13/23. Meds reviewed. Instructed pt to notify  nurse or her provider for changes in her condition. She vu.

## 2023-02-10 NOTE — PROGRESS NOTES
[facility]  Hospital Medicine  Progress Note    Patient Name: Monica Walker  MRN: [id number]  Patient Class: IP- Swing   Admission Date: 12/6/2022  Length of Stay: 3 days  Attending Physician: Hannah Schulz, *  Primary Care Provider: Hannah Schulz MD        Subjective:     Principal Problem:Septic arthritis        HPI:  No notes on file    Overview/Hospital Course:  12/7/22 - pt. Was admitted last night for continued IV antibiotics for a septic joint. She feels terrible. Orders revised.    12/9/22 - pt. Got a shower this morning. First time in a month according to patient. Requesting a toradol shot.      Interval History: requesting a toradol shot.    Review of Systems  Objective:     Vital Signs (Most Recent):  Temp: 98.5 °F (36.9 °C) (12/09/22 0805)  Pulse: 96 (12/09/22 0805)  Resp: 18 (12/09/22 0805)  BP: 111/74 (12/09/22 0805)  SpO2: 98 % (12/09/22 0805) Vital Signs (24h Range):  Temp:  [97.3 °F (36.3 °C)-98.5 °F (36.9 °C)] 98.5 °F (36.9 °C)  Pulse:  [] 96  Resp:  [18-21] 18  SpO2:  [93 %-98 %] 98 %  BP: (111-123)/(68-82) 111/74     Weight: 58.5 kg (129 lb)  Body mass index is 20.82 kg/m².    Intake/Output Summary (Last 24 hours) at 12/9/2022 0826  Last data filed at 12/8/2022 1726  Gross per 24 hour   Intake 720 ml   Output --   Net 720 ml      Physical Exam    Significant Labs: All pertinent labs within the past 24 hours have been reviewed.    Significant Imaging: I have reviewed all pertinent imaging results/findings within the past 24 hours.      Assessment/Plan:      * Septic arthritis          VTE Risk Mitigation (From admission, onward)         Ordered     enoxaparin injection 30 mg  Daily         12/07/22 0855                Discharge Planning   SUZANNA:      Code Status: Full Code   Is the patient medically ready for discharge?:     Reason for patient still in hospital (select all that apply): Patient trending condition  Discharge Plan A: Home  Remote Patient Monitoring Note      Date/Time:  2/10/2023 9:39 AM  LPN} contacted patient by telephone regarding red alert received for blood pressure reading (174/104). Verified patient by 2 identifiers    Background: Pt enrolled for HTN monitoring     Clinical Interventions: Spoke with patient, he reports he took his medications around 8:30am, he was able to recheck his BP while on the call, repeat /81. He feels well, denies SOB/CP/HA    Plan/Follow Up: Will continue to review, monitor and address alerts with follow up based on severity of symptoms and risk factors.       -- Current Patient Metrics ---- Blood Pressure: 142/81, 61bpm Pulseox: 98%, 64bpm Survey: - Weight: 259.0lbs Note Created at: 02/10/2023 09:45 AM ET ---- Time-Spent: 7 minutes 0 seconds with family                  Hannah Schulz MD  Department of Hospital Medicine   [facility]

## 2023-02-24 ENCOUNTER — LAB REQUISITION (OUTPATIENT)
Dept: LAB | Facility: HOSPITAL | Age: 53
End: 2023-02-24
Attending: FAMILY MEDICINE
Payer: MEDICARE

## 2023-02-24 DIAGNOSIS — Z79.2 LONG TERM (CURRENT) USE OF ANTIBIOTICS: ICD-10-CM

## 2023-02-24 LAB
ALBUMIN SERPL BCP-MCNC: 2 G/DL (ref 3.5–5)
ALBUMIN/GLOB SERPL: 0.4 {RATIO}
ALP SERPL-CCNC: 180 U/L (ref 41–108)
ALT SERPL W P-5'-P-CCNC: 21 U/L (ref 13–56)
ANION GAP SERPL CALCULATED.3IONS-SCNC: 14 MMOL/L (ref 7–16)
AST SERPL W P-5'-P-CCNC: 19 U/L (ref 15–37)
BASOPHILS # BLD AUTO: 0.01 K/UL (ref 0–0.2)
BASOPHILS NFR BLD AUTO: 0.1 % (ref 0–1)
BILIRUB SERPL-MCNC: 0.2 MG/DL (ref ?–1.2)
BUN SERPL-MCNC: 17 MG/DL (ref 7–18)
BUN/CREAT SERPL: 15 (ref 6–20)
CALCIUM SERPL-MCNC: 8.7 MG/DL (ref 8.5–10.1)
CHLORIDE SERPL-SCNC: 98 MMOL/L (ref 98–107)
CO2 SERPL-SCNC: 26 MMOL/L (ref 21–32)
CREAT SERPL-MCNC: 1.12 MG/DL (ref 0.55–1.02)
DIFFERENTIAL METHOD BLD: ABNORMAL
EGFR (NO RACE VARIABLE) (RUSH/TITUS): 59 ML/MIN/1.73M²
EOSINOPHIL # BLD AUTO: 0.52 K/UL (ref 0–0.5)
EOSINOPHIL NFR BLD AUTO: 5.2 % (ref 1–4)
ERYTHROCYTE [DISTWIDTH] IN BLOOD BY AUTOMATED COUNT: 19.8 % (ref 11.5–14.5)
ERYTHROCYTE [SEDIMENTATION RATE] IN BLOOD BY WESTERGREN METHOD: 122 MM/HR (ref 0–30)
GLOBULIN SER-MCNC: 5.4 G/DL (ref 2–4)
GLUCOSE SERPL-MCNC: 194 MG/DL (ref 74–106)
HCT VFR BLD AUTO: 26.5 % (ref 38–47)
HGB BLD-MCNC: 7.8 G/DL (ref 12–16)
IMM GRANULOCYTES # BLD AUTO: 0.08 K/UL (ref 0–0.04)
IMM GRANULOCYTES NFR BLD: 0.8 % (ref 0–0.4)
LYMPHOCYTES # BLD AUTO: 1.89 K/UL (ref 1–4.8)
LYMPHOCYTES NFR BLD AUTO: 18.9 % (ref 27–41)
MCH RBC QN AUTO: 23.2 PG (ref 27–31)
MCHC RBC AUTO-ENTMCNC: 29.4 G/DL (ref 32–36)
MCV RBC AUTO: 78.9 FL (ref 80–96)
MONOCYTES # BLD AUTO: 0.99 K/UL (ref 0–0.8)
MONOCYTES NFR BLD AUTO: 9.9 % (ref 2–6)
MPC BLD CALC-MCNC: 9.1 FL (ref 9.4–12.4)
NEUTROPHILS # BLD AUTO: 6.51 K/UL (ref 1.8–7.7)
NEUTROPHILS NFR BLD AUTO: 65.1 % (ref 53–65)
PLATELET # BLD AUTO: 378 K/UL (ref 150–400)
POTASSIUM SERPL-SCNC: 3.3 MMOL/L (ref 3.5–5.1)
PROT SERPL-MCNC: 7.4 G/DL (ref 6.4–8.2)
RBC # BLD AUTO: 3.36 M/UL (ref 4.2–5.4)
SODIUM SERPL-SCNC: 135 MMOL/L (ref 136–145)
WBC # BLD AUTO: 10 K/UL (ref 4.5–11)

## 2023-02-24 PROCEDURE — 80053 COMPREHEN METABOLIC PANEL: CPT | Performed by: FAMILY MEDICINE

## 2023-02-24 PROCEDURE — 86140 C-REACTIVE PROTEIN: CPT | Performed by: FAMILY MEDICINE

## 2023-02-24 PROCEDURE — 85651 RBC SED RATE NONAUTOMATED: CPT | Performed by: FAMILY MEDICINE

## 2023-02-24 PROCEDURE — 85025 COMPLETE CBC W/AUTO DIFF WBC: CPT | Performed by: FAMILY MEDICINE

## 2023-02-25 LAB — CRP SERPL-MCNC: 10.4 MG/DL (ref 0–0.8)

## 2023-03-02 ENCOUNTER — LAB REQUISITION (OUTPATIENT)
Dept: LAB | Facility: HOSPITAL | Age: 53
End: 2023-03-02
Attending: FAMILY MEDICINE
Payer: MEDICARE

## 2023-03-02 DIAGNOSIS — Z79.2 LONG TERM (CURRENT) USE OF ANTIBIOTICS: ICD-10-CM

## 2023-03-02 LAB
ALBUMIN SERPL BCP-MCNC: 2.4 G/DL (ref 3.5–5)
ALBUMIN/GLOB SERPL: 0.4 {RATIO}
ALP SERPL-CCNC: 149 U/L (ref 41–108)
ALT SERPL W P-5'-P-CCNC: 27 U/L (ref 13–56)
ANION GAP SERPL CALCULATED.3IONS-SCNC: 14 MMOL/L (ref 7–16)
AST SERPL W P-5'-P-CCNC: 13 U/L (ref 15–37)
BASOPHILS # BLD AUTO: 0.02 K/UL (ref 0–0.2)
BASOPHILS NFR BLD AUTO: 0.2 % (ref 0–1)
BILIRUB DIRECT SERPL-MCNC: 0.1 MG/DL (ref 0–0.2)
BILIRUB SERPL-MCNC: 0.2 MG/DL (ref ?–1.2)
BILIRUB UR QL STRIP: NEGATIVE
BUN SERPL-MCNC: 14 MG/DL (ref 7–18)
BUN/CREAT SERPL: 16 (ref 6–20)
CALCIUM SERPL-MCNC: 9.4 MG/DL (ref 8.5–10.1)
CHLORIDE SERPL-SCNC: 99 MMOL/L (ref 98–107)
CHOLEST SERPL-MCNC: 188 MG/DL (ref 0–200)
CHOLEST/HDLC SERPL: 6.5 {RATIO}
CLARITY UR: CLEAR
CO2 SERPL-SCNC: 28 MMOL/L (ref 21–32)
COLOR UR: YELLOW
CREAT SERPL-MCNC: 0.86 MG/DL (ref 0.55–1.02)
CREAT UR-MCNC: 76 MG/DL (ref 28–219)
CRP SERPL-MCNC: 9.33 MG/DL (ref 0–0.8)
DIFFERENTIAL METHOD BLD: ABNORMAL
EGFR (NO RACE VARIABLE) (RUSH/TITUS): 80 ML/MIN/1.73M²
EOSINOPHIL # BLD AUTO: 0.35 K/UL (ref 0–0.5)
EOSINOPHIL NFR BLD AUTO: 3.3 % (ref 1–4)
ERYTHROCYTE [DISTWIDTH] IN BLOOD BY AUTOMATED COUNT: 19.8 % (ref 11.5–14.5)
ERYTHROCYTE [SEDIMENTATION RATE] IN BLOOD BY WESTERGREN METHOD: 115 MM/HR (ref 0–30)
GLOBULIN SER-MCNC: 6 G/DL (ref 2–4)
GLUCOSE SERPL-MCNC: 197 MG/DL (ref 74–106)
GLUCOSE UR STRIP-MCNC: 100 MG/DL
HCT VFR BLD AUTO: 30.6 % (ref 38–47)
HDLC SERPL-MCNC: 29 MG/DL (ref 40–60)
HGB BLD-MCNC: 9 G/DL (ref 12–16)
IMM GRANULOCYTES # BLD AUTO: 0.04 K/UL (ref 0–0.04)
IMM GRANULOCYTES NFR BLD: 0.4 % (ref 0–0.4)
KETONES UR STRIP-SCNC: NEGATIVE MG/DL
LDLC SERPL CALC-MCNC: 120 MG/DL
LDLC/HDLC SERPL: 4.1 {RATIO}
LEUKOCYTE ESTERASE UR QL STRIP: NEGATIVE
LYMPHOCYTES # BLD AUTO: 2.27 K/UL (ref 1–4.8)
LYMPHOCYTES NFR BLD AUTO: 21.6 % (ref 27–41)
MCH RBC QN AUTO: 22.8 PG (ref 27–31)
MCHC RBC AUTO-ENTMCNC: 29.4 G/DL (ref 32–36)
MCV RBC AUTO: 77.5 FL (ref 80–96)
MICROALBUMIN UR-MCNC: 3.9 MG/DL (ref 0–2.8)
MICROALBUMIN/CREAT RATIO PNL UR: 51.3 MG/G (ref 0–30)
MONOCYTES # BLD AUTO: 0.84 K/UL (ref 0–0.8)
MONOCYTES NFR BLD AUTO: 8 % (ref 2–6)
MPC BLD CALC-MCNC: 9.4 FL (ref 9.4–12.4)
NEUTROPHILS # BLD AUTO: 6.97 K/UL (ref 1.8–7.7)
NEUTROPHILS NFR BLD AUTO: 66.5 % (ref 53–65)
NITRITE UR QL STRIP: NEGATIVE
NONHDLC SERPL-MCNC: 159 MG/DL
PH UR STRIP: 5.5 PH UNITS
PLATELET # BLD AUTO: 386 K/UL (ref 150–400)
POTASSIUM SERPL-SCNC: 3.1 MMOL/L (ref 3.5–5.1)
PROT SERPL-MCNC: 8.4 G/DL (ref 6.4–8.2)
PROT UR QL STRIP: NEGATIVE
RBC # BLD AUTO: 3.95 M/UL (ref 4.2–5.4)
RBC # UR STRIP: NEGATIVE /UL
SODIUM SERPL-SCNC: 138 MMOL/L (ref 136–145)
SP GR UR STRIP: 1.01
TRIGL SERPL-MCNC: 196 MG/DL (ref 35–150)
TSH SERPL DL<=0.005 MIU/L-ACNC: 1.52 UIU/ML (ref 0.36–3.74)
UROBILINOGEN UR STRIP-ACNC: 0.2 MG/DL
VLDLC SERPL-MCNC: 39 MG/DL
WBC # BLD AUTO: 10.49 K/UL (ref 4.5–11)

## 2023-03-02 PROCEDURE — 82248 BILIRUBIN DIRECT: CPT | Performed by: FAMILY MEDICINE

## 2023-03-02 PROCEDURE — 84443 ASSAY THYROID STIM HORMONE: CPT | Performed by: FAMILY MEDICINE

## 2023-03-02 PROCEDURE — 82570 ASSAY OF URINE CREATININE: CPT | Performed by: FAMILY MEDICINE

## 2023-03-02 PROCEDURE — 80053 COMPREHEN METABOLIC PANEL: CPT | Performed by: FAMILY MEDICINE

## 2023-03-02 PROCEDURE — 85025 COMPLETE CBC W/AUTO DIFF WBC: CPT | Performed by: FAMILY MEDICINE

## 2023-03-02 PROCEDURE — 85651 RBC SED RATE NONAUTOMATED: CPT | Performed by: FAMILY MEDICINE

## 2023-03-02 PROCEDURE — 80061 LIPID PANEL: CPT | Performed by: FAMILY MEDICINE

## 2023-03-02 PROCEDURE — 86140 C-REACTIVE PROTEIN: CPT | Performed by: FAMILY MEDICINE

## 2023-03-02 PROCEDURE — 82043 UR ALBUMIN QUANTITATIVE: CPT | Performed by: FAMILY MEDICINE

## 2023-03-02 PROCEDURE — 81003 URINALYSIS AUTO W/O SCOPE: CPT | Performed by: FAMILY MEDICINE

## 2023-03-06 ENCOUNTER — HOSPITAL ENCOUNTER (INPATIENT)
Facility: HOSPITAL | Age: 53
LOS: 14 days | Discharge: NURSING, REHAB OR LONG-TERM CARE | DRG: 467 | End: 2023-03-20
Attending: FAMILY MEDICINE | Admitting: FAMILY MEDICINE
Payer: MEDICARE

## 2023-03-06 DIAGNOSIS — R78.81 MRSA BACTEREMIA: ICD-10-CM

## 2023-03-06 DIAGNOSIS — R53.1 WEAKNESS: ICD-10-CM

## 2023-03-06 DIAGNOSIS — M00.9 JOINT INFECTION: ICD-10-CM

## 2023-03-06 DIAGNOSIS — R50.9 FEVER, UNSPECIFIED FEVER CAUSE: Primary | ICD-10-CM

## 2023-03-06 DIAGNOSIS — D64.9 ANEMIA, UNSPECIFIED TYPE: ICD-10-CM

## 2023-03-06 DIAGNOSIS — R05.9 COUGH: ICD-10-CM

## 2023-03-06 DIAGNOSIS — R07.9 CHEST PAIN: ICD-10-CM

## 2023-03-06 DIAGNOSIS — M00.062 STAPHYLOCOCCAL ARTHRITIS OF LEFT KNEE: ICD-10-CM

## 2023-03-06 DIAGNOSIS — B95.62 MRSA BACTEREMIA: ICD-10-CM

## 2023-03-06 LAB
ANION GAP SERPL CALCULATED.3IONS-SCNC: 14 MMOL/L (ref 7–16)
BASOPHILS # BLD AUTO: 0.06 K/UL (ref 0–0.2)
BASOPHILS NFR BLD AUTO: 0.5 % (ref 0–1)
BILIRUB UR QL STRIP: NEGATIVE
BUN SERPL-MCNC: 15 MG/DL (ref 7–18)
BUN/CREAT SERPL: 19 (ref 6–20)
CALCIUM SERPL-MCNC: 7.9 MG/DL (ref 8.5–10.1)
CHLORIDE SERPL-SCNC: 105 MMOL/L (ref 98–107)
CLARITY UR: CLEAR
CO2 SERPL-SCNC: 26 MMOL/L (ref 21–32)
COLOR UR: ABNORMAL
CREAT SERPL-MCNC: 0.77 MG/DL (ref 0.55–1.02)
DIFFERENTIAL METHOD BLD: ABNORMAL
EGFR (NO RACE VARIABLE) (RUSH/TITUS): 92 ML/MIN/1.73M²
EOSINOPHIL # BLD AUTO: 0.28 K/UL (ref 0–0.5)
EOSINOPHIL NFR BLD AUTO: 2.3 % (ref 1–4)
ERYTHROCYTE [DISTWIDTH] IN BLOOD BY AUTOMATED COUNT: 19.7 % (ref 11.5–14.5)
FLUAV AG UPPER RESP QL IA.RAPID: NEGATIVE
FLUBV AG UPPER RESP QL IA.RAPID: NEGATIVE
GLUCOSE SERPL-MCNC: 186 MG/DL (ref 70–105)
GLUCOSE SERPL-MCNC: 84 MG/DL (ref 74–106)
GLUCOSE UR STRIP-MCNC: 200 MG/DL
HCT VFR BLD AUTO: 25.3 % (ref 38–47)
HGB BLD-MCNC: 7.4 G/DL (ref 12–16)
IMM GRANULOCYTES # BLD AUTO: 0.06 K/UL (ref 0–0.04)
IMM GRANULOCYTES NFR BLD: 0.5 % (ref 0–0.4)
KETONES UR STRIP-SCNC: NEGATIVE MG/DL
LACTATE SERPL-SCNC: 0.8 MMOL/L (ref 0.4–2)
LEUKOCYTE ESTERASE UR QL STRIP: NEGATIVE
LYMPHOCYTES # BLD AUTO: 1.91 K/UL (ref 1–4.8)
LYMPHOCYTES NFR BLD AUTO: 15.4 % (ref 27–41)
MCH RBC QN AUTO: 22.6 PG (ref 27–31)
MCHC RBC AUTO-ENTMCNC: 29.2 G/DL (ref 32–36)
MCV RBC AUTO: 77.1 FL (ref 80–96)
MONOCYTES # BLD AUTO: 1.01 K/UL (ref 0–0.8)
MONOCYTES NFR BLD AUTO: 8.2 % (ref 2–6)
MPC BLD CALC-MCNC: 8.9 FL (ref 9.4–12.4)
NEUTROPHILS # BLD AUTO: 9.07 K/UL (ref 1.8–7.7)
NEUTROPHILS NFR BLD AUTO: 73.1 % (ref 53–65)
NITRITE UR QL STRIP: NEGATIVE
NRBC # BLD AUTO: 0 X10E3/UL
NRBC, AUTO (.00): 0 %
PH UR STRIP: 6 PH UNITS
PLATELET # BLD AUTO: 355 K/UL (ref 150–400)
POTASSIUM SERPL-SCNC: 4 MMOL/L (ref 3.5–5.1)
PROT UR QL STRIP: NEGATIVE
RBC # BLD AUTO: 3.28 M/UL (ref 4.2–5.4)
RBC # UR STRIP: NEGATIVE /UL
SARS-COV+SARS-COV-2 AG RESP QL IA.RAPID: NEGATIVE
SODIUM SERPL-SCNC: 141 MMOL/L (ref 136–145)
SP GR UR STRIP: 1.01
UROBILINOGEN UR STRIP-ACNC: NORMAL MG/DL
WBC # BLD AUTO: 12.39 K/UL (ref 4.5–11)

## 2023-03-06 PROCEDURE — 99222 PR INITIAL HOSPITAL CARE,LEVL II: ICD-10-PCS | Mod: AI,,, | Performed by: FAMILY MEDICINE

## 2023-03-06 PROCEDURE — 99285 EMERGENCY DEPT VISIT HI MDM: CPT | Mod: ,,, | Performed by: NURSE PRACTITIONER

## 2023-03-06 PROCEDURE — 25000003 PHARM REV CODE 250: Performed by: NURSE PRACTITIONER

## 2023-03-06 PROCEDURE — 87149 DNA/RNA DIRECT PROBE: CPT | Performed by: NURSE PRACTITIONER

## 2023-03-06 PROCEDURE — 82962 GLUCOSE BLOOD TEST: CPT

## 2023-03-06 PROCEDURE — 96365 THER/PROPH/DIAG IV INF INIT: CPT

## 2023-03-06 PROCEDURE — 63600175 PHARM REV CODE 636 W HCPCS: Mod: TB,JG | Performed by: FAMILY MEDICINE

## 2023-03-06 PROCEDURE — 87428 SARSCOV & INF VIR A&B AG IA: CPT | Performed by: NURSE PRACTITIONER

## 2023-03-06 PROCEDURE — 83605 ASSAY OF LACTIC ACID: CPT | Performed by: NURSE PRACTITIONER

## 2023-03-06 PROCEDURE — 93005 ELECTROCARDIOGRAM TRACING: CPT

## 2023-03-06 PROCEDURE — 87186 SC STD MICRODIL/AGAR DIL: CPT | Performed by: NURSE PRACTITIONER

## 2023-03-06 PROCEDURE — 87077 CULTURE AEROBIC IDENTIFY: CPT | Performed by: NURSE PRACTITIONER

## 2023-03-06 PROCEDURE — 93010 ELECTROCARDIOGRAM REPORT: CPT | Mod: ,,, | Performed by: STUDENT IN AN ORGANIZED HEALTH CARE EDUCATION/TRAINING PROGRAM

## 2023-03-06 PROCEDURE — 93010 EKG 12-LEAD: ICD-10-PCS | Mod: ,,, | Performed by: STUDENT IN AN ORGANIZED HEALTH CARE EDUCATION/TRAINING PROGRAM

## 2023-03-06 PROCEDURE — 99285 EMERGENCY DEPT VISIT HI MDM: CPT | Mod: 25

## 2023-03-06 PROCEDURE — 81003 URINALYSIS AUTO W/O SCOPE: CPT | Performed by: NURSE PRACTITIONER

## 2023-03-06 PROCEDURE — 85025 COMPLETE CBC W/AUTO DIFF WBC: CPT | Performed by: NURSE PRACTITIONER

## 2023-03-06 PROCEDURE — 99222 1ST HOSP IP/OBS MODERATE 55: CPT | Mod: AI,,, | Performed by: FAMILY MEDICINE

## 2023-03-06 PROCEDURE — 99285 PR EMERGENCY DEPT VISIT,LEVEL V: ICD-10-PCS | Mod: ,,, | Performed by: NURSE PRACTITIONER

## 2023-03-06 PROCEDURE — 80048 BASIC METABOLIC PNL TOTAL CA: CPT | Performed by: NURSE PRACTITIONER

## 2023-03-06 PROCEDURE — 25000003 PHARM REV CODE 250: Performed by: FAMILY MEDICINE

## 2023-03-06 PROCEDURE — 11000001 HC ACUTE MED/SURG PRIVATE ROOM

## 2023-03-06 PROCEDURE — 63600175 PHARM REV CODE 636 W HCPCS: Performed by: NURSE PRACTITIONER

## 2023-03-06 RX ORDER — DULOXETIN HYDROCHLORIDE 30 MG/1
60 CAPSULE, DELAYED RELEASE ORAL DAILY
Status: DISCONTINUED | OUTPATIENT
Start: 2023-03-07 | End: 2023-03-20 | Stop reason: HOSPADM

## 2023-03-06 RX ORDER — ONDANSETRON 2 MG/ML
4 INJECTION INTRAMUSCULAR; INTRAVENOUS EVERY 8 HOURS PRN
Status: DISCONTINUED | OUTPATIENT
Start: 2023-03-06 | End: 2023-03-20 | Stop reason: HOSPADM

## 2023-03-06 RX ORDER — IBUPROFEN 600 MG/1
600 TABLET ORAL EVERY 6 HOURS PRN
Status: DISCONTINUED | OUTPATIENT
Start: 2023-03-06 | End: 2023-03-20 | Stop reason: HOSPADM

## 2023-03-06 RX ORDER — DOCUSATE SODIUM 100 MG/1
100 CAPSULE, LIQUID FILLED ORAL DAILY
Status: DISCONTINUED | OUTPATIENT
Start: 2023-03-07 | End: 2023-03-20 | Stop reason: HOSPADM

## 2023-03-06 RX ORDER — DEXTROSE 40 %
15 GEL (GRAM) ORAL
Status: DISCONTINUED | OUTPATIENT
Start: 2023-03-06 | End: 2023-03-20 | Stop reason: HOSPADM

## 2023-03-06 RX ORDER — SODIUM CHLORIDE 0.9 % (FLUSH) 0.9 %
10 SYRINGE (ML) INJECTION EVERY 12 HOURS PRN
Status: DISCONTINUED | OUTPATIENT
Start: 2023-03-06 | End: 2023-03-20 | Stop reason: HOSPADM

## 2023-03-06 RX ORDER — MUPIROCIN 20 MG/G
OINTMENT TOPICAL 2 TIMES DAILY
Status: DISPENSED | OUTPATIENT
Start: 2023-03-06 | End: 2023-03-11

## 2023-03-06 RX ORDER — HYDRALAZINE HYDROCHLORIDE 25 MG/1
25 TABLET, FILM COATED ORAL EVERY 12 HOURS
Status: DISCONTINUED | OUTPATIENT
Start: 2023-03-06 | End: 2023-03-09

## 2023-03-06 RX ORDER — GABAPENTIN 300 MG/1
300 CAPSULE ORAL 2 TIMES DAILY
Status: DISCONTINUED | OUTPATIENT
Start: 2023-03-06 | End: 2023-03-20 | Stop reason: HOSPADM

## 2023-03-06 RX ORDER — NALOXONE HCL 0.4 MG/ML
0.02 VIAL (ML) INJECTION
Status: DISCONTINUED | OUTPATIENT
Start: 2023-03-06 | End: 2023-03-20 | Stop reason: HOSPADM

## 2023-03-06 RX ORDER — GLUCAGON 1 MG
1 KIT INJECTION
Status: DISCONTINUED | OUTPATIENT
Start: 2023-03-06 | End: 2023-03-20 | Stop reason: HOSPADM

## 2023-03-06 RX ORDER — HYDROCODONE BITARTRATE AND ACETAMINOPHEN 5; 325 MG/1; MG/1
2 TABLET ORAL EVERY 6 HOURS PRN
Status: DISCONTINUED | OUTPATIENT
Start: 2023-03-06 | End: 2023-03-09

## 2023-03-06 RX ORDER — INSULIN ASPART 100 [IU]/ML
0-5 INJECTION, SOLUTION INTRAVENOUS; SUBCUTANEOUS
Status: DISCONTINUED | OUTPATIENT
Start: 2023-03-06 | End: 2023-03-14

## 2023-03-06 RX ORDER — LANOLIN ALCOHOL/MO/W.PET/CERES
1 CREAM (GRAM) TOPICAL DAILY
Status: DISCONTINUED | OUTPATIENT
Start: 2023-03-07 | End: 2023-03-20 | Stop reason: HOSPADM

## 2023-03-06 RX ORDER — MORPHINE SULFATE 4 MG/ML
4 INJECTION, SOLUTION INTRAMUSCULAR; INTRAVENOUS EVERY 4 HOURS PRN
Status: DISCONTINUED | OUTPATIENT
Start: 2023-03-06 | End: 2023-03-09

## 2023-03-06 RX ORDER — DEXTROSE 40 %
30 GEL (GRAM) ORAL
Status: DISCONTINUED | OUTPATIENT
Start: 2023-03-06 | End: 2023-03-20 | Stop reason: HOSPADM

## 2023-03-06 RX ORDER — BISACODYL 10 MG
10 SUPPOSITORY, RECTAL RECTAL DAILY PRN
Status: DISCONTINUED | OUTPATIENT
Start: 2023-03-06 | End: 2023-03-15

## 2023-03-06 RX ORDER — PROMETHAZINE HYDROCHLORIDE 25 MG/1
25 TABLET ORAL EVERY 6 HOURS PRN
Status: DISCONTINUED | OUTPATIENT
Start: 2023-03-06 | End: 2023-03-20 | Stop reason: HOSPADM

## 2023-03-06 RX ADMIN — GABAPENTIN 300 MG: 300 CAPSULE ORAL at 08:03

## 2023-03-06 RX ADMIN — ONDANSETRON HYDROCHLORIDE 4 MG: 2 SOLUTION INTRAMUSCULAR; INTRAVENOUS at 08:03

## 2023-03-06 RX ADMIN — HYDROCODONE BITARTRATE AND ACETAMINOPHEN 2 TABLET: 5; 325 TABLET ORAL at 05:03

## 2023-03-06 RX ADMIN — VANCOMYCIN HYDROCHLORIDE 1250 MG: 500 INJECTION, POWDER, LYOPHILIZED, FOR SOLUTION INTRAVENOUS at 08:03

## 2023-03-06 RX ADMIN — HYDRALAZINE HYDROCHLORIDE 25 MG: 25 TABLET, FILM COATED ORAL at 08:03

## 2023-03-06 RX ADMIN — INSULIN DETEMIR 7 UNITS: 100 INJECTION, SOLUTION SUBCUTANEOUS at 08:03

## 2023-03-06 RX ADMIN — SODIUM CHLORIDE 500 ML: 9 INJECTION, SOLUTION INTRAVENOUS at 04:03

## 2023-03-06 RX ADMIN — MORPHINE SULFATE 4 MG: 4 INJECTION, SOLUTION INTRAMUSCULAR; INTRAVENOUS at 08:03

## 2023-03-06 RX ADMIN — PIPERACILLIN AND TAZOBACTAM 4.5 G: 4; .5 INJECTION, POWDER, FOR SOLUTION INTRAVENOUS; PARENTERAL at 04:03

## 2023-03-06 RX ADMIN — MUPIROCIN: 20 OINTMENT TOPICAL at 08:03

## 2023-03-06 NOTE — SUBJECTIVE & OBJECTIVE
"Past Medical History:   Diagnosis Date    Abscess of right thigh + MRSA 08/20/2021    healed    Adnexal cyst     Degenerative disc disease     Depression 10/29/2021    Hypertension     Hypomagnesemia 1/20/2023    Nicotine dependence     Osteoarthritis        Past Surgical History:   Procedure Laterality Date    BACK SURGERY      HYSTERECTOMY N/A     IRRIGATION AND DEBRIDEMENT OF LOWER EXTREMITY Left 11/22/2022    Procedure: IRRIGATION AND DEBRIDEMENT, LOWER EXTREMITY;  Surgeon: Papa Mendoza MD;  Location: [facility] OR;  Service: Orthopedics;  Laterality: Left;    IRRIGATION AND DEBRIDEMENT OF UPPER EXTREMITY Left 11/22/2022    Procedure: IRRIGATION AND DEBRIDEMENT, UPPER EXTREMITY;  Surgeon: Papa Mendoza MD;  Location: [facility] OR;  Service: Orthopedics;  Laterality: Left;    THORACIC LAMINECTOMY WITH FUSION N/A 1/24/2023    Procedure: T9-L2 fusion, T11-T12 laminectomy and corpectomy;  Surgeon: Jimmy Hernandes MD;  Location: [facility] OR;  Service: Neurosurgery;  Laterality: N/A;    TOTAL HIP ARTHROPLASTY Left     TOTAL KNEE ARTHROPLASTY Bilateral        Review of patient's allergies indicates:  No Known Allergies    Current Facility-Administered Medications on File Prior to Encounter   Medication    heparin lock flush (porcine) injection 100 Units     Current Outpatient Medications on File Prior to Encounter   Medication Sig    BD ULTRA-FINE SHORT PEN NEEDLE 31 gauge x 5/16" Ndle     docusate sodium (COLACE) 100 MG capsule Take 1 capsule (100 mg total) by mouth once daily.    DULoxetine (CYMBALTA) 60 MG capsule Take 1 capsule (60 mg total) by mouth once daily.    ferrous sulfate 325 (65 FE) MG EC tablet Take 1 tablet (325 mg total) by mouth once daily.    gabapentin (NEURONTIN ORAL) Take by mouth 2 (two) times a day.    hydrALAZINE (APRESOLINE) 25 MG tablet Take 1 tablet (25 mg total) by mouth every 12 (twelve) hours.    HYDROcodone-acetaminophen (NORCO) 5-325 mg per tablet Take 1 tablet by " mouth every 6 (six) hours as needed for Pain. (Patient not taking: Reported on 2/9/2023)    hydrocortisone 1 % cream Apply topically 2 (two) times daily.    insulin (LANTUS SOLOSTAR U-100 INSULIN) glargine 100 units/mL SubQ pen Inject 30 Units into the skin once daily.    NOVOLOG FLEXPEN U-100 INSULIN 100 unit/mL (3 mL) InPn pen Inject 5 Units into the skin 3 (three) times daily with meals.    sodium chloride 0.9% SolP 50 mL with DAPTOmycin 500 mg SolR 394 mg Inject 394 mg into the vein once daily. Daily given by Home health----Hand delivered medications---- for 26 doses    TRULICITY 0.75 mg/0.5 mL pen injector Inject 0.75 mg into the skin every 7 days.     Family History       Problem Relation (Age of Onset)    Diabetes Father    Hypertension Father          Tobacco Use    Smoking status: Every Day     Types: Cigarettes    Smokeless tobacco: Current     Types: Snuff   Substance and Sexual Activity    Alcohol use: Never    Drug use: Never    Sexual activity: Not on file     Review of Systems   Constitutional:  Positive for chills, fatigue and fever.   HENT: Negative.     Respiratory:  Positive for cough. Negative for shortness of breath.    Cardiovascular:  Negative for chest pain.   Gastrointestinal:  Positive for blood in stool. Negative for abdominal pain, diarrhea, nausea and vomiting.   Musculoskeletal:  Positive for arthralgias and joint swelling.   Skin: Negative.    Neurological:  Positive for weakness.   Psychiatric/Behavioral:  Negative for confusion.    Objective:     Vital Signs (Most Recent):  Temp: 99.5 °F (37.5 °C) (03/06/23 1301)  Pulse: 105 (03/06/23 1301)  Resp: 17 (03/06/23 1301)  BP: 118/61 (03/06/23 1301)  SpO2: 96 % (03/06/23 1301) Vital Signs (24h Range):  Temp:  [99.5 °F (37.5 °C)] 99.5 °F (37.5 °C)  Pulse:  [105] 105  Resp:  [17] 17  SpO2:  [96 %] 96 %  BP: (118)/(61) 118/61     Weight: 62.1 kg (137 lb)  Body mass index is 22.11 kg/m².    Physical Exam  Constitutional:       General: She is  not in acute distress.     Appearance: Normal appearance. She is not ill-appearing.   HENT:      Head: Normocephalic and atraumatic.      Nose: Nose normal.   Eyes:      Conjunctiva/sclera: Conjunctivae normal.      Pupils: Pupils are equal, round, and reactive to light.   Cardiovascular:      Rate and Rhythm: Regular rhythm. Tachycardia present.      Pulses: Normal pulses.   Pulmonary:      Effort: Pulmonary effort is normal. No respiratory distress.      Breath sounds: Normal breath sounds.   Abdominal:      Palpations: There is no mass.      Tenderness: There is no abdominal tenderness.   Musculoskeletal:         General: Swelling (left knee - generalized swelling with fluctuance along lateral joint) and tenderness (lateral left knee) present.      Cervical back: No rigidity.   Skin:     Coloration: Skin is not jaundiced or pale.      Findings: No rash.   Neurological:      Mental Status: She is alert.   Psychiatric:         Behavior: Behavior normal.         Thought Content: Thought content normal.         CRANIAL NERVES     CN III, IV, VI   Pupils are equal, round, and reactive to light.     Significant Labs: All pertinent labs within the past 24 hours have been reviewed.    Significant Imaging: I have reviewed all pertinent imaging results/findings within the past 24 hours.

## 2023-03-06 NOTE — PROGRESS NOTES
Consulted to assist with Vancomycin dosing.  Based on pt wt of 62kg and CrCl 73ml/min, we will begin Vancomycin 1250mg IV q18hrs.  Our goal is a trough of 15-20 for bone/joint infection.    We will monitor daily and adjust as needed.

## 2023-03-06 NOTE — ED TRIAGE NOTES
Presents to ED c/o left knee swelling, pain and fever that has been ongoing since Friday. Patient currently getting ABX infusions at home for knee infection, 3mo post op.

## 2023-03-06 NOTE — ASSESSMENT & PLAN NOTE
Patient's FSGs are uncontrolled due to hyperglycemia on current medication regimen.  Last A1c reviewed-   Lab Results   Component Value Date    HGBA1C 7.9 (H) 01/19/2023     Most recent fingerstick glucose reviewed- No results for input(s): POCTGLUCOSE in the last 24 hours.  Current correctional scale  Low  Maintain anti-hyperglycemic dose as follows-   Antihyperglycemics (From admission, onward)    Start     Stop Route Frequency Ordered    03/06/23 2100  insulin detemir U-100 injection 7 Units         -- SubQ 2 times daily 03/06/23 1643    03/06/23 1741  insulin aspart U-100 injection 0-5 Units         -- SubQ Before meals & nightly PRN 03/06/23 1643        Hold Oral hypoglycemics while patient is in the hospital.

## 2023-03-06 NOTE — ED PROVIDER NOTES
Encounter Date: 3/6/2023       History     Chief Complaint   Patient presents with    Joint Swelling    Fever     52-year-old female presents to the emergency department to be evaluated for fever, generalized weakness and left knee pain. She has also been having some black stools. She has had a productive cough for the last 4 days.     The history is provided by the patient.   Fever  Primary symptoms of the febrile illness include fever and cough. Primary symptoms do not include fatigue, visual change, headaches, wheezing, shortness of breath, abdominal pain, nausea, vomiting, diarrhea, dysuria, altered mental status, myalgias, arthralgias or rash.   Review of patient's allergies indicates:  No Known Allergies  Past Medical History:   Diagnosis Date    Abscess of right thigh + MRSA 08/20/2021    healed    Adnexal cyst     Degenerative disc disease     Depression 10/29/2021    Hypertension     Hypomagnesemia 1/20/2023    Nicotine dependence     Osteoarthritis      Past Surgical History:   Procedure Laterality Date    BACK SURGERY      HYSTERECTOMY N/A     IRRIGATION AND DEBRIDEMENT OF LOWER EXTREMITY Left 11/22/2022    Procedure: IRRIGATION AND DEBRIDEMENT, LOWER EXTREMITY;  Surgeon: Papa Mendoza MD;  Location: [facility] OR;  Service: Orthopedics;  Laterality: Left;    IRRIGATION AND DEBRIDEMENT OF UPPER EXTREMITY Left 11/22/2022    Procedure: IRRIGATION AND DEBRIDEMENT, UPPER EXTREMITY;  Surgeon: Papa Mendoza MD;  Location: [facility] OR;  Service: Orthopedics;  Laterality: Left;    THORACIC LAMINECTOMY WITH FUSION N/A 1/24/2023    Procedure: T9-L2 fusion, T11-T12 laminectomy and corpectomy;  Surgeon: Jimmy Hernandes MD;  Location: [facility] OR;  Service: Neurosurgery;  Laterality: N/A;    TOTAL HIP ARTHROPLASTY Left     TOTAL KNEE ARTHROPLASTY Bilateral      Family History   Problem Relation Age of Onset    Hypertension Father     Diabetes Father      Social History     Tobacco Use    Smoking  status: Every Day     Types: Cigarettes    Smokeless tobacco: Current     Types: Snuff   Substance Use Topics    Alcohol use: Never    Drug use: Never     Review of Systems   Constitutional:  Positive for fever. Negative for fatigue.   Respiratory:  Positive for cough. Negative for shortness of breath and wheezing.    Gastrointestinal:  Negative for abdominal pain, diarrhea, nausea and vomiting.   Genitourinary:  Negative for dysuria.   Musculoskeletal:  Negative for arthralgias and myalgias.   Skin:  Negative for rash.   Neurological:  Negative for headaches.   All other systems reviewed and are negative.    Physical Exam     Initial Vitals [03/06/23 1301]   BP Pulse Resp Temp SpO2   118/61 105 17 99.5 °F (37.5 °C) 96 %      MAP       --         Physical Exam    Vitals reviewed.  Constitutional: She appears well-developed and well-nourished.   Neck: Neck supple.   Cardiovascular:            tachycardic   Pulmonary/Chest: Breath sounds normal.   Abdominal: Abdomen is soft. Bowel sounds are normal. She exhibits no distension and no mass. There is no abdominal tenderness. There is no rebound and no guarding.   Musculoskeletal:      Cervical back: Neck supple.      Left upper leg: Normal.      Left knee: Swelling and effusion present. No erythema.      Left lower leg: Normal.     Neurological: She is alert and oriented to person, place, and time. She has normal strength. GCS score is 15. GCS eye subscore is 4. GCS verbal subscore is 5. GCS motor subscore is 6.   Skin: Skin is warm and dry. Capillary refill takes less than 2 seconds.   PICC line to left upper extremity dry and intact   Psychiatric: She has a normal mood and affect.             Medical Screening Exam   See Full Note    ED Course   Procedures  Labs Reviewed   BASIC METABOLIC PANEL - Abnormal; Notable for the following components:       Result Value    Calcium 7.9 (*)     All other components within normal limits   CBC WITH DIFFERENTIAL - Abnormal; Notable  for the following components:    WBC 12.39 (*)     RBC 3.28 (*)     Hemoglobin 7.4 (*)     Hematocrit 25.3 (*)     MCV 77.1 (*)     MCH 22.6 (*)     MCHC 29.2 (*)     RDW 19.7 (*)     MPV 8.9 (*)     Neutrophils % 73.1 (*)     Lymphocytes % 15.4 (*)     Monocytes % 8.2 (*)     Immature Granulocytes % 0.5 (*)     Neutrophils, Abs 9.07 (*)     Monocytes, Absolute 1.01 (*)     Immature Granulocytes, Absolute 0.06 (*)     All other components within normal limits   CULTURE, BLOOD   CULTURE, BLOOD   CBC W/ AUTO DIFFERENTIAL    Narrative:     The following orders were created for panel order CBC auto differential.  Procedure                               Abnormality         Status                     ---------                               -----------         ------                     CBC with Differential[[id number]]        Abnormal            Final result                 Please view results for these tests on the individual orders.   EXTRA TUBES    Narrative:     The following orders were created for panel order EXTRA TUBES.  Procedure                               Abnormality         Status                     ---------                               -----------         ------                     Light Blue Top Hold[[id number]]                              In process                 Light Green Top Hold[[id number]]                             In process                   Please view results for these tests on the individual orders.   LIGHT BLUE TOP HOLD   LIGHT GREEN TOP HOLD   SARS-COV2 (COVID) W/ FLU ANTIGEN   URINALYSIS   LACTIC ACID, PLASMA   POCT OCCULT BLOOD (STOOL)          Imaging Results              X-Ray Chest 1 View (Final result)  Result time 03/06/23 15:06:24      Final result by Corey Boateng II, MD (03/06/23 15:06:24)                   Impression:      No evidence of acute cardiopulmonary disease.      Electronically signed by: Corey Boateng  Date:    03/06/2023  Time:    15:06                Narrative:    EXAMINATION:  XR CHEST 1 VIEW    CLINICAL HISTORY:  Cough, unspecified    COMPARISON:  [facility]    TECHNIQUE:  XR CHEST 1 VIEW    FINDINGS:  The heart and mediastinum are normal in size and configuration.  Left arm catheter appears in good position.  The pulmonary vascularity is normal in caliber.  No lung infiltrates, effusions, pneumothorax or other abnormality is demonstrated.                                       Medications   sodium chloride 0.9% bolus 500 mL 500 mL (has no administration in time range)   piperacillin-tazobactam (ZOSYN) 4.5 g in dextrose 5 % in water (D5W) 5 % 100 mL IVPB (MB+) (has no administration in time range)     Medical Decision Makin-year-old female presents to the emergency department to be evaluated for fever, generalized weakness and left knee pain. She has also been having some black stools. She has had a productive cough for the last 4 days.   In the ED patient found to have acute anemia, fever, cough.    I ordered labs and personally reviewed them.   I ordered X-rays and personally reviewed them and reviewed the radiologist interpretation.  Xray significant for no acute process.    Diagnosis: anemia, cough, fever, generalized weakness  Admission MDM  I discussed the patient presentation labs, ekg, X-rays findings with the consultant for  (speciality).    Patient was managed in the ED with IV normal saline and zosyn   Patient required emergent consultation to  (admitting physician) for admission.                   Clinical Impression:   Final diagnoses:  [R05.9] Cough  [R53.1] Weakness  [R50.9] Fever, unspecified fever cause (Primary)  [D64.9] Anemia, unspecified type        ED Disposition Condition    Admit Stable                Jennifer Ching, [facility]  23 1523       Jennifer Ching, [facility]  23 1528

## 2023-03-06 NOTE — ASSESSMENT & PLAN NOTE
Appears somewhat chronic, however patient with complaints of recent dark stools. Will evaluate further and involve GI if needed, however will likely need outpatient evaluation due to current infection.    FOBT pending, iron studies if none recent.

## 2023-03-06 NOTE — ASSESSMENT & PLAN NOTE
Left knee joint pain and swelling for one day associated with fever and chills. Remote joint replacement, fairly recent septic arthritis requiring debridement and extended course of antibiotics. Currently on IV daptomycin for MRSA epidural abscess.     Mild leukocytosis but lactate is normal. Give one dose of Zosyn in ED, will consult pharmacy to dose vancomycin pending aspiration and culture of joint.     Orthopedics consulted. Patient well known to Dr. Noyola.

## 2023-03-06 NOTE — ASSESSMENT & PLAN NOTE
Currently controlled. Continue home hydralazine, would benefit from ACEi/ARB considering diabetes.

## 2023-03-06 NOTE — HPI
Monica Walker is a 52-year-old female with history of diabetes, hypertension, and MRSA joint space infection, who presented with complaints of left knee pain and swelling. She states the pain began suddenly yesterday, denies any recent falls or other associated trauma. Pain is worse along the lateral joint space with radiation down into the foot. There is a discreet area of fluctuance along the lateral surface of the knee, with generalized swelling of the joint to approximately 2x the size of the contralateral knee. Patient also complains of some fever, generalized weakness and productive cough for the past few days as well as dark stool.     Patient has a history of remote joint replacement (2015) with recent septic arthritis requiring debridement of the left knee as well as one shoulder. She was also recently hospitalized for with MRSA spinal epidural abscess, discharged about a month ago with outpatient daptomycin.      Initial evaluation with the following with WBC 12.39, LA 0.8, Hgb 7.4, glucose 200, labs otherwise unremarkable. Mildly elevated temp (99.5) and mild tachycardia (105), vital signs otherwise stable and grossly within normal limits.     Monica Walker is to be admitted to hospital service for evaluation and treatment of suspected septic arthritis. Orthopedics has been consulted. Blood cultures and aspiration/culture of affected knee pending at time of admission.   
K 4.8

## 2023-03-06 NOTE — H&P
[facility] - Emergency Department  Hospital Medicine  History & Physical    Patient Name: Monica Walker  MRN: [id number]  Patient Class: IP- Inpatient  Admission Date: 3/6/2023  Attending Physician: Kim Rosales DO   Primary Care Provider: Hannah Schulz MD         Patient information was obtained from patient, past medical records and ER records.     Subjective:     Principal Problem:Joint infection    Chief Complaint:   Chief Complaint   Patient presents with    Joint Swelling    Fever        HPI: Monica Walker is a 52-year-old female with history of diabetes, hypertension, and MRSA joint space infection, who presented with complaints of left knee pain and swelling. She states the pain began suddenly yesterday, denies any recent falls or other associated trauma. Pain is worse along the lateral joint space with radiation down into the foot. There is a discreet area of fluctuance along the lateral surface of the knee, with generalized swelling of the joint to approximately 2x the size of the contralateral knee. Patient also complains of some fever, generalized weakness and productive cough for the past few days as well as dark stool.     Patient has a history of remote joint replacement (2015) with recent septic arthritis requiring debridement of the left knee as well as one shoulder. She was also recently hospitalized for with MRSA spinal epidural abscess, discharged about a month ago with outpatient daptomycin.      Initial evaluation with the following with WBC 12.39, LA 0.8, Hgb 7.4, glucose 200, labs otherwise unremarkable. Mildly elevated temp (99.5) and mild tachycardia (105), vital signs otherwise stable and grossly within normal limits.     Monica Walker is to be admitted to hospital service for evaluation and treatment of suspected septic arthritis. Orthopedics has been consulted. Blood cultures and aspiration/culture of affected knee pending at time of admission.       Past  "Medical History:   Diagnosis Date    Abscess of right thigh + MRSA 08/20/2021    healed    Adnexal cyst     Degenerative disc disease     Depression 10/29/2021    Hypertension     Hypomagnesemia 1/20/2023    Nicotine dependence     Osteoarthritis        Past Surgical History:   Procedure Laterality Date    BACK SURGERY      HYSTERECTOMY N/A     IRRIGATION AND DEBRIDEMENT OF LOWER EXTREMITY Left 11/22/2022    Procedure: IRRIGATION AND DEBRIDEMENT, LOWER EXTREMITY;  Surgeon: Papa Mendoza MD;  Location: [facility] OR;  Service: Orthopedics;  Laterality: Left;    IRRIGATION AND DEBRIDEMENT OF UPPER EXTREMITY Left 11/22/2022    Procedure: IRRIGATION AND DEBRIDEMENT, UPPER EXTREMITY;  Surgeon: Papa Mendoza MD;  Location: [facility] OR;  Service: Orthopedics;  Laterality: Left;    THORACIC LAMINECTOMY WITH FUSION N/A 1/24/2023    Procedure: T9-L2 fusion, T11-T12 laminectomy and corpectomy;  Surgeon: Jimmy Hernandes MD;  Location: [facility] OR;  Service: Neurosurgery;  Laterality: N/A;    TOTAL HIP ARTHROPLASTY Left     TOTAL KNEE ARTHROPLASTY Bilateral        Review of patient's allergies indicates:  No Known Allergies    Current Facility-Administered Medications on File Prior to Encounter   Medication    heparin lock flush (porcine) injection 100 Units     Current Outpatient Medications on File Prior to Encounter   Medication Sig    BD ULTRA-FINE SHORT PEN NEEDLE 31 gauge x 5/16" Ndle     docusate sodium (COLACE) 100 MG capsule Take 1 capsule (100 mg total) by mouth once daily.    DULoxetine (CYMBALTA) 60 MG capsule Take 1 capsule (60 mg total) by mouth once daily.    ferrous sulfate 325 (65 FE) MG EC tablet Take 1 tablet (325 mg total) by mouth once daily.    gabapentin (NEURONTIN ORAL) Take by mouth 2 (two) times a day.    hydrALAZINE (APRESOLINE) 25 MG tablet Take 1 tablet (25 mg total) by mouth every 12 (twelve) hours.    HYDROcodone-acetaminophen (NORCO) 5-325 mg per tablet " Take 1 tablet by mouth every 6 (six) hours as needed for Pain. (Patient not taking: Reported on 2/9/2023)    hydrocortisone 1 % cream Apply topically 2 (two) times daily.    insulin (LANTUS SOLOSTAR U-100 INSULIN) glargine 100 units/mL SubQ pen Inject 30 Units into the skin once daily.    NOVOLOG FLEXPEN U-100 INSULIN 100 unit/mL (3 mL) InPn pen Inject 5 Units into the skin 3 (three) times daily with meals.    sodium chloride 0.9% SolP 50 mL with DAPTOmycin 500 mg SolR 394 mg Inject 394 mg into the vein once daily. Daily given by Home health----Hand delivered medications---- for 26 doses    TRULICITY 0.75 mg/0.5 mL pen injector Inject 0.75 mg into the skin every 7 days.     Family History       Problem Relation (Age of Onset)    Diabetes Father    Hypertension Father          Tobacco Use    Smoking status: Every Day     Types: Cigarettes    Smokeless tobacco: Current     Types: Snuff   Substance and Sexual Activity    Alcohol use: Never    Drug use: Never    Sexual activity: Not on file     Review of Systems   Constitutional:  Positive for chills, fatigue and fever.   HENT: Negative.     Respiratory:  Positive for cough. Negative for shortness of breath.    Cardiovascular:  Negative for chest pain.   Gastrointestinal:  Positive for blood in stool. Negative for abdominal pain, diarrhea, nausea and vomiting.   Musculoskeletal:  Positive for arthralgias and joint swelling.   Skin: Negative.    Neurological:  Positive for weakness.   Psychiatric/Behavioral:  Negative for confusion.    Objective:     Vital Signs (Most Recent):  Temp: 99.5 °F (37.5 °C) (03/06/23 1301)  Pulse: 105 (03/06/23 1301)  Resp: 17 (03/06/23 1301)  BP: 118/61 (03/06/23 1301)  SpO2: 96 % (03/06/23 1301) Vital Signs (24h Range):  Temp:  [99.5 °F (37.5 °C)] 99.5 °F (37.5 °C)  Pulse:  [105] 105  Resp:  [17] 17  SpO2:  [96 %] 96 %  BP: (118)/(61) 118/61     Weight: 62.1 kg (137 lb)  Body mass index is 22.11 kg/m².    Physical  Exam  Constitutional:       General: She is not in acute distress.     Appearance: Normal appearance. She is not ill-appearing.   HENT:      Head: Normocephalic and atraumatic.      Nose: Nose normal.   Eyes:      Conjunctiva/sclera: Conjunctivae normal.      Pupils: Pupils are equal, round, and reactive to light.   Cardiovascular:      Rate and Rhythm: Regular rhythm. Tachycardia present.      Pulses: Normal pulses.   Pulmonary:      Effort: Pulmonary effort is normal. No respiratory distress.      Breath sounds: Normal breath sounds.   Abdominal:      Palpations: There is no mass.      Tenderness: There is no abdominal tenderness.   Musculoskeletal:         General: Swelling (left knee - generalized swelling with fluctuance along lateral joint) and tenderness (lateral left knee) present.      Cervical back: No rigidity.   Skin:     Coloration: Skin is not jaundiced or pale.      Findings: No rash.   Neurological:      Mental Status: She is alert.   Psychiatric:         Behavior: Behavior normal.         Thought Content: Thought content normal.         CRANIAL NERVES     CN III, IV, VI   Pupils are equal, round, and reactive to light.     Significant Labs: All pertinent labs within the past 24 hours have been reviewed.    Significant Imaging: I have reviewed all pertinent imaging results/findings within the past 24 hours.    Assessment/Plan:     * Joint infection  Left knee joint pain and swelling for one day associated with fever and chills. Remote joint replacement, fairly recent septic arthritis requiring debridement and extended course of antibiotics. Currently on IV daptomycin for MRSA epidural abscess.     Mild leukocytosis but lactate is normal. Give one dose of Zosyn in ED, will consult pharmacy to dose vancomycin pending aspiration and culture of joint.     Orthopedics consulted. Patient well known to Dr. Noyola.       Microcytic anemia  Appears somewhat chronic, however patient with complaints of  recent dark stools. Will evaluate further and involve GI if needed, however will likely need outpatient evaluation due to current infection.    FOBT pending, iron studies if none recent.      Hypertension  Currently controlled. Continue home hydralazine, would benefit from ACEi/ARB considering diabetes.       Type 2 diabetes mellitus, with long-term current use of insulin  Patient's FSGs are uncontrolled due to hyperglycemia on current medication regimen.  Last A1c reviewed-   Lab Results   Component Value Date    HGBA1C 7.9 (H) 01/19/2023     Most recent fingerstick glucose reviewed- No results for input(s): POCTGLUCOSE in the last 24 hours.  Current correctional scale  Low  Maintain anti-hyperglycemic dose as follows-   Antihyperglycemics (From admission, onward)    Start     Stop Route Frequency Ordered    03/06/23 2100  insulin detemir U-100 injection 7 Units         -- SubQ 2 times daily 03/06/23 1643    03/06/23 1741  insulin aspart U-100 injection 0-5 Units         -- SubQ Before meals & nightly PRN 03/06/23 1643        Hold Oral hypoglycemics while patient is in the hospital.    Arthritis  Continue home medications.         VTE Risk Mitigation (From admission, onward)         Ordered     IP VTE LOW RISK PATIENT  Once         03/06/23 1643     Place sequential compression device  Until discontinued         03/06/23 1643                   Kim Rosales DO  Department of Hospital Medicine   [facility] - Emergency Department

## 2023-03-07 LAB
GLUCOSE SERPL-MCNC: 101 MG/DL (ref 70–105)
GLUCOSE SERPL-MCNC: 326 MG/DL (ref 70–105)
GLUCOSE SERPL-MCNC: 366 MG/DL (ref 70–105)
GLUCOSE SERPL-MCNC: 384 MG/DL (ref 70–105)

## 2023-03-07 PROCEDURE — 63600175 PHARM REV CODE 636 W HCPCS: Mod: TB,JG | Performed by: FAMILY MEDICINE

## 2023-03-07 PROCEDURE — 99232 SBSQ HOSP IP/OBS MODERATE 35: CPT | Mod: ,,, | Performed by: FAMILY MEDICINE

## 2023-03-07 PROCEDURE — 99232 PR SUBSEQUENT HOSPITAL CARE,LEVL II: ICD-10-PCS | Mod: ,,, | Performed by: FAMILY MEDICINE

## 2023-03-07 PROCEDURE — 25000003 PHARM REV CODE 250: Performed by: FAMILY MEDICINE

## 2023-03-07 PROCEDURE — 11000001 HC ACUTE MED/SURG PRIVATE ROOM

## 2023-03-07 PROCEDURE — 82962 GLUCOSE BLOOD TEST: CPT

## 2023-03-07 RX ORDER — DIPHENHYDRAMINE HCL 25 MG
25 CAPSULE ORAL EVERY 6 HOURS PRN
Status: DISCONTINUED | OUTPATIENT
Start: 2023-03-07 | End: 2023-03-20 | Stop reason: HOSPADM

## 2023-03-07 RX ORDER — SODIUM CHLORIDE 9 MG/ML
INJECTION, SOLUTION INTRAVENOUS
Status: DISPENSED
Start: 2023-03-07 | End: 2023-03-07

## 2023-03-07 RX ADMIN — MORPHINE SULFATE 4 MG: 4 INJECTION, SOLUTION INTRAMUSCULAR; INTRAVENOUS at 09:03

## 2023-03-07 RX ADMIN — INSULIN DETEMIR 10 UNITS: 100 INJECTION, SOLUTION SUBCUTANEOUS at 09:03

## 2023-03-07 RX ADMIN — MORPHINE SULFATE 4 MG: 4 INJECTION, SOLUTION INTRAMUSCULAR; INTRAVENOUS at 02:03

## 2023-03-07 RX ADMIN — HYDRALAZINE HYDROCHLORIDE 25 MG: 25 TABLET, FILM COATED ORAL at 09:03

## 2023-03-07 RX ADMIN — FERROUS SULFATE TAB 325 MG (65 MG ELEMENTAL FE) 1 EACH: 325 (65 FE) TAB at 09:03

## 2023-03-07 RX ADMIN — GABAPENTIN 300 MG: 300 CAPSULE ORAL at 09:03

## 2023-03-07 RX ADMIN — MORPHINE SULFATE 4 MG: 4 INJECTION, SOLUTION INTRAMUSCULAR; INTRAVENOUS at 10:03

## 2023-03-07 RX ADMIN — INSULIN ASPART 3 UNITS: 100 INJECTION, SOLUTION INTRAVENOUS; SUBCUTANEOUS at 06:03

## 2023-03-07 RX ADMIN — MORPHINE SULFATE 4 MG: 4 INJECTION, SOLUTION INTRAMUSCULAR; INTRAVENOUS at 04:03

## 2023-03-07 RX ADMIN — DULOXETINE HYDROCHLORIDE 60 MG: 30 CAPSULE, DELAYED RELEASE ORAL at 09:03

## 2023-03-07 RX ADMIN — INSULIN ASPART 4 UNITS: 100 INJECTION, SOLUTION INTRAVENOUS; SUBCUTANEOUS at 12:03

## 2023-03-07 RX ADMIN — INSULIN DETEMIR 7 UNITS: 100 INJECTION, SOLUTION SUBCUTANEOUS at 09:03

## 2023-03-07 RX ADMIN — MORPHINE SULFATE 4 MG: 4 INJECTION, SOLUTION INTRAMUSCULAR; INTRAVENOUS at 06:03

## 2023-03-07 RX ADMIN — DOCUSATE SODIUM 100 MG: 100 CAPSULE, LIQUID FILLED ORAL at 09:03

## 2023-03-07 RX ADMIN — HYDROCODONE BITARTRATE AND ACETAMINOPHEN 2 TABLET: 5; 325 TABLET ORAL at 09:03

## 2023-03-07 RX ADMIN — MUPIROCIN: 20 OINTMENT TOPICAL at 09:03

## 2023-03-07 RX ADMIN — HYDROCODONE BITARTRATE AND ACETAMINOPHEN 2 TABLET: 5; 325 TABLET ORAL at 08:03

## 2023-03-07 RX ADMIN — DIPHENHYDRAMINE HYDROCHLORIDE 25 MG: 25 CAPSULE ORAL at 12:03

## 2023-03-07 RX ADMIN — VANCOMYCIN HYDROCHLORIDE 1250 MG: 500 INJECTION, POWDER, LYOPHILIZED, FOR SOLUTION INTRAVENOUS at 02:03

## 2023-03-07 NOTE — HOSPITAL COURSE
"3/7: No acute events overnight, patient with no complaints this morning. US with aspiration was attempted however unable to obtain a specimen as fluid was too thick. Ortho consult pending.     3/8: Blood culture positive for MRSA, pharmacy notified to continue vancomycin. No other changes, Dr. Noyola to evaluate later today pending x-ray.    3/9: No acute events overnight, patient with no complaints aside from continued pain in the left knee. Ortho following with plans to monitor fluid cultures over the weekend with tentative plans for surgery on Monday. Plans to repeat blood cultures following procedure, if negative patient will need approximately one month of IV antibiotics from date of negative culture. Would benefit from continuation of vancomycin as she has failed outpatient treatment with daptomycin immediately prior to this admission.    3/10: Patient with no new complaints this morning, light growth of G+ cocci noted in knee aspirate culture. Continue vancomycin for MRSA bacteremia and joint infection, ortho following.    3/11: No major changes overnight, knee aspirate with MRSA. Plans for partial knee removal with placement of antibiotic spacer on Monday.    3/12: Patient continues to complain of pain, however is not taking scheduled PO medications as "they don't work." Will make changes to scheduled therapy but advised patient she will need to take medications consistently in order for them to work effectively.    3/13: Removal of hardware by Dr. Noyola today, patient tolerated procedure well with no immediate complications. Somewhat sedated from pain medications with vital signs stable and grossly WNL.    3/14: Post op #1 removal of hardware and placement of antibiotic spacer by Dr. Noyola. Now that presumed infective focus has been removed will repeat blood cultures, once negative patient will require approximately 4 weeks of IV antibiotic therapy. Recommend vancomycin as patient has already " failed outpatient treatment with daptomycin. Plan for transfer to Specialty for extended antibiotics and physical therapy.     3/15: No acute events overnight, patient continues to complain of pain and generally feeling unwell. Repeat blood cultures yesterday now positive for Klebsiella pneumonia, will add Rocephin pending final C&S. Continue vancomycin for MRSA bacteremia with indwelling prostheses.     3/16- picc line has been in during repeated cases of bacteremia, DC today. Line break for atleast 48h  3/17-continue abx, awaiting echo read- she had negative MONICA in November but is having recurrent MRSA bacteremia.  Will discuss need for repeat with Cardiology.  3/18-Continue abx  3/19-abx- attempting to arrange home IV abx  3/20-accepted to [facility] to finish IV ancef and IV vanco- Stable for DC. Follow up with PCP/ortho surgery

## 2023-03-07 NOTE — PROGRESS NOTES
Aspiration of postsurgical fluid collection left knee.  Performed by Dr. René Mims  Consent obtained for knee aspiration, a formal timeout was called all staff present agreed to patient and procedure.  The left lateral knee was prepped with ChloraPrep and sterile field was established.  1% lidocaine was used as local anesthetic.  Under ultrasound guidance an 18 gauge needle was placed into the complex fluid collection on the lateral left knee.  The fluid was thick and we were unable to aspirate any fluid for laboratory analysis.  The puncture site was cleaned and bandaged.  The patient tolerated the procedure well, there were no immediate postprocedure complications.

## 2023-03-07 NOTE — SUBJECTIVE & OBJECTIVE
Review of Systems   Constitutional:  Positive for chills, fatigue and fever.   HENT: Negative.     Respiratory:  Positive for cough. Negative for shortness of breath.    Cardiovascular:  Negative for chest pain.   Gastrointestinal:  Positive for blood in stool. Negative for abdominal pain, diarrhea, nausea and vomiting.   Musculoskeletal:  Positive for arthralgias and joint swelling.   Skin: Negative.    Neurological:  Positive for weakness.   Psychiatric/Behavioral:  Negative for confusion.    Objective:     Vital Signs (Most Recent):  Temp: 98.6 °F (37 °C) (03/07/23 1201)  Pulse: 89 (03/07/23 1201)  Resp: 20 (03/07/23 1201)  BP: (!) 103/45 (03/07/23 1201)  SpO2: 96 % (03/07/23 1201)   Vital Signs (24h Range):  Temp:  [97.9 °F (36.6 °C)-99.5 °F (37.5 °C)] 98.6 °F (37 °C)  Pulse:  [] 89  Resp:  [16-25] 20  SpO2:  [96 %-100 %] 96 %  BP: (103-163)/(45-80) 103/45     Weight: 64.4 kg (141 lb 15.6 oz)  Body mass index is 22.92 kg/m².  No intake or output data in the 24 hours ending 03/07/23 1208   Physical Exam  Constitutional:       General: She is not in acute distress.     Appearance: Normal appearance. She is not ill-appearing.   HENT:      Head: Normocephalic and atraumatic.      Nose: Nose normal.   Eyes:      Conjunctiva/sclera: Conjunctivae normal.      Pupils: Pupils are equal, round, and reactive to light.   Cardiovascular:      Rate and Rhythm: Normal rate and regular rhythm.      Pulses: Normal pulses.   Pulmonary:      Effort: Pulmonary effort is normal. No respiratory distress.      Breath sounds: Normal breath sounds.   Abdominal:      Palpations: There is no mass.      Tenderness: There is no abdominal tenderness.   Musculoskeletal:         General: Swelling (left knee - generalized swelling with fluctuance along lateral joint) and tenderness (lateral left knee) present.      Cervical back: No rigidity.   Skin:     Coloration: Skin is not jaundiced or pale.      Findings: No rash.   Neurological:       Mental Status: She is alert.   Psychiatric:         Behavior: Behavior normal.         Thought Content: Thought content normal.       Significant Labs: All pertinent labs within the past 24 hours have been reviewed.    Significant Imaging: I have reviewed all pertinent imaging results/findings within the past 24 hours.

## 2023-03-07 NOTE — PLAN OF CARE
[facility] - Short Stay Unit  Initial Discharge Assessment       Primary Care Provider: Hannah Schulz MD    Admission Diagnosis: Cough [R05.9]  Weakness [R53.1]  Joint infection [M00.9]  Chest pain [R07.9]  Fever, unspecified fever cause [R50.9]  Anemia, unspecified type [D64.9]    Admission Date: 3/6/2023  Expected Discharge Date:     Discharge Barriers Identified: None    Payor: [employer] MANAGED MEDICARE / Plan: [employer] SNP (SPECIAL NEEDS PLAN) / Product Type: Medicare Advantage /     Extended Emergency Contact Information  Primary Emergency Contact: Wilson,April  Mobile Phone: [phone]  Relation: Sister  Preferred language: English   needed? No  Secondary Emergency Contact: Alejandrina Walker  Home Phone: [phone]  Relation: Mother  Preferred language: English   needed? No    Discharge Plan A: Home with family, Home Health  Discharge Plan B: Home with family, Home Health       [facility] [place] [facility] [address]  [address]  [place] [address]  Phone: [phone] Fax: [phone]    [facility] [address] [place] [id number]  Phone: [phone] Fax: [phone]      Initial Assessment (most recent)       Adult Discharge Assessment - 03/07/23 1050          Discharge Assessment    Assessment Type Discharge Planning Assessment     Source of Information patient;family     If unable to respond/provide information was family/caregiver contacted? Yes     Contact Name/Number Alejandrina Walker (mother) [phone]     People in Home parent(s)     Do you expect to return to your current living situation? Yes     Do you have help at home or someone to help you manage your care at home? Yes     Who are your caregiver(s) and their phone number(s)? Alejandrina Walker (mother) [phone]     Current cognitive status: Alert/Oriented     Walking or Climbing Stairs ambulation difficulty, requires equipment      Dressing/Bathing bathing difficulty, requires equipment     Home Accessibility wheelchair accessible     Home Layout Able to live on 1st floor     Equipment Currently Used at Home walker, standard;bedside commode;crutches, axillary     Readmission within 30 days? Yes     Patient currently being followed by outpatient case management? Unable to determine (comments)     Do you currently have service(s) that help you manage your care at home? Yes     Name and Contact number of agency [facility]     Is the pt/caregiver preference to resume services with current agency Yes     Do you take prescription medications? Yes     Do you have prescription coverage? Yes     Do you have any problems affording any of your prescribed medications? No     Is the patient taking medications as prescribed? yes     Who is going to help you get home at discharge? Alejandrina Walker (mother) [phone]     How do you get to doctors appointments? family or friend will provide     Are you on dialysis? No     Do you take coumadin? No     Discharge Plan A Home with family;Home Health     Discharge Plan B Home with family;Home Health     Discharge Plan discussed with: Patient;Parent(s)     Name(s) and Number(s) Alejandrina Walker (mother) [phone]     Discharge Barriers Identified None                      SW spoke with pt and Alejandrina Walker (mother) [phone] at bedside. Pt is current with [facility]. Has walker, bsc, and crutches. Choice obtained to continue services with [facility]. Referral sending at this time. Tentative dcp is to return home with mother Alejandrina and [facility].

## 2023-03-07 NOTE — ASSESSMENT & PLAN NOTE
Left knee joint pain and swelling for one day associated with fever and chills. Remote joint replacement, fairly recent septic arthritis requiring debridement and extended course of antibiotics. Currently on IV daptomycin for MRSA epidural abscess.     Mild leukocytosis but lactate is normal. Give one dose of Zosyn in ED, will consult pharmacy to dose vancomycin. Unable to obtain culture as joint fluid was too thick to aspirate.     Orthopedics consulted. Patient well known to Dr. Noyola.

## 2023-03-07 NOTE — PROGRESS NOTES
[facility] - Short Stay [facility] Medicine  Progress Note    Patient Name: Monica Walker  MRN: [id number]  Patient Class: IP- Inpatient   Admission Date: 3/6/2023  Length of Stay: 1 days  Attending Physician: Kim Rosales DO  Primary Care Provider: Hannah Schulz MD        Subjective:     Principal Problem:Joint infection        HPI:  Monica Walker is a 52-year-old female with history of diabetes, hypertension, and MRSA joint space infection, who presented with complaints of left knee pain and swelling. She states the pain began suddenly yesterday, denies any recent falls or other associated trauma. Pain is worse along the lateral joint space with radiation down into the foot. There is a discreet area of fluctuance along the lateral surface of the knee, with generalized swelling of the joint to approximately 2x the size of the contralateral knee. Patient also complains of some fever, generalized weakness and productive cough for the past few days as well as dark stool.     Patient has a history of remote joint replacement (2015) with recent septic arthritis requiring debridement of the left knee as well as one shoulder. She was also recently hospitalized for with MRSA spinal epidural abscess, discharged about a month ago with outpatient daptomycin.      Initial evaluation with the following with WBC 12.39, LA 0.8, Hgb 7.4, glucose 200, labs otherwise unremarkable. Mildly elevated temp (99.5) and mild tachycardia (105), vital signs otherwise stable and grossly within normal limits.     Monica Walker is to be admitted to hospital service for evaluation and treatment of suspected septic arthritis. Orthopedics has been consulted. Blood cultures and aspiration/culture of affected knee pending at time of admission.       Overview/Hospital Course:  3/7: No acute events overnight, patient with no complaints this morning. US with aspiration was attempted however unable to obtain a specimen as  fluid was too thick. Ortho consult pending.         Review of Systems   Constitutional:  Positive for chills, fatigue and fever.   HENT: Negative.     Respiratory:  Positive for cough. Negative for shortness of breath.    Cardiovascular:  Negative for chest pain.   Gastrointestinal:  Positive for blood in stool. Negative for abdominal pain, diarrhea, nausea and vomiting.   Musculoskeletal:  Positive for arthralgias and joint swelling.   Skin: Negative.    Neurological:  Positive for weakness.   Psychiatric/Behavioral:  Negative for confusion.    Objective:     Vital Signs (Most Recent):  Temp: 98.6 °F (37 °C) (03/07/23 1201)  Pulse: 89 (03/07/23 1201)  Resp: 20 (03/07/23 1201)  BP: (!) 103/45 (03/07/23 1201)  SpO2: 96 % (03/07/23 1201)   Vital Signs (24h Range):  Temp:  [97.9 °F (36.6 °C)-99.5 °F (37.5 °C)] 98.6 °F (37 °C)  Pulse:  [] 89  Resp:  [16-25] 20  SpO2:  [96 %-100 %] 96 %  BP: (103-163)/(45-80) 103/45     Weight: 64.4 kg (141 lb 15.6 oz)  Body mass index is 22.92 kg/m².  No intake or output data in the 24 hours ending 03/07/23 1208   Physical Exam  Constitutional:       General: She is not in acute distress.     Appearance: Normal appearance. She is not ill-appearing.   HENT:      Head: Normocephalic and atraumatic.      Nose: Nose normal.   Eyes:      Conjunctiva/sclera: Conjunctivae normal.      Pupils: Pupils are equal, round, and reactive to light.   Cardiovascular:      Rate and Rhythm: Normal rate and regular rhythm.      Pulses: Normal pulses.   Pulmonary:      Effort: Pulmonary effort is normal. No respiratory distress.      Breath sounds: Normal breath sounds.   Abdominal:      Palpations: There is no mass.      Tenderness: There is no abdominal tenderness.   Musculoskeletal:         General: Swelling (left knee - generalized swelling with fluctuance along lateral joint) and tenderness (lateral left knee) present.      Cervical back: No rigidity.   Skin:     Coloration: Skin is not jaundiced  or pale.      Findings: No rash.   Neurological:      Mental Status: She is alert.   Psychiatric:         Behavior: Behavior normal.         Thought Content: Thought content normal.       Significant Labs: All pertinent labs within the past 24 hours have been reviewed.    Significant Imaging: I have reviewed all pertinent imaging results/findings within the past 24 hours.      Assessment/Plan:      * Joint infection  Left knee joint pain and swelling for one day associated with fever and chills. Remote joint replacement, fairly recent septic arthritis requiring debridement and extended course of antibiotics. Currently on IV daptomycin for MRSA epidural abscess.     Mild leukocytosis but lactate is normal. Give one dose of Zosyn in ED, will consult pharmacy to dose vancomycin. Unable to obtain culture as joint fluid was too thick to aspirate.     Orthopedics consulted. Patient well known to Dr. Noyola.       Microcytic anemia  Appears somewhat chronic, however patient with complaints of recent dark stools. Will evaluate further and involve GI if needed, however will likely need outpatient evaluation due to current infection.    FOBT pending, iron studies if none recent.      Hypertension  Currently controlled. Continue home hydralazine, would benefit from ACEi/ARB considering diabetes.       Type 2 diabetes mellitus, with long-term current use of insulin  Patient's FSGs are uncontrolled due to hyperglycemia on current medication regimen.  Last A1c reviewed-   Lab Results   Component Value Date    HGBA1C 7.9 (H) 01/19/2023     Most recent fingerstick glucose reviewed- No results for input(s): POCTGLUCOSE in the last 24 hours.  Current correctional scale  Low  Maintain anti-hyperglycemic dose as follows-   Antihyperglycemics (From admission, onward)    Start     Stop Route Frequency Ordered    03/06/23 2100  insulin detemir U-100 injection 7 Units         -- SubQ 2 times daily 03/06/23 1643    03/06/23 1741  insulin  aspart U-100 injection 0-5 Units         -- SubQ Before meals & nightly PRN 03/06/23 1643        Hold Oral hypoglycemics while patient is in the hospital.    Arthritis  Continue home medications.         VTE Risk Mitigation (From admission, onward)         Ordered     IP VTE LOW RISK PATIENT  Once         03/06/23 1643     Place sequential compression device  Until discontinued         03/06/23 1643                Discharge Planning   SUZANNA:      Code Status: Full Code   Is the patient medically ready for discharge?:     Reason for patient still in hospital (select all that apply): Treatment  Discharge Plan A: Home with family, Home Health                  Kim Rosales DO  Department of Hospital Medicine   [facility] - Short Stay Unit

## 2023-03-08 LAB
ANION GAP SERPL CALCULATED.3IONS-SCNC: 13 MMOL/L (ref 7–16)
BASOPHILS # BLD AUTO: 0.06 K/UL (ref 0–0.2)
BASOPHILS NFR BLD AUTO: 0.8 % (ref 0–1)
BUN SERPL-MCNC: 19 MG/DL (ref 7–18)
BUN/CREAT SERPL: 26 (ref 6–20)
CALCIUM SERPL-MCNC: 8.3 MG/DL (ref 8.5–10.1)
CHLORIDE SERPL-SCNC: 101 MMOL/L (ref 98–107)
CLARITY FLD: ABNORMAL
CO2 SERPL-SCNC: 26 MMOL/L (ref 21–32)
COLOR FLD: ABNORMAL
CREAT SERPL-MCNC: 0.74 MG/DL (ref 0.55–1.02)
DIFFERENTIAL METHOD BLD: ABNORMAL
EGFR (NO RACE VARIABLE) (RUSH/TITUS): 96 ML/MIN/1.73M²
EOSINOPHIL # BLD AUTO: 0.37 K/UL (ref 0–0.5)
EOSINOPHIL NFR BLD AUTO: 5 % (ref 1–4)
ERYTHROCYTE [DISTWIDTH] IN BLOOD BY AUTOMATED COUNT: 19.5 % (ref 11.5–14.5)
GLUCOSE SERPL-MCNC: 226 MG/DL (ref 70–105)
GLUCOSE SERPL-MCNC: 245 MG/DL (ref 70–105)
GLUCOSE SERPL-MCNC: 273 MG/DL (ref 74–106)
GLUCOSE SERPL-MCNC: 277 MG/DL (ref 70–105)
GLUCOSE SERPL-MCNC: 332 MG/DL (ref 70–105)
GRAM STN SPEC: NORMAL
GRANULOCYTES NFR SNV MANUAL: 95 % (ref 0–25)
HCT VFR BLD AUTO: 25.4 % (ref 38–47)
HGB BLD-MCNC: 7.3 G/DL (ref 12–16)
IMM GRANULOCYTES # BLD AUTO: 0.02 K/UL (ref 0–0.04)
IMM GRANULOCYTES NFR BLD: 0.3 % (ref 0–0.4)
LYMPHOCYTES # BLD AUTO: 1.94 K/UL (ref 1–4.8)
LYMPHOCYTES NFR BLD AUTO: 26.1 % (ref 27–41)
LYMPHOCYTES NFR SNV MANUAL: 2 %
MCH RBC QN AUTO: 23 PG (ref 27–31)
MCHC RBC AUTO-ENTMCNC: 28.7 G/DL (ref 32–36)
MCV RBC AUTO: 80.1 FL (ref 80–96)
MONOCYTES # BLD AUTO: 0.82 K/UL (ref 0–0.8)
MONOCYTES NFR BLD AUTO: 11.1 % (ref 2–6)
MONOCYTES NFR SNV MANUAL: 3 %
MPC BLD CALC-MCNC: 9 FL (ref 9.4–12.4)
NEUTROPHILS # BLD AUTO: 4.21 K/UL (ref 1.8–7.7)
NEUTROPHILS NFR BLD AUTO: 56.7 % (ref 53–65)
NRBC # BLD AUTO: 0 X10E3/UL
NRBC, AUTO (.00): 0 %
PLATELET # BLD AUTO: 326 K/UL (ref 150–400)
POTASSIUM SERPL-SCNC: 4.4 MMOL/L (ref 3.5–5.1)
RBC # BLD AUTO: 3.17 M/UL (ref 4.2–5.4)
RBC # SNV MANUAL: ABNORMAL /CUMM
SODIUM SERPL-SCNC: 136 MMOL/L (ref 136–145)
VERIGENE RESULT: ABNORMAL
WBC # BLD AUTO: 7.42 K/UL (ref 4.5–11)
WBC # SNV MANUAL: ABNORMAL /CUMM

## 2023-03-08 PROCEDURE — 80048 BASIC METABOLIC PNL TOTAL CA: CPT | Performed by: FAMILY MEDICINE

## 2023-03-08 PROCEDURE — 87205 SMEAR GRAM STAIN: CPT | Performed by: ORTHOPAEDIC SURGERY

## 2023-03-08 PROCEDURE — 11000001 HC ACUTE MED/SURG PRIVATE ROOM

## 2023-03-08 PROCEDURE — 82565 ASSAY OF CREATININE: CPT | Performed by: FAMILY MEDICINE

## 2023-03-08 PROCEDURE — 25000003 PHARM REV CODE 250: Performed by: FAMILY MEDICINE

## 2023-03-08 PROCEDURE — 63600175 PHARM REV CODE 636 W HCPCS: Performed by: FAMILY MEDICINE

## 2023-03-08 PROCEDURE — 89050 BODY FLUID CELL COUNT: CPT | Performed by: FAMILY MEDICINE

## 2023-03-08 PROCEDURE — 82962 GLUCOSE BLOOD TEST: CPT

## 2023-03-08 PROCEDURE — 99232 PR SUBSEQUENT HOSPITAL CARE,LEVL II: ICD-10-PCS | Mod: ,,, | Performed by: FAMILY MEDICINE

## 2023-03-08 PROCEDURE — 84520 ASSAY OF UREA NITROGEN: CPT | Performed by: FAMILY MEDICINE

## 2023-03-08 PROCEDURE — 87070 CULTURE OTHR SPECIMN AEROBIC: CPT | Performed by: FAMILY MEDICINE

## 2023-03-08 PROCEDURE — 87075 CULTR BACTERIA EXCEPT BLOOD: CPT | Performed by: ORTHOPAEDIC SURGERY

## 2023-03-08 PROCEDURE — 87070 CULTURE OTHR SPECIMN AEROBIC: CPT | Performed by: ORTHOPAEDIC SURGERY

## 2023-03-08 PROCEDURE — 99232 SBSQ HOSP IP/OBS MODERATE 35: CPT | Mod: ,,, | Performed by: FAMILY MEDICINE

## 2023-03-08 PROCEDURE — 85025 COMPLETE CBC W/AUTO DIFF WBC: CPT | Performed by: FAMILY MEDICINE

## 2023-03-08 PROCEDURE — 80202 ASSAY OF VANCOMYCIN: CPT | Performed by: FAMILY MEDICINE

## 2023-03-08 RX ADMIN — INSULIN ASPART 2 UNITS: 100 INJECTION, SOLUTION INTRAVENOUS; SUBCUTANEOUS at 12:03

## 2023-03-08 RX ADMIN — DOCUSATE SODIUM 100 MG: 100 CAPSULE, LIQUID FILLED ORAL at 08:03

## 2023-03-08 RX ADMIN — MORPHINE SULFATE 4 MG: 4 INJECTION, SOLUTION INTRAMUSCULAR; INTRAVENOUS at 12:03

## 2023-03-08 RX ADMIN — DULOXETINE HYDROCHLORIDE 60 MG: 30 CAPSULE, DELAYED RELEASE ORAL at 08:03

## 2023-03-08 RX ADMIN — INSULIN DETEMIR 10 UNITS: 100 INJECTION, SOLUTION SUBCUTANEOUS at 08:03

## 2023-03-08 RX ADMIN — INSULIN ASPART 3 UNITS: 100 INJECTION, SOLUTION INTRAVENOUS; SUBCUTANEOUS at 04:03

## 2023-03-08 RX ADMIN — MORPHINE SULFATE 4 MG: 4 INJECTION, SOLUTION INTRAMUSCULAR; INTRAVENOUS at 08:03

## 2023-03-08 RX ADMIN — INSULIN ASPART 2 UNITS: 100 INJECTION, SOLUTION INTRAVENOUS; SUBCUTANEOUS at 08:03

## 2023-03-08 RX ADMIN — HYDRALAZINE HYDROCHLORIDE 25 MG: 25 TABLET, FILM COATED ORAL at 08:03

## 2023-03-08 RX ADMIN — MORPHINE SULFATE 4 MG: 4 INJECTION, SOLUTION INTRAMUSCULAR; INTRAVENOUS at 04:03

## 2023-03-08 RX ADMIN — INSULIN ASPART 2 UNITS: 100 INJECTION, SOLUTION INTRAVENOUS; SUBCUTANEOUS at 07:03

## 2023-03-08 RX ADMIN — FERROUS SULFATE TAB 325 MG (65 MG ELEMENTAL FE) 1 EACH: 325 (65 FE) TAB at 08:03

## 2023-03-08 RX ADMIN — INSULIN DETEMIR 12 UNITS: 100 INJECTION, SOLUTION SUBCUTANEOUS at 08:03

## 2023-03-08 RX ADMIN — GABAPENTIN 300 MG: 300 CAPSULE ORAL at 08:03

## 2023-03-08 RX ADMIN — MUPIROCIN: 20 OINTMENT TOPICAL at 08:03

## 2023-03-08 RX ADMIN — VANCOMYCIN HYDROCHLORIDE 1250 MG: 500 INJECTION, POWDER, LYOPHILIZED, FOR SOLUTION INTRAVENOUS at 06:03

## 2023-03-08 RX ADMIN — VANCOMYCIN HYDROCHLORIDE 1250 MG: 500 INJECTION, POWDER, LYOPHILIZED, FOR SOLUTION INTRAVENOUS at 11:03

## 2023-03-08 NOTE — PLAN OF CARE
Problem: Adult Inpatient Plan of Care  Goal: Plan of Care Review  Outcome: Ongoing, Progressing  Goal: Patient-Specific Goal (Individualized)  Outcome: Ongoing, Progressing  Goal: Absence of Hospital-Acquired Illness or Injury  Outcome: Ongoing, Progressing  Goal: Optimal Comfort and Wellbeing  Outcome: Ongoing, Progressing  Goal: Readiness for Transition of Care  Outcome: Ongoing, Progressing     Problem: Diabetes Comorbidity  Goal: Blood Glucose Level Within Targeted Range  Outcome: Ongoing, Progressing     Problem: Fluid and Electrolyte Imbalance (Acute Kidney Injury/Impairment)  Goal: Fluid and Electrolyte Balance  Outcome: Ongoing, Progressing     Problem: Oral Intake Inadequate (Acute Kidney Injury/Impairment)  Goal: Optimal Nutrition Intake  Outcome: Ongoing, Progressing     Problem: Renal Function Impairment (Acute Kidney Injury/Impairment)  Goal: Effective Renal Function  Outcome: Ongoing, Progressing     Problem: Infection  Goal: Absence of Infection Signs and Symptoms  Outcome: Ongoing, Progressing     Problem: Impaired Wound Healing  Goal: Optimal Wound Healing  Outcome: Ongoing, Progressing

## 2023-03-08 NOTE — PROGRESS NOTES
[facility] - Short Stay [facility] Medicine  Progress Note    Patient Name: Monica Walker  MRN: [id number]  Patient Class: IP- Inpatient   Admission Date: 3/6/2023  Length of Stay: 2 days  Attending Physician: Kim Rosales DO  Primary Care Provider: Hannah Schulz MD        Subjective:     Principal Problem:Joint infection        HPI:  Monica Walker is a 52-year-old female with history of diabetes, hypertension, and MRSA joint space infection, who presented with complaints of left knee pain and swelling. She states the pain began suddenly yesterday, denies any recent falls or other associated trauma. Pain is worse along the lateral joint space with radiation down into the foot. There is a discreet area of fluctuance along the lateral surface of the knee, with generalized swelling of the joint to approximately 2x the size of the contralateral knee. Patient also complains of some fever, generalized weakness and productive cough for the past few days as well as dark stool.     Patient has a history of remote joint replacement (2015) with recent septic arthritis requiring debridement of the left knee as well as one shoulder. She was also recently hospitalized for with MRSA spinal epidural abscess, discharged about a month ago with outpatient daptomycin.      Initial evaluation with the following with WBC 12.39, LA 0.8, Hgb 7.4, glucose 200, labs otherwise unremarkable. Mildly elevated temp (99.5) and mild tachycardia (105), vital signs otherwise stable and grossly within normal limits.     Monica Walker is to be admitted to hospital service for evaluation and treatment of suspected septic arthritis. Orthopedics has been consulted. Blood cultures and aspiration/culture of affected knee pending at time of admission.       Overview/Hospital Course:  3/7: No acute events overnight, patient with no complaints this morning. US with aspiration was attempted however unable to obtain a specimen as  fluid was too thick. Ortho consult pending.     3/8: Blood culture positive for MRSA, pharmacy notified to continue vancomycin. No other changes, Dr. Noyola to evaluate later today pending x-ray.          Review of Systems   Constitutional:  Positive for chills, fatigue and fever.   HENT: Negative.     Respiratory:  Positive for cough. Negative for shortness of breath.    Cardiovascular:  Negative for chest pain.   Gastrointestinal:  Positive for blood in stool. Negative for abdominal pain, diarrhea, nausea and vomiting.   Musculoskeletal:  Positive for arthralgias and joint swelling.   Skin: Negative.    Neurological:  Positive for weakness.   Psychiatric/Behavioral:  Negative for confusion.    Objective:     Vital Signs (Most Recent):  Temp: 98.4 °F (36.9 °C) (03/08/23 1200)  Pulse: 79 (03/08/23 1200)  Resp: 18 (03/08/23 1211)  BP: 120/65 (03/08/23 1200)  SpO2: 96 % (03/08/23 1200) Vital Signs (24h Range):  Temp:  [97.2 °F (36.2 °C)-98.6 °F (37 °C)] 98.4 °F (36.9 °C)  Pulse:  [67-96] 79  Resp:  [16-18] 18  SpO2:  [95 %-96 %] 96 %  BP: ()/(46-65) 120/65     Weight: 64.4 kg (141 lb 15.6 oz)  Body mass index is 22.92 kg/m².    Intake/Output Summary (Last 24 hours) at 3/8/2023 1557  Last data filed at 3/7/2023 1800  Gross per 24 hour   Intake 360 ml   Output --   Net 360 ml      Physical Exam  Constitutional:       General: She is not in acute distress.     Appearance: Normal appearance. She is not ill-appearing.   HENT:      Head: Normocephalic and atraumatic.      Nose: Nose normal.   Eyes:      Conjunctiva/sclera: Conjunctivae normal.      Pupils: Pupils are equal, round, and reactive to light.   Cardiovascular:      Rate and Rhythm: Normal rate and regular rhythm.      Pulses: Normal pulses.   Pulmonary:      Effort: Pulmonary effort is normal. No respiratory distress.      Breath sounds: Normal breath sounds.   Abdominal:      Palpations: There is no mass.      Tenderness: There is no abdominal  tenderness.   Musculoskeletal:         General: Swelling (left knee - generalized swelling with fluctuance along lateral joint) and tenderness (lateral left knee) present.      Cervical back: No rigidity.   Skin:     Coloration: Skin is not jaundiced or pale.      Findings: No rash.   Neurological:      Mental Status: She is alert.   Psychiatric:         Behavior: Behavior normal.         Thought Content: Thought content normal.       Significant Labs: All pertinent labs within the past 24 hours have been reviewed.    Significant Imaging: I have reviewed all pertinent imaging results/findings within the past 24 hours.      Assessment/Plan:      * Joint infection  Left knee joint pain and swelling for one day associated with fever and chills. Remote joint replacement, fairly recent septic arthritis requiring debridement and extended course of antibiotics. Currently on IV daptomycin for MRSA epidural abscess.     Mild leukocytosis but lactate is normal. Give one dose of Zosyn in ED, will consult pharmacy to dose vancomycin. Unable to obtain culture as joint fluid was too thick to aspirate.     Orthopedics consulted. Patient well known to Dr. Noyola.       Microcytic anemia  Appears somewhat chronic, however patient with complaints of recent dark stools. Will evaluate further and involve GI if needed, however will likely need outpatient evaluation due to current infection.    Iron deficiency anemia on previous admission, will continue iron supplement and evaluate further as indicated.       Hypertension  Currently controlled. Continue home hydralazine, would benefit from ACEi/ARB considering diabetes.       Type 2 diabetes mellitus, with long-term current use of insulin  Patient's FSGs are uncontrolled due to hyperglycemia on current medication regimen.  Last A1c reviewed-   Lab Results   Component Value Date    HGBA1C 7.9 (H) 01/19/2023     Most recent fingerstick glucose reviewed- No results for input(s):  POCTGLUCOSE in the last 24 hours.  Current correctional scale  Low  Increase anti-hyperglycemic dose as follows-   Antihyperglycemics (From admission, onward)    Start     Stop Route Frequency Ordered    03/08/23 2100  insulin detemir U-100 injection 12 Units         -- SubQ 2 times daily 03/08/23 0903    03/06/23 1741  insulin aspart U-100 injection 0-5 Units         -- SubQ Before meals & nightly PRN 03/06/23 1643        Hold Oral hypoglycemics while patient is in the hospital.    Arthritis  Continue home medications.         VTE Risk Mitigation (From admission, onward)         Ordered     IP VTE LOW RISK PATIENT  Once         03/06/23 1643     Place sequential compression device  Until discontinued         03/06/23 1643                Discharge Planning   SUZANNA:      Code Status: Full Code   Is the patient medically ready for discharge?:     Reason for patient still in hospital (select all that apply): Treatment  Discharge Plan A: Home with family, Home Health                  Kim Rosales DO  Department of Hospital Medicine   [facility] Short Stay [facility]

## 2023-03-08 NOTE — SUBJECTIVE & OBJECTIVE
Review of Systems   Constitutional:  Positive for chills, fatigue and fever.   HENT: Negative.     Respiratory:  Positive for cough. Negative for shortness of breath.    Cardiovascular:  Negative for chest pain.   Gastrointestinal:  Positive for blood in stool. Negative for abdominal pain, diarrhea, nausea and vomiting.   Musculoskeletal:  Positive for arthralgias and joint swelling.   Skin: Negative.    Neurological:  Positive for weakness.   Psychiatric/Behavioral:  Negative for confusion.    Objective:     Vital Signs (Most Recent):  Temp: 98.4 °F (36.9 °C) (03/08/23 1200)  Pulse: 79 (03/08/23 1200)  Resp: 18 (03/08/23 1211)  BP: 120/65 (03/08/23 1200)  SpO2: 96 % (03/08/23 1200) Vital Signs (24h Range):  Temp:  [97.2 °F (36.2 °C)-98.6 °F (37 °C)] 98.4 °F (36.9 °C)  Pulse:  [67-96] 79  Resp:  [16-18] 18  SpO2:  [95 %-96 %] 96 %  BP: ()/(46-65) 120/65     Weight: 64.4 kg (141 lb 15.6 oz)  Body mass index is 22.92 kg/m².    Intake/Output Summary (Last 24 hours) at 3/8/2023 1557  Last data filed at 3/7/2023 1800  Gross per 24 hour   Intake 360 ml   Output --   Net 360 ml      Physical Exam  Constitutional:       General: She is not in acute distress.     Appearance: Normal appearance. She is not ill-appearing.   HENT:      Head: Normocephalic and atraumatic.      Nose: Nose normal.   Eyes:      Conjunctiva/sclera: Conjunctivae normal.      Pupils: Pupils are equal, round, and reactive to light.   Cardiovascular:      Rate and Rhythm: Normal rate and regular rhythm.      Pulses: Normal pulses.   Pulmonary:      Effort: Pulmonary effort is normal. No respiratory distress.      Breath sounds: Normal breath sounds.   Abdominal:      Palpations: There is no mass.      Tenderness: There is no abdominal tenderness.   Musculoskeletal:         General: Swelling (left knee - generalized swelling with fluctuance along lateral joint) and tenderness (lateral left knee) present.      Cervical back: No rigidity.   Skin:      Coloration: Skin is not jaundiced or pale.      Findings: No rash.   Neurological:      Mental Status: She is alert.   Psychiatric:         Behavior: Behavior normal.         Thought Content: Thought content normal.       Significant Labs: All pertinent labs within the past 24 hours have been reviewed.    Significant Imaging: I have reviewed all pertinent imaging results/findings within the past 24 hours.

## 2023-03-08 NOTE — NURSING
Ortho consult called to Dr. Noyola, rec'd ordered to obtain xray of left knee now and he will see pt.

## 2023-03-08 NOTE — PLAN OF CARE
Problem: Adult Inpatient Plan of Care  Goal: Plan of Care Review  Outcome: Ongoing, Progressing  Flowsheets (Taken 3/8/2023 1021)  Plan of Care Reviewed With: patient     Problem: Diabetes Comorbidity  Goal: Blood Glucose Level Within Targeted Range  Outcome: Ongoing, Progressing  Intervention: Monitor and Manage Glycemia  Flowsheets (Taken 3/8/2023 1021)  Glycemic Management:   blood glucose monitored   carbohydrate replacement provided

## 2023-03-08 NOTE — ASSESSMENT & PLAN NOTE
Appears somewhat chronic, however patient with complaints of recent dark stools. Will evaluate further and involve GI if needed, however will likely need outpatient evaluation due to current infection.    Iron deficiency anemia on previous admission, will continue iron supplement and evaluate further as indicated.

## 2023-03-08 NOTE — ASSESSMENT & PLAN NOTE
Patient's FSGs are uncontrolled due to hyperglycemia on current medication regimen.  Last A1c reviewed-   Lab Results   Component Value Date    HGBA1C 7.9 (H) 01/19/2023     Most recent fingerstick glucose reviewed- No results for input(s): POCTGLUCOSE in the last 24 hours.  Current correctional scale  Low  Increase anti-hyperglycemic dose as follows-   Antihyperglycemics (From admission, onward)    Start     Stop Route Frequency Ordered    03/08/23 2100  insulin detemir U-100 injection 12 Units         -- SubQ 2 times daily 03/08/23 0903    03/06/23 1741  insulin aspart U-100 injection 0-5 Units         -- SubQ Before meals & nightly PRN 03/06/23 1643        Hold Oral hypoglycemics while patient is in the hospital.

## 2023-03-09 PROBLEM — B95.62 MRSA BACTEREMIA: Status: ACTIVE | Noted: 2022-11-21

## 2023-03-09 LAB
BUN SERPL-MCNC: 20 MG/DL (ref 7–18)
BUN/CREAT SERPL: 23 (ref 6–20)
CREAT SERPL-MCNC: 0.87 MG/DL (ref 0.55–1.02)
EGFR (NO RACE VARIABLE) (RUSH/TITUS): 79 ML/MIN/1.73M²
GLUCOSE SERPL-MCNC: 192 MG/DL (ref 70–105)
GLUCOSE SERPL-MCNC: 202 MG/DL (ref 70–105)
GLUCOSE SERPL-MCNC: 233 MG/DL (ref 70–105)
GLUCOSE SERPL-MCNC: 255 MG/DL (ref 70–105)
VANCOMYCIN TROUGH SERPL-MCNC: 12.8 ΜG/ML (ref 10–20)
VANCOMYCIN TROUGH SERPL-MCNC: 31.2 ΜG/ML (ref 10–20)

## 2023-03-09 PROCEDURE — 99232 PR SUBSEQUENT HOSPITAL CARE,LEVL II: ICD-10-PCS | Mod: ,,, | Performed by: FAMILY MEDICINE

## 2023-03-09 PROCEDURE — 80202 ASSAY OF VANCOMYCIN: CPT | Performed by: FAMILY MEDICINE

## 2023-03-09 PROCEDURE — 99232 SBSQ HOSP IP/OBS MODERATE 35: CPT | Mod: ,,, | Performed by: FAMILY MEDICINE

## 2023-03-09 PROCEDURE — 63600175 PHARM REV CODE 636 W HCPCS: Performed by: FAMILY MEDICINE

## 2023-03-09 PROCEDURE — 25000003 PHARM REV CODE 250: Performed by: FAMILY MEDICINE

## 2023-03-09 PROCEDURE — 11000001 HC ACUTE MED/SURG PRIVATE ROOM

## 2023-03-09 PROCEDURE — 82962 GLUCOSE BLOOD TEST: CPT

## 2023-03-09 RX ORDER — KETOROLAC TROMETHAMINE 15 MG/ML
15 INJECTION, SOLUTION INTRAMUSCULAR; INTRAVENOUS ONCE
Status: COMPLETED | OUTPATIENT
Start: 2023-03-09 | End: 2023-03-09

## 2023-03-09 RX ORDER — MORPHINE SULFATE 2 MG/ML
2 INJECTION, SOLUTION INTRAMUSCULAR; INTRAVENOUS EVERY 4 HOURS PRN
Status: DISCONTINUED | OUTPATIENT
Start: 2023-03-09 | End: 2023-03-20 | Stop reason: HOSPADM

## 2023-03-09 RX ORDER — HYDRALAZINE HYDROCHLORIDE 10 MG/1
10 TABLET, FILM COATED ORAL EVERY 12 HOURS
Status: DISCONTINUED | OUTPATIENT
Start: 2023-03-09 | End: 2023-03-20 | Stop reason: HOSPADM

## 2023-03-09 RX ORDER — MORPHINE SULFATE 15 MG/1
15 TABLET, FILM COATED, EXTENDED RELEASE ORAL EVERY 12 HOURS
Status: DISCONTINUED | OUTPATIENT
Start: 2023-03-09 | End: 2023-03-12

## 2023-03-09 RX ADMIN — MUPIROCIN: 20 OINTMENT TOPICAL at 08:03

## 2023-03-09 RX ADMIN — INSULIN ASPART 3 UNITS: 100 INJECTION, SOLUTION INTRAVENOUS; SUBCUTANEOUS at 11:03

## 2023-03-09 RX ADMIN — DOCUSATE SODIUM 100 MG: 100 CAPSULE, LIQUID FILLED ORAL at 08:03

## 2023-03-09 RX ADMIN — MORPHINE SULFATE 15 MG: 15 TABLET, FILM COATED, EXTENDED RELEASE ORAL at 05:03

## 2023-03-09 RX ADMIN — MORPHINE SULFATE 4 MG: 4 INJECTION, SOLUTION INTRAMUSCULAR; INTRAVENOUS at 11:03

## 2023-03-09 RX ADMIN — INSULIN ASPART 2 UNITS: 100 INJECTION, SOLUTION INTRAVENOUS; SUBCUTANEOUS at 08:03

## 2023-03-09 RX ADMIN — TRAZODONE HYDROCHLORIDE 25 MG: 50 TABLET ORAL at 08:03

## 2023-03-09 RX ADMIN — MORPHINE SULFATE 4 MG: 4 INJECTION, SOLUTION INTRAMUSCULAR; INTRAVENOUS at 12:03

## 2023-03-09 RX ADMIN — MORPHINE SULFATE 4 MG: 4 INJECTION, SOLUTION INTRAMUSCULAR; INTRAVENOUS at 08:03

## 2023-03-09 RX ADMIN — HYDRALAZINE HYDROCHLORIDE 10 MG: 10 TABLET, FILM COATED ORAL at 08:03

## 2023-03-09 RX ADMIN — FERROUS SULFATE TAB 325 MG (65 MG ELEMENTAL FE) 1 EACH: 325 (65 FE) TAB at 08:03

## 2023-03-09 RX ADMIN — VANCOMYCIN HYDROCHLORIDE 1250 MG: 500 INJECTION, POWDER, LYOPHILIZED, FOR SOLUTION INTRAVENOUS at 05:03

## 2023-03-09 RX ADMIN — INSULIN DETEMIR 12 UNITS: 100 INJECTION, SOLUTION SUBCUTANEOUS at 08:03

## 2023-03-09 RX ADMIN — INSULIN ASPART 2 UNITS: 100 INJECTION, SOLUTION INTRAVENOUS; SUBCUTANEOUS at 05:03

## 2023-03-09 RX ADMIN — MORPHINE SULFATE 2 MG: 2 INJECTION, SOLUTION INTRAMUSCULAR; INTRAVENOUS at 08:03

## 2023-03-09 RX ADMIN — GABAPENTIN 300 MG: 300 CAPSULE ORAL at 08:03

## 2023-03-09 RX ADMIN — KETOROLAC TROMETHAMINE 15 MG: 15 INJECTION, SOLUTION INTRAMUSCULAR; INTRAVENOUS at 04:03

## 2023-03-09 RX ADMIN — MORPHINE SULFATE 4 MG: 4 INJECTION, SOLUTION INTRAMUSCULAR; INTRAVENOUS at 03:03

## 2023-03-09 RX ADMIN — DULOXETINE HYDROCHLORIDE 60 MG: 30 CAPSULE, DELAYED RELEASE ORAL at 08:03

## 2023-03-09 NOTE — ASSESSMENT & PLAN NOTE
Left knee joint pain and swelling for one day associated with fever and chills. Remote joint replacement, fairly recent septic arthritis requiring debridement and extended course of antibiotics. Currently on IV daptomycin for MRSA epidural abscess.     Orthopedics (Dr. Noyola) following, able to obtain sample for fluid culture and cell counts yesterday. Will follow cultures over the weekend with tentative plans for surgery on Monday.     Will begin transition from PRN IV pain management to scheduled oral medications, anti-inflammatory also added.

## 2023-03-09 NOTE — SUBJECTIVE & OBJECTIVE
Review of Systems   Constitutional:  Positive for chills and fatigue. Negative for fever.   HENT: Negative.     Respiratory:  Negative for cough and shortness of breath.    Cardiovascular:  Negative for chest pain.   Gastrointestinal:  Positive for blood in stool. Negative for abdominal pain, diarrhea, nausea and vomiting.   Musculoskeletal:  Positive for arthralgias and joint swelling.   Skin: Negative.    Neurological:  Positive for weakness.   Psychiatric/Behavioral:  Negative for confusion.    Objective:     Vital Signs (Most Recent):  Temp: 98 °F (36.7 °C) (03/09/23 1218)  Pulse: 96 (03/09/23 1218)  Resp: 18 (03/09/23 1218)  BP: 108/61 (03/09/23 1218)  SpO2: 98 % (03/09/23 1218) Vital Signs (24h Range):  Temp:  [98 °F (36.7 °C)-98.4 °F (36.9 °C)] 98 °F (36.7 °C)  Pulse:  [70-96] 96  Resp:  [17-20] 18  SpO2:  [96 %-98 %] 98 %  BP: (105-135)/(56-63) 108/61     Weight: 64.4 kg (141 lb 15.6 oz)  Body mass index is 22.92 kg/m².  No intake or output data in the 24 hours ending 03/09/23 1526   Physical Exam  Constitutional:       General: She is not in acute distress.     Appearance: Normal appearance. She is not ill-appearing.   HENT:      Head: Normocephalic and atraumatic.      Nose: Nose normal.   Eyes:      Conjunctiva/sclera: Conjunctivae normal.      Pupils: Pupils are equal, round, and reactive to light.   Cardiovascular:      Rate and Rhythm: Normal rate and regular rhythm.      Pulses: Normal pulses.   Pulmonary:      Effort: Pulmonary effort is normal. No respiratory distress.      Breath sounds: Normal breath sounds.   Abdominal:      Palpations: There is no mass.      Tenderness: There is no abdominal tenderness.   Musculoskeletal:         General: Swelling (left knee - generalized swelling with fluctuance along lateral joint) and tenderness (lateral left knee) present.      Cervical back: No rigidity.   Skin:     Coloration: Skin is not jaundiced or pale.      Findings: No rash.   Neurological:       Mental Status: She is alert.   Psychiatric:         Behavior: Behavior normal.         Thought Content: Thought content normal.       Significant Labs: All pertinent labs within the past 24 hours have been reviewed.    Significant Imaging: I have reviewed all pertinent imaging results/findings within the past 24 hours.

## 2023-03-09 NOTE — PROGRESS NOTES
[facility] - Short Stay [facility] Medicine  Progress Note    Patient Name: Monica Walker  MRN: [id number]  Patient Class: IP- Inpatient   Admission Date: 3/6/2023  Length of Stay: 3 days  Attending Physician: Kim Rosales DO  Primary Care Provider: Hannah Schulz MD        Subjective:     Principal Problem:Joint infection        HPI:  Monica Walker is a 52-year-old female with history of diabetes, hypertension, and MRSA joint space infection, who presented with complaints of left knee pain and swelling. She states the pain began suddenly yesterday, denies any recent falls or other associated trauma. Pain is worse along the lateral joint space with radiation down into the foot. There is a discreet area of fluctuance along the lateral surface of the knee, with generalized swelling of the joint to approximately 2x the size of the contralateral knee. Patient also complains of some fever, generalized weakness and productive cough for the past few days as well as dark stool.     Patient has a history of remote joint replacement (2015) with recent septic arthritis requiring debridement of the left knee as well as one shoulder. She was also recently hospitalized for with MRSA spinal epidural abscess, discharged about a month ago with outpatient daptomycin.      Initial evaluation with the following with WBC 12.39, LA 0.8, Hgb 7.4, glucose 200, labs otherwise unremarkable. Mildly elevated temp (99.5) and mild tachycardia (105), vital signs otherwise stable and grossly within normal limits.     Monica Walker is to be admitted to hospital service for evaluation and treatment of suspected septic arthritis. Orthopedics has been consulted. Blood cultures and aspiration/culture of affected knee pending at time of admission.       Overview/Hospital Course:  3/7: No acute events overnight, patient with no complaints this morning. US with aspiration was attempted however unable to obtain a specimen as  fluid was too thick. Ortho consult pending.     3/8: Blood culture positive for MRSA, pharmacy notified to continue vancomycin. No other changes, Dr. Noyola to evaluate later today pending x-ray.    3/9: No acute events overnight, patient with no complaints aside from continued pain in the left knee. Ortho following with plans to monitor fluid cultures over the weekend with tentative plans for surgery on Monday. Plans to repeat blood cultures following procedure, if negative patient will need approximately one month of IV antibiotics from date of negative culture. Would benefit from continuation of vancomycin as she has failed outpatient treatment with daptomycin immediately prior to this admission.        Review of Systems   Constitutional:  Positive for chills and fatigue. Negative for fever.   HENT: Negative.     Respiratory:  Negative for cough and shortness of breath.    Cardiovascular:  Negative for chest pain.   Gastrointestinal:  Positive for blood in stool. Negative for abdominal pain, diarrhea, nausea and vomiting.   Musculoskeletal:  Positive for arthralgias and joint swelling.   Skin: Negative.    Neurological:  Positive for weakness.   Psychiatric/Behavioral:  Negative for confusion.    Objective:     Vital Signs (Most Recent):  Temp: 98 °F (36.7 °C) (03/09/23 1218)  Pulse: 96 (03/09/23 1218)  Resp: 18 (03/09/23 1218)  BP: 108/61 (03/09/23 1218)  SpO2: 98 % (03/09/23 1218) Vital Signs (24h Range):  Temp:  [98 °F (36.7 °C)-98.4 °F (36.9 °C)] 98 °F (36.7 °C)  Pulse:  [70-96] 96  Resp:  [17-20] 18  SpO2:  [96 %-98 %] 98 %  BP: (105-135)/(56-63) 108/61     Weight: 64.4 kg (141 lb 15.6 oz)  Body mass index is 22.92 kg/m².  No intake or output data in the 24 hours ending 03/09/23 1526   Physical Exam  Constitutional:       General: She is not in acute distress.     Appearance: Normal appearance. She is not ill-appearing.   HENT:      Head: Normocephalic and atraumatic.      Nose: Nose normal.   Eyes:       Conjunctiva/sclera: Conjunctivae normal.      Pupils: Pupils are equal, round, and reactive to light.   Cardiovascular:      Rate and Rhythm: Normal rate and regular rhythm.      Pulses: Normal pulses.   Pulmonary:      Effort: Pulmonary effort is normal. No respiratory distress.      Breath sounds: Normal breath sounds.   Abdominal:      Palpations: There is no mass.      Tenderness: There is no abdominal tenderness.   Musculoskeletal:         General: Swelling (left knee - generalized swelling with fluctuance along lateral joint) and tenderness (lateral left knee) present.      Cervical back: No rigidity.   Skin:     Coloration: Skin is not jaundiced or pale.      Findings: No rash.   Neurological:      Mental Status: She is alert.   Psychiatric:         Behavior: Behavior normal.         Thought Content: Thought content normal.       Significant Labs: All pertinent labs within the past 24 hours have been reviewed.    Significant Imaging: I have reviewed all pertinent imaging results/findings within the past 24 hours.      Assessment/Plan:      * Joint infection  Left knee joint pain and swelling for one day associated with fever and chills. Remote joint replacement, fairly recent septic arthritis requiring debridement and extended course of antibiotics. Currently on IV daptomycin for MRSA epidural abscess.     Orthopedics (Dr. Noyola) following, able to obtain sample for fluid culture and cell counts yesterday. Will follow cultures over the weekend with tentative plans for surgery on Monday.     Will begin transition from PRN IV pain management to scheduled oral medications, anti-inflammatory also added.      Microcytic anemia  Appears somewhat chronic, however patient with complaints of recent dark stools. Will evaluate further and involve GI if needed, however will likely need outpatient evaluation due to current infection.    Iron deficiency anemia on previous admission, will continue iron supplement start  PPI for now.      Hypertension  Currently controlled. Continue home hydralazine, would benefit from ACEi/ARB considering diabetes.       MRSA bacteremia  Blood cultures on admission positive for MRSA, currently receiving vancomycin. Plan to repeat cultures following procedure Monday. Patient will require extended treatment due to recurrent infection and multiple joint prostheses. Currently recommendation for continued vancomycin as patient failed outpatient treatment with daptomycin immediately prior to this admission.      Type 2 diabetes mellitus, with long-term current use of insulin  Patient's FSGs are uncontrolled due to hyperglycemia on current medication regimen.  Last A1c reviewed-   Lab Results   Component Value Date    HGBA1C 7.9 (H) 01/19/2023     Most recent fingerstick glucose reviewed- No results for input(s): POCTGLUCOSE in the last 24 hours.  Current correctional scale  Low  Increase anti-hyperglycemic dose as follows-   Antihyperglycemics (From admission, onward)    Start     Stop Route Frequency Ordered    03/08/23 2100  insulin detemir U-100 injection 12 Units         -- SubQ 2 times daily 03/08/23 0903    03/06/23 1741  insulin aspart U-100 injection 0-5 Units         -- SubQ Before meals & nightly PRN 03/06/23 1643        Hold Oral hypoglycemics while patient is in the hospital.    Arthritis  Continue home medications.         VTE Risk Mitigation (From admission, onward)         Ordered     IP VTE LOW RISK PATIENT  Once         03/06/23 1643     Place sequential compression device  Until discontinued         03/06/23 1643                Discharge Planning   SUZANNA:      Code Status: Full Code   Is the patient medically ready for discharge?:     Reason for patient still in hospital (select all that apply): Treatment  Discharge Plan A: Home with family, Home Health                  Kim Rosales DO  Department of Hospital Medicine   [facility] - Short Stay Unit

## 2023-03-09 NOTE — ASSESSMENT & PLAN NOTE
Blood cultures on admission positive for MRSA, currently receiving vancomycin. Plan to repeat cultures following procedure Monday. Patient will require extended treatment due to recurrent infection and multiple joint prostheses. Currently recommendation for continued vancomycin as patient failed outpatient treatment with daptomycin immediately prior to this admission.

## 2023-03-09 NOTE — H&P (VIEW-ONLY)
Alisha Walker is a 52-year-old female known to me having undergone right total knee replacement arthroplasty and a medial unicompartmental replacement of her left knee.  She developed bacterial sepsis with infections involving the left shoulder, left knee and lumbar spine.  She underwent arthroscopic lavage of her left shoulder and debridement with polyethylene exchange of the left knee performed by Dr. Mendoza 11/22/2022.  Spine infection is being treated under the direction of Dr. KARIME Hernandes.  [facility] complaining of left knee pain 2 days ago by Dr. Rosales.  An attempted aspiration by Dr. René luciano [facility] (Radiology) yesterday was unsuccessful.  X-rays left knee today shows arthroplasty components involving the medial femoral condyle and medial tibial plateau consistent with unicompartmental replacement.  There is anterior subluxation of the femur on the tibia suggesting PCL deficiency and some erosion involving the lateral femoral condyle.  PE left knee healed anterior midline incision.  Mild effusion is present.  Painful active and passive arc of motion with increased posterior drawer test.  Impression:  The patient's left knee is in need of conversion to total knee replacement arthroplasty with a posterior stabilized femoral component.  If her left knee remains infected this will have to be performed as a 2 stage operation with initial removal of the present femoral and tibial components along with antibiotic impregnated methylmethacrylate spacer and continued IV antibiotics.  After the joint infection has been cleared she can be scheduled for a conversion to TKR.  I have aspirated the left knee today yielding 3 cc of cloudy joint fluid.  The aspirate has been submitted for aerobic and anaerobic cultures.  This will determine whether she would be eligible for 1 stage or 2 stage revision of her left knee.  And will take 48 hours for final cultures.

## 2023-03-09 NOTE — CONSULTS
Alisha Walker is a 52-year-old female known to me having undergone right total knee replacement arthroplasty and a medial unicompartmental replacement of her left knee.  She developed bacterial sepsis with infections involving the left shoulder, left knee and lumbar spine.  She underwent arthroscopic lavage of her left shoulder and debridement with polyethylene exchange of the left knee performed by Dr. Mendoza 11/22/2022.  Spine infection is being treated under the direction of Dr. KARIME Hernandes.  [facility] complaining of left knee pain 2 days ago by Dr. Rosales.  An attempted aspiration by Dr. René luciano [facility] (Radiology) yesterday was unsuccessful.  X-rays left knee today shows arthroplasty components involving the medial femoral condyle and medial tibial plateau consistent with unicompartmental replacement.  There is anterior subluxation of the femur on the tibia suggesting PCL deficiency and some erosion involving the lateral femoral condyle.  PE left knee healed anterior midline incision.  Mild effusion is present.  Painful active and passive arc of motion with increased posterior drawer test.  Impression:  The patient's left knee is in need of conversion to total knee replacement arthroplasty with a posterior stabilized femoral component.  If her left knee remains infected this will have to be performed as a 2 stage operation with initial removal of the present femoral and tibial components along with antibiotic impregnated methylmethacrylate spacer and continued IV antibiotics.  After the joint infection has been cleared she can be scheduled for a conversion to TKR.  I have aspirated the left knee today yielding 3 cc of cloudy joint fluid.  The aspirate has been submitted for aerobic and anaerobic cultures.  This will determine whether she would be eligible for 1 stage or 2 stage revision of her left knee.  And will take 48 hours for final cultures.

## 2023-03-09 NOTE — PROGRESS NOTES
Aspirate left knee yesterday shows a synovial white blood count of [id number].  G stain however is negative as is the preliminary aerobic culture.  I will plan on operating on her left knee Monday a.m..  If cultures are ultimately negative we can proceed with revision to a posterior stabilized total knee replacement arthroplasty.  If cultures are positive for bacterial growth or remove the unicompartmental components and implant methylmethacrylate spacer with antibiotics for a 2 stage type revision.  We will see the patient over the weekend and discuss her culture results once they are available in the plan for Monday..

## 2023-03-09 NOTE — ASSESSMENT & PLAN NOTE
Appears somewhat chronic, however patient with complaints of recent dark stools. Will evaluate further and involve GI if needed, however will likely need outpatient evaluation due to current infection.    Iron deficiency anemia on previous admission, will continue iron supplement start PPI for now.

## 2023-03-10 LAB
ANION GAP SERPL CALCULATED.3IONS-SCNC: 12 MMOL/L (ref 7–16)
BACTERIA BLD CULT: ABNORMAL
BACTERIA SPEC ANAEROBE CULT: NORMAL
BASOPHILS # BLD AUTO: 0.05 K/UL (ref 0–0.2)
BASOPHILS NFR BLD AUTO: 0.7 % (ref 0–1)
BUN SERPL-MCNC: 23 MG/DL (ref 7–18)
BUN/CREAT SERPL: 28 (ref 6–20)
CALCIUM SERPL-MCNC: 8.6 MG/DL (ref 8.5–10.1)
CHLORIDE SERPL-SCNC: 100 MMOL/L (ref 98–107)
CO2 SERPL-SCNC: 28 MMOL/L (ref 21–32)
CREAT SERPL-MCNC: 0.82 MG/DL (ref 0.55–1.02)
DIFFERENTIAL METHOD BLD: ABNORMAL
EGFR (NO RACE VARIABLE) (RUSH/TITUS): 85 ML/MIN/1.73M²
EOSINOPHIL # BLD AUTO: 0.41 K/UL (ref 0–0.5)
EOSINOPHIL NFR BLD AUTO: 5.8 % (ref 1–4)
ERYTHROCYTE [DISTWIDTH] IN BLOOD BY AUTOMATED COUNT: 19.4 % (ref 11.5–14.5)
GLUCOSE SERPL-MCNC: 176 MG/DL (ref 74–106)
GLUCOSE SERPL-MCNC: 209 MG/DL (ref 70–105)
GLUCOSE SERPL-MCNC: 219 MG/DL (ref 70–105)
GLUCOSE SERPL-MCNC: 346 MG/DL (ref 70–105)
GLUCOSE SERPL-MCNC: 436 MG/DL (ref 70–105)
GRAM STN SPEC: ABNORMAL
HCT VFR BLD AUTO: 24.8 % (ref 38–47)
HGB BLD-MCNC: 7.3 G/DL (ref 12–16)
IMM GRANULOCYTES # BLD AUTO: 0.03 K/UL (ref 0–0.04)
IMM GRANULOCYTES NFR BLD: 0.4 % (ref 0–0.4)
LYMPHOCYTES # BLD AUTO: 2.06 K/UL (ref 1–4.8)
LYMPHOCYTES NFR BLD AUTO: 29 % (ref 27–41)
MCH RBC QN AUTO: 22.7 PG (ref 27–31)
MCHC RBC AUTO-ENTMCNC: 29.4 G/DL (ref 32–36)
MCV RBC AUTO: 77 FL (ref 80–96)
MONOCYTES # BLD AUTO: 0.63 K/UL (ref 0–0.8)
MONOCYTES NFR BLD AUTO: 8.9 % (ref 2–6)
MPC BLD CALC-MCNC: 9.1 FL (ref 9.4–12.4)
NEUTROPHILS # BLD AUTO: 3.92 K/UL (ref 1.8–7.7)
NEUTROPHILS NFR BLD AUTO: 55.2 % (ref 53–65)
NRBC # BLD AUTO: 0 X10E3/UL
NRBC, AUTO (.00): 0 %
PLATELET # BLD AUTO: 333 K/UL (ref 150–400)
POTASSIUM SERPL-SCNC: 4 MMOL/L (ref 3.5–5.1)
RBC # BLD AUTO: 3.22 M/UL (ref 4.2–5.4)
SODIUM SERPL-SCNC: 136 MMOL/L (ref 136–145)
WBC # BLD AUTO: 7.1 K/UL (ref 4.5–11)

## 2023-03-10 PROCEDURE — 85025 COMPLETE CBC W/AUTO DIFF WBC: CPT | Performed by: FAMILY MEDICINE

## 2023-03-10 PROCEDURE — 25000003 PHARM REV CODE 250: Performed by: FAMILY MEDICINE

## 2023-03-10 PROCEDURE — 99232 SBSQ HOSP IP/OBS MODERATE 35: CPT | Mod: ,,, | Performed by: FAMILY MEDICINE

## 2023-03-10 PROCEDURE — 63600175 PHARM REV CODE 636 W HCPCS: Performed by: FAMILY MEDICINE

## 2023-03-10 PROCEDURE — 99232 PR SUBSEQUENT HOSPITAL CARE,LEVL II: ICD-10-PCS | Mod: ,,, | Performed by: FAMILY MEDICINE

## 2023-03-10 PROCEDURE — 82962 GLUCOSE BLOOD TEST: CPT

## 2023-03-10 PROCEDURE — 11000001 HC ACUTE MED/SURG PRIVATE ROOM

## 2023-03-10 PROCEDURE — 80048 BASIC METABOLIC PNL TOTAL CA: CPT | Performed by: FAMILY MEDICINE

## 2023-03-10 RX ORDER — KETOROLAC TROMETHAMINE 15 MG/ML
15 INJECTION, SOLUTION INTRAMUSCULAR; INTRAVENOUS EVERY 6 HOURS PRN
Status: DISPENSED | OUTPATIENT
Start: 2023-03-10 | End: 2023-03-13

## 2023-03-10 RX ADMIN — MORPHINE SULFATE 2 MG: 2 INJECTION, SOLUTION INTRAMUSCULAR; INTRAVENOUS at 10:03

## 2023-03-10 RX ADMIN — DULOXETINE HYDROCHLORIDE 60 MG: 30 CAPSULE, DELAYED RELEASE ORAL at 08:03

## 2023-03-10 RX ADMIN — MORPHINE SULFATE 2 MG: 2 INJECTION, SOLUTION INTRAMUSCULAR; INTRAVENOUS at 04:03

## 2023-03-10 RX ADMIN — HYDRALAZINE HYDROCHLORIDE 10 MG: 10 TABLET, FILM COATED ORAL at 08:03

## 2023-03-10 RX ADMIN — KETOROLAC TROMETHAMINE 15 MG: 15 INJECTION, SOLUTION INTRAMUSCULAR; INTRAVENOUS at 11:03

## 2023-03-10 RX ADMIN — INSULIN ASPART 5 UNITS: 100 INJECTION, SOLUTION INTRAVENOUS; SUBCUTANEOUS at 11:03

## 2023-03-10 RX ADMIN — MUPIROCIN: 20 OINTMENT TOPICAL at 08:03

## 2023-03-10 RX ADMIN — FERROUS SULFATE TAB 325 MG (65 MG ELEMENTAL FE) 1 EACH: 325 (65 FE) TAB at 08:03

## 2023-03-10 RX ADMIN — TRAZODONE HYDROCHLORIDE 25 MG: 50 TABLET ORAL at 08:03

## 2023-03-10 RX ADMIN — MORPHINE SULFATE 15 MG: 15 TABLET, FILM COATED, EXTENDED RELEASE ORAL at 08:03

## 2023-03-10 RX ADMIN — DOCUSATE SODIUM 100 MG: 100 CAPSULE, LIQUID FILLED ORAL at 08:03

## 2023-03-10 RX ADMIN — MORPHINE SULFATE 2 MG: 2 INJECTION, SOLUTION INTRAMUSCULAR; INTRAVENOUS at 02:03

## 2023-03-10 RX ADMIN — INSULIN ASPART 4 UNITS: 100 INJECTION, SOLUTION INTRAVENOUS; SUBCUTANEOUS at 05:03

## 2023-03-10 RX ADMIN — VANCOMYCIN HYDROCHLORIDE 1000 MG: 1 INJECTION, POWDER, LYOPHILIZED, FOR SOLUTION INTRAVENOUS at 04:03

## 2023-03-10 RX ADMIN — GABAPENTIN 300 MG: 300 CAPSULE ORAL at 08:03

## 2023-03-10 RX ADMIN — INSULIN DETEMIR 12 UNITS: 100 INJECTION, SOLUTION SUBCUTANEOUS at 08:03

## 2023-03-10 RX ADMIN — MORPHINE SULFATE 2 MG: 2 INJECTION, SOLUTION INTRAMUSCULAR; INTRAVENOUS at 06:03

## 2023-03-10 RX ADMIN — KETOROLAC TROMETHAMINE 15 MG: 15 INJECTION, SOLUTION INTRAMUSCULAR; INTRAVENOUS at 03:03

## 2023-03-10 RX ADMIN — VANCOMYCIN HYDROCHLORIDE 1000 MG: 1 INJECTION, POWDER, LYOPHILIZED, FOR SOLUTION INTRAVENOUS at 05:03

## 2023-03-10 NOTE — PROGRESS NOTES
Pharmacy Consult    Consulted to assist in the management of  Vanc  therapy.  Patient is currently receiving Vanc 1250mg iv q18hr`. Trough drawn at 1700 on 3/9 reported as 12.8 mcg/ml.    Based on this lab data, will change Vanc  to 1000mg iv q12hr. Will recheck trough prior to 3rd dose of new regimen and make further adjustments as necessary.    PUSHPA Molina.Ph.

## 2023-03-10 NOTE — ASSESSMENT & PLAN NOTE
Left knee joint pain and swelling for one day associated with fever and chills. Remote joint replacement, fairly recent septic arthritis requiring debridement and extended course of antibiotics. Currently on IV daptomycin for MRSA epidural abscess.     Orthopedics (Dr. Noyola) following. Light growth G+ cocci in culture of knee aspirate, will likely require two-stage replacement of prothesis.      Will begin transition from PRN IV pain management to scheduled oral medications, anti-inflammatory also added.

## 2023-03-10 NOTE — SUBJECTIVE & OBJECTIVE
Review of Systems   Constitutional:  Positive for fatigue. Negative for chills and fever.   HENT: Negative.     Respiratory:  Negative for cough and shortness of breath.    Cardiovascular:  Negative for chest pain.   Gastrointestinal:  Negative for abdominal pain, blood in stool, diarrhea, nausea and vomiting.   Musculoskeletal:  Positive for arthralgias and joint swelling.   Skin: Negative.    Neurological:  Positive for weakness.   Psychiatric/Behavioral:  Negative for confusion.    Objective:     Vital Signs (Most Recent):  Temp: 98.1 °F (36.7 °C) (03/10/23 1600)  Pulse: 72 (03/10/23 1600)  Resp: 18 (03/10/23 1600)  BP: (!) 145/63 (03/10/23 1600)  SpO2: 99 % (03/10/23 1600)   Vital Signs (24h Range):  Temp:  [97.5 °F (36.4 °C)-98.6 °F (37 °C)] 98.1 °F (36.7 °C)  Pulse:  [65-92] 72  Resp:  [16-19] 18  SpO2:  [94 %-99 %] 99 %  BP: (110-145)/(54-72) 145/63     Weight: 64.4 kg (141 lb 15.6 oz)  Body mass index is 22.92 kg/m².    Intake/Output Summary (Last 24 hours) at 3/10/2023 1624  Last data filed at 3/9/2023 1709  Gross per 24 hour   Intake 730 ml   Output --   Net 730 ml      Physical Exam  Constitutional:       General: She is not in acute distress.     Appearance: Normal appearance. She is not ill-appearing.   HENT:      Head: Normocephalic and atraumatic.      Nose: Nose normal.   Eyes:      Conjunctiva/sclera: Conjunctivae normal.      Pupils: Pupils are equal, round, and reactive to light.   Cardiovascular:      Rate and Rhythm: Normal rate and regular rhythm.      Pulses: Normal pulses.   Pulmonary:      Effort: Pulmonary effort is normal. No respiratory distress.      Breath sounds: Normal breath sounds.   Abdominal:      Palpations: There is no mass.      Tenderness: There is no abdominal tenderness.   Musculoskeletal:         General: Swelling (left knee - generalized swelling with fluctuance along lateral joint) and tenderness (lateral left knee) present.      Cervical back: No rigidity.   Skin:      Coloration: Skin is not jaundiced or pale.      Findings: No rash.   Neurological:      Mental Status: She is alert.   Psychiatric:         Behavior: Behavior normal.         Thought Content: Thought content normal.       Significant Labs: All pertinent labs within the past 24 hours have been reviewed.    Significant Imaging: I have reviewed all pertinent imaging results/findings within the past 24 hours.

## 2023-03-10 NOTE — PROGRESS NOTES
[facility] - Short Stay [facility] Medicine  Progress Note    Patient Name: Monica Walker  MRN: [id number]  Patient Class: IP- Inpatient   Admission Date: 3/6/2023  Length of Stay: 4 days  Attending Physician: Kim Rosales DO  Primary Care Provider: Hannah Schulz MD        Subjective:     Principal Problem:Joint infection        HPI:  Monica Walker is a 52-year-old female with history of diabetes, hypertension, and MRSA joint space infection, who presented with complaints of left knee pain and swelling. She states the pain began suddenly yesterday, denies any recent falls or other associated trauma. Pain is worse along the lateral joint space with radiation down into the foot. There is a discreet area of fluctuance along the lateral surface of the knee, with generalized swelling of the joint to approximately 2x the size of the contralateral knee. Patient also complains of some fever, generalized weakness and productive cough for the past few days as well as dark stool.     Patient has a history of remote joint replacement (2015) with recent septic arthritis requiring debridement of the left knee as well as one shoulder. She was also recently hospitalized for with MRSA spinal epidural abscess, discharged about a month ago with outpatient daptomycin.      Initial evaluation with the following with WBC 12.39, LA 0.8, Hgb 7.4, glucose 200, labs otherwise unremarkable. Mildly elevated temp (99.5) and mild tachycardia (105), vital signs otherwise stable and grossly within normal limits.     Monica Walker is to be admitted to hospital service for evaluation and treatment of suspected septic arthritis. Orthopedics has been consulted. Blood cultures and aspiration/culture of affected knee pending at time of admission.       Overview/Hospital Course:  3/7: No acute events overnight, patient with no complaints this morning. US with aspiration was attempted however unable to obtain a specimen as  fluid was too thick. Ortho consult pending.     3/8: Blood culture positive for MRSA, pharmacy notified to continue vancomycin. No other changes, Dr. Noyola to evaluate later today pending x-ray.    3/9: No acute events overnight, patient with no complaints aside from continued pain in the left knee. Ortho following with plans to monitor fluid cultures over the weekend with tentative plans for surgery on Monday. Plans to repeat blood cultures following procedure, if negative patient will need approximately one month of IV antibiotics from date of negative culture. Would benefit from continuation of vancomycin as she has failed outpatient treatment with daptomycin immediately prior to this admission.    3/10: Patient with no new complaints this morning, light growth of G+ cocci noted in knee aspirate culture. Continue vancomycin for MRSA bacteremia and joint infection, ortho following.        Review of Systems   Constitutional:  Positive for fatigue. Negative for chills and fever.   HENT: Negative.     Respiratory:  Negative for cough and shortness of breath.    Cardiovascular:  Negative for chest pain.   Gastrointestinal:  Negative for abdominal pain, blood in stool, diarrhea, nausea and vomiting.   Musculoskeletal:  Positive for arthralgias and joint swelling.   Skin: Negative.    Neurological:  Positive for weakness.   Psychiatric/Behavioral:  Negative for confusion.    Objective:     Vital Signs (Most Recent):  Temp: 98.1 °F (36.7 °C) (03/10/23 1600)  Pulse: 72 (03/10/23 1600)  Resp: 18 (03/10/23 1600)  BP: (!) 145/63 (03/10/23 1600)  SpO2: 99 % (03/10/23 1600)   Vital Signs (24h Range):  Temp:  [97.5 °F (36.4 °C)-98.6 °F (37 °C)] 98.1 °F (36.7 °C)  Pulse:  [65-92] 72  Resp:  [16-19] 18  SpO2:  [94 %-99 %] 99 %  BP: (110-145)/(54-72) 145/63     Weight: 64.4 kg (141 lb 15.6 oz)  Body mass index is 22.92 kg/m².    Intake/Output Summary (Last 24 hours) at 3/10/2023 3877  Last data filed at 3/9/2023 5128  Gross  per 24 hour   Intake 730 ml   Output --   Net 730 ml      Physical Exam  Constitutional:       General: She is not in acute distress.     Appearance: Normal appearance. She is not ill-appearing.   HENT:      Head: Normocephalic and atraumatic.      Nose: Nose normal.   Eyes:      Conjunctiva/sclera: Conjunctivae normal.      Pupils: Pupils are equal, round, and reactive to light.   Cardiovascular:      Rate and Rhythm: Normal rate and regular rhythm.      Pulses: Normal pulses.   Pulmonary:      Effort: Pulmonary effort is normal. No respiratory distress.      Breath sounds: Normal breath sounds.   Abdominal:      Palpations: There is no mass.      Tenderness: There is no abdominal tenderness.   Musculoskeletal:         General: Swelling (left knee - generalized swelling with fluctuance along lateral joint) and tenderness (lateral left knee) present.      Cervical back: No rigidity.   Skin:     Coloration: Skin is not jaundiced or pale.      Findings: No rash.   Neurological:      Mental Status: She is alert.   Psychiatric:         Behavior: Behavior normal.         Thought Content: Thought content normal.       Significant Labs: All pertinent labs within the past 24 hours have been reviewed.    Significant Imaging: I have reviewed all pertinent imaging results/findings within the past 24 hours.      Assessment/Plan:      * Joint infection  Left knee joint pain and swelling for one day associated with fever and chills. Remote joint replacement, fairly recent septic arthritis requiring debridement and extended course of antibiotics. Currently on IV daptomycin for MRSA epidural abscess.     Orthopedics (Dr. Noyola) following. Light growth G+ cocci in culture of knee aspirate, will likely require two-stage replacement of prothesis.      Will begin transition from PRN IV pain management to scheduled oral medications, anti-inflammatory also added.      Microcytic anemia  Appears somewhat chronic, however patient with  complaints of recent dark stools. Will evaluate further and involve GI if needed, however will likely need outpatient evaluation due to current infection.    Iron deficiency anemia on previous admission, will continue iron supplement start PPI for now.      Hypertension  Currently controlled. Continue home hydralazine, would benefit from ACEi/ARB considering diabetes.       MRSA bacteremia  Blood cultures on admission positive for MRSA, currently receiving vancomycin. Plan to repeat cultures following procedure Monday. Patient will require extended treatment due to recurrent infection and multiple joint prostheses. Currently recommendation for continued vancomycin as patient failed outpatient treatment with daptomycin immediately prior to this admission.      Type 2 diabetes mellitus, with long-term current use of insulin  Patient's FSGs are uncontrolled due to hyperglycemia on current medication regimen.  Last A1c reviewed-   Lab Results   Component Value Date    HGBA1C 7.9 (H) 01/19/2023     Most recent fingerstick glucose reviewed- No results for input(s): POCTGLUCOSE in the last 24 hours.  Current correctional scale  Low  Increase anti-hyperglycemic dose as follows-   Antihyperglycemics (From admission, onward)    Start     Stop Route Frequency Ordered    03/08/23 2100  insulin detemir U-100 injection 12 Units         -- SubQ 2 times daily 03/08/23 0903    03/06/23 1741  insulin aspart U-100 injection 0-5 Units         -- SubQ Before meals & nightly PRN 03/06/23 1643        Hold Oral hypoglycemics while patient is in the hospital.    Arthritis  Continue home medications.         VTE Risk Mitigation (From admission, onward)         Ordered     IP VTE LOW RISK PATIENT  Once         03/06/23 1643     Place sequential compression device  Until discontinued         03/06/23 1643                Discharge Planning   SUZANNA:      Code Status: Full Code   Is the patient medically ready for discharge?:     Reason for patient  still in hospital (select all that apply): Treatment  Discharge Plan A: Home with family, Home Health                  Kim Rosales DO  Department of Hospital Medicine   [facility] - Short Stay Unit

## 2023-03-11 LAB
BACTERIA SPEC BFLD CULT: ABNORMAL
GLUCOSE SERPL-MCNC: 352 MG/DL (ref 70–105)
GLUCOSE SERPL-MCNC: 409 MG/DL (ref 70–105)
GLUCOSE SERPL-MCNC: 440 MG/DL (ref 70–105)
MICROORGANISM SPEC CULT: ABNORMAL
VANCOMYCIN TROUGH SERPL-MCNC: 19.3 ΜG/ML (ref 10–20)

## 2023-03-11 PROCEDURE — 96372 THER/PROPH/DIAG INJ SC/IM: CPT

## 2023-03-11 PROCEDURE — 99232 SBSQ HOSP IP/OBS MODERATE 35: CPT | Mod: ,,, | Performed by: FAMILY MEDICINE

## 2023-03-11 PROCEDURE — 25000003 PHARM REV CODE 250: Performed by: FAMILY MEDICINE

## 2023-03-11 PROCEDURE — 63600175 PHARM REV CODE 636 W HCPCS: Performed by: FAMILY MEDICINE

## 2023-03-11 PROCEDURE — 97161 PT EVAL LOW COMPLEX 20 MIN: CPT

## 2023-03-11 PROCEDURE — 80202 ASSAY OF VANCOMYCIN: CPT | Performed by: FAMILY MEDICINE

## 2023-03-11 PROCEDURE — 11000001 HC ACUTE MED/SURG PRIVATE ROOM

## 2023-03-11 PROCEDURE — 99232 PR SUBSEQUENT HOSPITAL CARE,LEVL II: ICD-10-PCS | Mod: ,,, | Performed by: FAMILY MEDICINE

## 2023-03-11 PROCEDURE — 82962 GLUCOSE BLOOD TEST: CPT

## 2023-03-11 RX ORDER — LOSARTAN POTASSIUM 25 MG/1
25 TABLET ORAL DAILY
Status: DISCONTINUED | OUTPATIENT
Start: 2023-03-11 | End: 2023-03-20 | Stop reason: HOSPADM

## 2023-03-11 RX ADMIN — HYDRALAZINE HYDROCHLORIDE 10 MG: 10 TABLET, FILM COATED ORAL at 08:03

## 2023-03-11 RX ADMIN — INSULIN ASPART 5 UNITS: 100 INJECTION, SOLUTION INTRAVENOUS; SUBCUTANEOUS at 11:03

## 2023-03-11 RX ADMIN — MORPHINE SULFATE 2 MG: 2 INJECTION, SOLUTION INTRAMUSCULAR; INTRAVENOUS at 05:03

## 2023-03-11 RX ADMIN — FERROUS SULFATE TAB 325 MG (65 MG ELEMENTAL FE) 1 EACH: 325 (65 FE) TAB at 08:03

## 2023-03-11 RX ADMIN — GABAPENTIN 300 MG: 300 CAPSULE ORAL at 08:03

## 2023-03-11 RX ADMIN — MORPHINE SULFATE 2 MG: 2 INJECTION, SOLUTION INTRAMUSCULAR; INTRAVENOUS at 09:03

## 2023-03-11 RX ADMIN — LOSARTAN POTASSIUM 25 MG: 25 TABLET, FILM COATED ORAL at 10:03

## 2023-03-11 RX ADMIN — INSULIN DETEMIR 12 UNITS: 100 INJECTION, SOLUTION SUBCUTANEOUS at 08:03

## 2023-03-11 RX ADMIN — VANCOMYCIN HYDROCHLORIDE 1000 MG: 1 INJECTION, POWDER, LYOPHILIZED, FOR SOLUTION INTRAVENOUS at 04:03

## 2023-03-11 RX ADMIN — DULOXETINE HYDROCHLORIDE 60 MG: 30 CAPSULE, DELAYED RELEASE ORAL at 08:03

## 2023-03-11 RX ADMIN — DOCUSATE SODIUM 100 MG: 100 CAPSULE, LIQUID FILLED ORAL at 08:03

## 2023-03-11 RX ADMIN — MORPHINE SULFATE 2 MG: 2 INJECTION, SOLUTION INTRAMUSCULAR; INTRAVENOUS at 01:03

## 2023-03-11 RX ADMIN — INSULIN DETEMIR 14 UNITS: 100 INJECTION, SOLUTION SUBCUTANEOUS at 08:03

## 2023-03-11 RX ADMIN — MUPIROCIN: 20 OINTMENT TOPICAL at 10:03

## 2023-03-11 RX ADMIN — KETOROLAC TROMETHAMINE 15 MG: 15 INJECTION, SOLUTION INTRAMUSCULAR; INTRAVENOUS at 08:03

## 2023-03-11 RX ADMIN — MORPHINE SULFATE 2 MG: 2 INJECTION, SOLUTION INTRAMUSCULAR; INTRAVENOUS at 04:03

## 2023-03-11 RX ADMIN — INSULIN ASPART 5 UNITS: 100 INJECTION, SOLUTION INTRAVENOUS; SUBCUTANEOUS at 04:03

## 2023-03-11 NOTE — PT/OT/SLP EVAL
"Physical Therapy Evaluation    Patient Name:  Monica Walker   MRN:  [id number]    Recommendations:     Discharge Recommendations: home with home health, home health PT, nursing facility, skilled   Discharge Equipment Recommendations: none   Barriers to discharge:  awaiting surgical interventions    Assessment:     Monica Walker is a 52 y.o. female admitted with a medical diagnosis of recurrent Joint infection.  She presents with the following impairments/functional limitations: weakness, impaired endurance, impaired functional mobility, decreased lower extremity function, pain, decreased ROM, edema .    Patient demonstrated high level functional mobility using RW. Patient reports plans for antibiotic spacer to be placed in left knee Monday, with eventual revision of partial knee replacement to TKR in the future. Pt will follow to ensure safe, independent mobility post-op. Patient has PICC line and was taking home antibiotics prior to worsening of left knee infection. Patient plans for home with home health at d/c if possible.    Rehab Prognosis: Good; patient would benefit from acute skilled PT services to address these deficits and reach maximum level of function.    Recent Surgery: Procedure(s) (LRB):  REVISION, ARTHROPLASTY, KNEE (Left)      Plan:     During this hospitalization, patient to be seen 5 x/week to address the identified rehab impairments via gait training, therapeutic activities, therapeutic exercises and progress toward the following goals:    Plan of Care Expires:  04/11/23    Subjective     Chief Complaint: recurrent left knee infection  Patient/Family Comments/goals: "The doctor is going to put an antibiotic spacer in my knee on Monday. I walked a little in the room with my mother yesterday"  Pain/Comfort:  Pain Rating 1: 8/10  Location - Side 1: Left  Location 1: knee  Pain Addressed 1: Reposition, Distraction, Cessation of Activity  Pain Rating Post-Intervention 1: 8/10    Patients " cultural, spiritual, Quaker conflicts given the current situation: no    Living Environment:  Pt lives with her mother in a single level home with no steps  Prior to admission, patients level of function was independent without AD. HHPT still working with pt; home Abx via PICC. Equipment used at home: bedside commode, crutches, axillary, walker, rolling.  DME owned (not currently used): none.  Upon discharge, patient will have assistance from mother and HHPT vs swing bed.    Objective:     Communicated with Nicole Valencia RN prior to session.  Patient found supine with PICC line  upon PT entry to room.    General Precautions: Standard, fall  Orthopedic Precautions:N/A   Braces: N/A  Respiratory Status: Room air    Exams:  Cognitive Exam:  Patient is oriented to Person, Place, Time, and Situation  Sensation:    -       Intact  Skin Integrity/Edema:      -       Skin integrity: erythema left knee   -       Edema: Mild left knee with apparent pocket of fluid laterally  RLE ROM: WFL  RLE Strength: WFL  LLE ROM: Deficits: hip WFL; knee 5-95; ankle WFL  LLE Strength: Deficits: hip 4+/5; knee 4-/5; ankle 5/5    Functional Mobility:  Bed Mobility:     Supine to Sit: modified independence  Transfers:     Sit to Stand:  stand by assistance with rolling walker  Toilet Transfer: stand by assistance with  UE support on bed rail and BSC  using  Step Transfer  Gait: 150' using RW, mod I, mild antalgic pattern. No LOB/lightheadedness. Pt reports satisfaction with increased gait activity. Patient encouraged to complete short, frequent walks using RW in the room or in the halls as desired.      AM-PAC 6 CLICK MOBILITY  Total Score:22       Treatment & Education:  Pt educated on PT role/POC.   Importance of OOB activity with staff assistance.  Importance of sitting up in the chair throughout the day as tolerated, especially for meals   Safety during functional t/f and mobility with use of RW  White board updated   Multiple  self-care tasks/functional mobility completed- assistance level noted above   All questions/concerns answered within PT scope of practice     Patient left sitting edge of bed with all lines intact, call button in reach, Nicole Valencia RN notified, and mother present.    GOALS:   Multidisciplinary Problems       Physical Therapy Goals          Problem: Physical Therapy    Goal Priority Disciplines Outcome Goal Variances Interventions   Physical Therapy Goal     PT, PT/OT Ongoing, Progressing     Description: Short Term Goals to be met by: 3/25/2023    Patient will increase functional independence with mobility by performin. Supine to sit with independently  2. Sit to stand transfer with independently using Rolling walker  3. Bed to chair transfer with independently using Rolling walker  4. Gait  x 400 feet with independently using Rolling walker  5. Lower extremity exercise program x30 reps per handout, with assistance as needed    Long Term Goals to be met by: 2023    Pt will regain full independent functional mobility with lowest level of assistive device to return to home situation and prior activities of daily living.                        History:     Past Medical History:   Diagnosis Date    Abscess of right thigh + MRSA 2021    healed    Adnexal cyst     Degenerative disc disease     Depression 10/29/2021    Hypertension     Hypomagnesemia 2023    Nicotine dependence     Osteoarthritis        Past Surgical History:   Procedure Laterality Date    BACK SURGERY      HYSTERECTOMY N/A     IRRIGATION AND DEBRIDEMENT OF LOWER EXTREMITY Left 2022    Procedure: IRRIGATION AND DEBRIDEMENT, LOWER EXTREMITY;  Surgeon: Papa Mendoza MD;  Location: [facility];  Service: Orthopedics;  Laterality: Left;    IRRIGATION AND DEBRIDEMENT OF UPPER EXTREMITY Left 2022    Procedure: IRRIGATION AND DEBRIDEMENT, UPPER EXTREMITY;  Surgeon: Papa Mendoza MD;  Location: [facility]  OR;  Service: Orthopedics;  Laterality: Left;    THORACIC LAMINECTOMY WITH FUSION N/A 1/24/2023    Procedure: T9-L2 fusion, T11-T12 laminectomy and corpectomy;  Surgeon: Jimmy Hernandes MD;  Location: [facility] OR;  Service: Neurosurgery;  Laterality: N/A;    TOTAL HIP ARTHROPLASTY Left     TOTAL KNEE ARTHROPLASTY Bilateral        Time Tracking:     PT Received On: 03/11/23  PT Start Time: 0848     PT Stop Time: 0907  PT Total Time (min): 19 min     Billable Minutes: Evaluation Low complexity      03/11/2023

## 2023-03-11 NOTE — PLAN OF CARE
Problem: Physical Therapy  Goal: Physical Therapy Goal  Description: Short Term Goals to be met by: 3/25/2023    Patient will increase functional independence with mobility by performin. Supine to sit with independently  2. Sit to stand transfer with independently using Rolling walker  3. Bed to chair transfer with independently using Rolling walker  4. Gait  x 400 feet with independently using Rolling walker  5. Lower extremity exercise program x30 reps per handout, with assistance as needed    Long Term Goals to be met by: 2023    Pt will regain full independent functional mobility with lowest level of assistive device to return to home situation and prior activities of daily living.   Outcome: Ongoing, Progressing     Patient demonstrated high level functional mobility using RW. Patient reports plans for antibiotic spacer to be placed in left knee Monday, with eventual revision of partial knee replacement to TKR in the future. Pt will follow to ensure safe, independent mobility post-op. Patient has PICC line and was taking home antibiotics prior to worsening of left knee infection. Patient plans for home with home health at d/c if possible.

## 2023-03-11 NOTE — SUBJECTIVE & OBJECTIVE
Review of Systems   Constitutional:  Positive for fatigue. Negative for chills and fever.   HENT: Negative.     Respiratory:  Negative for cough and shortness of breath.    Cardiovascular:  Negative for chest pain.   Gastrointestinal:  Negative for abdominal pain, blood in stool, diarrhea, nausea and vomiting.   Musculoskeletal:  Positive for arthralgias and joint swelling.   Skin: Negative.    Neurological:  Positive for weakness.   Psychiatric/Behavioral:  Negative for confusion.    Objective:     Vital Signs (Most Recent):  Temp: 97.7 °F (36.5 °C) (03/11/23 1130)  Pulse: 74 (03/11/23 1130)  Resp: 18 (03/11/23 1335)  BP: 107/61 (03/11/23 1130)  SpO2: 96 % (03/11/23 1130) Vital Signs (24h Range):  Temp:  [97.7 °F (36.5 °C)-98.8 °F (37.1 °C)] 97.7 °F (36.5 °C)  Pulse:  [72-87] 74  Resp:  [17-20] 18  SpO2:  [96 %-99 %] 96 %  BP: (100-146)/(56-69) 107/61     Weight: 64.4 kg (141 lb 15.6 oz)  Body mass index is 22.92 kg/m².  No intake or output data in the 24 hours ending 03/11/23 1436   Physical Exam  Constitutional:       General: She is not in acute distress.     Appearance: Normal appearance. She is not ill-appearing.   HENT:      Head: Normocephalic and atraumatic.      Nose: Nose normal.   Eyes:      Conjunctiva/sclera: Conjunctivae normal.      Pupils: Pupils are equal, round, and reactive to light.   Cardiovascular:      Rate and Rhythm: Normal rate and regular rhythm.      Pulses: Normal pulses.   Pulmonary:      Effort: Pulmonary effort is normal. No respiratory distress.      Breath sounds: Normal breath sounds.   Abdominal:      Palpations: There is no mass.      Tenderness: There is no abdominal tenderness.   Musculoskeletal:         General: Swelling (left knee - generalized swelling with fluctuance along lateral joint) and tenderness (lateral left knee) present.      Cervical back: No rigidity.   Skin:     Coloration: Skin is not jaundiced or pale.      Findings: No rash.   Neurological:      Mental  Status: She is alert.   Psychiatric:         Behavior: Behavior normal.         Thought Content: Thought content normal.       Significant Labs: All pertinent labs within the past 24 hours have been reviewed.    Significant Imaging: I have reviewed all pertinent imaging results/findings within the past 24 hours.

## 2023-03-11 NOTE — PROGRESS NOTES
[facility] Medicine  Progress Note    Patient Name: Monica Walker  MRN: [id number]  Patient Class: IP- Inpatient   Admission Date: 3/6/2023  Length of Stay: 5 days  Attending Physician: Kim Rosales DO  Primary Care Provider: Hannah Schulz MD        Subjective:     Principal Problem:Joint infection        HPI:  Monica Walker is a 52-year-old female with history of diabetes, hypertension, and MRSA joint space infection, who presented with complaints of left knee pain and swelling. She states the pain began suddenly yesterday, denies any recent falls or other associated trauma. Pain is worse along the lateral joint space with radiation down into the foot. There is a discreet area of fluctuance along the lateral surface of the knee, with generalized swelling of the joint to approximately 2x the size of the contralateral knee. Patient also complains of some fever, generalized weakness and productive cough for the past few days as well as dark stool.     Patient has a history of remote joint replacement (2015) with recent septic arthritis requiring debridement of the left knee as well as one shoulder. She was also recently hospitalized for with MRSA spinal epidural abscess, discharged about a month ago with outpatient daptomycin.      Initial evaluation with the following with WBC 12.39, LA 0.8, Hgb 7.4, glucose 200, labs otherwise unremarkable. Mildly elevated temp (99.5) and mild tachycardia (105), vital signs otherwise stable and grossly within normal limits.     Monica Walker is to be admitted to hospital service for evaluation and treatment of suspected septic arthritis. Orthopedics has been consulted. Blood cultures and aspiration/culture of affected knee pending at time of admission.       Overview/Hospital Course:  3/7: No acute events overnight, patient with no complaints this morning. US with aspiration was attempted however unable to obtain a specimen as  fluid was too thick. Ortho consult pending.     3/8: Blood culture positive for MRSA, pharmacy notified to continue vancomycin. No other changes, Dr. Noyola to evaluate later today pending x-ray.    3/9: No acute events overnight, patient with no complaints aside from continued pain in the left knee. Ortho following with plans to monitor fluid cultures over the weekend with tentative plans for surgery on Monday. Plans to repeat blood cultures following procedure, if negative patient will need approximately one month of IV antibiotics from date of negative culture. Would benefit from continuation of vancomycin as she has failed outpatient treatment with daptomycin immediately prior to this admission.    3/10: Patient with no new complaints this morning, light growth of G+ cocci noted in knee aspirate culture. Continue vancomycin for MRSA bacteremia and joint infection, ortho following.    3/11: No major changes overnight, knee aspirate with MRSA. Plans for partial knee removal with placement of antibiotic spacer on Monday.        Review of Systems   Constitutional:  Positive for fatigue. Negative for chills and fever.   HENT: Negative.     Respiratory:  Negative for cough and shortness of breath.    Cardiovascular:  Negative for chest pain.   Gastrointestinal:  Negative for abdominal pain, blood in stool, diarrhea, nausea and vomiting.   Musculoskeletal:  Positive for arthralgias and joint swelling.   Skin: Negative.    Neurological:  Positive for weakness.   Psychiatric/Behavioral:  Negative for confusion.    Objective:     Vital Signs (Most Recent):  Temp: 97.7 °F (36.5 °C) (03/11/23 1130)  Pulse: 74 (03/11/23 1130)  Resp: 18 (03/11/23 1335)  BP: 107/61 (03/11/23 1130)  SpO2: 96 % (03/11/23 1130) Vital Signs (24h Range):  Temp:  [97.7 °F (36.5 °C)-98.8 °F (37.1 °C)] 97.7 °F (36.5 °C)  Pulse:  [72-87] 74  Resp:  [17-20] 18  SpO2:  [96 %-99 %] 96 %  BP: (100-146)/(56-69) 107/61     Weight: 64.4 kg (141 lb 15.6  oz)  Body mass index is 22.92 kg/m².  No intake or output data in the 24 hours ending 03/11/23 1436   Physical Exam  Constitutional:       General: She is not in acute distress.     Appearance: Normal appearance. She is not ill-appearing.   HENT:      Head: Normocephalic and atraumatic.      Nose: Nose normal.   Eyes:      Conjunctiva/sclera: Conjunctivae normal.      Pupils: Pupils are equal, round, and reactive to light.   Cardiovascular:      Rate and Rhythm: Normal rate and regular rhythm.      Pulses: Normal pulses.   Pulmonary:      Effort: Pulmonary effort is normal. No respiratory distress.      Breath sounds: Normal breath sounds.   Abdominal:      Palpations: There is no mass.      Tenderness: There is no abdominal tenderness.   Musculoskeletal:         General: Swelling (left knee - generalized swelling with fluctuance along lateral joint) and tenderness (lateral left knee) present.      Cervical back: No rigidity.   Skin:     Coloration: Skin is not jaundiced or pale.      Findings: No rash.   Neurological:      Mental Status: She is alert.   Psychiatric:         Behavior: Behavior normal.         Thought Content: Thought content normal.       Significant Labs: All pertinent labs within the past 24 hours have been reviewed.    Significant Imaging: I have reviewed all pertinent imaging results/findings within the past 24 hours.      Assessment/Plan:      * Joint infection  Left knee joint pain and swelling for one day associated with fever and chills. Remote joint replacement, fairly recent septic arthritis requiring debridement and extended course of antibiotics. Currently on outpatient IV daptomycin for MRSA epidural abscess, which has been held.     Left knee aspirate with MRSA sensitive to vancomycin. Continue current treatment.     Will begin transition from PRN IV pain management to scheduled oral medications, anti-inflammatory also added.    Orthopedics (Dr. Noyola) following, tentative plans  for procedure on Monday.    Microcytic anemia  Appears somewhat chronic, however patient with complaints of recent dark stools. Will evaluate further and involve GI if needed, however will likely need outpatient evaluation due to current infection.    Iron deficiency anemia on previous admission, will continue iron supplement start PPI for now.      Hypertension  Currently controlled. Will add ARB considering underlying diabetes and adjust hydralazine as needed.       MRSA bacteremia  Blood cultures on admission positive for MRSA, currently receiving vancomycin. Plan to repeat cultures following procedure Monday. Patient will require extended treatment due to recurrent infection and multiple joint prostheses. Currently recommendation for continued vancomycin as patient failed outpatient treatment with daptomycin immediately prior to this admission.    Tentative end date to be determined.       Type 2 diabetes mellitus, with long-term current use of insulin  Patient's FSGs are uncontrolled due to hyperglycemia on current medication regimen.  Last A1c reviewed-   Lab Results   Component Value Date    HGBA1C 7.9 (H) 01/19/2023     Most recent fingerstick glucose reviewed- No results for input(s): POCTGLUCOSE in the last 24 hours.  Current correctional scale  Low  Increase anti-hyperglycemic dose as follows-   Antihyperglycemics (From admission, onward)    Start     Stop Route Frequency Ordered    03/11/23 0900  insulin detemir U-100 injection 14 Units         -- SubQ 2 times daily 03/11/23 0847    03/06/23 1741  insulin aspart U-100 injection 0-5 Units         -- SubQ Before meals & nightly PRN 03/06/23 1643        Hold Oral hypoglycemics while patient is in the hospital.    Arthritis  Continue home medications. Added Toradol 15 mg IV q6h as needed.         VTE Risk Mitigation (From admission, onward)         Ordered     IP VTE LOW RISK PATIENT  Once         03/06/23 1643     Place sequential compression device  Until  discontinued         03/06/23 1643                Discharge Planning   SUZANNA:      Code Status: Full Code   Is the patient medically ready for discharge?:     Reason for patient still in hospital (select all that apply): Treatment  Discharge Plan A: Home with family, Home Health                  Kim Rosales DO  Department of Hospital Medicine   [facility]

## 2023-03-11 NOTE — ASSESSMENT & PLAN NOTE
Currently controlled. Will add ARB considering underlying diabetes and adjust hydralazine as needed.

## 2023-03-11 NOTE — ASSESSMENT & PLAN NOTE
Left knee joint pain and swelling for one day associated with fever and chills. Remote joint replacement, fairly recent septic arthritis requiring debridement and extended course of antibiotics. Currently on outpatient IV daptomycin for MRSA epidural abscess, which has been held.     Left knee aspirate with MRSA sensitive to vancomycin. Continue current treatment.     Will begin transition from PRN IV pain management to scheduled oral medications, anti-inflammatory also added.    Orthopedics (Dr. Noyola) following, tentative plans for procedure on Monday.

## 2023-03-11 NOTE — ASSESSMENT & PLAN NOTE
Patient's FSGs are uncontrolled due to hyperglycemia on current medication regimen.  Last A1c reviewed-   Lab Results   Component Value Date    HGBA1C 7.9 (H) 01/19/2023     Most recent fingerstick glucose reviewed- No results for input(s): POCTGLUCOSE in the last 24 hours.  Current correctional scale  Low  Increase anti-hyperglycemic dose as follows-   Antihyperglycemics (From admission, onward)    Start     Stop Route Frequency Ordered    03/11/23 0900  insulin detemir U-100 injection 14 Units         -- SubQ 2 times daily 03/11/23 0847    03/06/23 1741  insulin aspart U-100 injection 0-5 Units         -- SubQ Before meals & nightly PRN 03/06/23 1643        Hold Oral hypoglycemics while patient is in the hospital.

## 2023-03-11 NOTE — PROGRESS NOTES
Vancomycin trough resulted 19.3 this morning. No changes at this time.    Pharmacy will continue to monitor and make adjustments as needed.

## 2023-03-11 NOTE — PROGRESS NOTES
Left knee aspirate 48 hours ago showing rare growth of methicillin-resistant Staph aureus.  Blood cultures 03/06/2023 also positive for MRSA.  We will plan to proceed with explantation of the existing unicompartmental knee replacement arthroplasty components and implant an antibiotic impregnated methylmethacrylate spacer for 6 weeks or until all sources of infection of been eradicated before proceeding with 2nd stage revision to TKR.  Patient advised and wishes to proceed.

## 2023-03-11 NOTE — ASSESSMENT & PLAN NOTE
Blood cultures on admission positive for MRSA, currently receiving vancomycin. Plan to repeat cultures following procedure Monday. Patient will require extended treatment due to recurrent infection and multiple joint prostheses. Currently recommendation for continued vancomycin as patient failed outpatient treatment with daptomycin immediately prior to this admission.    Tentative end date to be determined.

## 2023-03-12 LAB
ANION GAP SERPL CALCULATED.3IONS-SCNC: 12 MMOL/L (ref 7–16)
BACTERIA BLD CULT: NORMAL
BASOPHILS # BLD AUTO: 0.06 K/UL (ref 0–0.2)
BASOPHILS NFR BLD AUTO: 0.6 % (ref 0–1)
BUN SERPL-MCNC: 19 MG/DL (ref 7–18)
BUN/CREAT SERPL: 23 (ref 6–20)
CALCIUM SERPL-MCNC: 9.2 MG/DL (ref 8.5–10.1)
CHLORIDE SERPL-SCNC: 99 MMOL/L (ref 98–107)
CO2 SERPL-SCNC: 28 MMOL/L (ref 21–32)
CREAT SERPL-MCNC: 0.84 MG/DL (ref 0.55–1.02)
DIFFERENTIAL METHOD BLD: ABNORMAL
EGFR (NO RACE VARIABLE) (RUSH/TITUS): 83 ML/MIN/1.73M²
EOSINOPHIL # BLD AUTO: 0.36 K/UL (ref 0–0.5)
EOSINOPHIL NFR BLD AUTO: 3.7 % (ref 1–4)
ERYTHROCYTE [DISTWIDTH] IN BLOOD BY AUTOMATED COUNT: 19.1 % (ref 11.5–14.5)
GLUCOSE SERPL-MCNC: 233 MG/DL (ref 74–106)
GLUCOSE SERPL-MCNC: 235 MG/DL (ref 70–105)
GLUCOSE SERPL-MCNC: 325 MG/DL (ref 70–105)
GLUCOSE SERPL-MCNC: 372 MG/DL (ref 70–105)
HCT VFR BLD AUTO: 26.4 % (ref 38–47)
HGB BLD-MCNC: 7.8 G/DL (ref 12–16)
IMM GRANULOCYTES # BLD AUTO: 0.09 K/UL (ref 0–0.04)
IMM GRANULOCYTES NFR BLD: 0.9 % (ref 0–0.4)
LYMPHOCYTES # BLD AUTO: 2.01 K/UL (ref 1–4.8)
LYMPHOCYTES NFR BLD AUTO: 20.6 % (ref 27–41)
MCH RBC QN AUTO: 23.1 PG (ref 27–31)
MCHC RBC AUTO-ENTMCNC: 29.5 G/DL (ref 32–36)
MCV RBC AUTO: 78.3 FL (ref 80–96)
MONOCYTES # BLD AUTO: 0.77 K/UL (ref 0–0.8)
MONOCYTES NFR BLD AUTO: 7.9 % (ref 2–6)
MPC BLD CALC-MCNC: 9.2 FL (ref 9.4–12.4)
NEUTROPHILS # BLD AUTO: 6.45 K/UL (ref 1.8–7.7)
NEUTROPHILS NFR BLD AUTO: 66.3 % (ref 53–65)
NRBC # BLD AUTO: 0 X10E3/UL
NRBC, AUTO (.00): 0 %
PLATELET # BLD AUTO: 342 K/UL (ref 150–400)
POTASSIUM SERPL-SCNC: 4.2 MMOL/L (ref 3.5–5.1)
RBC # BLD AUTO: 3.37 M/UL (ref 4.2–5.4)
SODIUM SERPL-SCNC: 135 MMOL/L (ref 136–145)
WBC # BLD AUTO: 9.74 K/UL (ref 4.5–11)

## 2023-03-12 PROCEDURE — 63600175 PHARM REV CODE 636 W HCPCS: Performed by: FAMILY MEDICINE

## 2023-03-12 PROCEDURE — 82962 GLUCOSE BLOOD TEST: CPT

## 2023-03-12 PROCEDURE — 85025 COMPLETE CBC W/AUTO DIFF WBC: CPT | Performed by: FAMILY MEDICINE

## 2023-03-12 PROCEDURE — 99232 SBSQ HOSP IP/OBS MODERATE 35: CPT | Mod: ,,, | Performed by: FAMILY MEDICINE

## 2023-03-12 PROCEDURE — 11000001 HC ACUTE MED/SURG PRIVATE ROOM

## 2023-03-12 PROCEDURE — 80048 BASIC METABOLIC PNL TOTAL CA: CPT | Performed by: FAMILY MEDICINE

## 2023-03-12 PROCEDURE — 99232 PR SUBSEQUENT HOSPITAL CARE,LEVL II: ICD-10-PCS | Mod: ,,, | Performed by: FAMILY MEDICINE

## 2023-03-12 PROCEDURE — 25000003 PHARM REV CODE 250: Performed by: FAMILY MEDICINE

## 2023-03-12 RX ORDER — LIDOCAINE 50 MG/G
1 PATCH TOPICAL DAILY
Status: DISCONTINUED | OUTPATIENT
Start: 2023-03-12 | End: 2023-03-20 | Stop reason: HOSPADM

## 2023-03-12 RX ORDER — OXYCODONE AND ACETAMINOPHEN 5; 325 MG/1; MG/1
2 TABLET ORAL EVERY 6 HOURS
Status: DISCONTINUED | OUTPATIENT
Start: 2023-03-12 | End: 2023-03-20 | Stop reason: HOSPADM

## 2023-03-12 RX ADMIN — FERROUS SULFATE TAB 325 MG (65 MG ELEMENTAL FE) 1 EACH: 325 (65 FE) TAB at 09:03

## 2023-03-12 RX ADMIN — INSULIN DETEMIR 14 UNITS: 100 INJECTION, SOLUTION SUBCUTANEOUS at 09:03

## 2023-03-12 RX ADMIN — DOCUSATE SODIUM 100 MG: 100 CAPSULE, LIQUID FILLED ORAL at 09:03

## 2023-03-12 RX ADMIN — HYDRALAZINE HYDROCHLORIDE 10 MG: 10 TABLET, FILM COATED ORAL at 09:03

## 2023-03-12 RX ADMIN — LIDOCAINE 1 PATCH: 50 PATCH TOPICAL at 02:03

## 2023-03-12 RX ADMIN — INSULIN ASPART 4 UNITS: 100 INJECTION, SOLUTION INTRAVENOUS; SUBCUTANEOUS at 11:03

## 2023-03-12 RX ADMIN — OXYCODONE AND ACETAMINOPHEN 2 TABLET: 325; 5 TABLET ORAL at 12:03

## 2023-03-12 RX ADMIN — INSULIN DETEMIR 17 UNITS: 100 INJECTION, SOLUTION SUBCUTANEOUS at 09:03

## 2023-03-12 RX ADMIN — VANCOMYCIN HYDROCHLORIDE 1000 MG: 1 INJECTION, POWDER, LYOPHILIZED, FOR SOLUTION INTRAVENOUS at 04:03

## 2023-03-12 RX ADMIN — GABAPENTIN 300 MG: 300 CAPSULE ORAL at 09:03

## 2023-03-12 RX ADMIN — MORPHINE SULFATE 2 MG: 2 INJECTION, SOLUTION INTRAMUSCULAR; INTRAVENOUS at 04:03

## 2023-03-12 RX ADMIN — MORPHINE SULFATE 2 MG: 2 INJECTION, SOLUTION INTRAMUSCULAR; INTRAVENOUS at 09:03

## 2023-03-12 RX ADMIN — DULOXETINE HYDROCHLORIDE 60 MG: 30 CAPSULE, DELAYED RELEASE ORAL at 09:03

## 2023-03-12 RX ADMIN — OXYCODONE AND ACETAMINOPHEN 2 TABLET: 325; 5 TABLET ORAL at 06:03

## 2023-03-12 RX ADMIN — INSULIN ASPART 4 UNITS: 100 INJECTION, SOLUTION INTRAVENOUS; SUBCUTANEOUS at 06:03

## 2023-03-12 NOTE — PROGRESS NOTES
[facility] Medicine  Progress Note    Patient Name: Monica Walker  MRN: [id number]  Patient Class: IP- Inpatient   Admission Date: 3/6/2023  Length of Stay: 6 days  Attending Physician: Kim Rosales DO  Primary Care Provider: Hannah Schulz MD        Subjective:     Principal Problem:Joint infection        HPI:  Monica Walker is a 52-year-old female with history of diabetes, hypertension, and MRSA joint space infection, who presented with complaints of left knee pain and swelling. She states the pain began suddenly yesterday, denies any recent falls or other associated trauma. Pain is worse along the lateral joint space with radiation down into the foot. There is a discreet area of fluctuance along the lateral surface of the knee, with generalized swelling of the joint to approximately 2x the size of the contralateral knee. Patient also complains of some fever, generalized weakness and productive cough for the past few days as well as dark stool.     Patient has a history of remote joint replacement (2015) with recent septic arthritis requiring debridement of the left knee as well as one shoulder. She was also recently hospitalized for with MRSA spinal epidural abscess, discharged about a month ago with outpatient daptomycin.      Initial evaluation with the following with WBC 12.39, LA 0.8, Hgb 7.4, glucose 200, labs otherwise unremarkable. Mildly elevated temp (99.5) and mild tachycardia (105), vital signs otherwise stable and grossly within normal limits.     Monica Walker is to be admitted to hospital service for evaluation and treatment of suspected septic arthritis. Orthopedics has been consulted. Blood cultures and aspiration/culture of affected knee pending at time of admission.       Overview/Hospital Course:  3/7: No acute events overnight, patient with no complaints this morning. US with aspiration was attempted however unable to obtain a specimen as  "fluid was too thick. Ortho consult pending.     3/8: Blood culture positive for MRSA, pharmacy notified to continue vancomycin. No other changes, Dr. Noyola to evaluate later today pending x-ray.    3/9: No acute events overnight, patient with no complaints aside from continued pain in the left knee. Ortho following with plans to monitor fluid cultures over the weekend with tentative plans for surgery on Monday. Plans to repeat blood cultures following procedure, if negative patient will need approximately one month of IV antibiotics from date of negative culture. Would benefit from continuation of vancomycin as she has failed outpatient treatment with daptomycin immediately prior to this admission.    3/10: Patient with no new complaints this morning, light growth of G+ cocci noted in knee aspirate culture. Continue vancomycin for MRSA bacteremia and joint infection, ortho following.    3/11: No major changes overnight, knee aspirate with MRSA. Plans for partial knee removal with placement of antibiotic spacer on Monday.    3/12: Patient continues to complain of pain, however is not taking scheduled PO medications as "they don't work." Will make changes to scheduled therapy but advised patient she will need to take medications consistently in order for them to work effectively.        Review of Systems   Constitutional:  Negative for chills, fatigue and fever.   HENT: Negative.     Respiratory:  Negative for cough and shortness of breath.    Cardiovascular:  Negative for chest pain.   Gastrointestinal:  Negative for abdominal pain, blood in stool, diarrhea, nausea and vomiting.   Musculoskeletal:  Positive for arthralgias and joint swelling.   Skin: Negative.    Neurological:  Positive for weakness.   Psychiatric/Behavioral:  Negative for confusion.    Objective:     Vital Signs (Most Recent):  Temp: 98.4 °F (36.9 °C) (03/12/23 1113)  Pulse: 73 (03/12/23 1113)  Resp: 18 (03/12/23 1113)  BP: 113/62 (03/12/23 " 1113)  SpO2: 97 % (03/12/23 1113) Vital Signs (24h Range):  Temp:  [97.8 °F (36.6 °C)-98.4 °F (36.9 °C)] 98.4 °F (36.9 °C)  Pulse:  [60-86] 73  Resp:  [16-20] 18  SpO2:  [94 %-97 %] 97 %  BP: (107-169)/(55-92) 113/62     Weight: 64.4 kg (141 lb 15.6 oz)  Body mass index is 22.92 kg/m².    Intake/Output Summary (Last 24 hours) at 3/12/2023 1219  Last data filed at 3/11/2023 1821  Gross per 24 hour   Intake 250 ml   Output --   Net 250 ml      Physical Exam  Constitutional:       General: She is not in acute distress.     Appearance: Normal appearance. She is not ill-appearing.   HENT:      Head: Normocephalic and atraumatic.      Nose: Nose normal.   Eyes:      Conjunctiva/sclera: Conjunctivae normal.      Pupils: Pupils are equal, round, and reactive to light.   Cardiovascular:      Rate and Rhythm: Normal rate and regular rhythm.      Pulses: Normal pulses.   Pulmonary:      Effort: Pulmonary effort is normal. No respiratory distress.      Breath sounds: Normal breath sounds.   Abdominal:      Palpations: There is no mass.      Tenderness: There is no abdominal tenderness.   Musculoskeletal:         General: Swelling (left knee - generalized swelling with fluctuance along lateral joint) and tenderness (lateral left knee) present.      Cervical back: No rigidity.   Skin:     Coloration: Skin is not jaundiced or pale.      Findings: No rash.   Neurological:      Mental Status: She is alert.   Psychiatric:         Behavior: Behavior normal.         Thought Content: Thought content normal.       Significant Labs: All pertinent labs within the past 24 hours have been reviewed.    Significant Imaging: I have reviewed all pertinent imaging results/findings within the past 24 hours.      Assessment/Plan:      * Joint infection  Left knee joint pain and swelling for one day associated with fever and chills. Remote joint replacement, fairly recent septic arthritis requiring debridement and extended course of antibiotics.  Currently on outpatient IV daptomycin for MRSA epidural abscess, which has been held.     Left knee aspirate with MRSA sensitive to vancomycin. Continue current treatment.     Orthopedics (Dr. Noyola) following, tentative plans for procedure on Monday.    Main complaint currently is pain, patient has been refusing scheduled PO morphine. Will make adjustments however advised that she will need to take medications as prescribed in order for them to work effectively, and that she will not be able continue IV morphine indefinitely.     Microcytic anemia  Appears somewhat chronic, however patient with complaints of recent dark stools. Will evaluate further and involve GI if needed, however will likely need outpatient evaluation due to current infection.    Iron deficiency anemia on previous admission, will continue iron supplement start PPI for now.      Hypertension  Currently controlled. Will add ARB considering underlying diabetes and adjust hydralazine as needed.       MRSA bacteremia  Blood cultures on admission positive for MRSA, currently receiving vancomycin. Plan to repeat cultures following procedure Monday. Patient will require extended treatment due to recurrent infection and multiple joint prostheses. Currently recommendation for continued vancomycin as patient failed outpatient treatment with daptomycin immediately prior to this admission.    Tentative end date to be determined.       Type 2 diabetes mellitus, with long-term current use of insulin  Patient's FSGs are uncontrolled due to hyperglycemia on current medication regimen.  Last A1c reviewed-   Lab Results   Component Value Date    HGBA1C 7.9 (H) 01/19/2023     Most recent fingerstick glucose reviewed- No results for input(s): POCTGLUCOSE in the last 24 hours.  Current correctional scale  Low  Increase anti-hyperglycemic dose as follows-   Antihyperglycemics (From admission, onward)    Start     Stop Route Frequency Ordered    03/12/23 2100   insulin detemir U-100 injection 17 Units         -- SubQ 2 times daily 03/12/23 1102    03/06/23 1741  insulin aspart U-100 injection 0-5 Units         -- SubQ Before meals & nightly PRN 03/06/23 1643        Hold Oral hypoglycemics while patient is in the hospital.    Arthritis  Continue home medications. Added Toradol 15 mg IV q6h as needed.         VTE Risk Mitigation (From admission, onward)         Ordered     IP VTE LOW RISK PATIENT  Once         03/06/23 1643     Place sequential compression device  Until discontinued         03/06/23 1643                Discharge Planning   SUZANNA:      Code Status: Full Code   Is the patient medically ready for discharge?:     Reason for patient still in hospital (select all that apply): Treatment  Discharge Plan A: Home with family, Home Health                  Kim Rosales DO  Department of Hospital Medicine   [facility]

## 2023-03-12 NOTE — ASSESSMENT & PLAN NOTE
Patient's FSGs are uncontrolled due to hyperglycemia on current medication regimen.  Last A1c reviewed-   Lab Results   Component Value Date    HGBA1C 7.9 (H) 01/19/2023     Most recent fingerstick glucose reviewed- No results for input(s): POCTGLUCOSE in the last 24 hours.  Current correctional scale  Low  Increase anti-hyperglycemic dose as follows-   Antihyperglycemics (From admission, onward)    Start     Stop Route Frequency Ordered    03/12/23 2100  insulin detemir U-100 injection 17 Units         -- SubQ 2 times daily 03/12/23 1102    03/06/23 1741  insulin aspart U-100 injection 0-5 Units         -- SubQ Before meals & nightly PRN 03/06/23 1643        Hold Oral hypoglycemics while patient is in the hospital.

## 2023-03-12 NOTE — SUBJECTIVE & OBJECTIVE
Review of Systems   Constitutional:  Negative for chills, fatigue and fever.   HENT: Negative.     Respiratory:  Negative for cough and shortness of breath.    Cardiovascular:  Negative for chest pain.   Gastrointestinal:  Negative for abdominal pain, blood in stool, diarrhea, nausea and vomiting.   Musculoskeletal:  Positive for arthralgias and joint swelling.   Skin: Negative.    Neurological:  Positive for weakness.   Psychiatric/Behavioral:  Negative for confusion.    Objective:     Vital Signs (Most Recent):  Temp: 98.4 °F (36.9 °C) (03/12/23 1113)  Pulse: 73 (03/12/23 1113)  Resp: 18 (03/12/23 1113)  BP: 113/62 (03/12/23 1113)  SpO2: 97 % (03/12/23 1113) Vital Signs (24h Range):  Temp:  [97.8 °F (36.6 °C)-98.4 °F (36.9 °C)] 98.4 °F (36.9 °C)  Pulse:  [60-86] 73  Resp:  [16-20] 18  SpO2:  [94 %-97 %] 97 %  BP: (107-169)/(55-92) 113/62     Weight: 64.4 kg (141 lb 15.6 oz)  Body mass index is 22.92 kg/m².    Intake/Output Summary (Last 24 hours) at 3/12/2023 1219  Last data filed at 3/11/2023 1821  Gross per 24 hour   Intake 250 ml   Output --   Net 250 ml      Physical Exam  Constitutional:       General: She is not in acute distress.     Appearance: Normal appearance. She is not ill-appearing.   HENT:      Head: Normocephalic and atraumatic.      Nose: Nose normal.   Eyes:      Conjunctiva/sclera: Conjunctivae normal.      Pupils: Pupils are equal, round, and reactive to light.   Cardiovascular:      Rate and Rhythm: Normal rate and regular rhythm.      Pulses: Normal pulses.   Pulmonary:      Effort: Pulmonary effort is normal. No respiratory distress.      Breath sounds: Normal breath sounds.   Abdominal:      Palpations: There is no mass.      Tenderness: There is no abdominal tenderness.   Musculoskeletal:         General: Swelling (left knee - generalized swelling with fluctuance along lateral joint) and tenderness (lateral left knee) present.      Cervical back: No rigidity.   Skin:     Coloration: Skin  is not jaundiced or pale.      Findings: No rash.   Neurological:      Mental Status: She is alert.   Psychiatric:         Behavior: Behavior normal.         Thought Content: Thought content normal.       Significant Labs: All pertinent labs within the past 24 hours have been reviewed.    Significant Imaging: I have reviewed all pertinent imaging results/findings within the past 24 hours.

## 2023-03-12 NOTE — PLAN OF CARE
Problem: Infection  Goal: Absence of Infection Signs and Symptoms  Outcome: Ongoing, Progressing     Problem: Impaired Wound Healing  Goal: Optimal Wound Healing  Outcome: Ongoing, Progressing

## 2023-03-12 NOTE — ASSESSMENT & PLAN NOTE
Left knee joint pain and swelling for one day associated with fever and chills. Remote joint replacement, fairly recent septic arthritis requiring debridement and extended course of antibiotics. Currently on outpatient IV daptomycin for MRSA epidural abscess, which has been held.     Left knee aspirate with MRSA sensitive to vancomycin. Continue current treatment.     Orthopedics (Dr. Noyola) following, tentative plans for procedure on Monday.    Main complaint currently is pain, patient has been refusing scheduled PO morphine. Will make adjustments however advised that she will need to take medications as prescribed in order for them to work effectively, and that she will not be able continue IV morphine indefinitely.

## 2023-03-13 ENCOUNTER — ANESTHESIA (OUTPATIENT)
Dept: SURGERY | Facility: HOSPITAL | Age: 53
DRG: 467 | End: 2023-03-13
Payer: MEDICARE

## 2023-03-13 ENCOUNTER — ANESTHESIA EVENT (OUTPATIENT)
Dept: SURGERY | Facility: HOSPITAL | Age: 53
DRG: 467 | End: 2023-03-13
Payer: MEDICARE

## 2023-03-13 LAB
ANION GAP SERPL CALCULATED.3IONS-SCNC: 11 MMOL/L (ref 7–16)
BASOPHILS # BLD AUTO: 0.08 K/UL (ref 0–0.2)
BASOPHILS NFR BLD AUTO: 0.6 % (ref 0–1)
BUN SERPL-MCNC: 15 MG/DL (ref 7–18)
BUN/CREAT SERPL: 18 (ref 6–20)
CALCIUM SERPL-MCNC: 8.1 MG/DL (ref 8.5–10.1)
CHLORIDE SERPL-SCNC: 105 MMOL/L (ref 98–107)
CO2 SERPL-SCNC: 26 MMOL/L (ref 21–32)
CREAT SERPL-MCNC: 0.82 MG/DL (ref 0.55–1.02)
DIFFERENTIAL METHOD BLD: ABNORMAL
EGFR (NO RACE VARIABLE) (RUSH/TITUS): 85 ML/MIN/1.73M²
EOSINOPHIL # BLD AUTO: 0.18 K/UL (ref 0–0.5)
EOSINOPHIL NFR BLD AUTO: 1.3 % (ref 1–4)
ERYTHROCYTE [DISTWIDTH] IN BLOOD BY AUTOMATED COUNT: 19.3 % (ref 11.5–14.5)
GLUCOSE SERPL-MCNC: 207 MG/DL (ref 70–105)
GLUCOSE SERPL-MCNC: 209 MG/DL (ref 74–106)
GLUCOSE SERPL-MCNC: 214 MG/DL (ref 70–105)
GLUCOSE SERPL-MCNC: 306 MG/DL (ref 70–105)
GLUCOSE SERPL-MCNC: 335 MG/DL (ref 70–105)
HCT VFR BLD AUTO: 25 % (ref 38–47)
HGB BLD-MCNC: 7.2 G/DL (ref 12–16)
IMM GRANULOCYTES # BLD AUTO: 0.17 K/UL (ref 0–0.04)
IMM GRANULOCYTES NFR BLD: 1.3 % (ref 0–0.4)
INDIRECT COOMBS: NORMAL
LYMPHOCYTES # BLD AUTO: 0.82 K/UL (ref 1–4.8)
LYMPHOCYTES NFR BLD AUTO: 6.1 % (ref 27–41)
MCH RBC QN AUTO: 23.1 PG (ref 27–31)
MCHC RBC AUTO-ENTMCNC: 28.8 G/DL (ref 32–36)
MCV RBC AUTO: 80.1 FL (ref 80–96)
MONOCYTES # BLD AUTO: 0.61 K/UL (ref 0–0.8)
MONOCYTES NFR BLD AUTO: 4.5 % (ref 2–6)
MPC BLD CALC-MCNC: 9.1 FL (ref 9.4–12.4)
NEUTROPHILS # BLD AUTO: 11.66 K/UL (ref 1.8–7.7)
NEUTROPHILS NFR BLD AUTO: 86.2 % (ref 53–65)
NRBC # BLD AUTO: 0 X10E3/UL
NRBC, AUTO (.00): 0 %
PLATELET # BLD AUTO: 334 K/UL (ref 150–400)
POTASSIUM SERPL-SCNC: 3.9 MMOL/L (ref 3.5–5.1)
RBC # BLD AUTO: 3.12 M/UL (ref 4.2–5.4)
RH BLD: NORMAL
SODIUM SERPL-SCNC: 138 MMOL/L (ref 136–145)
WBC # BLD AUTO: 13.52 K/UL (ref 4.5–11)

## 2023-03-13 PROCEDURE — 25000003 PHARM REV CODE 250: Performed by: ORTHOPAEDIC SURGERY

## 2023-03-13 PROCEDURE — D9220A PRA ANESTHESIA: Mod: CRNA,,, | Performed by: NURSE ANESTHETIST, CERTIFIED REGISTERED

## 2023-03-13 PROCEDURE — 99900035 HC TECH TIME PER 15 MIN (STAT)

## 2023-03-13 PROCEDURE — D9220A PRA ANESTHESIA: ICD-10-PCS | Mod: ANES,,, | Performed by: ANESTHESIOLOGY

## 2023-03-13 PROCEDURE — 25000003 PHARM REV CODE 250: Performed by: NURSE ANESTHETIST, CERTIFIED REGISTERED

## 2023-03-13 PROCEDURE — 27000655: Performed by: ANESTHESIOLOGY

## 2023-03-13 PROCEDURE — 99232 PR SUBSEQUENT HOSPITAL CARE,LEVL II: ICD-10-PCS | Mod: ,,, | Performed by: FAMILY MEDICINE

## 2023-03-13 PROCEDURE — 82962 GLUCOSE BLOOD TEST: CPT

## 2023-03-13 PROCEDURE — 85025 COMPLETE CBC W/AUTO DIFF WBC: CPT | Performed by: ORTHOPAEDIC SURGERY

## 2023-03-13 PROCEDURE — 86923 COMPATIBILITY TEST ELECTRIC: CPT | Performed by: FAMILY MEDICINE

## 2023-03-13 PROCEDURE — 36000711: Performed by: ORTHOPAEDIC SURGERY

## 2023-03-13 PROCEDURE — 27488 REMOVAL OF KNEE PROSTHESIS: CPT | Mod: 79,LT,, | Performed by: ORTHOPAEDIC SURGERY

## 2023-03-13 PROCEDURE — D9220A PRA ANESTHESIA: Mod: ANES,,, | Performed by: ANESTHESIOLOGY

## 2023-03-13 PROCEDURE — 27000510 HC BLANKET BAIR HUGGER ANY SIZE: Performed by: ANESTHESIOLOGY

## 2023-03-13 PROCEDURE — 99232 SBSQ HOSP IP/OBS MODERATE 35: CPT | Mod: ,,, | Performed by: FAMILY MEDICINE

## 2023-03-13 PROCEDURE — 37000008 HC ANESTHESIA 1ST 15 MINUTES: Performed by: ORTHOPAEDIC SURGERY

## 2023-03-13 PROCEDURE — D9220A PRA ANESTHESIA: ICD-10-PCS | Mod: CRNA,,, | Performed by: NURSE ANESTHETIST, CERTIFIED REGISTERED

## 2023-03-13 PROCEDURE — 27201423 OPTIME MED/SURG SUP & DEVICES STERILE SUPPLY: Performed by: ORTHOPAEDIC SURGERY

## 2023-03-13 PROCEDURE — 27000177 HC AIRWAY, LARYNGEAL MASK: Performed by: ANESTHESIOLOGY

## 2023-03-13 PROCEDURE — 63600175 PHARM REV CODE 636 W HCPCS: Mod: TB,JG | Performed by: ANESTHESIOLOGY

## 2023-03-13 PROCEDURE — 71000033 HC RECOVERY, INTIAL HOUR: Performed by: ORTHOPAEDIC SURGERY

## 2023-03-13 PROCEDURE — 36000710: Performed by: ORTHOPAEDIC SURGERY

## 2023-03-13 PROCEDURE — 63600175 PHARM REV CODE 636 W HCPCS: Mod: TB,JG | Performed by: ORTHOPAEDIC SURGERY

## 2023-03-13 PROCEDURE — 37000009 HC ANESTHESIA EA ADD 15 MINS: Performed by: ORTHOPAEDIC SURGERY

## 2023-03-13 PROCEDURE — 63600175 PHARM REV CODE 636 W HCPCS: Performed by: ANESTHESIOLOGY

## 2023-03-13 PROCEDURE — 63600175 PHARM REV CODE 636 W HCPCS: Performed by: NURSE ANESTHETIST, CERTIFIED REGISTERED

## 2023-03-13 PROCEDURE — 25000003 PHARM REV CODE 250: Performed by: FAMILY MEDICINE

## 2023-03-13 PROCEDURE — 71000039 HC RECOVERY, EACH ADD'L HOUR: Performed by: ORTHOPAEDIC SURGERY

## 2023-03-13 PROCEDURE — 63600175 PHARM REV CODE 636 W HCPCS: Performed by: FAMILY MEDICINE

## 2023-03-13 PROCEDURE — 11000001 HC ACUTE MED/SURG PRIVATE ROOM

## 2023-03-13 PROCEDURE — 27488 PR REMOVAL OF KNEE PROSTHESIS: ICD-10-PCS | Mod: 79,LT,, | Performed by: ORTHOPAEDIC SURGERY

## 2023-03-13 PROCEDURE — 86900 BLOOD TYPING SEROLOGIC ABO: CPT | Performed by: FAMILY MEDICINE

## 2023-03-13 PROCEDURE — 27000716 HC OXISENSOR PROBE, ANY SIZE: Performed by: ANESTHESIOLOGY

## 2023-03-13 PROCEDURE — C1713 ANCHOR/SCREW BN/BN,TIS/BN: HCPCS | Performed by: ORTHOPAEDIC SURGERY

## 2023-03-13 PROCEDURE — 27000221 HC OXYGEN, UP TO 24 HOURS

## 2023-03-13 PROCEDURE — 80048 BASIC METABOLIC PNL TOTAL CA: CPT | Performed by: ORTHOPAEDIC SURGERY

## 2023-03-13 DEVICE — TOBRA FULL DOSE ANTIBIOTIC BONE CEMENT, 10 PACK CATALOG NUMBER IS 6197-9-010
Type: IMPLANTABLE DEVICE | Site: KNEE | Status: FUNCTIONAL
Brand: SIMPLEX

## 2023-03-13 RX ORDER — DIPHENHYDRAMINE HYDROCHLORIDE 50 MG/ML
25 INJECTION INTRAMUSCULAR; INTRAVENOUS EVERY 6 HOURS PRN
Status: DISCONTINUED | OUTPATIENT
Start: 2023-03-13 | End: 2023-03-13 | Stop reason: HOSPADM

## 2023-03-13 RX ORDER — SODIUM CHLORIDE, SODIUM LACTATE, POTASSIUM CHLORIDE, CALCIUM CHLORIDE 600; 310; 30; 20 MG/100ML; MG/100ML; MG/100ML; MG/100ML
INJECTION, SOLUTION INTRAVENOUS CONTINUOUS
Status: DISCONTINUED | OUTPATIENT
Start: 2023-03-13 | End: 2023-03-15

## 2023-03-13 RX ORDER — ONDANSETRON 2 MG/ML
4 INJECTION INTRAMUSCULAR; INTRAVENOUS EVERY 8 HOURS PRN
Status: DISCONTINUED | OUTPATIENT
Start: 2023-03-13 | End: 2023-03-20 | Stop reason: HOSPADM

## 2023-03-13 RX ORDER — MORPHINE SULFATE 4 MG/ML
4 INJECTION, SOLUTION INTRAMUSCULAR; INTRAVENOUS EVERY 4 HOURS PRN
Status: DISCONTINUED | OUTPATIENT
Start: 2023-03-13 | End: 2023-03-20 | Stop reason: HOSPADM

## 2023-03-13 RX ORDER — MIDAZOLAM HYDROCHLORIDE 1 MG/ML
INJECTION INTRAMUSCULAR; INTRAVENOUS
Status: DISCONTINUED | OUTPATIENT
Start: 2023-03-13 | End: 2023-03-13

## 2023-03-13 RX ORDER — MEPERIDINE HYDROCHLORIDE 25 MG/ML
25 INJECTION INTRAMUSCULAR; INTRAVENOUS; SUBCUTANEOUS EVERY 10 MIN PRN
Status: DISCONTINUED | OUTPATIENT
Start: 2023-03-13 | End: 2023-03-13 | Stop reason: HOSPADM

## 2023-03-13 RX ORDER — KETOROLAC TROMETHAMINE 30 MG/ML
30 INJECTION, SOLUTION INTRAMUSCULAR; INTRAVENOUS ONCE
Status: COMPLETED | OUTPATIENT
Start: 2023-03-13 | End: 2023-03-13

## 2023-03-13 RX ORDER — ACETAMINOPHEN 10 MG/ML
1000 INJECTION, SOLUTION INTRAVENOUS EVERY 8 HOURS
Status: DISCONTINUED | OUTPATIENT
Start: 2023-03-13 | End: 2023-03-13 | Stop reason: HOSPADM

## 2023-03-13 RX ORDER — PROPOFOL 10 MG/ML
VIAL (ML) INTRAVENOUS
Status: DISCONTINUED | OUTPATIENT
Start: 2023-03-13 | End: 2023-03-13

## 2023-03-13 RX ORDER — HYDROMORPHONE HYDROCHLORIDE 2 MG/ML
INJECTION, SOLUTION INTRAMUSCULAR; INTRAVENOUS; SUBCUTANEOUS
Status: DISCONTINUED | OUTPATIENT
Start: 2023-03-13 | End: 2023-03-13

## 2023-03-13 RX ORDER — FENTANYL CITRATE 50 UG/ML
INJECTION, SOLUTION INTRAMUSCULAR; INTRAVENOUS
Status: DISCONTINUED | OUTPATIENT
Start: 2023-03-13 | End: 2023-03-13

## 2023-03-13 RX ORDER — SODIUM CHLORIDE 0.9 G/100ML
IRRIGANT IRRIGATION
Status: DISCONTINUED | OUTPATIENT
Start: 2023-03-13 | End: 2023-03-13 | Stop reason: HOSPADM

## 2023-03-13 RX ORDER — TRANEXAMIC ACID 100 MG/ML
INJECTION, SOLUTION INTRAVENOUS
Status: DISCONTINUED | OUTPATIENT
Start: 2023-03-13 | End: 2023-03-13

## 2023-03-13 RX ORDER — PHENYLEPHRINE HYDROCHLORIDE 10 MG/ML
INJECTION INTRAVENOUS
Status: DISCONTINUED | OUTPATIENT
Start: 2023-03-13 | End: 2023-03-13

## 2023-03-13 RX ORDER — ONDANSETRON 2 MG/ML
INJECTION INTRAMUSCULAR; INTRAVENOUS
Status: DISCONTINUED | OUTPATIENT
Start: 2023-03-13 | End: 2023-03-13

## 2023-03-13 RX ORDER — MORPHINE SULFATE 10 MG/ML
4 INJECTION INTRAMUSCULAR; INTRAVENOUS; SUBCUTANEOUS EVERY 5 MIN PRN
Status: DISCONTINUED | OUTPATIENT
Start: 2023-03-13 | End: 2023-03-13 | Stop reason: HOSPADM

## 2023-03-13 RX ORDER — HYDROMORPHONE HYDROCHLORIDE 2 MG/ML
0.5 INJECTION, SOLUTION INTRAMUSCULAR; INTRAVENOUS; SUBCUTANEOUS EVERY 5 MIN PRN
Status: DISCONTINUED | OUTPATIENT
Start: 2023-03-13 | End: 2023-03-13 | Stop reason: HOSPADM

## 2023-03-13 RX ORDER — LIDOCAINE HYDROCHLORIDE 20 MG/ML
INJECTION, SOLUTION EPIDURAL; INFILTRATION; INTRACAUDAL; PERINEURAL
Status: DISCONTINUED | OUTPATIENT
Start: 2023-03-13 | End: 2023-03-13

## 2023-03-13 RX ORDER — EPHEDRINE SULFATE 50 MG/ML
INJECTION, SOLUTION INTRAVENOUS
Status: DISCONTINUED | OUTPATIENT
Start: 2023-03-13 | End: 2023-03-13

## 2023-03-13 RX ORDER — DEXAMETHASONE SODIUM PHOSPHATE 4 MG/ML
INJECTION, SOLUTION INTRA-ARTICULAR; INTRALESIONAL; INTRAMUSCULAR; INTRAVENOUS; SOFT TISSUE
Status: DISCONTINUED | OUTPATIENT
Start: 2023-03-13 | End: 2023-03-13

## 2023-03-13 RX ORDER — HYDROCODONE BITARTRATE AND ACETAMINOPHEN 10; 325 MG/1; MG/1
1 TABLET ORAL EVERY 4 HOURS PRN
Status: DISCONTINUED | OUTPATIENT
Start: 2023-03-13 | End: 2023-03-20 | Stop reason: HOSPADM

## 2023-03-13 RX ORDER — BISACODYL 10 MG
10 SUPPOSITORY, RECTAL RECTAL DAILY PRN
Status: DISCONTINUED | OUTPATIENT
Start: 2023-03-13 | End: 2023-03-20 | Stop reason: HOSPADM

## 2023-03-13 RX ORDER — ONDANSETRON 2 MG/ML
4 INJECTION INTRAMUSCULAR; INTRAVENOUS DAILY PRN
Status: DISCONTINUED | OUTPATIENT
Start: 2023-03-13 | End: 2023-03-13 | Stop reason: HOSPADM

## 2023-03-13 RX ADMIN — HYDROMORPHONE HYDROCHLORIDE 1 MG: 2 INJECTION, SOLUTION INTRAMUSCULAR; INTRAVENOUS; SUBCUTANEOUS at 09:03

## 2023-03-13 RX ADMIN — PHENYLEPHRINE HYDROCHLORIDE 100 MCG: 10 INJECTION INTRAVENOUS at 08:03

## 2023-03-13 RX ADMIN — SODIUM CHLORIDE: 9 INJECTION, SOLUTION INTRAVENOUS at 10:03

## 2023-03-13 RX ADMIN — MORPHINE SULFATE 4 MG: 4 INJECTION, SOLUTION INTRAMUSCULAR; INTRAVENOUS at 08:03

## 2023-03-13 RX ADMIN — INSULIN ASPART 4 UNITS: 100 INJECTION, SOLUTION INTRAVENOUS; SUBCUTANEOUS at 01:03

## 2023-03-13 RX ADMIN — OXYCODONE AND ACETAMINOPHEN 2 TABLET: 325; 5 TABLET ORAL at 01:03

## 2023-03-13 RX ADMIN — EPHEDRINE SULFATE 25 MG: 50 INJECTION INTRAVENOUS at 08:03

## 2023-03-13 RX ADMIN — MORPHINE SULFATE 4 MG: 10 INJECTION, SOLUTION INTRAMUSCULAR; INTRAVENOUS at 11:03

## 2023-03-13 RX ADMIN — TRANEXAMIC ACID 1000 MG: 100 INJECTION, SOLUTION INTRAVENOUS at 10:03

## 2023-03-13 RX ADMIN — TRANEXAMIC ACID 1000 MG: 100 INJECTION, SOLUTION INTRAVENOUS at 08:03

## 2023-03-13 RX ADMIN — INSULIN ASPART 2 UNITS: 100 INJECTION, SOLUTION INTRAVENOUS; SUBCUTANEOUS at 07:03

## 2023-03-13 RX ADMIN — CEFAZOLIN 2 G: 2 INJECTION, POWDER, FOR SOLUTION INTRAMUSCULAR; INTRAVENOUS at 08:03

## 2023-03-13 RX ADMIN — PROPOFOL 100 MG: 10 INJECTION, EMULSION INTRAVENOUS at 08:03

## 2023-03-13 RX ADMIN — LIDOCAINE HYDROCHLORIDE 50 MG: 20 INJECTION, SOLUTION EPIDURAL; INFILTRATION; INTRACAUDAL; PERINEURAL at 08:03

## 2023-03-13 RX ADMIN — OXYCODONE AND ACETAMINOPHEN 2 TABLET: 325; 5 TABLET ORAL at 06:03

## 2023-03-13 RX ADMIN — SODIUM CHLORIDE: 9 INJECTION, SOLUTION INTRAVENOUS at 08:03

## 2023-03-13 RX ADMIN — VANCOMYCIN HYDROCHLORIDE 1000 MG: 1 INJECTION, POWDER, LYOPHILIZED, FOR SOLUTION INTRAVENOUS at 05:03

## 2023-03-13 RX ADMIN — GABAPENTIN 300 MG: 300 CAPSULE ORAL at 08:03

## 2023-03-13 RX ADMIN — HYDRALAZINE HYDROCHLORIDE 10 MG: 10 TABLET, FILM COATED ORAL at 08:03

## 2023-03-13 RX ADMIN — FENTANYL CITRATE 50 MCG: 50 INJECTION INTRAMUSCULAR; INTRAVENOUS at 08:03

## 2023-03-13 RX ADMIN — MIDAZOLAM 2 MG: 1 INJECTION INTRAMUSCULAR; INTRAVENOUS at 08:03

## 2023-03-13 RX ADMIN — MORPHINE SULFATE 3 MG: 10 INJECTION, SOLUTION INTRAMUSCULAR; INTRAVENOUS at 11:03

## 2023-03-13 RX ADMIN — LOSARTAN POTASSIUM 25 MG: 25 TABLET, FILM COATED ORAL at 01:03

## 2023-03-13 RX ADMIN — FERROUS SULFATE TAB 325 MG (65 MG ELEMENTAL FE) 1 EACH: 325 (65 FE) TAB at 01:03

## 2023-03-13 RX ADMIN — MORPHINE SULFATE 4 MG: 4 INJECTION, SOLUTION INTRAMUSCULAR; INTRAVENOUS at 04:03

## 2023-03-13 RX ADMIN — INSULIN ASPART 4 UNITS: 100 INJECTION, SOLUTION INTRAVENOUS; SUBCUTANEOUS at 05:03

## 2023-03-13 RX ADMIN — OXYCODONE AND ACETAMINOPHEN 2 TABLET: 325; 5 TABLET ORAL at 12:03

## 2023-03-13 RX ADMIN — CEFAZOLIN 2 G: 2 INJECTION, POWDER, FOR SOLUTION INTRAMUSCULAR; INTRAVENOUS at 01:03

## 2023-03-13 RX ADMIN — DOCUSATE SODIUM 100 MG: 100 CAPSULE, LIQUID FILLED ORAL at 01:03

## 2023-03-13 RX ADMIN — INSULIN DETEMIR 17 UNITS: 100 INJECTION, SOLUTION SUBCUTANEOUS at 08:03

## 2023-03-13 RX ADMIN — DULOXETINE HYDROCHLORIDE 60 MG: 30 CAPSULE, DELAYED RELEASE ORAL at 01:03

## 2023-03-13 RX ADMIN — INSULIN DETEMIR 17 UNITS: 100 INJECTION, SOLUTION SUBCUTANEOUS at 01:03

## 2023-03-13 RX ADMIN — VANCOMYCIN HYDROCHLORIDE 1000 MG: 1 INJECTION, POWDER, LYOPHILIZED, FOR SOLUTION INTRAVENOUS at 06:03

## 2023-03-13 RX ADMIN — GABAPENTIN 300 MG: 300 CAPSULE ORAL at 01:03

## 2023-03-13 RX ADMIN — CEFAZOLIN 2 G: 1 INJECTION, POWDER, FOR SOLUTION INTRAMUSCULAR; INTRAVENOUS; PARENTERAL at 08:03

## 2023-03-13 RX ADMIN — KETOROLAC TROMETHAMINE 30 MG: 30 INJECTION, SOLUTION INTRAMUSCULAR; INTRAVENOUS at 11:03

## 2023-03-13 RX ADMIN — HYDRALAZINE HYDROCHLORIDE 10 MG: 10 TABLET, FILM COATED ORAL at 01:03

## 2023-03-13 RX ADMIN — ONDANSETRON 4 MG: 2 INJECTION INTRAMUSCULAR; INTRAVENOUS at 08:03

## 2023-03-13 RX ADMIN — DEXAMETHASONE SODIUM PHOSPHATE 4 MG: 4 INJECTION, SOLUTION INTRA-ARTICULAR; INTRALESIONAL; INTRAMUSCULAR; INTRAVENOUS; SOFT TISSUE at 08:03

## 2023-03-13 NOTE — ASSESSMENT & PLAN NOTE
Left knee joint pain and swelling for one day associated with fever and chills. Remote joint replacement, fairly recent septic arthritis requiring debridement and extended course of antibiotics. Currently on outpatient IV daptomycin for MRSA epidural abscess, which has been held.     Left knee aspirate with MRSA sensitive to vancomycin. Continue current treatment.     Orthopedic hardware removal with placement of antibiotic spacer today by Dr. Noyola. Patient tolerated well.

## 2023-03-13 NOTE — PT/OT/SLP PROGRESS
Occupational Therapy      Patient Name:  Monica Walker   MRN:  [id number]    Patient not seen today secondary to Pain (pt gone for surgery this AM; pt too lethargic to participate this PM). Will follow-up 3/14/23.    3/13/2023

## 2023-03-13 NOTE — TRANSFER OF CARE
"Anesthesia Transfer of Care Note    Patient: Monica Walker    Procedure(s) Performed: Procedure(s) (LRB):  REMOVAL OF PROSTHESIS,METHYLMETHACRYLATE WITH OR WITHOUT INSERTION OF SPACER LEFT KNEE (Left)  INCISION AND DRAINAGE (Left)    Patient location: PACU    Anesthesia Type: general    Transport from OR: Transported from OR on 6-10 L/min O2 by face mask with adequate spontaneous ventilation    Post pain: adequate analgesia    Post assessment: no apparent anesthetic complications    Post vital signs: stable    Level of consciousness: responds to stimulation, awake and sedated    Nausea/Vomiting: no nausea/vomiting    Complications: none    Transfer of care protocol was followed      Last vitals:   Visit Vitals  BP (!) 140/79 (Patient Position: Lying)   Pulse 97   Temp 36.4 °C (97.6 °F) (Oral)   Resp 10   Ht 5' 6" (1.676 m)   Wt 64.4 kg (141 lb 15.6 oz)   SpO2 99%   BMI 22.92 kg/m²     "

## 2023-03-13 NOTE — ANESTHESIA PROCEDURE NOTES
Intubation    Date/Time: 3/13/2023 8:35 AM  Performed by: Antonio Vidal CRNA  Authorized by: Montana Rodriguez MD     Intubation:     Induction:  Intravenous    Intubated:  Postinduction    Mask Ventilation:  Easy mask    Attempts:  1    Attempted By:  CRNA    Method of Intubation:  Direct    Difficult Airway Encountered?: No      Complications:  None    Airway Device:  Supraglottic airway/LMA    Airway Device Size:  3.0    Style/Cuff Inflation:  Cuffed (inflated to minimal occlusive pressure)    Placement Verified By:  Capnometry    Complicating Factors:  None    Findings Post-Intubation:  BS equal bilateral and atraumatic/condition of teeth unchanged

## 2023-03-13 NOTE — PT/OT/SLP PROGRESS
Physical Therapy      Patient Name:  Monica Walker   MRN:  [id number]    Patient not seen today secondary to Patient unwilling to participate, Pain. Patient lethargic due to pain meds. Patient unwilling to participate with PT this afternoon. Will follow-up tomorrow. Anticipate transition home once medically stable as pt well versed in mobility using RW.

## 2023-03-13 NOTE — SUBJECTIVE & OBJECTIVE
Review of Systems   Unable to perform ROS: Other (Somewhat sedated from anesthesia and IV pain medications)   Objective:     Vital Signs (Most Recent):  Temp: 98.7 °F (37.1 °C) (03/13/23 1224)  Pulse: 88 (03/13/23 1250)  Resp: 16 (03/13/23 1307)  BP: (!) 148/76 (03/13/23 1239)  SpO2: 96 % (03/13/23 1250)   Vital Signs (24h Range):  Temp:  [97.6 °F (36.4 °C)-98.7 °F (37.1 °C)] 98.7 °F (37.1 °C)  Pulse:  [66-98] 88  Resp:  [10-18] 16  SpO2:  [90 %-100 %] 96 %  BP: (103-158)/(55-82) 148/76     Weight: 64.4 kg (141 lb 15.6 oz)  Body mass index is 22.92 kg/m².    Intake/Output Summary (Last 24 hours) at 3/13/2023 1417  Last data filed at 3/13/2023 1210  Gross per 24 hour   Intake 2380 ml   Output 450 ml   Net 1930 ml      Physical Exam  Constitutional:       General: She is not in acute distress.     Appearance: Normal appearance. She is not ill-appearing.   HENT:      Head: Normocephalic and atraumatic.      Nose: Nose normal.   Eyes:      Conjunctiva/sclera: Conjunctivae normal.      Pupils: Pupils are equal, round, and reactive to light.   Cardiovascular:      Rate and Rhythm: Normal rate and regular rhythm.   Pulmonary:      Effort: Pulmonary effort is normal. No respiratory distress.      Breath sounds: Normal breath sounds.   Musculoskeletal:      Cervical back: No rigidity.      Comments: Dressing, immobilizer left knee. Limb neurovascularly intact.   Skin:     General: Skin is warm and dry.      Coloration: Skin is not jaundiced or pale.      Findings: No rash.   Neurological:      Mental Status: She is alert.   Psychiatric:         Behavior: Behavior normal.         Thought Content: Thought content normal.       Significant Labs: All pertinent labs within the past 24 hours have been reviewed.    Significant Imaging: I have reviewed all pertinent imaging results/findings within the past 24 hours.

## 2023-03-13 NOTE — PLAN OF CARE
Problem: Adult Inpatient Plan of Care  Goal: Plan of Care Review  Outcome: Ongoing, Progressing  Goal: Patient-Specific Goal (Individualized)  Outcome: Ongoing, Progressing  Goal: Absence of Hospital-Acquired Illness or Injury  Outcome: Ongoing, Progressing  Goal: Optimal Comfort and Wellbeing  Outcome: Ongoing, Progressing  Goal: Readiness for Transition of Care  Outcome: Ongoing, Progressing     Problem: Diabetes Comorbidity  Goal: Blood Glucose Level Within Targeted Range  Outcome: Ongoing, Not Progressing     Problem: Fluid and Electrolyte Imbalance (Acute Kidney Injury/Impairment)  Goal: Fluid and Electrolyte Balance  Outcome: Ongoing, Progressing     Problem: Oral Intake Inadequate (Acute Kidney Injury/Impairment)  Goal: Optimal Nutrition Intake  Outcome: Ongoing, Progressing     Problem: Infection  Goal: Absence of Infection Signs and Symptoms  Outcome: Ongoing, Not Progressing     Problem: Impaired Wound Healing  Goal: Optimal Wound Healing  Outcome: Ongoing, Not Progressing     Problem: Skin Injury Risk Increased  Goal: Skin Health and Integrity  Outcome: Ongoing, Progressing

## 2023-03-13 NOTE — PROGRESS NOTES
"[facility]  Adult Nutrition  Progress Note    SUMMARY       Recommendations    Recommendation/Intervention: Recommend transition to Diabetic diet order as able/appropriate and tolerated. Encourage good PO intakes. If PO intakes suboptimal, recommend addition of Glucerna nutritional supplements.  Goals: consume % of meals, tolerate PO intake, weight maintenance  Nutrition Goal Status: new    Assessment and Plan  Pt seen for LOS. Current weight is within IBW range and BMI considered WNL per guidelines.     Per MD:   "52-year-old female with history of diabetes, hypertension, and MRSA joint space infection, who presented with complaints of left knee pain and swelling. She states the pain began suddenly yesterday, denies any recent falls or other associated trauma. Pain is worse along the lateral joint space with radiation down into the foot. There is a discreet area of fluctuance along the lateral surface of the knee, with generalized swelling of the joint to approximately 2x the size of the contralateral knee. Patient also complains of some fever, generalized weakness and productive cough for the past few days as well as dark stool.   Patient has a history of remote joint replacement (2015) with recent septic arthritis requiring debridement of the left knee as well as one shoulder. She was also recently hospitalized for with MRSA spinal epidural abscess, discharged about a month ago with outpatient daptomycin.    Initial evaluation with the following with WBC 12.39, LA 0.8, Hgb 7.4, glucose 200, labs otherwise unremarkable. Mildly elevated temp (99.5) and mild tachycardia (105), vital signs otherwise stable and grossly within normal limits.      Overview/Hospital Course:  3/7: No acute events overnight, patient with no complaints this morning. US with aspiration was attempted however unable to obtain a specimen as fluid was too thick. Ortho consult pending.   3/8: Blood culture positive " "for MRSA, pharmacy notified to continue vancomycin. No other changes, Dr. Noyola to evaluate later today pending x-ray.   3/9: No acute events overnight, patient with no complaints aside from continued pain in the left knee. Ortho following with plans to monitor fluid cultures over the weekend with tentative plans for surgery on Monday. Plans to repeat blood cultures following procedure, if negative patient will need approximately one month of IV antibiotics from date of negative culture. Would benefit from continuation of vancomycin as she has failed outpatient treatment with daptomycin immediately prior to this admission.  3/10: Patient with no new complaints this morning, light growth of G+ cocci noted in knee aspirate culture. Continue vancomycin for MRSA bacteremia and joint infection, ortho following.  3/11: No major changes overnight, knee aspirate with MRSA. Plans for partial knee removal with placement of antibiotic spacer on Monday.  3/12: Patient continues to complain of pain, however is not taking scheduled PO medications as "they don't work." Will make changes to scheduled therapy but advised patient she will need to take medications consistently in order for them to work effectively."    Pt underwent Removal of prosthesis, methylmethacrylate with or without insertion of spacer left knee (left) incision and drainage (left).     Pt is currently on a Regular diet. Per flowsheets, pt consuming 25-75% of meals. Recommend transition to Diabetic diet due to increased glucose levels. If PO intakes suboptimals, consider addition of Glucerna nutritional supplement.     Last BM 3/10 per flowsheets. Labs/meds reviewed. Continue mvi.  RD following.     Reason for Assessment    Reason For Assessment: length of stay  Diagnosis: other (see comments) (joint infection)  Relevant Medical History: HTN, osteoarthritis, depression, degenerative disk disease, abscess of right thigh, hypomagnesia    Nutrition Risk " "Screen    Nutrition Risk Screen: no indicators present    Nutrition/Diet History    Spiritual, Cultural Beliefs, Christian Practices, Values that Affect Care: no    Anthropometrics    Temp: 98 °F (36.7 °C)  Height: 5' 6" (167.6 cm)  Height (inches): 66 in  Weight Method: Bed Scale  Weight: 64.4 kg (141 lb 15.6 oz)  Weight (lb): 141.98 lb  Ideal Body Weight (IBW), Female: 130 lb  % Ideal Body Weight, Female (lb): 109.22 %  BMI (Calculated): 22.9       Lab/Procedures/Meds   Latest Reference Range & Units 03/13/23 11:38   Sodium 136 - 145 mmol/L 138   Potassium 3.5 - 5.1 mmol/L 3.9   Chloride 98 - 107 mmol/L 105   CO2 21 - 32 mmol/L 26   Anion Gap 7 - 16 mmol/L 11   BUN 7 - 18 mg/dL 15   Creatinine 0.55 - 1.02 mg/dL 0.82   BUN/CREAT RATIO 6 - 20  18   eGFR >=60 mL/min/1.73m² 85   Glucose 74 - 106 mg/dL 209 (H)   Calcium 8.5 - 10.1 mg/dL 8.1 (L)   (H): Data is abnormally high  (L): Data is abnormally low    Note: elevated glucose.     Pertinent Labs Reviewed: reviewed  Pertinent Medications Reviewed: reviewed  Pertinent Medications Comments: acetaminophen, dicusate sodium, duloxetine, ferrous sulfate, gabapentin, hydralazine, insulin, losartan, oxycodone, vancocin    Estimated/Assessed Needs    Weight Used For Calorie Calculations: 64.4 kg (141 lb 15.6 oz)  Energy Calorie Requirements (kcal): 0046-4108 kcals/day (25-30 kcals/kg)     Protein Requirements: 52-64 g/day (1.0-1.2 g/kg)  Weight Used For Protein Calculations: 64.4 kg (141 lb 15.6 oz)        RDA Method (mL): 1610         Nutrition Prescription Ordered    Current Diet Order: Regular diet    Evaluation of Received Nutrient/Fluid Intake       % Intake of Estimated Energy Needs: 50 - 75 %  % Meal Intake: 50 - 75 %    Nutrition Risk    Level of Risk/Frequency of Follow-up: moderate     Monitor and Evaluation    Food and Nutrient Intake: energy intake, food and beverage intake  Food and Nutrient Adminstration: diet order  Anthropometric Measurements: weight, weight " change  Biochemical Data, Medical Tests and Procedures: lipid profile, inflammatory profile, glucose/endocrine profile, gastrointestinal profile, electrolyte and renal panel     Nutrition Follow-Up    RD Follow-up?: Yes

## 2023-03-13 NOTE — PROGRESS NOTES
[facility]  [facility] Medicine  Progress Note    Patient Name: Monica Walker  MRN: [id number]  Patient Class: IP- Inpatient   Admission Date: 3/6/2023  Length of Stay: 7 days  Attending Physician: Kim Rosales DO  Primary Care Provider: Hannah Schulz MD        Subjective:     Principal Problem:Joint infection        HPI:  Monica Walker is a 52-year-old female with history of diabetes, hypertension, and MRSA joint space infection, who presented with complaints of left knee pain and swelling. She states the pain began suddenly yesterday, denies any recent falls or other associated trauma. Pain is worse along the lateral joint space with radiation down into the foot. There is a discreet area of fluctuance along the lateral surface of the knee, with generalized swelling of the joint to approximately 2x the size of the contralateral knee. Patient also complains of some fever, generalized weakness and productive cough for the past few days as well as dark stool.     Patient has a history of remote joint replacement (2015) with recent septic arthritis requiring debridement of the left knee as well as one shoulder. She was also recently hospitalized for with MRSA spinal epidural abscess, discharged about a month ago with outpatient daptomycin.      Initial evaluation with the following with WBC 12.39, LA 0.8, Hgb 7.4, glucose 200, labs otherwise unremarkable. Mildly elevated temp (99.5) and mild tachycardia (105), vital signs otherwise stable and grossly within normal limits.     Monica Walker is to be admitted to hospital service for evaluation and treatment of suspected septic arthritis. Orthopedics has been consulted. Blood cultures and aspiration/culture of affected knee pending at time of admission.       Overview/Hospital Course:  3/7: No acute events overnight, patient with no complaints this morning. US with aspiration was attempted however unable to obtain a specimen as fluid  "was too thick. Ortho consult pending.     3/8: Blood culture positive for MRSA, pharmacy notified to continue vancomycin. No other changes, Dr. Noyola to evaluate later today pending x-ray.    3/9: No acute events overnight, patient with no complaints aside from continued pain in the left knee. Ortho following with plans to monitor fluid cultures over the weekend with tentative plans for surgery on Monday. Plans to repeat blood cultures following procedure, if negative patient will need approximately one month of IV antibiotics from date of negative culture. Would benefit from continuation of vancomycin as she has failed outpatient treatment with daptomycin immediately prior to this admission.    3/10: Patient with no new complaints this morning, light growth of G+ cocci noted in knee aspirate culture. Continue vancomycin for MRSA bacteremia and joint infection, ortho following.    3/11: No major changes overnight, knee aspirate with MRSA. Plans for partial knee removal with placement of antibiotic spacer on Monday.    3/12: Patient continues to complain of pain, however is not taking scheduled PO medications as "they don't work." Will make changes to scheduled therapy but advised patient she will need to take medications consistently in order for them to work effectively.    3/13: Removal of hardware by Dr. Noyola today, patient tolerated procedure well with no immediate complications. Somewhat sedated from pain medications with vital signs stable and grossly WNL.        Review of Systems   Unable to perform ROS: Other (Somewhat sedated from anesthesia and IV pain medications)   Objective:     Vital Signs (Most Recent):  Temp: 98.7 °F (37.1 °C) (03/13/23 1224)  Pulse: 88 (03/13/23 1250)  Resp: 16 (03/13/23 1307)  BP: (!) 148/76 (03/13/23 1239)  SpO2: 96 % (03/13/23 1250)   Vital Signs (24h Range):  Temp:  [97.6 °F (36.4 °C)-98.7 °F (37.1 °C)] 98.7 °F (37.1 °C)  Pulse:  [66-98] 88  Resp:  [10-18] 16  SpO2:  " [90 %-100 %] 96 %  BP: (103-158)/(55-82) 148/76     Weight: 64.4 kg (141 lb 15.6 oz)  Body mass index is 22.92 kg/m².    Intake/Output Summary (Last 24 hours) at 3/13/2023 1417  Last data filed at 3/13/2023 1210  Gross per 24 hour   Intake 2380 ml   Output 450 ml   Net 1930 ml      Physical Exam  Constitutional:       General: She is not in acute distress.     Appearance: Normal appearance. She is not ill-appearing.   HENT:      Head: Normocephalic and atraumatic.      Nose: Nose normal.   Eyes:      Conjunctiva/sclera: Conjunctivae normal.      Pupils: Pupils are equal, round, and reactive to light.   Cardiovascular:      Rate and Rhythm: Normal rate and regular rhythm.   Pulmonary:      Effort: Pulmonary effort is normal. No respiratory distress.      Breath sounds: Normal breath sounds.   Musculoskeletal:      Cervical back: No rigidity.      Comments: Dressing, immobilizer left knee. Limb neurovascularly intact.   Skin:     General: Skin is warm and dry.      Coloration: Skin is not jaundiced or pale.      Findings: No rash.   Neurological:      Mental Status: She is alert.   Psychiatric:         Behavior: Behavior normal.         Thought Content: Thought content normal.       Significant Labs: All pertinent labs within the past 24 hours have been reviewed.    Significant Imaging: I have reviewed all pertinent imaging results/findings within the past 24 hours.      Assessment/Plan:      * Joint infection  Left knee joint pain and swelling for one day associated with fever and chills. Remote joint replacement, fairly recent septic arthritis requiring debridement and extended course of antibiotics. Currently on outpatient IV daptomycin for MRSA epidural abscess, which has been held.     Left knee aspirate with MRSA sensitive to vancomycin. Continue current treatment.     Orthopedic hardware removal with placement of antibiotic spacer today by Dr. Noyola. Patient tolerated well.     Microcytic anemia  Appears  somewhat chronic, however patient with complaints of recent dark stools. Will evaluate further and involve GI if needed, however will likely need outpatient evaluation due to current infection.    Iron deficiency anemia on previous admission, will continue iron supplement start PPI for now.      Hypertension  Currently controlled. Will add ARB considering underlying diabetes and adjust hydralazine as needed.       MRSA bacteremia  Blood cultures on admission positive for MRSA, currently receiving vancomycin. Plan to repeat cultures following procedure Monday. Patient will require extended treatment due to recurrent infection and multiple joint prostheses. Currently recommendation for continued vancomycin as patient failed outpatient treatment with daptomycin immediately prior to this admission.    Tentative end date to be determined.       Type 2 diabetes mellitus, with long-term current use of insulin  Patient's FSGs are uncontrolled due to hyperglycemia on current medication regimen.  Last A1c reviewed-   Lab Results   Component Value Date    HGBA1C 7.9 (H) 01/19/2023     Most recent fingerstick glucose reviewed- No results for input(s): POCTGLUCOSE in the last 24 hours.  Current correctional scale  Low  Increase anti-hyperglycemic dose as follows-   Antihyperglycemics (From admission, onward)    Start     Stop Route Frequency Ordered    03/12/23 2100  insulin detemir U-100 injection 17 Units         -- SubQ 2 times daily 03/12/23 1102    03/06/23 1741  insulin aspart U-100 injection 0-5 Units         -- SubQ Before meals & nightly PRN 03/06/23 1643        Hold Oral hypoglycemics while patient is in the hospital.    Arthritis  Continue home medications. Added Toradol 15 mg IV q6h as needed.         VTE Risk Mitigation (From admission, onward)         Ordered     IP VTE HIGH RISK PATIENT  Once         03/13/23 1226     Place DONOVAN hose  Until discontinued         03/13/23 1226     Place sequential compression device   Until discontinued         03/06/23 1643                Discharge Planning   SUZANNA:      Code Status: Full Code   Is the patient medically ready for discharge?:     Reason for patient still in hospital (select all that apply): Treatment  Discharge Plan A: Home with family, Home Health                  Kim Rosales DO  Department of Hospital Medicine   [facility]

## 2023-03-13 NOTE — INTERVAL H&P NOTE
The patient has been examined and the H&P has been reviewed:    I concur with the findings and changes have been noted since the H&P was written:  Left knee aspirate cultures are positive for MRSA bacterial infection.  Recent blood cultures also were positive for MRSA.  At this time we will proceed with removal of the existing prosthetic components and implantation of an antibiotic impregnated methylmethacrylate spacer.  Patient understands and accepts the procedure and the fact that we need to clear up her bacterial infections before proceeding with revision to total knee replacement arthroplasty likely 6 weeks or more after explantation and IV antibiotic therapy..    Surgery risks, benefits and alternative options discussed and understood by patient/family.          Active Hospital Problems    Diagnosis  POA    *Joint infection [M00.9]  Yes    Hypertension [I10]  Yes    Microcytic anemia [D50.9]  Yes    MRSA bacteremia [R78.81, B95.62]  Yes    Type 2 diabetes mellitus, with long-term current use of insulin [E11.9, Z79.4]  Not Applicable    Arthritis [M19.90]  Yes      Resolved Hospital Problems   No resolved problems to display.

## 2023-03-13 NOTE — PROGRESS NOTES
1047 REC'ED TO RR ASLEEP, EASILY AROUSED. COLOR PINK. NO RESP. DISTRESS NOTED. O2 VIA NC. DRESSING LEFT KNEE D/I. IMMOBILIZER LEFT KNEE WITH HEMOVAC DRAIN TO OP-SITE, COMPRESSED, RED COLORED DRAINAGE NOTED IN TUBING. LEFT PEDAL PULSE STRONG, TOES WARM, PINK, MOBILE. IV INFUSING WELL VIA LEFT UPPER ARM PICC LINE. SCD HOSE RIGHT LEG. MOANING, HOLLERING OUT IN PAIN. DILAUDID GIVEN PER CRNA ON ARRIVAL TO RR. BLOOD SUGAR 214. SEE FLOW SHEET.    1100 STILL HOLLERING OUT IN PAIN. RESP. SLOW WHEN NOT HOLLERING POUT IN PAIN. MORPHINE TITRATED FOR RELIEF.    1125 ENCOURAGED COUGH DEEP BREATHS. O2 VIA NC. RESP. SLOW, O2 SATS LOW ON ROOM AIR. STILL HOLLERING OUT IN PAIN BETWEEN  DOZING OFF.    1155 TORADOL GIVEN FOR PAIN. DOZING AT INTERVALS. BUT STILL COMPLAINING ENCOURAGED COUGH DEEP BREATHS. O2 VIA NC.     1210 TRANSFERRED TO ROOM. LESS CRYING OUT IN PAIN. MORE DOZING. LAB AND X-RAYS COMPLETED. PER TECHS. VOIDED WITHOUT DIFFICULTY.

## 2023-03-13 NOTE — ANESTHESIA PREPROCEDURE EVALUATION
03/13/2023  Monica Walker is a 52 y.o., female.      Pre-op Assessment    I have reviewed the Patient Summary Reports.       I have reviewed the Medications.     Review of Systems         Anesthesia Plan  Type of Anesthesia, risks & benefits discussed:    Anesthesia Type: Gen Supraglottic Airway, Regional  Intra-op Monitoring Plan: Standard ASA Monitors  Post Op Pain Control Plan: IV/PO Opioids PRN  Induction:  IV  Informed Consent: Informed consent signed with the Patient and all parties understand the risks and agree with anesthesia plan.  All questions answered.   ASA Score: 3    Ready For Surgery From Anesthesia Perspective.     .  NKDA    Medical History   Hypertension Osteoarthritis   Depression Degenerative disc disease   Abscess of right thigh + MRSA Adnexal cyst   Nicotine dependence Hypomagnesemia   IDDM  H/o lung, epidural abscess and pyelonephritits    Airway exam deferred (COVID precautions); adequate ROM at neck.    Plan is general anesthesia; no regional anesthesia secondary to pt's heightened infection risk

## 2023-03-13 NOTE — OP NOTE
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  After adequate level of general anesthesia been achieved (see anesthesia note) the patient's left lower extremity was prepped with Betadine and draped sterile fashion.  Left lower extremity was elevated but not exsanguinated.  A pneumatic tourniquet placed about the left upper thigh was inflated to pressure of 300 mmHg.  The operation begun by making a linear skin incision in line with the previously healed anteromedial linear incision.  Incision was carried carefully through subcutaneous layers.  Skin flaps were developed.  The joint was entered through a medial parapatellar incision.  Patella was distracted laterally knee was flexed.  Medial unicompartmental replacement components were visualized.  There was no gross loosening.  There was synovial proliferation and fibrinous but no purulence.  There is mild DJD of the patellofemoral joint but significant DJD involving the anterior 1/2 of the lateral tibial plateau.  The joint was then thoroughly debrided with a scalpel and rongeur.  The tib component methylmethacrylate spacer was removed with the osteotome.  Now the femoral and tibial components were removed using thin osteotomes.  There was mild bone loss involving the medial tibial plateau region and minimal bone loss of the medial femoral condyle.  These  areas were again debrided.  The joint was irrigated with antibiotic solution using the jet lavage system.  Betadine saline solution was in the joint and allowed to soak.  The Betadine saline solution was removed the joint was again irrigated with antibiotic solution using the jet lavage system.  Tourniquet was let down hemostasis was achieved with the Bovie electrocautery.  Tourniquet was again inflated for insertion of a methylmethacrylate spacer positioned in the medial compartment of the joint.  Tourniquet was then again let down.  Two Hemovac drains placed in the depths of the wound brought through separate stab was in the skin.  The joint was again irrigated with antibiotic solution using the bulb syringe.  Medial retinaculum was then approximated with interrupted 1. Vicryl suture.  Subcutaneous tissue was approximated with interrupted 2-0 Vicryl suture.  Skin margins were approximated with stainless steel staples.  Wounds dressed sterilely and a knee immobilizer was applied.  The patient was awakened taken recovery room in satisfactory condition.  Estimated blood loss 150 cc tourniquet time 56 minutes.        Complications: No    Estimated Blood Loss (EBL): 150 mL           Implants:   Implant Name Type Inv. Item Serial No.  Lot No. LRB No. Used Action   CEMENT BONE ANTIBIO SIMPLEX P - [id number]  CEMENT BONE ANTIBIO SIMPLEX P  [employer]. [id number] Left 1 Implanted   osteoboost slect     [id number] Left 1 Implanted       Specimens:   Specimen (24h ago, onward)      None                    Condition: Good    Disposition: PACU - hemodynamically stable.    Attestation: I was present and scrubbed for the entire procedure.

## 2023-03-13 NOTE — BRIEF OP NOTE
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         Specimens:   Specimen (24h ago, onward)      None            [id number]

## 2023-03-13 NOTE — DISCHARGE INSTRUCTIONS
Physical therapy instructions:    Toe touch weight bearing left LE using RW and knee immobilizer  No knee flexion (bending) whilst antibiotic spacer is in left knee.  Knee immobilizer can come off to wash leg and change clothes but keep left knee straight throughout.   Exercises as below    ANKLE: Pumps        Point toes down, then up. Repeat 30 times, 2 sessions per day        Quad Set        With other leg bent, foot flat, slowly tighten muscles on left thigh of straight leg in brace while counting out loud to 5.  Repeat 30 times, 2 sessions per day.          Hip Abduction / Adduction: with Extended Knee (Supine)        Bring left leg out to side and return. Keep knee straight.  Repeat 30 times, 2 sessions per day.            Straight Leg Raise        Bend right leg. Raise left leg 8-12 inches with knee locked. Exhale and tighten thigh muscles while raising leg.   Repeat 30 times, 2 sessions per day.

## 2023-03-14 LAB
ABO + RH BLD: NORMAL
ALBUMIN SERPL BCP-MCNC: 1.9 G/DL (ref 3.5–5)
ALBUMIN/GLOB SERPL: 0.4 {RATIO}
ALP SERPL-CCNC: 126 U/L (ref 41–108)
ALT SERPL W P-5'-P-CCNC: 15 U/L (ref 13–56)
ANION GAP SERPL CALCULATED.3IONS-SCNC: 15 MMOL/L (ref 7–16)
ANION GAP SERPL CALCULATED.3IONS-SCNC: 15 MMOL/L (ref 7–16)
AST SERPL W P-5'-P-CCNC: 12 U/L (ref 15–37)
BASOPHILS # BLD AUTO: 0.02 K/UL (ref 0–0.2)
BASOPHILS NFR BLD AUTO: 0.1 % (ref 0–1)
BILIRUB SERPL-MCNC: 0.1 MG/DL (ref ?–1.2)
BLD PROD TYP BPU: NORMAL
BLOOD UNIT EXPIRATION DATE: NORMAL
BLOOD UNIT TYPE CODE: 6200
BUN SERPL-MCNC: 25 MG/DL (ref 7–18)
BUN SERPL-MCNC: 25 MG/DL (ref 7–18)
BUN/CREAT SERPL: 22 (ref 6–20)
BUN/CREAT SERPL: 22 (ref 6–20)
CALCIUM SERPL-MCNC: 9.2 MG/DL (ref 8.5–10.1)
CALCIUM SERPL-MCNC: 9.2 MG/DL (ref 8.5–10.1)
CHLORIDE SERPL-SCNC: 96 MMOL/L (ref 98–107)
CHLORIDE SERPL-SCNC: 96 MMOL/L (ref 98–107)
CO2 SERPL-SCNC: 26 MMOL/L (ref 21–32)
CO2 SERPL-SCNC: 26 MMOL/L (ref 21–32)
CREAT SERPL-MCNC: 1.12 MG/DL (ref 0.55–1.02)
CREAT SERPL-MCNC: 1.12 MG/DL (ref 0.55–1.02)
CROSSMATCH INTERPRETATION: NORMAL
DIFFERENTIAL METHOD BLD: ABNORMAL
DISPENSE STATUS: NORMAL
EGFR (NO RACE VARIABLE) (RUSH/TITUS): 59 ML/MIN/1.73M²
EGFR (NO RACE VARIABLE) (RUSH/TITUS): 59 ML/MIN/1.73M²
EOSINOPHIL # BLD AUTO: 0 K/UL (ref 0–0.5)
EOSINOPHIL NFR BLD AUTO: 0 % (ref 1–4)
ERYTHROCYTE [DISTWIDTH] IN BLOOD BY AUTOMATED COUNT: 19 % (ref 11.5–14.5)
GLOBULIN SER-MCNC: 5.2 G/DL (ref 2–4)
GLUCOSE SERPL-MCNC: 167 MG/DL (ref 70–105)
GLUCOSE SERPL-MCNC: 311 MG/DL (ref 70–105)
GLUCOSE SERPL-MCNC: 318 MG/DL (ref 70–105)
GLUCOSE SERPL-MCNC: 415 MG/DL (ref 74–106)
GLUCOSE SERPL-MCNC: 415 MG/DL (ref 74–106)
GLUCOSE SERPL-MCNC: 484 MG/DL (ref 70–105)
GLUCOSE SERPL-MCNC: 521 MG/DL (ref 70–105)
HCT VFR BLD AUTO: 23.2 % (ref 38–47)
HGB BLD-MCNC: 6.8 G/DL (ref 12–16)
IMM GRANULOCYTES # BLD AUTO: 0.14 K/UL (ref 0–0.04)
IMM GRANULOCYTES NFR BLD: 1 % (ref 0–0.4)
LYMPHOCYTES # BLD AUTO: 1.53 K/UL (ref 1–4.8)
LYMPHOCYTES NFR BLD AUTO: 11.3 % (ref 27–41)
MCH RBC QN AUTO: 22.9 PG (ref 27–31)
MCHC RBC AUTO-ENTMCNC: 29.3 G/DL (ref 32–36)
MCV RBC AUTO: 78.1 FL (ref 80–96)
MONOCYTES # BLD AUTO: 0.9 K/UL (ref 0–0.8)
MONOCYTES NFR BLD AUTO: 6.6 % (ref 2–6)
MPC BLD CALC-MCNC: 9.5 FL (ref 9.4–12.4)
NEUTROPHILS # BLD AUTO: 10.95 K/UL (ref 1.8–7.7)
NEUTROPHILS NFR BLD AUTO: 81 % (ref 53–65)
NRBC # BLD AUTO: 0 X10E3/UL
NRBC, AUTO (.00): 0 %
PLATELET # BLD AUTO: 384 K/UL (ref 150–400)
POTASSIUM SERPL-SCNC: 5.1 MMOL/L (ref 3.5–5.1)
POTASSIUM SERPL-SCNC: 5.1 MMOL/L (ref 3.5–5.1)
PROT SERPL-MCNC: 7.1 G/DL (ref 6.4–8.2)
RBC # BLD AUTO: 2.97 M/UL (ref 4.2–5.4)
SODIUM SERPL-SCNC: 132 MMOL/L (ref 136–145)
SODIUM SERPL-SCNC: 132 MMOL/L (ref 136–145)
UNIT NUMBER: NORMAL
VANCOMYCIN TROUGH SERPL-MCNC: 21.3 ΜG/ML (ref 10–20)
WBC # BLD AUTO: 13.54 K/UL (ref 4.5–11)

## 2023-03-14 PROCEDURE — P9016 RBC LEUKOCYTES REDUCED: HCPCS | Performed by: FAMILY MEDICINE

## 2023-03-14 PROCEDURE — 87149 DNA/RNA DIRECT PROBE: CPT | Performed by: FAMILY MEDICINE

## 2023-03-14 PROCEDURE — 80202 ASSAY OF VANCOMYCIN: CPT | Performed by: FAMILY MEDICINE

## 2023-03-14 PROCEDURE — 82962 GLUCOSE BLOOD TEST: CPT

## 2023-03-14 PROCEDURE — 85025 COMPLETE CBC W/AUTO DIFF WBC: CPT | Performed by: FAMILY MEDICINE

## 2023-03-14 PROCEDURE — 87186 SC STD MICRODIL/AGAR DIL: CPT | Performed by: FAMILY MEDICINE

## 2023-03-14 PROCEDURE — 94761 N-INVAS EAR/PLS OXIMETRY MLT: CPT

## 2023-03-14 PROCEDURE — 87077 CULTURE AEROBIC IDENTIFY: CPT | Performed by: FAMILY MEDICINE

## 2023-03-14 PROCEDURE — 80048 BASIC METABOLIC PNL TOTAL CA: CPT | Mod: XB | Performed by: FAMILY MEDICINE

## 2023-03-14 PROCEDURE — 99232 PR SUBSEQUENT HOSPITAL CARE,LEVL II: ICD-10-PCS | Mod: ,,, | Performed by: FAMILY MEDICINE

## 2023-03-14 PROCEDURE — 25000003 PHARM REV CODE 250: Performed by: FAMILY MEDICINE

## 2023-03-14 PROCEDURE — 97165 OT EVAL LOW COMPLEX 30 MIN: CPT

## 2023-03-14 PROCEDURE — 99232 SBSQ HOSP IP/OBS MODERATE 35: CPT | Mod: ,,, | Performed by: FAMILY MEDICINE

## 2023-03-14 PROCEDURE — 36430 TRANSFUSION BLD/BLD COMPNT: CPT

## 2023-03-14 PROCEDURE — 97116 GAIT TRAINING THERAPY: CPT

## 2023-03-14 PROCEDURE — 63600175 PHARM REV CODE 636 W HCPCS: Mod: TB,JG | Performed by: FAMILY MEDICINE

## 2023-03-14 PROCEDURE — 63600175 PHARM REV CODE 636 W HCPCS: Mod: TB,JG | Performed by: ORTHOPAEDIC SURGERY

## 2023-03-14 PROCEDURE — 80053 COMPREHEN METABOLIC PANEL: CPT | Performed by: ORTHOPAEDIC SURGERY

## 2023-03-14 PROCEDURE — 11000001 HC ACUTE MED/SURG PRIVATE ROOM

## 2023-03-14 RX ORDER — HYDROCODONE BITARTRATE AND ACETAMINOPHEN 500; 5 MG/1; MG/1
TABLET ORAL
Status: DISCONTINUED | OUTPATIENT
Start: 2023-03-14 | End: 2023-03-20 | Stop reason: HOSPADM

## 2023-03-14 RX ORDER — INSULIN ASPART 100 [IU]/ML
1-10 INJECTION, SOLUTION INTRAVENOUS; SUBCUTANEOUS
Status: DISCONTINUED | OUTPATIENT
Start: 2023-03-14 | End: 2023-03-20 | Stop reason: HOSPADM

## 2023-03-14 RX ADMIN — HYDRALAZINE HYDROCHLORIDE 10 MG: 10 TABLET, FILM COATED ORAL at 10:03

## 2023-03-14 RX ADMIN — INSULIN ASPART 5 UNITS: 100 INJECTION, SOLUTION INTRAVENOUS; SUBCUTANEOUS at 07:03

## 2023-03-14 RX ADMIN — OXYCODONE AND ACETAMINOPHEN 2 TABLET: 325; 5 TABLET ORAL at 01:03

## 2023-03-14 RX ADMIN — OXYCODONE AND ACETAMINOPHEN 2 TABLET: 325; 5 TABLET ORAL at 07:03

## 2023-03-14 RX ADMIN — VANCOMYCIN HYDROCHLORIDE 1000 MG: 1 INJECTION, POWDER, LYOPHILIZED, FOR SOLUTION INTRAVENOUS at 04:03

## 2023-03-14 RX ADMIN — GABAPENTIN 300 MG: 300 CAPSULE ORAL at 10:03

## 2023-03-14 RX ADMIN — DOCUSATE SODIUM 100 MG: 100 CAPSULE, LIQUID FILLED ORAL at 10:03

## 2023-03-14 RX ADMIN — FERROUS SULFATE TAB 325 MG (65 MG ELEMENTAL FE) 1 EACH: 325 (65 FE) TAB at 10:03

## 2023-03-14 RX ADMIN — DULOXETINE HYDROCHLORIDE 60 MG: 30 CAPSULE, DELAYED RELEASE ORAL at 10:03

## 2023-03-14 RX ADMIN — OXYCODONE AND ACETAMINOPHEN 2 TABLET: 325; 5 TABLET ORAL at 12:03

## 2023-03-14 RX ADMIN — SODIUM CHLORIDE: 9 INJECTION, SOLUTION INTRAVENOUS at 04:03

## 2023-03-14 RX ADMIN — MORPHINE SULFATE 4 MG: 4 INJECTION, SOLUTION INTRAMUSCULAR; INTRAVENOUS at 05:03

## 2023-03-14 RX ADMIN — MORPHINE SULFATE 2 MG: 2 INJECTION, SOLUTION INTRAMUSCULAR; INTRAVENOUS at 10:03

## 2023-03-14 RX ADMIN — INSULIN ASPART 8 UNITS: 100 INJECTION, SOLUTION INTRAVENOUS; SUBCUTANEOUS at 01:03

## 2023-03-14 RX ADMIN — INSULIN ASPART 2 UNITS: 100 INJECTION, SOLUTION INTRAVENOUS; SUBCUTANEOUS at 05:03

## 2023-03-14 RX ADMIN — INSULIN DETEMIR 20 UNITS: 100 INJECTION, SOLUTION SUBCUTANEOUS at 09:03

## 2023-03-14 RX ADMIN — LOSARTAN POTASSIUM 25 MG: 25 TABLET, FILM COATED ORAL at 10:03

## 2023-03-14 RX ADMIN — HYDRALAZINE HYDROCHLORIDE 10 MG: 10 TABLET, FILM COATED ORAL at 08:03

## 2023-03-14 RX ADMIN — MORPHINE SULFATE 4 MG: 4 INJECTION, SOLUTION INTRAMUSCULAR; INTRAVENOUS at 01:03

## 2023-03-14 RX ADMIN — MORPHINE SULFATE 4 MG: 4 INJECTION, SOLUTION INTRAMUSCULAR; INTRAVENOUS at 08:03

## 2023-03-14 RX ADMIN — LIDOCAINE 1 PATCH: 50 PATCH TOPICAL at 10:03

## 2023-03-14 RX ADMIN — VANCOMYCIN HYDROCHLORIDE 1250 MG: 1 INJECTION, POWDER, LYOPHILIZED, FOR SOLUTION INTRAVENOUS at 09:03

## 2023-03-14 RX ADMIN — MORPHINE SULFATE 4 MG: 4 INJECTION, SOLUTION INTRAMUSCULAR; INTRAVENOUS at 03:03

## 2023-03-14 RX ADMIN — GABAPENTIN 300 MG: 300 CAPSULE ORAL at 08:03

## 2023-03-14 NOTE — PROGRESS NOTES
Vancomycin level resulted 21.3 this morning.     Will adjust vancomycin to 1250mg every 18 hours and check a trough on 3/16.    Pharmacy will continue to monitor and make adjustments as needed.

## 2023-03-14 NOTE — PT/OT/SLP PROGRESS
"Physical Therapy Treatment    Patient Name:  Monica Walker   MRN:  [id number]    Recommendations:     Discharge Recommendations: home with home health, home health PT  Discharge Equipment Recommendations: none  Barriers to discharge:  uncontrolled pain; ongoing medical treatment    Assessment:     Monica Walker is a 52 y.o. female admitted with a medical diagnosis of Joint infection.  She presents with the following impairments/functional limitations: weakness, impaired endurance, impaired self care skills, impaired functional mobility, gait instability, impaired balance, decreased lower extremity function, pain, decreased ROM, impaired cardiopulmonary response to activity, orthopedic precautions .    Patient required encouragement to participate with PT due to c/o sever pain left knee. Patient with limited gait distance but adhered to TTWB restriction using RW. PT will follow to help advance mobility for pt to be able to return to  home setting for completion of IV abx.    Rehab Prognosis: Good; patient would benefit from acute skilled PT services to address these deficits and reach maximum level of function.    Recent Surgery: Procedure(s) (LRB):  REMOVAL OF PROSTHESIS,METHYLMETHACRYLATE WITH OR WITHOUT INSERTION OF SPACER LEFT KNEE (Left)  INCISION AND DRAINAGE (Left) 1 Day Post-Op    Plan:     During this hospitalization, patient to be seen 5 x/week to address the identified rehab impairments via gait training, therapeutic activities, therapeutic exercises and progress toward the following goals:    Plan of Care Expires:  04/11/23    Subjective     Chief Complaint: MRSA joint infection; Abx spacer placed in left knee after explantation of L partial knee replacement  Patient/Family Comments/goals: "This knee hurts so bad, I don't know if I can do it."  Pain/Comfort:  Pain Rating 1: 10/10  Location - Side 1: Left  Location 1: knee  Pain Addressed 1: Pre-medicate for activity, Reposition, Distraction, Cessation " of Activity (ice pack)  Pain Rating Post-Intervention 1: 10/10      Objective:     Communicated with Tye Anthony RN prior to session.  Patient found supine with hemovac, knee immobilizer, PICC line, SCD, telemetry upon PT entry to room.     General Precautions: Standard, fall, contact  Orthopedic Precautions: LLE toe touch weight bearing  Braces: Knee immobilizer  Respiratory Status: Room air     Functional Mobility:  Bed Mobility:     Supine to Sit: minimum assistance  Sit to Supine: minimum assistance  Transfers:     Sit to Stand:  contact guard assistance with rolling walkerKI  Gait: 15' using RW/KI; TTWB left; step to pattern, slow theo, easy fatigue; limited by significant c/o pain      AM-PAC 6 CLICK MOBILITY  Turning over in bed (including adjusting bedclothes, sheets and blankets)?: 4  Sitting down on and standing up from a chair with arms (e.g., wheelchair, bedside commode, etc.): 3  Moving from lying on back to sitting on the side of the bed?: 3  Moving to and from a bed to a chair (including a wheelchair)?: 3  Need to walk in hospital room?: 3  Climbing 3-5 steps with a railing?: 1  Basic Mobility Total Score: 17       Treatment & Education:  Gait and transfers as above  Patient declined to participate with LE exercises due to pain. Ice pack refilled for left knee.    Patient left supine with all lines intact, call button in reach, and Tye Anthony RN notified..    GOALS:   Multidisciplinary Problems       Physical Therapy Goals          Problem: Physical Therapy    Goal Priority Disciplines Outcome Goal Variances Interventions   Physical Therapy Goal     PT, PT/OT Ongoing, Progressing     Description: Short Term Goals to be met by: 3/25/2023    Patient will increase functional independence with mobility by performin. Supine to sit with independently  2. Sit to stand transfer with independently using Rolling walker  3. Bed to chair transfer with independently using Rolling walker  4.  Gait  x 400 feet with independently using Rolling walker  5. Lower extremity exercise program x30 reps per handout, with assistance as needed    Long Term Goals to be met by: 5/11/2023    Pt will regain full independent functional mobility with lowest level of assistive device to return to home situation and prior activities of daily living.                        Time Tracking:     PT Received On: 03/14/23  PT Start Time: 0842     PT Stop Time: 0854  PT Total Time (min): 12 min     Billable Minutes: Gait Training 10 minutes    Treatment Type: Treatment  PT/PTA: PT     Number of PTA visits since last PT visit: 0     03/14/2023

## 2023-03-14 NOTE — SUBJECTIVE & OBJECTIVE
Review of Systems   Constitutional:  Negative for chills, fatigue and fever.   HENT: Negative.     Respiratory:  Negative for cough and shortness of breath.    Cardiovascular:  Negative for chest pain.   Gastrointestinal:  Negative for abdominal pain, blood in stool, diarrhea, nausea and vomiting.   Musculoskeletal:  Positive for arthralgias and joint swelling.   Skin: Negative.    Neurological:  Positive for weakness.   Psychiatric/Behavioral:  Negative for confusion.    Objective:     Vital Signs (Most Recent):  Temp: 98 °F (36.7 °C) (03/14/23 0714)  Pulse: 97 (03/14/23 0714)  Resp: 16 (03/14/23 0714)  BP: (!) 130/56 (03/14/23 0714)  SpO2: (!) 79 % (03/14/23 0714)   Vital Signs (24h Range):  Temp:  [97.6 °F (36.4 °C)-98.7 °F (37.1 °C)] 98 °F (36.7 °C)  Pulse:  [83-98] 97  Resp:  [10-18] 16  SpO2:  [79 %-100 %] 79 %  BP: (124-158)/(56-82) 130/56     Weight: 64.4 kg (141 lb 15.6 oz)  Body mass index is 22.92 kg/m².    Intake/Output Summary (Last 24 hours) at 3/14/2023 0922  Last data filed at 3/14/2023 0641  Gross per 24 hour   Intake 1770 ml   Output 1100 ml   Net 670 ml      Physical Exam  Constitutional:       General: She is not in acute distress.     Appearance: Normal appearance. She is not ill-appearing.   HENT:      Head: Normocephalic and atraumatic.      Nose: Nose normal.   Eyes:      Conjunctiva/sclera: Conjunctivae normal.      Pupils: Pupils are equal, round, and reactive to light.   Cardiovascular:      Rate and Rhythm: Normal rate and regular rhythm.   Pulmonary:      Effort: Pulmonary effort is normal. No respiratory distress.      Breath sounds: Normal breath sounds.   Musculoskeletal:      Cervical back: No rigidity.      Comments: Dressing, immobilizer left knee. Limb neurovascularly intact.   Skin:     General: Skin is warm and dry.      Coloration: Skin is not jaundiced or pale.      Findings: No rash.   Neurological:      Mental Status: She is alert.   Psychiatric:         Behavior: Behavior  normal.         Thought Content: Thought content normal.       Significant Labs: All pertinent labs within the past 24 hours have been reviewed.    Significant Imaging: I have reviewed all pertinent imaging results/findings within the past 24 hours.

## 2023-03-14 NOTE — PROGRESS NOTES
52-year-old female postop day 1 following explantation of a eloy arthroplasty of her left knee.  She is been followed by pharmacy for IV antibiotic therapy and adjustment in dosing of vancomycin.  She does state she is having discomfort however, it is managed with as needed pain medications.      T-max last 24 hours is 98.7° F.  Laboratory data showed hemoglobin 6.8 hematocrit 23.2.  White blood count was 13.54.  Drain had total output of 50 mL.    Knee immobilizer in place.  Ice pack in place.      Impression:  Postop day 1 following explantation of partial knee replacement of left knee     Plan:  Continue knee immobilizer.  Toe-touch weight-bearing only left lower extremity.  Continue IV antibiotic therapy with pharmacy to provide dosing.

## 2023-03-14 NOTE — PLAN OF CARE
Problem: Occupational Therapy  Goal: Occupational Therapy Goal  Description: ST.Pt will perform bathing with Mark with setup at EOB  2.Pt will perform UE dressing with Adrianna  3.Pt will perform LE dressing with Mark with AD if needed  4.Pt will transfer bed/chair/bsc with CGA with RW  5.Pt will perform standing task x 2 min with CGA with RW  6.Tolerate 15 min of tx without fatigue.      LTG:   Restore to max I with selfcare and mobility.      Outcome: Ongoing, Progressing

## 2023-03-14 NOTE — PT/OT/SLP EVAL
Occupational Therapy   Evaluation    Name: Monica Walker  MRN: [id number]  Admitting Diagnosis: Joint infection  Recent Surgery: Procedure(s) (LRB):  REMOVAL OF PROSTHESIS,METHYLMETHACRYLATE WITH OR WITHOUT INSERTION OF SPACER LEFT KNEE (Left)  INCISION AND DRAINAGE (Left) 1 Day Post-Op    Recommendations:     Discharge Recommendations: nursing facility, basic, [facility] (long-term acute care hospital), home with home health  Discharge Equipment Recommendations:  none  Barriers to discharge:  Other (Comment) (ongoing medical treatment)    Assessment:     Monica Walker is a 52 y.o. female with a medical diagnosis of Joint infection.  She presents with s/p removal of prosthesis of L partial knee replacement with insertion of antibiotic spacer on 3/13/23. Performance deficits affecting function: impaired self care skills, impaired functional mobility, gait instability, impaired balance, pain, orthopedic precautions. Pt limited with mobility d/t pain. Pt able to maintain TTWB to LLE with RW. OT to follow in order to maximize independence with ADL tasks and functional mobility.     Rehab Prognosis: Good; patient would benefit from acute skilled OT services to address these deficits and reach maximum level of function.       Plan:     Patient to be seen 5 x/week to address the above listed problems via therapeutic activities, self-care/home management, therapeutic exercises  Plan of Care Expires: 04/11/23  Plan of Care Reviewed with: patient    Subjective     Chief Complaint: s/p removal of prosthesis of L partial knee replacement with insertion of antibiotic spacer  Patient/Family Comments/goals: pt agreeable to participate in OT eval    Occupational Profile:  Living Environment: pt lives with mother in one story home with no steps   Previous level of function: independent with all ADL tasks and utilized RW for functional mobility   Roles and Routines: perform self care  Equipment Used at Home: bedside commode, crutches,  axillary, walker, rolling  Assistance upon Discharge: swing bed versus home with home health    Pain/Comfort:  Pain Rating 1: 10/10  Location - Side 1: Left  Location 1: knee  Pain Addressed 1: Pre-medicate for activity    Patients cultural, spiritual, Scientology conflicts given the current situation: no    Objective:     Communicated with: HAYDEE Gamble prior to session.  Patient found supine with hemovac, knee immobilizer, PICC line, SCD, telemetry upon OT entry to room.    General Precautions: Standard, fall, contact  Orthopedic Precautions: LLE toe touch weight bearing  Braces: Knee immobilizer  Respiratory Status: Room air    Occupational Performance:    Bed Mobility:    Patient completed Supine to Sit with minimum assistance  Patient completed Sit to Supine with minimum assistance    Functional Mobility/Transfers:  Patient completed Sit <> Stand Transfer with contact guard assistance  with  rolling walker   Functional Mobility: pt performed functional mobility to bathroom door and back to bed with RW with SBA    Activities of Daily Living:  Lower Body Dressing: maximal assistance to serenity sock    Cognitive/Visual Perceptual:  Cognitive/Psychosocial Skills:     -       Oriented to: Person, Place, Time, and Situation   -       Follows Commands/attention:Follows multistep  commands    Physical Exam:  Balance:    -       WFL with RW  Upper Extremity Range of Motion:     -       Right Upper Extremity: WFL  -       Left Upper Extremity: shoulder flexion limited; all other planes of motion WFL  Upper Extremity Strength:    -       Right Upper Extremity: WFL  -       Left Upper Extremity: WFL  Gross motor coordination:   WFL    AMPAC 6 Click ADL:  AMPAC Total Score:      Treatment & Education:  Pt educated on OT role/POC.   Importance of OOB activity with staff assistance.  Importance of sitting up in the chair throughout the day as tolerated, especially for meals   Safety during functional t/f and mobility with use of  RW  Importance of assisting with self-care activities   All questions/concerns answered within OT scope of practice      Patient left supine with all lines intact and call button in reach    GOALS:   Multidisciplinary Problems       Occupational Therapy Goals          Problem: Occupational Therapy    Goal Priority Disciplines Outcome Interventions   Occupational Therapy Goal     OT, PT/OT Ongoing, Progressing    Description: ST.Pt will perform bathing with Mark with setup at EOB  2.Pt will perform UE dressing with Adrianna  3.Pt will perform LE dressing with Mark with AD if needed  4.Pt will transfer bed/chair/bsc with CGA with RW  5.Pt will perform standing task x 2 min with CGA with RW  6.Tolerate 15 min of tx without fatigue.      LTG:   Restore to max I with selfcare and mobility.                           History:     Past Medical History:   Diagnosis Date    Abscess of right thigh + MRSA 2021    healed    Adnexal cyst     Degenerative disc disease     Depression 10/29/2021    Hypertension     Hypomagnesemia 2023    Nicotine dependence     Osteoarthritis          Past Surgical History:   Procedure Laterality Date    BACK SURGERY      HYSTERECTOMY N/A     INCISION AND DRAINAGE Left 3/13/2023    Procedure: INCISION AND DRAINAGE;  Surgeon: Jacek Noyola MD;  Location: [facility];  Service: Orthopedics;  Laterality: Left;    IRRIGATION AND DEBRIDEMENT OF LOWER EXTREMITY Left 2022    Procedure: IRRIGATION AND DEBRIDEMENT, LOWER EXTREMITY;  Surgeon: Papa Mendoza MD;  Location: [facility];  Service: Orthopedics;  Laterality: Left;    IRRIGATION AND DEBRIDEMENT OF UPPER EXTREMITY Left 2022    Procedure: IRRIGATION AND DEBRIDEMENT, UPPER EXTREMITY;  Surgeon: Papa Mendoza MD;  Location: [facility];  Service: Orthopedics;  Laterality: Left;    REVISION OF KNEE ARTHROPLASTY Left 3/13/2023    Procedure: REMOVAL OF PROSTHESIS,METHYLMETHACRYLATE WITH OR WITHOUT  INSERTION OF SPACER LEFT KNEE;  Surgeon: Jacek Noyola MD;  Location: [facility];  Service: Orthopedics;  Laterality: Left;    THORACIC LAMINECTOMY WITH FUSION N/A 1/24/2023    Procedure: T9-L2 fusion, T11-T12 laminectomy and corpectomy;  Surgeon: Jimmy Hernandes MD;  Location: [facility] OR;  Service: Neurosurgery;  Laterality: N/A;    TOTAL HIP ARTHROPLASTY Left     TOTAL KNEE ARTHROPLASTY Bilateral        Time Tracking:     OT Date of Treatment: 03/14/23  OT Start Time: 0843  OT Stop Time: 0855  OT Total Time (min): 12 min    Billable Minutes:Evaluation OT min complexity eval    3/14/2023

## 2023-03-14 NOTE — ASSESSMENT & PLAN NOTE
Blood cultures on admission positive for MRSA, currently receiving vancomycin. Plan to repeat cultures following procedure Monday. Patient will require extended treatment due to recurrent infection and multiple joint prostheses. Currently recommendation for continued vancomycin as patient failed outpatient treatment with daptomycin immediately prior to this admission.    Tentative end date 4/14/23. Anticipate transfer to Specialty for extended IV antibiotics.

## 2023-03-14 NOTE — ASSESSMENT & PLAN NOTE
Hgb 6.8 today after surgery. Will order 1 unit PRBC and continue to monitor.     Appears somewhat chronic, however patient with complaints of recent dark stools. Will evaluate further and involve GI if needed, however will likely need outpatient evaluation due to current infection.    Iron deficiency anemia on previous admission, will continue iron supplement start PPI for now.

## 2023-03-14 NOTE — PLAN OF CARE
Problem: Adult Inpatient Plan of Care  Goal: Plan of Care Review  3/14/2023 0050 by Paula Eid LPN  Outcome: Ongoing, Progressing  3/14/2023 0050 by Paula Eid LPN  Outcome: Ongoing, Progressing  Goal: Patient-Specific Goal (Individualized)  3/14/2023 0050 by Paula Eid LPN  Outcome: Ongoing, Progressing  3/14/2023 0050 by Paula Eid LPN  Outcome: Ongoing, Progressing  Goal: Absence of Hospital-Acquired Illness or Injury  3/14/2023 0050 by Paula Eid LPN  Outcome: Ongoing, Progressing  3/14/2023 0050 by Paula Eid LPN  Outcome: Ongoing, Progressing  Goal: Optimal Comfort and Wellbeing  3/14/2023 0050 by Paula Eid LPN  Outcome: Ongoing, Progressing  3/14/2023 0050 by Paula Eid LPN  Outcome: Ongoing, Progressing  Goal: Readiness for Transition of Care  3/14/2023 0050 by Paula Eid LPN  Outcome: Ongoing, Progressing  3/14/2023 0050 by Paula Eid LPN  Outcome: Ongoing, Progressing     Problem: Diabetes Comorbidity  Goal: Blood Glucose Level Within Targeted Range  3/14/2023 0050 by Paula Eid LPN  Outcome: Ongoing, Progressing  3/14/2023 0050 by Paula Eid LPN  Outcome: Ongoing, Progressing     Problem: Fluid and Electrolyte Imbalance (Acute Kidney Injury/Impairment)  Goal: Fluid and Electrolyte Balance  3/14/2023 0050 by Paula Eid LPN  Outcome: Ongoing, Progressing  3/14/2023 0050 by Paula Eid LPN  Outcome: Ongoing, Progressing     Problem: Oral Intake Inadequate (Acute Kidney Injury/Impairment)  Goal: Optimal Nutrition Intake  3/14/2023 0050 by Paula Eid LPN  Outcome: Ongoing, Progressing  3/14/2023 0050 by Paula Eid LPN  Outcome: Ongoing, Progressing     Problem: Renal Function Impairment (Acute Kidney Injury/Impairment)  Goal: Effective Renal Function  3/14/2023 0050 by Paula Eid LPN  Outcome: Ongoing, Progressing  3/14/2023 0050 by Paula J Hudler, LPN  Outcome: Ongoing, Progressing     Problem: Infection  Goal: Absence of Infection Signs and  Symptoms  3/14/2023 0050 by Paula Eid LPN  Outcome: Ongoing, Progressing  3/14/2023 0050 by Paula Eid LPN  Outcome: Ongoing, Progressing     Problem: Impaired Wound Healing  Goal: Optimal Wound Healing  3/14/2023 0050 by Paula Eid LPN  Outcome: Ongoing, Progressing  3/14/2023 0050 by Paula Eid LPN  Outcome: Ongoing, Progressing     Problem: Skin Injury Risk Increased  Goal: Skin Health and Integrity  3/14/2023 0050 by Paula Eid LPN  Outcome: Ongoing, Progressing  3/14/2023 0050 by Paula Eid LPN  Outcome: Ongoing, Progressing     Problem: Pain Acute  Goal: Acceptable Pain Control and Functional Ability  Outcome: Ongoing, Progressing     Problem: Fall Injury Risk  Goal: Absence of Fall and Fall-Related Injury  Outcome: Ongoing, Progressing     Problem: Fatigue  Goal: Improved Activity Tolerance  Outcome: Ongoing, Progressing

## 2023-03-14 NOTE — ASSESSMENT & PLAN NOTE
Post op #1 removal of hardware and placement of antibiotic spacer by Dr. Noyola. Now that presumed infective focus has been removed will repeat blood cultures, once negative patient will require approximately 4 weeks of IV antibiotic therapy. Recommend vancomycin as patient has already failed outpatient treatment with daptomycin. Plan for transfer to Specialty for extended antibiotics and physical therapy.     Left knee aspirate with MRSA sensitive to vancomycin. Continue current treatment.

## 2023-03-14 NOTE — PROGRESS NOTES
[facility]  [facility]  Progress Note    Patient Name: Monica Walker  MRN: [id number]  Patient Class: IP- Inpatient   Admission Date: 3/6/2023  Length of Stay: 8 days  Attending Physician: Kim Rosales DO  Primary Care Provider: Hannah Schulz MD        Subjective:     Principal Problem:Joint infection        HPI:  Monica Walker is a 52-year-old female with history of diabetes, hypertension, and MRSA joint space infection, who presented with complaints of left knee pain and swelling. She states the pain began suddenly yesterday, denies any recent falls or other associated trauma. Pain is worse along the lateral joint space with radiation down into the foot. There is a discreet area of fluctuance along the lateral surface of the knee, with generalized swelling of the joint to approximately 2x the size of the contralateral knee. Patient also complains of some fever, generalized weakness and productive cough for the past few days as well as dark stool.     Patient has a history of remote joint replacement (2015) with recent septic arthritis requiring debridement of the left knee as well as one shoulder. She was also recently hospitalized for with MRSA spinal epidural abscess, discharged about a month ago with outpatient daptomycin.      Initial evaluation with the following with WBC 12.39, LA 0.8, Hgb 7.4, glucose 200, labs otherwise unremarkable. Mildly elevated temp (99.5) and mild tachycardia (105), vital signs otherwise stable and grossly within normal limits.     Monica Walker is to be admitted to hospital service for evaluation and treatment of suspected septic arthritis. Orthopedics has been consulted. Blood cultures and aspiration/culture of affected knee pending at time of admission.       Overview/Hospital Course:  3/7: No acute events overnight, patient with no complaints this morning. US with aspiration was attempted however unable to obtain a specimen as fluid  "was too thick. Ortho consult pending.     3/8: Blood culture positive for MRSA, pharmacy notified to continue vancomycin. No other changes, Dr. Noyola to evaluate later today pending x-ray.    3/9: No acute events overnight, patient with no complaints aside from continued pain in the left knee. Ortho following with plans to monitor fluid cultures over the weekend with tentative plans for surgery on Monday. Plans to repeat blood cultures following procedure, if negative patient will need approximately one month of IV antibiotics from date of negative culture. Would benefit from continuation of vancomycin as she has failed outpatient treatment with daptomycin immediately prior to this admission.    3/10: Patient with no new complaints this morning, light growth of G+ cocci noted in knee aspirate culture. Continue vancomycin for MRSA bacteremia and joint infection, ortho following.    3/11: No major changes overnight, knee aspirate with MRSA. Plans for partial knee removal with placement of antibiotic spacer on Monday.    3/12: Patient continues to complain of pain, however is not taking scheduled PO medications as "they don't work." Will make changes to scheduled therapy but advised patient she will need to take medications consistently in order for them to work effectively.    3/13: Removal of hardware by Dr. Noyola today, patient tolerated procedure well with no immediate complications. Somewhat sedated from pain medications with vital signs stable and grossly WNL.    3/14: Post op #1 removal of hardware and placement of antibiotic spacer by Dr. Noyola. Now that presumed infective focus has been removed will repeat blood cultures, once negative patient will require approximately 4 weeks of IV antibiotic therapy. Recommend vancomycin as patient has already failed outpatient treatment with daptomycin. Plan for transfer to Specialty for extended antibiotics and physical therapy.         Review of Systems "   Constitutional:  Negative for chills, fatigue and fever.   HENT: Negative.     Respiratory:  Negative for cough and shortness of breath.    Cardiovascular:  Negative for chest pain.   Gastrointestinal:  Negative for abdominal pain, blood in stool, diarrhea, nausea and vomiting.   Musculoskeletal:  Positive for arthralgias and joint swelling.   Skin: Negative.    Neurological:  Positive for weakness.   Psychiatric/Behavioral:  Negative for confusion.    Objective:     Vital Signs (Most Recent):  Temp: 98 °F (36.7 °C) (03/14/23 0714)  Pulse: 97 (03/14/23 0714)  Resp: 16 (03/14/23 0714)  BP: (!) 130/56 (03/14/23 0714)  SpO2: (!) 79 % (03/14/23 0714)   Vital Signs (24h Range):  Temp:  [97.6 °F (36.4 °C)-98.7 °F (37.1 °C)] 98 °F (36.7 °C)  Pulse:  [83-98] 97  Resp:  [10-18] 16  SpO2:  [79 %-100 %] 79 %  BP: (124-158)/(56-82) 130/56     Weight: 64.4 kg (141 lb 15.6 oz)  Body mass index is 22.92 kg/m².    Intake/Output Summary (Last 24 hours) at 3/14/2023 0922  Last data filed at 3/14/2023 0641  Gross per 24 hour   Intake 1770 ml   Output 1100 ml   Net 670 ml      Physical Exam  Constitutional:       General: She is not in acute distress.     Appearance: Normal appearance. She is not ill-appearing.   HENT:      Head: Normocephalic and atraumatic.      Nose: Nose normal.   Eyes:      Conjunctiva/sclera: Conjunctivae normal.      Pupils: Pupils are equal, round, and reactive to light.   Cardiovascular:      Rate and Rhythm: Normal rate and regular rhythm.   Pulmonary:      Effort: Pulmonary effort is normal. No respiratory distress.      Breath sounds: Normal breath sounds.   Musculoskeletal:      Cervical back: No rigidity.      Comments: Dressing, immobilizer left knee. Limb neurovascularly intact.   Skin:     General: Skin is warm and dry.      Coloration: Skin is not jaundiced or pale.      Findings: No rash.   Neurological:      Mental Status: She is alert.   Psychiatric:         Behavior: Behavior normal.          Thought Content: Thought content normal.       Significant Labs: All pertinent labs within the past 24 hours have been reviewed.    Significant Imaging: I have reviewed all pertinent imaging results/findings within the past 24 hours.      Assessment/Plan:      * Joint infection  Post op #1 removal of hardware and placement of antibiotic spacer by Dr. Noyola. Now that presumed infective focus has been removed will repeat blood cultures, once negative patient will require approximately 4 weeks of IV antibiotic therapy. Recommend vancomycin as patient has already failed outpatient treatment with daptomycin. Plan for transfer to Specialty for extended antibiotics and physical therapy.     Left knee aspirate with MRSA sensitive to vancomycin. Continue current treatment.         Microcytic anemia  Hgb 6.8 today after surgery. Will order 1 unit PRBC and continue to monitor.     Appears somewhat chronic, however patient with complaints of recent dark stools. Will evaluate further and involve GI if needed, however will likely need outpatient evaluation due to current infection.    Iron deficiency anemia on previous admission, will continue iron supplement start PPI for now.      Hypertension  Currently controlled. Will add ARB considering underlying diabetes and adjust hydralazine as needed.       MRSA bacteremia  Blood cultures on admission positive for MRSA, currently receiving vancomycin. Plan to repeat cultures following procedure Monday. Patient will require extended treatment due to recurrent infection and multiple joint prostheses. Currently recommendation for continued vancomycin as patient failed outpatient treatment with daptomycin immediately prior to this admission.    Tentative end date 4/14/23. Anticipate transfer to Specialty for extended IV antibiotics.       Type 2 diabetes mellitus, with long-term current use of insulin  Patient's FSGs are uncontrolled due to hyperglycemia on current medication  regimen.  Last A1c reviewed-   Lab Results   Component Value Date    HGBA1C 7.9 (H) 01/19/2023     Most recent fingerstick glucose reviewed- No results for input(s): POCTGLUCOSE in the last 24 hours.  Current correctional scale  Medium  Increase anti-hyperglycemic dose as follows-   Antihyperglycemics (From admission, onward)    Start     Stop Route Frequency Ordered    03/14/23 2100  insulin detemir U-100 injection 20 Units         -- SubQ 2 times daily 03/14/23 0930    03/14/23 1029  insulin aspart U-100 injection 1-10 Units         -- SubQ Before meals & nightly PRN 03/14/23 0930        Hold Oral hypoglycemics while patient is in the hospital.    Arthritis  Continue home medications. Added Toradol 15 mg IV q6h as needed.         VTE Risk Mitigation (From admission, onward)         Ordered     IP VTE HIGH RISK PATIENT  Once         03/13/23 1226     Place DONOVAN hose  Until discontinued         03/13/23 1226     Place sequential compression device  Until discontinued         03/06/23 1643                Discharge Planning   SUZANNA:      Code Status: Full Code   Is the patient medically ready for discharge?:     Reason for patient still in hospital (select all that apply): Treatment  Discharge Plan A: Home with family, Home Health                  Kim Rosales DO  Department of Hospital Medicine   [facility]

## 2023-03-14 NOTE — ASSESSMENT & PLAN NOTE
Patient's FSGs are uncontrolled due to hyperglycemia on current medication regimen.  Last A1c reviewed-   Lab Results   Component Value Date    HGBA1C 7.9 (H) 01/19/2023     Most recent fingerstick glucose reviewed- No results for input(s): POCTGLUCOSE in the last 24 hours.  Current correctional scale  Medium  Increase anti-hyperglycemic dose as follows-   Antihyperglycemics (From admission, onward)    Start     Stop Route Frequency Ordered    03/14/23 2100  insulin detemir U-100 injection 20 Units         -- SubQ 2 times daily 03/14/23 0930    03/14/23 1029  insulin aspart U-100 injection 1-10 Units         -- SubQ Before meals & nightly PRN 03/14/23 0930        Hold Oral hypoglycemics while patient is in the hospital.

## 2023-03-15 PROBLEM — B96.1 BACTEREMIA DUE TO KLEBSIELLA PNEUMONIAE: Status: ACTIVE | Noted: 2023-03-15

## 2023-03-15 PROBLEM — R78.81 BACTEREMIA DUE TO KLEBSIELLA PNEUMONIAE: Status: ACTIVE | Noted: 2023-03-15

## 2023-03-15 LAB
ALBUMIN SERPL BCP-MCNC: 2.1 G/DL (ref 3.5–5)
ALBUMIN/GLOB SERPL: 0.4 {RATIO}
ALP SERPL-CCNC: 123 U/L (ref 41–108)
ALT SERPL W P-5'-P-CCNC: 12 U/L (ref 13–56)
ANION GAP SERPL CALCULATED.3IONS-SCNC: 12 MMOL/L (ref 7–16)
AST SERPL W P-5'-P-CCNC: 6 U/L (ref 15–37)
BASOPHILS # BLD AUTO: 0.05 K/UL (ref 0–0.2)
BASOPHILS NFR BLD AUTO: 0.5 % (ref 0–1)
BILIRUB SERPL-MCNC: 0.3 MG/DL (ref ?–1.2)
BUN SERPL-MCNC: 16 MG/DL (ref 7–18)
BUN/CREAT SERPL: 19 (ref 6–20)
CALCIUM SERPL-MCNC: 9.7 MG/DL (ref 8.5–10.1)
CHLORIDE SERPL-SCNC: 100 MMOL/L (ref 98–107)
CO2 SERPL-SCNC: 28 MMOL/L (ref 21–32)
CREAT SERPL-MCNC: 0.85 MG/DL (ref 0.55–1.02)
DIFFERENTIAL METHOD BLD: ABNORMAL
EGFR (NO RACE VARIABLE) (RUSH/TITUS): 82 ML/MIN/1.73M²
EOSINOPHIL # BLD AUTO: 0.26 K/UL (ref 0–0.5)
EOSINOPHIL NFR BLD AUTO: 2.4 % (ref 1–4)
ERYTHROCYTE [DISTWIDTH] IN BLOOD BY AUTOMATED COUNT: 19 % (ref 11.5–14.5)
GLOBULIN SER-MCNC: 5.3 G/DL (ref 2–4)
GLUCOSE SERPL-MCNC: 135 MG/DL (ref 70–105)
GLUCOSE SERPL-MCNC: 212 MG/DL (ref 70–105)
GLUCOSE SERPL-MCNC: 236 MG/DL (ref 74–106)
GLUCOSE SERPL-MCNC: 353 MG/DL (ref 70–105)
GLUCOSE SERPL-MCNC: 92 MG/DL (ref 70–105)
HCT VFR BLD AUTO: 29 % (ref 38–47)
HGB BLD-MCNC: 8.8 G/DL (ref 12–16)
IMM GRANULOCYTES # BLD AUTO: 0.1 K/UL (ref 0–0.04)
IMM GRANULOCYTES NFR BLD: 0.9 % (ref 0–0.4)
LYMPHOCYTES # BLD AUTO: 1.39 K/UL (ref 1–4.8)
LYMPHOCYTES NFR BLD AUTO: 12.9 % (ref 27–41)
MCH RBC QN AUTO: 24 PG (ref 27–31)
MCHC RBC AUTO-ENTMCNC: 30.3 G/DL (ref 32–36)
MCV RBC AUTO: 79 FL (ref 80–96)
MONOCYTES # BLD AUTO: 0.79 K/UL (ref 0–0.8)
MONOCYTES NFR BLD AUTO: 7.4 % (ref 2–6)
MPC BLD CALC-MCNC: 9.2 FL (ref 9.4–12.4)
NEUTROPHILS # BLD AUTO: 8.15 K/UL (ref 1.8–7.7)
NEUTROPHILS NFR BLD AUTO: 75.9 % (ref 53–65)
NRBC # BLD AUTO: 0 X10E3/UL
NRBC, AUTO (.00): 0 %
PLATELET # BLD AUTO: 298 K/UL (ref 150–400)
POTASSIUM SERPL-SCNC: 4.2 MMOL/L (ref 3.5–5.1)
PROT SERPL-MCNC: 7.4 G/DL (ref 6.4–8.2)
RBC # BLD AUTO: 3.67 M/UL (ref 4.2–5.4)
SODIUM SERPL-SCNC: 136 MMOL/L (ref 136–145)
VERIGENE RESULT: ABNORMAL
WBC # BLD AUTO: 10.74 K/UL (ref 4.5–11)

## 2023-03-15 PROCEDURE — 25000003 PHARM REV CODE 250: Performed by: FAMILY MEDICINE

## 2023-03-15 PROCEDURE — 63600175 PHARM REV CODE 636 W HCPCS: Mod: TB,JG | Performed by: ORTHOPAEDIC SURGERY

## 2023-03-15 PROCEDURE — 82962 GLUCOSE BLOOD TEST: CPT

## 2023-03-15 PROCEDURE — 63600175 PHARM REV CODE 636 W HCPCS: Mod: TB,JG | Performed by: FAMILY MEDICINE

## 2023-03-15 PROCEDURE — 99232 SBSQ HOSP IP/OBS MODERATE 35: CPT | Mod: ,,, | Performed by: FAMILY MEDICINE

## 2023-03-15 PROCEDURE — 11000001 HC ACUTE MED/SURG PRIVATE ROOM

## 2023-03-15 PROCEDURE — 99232 PR SUBSEQUENT HOSPITAL CARE,LEVL II: ICD-10-PCS | Mod: ,,, | Performed by: FAMILY MEDICINE

## 2023-03-15 PROCEDURE — 85025 COMPLETE CBC W/AUTO DIFF WBC: CPT | Performed by: ORTHOPAEDIC SURGERY

## 2023-03-15 PROCEDURE — 94761 N-INVAS EAR/PLS OXIMETRY MLT: CPT

## 2023-03-15 PROCEDURE — 80053 COMPREHEN METABOLIC PANEL: CPT | Performed by: ORTHOPAEDIC SURGERY

## 2023-03-15 RX ORDER — LACTOBACILLUS ACIDOPHILUS 500MM CELL
1 CAPSULE ORAL
Status: DISCONTINUED | OUTPATIENT
Start: 2023-03-15 | End: 2023-03-20 | Stop reason: HOSPADM

## 2023-03-15 RX ADMIN — MORPHINE SULFATE 4 MG: 4 INJECTION, SOLUTION INTRAMUSCULAR; INTRAVENOUS at 09:03

## 2023-03-15 RX ADMIN — LIDOCAINE 1 PATCH: 50 PATCH TOPICAL at 09:03

## 2023-03-15 RX ADMIN — INSULIN ASPART 4 UNITS: 100 INJECTION, SOLUTION INTRAVENOUS; SUBCUTANEOUS at 09:03

## 2023-03-15 RX ADMIN — INSULIN DETEMIR 20 UNITS: 100 INJECTION, SOLUTION SUBCUTANEOUS at 09:03

## 2023-03-15 RX ADMIN — GABAPENTIN 300 MG: 300 CAPSULE ORAL at 09:03

## 2023-03-15 RX ADMIN — Medication 1 CAPSULE: at 04:03

## 2023-03-15 RX ADMIN — CEFTRIAXONE SODIUM 2 G: 2 INJECTION, POWDER, FOR SOLUTION INTRAMUSCULAR; INTRAVENOUS at 09:03

## 2023-03-15 RX ADMIN — VANCOMYCIN HYDROCHLORIDE 1250 MG: 1 INJECTION, POWDER, LYOPHILIZED, FOR SOLUTION INTRAVENOUS at 04:03

## 2023-03-15 RX ADMIN — HYDRALAZINE HYDROCHLORIDE 10 MG: 10 TABLET, FILM COATED ORAL at 09:03

## 2023-03-15 RX ADMIN — OXYCODONE AND ACETAMINOPHEN 2 TABLET: 325; 5 TABLET ORAL at 12:03

## 2023-03-15 RX ADMIN — MORPHINE SULFATE 2 MG: 2 INJECTION, SOLUTION INTRAMUSCULAR; INTRAVENOUS at 09:03

## 2023-03-15 RX ADMIN — LOSARTAN POTASSIUM 25 MG: 25 TABLET, FILM COATED ORAL at 09:03

## 2023-03-15 RX ADMIN — MORPHINE SULFATE 2 MG: 2 INJECTION, SOLUTION INTRAMUSCULAR; INTRAVENOUS at 05:03

## 2023-03-15 RX ADMIN — DULOXETINE HYDROCHLORIDE 60 MG: 30 CAPSULE, DELAYED RELEASE ORAL at 09:03

## 2023-03-15 RX ADMIN — DOCUSATE SODIUM 100 MG: 100 CAPSULE, LIQUID FILLED ORAL at 09:03

## 2023-03-15 RX ADMIN — FERROUS SULFATE TAB 325 MG (65 MG ELEMENTAL FE) 1 EACH: 325 (65 FE) TAB at 09:03

## 2023-03-15 RX ADMIN — Medication 1 CAPSULE: at 12:03

## 2023-03-15 RX ADMIN — OXYCODONE AND ACETAMINOPHEN 2 TABLET: 325; 5 TABLET ORAL at 06:03

## 2023-03-15 RX ADMIN — INSULIN ASPART 5 UNITS: 100 INJECTION, SOLUTION INTRAVENOUS; SUBCUTANEOUS at 09:03

## 2023-03-15 NOTE — PLAN OF CARE
Problem: Adult Inpatient Plan of Care  Goal: Plan of Care Review  Outcome: Ongoing, Progressing  Goal: Patient-Specific Goal (Individualized)  Outcome: Ongoing, Progressing  Goal: Absence of Hospital-Acquired Illness or Injury  Outcome: Ongoing, Progressing  Goal: Optimal Comfort and Wellbeing  Outcome: Ongoing, Progressing  Goal: Readiness for Transition of Care  Outcome: Ongoing, Progressing     Problem: Diabetes Comorbidity  Goal: Blood Glucose Level Within Targeted Range  Outcome: Ongoing, Progressing     Problem: Fluid and Electrolyte Imbalance (Acute Kidney Injury/Impairment)  Goal: Fluid and Electrolyte Balance  Outcome: Ongoing, Progressing     Problem: Oral Intake Inadequate (Acute Kidney Injury/Impairment)  Goal: Optimal Nutrition Intake  Outcome: Ongoing, Progressing     Problem: Renal Function Impairment (Acute Kidney Injury/Impairment)  Goal: Effective Renal Function  Outcome: Ongoing, Progressing     Problem: Infection  Goal: Absence of Infection Signs and Symptoms  Outcome: Ongoing, Progressing     Problem: Impaired Wound Healing  Goal: Optimal Wound Healing  Outcome: Ongoing, Progressing     Problem: Skin Injury Risk Increased  Goal: Skin Health and Integrity  Outcome: Ongoing, Progressing     Problem: Pain Acute  Goal: Acceptable Pain Control and Functional Ability  Outcome: Ongoing, Progressing     Problem: Fall Injury Risk  Goal: Absence of Fall and Fall-Related Injury  Outcome: Ongoing, Progressing     Problem: Fatigue  Goal: Improved Activity Tolerance  Outcome: Ongoing, Progressing

## 2023-03-15 NOTE — PROGRESS NOTES
[facility]  [facility]  Progress Note    Patient Name: Monica Walker  MRN: [id number]  Patient Class: IP- Inpatient   Admission Date: 3/6/2023  Length of Stay: 9 days  Attending Physician: Kim Rosales DO  Primary Care Provider: Hannah Schulz MD        Subjective:     Principal Problem:Joint infection        HPI:  Monica Walker is a 52-year-old female with history of diabetes, hypertension, and MRSA joint space infection, who presented with complaints of left knee pain and swelling. She states the pain began suddenly yesterday, denies any recent falls or other associated trauma. Pain is worse along the lateral joint space with radiation down into the foot. There is a discreet area of fluctuance along the lateral surface of the knee, with generalized swelling of the joint to approximately 2x the size of the contralateral knee. Patient also complains of some fever, generalized weakness and productive cough for the past few days as well as dark stool.     Patient has a history of remote joint replacement (2015) with recent septic arthritis requiring debridement of the left knee as well as one shoulder. She was also recently hospitalized for with MRSA spinal epidural abscess, discharged about a month ago with outpatient daptomycin.      Initial evaluation with the following with WBC 12.39, LA 0.8, Hgb 7.4, glucose 200, labs otherwise unremarkable. Mildly elevated temp (99.5) and mild tachycardia (105), vital signs otherwise stable and grossly within normal limits.     Monica Walker is to be admitted to hospital service for evaluation and treatment of suspected septic arthritis. Orthopedics has been consulted. Blood cultures and aspiration/culture of affected knee pending at time of admission.       Overview/Hospital Course:  3/7: No acute events overnight, patient with no complaints this morning. US with aspiration was attempted however unable to obtain a specimen as fluid  "was too thick. Ortho consult pending.     3/8: Blood culture positive for MRSA, pharmacy notified to continue vancomycin. No other changes, Dr. Noyola to evaluate later today pending x-ray.    3/9: No acute events overnight, patient with no complaints aside from continued pain in the left knee. Ortho following with plans to monitor fluid cultures over the weekend with tentative plans for surgery on Monday. Plans to repeat blood cultures following procedure, if negative patient will need approximately one month of IV antibiotics from date of negative culture. Would benefit from continuation of vancomycin as she has failed outpatient treatment with daptomycin immediately prior to this admission.    3/10: Patient with no new complaints this morning, light growth of G+ cocci noted in knee aspirate culture. Continue vancomycin for MRSA bacteremia and joint infection, ortho following.    3/11: No major changes overnight, knee aspirate with MRSA. Plans for partial knee removal with placement of antibiotic spacer on Monday.    3/12: Patient continues to complain of pain, however is not taking scheduled PO medications as "they don't work." Will make changes to scheduled therapy but advised patient she will need to take medications consistently in order for them to work effectively.    3/13: Removal of hardware by Dr. Noyola today, patient tolerated procedure well with no immediate complications. Somewhat sedated from pain medications with vital signs stable and grossly WNL.    3/14: Post op #1 removal of hardware and placement of antibiotic spacer by Dr. Noyola. Now that presumed infective focus has been removed will repeat blood cultures, once negative patient will require approximately 4 weeks of IV antibiotic therapy. Recommend vancomycin as patient has already failed outpatient treatment with daptomycin. Plan for transfer to Specialty for extended antibiotics and physical therapy.     3/15: No acute events " overnight, patient continues to complain of pain and generally feeling unwell. Repeat blood cultures yesterday now positive for Klebsiella pneumonia, will add Rocephin pending final C&S. Continue vancomycin for MRSA bacteremia with indwelling prostheses.         Review of Systems   Constitutional:  Negative for chills, fatigue and fever.   HENT: Negative.     Respiratory:  Negative for cough and shortness of breath.    Cardiovascular:  Negative for chest pain.   Gastrointestinal:  Negative for abdominal pain, blood in stool, diarrhea, nausea and vomiting.   Musculoskeletal:  Positive for arthralgias.   Skin: Negative.    Neurological:  Positive for weakness.   Psychiatric/Behavioral:  Negative for confusion.    Objective:     Vital Signs (Most Recent):  Temp: 99.9 °F (37.7 °C) (03/15/23 0600)  Pulse: 102 (03/15/23 0600)  Resp: 18 (03/15/23 0943)  BP: (!) 150/72 (03/15/23 0600)  SpO2: (!) 94 % (03/15/23 0600)   Vital Signs (24h Range):  Temp:  [97.8 °F (36.6 °C)-99.9 °F (37.7 °C)] 99.9 °F (37.7 °C)  Pulse:  [] 102  Resp:  [18-20] 18  SpO2:  [94 %-100 %] 94 %  BP: (112-159)/(58-75) 150/72     Weight: 64.4 kg (141 lb 15.6 oz)  Body mass index is 22.92 kg/m².    Intake/Output Summary (Last 24 hours) at 3/15/2023 1136  Last data filed at 3/15/2023 0011  Gross per 24 hour   Intake 1537.08 ml   Output 1700 ml   Net -162.92 ml      Physical Exam  Constitutional:       General: She is not in acute distress.     Appearance: Normal appearance. She is not ill-appearing.   HENT:      Head: Normocephalic and atraumatic.      Nose: Nose normal.   Eyes:      Conjunctiva/sclera: Conjunctivae normal.      Pupils: Pupils are equal, round, and reactive to light.   Cardiovascular:      Rate and Rhythm: Normal rate and regular rhythm.   Pulmonary:      Effort: Pulmonary effort is normal. No respiratory distress.      Breath sounds: Normal breath sounds.   Musculoskeletal:      Cervical back: No rigidity.      Comments: Dressing,  immobilizer left knee. Limb neurovascularly intact.   Skin:     General: Skin is warm and dry.      Coloration: Skin is not jaundiced or pale.      Findings: No rash.   Neurological:      Mental Status: She is alert.   Psychiatric:         Behavior: Behavior normal.         Thought Content: Thought content normal.       Significant Labs: All pertinent labs within the past 24 hours have been reviewed.    Significant Imaging: I have reviewed all pertinent imaging results/findings within the past 24 hours.      Assessment/Plan:      * Joint infection  Post op #1 removal of hardware and placement of antibiotic spacer by Dr. Noyola. Now that presumed infective focus has been removed will repeat blood cultures, once negative patient will require approximately 4 weeks of IV antibiotic therapy. Recommend vancomycin as patient has already failed outpatient treatment with daptomycin. Plan for transfer to Specialty for extended antibiotics and physical therapy.     Left knee aspirate with MRSA sensitive to vancomycin. Continue current treatment.         Microcytic anemia  Hgb 6.8 today after surgery. Will order 1 unit PRBC and continue to monitor.     Appears somewhat chronic, however patient with complaints of recent dark stools. Will evaluate further and involve GI if needed, however will likely need outpatient evaluation due to current infection.    Iron deficiency anemia on previous admission, will continue iron supplement start PPI for now.      Bacteremia due to Klebsiella pneumoniae  Blood culture 3/14 preliminary positive for Klebsiella pneumoniae. Starting Rocephin 2 gm q24h pending final C&S.       Hypertension  Currently controlled. Will add ARB considering underlying diabetes and adjust hydralazine as needed.       MRSA bacteremia  Blood cultures on admission positive for MRSA, currently receiving vancomycin. Plan to repeat cultures following procedure Monday. Patient will require extended treatment due to  recurrent infection and multiple joint prostheses. Currently recommendation for continued vancomycin as patient failed outpatient treatment with daptomycin immediately prior to this admission.    Tentative end date 4/14/23. Anticipate transfer to Specialty for extended IV antibiotics.       Type 2 diabetes mellitus, with long-term current use of insulin  Patient's FSGs are uncontrolled due to hyperglycemia on current medication regimen.  Last A1c reviewed-   Lab Results   Component Value Date    HGBA1C 7.9 (H) 01/19/2023     Most recent fingerstick glucose reviewed- No results for input(s): POCTGLUCOSE in the last 24 hours.  Current correctional scale  Medium  Increase anti-hyperglycemic dose as follows-   Antihyperglycemics (From admission, onward)    Start     Stop Route Frequency Ordered    03/14/23 2100  insulin detemir U-100 injection 20 Units         -- SubQ 2 times daily 03/14/23 0930    03/14/23 1029  insulin aspart U-100 injection 1-10 Units         -- SubQ Before meals & nightly PRN 03/14/23 0930        Hold Oral hypoglycemics while patient is in the hospital.    Arthritis  Continue home medications. Added Toradol 15 mg IV q6h as needed.         VTE Risk Mitigation (From admission, onward)         Ordered     IP VTE HIGH RISK PATIENT  Once         03/13/23 1226     Place DONOVAN hose  Until discontinued         03/13/23 1226     Place sequential compression device  Until discontinued         03/06/23 1643                Discharge Planning   SUZANNA:      Code Status: Full Code   Is the patient medically ready for discharge?:     Reason for patient still in hospital (select all that apply): Treatment  Discharge Plan A: Home with family, Home Health                  Kim Rosales DO  Department of Hospital Medicine   [facility]

## 2023-03-15 NOTE — PLAN OF CARE
"SW received call from Candis ([facility] Specialist) stating that Lucio DODGE reviewed pt's clinical and does not feel there is medical necessity for LTACH services. P2P option is being provided. P2P Contact info is as follows: [phone], ask for "Candis". Deadline for P2P to be scheduled is 3/16/2023 @ 1100 central time. Physician notified. Ss following.    Per physician, P2P scheduled for today 03/15/23 @ 1300.  "

## 2023-03-15 NOTE — PT/OT/SLP PROGRESS
Occupational Therapy      Patient Name:  Monica Walker   MRN:  [id number]    Patient not seen today secondary to Pain (pt with c/o pain/nausea and not feeling well; pt decline all attempts to participate in OT tx). Will follow-up 3/16/23.    3/15/2023

## 2023-03-15 NOTE — ANESTHESIA POSTPROCEDURE EVALUATION
Anesthesia Post Evaluation    Patient: Monica Walker    Procedure(s) Performed: Procedure(s) (LRB):  REMOVAL OF PROSTHESIS,METHYLMETHACRYLATE WITH OR WITHOUT INSERTION OF SPACER LEFT KNEE (Left)  INCISION AND DRAINAGE (Left)    Final Anesthesia Type: general      Patient location during evaluation: PACU  Post-procedure vital signs: reviewed and stable  Pain management: adequate  Airway patency: patent    PONV status at discharge: No PONV  Anesthetic complications: no      Cardiovascular status: hemodynamically stable  Respiratory status: unassisted  Hydration status: euvolemic  Follow-up not needed.          Vitals Value Taken Time   /72 03/15/23 0600   Temp 37.7 °C (99.9 °F) 03/15/23 0600   Pulse 102 03/15/23 0600   Resp 18 03/15/23 0943   SpO2 94 % 03/15/23 0600         Event Time   Out of Recovery 03/13/2023 12:10:00         Pain/Inder Score: Pain Rating Prior to Med Admin: 10 (3/15/2023  9:43 AM)  Pain Rating Post Med Admin: 0 (3/15/2023 10:13 AM)

## 2023-03-15 NOTE — PLAN OF CARE
Problem: Adult Inpatient Plan of Care  Goal: Plan of Care Review  Outcome: Ongoing, Progressing  Flowsheets (Taken 3/15/2023 1712)  Plan of Care Reviewed With: patient  Goal: Patient-Specific Goal (Individualized)  Outcome: Ongoing, Progressing  Flowsheets (Taken 3/15/2023 1712)  Anxieties, Fears or Concerns: pain management  Individualized Care Needs: pain management  Goal: Absence of Hospital-Acquired Illness or Injury  Outcome: Ongoing, Progressing  Goal: Optimal Comfort and Wellbeing  Outcome: Ongoing, Progressing  Goal: Readiness for Transition of Care  Outcome: Ongoing, Progressing

## 2023-03-15 NOTE — SUBJECTIVE & OBJECTIVE
Review of Systems   Constitutional:  Negative for chills, fatigue and fever.   HENT: Negative.     Respiratory:  Negative for cough and shortness of breath.    Cardiovascular:  Negative for chest pain.   Gastrointestinal:  Negative for abdominal pain, blood in stool, diarrhea, nausea and vomiting.   Musculoskeletal:  Positive for arthralgias.   Skin: Negative.    Neurological:  Positive for weakness.   Psychiatric/Behavioral:  Negative for confusion.    Objective:     Vital Signs (Most Recent):  Temp: 99.9 °F (37.7 °C) (03/15/23 0600)  Pulse: 102 (03/15/23 0600)  Resp: 18 (03/15/23 0943)  BP: (!) 150/72 (03/15/23 0600)  SpO2: (!) 94 % (03/15/23 0600)   Vital Signs (24h Range):  Temp:  [97.8 °F (36.6 °C)-99.9 °F (37.7 °C)] 99.9 °F (37.7 °C)  Pulse:  [] 102  Resp:  [18-20] 18  SpO2:  [94 %-100 %] 94 %  BP: (112-159)/(58-75) 150/72     Weight: 64.4 kg (141 lb 15.6 oz)  Body mass index is 22.92 kg/m².    Intake/Output Summary (Last 24 hours) at 3/15/2023 1136  Last data filed at 3/15/2023 0011  Gross per 24 hour   Intake 1537.08 ml   Output 1700 ml   Net -162.92 ml      Physical Exam  Constitutional:       General: She is not in acute distress.     Appearance: Normal appearance. She is not ill-appearing.   HENT:      Head: Normocephalic and atraumatic.      Nose: Nose normal.   Eyes:      Conjunctiva/sclera: Conjunctivae normal.      Pupils: Pupils are equal, round, and reactive to light.   Cardiovascular:      Rate and Rhythm: Normal rate and regular rhythm.   Pulmonary:      Effort: Pulmonary effort is normal. No respiratory distress.      Breath sounds: Normal breath sounds.   Musculoskeletal:      Cervical back: No rigidity.      Comments: Dressing, immobilizer left knee. Limb neurovascularly intact.   Skin:     General: Skin is warm and dry.      Coloration: Skin is not jaundiced or pale.      Findings: No rash.   Neurological:      Mental Status: She is alert.   Psychiatric:         Behavior: Behavior  normal.         Thought Content: Thought content normal.       Significant Labs: All pertinent labs within the past 24 hours have been reviewed.    Significant Imaging: I have reviewed all pertinent imaging results/findings within the past 24 hours.

## 2023-03-15 NOTE — PT/OT/SLP PROGRESS
Physical Therapy      Patient Name:  Monica Walker   MRN:  [id number]    Patient not seen today secondary to Pain, Nausea, general malaise. Patient declined any and all offers of PT treatment today. Will follow-up tomorrow.

## 2023-03-15 NOTE — PROGRESS NOTES
Postop day 2 explantation hemiarthroplasty left knee for joint infection.  T-max past 24 hours 99° F. White count currently 10.74.  Currently on IV antibiotic therapy of vancomycin dosed by pharmacy.  No acute distress noted.  She was able to ambulate yesterday 15 ft toe-touch weight-bearing on left lower extremity rolling walker.

## 2023-03-15 NOTE — ASSESSMENT & PLAN NOTE
Blood culture 3/14 preliminary positive for Klebsiella pneumoniae. Starting Rocephin 2 gm q24h pending final C&S.

## 2023-03-16 LAB
ANION GAP SERPL CALCULATED.3IONS-SCNC: 11 MMOL/L (ref 7–16)
BASOPHILS # BLD AUTO: 0.08 K/UL (ref 0–0.2)
BASOPHILS NFR BLD AUTO: 0.7 % (ref 0–1)
BUN SERPL-MCNC: 17 MG/DL (ref 7–18)
BUN/CREAT SERPL: 24 (ref 6–20)
CALCIUM SERPL-MCNC: 9.7 MG/DL (ref 8.5–10.1)
CHLORIDE SERPL-SCNC: 100 MMOL/L (ref 98–107)
CO2 SERPL-SCNC: 29 MMOL/L (ref 21–32)
CREAT SERPL-MCNC: 0.72 MG/DL (ref 0.55–1.02)
DIFFERENTIAL METHOD BLD: ABNORMAL
EGFR (NO RACE VARIABLE) (RUSH/TITUS): 100 ML/MIN/1.73M²
EOSINOPHIL # BLD AUTO: 0.35 K/UL (ref 0–0.5)
EOSINOPHIL NFR BLD AUTO: 3.3 % (ref 1–4)
ERYTHROCYTE [DISTWIDTH] IN BLOOD BY AUTOMATED COUNT: 19.9 % (ref 11.5–14.5)
GLUCOSE SERPL-MCNC: 162 MG/DL (ref 74–106)
GLUCOSE SERPL-MCNC: 180 MG/DL (ref 70–105)
GLUCOSE SERPL-MCNC: 259 MG/DL (ref 70–105)
GLUCOSE SERPL-MCNC: 260 MG/DL (ref 70–105)
GLUCOSE SERPL-MCNC: 289 MG/DL (ref 70–105)
HCT VFR BLD AUTO: 33.3 % (ref 38–47)
HGB BLD-MCNC: 10.1 G/DL (ref 12–16)
IMM GRANULOCYTES # BLD AUTO: 0.11 K/UL (ref 0–0.04)
IMM GRANULOCYTES NFR BLD: 1 % (ref 0–0.4)
LYMPHOCYTES # BLD AUTO: 2.68 K/UL (ref 1–4.8)
LYMPHOCYTES NFR BLD AUTO: 25 % (ref 27–41)
MCH RBC QN AUTO: 24.2 PG (ref 27–31)
MCHC RBC AUTO-ENTMCNC: 30.3 G/DL (ref 32–36)
MCV RBC AUTO: 79.9 FL (ref 80–96)
MONOCYTES # BLD AUTO: 0.99 K/UL (ref 0–0.8)
MONOCYTES NFR BLD AUTO: 9.2 % (ref 2–6)
MPC BLD CALC-MCNC: 9.1 FL (ref 9.4–12.4)
NEUTROPHILS # BLD AUTO: 6.51 K/UL (ref 1.8–7.7)
NEUTROPHILS NFR BLD AUTO: 60.8 % (ref 53–65)
NRBC # BLD AUTO: 0 X10E3/UL
NRBC, AUTO (.00): 0 %
PLATELET # BLD AUTO: 385 K/UL (ref 150–400)
POTASSIUM SERPL-SCNC: 3.9 MMOL/L (ref 3.5–5.1)
RBC # BLD AUTO: 4.17 M/UL (ref 4.2–5.4)
SODIUM SERPL-SCNC: 136 MMOL/L (ref 136–145)
VANCOMYCIN TROUGH SERPL-MCNC: 14.6 ΜG/ML (ref 10–20)
WBC # BLD AUTO: 10.72 K/UL (ref 4.5–11)

## 2023-03-16 PROCEDURE — 99233 PR SUBSEQUENT HOSPITAL CARE,LEVL III: ICD-10-PCS | Mod: ,,, | Performed by: STUDENT IN AN ORGANIZED HEALTH CARE EDUCATION/TRAINING PROGRAM

## 2023-03-16 PROCEDURE — 11000001 HC ACUTE MED/SURG PRIVATE ROOM

## 2023-03-16 PROCEDURE — 97116 GAIT TRAINING THERAPY: CPT

## 2023-03-16 PROCEDURE — 63600175 PHARM REV CODE 636 W HCPCS: Mod: TB,JG | Performed by: ORTHOPAEDIC SURGERY

## 2023-03-16 PROCEDURE — 80202 ASSAY OF VANCOMYCIN: CPT | Performed by: FAMILY MEDICINE

## 2023-03-16 PROCEDURE — 25000003 PHARM REV CODE 250: Performed by: FAMILY MEDICINE

## 2023-03-16 PROCEDURE — 82962 GLUCOSE BLOOD TEST: CPT

## 2023-03-16 PROCEDURE — 80048 BASIC METABOLIC PNL TOTAL CA: CPT | Performed by: FAMILY MEDICINE

## 2023-03-16 PROCEDURE — 87040 BLOOD CULTURE FOR BACTERIA: CPT | Performed by: STUDENT IN AN ORGANIZED HEALTH CARE EDUCATION/TRAINING PROGRAM

## 2023-03-16 PROCEDURE — 99233 SBSQ HOSP IP/OBS HIGH 50: CPT | Mod: ,,, | Performed by: STUDENT IN AN ORGANIZED HEALTH CARE EDUCATION/TRAINING PROGRAM

## 2023-03-16 PROCEDURE — 97110 THERAPEUTIC EXERCISES: CPT

## 2023-03-16 PROCEDURE — 99900035 HC TECH TIME PER 15 MIN (STAT)

## 2023-03-16 PROCEDURE — 25000003 PHARM REV CODE 250: Performed by: ORTHOPAEDIC SURGERY

## 2023-03-16 PROCEDURE — 63600175 PHARM REV CODE 636 W HCPCS: Performed by: FAMILY MEDICINE

## 2023-03-16 PROCEDURE — 85025 COMPLETE CBC W/AUTO DIFF WBC: CPT | Performed by: FAMILY MEDICINE

## 2023-03-16 PROCEDURE — 63600175 PHARM REV CODE 636 W HCPCS: Performed by: STUDENT IN AN ORGANIZED HEALTH CARE EDUCATION/TRAINING PROGRAM

## 2023-03-16 PROCEDURE — 94761 N-INVAS EAR/PLS OXIMETRY MLT: CPT

## 2023-03-16 RX ADMIN — MORPHINE SULFATE 2 MG: 2 INJECTION, SOLUTION INTRAMUSCULAR; INTRAVENOUS at 04:03

## 2023-03-16 RX ADMIN — INSULIN ASPART 6 UNITS: 100 INJECTION, SOLUTION INTRAVENOUS; SUBCUTANEOUS at 05:03

## 2023-03-16 RX ADMIN — DOCUSATE SODIUM 100 MG: 100 CAPSULE, LIQUID FILLED ORAL at 08:03

## 2023-03-16 RX ADMIN — GABAPENTIN 300 MG: 300 CAPSULE ORAL at 08:03

## 2023-03-16 RX ADMIN — FERROUS SULFATE TAB 325 MG (65 MG ELEMENTAL FE) 1 EACH: 325 (65 FE) TAB at 08:03

## 2023-03-16 RX ADMIN — INSULIN DETEMIR 25 UNITS: 100 INJECTION, SOLUTION SUBCUTANEOUS at 08:03

## 2023-03-16 RX ADMIN — HYDROCODONE BITARTRATE AND ACETAMINOPHEN 1 TABLET: 10; 325 TABLET ORAL at 02:03

## 2023-03-16 RX ADMIN — Medication 1 CAPSULE: at 04:03

## 2023-03-16 RX ADMIN — MORPHINE SULFATE 2 MG: 2 INJECTION, SOLUTION INTRAMUSCULAR; INTRAVENOUS at 08:03

## 2023-03-16 RX ADMIN — ALTEPLASE 2 MG: 2.2 INJECTION, POWDER, LYOPHILIZED, FOR SOLUTION INTRAVENOUS at 09:03

## 2023-03-16 RX ADMIN — Medication 1 CAPSULE: at 08:03

## 2023-03-16 RX ADMIN — LIDOCAINE 1 PATCH: 50 PATCH TOPICAL at 08:03

## 2023-03-16 RX ADMIN — DULOXETINE HYDROCHLORIDE 60 MG: 30 CAPSULE, DELAYED RELEASE ORAL at 08:03

## 2023-03-16 RX ADMIN — Medication 1 CAPSULE: at 11:03

## 2023-03-16 RX ADMIN — VANCOMYCIN HYDROCHLORIDE 1250 MG: 1 INJECTION, POWDER, LYOPHILIZED, FOR SOLUTION INTRAVENOUS at 10:03

## 2023-03-16 RX ADMIN — INSULIN ASPART 6 UNITS: 100 INJECTION, SOLUTION INTRAVENOUS; SUBCUTANEOUS at 11:03

## 2023-03-16 RX ADMIN — INSULIN ASPART 3 UNITS: 100 INJECTION, SOLUTION INTRAVENOUS; SUBCUTANEOUS at 08:03

## 2023-03-16 RX ADMIN — OXYCODONE AND ACETAMINOPHEN 2 TABLET: 325; 5 TABLET ORAL at 11:03

## 2023-03-16 RX ADMIN — CEFTRIAXONE SODIUM 2 G: 2 INJECTION, POWDER, FOR SOLUTION INTRAMUSCULAR; INTRAVENOUS at 09:03

## 2023-03-16 RX ADMIN — MORPHINE SULFATE 4 MG: 4 INJECTION, SOLUTION INTRAMUSCULAR; INTRAVENOUS at 10:03

## 2023-03-16 RX ADMIN — MORPHINE SULFATE 4 MG: 4 INJECTION, SOLUTION INTRAMUSCULAR; INTRAVENOUS at 05:03

## 2023-03-16 RX ADMIN — HYDRALAZINE HYDROCHLORIDE 10 MG: 10 TABLET, FILM COATED ORAL at 08:03

## 2023-03-16 NOTE — PROGRESS NOTES
[facility]  [facility] Medicine  Progress Note    Patient Name: Monica Walker  MRN: [id number]  Patient Class: IP- Inpatient   Admission Date: 3/6/2023  Length of Stay: 10 days  Attending Physician: Adilson Fontanez DO  Primary Care Provider: Hannah Schulz MD        Subjective:     Principal Problem:Septic arthritis        HPI:  Monica Walker is a 52-year-old female with history of diabetes, hypertension, and MRSA joint space infection, who presented with complaints of left knee pain and swelling. She states the pain began suddenly yesterday, denies any recent falls or other associated trauma. Pain is worse along the lateral joint space with radiation down into the foot. There is a discreet area of fluctuance along the lateral surface of the knee, with generalized swelling of the joint to approximately 2x the size of the contralateral knee. Patient also complains of some fever, generalized weakness and productive cough for the past few days as well as dark stool.     Patient has a history of remote joint replacement (2015) with recent septic arthritis requiring debridement of the left knee as well as one shoulder. She was also recently hospitalized for with MRSA spinal epidural abscess, discharged about a month ago with outpatient daptomycin.      Initial evaluation with the following with WBC 12.39, LA 0.8, Hgb 7.4, glucose 200, labs otherwise unremarkable. Mildly elevated temp (99.5) and mild tachycardia (105), vital signs otherwise stable and grossly within normal limits.     Monica Walker is to be admitted to hospital service for evaluation and treatment of suspected septic arthritis. Orthopedics has been consulted. Blood cultures and aspiration/culture of affected knee pending at time of admission.       Overview/Hospital Course:  3/7: No acute events overnight, patient with no complaints this morning. US with aspiration was attempted however unable to obtain a specimen as fluid  "was too thick. Ortho consult pending.     3/8: Blood culture positive for MRSA, pharmacy notified to continue vancomycin. No other changes, Dr. Noyola to evaluate later today pending x-ray.    3/9: No acute events overnight, patient with no complaints aside from continued pain in the left knee. Ortho following with plans to monitor fluid cultures over the weekend with tentative plans for surgery on Monday. Plans to repeat blood cultures following procedure, if negative patient will need approximately one month of IV antibiotics from date of negative culture. Would benefit from continuation of vancomycin as she has failed outpatient treatment with daptomycin immediately prior to this admission.    3/10: Patient with no new complaints this morning, light growth of G+ cocci noted in knee aspirate culture. Continue vancomycin for MRSA bacteremia and joint infection, ortho following.    3/11: No major changes overnight, knee aspirate with MRSA. Plans for partial knee removal with placement of antibiotic spacer on Monday.    3/12: Patient continues to complain of pain, however is not taking scheduled PO medications as "they don't work." Will make changes to scheduled therapy but advised patient she will need to take medications consistently in order for them to work effectively.    3/13: Removal of hardware by Dr. Noyola today, patient tolerated procedure well with no immediate complications. Somewhat sedated from pain medications with vital signs stable and grossly WNL.    3/14: Post op #1 removal of hardware and placement of antibiotic spacer by Dr. Noyola. Now that presumed infective focus has been removed will repeat blood cultures, once negative patient will require approximately 4 weeks of IV antibiotic therapy. Recommend vancomycin as patient has already failed outpatient treatment with daptomycin. Plan for transfer to Specialty for extended antibiotics and physical therapy.     3/15: No acute events " overnight, patient continues to complain of pain and generally feeling unwell. Repeat blood cultures yesterday now positive for Klebsiella pneumonia, will add Rocephin pending final C&S. Continue vancomycin for MRSA bacteremia with indwelling prostheses.     3/16- picc line has been in during repeated cases of bacteremia, DC today. Line break for atleast 48h      Interval History: naeo    Review of Systems   Constitutional:  Negative for chills, fatigue and fever.   HENT: Negative.     Respiratory:  Negative for cough and shortness of breath.    Cardiovascular:  Negative for chest pain.   Gastrointestinal:  Negative for abdominal pain, blood in stool, diarrhea, nausea and vomiting.   Musculoskeletal:  Positive for arthralgias.   Skin: Negative.    Neurological:  Positive for weakness.   Psychiatric/Behavioral:  Negative for confusion.    Objective:     Vital Signs (Most Recent):  Temp: 97.6 °F (36.4 °C) (03/16/23 1100)  Pulse: 94 (03/16/23 1100)  Resp: 18 (03/16/23 1438)  BP: 118/66 (03/16/23 1100)  SpO2: 98 % (03/16/23 1100) Vital Signs (24h Range):  Temp:  [97.6 °F (36.4 °C)-99.2 °F (37.3 °C)] 97.6 °F (36.4 °C)  Pulse:  [74-98] 94  Resp:  [16-21] 18  SpO2:  [91 %-98 %] 98 %  BP: ()/(56-72) 118/66     Weight: 64.4 kg (141 lb 15.6 oz)  Body mass index is 22.92 kg/m².    Intake/Output Summary (Last 24 hours) at 3/16/2023 1541  Last data filed at 3/16/2023 0156  Gross per 24 hour   Intake --   Output 325 ml   Net -325 ml      Physical Exam  Constitutional:       General: She is not in acute distress.     Appearance: Normal appearance. She is not ill-appearing.   HENT:      Head: Normocephalic and atraumatic.      Nose: Nose normal.   Eyes:      Conjunctiva/sclera: Conjunctivae normal.      Pupils: Pupils are equal, round, and reactive to light.   Cardiovascular:      Rate and Rhythm: Normal rate and regular rhythm.   Pulmonary:      Effort: Pulmonary effort is normal. No respiratory distress.      Breath sounds:  Normal breath sounds.   Musculoskeletal:      Cervical back: No rigidity.      Comments: Dressing, immobilizer left knee. Limb neurovascularly intact.   Skin:     General: Skin is warm and dry.      Coloration: Skin is not jaundiced or pale.      Findings: No rash.   Neurological:      Mental Status: She is alert.   Psychiatric:         Behavior: Behavior normal.         Thought Content: Thought content normal.       Significant Labs: All pertinent labs within the past 24 hours have been reviewed.    Significant Imaging: I have reviewed all pertinent imaging results/findings within the past 24 hours.      Assessment/Plan:      * Septic arthritis  Post op #1 removal of hardware and placement of antibiotic spacer by Dr. Noyola. Now that presumed infective focus has been removed will repeat blood cultures, once negative patient will require approximately 4 weeks of IV antibiotic therapy. Recommend vancomycin as patient has already failed outpatient treatment with daptomycin. Plan for transfer to Specialty for extended antibiotics and physical therapy.     Left knee aspirate with MRSA sensitive to vancomycin. Continue current treatment.         Bacteremia due to Klebsiella pneumoniae  Blood culture 3/14 preliminary positive for Klebsiella pneumoniae. Starting Rocephin 2 gm q24h pending final C&S.       Hypertension  Adjust medications as needed      Microcytic anemia  Stable, follow HB      MRSA bacteremia  Blood cultures on admission positive for MRSA, currently receiving vancomycin. Plan to repeat cultures following procedure Monday. Patient will require extended treatment due to recurrent infection and multiple joint prostheses. Currently recommendation for continued vancomycin as patient failed outpatient treatment with daptomycin immediately prior to this admission.    Tentative end date 4/14/23. Anticipate transfer to Specialty for extended IV antibiotics.     DC picc line      Type 2 diabetes mellitus, with  long-term current use of insulin  Patient's FSGs are uncontrolled due to hyperglycemia on current medication regimen.  Last A1c reviewed-   Lab Results   Component Value Date    HGBA1C 7.9 (H) 01/19/2023     Most recent fingerstick glucose reviewed- No results for input(s): POCTGLUCOSE in the last 24 hours.  Current correctional scale  Medium  Increase anti-hyperglycemic dose as follows-   Antihyperglycemics (From admission, onward)    Start     Stop Route Frequency Ordered    03/16/23 0900  insulin detemir U-100 injection 25 Units         -- SubQ 2 times daily 03/16/23 0820    03/14/23 1029  insulin aspart U-100 injection 1-10 Units         -- SubQ Before meals & nightly PRN 03/14/23 0930        Hold Oral hypoglycemics while patient is in the hospital.    Arthritis  Continue home medications. Added Toradol 15 mg IV q6h as needed.         VTE Risk Mitigation (From admission, onward)         Ordered     IP VTE HIGH RISK PATIENT  Once         03/13/23 1226     Place DONOVAN hose  Until discontinued         03/13/23 1226     Place sequential compression device  Until discontinued         03/06/23 1643                Discharge Planning   SUZANNA:      Code Status: Full Code   Is the patient medically ready for discharge?:     Reason for patient still in hospital (select all that apply): Treatment  Discharge Plan A: Home with family, Home Health                  Adilson Fontanez DO  Department of Hospital Medicine   [facility]

## 2023-03-16 NOTE — PT/OT/SLP PROGRESS
Physical Therapy Treatment    Patient Name:  Monica Walker   MRN:  [id number]     Recommendations:     Discharge Recommendations: home with home health, home health PT  Discharge Equipment Recommendations: none  Barriers to discharge:  ongoing medical care    Assessment:     Monica Walker is a 52 y.o. female admitted with a medical diagnosis of Septic arthritis.  She presents with the following impairments/functional limitations: weakness, impaired endurance, impaired functional mobility, gait instability, decreased lower extremity function, pain, orthopedic precautions.    Rehab Prognosis: Good; patient would benefit from acute skilled PT services to address these deficits and reach maximum level of function.    Recent Surgery: Procedure(s) (LRB):  REMOVAL OF PROSTHESIS,METHYLMETHACRYLATE WITH OR WITHOUT INSERTION OF SPACER LEFT KNEE (Left)  INCISION AND DRAINAGE (Left) 3 Days Post-Op    Plan:     During this hospitalization, patient to be seen 5 x/week to address the identified rehab impairments via gait training, therapeutic activities, therapeutic exercises and progress toward the following goals:    Plan of Care Expires:  04/11/23    Subjective     Chief Complaint: septic arthritis  Patient/Family Comments/goals: agreeable   Pain/Comfort:  Pain Rating 1: 8/10  Location - Side 1: Left  Location 1: knee  Pain Addressed 1: Pre-medicate for activity  Pain Rating Post-Intervention 1: 8/10      Objective:     Communicated with OTR prior to session.  Patient found supine with knee immobilizer, PICC line, hemovac, SCD upon PT entry to room.     General Precautions: Standard, fall  Orthopedic Precautions: LLE toe touch weight bearing  Braces: Knee immobilizer  Respiratory Status: Room air     Functional Mobility:  Bed Mobility:     Supine to Sit: minimum assistance  Transfers:     Sit to Stand:  contact guard assistance and minimum assistance with rolling walker and TTWB LLE  Gait: 25ft with RW, CGA with TTWB LLE  with short resting periods during gait  Balance: Fair in stance with RW      AM-PAC 6 CLICK MOBILITY  Turning over in bed (including adjusting bedclothes, sheets and blankets)?: 3  Sitting down on and standing up from a chair with arms (e.g., wheelchair, bedside commode, etc.): 3  Moving from lying on back to sitting on the side of the bed?: 3  Moving to and from a bed to a chair (including a wheelchair)?: 3  Need to walk in hospital room?: 3  Climbing 3-5 steps with a railing?: 1  Basic Mobility Total Score: 16       Treatment & Education:  Left lower extremity exercise x 15 reps: ankle pumps, Quad sets, glut sets, hip abduction/adduction, and straight leg raises with active assist ROM and verbal cues for sequencing and safety     Patient left up in chair with all lines intact and call button in reach..    GOALS:   Multidisciplinary Problems       Physical Therapy Goals          Problem: Physical Therapy    Goal Priority Disciplines Outcome Goal Variances Interventions   Physical Therapy Goal     PT, PT/OT Ongoing, Progressing     Description: Short Term Goals to be met by: 3/25/2023    Patient will increase functional independence with mobility by performin. Supine to sit with independently  2. Sit to stand transfer with independently using Rolling walker  3. Bed to chair transfer with independently using Rolling walker  4. Gait  x 400 feet with independently using Rolling walker  5. Lower extremity exercise program x30 reps per handout, with assistance as needed    Long Term Goals to be met by: 2023    Pt will regain full independent functional mobility with lowest level of assistive device to return to home situation and prior activities of daily living.                        Time Tracking:     PT Received On: 23  PT Start Time: 1004     PT Stop Time: 1024  PT Total Time (min): 20 min     Billable Minutes: Gait Training 10    Treatment Type: Treatment  PT/PTA: PT     Number of PTA visits since  last PT visit: 0     03/16/2023

## 2023-03-16 NOTE — ASSESSMENT & PLAN NOTE
Patient's FSGs are uncontrolled due to hyperglycemia on current medication regimen.  Last A1c reviewed-   Lab Results   Component Value Date    HGBA1C 7.9 (H) 01/19/2023     Most recent fingerstick glucose reviewed- No results for input(s): POCTGLUCOSE in the last 24 hours.  Current correctional scale  Medium  Increase anti-hyperglycemic dose as follows-   Antihyperglycemics (From admission, onward)    Start     Stop Route Frequency Ordered    03/16/23 0900  insulin detemir U-100 injection 25 Units         -- SubQ 2 times daily 03/16/23 0820    03/14/23 1029  insulin aspart U-100 injection 1-10 Units         -- SubQ Before meals & nightly PRN 03/14/23 0930        Hold Oral hypoglycemics while patient is in the hospital.

## 2023-03-16 NOTE — PROGRESS NOTES
Vancomycin trough resulted as 14.6, drawn appropriately. Will continue current regimen of 1250 mg IV every 18 hours. Repeat trough ordered for 3/18 at 1530. Pharmacy will continue to follow and make necessary adjustments.    Alesia Carey, PharmD  Ext. [phone]

## 2023-03-16 NOTE — ASSESSMENT & PLAN NOTE
Blood cultures on admission positive for MRSA, currently receiving vancomycin. Plan to repeat cultures following procedure Monday. Patient will require extended treatment due to recurrent infection and multiple joint prostheses. Currently recommendation for continued vancomycin as patient failed outpatient treatment with daptomycin immediately prior to this admission.    Tentative end date 4/14/23. Anticipate transfer to Specialty for extended IV antibiotics.     DC picc line

## 2023-03-16 NOTE — SUBJECTIVE & OBJECTIVE
Interval History: naeo    Review of Systems   Constitutional:  Negative for chills, fatigue and fever.   HENT: Negative.     Respiratory:  Negative for cough and shortness of breath.    Cardiovascular:  Negative for chest pain.   Gastrointestinal:  Negative for abdominal pain, blood in stool, diarrhea, nausea and vomiting.   Musculoskeletal:  Positive for arthralgias.   Skin: Negative.    Neurological:  Positive for weakness.   Psychiatric/Behavioral:  Negative for confusion.    Objective:     Vital Signs (Most Recent):  Temp: 97.6 °F (36.4 °C) (03/16/23 1100)  Pulse: 94 (03/16/23 1100)  Resp: 18 (03/16/23 1438)  BP: 118/66 (03/16/23 1100)  SpO2: 98 % (03/16/23 1100) Vital Signs (24h Range):  Temp:  [97.6 °F (36.4 °C)-99.2 °F (37.3 °C)] 97.6 °F (36.4 °C)  Pulse:  [74-98] 94  Resp:  [16-21] 18  SpO2:  [91 %-98 %] 98 %  BP: ()/(56-72) 118/66     Weight: 64.4 kg (141 lb 15.6 oz)  Body mass index is 22.92 kg/m².    Intake/Output Summary (Last 24 hours) at 3/16/2023 1541  Last data filed at 3/16/2023 0156  Gross per 24 hour   Intake --   Output 325 ml   Net -325 ml      Physical Exam  Constitutional:       General: She is not in acute distress.     Appearance: Normal appearance. She is not ill-appearing.   HENT:      Head: Normocephalic and atraumatic.      Nose: Nose normal.   Eyes:      Conjunctiva/sclera: Conjunctivae normal.      Pupils: Pupils are equal, round, and reactive to light.   Cardiovascular:      Rate and Rhythm: Normal rate and regular rhythm.   Pulmonary:      Effort: Pulmonary effort is normal. No respiratory distress.      Breath sounds: Normal breath sounds.   Musculoskeletal:      Cervical back: No rigidity.      Comments: Dressing, immobilizer left knee. Limb neurovascularly intact.   Skin:     General: Skin is warm and dry.      Coloration: Skin is not jaundiced or pale.      Findings: No rash.   Neurological:      Mental Status: She is alert.   Psychiatric:         Behavior: Behavior normal.          Thought Content: Thought content normal.       Significant Labs: All pertinent labs within the past 24 hours have been reviewed.    Significant Imaging: I have reviewed all pertinent imaging results/findings within the past 24 hours.

## 2023-03-16 NOTE — PLAN OF CARE
Problem: Adult Inpatient Plan of Care  Goal: Plan of Care Review  Outcome: Ongoing, Progressing  Flowsheets (Taken 3/16/2023 1759)  Plan of Care Reviewed With: patient  Goal: Patient-Specific Goal (Individualized)  Outcome: Ongoing, Progressing  Flowsheets (Taken 3/16/2023 1759)  Anxieties, Fears or Concerns: pain management  Individualized Care Needs: pain management  Goal: Absence of Hospital-Acquired Illness or Injury  Outcome: Ongoing, Progressing  Goal: Optimal Comfort and Wellbeing  Outcome: Ongoing, Progressing  Goal: Readiness for Transition of Care  Outcome: Ongoing, Progressing

## 2023-03-16 NOTE — PT/OT/SLP PROGRESS
Occupational Therapy   Treatment    Name: Monica Walker  MRN: [id number]  Admitting Diagnosis:  Septic arthritis  3 Days Post-Op    Recommendations:     Discharge Recommendations: nursing facility, basic, LTACH (long-term acute care hospital), home with home health  Discharge Equipment Recommendations:  none  Barriers to discharge:  Other (Comment) (ongoing medical treatment)    Assessment:     Monica Walker is a 52 y.o. female with a medical diagnosis of Septic arthritis.  She presents with weakness, decreased functional mobility, and decline in ADLs. Performance deficits affecting function are impaired self care skills, impaired functional mobility, gait instability, impaired balance, pain, orthopedic precautions.     Rehab Prognosis:  Good; patient would benefit from acute skilled OT services to address these deficits and reach maximum level of function.       Plan:     Patient to be seen 5 x/week to address the above listed problems via therapeutic activities, self-care/home management, therapeutic exercises  Plan of Care Expires: 04/11/23  Plan of Care Reviewed with: patient    Subjective     Pain/Comfort:  Pain Rating 1: 8/10  Location - Side 1: Left  Location 1: knee  Pain Addressed 1: Pre-medicate for activity    Objective:     Communicated with: PERFECTO Ferrari prior to session.  Patient found supine with hemovac, knee immobilizer, PICC line, SCD, telemetry upon OT entry to room.    General Precautions: Standard, fall, contact    Orthopedic Precautions:LLE toe touch weight bearing  Braces: Knee immobilizer  Respiratory Status: Room air     Occupational Performance:     Bed Mobility:    Patient completed Supine to Sit with minimum assistance     Functional Mobility/Transfers:  Patient completed Sit <> Stand Transfer with contact guard assistance  with  rolling walker   Patient completed Bed <> Chair Transfer using Step Transfer technique with contact guard assistance with rolling walker  Functional Mobility:  pt performed functional mobility into hallway with RW with CGA    Activities of Daily Living:  Not performed      AMPAC 6 Click ADL:      Treatment & Education:  Pt performed x 15 reps each bicep curls 2#db, chest press 2#db, shoulder flexion 2#db, and IR/ER 2#db, in order to improve BUE strength and endurance to perform ADL and ADL t/f with less assist.     Patient left up in chair with all lines intact, call button in reach, and PERFECTO Ferrari notified    GOALS:   Multidisciplinary Problems       Occupational Therapy Goals          Problem: Occupational Therapy    Goal Priority Disciplines Outcome Interventions   Occupational Therapy Goal     OT, PT/OT Ongoing, Progressing    Description: ST.Pt will perform bathing with Mark with setup at EOB  2.Pt will perform UE dressing with Adrianna  3.Pt will perform LE dressing with Mark with AD if needed  4.Pt will transfer bed/chair/bsc with CGA with RW  5.Pt will perform standing task x 2 min with CGA with RW  6.Tolerate 15 min of tx without fatigue.      LTG:   Restore to max I with selfcare and mobility.                           Time Tracking:     OT Date of Treatment: 23  OT Start Time: 1001  OT Stop Time: 1024  OT Total Time (min): 23 min    Billable Minutes:Therapeutic Exercise 12 mintues    OT/TRISH: OT          3/16/2023

## 2023-03-17 LAB
AORTIC VALVE CUSP SEPERATION: 1.57 CM
AV INDEX (PROSTH): 0.65
AV MEAN GRADIENT: 5 MMHG
AV PEAK GRADIENT: 9 MMHG
AV VALVE AREA: 2.08 CM2
BACTERIA BLD CULT: ABNORMAL
BSA FOR ECHO PROCEDURE: 1.73 M2
CV ECHO LV RWT: 0.62 CM
DOP CALC AO PEAK VEL: 1.5 M/S
DOP CALC AO VTI: 29.6 CM
DOP CALC LVOT AREA: 3.2 CM2
DOP CALC LVOT DIAMETER: 2.02 CM
DOP CALC LVOT STROKE VOLUME: 61.63 CM3
DOP CALCLVOT PEAK VEL VTI: 19.24 CM
E WAVE DECELERATION TIME: 171 MSEC
E/A RATIO: 0.81
E/E' RATIO: 6 M/S
ECHO LV POSTERIOR WALL: 1 CM (ref 0.6–1.1)
EJECTION FRACTION: 55 %
FRACTIONAL SHORTENING: 35 % (ref 28–44)
GLUCOSE SERPL-MCNC: 104 MG/DL (ref 70–105)
GLUCOSE SERPL-MCNC: 138 MG/DL (ref 70–105)
GLUCOSE SERPL-MCNC: 196 MG/DL (ref 70–105)
GLUCOSE SERPL-MCNC: 217 MG/DL (ref 70–105)
GLUCOSE SERPL-MCNC: 281 MG/DL (ref 70–105)
GRAM STN SPEC: ABNORMAL
GRAM STN SPEC: ABNORMAL
INTERVENTRICULAR SEPTUM: 0.95 CM (ref 0.6–1.1)
IVC DIAMETER: 1.76 CM
LEFT ATRIUM SIZE: 3.04 CM
LEFT INTERNAL DIMENSION IN SYSTOLE: 2.09 CM (ref 2.1–4)
LEFT VENTRICLE DIASTOLIC VOLUME INDEX: 23.99 ML/M2
LEFT VENTRICLE DIASTOLIC VOLUME: 41.27 ML
LEFT VENTRICLE MASS INDEX: 51 G/M2
LEFT VENTRICLE SYSTOLIC VOLUME INDEX: 8.3 ML/M2
LEFT VENTRICLE SYSTOLIC VOLUME: 14.23 ML
LEFT VENTRICULAR INTERNAL DIMENSION IN DIASTOLE: 3.21 CM (ref 3.5–6)
LEFT VENTRICULAR MASS: 87.39 G
LV LATERAL E/E' RATIO: 5.18 M/S
LV SEPTAL E/E' RATIO: 7.13 M/S
MV PEAK A VEL: 0.7 M/S
MV PEAK E VEL: 0.57 M/S
PISA TR MAX VEL: 2.41 M/S
RA PRESSURE: 3 MMHG
RA WIDTH: 3.4 CM
RIGHT ATRIAL AREA: 12.5 CM2
RIGHT VENTRICULAR END-DIASTOLIC DIMENSION: 2.94 CM
RIGHT VENTRICULAR LENGTH IN DIASTOLE (APICAL 4-CHAMBER VIEW): 6.2 CM
RV MID DIAMA: 1.97 CM
TDI LATERAL: 0.11 M/S
TDI SEPTAL: 0.08 M/S
TDI: 0.1 M/S
TR MAX PG: 23 MMHG
TRICUSPID ANNULAR PLANE SYSTOLIC EXCURSION: 2.48 CM
TV REST PULMONARY ARTERY PRESSURE: 26 MMHG

## 2023-03-17 PROCEDURE — 63600175 PHARM REV CODE 636 W HCPCS: Mod: TB,JG | Performed by: ORTHOPAEDIC SURGERY

## 2023-03-17 PROCEDURE — 97110 THERAPEUTIC EXERCISES: CPT

## 2023-03-17 PROCEDURE — 63600175 PHARM REV CODE 636 W HCPCS: Performed by: STUDENT IN AN ORGANIZED HEALTH CARE EDUCATION/TRAINING PROGRAM

## 2023-03-17 PROCEDURE — 82962 GLUCOSE BLOOD TEST: CPT

## 2023-03-17 PROCEDURE — 11000001 HC ACUTE MED/SURG PRIVATE ROOM

## 2023-03-17 PROCEDURE — 25000003 PHARM REV CODE 250: Performed by: FAMILY MEDICINE

## 2023-03-17 PROCEDURE — 25000003 PHARM REV CODE 250: Performed by: STUDENT IN AN ORGANIZED HEALTH CARE EDUCATION/TRAINING PROGRAM

## 2023-03-17 PROCEDURE — 94761 N-INVAS EAR/PLS OXIMETRY MLT: CPT

## 2023-03-17 PROCEDURE — 99233 PR SUBSEQUENT HOSPITAL CARE,LEVL III: ICD-10-PCS | Mod: ,,, | Performed by: STUDENT IN AN ORGANIZED HEALTH CARE EDUCATION/TRAINING PROGRAM

## 2023-03-17 PROCEDURE — 63600175 PHARM REV CODE 636 W HCPCS: Performed by: FAMILY MEDICINE

## 2023-03-17 PROCEDURE — 99233 SBSQ HOSP IP/OBS HIGH 50: CPT | Mod: ,,, | Performed by: STUDENT IN AN ORGANIZED HEALTH CARE EDUCATION/TRAINING PROGRAM

## 2023-03-17 RX ADMIN — Medication 1 CAPSULE: at 05:03

## 2023-03-17 RX ADMIN — VANCOMYCIN HYDROCHLORIDE 1250 MG: 1 INJECTION, POWDER, LYOPHILIZED, FOR SOLUTION INTRAVENOUS at 03:03

## 2023-03-17 RX ADMIN — INSULIN DETEMIR 25 UNITS: 100 INJECTION, SOLUTION SUBCUTANEOUS at 09:03

## 2023-03-17 RX ADMIN — OXYCODONE AND ACETAMINOPHEN 2 TABLET: 325; 5 TABLET ORAL at 12:03

## 2023-03-17 RX ADMIN — GABAPENTIN 300 MG: 300 CAPSULE ORAL at 08:03

## 2023-03-17 RX ADMIN — CEFTRIAXONE SODIUM 2 G: 2 INJECTION, POWDER, FOR SOLUTION INTRAMUSCULAR; INTRAVENOUS at 09:03

## 2023-03-17 RX ADMIN — GABAPENTIN 300 MG: 300 CAPSULE ORAL at 09:03

## 2023-03-17 RX ADMIN — MORPHINE SULFATE 4 MG: 4 INJECTION, SOLUTION INTRAMUSCULAR; INTRAVENOUS at 08:03

## 2023-03-17 RX ADMIN — Medication 1 CAPSULE: at 09:03

## 2023-03-17 RX ADMIN — VANCOMYCIN HYDROCHLORIDE 1250 MG: 1 INJECTION, POWDER, LYOPHILIZED, FOR SOLUTION INTRAVENOUS at 10:03

## 2023-03-17 RX ADMIN — OXYCODONE AND ACETAMINOPHEN 2 TABLET: 325; 5 TABLET ORAL at 05:03

## 2023-03-17 RX ADMIN — CEFAZOLIN 2 G: 2 INJECTION, POWDER, FOR SOLUTION INTRAMUSCULAR; INTRAVENOUS at 10:03

## 2023-03-17 RX ADMIN — Medication 1 CAPSULE: at 12:03

## 2023-03-17 RX ADMIN — MORPHINE SULFATE 4 MG: 4 INJECTION, SOLUTION INTRAMUSCULAR; INTRAVENOUS at 01:03

## 2023-03-17 RX ADMIN — MORPHINE SULFATE 4 MG: 4 INJECTION, SOLUTION INTRAMUSCULAR; INTRAVENOUS at 09:03

## 2023-03-17 RX ADMIN — INSULIN ASPART 2 UNITS: 100 INJECTION, SOLUTION INTRAVENOUS; SUBCUTANEOUS at 05:03

## 2023-03-17 RX ADMIN — OXYCODONE AND ACETAMINOPHEN 2 TABLET: 325; 5 TABLET ORAL at 11:03

## 2023-03-17 RX ADMIN — CEFAZOLIN 2 G: 2 INJECTION, POWDER, FOR SOLUTION INTRAMUSCULAR; INTRAVENOUS at 03:03

## 2023-03-17 RX ADMIN — DULOXETINE HYDROCHLORIDE 60 MG: 30 CAPSULE, DELAYED RELEASE ORAL at 09:03

## 2023-03-17 RX ADMIN — DOCUSATE SODIUM 100 MG: 100 CAPSULE, LIQUID FILLED ORAL at 09:03

## 2023-03-17 RX ADMIN — FERROUS SULFATE TAB 325 MG (65 MG ELEMENTAL FE) 1 EACH: 325 (65 FE) TAB at 09:03

## 2023-03-17 RX ADMIN — HYDRALAZINE HYDROCHLORIDE 10 MG: 10 TABLET, FILM COATED ORAL at 08:03

## 2023-03-17 RX ADMIN — INSULIN DETEMIR 30 UNITS: 100 INJECTION, SOLUTION SUBCUTANEOUS at 08:03

## 2023-03-17 NOTE — PLAN OF CARE
SW spoke with pt at bedside to discuss dcp. SW discussed LTAC denial. Pt agreeable and verbalized understanding about importance of swb placement and cont iv abx tx. Choice obtained for [facility]. Referral called in to Dory ([facility] admissions). Dory stated that precert would be started at this time. Ss following.

## 2023-03-17 NOTE — PROGRESS NOTES
[facility]  [facility] Medicine  Progress Note    Patient Name: Monica Walker  MRN: [id number]  Patient Class: IP- Inpatient   Admission Date: 3/6/2023  Length of Stay: 11 days  Attending Physician: Adilson Fontanez DO  Primary Care Provider: Hannah Schulz MD        Subjective:     Principal Problem:Septic arthritis        HPI:  Monica Walker is a 52-year-old female with history of diabetes, hypertension, and MRSA joint space infection, who presented with complaints of left knee pain and swelling. She states the pain began suddenly yesterday, denies any recent falls or other associated trauma. Pain is worse along the lateral joint space with radiation down into the foot. There is a discreet area of fluctuance along the lateral surface of the knee, with generalized swelling of the joint to approximately 2x the size of the contralateral knee. Patient also complains of some fever, generalized weakness and productive cough for the past few days as well as dark stool.     Patient has a history of remote joint replacement (2015) with recent septic arthritis requiring debridement of the left knee as well as one shoulder. She was also recently hospitalized for with MRSA spinal epidural abscess, discharged about a month ago with outpatient daptomycin.      Initial evaluation with the following with WBC 12.39, LA 0.8, Hgb 7.4, glucose 200, labs otherwise unremarkable. Mildly elevated temp (99.5) and mild tachycardia (105), vital signs otherwise stable and grossly within normal limits.     Monica Walker is to be admitted to hospital service for evaluation and treatment of suspected septic arthritis. Orthopedics has been consulted. Blood cultures and aspiration/culture of affected knee pending at time of admission.       Overview/Hospital Course:  3/7: No acute events overnight, patient with no complaints this morning. US with aspiration was attempted however unable to obtain a specimen as fluid  "was too thick. Ortho consult pending.     3/8: Blood culture positive for MRSA, pharmacy notified to continue vancomycin. No other changes, Dr. Noyola to evaluate later today pending x-ray.    3/9: No acute events overnight, patient with no complaints aside from continued pain in the left knee. Ortho following with plans to monitor fluid cultures over the weekend with tentative plans for surgery on Monday. Plans to repeat blood cultures following procedure, if negative patient will need approximately one month of IV antibiotics from date of negative culture. Would benefit from continuation of vancomycin as she has failed outpatient treatment with daptomycin immediately prior to this admission.    3/10: Patient with no new complaints this morning, light growth of G+ cocci noted in knee aspirate culture. Continue vancomycin for MRSA bacteremia and joint infection, ortho following.    3/11: No major changes overnight, knee aspirate with MRSA. Plans for partial knee removal with placement of antibiotic spacer on Monday.    3/12: Patient continues to complain of pain, however is not taking scheduled PO medications as "they don't work." Will make changes to scheduled therapy but advised patient she will need to take medications consistently in order for them to work effectively.    3/13: Removal of hardware by Dr. Noyola today, patient tolerated procedure well with no immediate complications. Somewhat sedated from pain medications with vital signs stable and grossly WNL.    3/14: Post op #1 removal of hardware and placement of antibiotic spacer by Dr. Noyola. Now that presumed infective focus has been removed will repeat blood cultures, once negative patient will require approximately 4 weeks of IV antibiotic therapy. Recommend vancomycin as patient has already failed outpatient treatment with daptomycin. Plan for transfer to Specialty for extended antibiotics and physical therapy.     3/15: No acute events " overnight, patient continues to complain of pain and generally feeling unwell. Repeat blood cultures yesterday now positive for Klebsiella pneumonia, will add Rocephin pending final C&S. Continue vancomycin for MRSA bacteremia with indwelling prostheses.     3/16- picc line has been in during repeated cases of bacteremia, DC today. Line break for atleast 48h  3/17-continue abx, awaiting echo read- she had negative MONICA in November but is having recurrent MRSA bacteremia.  Will discuss need for repeat with Cardiology.      Interval History: naeo    Review of Systems   Constitutional:  Negative for chills, fatigue and fever.   HENT: Negative.     Respiratory:  Negative for cough and shortness of breath.    Cardiovascular:  Negative for chest pain.   Gastrointestinal:  Negative for abdominal pain, blood in stool, diarrhea, nausea and vomiting.   Musculoskeletal:  Positive for arthralgias.   Skin: Negative.    Neurological:  Positive for weakness.   Psychiatric/Behavioral:  Negative for confusion.    Objective:     Vital Signs (Most Recent):  Temp: 97.8 °F (36.6 °C) (03/17/23 1141)  Pulse: 75 (03/17/23 1141)  Resp: 16 (03/17/23 1235)  BP: 119/64 (03/17/23 1141)  SpO2: (!) 94 % (03/17/23 1141) Vital Signs (24h Range):  Temp:  [97.7 °F (36.5 °C)-99.1 °F (37.3 °C)] 97.8 °F (36.6 °C)  Pulse:  [72-98] 75  Resp:  [16-20] 16  SpO2:  [91 %-99 %] 94 %  BP: (107-124)/(30-68) 119/64     Weight: 64 kg (141 lb)  Body mass index is 22.76 kg/m².  No intake or output data in the 24 hours ending 03/17/23 1340     Physical Exam  Constitutional:       General: She is not in acute distress.     Appearance: Normal appearance. She is not ill-appearing.   HENT:      Head: Normocephalic and atraumatic.      Nose: Nose normal.   Eyes:      Conjunctiva/sclera: Conjunctivae normal.      Pupils: Pupils are equal, round, and reactive to light.   Cardiovascular:      Rate and Rhythm: Normal rate and regular rhythm.   Pulmonary:      Effort: Pulmonary  effort is normal. No respiratory distress.      Breath sounds: Normal breath sounds.   Musculoskeletal:      Cervical back: No rigidity.      Comments: Dressing, immobilizer left knee. Limb neurovascularly intact.   Skin:     General: Skin is warm and dry.      Coloration: Skin is not jaundiced or pale.      Findings: No rash.   Neurological:      Mental Status: She is alert.   Psychiatric:         Behavior: Behavior normal.         Thought Content: Thought content normal.       Significant Labs: All pertinent labs within the past 24 hours have been reviewed.    Significant Imaging: I have reviewed all pertinent imaging results/findings within the past 24 hours.      Assessment/Plan:      * Septic arthritis  Post op #1 removal of hardware and placement of antibiotic spacer by Dr. Noyola. Now that presumed infective focus has been removed will repeat blood cultures, once negative patient will require approximately 4 weeks of IV antibiotic therapy. Recommend vancomycin as patient has already failed outpatient treatment with daptomycin. Plan for transfer to Specialty for extended antibiotics and physical therapy.     Left knee aspirate with MRSA sensitive to vancomycin. Continue current treatment.         Bacteremia due to Klebsiella pneumoniae  Ancef  End date 3/30      Hypertension  Adjust medications as needed      Microcytic anemia  Stable, follow HB      MRSA bacteremia  Blood cultures on admission positive for MRSA, currently receiving vancomycin. Plan to repeat cultures following procedure Monday. Patient will require extended treatment due to recurrent infection and multiple joint prostheses. Currently recommendation for continued vancomycin as patient failed outpatient treatment with daptomycin immediately prior to this admission.    Tentative end date 4/14/23.     DC picc line  vanco    Type 2 diabetes mellitus, with long-term current use of insulin  Patient's FSGs are uncontrolled due to hyperglycemia on  current medication regimen.  Last A1c reviewed-   Lab Results   Component Value Date    HGBA1C 7.9 (H) 01/19/2023     Most recent fingerstick glucose reviewed- No results for input(s): POCTGLUCOSE in the last 24 hours.  Current correctional scale  Medium  Increase anti-hyperglycemic dose as follows-   Antihyperglycemics (From admission, onward)    Start     Stop Route Frequency Ordered    03/17/23 2100  insulin detemir U-100 injection 30 Units         -- SubQ 2 times daily 03/17/23 1338    03/14/23 1029  insulin aspart U-100 injection 1-10 Units         -- SubQ Before meals & nightly PRN 03/14/23 0930        Hold Oral hypoglycemics while patient is in the hospital.    Arthritis  Continue home medications. Added Toradol 15 mg IV q6h as needed.         VTE Risk Mitigation (From admission, onward)         Ordered     IP VTE HIGH RISK PATIENT  Once         03/13/23 1226     Place DONOVAN hose  Until discontinued         03/13/23 1226     Place sequential compression device  Until discontinued         03/06/23 1643                Discharge Planning   SUZANNA:      Code Status: Full Code   Is the patient medically ready for discharge?:     Reason for patient still in hospital (select all that apply): Treatment  Discharge Plan A: Home with family, Home Health                  Adilson Fontanez DO  Department of Hospital Medicine   [facility]

## 2023-03-17 NOTE — ASSESSMENT & PLAN NOTE
Patient's FSGs are uncontrolled due to hyperglycemia on current medication regimen.  Last A1c reviewed-   Lab Results   Component Value Date    HGBA1C 7.9 (H) 01/19/2023     Most recent fingerstick glucose reviewed- No results for input(s): POCTGLUCOSE in the last 24 hours.  Current correctional scale  Medium  Increase anti-hyperglycemic dose as follows-   Antihyperglycemics (From admission, onward)    Start     Stop Route Frequency Ordered    03/17/23 2100  insulin detemir U-100 injection 30 Units         -- SubQ 2 times daily 03/17/23 1338    03/14/23 1029  insulin aspart U-100 injection 1-10 Units         -- SubQ Before meals & nightly PRN 03/14/23 0930        Hold Oral hypoglycemics while patient is in the hospital.

## 2023-03-17 NOTE — PLAN OF CARE
Problem: Adult Inpatient Plan of Care  Goal: Plan of Care Review  Outcome: Ongoing, Progressing  Goal: Optimal Comfort and Wellbeing  Outcome: Ongoing, Progressing     Problem: Infection  Goal: Absence of Infection Signs and Symptoms  Outcome: Ongoing, Progressing     Problem: Skin Injury Risk Increased  Goal: Skin Health and Integrity  Outcome: Ongoing, Progressing     Problem: Pain Acute  Goal: Acceptable Pain Control and Functional Ability  Outcome: Ongoing, Progressing

## 2023-03-17 NOTE — PT/OT/SLP PROGRESS
"Physical Therapy Treatment    Patient Name:  Monica Walker   MRN:  [id number]    Recommendations:     Discharge Recommendations: home with home health, home health PT  Discharge Equipment Recommendations: none  Barriers to discharge:  ongoing medical treatment    Assessment:     Monica Walker is a 52 y.o. female admitted with a medical diagnosis of Septic arthritis.  She presents with the following impairments/functional limitations: weakness, impaired endurance, impaired functional mobility, gait instability, decreased lower extremity function, pain, orthopedic precautions. Pt reports that she is planning to d/c to swingbed today, declines ambulation at this time d/t not resting well last night.     Rehab Prognosis: Good; patient would benefit from acute skilled PT services to address these deficits and reach maximum level of function.    Recent Surgery: Procedure(s) (LRB):  REMOVAL OF PROSTHESIS,METHYLMETHACRYLATE WITH OR WITHOUT INSERTION OF SPACER LEFT KNEE (Left)  INCISION AND DRAINAGE (Left) 4 Days Post-Op    Plan:     During this hospitalization, patient to be seen 5 x/week to address the identified rehab impairments via gait training, therapeutic activities, therapeutic exercises and progress toward the following goals:    Plan of Care Expires:  04/11/23    Subjective     Chief Complaint: septic arthritis  Patient/Family Comments/goals: "I can't do no walking today."  Pain/Comfort: L knee 7/10         Objective:     Communicated with MONET Davalos RN prior to session.  Patient found left sidelying with knee immobilizer, PICC line, hemovac, SCD upon PT entry to room.     General Precautions: Standard, fall  Orthopedic Precautions: LLE toe touch weight bearing  Braces: Knee immobilizer  Respiratory Status: Room air     Functional Mobility:  Bed Mobility:     Supine to Sit: contact guard assistance and minimum assistance  Transfers:     Toilet Transfer: minimum assistance with  hand-held assist  using  " Scoot Pivot and TTWB LLE  Balance: EOB sitting Good       AM-PAC 6 CLICK MOBILITY          Treatment & Education:  Pt completed BLE ex x 20 reps with BLE consisting of AP, LAQ, hip ad/ab and RLE only marching, HS curls.    Patient left sitting edge of bed with all lines intact, call button in reach, and OTR present..    GOALS:   Multidisciplinary Problems       Physical Therapy Goals          Problem: Physical Therapy    Goal Priority Disciplines Outcome Goal Variances Interventions   Physical Therapy Goal     PT, PT/OT Ongoing, Progressing     Description: Short Term Goals to be met by: 3/25/2023    Patient will increase functional independence with mobility by performin. Supine to sit with independently  2. Sit to stand transfer with independently using Rolling walker  3. Bed to chair transfer with independently using Rolling walker  4. Gait  x 400 feet with independently using Rolling walker  5. Lower extremity exercise program x30 reps per handout, with assistance as needed    Long Term Goals to be met by: 2023    Pt will regain full independent functional mobility with lowest level of assistive device to return to home situation and prior activities of daily living.                        Time Tracking:     PT Received On: 23  PT Start Time: 1018     PT Stop Time: 1033  PT Total Time (min): 15 min     Billable Minutes: Therapeutic Exercise 10    Treatment Type: Treatment  PT/PTA: PT     Number of PTA visits since last PT visit: 0     2023

## 2023-03-17 NOTE — PT/OT/SLP PROGRESS
Occupational Therapy   Treatment    Name: Monica Walker  MRN: [id number]  Admitting Diagnosis:  Septic arthritis  4 Days Post-Op    Recommendations:     Discharge Recommendations: nursing facility, basic, LTACH (long-term acute care hospital), home with home health  Discharge Equipment Recommendations:  none  Barriers to discharge:  Other (Comment) (ongoing medical treatment)    Assessment:     Monica Walker is a 52 y.o. female with a medical diagnosis of Septic arthritis.  She presents with weakness, decreased functional mobility, and decline in ADLs. Performance deficits affecting function are impaired self care skills, impaired functional mobility, gait instability, impaired balance, pain, orthopedic precautions.     Rehab Prognosis:  Good; patient would benefit from acute skilled OT services to address these deficits and reach maximum level of function.       Plan:     Patient to be seen 5 x/week to address the above listed problems via therapeutic activities, self-care/home management, therapeutic exercises  Plan of Care Expires: 04/11/23  Plan of Care Reviewed with: patient    Subjective     Pain/Comfort:  Pain Rating 1: 7/10  Location - Side 1: Left  Location 1: knee    Objective:     Communicated with: HAYDEE Lea prior to session.  Patient found supine with peripheral IV upon OT entry to room.    General Precautions: Standard, fall, contact    Orthopedic Precautions:LLE toe touch weight bearing  Braces: Knee immobilizer  Respiratory Status: Room air     Occupational Performance:     Bed Mobility:    Patient completed Supine to Sit with minimum assistance  Patient completed Sit to Supine with minimum assistance     Functional Mobility/Transfers:  Patient completed Sit <> Stand Transfer with contact guard assistance  with  hand-held assist   Patient completed Toilet Transfer Stand Pivot technique with contact guard assistance with  bedside commode  Functional Mobility: not performed    Activities of Daily  Living:  Grooming: modified independence to comb hair  Toileting: stand by assistance to perform toilet hgyiene      [facility] 6 Click ADL:      Treatment & Education:  Pt performed x 20 reps each bicep curls 2#db, chest press 2#db, IR/ER 2#db, and rows red tband in order to improve BUE strength and endurance to perform ADL and ADL t/f with less assist.     Patient left supine with all lines intact and call button in reach    GOALS:   Multidisciplinary Problems       Occupational Therapy Goals          Problem: Occupational Therapy    Goal Priority Disciplines Outcome Interventions   Occupational Therapy Goal     OT, PT/OT Ongoing, Progressing    Description: ST.Pt will perform bathing with Mark with setup at EOB  2.Pt will perform UE dressing with Adrianna  3.Pt will perform LE dressing with aMrk with AD if needed  4.Pt will transfer bed/chair/bsc with CGA with RW  5.Pt will perform standing task x 2 min with CGA with RW  6.Tolerate 15 min of tx without fatigue.      LTG:   Restore to max I with selfcare and mobility.                           Time Tracking:     OT Date of Treatment: 23  OT Start Time: 1017  OT Stop Time: 1040  OT Total Time (min): 23 min    Billable Minutes:Therapeutic Exercise 15 minutes    OT/TRISH: OT          3/17/2023

## 2023-03-17 NOTE — PROGRESS NOTES
Postop day 4 explantation of partial knee replacement for infected joint.  Placement of medicated spacer and beads.  T-max 99.1°.  Laboratory data showed hemoglobin of 10.1 hematocrit 33.3.  White count was 38475.  Drain had no output.  Patient doing well.  Pain managed with as needed medications.  Recommendations:  1.) sterile dressing change today.  Apply Aquacel dressing.  2.) DONOVAN hose bilateral lower extremity x4 weeks.  May remove 1 hour b.i.d. then reapply.    3.) IV antibiotic therapy for total of 6 weeks.    4.)  Eliquis 2.5 mg p.o. b.i.d. times 12 days.  At the end of 12 days begin aspirin 325 mg p.o. daily.    5.)  Skin staple/suture removal postop day 10 wound healing permitting and apply Steri-Strips.    6.) if patient goes home follow-up orthopedic clinic postop day 10.  If patient goes to swing bed or long-term care facility follow-up orthopedic clinic in 4 weeks.

## 2023-03-17 NOTE — SUBJECTIVE & OBJECTIVE
Interval History: naeo    Review of Systems   Constitutional:  Negative for chills, fatigue and fever.   HENT: Negative.     Respiratory:  Negative for cough and shortness of breath.    Cardiovascular:  Negative for chest pain.   Gastrointestinal:  Negative for abdominal pain, blood in stool, diarrhea, nausea and vomiting.   Musculoskeletal:  Positive for arthralgias.   Skin: Negative.    Neurological:  Positive for weakness.   Psychiatric/Behavioral:  Negative for confusion.    Objective:     Vital Signs (Most Recent):  Temp: 97.8 °F (36.6 °C) (03/17/23 1141)  Pulse: 75 (03/17/23 1141)  Resp: 16 (03/17/23 1235)  BP: 119/64 (03/17/23 1141)  SpO2: (!) 94 % (03/17/23 1141) Vital Signs (24h Range):  Temp:  [97.7 °F (36.5 °C)-99.1 °F (37.3 °C)] 97.8 °F (36.6 °C)  Pulse:  [72-98] 75  Resp:  [16-20] 16  SpO2:  [91 %-99 %] 94 %  BP: (107-124)/(30-68) 119/64     Weight: 64 kg (141 lb)  Body mass index is 22.76 kg/m².  No intake or output data in the 24 hours ending 03/17/23 1340     Physical Exam  Constitutional:       General: She is not in acute distress.     Appearance: Normal appearance. She is not ill-appearing.   HENT:      Head: Normocephalic and atraumatic.      Nose: Nose normal.   Eyes:      Conjunctiva/sclera: Conjunctivae normal.      Pupils: Pupils are equal, round, and reactive to light.   Cardiovascular:      Rate and Rhythm: Normal rate and regular rhythm.   Pulmonary:      Effort: Pulmonary effort is normal. No respiratory distress.      Breath sounds: Normal breath sounds.   Musculoskeletal:      Cervical back: No rigidity.      Comments: Dressing, immobilizer left knee. Limb neurovascularly intact.   Skin:     General: Skin is warm and dry.      Coloration: Skin is not jaundiced or pale.      Findings: No rash.   Neurological:      Mental Status: She is alert.   Psychiatric:         Behavior: Behavior normal.         Thought Content: Thought content normal.       Significant Labs: All pertinent labs  within the past 24 hours have been reviewed.    Significant Imaging: I have reviewed all pertinent imaging results/findings within the past 24 hours.

## 2023-03-17 NOTE — ASSESSMENT & PLAN NOTE
Blood cultures on admission positive for MRSA, currently receiving vancomycin. Plan to repeat cultures following procedure Monday. Patient will require extended treatment due to recurrent infection and multiple joint prostheses. Currently recommendation for continued vancomycin as patient failed outpatient treatment with daptomycin immediately prior to this admission.    Tentative end date 4/14/23.     DC picc line  vanco

## 2023-03-17 NOTE — PLAN OF CARE
Problem: Diabetes Comorbidity  Goal: Blood Glucose Level Within Targeted Range  Outcome: Ongoing, Progressing  Intervention: Monitor and Manage Glycemia  Flowsheets (Taken 3/17/2023 1145)  Glycemic Management: blood glucose monitored     Problem: Pain Acute  Goal: Acceptable Pain Control and Functional Ability  Outcome: Ongoing, Not Progressing  Intervention: Prevent or Manage Pain  Flowsheets (Taken 3/17/2023 1145)  Bowel Elimination Promotion:   adequate fluid intake promoted   ambulation promoted

## 2023-03-18 LAB
ANION GAP SERPL CALCULATED.3IONS-SCNC: 13 MMOL/L (ref 7–16)
BACTERIA BLD CULT: ABNORMAL
BASOPHILS # BLD AUTO: 0.08 K/UL (ref 0–0.2)
BASOPHILS NFR BLD AUTO: 0.8 % (ref 0–1)
BUN SERPL-MCNC: 22 MG/DL (ref 7–18)
BUN/CREAT SERPL: 25 (ref 6–20)
CALCIUM SERPL-MCNC: 9.1 MG/DL (ref 8.5–10.1)
CHLORIDE SERPL-SCNC: 99 MMOL/L (ref 98–107)
CO2 SERPL-SCNC: 27 MMOL/L (ref 21–32)
CREAT SERPL-MCNC: 0.87 MG/DL (ref 0.55–1.02)
DIFFERENTIAL METHOD BLD: ABNORMAL
EGFR (NO RACE VARIABLE) (RUSH/TITUS): 79 ML/MIN/1.73M²
EOSINOPHIL # BLD AUTO: 0.43 K/UL (ref 0–0.5)
EOSINOPHIL NFR BLD AUTO: 4.5 % (ref 1–4)
ERYTHROCYTE [DISTWIDTH] IN BLOOD BY AUTOMATED COUNT: 19.3 % (ref 11.5–14.5)
GLUCOSE SERPL-MCNC: 135 MG/DL (ref 70–105)
GLUCOSE SERPL-MCNC: 163 MG/DL (ref 70–105)
GLUCOSE SERPL-MCNC: 189 MG/DL (ref 70–105)
GLUCOSE SERPL-MCNC: 291 MG/DL (ref 70–105)
GLUCOSE SERPL-MCNC: 294 MG/DL (ref 74–106)
GRAM STN SPEC: ABNORMAL
GRAM STN SPEC: ABNORMAL
HCT VFR BLD AUTO: 31.6 % (ref 38–47)
HGB BLD-MCNC: 9.4 G/DL (ref 12–16)
IMM GRANULOCYTES # BLD AUTO: 0.1 K/UL (ref 0–0.04)
IMM GRANULOCYTES NFR BLD: 1 % (ref 0–0.4)
LYMPHOCYTES # BLD AUTO: 2.77 K/UL (ref 1–4.8)
LYMPHOCYTES NFR BLD AUTO: 28.8 % (ref 27–41)
MCH RBC QN AUTO: 24.2 PG (ref 27–31)
MCHC RBC AUTO-ENTMCNC: 29.7 G/DL (ref 32–36)
MCV RBC AUTO: 81.4 FL (ref 80–96)
MONOCYTES # BLD AUTO: 0.62 K/UL (ref 0–0.8)
MONOCYTES NFR BLD AUTO: 6.4 % (ref 2–6)
MPC BLD CALC-MCNC: 9.2 FL (ref 9.4–12.4)
NEUTROPHILS # BLD AUTO: 5.62 K/UL (ref 1.8–7.7)
NEUTROPHILS NFR BLD AUTO: 58.5 % (ref 53–65)
NRBC # BLD AUTO: 0 X10E3/UL
NRBC, AUTO (.00): 0 %
PLATELET # BLD AUTO: 367 K/UL (ref 150–400)
POTASSIUM SERPL-SCNC: 4.1 MMOL/L (ref 3.5–5.1)
RBC # BLD AUTO: 3.88 M/UL (ref 4.2–5.4)
SODIUM SERPL-SCNC: 135 MMOL/L (ref 136–145)
VANCOMYCIN TROUGH SERPL-MCNC: 18.8 ΜG/ML (ref 10–20)
WBC # BLD AUTO: 9.62 K/UL (ref 4.5–11)

## 2023-03-18 PROCEDURE — 80202 ASSAY OF VANCOMYCIN: CPT | Performed by: STUDENT IN AN ORGANIZED HEALTH CARE EDUCATION/TRAINING PROGRAM

## 2023-03-18 PROCEDURE — 11000001 HC ACUTE MED/SURG PRIVATE ROOM

## 2023-03-18 PROCEDURE — 99232 SBSQ HOSP IP/OBS MODERATE 35: CPT | Mod: ,,, | Performed by: STUDENT IN AN ORGANIZED HEALTH CARE EDUCATION/TRAINING PROGRAM

## 2023-03-18 PROCEDURE — 63600175 PHARM REV CODE 636 W HCPCS: Performed by: FAMILY MEDICINE

## 2023-03-18 PROCEDURE — 85025 COMPLETE CBC W/AUTO DIFF WBC: CPT | Performed by: FAMILY MEDICINE

## 2023-03-18 PROCEDURE — 63600175 PHARM REV CODE 636 W HCPCS: Mod: TB,JG | Performed by: ORTHOPAEDIC SURGERY

## 2023-03-18 PROCEDURE — 25000003 PHARM REV CODE 250: Performed by: FAMILY MEDICINE

## 2023-03-18 PROCEDURE — 82962 GLUCOSE BLOOD TEST: CPT

## 2023-03-18 PROCEDURE — 99232 PR SUBSEQUENT HOSPITAL CARE,LEVL II: ICD-10-PCS | Mod: ,,, | Performed by: STUDENT IN AN ORGANIZED HEALTH CARE EDUCATION/TRAINING PROGRAM

## 2023-03-18 PROCEDURE — 94761 N-INVAS EAR/PLS OXIMETRY MLT: CPT

## 2023-03-18 PROCEDURE — 25000003 PHARM REV CODE 250: Performed by: STUDENT IN AN ORGANIZED HEALTH CARE EDUCATION/TRAINING PROGRAM

## 2023-03-18 PROCEDURE — 80048 BASIC METABOLIC PNL TOTAL CA: CPT | Performed by: FAMILY MEDICINE

## 2023-03-18 PROCEDURE — 63600175 PHARM REV CODE 636 W HCPCS: Performed by: STUDENT IN AN ORGANIZED HEALTH CARE EDUCATION/TRAINING PROGRAM

## 2023-03-18 RX ORDER — INSULIN ASPART 100 [IU]/ML
3 INJECTION, SOLUTION INTRAVENOUS; SUBCUTANEOUS
Status: DISCONTINUED | OUTPATIENT
Start: 2023-03-18 | End: 2023-03-20

## 2023-03-18 RX ADMIN — Medication 1 CAPSULE: at 04:03

## 2023-03-18 RX ADMIN — CEFAZOLIN 2 G: 2 INJECTION, POWDER, FOR SOLUTION INTRAMUSCULAR; INTRAVENOUS at 03:03

## 2023-03-18 RX ADMIN — INSULIN ASPART 3 UNITS: 100 INJECTION, SOLUTION INTRAVENOUS; SUBCUTANEOUS at 04:03

## 2023-03-18 RX ADMIN — MORPHINE SULFATE 2 MG: 2 INJECTION, SOLUTION INTRAMUSCULAR; INTRAVENOUS at 03:03

## 2023-03-18 RX ADMIN — DOCUSATE SODIUM 100 MG: 100 CAPSULE, LIQUID FILLED ORAL at 09:03

## 2023-03-18 RX ADMIN — DULOXETINE HYDROCHLORIDE 60 MG: 30 CAPSULE, DELAYED RELEASE ORAL at 09:03

## 2023-03-18 RX ADMIN — LOSARTAN POTASSIUM 25 MG: 25 TABLET, FILM COATED ORAL at 09:03

## 2023-03-18 RX ADMIN — INSULIN ASPART 3 UNITS: 100 INJECTION, SOLUTION INTRAVENOUS; SUBCUTANEOUS at 08:03

## 2023-03-18 RX ADMIN — GABAPENTIN 300 MG: 300 CAPSULE ORAL at 08:03

## 2023-03-18 RX ADMIN — OXYCODONE AND ACETAMINOPHEN 2 TABLET: 325; 5 TABLET ORAL at 05:03

## 2023-03-18 RX ADMIN — MORPHINE SULFATE 4 MG: 4 INJECTION, SOLUTION INTRAMUSCULAR; INTRAVENOUS at 08:03

## 2023-03-18 RX ADMIN — INSULIN DETEMIR 30 UNITS: 100 INJECTION, SOLUTION SUBCUTANEOUS at 08:03

## 2023-03-18 RX ADMIN — INSULIN ASPART 3 UNITS: 100 INJECTION, SOLUTION INTRAVENOUS; SUBCUTANEOUS at 12:03

## 2023-03-18 RX ADMIN — INSULIN DETEMIR 30 UNITS: 100 INJECTION, SOLUTION SUBCUTANEOUS at 09:03

## 2023-03-18 RX ADMIN — Medication 1 CAPSULE: at 08:03

## 2023-03-18 RX ADMIN — OXYCODONE AND ACETAMINOPHEN 2 TABLET: 325; 5 TABLET ORAL at 12:03

## 2023-03-18 RX ADMIN — GABAPENTIN 300 MG: 300 CAPSULE ORAL at 09:03

## 2023-03-18 RX ADMIN — FERROUS SULFATE TAB 325 MG (65 MG ELEMENTAL FE) 1 EACH: 325 (65 FE) TAB at 09:03

## 2023-03-18 RX ADMIN — MORPHINE SULFATE 4 MG: 4 INJECTION, SOLUTION INTRAMUSCULAR; INTRAVENOUS at 09:03

## 2023-03-18 RX ADMIN — Medication 1 CAPSULE: at 12:03

## 2023-03-18 RX ADMIN — HYDRALAZINE HYDROCHLORIDE 10 MG: 10 TABLET, FILM COATED ORAL at 09:03

## 2023-03-18 RX ADMIN — CEFAZOLIN 2 G: 2 INJECTION, POWDER, FOR SOLUTION INTRAMUSCULAR; INTRAVENOUS at 05:03

## 2023-03-18 RX ADMIN — INSULIN ASPART 1 UNITS: 100 INJECTION, SOLUTION INTRAVENOUS; SUBCUTANEOUS at 11:03

## 2023-03-18 RX ADMIN — OXYCODONE AND ACETAMINOPHEN 2 TABLET: 325; 5 TABLET ORAL at 11:03

## 2023-03-18 RX ADMIN — VANCOMYCIN HYDROCHLORIDE 1250 MG: 1 INJECTION, POWDER, LYOPHILIZED, FOR SOLUTION INTRAVENOUS at 04:03

## 2023-03-18 RX ADMIN — CEFAZOLIN 2 G: 2 INJECTION, POWDER, FOR SOLUTION INTRAMUSCULAR; INTRAVENOUS at 11:03

## 2023-03-18 NOTE — PROGRESS NOTES
[facility]  [facility]  Progress Note    Patient Name: Monica Walker  MRN: [id number]  Patient Class: IP- Inpatient   Admission Date: 3/6/2023  Length of Stay: 12 days  Attending Physician: Adilson Fontanez DO  Primary Care Provider: Hannah Schulz MD        Subjective:     Principal Problem:Septic arthritis        HPI:  Monica Walker is a 52-year-old female with history of diabetes, hypertension, and MRSA joint space infection, who presented with complaints of left knee pain and swelling. She states the pain began suddenly yesterday, denies any recent falls or other associated trauma. Pain is worse along the lateral joint space with radiation down into the foot. There is a discreet area of fluctuance along the lateral surface of the knee, with generalized swelling of the joint to approximately 2x the size of the contralateral knee. Patient also complains of some fever, generalized weakness and productive cough for the past few days as well as dark stool.     Patient has a history of remote joint replacement (2015) with recent septic arthritis requiring debridement of the left knee as well as one shoulder. She was also recently hospitalized for with MRSA spinal epidural abscess, discharged about a month ago with outpatient daptomycin.      Initial evaluation with the following with WBC 12.39, LA 0.8, Hgb 7.4, glucose 200, labs otherwise unremarkable. Mildly elevated temp (99.5) and mild tachycardia (105), vital signs otherwise stable and grossly within normal limits.     Monica Walker is to be admitted to hospital service for evaluation and treatment of suspected septic arthritis. Orthopedics has been consulted. Blood cultures and aspiration/culture of affected knee pending at time of admission.       Overview/Hospital Course:  3/7: No acute events overnight, patient with no complaints this morning. US with aspiration was attempted however unable to obtain a specimen as fluid  "was too thick. Ortho consult pending.     3/8: Blood culture positive for MRSA, pharmacy notified to continue vancomycin. No other changes, Dr. Noyola to evaluate later today pending x-ray.    3/9: No acute events overnight, patient with no complaints aside from continued pain in the left knee. Ortho following with plans to monitor fluid cultures over the weekend with tentative plans for surgery on Monday. Plans to repeat blood cultures following procedure, if negative patient will need approximately one month of IV antibiotics from date of negative culture. Would benefit from continuation of vancomycin as she has failed outpatient treatment with daptomycin immediately prior to this admission.    3/10: Patient with no new complaints this morning, light growth of G+ cocci noted in knee aspirate culture. Continue vancomycin for MRSA bacteremia and joint infection, ortho following.    3/11: No major changes overnight, knee aspirate with MRSA. Plans for partial knee removal with placement of antibiotic spacer on Monday.    3/12: Patient continues to complain of pain, however is not taking scheduled PO medications as "they don't work." Will make changes to scheduled therapy but advised patient she will need to take medications consistently in order for them to work effectively.    3/13: Removal of hardware by Dr. Noyola today, patient tolerated procedure well with no immediate complications. Somewhat sedated from pain medications with vital signs stable and grossly WNL.    3/14: Post op #1 removal of hardware and placement of antibiotic spacer by Dr. Noyola. Now that presumed infective focus has been removed will repeat blood cultures, once negative patient will require approximately 4 weeks of IV antibiotic therapy. Recommend vancomycin as patient has already failed outpatient treatment with daptomycin. Plan for transfer to Specialty for extended antibiotics and physical therapy.     3/15: No acute events " overnight, patient continues to complain of pain and generally feeling unwell. Repeat blood cultures yesterday now positive for Klebsiella pneumonia, will add Rocephin pending final C&S. Continue vancomycin for MRSA bacteremia with indwelling prostheses.     3/16- picc line has been in during repeated cases of bacteremia, DC today. Line break for atleast 48h  3/17-continue abx, awaiting echo read- she had negative MONICA in November but is having recurrent MRSA bacteremia.  Will discuss need for repeat with Cardiology.  3/18-Continue abx      Interval History: naeo    Review of Systems   Constitutional:  Negative for chills, fatigue and fever.   HENT: Negative.     Respiratory:  Negative for cough and shortness of breath.    Cardiovascular:  Negative for chest pain.   Gastrointestinal:  Negative for abdominal pain, blood in stool, diarrhea, nausea and vomiting.   Musculoskeletal:  Positive for arthralgias.   Skin: Negative.    Neurological:  Positive for weakness.   Psychiatric/Behavioral:  Negative for confusion.    Objective:     Vital Signs (Most Recent):  Temp: 98 °F (36.7 °C) (03/18/23 0600)  Pulse: 67 (03/18/23 0600)  Resp: 16 (03/18/23 0914)  BP: 113/63 (03/18/23 0600)  SpO2: 96 % (03/18/23 0600) Vital Signs (24h Range):  Temp:  [97.8 °F (36.6 °C)-98.7 °F (37.1 °C)] 98 °F (36.7 °C)  Pulse:  [65-80] 67  Resp:  [16-20] 16  SpO2:  [94 %-97 %] 96 %  BP: (113-126)/(60-64) 113/63     Weight: 64 kg (141 lb)  Body mass index is 22.76 kg/m².  No intake or output data in the 24 hours ending 03/18/23 1044     Physical Exam  Constitutional:       General: She is not in acute distress.     Appearance: Normal appearance. She is not ill-appearing.   HENT:      Head: Normocephalic and atraumatic.      Nose: Nose normal.   Eyes:      Conjunctiva/sclera: Conjunctivae normal.      Pupils: Pupils are equal, round, and reactive to light.   Cardiovascular:      Rate and Rhythm: Normal rate and regular rhythm.   Pulmonary:       Effort: Pulmonary effort is normal. No respiratory distress.      Breath sounds: Normal breath sounds.   Musculoskeletal:      Cervical back: No rigidity.      Comments: Dressing, immobilizer left knee. Limb neurovascularly intact.   Skin:     General: Skin is warm and dry.      Coloration: Skin is not jaundiced or pale.      Findings: No rash.   Neurological:      Mental Status: She is alert.   Psychiatric:         Behavior: Behavior normal.         Thought Content: Thought content normal.       Significant Labs: All pertinent labs within the past 24 hours have been reviewed.    Significant Imaging: I have reviewed all pertinent imaging results/findings within the past 24 hours.      Assessment/Plan:      * Septic arthritis  Post op #1 removal of hardware and placement of antibiotic spacer by Dr. Noyola. Now that presumed infective focus has been removed will repeat blood cultures, once negative patient will require approximately 4 weeks of IV antibiotic therapy. Recommend vancomycin as patient has already failed outpatient treatment with daptomycin. Plan for transfer to Specialty for extended antibiotics and physical therapy.     Left knee aspirate with MRSA sensitive to vancomycin. Continue current treatment.         Bacteremia due to Klebsiella pneumoniae  Ancef  End date 3/30      Hypertension  Adjust medications as needed      Microcytic anemia  Stable, follow HB      MRSA bacteremia  Blood cultures on admission positive for MRSA, currently receiving vancomycin. Plan to repeat cultures following procedure Monday. Patient will require extended treatment due to recurrent infection and multiple joint prostheses. Currently recommendation for continued vancomycin as patient failed outpatient treatment with daptomycin immediately prior to this admission.    Tentative end date 4/14/23.     DC picc line  vanco    Type 2 diabetes mellitus, with long-term current use of insulin  Patient's FSGs are uncontrolled due to  hyperglycemia on current medication regimen.  Last A1c reviewed-   Lab Results   Component Value Date    HGBA1C 7.9 (H) 01/19/2023     Most recent fingerstick glucose reviewed- No results for input(s): POCTGLUCOSE in the last 24 hours.  Current correctional scale  Medium  Increase anti-hyperglycemic dose as follows-   Antihyperglycemics (From admission, onward)    Start     Stop Route Frequency Ordered    03/17/23 2100  insulin detemir U-100 injection 30 Units         -- SubQ 2 times daily 03/17/23 1338    03/14/23 1029  insulin aspart U-100 injection 1-10 Units         -- SubQ Before meals & nightly PRN 03/14/23 0930        Hold Oral hypoglycemics while patient is in the hospital.    Arthritis  Continue home medications. Added Toradol 15 mg IV q6h as needed.         VTE Risk Mitigation (From admission, onward)         Ordered     IP VTE HIGH RISK PATIENT  Once         03/13/23 1226     Place DONOVAN hose  Until discontinued         03/13/23 1226     Place sequential compression device  Until discontinued         03/06/23 1643                Discharge Planning   SUZANNA:      Code Status: Full Code   Is the patient medically ready for discharge?:     Reason for patient still in hospital (select all that apply): Treatment  Discharge Plan A: Home with family, Home Health                  Adilson Fontanez DO  Department of Hospital Medicine   [facility]

## 2023-03-18 NOTE — PLAN OF CARE
Problem: Adult Inpatient Plan of Care  Goal: Plan of Care Review  Outcome: Ongoing, Progressing  Goal: Optimal Comfort and Wellbeing  Outcome: Ongoing, Progressing     Problem: Diabetes Comorbidity  Goal: Blood Glucose Level Within Targeted Range  Outcome: Ongoing, Progressing     Problem: Infection  Goal: Absence of Infection Signs and Symptoms  Outcome: Ongoing, Progressing     Problem: Pain Acute  Goal: Acceptable Pain Control and Functional Ability  Outcome: Ongoing, Progressing

## 2023-03-18 NOTE — SUBJECTIVE & OBJECTIVE
Interval History: naeo    Review of Systems   Constitutional:  Negative for chills, fatigue and fever.   HENT: Negative.     Respiratory:  Negative for cough and shortness of breath.    Cardiovascular:  Negative for chest pain.   Gastrointestinal:  Negative for abdominal pain, blood in stool, diarrhea, nausea and vomiting.   Musculoskeletal:  Positive for arthralgias.   Skin: Negative.    Neurological:  Positive for weakness.   Psychiatric/Behavioral:  Negative for confusion.    Objective:     Vital Signs (Most Recent):  Temp: 98 °F (36.7 °C) (03/18/23 0600)  Pulse: 67 (03/18/23 0600)  Resp: 16 (03/18/23 0914)  BP: 113/63 (03/18/23 0600)  SpO2: 96 % (03/18/23 0600) Vital Signs (24h Range):  Temp:  [97.8 °F (36.6 °C)-98.7 °F (37.1 °C)] 98 °F (36.7 °C)  Pulse:  [65-80] 67  Resp:  [16-20] 16  SpO2:  [94 %-97 %] 96 %  BP: (113-126)/(60-64) 113/63     Weight: 64 kg (141 lb)  Body mass index is 22.76 kg/m².  No intake or output data in the 24 hours ending 03/18/23 1044     Physical Exam  Constitutional:       General: She is not in acute distress.     Appearance: Normal appearance. She is not ill-appearing.   HENT:      Head: Normocephalic and atraumatic.      Nose: Nose normal.   Eyes:      Conjunctiva/sclera: Conjunctivae normal.      Pupils: Pupils are equal, round, and reactive to light.   Cardiovascular:      Rate and Rhythm: Normal rate and regular rhythm.   Pulmonary:      Effort: Pulmonary effort is normal. No respiratory distress.      Breath sounds: Normal breath sounds.   Musculoskeletal:      Cervical back: No rigidity.      Comments: Dressing, immobilizer left knee. Limb neurovascularly intact.   Skin:     General: Skin is warm and dry.      Coloration: Skin is not jaundiced or pale.      Findings: No rash.   Neurological:      Mental Status: She is alert.   Psychiatric:         Behavior: Behavior normal.         Thought Content: Thought content normal.       Significant Labs: All pertinent labs within the  past 24 hours have been reviewed.    Significant Imaging: I have reviewed all pertinent imaging results/findings within the past 24 hours.

## 2023-03-18 NOTE — PLAN OF CARE
Problem: Diabetes Comorbidity  Goal: Blood Glucose Level Within Targeted Range  Outcome: Ongoing, Progressing  Intervention: Monitor and Manage Glycemia  Flowsheets (Taken 3/18/2023 1805)  Glycemic Management: blood glucose monitored     Problem: Fall Injury Risk  Goal: Absence of Fall and Fall-Related Injury  Outcome: Ongoing, Progressing  Intervention: Identify and Manage Contributors  Flowsheets (Taken 3/18/2023 1805)  Medication Review/Management: medications reviewed     Problem: Pain Acute  Goal: Acceptable Pain Control and Functional Ability  Outcome: Ongoing, Not Progressing  Intervention: Develop Pain Management Plan  Flowsheets (Taken 3/18/2023 1805)  Pain Management Interventions: around-the-clock dosing utilized

## 2023-03-19 LAB
GLUCOSE SERPL-MCNC: 170 MG/DL (ref 70–105)
GLUCOSE SERPL-MCNC: 181 MG/DL (ref 70–105)
GLUCOSE SERPL-MCNC: 254 MG/DL (ref 70–105)
GLUCOSE SERPL-MCNC: 279 MG/DL (ref 70–105)

## 2023-03-19 PROCEDURE — 25000003 PHARM REV CODE 250: Performed by: STUDENT IN AN ORGANIZED HEALTH CARE EDUCATION/TRAINING PROGRAM

## 2023-03-19 PROCEDURE — 63600175 PHARM REV CODE 636 W HCPCS: Performed by: FAMILY MEDICINE

## 2023-03-19 PROCEDURE — 63600175 PHARM REV CODE 636 W HCPCS: Performed by: STUDENT IN AN ORGANIZED HEALTH CARE EDUCATION/TRAINING PROGRAM

## 2023-03-19 PROCEDURE — 99231 SBSQ HOSP IP/OBS SF/LOW 25: CPT | Mod: ,,, | Performed by: STUDENT IN AN ORGANIZED HEALTH CARE EDUCATION/TRAINING PROGRAM

## 2023-03-19 PROCEDURE — 82962 GLUCOSE BLOOD TEST: CPT

## 2023-03-19 PROCEDURE — 25000003 PHARM REV CODE 250: Performed by: FAMILY MEDICINE

## 2023-03-19 PROCEDURE — 63600175 PHARM REV CODE 636 W HCPCS: Mod: TB,JG | Performed by: ORTHOPAEDIC SURGERY

## 2023-03-19 PROCEDURE — 99231 PR SUBSEQUENT HOSPITAL CARE,LEVL I: ICD-10-PCS | Mod: ,,, | Performed by: STUDENT IN AN ORGANIZED HEALTH CARE EDUCATION/TRAINING PROGRAM

## 2023-03-19 PROCEDURE — 11000001 HC ACUTE MED/SURG PRIVATE ROOM

## 2023-03-19 RX ADMIN — OXYCODONE AND ACETAMINOPHEN 2 TABLET: 325; 5 TABLET ORAL at 05:03

## 2023-03-19 RX ADMIN — MORPHINE SULFATE 4 MG: 4 INJECTION, SOLUTION INTRAMUSCULAR; INTRAVENOUS at 09:03

## 2023-03-19 RX ADMIN — DULOXETINE HYDROCHLORIDE 60 MG: 30 CAPSULE, DELAYED RELEASE ORAL at 08:03

## 2023-03-19 RX ADMIN — Medication 1 CAPSULE: at 11:03

## 2023-03-19 RX ADMIN — OXYCODONE AND ACETAMINOPHEN 2 TABLET: 325; 5 TABLET ORAL at 11:03

## 2023-03-19 RX ADMIN — GABAPENTIN 300 MG: 300 CAPSULE ORAL at 09:03

## 2023-03-19 RX ADMIN — FERROUS SULFATE TAB 325 MG (65 MG ELEMENTAL FE) 1 EACH: 325 (65 FE) TAB at 08:03

## 2023-03-19 RX ADMIN — VANCOMYCIN HYDROCHLORIDE 1250 MG: 1 INJECTION, POWDER, LYOPHILIZED, FOR SOLUTION INTRAVENOUS at 10:03

## 2023-03-19 RX ADMIN — Medication 1 CAPSULE: at 05:03

## 2023-03-19 RX ADMIN — DOCUSATE SODIUM 100 MG: 100 CAPSULE, LIQUID FILLED ORAL at 08:03

## 2023-03-19 RX ADMIN — INSULIN ASPART 3 UNITS: 100 INJECTION, SOLUTION INTRAVENOUS; SUBCUTANEOUS at 05:03

## 2023-03-19 RX ADMIN — CEFAZOLIN 2 G: 2 INJECTION, POWDER, FOR SOLUTION INTRAMUSCULAR; INTRAVENOUS at 03:03

## 2023-03-19 RX ADMIN — INSULIN DETEMIR 30 UNITS: 100 INJECTION, SOLUTION SUBCUTANEOUS at 09:03

## 2023-03-19 RX ADMIN — ONDANSETRON HYDROCHLORIDE 4 MG: 2 SOLUTION INTRAMUSCULAR; INTRAVENOUS at 03:03

## 2023-03-19 RX ADMIN — INSULIN ASPART 3 UNITS: 100 INJECTION, SOLUTION INTRAVENOUS; SUBCUTANEOUS at 11:03

## 2023-03-19 RX ADMIN — INSULIN ASPART 3 UNITS: 100 INJECTION, SOLUTION INTRAVENOUS; SUBCUTANEOUS at 08:03

## 2023-03-19 RX ADMIN — Medication 1 CAPSULE: at 08:03

## 2023-03-19 RX ADMIN — HYDRALAZINE HYDROCHLORIDE 10 MG: 10 TABLET, FILM COATED ORAL at 09:03

## 2023-03-19 RX ADMIN — INSULIN ASPART 3 UNITS: 100 INJECTION, SOLUTION INTRAVENOUS; SUBCUTANEOUS at 09:03

## 2023-03-19 RX ADMIN — CEFAZOLIN 2 G: 2 INJECTION, POWDER, FOR SOLUTION INTRAMUSCULAR; INTRAVENOUS at 05:03

## 2023-03-19 RX ADMIN — CEFAZOLIN 2 G: 2 INJECTION, POWDER, FOR SOLUTION INTRAMUSCULAR; INTRAVENOUS at 10:03

## 2023-03-19 RX ADMIN — GABAPENTIN 300 MG: 300 CAPSULE ORAL at 08:03

## 2023-03-19 NOTE — PLAN OF CARE
Problem: Adult Inpatient Plan of Care  Goal: Plan of Care Review  Outcome: Ongoing, Progressing  Goal: Optimal Comfort and Wellbeing  Outcome: Ongoing, Progressing     Problem: Diabetes Comorbidity  Goal: Blood Glucose Level Within Targeted Range  Outcome: Ongoing, Progressing     Problem: Infection  Goal: Absence of Infection Signs and Symptoms  Outcome: Ongoing, Progressing     Problem: Impaired Wound Healing  Goal: Optimal Wound Healing  Outcome: Ongoing, Progressing

## 2023-03-19 NOTE — PROGRESS NOTES
[facility]  [facility]  Progress Note    Patient Name: Monica Walker  MRN: [id number]  Patient Class: IP- Inpatient   Admission Date: 3/6/2023  Length of Stay: 13 days  Attending Physician: Adilson Fontanez DO  Primary Care Provider: Hannah Schulz MD        Subjective:     Principal Problem:Septic arthritis        HPI:  Monica Walker is a 52-year-old female with history of diabetes, hypertension, and MRSA joint space infection, who presented with complaints of left knee pain and swelling. She states the pain began suddenly yesterday, denies any recent falls or other associated trauma. Pain is worse along the lateral joint space with radiation down into the foot. There is a discreet area of fluctuance along the lateral surface of the knee, with generalized swelling of the joint to approximately 2x the size of the contralateral knee. Patient also complains of some fever, generalized weakness and productive cough for the past few days as well as dark stool.     Patient has a history of remote joint replacement (2015) with recent septic arthritis requiring debridement of the left knee as well as one shoulder. She was also recently hospitalized for with MRSA spinal epidural abscess, discharged about a month ago with outpatient daptomycin.      Initial evaluation with the following with WBC 12.39, LA 0.8, Hgb 7.4, glucose 200, labs otherwise unremarkable. Mildly elevated temp (99.5) and mild tachycardia (105), vital signs otherwise stable and grossly within normal limits.     Monica Walker is to be admitted to hospital service for evaluation and treatment of suspected septic arthritis. Orthopedics has been consulted. Blood cultures and aspiration/culture of affected knee pending at time of admission.       Overview/Hospital Course:  3/7: No acute events overnight, patient with no complaints this morning. US with aspiration was attempted however unable to obtain a specimen as fluid  "was too thick. Ortho consult pending.     3/8: Blood culture positive for MRSA, pharmacy notified to continue vancomycin. No other changes, Dr. Noyola to evaluate later today pending x-ray.    3/9: No acute events overnight, patient with no complaints aside from continued pain in the left knee. Ortho following with plans to monitor fluid cultures over the weekend with tentative plans for surgery on Monday. Plans to repeat blood cultures following procedure, if negative patient will need approximately one month of IV antibiotics from date of negative culture. Would benefit from continuation of vancomycin as she has failed outpatient treatment with daptomycin immediately prior to this admission.    3/10: Patient with no new complaints this morning, light growth of G+ cocci noted in knee aspirate culture. Continue vancomycin for MRSA bacteremia and joint infection, ortho following.    3/11: No major changes overnight, knee aspirate with MRSA. Plans for partial knee removal with placement of antibiotic spacer on Monday.    3/12: Patient continues to complain of pain, however is not taking scheduled PO medications as "they don't work." Will make changes to scheduled therapy but advised patient she will need to take medications consistently in order for them to work effectively.    3/13: Removal of hardware by Dr. Noyola today, patient tolerated procedure well with no immediate complications. Somewhat sedated from pain medications with vital signs stable and grossly WNL.    3/14: Post op #1 removal of hardware and placement of antibiotic spacer by Dr. Noyola. Now that presumed infective focus has been removed will repeat blood cultures, once negative patient will require approximately 4 weeks of IV antibiotic therapy. Recommend vancomycin as patient has already failed outpatient treatment with daptomycin. Plan for transfer to Specialty for extended antibiotics and physical therapy.     3/15: No acute events " overnight, patient continues to complain of pain and generally feeling unwell. Repeat blood cultures yesterday now positive for Klebsiella pneumonia, will add Rocephin pending final C&S. Continue vancomycin for MRSA bacteremia with indwelling prostheses.     3/16- picc line has been in during repeated cases of bacteremia, DC today. Line break for atleast 48h  3/17-continue abx, awaiting echo read- she had negative MONICA in November but is having recurrent MRSA bacteremia.  Will discuss need for repeat with Cardiology.  3/18-Continue abx  3/19-abx- attempting to arrange home IV abx      Interval History: naeo    Review of Systems   Constitutional:  Negative for chills, fatigue and fever.   HENT: Negative.     Respiratory:  Negative for cough and shortness of breath.    Cardiovascular:  Negative for chest pain.   Gastrointestinal:  Negative for abdominal pain, blood in stool, diarrhea, nausea and vomiting.   Musculoskeletal:  Positive for arthralgias.   Skin: Negative.    Neurological:  Positive for weakness.   Psychiatric/Behavioral:  Negative for confusion.    Objective:     Vital Signs (Most Recent):  Temp: 98.1 °F (36.7 °C) (03/19/23 1001)  Pulse: 62 (03/19/23 1001)  Resp: 16 (03/19/23 1001)  BP: (!) 112/58 (03/19/23 1001)  SpO2: 95 % (03/19/23 1001) Vital Signs (24h Range):  Temp:  [97.7 °F (36.5 °C)-98.2 °F (36.8 °C)] 98.1 °F (36.7 °C)  Pulse:  [62-75] 62  Resp:  [16-18] 16  SpO2:  [95 %-97 %] 95 %  BP: (105-138)/(54-77) 112/58     Weight: 64 kg (141 lb)  Body mass index is 22.76 kg/m².    Intake/Output Summary (Last 24 hours) at 3/19/2023 1017  Last data filed at 3/19/2023 0557  Gross per 24 hour   Intake --   Output 30 ml   Net -30 ml        Physical Exam  Constitutional:       General: She is not in acute distress.     Appearance: Normal appearance. She is not ill-appearing.   HENT:      Head: Normocephalic and atraumatic.      Nose: Nose normal.   Eyes:      Conjunctiva/sclera: Conjunctivae normal.       Pupils: Pupils are equal, round, and reactive to light.   Cardiovascular:      Rate and Rhythm: Normal rate and regular rhythm.   Pulmonary:      Effort: Pulmonary effort is normal. No respiratory distress.      Breath sounds: Normal breath sounds.   Musculoskeletal:      Cervical back: No rigidity.      Comments: Dressing, immobilizer left knee. Limb neurovascularly intact.   Skin:     General: Skin is warm and dry.      Coloration: Skin is not jaundiced or pale.      Findings: No rash.   Neurological:      Mental Status: She is alert.   Psychiatric:         Behavior: Behavior normal.         Thought Content: Thought content normal.       Significant Labs: All pertinent labs within the past 24 hours have been reviewed.    Significant Imaging: I have reviewed all pertinent imaging results/findings within the past 24 hours.      Assessment/Plan:      * Septic arthritis  Post op #1 removal of hardware and placement of antibiotic spacer by Dr. Noyola. Now that presumed infective focus has been removed will repeat blood cultures, once negative patient will require approximately 4 weeks of IV antibiotic therapy. Recommend vancomycin as patient has already failed outpatient treatment with daptomycin. Plan for transfer to Specialty for extended antibiotics and physical therapy.     Left knee aspirate with MRSA sensitive to vancomycin. Continue current treatment.         Bacteremia due to Klebsiella pneumoniae  Ancef  End date 3/30      Hypertension  Adjust medications as needed      Microcytic anemia  Stable, follow HB      MRSA bacteremia  Blood cultures on admission positive for MRSA, currently receiving vancomycin. Plan to repeat cultures following procedure Monday. Patient will require extended treatment due to recurrent infection and multiple joint prostheses. Currently recommendation for continued vancomycin as patient failed outpatient treatment with daptomycin immediately prior to this admission.    Tentative  end date 4/14/23.     DC picc line  vanco    Type 2 diabetes mellitus, with long-term current use of insulin  Patient's FSGs are uncontrolled due to hyperglycemia on current medication regimen.  Last A1c reviewed-   Lab Results   Component Value Date    HGBA1C 7.9 (H) 01/19/2023     Most recent fingerstick glucose reviewed- No results for input(s): POCTGLUCOSE in the last 24 hours.  Current correctional scale  Medium  Increase anti-hyperglycemic dose as follows-   Antihyperglycemics (From admission, onward)    Start     Stop Route Frequency Ordered    03/17/23 2100  insulin detemir U-100 injection 30 Units         -- SubQ 2 times daily 03/17/23 1338    03/14/23 1029  insulin aspart U-100 injection 1-10 Units         -- SubQ Before meals & nightly PRN 03/14/23 0930        Hold Oral hypoglycemics while patient is in the hospital.    Arthritis  Continue home medications. Added Toradol 15 mg IV q6h as needed.         VTE Risk Mitigation (From admission, onward)         Ordered     IP VTE HIGH RISK PATIENT  Once         03/13/23 1226     Place DONOVAN hose  Until discontinued         03/13/23 1226     Place sequential compression device  Until discontinued         03/06/23 1643                Discharge Planning   SUZANNA:      Code Status: Full Code   Is the patient medically ready for discharge?:     Reason for patient still in hospital (select all that apply): Treatment  Discharge Plan A: Home with family, Home Health                  Adilson Fontanez DO  Department of Hospital Medicine   [facility]

## 2023-03-19 NOTE — SUBJECTIVE & OBJECTIVE
Interval History: naeo    Review of Systems   Constitutional:  Negative for chills, fatigue and fever.   HENT: Negative.     Respiratory:  Negative for cough and shortness of breath.    Cardiovascular:  Negative for chest pain.   Gastrointestinal:  Negative for abdominal pain, blood in stool, diarrhea, nausea and vomiting.   Musculoskeletal:  Positive for arthralgias.   Skin: Negative.    Neurological:  Positive for weakness.   Psychiatric/Behavioral:  Negative for confusion.    Objective:     Vital Signs (Most Recent):  Temp: 98.1 °F (36.7 °C) (03/19/23 1001)  Pulse: 62 (03/19/23 1001)  Resp: 16 (03/19/23 1001)  BP: (!) 112/58 (03/19/23 1001)  SpO2: 95 % (03/19/23 1001) Vital Signs (24h Range):  Temp:  [97.7 °F (36.5 °C)-98.2 °F (36.8 °C)] 98.1 °F (36.7 °C)  Pulse:  [62-75] 62  Resp:  [16-18] 16  SpO2:  [95 %-97 %] 95 %  BP: (105-138)/(54-77) 112/58     Weight: 64 kg (141 lb)  Body mass index is 22.76 kg/m².    Intake/Output Summary (Last 24 hours) at 3/19/2023 1017  Last data filed at 3/19/2023 0557  Gross per 24 hour   Intake --   Output 30 ml   Net -30 ml        Physical Exam  Constitutional:       General: She is not in acute distress.     Appearance: Normal appearance. She is not ill-appearing.   HENT:      Head: Normocephalic and atraumatic.      Nose: Nose normal.   Eyes:      Conjunctiva/sclera: Conjunctivae normal.      Pupils: Pupils are equal, round, and reactive to light.   Cardiovascular:      Rate and Rhythm: Normal rate and regular rhythm.   Pulmonary:      Effort: Pulmonary effort is normal. No respiratory distress.      Breath sounds: Normal breath sounds.   Musculoskeletal:      Cervical back: No rigidity.      Comments: Dressing, immobilizer left knee. Limb neurovascularly intact.   Skin:     General: Skin is warm and dry.      Coloration: Skin is not jaundiced or pale.      Findings: No rash.   Neurological:      Mental Status: She is alert.   Psychiatric:         Behavior: Behavior normal.          Thought Content: Thought content normal.       Significant Labs: All pertinent labs within the past 24 hours have been reviewed.    Significant Imaging: I have reviewed all pertinent imaging results/findings within the past 24 hours.

## 2023-03-20 ENCOUNTER — HOSPITAL ENCOUNTER (INPATIENT)
Facility: HOSPITAL | Age: 53
LOS: 38 days | Discharge: HOME | DRG: 550 | End: 2023-04-27
Attending: FAMILY MEDICINE | Admitting: FAMILY MEDICINE
Payer: MEDICARE

## 2023-03-20 VITALS
RESPIRATION RATE: 18 BRPM | BODY MASS INDEX: 22.68 KG/M2 | HEIGHT: 66 IN | OXYGEN SATURATION: 97 % | HEART RATE: 64 BPM | SYSTOLIC BLOOD PRESSURE: 142 MMHG | WEIGHT: 141.13 LBS | DIASTOLIC BLOOD PRESSURE: 69 MMHG | TEMPERATURE: 98 F

## 2023-03-20 DIAGNOSIS — M00.062 STAPHYLOCOCCAL ARTHRITIS OF LEFT KNEE: Primary | ICD-10-CM

## 2023-03-20 DIAGNOSIS — M00.9 SEPTIC ARTHRITIS: ICD-10-CM

## 2023-03-20 DIAGNOSIS — A49.02 MRSA (METHICILLIN RESISTANT STAPHYLOCOCCUS AUREUS): ICD-10-CM

## 2023-03-20 PROBLEM — N12 PYELONEPHRITIS: Status: RESOLVED | Noted: 2022-12-13 | Resolved: 2023-03-20

## 2023-03-20 LAB
ANION GAP SERPL CALCULATED.3IONS-SCNC: 11 MMOL/L (ref 7–16)
ANION GAP SERPL CALCULATED.3IONS-SCNC: 14 MMOL/L (ref 7–16)
BASOPHILS # BLD AUTO: 0.05 K/UL (ref 0–0.2)
BASOPHILS # BLD AUTO: 0.12 K/UL (ref 0–0.2)
BASOPHILS NFR BLD AUTO: 0.4 % (ref 0–1)
BASOPHILS NFR BLD AUTO: 1.1 % (ref 0–1)
BUN SERPL-MCNC: 18 MG/DL (ref 7–18)
BUN SERPL-MCNC: 19 MG/DL (ref 7–18)
BUN/CREAT SERPL: 19 (ref 6–20)
BUN/CREAT SERPL: 22 (ref 6–20)
CALCIUM SERPL-MCNC: 9.3 MG/DL (ref 8.5–10.1)
CALCIUM SERPL-MCNC: 9.5 MG/DL (ref 8.5–10.1)
CHLORIDE SERPL-SCNC: 100 MMOL/L (ref 98–107)
CHLORIDE SERPL-SCNC: 96 MMOL/L (ref 98–107)
CO2 SERPL-SCNC: 28 MMOL/L (ref 21–32)
CO2 SERPL-SCNC: 31 MMOL/L (ref 21–32)
CREAT SERPL-MCNC: 0.87 MG/DL (ref 0.55–1.02)
CREAT SERPL-MCNC: 0.96 MG/DL (ref 0.55–1.02)
DIFFERENTIAL METHOD BLD: ABNORMAL
DIFFERENTIAL METHOD BLD: ABNORMAL
EGFR (NO RACE VARIABLE) (RUSH/TITUS): 70 ML/MIN/1.73M²
EGFR (NO RACE VARIABLE) (RUSH/TITUS): 79 ML/MIN/1.73M²
EOSINOPHIL # BLD AUTO: 0.48 K/UL (ref 0–0.5)
EOSINOPHIL # BLD AUTO: 0.5 K/UL (ref 0–0.5)
EOSINOPHIL NFR BLD AUTO: 4 % (ref 1–4)
EOSINOPHIL NFR BLD AUTO: 4.4 % (ref 1–4)
ERYTHROCYTE [DISTWIDTH] IN BLOOD BY AUTOMATED COUNT: 19 % (ref 11.5–14.5)
ERYTHROCYTE [DISTWIDTH] IN BLOOD BY AUTOMATED COUNT: 19 % (ref 11.5–14.5)
GLUCOSE SERPL-MCNC: 188 MG/DL (ref 74–106)
GLUCOSE SERPL-MCNC: 297 MG/DL (ref 74–106)
GLUCOSE SERPL-MCNC: 299 MG/DL (ref 70–105)
GLUCOSE SERPL-MCNC: 81 MG/DL (ref 70–105)
HCT VFR BLD AUTO: 35.4 % (ref 38–47)
HCT VFR BLD AUTO: 35.9 % (ref 38–47)
HGB BLD-MCNC: 10.6 G/DL (ref 12–16)
HGB BLD-MCNC: 10.8 G/DL (ref 12–16)
IMM GRANULOCYTES # BLD AUTO: 0.09 K/UL (ref 0–0.04)
IMM GRANULOCYTES # BLD AUTO: 0.09 K/UL (ref 0–0.04)
IMM GRANULOCYTES NFR BLD: 0.7 % (ref 0–0.4)
IMM GRANULOCYTES NFR BLD: 0.8 % (ref 0–0.4)
LYMPHOCYTES # BLD AUTO: 2.83 K/UL (ref 1–4.8)
LYMPHOCYTES # BLD AUTO: 3.63 K/UL (ref 1–4.8)
LYMPHOCYTES NFR BLD AUTO: 22.8 % (ref 27–41)
LYMPHOCYTES NFR BLD AUTO: 33.5 % (ref 27–41)
MCH RBC QN AUTO: 24.4 PG (ref 27–31)
MCH RBC QN AUTO: 24.4 PG (ref 27–31)
MCHC RBC AUTO-ENTMCNC: 29.5 G/DL (ref 32–36)
MCHC RBC AUTO-ENTMCNC: 30.5 G/DL (ref 32–36)
MCV RBC AUTO: 79.9 FL (ref 80–96)
MCV RBC AUTO: 82.5 FL (ref 80–96)
MONOCYTES # BLD AUTO: 0.73 K/UL (ref 0–0.8)
MONOCYTES # BLD AUTO: 0.87 K/UL (ref 0–0.8)
MONOCYTES NFR BLD AUTO: 5.9 % (ref 2–6)
MONOCYTES NFR BLD AUTO: 8 % (ref 2–6)
MPC BLD CALC-MCNC: 8.9 FL (ref 9.4–12.4)
MPC BLD CALC-MCNC: 8.9 FL (ref 9.4–12.4)
NEUTROPHILS # BLD AUTO: 5.64 K/UL (ref 1.8–7.7)
NEUTROPHILS # BLD AUTO: 8.2 K/UL (ref 1.8–7.7)
NEUTROPHILS NFR BLD AUTO: 52.2 % (ref 53–65)
NEUTROPHILS NFR BLD AUTO: 66.2 % (ref 53–65)
NRBC # BLD AUTO: 0 X10E3/UL
NRBC, AUTO (.00): 0 %
PLATELET # BLD AUTO: 380 K/UL (ref 150–400)
PLATELET # BLD AUTO: 439 K/UL (ref 150–400)
POTASSIUM SERPL-SCNC: 4.7 MMOL/L (ref 3.5–5.1)
POTASSIUM SERPL-SCNC: 4.7 MMOL/L (ref 3.5–5.1)
RBC # BLD AUTO: 4.35 M/UL (ref 4.2–5.4)
RBC # BLD AUTO: 4.43 M/UL (ref 4.2–5.4)
SODIUM SERPL-SCNC: 133 MMOL/L (ref 136–145)
SODIUM SERPL-SCNC: 137 MMOL/L (ref 136–145)
WBC # BLD AUTO: 10.83 K/UL (ref 4.5–11)
WBC # BLD AUTO: 12.4 K/UL (ref 4.5–11)

## 2023-03-20 PROCEDURE — 99239 HOSP IP/OBS DSCHRG MGMT >30: CPT | Mod: ,,, | Performed by: STUDENT IN AN ORGANIZED HEALTH CARE EDUCATION/TRAINING PROGRAM

## 2023-03-20 PROCEDURE — 80048 BASIC METABOLIC PNL TOTAL CA: CPT | Performed by: FAMILY MEDICINE

## 2023-03-20 PROCEDURE — 99239 PR HOSPITAL DISCHARGE DAY,>30 MIN: ICD-10-PCS | Mod: ,,, | Performed by: STUDENT IN AN ORGANIZED HEALTH CARE EDUCATION/TRAINING PROGRAM

## 2023-03-20 PROCEDURE — 82962 GLUCOSE BLOOD TEST: CPT

## 2023-03-20 PROCEDURE — 63600175 PHARM REV CODE 636 W HCPCS: Mod: TB,JG | Performed by: FAMILY MEDICINE

## 2023-03-20 PROCEDURE — 63600175 PHARM REV CODE 636 W HCPCS: Performed by: EMERGENCY MEDICINE

## 2023-03-20 PROCEDURE — 85025 COMPLETE CBC W/AUTO DIFF WBC: CPT | Performed by: FAMILY MEDICINE

## 2023-03-20 PROCEDURE — 25000003 PHARM REV CODE 250: Performed by: FAMILY MEDICINE

## 2023-03-20 PROCEDURE — 63600175 PHARM REV CODE 636 W HCPCS: Mod: TB,JG | Performed by: ORTHOPAEDIC SURGERY

## 2023-03-20 PROCEDURE — 99305 PR NURSING FACILITY CARE, INIT, MOD SEVERITY: ICD-10-PCS | Mod: AI,,, | Performed by: FAMILY MEDICINE

## 2023-03-20 PROCEDURE — 25000003 PHARM REV CODE 250: Performed by: STUDENT IN AN ORGANIZED HEALTH CARE EDUCATION/TRAINING PROGRAM

## 2023-03-20 PROCEDURE — 63600175 PHARM REV CODE 636 W HCPCS: Performed by: STUDENT IN AN ORGANIZED HEALTH CARE EDUCATION/TRAINING PROGRAM

## 2023-03-20 PROCEDURE — 1111F PR DISCHARGE MEDS RECONCILED W/ CURRENT OUTPATIENT MED LIST: ICD-10-PCS | Mod: CPTII,,, | Performed by: STUDENT IN AN ORGANIZED HEALTH CARE EDUCATION/TRAINING PROGRAM

## 2023-03-20 PROCEDURE — 94761 N-INVAS EAR/PLS OXIMETRY MLT: CPT

## 2023-03-20 PROCEDURE — 11000004 HC SNF PRIVATE

## 2023-03-20 PROCEDURE — 1111F DSCHRG MED/CURRENT MED MERGE: CPT | Mod: CPTII,,, | Performed by: STUDENT IN AN ORGANIZED HEALTH CARE EDUCATION/TRAINING PROGRAM

## 2023-03-20 PROCEDURE — 63600175 PHARM REV CODE 636 W HCPCS: Performed by: FAMILY MEDICINE

## 2023-03-20 PROCEDURE — 99305 1ST NF CARE MODERATE MDM 35: CPT | Mod: AI,,, | Performed by: FAMILY MEDICINE

## 2023-03-20 RX ORDER — INSULIN ASPART 100 [IU]/ML
5 INJECTION, SOLUTION INTRAVENOUS; SUBCUTANEOUS
Status: DISCONTINUED | OUTPATIENT
Start: 2023-03-20 | End: 2023-03-20 | Stop reason: HOSPADM

## 2023-03-20 RX ORDER — MUPIROCIN 20 MG/G
OINTMENT TOPICAL 2 TIMES DAILY
Status: DISPENSED | OUTPATIENT
Start: 2023-03-20 | End: 2023-03-25

## 2023-03-20 RX ORDER — HYDRALAZINE HYDROCHLORIDE 25 MG/1
25 TABLET, FILM COATED ORAL EVERY 12 HOURS
Status: DISCONTINUED | OUTPATIENT
Start: 2023-03-20 | End: 2023-04-27 | Stop reason: HOSPADM

## 2023-03-20 RX ORDER — ONDANSETRON 2 MG/ML
4 INJECTION INTRAMUSCULAR; INTRAVENOUS EVERY 8 HOURS PRN
Start: 2023-03-20 | End: 2023-06-07 | Stop reason: CLARIF

## 2023-03-20 RX ORDER — TALC
6 POWDER (GRAM) TOPICAL NIGHTLY PRN
Status: DISCONTINUED | OUTPATIENT
Start: 2023-03-20 | End: 2023-04-01

## 2023-03-20 RX ORDER — AMOXICILLIN 250 MG
1 CAPSULE ORAL 2 TIMES DAILY
Status: DISCONTINUED | OUTPATIENT
Start: 2023-03-20 | End: 2023-04-27 | Stop reason: HOSPADM

## 2023-03-20 RX ORDER — DULOXETIN HYDROCHLORIDE 30 MG/1
60 CAPSULE, DELAYED RELEASE ORAL DAILY
Status: DISCONTINUED | OUTPATIENT
Start: 2023-03-21 | End: 2023-04-27 | Stop reason: HOSPADM

## 2023-03-20 RX ORDER — INSULIN ASPART 100 [IU]/ML
5 INJECTION, SOLUTION INTRAVENOUS; SUBCUTANEOUS
Status: DISCONTINUED | OUTPATIENT
Start: 2023-03-21 | End: 2023-04-27 | Stop reason: HOSPADM

## 2023-03-20 RX ORDER — ACETAMINOPHEN 325 MG/1
650 TABLET ORAL EVERY 6 HOURS PRN
Status: DISCONTINUED | OUTPATIENT
Start: 2023-03-20 | End: 2023-04-27 | Stop reason: HOSPADM

## 2023-03-20 RX ORDER — GLUCAGON 1 MG
1 KIT INJECTION
Status: DISCONTINUED | OUTPATIENT
Start: 2023-03-20 | End: 2023-04-27 | Stop reason: HOSPADM

## 2023-03-20 RX ORDER — HYDROCODONE BITARTRATE AND ACETAMINOPHEN 5; 325 MG/1; MG/1
1 TABLET ORAL EVERY 6 HOURS PRN
Status: DISCONTINUED | OUTPATIENT
Start: 2023-03-20 | End: 2023-03-22

## 2023-03-20 RX ORDER — CALCIUM CARBONATE 200(500)MG
500 TABLET,CHEWABLE ORAL 2 TIMES DAILY PRN
Status: DISCONTINUED | OUTPATIENT
Start: 2023-03-20 | End: 2023-04-27 | Stop reason: HOSPADM

## 2023-03-20 RX ORDER — LOSARTAN POTASSIUM 25 MG/1
25 TABLET ORAL DAILY
Qty: 90 TABLET | Refills: 3 | Status: ON HOLD | OUTPATIENT
Start: 2023-03-21 | End: 2023-05-09

## 2023-03-20 RX ORDER — LACTOBACILLUS ACIDOPHILUS 500MM CELL
1 CAPSULE ORAL
Status: DISCONTINUED | OUTPATIENT
Start: 2023-03-21 | End: 2023-04-27 | Stop reason: HOSPADM

## 2023-03-20 RX ORDER — GABAPENTIN 300 MG/1
300 CAPSULE ORAL 2 TIMES DAILY
Status: DISCONTINUED | OUTPATIENT
Start: 2023-03-20 | End: 2023-04-27 | Stop reason: HOSPADM

## 2023-03-20 RX ORDER — DEXTROSE 40 %
15 GEL (GRAM) ORAL
Status: DISCONTINUED | OUTPATIENT
Start: 2023-03-20 | End: 2023-04-27 | Stop reason: HOSPADM

## 2023-03-20 RX ORDER — PROMETHAZINE HYDROCHLORIDE 25 MG/1
25 TABLET ORAL EVERY 6 HOURS PRN
Start: 2023-03-20 | End: 2023-06-07 | Stop reason: CLARIF

## 2023-03-20 RX ORDER — HEPARIN SODIUM (PORCINE) LOCK FLUSH IV SOLN 100 UNIT/ML 100 UNIT/ML
100 SOLUTION INTRAVENOUS
Status: DISCONTINUED | OUTPATIENT
Start: 2023-03-20 | End: 2023-03-27

## 2023-03-20 RX ORDER — CEFAZOLIN SODIUM 2 G/50ML
2 SOLUTION INTRAVENOUS
Status: DISPENSED | OUTPATIENT
Start: 2023-03-20 | End: 2023-03-31

## 2023-03-20 RX ORDER — DEXTROSE 40 %
30 GEL (GRAM) ORAL
Status: DISCONTINUED | OUTPATIENT
Start: 2023-03-20 | End: 2023-04-27 | Stop reason: HOSPADM

## 2023-03-20 RX ORDER — PROMETHAZINE HYDROCHLORIDE 25 MG/1
25 TABLET ORAL EVERY 6 HOURS PRN
Status: DISCONTINUED | OUTPATIENT
Start: 2023-03-20 | End: 2023-04-27 | Stop reason: HOSPADM

## 2023-03-20 RX ORDER — LOSARTAN POTASSIUM 25 MG/1
25 TABLET ORAL DAILY
Status: DISCONTINUED | OUTPATIENT
Start: 2023-03-21 | End: 2023-04-27 | Stop reason: HOSPADM

## 2023-03-20 RX ORDER — LACTOBACILLUS ACIDOPHILUS 500MM CELL
1 CAPSULE ORAL
Start: 2023-03-20 | End: 2023-06-07 | Stop reason: CLARIF

## 2023-03-20 RX ORDER — INSULIN ASPART 100 [IU]/ML
1-10 INJECTION, SOLUTION INTRAVENOUS; SUBCUTANEOUS
Status: DISCONTINUED | OUTPATIENT
Start: 2023-03-20 | End: 2023-04-27 | Stop reason: HOSPADM

## 2023-03-20 RX ADMIN — OXYCODONE AND ACETAMINOPHEN 2 TABLET: 325; 5 TABLET ORAL at 12:03

## 2023-03-20 RX ADMIN — OXYCODONE AND ACETAMINOPHEN 2 TABLET: 325; 5 TABLET ORAL at 06:03

## 2023-03-20 RX ADMIN — FERROUS SULFATE TAB 325 MG (65 MG ELEMENTAL FE) 1 EACH: 325 (65 FE) TAB at 08:03

## 2023-03-20 RX ADMIN — CEFAZOLIN 2 G: 2 INJECTION, POWDER, FOR SOLUTION INTRAMUSCULAR; INTRAVENOUS at 06:03

## 2023-03-20 RX ADMIN — GABAPENTIN 300 MG: 300 CAPSULE ORAL at 08:03

## 2023-03-20 RX ADMIN — INSULIN ASPART 5 UNITS: 100 INJECTION, SOLUTION INTRAVENOUS; SUBCUTANEOUS at 01:03

## 2023-03-20 RX ADMIN — VANCOMYCIN HYDROCHLORIDE 1250 MG: 1 INJECTION, POWDER, LYOPHILIZED, FOR SOLUTION INTRAVENOUS at 04:03

## 2023-03-20 RX ADMIN — HYDROCODONE BITARTRATE AND ACETAMINOPHEN 1 TABLET: 5; 325 TABLET ORAL at 07:03

## 2023-03-20 RX ADMIN — Medication 1 CAPSULE: at 01:03

## 2023-03-20 RX ADMIN — INSULIN DETEMIR 33 UNITS: 100 INJECTION, SOLUTION SUBCUTANEOUS at 08:03

## 2023-03-20 RX ADMIN — Medication 1 CAPSULE: at 08:03

## 2023-03-20 RX ADMIN — CEFAZOLIN SODIUM 2 G: 2 SOLUTION INTRAVENOUS at 08:03

## 2023-03-20 RX ADMIN — MORPHINE SULFATE 4 MG: 4 INJECTION, SOLUTION INTRAMUSCULAR; INTRAVENOUS at 04:03

## 2023-03-20 RX ADMIN — LOSARTAN POTASSIUM 25 MG: 25 TABLET, FILM COATED ORAL at 08:03

## 2023-03-20 RX ADMIN — MUPIROCIN 1 G: 20 OINTMENT TOPICAL at 08:03

## 2023-03-20 RX ADMIN — SENNOSIDES AND DOCUSATE SODIUM 1 TABLET: 8.6; 5 TABLET ORAL at 08:03

## 2023-03-20 RX ADMIN — OXYCODONE AND ACETAMINOPHEN 2 TABLET: 325; 5 TABLET ORAL at 01:03

## 2023-03-20 RX ADMIN — DULOXETINE HYDROCHLORIDE 60 MG: 30 CAPSULE, DELAYED RELEASE ORAL at 08:03

## 2023-03-20 RX ADMIN — HYDRALAZINE HYDROCHLORIDE 25 MG: 25 TABLET, FILM COATED ORAL at 08:03

## 2023-03-20 RX ADMIN — HYDRALAZINE HYDROCHLORIDE 10 MG: 10 TABLET, FILM COATED ORAL at 08:03

## 2023-03-20 RX ADMIN — MORPHINE SULFATE 4 MG: 4 INJECTION, SOLUTION INTRAMUSCULAR; INTRAVENOUS at 08:03

## 2023-03-20 RX ADMIN — DOCUSATE SODIUM 100 MG: 100 CAPSULE, LIQUID FILLED ORAL at 08:03

## 2023-03-20 RX ADMIN — INSULIN ASPART 3 UNITS: 100 INJECTION, SOLUTION INTRAVENOUS; SUBCUTANEOUS at 09:03

## 2023-03-20 RX ADMIN — VANCOMYCIN HYDROCHLORIDE 1250 MG: 1.25 INJECTION, POWDER, LYOPHILIZED, FOR SOLUTION INTRAVENOUS at 10:03

## 2023-03-20 NOTE — PLAN OF CARE
Problem: Adult Inpatient Plan of Care  Goal: Plan of Care Review  Outcome: Ongoing, Progressing  Goal: Patient-Specific Goal (Individualized)  Outcome: Ongoing, Progressing     Problem: Fall Injury Risk  Goal: Absence of Fall and Fall-Related Injury  Outcome: Ongoing, Progressing  Intervention: Identify and Manage Contributors  Flowsheets (Taken 3/20/2023 1829)  Self-Care Promotion:   independence encouraged   BADL personal objects within reach   BADL personal routines maintained  Medication Review/Management:   medications reviewed   high-risk medications identified  Intervention: Promote Injury-Free Environment  Flowsheets (Taken 3/20/2023 1829)  Safety Promotion/Fall Prevention:   assistive device/personal item within reach   diversional activities provided   high risk medications identified   medications reviewed   muscle strengthening facilitated   nonskid shoes/socks when out of bed   side rails raised x 3   instructed to call staff for mobility

## 2023-03-20 NOTE — H&P
[facility] Medicine  History & Physical    Patient Name: Monica Walker  MRN: [id number]  Patient Class: IP- Swing  Admission Date: 3/20/2023  Attending Physician: Hannah Schuzl, *   Primary Care Provider: Hannah Schulz MD         Patient information was obtained from ER records.     Subjective:     Principal Problem:Staphylococcal arthritis of left knee    Chief Complaint: No chief complaint on file.       HPI: 3/20/23 - this 52 yr. Old WF was readmitted here for IV antibiotics. Her PICC line was pulled on the 16th. It was not replaced on the 18th as the noted stated was the plan. She is to get antibiotics through the 27th of April. We will have to arrange for her to have another PICC line installed for this. She has had her left knee replacement removed for sepsis. She has a spacer in the joint. She will undergo another knee replacement after the antibiotics. For rest of history, please see her old records.      Past Medical History:   Diagnosis Date    Abscess of right thigh + MRSA 08/20/2021    healed    Adnexal cyst     Degenerative disc disease     Depression 10/29/2021    Hypertension     Hypomagnesemia 1/20/2023    Nicotine dependence     Osteoarthritis        Past Surgical History:   Procedure Laterality Date    BACK SURGERY      HYSTERECTOMY N/A     INCISION AND DRAINAGE Left 3/13/2023    Procedure: INCISION AND DRAINAGE;  Surgeon: Jacek Noyola MD;  Location: [facility];  Service: Orthopedics;  Laterality: Left;    IRRIGATION AND DEBRIDEMENT OF LOWER EXTREMITY Left 11/22/2022    Procedure: IRRIGATION AND DEBRIDEMENT, LOWER EXTREMITY;  Surgeon: Papa Mendoza MD;  Location: [facility] OR;  Service: Orthopedics;  Laterality: Left;    IRRIGATION AND DEBRIDEMENT OF UPPER EXTREMITY Left 11/22/2022    Procedure: IRRIGATION AND DEBRIDEMENT, UPPER EXTREMITY;  Surgeon: Papa Mendoza MD;  Location: [facility]  OR;  Service: Orthopedics;  Laterality: Left;    REVISION OF KNEE ARTHROPLASTY Left 3/13/2023    Procedure: REMOVAL OF PROSTHESIS,METHYLMETHACRYLATE WITH OR WITHOUT INSERTION OF SPACER LEFT KNEE;  Surgeon: Jacek Noyola MD;  Location: [facility] OR;  Service: Orthopedics;  Laterality: Left;    THORACIC LAMINECTOMY WITH FUSION N/A 1/24/2023    Procedure: T9-L2 fusion, T11-T12 laminectomy and corpectomy;  Surgeon: Jimmy Hernandes MD;  Location: [facility] OR;  Service: Neurosurgery;  Laterality: N/A;    TOTAL HIP ARTHROPLASTY Left     TOTAL KNEE ARTHROPLASTY Bilateral        Review of patient's allergies indicates:  No Known Allergies    Current Facility-Administered Medications on File Prior to Encounter   Medication    [DISCONTINUED] 0.9%  NaCl infusion (for blood administration)    [DISCONTINUED] bisacodyL suppository 10 mg    [DISCONTINUED] ceFAZolin 2 g in dextrose 5 % in water (D5W) 5 % 50 mL IVPB (MB+)    [DISCONTINUED] dextrose 10% bolus 125 mL 125 mL    [DISCONTINUED] dextrose 10% bolus 250 mL 250 mL    [DISCONTINUED] dextrose 40 % gel 15,000 mg    [DISCONTINUED] dextrose 40 % gel 30,000 mg    [DISCONTINUED] diphenhydrAMINE capsule 25 mg    [DISCONTINUED] docusate sodium capsule 100 mg    [DISCONTINUED] DULoxetine DR capsule 60 mg    [DISCONTINUED] ferrous sulfate tablet 1 each    [DISCONTINUED] gabapentin capsule 300 mg    [DISCONTINUED] glucagon (human recombinant) injection 1 mg    [DISCONTINUED] hydrALAZINE tablet 10 mg    [DISCONTINUED] HYDROcodone-acetaminophen  mg per tablet 1 tablet    [DISCONTINUED] ibuprofen tablet 600 mg    [DISCONTINUED] insulin aspart U-100 injection 1-10 Units    [DISCONTINUED] insulin aspart U-100 injection 3 Units    [DISCONTINUED] insulin aspart U-100 injection 5 Units    [DISCONTINUED] insulin detemir U-100 injection 30 Units    [DISCONTINUED] insulin detemir U-100 injection 33 Units    [DISCONTINUED] Lactobacillus acidophilus  "capsule 1 capsule    [DISCONTINUED] LIDOcaine 5 % patch 1 patch    [DISCONTINUED] losartan tablet 25 mg    [DISCONTINUED] morphine injection 2 mg    [DISCONTINUED] morphine injection 4 mg    [DISCONTINUED] naloxone 0.4 mg/mL injection 0.02 mg    [DISCONTINUED] ondansetron injection 4 mg    [DISCONTINUED] ondansetron injection 4 mg    [DISCONTINUED] oxyCODONE-acetaminophen 5-325 mg per tablet 2 tablet    [DISCONTINUED] promethazine tablet 25 mg    [DISCONTINUED] sodium chloride 0.9% flush 10 mL    [DISCONTINUED] trazodone split tablet 25 mg    [DISCONTINUED] vancomycin (VANCOCIN) 1,250 mg in dextrose 5 % (D5W) 250 mL IVPB    [DISCONTINUED] vancomycin - pharmacy to dose     Current Outpatient Medications on File Prior to Encounter   Medication Sig    BD ULTRA-FINE SHORT PEN NEEDLE 31 gauge x 5/16" Ndle     dextrose 5 % (D5W) SolP 250 mL with vancomycin 1.25 gram SolR 1,250 mg Inject 1,250 mg into the vein As instructed. Every 18 hours next dose 3.20.23 2200    dextrose 5 % in water (D5W) 5 % PgBk 50 mL with ceFAZolin 2 gram SolR 2 g Inject 2 g into the vein every 8 (eight) hours.    DULoxetine (CYMBALTA) 60 MG capsule Take 1 capsule (60 mg total) by mouth once daily.    gabapentin (NEURONTIN) 300 MG capsule Take 300 mg by mouth 2 (two) times a day.    hydrALAZINE (APRESOLINE) 25 MG tablet Take 1 tablet (25 mg total) by mouth every 12 (twelve) hours.    HYDROcodone-acetaminophen (NORCO) 5-325 mg per tablet Take 1 tablet by mouth every 6 (six) hours as needed for Pain.    insulin (LANTUS SOLOSTAR U-100 INSULIN) glargine 100 units/mL SubQ pen Inject 30 Units into the skin once daily.    Lactobacillus acidophilus 500 million cell Cap Take 1 capsule by mouth 3 (three) times daily with meals.    [START ON 3/21/2023] losartan (COZAAR) 25 MG tablet Take 1 tablet (25 mg total) by mouth once daily.    NOVOLOG FLEXPEN U-100 INSULIN 100 unit/mL (3 mL) InPn pen Inject 5 Units into the skin 3 (three) times " daily with meals.    ondansetron 4 mg/2 mL Soln Inject 4 mg into the vein every 8 (eight) hours as needed.    promethazine (PHENERGAN) 25 MG tablet Take 1 tablet (25 mg total) by mouth every 6 (six) hours as needed.    [DISCONTINUED] dextrose 5 % (D5W) SolP 250 mL with vancomycin 1,000 mg SolR 1,000 mg Inject 1,000 mg into the vein once daily. (Patient not taking: Reported on 3/20/2023)    [DISCONTINUED] docusate sodium (COLACE) 100 MG capsule Take 1 capsule (100 mg total) by mouth once daily.    [DISCONTINUED] ferrous sulfate 325 (65 FE) MG EC tablet Take 1 tablet (325 mg total) by mouth once daily.    [DISCONTINUED] hydrocortisone 1 % cream Apply topically 2 (two) times daily.    [DISCONTINUED] sodium chloride 0.9% SolP 50 mL with DAPTOmycin 500 mg SolR 394 mg Inject 394 mg into the vein once daily. Daily given by Home health----Hand delivered medications---- for 26 doses    [DISCONTINUED] TRULICITY 0.75 mg/0.5 mL pen injector Inject 0.75 mg into the skin every 7 days.     Family History       Problem Relation (Age of Onset)    Diabetes Father    Hypertension Father          Tobacco Use    Smoking status: Every Day     Types: Cigarettes    Smokeless tobacco: Current     Types: Snuff   Substance and Sexual Activity    Alcohol use: Never    Drug use: Never    Sexual activity: Not on file     Review of Systems   Constitutional:  Negative for activity change.   HENT:  Negative for congestion.    Eyes:  Negative for visual disturbance.   Respiratory:  Negative for cough.    Cardiovascular:  Negative for chest pain.   Gastrointestinal:  Negative for abdominal distention.   Genitourinary:  Negative for difficulty urinating.   Musculoskeletal:  Negative for arthralgias.   Skin:  Negative for color change.   Neurological:  Positive for weakness. Negative for dizziness.   Psychiatric/Behavioral:  Negative for behavioral problems.    Objective:     Vital Signs (Most Recent):  Temp: 97.7 °F (36.5 °C) (03/20/23  1700)  Pulse: 79 (03/20/23 1700)  Resp: 18 (03/20/23 1700)  BP: 136/78 (03/20/23 1700)  SpO2: 96 % (03/20/23 1700) Vital Signs (24h Range):  Temp:  [97.7 °F (36.5 °C)-98.2 °F (36.8 °C)] 97.7 °F (36.5 °C)  Pulse:  [64-79] 79  Resp:  [16-18] 18  SpO2:  [94 %-97 %] 96 %  BP: (130-156)/(69-88) 136/78        There is no height or weight on file to calculate BMI.    Physical Exam  Vitals and nursing note reviewed.   Constitutional:       Appearance: Normal appearance. She is normal weight.   HENT:      Head: Normocephalic and atraumatic.      Right Ear: Tympanic membrane normal.      Left Ear: Tympanic membrane normal.      Nose: Nose normal.      Mouth/Throat:      Mouth: Mucous membranes are moist.   Eyes:      Extraocular Movements: Extraocular movements intact.      Conjunctiva/sclera: Conjunctivae normal.      Pupils: Pupils are equal, round, and reactive to light.   Cardiovascular:      Rate and Rhythm: Normal rate and regular rhythm.      Pulses: Normal pulses.   Pulmonary:      Effort: Pulmonary effort is normal.      Breath sounds: Normal breath sounds.   Abdominal:      General: Abdomen is flat. Bowel sounds are normal.      Palpations: Abdomen is soft.   Musculoskeletal:         General: Normal range of motion.      Cervical back: Normal range of motion and neck supple.      Comments: No knee on the left   Skin:     General: Skin is warm and dry.   Neurological:      General: No focal deficit present.      Mental Status: She is alert and oriented to person, place, and time.   Psychiatric:         Mood and Affect: Mood normal.         CRANIAL NERVES     CN III, IV, VI   Pupils are equal, round, and reactive to light.     Significant Labs: All pertinent labs within the past 24 hours have been reviewed.    Significant Imaging: I have reviewed all pertinent imaging results/findings within the past 24 hours.    Assessment/Plan:     * Staphylococcal arthritis of left knee          VTE Risk Mitigation (From admission,  onward)    None             On 03/20/2023, patient should be placed in hospital observation services under my care.        Hannah Schulz MD  Department of Hospital Medicine  [facility]   Home

## 2023-03-20 NOTE — PLAN OF CARE
Pt has been accepted by [facility] for admission on today. Sw spoke with pt at bedside. Pt agreeable and understands poc. Nurse notified. Sw to place packet on chart, Ss following for further dc needs.

## 2023-03-20 NOTE — PLAN OF CARE
[facility]  Discharge Final Note    Primary Care Provider: Hannah Schulz MD    Expected Discharge Date:     Final Discharge Note (most recent)       Final Note - 03/20/23 0922          Final Note    Assessment Type Final Discharge Note     Anticipated Discharge Disposition Swing Bed     What phone number can be called within the next 1-3 days to see how you are doing after discharge? [phone]        Post-Acute Status    Post-Acute Authorization Placement     Post-Acute Placement Status Set-up Complete/Auth obtained     Patient choice form signed by patient/caregiver List with quality metrics by geographic area provided;List from CMS Compare;List from System Post-Acute Care     Discharge Delays None known at this time                     Important Message from Medicare  Important Message from Medicare regarding Discharge Appeal Rights: Given to patient/caregiver, Explained to patient/caregiver, Signed/date by patient/caregiver     Date IMM was signed: 03/20/23  Time IMM was signed: 0900    After-discharge care                Destination       [facility]   Service: Skilled Nursing    [address] [place] [address] [place] [id number]   Phone: [phone]                     [facility] No further needs at [facility],

## 2023-03-20 NOTE — HPI
3/20/23 - this 52 yr. Old WF was readmitted here for IV antibiotics. Her PICC line was pulled on the 16th. It was not replaced on the 18th as the noted stated was the plan. She is to get antibiotics through the 27th of April. We will have to arrange for her to have another PICC line installed for this. She has had her left knee replacement removed for sepsis. She has a spacer in the joint. She will undergo another knee replacement after the antibiotics. For rest of history, please see her old records.

## 2023-03-20 NOTE — SUBJECTIVE & OBJECTIVE
Past Medical History:   Diagnosis Date    Abscess of right thigh + MRSA 08/20/2021    healed    Adnexal cyst     Degenerative disc disease     Depression 10/29/2021    Hypertension     Hypomagnesemia 1/20/2023    Nicotine dependence     Osteoarthritis        Past Surgical History:   Procedure Laterality Date    BACK SURGERY      HYSTERECTOMY N/A     INCISION AND DRAINAGE Left 3/13/2023    Procedure: INCISION AND DRAINAGE;  Surgeon: Jacek Noyola MD;  Location: [facility] OR;  Service: Orthopedics;  Laterality: Left;    IRRIGATION AND DEBRIDEMENT OF LOWER EXTREMITY Left 11/22/2022    Procedure: IRRIGATION AND DEBRIDEMENT, LOWER EXTREMITY;  Surgeon: Papa Mendoza MD;  Location: [facility] OR;  Service: Orthopedics;  Laterality: Left;    IRRIGATION AND DEBRIDEMENT OF UPPER EXTREMITY Left 11/22/2022    Procedure: IRRIGATION AND DEBRIDEMENT, UPPER EXTREMITY;  Surgeon: Papa Mendoza MD;  Location: [facility] OR;  Service: Orthopedics;  Laterality: Left;    REVISION OF KNEE ARTHROPLASTY Left 3/13/2023    Procedure: REMOVAL OF PROSTHESIS,METHYLMETHACRYLATE WITH OR WITHOUT INSERTION OF SPACER LEFT KNEE;  Surgeon: Jacek Noyola MD;  Location: [facility] OR;  Service: Orthopedics;  Laterality: Left;    THORACIC LAMINECTOMY WITH FUSION N/A 1/24/2023    Procedure: T9-L2 fusion, T11-T12 laminectomy and corpectomy;  Surgeon: Jimmy Hernandes MD;  Location: [facility] OR;  Service: Neurosurgery;  Laterality: N/A;    TOTAL HIP ARTHROPLASTY Left     TOTAL KNEE ARTHROPLASTY Bilateral        Review of patient's allergies indicates:  No Known Allergies    Current Facility-Administered Medications on File Prior to Encounter   Medication    [DISCONTINUED] 0.9%  NaCl infusion (for blood administration)    [DISCONTINUED] bisacodyL suppository 10 mg    [DISCONTINUED] ceFAZolin 2 g in dextrose 5 % in water (D5W) 5 % 50 mL IVPB (MB+)    [DISCONTINUED] dextrose 10% bolus 125 mL 125 mL    [DISCONTINUED]  "dextrose 10% bolus 250 mL 250 mL    [DISCONTINUED] dextrose 40 % gel 15,000 mg    [DISCONTINUED] dextrose 40 % gel 30,000 mg    [DISCONTINUED] diphenhydrAMINE capsule 25 mg    [DISCONTINUED] docusate sodium capsule 100 mg    [DISCONTINUED] DULoxetine DR capsule 60 mg    [DISCONTINUED] ferrous sulfate tablet 1 each    [DISCONTINUED] gabapentin capsule 300 mg    [DISCONTINUED] glucagon (human recombinant) injection 1 mg    [DISCONTINUED] hydrALAZINE tablet 10 mg    [DISCONTINUED] HYDROcodone-acetaminophen  mg per tablet 1 tablet    [DISCONTINUED] ibuprofen tablet 600 mg    [DISCONTINUED] insulin aspart U-100 injection 1-10 Units    [DISCONTINUED] insulin aspart U-100 injection 3 Units    [DISCONTINUED] insulin aspart U-100 injection 5 Units    [DISCONTINUED] insulin detemir U-100 injection 30 Units    [DISCONTINUED] insulin detemir U-100 injection 33 Units    [DISCONTINUED] Lactobacillus acidophilus capsule 1 capsule    [DISCONTINUED] LIDOcaine 5 % patch 1 patch    [DISCONTINUED] losartan tablet 25 mg    [DISCONTINUED] morphine injection 2 mg    [DISCONTINUED] morphine injection 4 mg    [DISCONTINUED] naloxone 0.4 mg/mL injection 0.02 mg    [DISCONTINUED] ondansetron injection 4 mg    [DISCONTINUED] ondansetron injection 4 mg    [DISCONTINUED] oxyCODONE-acetaminophen 5-325 mg per tablet 2 tablet    [DISCONTINUED] promethazine tablet 25 mg    [DISCONTINUED] sodium chloride 0.9% flush 10 mL    [DISCONTINUED] trazodone split tablet 25 mg    [DISCONTINUED] vancomycin (VANCOCIN) 1,250 mg in dextrose 5 % (D5W) 250 mL IVPB    [DISCONTINUED] vancomycin - pharmacy to dose     Current Outpatient Medications on File Prior to Encounter   Medication Sig    BD ULTRA-FINE SHORT PEN NEEDLE 31 gauge x 5/16" Ndle     dextrose 5 % (D5W) SolP 250 mL with vancomycin 1.25 gram SolR 1,250 mg Inject 1,250 mg into the vein As instructed. Every 18 hours next dose 3.20.23 2200    dextrose 5 % in water (D5W) 5 % PgBk 50 mL with ceFAZolin 2 " gram SolR 2 g Inject 2 g into the vein every 8 (eight) hours.    DULoxetine (CYMBALTA) 60 MG capsule Take 1 capsule (60 mg total) by mouth once daily.    gabapentin (NEURONTIN) 300 MG capsule Take 300 mg by mouth 2 (two) times a day.    hydrALAZINE (APRESOLINE) 25 MG tablet Take 1 tablet (25 mg total) by mouth every 12 (twelve) hours.    HYDROcodone-acetaminophen (NORCO) 5-325 mg per tablet Take 1 tablet by mouth every 6 (six) hours as needed for Pain.    insulin (LANTUS SOLOSTAR U-100 INSULIN) glargine 100 units/mL SubQ pen Inject 30 Units into the skin once daily.    Lactobacillus acidophilus 500 million cell Cap Take 1 capsule by mouth 3 (three) times daily with meals.    [START ON 3/21/2023] losartan (COZAAR) 25 MG tablet Take 1 tablet (25 mg total) by mouth once daily.    NOVOLOG FLEXPEN U-100 INSULIN 100 unit/mL (3 mL) InPn pen Inject 5 Units into the skin 3 (three) times daily with meals.    ondansetron 4 mg/2 mL Soln Inject 4 mg into the vein every 8 (eight) hours as needed.    promethazine (PHENERGAN) 25 MG tablet Take 1 tablet (25 mg total) by mouth every 6 (six) hours as needed.    [DISCONTINUED] dextrose 5 % (D5W) SolP 250 mL with vancomycin 1,000 mg SolR 1,000 mg Inject 1,000 mg into the vein once daily. (Patient not taking: Reported on 3/20/2023)    [DISCONTINUED] docusate sodium (COLACE) 100 MG capsule Take 1 capsule (100 mg total) by mouth once daily.    [DISCONTINUED] ferrous sulfate 325 (65 FE) MG EC tablet Take 1 tablet (325 mg total) by mouth once daily.    [DISCONTINUED] hydrocortisone 1 % cream Apply topically 2 (two) times daily.    [DISCONTINUED] sodium chloride 0.9% SolP 50 mL with DAPTOmycin 500 mg SolR 394 mg Inject 394 mg into the vein once daily. Daily given by Home health----Hand delivered medications---- for 26 doses    [DISCONTINUED] TRULICITY 0.75 mg/0.5 mL pen injector Inject 0.75 mg into the skin every 7 days.     Family History       Problem Relation (Age of Onset)    Diabetes  Father    Hypertension Father          Tobacco Use    Smoking status: Every Day     Types: Cigarettes    Smokeless tobacco: Current     Types: Snuff   Substance and Sexual Activity    Alcohol use: Never    Drug use: Never    Sexual activity: Not on file     Review of Systems   Constitutional:  Negative for activity change.   HENT:  Negative for congestion.    Eyes:  Negative for visual disturbance.   Respiratory:  Negative for cough.    Cardiovascular:  Negative for chest pain.   Gastrointestinal:  Negative for abdominal distention.   Genitourinary:  Negative for difficulty urinating.   Musculoskeletal:  Negative for arthralgias.   Skin:  Negative for color change.   Neurological:  Positive for weakness. Negative for dizziness.   Psychiatric/Behavioral:  Negative for behavioral problems.    Objective:     Vital Signs (Most Recent):  Temp: 97.7 °F (36.5 °C) (03/20/23 1700)  Pulse: 79 (03/20/23 1700)  Resp: 18 (03/20/23 1700)  BP: 136/78 (03/20/23 1700)  SpO2: 96 % (03/20/23 1700) Vital Signs (24h Range):  Temp:  [97.7 °F (36.5 °C)-98.2 °F (36.8 °C)] 97.7 °F (36.5 °C)  Pulse:  [64-79] 79  Resp:  [16-18] 18  SpO2:  [94 %-97 %] 96 %  BP: (130-156)/(69-88) 136/78        There is no height or weight on file to calculate BMI.    Physical Exam  Vitals and nursing note reviewed.   Constitutional:       Appearance: Normal appearance. She is normal weight.   HENT:      Head: Normocephalic and atraumatic.      Right Ear: Tympanic membrane normal.      Left Ear: Tympanic membrane normal.      Nose: Nose normal.      Mouth/Throat:      Mouth: Mucous membranes are moist.   Eyes:      Extraocular Movements: Extraocular movements intact.      Conjunctiva/sclera: Conjunctivae normal.      Pupils: Pupils are equal, round, and reactive to light.   Cardiovascular:      Rate and Rhythm: Normal rate and regular rhythm.      Pulses: Normal pulses.   Pulmonary:      Effort: Pulmonary effort is normal.      Breath sounds: Normal breath  sounds.   Abdominal:      General: Abdomen is flat. Bowel sounds are normal.      Palpations: Abdomen is soft.   Musculoskeletal:         General: Normal range of motion.      Cervical back: Normal range of motion and neck supple.      Comments: No knee on the left   Skin:     General: Skin is warm and dry.   Neurological:      General: No focal deficit present.      Mental Status: She is alert and oriented to person, place, and time.   Psychiatric:         Mood and Affect: Mood normal.         CRANIAL NERVES     CN III, IV, VI   Pupils are equal, round, and reactive to light.     Significant Labs: All pertinent labs within the past 24 hours have been reviewed.    Significant Imaging: I have reviewed all pertinent imaging results/findings within the past 24 hours.

## 2023-03-20 NOTE — PROGRESS NOTES
"[facility]  Adult Nutrition  Follow Up Note    SUMMARY       Recommendations    Recommendation/Intervention: Recommend transition to Diabetic diet given PMH of DM, elevated glucose. Continue to encourage good PO intakes.  Goals: consume % of meals, tolerate PO intake, weight maintenance  Nutrition Goal Status: progressing towards goal    Assessment and Plan  RD follow up. Current weight is 141 lbs, stable.     Per MD:   "3/14: Post op #1 removal of hardware and placement of antibiotic spacer by Dr. Noyola. Now that presumed infective focus has been removed will repeat blood cultures, once negative patient will require approximately 4 weeks of IV antibiotic therapy. Recommend vancomycin as patient has already failed outpatient treatment with daptomycin. Plan for transfer to Specialty for extended antibiotics and physical therapy.   3/15: No acute events overnight, patient continues to complain of pain and generally feeling unwell. Repeat blood cultures yesterday now positive for Klebsiella pneumonia, will add Rocephin pending final C&S. Continue vancomycin for MRSA bacteremia with indwelling prostheses.  3/16- picc line has been in during repeated cases of bacteremia, DC today. Line break for atleast 48h  3/17-continue abx, awaiting echo read- she had negative MONICA in November but is having recurrent MRSA bacteremia.  Will discuss need for repeat with Cardiology.  3/18-Continue abx  3/19-abx- attempting to arrange home IV abx"    Pt is currently receiving a Regular diet. Per flowsheets, pt consuming % of meals. Intake is adequate to meet needs. Recommend transition to Diabetic diet given PMH of DM and glucose elevated. Encourage good PO intakes.     Last BM 3/17 per flowsheets. Labs/meds reviewed. RD following.     Contributing Nutrition Diagnosis  altered nutrition related lab value     Related to (etiology):   Hyperglycemia     Signs and Symptoms (as evidenced by):   PMH of DM, " "elevated glucose      Interventions/Recommendations (treatment strategy):  Recommend Diabetic diet     Nutrition Diagnosis Status:   Continues    Reason for Assessment    Reason For Assessment: RD follow-up  Diagnosis: other (see comments) (joint infection)  Relevant Medical History: HTN, osteoarthritis, depression, degenerative disk disease, abscess of right thigh, hypomagnesia    Nutrition Risk Screen    Nutrition Risk Screen: no indicators present    Nutrition/Diet History    Spiritual, Cultural Beliefs, Sabianism Practices, Values that Affect Care: no    Anthropometrics    Temp: 98.2 °F (36.8 °C)  Height: 5' 6" (167.6 cm)  Height (inches): 66 in  Weight Method: Bed Scale  Weight: 64 kg (141 lb 1.5 oz)  Weight (lb): 141.1 lb  Ideal Body Weight (IBW), Female: 130 lb  % Ideal Body Weight, Female (lb): 108.46 %  BMI (Calculated): 22.8       Lab/Procedures/Meds   Latest Reference Range & Units 03/20/23 02:53   Sodium 136 - 145 mmol/L 137   Potassium 3.5 - 5.1 mmol/L 4.7   Chloride 98 - 107 mmol/L 100   CO2 21 - 32 mmol/L 28   Anion Gap 7 - 16 mmol/L 14   BUN 7 - 18 mg/dL 19 (H)   Creatinine 0.55 - 1.02 mg/dL 0.87   BUN/CREAT RATIO 6 - 20  22 (H)   eGFR >=60 mL/min/1.73m² 79   Glucose 74 - 106 mg/dL 188 (H)   Calcium 8.5 - 10.1 mg/dL 9.5   (H): Data is abnormally high    Note: elevated glucose - PMH of DM. Elevated BUN.     Pertinent Labs Reviewed: reviewed  Pertinent Medications Reviewed: reviewed  Pertinent Medications Comments: cefazolin, docusate sodium, duloxetine, ferrous sulfate, gabaoentin, hydralazine, insulin, lactobacillus acidophilus, losartan, axycodone, vancocin    Nutrition Prescription Ordered    Current Diet Order: Regular diet    Evaluation of Received Nutrient/Fluid Intake       % Intake of Estimated Energy Needs: 50 - 75 %  % Meal Intake: 50 - 75 %    Nutrition Risk    Level of Risk/Frequency of Follow-up: moderate     Monitor and Evaluation    Food and Nutrient Intake: food and beverage intake, " energy intake  Food and Nutrient Adminstration: diet order  Anthropometric Measurements: weight change, weight  Biochemical Data, Medical Tests and Procedures: inflammatory profile, lipid profile, glucose/endocrine profile, gastrointestinal profile, electrolyte and renal panel     Nutrition Follow-Up    RD Follow-up?: Yes

## 2023-03-20 NOTE — NURSING
Report called to [facility] to Yovani Fan. Will fax [facility] papers for patient to be transported.

## 2023-03-20 NOTE — DISCHARGE SUMMARY
[facility]  [facility] Medicine  Discharge Summary      Patient Name: Monica Walker  MRN: [id number]  [facility]: [id number]  Patient Class: IP- Inpatient  Admission Date: 3/6/2023  Hospital Length of Stay: 14 days  Discharge Date and Time:  03/20/2023 10:32 AM  Attending Physician: Adilson Fontanez DO   Discharging Provider: Adilson Fontanez DO  Primary Care Provider: Hannah Schulz MD    Primary Care Team: Networked reference to record PCT     HPI:   Monica Walker is a 52-year-old female with history of diabetes, hypertension, and MRSA joint space infection, who presented with complaints of left knee pain and swelling. She states the pain began suddenly yesterday, denies any recent falls or other associated trauma. Pain is worse along the lateral joint space with radiation down into the foot. There is a discreet area of fluctuance along the lateral surface of the knee, with generalized swelling of the joint to approximately 2x the size of the contralateral knee. Patient also complains of some fever, generalized weakness and productive cough for the past few days as well as dark stool.     Patient has a history of remote joint replacement (2015) with recent septic arthritis requiring debridement of the left knee as well as one shoulder. She was also recently hospitalized for with MRSA spinal epidural abscess, discharged about a month ago with outpatient daptomycin.      Initial evaluation with the following with WBC 12.39, LA 0.8, Hgb 7.4, glucose 200, labs otherwise unremarkable. Mildly elevated temp (99.5) and mild tachycardia (105), vital signs otherwise stable and grossly within normal limits.     Monica Walker is to be admitted to hospital service for evaluation and treatment of suspected septic arthritis. Orthopedics has been consulted. Blood cultures and aspiration/culture of affected knee pending at time of admission.       Procedure(s) (LRB):  REMOVAL OF PROSTHESIS,METHYLMETHACRYLATE  "WITH OR WITHOUT INSERTION OF SPACER LEFT KNEE (Left)  INCISION AND DRAINAGE (Left)      Hospital Course:   3/7: No acute events overnight, patient with no complaints this morning. US with aspiration was attempted however unable to obtain a specimen as fluid was too thick. Ortho consult pending.     3/8: Blood culture positive for MRSA, pharmacy notified to continue vancomycin. No other changes, Dr. Noyola to evaluate later today pending x-ray.    3/9: No acute events overnight, patient with no complaints aside from continued pain in the left knee. Ortho following with plans to monitor fluid cultures over the weekend with tentative plans for surgery on Monday. Plans to repeat blood cultures following procedure, if negative patient will need approximately one month of IV antibiotics from date of negative culture. Would benefit from continuation of vancomycin as she has failed outpatient treatment with daptomycin immediately prior to this admission.    3/10: Patient with no new complaints this morning, light growth of G+ cocci noted in knee aspirate culture. Continue vancomycin for MRSA bacteremia and joint infection, ortho following.    3/11: No major changes overnight, knee aspirate with MRSA. Plans for partial knee removal with placement of antibiotic spacer on Monday.    3/12: Patient continues to complain of pain, however is not taking scheduled PO medications as "they don't work." Will make changes to scheduled therapy but advised patient she will need to take medications consistently in order for them to work effectively.    3/13: Removal of hardware by Dr. Noyola today, patient tolerated procedure well with no immediate complications. Somewhat sedated from pain medications with vital signs stable and grossly WNL.    3/14: Post op #1 removal of hardware and placement of antibiotic spacer by Dr. Noyola. Now that presumed infective focus has been removed will repeat blood cultures, once negative patient " will require approximately 4 weeks of IV antibiotic therapy. Recommend vancomycin as patient has already failed outpatient treatment with daptomycin. Plan for transfer to Specialty for extended antibiotics and physical therapy.     3/15: No acute events overnight, patient continues to complain of pain and generally feeling unwell. Repeat blood cultures yesterday now positive for Klebsiella pneumonia, will add Rocephin pending final C&S. Continue vancomycin for MRSA bacteremia with indwelling prostheses.     3/16- picc line has been in during repeated cases of bacteremia, DC today. Line break for atleast 48h  3/17-continue abx, awaiting echo read- she had negative MONICA in November but is having recurrent MRSA bacteremia.  Will discuss need for repeat with Cardiology.  3/18-Continue abx  3/19-abx- attempting to arrange home IV abx  3/20-accepted to [facility] to finish IV ancef and IV vanco- Stable for DC. Follow up with PCP/ortho surgery       Goals of Care Treatment Preferences:  Code Status: Full Code      Consults:   Consults (From admission, onward)        Status Ordering Provider     Inpatient consult to Hospitalist  Once        Provider:  Adilson Fontanez DO    Acknowledged SAUL NOYOLA     Pharmacy to dose Vancomycin consult  Once        Provider:  (Not yet assigned)    Acknowledged SANTI VINES     Pharmacy to dose Vancomycin consult  Once        Provider:  (Not yet assigned)    Acknowledged SANTI VINES     Inpatient consult to Orthopedic Surgery  Once        Provider:  Saul Noyola MD    Acknowledged SANTI VINES          Cardiac/Vascular  Hypertension  Adjust medications as needed      ID  Bacteremia due to Klebsiella pneumoniae  Ancef  End date 3/31      MRSA bacteremia    vanco end date 4/27  Extended for recurrent MRSA bacteremia- 6 + weeks    Oncology  Microcytic anemia  Stable      Endocrine  Type 2 diabetes mellitus, with long-term current use of insulin  pcp follow  up    Orthopedic  Arthritis  Pt/ot   Pain management        Final Active Diagnoses:    Diagnosis Date Noted POA    Bacteremia due to Klebsiella pneumoniae [R78.81, B96.1] 03/15/2023 No    Hypertension [I10] 01/27/2023 Yes    Microcytic anemia [D50.9] 01/19/2023 Yes    MRSA bacteremia [R78.81, B95.62] 11/21/2022 Yes    Type 2 diabetes mellitus, with long-term current use of insulin [E11.9, Z79.4] 11/20/2022 Not Applicable    Arthritis [M19.90] 01/04/2022 Yes      Problems Resolved During this Admission:    Diagnosis Date Noted Date Resolved POA    PRINCIPAL PROBLEM:  Septic arthritis [M00.9] 03/06/2023 03/20/2023 Yes       Discharged Condition: good    Disposition: Another Health Care Inst*    Follow Up:   Contact information for after-discharge care     Destination     [facility] .    Service: Skilled Nursing  Contact information:  [address] [place] [address]  [phone]                 [facility]     [facility] .    Services: Home Nursing, Home Rehabilitation  Contact information:  [address] [place], [place] [id number]  [phone]                           Patient Instructions:      Diet diabetic     Activity as tolerated       Significant Diagnostic Studies: Labs: All labs within the past 24 hours have been reviewed    Pending Diagnostic Studies:     Procedure Component Value Units Date/Time    EXTRA TUBES [[id number]] Collected: 03/09/23 1714    Order Status: Sent Lab Status: In process Updated: 03/09/23 1714    Specimen: Blood, Venous     Narrative:      The following orders were created for panel order EXTRA TUBES.  Procedure                               Abnormality         Status                     ---------                               -----------         ------                     Gold Top Hold[[id number]]                                    In process                   Please view results for these tests on the individual orders.     EXTRA TUBES [[id number]] Collected: 03/09/23 0007    Order Status: Sent Lab Status: In process Updated: 03/09/23 0007    Specimen: Blood, Venous     Narrative:      The following orders were created for panel order EXTRA TUBES.  Procedure                               Abnormality         Status                     ---------                               -----------         ------                     Red Top Hold[[id number]]                                     In process                 Lavender Top Hold[[id number]]                                In process                   Please view results for these tests on the individual orders.    EXTRA TUBES [[id number]] Collected: 03/06/23 1404    Order Status: Sent Lab Status: In process Updated: 03/06/23 1404    Specimen: Blood, Venous     Narrative:      The following orders were created for panel order EXTRA TUBES.  Procedure                               Abnormality         Status                     ---------                               -----------         ------                     Light Blue Top Hold[[id number]]                              In process                 Light Green Top Hold[[id number]]                             In process                   Please view results for these tests on the individual orders.         Medications:  Reconciled Home Medications:      Medication List      START taking these medications    dextrose 5 % (D5W) SolP 250 mL with vancomycin 1,000 mg SolR 1,000 mg  Inject 1,000 mg into the vein once daily.     dextrose 5 % in water (D5W) 5 % PgBk 50 mL with ceFAZolin 2 gram SolR 2 g  Inject 2 g into the vein every 8 (eight) hours.     HYDROcodone-acetaminophen 5-325 mg per tablet  Commonly known as: NORCO  Take 1 tablet by mouth every 6 (six) hours as needed for Pain.     Lactobacillus acidophilus 500 million cell Cap  Take 1 capsule by mouth 3 (three) times daily with meals.     losartan 25 MG tablet  Commonly known as: COZAAR  Take 1  "tablet (25 mg total) by mouth once daily.  Start taking on: March 21, 2023     ondansetron 4 mg/2 mL Soln  Inject 4 mg into the vein every 8 (eight) hours as needed.     promethazine 25 MG tablet  Commonly known as: PHENERGAN  Take 1 tablet (25 mg total) by mouth every 6 (six) hours as needed.        CONTINUE taking these medications    BD ULTRA-FINE SHORT PEN NEEDLE 31 gauge x 5/16" Ndle  Generic drug: pen needle, diabetic     DULoxetine 60 MG capsule  Commonly known as: CYMBALTA  Take 1 capsule (60 mg total) by mouth once daily.     hydrALAZINE 25 MG tablet  Commonly known as: APRESOLINE  Take 1 tablet (25 mg total) by mouth every 12 (twelve) hours.     hydrocortisone 1 % cream  Apply topically 2 (two) times daily.     insulin glargine 100 units/mL SubQ pen  Commonly known as: LANTUS SOLOSTAR U-100 INSULIN  Inject 30 Units into the skin once daily.     NEURONTIN ORAL  Take by mouth 2 (two) times a day.     NovoLOG FlexPen U-100 Insulin 100 unit/mL (3 mL) Inpn pen  Generic drug: insulin aspart U-100  Inject 5 Units into the skin 3 (three) times daily with meals.     TRULICITY 0.75 mg/0.5 mL pen injector  Generic drug: dulaglutide  Inject 0.75 mg into the skin every 7 days.        STOP taking these medications    docusate sodium 100 MG capsule  Commonly known as: COLACE     ferrous sulfate 325 (65 FE) MG EC tablet     sodium chloride 0.9% SolP 50 mL with DAPTOmycin 500 mg SolR 394 mg            Indwelling Lines/Drains at time of discharge:   Lines/Drains/Airways     Drain  Duration                Closed/Suction Drain 03/13/23 1000 Left Knee Accordion 10 Fr. 7 days                Time spent on the discharge of patient: >30 minutes         Adilson Fontanez DO  Department of Hospital Medicine  [facility]  "

## 2023-03-20 NOTE — PLAN OF CARE
Problem: Diabetes Comorbidity  Goal: Blood Glucose Level Within Targeted Range  Outcome: Ongoing, Progressing  Intervention: Monitor and Manage Glycemia  Flowsheets (Taken 3/19/2023 1921)  Glycemic Management:   blood glucose monitored   carbohydrate replacement provided     Problem: Infection  Goal: Absence of Infection Signs and Symptoms  Outcome: Ongoing, Progressing  Intervention: Prevent or Manage Infection  Flowsheets (Taken 3/19/2023 1921)  Fever Reduction/Comfort Measures: fluid intake increased

## 2023-03-21 LAB
BACTERIA #/AREA URNS HPF: ABNORMAL /HPF
BILIRUB UR QL STRIP: NEGATIVE
CLARITY UR: CLEAR
COLOR UR: YELLOW
GLUCOSE SERPL-MCNC: 126 MG/DL (ref 70–105)
GLUCOSE SERPL-MCNC: 177 MG/DL (ref 70–105)
GLUCOSE SERPL-MCNC: 188 MG/DL (ref 70–105)
GLUCOSE SERPL-MCNC: 308 MG/DL (ref 70–105)
GLUCOSE SERPL-MCNC: 388 MG/DL (ref 70–105)
GLUCOSE UR STRIP-MCNC: NEGATIVE MG/DL
KETONES UR STRIP-SCNC: NEGATIVE MG/DL
LEUKOCYTE ESTERASE UR QL STRIP: ABNORMAL
NITRITE UR QL STRIP: NEGATIVE
PH UR STRIP: 5 PH UNITS
PROT UR QL STRIP: NEGATIVE
RBC # UR STRIP: NEGATIVE /UL
RBC #/AREA URNS HPF: ABNORMAL /HPF
SP GR UR STRIP: 1.01
SQUAMOUS #/AREA URNS LPF: ABNORMAL /LPF
UROBILINOGEN UR STRIP-ACNC: 0.2 MG/DL
WBC #/AREA URNS HPF: ABNORMAL /HPF
YEAST #/AREA URNS HPF: ABNORMAL /HPF

## 2023-03-21 PROCEDURE — 25000003 PHARM REV CODE 250: Performed by: FAMILY MEDICINE

## 2023-03-21 PROCEDURE — 82962 GLUCOSE BLOOD TEST: CPT

## 2023-03-21 PROCEDURE — 94761 N-INVAS EAR/PLS OXIMETRY MLT: CPT

## 2023-03-21 PROCEDURE — 63600175 PHARM REV CODE 636 W HCPCS: Mod: TB,JG | Performed by: FAMILY MEDICINE

## 2023-03-21 PROCEDURE — 99308 PR NURSING FAC CARE, SUBSEQ, MINOR COMPLIC: ICD-10-PCS | Mod: ,,, | Performed by: FAMILY MEDICINE

## 2023-03-21 PROCEDURE — 81001 URINALYSIS AUTO W/SCOPE: CPT | Performed by: FAMILY MEDICINE

## 2023-03-21 PROCEDURE — 11000004 HC SNF PRIVATE

## 2023-03-21 PROCEDURE — 99308 SBSQ NF CARE LOW MDM 20: CPT | Mod: ,,, | Performed by: FAMILY MEDICINE

## 2023-03-21 PROCEDURE — 97161 PT EVAL LOW COMPLEX 20 MIN: CPT

## 2023-03-21 PROCEDURE — 63600175 PHARM REV CODE 636 W HCPCS: Performed by: EMERGENCY MEDICINE

## 2023-03-21 PROCEDURE — 97165 OT EVAL LOW COMPLEX 30 MIN: CPT

## 2023-03-21 RX ADMIN — HYDRALAZINE HYDROCHLORIDE 25 MG: 25 TABLET, FILM COATED ORAL at 08:03

## 2023-03-21 RX ADMIN — MUPIROCIN 1 G: 20 OINTMENT TOPICAL at 08:03

## 2023-03-21 RX ADMIN — INSULIN ASPART 10 UNITS: 100 INJECTION, SOLUTION INTRAVENOUS; SUBCUTANEOUS at 05:03

## 2023-03-21 RX ADMIN — CEFAZOLIN SODIUM 2 G: 2 SOLUTION INTRAVENOUS at 03:03

## 2023-03-21 RX ADMIN — HYDROCODONE BITARTRATE AND ACETAMINOPHEN 1 TABLET: 5; 325 TABLET ORAL at 12:03

## 2023-03-21 RX ADMIN — LOSARTAN POTASSIUM 25 MG: 25 TABLET, FILM COATED ORAL at 08:03

## 2023-03-21 RX ADMIN — MUPIROCIN 1 G: 20 OINTMENT TOPICAL at 09:03

## 2023-03-21 RX ADMIN — INSULIN ASPART 5 UNITS: 100 INJECTION, SOLUTION INTRAVENOUS; SUBCUTANEOUS at 10:03

## 2023-03-21 RX ADMIN — SENNOSIDES AND DOCUSATE SODIUM 1 TABLET: 8.6; 5 TABLET ORAL at 09:03

## 2023-03-21 RX ADMIN — Medication 1 CAPSULE: at 04:03

## 2023-03-21 RX ADMIN — DULOXETINE 60 MG: 30 CAPSULE, DELAYED RELEASE ORAL at 08:03

## 2023-03-21 RX ADMIN — Medication 1 CAPSULE: at 08:03

## 2023-03-21 RX ADMIN — GABAPENTIN 300 MG: 300 CAPSULE ORAL at 09:03

## 2023-03-21 RX ADMIN — Medication 1 CAPSULE: at 11:03

## 2023-03-21 RX ADMIN — CEFAZOLIN SODIUM 2 G: 2 SOLUTION INTRAVENOUS at 09:03

## 2023-03-21 RX ADMIN — INSULIN ASPART 5 UNITS: 100 INJECTION, SOLUTION INTRAVENOUS; SUBCUTANEOUS at 04:03

## 2023-03-21 RX ADMIN — GABAPENTIN 300 MG: 300 CAPSULE ORAL at 08:03

## 2023-03-21 RX ADMIN — INSULIN DETEMIR 30 UNITS: 100 INJECTION, SOLUTION SUBCUTANEOUS at 08:03

## 2023-03-21 RX ADMIN — HYDROCODONE BITARTRATE AND ACETAMINOPHEN 1 TABLET: 5; 325 TABLET ORAL at 06:03

## 2023-03-21 RX ADMIN — HYDROCODONE BITARTRATE AND ACETAMINOPHEN 1 TABLET: 5; 325 TABLET ORAL at 04:03

## 2023-03-21 RX ADMIN — INSULIN ASPART 1 UNITS: 100 INJECTION, SOLUTION INTRAVENOUS; SUBCUTANEOUS at 09:03

## 2023-03-21 RX ADMIN — CEFAZOLIN SODIUM 2 G: 2 SOLUTION INTRAVENOUS at 11:03

## 2023-03-21 RX ADMIN — VANCOMYCIN HYDROCHLORIDE 1250 MG: 1.25 INJECTION, POWDER, LYOPHILIZED, FOR SOLUTION INTRAVENOUS at 04:03

## 2023-03-21 RX ADMIN — INSULIN ASPART 5 UNITS: 100 INJECTION, SOLUTION INTRAVENOUS; SUBCUTANEOUS at 08:03

## 2023-03-21 RX ADMIN — HYDRALAZINE HYDROCHLORIDE 25 MG: 25 TABLET, FILM COATED ORAL at 09:03

## 2023-03-21 RX ADMIN — SENNOSIDES AND DOCUSATE SODIUM 1 TABLET: 8.6; 5 TABLET ORAL at 08:03

## 2023-03-21 NOTE — PLAN OF CARE
Problem: Adult Inpatient Plan of Care  Goal: Optimal Comfort and Wellbeing  Outcome: Ongoing, Progressing  Goal: Readiness for Transition of Care  Outcome: Ongoing, Progressing     Problem: Diabetes Comorbidity  Goal: Blood Glucose Level Within Targeted Range  Outcome: Ongoing, Progressing     Problem: Oral Intake Inadequate (Acute Kidney Injury/Impairment)  Goal: Optimal Nutrition Intake  Outcome: Ongoing, Progressing     Problem: Fall Injury Risk  Goal: Absence of Fall and Fall-Related Injury  Outcome: Ongoing, Progressing

## 2023-03-21 NOTE — SUBJECTIVE & OBJECTIVE
Interval History: pt. Will need a PICC line.    Review of Systems  Objective:     Vital Signs (Most Recent):  Temp: 98.2 °F (36.8 °C) (03/20/23 2000)  Pulse: 71 (03/21/23 0602)  Resp: 18 (03/21/23 0602)  BP: 122/69 (03/20/23 2000)  SpO2: 96 % (03/21/23 0602) Vital Signs (24h Range):  Temp:  [97.7 °F (36.5 °C)-98.2 °F (36.8 °C)] 98.2 °F (36.8 °C)  Pulse:  [64-79] 71  Resp:  [17-18] 18  SpO2:  [96 %-98 %] 96 %  BP: (122-142)/(69-78) 122/69        Body mass index is 22.77 kg/m².  No intake or output data in the 24 hours ending 03/21/23 0838   Physical Exam    Significant Labs: All pertinent labs within the past 24 hours have been reviewed.    Significant Imaging: I have reviewed all pertinent imaging results/findings within the past 24 hours.   on paper/done

## 2023-03-21 NOTE — PLAN OF CARE
Description: Grooming Status:   Short Term Goal: Pt will perform grooming with s/u sitting EOB.   Long Term Goal: Pt will perform grooming/oral hygiene standing at sink with Mod I      LE dressing Status:   Short Term Goal: Pt will perform LE dressing with mod a.   Long Term Goal: Pt will perform LE dressing with min a.    Toileting Status:   Short Term Goal: Pt will perform toilet hygiene on BSC with min a.  Long Term Goal: Pt will perform toilet hygiene on toilet with no AE with s/u.    Commode Transfer:   Short Term Goal: Pt will perform BSC t/f with min a.  Long Term Goal:  Pt will perform toilet t/f in bathroom with s/u.     Bathing Status:   Long Term Goal: Pt will perform sponge bath with s/u with no unsafe fatigue.     Strength Status:   Long Term Goal: Pt to perform BUE strengthening with weights and/or body weight to increase ADL independence and safety    Endurance Status:   Short Term Goal:pt to perform 15 min OT treatment with 5 or greater rest breaks  Long Term Goal: pt to perform 30 min OT treat with 3 or less rest breaks

## 2023-03-21 NOTE — HOSPITAL COURSE
3/21/23 - pt. Is doing well so far. We will arrange for her to get a PICC line soon.    3/24/23 - pt. Is again wanting more pain meds. Again told her that she is on the dose she was on in [place]. Will continue present treatment.    3/27/23 - pt. Is still eating a lot of [employer]. Still complaining of pain. She is not moving. Will continue present treatment.    3/31/23 - pt. Is sleeping.    4/3/23 - pt. Sleeping. No complaints voiced by staff.    4/7/23 - pt. Requesting sitting outside some today. OK by me.    4/10/23 - pt. Is sleeping.    4/14/23 - pt. Is sleeping. Vancomycin has to be adjusted again today.    4/17/23 - pt. Complaining of back pain today. Orders revised.    4/21/23 - pt. Sleeping.    4/24/23 - pt. Is again sleeping. Has requested toradol again.    4/27/23 - pt has completed her IV antibiotics. She will go home today with her PICC line intact due to more surgery in 2 weeks.

## 2023-03-21 NOTE — NURSING
Pt [facility], pt and family aware that someone is required to go with her, pt's mother states she will follow the ambulance to the appointment.

## 2023-03-21 NOTE — PROGRESS NOTES
[facility] Medicine  Progress Note    Patient Name: Monica Walker  MRN: [id number]  Patient Class: IP- Swing   Admission Date: 3/20/2023  Length of Stay: 1 days  Attending Physician: Hannah Schulz, *  Primary Care Provider: Hannah Schulz MD        Subjective:     Principal Problem:Staphylococcal arthritis of left knee        HPI:  3/20/23 - this 52 yr. Old WF was readmitted here for IV antibiotics. Her PICC line was pulled on the 16th. It was not replaced on the 18th as the noted stated was the plan. She is to get antibiotics through the 27th of April. We will have to arrange for her to have another PICC line installed for this. She has had her left knee replacement removed for sepsis. She has a spacer in the joint. She will undergo another knee replacement after the antibiotics. For rest of history, please see her old records.      Overview/Hospital Course:  3/21/23 - pt. Is doing well so far. We will arrange for her to get a PICC line soon.      Interval History: pt. Will need a PICC line.    Review of Systems  Objective:     Vital Signs (Most Recent):  Temp: 98.2 °F (36.8 °C) (03/20/23 2000)  Pulse: 71 (03/21/23 0602)  Resp: 18 (03/21/23 0602)  BP: 122/69 (03/20/23 2000)  SpO2: 96 % (03/21/23 0602) Vital Signs (24h Range):  Temp:  [97.7 °F (36.5 °C)-98.2 °F (36.8 °C)] 98.2 °F (36.8 °C)  Pulse:  [64-79] 71  Resp:  [17-18] 18  SpO2:  [96 %-98 %] 96 %  BP: (122-142)/(69-78) 122/69        Body mass index is 22.77 kg/m².  No intake or output data in the 24 hours ending 03/21/23 0838   Physical Exam    Significant Labs: All pertinent labs within the past 24 hours have been reviewed.    Significant Imaging: I have reviewed all pertinent imaging results/findings within the past 24 hours.      Assessment/Plan:      * Staphylococcal arthritis of left knee          VTE Risk Mitigation (From admission, onward)         Ordered     IP VTE HIGH RISK PATIENT  Once          03/20/23 1801     Place DONOVAN hose  Until discontinued         03/20/23 1801     heparin lock flush (porcine) injection 100 Units  As needed (PRN)         03/20/23 1736                Discharge Planning   SUZANNA:      Code Status: Full Code   Is the patient medically ready for discharge?:     Reason for patient still in hospital (select all that apply): Patient trending condition                     Hannah Schulz MD  Department of Hospital Medicine   [facility]

## 2023-03-21 NOTE — PT/OT/SLP EVAL
Physical Therapy Evaluation    Patient Name:  Monica Walker   MRN:  [id number]    Recommendations:     Discharge Recommendations: home with home health   Discharge Equipment Recommendations: none   Barriers to discharge: None    Assessment:     Monica Walker is a 52 y.o. female admitted with a medical diagnosis of Staphylococcal arthritis of left knee.  She presents with the following impairments/functional limitations: weakness, impaired endurance, impaired functional mobility, gait instability, impaired balance, decreased lower extremity function, pain. Patient's impairments have resulted in decrease safety and idependence with functional mobility and ADLs resulting in decreased ability to return home at this time.      Rehab Prognosis: Good; patient would benefit from acute skilled PT services to address these deficits and reach maximum level of function.    Recent Surgery: * No surgery found *      Plan:     During this hospitalization, patient to be seen 5 x/week to address the identified rehab impairments via gait training, therapeutic activities, therapeutic exercises and progress toward the following goals:    Plan of Care Expires:  04/11/23    Subjective     Chief Complaint: weakness and pain   Patient/Family Comments/goals: improve safety and independence with mobility for safe return home.     Pain/Comfort:  Pain Rating 1: 8/10  Location - Side 1: Left  Location - Orientation 1: generalized  Location 1: knee    Patients cultural, spiritual, Oriental orthodox conflicts given the current situation: no    Living Environment:  Patient lives with her mother.   Prior to admission, patients level of function was modified independent.  Equipment used at home: walker, rolling, bedside commode.  DME owned (not currently used): rolling walker.  Upon discharge, patient will have assistance from family.    Objective:      Patient found supine with peripheral IV  upon PT entry to room.    General Precautions: Standard,  fall  Orthopedic Precautions:LLE toe touch weight bearing   Braces: Knee immobilizer  Respiratory Status: Room air    Exams:  RLE Strength: WFL  LLE Strength: Deficits: 3-/5     Functional Mobility:  Bed Mobility:     Supine to Sit: minimum assistance  Sit to Supine: moderate assistance  Transfers:     Sit to Stand:  moderate assistance with rolling walker  Balance: Fair       AM-PAC 6 CLICK MOBILITY  Total Score:12       Patient left HOB elevated with call button in reach.    GOALS:   Multidisciplinary Problems       Physical Therapy Goals       Not on file                    History:     Past Medical History:   Diagnosis Date    Abscess of right thigh + MRSA 08/20/2021    healed    Adnexal cyst     Degenerative disc disease     Depression 10/29/2021    Hypertension     Hypomagnesemia 1/20/2023    Nicotine dependence     Osteoarthritis        Past Surgical History:   Procedure Laterality Date    BACK SURGERY      HYSTERECTOMY N/A     INCISION AND DRAINAGE Left 3/13/2023    Procedure: INCISION AND DRAINAGE;  Surgeon: Jacek Noyola MD;  Location: [facility] OR;  Service: Orthopedics;  Laterality: Left;    IRRIGATION AND DEBRIDEMENT OF LOWER EXTREMITY Left 11/22/2022    Procedure: IRRIGATION AND DEBRIDEMENT, LOWER EXTREMITY;  Surgeon: Papa Mendoza MD;  Location: [facility] OR;  Service: Orthopedics;  Laterality: Left;    IRRIGATION AND DEBRIDEMENT OF UPPER EXTREMITY Left 11/22/2022    Procedure: IRRIGATION AND DEBRIDEMENT, UPPER EXTREMITY;  Surgeon: Papa Mendoza MD;  Location: [facility] OR;  Service: Orthopedics;  Laterality: Left;    REVISION OF KNEE ARTHROPLASTY Left 3/13/2023    Procedure: REMOVAL OF PROSTHESIS,METHYLMETHACRYLATE WITH OR WITHOUT INSERTION OF SPACER LEFT KNEE;  Surgeon: Jacek Noyola MD;  Location: [facility] OR;  Service: Orthopedics;  Laterality: Left;    THORACIC LAMINECTOMY WITH FUSION N/A 1/24/2023    Procedure: T9-L2 fusion, T11-T12 laminectomy and  corpectomy;  Surgeon: Jimmy Hernandes MD;  Location: [facility];  Service: Neurosurgery;  Laterality: N/A;    TOTAL HIP ARTHROPLASTY Left     TOTAL KNEE ARTHROPLASTY Bilateral        Time Tracking:     PT Received On: 03/21/23  PT Start Time: 1000     PT Stop Time: 1010  PT Total Time (min): 10 min     Billable Minutes: Evaluation 10 minutes   Damaris Kaplan, PT, DPT       03/21/2023

## 2023-03-21 NOTE — NURSING TRANSFER
Nursing Transfer Note      3/21/2023     Reason patient is being transferred: ***    Transfer {TRANSFER TO/FROM:52633}: ***    Transfer via {TRANSFER VIA:72928}    Transfer with {TRANSFER WITH:46790}    Transported by ***    Medicines sent: ***    Any special needs or follow-up needed: ***    Chart send with patient: {YES (DEF)/NO:73060}    Notified: {NOTIFIED:02104}    Patient reassessed at: *** (date, time)    Upon arrival to floor: {IP NSG TRANSFER ARRIVAL O[facility]:91347}

## 2023-03-21 NOTE — PLAN OF CARE
Problem: Adult Inpatient Plan of Care  Goal: Plan of Care Review  Outcome: Ongoing, Progressing  Goal: Patient-Specific Goal (Individualized)  Outcome: Ongoing, Progressing     Problem: Fall Injury Risk  Goal: Absence of Fall and Fall-Related Injury  Outcome: Ongoing, Progressing  Intervention: Identify and Manage Contributors  Flowsheets (Taken 3/21/2023 1615)  Self-Care Promotion:   independence encouraged   BADL personal objects within reach   BADL personal routines maintained  Medication Review/Management:   medications reviewed   high-risk medications identified  Intervention: Promote Injury-Free Environment  Flowsheets (Taken 3/21/2023 1615)  Safety Promotion/Fall Prevention:   assistive device/personal item within reach   diversional activities provided   high risk medications identified   medications reviewed   muscle strengthening facilitated   nonskid shoes/socks when out of bed   side rails raised x 3   instructed to call staff for mobility

## 2023-03-21 NOTE — PLAN OF CARE
Patient would benefit from skilled PT to address the deficits detailed in eval.   Damaris Kaplan, PT, DPT

## 2023-03-21 NOTE — PT/OT/SLP EVAL
Occupational Therapy   Evaluation    Name: Monica Walker  MRN: [id number]  Admitting Diagnosis: Staphylococcal arthritis of left knee  Recent Surgery: * No surgery found *      Recommendations:     Discharge Recommendations: home with home health  Discharge Equipment Recommendations:  none  Barriers to discharge:       Assessment:     Monica Walker is a 52 y.o. female with a medical diagnosis of Staphylococcal arthritis of left knee.  Performance deficits affecting function: weakness, impaired endurance, impaired self care skills, impaired functional mobility, impaired balance, gait instability, decreased lower extremity function, decreased safety awareness, decreased upper extremity function, pain, decreased ROM.      Rehab Prognosis: Fair; patient would benefit from acute skilled OT services to address these deficits and reach maximum level of function.       Plan:     Patient to be seen 5 x/week to address the above listed problems via self-care/home management, therapeutic exercises, therapeutic activities  Plan of Care Expires:    Plan of Care Reviewed with: patient    Subjective     Chief Complaint: Pain  Patient/Family Comments/goals: Get stronger and increase functional mobility    Occupational Profile:  Living Environment: lives with mother  Previous level of function: Mod I  Roles and Routines: self-care  Equipment Used at Home: walker, rolling, bedside commode  Assistance upon Discharge: mother can assist patient    Pain/Comfort:  Pain Rating 1: 8/10    Patients cultural, spiritual, [employer]ybeterian conflicts given the current situation: no    Objective:     Communicated with: RN prior to session.  Patient found supine with peripheral IV upon OT entry to room.    General Precautions: Standard, fall  Orthopedic Precautions: LLE toe touch weight bearing  Braces: Knee immobilizer  Respiratory Status: Room air    Occupational Performance:    Bed Mobility:    Patient completed Rolling/Turning to Right with  minimum assistance    Functional Mobility/Transfers:  Patient completed Sit <> Stand Transfer with minimum assistance and of 2 persons  with  rolling walker   Functional Mobility: N/A due to safety    Activities of Daily Living:  Upper Body Dressing: supervision shirt    Cognitive/Visual Perceptual:  Cognitive/Psychosocial Skills:     -       Oriented to: Person, Place, Time, and Situation     Physical Exam:  Upper Extremity Range of Motion:     -       Right Upper Extremity: WFL  -       Left Upper Extremity: WFL except patient reports that she has RTC tear and needs it repaired  Upper Extremity Strength:    -       Right Upper Extremity: WFL  -       Left Upper Extremity: NT due to RTC tear    AMPAC 6 Click ADL:  AMPAC Total Score: 20    Treatment & Education:  Pt educated on OT role/POC.   Importance of OOB activity with staff assistance.  Importance of sitting up in the chair throughout the day as tolerated, especially for meals   Safety during functional t/f and mobility  Importance of assisting with self-care activities       Patient left supine with call button in reach    GOALS:   Multidisciplinary Problems       Occupational Therapy Goals          Problem: Occupational Therapy    Goal Priority Disciplines Outcome Interventions   Occupational Therapy Goal     OT, PT/OT     Description: Description: Grooming Status:   Short Term Goal: Pt will perform grooming with s/u sitting EOB.   Long Term Goal: Pt will perform grooming/oral hygiene standing at sink with Mod I      LE dressing Status:   Short Term Goal: Pt will perform LE dressing with mod a.   Long Term Goal: Pt will perform LE dressing with min a.    Toileting Status:   Short Term Goal: Pt will perform toilet hygiene on BSC with min a.  Long Term Goal: Pt will perform toilet hygiene on toilet with no AE with s/u.    Commode Transfer:   Short Term Goal: Pt will perform BSC t/f with min a.  Long Term Goal:  Pt will perform toilet t/f in bathroom with s/u.      Bathing Status:   Long Term Goal: Pt will perform sponge bath with s/u with no unsafe fatigue.     Strength Status:   Long Term Goal: Pt to perform BUE strengthening with weights and/or body weight to increase ADL independence and safety    Endurance Status:   Short Term Goal:pt to perform 15 min OT treatment with 5 or greater rest breaks  Long Term Goal: pt to perform 30 min OT treat with 3 or less rest breaks                       History:     Past Medical History:   Diagnosis Date    Abscess of right thigh + MRSA 08/20/2021    healed    Adnexal cyst     Degenerative disc disease     Depression 10/29/2021    Hypertension     Hypomagnesemia 1/20/2023    Nicotine dependence     Osteoarthritis          Past Surgical History:   Procedure Laterality Date    BACK SURGERY      HYSTERECTOMY N/A     INCISION AND DRAINAGE Left 3/13/2023    Procedure: INCISION AND DRAINAGE;  Surgeon: Jacek Noyola MD;  Location: [facility] OR;  Service: Orthopedics;  Laterality: Left;    IRRIGATION AND DEBRIDEMENT OF LOWER EXTREMITY Left 11/22/2022    Procedure: IRRIGATION AND DEBRIDEMENT, LOWER EXTREMITY;  Surgeon: Papa Mendoza MD;  Location: [facility] OR;  Service: Orthopedics;  Laterality: Left;    IRRIGATION AND DEBRIDEMENT OF UPPER EXTREMITY Left 11/22/2022    Procedure: IRRIGATION AND DEBRIDEMENT, UPPER EXTREMITY;  Surgeon: Papa Mendoza MD;  Location: [facility] OR;  Service: Orthopedics;  Laterality: Left;    REVISION OF KNEE ARTHROPLASTY Left 3/13/2023    Procedure: REMOVAL OF PROSTHESIS,METHYLMETHACRYLATE WITH OR WITHOUT INSERTION OF SPACER LEFT KNEE;  Surgeon: Jacek Noyola MD;  Location: [facility] OR;  Service: Orthopedics;  Laterality: Left;    THORACIC LAMINECTOMY WITH FUSION N/A 1/24/2023    Procedure: T9-L2 fusion, T11-T12 laminectomy and corpectomy;  Surgeon: Jimmy Hernandse MD;  Location: [facility] OR;  Service: Neurosurgery;  Laterality: N/A;    TOTAL HIP ARTHROPLASTY Left      TOTAL KNEE ARTHROPLASTY Bilateral        Time Tracking:     OT Date of Treatment: 03/21/23  OT Start Time: 1020  OT Stop Time: 1029  OT Total Time (min): 9 min    Billable Minutes:Evaluation 9 min  Kirti Bolton OTR/L      3/21/2023

## 2023-03-22 ENCOUNTER — HOSPITAL ENCOUNTER (OUTPATIENT)
Dept: RADIOLOGY | Facility: HOSPITAL | Age: 53
Discharge: HOME | End: 2023-03-22
Attending: STUDENT IN AN ORGANIZED HEALTH CARE EDUCATION/TRAINING PROGRAM
Payer: MEDICARE

## 2023-03-22 LAB
BACTERIA BLD CULT: NORMAL
BACTERIA BLD CULT: NORMAL
GLUCOSE SERPL-MCNC: 137 MG/DL (ref 70–105)
GLUCOSE SERPL-MCNC: 147 MG/DL (ref 70–105)
GLUCOSE SERPL-MCNC: 214 MG/DL (ref 70–105)
GLUCOSE SERPL-MCNC: 248 MG/DL (ref 70–105)

## 2023-03-22 PROCEDURE — 25000003 PHARM REV CODE 250: Performed by: FAMILY MEDICINE

## 2023-03-22 PROCEDURE — 11000004 HC SNF PRIVATE

## 2023-03-22 PROCEDURE — 94761 N-INVAS EAR/PLS OXIMETRY MLT: CPT

## 2023-03-22 PROCEDURE — 27000525 IR PICC LINE PLACEMENT W/O PORT, W/IMG > 5 Y/O

## 2023-03-22 PROCEDURE — 63600175 PHARM REV CODE 636 W HCPCS: Performed by: EMERGENCY MEDICINE

## 2023-03-22 PROCEDURE — 76937 US GUIDE VASCULAR ACCESS: CPT | Mod: TC

## 2023-03-22 PROCEDURE — 82962 GLUCOSE BLOOD TEST: CPT

## 2023-03-22 PROCEDURE — 63600175 PHARM REV CODE 636 W HCPCS: Performed by: FAMILY MEDICINE

## 2023-03-22 RX ORDER — HYDROCODONE BITARTRATE AND ACETAMINOPHEN 5; 325 MG/1; MG/1
1 TABLET ORAL EVERY 4 HOURS PRN
Status: DISCONTINUED | OUTPATIENT
Start: 2023-03-22 | End: 2023-04-27 | Stop reason: HOSPADM

## 2023-03-22 RX ADMIN — DULOXETINE 60 MG: 30 CAPSULE, DELAYED RELEASE ORAL at 09:03

## 2023-03-22 RX ADMIN — Medication 1 CAPSULE: at 04:03

## 2023-03-22 RX ADMIN — LOSARTAN POTASSIUM 25 MG: 25 TABLET, FILM COATED ORAL at 09:03

## 2023-03-22 RX ADMIN — INSULIN ASPART 4 UNITS: 100 INJECTION, SOLUTION INTRAVENOUS; SUBCUTANEOUS at 06:03

## 2023-03-22 RX ADMIN — HYDRALAZINE HYDROCHLORIDE 25 MG: 25 TABLET, FILM COATED ORAL at 09:03

## 2023-03-22 RX ADMIN — MUPIROCIN 1 G: 20 OINTMENT TOPICAL at 08:03

## 2023-03-22 RX ADMIN — SENNOSIDES AND DOCUSATE SODIUM 1 TABLET: 8.6; 5 TABLET ORAL at 08:03

## 2023-03-22 RX ADMIN — CEFAZOLIN SODIUM 2 G: 2 SOLUTION INTRAVENOUS at 03:03

## 2023-03-22 RX ADMIN — HYDRALAZINE HYDROCHLORIDE 25 MG: 25 TABLET, FILM COATED ORAL at 08:03

## 2023-03-22 RX ADMIN — MUPIROCIN 1 G: 20 OINTMENT TOPICAL at 09:03

## 2023-03-22 RX ADMIN — GABAPENTIN 300 MG: 300 CAPSULE ORAL at 09:03

## 2023-03-22 RX ADMIN — Medication 1 CAPSULE: at 12:03

## 2023-03-22 RX ADMIN — SENNOSIDES AND DOCUSATE SODIUM 1 TABLET: 8.6; 5 TABLET ORAL at 09:03

## 2023-03-22 RX ADMIN — INSULIN ASPART 5 UNITS: 100 INJECTION, SOLUTION INTRAVENOUS; SUBCUTANEOUS at 04:03

## 2023-03-22 RX ADMIN — INSULIN ASPART 5 UNITS: 100 INJECTION, SOLUTION INTRAVENOUS; SUBCUTANEOUS at 09:03

## 2023-03-22 RX ADMIN — HYDROCODONE BITARTRATE AND ACETAMINOPHEN 1 TABLET: 5; 325 TABLET ORAL at 02:03

## 2023-03-22 RX ADMIN — INSULIN DETEMIR 30 UNITS: 100 INJECTION, SOLUTION SUBCUTANEOUS at 09:03

## 2023-03-22 RX ADMIN — HYDROCODONE BITARTRATE AND ACETAMINOPHEN 1 TABLET: 5; 325 TABLET ORAL at 09:03

## 2023-03-22 RX ADMIN — GABAPENTIN 300 MG: 300 CAPSULE ORAL at 08:03

## 2023-03-22 RX ADMIN — Medication 1 CAPSULE: at 09:03

## 2023-03-22 RX ADMIN — CEFAZOLIN SODIUM 2 G: 2 SOLUTION INTRAVENOUS at 08:03

## 2023-03-22 RX ADMIN — INSULIN ASPART 5 UNITS: 100 INJECTION, SOLUTION INTRAVENOUS; SUBCUTANEOUS at 12:03

## 2023-03-22 RX ADMIN — HYDROCODONE BITARTRATE AND ACETAMINOPHEN 1 TABLET: 5; 325 TABLET ORAL at 04:03

## 2023-03-22 RX ADMIN — VANCOMYCIN HYDROCHLORIDE 1250 MG: 1.25 INJECTION, POWDER, LYOPHILIZED, FOR SOLUTION INTRAVENOUS at 10:03

## 2023-03-22 RX ADMIN — INSULIN ASPART 2 UNITS: 100 INJECTION, SOLUTION INTRAVENOUS; SUBCUTANEOUS at 08:03

## 2023-03-22 NOTE — NURSING
Patient picked up by [facility] medical services at this time to be taken to [facility] for picc line placement. Family will follow patient there.

## 2023-03-22 NOTE — NURSING
Called [facility] notified them that pt would be arriving late due to [facility] hasnt arrived yet for pt transfer for PICC line placement.  verbalized understanding and stated she would pass on the information.

## 2023-03-22 NOTE — PLAN OF CARE
[facility] Medical Surgical Unit - Skilled Nursing Facility  Patient: Monica Walker     Interdisciplinary Team Meeting     Today's Date: 3/22/2023     Estimated D/C Date: tbd       Physician: Dr. Schulz    Pharmacy: [facility] Unit Director: BRODY Kelly   : JORGE LUIS Méndez Physical/Occupational Therapy: KAREN Kaplan/ JAVIER Bolton   Speech Therapy: josephine lyles Activity Therapy: watch tv, read newspaper   Nursing: agueda Venegas Jenkins, P. Flowers Other: JOSHUA Curry     Nurse  New Symptoms/Problems: none  Last BM: 3/19/23 Urine: continent Diarrhea: No   Constipated: No Bladder: continent    Isolation: No     O2: n/a  Rm Air: 97 %   Nutrition: see dietary notes  Speech/Swallowing: n/a  Aspiration Precautions: No  Cognition: alert and oriented    Physical Therapy  Physical Therapy/Gait: mod. Assist with rolling walker ELOS: tbd   Transfers: mod. assist Range of Motion/Restrictions: lle toe touch wb     Occupational Therapy  Occupational Therapy: 52 y.o. female with a medical diagnosis of Staphylococcal arthritis of left knee.  Performance deficits affecting function: weakness, impaired endurance, impaired self care skills, impaired functional mobility, impaired balance, gait instability, decreased lower extremity function, decreased safety awareness, decreased upper extremity function, pain, decreased ROM.      Eating/Grooming: independent   Toileting: mod. assist Bathing: mod. assist   Dressing (Upper Body): min. assist Dressing (Lower Body): mod. assist       Tx Plan/Recommendations reviewed with family and patient on 3/21/23 at bedside  Additional family Conference/Training: not at this time  D/C Plan/Recommendations: home with home health    Pharmacy  Medication Changes (see MD orders in chart): No  MD/NP: Dr. Schulz Labs Reviewed: yes New Lab Orders: no      MD/NP Signature: Time:

## 2023-03-22 NOTE — NURSING
Called [facility] to check ETA for pt transfer/pickup for PICC line placement at [facility] and dispatch stated they were on their way running late.

## 2023-03-22 NOTE — NURSING
Patient returned to [facility]. Placed patient in bed. No acute distress noted.

## 2023-03-22 NOTE — NURSING
[facility] not here to transport patient at this time. Mitch with ETA of 1200 today. Contacted [facility] dispatch who reports she will contact mitch to see issue and call me back.

## 2023-03-22 NOTE — PROGRESS NOTES
PICC insertion  Performed by A Jerman A  Consent obtained for PICC insertion.  Formal timeout was called all staff present agreed to patient and procedure.  The left upper extremity was prepped with ChloraPrep and sterile field was established.  1% lidocaine was used as local anesthetic.  Under ultrasound guidance the brachial vein was localized and accessed with a micropuncture needle.  An 018 guidewire was passed through the vein to the level superior vena cava.  A 43 cm PICC line was then placed over the wire with its tip terminating at the atrial caval junction.  Wire was removed both ports were flushed with heparinized saline.  The PICC line was then cleaned and secured.  The patient tolerated the procedure well there were no immediate postprocedure complications.  Total the fluoroscopy time was 36 seconds.

## 2023-03-23 LAB
BUN SERPL-MCNC: 19 MG/DL (ref 7–18)
BUN/CREAT SERPL: 23 (ref 6–20)
CREAT SERPL-MCNC: 0.84 MG/DL (ref 0.55–1.02)
EGFR (NO RACE VARIABLE) (RUSH/TITUS): 83 ML/MIN/1.73M²
GLUCOSE SERPL-MCNC: 257 MG/DL (ref 70–105)
GLUCOSE SERPL-MCNC: 275 MG/DL (ref 70–105)
GLUCOSE SERPL-MCNC: 299 MG/DL (ref 70–105)
GLUCOSE SERPL-MCNC: 70 MG/DL (ref 70–105)
VANCOMYCIN TROUGH SERPL-MCNC: 16.3 ΜG/ML (ref 10–20)

## 2023-03-23 PROCEDURE — 94761 N-INVAS EAR/PLS OXIMETRY MLT: CPT

## 2023-03-23 PROCEDURE — 97116 GAIT TRAINING THERAPY: CPT

## 2023-03-23 PROCEDURE — 80202 ASSAY OF VANCOMYCIN: CPT | Performed by: FAMILY MEDICINE

## 2023-03-23 PROCEDURE — 11000004 HC SNF PRIVATE

## 2023-03-23 PROCEDURE — 97110 THERAPEUTIC EXERCISES: CPT

## 2023-03-23 PROCEDURE — 84520 ASSAY OF UREA NITROGEN: CPT | Performed by: FAMILY MEDICINE

## 2023-03-23 PROCEDURE — 82962 GLUCOSE BLOOD TEST: CPT

## 2023-03-23 PROCEDURE — 25000003 PHARM REV CODE 250: Performed by: FAMILY MEDICINE

## 2023-03-23 PROCEDURE — 63600175 PHARM REV CODE 636 W HCPCS: Performed by: FAMILY MEDICINE

## 2023-03-23 PROCEDURE — 63600175 PHARM REV CODE 636 W HCPCS: Performed by: EMERGENCY MEDICINE

## 2023-03-23 PROCEDURE — 82565 ASSAY OF CREATININE: CPT | Performed by: FAMILY MEDICINE

## 2023-03-23 RX ADMIN — HYDROCODONE BITARTRATE AND ACETAMINOPHEN 1 TABLET: 5; 325 TABLET ORAL at 08:03

## 2023-03-23 RX ADMIN — HYDROCODONE BITARTRATE AND ACETAMINOPHEN 1 TABLET: 5; 325 TABLET ORAL at 03:03

## 2023-03-23 RX ADMIN — CEFAZOLIN SODIUM 2 G: 2 SOLUTION INTRAVENOUS at 01:03

## 2023-03-23 RX ADMIN — LOSARTAN POTASSIUM 25 MG: 25 TABLET, FILM COATED ORAL at 08:03

## 2023-03-23 RX ADMIN — INSULIN ASPART 5 UNITS: 100 INJECTION, SOLUTION INTRAVENOUS; SUBCUTANEOUS at 06:03

## 2023-03-23 RX ADMIN — HYDROCODONE BITARTRATE AND ACETAMINOPHEN 1 TABLET: 5; 325 TABLET ORAL at 01:03

## 2023-03-23 RX ADMIN — VANCOMYCIN HYDROCHLORIDE 1250 MG: 1.25 INJECTION, POWDER, LYOPHILIZED, FOR SOLUTION INTRAVENOUS at 04:03

## 2023-03-23 RX ADMIN — Medication 1 CAPSULE: at 12:03

## 2023-03-23 RX ADMIN — GABAPENTIN 300 MG: 300 CAPSULE ORAL at 08:03

## 2023-03-23 RX ADMIN — INSULIN ASPART 3 UNITS: 100 INJECTION, SOLUTION INTRAVENOUS; SUBCUTANEOUS at 08:03

## 2023-03-23 RX ADMIN — VANCOMYCIN HYDROCHLORIDE 1250 MG: 1.25 INJECTION, POWDER, LYOPHILIZED, FOR SOLUTION INTRAVENOUS at 10:03

## 2023-03-23 RX ADMIN — SENNOSIDES AND DOCUSATE SODIUM 1 TABLET: 8.6; 5 TABLET ORAL at 08:03

## 2023-03-23 RX ADMIN — CEFAZOLIN SODIUM 2 G: 2 SOLUTION INTRAVENOUS at 04:03

## 2023-03-23 RX ADMIN — MUPIROCIN 1 G: 20 OINTMENT TOPICAL at 08:03

## 2023-03-23 RX ADMIN — INSULIN ASPART 5 UNITS: 100 INJECTION, SOLUTION INTRAVENOUS; SUBCUTANEOUS at 11:03

## 2023-03-23 RX ADMIN — CEFAZOLIN SODIUM 2 G: 2 SOLUTION INTRAVENOUS at 07:03

## 2023-03-23 RX ADMIN — INSULIN ASPART 6 UNITS: 100 INJECTION, SOLUTION INTRAVENOUS; SUBCUTANEOUS at 11:03

## 2023-03-23 RX ADMIN — INSULIN DETEMIR 30 UNITS: 100 INJECTION, SOLUTION SUBCUTANEOUS at 08:03

## 2023-03-23 RX ADMIN — HYDRALAZINE HYDROCHLORIDE 25 MG: 25 TABLET, FILM COATED ORAL at 08:03

## 2023-03-23 RX ADMIN — Medication 1 CAPSULE: at 08:03

## 2023-03-23 RX ADMIN — HYDROCODONE BITARTRATE AND ACETAMINOPHEN 1 TABLET: 5; 325 TABLET ORAL at 05:03

## 2023-03-23 RX ADMIN — Medication 1 CAPSULE: at 05:03

## 2023-03-23 RX ADMIN — DULOXETINE 60 MG: 30 CAPSULE, DELAYED RELEASE ORAL at 08:03

## 2023-03-23 NOTE — NURSING
Unable to find in paperwork when staples to be removed/wound care to left knee. Removed ace bandage and assessed incision. Area appears wnl. Small amount of dry bloody drainage noted. Staples intact. No s/s of infection noted. Replaced ace wrap and immobilizer. Will contact Dr. Fontanez and/or Dr. Noyola in am to verify when staples need to be removed and any care needed to area.

## 2023-03-23 NOTE — PT/OT/SLP PROGRESS
"Occupational Therapy  Treatment    Monica Walker   MRN: [id number]   Admitting Diagnosis: Staphylococcal arthritis of left knee    OT Date of Treatment: 03/23/23   OT Start Time: 1253  OT Stop Time: 1320  OT Total Time (min): 27 min    Billable Minutes:  Therapeutic Exercise 27 min               General Precautions: Standard, fall  Orthopedic Precautions: LLE toe touch weight bearing  Braces: Knee immobilizer  Respiratory Status: Room air         Subjective:  Communicated with RN prior to session.    Patient was not willing to do therapy this morning but came this afternoon  Pain/Comfort  Pain Rating 1: 8/10    Objective:  Patient found with: PICC line     Functional Mobility:  Bed Mobility: Patient was s/u with bed mobility       Transfers: Patient was a min a t/f from chair to bed        Functional Ambulation:     Activities of Daily Living:         Therapeutic Activities and Exercises:  Patient completed the following for increased strength to increase I with ADLs: UBE 6 min x 1x, 2# DB- shoulder press, chest press, bicep curls x 30, yellow theraflex x 30 both ways, yellow theraband- scapular retraction x 30      AM-PAC 6 CLICK ADL   How much help from another person does this patient currently need?   1 = Unable, Total/Dependent Assistance  2 = A lot, Maximum/Moderate Assistance  3 = A little, Minimum/Contact Guard/Supervision  4 = None, Modified [place]/Independent    Putting on and taking off regular lower body clothing? : 3  Bathing (including washing, rinsing, drying)?: 3  Toileting, which includes using toilet, bedpan, or urinal? : 3  Putting on and taking off regular upper body clothing?: 3  Taking care of personal grooming such as brushing teeth?: 4  Eating meals?: 4  Daily Activity Total Score: 20     AM-PAC Raw Score CMS "G-Code Modifier Level of Impairment Assistance   6 % Total / Unable   7 - 8 CM 80 - 100% Maximal Assist   9-13 CL 60 - 80% Moderate Assist   14 - 19 CK 40 - 60% Moderate " Assist   20 - 22 CJ 20 - 40% Minimal Assist   23 CI 1-20% SBA / CGA   24 CH 0% Independent/ Mod I       Patient left supine with call button in reach    ASSESSMENT:  Monica Walker is a 52 y.o. female with a medical diagnosis of Staphylococcal arthritis of left knee and presents with decreased strength, decreased endurance, decreased self-care.    Rehab identified problem list/impairments:  weakness, impaired endurance, impaired self care skills, impaired functional mobility, impaired balance, decreased lower extremity function, decreased safety awareness, decreased upper extremity function    Rehab potential is fair.    Activity tolerance: Good    Discharge recommendations: home with home health   Barriers to discharge:      Equipment recommendations: none    GOALS:   Multidisciplinary Problems       Occupational Therapy Goals          Problem: Occupational Therapy    Goal Priority Disciplines Outcome Interventions   Occupational Therapy Goal     OT, PT/OT     Description: Description: Grooming Status:   Short Term Goal: Pt will perform grooming with s/u sitting EOB.   Long Term Goal: Pt will perform grooming/oral hygiene standing at sink with Mod I      LE dressing Status:   Short Term Goal: Pt will perform LE dressing with mod a.   Long Term Goal: Pt will perform LE dressing with min a.    Toileting Status:   Short Term Goal: Pt will perform toilet hygiene on BSC with min a.  Long Term Goal: Pt will perform toilet hygiene on toilet with no AE with s/u.    Commode Transfer:   Short Term Goal: Pt will perform BSC t/f with min a.  Long Term Goal:  Pt will perform toilet t/f in bathroom with s/u.     Bathing Status:   Long Term Goal: Pt will perform sponge bath with s/u with no unsafe fatigue.     Strength Status:   Long Term Goal: Pt to perform BUE strengthening with weights and/or body weight to increase ADL independence and safety    Endurance Status:   Short Term Goal:pt to perform 15 min OT treatment with 5 or  greater rest breaks  Long Term Goal: pt to perform 30 min OT treat with 3 or less rest breaks                       Plan:  Patient to be seen 5 x/week to address the above listed problems via therapeutic exercises  Plan of Care expires:    Plan of Care reviewed with: patient     Kirti Che, OTR/L      03/23/2023

## 2023-03-23 NOTE — PLAN OF CARE
Problem: Fall Injury Risk  Goal: Absence of Fall and Fall-Related Injury  Outcome: Ongoing, Progressing  Intervention: Identify and Manage Contributors  Flowsheets (Taken 3/23/2023 7350)  Self-Care Promotion:   independence encouraged   BADL personal objects within reach   BADL personal routines maintained   safe use of adaptive equipment encouraged  Medication Review/Management:   medications reviewed   high-risk medications identified     Problem: Infection  Goal: Absence of Infection Signs and Symptoms  Outcome: Ongoing, Progressing  Intervention: Prevent or Manage Infection  Flowsheets (Taken 3/23/2023 0120)  Fever Reduction/Comfort Measures: fluid intake increased  Infection Management: aseptic technique maintained  Isolation Precautions: precautions maintained

## 2023-03-24 LAB
GLUCOSE SERPL-MCNC: 159 MG/DL (ref 70–105)
GLUCOSE SERPL-MCNC: 175 MG/DL (ref 70–105)
GLUCOSE SERPL-MCNC: 178 MG/DL (ref 70–105)
GLUCOSE SERPL-MCNC: 186 MG/DL (ref 70–105)

## 2023-03-24 PROCEDURE — 25000003 PHARM REV CODE 250: Performed by: FAMILY MEDICINE

## 2023-03-24 PROCEDURE — 27201920 HC DRESSING, AQUACEL AG

## 2023-03-24 PROCEDURE — 63600175 PHARM REV CODE 636 W HCPCS: Performed by: FAMILY MEDICINE

## 2023-03-24 PROCEDURE — 99308 SBSQ NF CARE LOW MDM 20: CPT | Mod: ,,, | Performed by: FAMILY MEDICINE

## 2023-03-24 PROCEDURE — 99308 PR NURSING FAC CARE, SUBSEQ, MINOR COMPLIC: ICD-10-PCS | Mod: ,,, | Performed by: FAMILY MEDICINE

## 2023-03-24 PROCEDURE — 82962 GLUCOSE BLOOD TEST: CPT

## 2023-03-24 PROCEDURE — 11000004 HC SNF PRIVATE

## 2023-03-24 PROCEDURE — 97110 THERAPEUTIC EXERCISES: CPT | Mod: CQ

## 2023-03-24 PROCEDURE — 63600175 PHARM REV CODE 636 W HCPCS: Mod: TB,JG | Performed by: FAMILY MEDICINE

## 2023-03-24 PROCEDURE — 94761 N-INVAS EAR/PLS OXIMETRY MLT: CPT

## 2023-03-24 PROCEDURE — 97110 THERAPEUTIC EXERCISES: CPT

## 2023-03-24 RX ADMIN — INSULIN ASPART 5 UNITS: 100 INJECTION, SOLUTION INTRAVENOUS; SUBCUTANEOUS at 11:03

## 2023-03-24 RX ADMIN — Medication 1 CAPSULE: at 04:03

## 2023-03-24 RX ADMIN — LOSARTAN POTASSIUM 25 MG: 25 TABLET, FILM COATED ORAL at 08:03

## 2023-03-24 RX ADMIN — HYDROCODONE BITARTRATE AND ACETAMINOPHEN 1 TABLET: 5; 325 TABLET ORAL at 02:03

## 2023-03-24 RX ADMIN — CEFAZOLIN SODIUM 2 G: 2 SOLUTION INTRAVENOUS at 12:03

## 2023-03-24 RX ADMIN — VANCOMYCIN HYDROCHLORIDE 1250 MG: 1.25 INJECTION, POWDER, LYOPHILIZED, FOR SOLUTION INTRAVENOUS at 04:03

## 2023-03-24 RX ADMIN — INSULIN DETEMIR 30 UNITS: 100 INJECTION, SOLUTION SUBCUTANEOUS at 08:03

## 2023-03-24 RX ADMIN — HYDRALAZINE HYDROCHLORIDE 25 MG: 25 TABLET, FILM COATED ORAL at 08:03

## 2023-03-24 RX ADMIN — Medication 1 CAPSULE: at 08:03

## 2023-03-24 RX ADMIN — GABAPENTIN 300 MG: 300 CAPSULE ORAL at 08:03

## 2023-03-24 RX ADMIN — MUPIROCIN 1 G: 20 OINTMENT TOPICAL at 08:03

## 2023-03-24 RX ADMIN — HYDROCODONE BITARTRATE AND ACETAMINOPHEN 1 TABLET: 5; 325 TABLET ORAL at 01:03

## 2023-03-24 RX ADMIN — INSULIN ASPART 2 UNITS: 100 INJECTION, SOLUTION INTRAVENOUS; SUBCUTANEOUS at 11:03

## 2023-03-24 RX ADMIN — HYDROCODONE BITARTRATE AND ACETAMINOPHEN 1 TABLET: 5; 325 TABLET ORAL at 08:03

## 2023-03-24 RX ADMIN — INSULIN ASPART 5 UNITS: 100 INJECTION, SOLUTION INTRAVENOUS; SUBCUTANEOUS at 06:03

## 2023-03-24 RX ADMIN — Medication 1 CAPSULE: at 12:03

## 2023-03-24 RX ADMIN — CEFAZOLIN SODIUM 2 G: 2 SOLUTION INTRAVENOUS at 08:03

## 2023-03-24 RX ADMIN — INSULIN ASPART 1 UNITS: 100 INJECTION, SOLUTION INTRAVENOUS; SUBCUTANEOUS at 08:03

## 2023-03-24 RX ADMIN — INSULIN ASPART 2 UNITS: 100 INJECTION, SOLUTION INTRAVENOUS; SUBCUTANEOUS at 05:03

## 2023-03-24 RX ADMIN — CEFAZOLIN SODIUM 2 G: 2 SOLUTION INTRAVENOUS at 03:03

## 2023-03-24 RX ADMIN — SENNOSIDES AND DOCUSATE SODIUM 1 TABLET: 8.6; 5 TABLET ORAL at 08:03

## 2023-03-24 RX ADMIN — INSULIN ASPART 5 UNITS: 100 INJECTION, SOLUTION INTRAVENOUS; SUBCUTANEOUS at 04:03

## 2023-03-24 RX ADMIN — DULOXETINE 60 MG: 30 CAPSULE, DELAYED RELEASE ORAL at 08:03

## 2023-03-24 RX ADMIN — Medication 6 MG: at 08:03

## 2023-03-24 NOTE — NURSING
Updated notes and clinicals faxed to [facility] for approval of additional swing bed days

## 2023-03-24 NOTE — PT/OT/SLP PROGRESS
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     Pain/Comfort  Pain Rating 1: 8/10  Location - Side 1: Left  Location - Orientation 1: generalized  Location 1: knee  Pain Addressed 1: Pre-medicate for activity    Objective:        Functional Mobility:  Bed Mobility: sup       Transfers: sit <> stand, SBA ; bed to chair, CGA       Gait: 10' ' with RW, CGA, TTWB LLE, mod fatigue, complaints of L shoulder pain stopped gait trial       Stairs: not performed       Treatment and Education:  Physical Therapy  Treatment    Therapeutic Activities and Exercises:    Ther- Ex    Nustep    Ankle pumps 30 x    Hip adduction 30 x 3" *    Heelslides    LAQ's    Hamstring Curls    Quad sets  30 x 3" *   SAQ's 30 x 3" R LE   Straight Leg Raises    Hip abduction 3 x 10    Horizontal ADD/ABD    Glut sets  30x 5"        Ther-Act    Heel Raises    Mini Squats    3 way hip    Sit <> stands                         AM-PAC 6 CLICK MOBILITY  How much help from another person does this patient currently need?   1 = Unable, Total/Dependent Assistance  2 = A lot, Maximum/Moderate Assistance  3 = A little, Minimum/Contact Guard/Supervision  4 = None, Modified [place]/Independent    Turning over in bed (including adjusting bedclothes, sheets and blankets)?: 3  Sitting down on and standing up from a chair with arms (e.g., wheelchair, " bedside commode, etc.): 3  Moving from lying on back to sitting on the side of the bed?: 3  Moving to and from a bed to a chair (including a wheelchair)?: 3  Need to walk in hospital room?: 3  Climbing 3-5 steps with a railing?: 2  Basic Mobility Total Score: 17    AM-PAC Raw Score CMS G-Code Modifier Level of Impairment Assistance   6 % Total / Unable   7 - 9 CM 80 - 100% Maximal Assist   10 - 14 CL 60 - 80% Moderate Assist   15 - 19 CK 40 - 60% Moderate Assist   20 - 22 CJ 20 - 40% Minimal Assist   23 CI 1-20% SBA / CGA   24 CH 0% Independent/ Mod I     Patient left up in chair with  OT present un rehab gym    Assessment:  Monica Walker is a 52 y.o. female with a medical diagnosis of Staphylococcal arthritis of left knee and presents with Rehab identified problem list/impairments: weakness, impaired endurance, impaired self care skills, impaired functional mobility, gait instability, impaired balance, decreased lower extremity function, decreased safety awareness. Patient reports inability to move but able to complete therapeutic exercises as instructed well on R LE but did require occasional assist with L LE. Patient required near max cuing to continue with exercises due to patient performing one or two exercises and then stopping.  Patient unable to increase distance during gait training secondary to reports of pain.  No adverse effects noted.       Rehab potential is good.    Activity tolerance: Good and Fair    Discharge recommendations: home with home health      Barriers to discharge:      Equipment recommendations: none     GOALS:   Multidisciplinary Problems       Physical Therapy Goals          Problem: Physical Therapy    Goal Priority Disciplines Outcome Goal Variances Interventions   Physical Therapy Goal     PT, PT/OT      Description: Short Term Goals  1. Patient will complete 30 reps of B LE exercises with correct form.   2. Patient will complete sit<>stand transfers with SBA while  maintaining toe touch weight bearing on LLE.   3. Patient will ambulate 25 feet with RW on level surfaces with CGA while maintaining toe touch weight bearing on LLE.    Long Term Goals   1. Patient will ambulate 50 feet with RW on level and unlevel surfaces with SBA while maintaining toe touch weight bearing on LLE.  2. Patient will complete all functional transfers with MOD I while maintaining toe touch weight bearing on LLE.                        PLAN:    Patient to be seen 5 x/week to address the above listed problems via gait training, therapeutic activities, therapeutic exercises  Plan of Care expires: 04/11/23  Plan of Care reviewed with: patient    Continue Plan of Care Per PT order to progress patient toward rehab goals.    ZEINAB Hardin

## 2023-03-24 NOTE — SUBJECTIVE & OBJECTIVE
Interval History: no change    Review of Systems  Objective:     Vital Signs (Most Recent):  Temp: 98.6 °F (37 °C) (03/23/23 2015)  Pulse: 76 (03/24/23 0713)  Resp: 18 (03/24/23 0839)  BP: 124/62 (03/23/23 2015)  SpO2: 95 % (03/24/23 0713) Vital Signs (24h Range):  Temp:  [98.6 °F (37 °C)] 98.6 °F (37 °C)  Pulse:  [76-94] 76  Resp:  [18-20] 18  SpO2:  [95 %-96 %] 95 %  BP: (124)/(62) 124/62     Weight: 62.9 kg (138 lb 10.7 oz)  Body mass index is 22.38 kg/m².  No intake or output data in the 24 hours ending 03/24/23 0858   Physical Exam    Significant Labs: All pertinent labs within the past 24 hours have been reviewed.    Significant Imaging: I have reviewed all pertinent imaging results/findings within the past 24 hours.

## 2023-03-24 NOTE — PROGRESS NOTES
Pharmacokinetic Assessment Follow Up: IV Vancomycin    Vancomycin serum concentration assessment(s):    The trough level was drawn correctly and can be used to guide therapy at this time. The measurement is within the desired definitive target range of 15 to 20 mcg/mL.    Vancomycin Regimen Plan:    Continue regimen to Vancomycin 1250 mg mg IV every 18 hours with next serum trough concentration measured at 30 min prior to 1600 dose on 3.27.23    Drug levels (last 3 results):  Recent Labs   Lab Result Units 03/23/23  0335   Vancomycin, Trough µg/mL 16.3       Pharmacy will continue to follow and monitor vancomycin.    Please contact pharmacy at extension [phone] for questions regarding this assessment.    Thank you for the consult,   Stacey Reddy       Patient brief summary:  Monica Walker is a 52 y.o. female initiated on antimicrobial therapy with IV Vancomycin for treatment of bone/joint infection    The patient's current regimen is Vancomycin 1250 mg q18h     Drug Allergies:   Review of patient's allergies indicates:  No Known Allergies    Actual Body Weight:   63 kg    Renal Function:   Estimated Creatinine Clearance: 71.7 mL/min (based on SCr of 0.84 mg/dL).,     Dialysis Method (if applicable):  N/A    CBC (last 72 hours):  No results for input(s): WHITE BLOOD CELL COUNT, HEMOGLOBIN, HEMATOCRIT, PLATELETS, GRAN%, LYMPH%, MONO%, EOSINOPHIL%, BASOPHIL%, DIFFERENTIAL METHOD in the last 72 hours.    Metabolic Panel (last 72 hours):  Recent Labs   Lab Result Units 03/23/23  0335   BUN mg/dL 19*   Creatinine mg/dL 0.84       Vancomycin Administrations:  vancomycin given in the last 96 hours                     vancomycin 1,250 mg in dextrose 5 % (D5W) 250 mL IVPB (Vial-Mate) (mg) 1,250 mg New Bag 03/23/23 2244     1,250 mg New Bag  0406     1,250 mg New Bag 03/22/23 1015     1,250 mg New Bag 03/21/23 1654     1,250 mg New Bag 03/20/23 2222                    Microbiologic Results:  Microbiology Results (last 7 days)        ** No results found for the last 168 hours. **

## 2023-03-24 NOTE — NURSING
Was told in report from [facility] that staples/sutures were to be removed 10 days post-op on the 23rd, and to apply an aquacel AG surgical dressing, no mention of applying steri-strips.  There are no written orders or a note from the surgeon that supports this information. Thirty-three staples noted C/D/I were removed, incision is approximated, no redness swelling or drainage noted. KAYCE Anderson MD at bedside to look at incision. He says it looks very good. He also gives a verbal order to clean incision with Chloraprep, allow to air dry, place Aquacel AG surgical dressing , and to call Dr. Noyola's office on Monday to clarify his instructions. Incision cleaned with Chloraprep, allowed to air dry, no steri-strips placed, Aquacel AG dressing applied, immobilizer placed back on knee. Photos made before and after staple removal.

## 2023-03-24 NOTE — PT/OT/SLP PROGRESS
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  PT had already attempted patient in AM for treatment session. "I can't walk but you can roll me down in the chair."    Pain/Comfort  Pain Rating 1: 8/10  Location - Side 1: Left  Location 1: knee  Pain Addressed 1: Pre-medicate for activity  Pain Rating Post-Intervention 1: 8/10    Objective:   Patient found with: PICC line    Functional Mobility:  Bed Mobility: sup       Transfers: sit <> stand, SBA ; bed to chair, CGA       Gait: 25' with RW, CGA, TTWB LLE, mod fatigue, complaints of L shoulder pain stopped gait trial       Stairs: not performed       Treatment and Education:  Physical Therapy  Treatment    Therapeutic Activities and Exercises:    Ther- Ex    Nustep    Ankle pumps 20 x    Hip adduction 20 x 3"   Heelslides    LAQ's    Hamstring Curls    Quad sets  20 x 3"   SAQ's 20 x 3" R LE   Straight Leg Raises 2 x 10 reps R LE   Hip abduction    Horizontal ADD/ABD 20 x each LE assist with L LE            Ther-Act    Heel Raises    Mini Squats    3 way hip    Sit <> stands                         AM-PAC 6 CLICK MOBILITY  How much help from another person does this patient currently need?   1 = Unable, Total/Dependent Assistance  2 = A lot, Maximum/Moderate Assistance  3 = A little, Minimum/Contact Guard/Supervision  4 = None, Modified " [place]/Independent    Turning over in bed (including adjusting bedclothes, sheets and blankets)?: 3  Sitting down on and standing up from a chair with arms (e.g., wheelchair, bedside commode, etc.): 3  Moving from lying on back to sitting on the side of the bed?: 3  Moving to and from a bed to a chair (including a wheelchair)?: 3  Need to walk in hospital room?: 3  Climbing 3-5 steps with a railing?: 2  Basic Mobility Total Score: 17    AM-PAC Raw Score CMS G-Code Modifier Level of Impairment Assistance   6 % Total / Unable   7 - 9 CM 80 - 100% Maximal Assist   10 - 14 CL 60 - 80% Moderate Assist   15 - 19 CK 40 - 60% Moderate Assist   20 - 22 CJ 20 - 40% Minimal Assist   23 CI 1-20% SBA / CGA   24 CH 0% Independent/ Mod I     Patient left up in chair with  OT present un rehab gym    Assessment:  Monica Walker is a 52 y.o. female with a medical diagnosis of Staphylococcal arthritis of left knee and presents with Rehab identified problem list/impairments: weakness, impaired endurance, impaired self care skills, impaired functional mobility, gait instability, impaired balance, decreased lower extremity function, decreased safety awareness. Patient reports inability to move but able to complete therapeutic exercises as instructed well on R LE but did require occasional assist with L LE. Patient required near max cuing to continue with exercises due to patient performing one or two exercises and then stopping. Patient able to ambulate with max encouragement with good form and UE strength despite L shoulder limiting further distances. No adverse effects noted    Rehab potential is good.    Activity tolerance: Good and Fair    Discharge recommendations: home with home health      Barriers to discharge:      Equipment recommendations: none     GOALS:   Multidisciplinary Problems       Physical Therapy Goals          Problem: Physical Therapy    Goal Priority Disciplines Outcome Goal Variances Interventions    Physical Therapy Goal     PT, PT/OT      Description: Short Term Goals  1. Patient will complete 30 reps of B LE exercises with correct form.   2. Patient will complete sit<>stand transfers with SBA while maintaining toe touch weight bearing on LLE.   3. Patient will ambulate 25 feet with RW on level surfaces with CGA while maintaining toe touch weight bearing on LLE.    Long Term Goals   1. Patient will ambulate 50 feet with RW on level and unlevel surfaces with SBA while maintaining toe touch weight bearing on LLE.  2. Patient will complete all functional transfers with MOD I while maintaining toe touch weight bearing on LLE.                        PLAN:    Patient to be seen 5 x/week to address the above listed problems via gait training, therapeutic activities, therapeutic exercises  Plan of Care expires: 04/11/23  Plan of Care reviewed with: patient     Kalina Ac, PT, DPT     03/23/2023

## 2023-03-24 NOTE — PROGRESS NOTES
[facility] Medicine  Progress Note    Patient Name: Monica Walker  MRN: [id number]  Patient Class: IP- Swing   Admission Date: 3/20/2023  Length of Stay: 4 days  Attending Physician: Hannah Schulz, *  Primary Care Provider: Hannah Schulz MD        Subjective:     Principal Problem:Staphylococcal arthritis of left knee        HPI:  3/20/23 - this 52 yr. Old WF was readmitted here for IV antibiotics. Her PICC line was pulled on the 16th. It was not replaced on the 18th as the noted stated was the plan. She is to get antibiotics through the 27th of April. We will have to arrange for her to have another PICC line installed for this. She has had her left knee replacement removed for sepsis. She has a spacer in the joint. She will undergo another knee replacement after the antibiotics. For rest of history, please see her old records.      Overview/Hospital Course:  3/21/23 - pt. Is doing well so far. We will arrange for her to get a PICC line soon.    3/24/23 - pt. Is again wanting more pain meds. Again told her that she is on the dose she was on in [place]. Will continue present treatment.      Interval History: no change    Review of Systems  Objective:     Vital Signs (Most Recent):  Temp: 98.6 °F (37 °C) (03/23/23 2015)  Pulse: 76 (03/24/23 0713)  Resp: 18 (03/24/23 0839)  BP: 124/62 (03/23/23 2015)  SpO2: 95 % (03/24/23 0713) Vital Signs (24h Range):  Temp:  [98.6 °F (37 °C)] 98.6 °F (37 °C)  Pulse:  [76-94] 76  Resp:  [18-20] 18  SpO2:  [95 %-96 %] 95 %  BP: (124)/(62) 124/62     Weight: 62.9 kg (138 lb 10.7 oz)  Body mass index is 22.38 kg/m².  No intake or output data in the 24 hours ending 03/24/23 0858   Physical Exam    Significant Labs: All pertinent labs within the past 24 hours have been reviewed.    Significant Imaging: I have reviewed all pertinent imaging results/findings within the past 24 hours.      Assessment/Plan:      *  Staphylococcal arthritis of left knee          VTE Risk Mitigation (From admission, onward)         Ordered     IP VTE HIGH RISK PATIENT  Once         03/20/23 1801     Place DONOVAN hose  Until discontinued         03/20/23 1801     heparin lock flush (porcine) injection 100 Units  As needed (PRN)         03/20/23 1733                Discharge Planning   SUZANNA:      Code Status: Full Code   Is the patient medically ready for discharge?:     Reason for patient still in hospital (select all that apply): Patient trending condition  Discharge Plan A: Home with family, Home Health                  Hannah Schulz MD  Department of Hospital Medicine   [facility]

## 2023-03-24 NOTE — CONSULTS
" [facility]  Adult Nutrition  Consult Note    SUMMARY     Recommendations    Recommendation/Intervention: 1. F/U for intake support  Goals: Meet EEN >75%, BG<200mg/dL  Nutrition Goal Status: new    Assessment and Plan    No new Assessment & Plan notes have been filed under this hospital service since the last note was generated.  Service: Nutrition       Malnutrition Assessment  Malnutrition Type:  (Based on assessment this pt is not malnourished.)                                    Reason for Assessment    Reason For Assessment: consult (swing bed pt)  Diagnosis:  (Infected artritis of L knee)  Relevant Medical History: DM, HTN, MRSA joint space infection, Depression, Nicotine dependance  Interdisciplinary Rounds: did not attend  General Information Comments: RDN visted pt this a.m.  and she denied difficulty with intake. Meal intake ~75%.  RDN took prefs.  Last BM 3/23.    Nutrition Risk Screen    Nutrition Risk Screen: no indicators present    Nutrition/Diet History    Patient Reported Diet/Restrictions/Preferences: diabetic diet  Spiritual, Cultural Beliefs, Uatsdin Practices, Values that Affect Care: no  Food Allergies: NKFA  Factors Affecting Nutritional Intake: None identified at this time    Anthropometrics    Temp: 98.1 °F (36.7 °C)  Height Method: Stated  Height: 5' 6" (167.6 cm)  Height (inches): 66 in  Weight Method: Standard Scale  Weight: 63 kg (138 lb 14.2 oz)  Weight (lb): 138.89 lb  Ideal Body Weight (IBW), Female: 130 lb  % Ideal Body Weight, Female (lb): 106.84 %  BMI (Calculated): 22.4  BMI Grade: 18.5-24.9 - normal       Lab/Procedures/Meds    Pertinent Labs Reviewed: reviewed  Pertinent Labs Comments: [id number]  Pertinent Medications Reviewed: reviewed  Pertinent Medications Comments: Ancef, Insulin, Cymbalta, Lactobacillus, Vancyomycin    Physical Findings/Assessment         Estimated/Assessed Needs    Weight Used For Calorie Calculations: 62.6 kg (138 " lb)  Energy Calorie Requirements (kcal): 1770-4410  Energy Need Method: Kcal/kg  Protein Requirements: 63-69  Weight Used For Protein Calculations: 63 kg (138 lb 14.2 oz)     Estimated Fluid Requirement Method: RDA Method  RDA Method (mL): 1564         Nutrition Prescription Ordered    Current Diet Order: Diabetic    Evaluation of Received Nutrient/Fluid Intake    Energy Calories Required: meeting needs  Protein Required: meeting needs  Fluid Required: meeting needs  Tolerance: tolerating  % Intake of Estimated Energy Needs: 75 - 100 %  % Meal Intake: 75 - 100 %    Nutrition Risk    Level of Risk/Frequency of Follow-up: moderate       Monitor and Evaluation    Food and Nutrient Intake: food and beverage intake  Anthropometric Measurements: weight  Biochemical Data, Medical Tests and Procedures: glucose/endocrine profile, electrolyte and renal panel  Nutrition-Focused Physical Findings: overall appearance, skin       Nutrition Follow-Up    RD Follow-up?: Yes

## 2023-03-24 NOTE — PT/OT/SLP PROGRESS
"Occupational Therapy  Treatment    Monica Walker   MRN: [id number]   Admitting Diagnosis: Staphylococcal arthritis of left knee    OT Date of Treatment: 03/24/23   OT Start Time: 0930  OT Stop Time: 0957  OT Total Time (min): 27 min    Billable Minutes:  Therapeutic Exercise 27 min               General Precautions: Standard, fall  Orthopedic Precautions: LLE toe touch weight bearing  Braces: Knee immobilizer  Respiratory Status: Room air         Subjective:  Communicated with RN prior to session.      Pain/Comfort  Pain Rating 1: 8/10    Objective:  Patient found with: PICC line     Functional Mobility:  Bed Mobility: Patient was s/u with bed mobility       Transfers: Patient was a s/u t/f from chair to bed        Functional Ambulation:     Activities of Daily Living:         Therapeutic Activities and Exercises:  Patient completed the following for increased strength to increase I with ADLs: UBE 7 min x 1x, 2# DB- shoulder press, chest press, bicep curls x 30, yellow theraflex x 30 both ways, yellow theraband- scapular retraction x 30      AM-PAC 6 CLICK ADL   How much help from another person does this patient currently need?   1 = Unable, Total/Dependent Assistance  2 = A lot, Maximum/Moderate Assistance  3 = A little, Minimum/Contact Guard/Supervision  4 = None, Modified [place]/Independent    Putting on and taking off regular lower body clothing? : 3  Bathing (including washing, rinsing, drying)?: 3  Toileting, which includes using toilet, bedpan, or urinal? : 3  Putting on and taking off regular upper body clothing?: 3  Taking care of personal grooming such as brushing teeth?: 4  Eating meals?: 4  Daily Activity Total Score: 20     AM-PAC Raw Score CMS "G-Code Modifier Level of Impairment Assistance   6 % Total / Unable   7 - 8 CM 80 - 100% Maximal Assist   9-13 CL 60 - 80% Moderate Assist   14 - 19 CK 40 - 60% Moderate Assist   20 - 22 CJ 20 - 40% Minimal Assist   23 CI 1-20% SBA / CGA   24 CH " 0% Independent/ Mod I       Patient left supine with call button in reach    ASSESSMENT:  Monica Walker is a 52 y.o. female with a medical diagnosis of Staphylococcal arthritis of left knee and presents with decreased strength, decreased endurance, decreased self-care.    Rehab identified problem list/impairments:  weakness, impaired endurance, impaired self care skills, impaired functional mobility, decreased lower extremity function, decreased safety awareness, decreased upper extremity function, pain    Rehab potential is fair.    Activity tolerance: Good    Discharge recommendations: home with home health   Barriers to discharge:      Equipment recommendations: none    GOALS:   Multidisciplinary Problems       Occupational Therapy Goals          Problem: Occupational Therapy    Goal Priority Disciplines Outcome Interventions   Occupational Therapy Goal     OT, PT/OT     Description: Description: Grooming Status:   Short Term Goal: Pt will perform grooming with s/u sitting EOB.   Long Term Goal: Pt will perform grooming/oral hygiene standing at sink with Mod I      LE dressing Status:   Short Term Goal: Pt will perform LE dressing with mod a.   Long Term Goal: Pt will perform LE dressing with min a.    Toileting Status:   Short Term Goal: Pt will perform toilet hygiene on BSC with min a.  Long Term Goal: Pt will perform toilet hygiene on toilet with no AE with s/u.    Commode Transfer:   Short Term Goal: Pt will perform BSC t/f with min a.  Long Term Goal:  Pt will perform toilet t/f in bathroom with s/u.     Bathing Status:   Long Term Goal: Pt will perform sponge bath with s/u with no unsafe fatigue.     Strength Status:   Long Term Goal: Pt to perform BUE strengthening with weights and/or body weight to increase ADL independence and safety    Endurance Status:   Short Term Goal:pt to perform 15 min OT treatment with 5 or greater rest breaks  Long Term Goal: pt to perform 30 min OT treat with 3 or less rest  breaks                       Plan:  Patient to be seen 5 x/week to address the above listed problems via therapeutic exercises  Plan of Care expires:    Plan of Care reviewed with: patient     Kirti Bolton, OTR/ETHAN      03/24/2023

## 2023-03-25 LAB
GLUCOSE SERPL-MCNC: 178 MG/DL (ref 70–105)
GLUCOSE SERPL-MCNC: 198 MG/DL (ref 70–105)
GLUCOSE SERPL-MCNC: 233 MG/DL (ref 70–105)
GLUCOSE SERPL-MCNC: 237 MG/DL (ref 70–105)

## 2023-03-25 PROCEDURE — 63600175 PHARM REV CODE 636 W HCPCS: Performed by: FAMILY MEDICINE

## 2023-03-25 PROCEDURE — 11000004 HC SNF PRIVATE

## 2023-03-25 PROCEDURE — 82962 GLUCOSE BLOOD TEST: CPT

## 2023-03-25 PROCEDURE — 94761 N-INVAS EAR/PLS OXIMETRY MLT: CPT

## 2023-03-25 PROCEDURE — 25000003 PHARM REV CODE 250: Performed by: FAMILY MEDICINE

## 2023-03-25 PROCEDURE — 63600175 PHARM REV CODE 636 W HCPCS: Mod: TB,JG | Performed by: FAMILY MEDICINE

## 2023-03-25 RX ADMIN — HYDROCODONE BITARTRATE AND ACETAMINOPHEN 1 TABLET: 5; 325 TABLET ORAL at 03:03

## 2023-03-25 RX ADMIN — GABAPENTIN 300 MG: 300 CAPSULE ORAL at 08:03

## 2023-03-25 RX ADMIN — INSULIN DETEMIR 30 UNITS: 100 INJECTION, SOLUTION SUBCUTANEOUS at 08:03

## 2023-03-25 RX ADMIN — SENNOSIDES AND DOCUSATE SODIUM 1 TABLET: 8.6; 5 TABLET ORAL at 08:03

## 2023-03-25 RX ADMIN — CEFAZOLIN SODIUM 2 G: 2 SOLUTION INTRAVENOUS at 12:03

## 2023-03-25 RX ADMIN — HYDRALAZINE HYDROCHLORIDE 25 MG: 25 TABLET, FILM COATED ORAL at 08:03

## 2023-03-25 RX ADMIN — HYDROCODONE BITARTRATE AND ACETAMINOPHEN 1 TABLET: 5; 325 TABLET ORAL at 08:03

## 2023-03-25 RX ADMIN — INSULIN ASPART 5 UNITS: 100 INJECTION, SOLUTION INTRAVENOUS; SUBCUTANEOUS at 05:03

## 2023-03-25 RX ADMIN — INSULIN ASPART 5 UNITS: 100 INJECTION, SOLUTION INTRAVENOUS; SUBCUTANEOUS at 10:03

## 2023-03-25 RX ADMIN — CEFAZOLIN SODIUM 2 G: 2 SOLUTION INTRAVENOUS at 08:03

## 2023-03-25 RX ADMIN — INSULIN ASPART 4 UNITS: 100 INJECTION, SOLUTION INTRAVENOUS; SUBCUTANEOUS at 05:03

## 2023-03-25 RX ADMIN — LOSARTAN POTASSIUM 25 MG: 25 TABLET, FILM COATED ORAL at 08:03

## 2023-03-25 RX ADMIN — Medication 1 CAPSULE: at 12:03

## 2023-03-25 RX ADMIN — Medication 1 CAPSULE: at 08:03

## 2023-03-25 RX ADMIN — INSULIN ASPART 1 UNITS: 100 INJECTION, SOLUTION INTRAVENOUS; SUBCUTANEOUS at 08:03

## 2023-03-25 RX ADMIN — HYDROCODONE BITARTRATE AND ACETAMINOPHEN 1 TABLET: 5; 325 TABLET ORAL at 04:03

## 2023-03-25 RX ADMIN — INSULIN ASPART 2 UNITS: 100 INJECTION, SOLUTION INTRAVENOUS; SUBCUTANEOUS at 10:03

## 2023-03-25 RX ADMIN — CEFAZOLIN SODIUM 2 G: 2 SOLUTION INTRAVENOUS at 03:03

## 2023-03-25 RX ADMIN — Medication 6 MG: at 08:03

## 2023-03-25 RX ADMIN — INSULIN ASPART 5 UNITS: 100 INJECTION, SOLUTION INTRAVENOUS; SUBCUTANEOUS at 06:03

## 2023-03-25 RX ADMIN — Medication 1 CAPSULE: at 05:03

## 2023-03-25 RX ADMIN — VANCOMYCIN HYDROCHLORIDE 1250 MG: 1.25 INJECTION, POWDER, LYOPHILIZED, FOR SOLUTION INTRAVENOUS at 10:03

## 2023-03-25 RX ADMIN — DULOXETINE 60 MG: 30 CAPSULE, DELAYED RELEASE ORAL at 08:03

## 2023-03-25 NOTE — PLAN OF CARE
Problem: Adult Inpatient Plan of Care  Goal: Optimal Comfort and Wellbeing  Outcome: Ongoing, Progressing  Goal: Readiness for Transition of Care  Outcome: Ongoing, Progressing     Problem: Diabetes Comorbidity  Goal: Blood Glucose Level Within Targeted Range  Outcome: Ongoing, Progressing     Problem: Oral Intake Inadequate (Acute Kidney Injury/Impairment)  Goal: Optimal Nutrition Intake  Outcome: Ongoing, Progressing     Problem: Impaired Wound Healing  Goal: Optimal Wound Healing  Outcome: Ongoing, Progressing     Problem: Fall Injury Risk  Goal: Absence of Fall and Fall-Related Injury  Outcome: Ongoing, Progressing     Problem: Skin Injury Risk Increased  Goal: Skin Health and Integrity  Outcome: Ongoing, Progressing

## 2023-03-25 NOTE — PLAN OF CARE
Problem: Adult Inpatient Plan of Care  Goal: Plan of Care Review  Outcome: Ongoing, Progressing  Flowsheets (Taken 3/25/2023 1606)  Plan of Care Reviewed With: patient  Goal: Optimal Comfort and Wellbeing  Outcome: Ongoing, Progressing  Goal: Readiness for Transition of Care  Outcome: Ongoing, Progressing     Problem: Diabetes Comorbidity  Goal: Blood Glucose Level Within Targeted Range  Outcome: Ongoing, Not Progressing

## 2023-03-26 LAB
GLUCOSE SERPL-MCNC: 124 MG/DL (ref 70–105)
GLUCOSE SERPL-MCNC: 167 MG/DL (ref 70–105)
GLUCOSE SERPL-MCNC: 225 MG/DL (ref 70–105)
GLUCOSE SERPL-MCNC: 336 MG/DL (ref 70–105)

## 2023-03-26 PROCEDURE — 82962 GLUCOSE BLOOD TEST: CPT

## 2023-03-26 PROCEDURE — 94761 N-INVAS EAR/PLS OXIMETRY MLT: CPT

## 2023-03-26 PROCEDURE — 11000004 HC SNF PRIVATE

## 2023-03-26 PROCEDURE — 63600175 PHARM REV CODE 636 W HCPCS: Performed by: FAMILY MEDICINE

## 2023-03-26 PROCEDURE — 25000003 PHARM REV CODE 250: Performed by: FAMILY MEDICINE

## 2023-03-26 RX ADMIN — CEFAZOLIN SODIUM 2 G: 2 SOLUTION INTRAVENOUS at 09:03

## 2023-03-26 RX ADMIN — Medication 1 CAPSULE: at 11:03

## 2023-03-26 RX ADMIN — CEFAZOLIN SODIUM 2 G: 2 SOLUTION INTRAVENOUS at 03:03

## 2023-03-26 RX ADMIN — DULOXETINE 60 MG: 30 CAPSULE, DELAYED RELEASE ORAL at 08:03

## 2023-03-26 RX ADMIN — Medication 1 CAPSULE: at 05:03

## 2023-03-26 RX ADMIN — Medication 6 MG: at 08:03

## 2023-03-26 RX ADMIN — INSULIN ASPART 5 UNITS: 100 INJECTION, SOLUTION INTRAVENOUS; SUBCUTANEOUS at 11:03

## 2023-03-26 RX ADMIN — SENNOSIDES AND DOCUSATE SODIUM 1 TABLET: 8.6; 5 TABLET ORAL at 08:03

## 2023-03-26 RX ADMIN — GABAPENTIN 300 MG: 300 CAPSULE ORAL at 08:03

## 2023-03-26 RX ADMIN — CEFAZOLIN SODIUM 2 G: 2 SOLUTION INTRAVENOUS at 11:03

## 2023-03-26 RX ADMIN — INSULIN ASPART 4 UNITS: 100 INJECTION, SOLUTION INTRAVENOUS; SUBCUTANEOUS at 11:03

## 2023-03-26 RX ADMIN — INSULIN DETEMIR 30 UNITS: 100 INJECTION, SOLUTION SUBCUTANEOUS at 08:03

## 2023-03-26 RX ADMIN — HYDROCODONE BITARTRATE AND ACETAMINOPHEN 1 TABLET: 5; 325 TABLET ORAL at 03:03

## 2023-03-26 RX ADMIN — HYDROCODONE BITARTRATE AND ACETAMINOPHEN 1 TABLET: 5; 325 TABLET ORAL at 08:03

## 2023-03-26 RX ADMIN — INSULIN ASPART 2 UNITS: 100 INJECTION, SOLUTION INTRAVENOUS; SUBCUTANEOUS at 05:03

## 2023-03-26 RX ADMIN — HYDRALAZINE HYDROCHLORIDE 25 MG: 25 TABLET, FILM COATED ORAL at 08:03

## 2023-03-26 RX ADMIN — INSULIN ASPART 5 UNITS: 100 INJECTION, SOLUTION INTRAVENOUS; SUBCUTANEOUS at 05:03

## 2023-03-26 RX ADMIN — Medication 1 CAPSULE: at 08:03

## 2023-03-26 RX ADMIN — INSULIN ASPART 5 UNITS: 100 INJECTION, SOLUTION INTRAVENOUS; SUBCUTANEOUS at 06:03

## 2023-03-26 RX ADMIN — INSULIN ASPART 8 UNITS: 100 INJECTION, SOLUTION INTRAVENOUS; SUBCUTANEOUS at 06:03

## 2023-03-26 RX ADMIN — LOSARTAN POTASSIUM 25 MG: 25 TABLET, FILM COATED ORAL at 08:03

## 2023-03-26 RX ADMIN — VANCOMYCIN HYDROCHLORIDE 1250 MG: 1.25 INJECTION, POWDER, LYOPHILIZED, FOR SOLUTION INTRAVENOUS at 04:03

## 2023-03-26 NOTE — PLAN OF CARE
Problem: Adult Inpatient Plan of Care  Goal: Plan of Care Review  Outcome: Ongoing, Progressing  Goal: Patient-Specific Goal (Individualized)  Outcome: Ongoing, Progressing     Problem: Fall Injury Risk  Goal: Absence of Fall and Fall-Related Injury  Outcome: Ongoing, Progressing  Intervention: Identify and Manage Contributors  Flowsheets (Taken 3/26/2023 1746)  Self-Care Promotion:   independence encouraged   BADL personal objects within reach   BADL personal routines maintained  Medication Review/Management:   medications reviewed   high-risk medications identified  Intervention: Promote Injury-Free Environment  Flowsheets (Taken 3/26/2023 1746)  Safety Promotion/Fall Prevention:   assistive device/personal item within reach   diversional activities provided   high risk medications identified   medications reviewed   muscle strengthening facilitated   nonskid shoes/socks when out of bed   side rails raised x 3   instructed to call staff for mobility

## 2023-03-27 PROBLEM — G89.18 POSTOPERATIVE PAIN OF LEFT KNEE: Status: RESOLVED | Noted: 2022-12-10 | Resolved: 2023-03-27

## 2023-03-27 PROBLEM — M25.562 POSTOPERATIVE PAIN OF LEFT KNEE: Status: RESOLVED | Noted: 2022-12-10 | Resolved: 2023-03-27

## 2023-03-27 PROBLEM — Z09 S/P ORTHOPEDIC SURGERY, FOLLOW-UP EXAM: Status: RESOLVED | Noted: 2022-12-20 | Resolved: 2023-03-27

## 2023-03-27 LAB
ANION GAP SERPL CALCULATED.3IONS-SCNC: 13 MMOL/L (ref 7–16)
BASOPHILS # BLD AUTO: 0.04 K/UL (ref 0–0.2)
BASOPHILS NFR BLD AUTO: 0.5 % (ref 0–1)
BUN SERPL-MCNC: 15 MG/DL (ref 7–18)
BUN/CREAT SERPL: 19 (ref 6–20)
CALCIUM SERPL-MCNC: 9.3 MG/DL (ref 8.5–10.1)
CHLORIDE SERPL-SCNC: 99 MMOL/L (ref 98–107)
CO2 SERPL-SCNC: 26 MMOL/L (ref 21–32)
CREAT SERPL-MCNC: 0.78 MG/DL (ref 0.55–1.02)
DIFFERENTIAL METHOD BLD: ABNORMAL
EGFR (NO RACE VARIABLE) (RUSH/TITUS): 90 ML/MIN/1.73M²
EOSINOPHIL # BLD AUTO: 0.42 K/UL (ref 0–0.5)
EOSINOPHIL NFR BLD AUTO: 5 % (ref 1–4)
ERYTHROCYTE [DISTWIDTH] IN BLOOD BY AUTOMATED COUNT: 19.5 % (ref 11.5–14.5)
GLUCOSE SERPL-MCNC: 184 MG/DL (ref 70–105)
GLUCOSE SERPL-MCNC: 198 MG/DL (ref 70–105)
GLUCOSE SERPL-MCNC: 204 MG/DL (ref 74–106)
GLUCOSE SERPL-MCNC: 220 MG/DL (ref 70–105)
GLUCOSE SERPL-MCNC: 308 MG/DL (ref 70–105)
HCT VFR BLD AUTO: 33.6 % (ref 38–47)
HGB BLD-MCNC: 10.2 G/DL (ref 12–16)
IMM GRANULOCYTES # BLD AUTO: 0.02 K/UL (ref 0–0.04)
IMM GRANULOCYTES NFR BLD: 0.2 % (ref 0–0.4)
LYMPHOCYTES # BLD AUTO: 2.4 K/UL (ref 1–4.8)
LYMPHOCYTES NFR BLD AUTO: 28.4 % (ref 27–41)
MCH RBC QN AUTO: 24.5 PG (ref 27–31)
MCHC RBC AUTO-ENTMCNC: 30.4 G/DL (ref 32–36)
MCV RBC AUTO: 80.6 FL (ref 80–96)
MONOCYTES # BLD AUTO: 0.66 K/UL (ref 0–0.8)
MONOCYTES NFR BLD AUTO: 7.8 % (ref 2–6)
MPC BLD CALC-MCNC: 10.1 FL (ref 9.4–12.4)
NEUTROPHILS # BLD AUTO: 4.9 K/UL (ref 1.8–7.7)
NEUTROPHILS NFR BLD AUTO: 58.1 % (ref 53–65)
PLATELET # BLD AUTO: 256 K/UL (ref 150–400)
POTASSIUM SERPL-SCNC: 3.9 MMOL/L (ref 3.5–5.1)
RBC # BLD AUTO: 4.17 M/UL (ref 4.2–5.4)
SODIUM SERPL-SCNC: 134 MMOL/L (ref 136–145)
WBC # BLD AUTO: 8.44 K/UL (ref 4.5–11)

## 2023-03-27 PROCEDURE — 63600175 PHARM REV CODE 636 W HCPCS: Performed by: FAMILY MEDICINE

## 2023-03-27 PROCEDURE — 97110 THERAPEUTIC EXERCISES: CPT

## 2023-03-27 PROCEDURE — 82962 GLUCOSE BLOOD TEST: CPT

## 2023-03-27 PROCEDURE — 11000004 HC SNF PRIVATE

## 2023-03-27 PROCEDURE — 99308 SBSQ NF CARE LOW MDM 20: CPT | Mod: ,,, | Performed by: FAMILY MEDICINE

## 2023-03-27 PROCEDURE — 94761 N-INVAS EAR/PLS OXIMETRY MLT: CPT

## 2023-03-27 PROCEDURE — 97110 THERAPEUTIC EXERCISES: CPT | Mod: CQ

## 2023-03-27 PROCEDURE — 80048 BASIC METABOLIC PNL TOTAL CA: CPT | Performed by: FAMILY MEDICINE

## 2023-03-27 PROCEDURE — 85025 COMPLETE CBC W/AUTO DIFF WBC: CPT | Performed by: FAMILY MEDICINE

## 2023-03-27 PROCEDURE — 25000003 PHARM REV CODE 250: Performed by: FAMILY MEDICINE

## 2023-03-27 PROCEDURE — 99308 PR NURSING FAC CARE, SUBSEQ, MINOR COMPLIC: ICD-10-PCS | Mod: ,,, | Performed by: FAMILY MEDICINE

## 2023-03-27 RX ORDER — HEPARIN SODIUM,PORCINE/PF 10 UNIT/ML
50 SYRINGE (ML) INTRAVENOUS DAILY
Status: DISCONTINUED | OUTPATIENT
Start: 2023-03-27 | End: 2023-04-27 | Stop reason: HOSPADM

## 2023-03-27 RX ADMIN — Medication 1 CAPSULE: at 11:03

## 2023-03-27 RX ADMIN — INSULIN ASPART 5 UNITS: 100 INJECTION, SOLUTION INTRAVENOUS; SUBCUTANEOUS at 11:03

## 2023-03-27 RX ADMIN — SENNOSIDES AND DOCUSATE SODIUM 1 TABLET: 8.6; 5 TABLET ORAL at 08:03

## 2023-03-27 RX ADMIN — DULOXETINE 60 MG: 30 CAPSULE, DELAYED RELEASE ORAL at 08:03

## 2023-03-27 RX ADMIN — INSULIN ASPART 5 UNITS: 100 INJECTION, SOLUTION INTRAVENOUS; SUBCUTANEOUS at 04:03

## 2023-03-27 RX ADMIN — INSULIN ASPART 5 UNITS: 100 INJECTION, SOLUTION INTRAVENOUS; SUBCUTANEOUS at 06:03

## 2023-03-27 RX ADMIN — CEFAZOLIN SODIUM 2 G: 2 SOLUTION INTRAVENOUS at 11:03

## 2023-03-27 RX ADMIN — Medication 1 CAPSULE: at 08:03

## 2023-03-27 RX ADMIN — CEFAZOLIN SODIUM 2 G: 2 SOLUTION INTRAVENOUS at 08:03

## 2023-03-27 RX ADMIN — INSULIN DETEMIR 30 UNITS: 100 INJECTION, SOLUTION SUBCUTANEOUS at 08:03

## 2023-03-27 RX ADMIN — HEPARIN, PORCINE (PF) 10 UNIT/ML INTRAVENOUS SYRINGE 50 UNITS: at 05:03

## 2023-03-27 RX ADMIN — INSULIN ASPART 1 UNITS: 100 INJECTION, SOLUTION INTRAVENOUS; SUBCUTANEOUS at 08:03

## 2023-03-27 RX ADMIN — GABAPENTIN 300 MG: 300 CAPSULE ORAL at 08:03

## 2023-03-27 RX ADMIN — HYDRALAZINE HYDROCHLORIDE 25 MG: 25 TABLET, FILM COATED ORAL at 08:03

## 2023-03-27 RX ADMIN — HYDROCODONE BITARTRATE AND ACETAMINOPHEN 1 TABLET: 5; 325 TABLET ORAL at 10:03

## 2023-03-27 RX ADMIN — Medication 6 MG: at 08:03

## 2023-03-27 RX ADMIN — INSULIN ASPART 4 UNITS: 100 INJECTION, SOLUTION INTRAVENOUS; SUBCUTANEOUS at 06:03

## 2023-03-27 RX ADMIN — LOSARTAN POTASSIUM 25 MG: 25 TABLET, FILM COATED ORAL at 08:03

## 2023-03-27 RX ADMIN — HYDROCODONE BITARTRATE AND ACETAMINOPHEN 1 TABLET: 5; 325 TABLET ORAL at 08:03

## 2023-03-27 RX ADMIN — CEFAZOLIN SODIUM 2 G: 2 SOLUTION INTRAVENOUS at 04:03

## 2023-03-27 RX ADMIN — HYDROCODONE BITARTRATE AND ACETAMINOPHEN 1 TABLET: 5; 325 TABLET ORAL at 04:03

## 2023-03-27 RX ADMIN — HYDROCODONE BITARTRATE AND ACETAMINOPHEN 1 TABLET: 5; 325 TABLET ORAL at 05:03

## 2023-03-27 RX ADMIN — Medication 1 CAPSULE: at 04:03

## 2023-03-27 RX ADMIN — VANCOMYCIN HYDROCHLORIDE 1250 MG: 1.25 INJECTION, POWDER, LYOPHILIZED, FOR SOLUTION INTRAVENOUS at 04:03

## 2023-03-27 NOTE — SUBJECTIVE & OBJECTIVE
Interval History: no new complaints.    Review of Systems  Objective:     Vital Signs (Most Recent):  Temp: 98.6 °F (37 °C) (03/27/23 0808)  Pulse: 72 (03/27/23 0808)  Resp: 18 (03/27/23 0808)  BP: 114/61 (03/27/23 0808)  SpO2: 97 % (03/27/23 0808) Vital Signs (24h Range):  Temp:  [98 °F (36.7 °C)-98.6 °F (37 °C)] 98.6 °F (37 °C)  Pulse:  [72-95] 72  Resp:  [17-20] 18  SpO2:  [97 %-98 %] 97 %  BP: (114-132)/(61-66) 114/61     Weight: 63.9 kg (140 lb 12.8 oz)  Body mass index is 22.73 kg/m².  No intake or output data in the 24 hours ending 03/27/23 0832   Physical Exam    Significant Labs: All pertinent labs within the past 24 hours have been reviewed.    Significant Imaging: I have reviewed all pertinent imaging results/findings within the past 24 hours.

## 2023-03-27 NOTE — NURSING
Found note from NATALIA Marin/Dr. Dennys etienne to place steristrips when staples removed on post op day 10. Placed steristrips on incision site. Incision wnl.Healing well, no s/s of infection or dehiscence noted.

## 2023-03-27 NOTE — PROGRESS NOTES
[facility]  Hospital Medicine  Progress Note    Patient Name: Monica Walker  MRN: [id number]  Patient Class: IP- Swing   Admission Date: 3/20/2023  Length of Stay: 7 days  Attending Physician: Hannah Schulz, *  Primary Care Provider: Hannah Schulz MD        Subjective:     Principal Problem:Staphylococcal arthritis of left knee        HPI:  3/20/23 - this 52 yr. Old WF was readmitted here for IV antibiotics. Her PICC line was pulled on the 16th. It was not replaced on the 18th as the noted stated was the plan. She is to get antibiotics through the 27th of April. We will have to arrange for her to have another PICC line installed for this. She has had her left knee replacement removed for sepsis. She has a spacer in the joint. She will undergo another knee replacement after the antibiotics. For rest of history, please see her old records.      Overview/Hospital Course:  3/21/23 - pt. Is doing well so far. We will arrange for her to get a PICC line soon.    3/24/23 - pt. Is again wanting more pain meds. Again told her that she is on the dose she was on in [place]. Will continue present treatment.    3/27/23 - pt. Is still eating a lot of [employer]. Still complaining of pain. She is not moving. Will continue present treatment.      Interval History: no new complaints.    Review of Systems  Objective:     Vital Signs (Most Recent):  Temp: 98.6 °F (37 °C) (03/27/23 0808)  Pulse: 72 (03/27/23 0808)  Resp: 18 (03/27/23 0808)  BP: 114/61 (03/27/23 0808)  SpO2: 97 % (03/27/23 0808) Vital Signs (24h Range):  Temp:  [98 °F (36.7 °C)-98.6 °F (37 °C)] 98.6 °F (37 °C)  Pulse:  [72-95] 72  Resp:  [17-20] 18  SpO2:  [97 %-98 %] 97 %  BP: (114-132)/(61-66) 114/61     Weight: 63.9 kg (140 lb 12.8 oz)  Body mass index is 22.73 kg/m².  No intake or output data in the 24 hours ending 03/27/23 0832   Physical Exam    Significant Labs: All pertinent labs within the past 24 hours  have been reviewed.    Significant Imaging: I have reviewed all pertinent imaging results/findings within the past 24 hours.      Assessment/Plan:      * Staphylococcal arthritis of left knee          VTE Risk Mitigation (From admission, onward)         Ordered     IP VTE HIGH RISK PATIENT  Once         03/20/23 1801     Place DONOVAN hose  Until discontinued         03/20/23 1801     heparin lock flush (porcine) injection 100 Units  As needed (PRN)         03/20/23 1734                Discharge Planning   SUZANNA:      Code Status: Full Code   Is the patient medically ready for discharge?:     Reason for patient still in hospital (select all that apply): Patient trending condition  Discharge Plan A: Home with family, Home Health                  Hannah Schulz MD  Department of Hospital Medicine   [facility]

## 2023-03-27 NOTE — PT/OT/SLP PROGRESS
"Physical Therapy  Treatment    Monica Walker   MRN: [id number]   Admitting Diagnosis: Staphylococcal arthritis of left knee    PT Received On: 03/27/23  PT Start Time: 1342     PT Stop Time: 1410    PT Total Time (min): 28 min       Billable Minutes: There Ex 20     Treatment Type: Treatment  PT/PTA: PTA     Number of PTA visits since last PT visit: 2       General Precautions: Standard, fall  Orthopedic Precautions: LLE toe touch weight bearing  Braces: Knee immobilizer  Respiratory Status: Room air    Spiritual, Cultural Beliefs, Judaism Practices, Values that Affect Care: no    Subjective:   Patient is agreeable to physical therapy treatment session. Patient questions if she can use bilateral crutches for ambulation, and states "I used them all the time before I came here".   Patient also states "I am ready for this leg to get right.  I've been down like this for a while". d    Pain/Comfort  Pain Rating 1: 9/10  Location - Side 1: Left  Location - Orientation 1: generalized  Location 1: knee  Pain Addressed 1: Pre-medicate for activity, Reposition, Cessation of Activity    Objective:        Functional Mobility:  Bed Mobility: sup       Transfers: sit <> stand, SBA ; bed to chair, CGA       Gait: 25'  with RW, CGA, TTWB LLE, mod fatigue, complaints of L shoulder pain stopped gait trial       Stairs: not performed       Treatment and Education:  Physical Therapy  Treatment    Therapeutic Activities and Exercises:    Ther- Ex    Nustep    Ankle pumps 30 x    Hip adduction 30 x 3" *    Heelslides    LAQ's    Hamstring Curls    Quad sets  30 x 3" *   SAQ's 30 x 3" R LE   Straight Leg Raises    Hip abduction 3 x 10    Horizontal ADD/ABD    Glut sets  30x 5"        Ther-Act    Heel Raises    Mini Squats    3 way hip    Sit <> stands                         AM-PAC 6 CLICK MOBILITY  How much help from another person does this patient currently need?   1 = Unable, Total/Dependent Assistance  2 = A lot, Maximum/Moderate " Assistance  3 = A little, Minimum/Contact Guard/Supervision  4 = None, Modified [place]/Independent    Turning over in bed (including adjusting bedclothes, sheets and blankets)?: 3  Sitting down on and standing up from a chair with arms (e.g., wheelchair, bedside commode, etc.): 3  Moving from lying on back to sitting on the side of the bed?: 3  Moving to and from a bed to a chair (including a wheelchair)?: 3  Need to walk in hospital room?: 3  Climbing 3-5 steps with a railing?: 2 (not attempted this treatment)  Basic Mobility Total Score: 17    AM-PAC Raw Score CMS G-Code Modifier Level of Impairment Assistance   6 % Total / Unable   7 - 9 CM 80 - 100% Maximal Assist   10 - 14 CL 60 - 80% Moderate Assist   15 - 19 CK 40 - 60% Moderate Assist   20 - 22 CJ 20 - 40% Minimal Assist   23 CI 1-20% SBA / CGA   24 CH 0% Independent/ Mod I     Patient left up in chair with  OT present un rehab gym    Assessment:  Monica Walker is a 52 y.o. female with a medical diagnosis of Staphylococcal arthritis of left knee and presents with Rehab identified problem list/impairments: weakness, impaired functional mobility, impaired self care skills, impaired endurance, impaired balance, decreased lower extremity function, decreased upper extremity function.  LPTA reported patient's question regarding ambulation with crutches to supervising physical therapist, Kalina Ac DPT.  Patient was then instructed to get someone to bring personal crutches from home for gait training.  Patient presents with overall better spirits, and improved tolerance to physical therapy treatment session.  No adverse effects noted or reported.     Rehab potential is good.    Activity tolerance: Good and Fair    Discharge recommendations: home with home health      Barriers to discharge:      Equipment recommendations: none     GOALS:   Multidisciplinary Problems       Physical Therapy Goals          Problem: Physical Therapy    Goal Priority  Disciplines Outcome Goal Variances Interventions   Physical Therapy Goal     PT, PT/OT      Description: Short Term Goals  1. Patient will complete 30 reps of B LE exercises with correct form.   2. Patient will complete sit<>stand transfers with SBA while maintaining toe touch weight bearing on LLE.   3. Patient will ambulate 25 feet with RW on level surfaces with CGA while maintaining toe touch weight bearing on LLE.    Long Term Goals   1. Patient will ambulate 50 feet with RW on level and unlevel surfaces with SBA while maintaining toe touch weight bearing on LLE.  2. Patient will complete all functional transfers with MOD I while maintaining toe touch weight bearing on LLE.                        PLAN:    Patient to be seen 5 x/week to address the above listed problems via gait training, therapeutic activities, therapeutic exercises  Plan of Care expires: 04/11/23  Plan of Care reviewed with: patient    Continue Plan of Care Per PT order to progress patient toward rehab goals.    ZEINAB Hardin
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
## 2023-03-27 NOTE — PLAN OF CARE
Problem: Diabetes Comorbidity  Goal: Blood Glucose Level Within Targeted Range  Outcome: Ongoing, Progressing  Intervention: Monitor and Manage Glycemia  Flowsheets (Taken 3/27/2023 1533)  Glycemic Management:   blood glucose monitored   oral hydration promoted   supplemental insulin given     Problem: Oral Intake Inadequate (Acute Kidney Injury/Impairment)  Goal: Optimal Nutrition Intake  Outcome: Ongoing, Progressing  Intervention: Promote and Optimize Nutrition  Flowsheets (Taken 3/27/2023 1533)  Oral Nutrition Promotion:   calorie-dense foods provided   rest periods promoted   safe use of adaptive equipment encouraged   physical activity promoted     Problem: Infection  Goal: Absence of Infection Signs and Symptoms  Outcome: Ongoing, Progressing  Intervention: Prevent or Manage Infection  Flowsheets (Taken 3/27/2023 1533)  Infection Management: aseptic technique maintained  Isolation Precautions:   precautions maintained   contact

## 2023-03-27 NOTE — PT/OT/SLP PROGRESS
"Occupational Therapy  Treatment    Monica Walker   MRN: [id number]   Admitting Diagnosis: Staphylococcal arthritis of left knee    OT Date of Treatment: 03/27/23   OT Start Time: 1313  OT Stop Time: 1339  OT Total Time (min): 26 min    Billable Minutes:  Therapeutic Exercise 26 min               General Precautions: Standard, fall  Orthopedic Precautions: LLE toe touch weight bearing  Braces: Knee immobilizer  Respiratory Status: Room air         Subjective:  Communicated with RN prior to session.      Pain/Comfort  Pain Rating 1: 9/10    Objective:  Patient found with: PICC line     Functional Mobility:  Bed Mobility:        Transfers:         Functional Ambulation:     Activities of Daily Living:         Therapeutic Activities and Exercises:  Patient completed the following for increased strength to increase I with ADLs: UBE 7 min x 1x, 2# DB- shoulder press, chest press with RUE, and bicep curls with BUE x 30, yellow theraflex x 30 both ways, yellow theraband- scapular retraction and tricep extension x 30      AM-PAC 6 CLICK ADL   How much help from another person does this patient currently need?   1 = Unable, Total/Dependent Assistance  2 = A lot, Maximum/Moderate Assistance  3 = A little, Minimum/Contact Guard/Supervision  4 = None, Modified [place]/Independent    Putting on and taking off regular lower body clothing? : 3  Bathing (including washing, rinsing, drying)?: 3  Toileting, which includes using toilet, bedpan, or urinal? : 3  Putting on and taking off regular upper body clothing?: 3  Taking care of personal grooming such as brushing teeth?: 4  Eating meals?: 4  Daily Activity Total Score: 20     AM-PAC Raw Score CMS "G-Code Modifier Level of Impairment Assistance   6 % Total / Unable   7 - 8 CM 80 - 100% Maximal Assist   9-13 CL 60 - 80% Moderate Assist   14 - 19 CK 40 - 60% Moderate Assist   20 - 22 CJ 20 - 40% Minimal Assist   23 CI 1-20% SBA / CGA   24 CH 0% Independent/ Mod I "       Patient left supine with call button in reach    ASSESSMENT:  Monica Walker is a 52 y.o. female with a medical diagnosis of Staphylococcal arthritis of left knee and presents with decreased strength, decreased endurance, decreased self-care.    Rehab identified problem list/impairments:  weakness, impaired endurance, impaired self care skills, impaired functional mobility, impaired balance, decreased lower extremity function, decreased safety awareness    Rehab potential is fair.    Activity tolerance: Good    Discharge recommendations: home with home health   Barriers to discharge:      Equipment recommendations: none    GOALS:   Multidisciplinary Problems       Occupational Therapy Goals          Problem: Occupational Therapy    Goal Priority Disciplines Outcome Interventions   Occupational Therapy Goal     OT, PT/OT Ongoing, Progressing    Description: Description: Grooming Status:   Short Term Goal: Pt will perform grooming with s/u sitting EOB.   Long Term Goal: Pt will perform grooming/oral hygiene standing at sink with Mod I      LE dressing Status:   Short Term Goal: Pt will perform LE dressing with mod a.   Long Term Goal: Pt will perform LE dressing with min a.    Toileting Status:   Short Term Goal: Pt will perform toilet hygiene on BSC with min a.  Long Term Goal: Pt will perform toilet hygiene on toilet with no AE with s/u.    Commode Transfer:   Short Term Goal: Pt will perform BSC t/f with min a.  Long Term Goal:  Pt will perform toilet t/f in bathroom with s/u.     Bathing Status:   Long Term Goal: Pt will perform sponge bath with s/u with no unsafe fatigue.     Strength Status:   Long Term Goal: Pt to perform BUE strengthening with weights and/or body weight to increase ADL independence and safety    Endurance Status:   Short Term Goal:pt to perform 15 min OT treatment with 5 or greater rest breaks  Long Term Goal: pt to perform 30 min OT treat with 3 or less rest breaks                        Plan:  Patient to be seen 5 x/week to address the above listed problems via therapeutic exercises  Plan of Care expires:    Plan of Care reviewed with: patient     Kirti Che, OTR/ETHAN      03/27/2023

## 2023-03-28 LAB
GLUCOSE SERPL-MCNC: 186 MG/DL (ref 70–105)
GLUCOSE SERPL-MCNC: 206 MG/DL (ref 70–105)
GLUCOSE SERPL-MCNC: 220 MG/DL (ref 70–105)
GLUCOSE SERPL-MCNC: 281 MG/DL (ref 70–105)

## 2023-03-28 PROCEDURE — 82962 GLUCOSE BLOOD TEST: CPT

## 2023-03-28 PROCEDURE — 63600175 PHARM REV CODE 636 W HCPCS: Performed by: FAMILY MEDICINE

## 2023-03-28 PROCEDURE — 97110 THERAPEUTIC EXERCISES: CPT

## 2023-03-28 PROCEDURE — 97116 GAIT TRAINING THERAPY: CPT

## 2023-03-28 PROCEDURE — 25000003 PHARM REV CODE 250: Performed by: FAMILY MEDICINE

## 2023-03-28 PROCEDURE — 11000004 HC SNF PRIVATE

## 2023-03-28 PROCEDURE — 94761 N-INVAS EAR/PLS OXIMETRY MLT: CPT

## 2023-03-28 RX ADMIN — VANCOMYCIN HYDROCHLORIDE 1250 MG: 1.25 INJECTION, POWDER, LYOPHILIZED, FOR SOLUTION INTRAVENOUS at 10:03

## 2023-03-28 RX ADMIN — INSULIN ASPART 5 UNITS: 100 INJECTION, SOLUTION INTRAVENOUS; SUBCUTANEOUS at 06:03

## 2023-03-28 RX ADMIN — HYDROCODONE BITARTRATE AND ACETAMINOPHEN 1 TABLET: 5; 325 TABLET ORAL at 03:03

## 2023-03-28 RX ADMIN — INSULIN ASPART 5 UNITS: 100 INJECTION, SOLUTION INTRAVENOUS; SUBCUTANEOUS at 10:03

## 2023-03-28 RX ADMIN — SENNOSIDES AND DOCUSATE SODIUM 1 TABLET: 8.6; 5 TABLET ORAL at 08:03

## 2023-03-28 RX ADMIN — CEFAZOLIN SODIUM 2 G: 2 SOLUTION INTRAVENOUS at 03:03

## 2023-03-28 RX ADMIN — DULOXETINE 60 MG: 30 CAPSULE, DELAYED RELEASE ORAL at 08:03

## 2023-03-28 RX ADMIN — APIXABAN 2.5 MG: 2.5 TABLET, FILM COATED ORAL at 08:03

## 2023-03-28 RX ADMIN — CEFAZOLIN SODIUM 2 G: 2 SOLUTION INTRAVENOUS at 12:03

## 2023-03-28 RX ADMIN — GABAPENTIN 300 MG: 300 CAPSULE ORAL at 08:03

## 2023-03-28 RX ADMIN — INSULIN ASPART 1 UNITS: 100 INJECTION, SOLUTION INTRAVENOUS; SUBCUTANEOUS at 08:03

## 2023-03-28 RX ADMIN — HYDRALAZINE HYDROCHLORIDE 25 MG: 25 TABLET, FILM COATED ORAL at 08:03

## 2023-03-28 RX ADMIN — HEPARIN, PORCINE (PF) 10 UNIT/ML INTRAVENOUS SYRINGE 50 UNITS: at 09:03

## 2023-03-28 RX ADMIN — HYDROCODONE BITARTRATE AND ACETAMINOPHEN 1 TABLET: 5; 325 TABLET ORAL at 09:03

## 2023-03-28 RX ADMIN — INSULIN DETEMIR 30 UNITS: 100 INJECTION, SOLUTION SUBCUTANEOUS at 08:03

## 2023-03-28 RX ADMIN — Medication 1 CAPSULE: at 05:03

## 2023-03-28 RX ADMIN — CEFAZOLIN SODIUM 2 G: 2 SOLUTION INTRAVENOUS at 08:03

## 2023-03-28 RX ADMIN — INSULIN ASPART 5 UNITS: 100 INJECTION, SOLUTION INTRAVENOUS; SUBCUTANEOUS at 04:03

## 2023-03-28 RX ADMIN — Medication 6 MG: at 08:03

## 2023-03-28 RX ADMIN — Medication 1 CAPSULE: at 08:03

## 2023-03-28 RX ADMIN — LOSARTAN POTASSIUM 25 MG: 25 TABLET, FILM COATED ORAL at 08:03

## 2023-03-28 RX ADMIN — HYDROCODONE BITARTRATE AND ACETAMINOPHEN 1 TABLET: 5; 325 TABLET ORAL at 02:03

## 2023-03-28 RX ADMIN — Medication 1 CAPSULE: at 11:03

## 2023-03-28 RX ADMIN — HYDROCODONE BITARTRATE AND ACETAMINOPHEN 1 TABLET: 5; 325 TABLET ORAL at 08:03

## 2023-03-28 NOTE — PLAN OF CARE
Problem: Adult Inpatient Plan of Care  Goal: Plan of Care Review  Outcome: Ongoing, Progressing  Goal: Patient-Specific Goal (Individualized)  Outcome: Ongoing, Progressing  Goal: Optimal Comfort and Wellbeing  Outcome: Ongoing, Progressing     Problem: Diabetes Comorbidity  Goal: Blood Glucose Level Within Targeted Range  Outcome: Ongoing, Progressing     Problem: Fall Injury Risk  Goal: Absence of Fall and Fall-Related Injury  Outcome: Ongoing, Progressing     Problem: Skin Injury Risk Increased  Goal: Skin Health and Integrity  Outcome: Ongoing, Progressing     Problem: Infection  Goal: Absence of Infection Signs and Symptoms  Outcome: Ongoing, Progressing

## 2023-03-28 NOTE — PT/OT/SLP PROGRESS
"Physical Therapy  Treatment    Monica Walker   MRN: [id number]   Admitting Diagnosis: Staphylococcal arthritis of left knee    PT Received On: 03/28/23  PT Start Time: 0930     PT Stop Time: 0958    PT Total Time (min): 28 min       Billable Minutes: There Ex 20 Gait 8     Treatment Type: Treatment  PT/PTA: PT     Number of PTA visits since last PT visit: 0       General Precautions: Standard, fall  Orthopedic Precautions: LLE toe touch weight bearing  Braces: Knee immobilizer  Respiratory Status: Room air    Spiritual, Cultural Beliefs, Cheondoism Practices, Values that Affect Care: no    Subjective:   Patient reports "My momma got a report from the doctor and he doesn't want you all to mess with this left leg."     Pain/Comfort  Pain Rating 1: 9/10  Location - Side 1: Left  Location - Orientation 1: generalized  Location 1: knee    Objective:   Patient found with: PICC line    Functional Mobility:  Bed Mobility: sup       Transfers: sit <> stand, SBA ; bed to chair, CGA       Gait: 35'  with RW, CGA, TTWB LLE        Stairs: not performed       Treatment and Education:  Physical Therapy  Treatment    Therapeutic Activities and Exercises:    Ther- Ex    Nustep    Ankle pumps 30 x    Hip adduction 30 x 3"    Heelslides    LAQ's    Hamstring Curls    Quad sets  30 x 3"    SAQ's 30 x 3" R LE   Straight Leg Raises 3 x 10 R LE    Hip abduction    Horizontal ADD/ABD 3 x 10 R LE    Glut sets  30x 5"        Ther-Act    Heel Raises    Mini Squats    3 way hip    Sit <> stands                         AM-PAC 6 CLICK MOBILITY  How much help from another person does this patient currently need?   1 = Unable, Total/Dependent Assistance  2 = A lot, Maximum/Moderate Assistance  3 = A little, Minimum/Contact Guard/Supervision  4 = None, Modified [place]/Independent    Turning over in bed (including adjusting bedclothes, sheets and blankets)?: 4  Sitting down on and standing up from a chair with arms (e.g., wheelchair, bedside " commode, etc.): 3  Moving from lying on back to sitting on the side of the bed?: 4  Moving to and from a bed to a chair (including a wheelchair)?: 3  Need to walk in hospital room?: 3  Climbing 3-5 steps with a railing?: 2  Basic Mobility Total Score: 19    AM-PAC Raw Score CMS G-Code Modifier Level of Impairment Assistance   6 % Total / Unable   7 - 9 CM 80 - 100% Maximal Assist   10 - 14 CL 60 - 80% Moderate Assist   15 - 19 CK 40 - 60% Moderate Assist   20 - 22 CJ 20 - 40% Minimal Assist   23 CI 1-20% SBA / CGA   24 CH 0% Independent/ Mod I     Patient left up in chair with  OT present un rehab gym    Assessment:  Monica Walker is a 52 y.o. female with a medical diagnosis of Staphylococcal arthritis of left knee and presents with Rehab identified problem list/impairments: weakness, impaired endurance, impaired functional mobility, gait instability, impaired balance, decreased lower extremity function, pain.  PT provided patient with visual and verbal cueing for proper form and decreased compensations with LE exercises. Patient unwilling to complete L LE hip abduction or straight leg raises exercise stating that her doctor said for her not to exercise that leg. Patient's ambulation trial was limited by UE fatigue. NO adverse effects to treatment.     Rehab potential is good.    Activity tolerance: Good and Fair    Discharge recommendations: home with home health      Barriers to discharge:      Equipment recommendations: none     GOALS:   Multidisciplinary Problems       Physical Therapy Goals          Problem: Physical Therapy    Goal Priority Disciplines Outcome Goal Variances Interventions   Physical Therapy Goal     PT, PT/OT      Description: Short Term Goals  1. Patient will complete 30 reps of B LE exercises with correct form.   2. Patient will complete sit<>stand transfers with SBA while maintaining toe touch weight bearing on LLE.   3. Patient will ambulate 25 feet with RW on level surfaces  with CGA while maintaining toe touch weight bearing on LLE.    Long Term Goals   1. Patient will ambulate 50 feet with RW on level and unlevel surfaces with SBA while maintaining toe touch weight bearing on LLE.  2. Patient will complete all functional transfers with MOD I while maintaining toe touch weight bearing on LLE.                        PLAN:    Patient to be seen 5 x/week to address the above listed problems via gait training, therapeutic exercises, therapeutic activities  Plan of Care expires: 04/11/23  Plan of Care reviewed with: patient    Continue Plan of Care Per PT order to progress patient toward rehab goals.   Damaris Kaplan, PT, DPT

## 2023-03-28 NOTE — PT/OT/SLP PROGRESS
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      Pain/Comfort  Pain Rating 1: 9/10    Objective:  Patient found with: PICC line     Functional Mobility:  Bed Mobility:        Transfers:         Functional Ambulation:     Activities of Daily Living:         Therapeutic Activities and Exercises:  Patient completed the following for increased strength to increase I with ADLs: UBE 7 min x 1x, 3# DB- shoulder press, chest press with RUE, and bicep curls with BUE x 40, yellow theraflex x 40 both ways, yellow theraband- scapular retraction and tricep extension x 40      AM-PAC 6 CLICK ADL   How much help from another person does this patient currently need?   1 = Unable, Total/Dependent Assistance  2 = A lot, Maximum/Moderate Assistance  3 = A little, Minimum/Contact Guard/Supervision  4 = None, Modified [place]/Independent    Putting on and taking off regular lower body clothing? : 3  Bathing (including washing, rinsing, drying)?: 3  Toileting, which includes using toilet, bedpan, or urinal? : 3  Putting on and taking off regular upper body clothing?: 3  Taking care of personal grooming such as brushing teeth?: 4  Eating meals?: 4  Daily Activity Total Score: 20     AM-PAC Raw Score CMS "G-Code Modifier Level of Impairment Assistance   6 % Total / Unable   7 - 8 CM 80 - 100% Maximal Assist   9-13 CL 60 - 80% Moderate Assist   14 - 19 CK 40 - 60% Moderate Assist   20 - 22 CJ 20 - 40% Minimal Assist   23 CI 1-20% SBA / CGA   24 CH 0% Independent/ Mod I "       Patient left supine with call button in reach    ASSESSMENT:  Monica Walker is a 52 y.o. female with a medical diagnosis of Staphylococcal arthritis of left knee and presents with decreased strength, decreased endurance, decreased self-care.    Rehab identified problem list/impairments:  weakness, impaired endurance, impaired self care skills, impaired functional mobility, impaired balance, decreased lower extremity function, decreased safety awareness    Rehab potential is fair.    Activity tolerance: Good    Discharge recommendations: home with home health   Barriers to discharge:      Equipment recommendations: none    GOALS:   Multidisciplinary Problems       Occupational Therapy Goals          Problem: Occupational Therapy    Goal Priority Disciplines Outcome Interventions   Occupational Therapy Goal     OT, PT/OT Ongoing, Progressing    Description: Description: Grooming Status:   Short Term Goal: Pt will perform grooming with s/u sitting EOB.   Long Term Goal: Pt will perform grooming/oral hygiene standing at sink with Mod I      LE dressing Status:   Short Term Goal: Pt will perform LE dressing with mod a.   Long Term Goal: Pt will perform LE dressing with min a.    Toileting Status:   Short Term Goal: Pt will perform toilet hygiene on BSC with min a.  Long Term Goal: Pt will perform toilet hygiene on toilet with no AE with s/u.    Commode Transfer:   Short Term Goal: Pt will perform BSC t/f with min a.  Long Term Goal:  Pt will perform toilet t/f in bathroom with s/u.     Bathing Status:   Long Term Goal: Pt will perform sponge bath with s/u with no unsafe fatigue.     Strength Status:   Long Term Goal: Pt to perform BUE strengthening with weights and/or body weight to increase ADL independence and safety    Endurance Status:   Short Term Goal:pt to perform 15 min OT treatment with 5 or greater rest breaks  Long Term Goal: pt to perform 30 min OT treat with 3 or less rest breaks                        Plan:  Patient to be seen 5 x/week to address the above listed problems via therapeutic exercises  Plan of Care expires:    Plan of Care reviewed with: patient     Kirti Che, OTR/ETHAN      03/28/2023

## 2023-03-29 LAB
GLUCOSE SERPL-MCNC: 107 MG/DL (ref 70–105)
GLUCOSE SERPL-MCNC: 217 MG/DL (ref 70–105)
GLUCOSE SERPL-MCNC: 253 MG/DL (ref 70–105)
GLUCOSE SERPL-MCNC: 279 MG/DL (ref 70–105)

## 2023-03-29 PROCEDURE — 11000004 HC SNF PRIVATE

## 2023-03-29 PROCEDURE — 97110 THERAPEUTIC EXERCISES: CPT

## 2023-03-29 PROCEDURE — 97116 GAIT TRAINING THERAPY: CPT | Mod: CQ

## 2023-03-29 PROCEDURE — 63600175 PHARM REV CODE 636 W HCPCS: Performed by: FAMILY MEDICINE

## 2023-03-29 PROCEDURE — 94761 N-INVAS EAR/PLS OXIMETRY MLT: CPT

## 2023-03-29 PROCEDURE — 25000003 PHARM REV CODE 250: Performed by: FAMILY MEDICINE

## 2023-03-29 PROCEDURE — 82962 GLUCOSE BLOOD TEST: CPT

## 2023-03-29 PROCEDURE — 97110 THERAPEUTIC EXERCISES: CPT | Mod: CQ

## 2023-03-29 RX ADMIN — Medication 1 CAPSULE: at 08:03

## 2023-03-29 RX ADMIN — INSULIN ASPART 6 UNITS: 100 INJECTION, SOLUTION INTRAVENOUS; SUBCUTANEOUS at 11:03

## 2023-03-29 RX ADMIN — CEFAZOLIN SODIUM 2 G: 2 SOLUTION INTRAVENOUS at 11:03

## 2023-03-29 RX ADMIN — HYDROCODONE BITARTRATE AND ACETAMINOPHEN 1 TABLET: 5; 325 TABLET ORAL at 11:03

## 2023-03-29 RX ADMIN — Medication 6 MG: at 08:03

## 2023-03-29 RX ADMIN — HYDRALAZINE HYDROCHLORIDE 25 MG: 25 TABLET, FILM COATED ORAL at 08:03

## 2023-03-29 RX ADMIN — INSULIN ASPART 5 UNITS: 100 INJECTION, SOLUTION INTRAVENOUS; SUBCUTANEOUS at 11:03

## 2023-03-29 RX ADMIN — GABAPENTIN 300 MG: 300 CAPSULE ORAL at 08:03

## 2023-03-29 RX ADMIN — INSULIN ASPART 5 UNITS: 100 INJECTION, SOLUTION INTRAVENOUS; SUBCUTANEOUS at 05:03

## 2023-03-29 RX ADMIN — HEPARIN, PORCINE (PF) 10 UNIT/ML INTRAVENOUS SYRINGE 50 UNITS: at 08:03

## 2023-03-29 RX ADMIN — Medication 1 CAPSULE: at 11:03

## 2023-03-29 RX ADMIN — INSULIN DETEMIR 30 UNITS: 100 INJECTION, SOLUTION SUBCUTANEOUS at 08:03

## 2023-03-29 RX ADMIN — VANCOMYCIN HYDROCHLORIDE 1250 MG: 1.25 INJECTION, POWDER, LYOPHILIZED, FOR SOLUTION INTRAVENOUS at 03:03

## 2023-03-29 RX ADMIN — APIXABAN 2.5 MG: 2.5 TABLET, FILM COATED ORAL at 08:03

## 2023-03-29 RX ADMIN — CEFAZOLIN SODIUM 2 G: 2 SOLUTION INTRAVENOUS at 03:03

## 2023-03-29 RX ADMIN — CEFAZOLIN SODIUM 2 G: 2 SOLUTION INTRAVENOUS at 08:03

## 2023-03-29 RX ADMIN — VANCOMYCIN HYDROCHLORIDE 1250 MG: 1.25 INJECTION, POWDER, LYOPHILIZED, FOR SOLUTION INTRAVENOUS at 10:03

## 2023-03-29 RX ADMIN — LOSARTAN POTASSIUM 25 MG: 25 TABLET, FILM COATED ORAL at 08:03

## 2023-03-29 RX ADMIN — HYDROCODONE BITARTRATE AND ACETAMINOPHEN 1 TABLET: 5; 325 TABLET ORAL at 01:03

## 2023-03-29 RX ADMIN — HYDROCODONE BITARTRATE AND ACETAMINOPHEN 1 TABLET: 5; 325 TABLET ORAL at 08:03

## 2023-03-29 RX ADMIN — SENNOSIDES AND DOCUSATE SODIUM 1 TABLET: 8.6; 5 TABLET ORAL at 08:03

## 2023-03-29 RX ADMIN — INSULIN ASPART 4 UNITS: 100 INJECTION, SOLUTION INTRAVENOUS; SUBCUTANEOUS at 05:03

## 2023-03-29 RX ADMIN — Medication 1 CAPSULE: at 05:03

## 2023-03-29 RX ADMIN — DULOXETINE 60 MG: 30 CAPSULE, DELAYED RELEASE ORAL at 08:03

## 2023-03-29 RX ADMIN — HYDROCODONE BITARTRATE AND ACETAMINOPHEN 1 TABLET: 5; 325 TABLET ORAL at 06:03

## 2023-03-29 NOTE — PT/OT/SLP PROGRESS
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    Pain/Comfort  Pain Rating 1: 0/10    Objective:   Patient found with: PICC line    Functional Mobility:  Bed Mobility: sup       Transfers: sit <> stand, SBA ; bed to chair, CGA       Gait: 35'  with RW, CGA, TTWB LLE        Stairs: not performed       Treatment and Education:  Physical Therapy  Treatment    Therapeutic Activities and Exercises:    Ther- Ex    Nustep    Ankle pumps 30 x    Hip adduction 30 x 3"    Heelslides    LAQ's    Hamstring Curls    Quad sets  30 x 3"    SAQ's 30 x 3" R LE   Straight Leg Raises 3 x 10 R LE    Hip abduction    Horizontal ADD/ABD 3 x 10 R LE    Glut sets  30x 5"        Ther-Act    Heel Raises    Mini Squats    3 way hip    Sit <> stands     Standing [facility]                    AM-[facility] 6 CLICK MOBILITY  How much help from another person does this patient currently need?   1 = Unable, Total/Dependent Assistance  2 = A lot, Maximum/Moderate Assistance  3 = A little, Minimum/Contact Guard/Supervision  4 = None, Modified [place]/Independent    Turning over in bed (including adjusting bedclothes, sheets and blankets)?: 4  Sitting down on and standing up from a chair with arms (e.g., wheelchair, bedside commode, etc.): 3  Moving from lying on back to sitting on the side of the bed?: 4  Moving to and from " "a bed to a chair (including a wheelchair)?: 3  Need to walk in hospital room?: 3  Climbing 3-5 steps with a railing?: 2  Basic Mobility Total Score: 19    AM-PAC Raw Score CMS G-Code Modifier Level of Impairment Assistance   6 % Total / Unable   7 - 9 CM 80 - 100% Maximal Assist   10 - 14 CL 60 - 80% Moderate Assist   15 - 19 CK 40 - 60% Moderate Assist   20 - 22 CJ 20 - 40% Minimal Assist   23 CI 1-20% SBA / CGA   24 CH 0% Independent/ Mod I     Patient left up in chair with  OT present in rehab gym    Assessment:  Monica Walker is a 52 y.o. female with a medical diagnosis of Staphylococcal arthritis of left knee and presents with Rehab identified problem list/impairments: weakness, impaired endurance, impaired self care skills, impaired functional mobility, impaired balance, decreased lower extremity function, decreased safety awareness.  Pt is unwilling to complete any activity with left lower extremity and states she "doesn't want anything to happen before they put her knee back in. LPTA provided verbal cuing for form, hold times, and count. Pt ambulation trial ended secondary to upper extremity fatigue. No adverse effects to treatment.     Rehab potential is good.    Activity tolerance: Good and Fair    Discharge recommendations: home with home health      Barriers to discharge:      Equipment recommendations: none     GOALS:   Multidisciplinary Problems       Physical Therapy Goals          Problem: Physical Therapy    Goal Priority Disciplines Outcome Goal Variances Interventions   Physical Therapy Goal     PT, PT/OT      Description: Short Term Goals  1. Patient will complete 30 reps of B LE exercises with correct form.   2. Patient will complete sit<>stand transfers with SBA while maintaining toe touch weight bearing on LLE.   3. Patient will ambulate 25 feet with RW on level surfaces with CGA while maintaining toe touch weight bearing on LLE.    Long Term Goals   1. Patient will ambulate 50 feet " with RW on level and unlevel surfaces with SBA while maintaining toe touch weight bearing on LLE.  2. Patient will complete all functional transfers with MOD I while maintaining toe touch weight bearing on LLE.                        PLAN:    Patient to be seen 5 x/week to address the above listed problems via gait training, therapeutic activities, therapeutic exercises  Plan of Care expires: 04/11/23  Plan of Care reviewed with: patient    Continue Plan of Care Per PT order to progress patient toward rehab goals.   ZEINAB Scott  3/29/2023

## 2023-03-29 NOTE — PLAN OF CARE
Problem: Adult Inpatient Plan of Care  Goal: Plan of Care Review  Outcome: Ongoing, Progressing  Goal: Patient-Specific Goal (Individualized)  Outcome: Ongoing, Progressing     Problem: Fall Injury Risk  Goal: Absence of Fall and Fall-Related Injury  Outcome: Ongoing, Progressing  Intervention: Identify and Manage Contributors  Flowsheets (Taken 3/29/2023 1621)  Self-Care Promotion:   independence encouraged   BADL personal objects within reach   BADL personal routines maintained  Medication Review/Management:   medications reviewed   high-risk medications identified  Intervention: Promote Injury-Free Environment  Flowsheets (Taken 3/29/2023 1621)  Safety Promotion/Fall Prevention:   assistive device/personal item within reach   diversional activities provided   high risk medications identified   in recliner, wheels locked   medications reviewed   muscle strengthening facilitated   nonskid shoes/socks when out of bed   side rails raised x 3   instructed to call staff for mobility

## 2023-03-29 NOTE — PT/OT/SLP PROGRESS
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      Pain/Comfort  Pain Rating 1: 0/10  Pain Rating Post-Intervention 1: 8/10    Objective:  Patient found with: PICC line     Functional Mobility:  Bed Mobility:        Transfers:         Functional Ambulation:     Activities of Daily Living:         Therapeutic Activities and Exercises:  Patient completed the following for increased strength to increase I with ADLs: UBE 7 min x 1x, 3# DB- shoulder press, chest press with RUE, and bicep curls with BUE x 40, yellow theraflex x 40 both ways, yellow theraband- scapular retraction and tricep extension x 40      AM-PAC 6 CLICK ADL   How much help from another person does this patient currently need?   1 = Unable, Total/Dependent Assistance  2 = A lot, Maximum/Moderate Assistance  3 = A little, Minimum/Contact Guard/Supervision  4 = None, Modified [place]/Independent    Putting on and taking off regular lower body clothing? : 3  Bathing (including washing, rinsing, drying)?: 3  Toileting, which includes using toilet, bedpan, or urinal? : 3  Putting on and taking off regular upper body clothing?: 3  Taking care of personal grooming such as brushing teeth?: 4  Eating meals?: 4  Daily Activity Total Score: 20     AM-PAC Raw Score CMS "G-Code Modifier Level of Impairment Assistance   6 % Total / Unable   7 - 8 CM 80 - 100% Maximal Assist   9-13 CL 60 - 80% Moderate Assist   14 - 19 CK 40 - 60% Moderate Assist   20 - 22 CJ 20 - 40% Minimal Assist   23 CI 1-20% SBA / CGA "   24 CH 0% Independent/ Mod I       Patient left supine with call button in reach    ASSESSMENT:  Moniac Walker is a 52 y.o. female with a medical diagnosis of Staphylococcal arthritis of left knee and presents with decreased strength, decreased endurance, decreased self-care.    Rehab identified problem list/impairments:  weakness, impaired endurance, impaired self care skills, impaired functional mobility, impaired balance, decreased lower extremity function, decreased safety awareness    Rehab potential is fair.    Activity tolerance: Good    Discharge recommendations: home with home health   Barriers to discharge:      Equipment recommendations: none    GOALS:   Multidisciplinary Problems       Occupational Therapy Goals          Problem: Occupational Therapy    Goal Priority Disciplines Outcome Interventions   Occupational Therapy Goal     OT, PT/OT Ongoing, Progressing    Description: Description: Grooming Status:   Short Term Goal: Pt will perform grooming with s/u sitting EOB.   Long Term Goal: Pt will perform grooming/oral hygiene standing at sink with Mod I      LE dressing Status:   Short Term Goal: Pt will perform LE dressing with mod a.   Long Term Goal: Pt will perform LE dressing with min a.    Toileting Status:   Short Term Goal: Pt will perform toilet hygiene on BSC with min a.  Long Term Goal: Pt will perform toilet hygiene on toilet with no AE with s/u.    Commode Transfer:   Short Term Goal: Pt will perform BSC t/f with min a.  Long Term Goal:  Pt will perform toilet t/f in bathroom with s/u.     Bathing Status:   Long Term Goal: Pt will perform sponge bath with s/u with no unsafe fatigue.     Strength Status:   Long Term Goal: Pt to perform BUE strengthening with weights and/or body weight to increase ADL independence and safety    Endurance Status:   Short Term Goal:pt to perform 15 min OT treatment with 5 or greater rest breaks  Long Term Goal: pt to perform 30 min OT treat with 3 or less  rest breaks                       Plan:  Patient to be seen 5 x/week to address the above listed problems via therapeutic exercises  Plan of Care expires:    Plan of Care reviewed with: patient     Kirti Che, OTR/ETHAN      03/29/2023

## 2023-03-29 NOTE — NURSING
"Patient reports that she ate all of her supper because it was something that she liked. She says, "I bet my sister April is glad I'm not calling her for something to eat today". At this time patient's brother-in-law brought her a meal from [employer] with a large sweet tea.  " Head , normocephalic , atraumatic , Face , Face within normal limits , Ears , External ears within normal limits , Nose/Nasopharynx , External nose  normal appearance , Mouth and Throat , Oral cavity appearance normal

## 2023-03-30 LAB
GLUCOSE SERPL-MCNC: 148 MG/DL (ref 70–105)
GLUCOSE SERPL-MCNC: 181 MG/DL (ref 70–105)
GLUCOSE SERPL-MCNC: 246 MG/DL (ref 70–105)
VANCOMYCIN TROUGH SERPL-MCNC: 11.2 ΜG/ML (ref 10–20)

## 2023-03-30 PROCEDURE — 97110 THERAPEUTIC EXERCISES: CPT

## 2023-03-30 PROCEDURE — 11000004 HC SNF PRIVATE

## 2023-03-30 PROCEDURE — 63600175 PHARM REV CODE 636 W HCPCS: Mod: TB,JG | Performed by: FAMILY MEDICINE

## 2023-03-30 PROCEDURE — 80202 ASSAY OF VANCOMYCIN: CPT | Performed by: FAMILY MEDICINE

## 2023-03-30 PROCEDURE — 82962 GLUCOSE BLOOD TEST: CPT

## 2023-03-30 PROCEDURE — 25000003 PHARM REV CODE 250: Performed by: FAMILY MEDICINE

## 2023-03-30 PROCEDURE — 94761 N-INVAS EAR/PLS OXIMETRY MLT: CPT

## 2023-03-30 PROCEDURE — 63600175 PHARM REV CODE 636 W HCPCS: Performed by: FAMILY MEDICINE

## 2023-03-30 RX ADMIN — INSULIN ASPART 5 UNITS: 100 INJECTION, SOLUTION INTRAVENOUS; SUBCUTANEOUS at 11:03

## 2023-03-30 RX ADMIN — INSULIN ASPART 5 UNITS: 100 INJECTION, SOLUTION INTRAVENOUS; SUBCUTANEOUS at 04:03

## 2023-03-30 RX ADMIN — CEFAZOLIN SODIUM 2 G: 2 SOLUTION INTRAVENOUS at 11:03

## 2023-03-30 RX ADMIN — CEFAZOLIN SODIUM 2 G: 2 SOLUTION INTRAVENOUS at 03:03

## 2023-03-30 RX ADMIN — SENNOSIDES AND DOCUSATE SODIUM 1 TABLET: 8.6; 5 TABLET ORAL at 08:03

## 2023-03-30 RX ADMIN — APIXABAN 2.5 MG: 2.5 TABLET, FILM COATED ORAL at 08:03

## 2023-03-30 RX ADMIN — GABAPENTIN 300 MG: 300 CAPSULE ORAL at 08:03

## 2023-03-30 RX ADMIN — CEFAZOLIN SODIUM 2 G: 2 SOLUTION INTRAVENOUS at 08:03

## 2023-03-30 RX ADMIN — INSULIN ASPART 5 UNITS: 100 INJECTION, SOLUTION INTRAVENOUS; SUBCUTANEOUS at 06:03

## 2023-03-30 RX ADMIN — Medication 1 CAPSULE: at 08:03

## 2023-03-30 RX ADMIN — INSULIN DETEMIR 30 UNITS: 100 INJECTION, SOLUTION SUBCUTANEOUS at 08:03

## 2023-03-30 RX ADMIN — LOSARTAN POTASSIUM 25 MG: 25 TABLET, FILM COATED ORAL at 08:03

## 2023-03-30 RX ADMIN — HYDROCODONE BITARTRATE AND ACETAMINOPHEN 1 TABLET: 5; 325 TABLET ORAL at 08:03

## 2023-03-30 RX ADMIN — HYDRALAZINE HYDROCHLORIDE 25 MG: 25 TABLET, FILM COATED ORAL at 08:03

## 2023-03-30 RX ADMIN — INSULIN ASPART 1 UNITS: 100 INJECTION, SOLUTION INTRAVENOUS; SUBCUTANEOUS at 08:03

## 2023-03-30 RX ADMIN — HYDROCODONE BITARTRATE AND ACETAMINOPHEN 1 TABLET: 5; 325 TABLET ORAL at 05:03

## 2023-03-30 RX ADMIN — Medication 1 CAPSULE: at 04:03

## 2023-03-30 RX ADMIN — VANCOMYCIN HYDROCHLORIDE 1500 MG: 1.5 INJECTION, POWDER, LYOPHILIZED, FOR SOLUTION INTRAVENOUS at 04:03

## 2023-03-30 RX ADMIN — INSULIN ASPART 4 UNITS: 100 INJECTION, SOLUTION INTRAVENOUS; SUBCUTANEOUS at 06:03

## 2023-03-30 RX ADMIN — HYDROCODONE BITARTRATE AND ACETAMINOPHEN 1 TABLET: 5; 325 TABLET ORAL at 03:03

## 2023-03-30 RX ADMIN — HEPARIN, PORCINE (PF) 10 UNIT/ML INTRAVENOUS SYRINGE 50 UNITS: at 11:03

## 2023-03-30 RX ADMIN — HYDROCODONE BITARTRATE AND ACETAMINOPHEN 1 TABLET: 5; 325 TABLET ORAL at 01:03

## 2023-03-30 RX ADMIN — Medication 6 MG: at 08:03

## 2023-03-30 RX ADMIN — DULOXETINE 60 MG: 30 CAPSULE, DELAYED RELEASE ORAL at 08:03

## 2023-03-30 NOTE — PT/OT/SLP PROGRESS
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      Pain/Comfort  Pain Rating 1: 0/10    Objective:  Patient found with: PICC line     Functional Mobility:  Bed Mobility:        Transfers:         Functional Ambulation:     Activities of Daily Living:         Therapeutic Activities and Exercises:  Patient completed the following for increased strength to increase I with ADLs: UBE 7 min x 1x, 3# DB- shoulder press, chest press with RUE, and bicep curls with BUE x 40, yellow theraflex x 40 both ways, yellow theraband- scapular retraction and tricep extension x 40      AM-PAC 6 CLICK ADL   How much help from another person does this patient currently need?   1 = Unable, Total/Dependent Assistance  2 = A lot, Maximum/Moderate Assistance  3 = A little, Minimum/Contact Guard/Supervision  4 = None, Modified [place]/Independent    Putting on and taking off regular lower body clothing? : 3  Bathing (including washing, rinsing, drying)?: 3  Toileting, which includes using toilet, bedpan, or urinal? : 3  Putting on and taking off regular upper body clothing?: 3  Taking care of personal grooming such as brushing teeth?: 4  Eating meals?: 4  Daily Activity Total Score: 20     AM-PAC Raw Score CMS "G-Code Modifier Level of Impairment Assistance   6 % Total / Unable   7 - 8 CM 80 - 100% Maximal Assist   9-13 CL 60 - 80% Moderate Assist   14 - 19 CK 40 - 60% Moderate Assist   20 - 22 CJ 20 - 40% Minimal Assist   23 CI 1-20% SBA / CGA   24 CH 0% Independent/ Mod I "       Patient left supine with call button in reach    ASSESSMENT:  Monica Walker is a 52 y.o. female with a medical diagnosis of Staphylococcal arthritis of left knee and presents with decreased strength, decreased endurance, decreased self-care.    Rehab identified problem list/impairments:  weakness, impaired endurance, impaired self care skills, impaired functional mobility, impaired balance, decreased lower extremity function, decreased upper extremity function, decreased safety awareness    Rehab potential is fair.    Activity tolerance: Good    Discharge recommendations: home with home health   Barriers to discharge:      Equipment recommendations: none    GOALS:   Multidisciplinary Problems       Occupational Therapy Goals          Problem: Occupational Therapy    Goal Priority Disciplines Outcome Interventions   Occupational Therapy Goal     OT, PT/OT Ongoing, Progressing    Description: Description: Grooming Status:   Short Term Goal: Pt will perform grooming with s/u sitting EOB.   Long Term Goal: Pt will perform grooming/oral hygiene standing at sink with Mod I      LE dressing Status:   Short Term Goal: Pt will perform LE dressing with mod a.   Long Term Goal: Pt will perform LE dressing with min a.    Toileting Status:   Short Term Goal: Pt will perform toilet hygiene on BSC with min a.  Long Term Goal: Pt will perform toilet hygiene on toilet with no AE with s/u.    Commode Transfer:   Short Term Goal: Pt will perform BSC t/f with min a.  Long Term Goal:  Pt will perform toilet t/f in bathroom with s/u.     Bathing Status:   Long Term Goal: Pt will perform sponge bath with s/u with no unsafe fatigue.     Strength Status:   Long Term Goal: Pt to perform BUE strengthening with weights and/or body weight to increase ADL independence and safety    Endurance Status:   Short Term Goal:pt to perform 15 min OT treatment with 5 or greater rest breaks  Long Term Goal: pt to perform 30 min OT treat with 3 or  less rest breaks                       Plan:  Patient to be seen 5 x/week to address the above listed problems via therapeutic exercises  Plan of Care expires:    Plan of Care reviewed with: patient     Kirtialan Bolton, OTR/ETHAN      03/30/2023

## 2023-03-30 NOTE — PROGRESS NOTES
WT: 140#  RDN visited pt this a.m. and discouraged pt from eating high calorie fast food and sugared beverages.  RDN educated pt on role of BG control and WND healing.  Meal intake ~75% with R x 1.  Staples have been removed from WND and it is healing well per EMR.  B-336 noted.  Last BM was 3/29.  Hgb 10.2, Hct 33.6.  Continue to follow.

## 2023-03-30 NOTE — PLAN OF CARE
Problem: Adult Inpatient Plan of Care  Goal: Plan of Care Review  Outcome: Ongoing, Progressing  Goal: Patient-Specific Goal (Individualized)  Outcome: Ongoing, Progressing     Problem: Fall Injury Risk  Goal: Absence of Fall and Fall-Related Injury  Outcome: Ongoing, Progressing  Intervention: Identify and Manage Contributors  Flowsheets (Taken 3/30/2023 1806)  Self-Care Promotion:   independence encouraged   BADL personal objects within reach   BADL personal routines maintained  Medication Review/Management:   medications reviewed   high-risk medications identified  Intervention: Promote Injury-Free Environment  Flowsheets (Taken 3/30/2023 1806)  Safety Promotion/Fall Prevention:   assistive device/personal item within reach   diversional activities provided   high risk medications identified   in recliner, wheels locked   medications reviewed   muscle strengthening facilitated   nonskid shoes/socks when out of bed   side rails raised x 3   instructed to call staff for mobility

## 2023-03-30 NOTE — PLAN OF CARE
Problem: Adult Inpatient Plan of Care  Goal: Optimal Comfort and Wellbeing  Outcome: Ongoing, Progressing  Goal: Readiness for Transition of Care  Outcome: Ongoing, Progressing     Problem: Oral Intake Inadequate (Acute Kidney Injury/Impairment)  Goal: Optimal Nutrition Intake  Outcome: Ongoing, Progressing     Problem: Impaired Wound Healing  Goal: Optimal Wound Healing  Outcome: Ongoing, Progressing     Problem: Fall Injury Risk  Goal: Absence of Fall and Fall-Related Injury  Outcome: Ongoing, Progressing     Problem: Skin Injury Risk Increased  Goal: Skin Health and Integrity  Outcome: Ongoing, Progressing

## 2023-03-30 NOTE — PROGRESS NOTES
Pharmacokinetic Assessment Follow Up: IV Vancomycin    Vancomycin serum concentration assessment(s):    The trough level was drawn correctly and can be used to guide therapy at this time. The measurement is below the desired definitive target range of 15 to 20 mcg/mL.    Vancomycin Regimen Plan:    Change regimen to Vancomycin 1500 mg IV every 18 hours with next serum trough concentration measured at 30 min  prior to 1700 dose on 4.2.23    Drug levels (last 3 results):  Recent Labs   Lab Result Units 03/30/23  1535   Vancomycin, Trough µg/mL 11.2       Pharmacy will continue to follow and monitor vancomycin.    Please contact pharmacy at extension [phone] for questions regarding this assessment.    Thank you for the consult,   Stacey Reddy       Patient brief summary:  Monica Walker is a 52 y.o. female initiated on antimicrobial therapy with IV Vancomycin for treatment of bone/joint infection        Drug Allergies:   Review of patient's allergies indicates:  No Known Allergies    Actual Body Weight:   63.9 kg    Renal Function:   Estimated Creatinine Clearance: 77.2 mL/min (based on SCr of 0.78 mg/dL).,     Dialysis Method (if applicable):  N/A    CBC (last 72 hours):  No results for input(s): WHITE BLOOD CELL COUNT, HEMOGLOBIN, HEMATOCRIT, PLATELETS, GRAN%, LYMPH%, MONO%, EOSINOPHIL%, BASOPHIL%, DIFFERENTIAL METHOD in the last 72 hours.    Metabolic Panel (last 72 hours):  No results for input(s): SODIUM, POTASSIUM, CHLORIDE, CO2, GLUCOSE, BUN BLD, CREATININE, ALBUMIN, BILIRUBIN TOTAL, ALK PHOS, AST, ALT, MAGNESIUM, PHOSPHORUS in the last 72 hours.    Vancomycin Administrations:  vancomycin given in the last 96 hours                     vancomycin 1,250 mg in dextrose 5 % (D5W) 250 mL IVPB (Vial-Mate) (mg) 1,250 mg New Bag 03/29/23 2206     1,250 mg New Bag  0359     1,250 mg New Bag 03/28/23 1017     1,250 mg New Bag 03/27/23 1612                    Microbiologic Results:  Microbiology Results (last 7 days)       **  No results found for the last 168 hours. **

## 2023-03-30 NOTE — PLAN OF CARE
[facility]  Patient: Monica Walker     Interdisciplinary Team Meeting     Today's Date: 3/30/2023     Estimated D/C Date: tbd       Physician: Dr. Schulz    Pharmacy: [facility] Unit Director: BRODY Kelly   : JORGE LUIS Méndez Physical/Occupational Therapy: KAREN Kaplan/ JAVIER Laureano   Speech Therapy: n/a Activity Therapy: read newspaper, watch tv   Nursing: FLORECITA Griffin L. Leger      Nurse  New Symptoms/Problems: none  Last BM: 3/29/23 Urine: continent Diarrhea: No   Constipated: No Bladder: continent    Isolation: No     O2: n/a  Rm Air: 96 %   Nutrition: see dietary notes  Speech/Swallowing: n/a  Aspiration Precautions: No  Cognition: alert and oriented    Physical Therapy  Physical Therapy/Gait: 40'  with RW, CGA, TTWB LLE     Transfers: sit <> stand, SBA for preparation for gait  Range of Motion/Restrictions: ttwb lle     Occupational Therapy  Occupational Therapy: following for increased strength to increase I with ADLs: Eating/Grooming: independent   Toileting: min. assist Bathing: min. assist   Dressing (Upper Body): min. assist Dressing (Lower Body): min. assist       Tx Plan/Recommendations reviewed with family and patient on 3/21/23 at bedside.  Additional family Conference/Training: not at this time  D/C Plan/Recommendations: home with     Pharmacy  Medication Changes (see MD orders in chart): No  MD/NP: Dr. Schulz Labs Reviewed: yes New Lab Orders: no     MD/NP Signature: Time:

## 2023-03-30 NOTE — PT/OT/SLP PROGRESS
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     Pain/Comfort  Pain Rating 1: 0/10    Objective:   Patient found with: PICC line    Functional Mobility:  Bed Mobility: sup       Transfers: sit <> stand, SBA for preparation for gait        Gait: 40'  with RW, CGA, TTWB LLE        Stairs: not performed       Treatment and Education:  Physical Therapy  Treatment    Therapeutic Activities and Exercises:    Ther- Ex    Nustep    Ankle pumps 30 x    Hip adduction 30 x 3"    Heelslides    LAQ's 3 x 10 2# R LE    Hamstring Curls 3 x 10 yellow TB R LE    Quad sets  30 x 3"    SAQ's 30 x 3" R LE   Straight Leg Raises 3 x 10 R LE    Hip abduction    Horizontal ADD/ABD 3 x 10 B LE    Glut sets  30x 5"    Seated Marching  3 x 10 2# on R LE    Ther-Act    Heel Raises    Mini Squats    3 way hip    Sit <> stands     Standing Marches                    AM-PAC 6 CLICK MOBILITY  How much help from another person does this patient currently need?   1 = Unable, Total/Dependent Assistance  2 = A lot, Maximum/Moderate Assistance  3 = A little, Minimum/Contact Guard/Supervision  4 = None, Modified [place]/Independent    Turning over in bed (including adjusting bedclothes, sheets and blankets)?: 4  Sitting down on and standing up from a chair with arms (e.g., wheelchair, bedside commode, etc.): 3  Moving from lying on back to " sitting on the side of the bed?: 4  Moving to and from a bed to a chair (including a wheelchair)?: 3  Need to walk in hospital room?: 3  Climbing 3-5 steps with a railing?: 2  Basic Mobility Total Score: 19    AM-PAC Raw Score CMS G-Code Modifier Level of Impairment Assistance   6 % Total / Unable   7 - 9 CM 80 - 100% Maximal Assist   10 - 14 CL 60 - 80% Moderate Assist   15 - 19 CK 40 - 60% Moderate Assist   20 - 22 CJ 20 - 40% Minimal Assist   23 CI 1-20% SBA / CGA   24 CH 0% Independent/ Mod I     Patient left up in chair with  OT present in rehab gym    Assessment:  Monica Walker is a 52 y.o. female with a medical diagnosis of Staphylococcal arthritis of left knee and presents with Rehab identified problem list/impairments: weakness, impaired endurance, impaired functional mobility, gait instability, impaired balance, pain, decreased lower extremity function.  PT provided patient with verbal cueing for proper form and speed with LE exercises. Patient completed short sitting exercises in mele chair with L LE propped up. Patient had no reports of adverse effects to treatment.     Rehab potential is good.    Activity tolerance: Good and Fair    Discharge recommendations: home with home health      Barriers to discharge:      Equipment recommendations: none     GOALS:   Multidisciplinary Problems       Physical Therapy Goals          Problem: Physical Therapy    Goal Priority Disciplines Outcome Goal Variances Interventions   Physical Therapy Goal     PT, PT/OT      Description: Short Term Goals  1. Patient will complete 30 reps of B LE exercises with correct form.   2. Patient will complete sit<>stand transfers with SBA while maintaining toe touch weight bearing on LLE.   3. Patient will ambulate 25 feet with RW on level surfaces with CGA while maintaining toe touch weight bearing on LLE.    Long Term Goals   1. Patient will ambulate 50 feet with RW on level and unlevel surfaces with SBA while  maintaining toe touch weight bearing on LLE.  2. Patient will complete all functional transfers with MOD I while maintaining toe touch weight bearing on LLE.                        PLAN:    Patient to be seen 5 x/week to address the above listed problems via gait training, therapeutic exercises  Plan of Care expires: 04/11/23  Plan of Care reviewed with: patient    Continue Plan of Care Per PT order to progress patient toward rehab goals.   Damaris Kaplan, PT, DPT     3/30/2023

## 2023-03-31 LAB
GLUCOSE SERPL-MCNC: 202 MG/DL (ref 70–105)
GLUCOSE SERPL-MCNC: 271 MG/DL (ref 70–105)
GLUCOSE SERPL-MCNC: 317 MG/DL (ref 70–105)
GLUCOSE SERPL-MCNC: 356 MG/DL (ref 70–105)

## 2023-03-31 PROCEDURE — 99308 PR NURSING FAC CARE, SUBSEQ, MINOR COMPLIC: ICD-10-PCS | Mod: ,,, | Performed by: FAMILY MEDICINE

## 2023-03-31 PROCEDURE — 99308 SBSQ NF CARE LOW MDM 20: CPT | Mod: ,,, | Performed by: FAMILY MEDICINE

## 2023-03-31 PROCEDURE — 63600175 PHARM REV CODE 636 W HCPCS: Performed by: FAMILY MEDICINE

## 2023-03-31 PROCEDURE — 97110 THERAPEUTIC EXERCISES: CPT

## 2023-03-31 PROCEDURE — 82962 GLUCOSE BLOOD TEST: CPT

## 2023-03-31 PROCEDURE — 94761 N-INVAS EAR/PLS OXIMETRY MLT: CPT

## 2023-03-31 PROCEDURE — 25000003 PHARM REV CODE 250: Performed by: FAMILY MEDICINE

## 2023-03-31 PROCEDURE — 97110 THERAPEUTIC EXERCISES: CPT | Mod: CQ

## 2023-03-31 PROCEDURE — 11000004 HC SNF PRIVATE

## 2023-03-31 RX ORDER — ASPIRIN 325 MG
325 TABLET ORAL DAILY
Status: DISCONTINUED | OUTPATIENT
Start: 2023-04-09 | End: 2023-04-27 | Stop reason: HOSPADM

## 2023-03-31 RX ADMIN — LOSARTAN POTASSIUM 25 MG: 25 TABLET, FILM COATED ORAL at 08:03

## 2023-03-31 RX ADMIN — GABAPENTIN 300 MG: 300 CAPSULE ORAL at 08:03

## 2023-03-31 RX ADMIN — HYDROCODONE BITARTRATE AND ACETAMINOPHEN 1 TABLET: 5; 325 TABLET ORAL at 08:03

## 2023-03-31 RX ADMIN — VANCOMYCIN HYDROCHLORIDE 1500 MG: 1.5 INJECTION, POWDER, LYOPHILIZED, FOR SOLUTION INTRAVENOUS at 11:03

## 2023-03-31 RX ADMIN — INSULIN ASPART 5 UNITS: 100 INJECTION, SOLUTION INTRAVENOUS; SUBCUTANEOUS at 04:03

## 2023-03-31 RX ADMIN — INSULIN ASPART 4 UNITS: 100 INJECTION, SOLUTION INTRAVENOUS; SUBCUTANEOUS at 06:03

## 2023-03-31 RX ADMIN — Medication 1 CAPSULE: at 06:03

## 2023-03-31 RX ADMIN — HYDROCODONE BITARTRATE AND ACETAMINOPHEN 1 TABLET: 5; 325 TABLET ORAL at 05:03

## 2023-03-31 RX ADMIN — Medication 1 CAPSULE: at 04:03

## 2023-03-31 RX ADMIN — HYDRALAZINE HYDROCHLORIDE 25 MG: 25 TABLET, FILM COATED ORAL at 08:03

## 2023-03-31 RX ADMIN — CEFAZOLIN SODIUM 2 G: 2 SOLUTION INTRAVENOUS at 01:03

## 2023-03-31 RX ADMIN — HYDROCODONE BITARTRATE AND ACETAMINOPHEN 1 TABLET: 5; 325 TABLET ORAL at 09:03

## 2023-03-31 RX ADMIN — HYDROCODONE BITARTRATE AND ACETAMINOPHEN 1 TABLET: 5; 325 TABLET ORAL at 12:03

## 2023-03-31 RX ADMIN — Medication 6 MG: at 08:03

## 2023-03-31 RX ADMIN — APIXABAN 2.5 MG: 2.5 TABLET, FILM COATED ORAL at 08:03

## 2023-03-31 RX ADMIN — SENNOSIDES AND DOCUSATE SODIUM 1 TABLET: 8.6; 5 TABLET ORAL at 08:03

## 2023-03-31 RX ADMIN — HEPARIN, PORCINE (PF) 10 UNIT/ML INTRAVENOUS SYRINGE 50 UNITS: at 04:03

## 2023-03-31 RX ADMIN — DULOXETINE 60 MG: 30 CAPSULE, DELAYED RELEASE ORAL at 08:03

## 2023-03-31 RX ADMIN — INSULIN DETEMIR 30 UNITS: 100 INJECTION, SOLUTION SUBCUTANEOUS at 08:03

## 2023-03-31 RX ADMIN — CEFAZOLIN SODIUM 2 G: 2 SOLUTION INTRAVENOUS at 04:03

## 2023-03-31 RX ADMIN — Medication 1 CAPSULE: at 11:03

## 2023-03-31 RX ADMIN — INSULIN ASPART 5 UNITS: 100 INJECTION, SOLUTION INTRAVENOUS; SUBCUTANEOUS at 10:03

## 2023-03-31 RX ADMIN — INSULIN ASPART 5 UNITS: 100 INJECTION, SOLUTION INTRAVENOUS; SUBCUTANEOUS at 06:03

## 2023-03-31 RX ADMIN — INSULIN ASPART 4 UNITS: 100 INJECTION, SOLUTION INTRAVENOUS; SUBCUTANEOUS at 08:03

## 2023-03-31 NOTE — PT/OT/SLP PROGRESS
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      Pain/Comfort  Pain Rating 1: 0/10    Objective:  Patient found with: PICC line     Functional Mobility:  Bed Mobility:        Transfers:         Functional Ambulation:     Activities of Daily Living:         Therapeutic Activities and Exercises:  Patient completed the following for increased strength to increase I with ADLs: UBE 7 min x 1x, 3# DB- shoulder press, chest press with RUE, and bicep curls with BUE x 40, yellow theraflex x 40 both ways, yellow theraband- scapular retraction and tricep extension x 40      AM-PAC 6 CLICK ADL   How much help from another person does this patient currently need?   1 = Unable, Total/Dependent Assistance  2 = A lot, Maximum/Moderate Assistance  3 = A little, Minimum/Contact Guard/Supervision  4 = None, Modified [place]/Independent    Putting on and taking off regular lower body clothing? : 3  Bathing (including washing, rinsing, drying)?: 3  Toileting, which includes using toilet, bedpan, or urinal? : 3  Putting on and taking off regular upper body clothing?: 3  Taking care of personal grooming such as brushing teeth?: 4  Eating meals?: 4  Daily Activity Total Score: 20     AM-PAC Raw Score CMS "G-Code Modifier Level of Impairment Assistance   6 % Total / Unable   7 - 8 CM 80 - 100% Maximal Assist   9-13 CL 60 - 80% Moderate Assist   14 - 19 CK 40 - 60% Moderate Assist   20 - 22 CJ 20 - 40% Minimal Assist   23 CI 1-20% SBA / CGA   24 CH 0% Independent/ Mod I "       Patient left supine with call button in reach    ASSESSMENT:  Monica Walker is a 52 y.o. female with a medical diagnosis of Staphylococcal arthritis of left knee and presents with decreased strength, decreased endurance, decreased self-care.    Rehab identified problem list/impairments:  weakness, impaired endurance, impaired self care skills, impaired functional mobility, impaired balance, decreased lower extremity function, decreased safety awareness, decreased upper extremity function    Rehab potential is fair.    Activity tolerance: Good    Discharge recommendations: home with home health   Barriers to discharge:      Equipment recommendations: none    GOALS:   Multidisciplinary Problems       Occupational Therapy Goals          Problem: Occupational Therapy    Goal Priority Disciplines Outcome Interventions   Occupational Therapy Goal     OT, PT/OT Ongoing, Progressing    Description: Description: Grooming Status:   Short Term Goal: Pt will perform grooming with s/u sitting EOB.   Long Term Goal: Pt will perform grooming/oral hygiene standing at sink with Mod I      LE dressing Status:   Short Term Goal: Pt will perform LE dressing with mod a.   Long Term Goal: Pt will perform LE dressing with min a.    Toileting Status:   Short Term Goal: Pt will perform toilet hygiene on BSC with min a.  Long Term Goal: Pt will perform toilet hygiene on toilet with no AE with s/u.    Commode Transfer:   Short Term Goal: Pt will perform BSC t/f with min a.  Long Term Goal:  Pt will perform toilet t/f in bathroom with s/u.     Bathing Status:   Long Term Goal: Pt will perform sponge bath with s/u with no unsafe fatigue.     Strength Status:   Long Term Goal: Pt to perform BUE strengthening with weights and/or body weight to increase ADL independence and safety    Endurance Status:   Short Term Goal:pt to perform 15 min OT treatment with 5 or greater rest breaks  Long Term Goal: pt to perform 30 min OT treat with 3 or  less rest breaks                       Plan:  Patient to be seen 5 x/week to address the above listed problems via therapeutic exercises  Plan of Care expires:    Plan of Care reviewed with: patient     Kirti Che, OTR/ETHAN      03/31/2023

## 2023-03-31 NOTE — PROGRESS NOTES
[facility]  Hospital Medicine  Progress Note    Patient Name: Monica Walker  MRN: [id number]  Patient Class: IP- Swing   Admission Date: 3/20/2023  Length of Stay: 11 days  Attending Physician: Hannah Schulz, *  Primary Care Provider: Hannah Schulz MD        Subjective:     Principal Problem:Staphylococcal arthritis of left knee        HPI:  3/20/23 - this 52 yr. Old WF was readmitted here for IV antibiotics. Her PICC line was pulled on the 16th. It was not replaced on the 18th as the noted stated was the plan. She is to get antibiotics through the 27th of April. We will have to arrange for her to have another PICC line installed for this. She has had her left knee replacement removed for sepsis. She has a spacer in the joint. She will undergo another knee replacement after the antibiotics. For rest of history, please see her old records.      Overview/Hospital Course:  3/21/23 - pt. Is doing well so far. We will arrange for her to get a PICC line soon.    3/24/23 - pt. Is again wanting more pain meds. Again told her that she is on the dose she was on in [place]. Will continue present treatment.    3/27/23 - pt. Is still eating a lot of [employer]. Still complaining of pain. She is not moving. Will continue present treatment.    3/31/23 - pt. Is sleeping.      Interval History: pt. Is sleeping    Review of Systems  Objective:     Vital Signs (Most Recent):  Temp: 98.1 °F (36.7 °C) (03/31/23 0744)  Pulse: 96 (03/31/23 0744)  Resp: 18 (03/31/23 0744)  BP: (!) 100/51 (03/31/23 0744)  SpO2: 98 % (03/31/23 0744)   Vital Signs (24h Range):  Temp:  [98.1 °F (36.7 °C)-98.2 °F (36.8 °C)] 98.1 °F (36.7 °C)  Pulse:  [72-96] 96  Resp:  [18-19] 18  SpO2:  [96 %-100 %] 98 %  BP: (100-152)/(51-74) 100/51     Weight: 63.9 kg (140 lb 12.8 oz)  Body mass index is 22.73 kg/m².    Intake/Output Summary (Last 24 hours) at 3/31/2023 0812  Last data filed at 3/30/2023 6592  Gross  per 24 hour   Intake 720 ml   Output --   Net 720 ml      Physical Exam    Significant Labs: All pertinent labs within the past 24 hours have been reviewed.    Significant Imaging: I have reviewed all pertinent imaging results/findings within the past 24 hours.      Assessment/Plan:      * Staphylococcal arthritis of left knee          VTE Risk Mitigation (From admission, onward)         Ordered     apixaban tablet 2.5 mg  2 times daily         03/28/23 0820     heparin, porcine (PF) injection 50 Units  Daily         03/27/23 1427     IP VTE HIGH RISK PATIENT  Once         03/20/23 1801     Place DONOVAN hose  Until discontinued         03/20/23 1801                Discharge Planning   SUZANNA:      Code Status: Full Code   Is the patient medically ready for discharge?:     Reason for patient still in hospital (select all that apply): Patient trending condition  Discharge Plan A: Home with family, Home Health                  Hannah Schulz MD  Department of Hospital Medicine   [facility]

## 2023-03-31 NOTE — PT/OT/SLP PROGRESS
"Physical Therapy  Treatment    Monica Walker   MRN: [id number]   Admitting Diagnosis: Staphylococcal arthritis of left knee    PT Received On: 03/31/23  PT Start Time: 1440     PT Stop Time: 1503    PT Total Time (min): 23 min       Billable Minutes: Ther Ex 23    Treatment Type: Treatment  PT/PTA: PTA     Number of PTA visits since last PT visit: 1       General Precautions: Standard, fall  Orthopedic Precautions: LLE toe touch weight bearing  Braces: Knee immobilizer  Respiratory Status: Room air    Spiritual, Cultural Beliefs, Zoroastrian Practices, Values that Affect Care: no    Subjective:     Patient reports no pain and is agreeable to PT treatment.     Pain/Comfort  Pain Rating 1: 0/10    Objective:   Patient found with: PICC line    Functional Mobility:  Bed Mobility: sup       Transfers: sit <> stand, SBA for preparation for gait        Gait: 40'  with RW, CGA, TTWB LLE        Stairs: not performed       Treatment and Education:  Physical Therapy  Treatment    Therapeutic Activities and Exercises:    Ther- Ex    Nustep    Ankle pumps 30 x    Hip adduction 30 x 3"    Heelslides    LAQ's 3 x 10 2# R LE    Hamstring Curls 3 x 10 yellow TB R LE    Quad sets  30 x 3"    SAQ's 30 x 3" R LE   Straight Leg Raises 3 x 10 R LE    Hip abduction    Horizontal ADD/ABD 3 x 10 B LE    Glut sets  30x 5"    Seated Marching  3 x 10 2# on R LE    Ther-Act    Heel Raises    Mini Squats    3 way hip    Sit <> stands     Standing Marches                    AM-PAC 6 CLICK MOBILITY  How much help from another person does this patient currently need?   1 = Unable, Total/Dependent Assistance  2 = A lot, Maximum/Moderate Assistance  3 = A little, Minimum/Contact Guard/Supervision  4 = None, Modified [place]/Independent    Turning over in bed (including adjusting bedclothes, sheets and blankets)?: 4  Sitting down on and standing up from a chair with arms (e.g., wheelchair, bedside commode, etc.): 3  Moving from lying on back to " sitting on the side of the bed?: 4  Moving to and from a bed to a chair (including a wheelchair)?: 3  Need to walk in hospital room?: 3  Climbing 3-5 steps with a railing?: 2  Basic Mobility Total Score: 19    AM-PAC Raw Score CMS G-Code Modifier Level of Impairment Assistance   6 % Total / Unable   7 - 9 CM 80 - 100% Maximal Assist   10 - 14 CL 60 - 80% Moderate Assist   15 - 19 CK 40 - 60% Moderate Assist   20 - 22 CJ 20 - 40% Minimal Assist   23 CI 1-20% SBA / CGA   24 CH 0% Independent/ Mod I     Patient left up in chair with call bell handy.    Assessment:  Monica Walker is a 52 y.o. female with a medical diagnosis of Staphylococcal arthritis of left knee and presents with Rehab identified problem list/impairments: weakness, impaired endurance, impaired self care skills, impaired functional mobility, impaired balance, decreased lower extremity function, decreased safety awareness, decreased upper extremity function.  Pt continues to only be willing to work with right lower extremity. LPTA prompted pt with mod verbal cues to progress through tx session. No adverse effects noted or reported to tx.     Rehab potential is good.    Activity tolerance: Good and Fair    Discharge recommendations: home with home health      Barriers to discharge:      Equipment recommendations: none     GOALS:   Multidisciplinary Problems       Physical Therapy Goals          Problem: Physical Therapy    Goal Priority Disciplines Outcome Goal Variances Interventions   Physical Therapy Goal     PT, PT/OT      Description: Short Term Goals  1. Patient will complete 30 reps of B LE exercises with correct form.   2. Patient will complete sit<>stand transfers with SBA while maintaining toe touch weight bearing on LLE.   3. Patient will ambulate 25 feet with RW on level surfaces with CGA while maintaining toe touch weight bearing on LLE.    Long Term Goals   1. Patient will ambulate 50 feet with RW on level and unlevel surfaces  with SBA while maintaining toe touch weight bearing on LLE.  2. Patient will complete all functional transfers with MOD I while maintaining toe touch weight bearing on LLE.                        PLAN:    Patient to be seen 5 x/week to address the above listed problems via gait training, therapeutic exercises  Plan of Care expires: 04/11/23  Plan of Care reviewed with: patient    Continue Plan of Care Per PT order to progress patient toward rehab goals.   ZEINAB Scott  3/31/2023

## 2023-03-31 NOTE — PLAN OF CARE
Problem: Fall Injury Risk  Goal: Absence of Fall and Fall-Related Injury  Outcome: Ongoing, Progressing     Problem: Skin Injury Risk Increased  Goal: Skin Health and Integrity  Outcome: Ongoing, Progressing     Problem: Infection  Goal: Absence of Infection Signs and Symptoms  Outcome: Ongoing, Progressing

## 2023-03-31 NOTE — PLAN OF CARE
Problem: Adult Inpatient Plan of Care  Goal: Plan of Care Review  Outcome: Ongoing, Progressing  Goal: Patient-Specific Goal (Individualized)  Outcome: Ongoing, Progressing  Goal: Optimal Comfort and Wellbeing  Outcome: Ongoing, Progressing     Problem: Fluid and Electrolyte Imbalance (Acute Kidney Injury/Impairment)  Goal: Fluid and Electrolyte Balance  Outcome: Ongoing, Progressing     Problem: Infection  Goal: Absence of Infection Signs and Symptoms  Outcome: Ongoing, Progressing

## 2023-03-31 NOTE — SUBJECTIVE & OBJECTIVE
Interval History: pt. Is sleeping    Review of Systems  Objective:     Vital Signs (Most Recent):  Temp: 98.1 °F (36.7 °C) (03/31/23 0744)  Pulse: 96 (03/31/23 0744)  Resp: 18 (03/31/23 0744)  BP: (!) 100/51 (03/31/23 0744)  SpO2: 98 % (03/31/23 0744)   Vital Signs (24h Range):  Temp:  [98.1 °F (36.7 °C)-98.2 °F (36.8 °C)] 98.1 °F (36.7 °C)  Pulse:  [72-96] 96  Resp:  [18-19] 18  SpO2:  [96 %-100 %] 98 %  BP: (100-152)/(51-74) 100/51     Weight: 63.9 kg (140 lb 12.8 oz)  Body mass index is 22.73 kg/m².    Intake/Output Summary (Last 24 hours) at 3/31/2023 0812  Last data filed at 3/30/2023 1822  Gross per 24 hour   Intake 720 ml   Output --   Net 720 ml      Physical Exam    Significant Labs: All pertinent labs within the past 24 hours have been reviewed.    Significant Imaging: I have reviewed all pertinent imaging results/findings within the past 24 hours.

## 2023-04-01 LAB
GLUCOSE SERPL-MCNC: 134 MG/DL (ref 70–105)
GLUCOSE SERPL-MCNC: 149 MG/DL (ref 70–105)
GLUCOSE SERPL-MCNC: 318 MG/DL (ref 70–105)
GLUCOSE SERPL-MCNC: 374 MG/DL (ref 70–105)

## 2023-04-01 PROCEDURE — 63600175 PHARM REV CODE 636 W HCPCS: Performed by: FAMILY MEDICINE

## 2023-04-01 PROCEDURE — 82962 GLUCOSE BLOOD TEST: CPT

## 2023-04-01 PROCEDURE — 25000003 PHARM REV CODE 250: Performed by: FAMILY MEDICINE

## 2023-04-01 PROCEDURE — 25000003 PHARM REV CODE 250: Performed by: SPECIALIST

## 2023-04-01 PROCEDURE — 94761 N-INVAS EAR/PLS OXIMETRY MLT: CPT

## 2023-04-01 PROCEDURE — 11000004 HC SNF PRIVATE

## 2023-04-01 RX ORDER — TALC
3 POWDER (GRAM) TOPICAL ONCE
Status: COMPLETED | OUTPATIENT
Start: 2023-04-01 | End: 2023-04-01

## 2023-04-01 RX ORDER — TALC
9 POWDER (GRAM) TOPICAL NIGHTLY PRN
Status: DISCONTINUED | OUTPATIENT
Start: 2023-04-01 | End: 2023-04-27 | Stop reason: HOSPADM

## 2023-04-01 RX ADMIN — HYDROCODONE BITARTRATE AND ACETAMINOPHEN 1 TABLET: 5; 325 TABLET ORAL at 08:04

## 2023-04-01 RX ADMIN — Medication 3 MG: at 09:04

## 2023-04-01 RX ADMIN — HYDROCODONE BITARTRATE AND ACETAMINOPHEN 1 TABLET: 5; 325 TABLET ORAL at 05:04

## 2023-04-01 RX ADMIN — LOSARTAN POTASSIUM 25 MG: 25 TABLET, FILM COATED ORAL at 08:04

## 2023-04-01 RX ADMIN — HYDRALAZINE HYDROCHLORIDE 25 MG: 25 TABLET, FILM COATED ORAL at 08:04

## 2023-04-01 RX ADMIN — HYDROCODONE BITARTRATE AND ACETAMINOPHEN 1 TABLET: 5; 325 TABLET ORAL at 11:04

## 2023-04-01 RX ADMIN — VANCOMYCIN HYDROCHLORIDE 1500 MG: 1.5 INJECTION, POWDER, LYOPHILIZED, FOR SOLUTION INTRAVENOUS at 10:04

## 2023-04-01 RX ADMIN — Medication 6 MG: at 08:04

## 2023-04-01 RX ADMIN — APIXABAN 2.5 MG: 2.5 TABLET, FILM COATED ORAL at 08:04

## 2023-04-01 RX ADMIN — HEPARIN, PORCINE (PF) 10 UNIT/ML INTRAVENOUS SYRINGE 50 UNITS: at 08:04

## 2023-04-01 RX ADMIN — INSULIN ASPART 5 UNITS: 100 INJECTION, SOLUTION INTRAVENOUS; SUBCUTANEOUS at 05:04

## 2023-04-01 RX ADMIN — GABAPENTIN 300 MG: 300 CAPSULE ORAL at 08:04

## 2023-04-01 RX ADMIN — Medication 1 CAPSULE: at 04:04

## 2023-04-01 RX ADMIN — INSULIN ASPART 5 UNITS: 100 INJECTION, SOLUTION INTRAVENOUS; SUBCUTANEOUS at 04:04

## 2023-04-01 RX ADMIN — SENNOSIDES AND DOCUSATE SODIUM 1 TABLET: 8.6; 5 TABLET ORAL at 08:04

## 2023-04-01 RX ADMIN — HYDROCODONE BITARTRATE AND ACETAMINOPHEN 1 TABLET: 5; 325 TABLET ORAL at 04:04

## 2023-04-01 RX ADMIN — INSULIN ASPART 5 UNITS: 100 INJECTION, SOLUTION INTRAVENOUS; SUBCUTANEOUS at 10:04

## 2023-04-01 RX ADMIN — INSULIN ASPART 10 UNITS: 100 INJECTION, SOLUTION INTRAVENOUS; SUBCUTANEOUS at 04:04

## 2023-04-01 RX ADMIN — DULOXETINE 60 MG: 30 CAPSULE, DELAYED RELEASE ORAL at 08:04

## 2023-04-01 RX ADMIN — Medication 1 CAPSULE: at 06:04

## 2023-04-01 RX ADMIN — INSULIN DETEMIR 30 UNITS: 100 INJECTION, SOLUTION SUBCUTANEOUS at 08:04

## 2023-04-01 RX ADMIN — Medication 1 CAPSULE: at 10:04

## 2023-04-01 RX ADMIN — VANCOMYCIN HYDROCHLORIDE 1500 MG: 1.5 INJECTION, POWDER, LYOPHILIZED, FOR SOLUTION INTRAVENOUS at 05:04

## 2023-04-01 NOTE — PLAN OF CARE
Problem: Adult Inpatient Plan of Care  Goal: Plan of Care Review  Outcome: Ongoing, Progressing  Goal: Patient-Specific Goal (Individualized)  Outcome: Ongoing, Progressing  Goal: Optimal Comfort and Wellbeing  Outcome: Ongoing, Progressing     Problem: Diabetes Comorbidity  Goal: Blood Glucose Level Within Targeted Range  Outcome: Ongoing, Progressing     Problem: Impaired Wound Healing  Goal: Optimal Wound Healing  Outcome: Ongoing, Progressing     Problem: Fall Injury Risk  Goal: Absence of Fall and Fall-Related Injury  Outcome: Ongoing, Progressing

## 2023-04-02 LAB
GLUCOSE SERPL-MCNC: 167 MG/DL (ref 70–105)
GLUCOSE SERPL-MCNC: 173 MG/DL (ref 70–105)
GLUCOSE SERPL-MCNC: 218 MG/DL (ref 70–105)
GLUCOSE SERPL-MCNC: 271 MG/DL (ref 70–105)
VANCOMYCIN TROUGH SERPL-MCNC: 14 ΜG/ML (ref 10–20)

## 2023-04-02 PROCEDURE — 25000003 PHARM REV CODE 250: Performed by: FAMILY MEDICINE

## 2023-04-02 PROCEDURE — 80202 ASSAY OF VANCOMYCIN: CPT | Performed by: FAMILY MEDICINE

## 2023-04-02 PROCEDURE — 11000004 HC SNF PRIVATE

## 2023-04-02 PROCEDURE — 25000003 PHARM REV CODE 250: Performed by: SPECIALIST

## 2023-04-02 PROCEDURE — 63600175 PHARM REV CODE 636 W HCPCS: Performed by: FAMILY MEDICINE

## 2023-04-02 PROCEDURE — 94761 N-INVAS EAR/PLS OXIMETRY MLT: CPT

## 2023-04-02 PROCEDURE — 63600175 PHARM REV CODE 636 W HCPCS: Mod: TB,JG | Performed by: FAMILY MEDICINE

## 2023-04-02 PROCEDURE — 82962 GLUCOSE BLOOD TEST: CPT

## 2023-04-02 RX ADMIN — APIXABAN 2.5 MG: 2.5 TABLET, FILM COATED ORAL at 09:04

## 2023-04-02 RX ADMIN — INSULIN ASPART 6 UNITS: 100 INJECTION, SOLUTION INTRAVENOUS; SUBCUTANEOUS at 04:04

## 2023-04-02 RX ADMIN — Medication 1 CAPSULE: at 10:04

## 2023-04-02 RX ADMIN — INSULIN ASPART 5 UNITS: 100 INJECTION, SOLUTION INTRAVENOUS; SUBCUTANEOUS at 10:04

## 2023-04-02 RX ADMIN — SENNOSIDES AND DOCUSATE SODIUM 1 TABLET: 8.6; 5 TABLET ORAL at 09:04

## 2023-04-02 RX ADMIN — Medication 1 CAPSULE: at 04:04

## 2023-04-02 RX ADMIN — LOSARTAN POTASSIUM 25 MG: 25 TABLET, FILM COATED ORAL at 09:04

## 2023-04-02 RX ADMIN — Medication 9 MG: at 09:04

## 2023-04-02 RX ADMIN — HEPARIN, PORCINE (PF) 10 UNIT/ML INTRAVENOUS SYRINGE 50 UNITS: at 09:04

## 2023-04-02 RX ADMIN — HYDROCODONE BITARTRATE AND ACETAMINOPHEN 1 TABLET: 5; 325 TABLET ORAL at 09:04

## 2023-04-02 RX ADMIN — HYDROCODONE BITARTRATE AND ACETAMINOPHEN 1 TABLET: 5; 325 TABLET ORAL at 05:04

## 2023-04-02 RX ADMIN — GABAPENTIN 300 MG: 300 CAPSULE ORAL at 09:04

## 2023-04-02 RX ADMIN — INSULIN ASPART 2 UNITS: 100 INJECTION, SOLUTION INTRAVENOUS; SUBCUTANEOUS at 09:04

## 2023-04-02 RX ADMIN — INSULIN ASPART 5 UNITS: 100 INJECTION, SOLUTION INTRAVENOUS; SUBCUTANEOUS at 06:04

## 2023-04-02 RX ADMIN — VANCOMYCIN HYDROCHLORIDE 1500 MG: 1.5 INJECTION, POWDER, LYOPHILIZED, FOR SOLUTION INTRAVENOUS at 05:04

## 2023-04-02 RX ADMIN — HYDRALAZINE HYDROCHLORIDE 25 MG: 25 TABLET, FILM COATED ORAL at 09:04

## 2023-04-02 RX ADMIN — Medication 1 CAPSULE: at 06:04

## 2023-04-02 RX ADMIN — HYDROCODONE BITARTRATE AND ACETAMINOPHEN 1 TABLET: 5; 325 TABLET ORAL at 06:04

## 2023-04-02 RX ADMIN — HYDROCODONE BITARTRATE AND ACETAMINOPHEN 1 TABLET: 5; 325 TABLET ORAL at 10:04

## 2023-04-02 RX ADMIN — INSULIN ASPART 5 UNITS: 100 INJECTION, SOLUTION INTRAVENOUS; SUBCUTANEOUS at 04:04

## 2023-04-02 RX ADMIN — INSULIN DETEMIR 30 UNITS: 100 INJECTION, SOLUTION SUBCUTANEOUS at 09:04

## 2023-04-02 RX ADMIN — DULOXETINE 60 MG: 30 CAPSULE, DELAYED RELEASE ORAL at 09:04

## 2023-04-03 LAB
ANION GAP SERPL CALCULATED.3IONS-SCNC: 11 MMOL/L (ref 7–16)
BASOPHILS # BLD AUTO: 0.05 K/UL (ref 0–0.2)
BASOPHILS NFR BLD AUTO: 0.6 % (ref 0–1)
BUN SERPL-MCNC: 9 MG/DL (ref 7–18)
BUN/CREAT SERPL: 12 (ref 6–20)
CALCIUM SERPL-MCNC: 9 MG/DL (ref 8.5–10.1)
CHLORIDE SERPL-SCNC: 104 MMOL/L (ref 98–107)
CO2 SERPL-SCNC: 28 MMOL/L (ref 21–32)
CREAT SERPL-MCNC: 0.75 MG/DL (ref 0.55–1.02)
DIFFERENTIAL METHOD BLD: ABNORMAL
EGFR (NO RACE VARIABLE) (RUSH/TITUS): 95 ML/MIN/1.73M²
EOSINOPHIL # BLD AUTO: 0.79 K/UL (ref 0–0.5)
EOSINOPHIL NFR BLD AUTO: 9.5 % (ref 1–4)
ERYTHROCYTE [DISTWIDTH] IN BLOOD BY AUTOMATED COUNT: 20.1 % (ref 11.5–14.5)
GLUCOSE SERPL-MCNC: 164 MG/DL (ref 70–105)
GLUCOSE SERPL-MCNC: 190 MG/DL (ref 74–106)
GLUCOSE SERPL-MCNC: 191 MG/DL (ref 70–105)
GLUCOSE SERPL-MCNC: 200 MG/DL (ref 70–105)
GLUCOSE SERPL-MCNC: 218 MG/DL (ref 70–105)
HCT VFR BLD AUTO: 32.9 % (ref 38–47)
HGB BLD-MCNC: 10.2 G/DL (ref 12–16)
IMM GRANULOCYTES # BLD AUTO: 0.01 K/UL (ref 0–0.04)
IMM GRANULOCYTES NFR BLD: 0.1 % (ref 0–0.4)
LYMPHOCYTES # BLD AUTO: 2.45 K/UL (ref 1–4.8)
LYMPHOCYTES NFR BLD AUTO: 29.6 % (ref 27–41)
MCH RBC QN AUTO: 25.4 PG (ref 27–31)
MCHC RBC AUTO-ENTMCNC: 31 G/DL (ref 32–36)
MCV RBC AUTO: 82 FL (ref 80–96)
MONOCYTES # BLD AUTO: 0.77 K/UL (ref 0–0.8)
MONOCYTES NFR BLD AUTO: 9.3 % (ref 2–6)
MPC BLD CALC-MCNC: 9.9 FL (ref 9.4–12.4)
NEUTROPHILS # BLD AUTO: 4.21 K/UL (ref 1.8–7.7)
NEUTROPHILS NFR BLD AUTO: 50.9 % (ref 53–65)
PLATELET # BLD AUTO: 245 K/UL (ref 150–400)
POTASSIUM SERPL-SCNC: 3.7 MMOL/L (ref 3.5–5.1)
RBC # BLD AUTO: 4.01 M/UL (ref 4.2–5.4)
SODIUM SERPL-SCNC: 139 MMOL/L (ref 136–145)
WBC # BLD AUTO: 8.28 K/UL (ref 4.5–11)

## 2023-04-03 PROCEDURE — 11000004 HC SNF PRIVATE

## 2023-04-03 PROCEDURE — 97110 THERAPEUTIC EXERCISES: CPT

## 2023-04-03 PROCEDURE — 99308 PR NURSING FAC CARE, SUBSEQ, MINOR COMPLIC: ICD-10-PCS | Mod: ,,, | Performed by: FAMILY MEDICINE

## 2023-04-03 PROCEDURE — 94761 N-INVAS EAR/PLS OXIMETRY MLT: CPT

## 2023-04-03 PROCEDURE — 25000003 PHARM REV CODE 250: Performed by: FAMILY MEDICINE

## 2023-04-03 PROCEDURE — 85025 COMPLETE CBC W/AUTO DIFF WBC: CPT | Performed by: FAMILY MEDICINE

## 2023-04-03 PROCEDURE — 80048 BASIC METABOLIC PNL TOTAL CA: CPT | Performed by: FAMILY MEDICINE

## 2023-04-03 PROCEDURE — 99308 SBSQ NF CARE LOW MDM 20: CPT | Mod: ,,, | Performed by: FAMILY MEDICINE

## 2023-04-03 PROCEDURE — 63600175 PHARM REV CODE 636 W HCPCS: Mod: TB,JG | Performed by: FAMILY MEDICINE

## 2023-04-03 PROCEDURE — 97116 GAIT TRAINING THERAPY: CPT

## 2023-04-03 PROCEDURE — 25000003 PHARM REV CODE 250: Performed by: SPECIALIST

## 2023-04-03 PROCEDURE — 63600175 PHARM REV CODE 636 W HCPCS: Performed by: FAMILY MEDICINE

## 2023-04-03 PROCEDURE — 82962 GLUCOSE BLOOD TEST: CPT

## 2023-04-03 RX ADMIN — INSULIN ASPART 5 UNITS: 100 INJECTION, SOLUTION INTRAVENOUS; SUBCUTANEOUS at 05:04

## 2023-04-03 RX ADMIN — HYDROCODONE BITARTRATE AND ACETAMINOPHEN 1 TABLET: 5; 325 TABLET ORAL at 05:04

## 2023-04-03 RX ADMIN — INSULIN ASPART 2 UNITS: 100 INJECTION, SOLUTION INTRAVENOUS; SUBCUTANEOUS at 08:04

## 2023-04-03 RX ADMIN — HYDRALAZINE HYDROCHLORIDE 25 MG: 25 TABLET, FILM COATED ORAL at 08:04

## 2023-04-03 RX ADMIN — INSULIN ASPART 5 UNITS: 100 INJECTION, SOLUTION INTRAVENOUS; SUBCUTANEOUS at 11:04

## 2023-04-03 RX ADMIN — INSULIN DETEMIR 30 UNITS: 100 INJECTION, SOLUTION SUBCUTANEOUS at 08:04

## 2023-04-03 RX ADMIN — HYDROCODONE BITARTRATE AND ACETAMINOPHEN 1 TABLET: 5; 325 TABLET ORAL at 09:04

## 2023-04-03 RX ADMIN — GABAPENTIN 300 MG: 300 CAPSULE ORAL at 08:04

## 2023-04-03 RX ADMIN — SENNOSIDES AND DOCUSATE SODIUM 1 TABLET: 8.6; 5 TABLET ORAL at 08:04

## 2023-04-03 RX ADMIN — Medication 9 MG: at 08:04

## 2023-04-03 RX ADMIN — APIXABAN 2.5 MG: 2.5 TABLET, FILM COATED ORAL at 08:04

## 2023-04-03 RX ADMIN — LOSARTAN POTASSIUM 25 MG: 25 TABLET, FILM COATED ORAL at 08:04

## 2023-04-03 RX ADMIN — VANCOMYCIN HYDROCHLORIDE 1500 MG: 1.5 INJECTION, POWDER, LYOPHILIZED, FOR SOLUTION INTRAVENOUS at 11:04

## 2023-04-03 RX ADMIN — DULOXETINE 60 MG: 30 CAPSULE, DELAYED RELEASE ORAL at 08:04

## 2023-04-03 RX ADMIN — HEPARIN, PORCINE (PF) 10 UNIT/ML INTRAVENOUS SYRINGE 50 UNITS: at 08:04

## 2023-04-03 RX ADMIN — Medication 1 CAPSULE: at 11:04

## 2023-04-03 RX ADMIN — INSULIN ASPART 2 UNITS: 100 INJECTION, SOLUTION INTRAVENOUS; SUBCUTANEOUS at 05:04

## 2023-04-03 RX ADMIN — Medication 1 CAPSULE: at 05:04

## 2023-04-03 RX ADMIN — Medication 1 CAPSULE: at 06:04

## 2023-04-03 NOTE — SUBJECTIVE & OBJECTIVE
Interval History: pt. Sleeping.    Review of Systems  Objective:     Vital Signs (Most Recent):  Temp: 98.6 °F (37 °C) (04/03/23 0745)  Pulse: 87 (04/03/23 0745)  Resp: 19 (04/03/23 0745)  BP: 133/72 (04/03/23 0745)  SpO2: 98 % (04/03/23 0745) Vital Signs (24h Range):  Temp:  [98.6 °F (37 °C)] 98.6 °F (37 °C)  Pulse:  [77-90] 87  Resp:  [18-19] 19  SpO2:  [98 %-99 %] 98 %  BP: (133-149)/(72-82) 133/72     Weight: 63.9 kg (140 lb 12.8 oz)  Body mass index is 22.73 kg/m².    Intake/Output Summary (Last 24 hours) at 4/3/2023 0822  Last data filed at 4/2/2023 1218  Gross per 24 hour   Intake 240 ml   Output --   Net 240 ml      Physical Exam    Significant Labs: All pertinent labs within the past 24 hours have been reviewed.    Significant Imaging: I have reviewed all pertinent imaging results/findings within the past 24 hours.

## 2023-04-03 NOTE — PLAN OF CARE
Problem: Adult Inpatient Plan of Care  Goal: Plan of Care Review  Outcome: Ongoing, Progressing  Goal: Patient-Specific Goal (Individualized)  Outcome: Ongoing, Progressing     Problem: Fall Injury Risk  Goal: Absence of Fall and Fall-Related Injury  Outcome: Ongoing, Progressing  Intervention: Identify and Manage Contributors  Flowsheets (Taken 4/3/2023 1745)  Self-Care Promotion:   independence encouraged   BADL personal objects within reach   BADL personal routines maintained  Medication Review/Management:   medications reviewed   high-risk medications identified  Intervention: Promote Injury-Free Environment  Flowsheets (Taken 4/3/2023 1745)  Safety Promotion/Fall Prevention:   assistive device/personal item within reach   diversional activities provided   high risk medications identified   medications reviewed   muscle strengthening facilitated   nonskid shoes/socks when out of bed   side rails raised x 3   instructed to call staff for mobility

## 2023-04-03 NOTE — PT/OT/SLP PROGRESS
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    Patient rushed through treatment and didn't really want to stay for it secondary to her mother being on her way up here.       Pain/Comfort  Pain Rating 1: 0/10    Objective:  Patient found with: PICC line     Functional Mobility:  Bed Mobility:        Transfers:         Functional Ambulation:     Activities of Daily Living:         Therapeutic Activities and Exercises:  Patient completed the following for increased strength to increase I with ADLs: UBE 7 min x 1x, 3# DB- shoulder press, chest press with RUE, and bicep curls with BUE x 40, yellow theraflex x 40 both ways, yellow theraband- scapular retraction and tricep extension x 40      AM-PAC 6 CLICK ADL   How much help from another person does this patient currently need?   1 = Unable, Total/Dependent Assistance  2 = A lot, Maximum/Moderate Assistance  3 = A little, Minimum/Contact Guard/Supervision  4 = None, Modified [place]/Independent    Putting on and taking off regular lower body clothing? : 3  Bathing (including washing, rinsing, drying)?: 3  Toileting, which includes using toilet, bedpan, or urinal? : 3  Putting on and taking off regular upper body clothing?: 3  Taking care of personal grooming such as brushing teeth?: 4  Eating meals?: 4  Daily Activity Total Score: 20     AM-PAC Raw Score CMS "G-Code Modifier Level of Impairment Assistance   6 % Total / Unable   7 - 8 CM 80 - 100% Maximal Assist   9-13 CL 60 - 80% Moderate Assist   14 - 19 CK " 40 - 60% Moderate Assist   20 - 22 CJ 20 - 40% Minimal Assist   23 CI 1-20% SBA / CGA   24 CH 0% Independent/ Mod I       Patient left supine with call button in reach    ASSESSMENT:  Monica Walker is a 52 y.o. female with a medical diagnosis of Staphylococcal arthritis of left knee and presents with decreased strength, decreased endurance, decreased self-care.    Rehab identified problem list/impairments:  weakness, impaired endurance, impaired self care skills, impaired functional mobility, impaired balance, decreased lower extremity function, decreased safety awareness, decreased upper extremity function    Rehab potential is fair.    Activity tolerance: Good    Discharge recommendations: home with home health   Barriers to discharge:      Equipment recommendations: none    GOALS:   Multidisciplinary Problems       Occupational Therapy Goals          Problem: Occupational Therapy    Goal Priority Disciplines Outcome Interventions   Occupational Therapy Goal     OT, PT/OT Ongoing, Progressing    Description: Description: Grooming Status:   Short Term Goal: Pt will perform grooming with s/u sitting EOB.   Long Term Goal: Pt will perform grooming/oral hygiene standing at sink with Mod I      LE dressing Status:   Short Term Goal: Pt will perform LE dressing with mod a.   Long Term Goal: Pt will perform LE dressing with min a.    Toileting Status:   Short Term Goal: Pt will perform toilet hygiene on BSC with min a.  Long Term Goal: Pt will perform toilet hygiene on toilet with no AE with s/u.    Commode Transfer:   Short Term Goal: Pt will perform BSC t/f with min a.  Long Term Goal:  Pt will perform toilet t/f in bathroom with s/u.     Bathing Status:   Long Term Goal: Pt will perform sponge bath with s/u with no unsafe fatigue.     Strength Status:   Long Term Goal: Pt to perform BUE strengthening with weights and/or body weight to increase ADL independence and safety    Endurance Status:   Short Term Goal:pt to  perform 15 min OT treatment with 5 or greater rest breaks  Long Term Goal: pt to perform 30 min OT treat with 3 or less rest breaks                       Plan:  Patient to be seen 5 x/week to address the above listed problems via therapeutic exercises  Plan of Care expires:    Plan of Care reviewed with: patient     Kirti Bolton, OTR/L      04/03/2023

## 2023-04-03 NOTE — PT/OT/SLP PROGRESS
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     Pain/Comfort  Pain Rating 1: 8/10  Location - Side 1: Left  Location 1: knee  Pain Addressed 1: Pre-medicate for activity    Objective:   Patient found with: PICC line    Functional Mobility:  Bed Mobility: sup       Transfers: sit <> stand, SBA for preparation for gait        Gait: 40' x 2  with RW, CGA, TTWB LLE        Stairs: not performed       Therapeutic Activities and Exercises:    Ther- Ex    Nustep    Ankle pumps 30 x    Hip adduction 30 x 3"    Heelslides 2 x 10 *   LAQ's 3 x 10 2# R LE    Hamstring Curls 3 x 10 yellow TB R LE    Quad sets  30 x 3"    SAQ's 30 x 3" R LE   Straight Leg Raises 3 x 10 R LE    Hip abduction    Horizontal ADD/ABD 3 x 10 B LE    Glut sets  30x 5"    Seated Marching  3 x 10 2# on R LE    Ther-Act    Heel Raises    Mini Squats    3 way hip    Sit <> stands     Standing Marches                    AM-PAC 6 CLICK MOBILITY  How much help from another person does this patient currently need?   1 = Unable, Total/Dependent Assistance  2 = A lot, Maximum/Moderate Assistance  3 = A little, Minimum/Contact Guard/Supervision  4 = None, Modified [place]/Independent    Turning over in bed (including adjusting bedclothes, sheets and blankets)?: 4  Sitting down on and standing up from a chair " "with arms (e.g., wheelchair, bedside commode, etc.): 3  Moving from lying on back to sitting on the side of the bed?: 4  Moving to and from a bed to a chair (including a wheelchair)?: 3  Need to walk in hospital room?: 3  Climbing 3-5 steps with a railing?: 2  Basic Mobility Total Score: 19    AM-PAC Raw Score CMS G-Code Modifier Level of Impairment Assistance   6 % Total / Unable   7 - 9 CM 80 - 100% Maximal Assist   10 - 14 CL 60 - 80% Moderate Assist   15 - 19 CK 40 - 60% Moderate Assist   20 - 22 CJ 20 - 40% Minimal Assist   23 CI 1-20% SBA / CGA   24 CH 0% Independent/ Mod I     Patient left up in chair with OT present in rehab gym     Assessment:  Monica Walker is a 52 y.o. female with a medical diagnosis of Staphylococcal arthritis of left knee and presents with Rehab identified problem list/impairments: weakness, impaired endurance, impaired self care skills, impaired functional mobility, gait instability, impaired balance, decreased lower extremity function, decreased safety awareness, pain.  Patient continues to only be willing to work with right lower extremity. Patient does not report pain or muscle fatigue with exercises. Patient requires cuing to continue through exercises due to stopping after completing a few exercises but not total set. Patient completed gait trial prior to exercises with one seated rest break and one standing rest break with max encouragement to continue. Patient with ability to continue but self limiting due to reporting "I am going to be here 21 more days. I don't need to walk anymore right now." PT cued to decrease speed of movement with ambulation but patient did not acknowledge cuing. No adverse effects noted or reported to treatment    Rehab potential is good.    Activity tolerance: Good and Fair    Discharge recommendations: home with home health      Barriers to discharge:      Equipment recommendations: none     GOALS:   Multidisciplinary Problems       Physical " Therapy Goals          Problem: Physical Therapy    Goal Priority Disciplines Outcome Goal Variances Interventions   Physical Therapy Goal     PT, PT/OT Ongoing, Progressing     Description: Short Term Goals  1. Patient will complete 30 reps of B LE exercises with correct form.   2. Patient will complete sit<>stand transfers with SBA while maintaining toe touch weight bearing on LLE.   3. Patient will ambulate 25 feet with RW on level surfaces with CGA while maintaining toe touch weight bearing on LLE.    Long Term Goals   1. Patient will ambulate 50 feet with RW on level and unlevel surfaces with SBA while maintaining toe touch weight bearing on LLE.  2. Patient will complete all functional transfers with MOD I while maintaining toe touch weight bearing on LLE.                        PLAN:    Patient to be seen 5 x/week to address the above listed problems via gait training, therapeutic exercises, therapeutic activities  Plan of Care expires: 04/11/23  Plan of Care reviewed with: patient    Continue Plan of Care Per PT order to progress patient toward rehab goals.     Kalina Ac, PT, DPT   4/3/2023

## 2023-04-03 NOTE — PLAN OF CARE
Problem: Occupational Therapy  Goal: Occupational Therapy Goal  Description: Description: Grooming Status:   Short Term Goal: Pt will perform grooming with s/u sitting EOB.   Long Term Goal: Pt will perform grooming/oral hygiene standing at sink with Mod I      LE dressing Status:   Short Term Goal: Pt will perform LE dressing with mod a.   Long Term Goal: Pt will perform LE dressing with min a.    Toileting Status:   Short Term Goal: Pt will perform toilet hygiene on BSC with min a.  Long Term Goal: Pt will perform toilet hygiene on toilet with no AE with s/u.    Commode Transfer:   Short Term Goal: Pt will perform BSC t/f with min a.  Long Term Goal:  Pt will perform toilet t/f in bathroom with s/u.     Bathing Status:   Long Term Goal: Pt will perform sponge bath with s/u with no unsafe fatigue.     Strength Status:   Long Term Goal: Pt to perform BUE strengthening with weights and/or body weight to increase ADL independence and safety    Endurance Status:   Short Term Goal:pt to perform 15 min OT treatment with 5 or greater rest breaks  Long Term Goal: pt to perform 30 min OT treat with 3 or less rest breaks  Outcome: Ongoing, Progressing

## 2023-04-03 NOTE — PROGRESS NOTES
[facility] Medicine  Progress Note    Patient Name: Monica Walker  MRN: [id number]  Patient Class: IP- Swing   Admission Date: 3/20/2023  Length of Stay: 14 days  Attending Physician: Hannah Schulz, *  Primary Care Provider: Hannah Schulz MD        Subjective:     Principal Problem:Staphylococcal arthritis of left knee        HPI:  3/20/23 - this 52 yr. Old WF was readmitted here for IV antibiotics. Her PICC line was pulled on the 16th. It was not replaced on the 18th as the noted stated was the plan. She is to get antibiotics through the 27th of April. We will have to arrange for her to have another PICC line installed for this. She has had her left knee replacement removed for sepsis. She has a spacer in the joint. She will undergo another knee replacement after the antibiotics. For rest of history, please see her old records.      Overview/Hospital Course:  3/21/23 - pt. Is doing well so far. We will arrange for her to get a PICC line soon.    3/24/23 - pt. Is again wanting more pain meds. Again told her that she is on the dose she was on in [place]. Will continue present treatment.    3/27/23 - pt. Is still eating a lot of [employer]. Still complaining of pain. She is not moving. Will continue present treatment.    3/31/23 - pt. Is sleeping.    4/3/23 - pt. Sleeping. No complaints voiced by staff.      Interval History: pt. Sleeping.    Review of Systems  Objective:     Vital Signs (Most Recent):  Temp: 98.6 °F (37 °C) (04/03/23 0745)  Pulse: 87 (04/03/23 0745)  Resp: 19 (04/03/23 0745)  BP: 133/72 (04/03/23 0745)  SpO2: 98 % (04/03/23 0745) Vital Signs (24h Range):  Temp:  [98.6 °F (37 °C)] 98.6 °F (37 °C)  Pulse:  [77-90] 87  Resp:  [18-19] 19  SpO2:  [98 %-99 %] 98 %  BP: (133-149)/(72-82) 133/72     Weight: 63.9 kg (140 lb 12.8 oz)  Body mass index is 22.73 kg/m².    Intake/Output Summary (Last 24 hours) at 4/3/2023 0893  Last data filed at  4/2/2023 1218  Gross per 24 hour   Intake 240 ml   Output --   Net 240 ml      Physical Exam    Significant Labs: All pertinent labs within the past 24 hours have been reviewed.    Significant Imaging: I have reviewed all pertinent imaging results/findings within the past 24 hours.      Assessment/Plan:      * Staphylococcal arthritis of left knee          VTE Risk Mitigation (From admission, onward)         Ordered     apixaban tablet 2.5 mg  2 times daily         03/28/23 0820     heparin, porcine (PF) injection 50 Units  Daily         03/27/23 1427     IP VTE HIGH RISK PATIENT  Once         03/20/23 1801     Place DONOVAN hose  Until discontinued         03/20/23 1801                Discharge Planning   SUZANNA:      Code Status: Full Code   Is the patient medically ready for discharge?:     Reason for patient still in hospital (select all that apply): Patient trending condition  Discharge Plan A: Home with family, Home Health                  Hannah Schulz MD  Department of Hospital Medicine   [facility]

## 2023-04-04 LAB
GLUCOSE SERPL-MCNC: 190 MG/DL (ref 70–105)
GLUCOSE SERPL-MCNC: 336 MG/DL (ref 70–105)
VANCOMYCIN TROUGH SERPL-MCNC: 12.9 ΜG/ML (ref 10–20)

## 2023-04-04 PROCEDURE — 63600175 PHARM REV CODE 636 W HCPCS: Performed by: FAMILY MEDICINE

## 2023-04-04 PROCEDURE — 94761 N-INVAS EAR/PLS OXIMETRY MLT: CPT

## 2023-04-04 PROCEDURE — 25000003 PHARM REV CODE 250: Performed by: FAMILY MEDICINE

## 2023-04-04 PROCEDURE — 11000004 HC SNF PRIVATE

## 2023-04-04 PROCEDURE — 25000003 PHARM REV CODE 250: Performed by: SPECIALIST

## 2023-04-04 PROCEDURE — 82962 GLUCOSE BLOOD TEST: CPT

## 2023-04-04 PROCEDURE — 80202 ASSAY OF VANCOMYCIN: CPT | Performed by: FAMILY MEDICINE

## 2023-04-04 RX ADMIN — HYDROCODONE BITARTRATE AND ACETAMINOPHEN 1 TABLET: 5; 325 TABLET ORAL at 02:04

## 2023-04-04 RX ADMIN — Medication 1 CAPSULE: at 06:04

## 2023-04-04 RX ADMIN — HYDRALAZINE HYDROCHLORIDE 25 MG: 25 TABLET, FILM COATED ORAL at 08:04

## 2023-04-04 RX ADMIN — APIXABAN 2.5 MG: 2.5 TABLET, FILM COATED ORAL at 08:04

## 2023-04-04 RX ADMIN — Medication 9 MG: at 08:04

## 2023-04-04 RX ADMIN — LOSARTAN POTASSIUM 25 MG: 25 TABLET, FILM COATED ORAL at 08:04

## 2023-04-04 RX ADMIN — VANCOMYCIN HYDROCHLORIDE 1500 MG: 1.5 INJECTION, POWDER, LYOPHILIZED, FOR SOLUTION INTRAVENOUS at 05:04

## 2023-04-04 RX ADMIN — INSULIN ASPART 5 UNITS: 100 INJECTION, SOLUTION INTRAVENOUS; SUBCUTANEOUS at 06:04

## 2023-04-04 RX ADMIN — VANCOMYCIN HYDROCHLORIDE 1500 MG: 1.5 INJECTION, POWDER, LYOPHILIZED, FOR SOLUTION INTRAVENOUS at 11:04

## 2023-04-04 RX ADMIN — Medication 1 CAPSULE: at 05:04

## 2023-04-04 RX ADMIN — SENNOSIDES AND DOCUSATE SODIUM 1 TABLET: 8.6; 5 TABLET ORAL at 08:04

## 2023-04-04 RX ADMIN — HYDROCODONE BITARTRATE AND ACETAMINOPHEN 1 TABLET: 5; 325 TABLET ORAL at 06:04

## 2023-04-04 RX ADMIN — INSULIN ASPART 6 UNITS: 100 INJECTION, SOLUTION INTRAVENOUS; SUBCUTANEOUS at 05:04

## 2023-04-04 RX ADMIN — HYDROCODONE BITARTRATE AND ACETAMINOPHEN 1 TABLET: 5; 325 TABLET ORAL at 11:04

## 2023-04-04 RX ADMIN — HEPARIN, PORCINE (PF) 10 UNIT/ML INTRAVENOUS SYRINGE 50 UNITS: at 08:04

## 2023-04-04 RX ADMIN — HYDROCODONE BITARTRATE AND ACETAMINOPHEN 1 TABLET: 5; 325 TABLET ORAL at 03:04

## 2023-04-04 RX ADMIN — GABAPENTIN 300 MG: 300 CAPSULE ORAL at 08:04

## 2023-04-04 RX ADMIN — DULOXETINE 60 MG: 30 CAPSULE, DELAYED RELEASE ORAL at 08:04

## 2023-04-04 RX ADMIN — INSULIN ASPART 8 UNITS: 100 INJECTION, SOLUTION INTRAVENOUS; SUBCUTANEOUS at 11:04

## 2023-04-04 RX ADMIN — INSULIN DETEMIR 30 UNITS: 100 INJECTION, SOLUTION SUBCUTANEOUS at 08:04

## 2023-04-04 RX ADMIN — HYDROCODONE BITARTRATE AND ACETAMINOPHEN 1 TABLET: 5; 325 TABLET ORAL at 08:04

## 2023-04-04 RX ADMIN — INSULIN ASPART 5 UNITS: 100 INJECTION, SOLUTION INTRAVENOUS; SUBCUTANEOUS at 05:04

## 2023-04-04 RX ADMIN — Medication 1 CAPSULE: at 11:04

## 2023-04-04 RX ADMIN — INSULIN ASPART 5 UNITS: 100 INJECTION, SOLUTION INTRAVENOUS; SUBCUTANEOUS at 11:04

## 2023-04-04 NOTE — PLAN OF CARE
Problem: Adult Inpatient Plan of Care  Goal: Plan of Care Review  Outcome: Ongoing, Not Progressing  Flowsheets (Taken 4/4/2023 7633)  Plan of Care Reviewed With: patient  Goal: Optimal Comfort and Wellbeing  Outcome: Ongoing, Not Progressing     Problem: Diabetes Comorbidity  Goal: Blood Glucose Level Within Targeted Range  Outcome: Ongoing, Not Progressing

## 2023-04-04 NOTE — NURSING
Patient has been complaining that it feels like she twisted her knee getting back in bed yesterday. Patient gets up and down in her room by herself all the time. Immobilizer in place to L knee. She is refusing therapy today because she wants her knee x-rayed. The ER MD, Dr. Robert was notified, he said it was okay to x-ray her knee, and asked if we could put the order in.     (1120) X-ray of L knee seems to show no acute changes, therapy notified.

## 2023-04-04 NOTE — NURSING
Updated notes and clinicals faxed to [facility] for approval of additional swing bed days.

## 2023-04-04 NOTE — PROGRESS NOTES
Patient refused participation with physical therapy treatment today secondary to complaints of pain in Left knee.

## 2023-04-04 NOTE — PLAN OF CARE
Problem: Diabetes Comorbidity  Goal: Blood Glucose Level Within Targeted Range  Outcome: Ongoing, Progressing     Problem: Fall Injury Risk  Goal: Absence of Fall and Fall-Related Injury  Outcome: Ongoing, Progressing     Problem: Skin Injury Risk Increased  Goal: Skin Health and Integrity  Outcome: Ongoing, Progressing     Problem: Infection  Goal: Absence of Infection Signs and Symptoms  Outcome: Ongoing, Progressing

## 2023-04-04 NOTE — PROGRESS NOTES
Patient was unwilling to participate this morning due to hurting leg and wanting an x-ray. X_ray was done before lunch and everything looked good per nursing. Patient was still unwilling to participate this afternoon due to increased pain.

## 2023-04-05 LAB
GLUCOSE SERPL-MCNC: 162 MG/DL (ref 70–105)
GLUCOSE SERPL-MCNC: 194 MG/DL (ref 70–105)
GLUCOSE SERPL-MCNC: 213 MG/DL (ref 70–105)
GLUCOSE SERPL-MCNC: 266 MG/DL (ref 70–105)
GLUCOSE SERPL-MCNC: 281 MG/DL (ref 70–105)
GLUCOSE SERPL-MCNC: 294 MG/DL (ref 70–105)

## 2023-04-05 PROCEDURE — 25000003 PHARM REV CODE 250: Performed by: FAMILY MEDICINE

## 2023-04-05 PROCEDURE — 97110 THERAPEUTIC EXERCISES: CPT

## 2023-04-05 PROCEDURE — 11000004 HC SNF PRIVATE

## 2023-04-05 PROCEDURE — 63600175 PHARM REV CODE 636 W HCPCS: Performed by: FAMILY MEDICINE

## 2023-04-05 PROCEDURE — 94761 N-INVAS EAR/PLS OXIMETRY MLT: CPT

## 2023-04-05 PROCEDURE — 82962 GLUCOSE BLOOD TEST: CPT

## 2023-04-05 PROCEDURE — 25000003 PHARM REV CODE 250: Performed by: SPECIALIST

## 2023-04-05 RX ADMIN — INSULIN ASPART 4 UNITS: 100 INJECTION, SOLUTION INTRAVENOUS; SUBCUTANEOUS at 06:04

## 2023-04-05 RX ADMIN — INSULIN ASPART 3 UNITS: 100 INJECTION, SOLUTION INTRAVENOUS; SUBCUTANEOUS at 08:04

## 2023-04-05 RX ADMIN — Medication 9 MG: at 08:04

## 2023-04-05 RX ADMIN — HYDROCODONE BITARTRATE AND ACETAMINOPHEN 1 TABLET: 5; 325 TABLET ORAL at 03:04

## 2023-04-05 RX ADMIN — HYDROCODONE BITARTRATE AND ACETAMINOPHEN 1 TABLET: 5; 325 TABLET ORAL at 06:04

## 2023-04-05 RX ADMIN — VANCOMYCIN HYDROCHLORIDE 2000 MG: 1 INJECTION, POWDER, LYOPHILIZED, FOR SOLUTION INTRAVENOUS at 05:04

## 2023-04-05 RX ADMIN — GABAPENTIN 300 MG: 300 CAPSULE ORAL at 08:04

## 2023-04-05 RX ADMIN — INSULIN DETEMIR 30 UNITS: 100 INJECTION, SOLUTION SUBCUTANEOUS at 08:04

## 2023-04-05 RX ADMIN — HYDRALAZINE HYDROCHLORIDE 25 MG: 25 TABLET, FILM COATED ORAL at 08:04

## 2023-04-05 RX ADMIN — HEPARIN, PORCINE (PF) 10 UNIT/ML INTRAVENOUS SYRINGE 50 UNITS: at 08:04

## 2023-04-05 RX ADMIN — APIXABAN 2.5 MG: 2.5 TABLET, FILM COATED ORAL at 08:04

## 2023-04-05 RX ADMIN — INSULIN ASPART 5 UNITS: 100 INJECTION, SOLUTION INTRAVENOUS; SUBCUTANEOUS at 05:04

## 2023-04-05 RX ADMIN — SENNOSIDES AND DOCUSATE SODIUM 1 TABLET: 8.6; 5 TABLET ORAL at 08:04

## 2023-04-05 RX ADMIN — INSULIN ASPART 5 UNITS: 100 INJECTION, SOLUTION INTRAVENOUS; SUBCUTANEOUS at 11:04

## 2023-04-05 RX ADMIN — HYDROCODONE BITARTRATE AND ACETAMINOPHEN 1 TABLET: 5; 325 TABLET ORAL at 08:04

## 2023-04-05 RX ADMIN — Medication 1 CAPSULE: at 06:04

## 2023-04-05 RX ADMIN — HYDROCODONE BITARTRATE AND ACETAMINOPHEN 1 TABLET: 5; 325 TABLET ORAL at 01:04

## 2023-04-05 RX ADMIN — LOSARTAN POTASSIUM 25 MG: 25 TABLET, FILM COATED ORAL at 08:04

## 2023-04-05 RX ADMIN — Medication 1 CAPSULE: at 11:04

## 2023-04-05 RX ADMIN — Medication 1 CAPSULE: at 05:04

## 2023-04-05 RX ADMIN — INSULIN ASPART 5 UNITS: 100 INJECTION, SOLUTION INTRAVENOUS; SUBCUTANEOUS at 06:04

## 2023-04-05 RX ADMIN — DULOXETINE 60 MG: 30 CAPSULE, DELAYED RELEASE ORAL at 08:04

## 2023-04-05 NOTE — PLAN OF CARE
[facility]  Medical Surgical Unit - Skilled Nursing Facility  Patient: Monica Walker     Interdisciplinary Team Meeting     Today's Date: 4/5/2023     Estimated D/C Date: tbd       Physician: Dr. Schulz    Pharmacy: [facility] Unit Director: BRODY Kelly   : JORGE LUIS Méndez Physical/Occupational Therapy: KAREN Kaplan/ JAVIER Bolton   Speech Therapy: n/a Activity Therapy: read newspaper/ watch tv   Nursing: KEVIN Nettles J. Smith Other: MONET Perkins     Nurse  New Symptoms/Problems: none  Last BM: 4/4/23 Urine: continent Diarrhea: No   Constipated: No Bladder: continent    Isolation: No     O2: n/a  Rm Air: 97 %   Nutrition: see dietary notes  Speech/Swallowing: n/a  Aspiration Precautions: No  Cognition: alert and oriented    Physical Therapy  Physical Therapy/Gait: 40' x 2  with RW, CGA, TTWB LLE ELOS: tbd   Transfers: sit <> stand, SBA for preparation for gait  Range of Motion/Restrictions: LLE toe touch weight bearing     Occupational Therapy  Occupational Therapy: following for increased strength to increase I with ADLs Eating/Grooming: independent   Toileting: min. assist Bathing: min. assist   Dressing (Upper Body): min. assist Dressing (Lower Body): min. assist       Tx Plan/Recommendations reviewed with family and patient on 3/30/23.  Additional family Conference/Training: not at this time  D/C Plan/Recommendations: home with home health    Pharmacy  Medication Changes (see MD orders in chart): No  MD/NP: Dr. Schulz Labs Reviewed: yes New Lab Orders: no     MD/NP Signature: Time:

## 2023-04-05 NOTE — PT/OT/SLP PROGRESS
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     Pain/Comfort  Pain Rating 1: 8/10  Location - Side 1: Left  Location 1: knee  Pain Addressed 1: Pre-medicate for activity    Objective:   Patient found with: PICC line    Functional Mobility:  Not performed------ Bed Mobility: sup       Not performed------ Transfers: sit <> stand, SBA for preparation for gait        Not performed------ Gait: 40' x 2  with RW, CGA, TTWB LLE        Stairs: not performed       Therapeutic Activities and Exercises:    Ther- Ex    Nustep    Ankle pumps 30 x    Hip adduction 30 x 3"    Heelslides 2 x 10 *   LAQ's 3 x 10 2# R LE    Hamstring Curls 3 x 10 yellow TB R LE    Quad sets  30 x 3"    SAQ's 30 x 3" R LE   Straight Leg Raises 3 x 10 R LE    Hip abduction    Horizontal ADD/ABD 3 x 10 B LE    Glut sets  30x 5"    Seated Marching  3 x 10 2# on R LE    Ther-Act    Heel Raises    Mini Squats    3 way hip    Sit <> stands     Standing Marches                    AM-PAC 6 CLICK MOBILITY  How much help from another person does this patient currently need?   1 = Unable, Total/Dependent Assistance  2 = A lot, Maximum/Moderate Assistance  3 = A little, Minimum/Contact Guard/Supervision  4 = None, Modified [place]/Independent    Turning over in bed (including adjusting bedclothes, sheets and " blankets)?: 4  Sitting down on and standing up from a chair with arms (e.g., wheelchair, bedside commode, etc.): 3  Moving from lying on back to sitting on the side of the bed?: 4  Moving to and from a bed to a chair (including a wheelchair)?: 3  Need to walk in hospital room?: 3  Climbing 3-5 steps with a railing?: 2  Basic Mobility Total Score: 19    AM-PAC Raw Score CMS G-Code Modifier Level of Impairment Assistance   6 % Total / Unable   7 - 9 CM 80 - 100% Maximal Assist   10 - 14 CL 60 - 80% Moderate Assist   15 - 19 CK 40 - 60% Moderate Assist   20 - 22 CJ 20 - 40% Minimal Assist   23 CI 1-20% SBA / CGA   24 CH 0% Independent/ Mod I     Patient left up in chair with OT present in rehab gym     Assessment:  Monica Walker is a 52 y.o. female with a medical diagnosis of Staphylococcal arthritis of left knee and presents with Rehab identified problem list/impairments: weakness, impaired endurance, impaired self care skills, impaired functional mobility, impaired balance, decreased lower extremity function, decreased safety awareness.  Patient with increased pain in L LE due to twisting injury and request not to ambulate or transfer during treatment. Exercises performed on R LE due to pain experienced with all attempted movements on L LE. Patient with increased time due to rest breaks with pain on L LE with quad sets and ankle pumps. No adverse effects from treatment. Will re-attempt gait if patient able next treatment.     Rehab potential is good.    Activity tolerance: Good and Fair    Discharge recommendations: home with home health      Barriers to discharge:      Equipment recommendations: none     GOALS:   Multidisciplinary Problems       Physical Therapy Goals          Problem: Physical Therapy    Goal Priority Disciplines Outcome Goal Variances Interventions   Physical Therapy Goal     PT, PT/OT Ongoing, Progressing     Description: Short Term Goals  1. Patient will complete 30 reps of B LE  exercises with correct form.   2. Patient will complete sit<>stand transfers with SBA while maintaining toe touch weight bearing on LLE.   3. Patient will ambulate 25 feet with RW on level surfaces with CGA while maintaining toe touch weight bearing on LLE.    Long Term Goals   1. Patient will ambulate 50 feet with RW on level and unlevel surfaces with SBA while maintaining toe touch weight bearing on LLE.  2. Patient will complete all functional transfers with MOD I while maintaining toe touch weight bearing on LLE.                        PLAN:    Patient to be seen 5 x/week to address the above listed problems via gait training, therapeutic activities, therapeutic exercises  Plan of Care expires: 04/11/23  Plan of Care reviewed with: patient    Continue Plan of Care Per PT order to progress patient toward rehab goals.     Kalina Ac, PT, DPT   4/5/2023

## 2023-04-05 NOTE — PROGRESS NOTES
Pharmacokinetic Assessment Follow Up: IV Vancomycin    Vancomycin serum concentration assessment(s):    The trough level was drawn correctly and can be used to guide therapy at this time. The measurement is below the desired definitive target range of 15 to 20 mcg/mL.    Vancomycin Regimen Plan:    Change regimen to Vancomycin 2000 mg IV every 18 hours with next serum trough concentration measured at 30 min prior to 2300 dose on 4.7.23    Drug levels (last 3 results):  Recent Labs   Lab Result Units 04/02/23  1637 04/04/23  2226   Vancomycin, Trough µg/mL 14.0 12.9       Pharmacy will continue to follow and monitor vancomycin.    Please contact pharmacy at extension [phone] for questions regarding this assessment.    Thank you for the consult,   Stacey Reddy       Patient brief summary:  Monica Walker is a 52 y.o. female initiated on antimicrobial therapy with IV Vancomycin for treatment of bone/joint infection through 4/27/2023.        Drug Allergies:   Review of patient's allergies indicates:  No Known Allergies    Actual Body Weight:   63.9    Renal Function:   Estimated Creatinine Clearance: 80.3 mL/min (based on SCr of 0.75 mg/dL).,     Dialysis Method (if applicable):  N/A    CBC (last 72 hours):  Recent Labs   Lab Result Units 04/03/23  0534   WBC K/uL 8.28   Hemoglobin g/dL 10.2*   Hematocrit % 32.9*   Platelet Count K/uL 245   Lymphocytes % % 29.6   Monocytes % % 9.3*   Eosinophils % % 9.5*   Basophils % % 0.6       Metabolic Panel (last 72 hours):  Recent Labs   Lab Result Units 04/03/23  0534   Sodium mmol/L 139   Potassium mmol/L 3.7   Chloride mmol/L 104   CO2 mmol/L 28   Glucose mg/dL 190*   BUN mg/dL 9   Creatinine mg/dL 0.75       Vancomycin Administrations:  vancomycin given in the last 96 hours                     vancomycin 1,500 mg in dextrose 5 % (D5W) 250 mL IVPB (Vial-Mate) (mg) 1,500 mg New Bag 04/04/23 2336     1,500 mg New Bag  0529     1,500 mg New Bag 04/03/23 1110     1,500 mg New Bag  04/02/23 1705     1,500 mg New Bag 04/01/23 4504                    Microbiologic Results:  Microbiology Results (last 7 days)       ** No results found for the last 168 hours. **

## 2023-04-05 NOTE — PT/OT/SLP PROGRESS
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          Pain/Comfort  Pain Rating 1: 8/10    Objective:  Patient found with: PICC line     Functional Mobility:  Bed Mobility:        Transfers:         Functional Ambulation:     Activities of Daily Living:         Therapeutic Activities and Exercises:  Patient completed the following for increased strength to increase I with ADLs: UBE 7 min x 1x, 3# DB- shoulder press, chest press with RUE, and bicep curls with BUE x 40, yellow theraflex x 40 both ways, yellow theraband- scapular retraction and tricep extension x 40      AM-PAC 6 CLICK ADL   How much help from another person does this patient currently need?   1 = Unable, Total/Dependent Assistance  2 = A lot, Maximum/Moderate Assistance  3 = A little, Minimum/Contact Guard/Supervision  4 = None, Modified [place]/Independent    Putting on and taking off regular lower body clothing? : 3  Bathing (including washing, rinsing, drying)?: 3  Toileting, which includes using toilet, bedpan, or urinal? : 3  Putting on and taking off regular upper body clothing?: 3  Taking care of personal grooming such as brushing teeth?: 4  Eating meals?: 4  Daily Activity Total Score: 20     AM-PAC Raw Score CMS "G-Code Modifier Level of Impairment Assistance   6 % Total / Unable   7 - 8 CM 80 - 100% Maximal Assist   9-13 CL 60 - 80% Moderate Assist   14 - 19 CK 40 - 60% Moderate Assist   20 - 22 CJ 20 - 40% Minimal Assist   23 CI 1-20% SBA / CGA   24 CH 0% Independent/ Mod I "       Patient left supine with call button in reach    ASSESSMENT:  Monica Walker is a 52 y.o. female with a medical diagnosis of Staphylococcal arthritis of left knee and presents with decreased strength, decreased endurance, decreased self-care.    Rehab identified problem list/impairments:  weakness, impaired endurance, impaired self care skills, impaired functional mobility, impaired balance, decreased lower extremity function, decreased safety awareness, decreased upper extremity function    Rehab potential is fair.    Activity tolerance: Good    Discharge recommendations: home with home health   Barriers to discharge:      Equipment recommendations: none    GOALS:   Multidisciplinary Problems       Occupational Therapy Goals          Problem: Occupational Therapy    Goal Priority Disciplines Outcome Interventions   Occupational Therapy Goal     OT, PT/OT Ongoing, Progressing    Description: Description: Grooming Status:   Short Term Goal: Pt will perform grooming with s/u sitting EOB.   Long Term Goal: Pt will perform grooming/oral hygiene standing at sink with Mod I      LE dressing Status:   Short Term Goal: Pt will perform LE dressing with mod a.   Long Term Goal: Pt will perform LE dressing with min a.    Toileting Status:   Short Term Goal: Pt will perform toilet hygiene on BSC with min a.  Long Term Goal: Pt will perform toilet hygiene on toilet with no AE with s/u.    Commode Transfer:   Short Term Goal: Pt will perform BSC t/f with min a.  Long Term Goal:  Pt will perform toilet t/f in bathroom with s/u.     Bathing Status:   Long Term Goal: Pt will perform sponge bath with s/u with no unsafe fatigue.     Strength Status:   Long Term Goal: Pt to perform BUE strengthening with weights and/or body weight to increase ADL independence and safety    Endurance Status:   Short Term Goal:pt to perform 15 min OT treatment with 5 or greater rest breaks  Long Term Goal: pt to perform 30 min OT treat with 3 or  less rest breaks                       Plan:  Patient to be seen 5 x/week to address the above listed problems via therapeutic exercises  Plan of Care expires:    Plan of Care reviewed with: patient     Kirti Bolton, OTR/ETHAN      04/05/2023

## 2023-04-06 LAB
GLUCOSE SERPL-MCNC: 194 MG/DL (ref 70–105)
GLUCOSE SERPL-MCNC: 220 MG/DL (ref 70–105)
GLUCOSE SERPL-MCNC: 263 MG/DL (ref 70–105)
GLUCOSE SERPL-MCNC: 273 MG/DL (ref 70–105)

## 2023-04-06 PROCEDURE — 25000003 PHARM REV CODE 250: Performed by: SPECIALIST

## 2023-04-06 PROCEDURE — 11000004 HC SNF PRIVATE

## 2023-04-06 PROCEDURE — 63600175 PHARM REV CODE 636 W HCPCS: Performed by: FAMILY MEDICINE

## 2023-04-06 PROCEDURE — 97116 GAIT TRAINING THERAPY: CPT | Mod: CQ

## 2023-04-06 PROCEDURE — 82962 GLUCOSE BLOOD TEST: CPT

## 2023-04-06 PROCEDURE — 97110 THERAPEUTIC EXERCISES: CPT | Mod: CQ

## 2023-04-06 PROCEDURE — 25000003 PHARM REV CODE 250: Performed by: FAMILY MEDICINE

## 2023-04-06 PROCEDURE — 97110 THERAPEUTIC EXERCISES: CPT

## 2023-04-06 PROCEDURE — 94761 N-INVAS EAR/PLS OXIMETRY MLT: CPT

## 2023-04-06 RX ADMIN — GABAPENTIN 300 MG: 300 CAPSULE ORAL at 08:04

## 2023-04-06 RX ADMIN — Medication 1 CAPSULE: at 06:04

## 2023-04-06 RX ADMIN — DULOXETINE 60 MG: 30 CAPSULE, DELAYED RELEASE ORAL at 09:04

## 2023-04-06 RX ADMIN — INSULIN ASPART 5 UNITS: 100 INJECTION, SOLUTION INTRAVENOUS; SUBCUTANEOUS at 05:04

## 2023-04-06 RX ADMIN — INSULIN ASPART 6 UNITS: 100 INJECTION, SOLUTION INTRAVENOUS; SUBCUTANEOUS at 11:04

## 2023-04-06 RX ADMIN — SENNOSIDES AND DOCUSATE SODIUM 1 TABLET: 8.6; 5 TABLET ORAL at 09:04

## 2023-04-06 RX ADMIN — INSULIN ASPART 1 UNITS: 100 INJECTION, SOLUTION INTRAVENOUS; SUBCUTANEOUS at 08:04

## 2023-04-06 RX ADMIN — HEPARIN, PORCINE (PF) 10 UNIT/ML INTRAVENOUS SYRINGE 50 UNITS: at 09:04

## 2023-04-06 RX ADMIN — HYDROCODONE BITARTRATE AND ACETAMINOPHEN 1 TABLET: 5; 325 TABLET ORAL at 08:04

## 2023-04-06 RX ADMIN — GABAPENTIN 300 MG: 300 CAPSULE ORAL at 09:04

## 2023-04-06 RX ADMIN — VANCOMYCIN HYDROCHLORIDE 2000 MG: 1 INJECTION, POWDER, LYOPHILIZED, FOR SOLUTION INTRAVENOUS at 11:04

## 2023-04-06 RX ADMIN — HYDROCODONE BITARTRATE AND ACETAMINOPHEN 1 TABLET: 5; 325 TABLET ORAL at 11:04

## 2023-04-06 RX ADMIN — INSULIN ASPART 6 UNITS: 100 INJECTION, SOLUTION INTRAVENOUS; SUBCUTANEOUS at 05:04

## 2023-04-06 RX ADMIN — SENNOSIDES AND DOCUSATE SODIUM 1 TABLET: 8.6; 5 TABLET ORAL at 08:04

## 2023-04-06 RX ADMIN — Medication 1 CAPSULE: at 04:04

## 2023-04-06 RX ADMIN — HYDRALAZINE HYDROCHLORIDE 25 MG: 25 TABLET, FILM COATED ORAL at 09:04

## 2023-04-06 RX ADMIN — APIXABAN 2.5 MG: 2.5 TABLET, FILM COATED ORAL at 09:04

## 2023-04-06 RX ADMIN — HYDRALAZINE HYDROCHLORIDE 25 MG: 25 TABLET, FILM COATED ORAL at 08:04

## 2023-04-06 RX ADMIN — APIXABAN 2.5 MG: 2.5 TABLET, FILM COATED ORAL at 08:04

## 2023-04-06 RX ADMIN — INSULIN ASPART 5 UNITS: 100 INJECTION, SOLUTION INTRAVENOUS; SUBCUTANEOUS at 11:04

## 2023-04-06 RX ADMIN — LOSARTAN POTASSIUM 25 MG: 25 TABLET, FILM COATED ORAL at 09:04

## 2023-04-06 RX ADMIN — HYDROCODONE BITARTRATE AND ACETAMINOPHEN 1 TABLET: 5; 325 TABLET ORAL at 05:04

## 2023-04-06 RX ADMIN — Medication 9 MG: at 08:04

## 2023-04-06 RX ADMIN — INSULIN DETEMIR 30 UNITS: 100 INJECTION, SOLUTION SUBCUTANEOUS at 09:04

## 2023-04-06 RX ADMIN — Medication 1 CAPSULE: at 11:04

## 2023-04-06 NOTE — NURSING
0700--Rec'd this morning noted awake and alert with no acute distress, respirations even and unlabored. Resting in bed comfortably at this time.    0825--Pt remains awake and alert, lung sounds clear. Respirations even and unlabored. Steri strips intact to LLE, no drainage or odor noted. Good circulation noted. Denies pain at this time.

## 2023-04-06 NOTE — NURSING
PATIENT OBSERVED SITTING ON SIDE OF BED EATING HAMBURGER FROM [facility]; VOICED NO COMPLAINTS; NO DISTRESS NOTED

## 2023-04-06 NOTE — PT/OT/SLP PROGRESS
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          Pain/Comfort  Pain Rating 1: 8/10    Objective:  Patient found with: PICC line     Functional Mobility:  Bed Mobility:        Transfers:         Functional Ambulation:     Activities of Daily Living:         Therapeutic Activities and Exercises:  Patient completed the following for increased strength to increase I with ADLs: UBE 7 min x 1x, 4# DB- shoulder press, chest press with RUE, and bicep curls with BUE x 40, yellow theraflex x 40 both ways, yellow theraband- scapular retraction and tricep extension x 40      AM-PAC 6 CLICK ADL   How much help from another person does this patient currently need?   1 = Unable, Total/Dependent Assistance  2 = A lot, Maximum/Moderate Assistance  3 = A little, Minimum/Contact Guard/Supervision  4 = None, Modified [place]/Independent    Putting on and taking off regular lower body clothing? : 3  Bathing (including washing, rinsing, drying)?: 3  Toileting, which includes using toilet, bedpan, or urinal? : 3  Putting on and taking off regular upper body clothing?: 3  Taking care of personal grooming such as brushing teeth?: 4  Eating meals?: 4  Daily Activity Total Score: 20     AM-PAC Raw Score CMS "G-Code Modifier Level of Impairment Assistance   6 % Total / Unable   7 - 8 CM 80 - 100% Maximal Assist   9-13 CL 60 - 80% Moderate Assist   14 - 19 CK 40 - 60% Moderate Assist   20 - 22 CJ 20 - 40% Minimal Assist   23 CI 1-20% SBA / CGA   24 CH 0% Independent/ Mod I "       Patient left supine with call button in reach    ASSESSMENT:  Monica Walker is a 52 y.o. female with a medical diagnosis of Staphylococcal arthritis of left knee and presents with decreased strength, decreased endurance, decreased self-care.    Rehab identified problem list/impairments:  weakness, impaired endurance, impaired self care skills, impaired functional mobility, impaired balance, decreased lower extremity function, decreased upper extremity function, decreased safety awareness    Rehab potential is fair.    Activity tolerance: Good    Discharge recommendations: home with home health   Barriers to discharge:      Equipment recommendations: none    GOALS:   Multidisciplinary Problems       Occupational Therapy Goals          Problem: Occupational Therapy    Goal Priority Disciplines Outcome Interventions   Occupational Therapy Goal     OT, PT/OT Ongoing, Progressing    Description: Description: Grooming Status:   Short Term Goal: Pt will perform grooming with s/u sitting EOB.   Long Term Goal: Pt will perform grooming/oral hygiene standing at sink with Mod I      LE dressing Status:   Short Term Goal: Pt will perform LE dressing with mod a.   Long Term Goal: Pt will perform LE dressing with min a.    Toileting Status:   Short Term Goal: Pt will perform toilet hygiene on BSC with min a.  Long Term Goal: Pt will perform toilet hygiene on toilet with no AE with s/u.    Commode Transfer:   Short Term Goal: Pt will perform BSC t/f with min a.  Long Term Goal:  Pt will perform toilet t/f in bathroom with s/u.     Bathing Status:   Long Term Goal: Pt will perform sponge bath with s/u with no unsafe fatigue.     Strength Status:   Long Term Goal: Pt to perform BUE strengthening with weights and/or body weight to increase ADL independence and safety    Endurance Status:   Short Term Goal:pt to perform 15 min OT treatment with 5 or greater rest breaks  Long Term Goal: pt to perform 30 min OT treat with 3 or  less rest breaks                       Plan:  Patient to be seen 5 x/week to address the above listed problems via therapeutic exercises  Plan of Care expires:    Plan of Care reviewed with: patient     Kirti Bolton, OTR/ETHAN      04/06/2023

## 2023-04-06 NOTE — PT/OT/SLP PROGRESS
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     Pain/Comfort  Pain Rating 1: 6/10  Location - Side 1: Left  Location - Orientation 1: generalized  Location 1: knee  Pain Addressed 1: Reposition    Objective:        Functional Mobility:       Transfers: sit <> stand, SBA for preparation for gait        Gait:  Approx. 110' with bilateral crutches SBA, L LE NWB on level indoor surface        Stairs: not performed       Therapeutic Activities and Exercises:    Ther- Ex    Nustep    Ankle pumps 30 x    Hip adduction 30 x 3"    Heelslides 2 x 10 *   LAQ's 3 x 10 2# R LE    Hamstring Curls    Quad sets     SAQ's 30 x 3" R LE   Straight Leg Raises 3 x 10 R LE    Hip abduction    Horizontal ADD/ABD 3 x 10 B LE    Glut sets  30x 5"    Seated Marching     Ther-Act    Heel Raises    Mini Squats    3 way hip    Sit <> stands     Standing Marches                    AM-PAC 6 CLICK MOBILITY  How much help from another person does this patient currently need?   1 = Unable, Total/Dependent Assistance  2 = A lot, Maximum/Moderate Assistance  3 = A little, Minimum/Contact Guard/Supervision  4 = None, Modified [place]/Independent    Turning over in bed (including adjusting bedclothes, sheets and blankets)?: 4  Sitting down on and standing up from a chair with arms (e.g., " "wheelchair, bedside commode, etc.): 3  Moving from lying on back to sitting on the side of the bed?: 4  Moving to and from a bed to a chair (including a wheelchair)?: 3  Need to walk in hospital room?: 3  Climbing 3-5 steps with a railing?: 2  Basic Mobility Total Score: 19    AM-PAC Raw Score CMS G-Code Modifier Level of Impairment Assistance   6 % Total / Unable   7 - 9 CM 80 - 100% Maximal Assist   10 - 14 CL 60 - 80% Moderate Assist   15 - 19 CK 40 - 60% Moderate Assist   20 - 22 CJ 20 - 40% Minimal Assist   23 CI 1-20% SBA / CGA   24 CH 0% Independent/ Mod I     Patient left up in chair with OT present in rehab gym     Assessment:  Monica Walker is a 52 y.o. female with a medical diagnosis of Staphylococcal arthritis of left knee and presents with Rehab identified problem list/impairments: weakness, impaired balance, impaired endurance, impaired functional mobility, impaired self care skills, decreased lower extremity function, decreased ROM, decreased safety awareness, gait instability.  Patient with increased pain in L LE due to twisting injury and request not to ambulate or transfer during treatment. Exercises performed on R LE due to pain experienced with all attempted movements on L LE. Patient with increased time due to rest breaks with pain on L LE with quad sets and ankle pumps.  Patient requires CGA with gait training with crutches.  Patient states "I'm used to these.  This is what I've been using at home".          Rehab potential is good.    Activity tolerance: Good and Fair    Discharge recommendations: home with home health      Barriers to discharge:      Equipment recommendations: none     GOALS:   Multidisciplinary Problems       Physical Therapy Goals          Problem: Physical Therapy    Goal Priority Disciplines Outcome Goal Variances Interventions   Physical Therapy Goal     PT, PT/OT Ongoing, Progressing     Description: Short Term Goals  1. Patient will complete 30 reps of B LE " exercises with correct form.   2. Patient will complete sit<>stand transfers with SBA while maintaining toe touch weight bearing on LLE.   3. Patient will ambulate 25 feet with RW on level surfaces with CGA while maintaining toe touch weight bearing on LLE.    Long Term Goals   1. Patient will ambulate 50 feet with RW on level and unlevel surfaces with SBA while maintaining toe touch weight bearing on LLE.  2. Patient will complete all functional transfers with MOD I while maintaining toe touch weight bearing on LLE.                        PLAN:    Patient to be seen 5 x/week to address the above listed problems via gait training, therapeutic exercises, therapeutic activities  Plan of Care expires: 04/11/23  Plan of Care reviewed with: patient    Continue Plan of Care Per PT order to progress patient toward rehab goals.   ZEINAB Hardin   4/6/2023

## 2023-04-07 LAB
GLUCOSE SERPL-MCNC: 204 MG/DL (ref 70–105)
GLUCOSE SERPL-MCNC: 240 MG/DL (ref 70–105)
GLUCOSE SERPL-MCNC: 262 MG/DL (ref 70–105)
GLUCOSE SERPL-MCNC: 279 MG/DL (ref 70–105)
VANCOMYCIN TROUGH SERPL-MCNC: 21 ΜG/ML (ref 10–20)

## 2023-04-07 PROCEDURE — 99308 SBSQ NF CARE LOW MDM 20: CPT | Mod: ,,, | Performed by: FAMILY MEDICINE

## 2023-04-07 PROCEDURE — 82962 GLUCOSE BLOOD TEST: CPT

## 2023-04-07 PROCEDURE — 25000003 PHARM REV CODE 250: Performed by: FAMILY MEDICINE

## 2023-04-07 PROCEDURE — 80202 ASSAY OF VANCOMYCIN: CPT | Performed by: FAMILY MEDICINE

## 2023-04-07 PROCEDURE — 94761 N-INVAS EAR/PLS OXIMETRY MLT: CPT

## 2023-04-07 PROCEDURE — 25000003 PHARM REV CODE 250: Performed by: SPECIALIST

## 2023-04-07 PROCEDURE — 63600175 PHARM REV CODE 636 W HCPCS: Performed by: FAMILY MEDICINE

## 2023-04-07 PROCEDURE — 99308 PR NURSING FAC CARE, SUBSEQ, MINOR COMPLIC: ICD-10-PCS | Mod: ,,, | Performed by: FAMILY MEDICINE

## 2023-04-07 PROCEDURE — 97110 THERAPEUTIC EXERCISES: CPT | Mod: CQ

## 2023-04-07 PROCEDURE — 11000004 HC SNF PRIVATE

## 2023-04-07 RX ORDER — VANCOMYCIN 2 GRAM/500 ML IN 0.9 % SODIUM CHLORIDE INTRAVENOUS
2000
Status: DISCONTINUED | OUTPATIENT
Start: 2023-04-08 | End: 2023-04-07

## 2023-04-07 RX ADMIN — HEPARIN, PORCINE (PF) 10 UNIT/ML INTRAVENOUS SYRINGE 50 UNITS: at 09:04

## 2023-04-07 RX ADMIN — INSULIN ASPART 6 UNITS: 100 INJECTION, SOLUTION INTRAVENOUS; SUBCUTANEOUS at 04:04

## 2023-04-07 RX ADMIN — HYDROCODONE BITARTRATE AND ACETAMINOPHEN 1 TABLET: 5; 325 TABLET ORAL at 05:04

## 2023-04-07 RX ADMIN — INSULIN ASPART 5 UNITS: 100 INJECTION, SOLUTION INTRAVENOUS; SUBCUTANEOUS at 10:04

## 2023-04-07 RX ADMIN — Medication 9 MG: at 08:04

## 2023-04-07 RX ADMIN — GABAPENTIN 300 MG: 300 CAPSULE ORAL at 08:04

## 2023-04-07 RX ADMIN — INSULIN ASPART 5 UNITS: 100 INJECTION, SOLUTION INTRAVENOUS; SUBCUTANEOUS at 05:04

## 2023-04-07 RX ADMIN — INSULIN ASPART 6 UNITS: 100 INJECTION, SOLUTION INTRAVENOUS; SUBCUTANEOUS at 10:04

## 2023-04-07 RX ADMIN — DULOXETINE 60 MG: 30 CAPSULE, DELAYED RELEASE ORAL at 08:04

## 2023-04-07 RX ADMIN — INSULIN ASPART 5 UNITS: 100 INJECTION, SOLUTION INTRAVENOUS; SUBCUTANEOUS at 04:04

## 2023-04-07 RX ADMIN — HYDRALAZINE HYDROCHLORIDE 25 MG: 25 TABLET, FILM COATED ORAL at 08:04

## 2023-04-07 RX ADMIN — APIXABAN 2.5 MG: 2.5 TABLET, FILM COATED ORAL at 08:04

## 2023-04-07 RX ADMIN — LOSARTAN POTASSIUM 25 MG: 25 TABLET, FILM COATED ORAL at 08:04

## 2023-04-07 RX ADMIN — INSULIN ASPART 2 UNITS: 100 INJECTION, SOLUTION INTRAVENOUS; SUBCUTANEOUS at 08:04

## 2023-04-07 RX ADMIN — SENNOSIDES AND DOCUSATE SODIUM 1 TABLET: 8.6; 5 TABLET ORAL at 08:04

## 2023-04-07 RX ADMIN — Medication 1 CAPSULE: at 10:04

## 2023-04-07 RX ADMIN — HYDROCODONE BITARTRATE AND ACETAMINOPHEN 1 TABLET: 5; 325 TABLET ORAL at 10:04

## 2023-04-07 RX ADMIN — Medication 1 CAPSULE: at 04:04

## 2023-04-07 RX ADMIN — VANCOMYCIN HYDROCHLORIDE 2000 MG: 1 INJECTION, POWDER, LYOPHILIZED, FOR SOLUTION INTRAVENOUS at 05:04

## 2023-04-07 RX ADMIN — HYDROCODONE BITARTRATE AND ACETAMINOPHEN 1 TABLET: 5; 325 TABLET ORAL at 08:04

## 2023-04-07 RX ADMIN — INSULIN DETEMIR 30 UNITS: 100 INJECTION, SOLUTION SUBCUTANEOUS at 08:04

## 2023-04-07 RX ADMIN — Medication 1 CAPSULE: at 06:04

## 2023-04-07 NOTE — PT/OT/SLP PROGRESS
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     Pain/Comfort  Pain Rating 1: 7/10  Location - Side 1: Left  Location - Orientation 1: generalized  Location 1: knee  Pain Addressed 1: Distraction    Objective:        Functional Mobility:       Transfers: sit <> stand, SBA for preparation for gait        Gait:  Approx. 110' x 2 with bilateral crutches SBA, L LE NWB on level indoor surface        Stairs: not performed       Therapeutic Activities and Exercises:    Ther- Ex    Nustep    Ankle pumps 30 x    Hip adduction 30 x 3"    Heelslides 2 x 10 *   LAQ's 3 x 10 2# R LE    Hamstring Curls    Quad sets     SAQ's 30 x 3" R LE   Straight Leg Raises 3 x 10 R LE    Hip abduction    Horizontal ADD/ABD 3 x 10 B LE    Glut sets  30x 5"    Seated Marching     Ther-Act    Heel Raises    Mini Squats    3 way hip    Sit <> stands  3 x   Standing Marches                    AM-PAC 6 CLICK MOBILITY  How much help from another person does this patient currently need?   1 = Unable, Total/Dependent Assistance  2 = A lot, Maximum/Moderate Assistance  3 = A little, Minimum/Contact Guard/Supervision  4 = None, Modified [place]/Independent    Turning over in bed (including adjusting bedclothes, sheets and blankets)?: 4  Sitting down on and standing up from a chair with arms (e.g., wheelchair, bedside commode, etc.): " 3  Moving from lying on back to sitting on the side of the bed?: 4  Moving to and from a bed to a chair (including a wheelchair)?: 3  Need to walk in hospital room?: 3  Climbing 3-5 steps with a railing?: 2  Basic Mobility Total Score: 19    AM-PAC Raw Score CMS G-Code Modifier Level of Impairment Assistance   6 % Total / Unable   7 - 9 CM 80 - 100% Maximal Assist   10 - 14 CL 60 - 80% Moderate Assist   15 - 19 CK 40 - 60% Moderate Assist   20 - 22 CJ 20 - 40% Minimal Assist   23 CI 1-20% SBA / CGA   24 CH 0% Independent/ Mod I     Patient left up in chair with call bell handy    Assessment:  Monica Walker is a 52 y.o. female with a medical diagnosis of Staphylococcal arthritis of left knee and presents with Rehab identified problem list/impairments: weakness, impaired balance, impaired functional mobility, impaired self care skills, decreased lower extremity function, decreased ROM, decreased safety awareness, gait instability.  Pt is more willing to utilize LLE during today's tx.. Pt required verbal cues to decrease speed of movement during exercises.   Pt continues to require CGA with gait training with crutches.  Pt displays less fatigue with use of crutches compared to rolling walker. No adverse effects noted or reported.        Rehab potential is good.    Activity tolerance: Good and Fair    Discharge recommendations: home with home health      Barriers to discharge:      Equipment recommendations: none     GOALS:   Multidisciplinary Problems       Physical Therapy Goals          Problem: Physical Therapy    Goal Priority Disciplines Outcome Goal Variances Interventions   Physical Therapy Goal     PT, PT/OT Ongoing, Progressing     Description: Short Term Goals  1. Patient will complete 30 reps of B LE exercises with correct form.   2. Patient will complete sit<>stand transfers with SBA while maintaining toe touch weight bearing on LLE.   3. Patient will ambulate 25 feet with RW on level surfaces  with CGA while maintaining toe touch weight bearing on LLE.    Long Term Goals   1. Patient will ambulate 50 feet with RW on level and unlevel surfaces with SBA while maintaining toe touch weight bearing on LLE.  2. Patient will complete all functional transfers with MOD I while maintaining toe touch weight bearing on LLE.                        PLAN:    Patient to be seen 5 x/week to address the above listed problems via gait training, therapeutic activities, therapeutic exercises  Plan of Care expires: 04/11/23  Plan of Care reviewed with: patient    Continue Plan of Care Per PT order to progress patient toward rehab goals.   ZEINAB Scott   4/7/2023

## 2023-04-07 NOTE — PROGRESS NOTES
[facility]  Hospital Medicine  Progress Note    Patient Name: Monica Walker  MRN: [id number]  Patient Class: IP- Swing   Admission Date: 3/20/2023  Length of Stay: 18 days  Attending Physician: Hannah Schulz, *  Primary Care Provider: Hannah Schulz MD        Subjective:     Principal Problem:Staphylococcal arthritis of left knee        HPI:  3/20/23 - this 52 yr. Old WF was readmitted here for IV antibiotics. Her PICC line was pulled on the 16th. It was not replaced on the 18th as the noted stated was the plan. She is to get antibiotics through the 27th of April. We will have to arrange for her to have another PICC line installed for this. She has had her left knee replacement removed for sepsis. She has a spacer in the joint. She will undergo another knee replacement after the antibiotics. For rest of history, please see her old records.      Overview/Hospital Course:  3/21/23 - pt. Is doing well so far. We will arrange for her to get a PICC line soon.    3/24/23 - pt. Is again wanting more pain meds. Again told her that she is on the dose she was on in [place]. Will continue present treatment.    3/27/23 - pt. Is still eating a lot of [employer]. Still complaining of pain. She is not moving. Will continue present treatment.    3/31/23 - pt. Is sleeping.    4/3/23 - pt. Sleeping. No complaints voiced by staff.    4/7/23 - pt. Requesting sitting outside some today. OK by me.      Interval History: wants to sit outside.    Review of Systems  Objective:     Vital Signs (Most Recent):  Temp: 98.5 °F (36.9 °C) (04/06/23 1906)  Pulse: 70 (04/07/23 0735)  Resp: 18 (04/07/23 0735)  BP: 130/69 (04/06/23 1906)  SpO2: 98 % (04/07/23 0735) Vital Signs (24h Range):  Temp:  [98.5 °F (36.9 °C)] 98.5 °F (36.9 °C)  Pulse:  [70-80] 70  Resp:  [16-20] 18  SpO2:  [98 %-99 %] 98 %  BP: (130)/(69) 130/69     Weight: 63.9 kg (140 lb 12.8 oz)  Body mass index is 22.73 kg/m².  No  intake or output data in the 24 hours ending 04/07/23 0825   Physical Exam    Significant Labs: All pertinent labs within the past 24 hours have been reviewed.    Significant Imaging: I have reviewed all pertinent imaging results/findings within the past 24 hours.      Assessment/Plan:      * Staphylococcal arthritis of left knee          VTE Risk Mitigation (From admission, onward)         Ordered     apixaban tablet 2.5 mg  2 times daily         03/28/23 0820     heparin, porcine (PF) injection 50 Units  Daily         03/27/23 1427     IP VTE HIGH RISK PATIENT  Once         03/20/23 1801     Place DONOVAN hose  Until discontinued         03/20/23 1801                Discharge Planning   SUZANNA:      Code Status: Full Code   Is the patient medically ready for discharge?:     Reason for patient still in hospital (select all that apply): Patient trending condition  Discharge Plan A: Home with family, Home Health                  Hannah Schulz MD  Department of Hospital Medicine   [facility]

## 2023-04-07 NOTE — PROGRESS NOTES
No new wt. Meal intake ~65%.  B-336.  Last BM .  Hgb 10.2, Hct 32.9.  No problems noted with healing.  Pt has regular soda at bedside this a.m.  Continue to follow.

## 2023-04-07 NOTE — PLAN OF CARE
Problem: Adult Inpatient Plan of Care  Goal: Plan of Care Review  Outcome: Ongoing, Progressing  Goal: Patient-Specific Goal (Individualized)  Outcome: Ongoing, Progressing     Problem: Fall Injury Risk  Goal: Absence of Fall and Fall-Related Injury  Outcome: Ongoing, Progressing  Intervention: Identify and Manage Contributors  Flowsheets (Taken 4/7/2023 1808)  Self-Care Promotion:   independence encouraged   BADL personal objects within reach   BADL personal routines maintained  Medication Review/Management:   medications reviewed   high-risk medications identified  Intervention: Promote Injury-Free Environment  Flowsheets (Taken 4/7/2023 1808)  Safety Promotion/Fall Prevention:   assistive device/personal item within reach   diversional activities provided   high risk medications identified   medications reviewed   muscle strengthening facilitated   nonskid shoes/socks when out of bed   side rails raised x 3   instructed to call staff for mobility

## 2023-04-07 NOTE — SUBJECTIVE & OBJECTIVE
Interval History: wants to sit outside.    Review of Systems  Objective:     Vital Signs (Most Recent):  Temp: 98.5 °F (36.9 °C) (04/06/23 1906)  Pulse: 70 (04/07/23 0735)  Resp: 18 (04/07/23 0735)  BP: 130/69 (04/06/23 1906)  SpO2: 98 % (04/07/23 0735) Vital Signs (24h Range):  Temp:  [98.5 °F (36.9 °C)] 98.5 °F (36.9 °C)  Pulse:  [70-80] 70  Resp:  [16-20] 18  SpO2:  [98 %-99 %] 98 %  BP: (130)/(69) 130/69     Weight: 63.9 kg (140 lb 12.8 oz)  Body mass index is 22.73 kg/m².  No intake or output data in the 24 hours ending 04/07/23 0825   Physical Exam    Significant Labs: All pertinent labs within the past 24 hours have been reviewed.    Significant Imaging: I have reviewed all pertinent imaging results/findings within the past 24 hours.

## 2023-04-08 LAB
GLUCOSE SERPL-MCNC: 185 MG/DL (ref 70–105)
GLUCOSE SERPL-MCNC: 200 MG/DL (ref 70–105)
GLUCOSE SERPL-MCNC: 214 MG/DL (ref 70–105)
GLUCOSE SERPL-MCNC: 315 MG/DL (ref 70–105)
VANCOMYCIN SERPL-MCNC: 15.8 ΜG/ML (ref 0–20)

## 2023-04-08 PROCEDURE — 25000003 PHARM REV CODE 250: Performed by: SPECIALIST

## 2023-04-08 PROCEDURE — 25000003 PHARM REV CODE 250: Performed by: FAMILY MEDICINE

## 2023-04-08 PROCEDURE — 63600175 PHARM REV CODE 636 W HCPCS: Performed by: FAMILY MEDICINE

## 2023-04-08 PROCEDURE — 80202 ASSAY OF VANCOMYCIN: CPT | Performed by: FAMILY MEDICINE

## 2023-04-08 PROCEDURE — 11000004 HC SNF PRIVATE

## 2023-04-08 PROCEDURE — 94761 N-INVAS EAR/PLS OXIMETRY MLT: CPT

## 2023-04-08 PROCEDURE — 82962 GLUCOSE BLOOD TEST: CPT

## 2023-04-08 RX ADMIN — LOSARTAN POTASSIUM 25 MG: 25 TABLET, FILM COATED ORAL at 08:04

## 2023-04-08 RX ADMIN — GABAPENTIN 300 MG: 300 CAPSULE ORAL at 08:04

## 2023-04-08 RX ADMIN — INSULIN ASPART 2 UNITS: 100 INJECTION, SOLUTION INTRAVENOUS; SUBCUTANEOUS at 06:04

## 2023-04-08 RX ADMIN — APIXABAN 2.5 MG: 2.5 TABLET, FILM COATED ORAL at 08:04

## 2023-04-08 RX ADMIN — INSULIN ASPART 5 UNITS: 100 INJECTION, SOLUTION INTRAVENOUS; SUBCUTANEOUS at 06:04

## 2023-04-08 RX ADMIN — HYDRALAZINE HYDROCHLORIDE 25 MG: 25 TABLET, FILM COATED ORAL at 08:04

## 2023-04-08 RX ADMIN — DULOXETINE 60 MG: 30 CAPSULE, DELAYED RELEASE ORAL at 08:04

## 2023-04-08 RX ADMIN — SENNOSIDES AND DOCUSATE SODIUM 1 TABLET: 8.6; 5 TABLET ORAL at 08:04

## 2023-04-08 RX ADMIN — INSULIN DETEMIR 30 UNITS: 100 INJECTION, SOLUTION SUBCUTANEOUS at 08:04

## 2023-04-08 RX ADMIN — Medication 9 MG: at 08:04

## 2023-04-08 RX ADMIN — HYDROCODONE BITARTRATE AND ACETAMINOPHEN 1 TABLET: 5; 325 TABLET ORAL at 06:04

## 2023-04-08 RX ADMIN — INSULIN ASPART 4 UNITS: 100 INJECTION, SOLUTION INTRAVENOUS; SUBCUTANEOUS at 08:04

## 2023-04-08 RX ADMIN — Medication 1 CAPSULE: at 06:04

## 2023-04-08 RX ADMIN — HYDROCODONE BITARTRATE AND ACETAMINOPHEN 1 TABLET: 5; 325 TABLET ORAL at 01:04

## 2023-04-08 RX ADMIN — INSULIN ASPART 4 UNITS: 100 INJECTION, SOLUTION INTRAVENOUS; SUBCUTANEOUS at 04:04

## 2023-04-08 RX ADMIN — INSULIN ASPART 5 UNITS: 100 INJECTION, SOLUTION INTRAVENOUS; SUBCUTANEOUS at 04:04

## 2023-04-08 RX ADMIN — Medication 1 CAPSULE: at 04:04

## 2023-04-08 RX ADMIN — HYDROCODONE BITARTRATE AND ACETAMINOPHEN 1 TABLET: 5; 325 TABLET ORAL at 08:04

## 2023-04-08 RX ADMIN — HEPARIN, PORCINE (PF) 10 UNIT/ML INTRAVENOUS SYRINGE 50 UNITS: at 08:04

## 2023-04-08 RX ADMIN — INSULIN ASPART 5 UNITS: 100 INJECTION, SOLUTION INTRAVENOUS; SUBCUTANEOUS at 10:04

## 2023-04-08 RX ADMIN — Medication 1 CAPSULE: at 10:04

## 2023-04-08 RX ADMIN — INSULIN ASPART 3 UNITS: 100 INJECTION, SOLUTION INTRAVENOUS; SUBCUTANEOUS at 10:04

## 2023-04-08 RX ADMIN — VANCOMYCIN HYDROCHLORIDE 2000 MG: 1 INJECTION, POWDER, LYOPHILIZED, FOR SOLUTION INTRAVENOUS at 04:04

## 2023-04-08 NOTE — PLAN OF CARE
Problem: Adult Inpatient Plan of Care  Goal: Plan of Care Review  Outcome: Ongoing, Progressing  Goal: Patient-Specific Goal (Individualized)  Outcome: Ongoing, Progressing     Problem: Fall Injury Risk  Goal: Absence of Fall and Fall-Related Injury  Outcome: Ongoing, Progressing  Intervention: Identify and Manage Contributors  Flowsheets (Taken 4/8/2023 1719)  Self-Care Promotion:   independence encouraged   BADL personal objects within reach   BADL personal routines maintained  Medication Review/Management:   medications reviewed   high-risk medications identified  Intervention: Promote Injury-Free Environment  Flowsheets (Taken 4/8/2023 1719)  Safety Promotion/Fall Prevention:   assistive device/personal item within reach   diversional activities provided   high risk medications identified   medications reviewed   muscle strengthening facilitated   nonskid shoes/socks when out of bed   side rails raised x 3   instructed to call staff for mobility

## 2023-04-08 NOTE — PLAN OF CARE
Problem: Diabetes Comorbidity  Goal: Blood Glucose Level Within Targeted Range  Outcome: Ongoing, Not Progressing     Problem: Fluid and Electrolyte Imbalance (Acute Kidney Injury/Impairment)  Goal: Fluid and Electrolyte Balance  Outcome: Ongoing, Progressing     Problem: Impaired Wound Healing  Goal: Optimal Wound Healing  Outcome: Ongoing, Progressing     Problem: Fall Injury Risk  Goal: Absence of Fall and Fall-Related Injury  Outcome: Ongoing, Progressing     Problem: Infection  Goal: Absence of Infection Signs and Symptoms  Outcome: Ongoing, Progressing

## 2023-04-09 LAB
GLUCOSE SERPL-MCNC: 256 MG/DL (ref 70–105)
GLUCOSE SERPL-MCNC: 261 MG/DL (ref 70–105)
GLUCOSE SERPL-MCNC: 264 MG/DL (ref 70–105)
GLUCOSE SERPL-MCNC: 295 MG/DL (ref 70–105)

## 2023-04-09 PROCEDURE — 63600175 PHARM REV CODE 636 W HCPCS: Performed by: FAMILY MEDICINE

## 2023-04-09 PROCEDURE — 94761 N-INVAS EAR/PLS OXIMETRY MLT: CPT

## 2023-04-09 PROCEDURE — 82962 GLUCOSE BLOOD TEST: CPT

## 2023-04-09 PROCEDURE — 25000003 PHARM REV CODE 250: Performed by: FAMILY MEDICINE

## 2023-04-09 PROCEDURE — 25000003 PHARM REV CODE 250: Performed by: SPECIALIST

## 2023-04-09 PROCEDURE — 11000004 HC SNF PRIVATE

## 2023-04-09 RX ADMIN — INSULIN ASPART 6 UNITS: 100 INJECTION, SOLUTION INTRAVENOUS; SUBCUTANEOUS at 05:04

## 2023-04-09 RX ADMIN — HYDROCODONE BITARTRATE AND ACETAMINOPHEN 1 TABLET: 5; 325 TABLET ORAL at 11:04

## 2023-04-09 RX ADMIN — INSULIN ASPART 6 UNITS: 100 INJECTION, SOLUTION INTRAVENOUS; SUBCUTANEOUS at 11:04

## 2023-04-09 RX ADMIN — INSULIN ASPART 6 UNITS: 100 INJECTION, SOLUTION INTRAVENOUS; SUBCUTANEOUS at 06:04

## 2023-04-09 RX ADMIN — Medication 1 CAPSULE: at 10:04

## 2023-04-09 RX ADMIN — INSULIN ASPART 5 UNITS: 100 INJECTION, SOLUTION INTRAVENOUS; SUBCUTANEOUS at 06:04

## 2023-04-09 RX ADMIN — INSULIN ASPART 3 UNITS: 100 INJECTION, SOLUTION INTRAVENOUS; SUBCUTANEOUS at 08:04

## 2023-04-09 RX ADMIN — Medication 9 MG: at 08:04

## 2023-04-09 RX ADMIN — VANCOMYCIN HYDROCHLORIDE 2000 MG: 1 INJECTION, POWDER, LYOPHILIZED, FOR SOLUTION INTRAVENOUS at 04:04

## 2023-04-09 RX ADMIN — SENNOSIDES AND DOCUSATE SODIUM 1 TABLET: 8.6; 5 TABLET ORAL at 08:04

## 2023-04-09 RX ADMIN — Medication 1 CAPSULE: at 04:04

## 2023-04-09 RX ADMIN — HYDRALAZINE HYDROCHLORIDE 25 MG: 25 TABLET, FILM COATED ORAL at 08:04

## 2023-04-09 RX ADMIN — GABAPENTIN 300 MG: 300 CAPSULE ORAL at 08:04

## 2023-04-09 RX ADMIN — DULOXETINE 60 MG: 30 CAPSULE, DELAYED RELEASE ORAL at 08:04

## 2023-04-09 RX ADMIN — HEPARIN, PORCINE (PF) 10 UNIT/ML INTRAVENOUS SYRINGE 50 UNITS: at 08:04

## 2023-04-09 RX ADMIN — HYDROCODONE BITARTRATE AND ACETAMINOPHEN 1 TABLET: 5; 325 TABLET ORAL at 08:04

## 2023-04-09 RX ADMIN — HYDROCODONE BITARTRATE AND ACETAMINOPHEN 1 TABLET: 5; 325 TABLET ORAL at 06:04

## 2023-04-09 RX ADMIN — INSULIN DETEMIR 30 UNITS: 100 INJECTION, SOLUTION SUBCUTANEOUS at 08:04

## 2023-04-09 RX ADMIN — LOSARTAN POTASSIUM 25 MG: 25 TABLET, FILM COATED ORAL at 08:04

## 2023-04-09 RX ADMIN — INSULIN ASPART 5 UNITS: 100 INJECTION, SOLUTION INTRAVENOUS; SUBCUTANEOUS at 05:04

## 2023-04-09 RX ADMIN — Medication 1 CAPSULE: at 06:04

## 2023-04-09 RX ADMIN — INSULIN ASPART 5 UNITS: 100 INJECTION, SOLUTION INTRAVENOUS; SUBCUTANEOUS at 11:04

## 2023-04-09 RX ADMIN — ASPIRIN 325 MG ORAL TABLET 325 MG: 325 PILL ORAL at 08:04

## 2023-04-09 NOTE — PLAN OF CARE
Problem: Adult Inpatient Plan of Care  Goal: Plan of Care Review  Outcome: Ongoing, Progressing  Flowsheets (Taken 4/9/2023 8231)  Plan of Care Reviewed With: patient  Goal: Optimal Comfort and Wellbeing  Outcome: Ongoing, Progressing  Goal: Readiness for Transition of Care  Outcome: Ongoing, Progressing     Problem: Diabetes Comorbidity  Goal: Blood Glucose Level Within Targeted Range  Outcome: Ongoing, Not Progressing     Problem: Fluid and Electrolyte Imbalance (Acute Kidney Injury/Impairment)  Goal: Fluid and Electrolyte Balance  Outcome: Ongoing, Progressing

## 2023-04-09 NOTE — NURSING
Rec'd pt tonight noted awake and alert with no acute distress, respirations even and unlabored. Denies pain at this time. Incision site looks good, no redness or drainage or signs of infection noted. Lung sounds clear. Heart sounds regular.

## 2023-04-10 DIAGNOSIS — M00.9 JOINT INFECTION: Primary | ICD-10-CM

## 2023-04-10 LAB
ANION GAP SERPL CALCULATED.3IONS-SCNC: 9 MMOL/L (ref 7–16)
BASOPHILS # BLD AUTO: 0.04 K/UL (ref 0–0.2)
BASOPHILS NFR BLD AUTO: 0.5 % (ref 0–1)
BUN SERPL-MCNC: 16 MG/DL (ref 7–18)
BUN/CREAT SERPL: 20 (ref 6–20)
CALCIUM SERPL-MCNC: 8.9 MG/DL (ref 8.5–10.1)
CHLORIDE SERPL-SCNC: 101 MMOL/L (ref 98–107)
CO2 SERPL-SCNC: 30 MMOL/L (ref 21–32)
CREAT SERPL-MCNC: 0.8 MG/DL (ref 0.55–1.02)
DIFFERENTIAL METHOD BLD: ABNORMAL
EGFR (NO RACE VARIABLE) (RUSH/TITUS): 88 ML/MIN/1.73M²
EOSINOPHIL # BLD AUTO: 0.79 K/UL (ref 0–0.5)
EOSINOPHIL NFR BLD AUTO: 10.5 % (ref 1–4)
ERYTHROCYTE [DISTWIDTH] IN BLOOD BY AUTOMATED COUNT: 20.2 % (ref 11.5–14.5)
GLUCOSE SERPL-MCNC: 159 MG/DL (ref 70–105)
GLUCOSE SERPL-MCNC: 209 MG/DL (ref 74–106)
GLUCOSE SERPL-MCNC: 281 MG/DL (ref 70–105)
GLUCOSE SERPL-MCNC: 281 MG/DL (ref 70–105)
HCT VFR BLD AUTO: 33.2 % (ref 38–47)
HGB BLD-MCNC: 10.2 G/DL (ref 12–16)
IMM GRANULOCYTES # BLD AUTO: 0.02 K/UL (ref 0–0.04)
IMM GRANULOCYTES NFR BLD: 0.3 % (ref 0–0.4)
LYMPHOCYTES # BLD AUTO: 2.3 K/UL (ref 1–4.8)
LYMPHOCYTES NFR BLD AUTO: 30.5 % (ref 27–41)
MCH RBC QN AUTO: 25.7 PG (ref 27–31)
MCHC RBC AUTO-ENTMCNC: 30.7 G/DL (ref 32–36)
MCV RBC AUTO: 83.6 FL (ref 80–96)
MONOCYTES # BLD AUTO: 0.68 K/UL (ref 0–0.8)
MONOCYTES NFR BLD AUTO: 9 % (ref 2–6)
MPC BLD CALC-MCNC: 10.1 FL (ref 9.4–12.4)
NEUTROPHILS # BLD AUTO: 3.72 K/UL (ref 1.8–7.7)
NEUTROPHILS NFR BLD AUTO: 49.2 % (ref 53–65)
PLATELET # BLD AUTO: 218 K/UL (ref 150–400)
POTASSIUM SERPL-SCNC: 3.8 MMOL/L (ref 3.5–5.1)
RBC # BLD AUTO: 3.97 M/UL (ref 4.2–5.4)
SODIUM SERPL-SCNC: 136 MMOL/L (ref 136–145)
WBC # BLD AUTO: 7.55 K/UL (ref 4.5–11)

## 2023-04-10 PROCEDURE — 85025 COMPLETE CBC W/AUTO DIFF WBC: CPT | Performed by: FAMILY MEDICINE

## 2023-04-10 PROCEDURE — 25000003 PHARM REV CODE 250: Performed by: SPECIALIST

## 2023-04-10 PROCEDURE — 63600175 PHARM REV CODE 636 W HCPCS: Performed by: FAMILY MEDICINE

## 2023-04-10 PROCEDURE — 80048 BASIC METABOLIC PNL TOTAL CA: CPT | Performed by: FAMILY MEDICINE

## 2023-04-10 PROCEDURE — 25000003 PHARM REV CODE 250: Performed by: FAMILY MEDICINE

## 2023-04-10 PROCEDURE — 94761 N-INVAS EAR/PLS OXIMETRY MLT: CPT

## 2023-04-10 PROCEDURE — 97110 THERAPEUTIC EXERCISES: CPT

## 2023-04-10 PROCEDURE — 82962 GLUCOSE BLOOD TEST: CPT

## 2023-04-10 PROCEDURE — 11000004 HC SNF PRIVATE

## 2023-04-10 PROCEDURE — 99308 SBSQ NF CARE LOW MDM 20: CPT | Mod: ,,, | Performed by: FAMILY MEDICINE

## 2023-04-10 PROCEDURE — 99308 PR NURSING FAC CARE, SUBSEQ, MINOR COMPLIC: ICD-10-PCS | Mod: ,,, | Performed by: FAMILY MEDICINE

## 2023-04-10 RX ADMIN — HYDROCODONE BITARTRATE AND ACETAMINOPHEN 1 TABLET: 5; 325 TABLET ORAL at 09:04

## 2023-04-10 RX ADMIN — HYDRALAZINE HYDROCHLORIDE 25 MG: 25 TABLET, FILM COATED ORAL at 08:04

## 2023-04-10 RX ADMIN — SENNOSIDES AND DOCUSATE SODIUM 1 TABLET: 8.6; 5 TABLET ORAL at 08:04

## 2023-04-10 RX ADMIN — SENNOSIDES AND DOCUSATE SODIUM 1 TABLET: 8.6; 5 TABLET ORAL at 09:04

## 2023-04-10 RX ADMIN — LOSARTAN POTASSIUM 25 MG: 25 TABLET, FILM COATED ORAL at 08:04

## 2023-04-10 RX ADMIN — INSULIN ASPART 5 UNITS: 100 INJECTION, SOLUTION INTRAVENOUS; SUBCUTANEOUS at 06:04

## 2023-04-10 RX ADMIN — HYDROCODONE BITARTRATE AND ACETAMINOPHEN 1 TABLET: 5; 325 TABLET ORAL at 08:04

## 2023-04-10 RX ADMIN — DULOXETINE 60 MG: 30 CAPSULE, DELAYED RELEASE ORAL at 08:04

## 2023-04-10 RX ADMIN — GABAPENTIN 300 MG: 300 CAPSULE ORAL at 08:04

## 2023-04-10 RX ADMIN — Medication 1 CAPSULE: at 06:04

## 2023-04-10 RX ADMIN — INSULIN ASPART 4 UNITS: 100 INJECTION, SOLUTION INTRAVENOUS; SUBCUTANEOUS at 06:04

## 2023-04-10 RX ADMIN — HEPARIN, PORCINE (PF) 10 UNIT/ML INTRAVENOUS SYRINGE 50 UNITS: at 08:04

## 2023-04-10 RX ADMIN — INSULIN DETEMIR 30 UNITS: 100 INJECTION, SOLUTION SUBCUTANEOUS at 08:04

## 2023-04-10 RX ADMIN — GABAPENTIN 300 MG: 300 CAPSULE ORAL at 09:04

## 2023-04-10 RX ADMIN — HYDRALAZINE HYDROCHLORIDE 25 MG: 25 TABLET, FILM COATED ORAL at 09:04

## 2023-04-10 RX ADMIN — INSULIN ASPART 5 UNITS: 100 INJECTION, SOLUTION INTRAVENOUS; SUBCUTANEOUS at 05:04

## 2023-04-10 RX ADMIN — Medication 1 CAPSULE: at 05:04

## 2023-04-10 RX ADMIN — Medication 9 MG: at 09:04

## 2023-04-10 RX ADMIN — INSULIN ASPART 5 UNITS: 100 INJECTION, SOLUTION INTRAVENOUS; SUBCUTANEOUS at 11:04

## 2023-04-10 RX ADMIN — VANCOMYCIN HYDROCHLORIDE 2000 MG: 1 INJECTION, POWDER, LYOPHILIZED, FOR SOLUTION INTRAVENOUS at 04:04

## 2023-04-10 RX ADMIN — ASPIRIN 325 MG ORAL TABLET 325 MG: 325 PILL ORAL at 08:04

## 2023-04-10 RX ADMIN — INSULIN ASPART 3 UNITS: 100 INJECTION, SOLUTION INTRAVENOUS; SUBCUTANEOUS at 09:04

## 2023-04-10 RX ADMIN — Medication 1 CAPSULE: at 11:04

## 2023-04-10 NOTE — PROGRESS NOTES
[facility] Medicine  Progress Note    Patient Name: Monica Walker  MRN: [id number]  Patient Class: IP- Swing   Admission Date: 3/20/2023  Length of Stay: 21 days  Attending Physician: Hannah Schulz, *  Primary Care Provider: Hannah Schulz MD        Subjective:     Principal Problem:Staphylococcal arthritis of left knee        HPI:  3/20/23 - this 52 yr. Old WF was readmitted here for IV antibiotics. Her PICC line was pulled on the 16th. It was not replaced on the 18th as the noted stated was the plan. She is to get antibiotics through the 27th of April. We will have to arrange for her to have another PICC line installed for this. She has had her left knee replacement removed for sepsis. She has a spacer in the joint. She will undergo another knee replacement after the antibiotics. For rest of history, please see her old records.      Overview/Hospital Course:  3/21/23 - pt. Is doing well so far. We will arrange for her to get a PICC line soon.    3/24/23 - pt. Is again wanting more pain meds. Again told her that she is on the dose she was on in [place]. Will continue present treatment.    3/27/23 - pt. Is still eating a lot of [employer]. Still complaining of pain. She is not moving. Will continue present treatment.    3/31/23 - pt. Is sleeping.    4/3/23 - pt. Sleeping. No complaints voiced by staff.    4/7/23 - pt. Requesting sitting outside some today. OK by me.    4/10/23 - pt. Is sleeping.      No new subjective & objective note has been filed under this hospital service since the last note was generated.      Assessment/Plan:      * Staphylococcal arthritis of left knee          VTE Risk Mitigation (From admission, onward)         Ordered     heparin, porcine (PF) injection 50 Units  Daily         03/27/23 1427     IP VTE HIGH RISK PATIENT  Once         03/20/23 1801     Place DONOVAN hose  Until discontinued         03/20/23 1801                 Discharge Planning   SUZANNA:      Code Status: Full Code   Is the patient medically ready for discharge?:     Reason for patient still in hospital (select all that apply): Patient trending condition  Discharge Plan A: Home with family, Home Health                  Hannah Schulz MD  Department of Hospital Medicine   [facility]

## 2023-04-10 NOTE — PROGRESS NOTES
[facility]  Hospital Medicine  Progress Note    Patient Name: Monica Walker  MRN: [id number]  Patient Class: IP- Swing   Admission Date: 3/20/2023  Length of Stay: 21 days  Attending Physician: Hannah Schulz, *  Primary Care Provider: Hannah Schulz MD        Subjective:     Principal Problem:Staphylococcal arthritis of left knee        HPI:  3/20/23 - this 52 yr. Old WF was readmitted here for IV antibiotics. Her PICC line was pulled on the 16th. It was not replaced on the 18th as the noted stated was the plan. She is to get antibiotics through the 27th of April. We will have to arrange for her to have another PICC line installed for this. She has had her left knee replacement removed for sepsis. She has a spacer in the joint. She will undergo another knee replacement after the antibiotics. For rest of history, please see her old records.      Overview/Hospital Course:  3/21/23 - pt. Is doing well so far. We will arrange for her to get a PICC line soon.    3/24/23 - pt. Is again wanting more pain meds. Again told her that she is on the dose she was on in [place]. Will continue present treatment.    3/27/23 - pt. Is still eating a lot of [employer]. Still complaining of pain. She is not moving. Will continue present treatment.    3/31/23 - pt. Is sleeping.    4/3/23 - pt. Sleeping. No complaints voiced by staff.    4/7/23 - pt. Requesting sitting outside some today. OK by me.    4/10/23 - pt. Is sleeping.      Interval History: pt. Is sleeping.    Review of Systems  Objective:     Vital Signs (Most Recent):  Temp: 98.2 °F (36.8 °C) (04/10/23 0720)  Pulse: 76 (04/10/23 0750)  Resp: 18 (04/10/23 0720)  BP: (!) 141/72 (04/10/23 0720)  SpO2: 97 % (04/10/23 0750)   Vital Signs (24h Range):  Temp:  [97.9 °F (36.6 °C)-98.2 °F (36.8 °C)] 98.2 °F (36.8 °C)  Pulse:  [] 76  Resp:  [18-20] 18  SpO2:  [97 %-99 %] 97 %  BP: (141-143)/(72-76) 141/72     Weight: 63.8 kg  (140 lb 10.5 oz)  Body mass index is 22.7 kg/m².    Intake/Output Summary (Last 24 hours) at 4/10/2023 0827  Last data filed at 4/9/2023 1832  Gross per 24 hour   Intake 720 ml   Output --   Net 720 ml      Physical Exam    Significant Labs: All pertinent labs within the past 24 hours have been reviewed.    Significant Imaging: I have reviewed all pertinent imaging results/findings within the past 24 hours.      Assessment/Plan:      * Staphylococcal arthritis of left knee          VTE Risk Mitigation (From admission, onward)         Ordered     heparin, porcine (PF) injection 50 Units  Daily         03/27/23 1427     IP VTE HIGH RISK PATIENT  Once         03/20/23 1801     Place DONOVAN hose  Until discontinued         03/20/23 1801                Discharge Planning   SUZANNA:      Code Status: Full Code   Is the patient medically ready for discharge?:     Reason for patient still in hospital (select all that apply): Patient trending condition  Discharge Plan A: Home with family, Home Health                  Hannah Schulz MD  Department of Hospital Medicine   [facility]

## 2023-04-10 NOTE — PT/OT/SLP PROGRESS
"Physical Therapy  Treatment    Monica Walker   MRN: [id number]   Admitting Diagnosis: Staphylococcal arthritis of left knee    PT Received On: 04/10/23  PT Start Time: 1240     PT Stop Time: 1304    PT Total Time (min): 24 min       Billable Minutes: There Ex 24    Treatment Type: Treatment  PT/PTA: PT     Number of PTA visits since last PT visit: 0       General Precautions: Standard, fall  Orthopedic Precautions: LLE toe touch weight bearing  Braces: Knee immobilizer  Respiratory Status: Room air    Spiritual, Cultural Beliefs, Anabaptism Practices, Values that Affect Care: no    Subjective:     Pt is agreeable to PT treatment session.     Pain/Comfort  Pain Rating 1: 6/10  Location - Side 1: Left  Location - Orientation 1: generalized  Location 1: knee  Pain Addressed 1: Distraction    Objective:   Patient found with: PICC line    Functional Mobility:   Bed Mobility: supine to sit: SBA     Transfers: sit <> stand and bed <>chair SBA for safety        Gait:  Approx. 100' x 1 with RW SBA, L LE TTWB on level indoor surface        Stairs: not performed       Therapeutic Activities and Exercises:    Ther- Ex    Nustep    Ankle pumps 30 x    Hip adduction 30 x 3"    Heelslides    LAQ's 3 x 10 2# R LE    Hamstring Curls 3 x 10 yellow TB R LE    Quad sets  3 x 10 3"    SAQ's 30 x 3" R LE 2#   Straight Leg Raises 3 x 10 B LE with min A for L LE    Hip abduction    Horizontal ADD/ABD 3 x 10 B LE    Glut sets  30x 5"    Seated Marching  3 x 10 2# RLE    Ther-Act    Heel Raises    Mini Squats    3 way hip    Sit <> stands  3 x    Standing Marches                    AM-PAC 6 CLICK MOBILITY  How much help from another person does this patient currently need?   1 = Unable, Total/Dependent Assistance  2 = A lot, Maximum/Moderate Assistance  3 = A little, Minimum/Contact Guard/Supervision  4 = None, Modified [place]/Independent    Turning over in bed (including adjusting bedclothes, sheets and blankets)?: 4  Sitting down on " and standing up from a chair with arms (e.g., wheelchair, bedside commode, etc.): 3  Moving from lying on back to sitting on the side of the bed?: 4  Moving to and from a bed to a chair (including a wheelchair)?: 3  Need to walk in hospital room?: 3  Climbing 3-5 steps with a railing?: 2  Basic Mobility Total Score: 19    AM-PAC Raw Score CMS G-Code Modifier Level of Impairment Assistance   6 % Total / Unable   7 - 9 CM 80 - 100% Maximal Assist   10 - 14 CL 60 - 80% Moderate Assist   15 - 19 CK 40 - 60% Moderate Assist   20 - 22 CJ 20 - 40% Minimal Assist   23 CI 1-20% SBA / CGA   24 CH 0% Independent/ Mod I     Patient left up in chair with call bell handy    Assessment:  Monica Walker is a 52 y.o. female with a medical diagnosis of Staphylococcal arthritis of left knee and presents with Rehab identified problem list/impairments: weakness, impaired endurance, impaired functional mobility, gait instability, impaired balance, decreased lower extremity function, pain, decreased ROM.  Patient is motivated to participate in PT treatment session. Patient moves quickly through exercises with only minimal cueing for decreased speed and min A for LLE straight leg raises exercise. Patient with no adverse effects to treatment.         Rehab potential is good.    Activity tolerance: Good and Fair    Discharge recommendations: home with home health      Barriers to discharge:      Equipment recommendations: none     GOALS:   Multidisciplinary Problems       Physical Therapy Goals          Problem: Physical Therapy    Goal Priority Disciplines Outcome Goal Variances Interventions   Physical Therapy Goal     PT, PT/OT Ongoing, Progressing     Description: Short Term Goals  1. Patient will complete 30 reps of B LE exercises with correct form.   2. Patient will complete sit<>stand transfers with SBA while maintaining toe touch weight bearing on LLE.   3. Patient will ambulate 25 feet with RW on level surfaces with CGA  while maintaining toe touch weight bearing on LLE.    Long Term Goals   1. Patient will ambulate 50 feet with RW on level and unlevel surfaces with SBA while maintaining toe touch weight bearing on LLE.  2. Patient will complete all functional transfers with MOD I while maintaining toe touch weight bearing on LLE.                        PLAN:    Patient to be seen 5 x/week to address the above listed problems via gait training, therapeutic exercises, therapeutic activities  Plan of Care expires: 04/11/23  Plan of Care reviewed with: patient    Continue Plan of Care Per PT order to progress patient toward rehab goals.   Damaris Kaplan, PT, DPT     4/10/2023

## 2023-04-10 NOTE — PT/OT/SLP PROGRESS
"Occupational Therapy  Treatment    Monica Walker   MRN: [id number]   Admitting Diagnosis: Staphylococcal arthritis of left knee    OT Date of Treatment: 04/10/23   OT Start Time: 1035  OT Stop Time: 1100  OT Total Time (min): 25 min    Billable Minutes:  Therapeutic Exercise 25 min               General Precautions: Standard, fall  Orthopedic Precautions: LLE toe touch weight bearing  Braces: Knee immobilizer  Respiratory Status: Room air         Subjective:  Communicated with RN prior to session.          Pain/Comfort  Pain Rating 1: 6/10    Objective:  Patient found with: PICC line     Functional Mobility:  Bed Mobility:        Transfers:         Functional Ambulation:     Activities of Daily Living:         Therapeutic Activities and Exercises:  Patient completed the following for increased strength to increase I with ADLs: UBE 7 min x 1x, 4# DB- shoulder press, chest press with RUE, and bicep curls with BUE x 40, yellow theraflex x 40 both ways, yellow theraband- scapular retraction and tricep extension x 40      AM-PAC 6 CLICK ADL   How much help from another person does this patient currently need?   1 = Unable, Total/Dependent Assistance  2 = A lot, Maximum/Moderate Assistance  3 = A little, Minimum/Contact Guard/Supervision  4 = None, Modified [place]/Independent    Putting on and taking off regular lower body clothing? : 3  Bathing (including washing, rinsing, drying)?: 3  Toileting, which includes using toilet, bedpan, or urinal? : 3  Putting on and taking off regular upper body clothing?: 3  Taking care of personal grooming such as brushing teeth?: 4  Eating meals?: 4  Daily Activity Total Score: 20     AM-PAC Raw Score CMS "G-Code Modifier Level of Impairment Assistance   6 % Total / Unable   7 - 8 CM 80 - 100% Maximal Assist   9-13 CL 60 - 80% Moderate Assist   14 - 19 CK 40 - 60% Moderate Assist   20 - 22 CJ 20 - 40% Minimal Assist   23 CI 1-20% SBA / CGA   24 CH 0% Independent/ Mod I "       Patient left supine with call button in reach    ASSESSMENT:  Monica Walker is a 52 y.o. female with a medical diagnosis of Staphylococcal arthritis of left knee and presents with decreased strength, decreased endurance, decreased self-care.    Rehab identified problem list/impairments:  weakness, impaired endurance, impaired self care skills, impaired functional mobility, decreased upper extremity function, decreased lower extremity function, decreased safety awareness    Rehab potential is fair.    Activity tolerance: Good    Discharge recommendations: home with home health   Barriers to discharge:      Equipment recommendations: none    GOALS:   Multidisciplinary Problems       Occupational Therapy Goals          Problem: Occupational Therapy    Goal Priority Disciplines Outcome Interventions   Occupational Therapy Goal     OT, PT/OT Ongoing, Progressing    Description: Description: Grooming Status:   Short Term Goal: Pt will perform grooming with s/u sitting EOB.   Long Term Goal: Pt will perform grooming/oral hygiene standing at sink with Mod I      LE dressing Status:   Short Term Goal: Pt will perform LE dressing with mod a.   Long Term Goal: Pt will perform LE dressing with min a.    Toileting Status:   Short Term Goal: Pt will perform toilet hygiene on BSC with min a.  Long Term Goal: Pt will perform toilet hygiene on toilet with no AE with s/u.    Commode Transfer:   Short Term Goal: Pt will perform BSC t/f with min a.  Long Term Goal:  Pt will perform toilet t/f in bathroom with s/u.     Bathing Status:   Long Term Goal: Pt will perform sponge bath with s/u with no unsafe fatigue.     Strength Status:   Long Term Goal: Pt to perform BUE strengthening with weights and/or body weight to increase ADL independence and safety    Endurance Status:   Short Term Goal:pt to perform 15 min OT treatment with 5 or greater rest breaks  Long Term Goal: pt to perform 30 min OT treat with 3 or less rest breaks                        Plan:  Patient to be seen 5 x/week to address the above listed problems via therapeutic exercises  Plan of Care expires:    Plan of Care reviewed with: patient     Kirti Che, OTR/ETHAN      04/10/2023

## 2023-04-10 NOTE — SUBJECTIVE & OBJECTIVE
Interval History: pt. Is sleeping.    Review of Systems  Objective:     Vital Signs (Most Recent):  Temp: 98.2 °F (36.8 °C) (04/10/23 0720)  Pulse: 76 (04/10/23 0750)  Resp: 18 (04/10/23 0720)  BP: (!) 141/72 (04/10/23 0720)  SpO2: 97 % (04/10/23 0750)   Vital Signs (24h Range):  Temp:  [97.9 °F (36.6 °C)-98.2 °F (36.8 °C)] 98.2 °F (36.8 °C)  Pulse:  [] 76  Resp:  [18-20] 18  SpO2:  [97 %-99 %] 97 %  BP: (141-143)/(72-76) 141/72     Weight: 63.8 kg (140 lb 10.5 oz)  Body mass index is 22.7 kg/m².    Intake/Output Summary (Last 24 hours) at 4/10/2023 0827  Last data filed at 4/9/2023 1832  Gross per 24 hour   Intake 720 ml   Output --   Net 720 ml      Physical Exam    Significant Labs: All pertinent labs within the past 24 hours have been reviewed.    Significant Imaging: I have reviewed all pertinent imaging results/findings within the past 24 hours.

## 2023-04-10 NOTE — PLAN OF CARE
Problem: Adult Inpatient Plan of Care  Goal: Plan of Care Review  Outcome: Ongoing, Progressing  Goal: Patient-Specific Goal (Individualized)  Outcome: Ongoing, Progressing  Goal: Absence of Hospital-Acquired Illness or Injury  Outcome: Ongoing, Progressing  Goal: Optimal Comfort and Wellbeing  Outcome: Ongoing, Progressing     Problem: Impaired Wound Healing  Goal: Optimal Wound Healing  Outcome: Ongoing, Progressing     Problem: Fall Injury Risk  Goal: Absence of Fall and Fall-Related Injury  Outcome: Ongoing, Progressing     Problem: Skin Injury Risk Increased  Goal: Skin Health and Integrity  Outcome: Ongoing, Progressing

## 2023-04-11 ENCOUNTER — HOSPITAL ENCOUNTER (OUTPATIENT)
Dept: RADIOLOGY | Facility: HOSPITAL | Age: 53
Discharge: HOME | End: 2023-04-11
Attending: ORTHOPAEDIC SURGERY
Payer: MEDICARE

## 2023-04-11 ENCOUNTER — OFFICE VISIT (OUTPATIENT)
Dept: ORTHOPEDICS | Facility: CLINIC | Age: 53
End: 2023-04-11
Payer: MEDICARE

## 2023-04-11 DIAGNOSIS — M00.9 JOINT INFECTION: ICD-10-CM

## 2023-04-11 DIAGNOSIS — Z09 S/P ORTHOPEDIC SURGERY, FOLLOW-UP EXAM: Primary | ICD-10-CM

## 2023-04-11 LAB
GLUCOSE SERPL-MCNC: 159 MG/DL (ref 70–105)
GLUCOSE SERPL-MCNC: 316 MG/DL (ref 70–105)
GLUCOSE SERPL-MCNC: 342 MG/DL (ref 70–105)
VANCOMYCIN TROUGH SERPL-MCNC: 13.5 ΜG/ML (ref 10–20)

## 2023-04-11 PROCEDURE — 4010F ACE/ARB THERAPY RXD/TAKEN: CPT | Mod: CPTII,,, | Performed by: NURSE PRACTITIONER

## 2023-04-11 PROCEDURE — 3060F PR POS MICROALBUMINURIA RESULT DOCUMENTED/REVIEW: ICD-10-PCS | Mod: CPTII,,, | Performed by: NURSE PRACTITIONER

## 2023-04-11 PROCEDURE — 3051F PR MOST RECENT HEMOGLOBIN A1C LEVEL 7.0 - < 8.0%: ICD-10-PCS | Mod: CPTII,,, | Performed by: NURSE PRACTITIONER

## 2023-04-11 PROCEDURE — 3060F POS MICROALBUMINURIA REV: CPT | Mod: CPTII,,, | Performed by: NURSE PRACTITIONER

## 2023-04-11 PROCEDURE — 73560 XR KNEE 1 OR 2 VIEW LEFT: ICD-10-PCS | Mod: 26,LT,, | Performed by: RADIOLOGY

## 2023-04-11 PROCEDURE — 1160F RVW MEDS BY RX/DR IN RCRD: CPT | Mod: CPTII,,, | Performed by: NURSE PRACTITIONER

## 2023-04-11 PROCEDURE — 99024 PR POST-OP FOLLOW-UP VISIT: ICD-10-PCS | Mod: ,,, | Performed by: NURSE PRACTITIONER

## 2023-04-11 PROCEDURE — 3066F PR DOCUMENTATION OF TREATMENT FOR NEPHROPATHY: ICD-10-PCS | Mod: CPTII,,, | Performed by: NURSE PRACTITIONER

## 2023-04-11 PROCEDURE — 63600175 PHARM REV CODE 636 W HCPCS: Performed by: FAMILY MEDICINE

## 2023-04-11 PROCEDURE — 73560 X-RAY EXAM OF KNEE 1 OR 2: CPT | Mod: TC,LT

## 2023-04-11 PROCEDURE — 1160F PR REVIEW ALL MEDS BY PRESCRIBER/CLIN PHARMACIST DOCUMENTED: ICD-10-PCS | Mod: CPTII,,, | Performed by: NURSE PRACTITIONER

## 2023-04-11 PROCEDURE — 3066F NEPHROPATHY DOC TX: CPT | Mod: CPTII,,, | Performed by: NURSE PRACTITIONER

## 2023-04-11 PROCEDURE — 99024 POSTOP FOLLOW-UP VISIT: CPT | Mod: ,,, | Performed by: NURSE PRACTITIONER

## 2023-04-11 PROCEDURE — 4010F PR ACE/ARB THEARPY RXD/TAKEN: ICD-10-PCS | Mod: CPTII,,, | Performed by: NURSE PRACTITIONER

## 2023-04-11 PROCEDURE — 11000004 HC SNF PRIVATE

## 2023-04-11 PROCEDURE — 99213 OFFICE O/P EST LOW 20 MIN: CPT | Mod: PBBFAC | Performed by: NURSE PRACTITIONER

## 2023-04-11 PROCEDURE — 73560 X-RAY EXAM OF KNEE 1 OR 2: CPT | Mod: 26,LT,, | Performed by: RADIOLOGY

## 2023-04-11 PROCEDURE — 25000003 PHARM REV CODE 250: Performed by: SPECIALIST

## 2023-04-11 PROCEDURE — 82962 GLUCOSE BLOOD TEST: CPT

## 2023-04-11 PROCEDURE — 1159F MED LIST DOCD IN RCRD: CPT | Mod: CPTII,,, | Performed by: NURSE PRACTITIONER

## 2023-04-11 PROCEDURE — 94761 N-INVAS EAR/PLS OXIMETRY MLT: CPT

## 2023-04-11 PROCEDURE — 80202 ASSAY OF VANCOMYCIN: CPT | Performed by: FAMILY MEDICINE

## 2023-04-11 PROCEDURE — 3051F HG A1C>EQUAL 7.0%<8.0%: CPT | Mod: CPTII,,, | Performed by: NURSE PRACTITIONER

## 2023-04-11 PROCEDURE — 25000003 PHARM REV CODE 250: Performed by: FAMILY MEDICINE

## 2023-04-11 PROCEDURE — 1159F PR MEDICATION LIST DOCUMENTED IN MEDICAL RECORD: ICD-10-PCS | Mod: CPTII,,, | Performed by: NURSE PRACTITIONER

## 2023-04-11 RX ADMIN — HYDROCODONE BITARTRATE AND ACETAMINOPHEN 1 TABLET: 5; 325 TABLET ORAL at 03:04

## 2023-04-11 RX ADMIN — INSULIN ASPART 8 UNITS: 100 INJECTION, SOLUTION INTRAVENOUS; SUBCUTANEOUS at 05:04

## 2023-04-11 RX ADMIN — DULOXETINE 60 MG: 30 CAPSULE, DELAYED RELEASE ORAL at 10:04

## 2023-04-11 RX ADMIN — HYDRALAZINE HYDROCHLORIDE 25 MG: 25 TABLET, FILM COATED ORAL at 08:04

## 2023-04-11 RX ADMIN — LOSARTAN POTASSIUM 25 MG: 25 TABLET, FILM COATED ORAL at 10:04

## 2023-04-11 RX ADMIN — INSULIN DETEMIR 30 UNITS: 100 INJECTION, SOLUTION SUBCUTANEOUS at 10:04

## 2023-04-11 RX ADMIN — HEPARIN, PORCINE (PF) 10 UNIT/ML INTRAVENOUS SYRINGE 50 UNITS: at 10:04

## 2023-04-11 RX ADMIN — INSULIN ASPART 5 UNITS: 100 INJECTION, SOLUTION INTRAVENOUS; SUBCUTANEOUS at 05:04

## 2023-04-11 RX ADMIN — HYDRALAZINE HYDROCHLORIDE 25 MG: 25 TABLET, FILM COATED ORAL at 10:04

## 2023-04-11 RX ADMIN — ASPIRIN 325 MG ORAL TABLET 325 MG: 325 PILL ORAL at 10:04

## 2023-04-11 RX ADMIN — Medication 1 CAPSULE: at 05:04

## 2023-04-11 RX ADMIN — SENNOSIDES AND DOCUSATE SODIUM 1 TABLET: 8.6; 5 TABLET ORAL at 10:04

## 2023-04-11 RX ADMIN — Medication 9 MG: at 08:04

## 2023-04-11 RX ADMIN — HYDROCODONE BITARTRATE AND ACETAMINOPHEN 1 TABLET: 5; 325 TABLET ORAL at 05:04

## 2023-04-11 RX ADMIN — GABAPENTIN 300 MG: 300 CAPSULE ORAL at 10:04

## 2023-04-11 RX ADMIN — Medication 1 CAPSULE: at 06:04

## 2023-04-11 RX ADMIN — GABAPENTIN 300 MG: 300 CAPSULE ORAL at 08:04

## 2023-04-11 RX ADMIN — VANCOMYCIN HYDROCHLORIDE 2000 MG: 1 INJECTION, POWDER, LYOPHILIZED, FOR SOLUTION INTRAVENOUS at 11:04

## 2023-04-11 RX ADMIN — SENNOSIDES AND DOCUSATE SODIUM 1 TABLET: 8.6; 5 TABLET ORAL at 08:04

## 2023-04-11 RX ADMIN — HYDROCODONE BITARTRATE AND ACETAMINOPHEN 1 TABLET: 5; 325 TABLET ORAL at 08:04

## 2023-04-11 RX ADMIN — VANCOMYCIN HYDROCHLORIDE 2000 MG: 1 INJECTION, POWDER, LYOPHILIZED, FOR SOLUTION INTRAVENOUS at 05:04

## 2023-04-11 RX ADMIN — HYDROCODONE BITARTRATE AND ACETAMINOPHEN 1 TABLET: 5; 325 TABLET ORAL at 10:04

## 2023-04-11 NOTE — PLAN OF CARE
Problem: Adult Inpatient Plan of Care  Goal: Plan of Care Review  4/11/2023 1701 by Larissa Dimas RN  Outcome: Ongoing, Progressing  4/11/2023 1627 by Larissa Dimas RN  Outcome: Ongoing, Progressing  Goal: Patient-Specific Goal (Individualized)  4/11/2023 1701 by Larissa Dimas RN  Outcome: Ongoing, Progressing  4/11/2023 1627 by Larissa Dimas RN  Outcome: Ongoing, Progressing     Problem: Fall Injury Risk  Goal: Absence of Fall and Fall-Related Injury  4/11/2023 1701 by Larissa Dimas RN  Outcome: Ongoing, Progressing  4/11/2023 1627 by Larissa Dimas RN  Outcome: Ongoing, Progressing  Intervention: Identify and Manage Contributors  Flowsheets (Taken 4/11/2023 1701)  Self-Care Promotion:   independence encouraged   BADL personal objects within reach   BADL personal routines maintained  Medication Review/Management:   medications reviewed   high-risk medications identified  Intervention: Promote Injury-Free Environment  Flowsheets (Taken 4/11/2023 1701)  Safety Promotion/Fall Prevention:   assistive device/personal item within reach   diversional activities provided   high risk medications identified   muscle strengthening facilitated   nonskid shoes/socks when out of bed   side rails raised x 3   instructed to call staff for mobility     Problem: Skin Injury Risk Increased  Goal: Skin Health and Integrity  Outcome: Ongoing, Progressing

## 2023-04-11 NOTE — NURSING
Updated notes and clinicals faxed to [facility] for approval of additional swing bed days

## 2023-04-11 NOTE — PATIENT INSTRUCTIONS
Knee immobilizer reapplied.  Safety guidelines activity restrictions discussed with patient.  Verbalized understanding.  Remain toe-touch weight-bearing only on left lower extremity with a rolling walker.  Continue IV antibiotic therapy.  She is given a prescription to have CBC, C-reactive protein and sed rate drawn with her next lab for peak and trough.  We will have return to orthopedic clinic in 2 weeks follow-up Dr. Noyola or sooner as needed.  No x-rays at that time unless clinically warranted.

## 2023-04-11 NOTE — PROGRESS NOTES
Pharmacokinetic Assessment Follow Up: IV Vancomycin    Vancomycin serum concentration assessment(s):    The trough level was drawn correctly and can be used to guide therapy at this time. The measurement is below the desired definitive target range of 15 to 20 mcg/mL.    Vancomycin Regimen Plan:    Change regimen to Vancomycin 2000 mg mg IV every 18 hours with next serum trough concentration measured at 30 min prior to 0500  dose on 4/14/23    Drug levels (last 3 results):  Recent Labs   Lab Result Units 04/11/23  0427   Vancomycin, Trough µg/mL 13.5       Pharmacy will continue to follow and monitor vancomycin.    Please contact pharmacy at extension [phone] for questions regarding this assessment.    Thank you for the consult,   Stacey Reddy       Patient brief summary:  Monica Walker is a 52 y.o. female initiated on antimicrobial therapy with IV Vancomycin for treatment of bone/joint infection        Drug Allergies:   Review of patient's allergies indicates:  No Known Allergies    Actual Body Weight:   63.8 kg    Renal Function:   Estimated Creatinine Clearance: 75.3 mL/min (based on SCr of 0.8 mg/dL).,     Dialysis Method (if applicable):  N/A    CBC (last 72 hours):  Recent Labs   Lab Result Units 04/10/23  0431   WBC K/uL 7.55   Hemoglobin g/dL 10.2*   Hematocrit % 33.2*   Platelet Count K/uL 218   Lymphocytes % % 30.5   Monocytes % % 9.0*   Eosinophils % % 10.5*   Basophils % % 0.5       Metabolic Panel (last 72 hours):  Recent Labs   Lab Result Units 04/10/23  0431   Sodium mmol/L 136   Potassium mmol/L 3.8   Chloride mmol/L 101   CO2 mmol/L 30   Glucose mg/dL 209*   BUN mg/dL 16   Creatinine mg/dL 0.80       Vancomycin Administrations:  vancomycin given in the last 96 hours                     vancomycin (VANCOCIN) 2,000 mg in dextrose 5 % (D5W) 500 mL IVPB (mg) 2,000 mg New Bag 04/11/23 0550     2,000 mg New Bag 04/10/23 0430     2,000 mg New Bag 04/09/23 0432     2,000 mg New Bag 04/08/23 0458                     Microbiologic Results:  Microbiology Results (last 7 days)       ** No results found for the last 168 hours. **

## 2023-04-11 NOTE — NURSING
0700--Rec'd pt this morning noted awake and alert with no acute distress, respirations even and unlabored.    0800--Dr. Schulz made rounds, updates given.     1000--Remains awake and alert, pt has f/u appt today and will be going to that. Lung sounds clear. Steri strips intact, no drainage noted. Heart sounds regular. Respirations even and unlabored. Stable at this time.    1015--pt discharging at this time to appt, will return after.

## 2023-04-11 NOTE — PROGRESS NOTES
52-year-old female presents to [facility]opedic Clinic in wheelchair.  She is known to orthopedic clinic having undergone explantation of a hemiarthroplasty of the left knee for infection.  She does have a spacer in place.  She is currently in a swing bed setting and undergoing IV antibiotic therapy.  She reports improvement pain symptoms.  Her main concern is when she will be able to go back home.      X-ray: X-rays today of the left knee AP and lateral view reviewed by Dr. Noyola.      View of the x-ray there is a spacer noted laterally.  No evidence of acute fracture, dislocation or pathologic bone noted.      PE:  She is in wheelchair this time.  Knee immobilizer is off.  Anterior midline surgical wound is healing well without sign or symptom of infection.  Steri-Strips are noted.  Edges well approximated.  No soft tissue swelling noted.  No intra-articular effusion appreciated clinically.  She does state that she has lab drawn periodically for peak and trough for vancomycin.  She is unable to state just when the lab is performed in how often.      Impression:  4 weeks following implantation of a hemiarthroplasty of the right knee for infection     Plan:  Knee immobilizer reapplied.  Safety guidelines activity restrictions discussed with patient.  Verbalized understanding.  Remain toe-touch weight-bearing only on left lower extremity with a rolling walker.  Continue IV antibiotic therapy.  She is given a prescription to have CBC, C-reactive protein and sed rate drawn with her next lab for peak and trough.  We will have return to orthopedic clinic in 2 weeks follow-up Dr. Noyola or sooner as needed.  No x-rays at that time unless clinically warranted.

## 2023-04-12 LAB
GLUCOSE SERPL-MCNC: 165 MG/DL (ref 70–105)
GLUCOSE SERPL-MCNC: 251 MG/DL (ref 70–105)
GLUCOSE SERPL-MCNC: 310 MG/DL (ref 70–105)
GLUCOSE SERPL-MCNC: 316 MG/DL (ref 70–105)

## 2023-04-12 PROCEDURE — 63600175 PHARM REV CODE 636 W HCPCS: Performed by: FAMILY MEDICINE

## 2023-04-12 PROCEDURE — 82962 GLUCOSE BLOOD TEST: CPT

## 2023-04-12 PROCEDURE — 25000003 PHARM REV CODE 250: Performed by: SPECIALIST

## 2023-04-12 PROCEDURE — 97110 THERAPEUTIC EXERCISES: CPT | Mod: CQ

## 2023-04-12 PROCEDURE — 94761 N-INVAS EAR/PLS OXIMETRY MLT: CPT

## 2023-04-12 PROCEDURE — 97110 THERAPEUTIC EXERCISES: CPT

## 2023-04-12 PROCEDURE — 11000004 HC SNF PRIVATE

## 2023-04-12 PROCEDURE — 25000003 PHARM REV CODE 250: Performed by: FAMILY MEDICINE

## 2023-04-12 RX ADMIN — Medication 1 CAPSULE: at 06:04

## 2023-04-12 RX ADMIN — INSULIN DETEMIR 30 UNITS: 100 INJECTION, SOLUTION SUBCUTANEOUS at 08:04

## 2023-04-12 RX ADMIN — INSULIN ASPART 5 UNITS: 100 INJECTION, SOLUTION INTRAVENOUS; SUBCUTANEOUS at 05:04

## 2023-04-12 RX ADMIN — LOSARTAN POTASSIUM 25 MG: 25 TABLET, FILM COATED ORAL at 08:04

## 2023-04-12 RX ADMIN — HYDROCODONE BITARTRATE AND ACETAMINOPHEN 1 TABLET: 5; 325 TABLET ORAL at 12:04

## 2023-04-12 RX ADMIN — SENNOSIDES AND DOCUSATE SODIUM 1 TABLET: 8.6; 5 TABLET ORAL at 08:04

## 2023-04-12 RX ADMIN — INSULIN ASPART 1 UNITS: 100 INJECTION, SOLUTION INTRAVENOUS; SUBCUTANEOUS at 08:04

## 2023-04-12 RX ADMIN — Medication 1 CAPSULE: at 11:04

## 2023-04-12 RX ADMIN — GABAPENTIN 300 MG: 300 CAPSULE ORAL at 08:04

## 2023-04-12 RX ADMIN — INSULIN ASPART 8 UNITS: 100 INJECTION, SOLUTION INTRAVENOUS; SUBCUTANEOUS at 11:04

## 2023-04-12 RX ADMIN — HYDRALAZINE HYDROCHLORIDE 25 MG: 25 TABLET, FILM COATED ORAL at 08:04

## 2023-04-12 RX ADMIN — HYDROCODONE BITARTRATE AND ACETAMINOPHEN 1 TABLET: 5; 325 TABLET ORAL at 05:04

## 2023-04-12 RX ADMIN — DULOXETINE 60 MG: 30 CAPSULE, DELAYED RELEASE ORAL at 08:04

## 2023-04-12 RX ADMIN — INSULIN ASPART 5 UNITS: 100 INJECTION, SOLUTION INTRAVENOUS; SUBCUTANEOUS at 11:04

## 2023-04-12 RX ADMIN — VANCOMYCIN HYDROCHLORIDE 2000 MG: 1 INJECTION, POWDER, LYOPHILIZED, FOR SOLUTION INTRAVENOUS at 05:04

## 2023-04-12 RX ADMIN — ASPIRIN 325 MG ORAL TABLET 325 MG: 325 PILL ORAL at 08:04

## 2023-04-12 RX ADMIN — HYDROCODONE BITARTRATE AND ACETAMINOPHEN 1 TABLET: 5; 325 TABLET ORAL at 08:04

## 2023-04-12 RX ADMIN — Medication 1 CAPSULE: at 05:04

## 2023-04-12 RX ADMIN — INSULIN ASPART 8 UNITS: 100 INJECTION, SOLUTION INTRAVENOUS; SUBCUTANEOUS at 05:04

## 2023-04-12 RX ADMIN — Medication 9 MG: at 08:04

## 2023-04-12 RX ADMIN — HEPARIN, PORCINE (PF) 10 UNIT/ML INTRAVENOUS SYRINGE 50 UNITS: at 11:04

## 2023-04-12 NOTE — PT/OT/SLP PROGRESS
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     Pain/Comfort  Pain Rating 1: 3/10  Location - Side 1: Left  Location - Orientation 1: generalized  Location 1: knee  Pain Addressed 1: Distraction    Objective:   Patient found with: PICC line    Functional Mobility:   Bed Mobility:     Transfers: sit <> stand SBA for safety        Gait:  Approx. 100' x 1 with RW SBA, L LE TTWB on level indoor surface        Stairs: not performed       Therapeutic Activities and Exercises:    Ther- Ex    Nustep    Ankle pumps 30 x    Hip adduction 30 x 3"    Heelslides    LAQ's 3 x 10 2# R LE    Hamstring Curls 3 x 10 yellow TB R LE    Quad sets  3 x 10 3"    SAQ's 30 x 3" R LE 2#   Straight Leg Raises 3 x 10 B LE with min A for L LE    Hip abduction    Horizontal ADD/ABD 3 x 10 B LE    Glut sets  30x 5"    Seated Marching  3 x 10 2# RLE    Ther-Act    Heel Raises    Mini Squats    3 way hip    Sit <> stands  3 x    Standing Marches                    AM-PAC 6 CLICK MOBILITY  How much help from another person does this patient currently need?   1 = Unable, Total/Dependent Assistance  2 = A lot, Maximum/Moderate Assistance  3 = A little, Minimum/Contact Guard/Supervision  4 = None, Modified [place]/Independent    Turning over in bed (including adjusting bedclothes, sheets and blankets)?: 4  Sitting down on and standing up from a chair with " arms (e.g., wheelchair, bedside commode, etc.): 3  Moving from lying on back to sitting on the side of the bed?: 4  Moving to and from a bed to a chair (including a wheelchair)?: 3  Need to walk in hospital room?: 3  Climbing 3-5 steps with a railing?: 2  Basic Mobility Total Score: 19    AM-PAC Raw Score CMS G-Code Modifier Level of Impairment Assistance   6 % Total / Unable   7 - 9 CM 80 - 100% Maximal Assist   10 - 14 CL 60 - 80% Moderate Assist   15 - 19 CK 40 - 60% Moderate Assist   20 - 22 CJ 20 - 40% Minimal Assist   23 CI 1-20% SBA / CGA   24 CH 0% Independent/ Mod I     Patient left up in chair with call bell handy    Assessment:  Monica Walker is a 52 y.o. female with a medical diagnosis of Staphylococcal arthritis of left knee and presents with Rehab identified problem list/impairments: weakness, impaired endurance, impaired self care skills, impaired functional mobility, decreased upper extremity function, decreased lower extremity function, decreased safety awareness.  Pt is received from OT treatment session. Pt displays increased carryover of previous tx session and progresses quickly through tx. Pt ambulates with less signs of fatigue secondary to using toe touch weight bearing during gait trial. No adverse effects noted to tx.         Rehab potential is good.    Activity tolerance: Good and Fair    Discharge recommendations: home with home health      Barriers to discharge:      Equipment recommendations: none     GOALS:   Multidisciplinary Problems       Physical Therapy Goals          Problem: Physical Therapy    Goal Priority Disciplines Outcome Goal Variances Interventions   Physical Therapy Goal     PT, PT/OT Ongoing, Progressing     Description: Short Term Goals  1. Patient will complete 30 reps of B LE exercises with correct form.   2. Patient will complete sit<>stand transfers with SBA while maintaining toe touch weight bearing on LLE.   3. Patient will ambulate 25 feet with RW on  level surfaces with CGA while maintaining toe touch weight bearing on LLE.    Long Term Goals   1. Patient will ambulate 50 feet with RW on level and unlevel surfaces with SBA while maintaining toe touch weight bearing on LLE.  2. Patient will complete all functional transfers with MOD I while maintaining toe touch weight bearing on LLE.                        PLAN:    Patient to be seen 5 x/week to address the above listed problems via gait training, therapeutic activities, therapeutic exercises  Plan of Care expires: 04/11/23  Plan of Care reviewed with: patient    Continue Plan of Care Per PT order to progress patient toward rehab goals.   ZEINAB Scott  4/12/2023

## 2023-04-12 NOTE — PT/OT/SLP PROGRESS
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          Pain/Comfort  Pain Rating 1: 3/10    Objective:  Patient found with: PICC line     Functional Mobility:  Bed Mobility:        Transfers:         Functional Ambulation:     Activities of Daily Living:         Therapeutic Activities and Exercises:  Patient completed the following for increased strength to increase I with ADLs: UBE 7 min x 1x, 4# DB- shoulder press, chest press with RUE, and bicep curls with BUE x 40, yellow theraflex x 40 both ways, yellow theraband- scapular retraction and tricep extension x 40      AM-PAC 6 CLICK ADL   How much help from another person does this patient currently need?   1 = Unable, Total/Dependent Assistance  2 = A lot, Maximum/Moderate Assistance  3 = A little, Minimum/Contact Guard/Supervision  4 = None, Modified [place]/Independent    Putting on and taking off regular lower body clothing? : 3  Bathing (including washing, rinsing, drying)?: 3  Toileting, which includes using toilet, bedpan, or urinal? : 3  Putting on and taking off regular upper body clothing?: 3  Taking care of personal grooming such as brushing teeth?: 4  Eating meals?: 4  Daily Activity Total Score: 20     AM-PAC Raw Score CMS "G-Code Modifier Level of Impairment Assistance   6 % Total / Unable   7 - 8 CM 80 - 100% Maximal Assist   9-13 CL 60 - 80% Moderate Assist   14 - 19 CK 40 - 60% Moderate Assist   20 - 22 CJ 20 - 40% Minimal Assist   23 CI 1-20% SBA / CGA   24 CH 0% Independent/ Mod I "       Patient left supine with  PTA notified    ASSESSMENT:  Monica Walker is a 52 y.o. female with a medical diagnosis of Staphylococcal arthritis of left knee and presents with decreased strength, decreased endurance, decreased self-care.    Rehab identified problem list/impairments:  weakness, impaired endurance, impaired self care skills, impaired functional mobility, impaired balance, decreased lower extremity function, decreased safety awareness, decreased upper extremity function    Rehab potential is fair.    Activity tolerance: Good    Discharge recommendations: home with home health   Barriers to discharge:      Equipment recommendations: none    GOALS:   Multidisciplinary Problems       Occupational Therapy Goals          Problem: Occupational Therapy    Goal Priority Disciplines Outcome Interventions   Occupational Therapy Goal     OT, PT/OT Ongoing, Progressing    Description: Description: Grooming Status:   Short Term Goal: Pt will perform grooming with s/u sitting EOB.   Long Term Goal: Pt will perform grooming/oral hygiene standing at sink with Mod I      LE dressing Status:   Short Term Goal: Pt will perform LE dressing with mod a.   Long Term Goal: Pt will perform LE dressing with min a.    Toileting Status:   Short Term Goal: Pt will perform toilet hygiene on BSC with min a.  Long Term Goal: Pt will perform toilet hygiene on toilet with no AE with s/u.    Commode Transfer:   Short Term Goal: Pt will perform BSC t/f with min a.  Long Term Goal:  Pt will perform toilet t/f in bathroom with s/u.     Bathing Status:   Long Term Goal: Pt will perform sponge bath with s/u with no unsafe fatigue.     Strength Status:   Long Term Goal: Pt to perform BUE strengthening with weights and/or body weight to increase ADL independence and safety    Endurance Status:   Short Term Goal:pt to perform 15 min OT treatment with 5 or greater rest breaks  Long Term Goal: pt to perform 30 min OT treat with 3 or less  rest breaks                       Plan:  Patient to be seen 5 x/week to address the above listed problems via therapeutic exercises  Plan of Care expires:    Plan of Care reviewed with: patient     Kirti Bolton, OTR/ETHAN      04/12/2023

## 2023-04-12 NOTE — PLAN OF CARE
[facility]  Patient: Monica Walker     Interdisciplinary Team Meeting     Today's Date: 4/12/2023     Estimated D/C Date: tbd       Physician: Dr. Schulz    Pharmacy: [facility] Unit Director: BRODY Kelly   : JORGE LUIS Méndez Physical/Occupational Therapy: KAREN Kaplan/ JAVIER Watters   Speech Therapy: n/a Activity Therapy: read newspaper/watch tv   Nursing: LOW Starks S. Luker Other: KENNY Lu     Nurse  New Symptoms/Problems: none  Last BM: 4/11/23 Urine: continent Diarrhea: No   Constipated: No Bladder: continent    Isolation: No     O2: n/a  Rm Air: 97 %   Nutrition: see dietary notes  Speech/Swallowing: n/a  Aspiration Precautions: No  Cognition: alert and oriented    Physical Therapy  Physical Therapy/Gait: Approx. 100' x 1 with RW SBA, L LE TTWB on level indoor surface  ELOS: tbs   Transfers: sit <> stand and bed <>chair SBA for safety  Range of Motion/Restrictions: LLE toe touch weight bearing     Occupational Therapy  Occupational Therapy: following for increased strength to increase I with ADLs Eating/Grooming: independent   Toileting: min. assist Bathing: min. assist   Dressing (Upper Body): min. assist Dressing (Lower Body): min. assist       Tx Plan/Recommendations reviewed with family and patient on 4/07/23 at bedside.  Additional family Conference/Training: not at this time  D/C Plan/Recommendations: Home with     Pharmacy  Medication Changes (see MD orders in chart): No  MD/NP: Dr. Schulz Labs Reviewed: yes New Lab Orders: no     MD/NP Signature: Time:

## 2023-04-12 NOTE — PLAN OF CARE
Problem: Adult Inpatient Plan of Care  Goal: Plan of Care Review  Outcome: Ongoing, Progressing  Goal: Patient-Specific Goal (Individualized)  Outcome: Ongoing, Progressing     Problem: Fall Injury Risk  Goal: Absence of Fall and Fall-Related Injury  Outcome: Ongoing, Progressing  Intervention: Identify and Manage Contributors  Flowsheets (Taken 4/12/2023 1819)  Self-Care Promotion:   independence encouraged   BADL personal objects within reach   BADL personal routines maintained  Medication Review/Management:   medications reviewed   high-risk medications identified  Intervention: Promote Injury-Free Environment  Flowsheets (Taken 4/12/2023 1819)  Safety Promotion/Fall Prevention:   assistive device/personal item within reach   diversional activities provided   high risk medications identified   medications reviewed   muscle strengthening facilitated   nonskid shoes/socks when out of bed   side rails raised x 3   instructed to call staff for mobility

## 2023-04-13 LAB
GLUCOSE SERPL-MCNC: 131 MG/DL (ref 70–105)
GLUCOSE SERPL-MCNC: 177 MG/DL (ref 70–105)
GLUCOSE SERPL-MCNC: 315 MG/DL (ref 70–105)
GLUCOSE SERPL-MCNC: 334 MG/DL (ref 70–105)

## 2023-04-13 PROCEDURE — 63600175 PHARM REV CODE 636 W HCPCS: Performed by: FAMILY MEDICINE

## 2023-04-13 PROCEDURE — 97110 THERAPEUTIC EXERCISES: CPT | Mod: CQ

## 2023-04-13 PROCEDURE — 25000003 PHARM REV CODE 250: Performed by: SPECIALIST

## 2023-04-13 PROCEDURE — 25000003 PHARM REV CODE 250: Performed by: FAMILY MEDICINE

## 2023-04-13 PROCEDURE — 97116 GAIT TRAINING THERAPY: CPT | Mod: CQ

## 2023-04-13 PROCEDURE — 11000004 HC SNF PRIVATE

## 2023-04-13 PROCEDURE — 82962 GLUCOSE BLOOD TEST: CPT

## 2023-04-13 PROCEDURE — 94761 N-INVAS EAR/PLS OXIMETRY MLT: CPT

## 2023-04-13 PROCEDURE — 97110 THERAPEUTIC EXERCISES: CPT

## 2023-04-13 RX ADMIN — Medication 1 CAPSULE: at 10:04

## 2023-04-13 RX ADMIN — INSULIN ASPART 5 UNITS: 100 INJECTION, SOLUTION INTRAVENOUS; SUBCUTANEOUS at 11:04

## 2023-04-13 RX ADMIN — INSULIN ASPART 1 UNITS: 100 INJECTION, SOLUTION INTRAVENOUS; SUBCUTANEOUS at 08:04

## 2023-04-13 RX ADMIN — HYDROCODONE BITARTRATE AND ACETAMINOPHEN 1 TABLET: 5; 325 TABLET ORAL at 10:04

## 2023-04-13 RX ADMIN — INSULIN ASPART 5 UNITS: 100 INJECTION, SOLUTION INTRAVENOUS; SUBCUTANEOUS at 04:04

## 2023-04-13 RX ADMIN — SENNOSIDES AND DOCUSATE SODIUM 1 TABLET: 8.6; 5 TABLET ORAL at 10:04

## 2023-04-13 RX ADMIN — Medication 9 MG: at 08:04

## 2023-04-13 RX ADMIN — DULOXETINE 60 MG: 30 CAPSULE, DELAYED RELEASE ORAL at 10:04

## 2023-04-13 RX ADMIN — INSULIN ASPART 8 UNITS: 100 INJECTION, SOLUTION INTRAVENOUS; SUBCUTANEOUS at 06:04

## 2023-04-13 RX ADMIN — GABAPENTIN 300 MG: 300 CAPSULE ORAL at 08:04

## 2023-04-13 RX ADMIN — ASPIRIN 325 MG ORAL TABLET 325 MG: 325 PILL ORAL at 10:04

## 2023-04-13 RX ADMIN — SENNOSIDES AND DOCUSATE SODIUM 1 TABLET: 8.6; 5 TABLET ORAL at 08:04

## 2023-04-13 RX ADMIN — Medication 1 CAPSULE: at 05:04

## 2023-04-13 RX ADMIN — GABAPENTIN 300 MG: 300 CAPSULE ORAL at 10:04

## 2023-04-13 RX ADMIN — HEPARIN, PORCINE (PF) 10 UNIT/ML INTRAVENOUS SYRINGE 50 UNITS: at 10:04

## 2023-04-13 RX ADMIN — INSULIN ASPART 8 UNITS: 100 INJECTION, SOLUTION INTRAVENOUS; SUBCUTANEOUS at 05:04

## 2023-04-13 RX ADMIN — INSULIN DETEMIR 30 UNITS: 100 INJECTION, SOLUTION SUBCUTANEOUS at 10:04

## 2023-04-13 RX ADMIN — VANCOMYCIN HYDROCHLORIDE 2000 MG: 1 INJECTION, POWDER, LYOPHILIZED, FOR SOLUTION INTRAVENOUS at 12:04

## 2023-04-13 RX ADMIN — HYDRALAZINE HYDROCHLORIDE 25 MG: 25 TABLET, FILM COATED ORAL at 08:04

## 2023-04-13 RX ADMIN — LOSARTAN POTASSIUM 25 MG: 25 TABLET, FILM COATED ORAL at 10:04

## 2023-04-13 RX ADMIN — HYDROCODONE BITARTRATE AND ACETAMINOPHEN 1 TABLET: 5; 325 TABLET ORAL at 08:04

## 2023-04-13 RX ADMIN — INSULIN ASPART 5 UNITS: 100 INJECTION, SOLUTION INTRAVENOUS; SUBCUTANEOUS at 06:04

## 2023-04-13 RX ADMIN — Medication 1 CAPSULE: at 06:04

## 2023-04-13 RX ADMIN — HYDRALAZINE HYDROCHLORIDE 25 MG: 25 TABLET, FILM COATED ORAL at 10:04

## 2023-04-13 RX ADMIN — HYDROCODONE BITARTRATE AND ACETAMINOPHEN 1 TABLET: 5; 325 TABLET ORAL at 06:04

## 2023-04-13 NOTE — PT/OT/SLP PROGRESS
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          Pain/Comfort  Pain Rating 1: 3/10    Objective:  Patient found with: PICC line     Functional Mobility:  Bed Mobility:        Transfers:         Functional Ambulation:     Activities of Daily Living:         Therapeutic Activities and Exercises:  Patient completed the following for increased strength to increase I with ADLs: UBE 7 min x 1x, 4# DB- shoulder press, chest press with RUE, and bicep curls with BUE x 40, yellow theraflex x 40 both ways, yellow theraband- scapular retraction and tricep extension x 40      AM-PAC 6 CLICK ADL   How much help from another person does this patient currently need?   1 = Unable, Total/Dependent Assistance  2 = A lot, Maximum/Moderate Assistance  3 = A little, Minimum/Contact Guard/Supervision  4 = None, Modified [place]/Independent    Putting on and taking off regular lower body clothing? : 3  Bathing (including washing, rinsing, drying)?: 3  Toileting, which includes using toilet, bedpan, or urinal? : 3  Putting on and taking off regular upper body clothing?: 3  Taking care of personal grooming such as brushing teeth?: 4  Eating meals?: 4  Daily Activity Total Score: 20     AM-PAC Raw Score CMS "G-Code Modifier Level of Impairment Assistance   6 % Total / Unable   7 - 8 CM 80 - 100% Maximal Assist   9-13 CL 60 - 80% Moderate Assist   14 - 19 CK 40 - 60% Moderate Assist   20 - 22 CJ 20 - 40% Minimal Assist   23 CI 1-20% SBA / CGA   24 CH 0% Independent/ Mod I "       Patient left supine with call button in reach    ASSESSMENT:  Monica Walker is a 52 y.o. female with a medical diagnosis of Staphylococcal arthritis of left knee and presents with decreased strength, decreased endurance, decreased self-care.    Rehab identified problem list/impairments:  weakness, impaired endurance, impaired self care skills, impaired functional mobility, impaired balance, decreased lower extremity function, decreased upper extremity function, decreased safety awareness    Rehab potential is fair.    Activity tolerance: Good    Discharge recommendations: home with home health   Barriers to discharge:      Equipment recommendations: none    GOALS:   Multidisciplinary Problems       Occupational Therapy Goals          Problem: Occupational Therapy    Goal Priority Disciplines Outcome Interventions   Occupational Therapy Goal     OT, PT/OT Ongoing, Progressing    Description: Description: Grooming Status:   Short Term Goal: Pt will perform grooming with s/u sitting EOB.   Long Term Goal: Pt will perform grooming/oral hygiene standing at sink with Mod I      LE dressing Status:   Short Term Goal: Pt will perform LE dressing with mod a.   Long Term Goal: Pt will perform LE dressing with min a.    Toileting Status:   Short Term Goal: Pt will perform toilet hygiene on BSC with min a.  Long Term Goal: Pt will perform toilet hygiene on toilet with no AE with s/u.    Commode Transfer:   Short Term Goal: Pt will perform BSC t/f with min a.  Long Term Goal:  Pt will perform toilet t/f in bathroom with s/u.     Bathing Status:   Long Term Goal: Pt will perform sponge bath with s/u with no unsafe fatigue.     Strength Status:   Long Term Goal: Pt to perform BUE strengthening with weights and/or body weight to increase ADL independence and safety    Endurance Status:   Short Term Goal:pt to perform 15 min OT treatment with 5 or greater rest breaks  Long Term Goal: pt to perform 30 min OT treat with 3 or  less rest breaks                       Plan:  Patient to be seen 5 x/week to address the above listed problems via therapeutic exercises  Plan of Care expires:    Plan of Care reviewed with: patient     Kirtialan Bolton, OTR/ETHAN      04/13/2023

## 2023-04-13 NOTE — PLAN OF CARE
Problem: Adult Inpatient Plan of Care  Goal: Plan of Care Review  Outcome: Ongoing, Progressing  Goal: Patient-Specific Goal (Individualized)  Outcome: Ongoing, Progressing     Problem: Fall Injury Risk  Goal: Absence of Fall and Fall-Related Injury  Outcome: Ongoing, Progressing  Intervention: Identify and Manage Contributors  Flowsheets (Taken 4/13/2023 1840)  Self-Care Promotion:   independence encouraged   BADL personal objects within reach   BADL personal routines maintained  Medication Review/Management:   medications reviewed   high-risk medications identified  Intervention: Promote Injury-Free Environment  Flowsheets (Taken 4/13/2023 1840)  Safety Promotion/Fall Prevention:   assistive device/personal item within reach   diversional activities provided   high risk medications identified   in recliner, wheels locked   medications reviewed   muscle strengthening facilitated   nonskid shoes/socks when out of bed   side rails raised x 3   instructed to call staff for mobility

## 2023-04-13 NOTE — PT/OT/SLP PROGRESS
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   Patient complains of back pain, and states "My doctor told me I probably should go back to using my walker".     Pain/Comfort  Pain Rating 1: 6/10  Location 1: back    Objective:        Functional Mobility:   Bed Mobility:     Transfers: sit <> stand SBA for safety        Gait:  Approx. 100' x 1 with RW SBA, L LE TTWB on level indoor surface        Stairs: not performed       Therapeutic Activities and Exercises:    Ther- Ex    Nustep    Ankle pumps 30 x    Hip adduction 30 x 3"    Heelslides    LAQ's 3 x 10 2# R LE    Hamstring Curls 3 x 10 yellow TB R LE    Quad sets  3 x 10 3"    SAQ's 30 x 3" R LE 2#   Straight Leg Raises 3 x 10 B LE with min A for L LE    Hip abduction    Horizontal ADD/ABD 3 x 10 B LE    Glut sets  30x 5"    Seated Marching  3 x 10 2# RLE    Ther-Act    Heel Raises    Mini Squats    3 way hip    Sit <> stands  3 x    Standing Marches                    AM-PAC 6 CLICK MOBILITY  How much help from another person does this patient currently need?   1 = Unable, Total/Dependent Assistance  2 = A lot, Maximum/Moderate Assistance  3 = A little, Minimum/Contact Guard/Supervision  4 = None, Modified [place]/Independent    Turning over in bed (including adjusting bedclothes, sheets and blankets)?: 4  Sitting down on and standing up from a chair with " arms (e.g., wheelchair, bedside commode, etc.): 3  Moving from lying on back to sitting on the side of the bed?: 4  Moving to and from a bed to a chair (including a wheelchair)?: 3  Need to walk in hospital room?: 3  Climbing 3-5 steps with a railing?: 2  Basic Mobility Total Score: 19    AM-PAC Raw Score CMS G-Code Modifier Level of Impairment Assistance   6 % Total / Unable   7 - 9 CM 80 - 100% Maximal Assist   10 - 14 CL 60 - 80% Moderate Assist   15 - 19 CK 40 - 60% Moderate Assist   20 - 22 CJ 20 - 40% Minimal Assist   23 CI 1-20% SBA / CGA   24 CH 0% Independent/ Mod I     Patient left up in chair with call bell handy    Assessment:  Monica Walker is a 52 y.o. female with a medical diagnosis of Staphylococcal arthritis of left knee and presents with Rehab identified problem list/impairments: weakness, impaired endurance, impaired self care skills, impaired functional mobility, decreased upper extremity function, decreased lower extremity function, decreased safety awareness.  Patient is pleasant and cooperative during physical therapy treatment session.  Patient requires rest break at end of gait training.  Patient without new complaints.       Rehab potential is good.    Activity tolerance: Good and Fair    Discharge recommendations: home with home health      Barriers to discharge:      Equipment recommendations: none     GOALS:   Multidisciplinary Problems       Physical Therapy Goals          Problem: Physical Therapy    Goal Priority Disciplines Outcome Goal Variances Interventions   Physical Therapy Goal     PT, PT/OT Ongoing, Progressing     Description: Short Term Goals  1. Patient will complete 30 reps of B LE exercises with correct form.   2. Patient will complete sit<>stand transfers with SBA while maintaining toe touch weight bearing on LLE.   3. Patient will ambulate 25 feet with RW on level surfaces with CGA while maintaining toe touch weight bearing on LLE.    Long Term Goals   1.  Patient will ambulate 50 feet with RW on level and unlevel surfaces with SBA while maintaining toe touch weight bearing on LLE.  2. Patient will complete all functional transfers with MOD I while maintaining toe touch weight bearing on LLE.                        PLAN:    Patient to be seen 5 x/week to address the above listed problems via gait training, therapeutic activities, therapeutic exercises  Plan of Care expires: 04/11/23  Plan of Care reviewed with: patient    Continue Plan of Care Per PT order to progress patient toward rehab goals.   ZEINAB Hardin   4/13/2023

## 2023-04-14 LAB
ANION GAP SERPL CALCULATED.3IONS-SCNC: 10 MMOL/L (ref 7–16)
BUN SERPL-MCNC: 18 MG/DL (ref 7–18)
BUN/CREAT SERPL: 19 (ref 6–20)
CALCIUM SERPL-MCNC: 8.8 MG/DL (ref 8.5–10.1)
CHLORIDE SERPL-SCNC: 100 MMOL/L (ref 98–107)
CO2 SERPL-SCNC: 29 MMOL/L (ref 21–32)
CREAT SERPL-MCNC: 0.97 MG/DL (ref 0.55–1.02)
EGFR (NO RACE VARIABLE) (RUSH/TITUS): 70 ML/MIN/1.73M²
GLUCOSE SERPL-MCNC: 209 MG/DL (ref 70–105)
GLUCOSE SERPL-MCNC: 257 MG/DL (ref 70–105)
GLUCOSE SERPL-MCNC: 277 MG/DL (ref 70–105)
GLUCOSE SERPL-MCNC: 285 MG/DL (ref 74–106)
POTASSIUM SERPL-SCNC: 4.1 MMOL/L (ref 3.5–5.1)
SODIUM SERPL-SCNC: 135 MMOL/L (ref 136–145)
VANCOMYCIN TROUGH SERPL-MCNC: 23.6 ΜG/ML (ref 10–20)

## 2023-04-14 PROCEDURE — 99308 PR NURSING FAC CARE, SUBSEQ, MINOR COMPLIC: ICD-10-PCS | Mod: ,,, | Performed by: FAMILY MEDICINE

## 2023-04-14 PROCEDURE — 80202 ASSAY OF VANCOMYCIN: CPT | Performed by: FAMILY MEDICINE

## 2023-04-14 PROCEDURE — 97110 THERAPEUTIC EXERCISES: CPT | Mod: CQ

## 2023-04-14 PROCEDURE — 25000003 PHARM REV CODE 250: Performed by: FAMILY MEDICINE

## 2023-04-14 PROCEDURE — 63600175 PHARM REV CODE 636 W HCPCS: Performed by: FAMILY MEDICINE

## 2023-04-14 PROCEDURE — 97110 THERAPEUTIC EXERCISES: CPT

## 2023-04-14 PROCEDURE — 97116 GAIT TRAINING THERAPY: CPT | Mod: CQ

## 2023-04-14 PROCEDURE — 94761 N-INVAS EAR/PLS OXIMETRY MLT: CPT

## 2023-04-14 PROCEDURE — 82962 GLUCOSE BLOOD TEST: CPT

## 2023-04-14 PROCEDURE — 80048 BASIC METABOLIC PNL TOTAL CA: CPT | Performed by: FAMILY MEDICINE

## 2023-04-14 PROCEDURE — 25000003 PHARM REV CODE 250: Performed by: SPECIALIST

## 2023-04-14 PROCEDURE — 11000004 HC SNF PRIVATE

## 2023-04-14 PROCEDURE — 99308 SBSQ NF CARE LOW MDM 20: CPT | Mod: ,,, | Performed by: FAMILY MEDICINE

## 2023-04-14 RX ORDER — DEXTROSE MONOHYDRATE 50 MG/ML
INJECTION, SOLUTION INTRAVENOUS
Status: COMPLETED
Start: 2023-04-14 | End: 2023-04-14

## 2023-04-14 RX ADMIN — HYDROCODONE BITARTRATE AND ACETAMINOPHEN 1 TABLET: 5; 325 TABLET ORAL at 10:04

## 2023-04-14 RX ADMIN — INSULIN ASPART 5 UNITS: 100 INJECTION, SOLUTION INTRAVENOUS; SUBCUTANEOUS at 06:04

## 2023-04-14 RX ADMIN — SENNOSIDES AND DOCUSATE SODIUM 1 TABLET: 8.6; 5 TABLET ORAL at 08:04

## 2023-04-14 RX ADMIN — VANCOMYCIN HYDROCHLORIDE 1750 MG: 1 INJECTION, POWDER, LYOPHILIZED, FOR SOLUTION INTRAVENOUS at 12:04

## 2023-04-14 RX ADMIN — ASPIRIN 325 MG ORAL TABLET 325 MG: 325 PILL ORAL at 09:04

## 2023-04-14 RX ADMIN — INSULIN ASPART 4 UNITS: 100 INJECTION, SOLUTION INTRAVENOUS; SUBCUTANEOUS at 06:04

## 2023-04-14 RX ADMIN — SENNOSIDES AND DOCUSATE SODIUM 1 TABLET: 8.6; 5 TABLET ORAL at 09:04

## 2023-04-14 RX ADMIN — HYDRALAZINE HYDROCHLORIDE 25 MG: 25 TABLET, FILM COATED ORAL at 08:04

## 2023-04-14 RX ADMIN — INSULIN ASPART 3 UNITS: 100 INJECTION, SOLUTION INTRAVENOUS; SUBCUTANEOUS at 08:04

## 2023-04-14 RX ADMIN — INSULIN ASPART 5 UNITS: 100 INJECTION, SOLUTION INTRAVENOUS; SUBCUTANEOUS at 05:04

## 2023-04-14 RX ADMIN — GABAPENTIN 300 MG: 300 CAPSULE ORAL at 09:04

## 2023-04-14 RX ADMIN — DULOXETINE 60 MG: 30 CAPSULE, DELAYED RELEASE ORAL at 09:04

## 2023-04-14 RX ADMIN — INSULIN ASPART 6 UNITS: 100 INJECTION, SOLUTION INTRAVENOUS; SUBCUTANEOUS at 10:04

## 2023-04-14 RX ADMIN — HYDROCODONE BITARTRATE AND ACETAMINOPHEN 1 TABLET: 5; 325 TABLET ORAL at 08:04

## 2023-04-14 RX ADMIN — Medication 1 CAPSULE: at 10:04

## 2023-04-14 RX ADMIN — Medication 9 MG: at 08:04

## 2023-04-14 RX ADMIN — GABAPENTIN 300 MG: 300 CAPSULE ORAL at 08:04

## 2023-04-14 RX ADMIN — LOSARTAN POTASSIUM 25 MG: 25 TABLET, FILM COATED ORAL at 09:04

## 2023-04-14 RX ADMIN — HEPARIN, PORCINE (PF) 10 UNIT/ML INTRAVENOUS SYRINGE 50 UNITS: at 09:04

## 2023-04-14 RX ADMIN — Medication 1 CAPSULE: at 06:04

## 2023-04-14 RX ADMIN — Medication 1 CAPSULE: at 05:04

## 2023-04-14 RX ADMIN — INSULIN ASPART 5 UNITS: 100 INJECTION, SOLUTION INTRAVENOUS; SUBCUTANEOUS at 10:04

## 2023-04-14 RX ADMIN — INSULIN DETEMIR 30 UNITS: 100 INJECTION, SOLUTION SUBCUTANEOUS at 09:04

## 2023-04-14 RX ADMIN — HYDROCODONE BITARTRATE AND ACETAMINOPHEN 1 TABLET: 5; 325 TABLET ORAL at 06:04

## 2023-04-14 RX ADMIN — HYDRALAZINE HYDROCHLORIDE 25 MG: 25 TABLET, FILM COATED ORAL at 09:04

## 2023-04-14 NOTE — NURSING
Cap removed from tip. Tip cleansed x 15 seconds with chlor prep swab. 10 ml blood waste pulled from red PICC port, 6 mL blood pulled and given to  Amrita. Line flushed with 10 mL NS. Cap placed on tip.

## 2023-04-14 NOTE — PT/OT/SLP PROGRESS
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   Patient states "My back still bothering me".          Objective:        Functional Mobility:   Bed Mobility:     Transfers: sit <> stand SBA for safety        Gait:  Approx. 100' x 1 with RW SBA, L LE TTWB on level indoor surface        Stairs: not performed       Therapeutic Activities and Exercises:    Ther- Ex    Nustep    Ankle pumps 30 x    Hip adduction 30 x 3"    Heelslides    LAQ's 3 x 10 2# R LE    Hamstring Curls 3 x 10 yellow TB R LE    Quad sets  3 x 10 3"    SAQ's 30 x 3" R LE 2#   Straight Leg Raises 3 x 10 B LE with min A for L LE    Hip abduction    Horizontal ADD/ABD 3 x 10 B LE    Glut sets  30x 5"    Seated Marching  3 x 10 2# RLE    Ther-Act    Heel Raises    Mini Squats    3 way hip    Sit <> stands  3 x    Standing Marches                    AM-PAC 6 CLICK MOBILITY  How much help from another person does this patient currently need?   1 = Unable, Total/Dependent Assistance  2 = A lot, Maximum/Moderate Assistance  3 = A little, Minimum/Contact Guard/Supervision  4 = None, Modified [place]/Independent    Turning over in bed (including adjusting bedclothes, sheets and blankets)?: 4  Sitting down on and standing up from a chair with arms (e.g., wheelchair, bedside commode, etc.): 3  Moving from lying on back to sitting on the side of the bed?: " 4  Moving to and from a bed to a chair (including a wheelchair)?: 3  Need to walk in hospital room?: 3  Climbing 3-5 steps with a railing?: 2  Basic Mobility Total Score: 19    AM-PAC Raw Score CMS G-Code Modifier Level of Impairment Assistance   6 % Total / Unable   7 - 9 CM 80 - 100% Maximal Assist   10 - 14 CL 60 - 80% Moderate Assist   15 - 19 CK 40 - 60% Moderate Assist   20 - 22 CJ 20 - 40% Minimal Assist   23 CI 1-20% SBA / CGA   24 CH 0% Independent/ Mod I     Patient left up in chair with call bell handy    Assessment:  Monica Walker is a 52 y.o. female with a medical diagnosis of Staphylococcal arthritis of left knee and presents with Rehab identified problem list/impairments: weakness, impaired endurance, impaired self care skills, impaired functional mobility, decreased upper extremity function, decreased lower extremity function, decreased safety awareness.  Patient is pleasant and cooperative during physical therapy treatment session.  Patient presents with good carryover progressing through There Ex.  Patient requires rest break at end of gait training.  Patient without new complaints.    Rehab potential is good.    Activity tolerance: Good and Fair    Discharge recommendations: home with home health      Barriers to discharge:      Equipment recommendations: none     GOALS:   Multidisciplinary Problems       Physical Therapy Goals          Problem: Physical Therapy    Goal Priority Disciplines Outcome Goal Variances Interventions   Physical Therapy Goal     PT, PT/OT Ongoing, Progressing     Description: Short Term Goals  1. Patient will complete 30 reps of B LE exercises with correct form.   2. Patient will complete sit<>stand transfers with SBA while maintaining toe touch weight bearing on LLE.   3. Patient will ambulate 25 feet with RW on level surfaces with CGA while maintaining toe touch weight bearing on LLE.    Long Term Goals   1. Patient will ambulate 50 feet with RW on level and  unlevel surfaces with SBA while maintaining toe touch weight bearing on LLE.  2. Patient will complete all functional transfers with MOD I while maintaining toe touch weight bearing on LLE.                        PLAN:    Patient to be seen 5 x/week to address the above listed problems via gait training, therapeutic exercises  Plan of Care expires: 04/11/23  Plan of Care reviewed with: patient    Continue Plan of Care Per PT order to progress patient toward rehab goals.   ZEINAB Hardin   4/14/2023

## 2023-04-14 NOTE — PLAN OF CARE
Problem: Oral Intake Inadequate (Acute Kidney Injury/Impairment)  Goal: Optimal Nutrition Intake  Outcome: Ongoing, Progressing  Intervention: Promote and Optimize Nutrition  Flowsheets (Taken 4/14/2023 1320)  Oral Nutrition Promotion:   physical activity promoted   rest periods promoted     Problem: Infection  Goal: Absence of Infection Signs and Symptoms  Outcome: Ongoing, Progressing  Intervention: Prevent or Manage Infection  Flowsheets (Taken 4/14/2023 1320)  Infection Management: aseptic technique maintained  Isolation Precautions:   precautions maintained   contact

## 2023-04-14 NOTE — PT/OT/SLP PROGRESS
Occupational Therapy   Treatment    Name: Monica Walker  MRN: [id number]  Admitting Diagnosis:  Staphylococcal arthritis of left knee       Recommendations:     Discharge Recommendations: home with home health  Discharge Equipment Recommendations:  none  Barriers to discharge:       Assessment:     Monica Walker is a 52 y.o. female with a medical diagnosis of Staphylococcal arthritis of left knee.  She presents with weakness and decline with ADLs. Performance deficits affecting function are weakness, impaired endurance, impaired self care skills, impaired functional mobility, impaired balance, decreased lower extremity function, decreased upper extremity function, decreased safety awareness.     Rehab Prognosis:  Good; patient would benefit from acute skilled OT services to address these deficits and reach maximum level of function.       Plan:     Patient to be seen 5 x/week to address the above listed problems via therapeutic exercises  Plan of Care Expires:    Plan of Care Reviewed with: patient    Subjective     Chief Complaint: no complaints  Patient/Family Comments/goals: to go home  Pain/Comfort:  Pain Rating 1: 2/10  Location - Side 1: Left  Location - Orientation 1: upper  Location 1: shoulder  Pain Addressed 1: Cessation of Activity  Pain Rating Post-Intervention 1: 1/10    Objective:     Communicated with: Nurse prior to session.  Patient found up in chair with PICC line upon OT entry to room.    General Precautions: Standard, fall    Orthopedic Precautions:LLE toe touch weight bearing  Braces: Knee immobilizer  Respiratory Status: Room air     Occupational Performance:     Bed Mobility:    Not tested     Functional Mobility/Transfers:  Not tested  Functional Mobility: n/a    Activities of Daily Living:  Not tested      [facility] 6 Click ADL:      Treatment & Education:  Pt performed B UE strengthening exercises to include:   Shoulder flexion (Right UE only)   Chest press (Right UE only)   Elbow  flexion   Elbow extension   Pectoral stretches   Bilateral rowing  All exercises performed 3x10 reps using 4# weight and red theraband. Patient also performed UE bike x 6 minutes with minimal torque.     Patient left up in chair with all lines intact and call button in reach    GOALS:   Multidisciplinary Problems       Occupational Therapy Goals          Problem: Occupational Therapy    Goal Priority Disciplines Outcome Interventions   Occupational Therapy Goal     OT, PT/OT Ongoing, Progressing    Description: Description: Grooming Status:   Short Term Goal: Pt will perform grooming with s/u sitting EOB.   Long Term Goal: Pt will perform grooming/oral hygiene standing at sink with Mod I      LE dressing Status:   Short Term Goal: Pt will perform LE dressing with mod a.   Long Term Goal: Pt will perform LE dressing with min a.    Toileting Status:   Short Term Goal: Pt will perform toilet hygiene on BSC with min a.  Long Term Goal: Pt will perform toilet hygiene on toilet with no AE with s/u.    Commode Transfer:   Short Term Goal: Pt will perform BSC t/f with min a.  Long Term Goal:  Pt will perform toilet t/f in bathroom with s/u.     Bathing Status:   Long Term Goal: Pt will perform sponge bath with s/u with no unsafe fatigue.     Strength Status:   Long Term Goal: Pt to perform BUE strengthening with weights and/or body weight to increase ADL independence and safety    Endurance Status:   Short Term Goal:pt to perform 15 min OT treatment with 5 or greater rest breaks  Long Term Goal: pt to perform 30 min OT treat with 3 or less rest breaks                       Time Tracking:     OT Date of Treatment: 04/14/23  OT Start Time: 1315  OT Stop Time: 1345  OT Total Time (min): 30 min    Billable Minutes:Therapeutic Exercise 25 min             EDITA Santos/L, CSRS  4/14/2023

## 2023-04-14 NOTE — PROGRESS NOTES
Wt: 140#  No problems noted with surgical incision.  Pt continues to eat high kcal/high CHO food from the outside despite being discouraged by staff.  B-332.  Continue to follow for intake support.

## 2023-04-14 NOTE — PROGRESS NOTES
4/14 AM dose of Vancomycin held due to elevated trough.    Pharmacy to follow up and adjust Vancomycin dosing.

## 2023-04-14 NOTE — PROGRESS NOTES
[facility]  Hospital Medicine  Progress Note    Patient Name: Monica Walker  MRN: [id number]  Patient Class: IP- Swing   Admission Date: 3/20/2023  Length of Stay: 25 days  Attending Physician: Hannah Schulz, *  Primary Care Provider: Hannah Schulz MD        Subjective:     Principal Problem:Staphylococcal arthritis of left knee        HPI:  3/20/23 - this 52 yr. Old WF was readmitted here for IV antibiotics. Her PICC line was pulled on the 16th. It was not replaced on the 18th as the noted stated was the plan. She is to get antibiotics through the 27th of April. We will have to arrange for her to have another PICC line installed for this. She has had her left knee replacement removed for sepsis. She has a spacer in the joint. She will undergo another knee replacement after the antibiotics. For rest of history, please see her old records.      Overview/Hospital Course:  3/21/23 - pt. Is doing well so far. We will arrange for her to get a PICC line soon.    3/24/23 - pt. Is again wanting more pain meds. Again told her that she is on the dose she was on in [place]. Will continue present treatment.    3/27/23 - pt. Is still eating a lot of [employer]. Still complaining of pain. She is not moving. Will continue present treatment.    3/31/23 - pt. Is sleeping.    4/3/23 - pt. Sleeping. No complaints voiced by staff.    4/7/23 - pt. Requesting sitting outside some today. OK by me.    4/10/23 - pt. Is sleeping.    4/14/23 - pt. Is sleeping. Vancomycin has to be adjusted again today.      Interval History: pt. Is sleeping.    Review of Systems  Objective:     Vital Signs (Most Recent):  Temp: 98.5 °F (36.9 °C) (04/14/23 0800)  Pulse: 76 (04/14/23 0804)  Resp: 18 (04/14/23 0804)  BP: 130/72 (04/14/23 0800)  SpO2: 97 % (04/14/23 0804) Vital Signs (24h Range):  Temp:  [98 °F (36.7 °C)-98.5 °F (36.9 °C)] 98.5 °F (36.9 °C)  Pulse:  [72-90] 76  Resp:  [18-20] 18  SpO2:  [97  %-98 %] 97 %  BP: (130-137)/(72-73) 130/72     Weight: 63.8 kg (140 lb 10.5 oz)  Body mass index is 22.7 kg/m².    Intake/Output Summary (Last 24 hours) at 4/14/2023 0841  Last data filed at 4/13/2023 1853  Gross per 24 hour   Intake 720 ml   Output --   Net 720 ml      Physical Exam    Significant Labs: All pertinent labs within the past 24 hours have been reviewed.    Significant Imaging: I have reviewed all pertinent imaging results/findings within the past 24 hours.      Assessment/Plan:      * Staphylococcal arthritis of left knee          VTE Risk Mitigation (From admission, onward)         Ordered     heparin, porcine (PF) injection 50 Units  Daily         03/27/23 1427     IP VTE HIGH RISK PATIENT  Once         03/20/23 1801     Place DONOVAN hose  Until discontinued         03/20/23 1801                Discharge Planning   SUZANNA:      Code Status: Prior   Is the patient medically ready for discharge?:     Reason for patient still in hospital (select all that apply): Patient trending condition  Discharge Plan A: Home with family, Home Health                  Hannah Schulz MD  Department of Hospital Medicine   [facility]

## 2023-04-14 NOTE — SUBJECTIVE & OBJECTIVE
Interval History: pt. Is sleeping.    Review of Systems  Objective:     Vital Signs (Most Recent):  Temp: 98.5 °F (36.9 °C) (04/14/23 0800)  Pulse: 76 (04/14/23 0804)  Resp: 18 (04/14/23 0804)  BP: 130/72 (04/14/23 0800)  SpO2: 97 % (04/14/23 0804) Vital Signs (24h Range):  Temp:  [98 °F (36.7 °C)-98.5 °F (36.9 °C)] 98.5 °F (36.9 °C)  Pulse:  [72-90] 76  Resp:  [18-20] 18  SpO2:  [97 %-98 %] 97 %  BP: (130-137)/(72-73) 130/72     Weight: 63.8 kg (140 lb 10.5 oz)  Body mass index is 22.7 kg/m².    Intake/Output Summary (Last 24 hours) at 4/14/2023 0841  Last data filed at 4/13/2023 1853  Gross per 24 hour   Intake 720 ml   Output --   Net 720 ml      Physical Exam    Significant Labs: All pertinent labs within the past 24 hours have been reviewed.    Significant Imaging: I have reviewed all pertinent imaging results/findings within the past 24 hours.

## 2023-04-15 LAB
GLUCOSE SERPL-MCNC: 145 MG/DL (ref 70–105)
GLUCOSE SERPL-MCNC: 176 MG/DL (ref 70–105)
GLUCOSE SERPL-MCNC: 217 MG/DL (ref 70–105)
GLUCOSE SERPL-MCNC: 286 MG/DL (ref 70–105)

## 2023-04-15 PROCEDURE — 63600175 PHARM REV CODE 636 W HCPCS: Performed by: FAMILY MEDICINE

## 2023-04-15 PROCEDURE — 25000003 PHARM REV CODE 250: Performed by: FAMILY MEDICINE

## 2023-04-15 PROCEDURE — 82962 GLUCOSE BLOOD TEST: CPT

## 2023-04-15 PROCEDURE — 25000003 PHARM REV CODE 250: Performed by: SPECIALIST

## 2023-04-15 PROCEDURE — 94761 N-INVAS EAR/PLS OXIMETRY MLT: CPT

## 2023-04-15 PROCEDURE — 11000004 HC SNF PRIVATE

## 2023-04-15 RX ADMIN — HYDRALAZINE HYDROCHLORIDE 25 MG: 25 TABLET, FILM COATED ORAL at 08:04

## 2023-04-15 RX ADMIN — GABAPENTIN 300 MG: 300 CAPSULE ORAL at 08:04

## 2023-04-15 RX ADMIN — HEPARIN, PORCINE (PF) 10 UNIT/ML INTRAVENOUS SYRINGE 50 UNITS: at 09:04

## 2023-04-15 RX ADMIN — SENNOSIDES AND DOCUSATE SODIUM 1 TABLET: 8.6; 5 TABLET ORAL at 08:04

## 2023-04-15 RX ADMIN — DULOXETINE 60 MG: 30 CAPSULE, DELAYED RELEASE ORAL at 09:04

## 2023-04-15 RX ADMIN — HYDRALAZINE HYDROCHLORIDE 25 MG: 25 TABLET, FILM COATED ORAL at 09:04

## 2023-04-15 RX ADMIN — Medication 1 CAPSULE: at 05:04

## 2023-04-15 RX ADMIN — INSULIN DETEMIR 30 UNITS: 100 INJECTION, SOLUTION SUBCUTANEOUS at 09:04

## 2023-04-15 RX ADMIN — INSULIN ASPART 5 UNITS: 100 INJECTION, SOLUTION INTRAVENOUS; SUBCUTANEOUS at 05:04

## 2023-04-15 RX ADMIN — INSULIN ASPART 5 UNITS: 100 INJECTION, SOLUTION INTRAVENOUS; SUBCUTANEOUS at 11:04

## 2023-04-15 RX ADMIN — Medication 9 MG: at 08:04

## 2023-04-15 RX ADMIN — Medication 1 CAPSULE: at 11:04

## 2023-04-15 RX ADMIN — GABAPENTIN 300 MG: 300 CAPSULE ORAL at 09:04

## 2023-04-15 RX ADMIN — VANCOMYCIN HYDROCHLORIDE 1750 MG: 1 INJECTION, POWDER, LYOPHILIZED, FOR SOLUTION INTRAVENOUS at 05:04

## 2023-04-15 RX ADMIN — Medication 1 CAPSULE: at 06:04

## 2023-04-15 RX ADMIN — INSULIN ASPART 6 UNITS: 100 INJECTION, SOLUTION INTRAVENOUS; SUBCUTANEOUS at 11:04

## 2023-04-15 RX ADMIN — HYDROCODONE BITARTRATE AND ACETAMINOPHEN 1 TABLET: 5; 325 TABLET ORAL at 08:04

## 2023-04-15 RX ADMIN — HYDROCODONE BITARTRATE AND ACETAMINOPHEN 1 TABLET: 5; 325 TABLET ORAL at 02:04

## 2023-04-15 RX ADMIN — LOSARTAN POTASSIUM 25 MG: 25 TABLET, FILM COATED ORAL at 09:04

## 2023-04-15 RX ADMIN — ASPIRIN 325 MG ORAL TABLET 325 MG: 325 PILL ORAL at 09:04

## 2023-04-15 RX ADMIN — HYDROCODONE BITARTRATE AND ACETAMINOPHEN 1 TABLET: 5; 325 TABLET ORAL at 05:04

## 2023-04-15 NOTE — PLAN OF CARE
Problem: Adult Inpatient Plan of Care  Goal: Plan of Care Review  Outcome: Ongoing, Progressing  Goal: Patient-Specific Goal (Individualized)  Outcome: Ongoing, Progressing     Problem: Diabetes Comorbidity  Goal: Blood Glucose Level Within Targeted Range  Outcome: Ongoing, Not Progressing  Intervention: Monitor and Manage Glycemia  Flowsheets (Taken 4/15/2023 3916)  Glycemic Management:   blood glucose monitored   oral hydration promoted   supplemental insulin given

## 2023-04-16 LAB
GLUCOSE SERPL-MCNC: 194 MG/DL (ref 70–105)
GLUCOSE SERPL-MCNC: 225 MG/DL (ref 70–105)
GLUCOSE SERPL-MCNC: 234 MG/DL (ref 70–105)
GLUCOSE SERPL-MCNC: 295 MG/DL (ref 70–105)

## 2023-04-16 PROCEDURE — 63600175 PHARM REV CODE 636 W HCPCS: Performed by: FAMILY MEDICINE

## 2023-04-16 PROCEDURE — 25000003 PHARM REV CODE 250: Performed by: FAMILY MEDICINE

## 2023-04-16 PROCEDURE — 82962 GLUCOSE BLOOD TEST: CPT

## 2023-04-16 PROCEDURE — 25000003 PHARM REV CODE 250: Performed by: SPECIALIST

## 2023-04-16 PROCEDURE — 94761 N-INVAS EAR/PLS OXIMETRY MLT: CPT

## 2023-04-16 PROCEDURE — 11000004 HC SNF PRIVATE

## 2023-04-16 RX ADMIN — HEPARIN, PORCINE (PF) 10 UNIT/ML INTRAVENOUS SYRINGE 50 UNITS: at 08:04

## 2023-04-16 RX ADMIN — INSULIN ASPART 5 UNITS: 100 INJECTION, SOLUTION INTRAVENOUS; SUBCUTANEOUS at 05:04

## 2023-04-16 RX ADMIN — Medication 1 CAPSULE: at 06:04

## 2023-04-16 RX ADMIN — SENNOSIDES AND DOCUSATE SODIUM 1 TABLET: 8.6; 5 TABLET ORAL at 08:04

## 2023-04-16 RX ADMIN — GABAPENTIN 300 MG: 300 CAPSULE ORAL at 08:04

## 2023-04-16 RX ADMIN — Medication 1 CAPSULE: at 05:04

## 2023-04-16 RX ADMIN — INSULIN DETEMIR 30 UNITS: 100 INJECTION, SOLUTION SUBCUTANEOUS at 08:04

## 2023-04-16 RX ADMIN — Medication 9 MG: at 08:04

## 2023-04-16 RX ADMIN — HYDRALAZINE HYDROCHLORIDE 25 MG: 25 TABLET, FILM COATED ORAL at 08:04

## 2023-04-16 RX ADMIN — VANCOMYCIN HYDROCHLORIDE 1750 MG: 1 INJECTION, POWDER, LYOPHILIZED, FOR SOLUTION INTRAVENOUS at 12:04

## 2023-04-16 RX ADMIN — HYDROCODONE BITARTRATE AND ACETAMINOPHEN 1 TABLET: 5; 325 TABLET ORAL at 06:04

## 2023-04-16 RX ADMIN — LOSARTAN POTASSIUM 25 MG: 25 TABLET, FILM COATED ORAL at 08:04

## 2023-04-16 RX ADMIN — ASPIRIN 325 MG ORAL TABLET 325 MG: 325 PILL ORAL at 08:04

## 2023-04-16 RX ADMIN — HYDROCODONE BITARTRATE AND ACETAMINOPHEN 1 TABLET: 5; 325 TABLET ORAL at 05:04

## 2023-04-16 RX ADMIN — Medication 1 CAPSULE: at 11:04

## 2023-04-16 RX ADMIN — DULOXETINE 60 MG: 30 CAPSULE, DELAYED RELEASE ORAL at 08:04

## 2023-04-16 RX ADMIN — INSULIN ASPART 4 UNITS: 100 INJECTION, SOLUTION INTRAVENOUS; SUBCUTANEOUS at 05:04

## 2023-04-16 RX ADMIN — HYDROCODONE BITARTRATE AND ACETAMINOPHEN 1 TABLET: 5; 325 TABLET ORAL at 11:04

## 2023-04-16 RX ADMIN — VANCOMYCIN HYDROCHLORIDE 1750 MG: 1 INJECTION, POWDER, LYOPHILIZED, FOR SOLUTION INTRAVENOUS at 06:04

## 2023-04-16 RX ADMIN — INSULIN ASPART 2 UNITS: 100 INJECTION, SOLUTION INTRAVENOUS; SUBCUTANEOUS at 08:04

## 2023-04-16 RX ADMIN — INSULIN ASPART 5 UNITS: 100 INJECTION, SOLUTION INTRAVENOUS; SUBCUTANEOUS at 11:04

## 2023-04-16 NOTE — PLAN OF CARE
Problem: Adult Inpatient Plan of Care  Goal: Plan of Care Review  Outcome: Ongoing, Progressing  Goal: Patient-Specific Goal (Individualized)  Outcome: Ongoing, Progressing  Goal: Absence of Hospital-Acquired Illness or Injury  Outcome: Ongoing, Progressing     Problem: Diabetes Comorbidity  Goal: Blood Glucose Level Within Targeted Range  Outcome: Ongoing, Progressing     Problem: Fluid and Electrolyte Imbalance (Acute Kidney Injury/Impairment)  Goal: Fluid and Electrolyte Balance  Outcome: Ongoing, Progressing     Problem: Fall Injury Risk  Goal: Absence of Fall and Fall-Related Injury  Outcome: Ongoing, Progressing     Problem: Infection  Goal: Absence of Infection Signs and Symptoms  Outcome: Ongoing, Progressing

## 2023-04-16 NOTE — NURSING
"Patient called RN into room and stated that she needed to see a doctor because her back and hip on the left side were hurting really bad. Pt stated  " I have already told a couple of people and no one has done anything about it". Pt states that she has not seen a doctor since last week. Discussed with patient that she is in swing bed so she will not see a doctor every day unless she needs something. Patient verbalized understanding. Patient then stated, "well I need to see someone or yall need to send me out to [facility] so they can see what is wrong." Discussed that I will discuss with MD. Spoke with Dr. Barth about the patient, at this time Dr. Barth is unable to come to bedside due to emergencies in the ED. Discussed this with patient and that I will pass along to night shift to see if their doctor can come back by whenever the ED is not as busy. Pt verbalized understanding.   "

## 2023-04-17 LAB
ANION GAP SERPL CALCULATED.3IONS-SCNC: 12 MMOL/L (ref 7–16)
BASOPHILS # BLD AUTO: 0.04 K/UL (ref 0–0.2)
BASOPHILS NFR BLD AUTO: 0.6 % (ref 0–1)
BUN SERPL-MCNC: 19 MG/DL (ref 7–18)
BUN/CREAT SERPL: 22 (ref 6–20)
CALCIUM SERPL-MCNC: 9.1 MG/DL (ref 8.5–10.1)
CHLORIDE SERPL-SCNC: 102 MMOL/L (ref 98–107)
CO2 SERPL-SCNC: 28 MMOL/L (ref 21–32)
CREAT SERPL-MCNC: 0.86 MG/DL (ref 0.55–1.02)
CRP SERPL-MCNC: 0.37 MG/DL (ref 0–0.8)
DIFFERENTIAL METHOD BLD: ABNORMAL
EGFR (NO RACE VARIABLE) (RUSH/TITUS): 80 ML/MIN/1.73M²
EOSINOPHIL # BLD AUTO: 0.58 K/UL (ref 0–0.5)
EOSINOPHIL NFR BLD AUTO: 8.2 % (ref 1–4)
ERYTHROCYTE [DISTWIDTH] IN BLOOD BY AUTOMATED COUNT: 19.3 % (ref 11.5–14.5)
ERYTHROCYTE [SEDIMENTATION RATE] IN BLOOD BY WESTERGREN METHOD: 47 MM/HR (ref 0–30)
GLUCOSE SERPL-MCNC: 193 MG/DL (ref 70–105)
GLUCOSE SERPL-MCNC: 227 MG/DL (ref 70–105)
GLUCOSE SERPL-MCNC: 256 MG/DL (ref 70–105)
GLUCOSE SERPL-MCNC: 268 MG/DL (ref 74–106)
HCT VFR BLD AUTO: 34.8 % (ref 38–47)
HGB BLD-MCNC: 10.7 G/DL (ref 12–16)
IMM GRANULOCYTES # BLD AUTO: 0.01 K/UL (ref 0–0.04)
IMM GRANULOCYTES NFR BLD: 0.1 % (ref 0–0.4)
LYMPHOCYTES # BLD AUTO: 2.39 K/UL (ref 1–4.8)
LYMPHOCYTES NFR BLD AUTO: 33.9 % (ref 27–41)
MCH RBC QN AUTO: 25.8 PG (ref 27–31)
MCHC RBC AUTO-ENTMCNC: 30.7 G/DL (ref 32–36)
MCV RBC AUTO: 84.1 FL (ref 80–96)
MONOCYTES # BLD AUTO: 0.63 K/UL (ref 0–0.8)
MONOCYTES NFR BLD AUTO: 8.9 % (ref 2–6)
MPC BLD CALC-MCNC: 10.3 FL (ref 9.4–12.4)
NEUTROPHILS # BLD AUTO: 3.39 K/UL (ref 1.8–7.7)
NEUTROPHILS NFR BLD AUTO: 48.3 % (ref 53–65)
PLATELET # BLD AUTO: 221 K/UL (ref 150–400)
POTASSIUM SERPL-SCNC: 4.1 MMOL/L (ref 3.5–5.1)
RBC # BLD AUTO: 4.14 M/UL (ref 4.2–5.4)
SODIUM SERPL-SCNC: 138 MMOL/L (ref 136–145)
VANCOMYCIN TROUGH SERPL-MCNC: 15 ΜG/ML (ref 10–20)
WBC # BLD AUTO: 7.04 K/UL (ref 4.5–11)

## 2023-04-17 PROCEDURE — 94761 N-INVAS EAR/PLS OXIMETRY MLT: CPT

## 2023-04-17 PROCEDURE — 63600175 PHARM REV CODE 636 W HCPCS: Performed by: FAMILY MEDICINE

## 2023-04-17 PROCEDURE — 82962 GLUCOSE BLOOD TEST: CPT

## 2023-04-17 PROCEDURE — 80202 ASSAY OF VANCOMYCIN: CPT | Performed by: FAMILY MEDICINE

## 2023-04-17 PROCEDURE — 99308 PR NURSING FAC CARE, SUBSEQ, MINOR COMPLIC: ICD-10-PCS | Mod: ,,, | Performed by: FAMILY MEDICINE

## 2023-04-17 PROCEDURE — 86140 C-REACTIVE PROTEIN: CPT | Performed by: FAMILY MEDICINE

## 2023-04-17 PROCEDURE — 80048 BASIC METABOLIC PNL TOTAL CA: CPT | Performed by: FAMILY MEDICINE

## 2023-04-17 PROCEDURE — 25000003 PHARM REV CODE 250: Performed by: SPECIALIST

## 2023-04-17 PROCEDURE — 11000004 HC SNF PRIVATE

## 2023-04-17 PROCEDURE — 85651 RBC SED RATE NONAUTOMATED: CPT | Performed by: FAMILY MEDICINE

## 2023-04-17 PROCEDURE — 85025 COMPLETE CBC W/AUTO DIFF WBC: CPT | Performed by: FAMILY MEDICINE

## 2023-04-17 PROCEDURE — 25000003 PHARM REV CODE 250: Performed by: FAMILY MEDICINE

## 2023-04-17 PROCEDURE — 99308 SBSQ NF CARE LOW MDM 20: CPT | Mod: ,,, | Performed by: FAMILY MEDICINE

## 2023-04-17 RX ORDER — KETOROLAC TROMETHAMINE 30 MG/ML
60 INJECTION, SOLUTION INTRAMUSCULAR; INTRAVENOUS ONCE
Status: COMPLETED | OUTPATIENT
Start: 2023-04-17 | End: 2023-04-17

## 2023-04-17 RX ADMIN — INSULIN ASPART 2 UNITS: 100 INJECTION, SOLUTION INTRAVENOUS; SUBCUTANEOUS at 08:04

## 2023-04-17 RX ADMIN — VANCOMYCIN HYDROCHLORIDE 1750 MG: 1 INJECTION, POWDER, LYOPHILIZED, FOR SOLUTION INTRAVENOUS at 12:04

## 2023-04-17 RX ADMIN — GABAPENTIN 300 MG: 300 CAPSULE ORAL at 08:04

## 2023-04-17 RX ADMIN — ASPIRIN 325 MG ORAL TABLET 325 MG: 325 PILL ORAL at 08:04

## 2023-04-17 RX ADMIN — DULOXETINE 60 MG: 30 CAPSULE, DELAYED RELEASE ORAL at 08:04

## 2023-04-17 RX ADMIN — INSULIN ASPART 5 UNITS: 100 INJECTION, SOLUTION INTRAVENOUS; SUBCUTANEOUS at 06:04

## 2023-04-17 RX ADMIN — INSULIN ASPART 6 UNITS: 100 INJECTION, SOLUTION INTRAVENOUS; SUBCUTANEOUS at 11:04

## 2023-04-17 RX ADMIN — Medication 1 CAPSULE: at 05:04

## 2023-04-17 RX ADMIN — HYDRALAZINE HYDROCHLORIDE 25 MG: 25 TABLET, FILM COATED ORAL at 08:04

## 2023-04-17 RX ADMIN — HEPARIN, PORCINE (PF) 10 UNIT/ML INTRAVENOUS SYRINGE 50 UNITS: at 08:04

## 2023-04-17 RX ADMIN — Medication 1 CAPSULE: at 06:04

## 2023-04-17 RX ADMIN — INSULIN ASPART 6 UNITS: 100 INJECTION, SOLUTION INTRAVENOUS; SUBCUTANEOUS at 06:04

## 2023-04-17 RX ADMIN — LOSARTAN POTASSIUM 25 MG: 25 TABLET, FILM COATED ORAL at 08:04

## 2023-04-17 RX ADMIN — INSULIN ASPART 5 UNITS: 100 INJECTION, SOLUTION INTRAVENOUS; SUBCUTANEOUS at 05:04

## 2023-04-17 RX ADMIN — Medication 9 MG: at 08:04

## 2023-04-17 RX ADMIN — Medication 1 CAPSULE: at 11:04

## 2023-04-17 RX ADMIN — INSULIN DETEMIR 30 UNITS: 100 INJECTION, SOLUTION SUBCUTANEOUS at 08:04

## 2023-04-17 RX ADMIN — SENNOSIDES AND DOCUSATE SODIUM 1 TABLET: 8.6; 5 TABLET ORAL at 08:04

## 2023-04-17 RX ADMIN — INSULIN ASPART 2 UNITS: 100 INJECTION, SOLUTION INTRAVENOUS; SUBCUTANEOUS at 05:04

## 2023-04-17 RX ADMIN — HYDROCODONE BITARTRATE AND ACETAMINOPHEN 1 TABLET: 5; 325 TABLET ORAL at 11:04

## 2023-04-17 RX ADMIN — KETOROLAC TROMETHAMINE 60 MG: 30 INJECTION, SOLUTION INTRAMUSCULAR at 09:04

## 2023-04-17 RX ADMIN — HYDROCODONE BITARTRATE AND ACETAMINOPHEN 1 TABLET: 5; 325 TABLET ORAL at 06:04

## 2023-04-17 RX ADMIN — HYDROCODONE BITARTRATE AND ACETAMINOPHEN 1 TABLET: 5; 325 TABLET ORAL at 08:04

## 2023-04-17 RX ADMIN — INSULIN ASPART 5 UNITS: 100 INJECTION, SOLUTION INTRAVENOUS; SUBCUTANEOUS at 11:04

## 2023-04-17 NOTE — PROGRESS NOTES
[facility] Hospital Medicine  Progress Note    Patient Name: Monica Walker  MRN: [id number]  Patient Class: IP- Swing   Admission Date: 3/20/2023  Length of Stay: 28 days  Attending Physician: Hannah Schulz, *  Primary Care Provider: Hannah Schulz MD        Subjective:     Principal Problem:Staphylococcal arthritis of left knee    [facility] Hospital Admission Certification    I certify that skilled nursing facility services are required to be given on an inpatient basis due to the following required skilled nursing and/or skilled rehabilitation services on a continuing basis:    management & evaluation of a patient plan of care that requires skilled nursing or rehabilitation services    I estimate that the additional period of SNF inpatient care will be 10 days.    Plans for post SNF care are: Home Care  ______________________________________________________________________        HPI:  3/20/23 - this 52 yr. Old WF was readmitted here for IV antibiotics. Her PICC line was pulled on the 16th. It was not replaced on the 18th as the noted stated was the plan. She is to get antibiotics through the 27th of April. We will have to arrange for her to have another PICC line installed for this. She has had her left knee replacement removed for sepsis. She has a spacer in the joint. She will undergo another knee replacement after the antibiotics. For rest of history, please see her old records.      Overview/Hospital Course:  3/21/23 - pt. Is doing well so far. We will arrange for her to get a PICC line soon.    3/24/23 - pt. Is again wanting more pain meds. Again told her that she is on the dose she was on in [place]. Will continue present treatment.    3/27/23 - pt. Is still eating a lot of [employer]. Still complaining of pain. She is not moving. Will continue present treatment.    3/31/23 - pt. Is sleeping.    4/3/23 - pt.  Sleeping. No complaints voiced by staff.    4/7/23 - pt. Requesting sitting outside some today. OK by me.    4/10/23 - pt. Is sleeping.    4/14/23 - pt. Is sleeping. Vancomycin has to be adjusted again today.    4/17/23 - pt. Complaining of back pain today. Orders revised.      Interval History: complaining of back pain.    Review of Systems  Objective:     Vital Signs (Most Recent):  Temp: 97.8 °F (36.6 °C) (04/16/23 2023)  Pulse: 69 (04/17/23 0747)  Resp: 20 (04/17/23 0747)  BP: (!) 141/60 (04/16/23 2023)  SpO2: 98 % (04/17/23 0747)   Vital Signs (24h Range):  Temp:  [97.8 °F (36.6 °C)] 97.8 °F (36.6 °C)  Pulse:  [69-77] 69  Resp:  [17-20] 20  SpO2:  [98 %-99 %] 98 %  BP: (141)/(60) 141/60     Weight: 68.3 kg (150 lb 9.2 oz)  Body mass index is 24.3 kg/m².  No intake or output data in the 24 hours ending 04/17/23 0849   Physical Exam    Significant Labs: All pertinent labs within the past 24 hours have been reviewed.    Significant Imaging: I have reviewed all pertinent imaging results/findings within the past 24 hours.      Assessment/Plan:      * Staphylococcal arthritis of left knee          VTE Risk Mitigation (From admission, onward)         Ordered     heparin, porcine (PF) injection 50 Units  Daily         03/27/23 1427     IP VTE HIGH RISK PATIENT  Once         03/20/23 1801     Place DONOVAN hose  Until discontinued         03/20/23 1801                Discharge Planning   SUZANNA:      Code Status: Prior   Is the patient medically ready for discharge?:     Reason for patient still in hospital (select all that apply): Patient trending condition  Discharge Plan A: Home with family, Home Health                  Hannah Schulz MD  Department of Hospital Medicine   [facility]

## 2023-04-17 NOTE — PROGRESS NOTES
Pharmacokinetic Assessment Follow Up: IV Vancomycin    Vancomycin serum concentration assessment(s):    The trough level was drawn correctly and can be used to guide therapy at this time. The measurement is within the desired definitive target range of 15 to 20 mcg/mL.    Vancomycin Regimen Plan:    Continue regimen to Vancomycin 1750 mg mg IV every 18 hours with next serum trough concentration measured at 30 min prior to 0600 dose on 4.21.23    Drug levels (last 3 results):  Recent Labs   Lab Result Units 04/17/23  1145   Vancomycin, Trough µg/mL 15.0       Pharmacy will continue to follow and monitor vancomycin.    Please contact pharmacy at extension [phone] for questions regarding this assessment.    Thank you for the consult,   Stacey Reddy       Patient brief summary:  Monica Walker is a 52 y.o. female initiated on antimicrobial therapy with IV Vancomycin for treatment of bone/joint infection    The patient's current regimen is vancomycin 1750 mg IV q18 h    Drug Allergies:   Review of patient's allergies indicates:  No Known Allergies    Actual Body Weight:   68.3    Renal Function:   Estimated Creatinine Clearance: 70 mL/min (based on SCr of 0.86 mg/dL).,     Dialysis Method (if applicable):  N/A    CBC (last 72 hours):  Recent Labs   Lab Result Units 04/17/23  0526   WBC K/uL 7.04   Hemoglobin g/dL 10.7*   Hematocrit % 34.8*   Platelet Count K/uL 221   Lymphocytes % % 33.9   Monocytes % % 8.9*   Eosinophils % % 8.2*   Basophils % % 0.6       Metabolic Panel (last 72 hours):  Recent Labs   Lab Result Units 04/14/23  1702 04/17/23  0526   Sodium mmol/L 135* 138   Potassium mmol/L 4.1 4.1   Chloride mmol/L 100 102   CO2 mmol/L 29 28   Glucose mg/dL 285* 268*   BUN mg/dL 18 19*   Creatinine mg/dL 0.97 0.86       Vancomycin Administrations:  vancomycin given in the last 96 hours                     vancomycin (VANCOCIN) 1,750 mg in dextrose 5 % (D5W) 500 mL IVPB (mg) 1,750 mg New Bag 04/17/23 1241     1,750 mg New  Bag 04/16/23 1817     1,750 mg New Bag  0048     1,750 mg New Bag 04/15/23 0531     1,750 mg New Bag 04/14/23 1242                    Microbiologic Results:  Microbiology Results (last 7 days)       ** No results found for the last 168 hours. **

## 2023-04-17 NOTE — PROGRESS NOTES
Patient reports that therapy tasks have increased her back pain. She reports that she received a toradol shot this morning and is hoping it will decrease her pain.   Damaris Kaplan, PT, DPT

## 2023-04-17 NOTE — SUBJECTIVE & OBJECTIVE
Interval History: complaining of back pain.    Review of Systems  Objective:     Vital Signs (Most Recent):  Temp: 97.8 °F (36.6 °C) (04/16/23 2023)  Pulse: 69 (04/17/23 0747)  Resp: 20 (04/17/23 0747)  BP: (!) 141/60 (04/16/23 2023)  SpO2: 98 % (04/17/23 0747)   Vital Signs (24h Range):  Temp:  [97.8 °F (36.6 °C)] 97.8 °F (36.6 °C)  Pulse:  [69-77] 69  Resp:  [17-20] 20  SpO2:  [98 %-99 %] 98 %  BP: (141)/(60) 141/60     Weight: 68.3 kg (150 lb 9.2 oz)  Body mass index is 24.3 kg/m².  No intake or output data in the 24 hours ending 04/17/23 0849   Physical Exam    Significant Labs: All pertinent labs within the past 24 hours have been reviewed.    Significant Imaging: I have reviewed all pertinent imaging results/findings within the past 24 hours.

## 2023-04-17 NOTE — PROGRESS NOTES
[facility] Hospital Medicine  Progress Note    Patient Name: Monica Walker  MRN: [id number]  Patient Class: IP- Swing   Admission Date: 3/20/2023  Length of Stay: 28 days  Attending Physician: Hannah Schulz, *  Primary Care Provider: Hannah Schulz MD        Subjective:     Principal Problem:Staphylococcal arthritis of left knee    [facility] Hospital Admission Certification    I certify that skilled nursing facility services are required to be given on an inpatient basis due to the following required skilled nursing and/or skilled rehabilitation services on a continuing basis:    management & evaluation of a patient plan of care that requires skilled nursing or rehabilitation services    I estimate that the additional period of SNF inpatient care will be 10 days.    Plans for post SNF care are: Home Care  ______________________________________________________________________        HPI:  3/20/23 - this 52 yr. Old WF was readmitted here for IV antibiotics. Her PICC line was pulled on the 16th. It was not replaced on the 18th as the noted stated was the plan. She is to get antibiotics through the 27th of April. We will have to arrange for her to have another PICC line installed for this. She has had her left knee replacement removed for sepsis. She has a spacer in the joint. She will undergo another knee replacement after the antibiotics. For rest of history, please see her old records.      Overview/Hospital Course:  3/21/23 - pt. Is doing well so far. We will arrange for her to get a PICC line soon.    3/24/23 - pt. Is again wanting more pain meds. Again told her that she is on the dose she was on in [place]. Will continue present treatment.    3/27/23 - pt. Is still eating a lot of Cherry[employer]. Still complaining of pain. She is not moving. Will continue present treatment.    3/31/23 - pt. Is sleeping.    4/3/23 - pt.  Sleeping. No complaints voiced by staff.    4/7/23 - pt. Requesting sitting outside some today. OK by me.    4/10/23 - pt. Is sleeping.    4/14/23 - pt. Is sleeping. Vancomycin has to be adjusted again today.    4/17/23 - pt. Complaining of back pain today. Orders revised.      No new subjective & objective note has been filed under this hospital service since the last note was generated.      Assessment/Plan:      * Staphylococcal arthritis of left knee          VTE Risk Mitigation (From admission, onward)         Ordered     heparin, porcine (PF) injection 50 Units  Daily         03/27/23 1427     IP VTE HIGH RISK PATIENT  Once         03/20/23 1801     Place DONOVAN hose  Until discontinued         03/20/23 1801                Discharge Planning   SUZANNA:      Code Status: Prior   Is the patient medically ready for discharge?:     Reason for patient still in hospital (select all that apply): Patient trending condition  Discharge Plan A: Home with family, Home Health                  Hannah Schulz MD  Department of Hospital Medicine   [facility]

## 2023-04-18 LAB
GLUCOSE SERPL-MCNC: 149 MG/DL (ref 70–105)
GLUCOSE SERPL-MCNC: 297 MG/DL (ref 70–105)
GLUCOSE SERPL-MCNC: 299 MG/DL (ref 70–105)
GLUCOSE SERPL-MCNC: 319 MG/DL (ref 70–105)

## 2023-04-18 PROCEDURE — 94761 N-INVAS EAR/PLS OXIMETRY MLT: CPT

## 2023-04-18 PROCEDURE — 82962 GLUCOSE BLOOD TEST: CPT

## 2023-04-18 PROCEDURE — 63600175 PHARM REV CODE 636 W HCPCS: Performed by: FAMILY MEDICINE

## 2023-04-18 PROCEDURE — 25000003 PHARM REV CODE 250: Performed by: SPECIALIST

## 2023-04-18 PROCEDURE — 11000004 HC SNF PRIVATE

## 2023-04-18 PROCEDURE — 25000003 PHARM REV CODE 250: Performed by: FAMILY MEDICINE

## 2023-04-18 RX ADMIN — GABAPENTIN 300 MG: 300 CAPSULE ORAL at 08:04

## 2023-04-18 RX ADMIN — HYDROCODONE BITARTRATE AND ACETAMINOPHEN 1 TABLET: 5; 325 TABLET ORAL at 11:04

## 2023-04-18 RX ADMIN — INSULIN ASPART 5 UNITS: 100 INJECTION, SOLUTION INTRAVENOUS; SUBCUTANEOUS at 11:04

## 2023-04-18 RX ADMIN — Medication 1 CAPSULE: at 05:04

## 2023-04-18 RX ADMIN — SENNOSIDES AND DOCUSATE SODIUM 1 TABLET: 8.6; 5 TABLET ORAL at 08:04

## 2023-04-18 RX ADMIN — INSULIN ASPART 8 UNITS: 100 INJECTION, SOLUTION INTRAVENOUS; SUBCUTANEOUS at 11:04

## 2023-04-18 RX ADMIN — INSULIN ASPART 5 UNITS: 100 INJECTION, SOLUTION INTRAVENOUS; SUBCUTANEOUS at 05:04

## 2023-04-18 RX ADMIN — INSULIN ASPART 5 UNITS: 100 INJECTION, SOLUTION INTRAVENOUS; SUBCUTANEOUS at 06:04

## 2023-04-18 RX ADMIN — HEPARIN, PORCINE (PF) 10 UNIT/ML INTRAVENOUS SYRINGE 50 UNITS: at 09:04

## 2023-04-18 RX ADMIN — GABAPENTIN 300 MG: 300 CAPSULE ORAL at 09:04

## 2023-04-18 RX ADMIN — SENNOSIDES AND DOCUSATE SODIUM 1 TABLET: 8.6; 5 TABLET ORAL at 09:04

## 2023-04-18 RX ADMIN — LOSARTAN POTASSIUM 25 MG: 25 TABLET, FILM COATED ORAL at 09:04

## 2023-04-18 RX ADMIN — HYDROCODONE BITARTRATE AND ACETAMINOPHEN 1 TABLET: 5; 325 TABLET ORAL at 06:04

## 2023-04-18 RX ADMIN — INSULIN DETEMIR 30 UNITS: 100 INJECTION, SOLUTION SUBCUTANEOUS at 09:04

## 2023-04-18 RX ADMIN — Medication 1 CAPSULE: at 06:04

## 2023-04-18 RX ADMIN — Medication 1 CAPSULE: at 11:04

## 2023-04-18 RX ADMIN — INSULIN ASPART 6 UNITS: 100 INJECTION, SOLUTION INTRAVENOUS; SUBCUTANEOUS at 06:04

## 2023-04-18 RX ADMIN — ASPIRIN 325 MG ORAL TABLET 325 MG: 325 PILL ORAL at 09:04

## 2023-04-18 RX ADMIN — HYDROCODONE BITARTRATE AND ACETAMINOPHEN 1 TABLET: 5; 325 TABLET ORAL at 05:04

## 2023-04-18 RX ADMIN — DULOXETINE 60 MG: 30 CAPSULE, DELAYED RELEASE ORAL at 09:04

## 2023-04-18 RX ADMIN — INSULIN ASPART 6 UNITS: 100 INJECTION, SOLUTION INTRAVENOUS; SUBCUTANEOUS at 05:04

## 2023-04-18 RX ADMIN — VANCOMYCIN HYDROCHLORIDE 1750 MG: 1 INJECTION, POWDER, LYOPHILIZED, FOR SOLUTION INTRAVENOUS at 06:04

## 2023-04-18 RX ADMIN — HYDRALAZINE HYDROCHLORIDE 25 MG: 25 TABLET, FILM COATED ORAL at 09:04

## 2023-04-18 RX ADMIN — Medication 9 MG: at 08:04

## 2023-04-18 RX ADMIN — HYDRALAZINE HYDROCHLORIDE 25 MG: 25 TABLET, FILM COATED ORAL at 08:04

## 2023-04-18 NOTE — PLAN OF CARE
Problem: Adult Inpatient Plan of Care  Goal: Plan of Care Review  Outcome: Ongoing, Progressing  Goal: Patient-Specific Goal (Individualized)  Outcome: Ongoing, Progressing     Problem: Fall Injury Risk  Goal: Absence of Fall and Fall-Related Injury  Outcome: Ongoing, Progressing  Intervention: Identify and Manage Contributors  Flowsheets (Taken 4/18/2023 1527)  Self-Care Promotion:   independence encouraged   BADL personal objects within reach   BADL personal routines maintained  Medication Review/Management:   medications reviewed   high-risk medications identified  Intervention: Promote Injury-Free Environment  Flowsheets (Taken 4/18/2023 1527)  Safety Promotion/Fall Prevention:   assistive device/personal item within reach   diversional activities provided   high risk medications identified   medications reviewed   muscle strengthening facilitated   in recliner, wheels locked   nonskid shoes/socks when out of bed   side rails raised x 3   instructed to call staff for mobility

## 2023-04-18 NOTE — PROGRESS NOTES
Patient voiced her back was hurting too bad to do therapy. She voiced she was taking a few days off of therapy

## 2023-04-18 NOTE — NURSING
Updated notes and clinicals sent to [facility] for approval of additional swing bed days.

## 2023-04-18 NOTE — PLAN OF CARE
Problem: Diabetes Comorbidity  Goal: Blood Glucose Level Within Targeted Range  Outcome: Ongoing, Not Progressing  Intervention: Monitor and Manage Glycemia  Flowsheets (Taken 4/17/2023 2227)  Glycemic Management: blood glucose monitored     Problem: Impaired Wound Healing  Goal: Optimal Wound Healing  Outcome: Met     Problem: Fall Injury Risk  Goal: Absence of Fall and Fall-Related Injury  Outcome: Ongoing, Progressing  Intervention: Identify and Manage Contributors  Flowsheets (Taken 4/17/2023 2227)  Self-Care Promotion:   independence encouraged   BADL personal objects within reach   BADL personal routines maintained   meal set-up provided  Medication Review/Management: medications reviewed

## 2023-04-18 NOTE — NURSING
"About 1235 patient calls me to her room saying she had a tooth that has been hurting and she needs to get it pulled now. She has never mentioned a tooth hurting before. She said she was going to call her dentist Dr. Kim, to see if he can see her today, but she wanted to make sure it was okay if she went before she called. I asked the Swingbed coordinator and she said it is okay if she goes out to the dentist.   About 1258, René Alex,  the patient's brother in law comes to visit. At 1300, they both come rolling down the martinez heading for the door. I asked where they were going and the patient said, "I'm going to get my tooth pulled, be back next week". We called the dentist office to verify that they were going to see the patient today, and they said, "Yes her appointment is at 1630".  Patient is aware of the 8 hour pass rule for doctors appointments.   "

## 2023-04-19 LAB
GLUCOSE SERPL-MCNC: 214 MG/DL (ref 70–105)
GLUCOSE SERPL-MCNC: 281 MG/DL (ref 70–105)
GLUCOSE SERPL-MCNC: 312 MG/DL (ref 70–105)
GLUCOSE SERPL-MCNC: 320 MG/DL (ref 70–105)

## 2023-04-19 PROCEDURE — 63600175 PHARM REV CODE 636 W HCPCS: Performed by: FAMILY MEDICINE

## 2023-04-19 PROCEDURE — 25000003 PHARM REV CODE 250: Performed by: SPECIALIST

## 2023-04-19 PROCEDURE — 11000004 HC SNF PRIVATE

## 2023-04-19 PROCEDURE — 82962 GLUCOSE BLOOD TEST: CPT

## 2023-04-19 PROCEDURE — 94761 N-INVAS EAR/PLS OXIMETRY MLT: CPT

## 2023-04-19 PROCEDURE — 25000003 PHARM REV CODE 250: Performed by: FAMILY MEDICINE

## 2023-04-19 PROCEDURE — 63600175 PHARM REV CODE 636 W HCPCS: Performed by: SPECIALIST

## 2023-04-19 RX ORDER — KETOROLAC TROMETHAMINE 30 MG/ML
60 INJECTION, SOLUTION INTRAMUSCULAR; INTRAVENOUS ONCE
Status: COMPLETED | OUTPATIENT
Start: 2023-04-19 | End: 2023-04-19

## 2023-04-19 RX ADMIN — INSULIN ASPART 8 UNITS: 100 INJECTION, SOLUTION INTRAVENOUS; SUBCUTANEOUS at 10:04

## 2023-04-19 RX ADMIN — KETOROLAC TROMETHAMINE 60 MG: 30 INJECTION, SOLUTION INTRAMUSCULAR at 02:04

## 2023-04-19 RX ADMIN — INSULIN ASPART 2 UNITS: 100 INJECTION, SOLUTION INTRAVENOUS; SUBCUTANEOUS at 06:04

## 2023-04-19 RX ADMIN — LOSARTAN POTASSIUM 25 MG: 25 TABLET, FILM COATED ORAL at 09:04

## 2023-04-19 RX ADMIN — GABAPENTIN 300 MG: 300 CAPSULE ORAL at 09:04

## 2023-04-19 RX ADMIN — INSULIN DETEMIR 30 UNITS: 100 INJECTION, SOLUTION SUBCUTANEOUS at 09:04

## 2023-04-19 RX ADMIN — INSULIN ASPART 5 UNITS: 100 INJECTION, SOLUTION INTRAVENOUS; SUBCUTANEOUS at 05:04

## 2023-04-19 RX ADMIN — INSULIN ASPART 5 UNITS: 100 INJECTION, SOLUTION INTRAVENOUS; SUBCUTANEOUS at 10:04

## 2023-04-19 RX ADMIN — Medication 1 CAPSULE: at 06:04

## 2023-04-19 RX ADMIN — HEPARIN, PORCINE (PF) 10 UNIT/ML INTRAVENOUS SYRINGE 50 UNITS: at 09:04

## 2023-04-19 RX ADMIN — HYDROCODONE BITARTRATE AND ACETAMINOPHEN 1 TABLET: 5; 325 TABLET ORAL at 01:04

## 2023-04-19 RX ADMIN — SENNOSIDES AND DOCUSATE SODIUM 1 TABLET: 8.6; 5 TABLET ORAL at 09:04

## 2023-04-19 RX ADMIN — INSULIN ASPART 3 UNITS: 100 INJECTION, SOLUTION INTRAVENOUS; SUBCUTANEOUS at 09:04

## 2023-04-19 RX ADMIN — HYDROCODONE BITARTRATE AND ACETAMINOPHEN 1 TABLET: 5; 325 TABLET ORAL at 06:04

## 2023-04-19 RX ADMIN — GABAPENTIN 300 MG: 300 CAPSULE ORAL at 08:04

## 2023-04-19 RX ADMIN — SENNOSIDES AND DOCUSATE SODIUM 1 TABLET: 8.6; 5 TABLET ORAL at 08:04

## 2023-04-19 RX ADMIN — Medication 1 CAPSULE: at 05:04

## 2023-04-19 RX ADMIN — Medication 9 MG: at 08:04

## 2023-04-19 RX ADMIN — HYDRALAZINE HYDROCHLORIDE 25 MG: 25 TABLET, FILM COATED ORAL at 09:04

## 2023-04-19 RX ADMIN — VANCOMYCIN HYDROCHLORIDE 1750 MG: 1 INJECTION, POWDER, LYOPHILIZED, FOR SOLUTION INTRAVENOUS at 12:04

## 2023-04-19 RX ADMIN — HYDRALAZINE HYDROCHLORIDE 25 MG: 25 TABLET, FILM COATED ORAL at 08:04

## 2023-04-19 RX ADMIN — HYDROCODONE BITARTRATE AND ACETAMINOPHEN 1 TABLET: 5; 325 TABLET ORAL at 08:04

## 2023-04-19 RX ADMIN — INSULIN ASPART 5 UNITS: 100 INJECTION, SOLUTION INTRAVENOUS; SUBCUTANEOUS at 06:04

## 2023-04-19 RX ADMIN — ASPIRIN 325 MG ORAL TABLET 325 MG: 325 PILL ORAL at 09:04

## 2023-04-19 RX ADMIN — VANCOMYCIN HYDROCHLORIDE 1750 MG: 1 INJECTION, POWDER, LYOPHILIZED, FOR SOLUTION INTRAVENOUS at 06:04

## 2023-04-19 RX ADMIN — DULOXETINE 60 MG: 30 CAPSULE, DELAYED RELEASE ORAL at 09:04

## 2023-04-19 RX ADMIN — Medication 1 CAPSULE: at 10:04

## 2023-04-19 NOTE — PLAN OF CARE
[facility] - Skilled Nursing Facility  Patient: Monica Walker     Interdisciplinary Team Meeting     Today's Date: 4/19/2023     Estimated D/C Date: 4/27/23       Physician: Dr. Schulz    Pharmacy: [facility] Unit Director: BRODY Kelly   : JORGE LUIS Méndez Physical/Occupational Therapy: MIGUEL ANGEL Ac/ JAVIER Bolton   Speech Therapy: n/a Activity Therapy: read newspaper/watch tv   Nursing: MONET Nguyễn S. Luker Other: MONET Perkins     Nurse  New Symptoms/Problems: none  Last BM: 4/18/23 Urine: continent Diarrhea: No   Constipated: No Bladder: continent    Isolation: No     O2: n/a  Rm Air: 95 %   Nutrition: see dietary notes  Speech/Swallowing: n/a  Aspiration Precautions: No  Cognition: n/a    Physical Therapy  Physical Therapy/Gait: Approx. 100' x 1 with RW SBA, L LE TTWB on level indoor surface     ELOS: tbd   Transfers: sit <> stand SBA for safety  Range of Motion/Restrictions: LLE toe touch weight bearing     Occupational Therapy  Occupational Therapy: following for increased strength to increase I with ADLs Eating/Grooming: independent   Toileting: min. assist Bathing: min. assist   Dressing (Upper Body): min. assist Dressing (Lower Body): min. assist       Tx Plan/Recommendations reviewed with patient on 4/12/23 at bedside.  Additional family Conference/Training: not at this time  D/C Plan/Recommendations: home with home health    Pharmacy  Medication Changes (see MD orders in chart): No  MD/NP: Dr. Schulz Labs Reviewed: yes New Lab Orders: no     MD/NP Signature: Time:

## 2023-04-19 NOTE — PROGRESS NOTES
Patient refused participation with physical therapy treatment session today secondary to reports of back pain.  Skilled nursing and supervising physical therapist, Kalina Ac DPT notified.  Continue Plan of Care per PT order to progress patient toward rehab goals.  ZEINAB Hardin 4/19/2023

## 2023-04-20 LAB
GLUCOSE SERPL-MCNC: 150 MG/DL (ref 70–105)
GLUCOSE SERPL-MCNC: 166 MG/DL (ref 70–105)
GLUCOSE SERPL-MCNC: 241 MG/DL (ref 70–105)
GLUCOSE SERPL-MCNC: 285 MG/DL (ref 70–105)

## 2023-04-20 PROCEDURE — 11000004 HC SNF PRIVATE

## 2023-04-20 PROCEDURE — 82962 GLUCOSE BLOOD TEST: CPT

## 2023-04-20 PROCEDURE — 63600175 PHARM REV CODE 636 W HCPCS: Performed by: INTERNAL MEDICINE

## 2023-04-20 PROCEDURE — 25000003 PHARM REV CODE 250: Performed by: SPECIALIST

## 2023-04-20 PROCEDURE — 25000003 PHARM REV CODE 250: Performed by: FAMILY MEDICINE

## 2023-04-20 PROCEDURE — 94761 N-INVAS EAR/PLS OXIMETRY MLT: CPT

## 2023-04-20 PROCEDURE — 63600175 PHARM REV CODE 636 W HCPCS: Performed by: FAMILY MEDICINE

## 2023-04-20 RX ORDER — KETOROLAC TROMETHAMINE 30 MG/ML
60 INJECTION, SOLUTION INTRAMUSCULAR; INTRAVENOUS ONCE
Status: COMPLETED | OUTPATIENT
Start: 2023-04-20 | End: 2023-04-20

## 2023-04-20 RX ADMIN — SENNOSIDES AND DOCUSATE SODIUM 1 TABLET: 8.6; 5 TABLET ORAL at 08:04

## 2023-04-20 RX ADMIN — INSULIN ASPART 6 UNITS: 100 INJECTION, SOLUTION INTRAVENOUS; SUBCUTANEOUS at 06:04

## 2023-04-20 RX ADMIN — ASPIRIN 325 MG ORAL TABLET 325 MG: 325 PILL ORAL at 08:04

## 2023-04-20 RX ADMIN — INSULIN ASPART 2 UNITS: 100 INJECTION, SOLUTION INTRAVENOUS; SUBCUTANEOUS at 05:04

## 2023-04-20 RX ADMIN — Medication 9 MG: at 08:04

## 2023-04-20 RX ADMIN — HYDROCODONE BITARTRATE AND ACETAMINOPHEN 1 TABLET: 5; 325 TABLET ORAL at 08:04

## 2023-04-20 RX ADMIN — KETOROLAC TROMETHAMINE 60 MG: 30 INJECTION, SOLUTION INTRAMUSCULAR at 08:04

## 2023-04-20 RX ADMIN — HYDRALAZINE HYDROCHLORIDE 25 MG: 25 TABLET, FILM COATED ORAL at 08:04

## 2023-04-20 RX ADMIN — LOSARTAN POTASSIUM 25 MG: 25 TABLET, FILM COATED ORAL at 08:04

## 2023-04-20 RX ADMIN — HYDROCODONE BITARTRATE AND ACETAMINOPHEN 1 TABLET: 5; 325 TABLET ORAL at 12:04

## 2023-04-20 RX ADMIN — Medication 1 CAPSULE: at 04:04

## 2023-04-20 RX ADMIN — DULOXETINE 60 MG: 30 CAPSULE, DELAYED RELEASE ORAL at 08:04

## 2023-04-20 RX ADMIN — Medication 1 CAPSULE: at 06:04

## 2023-04-20 RX ADMIN — HYDROCODONE BITARTRATE AND ACETAMINOPHEN 1 TABLET: 5; 325 TABLET ORAL at 09:04

## 2023-04-20 RX ADMIN — INSULIN ASPART 5 UNITS: 100 INJECTION, SOLUTION INTRAVENOUS; SUBCUTANEOUS at 04:04

## 2023-04-20 RX ADMIN — INSULIN DETEMIR 30 UNITS: 100 INJECTION, SOLUTION SUBCUTANEOUS at 08:04

## 2023-04-20 RX ADMIN — INSULIN ASPART 5 UNITS: 100 INJECTION, SOLUTION INTRAVENOUS; SUBCUTANEOUS at 06:04

## 2023-04-20 RX ADMIN — INSULIN ASPART 5 UNITS: 100 INJECTION, SOLUTION INTRAVENOUS; SUBCUTANEOUS at 11:04

## 2023-04-20 RX ADMIN — HEPARIN, PORCINE (PF) 10 UNIT/ML INTRAVENOUS SYRINGE 50 UNITS: at 08:04

## 2023-04-20 RX ADMIN — VANCOMYCIN HYDROCHLORIDE 1750 MG: 1 INJECTION, POWDER, LYOPHILIZED, FOR SOLUTION INTRAVENOUS at 12:04

## 2023-04-20 RX ADMIN — GABAPENTIN 300 MG: 300 CAPSULE ORAL at 08:04

## 2023-04-20 RX ADMIN — Medication 1 CAPSULE: at 11:04

## 2023-04-20 RX ADMIN — INSULIN ASPART 4 UNITS: 100 INJECTION, SOLUTION INTRAVENOUS; SUBCUTANEOUS at 11:04

## 2023-04-20 NOTE — PLAN OF CARE
Problem: Adult Inpatient Plan of Care  Goal: Optimal Comfort and Wellbeing  Outcome: Ongoing, Progressing  Goal: Readiness for Transition of Care  Outcome: Ongoing, Progressing     Problem: Diabetes Comorbidity  Goal: Blood Glucose Level Within Targeted Range  Outcome: Ongoing, Progressing     Problem: Fall Injury Risk  Goal: Absence of Fall and Fall-Related Injury  Outcome: Ongoing, Progressing     Problem: Skin Injury Risk Increased  Goal: Skin Health and Integrity  Outcome: Ongoing, Progressing     Problem: Infection  Goal: Absence of Infection Signs and Symptoms  Outcome: Ongoing, Progressing

## 2023-04-20 NOTE — PROGRESS NOTES
Patient has been unwilling to participate this week with therapy stating that her back hurts too bad. Physical therapy and OT D/C today as therapy is making her hurt worse and she is awaiting a TKR

## 2023-04-20 NOTE — PLAN OF CARE
Problem: Adult Inpatient Plan of Care  Goal: Patient-Specific Goal (Individualized)  Outcome: Ongoing, Progressing  Goal: Absence of Hospital-Acquired Illness or Injury  Outcome: Ongoing, Progressing  Goal: Optimal Comfort and Wellbeing  Outcome: Ongoing, Progressing     Problem: Fall Injury Risk  Goal: Absence of Fall and Fall-Related Injury  Outcome: Ongoing, Progressing     Problem: Skin Injury Risk Increased  Goal: Skin Health and Integrity  Outcome: Ongoing, Progressing

## 2023-04-21 LAB
BUN SERPL-MCNC: 25 MG/DL (ref 7–18)
BUN/CREAT SERPL: 28 (ref 6–20)
CREAT SERPL-MCNC: 0.89 MG/DL (ref 0.55–1.02)
EGFR (NO RACE VARIABLE) (RUSH/TITUS): 77 ML/MIN/1.73M²
GLUCOSE SERPL-MCNC: 126 MG/DL (ref 70–105)
GLUCOSE SERPL-MCNC: 214 MG/DL (ref 70–105)
GLUCOSE SERPL-MCNC: 241 MG/DL (ref 70–105)
GLUCOSE SERPL-MCNC: 253 MG/DL (ref 70–105)
VANCOMYCIN SERPL-MCNC: 20.4 ΜG/ML (ref 0–20)
VANCOMYCIN TROUGH SERPL-MCNC: 22.8 ΜG/ML (ref 10–20)

## 2023-04-21 PROCEDURE — 99308 PR NURSING FAC CARE, SUBSEQ, MINOR COMPLIC: ICD-10-PCS | Mod: ,,, | Performed by: FAMILY MEDICINE

## 2023-04-21 PROCEDURE — 82565 ASSAY OF CREATININE: CPT | Performed by: FAMILY MEDICINE

## 2023-04-21 PROCEDURE — 63600175 PHARM REV CODE 636 W HCPCS: Performed by: FAMILY MEDICINE

## 2023-04-21 PROCEDURE — 80202 ASSAY OF VANCOMYCIN: CPT | Performed by: FAMILY MEDICINE

## 2023-04-21 PROCEDURE — 25000003 PHARM REV CODE 250: Performed by: SPECIALIST

## 2023-04-21 PROCEDURE — 84520 ASSAY OF UREA NITROGEN: CPT | Performed by: FAMILY MEDICINE

## 2023-04-21 PROCEDURE — 11000004 HC SNF PRIVATE

## 2023-04-21 PROCEDURE — 25000003 PHARM REV CODE 250: Performed by: FAMILY MEDICINE

## 2023-04-21 PROCEDURE — 99308 SBSQ NF CARE LOW MDM 20: CPT | Mod: ,,, | Performed by: FAMILY MEDICINE

## 2023-04-21 PROCEDURE — 82962 GLUCOSE BLOOD TEST: CPT

## 2023-04-21 PROCEDURE — 94761 N-INVAS EAR/PLS OXIMETRY MLT: CPT

## 2023-04-21 RX ADMIN — INSULIN ASPART 5 UNITS: 100 INJECTION, SOLUTION INTRAVENOUS; SUBCUTANEOUS at 11:04

## 2023-04-21 RX ADMIN — INSULIN ASPART 4 UNITS: 100 INJECTION, SOLUTION INTRAVENOUS; SUBCUTANEOUS at 05:04

## 2023-04-21 RX ADMIN — HYDRALAZINE HYDROCHLORIDE 25 MG: 25 TABLET, FILM COATED ORAL at 09:04

## 2023-04-21 RX ADMIN — ASPIRIN 325 MG ORAL TABLET 325 MG: 325 PILL ORAL at 09:04

## 2023-04-21 RX ADMIN — INSULIN ASPART 5 UNITS: 100 INJECTION, SOLUTION INTRAVENOUS; SUBCUTANEOUS at 05:04

## 2023-04-21 RX ADMIN — INSULIN ASPART 6 UNITS: 100 INJECTION, SOLUTION INTRAVENOUS; SUBCUTANEOUS at 11:04

## 2023-04-21 RX ADMIN — DULOXETINE 60 MG: 30 CAPSULE, DELAYED RELEASE ORAL at 09:04

## 2023-04-21 RX ADMIN — HYDROCODONE BITARTRATE AND ACETAMINOPHEN 1 TABLET: 5; 325 TABLET ORAL at 08:04

## 2023-04-21 RX ADMIN — GABAPENTIN 300 MG: 300 CAPSULE ORAL at 09:04

## 2023-04-21 RX ADMIN — SENNOSIDES AND DOCUSATE SODIUM 1 TABLET: 8.6; 5 TABLET ORAL at 08:04

## 2023-04-21 RX ADMIN — HYDRALAZINE HYDROCHLORIDE 25 MG: 25 TABLET, FILM COATED ORAL at 08:04

## 2023-04-21 RX ADMIN — Medication 9 MG: at 08:04

## 2023-04-21 RX ADMIN — HYDROCODONE BITARTRATE AND ACETAMINOPHEN 1 TABLET: 5; 325 TABLET ORAL at 05:04

## 2023-04-21 RX ADMIN — Medication 1 CAPSULE: at 10:04

## 2023-04-21 RX ADMIN — GABAPENTIN 300 MG: 300 CAPSULE ORAL at 08:04

## 2023-04-21 RX ADMIN — HYDROCODONE BITARTRATE AND ACETAMINOPHEN 1 TABLET: 5; 325 TABLET ORAL at 11:04

## 2023-04-21 RX ADMIN — LOSARTAN POTASSIUM 25 MG: 25 TABLET, FILM COATED ORAL at 09:04

## 2023-04-21 RX ADMIN — INSULIN ASPART 5 UNITS: 100 INJECTION, SOLUTION INTRAVENOUS; SUBCUTANEOUS at 06:04

## 2023-04-21 RX ADMIN — INSULIN DETEMIR 30 UNITS: 100 INJECTION, SOLUTION SUBCUTANEOUS at 09:04

## 2023-04-21 RX ADMIN — Medication 1 CAPSULE: at 06:04

## 2023-04-21 RX ADMIN — HEPARIN, PORCINE (PF) 10 UNIT/ML INTRAVENOUS SYRINGE 50 UNITS: at 09:04

## 2023-04-21 RX ADMIN — Medication 1 CAPSULE: at 05:04

## 2023-04-21 RX ADMIN — VANCOMYCIN HYDROCHLORIDE 1500 MG: 1.5 INJECTION, POWDER, LYOPHILIZED, FOR SOLUTION INTRAVENOUS at 12:04

## 2023-04-21 NOTE — PROGRESS NOTES
[facility]  Hospital Medicine  Progress Note    Patient Name: Monica Walker  MRN: [id number]  Patient Class: IP- Swing   Admission Date: 3/20/2023  Length of Stay: 32 days  Attending Physician: Hannah Schulz, *  Primary Care Provider: Hannah Schulz MD        Subjective:     Principal Problem:Staphylococcal arthritis of left knee        HPI:  3/20/23 - this 52 yr. Old WF was readmitted here for IV antibiotics. Her PICC line was pulled on the 16th. It was not replaced on the 18th as the noted stated was the plan. She is to get antibiotics through the 27th of April. We will have to arrange for her to have another PICC line installed for this. She has had her left knee replacement removed for sepsis. She has a spacer in the joint. She will undergo another knee replacement after the antibiotics. For rest of history, please see her old records.      Overview/Hospital Course:  3/21/23 - pt. Is doing well so far. We will arrange for her to get a PICC line soon.    3/24/23 - pt. Is again wanting more pain meds. Again told her that she is on the dose she was on in [place]. Will continue present treatment.    3/27/23 - pt. Is still eating a lot of Cherry[employer]. Still complaining of pain. She is not moving. Will continue present treatment.    3/31/23 - pt. Is sleeping.    4/3/23 - pt. Sleeping. No complaints voiced by staff.    4/7/23 - pt. Requesting sitting outside some today. OK by me.    4/10/23 - pt. Is sleeping.    4/14/23 - pt. Is sleeping. Vancomycin has to be adjusted again today.    4/17/23 - pt. Complaining of back pain today. Orders revised.    4/21/23 - pt. Sleeping.      Interval History: pt. Sleeping.    Review of Systems  Objective:     Vital Signs (Most Recent):  Temp: 97.9 °F (36.6 °C) (04/20/23 2000)  Pulse: 86 (04/20/23 2000)  Resp: 18 (04/21/23 0558)  BP: (!) 150/78 (04/20/23 2000)  SpO2: 98 % (04/20/23 2000)   Vital Signs (24h Range):  Temp:  [97.9 °F  (36.6 °C)-98.2 °F (36.8 °C)] 97.9 °F (36.6 °C)  Pulse:  [65-86] 86  Resp:  [16-19] 18  SpO2:  [97 %-98 %] 98 %  BP: (122-150)/(59-78) 150/78     Weight: 68.3 kg (150 lb 9.2 oz)  Body mass index is 24.3 kg/m².    Intake/Output Summary (Last 24 hours) at 4/21/2023 0843  Last data filed at 4/20/2023 0908  Gross per 24 hour   Intake 0 ml   Output --   Net 0 ml      Physical Exam    Significant Labs: All pertinent labs within the past 24 hours have been reviewed.    Significant Imaging: I have reviewed all pertinent imaging results/findings within the past 24 hours.      Assessment/Plan:      * Staphylococcal arthritis of left knee          VTE Risk Mitigation (From admission, onward)         Ordered     heparin, porcine (PF) injection 50 Units  Daily         03/27/23 1427     IP VTE HIGH RISK PATIENT  Once         03/20/23 1801     Place DONOVAN hose  Until discontinued         03/20/23 1801                Discharge Planning   SUZANNA:      Code Status: Prior   Is the patient medically ready for discharge?:     Reason for patient still in hospital (select all that apply): Patient trending condition  Discharge Plan A: Home with family, Home Health                  Hannah Schulz MD  Department of Hospital Medicine   [facility]

## 2023-04-21 NOTE — PROGRESS NOTES
Wt: 150#  Pt had 2 teeth pulled and needs softer foods.  Intake has been decreased as a result of this.  B-319.  Last BM , BUN 19.  RDN visited pt this a.m.  Pt denied any other requests.  Rec 1.[facility] soft, DM diet with chopped meats and gravy.

## 2023-04-21 NOTE — PROGRESS NOTES
Pharmacokinetic Assessment Follow Up: IV Vancomycin    Vancomycin serum concentration assessment(s):    The trough level was drawn correctly and can be used to guide therapy at this time. The measurement is above the desired definitive target range of 15 to 20 mcg/mL.    Vancomycin Regimen Plan:    Discontinue the scheduled vancomycin regimen and re-dose when the random level is less than or equal to 20  mcg/mL, next level to be drawn at 0900 on 4.21. level drawn - 20.4 and Vancomycin will be lowered to 1500 mg q18h to begin at 12 pm 4.21.. 6 AM dose on 4.21 was held.     Drug levels (last 3 results):  Recent Labs   Lab Result Units 04/21/23  0500 04/21/23  0936   Vancomycin, Random µg/mL  --  20.4*   Vancomycin, Trough µg/mL 22.8*  --        Pharmacy will continue to follow and monitor vancomycin. Next trough scheduled at 1130 am on 4.24.23.    Please contact pharmacy at extension [phone] for questions regarding this assessment.    Thank you for the consult,   Stacey Reddy       Patient brief summary:  Monica Walker is a 52 y.o. female initiated on antimicrobial therapy with IV Vancomycin for treatment of bone/joint infection    The patient's current regimen is Vancomycin 1500 mg IV q18h beginning at 12 pm on 4.21.23.    Drug Allergies:   Review of patient's allergies indicates:  No Known Allergies    Actual Body Weight: 68.3 kg      Renal Function:   Estimated Creatinine Clearance: 67.6 mL/min (based on SCr of 0.89 mg/dL).,     Dialysis Method (if applicable):  N/A    CBC (last 72 hours):  No results for input(s): WHITE BLOOD CELL COUNT, HEMOGLOBIN, HEMATOCRIT, PLATELETS, GRAN%, LYMPH%, MONO%, EOSINOPHIL%, BASOPHIL%, DIFFERENTIAL METHOD in the last 72 hours.    Metabolic Panel (last 72 hours):  Recent Labs   Lab Result Units 04/21/23  0936   BUN mg/dL 25*   Creatinine mg/dL 0.89       Vancomycin Administrations:  vancomycin given in the last 96 hours                     vancomycin 1,500 mg in dextrose 5 % (D5W) 250  mL IVPB (Vial-Mate) (mg) 1,500 mg New Bag 04/21/23 1238    vancomycin (VANCOCIN) 1,750 mg in dextrose 5 % (D5W) 500 mL IVPB (mg) 1,750 mg New Bag 04/20/23 1253     1,750 mg New Bag 04/19/23 1843     1,750 mg New Bag  0039     1,750 mg New Bag 04/18/23 0645                    Microbiologic Results:  Microbiology Results (last 7 days)       ** No results found for the last 168 hours. **

## 2023-04-21 NOTE — PLAN OF CARE
Problem: Adult Inpatient Plan of Care  Goal: Patient-Specific Goal (Individualized)  Outcome: Ongoing, Progressing  Goal: Optimal Comfort and Wellbeing  Outcome: Ongoing, Progressing  Goal: Readiness for Transition of Care  Outcome: Ongoing, Progressing     Problem: Diabetes Comorbidity  Goal: Blood Glucose Level Within Targeted Range  Outcome: Ongoing, Not Progressing

## 2023-04-21 NOTE — SUBJECTIVE & OBJECTIVE
Interval History: pt. Sleeping.    Review of Systems  Objective:     Vital Signs (Most Recent):  Temp: 97.9 °F (36.6 °C) (04/20/23 2000)  Pulse: 86 (04/20/23 2000)  Resp: 18 (04/21/23 0558)  BP: (!) 150/78 (04/20/23 2000)  SpO2: 98 % (04/20/23 2000)   Vital Signs (24h Range):  Temp:  [97.9 °F (36.6 °C)-98.2 °F (36.8 °C)] 97.9 °F (36.6 °C)  Pulse:  [65-86] 86  Resp:  [16-19] 18  SpO2:  [97 %-98 %] 98 %  BP: (122-150)/(59-78) 150/78     Weight: 68.3 kg (150 lb 9.2 oz)  Body mass index is 24.3 kg/m².    Intake/Output Summary (Last 24 hours) at 4/21/2023 0843  Last data filed at 4/20/2023 0908  Gross per 24 hour   Intake 0 ml   Output --   Net 0 ml      Physical Exam    Significant Labs: All pertinent labs within the past 24 hours have been reviewed.    Significant Imaging: I have reviewed all pertinent imaging results/findings within the past 24 hours.

## 2023-04-21 NOTE — PROGRESS NOTES
[facility] Medicine  Progress Note    Patient Name: Monica Walker  MRN: [id number]  Patient Class: IP- Swing   Admission Date: 3/20/2023  Length of Stay: 32 days  Attending Physician: Hannah Schulz, *  Primary Care Provider: Hannah Schulz MD        Subjective:     Principal Problem:Staphylococcal arthritis of left knee        HPI:  3/20/23 - this 52 yr. Old WF was readmitted here for IV antibiotics. Her PICC line was pulled on the 16th. It was not replaced on the 18th as the noted stated was the plan. She is to get antibiotics through the 27th of April. We will have to arrange for her to have another PICC line installed for this. She has had her left knee replacement removed for sepsis. She has a spacer in the joint. She will undergo another knee replacement after the antibiotics. For rest of history, please see her old records.      Overview/Hospital Course:  3/21/23 - pt. Is doing well so far. We will arrange for her to get a PICC line soon.    3/24/23 - pt. Is again wanting more pain meds. Again told her that she is on the dose she was on in [place]. Will continue present treatment.    3/27/23 - pt. Is still eating a lot of Cherry[employer]. Still complaining of pain. She is not moving. Will continue present treatment.    3/31/23 - pt. Is sleeping.    4/3/23 - pt. Sleeping. No complaints voiced by staff.    4/7/23 - pt. Requesting sitting outside some today. OK by me.    4/10/23 - pt. Is sleeping.    4/14/23 - pt. Is sleeping. Vancomycin has to be adjusted again today.    4/17/23 - pt. Complaining of back pain today. Orders revised.    4/21/23 - pt. Sleeping.      No new subjective & objective note has been filed under this hospital service since the last note was generated.      Assessment/Plan:      * Staphylococcal arthritis of left knee          VTE Risk Mitigation (From admission, onward)         Ordered     heparin, porcine (PF) injection 50 Units   Daily         03/27/23 1427     IP VTE HIGH RISK PATIENT  Once         03/20/23 1801     Place DONOVAN hose  Until discontinued         03/20/23 1801                Discharge Planning   SUZANNA:      Code Status: Prior   Is the patient medically ready for discharge?:     Reason for patient still in hospital (select all that apply): Patient trending condition  Discharge Plan A: Home with family, Home Health                  Hannah Schulz MD  Department of Hospital Medicine   [facility]

## 2023-04-22 LAB
GLUCOSE SERPL-MCNC: 130 MG/DL (ref 70–105)
GLUCOSE SERPL-MCNC: 156 MG/DL (ref 70–105)
GLUCOSE SERPL-MCNC: 168 MG/DL (ref 70–105)
GLUCOSE SERPL-MCNC: 291 MG/DL (ref 70–105)

## 2023-04-22 PROCEDURE — 63600175 PHARM REV CODE 636 W HCPCS: Performed by: FAMILY MEDICINE

## 2023-04-22 PROCEDURE — 25000003 PHARM REV CODE 250: Performed by: FAMILY MEDICINE

## 2023-04-22 PROCEDURE — 82962 GLUCOSE BLOOD TEST: CPT

## 2023-04-22 PROCEDURE — 25000003 PHARM REV CODE 250: Performed by: SPECIALIST

## 2023-04-22 PROCEDURE — 11000004 HC SNF PRIVATE

## 2023-04-22 RX ORDER — KETOROLAC TROMETHAMINE 30 MG/ML
30 INJECTION, SOLUTION INTRAMUSCULAR; INTRAVENOUS ONCE
Status: COMPLETED | OUTPATIENT
Start: 2023-04-22 | End: 2023-04-22

## 2023-04-22 RX ADMIN — DULOXETINE 60 MG: 30 CAPSULE, DELAYED RELEASE ORAL at 09:04

## 2023-04-22 RX ADMIN — LOSARTAN POTASSIUM 25 MG: 25 TABLET, FILM COATED ORAL at 09:04

## 2023-04-22 RX ADMIN — INSULIN ASPART 2 UNITS: 100 INJECTION, SOLUTION INTRAVENOUS; SUBCUTANEOUS at 11:04

## 2023-04-22 RX ADMIN — HYDRALAZINE HYDROCHLORIDE 25 MG: 25 TABLET, FILM COATED ORAL at 08:04

## 2023-04-22 RX ADMIN — SENNOSIDES AND DOCUSATE SODIUM 1 TABLET: 8.6; 5 TABLET ORAL at 08:04

## 2023-04-22 RX ADMIN — VANCOMYCIN HYDROCHLORIDE 1500 MG: 1.5 INJECTION, POWDER, LYOPHILIZED, FOR SOLUTION INTRAVENOUS at 11:04

## 2023-04-22 RX ADMIN — Medication 1 CAPSULE: at 05:04

## 2023-04-22 RX ADMIN — GABAPENTIN 300 MG: 300 CAPSULE ORAL at 09:04

## 2023-04-22 RX ADMIN — GABAPENTIN 300 MG: 300 CAPSULE ORAL at 08:04

## 2023-04-22 RX ADMIN — INSULIN ASPART 5 UNITS: 100 INJECTION, SOLUTION INTRAVENOUS; SUBCUTANEOUS at 06:04

## 2023-04-22 RX ADMIN — HYDRALAZINE HYDROCHLORIDE 25 MG: 25 TABLET, FILM COATED ORAL at 09:04

## 2023-04-22 RX ADMIN — INSULIN ASPART 6 UNITS: 100 INJECTION, SOLUTION INTRAVENOUS; SUBCUTANEOUS at 05:04

## 2023-04-22 RX ADMIN — HEPARIN, PORCINE (PF) 10 UNIT/ML INTRAVENOUS SYRINGE 50 UNITS: at 09:04

## 2023-04-22 RX ADMIN — INSULIN ASPART 5 UNITS: 100 INJECTION, SOLUTION INTRAVENOUS; SUBCUTANEOUS at 11:04

## 2023-04-22 RX ADMIN — SENNOSIDES AND DOCUSATE SODIUM 1 TABLET: 8.6; 5 TABLET ORAL at 09:04

## 2023-04-22 RX ADMIN — Medication 1 CAPSULE: at 11:04

## 2023-04-22 RX ADMIN — KETOROLAC TROMETHAMINE 30 MG: 30 INJECTION, SOLUTION INTRAMUSCULAR at 10:04

## 2023-04-22 RX ADMIN — HYDROCODONE BITARTRATE AND ACETAMINOPHEN 1 TABLET: 5; 325 TABLET ORAL at 05:04

## 2023-04-22 RX ADMIN — ASPIRIN 325 MG ORAL TABLET 325 MG: 325 PILL ORAL at 09:04

## 2023-04-22 RX ADMIN — Medication 9 MG: at 08:04

## 2023-04-22 RX ADMIN — Medication 1 CAPSULE: at 06:04

## 2023-04-22 RX ADMIN — INSULIN DETEMIR 30 UNITS: 100 INJECTION, SOLUTION SUBCUTANEOUS at 09:04

## 2023-04-22 RX ADMIN — VANCOMYCIN HYDROCHLORIDE 1500 MG: 1.5 INJECTION, POWDER, LYOPHILIZED, FOR SOLUTION INTRAVENOUS at 05:04

## 2023-04-22 RX ADMIN — INSULIN ASPART 5 UNITS: 100 INJECTION, SOLUTION INTRAVENOUS; SUBCUTANEOUS at 05:04

## 2023-04-22 RX ADMIN — HYDROCODONE BITARTRATE AND ACETAMINOPHEN 1 TABLET: 5; 325 TABLET ORAL at 11:04

## 2023-04-22 NOTE — PLAN OF CARE
Problem: Adult Inpatient Plan of Care  Goal: Patient-Specific Goal (Individualized)  Outcome: Ongoing, Progressing  Goal: Absence of Hospital-Acquired Illness or Injury  Outcome: Ongoing, Progressing  Goal: Optimal Comfort and Wellbeing  Outcome: Ongoing, Progressing     Problem: Diabetes Comorbidity  Goal: Blood Glucose Level Within Targeted Range  Outcome: Ongoing, Progressing     Problem: Fluid and Electrolyte Imbalance (Acute Kidney Injury/Impairment)  Goal: Fluid and Electrolyte Balance  Outcome: Ongoing, Progressing     Problem: Oral Intake Inadequate (Acute Kidney Injury/Impairment)  Goal: Optimal Nutrition Intake  Outcome: Ongoing, Progressing     Problem: Renal Function Impairment (Acute Kidney Injury/Impairment)  Goal: Effective Renal Function  Outcome: Ongoing, Progressing     Problem: Fall Injury Risk  Goal: Absence of Fall and Fall-Related Injury  Outcome: Ongoing, Progressing     Problem: Skin Injury Risk Increased  Goal: Skin Health and Integrity  Outcome: Ongoing, Progressing     Problem: Infection  Goal: Absence of Infection Signs and Symptoms  Outcome: Ongoing, Progressing

## 2023-04-22 NOTE — PLAN OF CARE
Problem: Adult Inpatient Plan of Care  Goal: Patient-Specific Goal (Individualized)  Outcome: Ongoing, Progressing     Problem: Fall Injury Risk  Goal: Absence of Fall and Fall-Related Injury  4/22/2023 1622 by Larissa Dimas RN  Outcome: Ongoing, Progressing  4/22/2023 1621 by Larissa Dimas RN  Outcome: Ongoing, Progressing  Intervention: Identify and Manage Contributors  4/22/2023 1622 by Larissa Dimas RN  Flowsheets (Taken 4/22/2023 1622)  Self-Care Promotion:   independence encouraged   BADL personal objects within reach   BADL personal routines maintained  Medication Review/Management:   medications reviewed   high-risk medications identified  4/22/2023 1621 by Larissa Dimas RN  Flowsheets (Taken 4/22/2023 1621)  Self-Care Promotion:   independence encouraged   BADL personal objects within reach   BADL personal routines maintained   safe use of adaptive equipment encouraged  Medication Review/Management:   medications reviewed   high-risk medications identified  Intervention: Promote Injury-Free Environment  4/22/2023 1622 by Larissa Dimas RN  Flowsheets (Taken 4/22/2023 1622)  Safety Promotion/Fall Prevention:   assistive device/personal item within reach   diversional activities provided   high risk medications identified   medications reviewed   muscle strengthening facilitated   nonskid shoes/socks when out of bed   side rails raised x 3   instructed to call staff for mobility  4/22/2023 1621 by Larissa Dimas RN  Flowsheets (Taken 4/22/2023 1621)  Safety Promotion/Fall Prevention:   assistive device/personal item within reach   diversional activities provided   high risk medications identified   muscle strengthening facilitated   nonskid shoes/socks when out of bed   side rails raised x 3   instructed to call staff for mobility

## 2023-04-23 LAB
GLUCOSE SERPL-MCNC: 142 MG/DL (ref 70–105)
GLUCOSE SERPL-MCNC: 163 MG/DL (ref 70–105)
GLUCOSE SERPL-MCNC: 199 MG/DL (ref 70–105)
GLUCOSE SERPL-MCNC: 276 MG/DL (ref 70–105)

## 2023-04-23 PROCEDURE — 25000003 PHARM REV CODE 250: Performed by: FAMILY MEDICINE

## 2023-04-23 PROCEDURE — 63600175 PHARM REV CODE 636 W HCPCS: Performed by: FAMILY MEDICINE

## 2023-04-23 PROCEDURE — 82962 GLUCOSE BLOOD TEST: CPT

## 2023-04-23 PROCEDURE — 25000003 PHARM REV CODE 250: Performed by: SPECIALIST

## 2023-04-23 PROCEDURE — 11000004 HC SNF PRIVATE

## 2023-04-23 PROCEDURE — 94761 N-INVAS EAR/PLS OXIMETRY MLT: CPT

## 2023-04-23 RX ADMIN — INSULIN ASPART 2 UNITS: 100 INJECTION, SOLUTION INTRAVENOUS; SUBCUTANEOUS at 06:04

## 2023-04-23 RX ADMIN — Medication 1 CAPSULE: at 04:04

## 2023-04-23 RX ADMIN — HYDROCODONE BITARTRATE AND ACETAMINOPHEN 1 TABLET: 5; 325 TABLET ORAL at 10:04

## 2023-04-23 RX ADMIN — HEPARIN, PORCINE (PF) 10 UNIT/ML INTRAVENOUS SYRINGE 50 UNITS: at 09:04

## 2023-04-23 RX ADMIN — INSULIN ASPART 5 UNITS: 100 INJECTION, SOLUTION INTRAVENOUS; SUBCUTANEOUS at 05:04

## 2023-04-23 RX ADMIN — INSULIN DETEMIR 30 UNITS: 100 INJECTION, SOLUTION SUBCUTANEOUS at 09:04

## 2023-04-23 RX ADMIN — SENNOSIDES AND DOCUSATE SODIUM 1 TABLET: 8.6; 5 TABLET ORAL at 08:04

## 2023-04-23 RX ADMIN — INSULIN ASPART 5 UNITS: 100 INJECTION, SOLUTION INTRAVENOUS; SUBCUTANEOUS at 06:04

## 2023-04-23 RX ADMIN — GABAPENTIN 300 MG: 300 CAPSULE ORAL at 08:04

## 2023-04-23 RX ADMIN — HYDROCODONE BITARTRATE AND ACETAMINOPHEN 1 TABLET: 5; 325 TABLET ORAL at 08:04

## 2023-04-23 RX ADMIN — SENNOSIDES AND DOCUSATE SODIUM 1 TABLET: 8.6; 5 TABLET ORAL at 09:04

## 2023-04-23 RX ADMIN — Medication 9 MG: at 08:04

## 2023-04-23 RX ADMIN — LOSARTAN POTASSIUM 25 MG: 25 TABLET, FILM COATED ORAL at 09:04

## 2023-04-23 RX ADMIN — HYDRALAZINE HYDROCHLORIDE 25 MG: 25 TABLET, FILM COATED ORAL at 08:04

## 2023-04-23 RX ADMIN — INSULIN ASPART 2 UNITS: 100 INJECTION, SOLUTION INTRAVENOUS; SUBCUTANEOUS at 11:04

## 2023-04-23 RX ADMIN — INSULIN ASPART 5 UNITS: 100 INJECTION, SOLUTION INTRAVENOUS; SUBCUTANEOUS at 11:04

## 2023-04-23 RX ADMIN — ASPIRIN 325 MG ORAL TABLET 325 MG: 325 PILL ORAL at 09:04

## 2023-04-23 RX ADMIN — GABAPENTIN 300 MG: 300 CAPSULE ORAL at 09:04

## 2023-04-23 RX ADMIN — HYDRALAZINE HYDROCHLORIDE 25 MG: 25 TABLET, FILM COATED ORAL at 09:04

## 2023-04-23 RX ADMIN — VANCOMYCIN HYDROCHLORIDE 1500 MG: 1.5 INJECTION, POWDER, LYOPHILIZED, FOR SOLUTION INTRAVENOUS at 06:04

## 2023-04-23 RX ADMIN — INSULIN ASPART 6 UNITS: 100 INJECTION, SOLUTION INTRAVENOUS; SUBCUTANEOUS at 05:04

## 2023-04-23 RX ADMIN — DULOXETINE 60 MG: 30 CAPSULE, DELAYED RELEASE ORAL at 09:04

## 2023-04-23 RX ADMIN — Medication 1 CAPSULE: at 10:04

## 2023-04-23 RX ADMIN — Medication 1 CAPSULE: at 06:04

## 2023-04-23 NOTE — NURSING
SPOKE WITH DR WU CONCERNING PAIN/PT REQUESTING TORADOL IM FOR BREAKTHROUGH PAIN/ORDERS PLACED FOR TORADOL 30MG IM ONE TIME DOSE/D.MARIANO HUBBARD NOTIFIED

## 2023-04-23 NOTE — PLAN OF CARE
Problem: Adult Inpatient Plan of Care  Goal: Patient-Specific Goal (Individualized)  Outcome: Ongoing, Progressing     Problem: Fall Injury Risk  Goal: Absence of Fall and Fall-Related Injury  Outcome: Ongoing, Progressing  Intervention: Identify and Manage Contributors  Flowsheets (Taken 4/23/2023 1752)  Self-Care Promotion:   independence encouraged   BADL personal objects within reach   BADL personal routines maintained  Medication Review/Management:   medications reviewed   high-risk medications identified  Intervention: Promote Injury-Free Environment  Flowsheets (Taken 4/23/2023 1752)  Safety Promotion/Fall Prevention:   assistive device/personal item within reach   diversional activities provided   high risk medications identified   medications reviewed   muscle strengthening facilitated   nonskid shoes/socks when out of bed   side rails raised x 3   instructed to call staff for mobility

## 2023-04-23 NOTE — NURSING
Pt requested an IM injection of ketorolac earlier during RN assessment. Spoke with Charge HAYDEE Matias and discussed pt request for med for pain in her back that is not relieved by her chronic pain medication. HAYDEE Matias discussed with MD Alcantara and verbal order received for 1 time dose of 30mg  ketorolac IM.

## 2023-04-23 NOTE — PLAN OF CARE
Problem: Adult Inpatient Plan of Care  Goal: Patient-Specific Goal (Individualized)  Outcome: Ongoing, Progressing  Goal: Absence of Hospital-Acquired Illness or Injury  Outcome: Ongoing, Progressing  Goal: Optimal Comfort and Wellbeing  Outcome: Ongoing, Progressing  Goal: Readiness for Transition of Care  Outcome: Ongoing, Progressing     Problem: Diabetes Comorbidity  Goal: Blood Glucose Level Within Targeted Range  Outcome: Ongoing, Progressing     Problem: Fluid and Electrolyte Imbalance (Acute Kidney Injury/Impairment)  Goal: Fluid and Electrolyte Balance  Outcome: Ongoing, Progressing     Problem: Oral Intake Inadequate (Acute Kidney Injury/Impairment)  Goal: Optimal Nutrition Intake  Outcome: Ongoing, Progressing     Problem: Renal Function Impairment (Acute Kidney Injury/Impairment)  Goal: Effective Renal Function  Outcome: Ongoing, Progressing     Problem: Fall Injury Risk  Goal: Absence of Fall and Fall-Related Injury  Outcome: Ongoing, Progressing     Problem: Skin Injury Risk Increased  Goal: Skin Health and Integrity  Outcome: Ongoing, Progressing     Problem: Infection  Goal: Absence of Infection Signs and Symptoms  Outcome: Ongoing, Progressing

## 2023-04-24 LAB
ANION GAP SERPL CALCULATED.3IONS-SCNC: 15 MMOL/L (ref 7–16)
BASOPHILS # BLD AUTO: 0.05 K/UL (ref 0–0.2)
BASOPHILS NFR BLD AUTO: 0.6 % (ref 0–1)
BUN SERPL-MCNC: 20 MG/DL (ref 7–18)
BUN/CREAT SERPL: 22 (ref 6–20)
CALCIUM SERPL-MCNC: 9.4 MG/DL (ref 8.5–10.1)
CHLORIDE SERPL-SCNC: 99 MMOL/L (ref 98–107)
CO2 SERPL-SCNC: 26 MMOL/L (ref 21–32)
CREAT SERPL-MCNC: 0.89 MG/DL (ref 0.55–1.02)
DIFFERENTIAL METHOD BLD: ABNORMAL
EGFR (NO RACE VARIABLE) (RUSH/TITUS): 77 ML/MIN/1.73M²
EOSINOPHIL # BLD AUTO: 0.7 K/UL (ref 0–0.5)
EOSINOPHIL NFR BLD AUTO: 8 % (ref 1–4)
ERYTHROCYTE [DISTWIDTH] IN BLOOD BY AUTOMATED COUNT: 19.2 % (ref 11.5–14.5)
ERYTHROCYTE [SEDIMENTATION RATE] IN BLOOD BY WESTERGREN METHOD: 30 MM/HR (ref 0–30)
GLUCOSE SERPL-MCNC: 138 MG/DL (ref 70–105)
GLUCOSE SERPL-MCNC: 191 MG/DL (ref 74–106)
GLUCOSE SERPL-MCNC: 221 MG/DL (ref 70–105)
GLUCOSE SERPL-MCNC: 223 MG/DL (ref 70–105)
GLUCOSE SERPL-MCNC: 244 MG/DL (ref 70–105)
HCT VFR BLD AUTO: 37.8 % (ref 38–47)
HGB BLD-MCNC: 11.9 G/DL (ref 12–16)
IMM GRANULOCYTES # BLD AUTO: 0.02 K/UL (ref 0–0.04)
IMM GRANULOCYTES NFR BLD: 0.2 % (ref 0–0.4)
LYMPHOCYTES # BLD AUTO: 2.47 K/UL (ref 1–4.8)
LYMPHOCYTES NFR BLD AUTO: 28.1 % (ref 27–41)
MCH RBC QN AUTO: 26.4 PG (ref 27–31)
MCHC RBC AUTO-ENTMCNC: 31.5 G/DL (ref 32–36)
MCV RBC AUTO: 84 FL (ref 80–96)
MONOCYTES # BLD AUTO: 0.77 K/UL (ref 0–0.8)
MONOCYTES NFR BLD AUTO: 8.8 % (ref 2–6)
MPC BLD CALC-MCNC: 10.4 FL (ref 9.4–12.4)
NEUTROPHILS # BLD AUTO: 4.77 K/UL (ref 1.8–7.7)
NEUTROPHILS NFR BLD AUTO: 54.3 % (ref 53–65)
PLATELET # BLD AUTO: 251 K/UL (ref 150–400)
POTASSIUM SERPL-SCNC: 3.9 MMOL/L (ref 3.5–5.1)
RBC # BLD AUTO: 4.5 M/UL (ref 4.2–5.4)
SODIUM SERPL-SCNC: 136 MMOL/L (ref 136–145)
VANCOMYCIN TROUGH SERPL-MCNC: 16.1 ΜG/ML (ref 10–20)
WBC # BLD AUTO: 8.78 K/UL (ref 4.5–11)

## 2023-04-24 PROCEDURE — 80202 ASSAY OF VANCOMYCIN: CPT | Performed by: FAMILY MEDICINE

## 2023-04-24 PROCEDURE — 25000003 PHARM REV CODE 250: Performed by: FAMILY MEDICINE

## 2023-04-24 PROCEDURE — 80048 BASIC METABOLIC PNL TOTAL CA: CPT | Performed by: FAMILY MEDICINE

## 2023-04-24 PROCEDURE — 99308 SBSQ NF CARE LOW MDM 20: CPT | Mod: ,,, | Performed by: FAMILY MEDICINE

## 2023-04-24 PROCEDURE — 25000003 PHARM REV CODE 250: Performed by: EMERGENCY MEDICINE

## 2023-04-24 PROCEDURE — 82962 GLUCOSE BLOOD TEST: CPT

## 2023-04-24 PROCEDURE — 85651 RBC SED RATE NONAUTOMATED: CPT | Performed by: FAMILY MEDICINE

## 2023-04-24 PROCEDURE — 85025 COMPLETE CBC W/AUTO DIFF WBC: CPT | Performed by: FAMILY MEDICINE

## 2023-04-24 PROCEDURE — 94761 N-INVAS EAR/PLS OXIMETRY MLT: CPT

## 2023-04-24 PROCEDURE — 63600175 PHARM REV CODE 636 W HCPCS: Performed by: FAMILY MEDICINE

## 2023-04-24 PROCEDURE — 25000003 PHARM REV CODE 250: Performed by: SPECIALIST

## 2023-04-24 PROCEDURE — 11000004 HC SNF PRIVATE

## 2023-04-24 PROCEDURE — 99308 PR NURSING FAC CARE, SUBSEQ, MINOR COMPLIC: ICD-10-PCS | Mod: ,,, | Performed by: FAMILY MEDICINE

## 2023-04-24 RX ORDER — KETOROLAC TROMETHAMINE 30 MG/ML
60 INJECTION, SOLUTION INTRAMUSCULAR; INTRAVENOUS ONCE
Status: COMPLETED | OUTPATIENT
Start: 2023-04-24 | End: 2023-04-24

## 2023-04-24 RX ORDER — NAPROXEN 250 MG/1
500 TABLET ORAL 2 TIMES DAILY PRN
Status: DISCONTINUED | OUTPATIENT
Start: 2023-04-24 | End: 2023-04-27 | Stop reason: HOSPADM

## 2023-04-24 RX ADMIN — GABAPENTIN 300 MG: 300 CAPSULE ORAL at 08:04

## 2023-04-24 RX ADMIN — VANCOMYCIN HYDROCHLORIDE 1500 MG: 1.5 INJECTION, POWDER, LYOPHILIZED, FOR SOLUTION INTRAVENOUS at 12:04

## 2023-04-24 RX ADMIN — HYDROCODONE BITARTRATE AND ACETAMINOPHEN 1 TABLET: 5; 325 TABLET ORAL at 06:04

## 2023-04-24 RX ADMIN — Medication 9 MG: at 09:04

## 2023-04-24 RX ADMIN — INSULIN ASPART 5 UNITS: 100 INJECTION, SOLUTION INTRAVENOUS; SUBCUTANEOUS at 06:04

## 2023-04-24 RX ADMIN — HYDRALAZINE HYDROCHLORIDE 25 MG: 25 TABLET, FILM COATED ORAL at 08:04

## 2023-04-24 RX ADMIN — INSULIN ASPART 5 UNITS: 100 INJECTION, SOLUTION INTRAVENOUS; SUBCUTANEOUS at 05:04

## 2023-04-24 RX ADMIN — INSULIN DETEMIR 30 UNITS: 100 INJECTION, SOLUTION SUBCUTANEOUS at 09:04

## 2023-04-24 RX ADMIN — Medication 1 CAPSULE: at 06:04

## 2023-04-24 RX ADMIN — HYDRALAZINE HYDROCHLORIDE 25 MG: 25 TABLET, FILM COATED ORAL at 09:04

## 2023-04-24 RX ADMIN — Medication 1 CAPSULE: at 11:04

## 2023-04-24 RX ADMIN — INSULIN ASPART 5 UNITS: 100 INJECTION, SOLUTION INTRAVENOUS; SUBCUTANEOUS at 11:04

## 2023-04-24 RX ADMIN — LOSARTAN POTASSIUM 25 MG: 25 TABLET, FILM COATED ORAL at 09:04

## 2023-04-24 RX ADMIN — NAPROXEN 500 MG: 250 TABLET ORAL at 09:04

## 2023-04-24 RX ADMIN — SENNOSIDES AND DOCUSATE SODIUM 1 TABLET: 8.6; 5 TABLET ORAL at 08:04

## 2023-04-24 RX ADMIN — DULOXETINE 60 MG: 30 CAPSULE, DELAYED RELEASE ORAL at 09:04

## 2023-04-24 RX ADMIN — INSULIN ASPART 2 UNITS: 100 INJECTION, SOLUTION INTRAVENOUS; SUBCUTANEOUS at 08:04

## 2023-04-24 RX ADMIN — KETOROLAC TROMETHAMINE 60 MG: 30 INJECTION, SOLUTION INTRAMUSCULAR at 09:04

## 2023-04-24 RX ADMIN — HEPARIN, PORCINE (PF) 10 UNIT/ML INTRAVENOUS SYRINGE 50 UNITS: at 09:04

## 2023-04-24 RX ADMIN — ASPIRIN 325 MG ORAL TABLET 325 MG: 325 PILL ORAL at 10:04

## 2023-04-24 RX ADMIN — Medication 1 CAPSULE: at 05:04

## 2023-04-24 RX ADMIN — GABAPENTIN 300 MG: 300 CAPSULE ORAL at 09:04

## 2023-04-24 RX ADMIN — SENNOSIDES AND DOCUSATE SODIUM 1 TABLET: 8.6; 5 TABLET ORAL at 09:04

## 2023-04-24 RX ADMIN — INSULIN ASPART 4 UNITS: 100 INJECTION, SOLUTION INTRAVENOUS; SUBCUTANEOUS at 06:04

## 2023-04-24 NOTE — PROGRESS NOTES
[facility] - Medical Surgical [facility]  Hospital Medicine  Progress Note    Patient Name: Monica Walker  MRN: [id number]  Patient Class: IP- Swing   Admission Date: 3/20/2023  Length of Stay: 35 days  Attending Physician: Hannah Schulz, *  Primary Care Provider: Hannah Schulz MD        Subjective:     Principal Problem:Staphylococcal arthritis of left knee        HPI:  3/20/23 - this 52 yr. Old WF was readmitted here for IV antibiotics. Her PICC line was pulled on the 16th. It was not replaced on the 18th as the noted stated was the plan. She is to get antibiotics through the 27th of April. We will have to arrange for her to have another PICC line installed for this. She has had her left knee replacement removed for sepsis. She has a spacer in the joint. She will undergo another knee replacement after the antibiotics. For rest of history, please see her old records.      Overview/Hospital Course:  3/21/23 - pt. Is doing well so far. We will arrange for her to get a PICC line soon.    3/24/23 - pt. Is again wanting more pain meds. Again told her that she is on the dose she was on in [place]. Will continue present treatment.    3/27/23 - pt. Is still eating a lot of [employer]. Still complaining of pain. She is not moving. Will continue present treatment.    3/31/23 - pt. Is sleeping.    4/3/23 - pt. Sleeping. No complaints voiced by staff.    4/7/23 - pt. Requesting sitting outside some today. OK by me.    4/10/23 - pt. Is sleeping.    4/14/23 - pt. Is sleeping. Vancomycin has to be adjusted again today.    4/17/23 - pt. Complaining of back pain today. Orders revised.    4/21/23 - pt. Sleeping.    4/24/23 - pt. Is again sleeping. Has requested toradol again.      Interval History: requesting toradol again.    Review of Systems  Objective:     Vital Signs (Most Recent):  Temp: 98.3 °F (36.8 °C) (04/24/23 0759)  Pulse: 65 (04/24/23 0759)  Resp: 18 (04/24/23 0759)  BP: 134/67 (04/24/23  0759)  SpO2: 99 % (04/24/23 0759) Vital Signs (24h Range):  Temp:  [97.9 °F (36.6 °C)-98.3 °F (36.8 °C)] 98.3 °F (36.8 °C)  Pulse:  [65-89] 65  Resp:  [18-20] 18  SpO2:  [98 %-99 %] 99 %  BP: (134-152)/(67-77) 134/67     Weight: 71.1 kg (156 lb 12 oz)  Body mass index is 25.3 kg/m².    Intake/Output Summary (Last 24 hours) at 4/24/2023 0901  Last data filed at 4/24/2023 0557  Gross per 24 hour   Intake 591 ml   Output 250 ml   Net 341 ml      Physical Exam    Significant Labs: All pertinent labs within the past 24 hours have been reviewed.    Significant Imaging: I have reviewed all pertinent imaging results/findings within the past 24 hours.      Assessment/Plan:      * Staphylococcal arthritis of left knee          VTE Risk Mitigation (From admission, onward)         Ordered     heparin, porcine (PF) injection 50 Units  Daily         03/27/23 1427     IP VTE HIGH RISK PATIENT  Once         03/20/23 1801     Place DONOVAN hose  Until discontinued         03/20/23 1801                Discharge Planning   SUZANNA:      Code Status: Prior   Is the patient medically ready for discharge?:     Reason for patient still in hospital (select all that apply): Patient trending condition  Discharge Plan A: Home with family, Home Health                  Hannah Schulz MD  Department of Hospital Medicine   [facility] - [facility] Unit

## 2023-04-24 NOTE — SUBJECTIVE & OBJECTIVE
Interval History: requesting toradol again.    Review of Systems  Objective:     Vital Signs (Most Recent):  Temp: 98.3 °F (36.8 °C) (04/24/23 0759)  Pulse: 65 (04/24/23 0759)  Resp: 18 (04/24/23 0759)  BP: 134/67 (04/24/23 0759)  SpO2: 99 % (04/24/23 0759) Vital Signs (24h Range):  Temp:  [97.9 °F (36.6 °C)-98.3 °F (36.8 °C)] 98.3 °F (36.8 °C)  Pulse:  [65-89] 65  Resp:  [18-20] 18  SpO2:  [98 %-99 %] 99 %  BP: (134-152)/(67-77) 134/67     Weight: 71.1 kg (156 lb 12 oz)  Body mass index is 25.3 kg/m².    Intake/Output Summary (Last 24 hours) at 4/24/2023 0901  Last data filed at 4/24/2023 0557  Gross per 24 hour   Intake 591 ml   Output 250 ml   Net 341 ml      Physical Exam    Significant Labs: All pertinent labs within the past 24 hours have been reviewed.    Significant Imaging: I have reviewed all pertinent imaging results/findings within the past 24 hours.

## 2023-04-24 NOTE — PLAN OF CARE
Problem: Fall Injury Risk  Goal: Absence of Fall and Fall-Related Injury  Outcome: Ongoing, Progressing  Intervention: Promote Injury-Free Environment  Flowsheets (Taken 4/24/2023 1748)  Safety Promotion/Fall Prevention:   assistive device/personal item within reach   diversional activities provided   Fall Risk reviewed with patient/family   medications reviewed   high risk medications identified   instructed to call staff for mobility     Problem: Infection  Goal: Absence of Infection Signs and Symptoms  Outcome: Ongoing, Progressing  Intervention: Prevent or Manage Infection  Flowsheets (Taken 4/24/2023 5702)  Infection Management: aseptic technique maintained  Isolation Precautions:   precautions maintained   contact

## 2023-04-24 NOTE — PROGRESS NOTES
Pharmacokinetic Assessment Follow Up: IV Vancomycin    Vancomycin serum concentration assessment(s):    The trough level was drawn correctly and can be used to guide therapy at this time. The measurement is within the desired definitive target range of 15 to 20 mcg/mL.    Vancomycin Regimen Plan:    Continue regimen to Vancomycin 1500 mg  mg IV every 18 hours with next serum trough concentration measured at no trough needed prior to discharge dose on 4.27.23.    Drug levels (last 3 results):  Recent Labs   Lab Result Units 04/24/23  1127   Vancomycin, Trough µg/mL 16.1       Pharmacy will continue to follow and monitor vancomycin.    Please contact pharmacy at extension [phone] for questions regarding this assessment.    Thank you for the consult,   Stacey Reddy       Patient brief summary:  Monica Walker is a 52 y.o. female initiated on antimicrobial therapy with IV Vancomycin for treatment of bone/joint infection    The patient's current regimen is Vancomycin 1500 mg q18h     Drug Allergies:   Review of patient's allergies indicates:  No Known Allergies    Actual Body Weight:   71.1kg    Renal Function:   Estimated Creatinine Clearance: 67.6 mL/min (based on SCr of 0.89 mg/dL).,     Dialysis Method (if applicable):  N/A    CBC (last 72 hours):  Recent Labs   Lab Result Units 04/24/23  1127   WBC K/uL 8.78   Hemoglobin g/dL 11.9*   Hematocrit % 37.8*   Platelet Count K/uL 251   Lymphocytes % % 28.1   Monocytes % % 8.8*   Eosinophils % % 8.0*   Basophils % % 0.6       Metabolic Panel (last 72 hours):  Recent Labs   Lab Result Units 04/24/23  1127   Sodium mmol/L 136   Potassium mmol/L 3.9   Chloride mmol/L 99   CO2 mmol/L 26   Glucose mg/dL 191*   BUN mg/dL 20*   Creatinine mg/dL 0.89       Vancomycin Administrations:  vancomycin given in the last 96 hours                     vancomycin 1,500 mg in dextrose 5 % (D5W) 250 mL IVPB (Vial-Mate) (mg) 1,500 mg New Bag 04/24/23 1229     1,500 mg New Bag 04/23/23 7557      1,500 mg New Bag 04/22/23 2328     1,500 mg New Bag  0524     1,500 mg New Bag 04/21/23 1238    vancomycin (VANCOCIN) 1,750 mg in dextrose 5 % (D5W) 500 mL IVPB (mg) 1,750 mg New Bag 04/20/23 1253                    Microbiologic Results:  Microbiology Results (last 7 days)       ** No results found for the last 168 hours. **

## 2023-04-25 LAB
GLUCOSE SERPL-MCNC: 109 MG/DL (ref 70–105)
GLUCOSE SERPL-MCNC: 263 MG/DL (ref 70–105)
GLUCOSE SERPL-MCNC: 265 MG/DL (ref 70–105)
GLUCOSE SERPL-MCNC: 308 MG/DL (ref 70–105)

## 2023-04-25 PROCEDURE — 82962 GLUCOSE BLOOD TEST: CPT

## 2023-04-25 PROCEDURE — 94761 N-INVAS EAR/PLS OXIMETRY MLT: CPT

## 2023-04-25 PROCEDURE — 63600175 PHARM REV CODE 636 W HCPCS: Performed by: FAMILY MEDICINE

## 2023-04-25 PROCEDURE — 25000003 PHARM REV CODE 250: Performed by: EMERGENCY MEDICINE

## 2023-04-25 PROCEDURE — 25000003 PHARM REV CODE 250: Performed by: FAMILY MEDICINE

## 2023-04-25 PROCEDURE — 25000003 PHARM REV CODE 250: Performed by: SPECIALIST

## 2023-04-25 PROCEDURE — 11000004 HC SNF PRIVATE

## 2023-04-25 RX ADMIN — HYDROCODONE BITARTRATE AND ACETAMINOPHEN 1 TABLET: 5; 325 TABLET ORAL at 11:04

## 2023-04-25 RX ADMIN — Medication 1 CAPSULE: at 05:04

## 2023-04-25 RX ADMIN — HYDROCODONE BITARTRATE AND ACETAMINOPHEN 1 TABLET: 5; 325 TABLET ORAL at 05:04

## 2023-04-25 RX ADMIN — NAPROXEN 500 MG: 250 TABLET ORAL at 08:04

## 2023-04-25 RX ADMIN — HYDRALAZINE HYDROCHLORIDE 25 MG: 25 TABLET, FILM COATED ORAL at 08:04

## 2023-04-25 RX ADMIN — INSULIN ASPART 8 UNITS: 100 INJECTION, SOLUTION INTRAVENOUS; SUBCUTANEOUS at 05:04

## 2023-04-25 RX ADMIN — DULOXETINE 60 MG: 30 CAPSULE, DELAYED RELEASE ORAL at 09:04

## 2023-04-25 RX ADMIN — ASPIRIN 325 MG ORAL TABLET 325 MG: 325 PILL ORAL at 09:04

## 2023-04-25 RX ADMIN — GABAPENTIN 300 MG: 300 CAPSULE ORAL at 09:04

## 2023-04-25 RX ADMIN — GABAPENTIN 300 MG: 300 CAPSULE ORAL at 08:04

## 2023-04-25 RX ADMIN — Medication 1 CAPSULE: at 06:04

## 2023-04-25 RX ADMIN — Medication 9 MG: at 08:04

## 2023-04-25 RX ADMIN — LOSARTAN POTASSIUM 25 MG: 25 TABLET, FILM COATED ORAL at 09:04

## 2023-04-25 RX ADMIN — INSULIN ASPART 6 UNITS: 100 INJECTION, SOLUTION INTRAVENOUS; SUBCUTANEOUS at 05:04

## 2023-04-25 RX ADMIN — INSULIN ASPART 5 UNITS: 100 INJECTION, SOLUTION INTRAVENOUS; SUBCUTANEOUS at 11:04

## 2023-04-25 RX ADMIN — HEPARIN, PORCINE (PF) 10 UNIT/ML INTRAVENOUS SYRINGE 50 UNITS: at 09:04

## 2023-04-25 RX ADMIN — SENNOSIDES AND DOCUSATE SODIUM 1 TABLET: 8.6; 5 TABLET ORAL at 09:04

## 2023-04-25 RX ADMIN — SENNOSIDES AND DOCUSATE SODIUM 1 TABLET: 8.6; 5 TABLET ORAL at 08:04

## 2023-04-25 RX ADMIN — VANCOMYCIN HYDROCHLORIDE 1500 MG: 1.5 INJECTION, POWDER, LYOPHILIZED, FOR SOLUTION INTRAVENOUS at 11:04

## 2023-04-25 RX ADMIN — INSULIN ASPART 5 UNITS: 100 INJECTION, SOLUTION INTRAVENOUS; SUBCUTANEOUS at 05:04

## 2023-04-25 RX ADMIN — VANCOMYCIN HYDROCHLORIDE 1500 MG: 1.5 INJECTION, POWDER, LYOPHILIZED, FOR SOLUTION INTRAVENOUS at 05:04

## 2023-04-25 RX ADMIN — HYDRALAZINE HYDROCHLORIDE 25 MG: 25 TABLET, FILM COATED ORAL at 09:04

## 2023-04-25 RX ADMIN — Medication 1 CAPSULE: at 11:04

## 2023-04-25 RX ADMIN — INSULIN DETEMIR 30 UNITS: 100 INJECTION, SOLUTION SUBCUTANEOUS at 09:04

## 2023-04-25 NOTE — NURSING
Updated notes and clinicals faxed to [facility] for approval of additional swing bed days

## 2023-04-25 NOTE — PLAN OF CARE
Problem: Adult Inpatient Plan of Care  Goal: Patient-Specific Goal (Individualized)  Outcome: Ongoing, Progressing  Goal: Absence of Hospital-Acquired Illness or Injury  Outcome: Ongoing, Progressing  Intervention: Identify and Manage Fall Risk  Flowsheets (Taken 4/25/2023 1711)  Safety Promotion/Fall Prevention:   assistive device/personal item within reach   Fall Risk reviewed with patient/family   Fall Risk signage in place   medications reviewed   in recliner, wheels locked   high risk medications identified   nonskid shoes/socks when out of bed   side rails raised x 2  Intervention: Prevent Skin Injury  Flowsheets (Taken 4/25/2023 1711)  Body Position:   weight shifting   position changed independently  Skin Protection: adhesive use limited  Intervention: Prevent and Manage VTE (Venous Thromboembolism) Risk  Flowsheets (Taken 4/25/2023 1711)  Activity Management:   Ambulated to bathroom - L4   Ambulated in [place]inez - L4   Ambulated in room - L4   Sitting at edge of bed - L2  VTE Prevention/Management:   ambulation promoted   fluids promoted  Range of Motion: active ROM (range of motion) encouraged  Intervention: Prevent Infection  Flowsheets (Taken 4/25/2023 1711)  Infection Prevention:   equipment surfaces disinfected   hand hygiene promoted   rest/sleep promoted   personal protective equipment utilized   single patient room provided

## 2023-04-25 NOTE — PROGRESS NOTES
Wt: 156# 4/24, et: 150# 4/10, 140# 4/10.  Pt denied edema and stated that her UBW: ~170#.  RDN provided prefs.  BUN 20, last BM ws 4/23.  Meal intake good.

## 2023-04-26 ENCOUNTER — OFFICE VISIT (OUTPATIENT)
Dept: ORTHOPEDICS | Facility: CLINIC | Age: 53
End: 2023-04-26
Payer: MEDICARE

## 2023-04-26 DIAGNOSIS — Z86.19 HISTORY OF REMOVAL OF JOINT PROSTHESIS OF KNEE DUE TO INFECTION: Primary | ICD-10-CM

## 2023-04-26 DIAGNOSIS — Z98.890 HISTORY OF REMOVAL OF JOINT PROSTHESIS OF KNEE DUE TO INFECTION: Primary | ICD-10-CM

## 2023-04-26 LAB
GLUCOSE SERPL-MCNC: 139 MG/DL (ref 70–105)
GLUCOSE SERPL-MCNC: 172 MG/DL (ref 70–105)

## 2023-04-26 PROCEDURE — 99024 POSTOP FOLLOW-UP VISIT: CPT | Mod: ,,, | Performed by: ORTHOPAEDIC SURGERY

## 2023-04-26 PROCEDURE — 3051F PR MOST RECENT HEMOGLOBIN A1C LEVEL 7.0 - < 8.0%: ICD-10-PCS | Mod: CPTII,,, | Performed by: ORTHOPAEDIC SURGERY

## 2023-04-26 PROCEDURE — 94761 N-INVAS EAR/PLS OXIMETRY MLT: CPT

## 2023-04-26 PROCEDURE — 1160F RVW MEDS BY RX/DR IN RCRD: CPT | Mod: CPTII,,, | Performed by: ORTHOPAEDIC SURGERY

## 2023-04-26 PROCEDURE — 99214 OFFICE O/P EST MOD 30 MIN: CPT | Mod: PBBFAC | Performed by: ORTHOPAEDIC SURGERY

## 2023-04-26 PROCEDURE — 1159F MED LIST DOCD IN RCRD: CPT | Mod: CPTII,,, | Performed by: ORTHOPAEDIC SURGERY

## 2023-04-26 PROCEDURE — 3066F PR DOCUMENTATION OF TREATMENT FOR NEPHROPATHY: ICD-10-PCS | Mod: CPTII,,, | Performed by: ORTHOPAEDIC SURGERY

## 2023-04-26 PROCEDURE — 1160F PR REVIEW ALL MEDS BY PRESCRIBER/CLIN PHARMACIST DOCUMENTED: ICD-10-PCS | Mod: CPTII,,, | Performed by: ORTHOPAEDIC SURGERY

## 2023-04-26 PROCEDURE — 99024 PR POST-OP FOLLOW-UP VISIT: ICD-10-PCS | Mod: ,,, | Performed by: ORTHOPAEDIC SURGERY

## 2023-04-26 PROCEDURE — 3066F NEPHROPATHY DOC TX: CPT | Mod: CPTII,,, | Performed by: ORTHOPAEDIC SURGERY

## 2023-04-26 PROCEDURE — 63600175 PHARM REV CODE 636 W HCPCS: Performed by: FAMILY MEDICINE

## 2023-04-26 PROCEDURE — 82962 GLUCOSE BLOOD TEST: CPT

## 2023-04-26 PROCEDURE — 3060F POS MICROALBUMINURIA REV: CPT | Mod: CPTII,,, | Performed by: ORTHOPAEDIC SURGERY

## 2023-04-26 PROCEDURE — 4010F PR ACE/ARB THEARPY RXD/TAKEN: ICD-10-PCS | Mod: CPTII,,, | Performed by: ORTHOPAEDIC SURGERY

## 2023-04-26 PROCEDURE — 1159F PR MEDICATION LIST DOCUMENTED IN MEDICAL RECORD: ICD-10-PCS | Mod: CPTII,,, | Performed by: ORTHOPAEDIC SURGERY

## 2023-04-26 PROCEDURE — 11000004 HC SNF PRIVATE

## 2023-04-26 PROCEDURE — 3051F HG A1C>EQUAL 7.0%<8.0%: CPT | Mod: CPTII,,, | Performed by: ORTHOPAEDIC SURGERY

## 2023-04-26 PROCEDURE — 25000003 PHARM REV CODE 250: Performed by: SPECIALIST

## 2023-04-26 PROCEDURE — 25000003 PHARM REV CODE 250: Performed by: FAMILY MEDICINE

## 2023-04-26 PROCEDURE — 4010F ACE/ARB THERAPY RXD/TAKEN: CPT | Mod: CPTII,,, | Performed by: ORTHOPAEDIC SURGERY

## 2023-04-26 PROCEDURE — 3060F PR POS MICROALBUMINURIA RESULT DOCUMENTED/REVIEW: ICD-10-PCS | Mod: CPTII,,, | Performed by: ORTHOPAEDIC SURGERY

## 2023-04-26 RX ADMIN — INSULIN ASPART 5 UNITS: 100 INJECTION, SOLUTION INTRAVENOUS; SUBCUTANEOUS at 04:04

## 2023-04-26 RX ADMIN — HEPARIN, PORCINE (PF) 10 UNIT/ML INTRAVENOUS SYRINGE 50 UNITS: at 09:04

## 2023-04-26 RX ADMIN — INSULIN DETEMIR 30 UNITS: 100 INJECTION, SOLUTION SUBCUTANEOUS at 09:04

## 2023-04-26 RX ADMIN — GABAPENTIN 300 MG: 300 CAPSULE ORAL at 08:04

## 2023-04-26 RX ADMIN — SENNOSIDES AND DOCUSATE SODIUM 1 TABLET: 8.6; 5 TABLET ORAL at 09:04

## 2023-04-26 RX ADMIN — HYDROCODONE BITARTRATE AND ACETAMINOPHEN 1 TABLET: 5; 325 TABLET ORAL at 08:04

## 2023-04-26 RX ADMIN — HYDRALAZINE HYDROCHLORIDE 25 MG: 25 TABLET, FILM COATED ORAL at 09:04

## 2023-04-26 RX ADMIN — INSULIN ASPART 5 UNITS: 100 INJECTION, SOLUTION INTRAVENOUS; SUBCUTANEOUS at 06:04

## 2023-04-26 RX ADMIN — Medication 9 MG: at 08:04

## 2023-04-26 RX ADMIN — GABAPENTIN 300 MG: 300 CAPSULE ORAL at 09:04

## 2023-04-26 RX ADMIN — Medication 1 CAPSULE: at 06:04

## 2023-04-26 RX ADMIN — HYDROCODONE BITARTRATE AND ACETAMINOPHEN 1 TABLET: 5; 325 TABLET ORAL at 03:04

## 2023-04-26 RX ADMIN — SENNOSIDES AND DOCUSATE SODIUM 1 TABLET: 8.6; 5 TABLET ORAL at 08:04

## 2023-04-26 RX ADMIN — LOSARTAN POTASSIUM 25 MG: 25 TABLET, FILM COATED ORAL at 09:04

## 2023-04-26 RX ADMIN — HYDRALAZINE HYDROCHLORIDE 25 MG: 25 TABLET, FILM COATED ORAL at 08:04

## 2023-04-26 RX ADMIN — DULOXETINE 60 MG: 30 CAPSULE, DELAYED RELEASE ORAL at 09:04

## 2023-04-26 RX ADMIN — ASPIRIN 325 MG ORAL TABLET 325 MG: 325 PILL ORAL at 09:04

## 2023-04-26 RX ADMIN — Medication 1 CAPSULE: at 04:04

## 2023-04-26 RX ADMIN — INSULIN ASPART 10 UNITS: 100 INJECTION, SOLUTION INTRAVENOUS; SUBCUTANEOUS at 04:04

## 2023-04-26 RX ADMIN — VANCOMYCIN HYDROCHLORIDE 1500 MG: 1.5 INJECTION, POWDER, LYOPHILIZED, FOR SOLUTION INTRAVENOUS at 06:04

## 2023-04-26 RX ADMIN — INSULIN ASPART 2 UNITS: 100 INJECTION, SOLUTION INTRAVENOUS; SUBCUTANEOUS at 06:04

## 2023-04-26 NOTE — PLAN OF CARE
[facility]  Medical Surgical Unit - Skilled Nursing Facility  Patient: Monica Walker     Interdisciplinary Team Meeting     Today's Date: 4/26/2023     Estimated D/C Date: 4/27/23       Physician: Dr. Schulz    Pharmacy: A. Reddy Unit Director: BRODY Kelly   : JORGE LUIS Méndez Physical/Occupational Therapy: JAVIER Bolton   Speech Therapy: n/a Activity Therapy: read newspaper/watch tv   Nursing: TASNEEM Ortiz      Nurse  New Symptoms/Problems: none  Last BM: 4/24/23 Urine: continent Diarrhea: No   Constipated: No Bladder: continent    Isolation: Yes     O2: r/a  Rm Air: 99 %   Nutrition: see dietary notes  Speech/Swallowing: n/a  Aspiration Precautions: No  Cognition: alert and oriented    Physical Therapy  Physical Therapy/Gait: pt is refusing therapy          Occupational Therapy  Occupational Therapy: pt is refusing therapy                Tx Plan/Recommendations reviewed with family and patient on 4/26/23 at bedside.  Additional family Conference/Training: not at this time  D/C Plan/Recommendations: home    Pharmacy  Medication Changes (see MD orders in chart): No  MD/NP: Dr. schulz Labs Reviewed: yes New Lab Orders: no     MD/NP Signature: Time:

## 2023-04-26 NOTE — PLAN OF CARE
Problem: Adult Inpatient Plan of Care  Goal: Patient-Specific Goal (Individualized)  Outcome: Ongoing, Progressing  Goal: Absence of Hospital-Acquired Illness or Injury  Outcome: Ongoing, Progressing  Goal: Optimal Comfort and Wellbeing  Outcome: Ongoing, Progressing  Goal: Readiness for Transition of Care  Outcome: Ongoing, Progressing     Problem: Diabetes Comorbidity  Goal: Blood Glucose Level Within Targeted Range  Outcome: Ongoing, Progressing     Problem: Fluid and Electrolyte Imbalance (Acute Kidney Injury/Impairment)  Goal: Fluid and Electrolyte Balance  Outcome: Ongoing, Progressing     Problem: Renal Function Impairment (Acute Kidney Injury/Impairment)  Goal: Effective Renal Function  Outcome: Ongoing, Progressing     Problem: Fall Injury Risk  Goal: Absence of Fall and Fall-Related Injury  Outcome: Ongoing, Progressing     Problem: Skin Injury Risk Increased  Goal: Skin Health and Integrity  Outcome: Ongoing, Progressing     Problem: Infection  Goal: Absence of Infection Signs and Symptoms  Outcome: Ongoing, Progressing

## 2023-04-26 NOTE — PLAN OF CARE
Problem: Adult Inpatient Plan of Care  Goal: Patient-Specific Goal (Individualized)  Outcome: Ongoing, Progressing  Goal: Absence of Hospital-Acquired Illness or Injury  Outcome: Ongoing, Progressing     Problem: Fall Injury Risk  Goal: Absence of Fall and Fall-Related Injury  Outcome: Ongoing, Progressing  Intervention: Identify and Manage Contributors  Flowsheets (Taken 4/26/2023 1721)  Self-Care Promotion:   independence encouraged   BADL personal objects within reach   BADL personal routines maintained  Medication Review/Management:   medications reviewed   high-risk medications identified  Intervention: Promote Injury-Free Environment  Flowsheets (Taken 4/26/2023 1721)  Safety Promotion/Fall Prevention:   assistive device/personal item within reach   diversional activities provided   high risk medications identified   medications reviewed   muscle strengthening facilitated   nonskid shoes/socks when out of bed   side rails raised x 3   instructed to call staff for mobility

## 2023-04-26 NOTE — PROGRESS NOTES
CLINIC NOTE       Chief Complaint   Patient presents with    Left Knee - Follow-up        Monica Walker is a 52 y.o. female seen today for recheck of her left knee.  She became septic in seated her lumbar spine, left shoulder and left knee hemiarthroplasty.  Components were removed and methylmethacrylate antibiotic spacer was implanted.  She is just finished up her 6 weeks IV antibiotic course.  She no longer has any pain in shoulder back or knee as she had before.  She underwent serologic testing for 04/20/2023 with a sed rate the upper limits of normal now at 30.  X-rays left knee today 04/26/2023 two views AP and lateral projection shows moderate generalized osteopenia.  Previously seen medial hemiarthroplasty components have been removed any radiopaque spacers he has been placed in the area of the medial compartment.      Past Medical History:   Diagnosis Date    Abscess of right thigh + MRSA 08/20/2021    healed    Adnexal cyst     Degenerative disc disease     Depression 10/29/2021    Hypertension     Hypomagnesemia 1/20/2023    Nicotine dependence     Osteoarthritis      Family History   Problem Relation Age of Onset    Hypertension Father     Diabetes Father      Current Facility-Administered Medications on File Prior to Visit   Medication Dose Route Frequency Provider Last Rate Last Admin    acetaminophen tablet 650 mg  650 mg Oral Q6H PRN Hannah Schulz MD        aspirin tablet 325 mg  325 mg Oral Daily Hannah Schulz MD   325 mg at 04/26/23 0900    calcium carbonate 200 mg calcium (500 mg) chewable tablet 500 mg  500 mg Oral BID PRN Hannah Schulz MD        dextrose 10% bolus 125 mL 125 mL  12.5 g Intravenous PRN Hannah Schulz MD        dextrose 10% bolus 250 mL 250 mL  25 g Intravenous PRN Hannah Schulz MD        dextrose 40 % gel 15,000 mg  15 g Oral PRN Hannah Schulz MD        dextrose 40 % gel 30,000 mg  30 g Oral PRN Hannah BOATENG  MD Zeus        DULoxetine DR capsule 60 mg  60 mg Oral Daily Hannah Schulz MD   60 mg at 04/26/23 0900    gabapentin capsule 300 mg  300 mg Oral BID Hannah Schulz MD   300 mg at 04/26/23 0900    glucagon (human recombinant) injection 1 mg  1 mg Intramuscular PRN Hannah Schulz MD        heparin, porcine (PF) injection 50 Units  50 Units Intravenous Daily Hannah Schulz MD   50 Units at 04/26/23 0900    hydrALAZINE tablet 25 mg  25 mg Oral Q12H Hannah Schulz MD   25 mg at 04/26/23 0900    HYDROcodone-acetaminophen 5-325 mg per tablet 1 tablet  1 tablet Oral Q4H PRN Hannah Schulz MD   1 tablet at 04/25/23 1109    insulin aspart U-100 injection 1-10 Units  1-10 Units Subcutaneous QID (AC + HS) PRN Hannah Schulz MD   2 Units at 04/26/23 0610    insulin aspart U-100 injection 5 Units  5 Units Subcutaneous TIDWM Hannah Schulz MD   5 Units at 04/26/23 0613    insulin detemir U-100 injection 30 Units  30 Units Subcutaneous Daily Hannah Schulz MD   30 Units at 04/26/23 0900    Lactobacillus acidophilus capsule 1 capsule  1 capsule Oral TID WM Hannah Schulz MD   1 capsule at 04/26/23 0610    losartan tablet 25 mg  25 mg Oral Daily Hannah Schulz MD   25 mg at 04/26/23 0900    melatonin tablet 9 mg  9 mg Oral Nightly PRN Rosalina Barth MD   9 mg at 04/25/23 2035    naproxen tablet 500 mg  500 mg Oral BID PRN Jadiel Beltran Robert DO   500 mg at 04/25/23 2035    promethazine tablet 25 mg  25 mg Oral Q6H PRN Hannah Schulz MD        senna-docusate 8.6-50 mg per tablet 1 tablet  1 tablet Oral BID Hannah Schulz MD   1 tablet at 04/26/23 0900    vancomycin - pharmacy to dose   Intravenous pharmacy to manage frequency Hannah Schulz MD        vancomycin 1,500 mg in dextrose 5 % (D5W) 250 mL IVPB (Vial-Mate)  1,500 mg Intravenous Q18H Hannah Schulz MD   Stopped at 04/26/23 0129  "    Current Outpatient Medications on File Prior to Visit   Medication Sig Dispense Refill    BD ULTRA-FINE SHORT PEN NEEDLE 31 gauge x 5/16" Ndle       dextrose 5 % (D5W) SolP 250 mL with vancomycin 1.25 gram SolR 1,250 mg Inject 1,250 mg into the vein As instructed. Every 18 hours next dose 3.20.23 2200      dextrose 5 % in water (D5W) 5 % PgBk 50 mL with ceFAZolin 2 gram SolR 2 g Inject 2 g into the vein every 8 (eight) hours.  0    DULoxetine (CYMBALTA) 60 MG capsule Take 1 capsule (60 mg total) by mouth once daily. 90 capsule 3    gabapentin (NEURONTIN) 300 MG capsule Take 300 mg by mouth 2 (two) times a day.      hydrALAZINE (APRESOLINE) 25 MG tablet Take 1 tablet (25 mg total) by mouth every 12 (twelve) hours. 60 tablet 11    HYDROcodone-acetaminophen (NORCO) 5-325 mg per tablet Take 1 tablet by mouth every 6 (six) hours as needed for Pain. 28 tablet 0    insulin (LANTUS SOLOSTAR U-100 INSULIN) glargine 100 units/mL SubQ pen Inject 30 Units into the skin once daily. 4 each 5    Lactobacillus acidophilus 500 million cell Cap Take 1 capsule by mouth 3 (three) times daily with meals.      losartan (COZAAR) 25 MG tablet Take 1 tablet (25 mg total) by mouth once daily. 90 tablet 3    NOVOLOG FLEXPEN U-100 INSULIN 100 unit/mL (3 mL) InPn pen Inject 5 Units into the skin 3 (three) times daily with meals. 5 each 11    ondansetron 4 mg/2 mL Soln Inject 4 mg into the vein every 8 (eight) hours as needed.      promethazine (PHENERGAN) 25 MG tablet Take 1 tablet (25 mg total) by mouth every 6 (six) hours as needed.         ROS     There were no vitals filed for this visit.    Past Surgical History:   Procedure Laterality Date    BACK SURGERY      HYSTERECTOMY N/A     INCISION AND DRAINAGE Left 3/13/2023    Procedure: INCISION AND DRAINAGE;  Surgeon: Jacek Noyola MD;  Location: [facility];  Service: Orthopedics;  Laterality: Left;    IRRIGATION AND DEBRIDEMENT OF LOWER EXTREMITY Left 11/22/2022    " Procedure: IRRIGATION AND DEBRIDEMENT, LOWER EXTREMITY;  Surgeon: Papa Mendoza MD;  Location: [facility] OR;  Service: Orthopedics;  Laterality: Left;    IRRIGATION AND DEBRIDEMENT OF UPPER EXTREMITY Left 11/22/2022    Procedure: IRRIGATION AND DEBRIDEMENT, UPPER EXTREMITY;  Surgeon: Papa Mendoza MD;  Location: [facility] OR;  Service: Orthopedics;  Laterality: Left;    REVISION OF KNEE ARTHROPLASTY Left 3/13/2023    Procedure: REMOVAL OF PROSTHESIS,METHYLMETHACRYLATE WITH OR WITHOUT INSERTION OF SPACER LEFT KNEE;  Surgeon: Jacek Noyola MD;  Location: [facility] OR;  Service: Orthopedics;  Laterality: Left;    THORACIC LAMINECTOMY WITH FUSION N/A 1/24/2023    Procedure: T9-L2 fusion, T11-T12 laminectomy and corpectomy;  Surgeon: Jimmy Hernandes MD;  Location: [facility] OR;  Service: Neurosurgery;  Laterality: N/A;    TOTAL HIP ARTHROPLASTY Left     TOTAL KNEE ARTHROPLASTY Bilateral         Review of patient's allergies indicates:  No Known Allergies     Ortho Exam :  Exam left knee shows well-healed surgical incision.  No effusion and minimal increased heat.      Assessment and Plan  Patient Active Problem List    Diagnosis Date Noted    Bacteremia due to Klebsiella pneumoniae 03/15/2023    GATO (acute kidney injury) 02/05/2023    Thrombocytosis 02/05/2023    Left hip pain 01/30/2023    Hypertension 01/27/2023    Epidural abscess 01/19/2023    Microcytic anemia 01/19/2023    Staphylococcal arthritis of left knee 01/04/2023    S/P orthopedic surgery, follow-up exam 12/20/2022    MRSA bacteremia 11/21/2022    Lung abscess 11/20/2022    Type 2 diabetes mellitus, with long-term current use of insulin 11/20/2022    Depression 01/04/2022    Arthritis 01/04/2022    Impression:  Status post stage I explantation left knee infected hemiarthroplasty components.  Plan:  We will schedule for revision left TKR.                                  Radiology Interpretation        Patient Name: Monica LINDQUIST  Denise  Date: 4/26/2023  YOB: 1970  MRN# [id number]        ORDERING DIAGNOSIS:  No diagnosis found.          X-rays left knee today 04/26/2023 two views AP and lateral projection shows moderate generalized osteopenia.  Previously seen medial hemiarthroplasty components have been removed any radiopaque spacers he has been placed in the area of the medial compartment.               MD Jacek Rodriguez M.D.

## 2023-04-27 VITALS
TEMPERATURE: 98 F | HEIGHT: 66 IN | HEART RATE: 73 BPM | WEIGHT: 156.75 LBS | SYSTOLIC BLOOD PRESSURE: 143 MMHG | OXYGEN SATURATION: 96 % | DIASTOLIC BLOOD PRESSURE: 68 MMHG | RESPIRATION RATE: 18 BRPM | BODY MASS INDEX: 25.19 KG/M2

## 2023-04-27 LAB — GLUCOSE SERPL-MCNC: 234 MG/DL (ref 70–105)

## 2023-04-27 PROCEDURE — 82962 GLUCOSE BLOOD TEST: CPT

## 2023-04-27 PROCEDURE — 63600175 PHARM REV CODE 636 W HCPCS: Performed by: FAMILY MEDICINE

## 2023-04-27 PROCEDURE — 94761 N-INVAS EAR/PLS OXIMETRY MLT: CPT

## 2023-04-27 PROCEDURE — 99315 NF DSCHRG MGMT 30 MIN/LESS: CPT | Mod: ,,, | Performed by: FAMILY MEDICINE

## 2023-04-27 PROCEDURE — 99315 PR NURSING FAC DISCHRGE DAY,1-30 MIN: ICD-10-PCS | Mod: ,,, | Performed by: FAMILY MEDICINE

## 2023-04-27 PROCEDURE — 25000003 PHARM REV CODE 250: Performed by: FAMILY MEDICINE

## 2023-04-27 RX ADMIN — GABAPENTIN 300 MG: 300 CAPSULE ORAL at 09:04

## 2023-04-27 RX ADMIN — INSULIN DETEMIR 30 UNITS: 100 INJECTION, SOLUTION SUBCUTANEOUS at 09:04

## 2023-04-27 RX ADMIN — INSULIN ASPART 10 UNITS: 100 INJECTION, SOLUTION INTRAVENOUS; SUBCUTANEOUS at 10:04

## 2023-04-27 RX ADMIN — ASPIRIN 325 MG ORAL TABLET 325 MG: 325 PILL ORAL at 09:04

## 2023-04-27 RX ADMIN — INSULIN ASPART 5 UNITS: 100 INJECTION, SOLUTION INTRAVENOUS; SUBCUTANEOUS at 06:04

## 2023-04-27 RX ADMIN — INSULIN ASPART 4 UNITS: 100 INJECTION, SOLUTION INTRAVENOUS; SUBCUTANEOUS at 06:04

## 2023-04-27 RX ADMIN — Medication 1 CAPSULE: at 10:04

## 2023-04-27 RX ADMIN — HYDRALAZINE HYDROCHLORIDE 25 MG: 25 TABLET, FILM COATED ORAL at 09:04

## 2023-04-27 RX ADMIN — HEPARIN, PORCINE (PF) 10 UNIT/ML INTRAVENOUS SYRINGE 50 UNITS: at 12:04

## 2023-04-27 RX ADMIN — DULOXETINE 60 MG: 30 CAPSULE, DELAYED RELEASE ORAL at 09:04

## 2023-04-27 RX ADMIN — LOSARTAN POTASSIUM 25 MG: 25 TABLET, FILM COATED ORAL at 09:04

## 2023-04-27 RX ADMIN — Medication 1 CAPSULE: at 06:04

## 2023-04-27 RX ADMIN — SENNOSIDES AND DOCUSATE SODIUM 1 TABLET: 8.6; 5 TABLET ORAL at 09:04

## 2023-04-27 RX ADMIN — VANCOMYCIN HYDROCHLORIDE 1500 MG: 1.5 INJECTION, POWDER, LYOPHILIZED, FOR SOLUTION INTRAVENOUS at 12:04

## 2023-04-27 RX ADMIN — HYDROCODONE BITARTRATE AND ACETAMINOPHEN 1 TABLET: 5; 325 TABLET ORAL at 09:04

## 2023-04-27 RX ADMIN — INSULIN ASPART 5 UNITS: 100 INJECTION, SOLUTION INTRAVENOUS; SUBCUTANEOUS at 10:04

## 2023-04-27 NOTE — PLAN OF CARE
Problem: Adult Inpatient Plan of Care  Goal: Patient-Specific Goal (Individualized)  Outcome: Ongoing, Progressing  Goal: Absence of Hospital-Acquired Illness or Injury  Outcome: Ongoing, Progressing  Goal: Optimal Comfort and Wellbeing  Outcome: Ongoing, Progressing  Goal: Readiness for Transition of Care  Outcome: Ongoing, Progressing     Problem: Diabetes Comorbidity  Goal: Blood Glucose Level Within Targeted Range  Outcome: Ongoing, Progressing     Problem: Fluid and Electrolyte Imbalance (Acute Kidney Injury/Impairment)  Goal: Fluid and Electrolyte Balance  Outcome: Ongoing, Progressing     Problem: Renal Function Impairment (Acute Kidney Injury/Impairment)  Goal: Effective Renal Function  Outcome: Ongoing, Progressing     Problem: Skin Injury Risk Increased  Goal: Skin Health and Integrity  Outcome: Ongoing, Progressing     Problem: Infection  Goal: Absence of Infection Signs and Symptoms  Outcome: Ongoing, Progressing

## 2023-04-27 NOTE — DISCHARGE SUMMARY
[facility] - Medical Surgical Unit  Hospital Medicine  Discharge Summary      Patient Name: Monica Walker  MRN: [id number]  FAVIAN: [id number]  Patient Class: IP- Swing  Admission Date: 3/20/2023  Hospital Length of Stay: 38 days  Discharge Date and Time:  04/27/2023 8:09 AM  Attending Physician: Hannah Schulz, *   Discharging Provider: Hannah Schulz MD  Primary Care Provider: Hannah Schulz MD    Primary Care Team: Networked reference to record PCT     HPI:   3/20/23 - this 52 yr. Old WF was readmitted here for IV antibiotics. Her PICC line was pulled on the 16th. It was not replaced on the 18th as the noted stated was the plan. She is to get antibiotics through the 27th of April. We will have to arrange for her to have another PICC line installed for this. She has had her left knee replacement removed for sepsis. She has a spacer in the joint. She will undergo another knee replacement after the antibiotics. For rest of history, please see her old records.      * No surgery found *      Hospital Course:   3/21/23 - pt. Is doing well so far. We will arrange for her to get a PICC line soon.    3/24/23 - pt. Is again wanting more pain meds. Again told her that she is on the dose she was on in [place]. Will continue present treatment.    3/27/23 - pt. Is still eating a lot of Cherry[employer]. Still complaining of pain. She is not moving. Will continue present treatment.    3/31/23 - pt. Is sleeping.    4/3/23 - pt. Sleeping. No complaints voiced by staff.    4/7/23 - pt. Requesting sitting outside some today. OK by me.    4/10/23 - pt. Is sleeping.    4/14/23 - pt. Is sleeping. Vancomycin has to be adjusted again today.    4/17/23 - pt. Complaining of back pain today. Orders revised.    4/21/23 - pt. Sleeping.    4/24/23 - pt. Is again sleeping. Has requested toradol again.    4/27/23 - pt has completed her IV antibiotics. She will go home today with her PICC line intact due to more  "surgery in 2 weeks.       Goals of Care Treatment Preferences:  Code Status: Full Code      Consults:   Consults (From admission, onward)        Status Ordering Provider     Inpatient consult to Registered Dietitian/Nutritionist  Once        Provider:  (Not yet assigned)    Completed ELISEO FREEMAN          No new Assessment & Plan notes have been filed under this hospital service since the last note was generated.  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    Significant Diagnostic Studies: Labs: All labs within the past 24 hours have been reviewed    Pending Diagnostic Studies:     None         Medications:  Reconciled Home Medications:      Medication List      ASK your doctor about these medications    BD ULTRA-FINE SHORT PEN NEEDLE 31 gauge x 5/16" Ndle  Generic drug: pen needle, diabetic     dextrose 5 % (D5W) SolP 250 mL with vancomycin 1.25 gram SolR 1,250 mg  Inject 1,250 mg into the vein As instructed. Every 18 hours next dose 3.20.23 2200  Ask about: Which instructions should I use?     dextrose 5 % in water (D5W) 5 % PgBk 50 mL with ceFAZolin 2 gram SolR 2 g  Inject 2 g into the vein every 8 (eight) hours.     DULoxetine 60 MG capsule  Commonly known as: CYMBALTA  Take 1 capsule (60 mg total) by mouth once daily.     gabapentin 300 MG capsule  Commonly known as: NEURONTIN  Take 300 mg by mouth 2 (two) times a day.     hydrALAZINE 25 MG tablet  Commonly known as: APRESOLINE  Take 1 tablet (25 mg total) by mouth every 12 (twelve) hours.     HYDROcodone-acetaminophen 5-325 mg per tablet  Commonly known as: NORCO  Take 1 tablet by mouth every 6 (six) hours as needed for Pain.     insulin glargine 100 " units/mL SubQ pen  Commonly known as: LANTUS SOLOSTAR U-100 INSULIN  Inject 30 Units into the skin once daily.     Lactobacillus acidophilus 500 million cell Cap  Take 1 capsule by mouth 3 (three) times daily with meals.     losartan 25 MG tablet  Commonly known as: COZAAR  Take 1 tablet (25 mg total) by mouth once daily.     NovoLOG FlexPen U-100 Insulin 100 unit/mL (3 mL) Inpn pen  Generic drug: insulin aspart U-100  Inject 5 Units into the skin 3 (three) times daily with meals.     ondansetron 4 mg/2 mL Soln  Inject 4 mg into the vein every 8 (eight) hours as needed.     promethazine 25 MG tablet  Commonly known as: PHENERGAN  Take 1 tablet (25 mg total) by mouth every 6 (six) hours as needed.            Indwelling Lines/Drains at time of discharge:   Lines/Drains/Airways     Peripherally Inserted Central Catheter Line  Duration           PICC Double Lumen 03/22/23 1400 left basilic 35 days                Time spent on the discharge of patient: 30 minutes         Hannah Schulz MD  Department of Hospital Medicine  [facility] - Medical Surgical Unit

## 2023-04-27 NOTE — NURSING
Informed pt that home health would come to her home next Thursday to flush and change the Picc line dsg. Pt verbalized understanding.

## 2023-04-27 NOTE — DISCHARGE INSTRUCTIONS
Discharge home with family. Continue home meds as prescribed. Resume a diabetic home diet. Activity as tolerated. Keep follow up appointments as scheduled. [facility] to maintain PICC line. Return to nearest ER for any worsening condition.

## 2023-04-27 NOTE — NURSING
PICC line dressing removed, site cleaned, new dressing applied. PICC line remained in place and flushed with heparin after last antibiotic was completed. [facility] set up to come perform PICC line care, patient verbalizes understanding. Discharge instructions discussed with patient, verbalizes understanding. Written prescription for Norco 5/325 was given per Dr. Schulz.  Patient was taken out via wheelchair to private car, discharged home with family, personal belongings with patient. One new bottle of Losartan Pot was returned to patient.

## 2023-04-27 NOTE — PLAN OF CARE
Problem: Adult Inpatient Plan of Care  Goal: Patient-Specific Goal (Individualized)  Outcome: Adequate for Care Transition  Goal: Absence of Hospital-Acquired Illness or Injury  Outcome: Adequate for Care Transition     Problem: Fall Injury Risk  Goal: Absence of Fall and Fall-Related Injury  Outcome: Adequate for Care Transition  Intervention: Identify and Manage Contributors  Flowsheets (Taken 4/27/2023 1052)  Self-Care Promotion:   independence encouraged   BADL personal objects within reach   BADL personal routines maintained  Medication Review/Management:   medications reviewed   high-risk medications identified  Intervention: Promote Injury-Free Environment  Flowsheets (Taken 4/27/2023 1052)  Safety Promotion/Fall Prevention:   assistive device/personal item within reach   diversional activities provided   high risk medications identified   medications reviewed   muscle strengthening facilitated   nonskid shoes/socks when out of bed   side rails raised x 3   instructed to call staff for mobility

## 2023-04-28 ENCOUNTER — PATIENT OUTREACH (OUTPATIENT)
Dept: ADMINISTRATIVE | Facility: CLINIC | Age: 53
End: 2023-04-28

## 2023-04-28 NOTE — PROGRESS NOTES
C3 nurse spoke with Monica Wakler  for a TCC post hospital discharge follow up call. The patient reports does not have a scheduled HOSFU appointment. C3 nurse was unable to schedule [facility]. Patient advised to contact their PCP to schedule a HOSPFU within 5-7 days.   Pt currently not taking gabapentin d/t out of med. States she plans to refill today  Pt reports never taking lactobacillus, not taking promethazine

## 2023-05-02 LAB
GLUCOSE SERPL-MCNC: 388 MG/DL (ref 70–105)
GLUCOSE SERPL-MCNC: 428 MG/DL (ref 70–105)

## 2023-05-04 DIAGNOSIS — Z51.81 MONITORING FOR ANTICOAGULANT USE: ICD-10-CM

## 2023-05-04 DIAGNOSIS — Z01.818 PRE-OPERATIVE CLEARANCE: ICD-10-CM

## 2023-05-04 DIAGNOSIS — Z01.812 PRE-PROCEDURE LAB EXAM: Primary | ICD-10-CM

## 2023-05-04 DIAGNOSIS — Z79.01 MONITORING FOR ANTICOAGULANT USE: ICD-10-CM

## 2023-05-08 ENCOUNTER — LAB VISIT (OUTPATIENT)
Dept: LAB | Facility: HOSPITAL | Age: 53
End: 2023-05-08
Attending: ORTHOPAEDIC SURGERY
Payer: MEDICARE

## 2023-05-08 DIAGNOSIS — Z01.818 PRE-OPERATIVE CLEARANCE: ICD-10-CM

## 2023-05-08 DIAGNOSIS — Z01.812 PRE-PROCEDURE LAB EXAM: ICD-10-CM

## 2023-05-08 DIAGNOSIS — Z51.81 MONITORING FOR ANTICOAGULANT USE: ICD-10-CM

## 2023-05-08 DIAGNOSIS — Z79.01 MONITORING FOR ANTICOAGULANT USE: ICD-10-CM

## 2023-05-08 LAB
ANION GAP SERPL CALCULATED.3IONS-SCNC: 14 MMOL/L (ref 7–16)
APTT PPP: 27.1 SECONDS (ref 25.2–37.3)
BASOPHILS # BLD AUTO: 0.03 K/UL (ref 0–0.2)
BASOPHILS NFR BLD AUTO: 0.4 % (ref 0–1)
BILIRUB UR QL STRIP: NEGATIVE
BUN SERPL-MCNC: 16 MG/DL (ref 7–18)
BUN/CREAT SERPL: 17 (ref 6–20)
CALCIUM SERPL-MCNC: 9.3 MG/DL (ref 8.5–10.1)
CHLORIDE SERPL-SCNC: 99 MMOL/L (ref 98–107)
CLARITY UR: CLEAR
CO2 SERPL-SCNC: 29 MMOL/L (ref 21–32)
COLOR UR: YELLOW
CREAT SERPL-MCNC: 0.92 MG/DL (ref 0.55–1.02)
DIFFERENTIAL METHOD BLD: ABNORMAL
EGFR (NO RACE VARIABLE) (RUSH/TITUS): 74 ML/MIN/1.73M2
EOSINOPHIL # BLD AUTO: 0.52 K/UL (ref 0–0.5)
EOSINOPHIL NFR BLD AUTO: 6.7 % (ref 1–4)
ERYTHROCYTE [DISTWIDTH] IN BLOOD BY AUTOMATED COUNT: 17.4 % (ref 11.5–14.5)
GLUCOSE SERPL-MCNC: 223 MG/DL (ref 74–106)
GLUCOSE UR STRIP-MCNC: NEGATIVE MG/DL
HCT VFR BLD AUTO: 41.1 % (ref 38–47)
HGB BLD-MCNC: 13 G/DL (ref 12–16)
IMM GRANULOCYTES # BLD AUTO: 0.01 K/UL (ref 0–0.04)
IMM GRANULOCYTES NFR BLD: 0.1 % (ref 0–0.4)
INR BLD: 1
KETONES UR STRIP-SCNC: NEGATIVE MG/DL
LEUKOCYTE ESTERASE UR QL STRIP: NEGATIVE
LYMPHOCYTES # BLD AUTO: 2.39 K/UL (ref 1–4.8)
LYMPHOCYTES NFR BLD AUTO: 31 % (ref 27–41)
MCH RBC QN AUTO: 26.4 PG (ref 27–31)
MCHC RBC AUTO-ENTMCNC: 31.6 G/DL (ref 32–36)
MCV RBC AUTO: 83.4 FL (ref 80–96)
MONOCYTES # BLD AUTO: 0.51 K/UL (ref 0–0.8)
MONOCYTES NFR BLD AUTO: 6.6 % (ref 2–6)
MPC BLD CALC-MCNC: 10.3 FL (ref 9.4–12.4)
NEUTROPHILS # BLD AUTO: 4.26 K/UL (ref 1.8–7.7)
NEUTROPHILS NFR BLD AUTO: 55.2 % (ref 53–65)
NITRITE UR QL STRIP: NEGATIVE
PH UR STRIP: 5 PH UNITS
PLATELET # BLD AUTO: 264 K/UL (ref 150–400)
POTASSIUM SERPL-SCNC: 3.7 MMOL/L (ref 3.5–5.1)
PROT UR QL STRIP: NEGATIVE
PROTHROMBIN TIME: 13.2 SECONDS (ref 11.7–14.7)
RBC # BLD AUTO: 4.93 M/UL (ref 4.2–5.4)
RBC # UR STRIP: NEGATIVE /UL
SODIUM SERPL-SCNC: 138 MMOL/L (ref 136–145)
SP GR UR STRIP: 1.02
UROBILINOGEN UR STRIP-ACNC: 0.2 MG/DL
WBC # BLD AUTO: 7.72 K/UL (ref 4.5–11)

## 2023-05-08 PROCEDURE — 36415 COLL VENOUS BLD VENIPUNCTURE: CPT

## 2023-05-08 PROCEDURE — 85610 PROTHROMBIN TIME: CPT

## 2023-05-08 PROCEDURE — 81003 URINALYSIS AUTO W/O SCOPE: CPT

## 2023-05-08 PROCEDURE — 80048 BASIC METABOLIC PNL TOTAL CA: CPT

## 2023-05-08 PROCEDURE — 85730 THROMBOPLASTIN TIME PARTIAL: CPT

## 2023-05-08 PROCEDURE — 85025 COMPLETE CBC W/AUTO DIFF WBC: CPT

## 2023-05-09 ENCOUNTER — HOSPITAL ENCOUNTER (INPATIENT)
Facility: HOSPITAL | Age: 53
LOS: 1 days | Discharge: NURSING, REHAB OR LONG-TERM CARE | DRG: 467 | End: 2023-05-10
Attending: ORTHOPAEDIC SURGERY | Admitting: ORTHOPAEDIC SURGERY
Payer: MEDICARE

## 2023-05-09 ENCOUNTER — ANESTHESIA (OUTPATIENT)
Dept: SURGERY | Facility: HOSPITAL | Age: 53
DRG: 467 | End: 2023-05-09
Payer: MEDICARE

## 2023-05-09 ENCOUNTER — ANESTHESIA EVENT (OUTPATIENT)
Dept: SURGERY | Facility: HOSPITAL | Age: 53
DRG: 467 | End: 2023-05-09
Payer: MEDICARE

## 2023-05-09 DIAGNOSIS — Z71.89 COMPLEX CARE COORDINATION: ICD-10-CM

## 2023-05-09 DIAGNOSIS — Z98.890 HISTORY OF REMOVAL OF JOINT PROSTHESIS OF KNEE DUE TO INFECTION: Primary | ICD-10-CM

## 2023-05-09 DIAGNOSIS — Z86.19 HISTORY OF REMOVAL OF JOINT PROSTHESIS OF KNEE DUE TO INFECTION: Primary | ICD-10-CM

## 2023-05-09 DIAGNOSIS — T84.54XA INFECTION ASSOCIATED WITH INTERNAL LEFT KNEE PROSTHESIS, INITIAL ENCOUNTER: ICD-10-CM

## 2023-05-09 PROBLEM — B99.9: Status: ACTIVE | Noted: 2023-05-09

## 2023-05-09 PROBLEM — Z72.0 TOBACCO USE: Status: ACTIVE | Noted: 2023-05-09

## 2023-05-09 LAB
ANION GAP SERPL CALCULATED.3IONS-SCNC: 16 MMOL/L (ref 7–16)
ANISOCYTOSIS BLD QL SMEAR: ABNORMAL
BASOPHILS # BLD AUTO: 0.06 K/UL (ref 0–0.2)
BASOPHILS NFR BLD AUTO: 0.6 % (ref 0–1)
BUN SERPL-MCNC: 19 MG/DL (ref 7–18)
BUN/CREAT SERPL: 18 (ref 6–20)
CALCIUM SERPL-MCNC: 8.1 MG/DL (ref 8.5–10.1)
CHLORIDE SERPL-SCNC: 103 MMOL/L (ref 98–107)
CO2 SERPL-SCNC: 24 MMOL/L (ref 21–32)
CREAT SERPL-MCNC: 1.05 MG/DL (ref 0.55–1.02)
CRP SERPL-MCNC: 10.4 MG/DL (ref 0–0.8)
DIFFERENTIAL METHOD BLD: ABNORMAL
EGFR (NO RACE VARIABLE) (RUSH/TITUS): 63 ML/MIN/1.73M2
EOSINOPHIL # BLD AUTO: 0.17 K/UL (ref 0–0.5)
EOSINOPHIL NFR BLD AUTO: 1.7 % (ref 1–4)
EOSINOPHIL NFR BLD MANUAL: 3 % (ref 1–4)
ERYTHROCYTE [DISTWIDTH] IN BLOOD BY AUTOMATED COUNT: 17 % (ref 11.5–14.5)
ERYTHROCYTE [SEDIMENTATION RATE] IN BLOOD BY WESTERGREN METHOD: 25 MM/HR (ref 0–30)
EST. AVERAGE GLUCOSE BLD GHB EST-MCNC: 170 MG/DL
GLUCOSE SERPL-MCNC: 194 MG/DL (ref 70–105)
GLUCOSE SERPL-MCNC: 226 MG/DL (ref 74–106)
GLUCOSE SERPL-MCNC: 246 MG/DL (ref 70–105)
GLUCOSE SERPL-MCNC: 391 MG/DL (ref 70–105)
HBA1C MFR BLD HPLC: 7.7 % (ref 4.5–6.6)
HCT VFR BLD AUTO: 38.5 % (ref 38–47)
HGB BLD-MCNC: 11.9 G/DL (ref 12–16)
HYPOCHROMIA BLD QL SMEAR: ABNORMAL
IMM GRANULOCYTES # BLD AUTO: 0.03 K/UL (ref 0–0.04)
IMM GRANULOCYTES NFR BLD: 0.3 % (ref 0–0.4)
INDIRECT COOMBS: NORMAL
LYMPHOCYTES # BLD AUTO: 0.51 K/UL (ref 1–4.8)
LYMPHOCYTES NFR BLD AUTO: 5.1 % (ref 27–41)
LYMPHOCYTES NFR BLD MANUAL: 6 % (ref 27–41)
MCH RBC QN AUTO: 26.7 PG (ref 27–31)
MCHC RBC AUTO-ENTMCNC: 30.9 G/DL (ref 32–36)
MCV RBC AUTO: 86.5 FL (ref 80–96)
MONOCYTES # BLD AUTO: 0.18 K/UL (ref 0–0.8)
MONOCYTES NFR BLD AUTO: 1.8 % (ref 2–6)
MONOCYTES NFR BLD MANUAL: 1 % (ref 2–6)
MPC BLD CALC-MCNC: 10.4 FL (ref 9.4–12.4)
NEUTROPHILS # BLD AUTO: 9.14 K/UL (ref 1.8–7.7)
NEUTROPHILS NFR BLD AUTO: 90.5 % (ref 53–65)
NEUTS BAND NFR BLD MANUAL: 20 % (ref 1–5)
NEUTS SEG NFR BLD MANUAL: 70 % (ref 50–62)
NRBC # BLD AUTO: 0 X10E3/UL
NRBC, AUTO (.00): 0 %
PLATELET # BLD AUTO: 204 K/UL (ref 150–400)
PLATELET MORPHOLOGY: ABNORMAL
POTASSIUM SERPL-SCNC: 4 MMOL/L (ref 3.5–5.1)
RBC # BLD AUTO: 4.45 M/UL (ref 4.2–5.4)
RH BLD: NORMAL
SODIUM SERPL-SCNC: 139 MMOL/L (ref 136–145)
SPECIMEN OUTDATE: NORMAL
WBC # BLD AUTO: 10.09 K/UL (ref 4.5–11)

## 2023-05-09 PROCEDURE — 63600175 PHARM REV CODE 636 W HCPCS: Performed by: NURSE ANESTHETIST, CERTIFIED REGISTERED

## 2023-05-09 PROCEDURE — 27201423 OPTIME MED/SURG SUP & DEVICES STERILE SUPPLY: Performed by: ORTHOPAEDIC SURGERY

## 2023-05-09 PROCEDURE — 27000165 HC TUBE, ETT CUFFED: Performed by: ANESTHESIOLOGY

## 2023-05-09 PROCEDURE — 11000001 HC ACUTE MED/SURG PRIVATE ROOM

## 2023-05-09 PROCEDURE — D9220A PRA ANESTHESIA: Mod: ANES,,, | Performed by: ANESTHESIOLOGY

## 2023-05-09 PROCEDURE — 27000689 HC BLADE LARYNGOSCOPE ANY SIZE: Performed by: ANESTHESIOLOGY

## 2023-05-09 PROCEDURE — 99223 1ST HOSP IP/OBS HIGH 75: CPT | Mod: $0,,, | Performed by: HOSPITALIST

## 2023-05-09 PROCEDURE — 63600175 PHARM REV CODE 636 W HCPCS: Performed by: ORTHOPAEDIC SURGERY

## 2023-05-09 PROCEDURE — 37000008 HC ANESTHESIA 1ST 15 MINUTES: Performed by: ORTHOPAEDIC SURGERY

## 2023-05-09 PROCEDURE — 87186 SC STD MICRODIL/AGAR DIL: CPT | Performed by: INTERNAL MEDICINE

## 2023-05-09 PROCEDURE — 63600175 PHARM REV CODE 636 W HCPCS: Performed by: ANESTHESIOLOGY

## 2023-05-09 PROCEDURE — D9220A PRA ANESTHESIA: ICD-10-PCS | Mod: CRNA,,, | Performed by: NURSE ANESTHETIST, CERTIFIED REGISTERED

## 2023-05-09 PROCEDURE — 85025 COMPLETE CBC W/AUTO DIFF WBC: CPT | Performed by: ORTHOPAEDIC SURGERY

## 2023-05-09 PROCEDURE — 86900 BLOOD TYPING SEROLOGIC ABO: CPT | Performed by: ORTHOPAEDIC SURGERY

## 2023-05-09 PROCEDURE — 0055T BONE SRGRY CMPTR CT/MRI IMAG: CPT | Mod: ,,, | Performed by: ORTHOPAEDIC SURGERY

## 2023-05-09 PROCEDURE — 83036 HEMOGLOBIN GLYCOSYLATED A1C: CPT | Performed by: HOSPITALIST

## 2023-05-09 PROCEDURE — C1713 ANCHOR/SCREW BN/BN,TIS/BN: HCPCS | Performed by: ORTHOPAEDIC SURGERY

## 2023-05-09 PROCEDURE — 25000003 PHARM REV CODE 250: Performed by: ORTHOPAEDIC SURGERY

## 2023-05-09 PROCEDURE — 97165 OT EVAL LOW COMPLEX 30 MIN: CPT

## 2023-05-09 PROCEDURE — 27000716 HC OXISENSOR PROBE, ANY SIZE: Performed by: ANESTHESIOLOGY

## 2023-05-09 PROCEDURE — D9220A PRA ANESTHESIA: Mod: CRNA,,, | Performed by: NURSE ANESTHETIST, CERTIFIED REGISTERED

## 2023-05-09 PROCEDURE — 25000003 PHARM REV CODE 250: Performed by: INTERNAL MEDICINE

## 2023-05-09 PROCEDURE — 85651 RBC SED RATE NONAUTOMATED: CPT | Performed by: INTERNAL MEDICINE

## 2023-05-09 PROCEDURE — 97161 PT EVAL LOW COMPLEX 20 MIN: CPT

## 2023-05-09 PROCEDURE — 37000009 HC ANESTHESIA EA ADD 15 MINS: Performed by: ORTHOPAEDIC SURGERY

## 2023-05-09 PROCEDURE — 99223 PR INITIAL HOSPITAL CARE,LEVL III: ICD-10-PCS | Mod: $0,,, | Performed by: HOSPITALIST

## 2023-05-09 PROCEDURE — C1769 GUIDE WIRE: HCPCS | Performed by: ORTHOPAEDIC SURGERY

## 2023-05-09 PROCEDURE — 27447 TOTAL KNEE ARTHROPLASTY: CPT | Mod: 58,LT,, | Performed by: ORTHOPAEDIC SURGERY

## 2023-05-09 PROCEDURE — D9220A PRA ANESTHESIA: ICD-10-PCS | Mod: ANES,,, | Performed by: ANESTHESIOLOGY

## 2023-05-09 PROCEDURE — 63600175 PHARM REV CODE 636 W HCPCS: Performed by: HOSPITALIST

## 2023-05-09 PROCEDURE — 71000033 HC RECOVERY, INTIAL HOUR: Performed by: ORTHOPAEDIC SURGERY

## 2023-05-09 PROCEDURE — 87077 CULTURE AEROBIC IDENTIFY: CPT | Performed by: INTERNAL MEDICINE

## 2023-05-09 PROCEDURE — 86140 C-REACTIVE PROTEIN: CPT | Performed by: INTERNAL MEDICINE

## 2023-05-09 PROCEDURE — 82962 GLUCOSE BLOOD TEST: CPT

## 2023-05-09 PROCEDURE — 27000655: Performed by: ANESTHESIOLOGY

## 2023-05-09 PROCEDURE — C1776 JOINT DEVICE (IMPLANTABLE): HCPCS | Performed by: ORTHOPAEDIC SURGERY

## 2023-05-09 PROCEDURE — 27447 PR TOTAL KNEE ARTHROPLASTY: ICD-10-PCS | Mod: 58,LT,, | Performed by: ORTHOPAEDIC SURGERY

## 2023-05-09 PROCEDURE — 27000221 HC OXYGEN, UP TO 24 HOURS

## 2023-05-09 PROCEDURE — 25000003 PHARM REV CODE 250: Performed by: NURSE ANESTHETIST, CERTIFIED REGISTERED

## 2023-05-09 PROCEDURE — 80048 BASIC METABOLIC PNL TOTAL CA: CPT | Performed by: ORTHOPAEDIC SURGERY

## 2023-05-09 PROCEDURE — 94761 N-INVAS EAR/PLS OXIMETRY MLT: CPT

## 2023-05-09 PROCEDURE — 36000710: Performed by: ORTHOPAEDIC SURGERY

## 2023-05-09 PROCEDURE — 36000711: Performed by: ORTHOPAEDIC SURGERY

## 2023-05-09 PROCEDURE — 0055T PR COMPUTER-ASSIST MUSCSKEL NAVIG, ORTHO PROC, CT/MRI: ICD-10-PCS | Mod: ,,, | Performed by: ORTHOPAEDIC SURGERY

## 2023-05-09 PROCEDURE — 99900035 HC TECH TIME PER 15 MIN (STAT)

## 2023-05-09 PROCEDURE — 27200750 HC INSULATED NEEDLE/ STIMUPLEX: Performed by: ANESTHESIOLOGY

## 2023-05-09 DEVICE — UNIVERSAL CEMENT RESTRICTOR
Type: IMPLANTABLE DEVICE | Site: KNEE | Status: FUNCTIONAL
Brand: RESTRICTOR

## 2023-05-09 DEVICE — UNIVERSAL TIBIAL BASEPLATE
Type: IMPLANTABLE DEVICE | Site: KNEE | Status: FUNCTIONAL
Brand: TRIATHLON

## 2023-05-09 DEVICE — CORKSCREW FT, BC, SUTURETAPE, 4.75MM
Type: IMPLANTABLE DEVICE | Site: KNEE | Status: FUNCTIONAL
Brand: ARTHREX®

## 2023-05-09 DEVICE — TOTAL STABILIZER+ TIBIAL INSERT
Type: IMPLANTABLE DEVICE | Site: KNEE | Status: FUNCTIONAL
Brand: TRIATHLON

## 2023-05-09 DEVICE — FLUTED STEM
Type: IMPLANTABLE DEVICE | Site: KNEE | Status: FUNCTIONAL
Brand: TRIATHLON

## 2023-05-09 DEVICE — BIO-COMPOSITE CRKSCRW 5.5X14.7MM
Type: IMPLANTABLE DEVICE | Site: KNEE | Status: FUNCTIONAL
Brand: ARTHREX®

## 2023-05-09 DEVICE — CEMENTED STEM
Type: IMPLANTABLE DEVICE | Site: KNEE | Status: FUNCTIONAL
Brand: TRIATHLON

## 2023-05-09 DEVICE — TOBRA FULL DOSE ANTIBIOTIC BONE CEMENT, 10 PACK CATALOG NUMBER IS 6197-9-010
Type: IMPLANTABLE DEVICE | Site: KNEE | Status: FUNCTIONAL
Brand: SIMPLEX

## 2023-05-09 RX ORDER — TRANEXAMIC ACID 100 MG/ML
INJECTION, SOLUTION INTRAVENOUS
Status: DISCONTINUED | OUTPATIENT
Start: 2023-05-09 | End: 2023-05-09

## 2023-05-09 RX ORDER — KETAMINE HYDROCHLORIDE 50 MG/ML
INJECTION, SOLUTION INTRAMUSCULAR; INTRAVENOUS
Status: DISCONTINUED | OUTPATIENT
Start: 2023-05-09 | End: 2023-05-09

## 2023-05-09 RX ORDER — HYDROMORPHONE HYDROCHLORIDE 2 MG/ML
INJECTION, SOLUTION INTRAMUSCULAR; INTRAVENOUS; SUBCUTANEOUS
Status: DISCONTINUED | OUTPATIENT
Start: 2023-05-09 | End: 2023-05-09

## 2023-05-09 RX ORDER — DULOXETIN HYDROCHLORIDE 30 MG/1
60 CAPSULE, DELAYED RELEASE ORAL DAILY
Status: CANCELLED | OUTPATIENT
Start: 2023-05-10

## 2023-05-09 RX ORDER — MIDAZOLAM HYDROCHLORIDE 1 MG/ML
INJECTION INTRAMUSCULAR; INTRAVENOUS
Status: DISCONTINUED | OUTPATIENT
Start: 2023-05-09 | End: 2023-05-09

## 2023-05-09 RX ORDER — MORPHINE SULFATE 10 MG/ML
4 INJECTION INTRAMUSCULAR; INTRAVENOUS; SUBCUTANEOUS EVERY 5 MIN PRN
Status: DISCONTINUED | OUTPATIENT
Start: 2023-05-09 | End: 2023-05-09 | Stop reason: HOSPADM

## 2023-05-09 RX ORDER — LIDOCAINE HYDROCHLORIDE 10 MG/ML
1 INJECTION, SOLUTION EPIDURAL; INFILTRATION; INTRACAUDAL; PERINEURAL ONCE AS NEEDED
Status: DISCONTINUED | OUTPATIENT
Start: 2023-05-09 | End: 2023-05-09 | Stop reason: HOSPADM

## 2023-05-09 RX ORDER — DEXTROSE 40 %
15 GEL (GRAM) ORAL
Status: DISCONTINUED | OUTPATIENT
Start: 2023-05-09 | End: 2023-05-10 | Stop reason: HOSPADM

## 2023-05-09 RX ORDER — SODIUM CHLORIDE 9 MG/ML
INJECTION, SOLUTION INTRAVENOUS CONTINUOUS
Status: DISCONTINUED | OUTPATIENT
Start: 2023-05-09 | End: 2023-05-09

## 2023-05-09 RX ORDER — BISACODYL 10 MG
10 SUPPOSITORY, RECTAL RECTAL DAILY PRN
Status: DISCONTINUED | OUTPATIENT
Start: 2023-05-09 | End: 2023-05-10 | Stop reason: HOSPADM

## 2023-05-09 RX ORDER — MORPHINE SULFATE 4 MG/ML
4 INJECTION, SOLUTION INTRAMUSCULAR; INTRAVENOUS EVERY 4 HOURS PRN
Status: DISCONTINUED | OUTPATIENT
Start: 2023-05-09 | End: 2023-05-10 | Stop reason: HOSPADM

## 2023-05-09 RX ORDER — FENTANYL CITRATE 50 UG/ML
INJECTION, SOLUTION INTRAMUSCULAR; INTRAVENOUS
Status: DISCONTINUED | OUTPATIENT
Start: 2023-05-09 | End: 2023-05-09

## 2023-05-09 RX ORDER — LIDOCAINE HYDROCHLORIDE 20 MG/ML
INJECTION, SOLUTION EPIDURAL; INFILTRATION; INTRACAUDAL; PERINEURAL
Status: DISCONTINUED | OUTPATIENT
Start: 2023-05-09 | End: 2023-05-09

## 2023-05-09 RX ORDER — ONDANSETRON 2 MG/ML
4 INJECTION INTRAMUSCULAR; INTRAVENOUS EVERY 8 HOURS PRN
Status: DISCONTINUED | OUTPATIENT
Start: 2023-05-09 | End: 2023-05-10 | Stop reason: HOSPADM

## 2023-05-09 RX ORDER — ACETAMINOPHEN 325 MG/1
650 TABLET ORAL EVERY 6 HOURS PRN
Status: DISCONTINUED | OUTPATIENT
Start: 2023-05-09 | End: 2023-05-10 | Stop reason: HOSPADM

## 2023-05-09 RX ORDER — ACETAMINOPHEN 10 MG/ML
INJECTION, SOLUTION INTRAVENOUS
Status: DISCONTINUED | OUTPATIENT
Start: 2023-05-09 | End: 2023-05-09

## 2023-05-09 RX ORDER — HYDROMORPHONE HYDROCHLORIDE 2 MG/ML
0.5 INJECTION, SOLUTION INTRAMUSCULAR; INTRAVENOUS; SUBCUTANEOUS EVERY 5 MIN PRN
Status: DISCONTINUED | OUTPATIENT
Start: 2023-05-09 | End: 2023-05-09 | Stop reason: HOSPADM

## 2023-05-09 RX ORDER — INSULIN ASPART 100 [IU]/ML
1-10 INJECTION, SOLUTION INTRAVENOUS; SUBCUTANEOUS
Status: DISCONTINUED | OUTPATIENT
Start: 2023-05-09 | End: 2023-05-10 | Stop reason: HOSPADM

## 2023-05-09 RX ORDER — GLUCAGON 1 MG
1 KIT INJECTION
Status: DISCONTINUED | OUTPATIENT
Start: 2023-05-09 | End: 2023-05-10 | Stop reason: HOSPADM

## 2023-05-09 RX ORDER — ROCURONIUM BROMIDE 10 MG/ML
INJECTION, SOLUTION INTRAVENOUS
Status: DISCONTINUED | OUTPATIENT
Start: 2023-05-09 | End: 2023-05-09

## 2023-05-09 RX ORDER — SODIUM CHLORIDE, SODIUM LACTATE, POTASSIUM CHLORIDE, CALCIUM CHLORIDE 600; 310; 30; 20 MG/100ML; MG/100ML; MG/100ML; MG/100ML
INJECTION, SOLUTION INTRAVENOUS CONTINUOUS
Status: DISCONTINUED | OUTPATIENT
Start: 2023-05-09 | End: 2023-05-09

## 2023-05-09 RX ORDER — MEPERIDINE HYDROCHLORIDE 25 MG/ML
25 INJECTION INTRAMUSCULAR; INTRAVENOUS; SUBCUTANEOUS EVERY 10 MIN PRN
Status: DISCONTINUED | OUTPATIENT
Start: 2023-05-09 | End: 2023-05-09 | Stop reason: HOSPADM

## 2023-05-09 RX ORDER — DIPHENHYDRAMINE HYDROCHLORIDE 50 MG/ML
25 INJECTION INTRAMUSCULAR; INTRAVENOUS EVERY 6 HOURS PRN
Status: DISCONTINUED | OUTPATIENT
Start: 2023-05-09 | End: 2023-05-09 | Stop reason: HOSPADM

## 2023-05-09 RX ORDER — ONDANSETRON 2 MG/ML
INJECTION INTRAMUSCULAR; INTRAVENOUS
Status: DISCONTINUED | OUTPATIENT
Start: 2023-05-09 | End: 2023-05-09

## 2023-05-09 RX ORDER — HYDROCODONE BITARTRATE AND ACETAMINOPHEN 10; 325 MG/1; MG/1
1 TABLET ORAL EVERY 4 HOURS PRN
Status: DISCONTINUED | OUTPATIENT
Start: 2023-05-09 | End: 2023-05-10 | Stop reason: HOSPADM

## 2023-05-09 RX ORDER — SODIUM CHLORIDE, SODIUM LACTATE, POTASSIUM CHLORIDE, CALCIUM CHLORIDE 600; 310; 30; 20 MG/100ML; MG/100ML; MG/100ML; MG/100ML
INJECTION, SOLUTION INTRAVENOUS CONTINUOUS
Status: DISCONTINUED | OUTPATIENT
Start: 2023-05-09 | End: 2023-05-10 | Stop reason: HOSPADM

## 2023-05-09 RX ORDER — PROPOFOL 10 MG/ML
VIAL (ML) INTRAVENOUS
Status: DISCONTINUED | OUTPATIENT
Start: 2023-05-09 | End: 2023-05-09

## 2023-05-09 RX ORDER — KETOROLAC TROMETHAMINE 30 MG/ML
INJECTION, SOLUTION INTRAMUSCULAR; INTRAVENOUS
Status: DISCONTINUED | OUTPATIENT
Start: 2023-05-09 | End: 2023-05-09

## 2023-05-09 RX ORDER — DEXTROSE 40 %
30 GEL (GRAM) ORAL
Status: DISCONTINUED | OUTPATIENT
Start: 2023-05-09 | End: 2023-05-10 | Stop reason: HOSPADM

## 2023-05-09 RX ORDER — SODIUM CHLORIDE 9 MG/ML
INJECTION, SOLUTION INTRAVENOUS CONTINUOUS PRN
Status: DISCONTINUED | OUTPATIENT
Start: 2023-05-09 | End: 2023-05-09

## 2023-05-09 RX ORDER — ONDANSETRON 2 MG/ML
4 INJECTION INTRAMUSCULAR; INTRAVENOUS DAILY PRN
Status: DISCONTINUED | OUTPATIENT
Start: 2023-05-09 | End: 2023-05-09 | Stop reason: HOSPADM

## 2023-05-09 RX ADMIN — VANCOMYCIN HYDROCHLORIDE 1000 MG: 1 INJECTION, POWDER, LYOPHILIZED, FOR SOLUTION INTRAVENOUS at 08:05

## 2023-05-09 RX ADMIN — TRANEXAMIC ACID 1000 MG: 100 INJECTION, SOLUTION INTRAVENOUS at 10:05

## 2023-05-09 RX ADMIN — ONDANSETRON 4 MG: 2 INJECTION INTRAMUSCULAR; INTRAVENOUS at 08:05

## 2023-05-09 RX ADMIN — MORPHINE SULFATE 4 MG: 4 INJECTION, SOLUTION INTRAMUSCULAR; INTRAVENOUS at 08:05

## 2023-05-09 RX ADMIN — FENTANYL CITRATE 50 MCG: 50 INJECTION INTRAMUSCULAR; INTRAVENOUS at 09:05

## 2023-05-09 RX ADMIN — KETOROLAC TROMETHAMINE 30 MG: 30 INJECTION, SOLUTION INTRAMUSCULAR at 11:05

## 2023-05-09 RX ADMIN — SUGAMMADEX 200 MG: 100 INJECTION, SOLUTION INTRAVENOUS at 11:05

## 2023-05-09 RX ADMIN — CEFAZOLIN 2 G: 2 INJECTION, POWDER, FOR SOLUTION INTRAMUSCULAR; INTRAVENOUS at 10:05

## 2023-05-09 RX ADMIN — TRANEXAMIC ACID 1000 MG: 100 INJECTION, SOLUTION INTRAVENOUS at 08:05

## 2023-05-09 RX ADMIN — ROCURONIUM BROMIDE 20 MG: 10 INJECTION, SOLUTION INTRAVENOUS at 09:05

## 2023-05-09 RX ADMIN — HYDROMORPHONE HYDROCHLORIDE 0.4 MG: 2 INJECTION, SOLUTION INTRAMUSCULAR; INTRAVENOUS; SUBCUTANEOUS at 09:05

## 2023-05-09 RX ADMIN — ROCURONIUM BROMIDE 50 MG: 10 INJECTION, SOLUTION INTRAVENOUS at 08:05

## 2023-05-09 RX ADMIN — MIDAZOLAM 2 MG: 1 INJECTION INTRAMUSCULAR; INTRAVENOUS at 08:05

## 2023-05-09 RX ADMIN — HYDROMORPHONE HYDROCHLORIDE 0.5 MG: 2 INJECTION, SOLUTION INTRAMUSCULAR; INTRAVENOUS; SUBCUTANEOUS at 12:05

## 2023-05-09 RX ADMIN — PROPOFOL 150 MG: 10 INJECTION, EMULSION INTRAVENOUS at 08:05

## 2023-05-09 RX ADMIN — ACETAMINOPHEN 1000 MG: 10 INJECTION, SOLUTION INTRAVENOUS at 10:05

## 2023-05-09 RX ADMIN — CEFAZOLIN 2 G: 1 INJECTION, POWDER, FOR SOLUTION INTRAMUSCULAR; INTRAVENOUS; PARENTERAL at 08:05

## 2023-05-09 RX ADMIN — FENTANYL CITRATE 50 MCG: 50 INJECTION INTRAMUSCULAR; INTRAVENOUS at 08:05

## 2023-05-09 RX ADMIN — ROPIVACAINE HYDROCHLORIDE 30 ML: 7.5 INJECTION, SOLUTION EPIDURAL; PERINEURAL at 08:05

## 2023-05-09 RX ADMIN — INSULIN ASPART 5 UNITS: 100 INJECTION, SOLUTION INTRAVENOUS; SUBCUTANEOUS at 11:05

## 2023-05-09 RX ADMIN — SODIUM CHLORIDE: 9 INJECTION, SOLUTION INTRAVENOUS at 08:05

## 2023-05-09 RX ADMIN — LIDOCAINE HYDROCHLORIDE 50 MG: 20 INJECTION, SOLUTION EPIDURAL; INFILTRATION; INTRACAUDAL; PERINEURAL at 08:05

## 2023-05-09 RX ADMIN — CEFAZOLIN 2 G: 2 INJECTION, POWDER, FOR SOLUTION INTRAMUSCULAR; INTRAVENOUS at 02:05

## 2023-05-09 RX ADMIN — KETAMINE HYDROCHLORIDE 30 MG: 50 INJECTION INTRAMUSCULAR; INTRAVENOUS at 10:05

## 2023-05-09 RX ADMIN — ACETAMINOPHEN 650 MG: 325 TABLET ORAL at 10:05

## 2023-05-09 NOTE — ANESTHESIA PREPROCEDURE EVALUATION
05/09/2023  Monica Walker is a 52 y.o., female.      Pre-op Assessment    I have reviewed the Patient Summary Reports.     I have reviewed the Nursing Notes. I have reviewed the NPO Status.   I have reviewed the Medications.     Review of Systems  Anesthesia Hx:  No problems with previous Anesthesia  Denies Family Hx of Anesthesia complications.   Denies Personal Hx of Anesthesia complications.   Social:  Smoker, No Alcohol Use  Tobacco Use:  of cigarette Illicit Drug Use: Types of drugs include Methamphetamines,   Cardiovascular:   Exercise tolerance: poor Hypertension ECG has been reviewed.    Pulmonary:   Shortness of breath    Musculoskeletal:   Arthritis     Endocrine:   Diabetes, poorly controlled    Psych:   Psychiatric History          Physical Exam  General: Well nourished, Cooperative and Alert    Airway:  Mallampati: II   Mouth Opening: Normal  TM Distance: Normal  Tongue: Normal  Neck ROM: Normal ROM    Dental:  Dentures    Chest/Lungs:  Clear to auscultation, Normal Respiratory Rate    Heart:  Rate: Normal  Rhythm: Regular Rhythm        Chemistry        Component Value Date/Time     05/08/2023 0930    K 3.7 05/08/2023 0930    CL 99 05/08/2023 0930    CO2 29 05/08/2023 0930    BUN 16 05/08/2023 0930    CREATININE 0.92 05/08/2023 0930     (H) 05/08/2023 0930        Component Value Date/Time    CALCIUM 9.3 05/08/2023 0930    ALKPHOS 123 (H) 03/15/2023 0402    AST 6 (L) 03/15/2023 0402    ALT 12 (L) 03/15/2023 0402    BILITOT 0.3 03/15/2023 0402    EGFRNONAA 60 08/07/2022 1547        Lab Results   Component Value Date    WBC 7.72 05/08/2023    RBC 4.93 05/08/2023    HGB 13.0 05/08/2023    MCV 83.4 05/08/2023    MCH 26.4 (L) 05/08/2023    MCHC 31.6 (L) 05/08/2023    RDW 17.4 (H) 05/08/2023     05/08/2023    MPV 10.3 05/08/2023    LYMPH 31.0 05/08/2023    LYMPH 2.39 05/08/2023     MONO 6.6 (H) 05/08/2023    EOS 0.52 (H) 05/08/2023    BASO 0.03 05/08/2023     Results for orders placed or performed during the hospital encounter of 03/06/23   EKG 12-lead    Collection Time: 03/06/23  3:31 PM    Narrative    Test Reason : R53.1,    Vent. Rate : 081 BPM     Atrial Rate : 081 BPM     P-R Int : 132 ms          QRS Dur : 076 ms      QT Int : 388 ms       P-R-T Axes : 076 080 056 degrees     QTc Int : 450 ms    Normal sinus rhythm  Normal ECG  When compared with ECG of 09-JAN-2023 09:18,  No significant change was found  Confirmed by Adilson Fontanez DO ([id number]) on 3/9/2023 8:53:35 AM    Referred By: THAIS   SELF           Confirmed By:Adilson Fontanez DO     TTE 11/21/22  Summary     The left ventricle is normal in size with normal systolic function.   The estimated ejection fraction is 55%.   Normal right ventricular size with normal right ventricular systolic function.   Mild mitral regurgitation.   Normal central venous pressure (3 mmHg).   No obvious vegetation noted on aortic or mitral leaflet; cannot exclude a small vegetation. Consider MONICA if clinical suspicion remains high for endocarditits           Anesthesia Plan  Type of Anesthesia, risks & benefits discussed:    Anesthesia Type: Gen ETT, Regional  Intra-op Monitoring Plan: Standard ASA Monitors  Post Op Pain Control Plan: multimodal analgesia and peripheral nerve block  Induction:  IV  Airway Plan: Direct, Post-Induction  Informed Consent: Informed consent signed with the Patient and all parties understand the risks and agree with anesthesia plan.  All questions answered.   ASA Score: 3  Day of Surgery Review of History & Physical: H&P Update referred to the surgeon/provider.I have interviewed and examined the patient. I have reviewed the patient's H&P dated: There are no significant changes.   Anesthesia Plan Notes: 52yF with h/o MRSA, bilateral lung abscesses, multiple joint effusions with infectious material - patient on IV  Vancomycin scheduled for washouts.    Bacteremia vs sepsis - BP stable with mild tachycardia    Ready For Surgery From Anesthesia Perspective.     .

## 2023-05-09 NOTE — HPI
Chief complaint:  Status post explantation infected left knee replacement arthroplasty  History:   Monica Walker is a 52 y.o. female seen  for recheck of her left knee.  She became septic  seeding her lumbar spine, left shoulder and left knee hemiarthroplasty.Knee  components were removed and methylmethacrylate antibiotic spacer was implanted.  She completed 6 weeks IV antibiotic therapy.  She no longer has any pain in shoulder back or knee as she had before.  She underwent serologic testing for 04/20/2023 with a sed rate the upper limits of normal now at 30.  X-rays left knee today 04/26/2023 two views AP and lateral projection shows moderate generalized osteopenia.  Previously seen medial hemiarthroplasty components have been removed any radiopaque spacers he has been placed in the area of the medial compartment.  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  The potential benefits and risks of surgery outlined to include but not limited to bleeding infection, damage to blood vessels nerves, need for further surgery, other risks and complications including even death the patient wished to proceed.  We discussed overnight stay in the hospital.

## 2023-05-09 NOTE — TRANSFER OF CARE
"Anesthesia Transfer of Care Note    Patient: Monica Walker    Procedure(s) Performed: Procedure(s) (LRB):  REVISION, ARTHROPLASTY, KNEE (Left)    Patient location: PACU    Anesthesia Type: general    Transport from OR: Transported from OR on 6-10 L/min O2 by face mask with adequate spontaneous ventilation    Post pain: adequate analgesia    Post assessment: no apparent anesthetic complications    Post vital signs: stable    Level of consciousness: responds to stimulation, awake and sedated    Nausea/Vomiting: no nausea/vomiting    Complications: none    Transfer of care protocol was followed      Last vitals:   Visit Vitals  /72 (BP Location: Right arm, Patient Position: Lying)   Pulse 104   Temp 36.9 °C (98.5 °F) (Oral)   Resp 14   Ht 5' 6" (1.676 m)   Wt 72.6 kg (160 lb)   SpO2 99%   Breastfeeding No   BMI 25.82 kg/m²     "

## 2023-05-09 NOTE — ANESTHESIA PROCEDURE NOTES
Intubation    Date/Time: 5/9/2023 8:33 AM  Performed by: Sera Vidal CRNA  Authorized by: Haroldo Aldridge MD     Intubation:     Induction:  Intravenous    Intubated:  Postinduction    Mask Ventilation:  Easy mask    Attempts:  1    Attempted By:  CRNA    Method of Intubation:  Direct    Blade:  Celso 3    Laryngeal View Grade: Grade IIA - cords partially seen      Difficult Airway Encountered?: No      Complications:  None    Airway Device:  Oral endotracheal tube    Airway Device Size:  7.0    Style/Cuff Inflation:  Cuffed (inflated to minimal occlusive pressure)    Inflation Amount (mL):  8    Tube secured:  21    Placement Verified By:  Capnometry    Complicating Factors:  None    Findings Post-Intubation:  BS equal bilateral and atraumatic/condition of teeth unchanged

## 2023-05-09 NOTE — BRIEF OP NOTE
[facility] - Orthopedic Periop Services  Brief Operative Note    Surgery Date: 5/9/2023     Surgeon(s) and Role:     * Jacek Noyola MD - Primary    Assisting Surgeon: None    Pre-op Diagnosis:  History of removal of joint prosthesis of knee due to infection [Z98.890, Z86.19]    Post-op Diagnosis:  Post-Op Diagnosis Codes:     * History of removal of joint prosthesis of knee due to infection [Z98.890, Z86.19]    Procedure(s) (LRB):  REVISION, ARTHROPLASTY, KNEE (Left)    Anesthesia:  General    Description of the findings of the procedure(s): See Op Note     Estimated Blood Loss: 150 mL         Specimens:   Specimen (24h ago, onward)      None              Discharge Note    OUTCOME: Patient tolerated treatment/procedure well without complication and is now ready for discharge.    DISPOSITION: Home or Self Care    FINAL DIAGNOSIS:  History of removal of joint prosthesis of knee due to infection    FOLLOWUP: In clinic    DISCHARGE INSTRUCTIONS:  No discharge procedures on file.

## 2023-05-09 NOTE — SUBJECTIVE & OBJECTIVE
Past Medical History:   Diagnosis Date    Abscess of right thigh + MRSA 08/20/2021    healed    Adnexal cyst     Degenerative disc disease     Depression 10/29/2021    Hypertension     Hypomagnesemia 1/20/2023    Nicotine dependence     Osteoarthritis        Past Surgical History:   Procedure Laterality Date    BACK SURGERY      HYSTERECTOMY N/A     INCISION AND DRAINAGE Left 3/13/2023    Procedure: INCISION AND DRAINAGE;  Surgeon: Jacek Noyola MD;  Location: [facility] OR;  Service: Orthopedics;  Laterality: Left;    IRRIGATION AND DEBRIDEMENT OF LOWER EXTREMITY Left 11/22/2022    Procedure: IRRIGATION AND DEBRIDEMENT, LOWER EXTREMITY;  Surgeon: Papa Mendoza MD;  Location: [facility] OR;  Service: Orthopedics;  Laterality: Left;    IRRIGATION AND DEBRIDEMENT OF UPPER EXTREMITY Left 11/22/2022    Procedure: IRRIGATION AND DEBRIDEMENT, UPPER EXTREMITY;  Surgeon: Papa Mendoza MD;  Location: [facility] OR;  Service: Orthopedics;  Laterality: Left;    REVISION OF KNEE ARTHROPLASTY Left 3/13/2023    Procedure: REMOVAL OF PROSTHESIS,METHYLMETHACRYLATE WITH OR WITHOUT INSERTION OF SPACER LEFT KNEE;  Surgeon: Jacek Noyola MD;  Location: [facility] OR;  Service: Orthopedics;  Laterality: Left;    THORACIC LAMINECTOMY WITH FUSION N/A 1/24/2023    Procedure: T9-L2 fusion, T11-T12 laminectomy and corpectomy;  Surgeon: Jimmy Hernandes MD;  Location: [facility] OR;  Service: Neurosurgery;  Laterality: N/A;    TOTAL HIP ARTHROPLASTY Left     TOTAL KNEE ARTHROPLASTY Bilateral        Review of patient's allergies indicates:  No Known Allergies    No current facility-administered medications for this encounter.     Family History       Problem Relation (Age of Onset)    Diabetes Father    Hypertension Father          Tobacco Use    Smoking status: Every Day     Types: Cigarettes    Smokeless tobacco: Current     Types: Snuff   Substance and Sexual Activity    Alcohol use: Never    Drug use:  Never    Sexual activity: Not on file     Review of Systems   Unable to perform ROS  Constitutional: Negative.   Objective:     Vital Signs (Most Recent):    Vital Signs (24h Range):              There is no height or weight on file to calculate BMI.    No intake or output data in the 24 hours ending 23 0616     General    Vitals reviewed.  Constitutional: She is oriented to person, place, and time. She appears well-developed and well-nourished.   HENT:   Head: Normocephalic and atraumatic.   Eyes: EOM are normal. Pupils are equal, round, and reactive to light.   Neck: Neck supple.   Cardiovascular:  Normal rate, regular rhythm and normal heart sounds.            Pulmonary/Chest: Effort normal and breath sounds normal.   Abdominal: Soft. Bowel sounds are normal.   Neurological: She is alert and oriented to person, place, and time.   Psychiatric: She has a normal mood and affect. Her behavior is normal.     General Musculoskeletal Exam   Gait: abnormal       Right Knee Exam   Right knee exam is normal.    Left Knee Exam     Inspection   Scars: present  Swelling: present    Tenderness   The patient tender to palpation of the medial joint line.    Range of Motion   Extension:  abnormal   Flexion:  abnormal     Tests   Stability   Lachman: normal (-1 to 2mm)   PCL-Posterior Drawer: abnormal  MCL - Valgus: normal (0 to 2mm)  LCL - Varus: normal (0 to 2mm)    Other   Sensation: normal    Muscle Strength   Left Lower Extremity   Hip Abduction: 5/5   Quadriceps:  5/5   Hamstrin/5     Vascular Exam       Left Pulses  Dorsalis Pedis:      2+  Posterior Tibial:      2+         Significant Labs: All pertinent labs within the past 24 hours have been reviewed.    Significant Imaging: I have reviewed all pertinent imaging results/findings.

## 2023-05-09 NOTE — PLAN OF CARE
Problem: Occupational Therapy  Goal: Occupational Therapy Goal  Description: ST.Pt will perform bathing with Mark with setup at EOB  2.Pt will perform UE dressing with Adrianna  3.Pt will perform LE dressing with Mark  4.Pt will transfer bed/chair/bsc with CGA with RW  5.Pt will perform standing task x 2 min with CGA with RW  6.Tolerate 15 min of tx without fatigue.      LTG:   Restore to max I with selfcare and mobility.      Outcome: Ongoing, Progressing

## 2023-05-09 NOTE — PLAN OF CARE
Sw consulted for dcp. Sw went to bedside to speak with pt. Pt post op and unable to provide information. Sw atttempted to speak with pt's mother rafael via telephone, to no avail. Sw to f/u.

## 2023-05-09 NOTE — H&P
[facility] - Orthopedic Periop Services  Orthopedics  H&P    Patient Name: Monica Walker  MRN: [id number]  Admission Date: 5/9/2023  Primary Care Provider: Hannah Schulz MD    Patient information was obtained from patient and ER records.     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       HPI: Chief complaint:  Status post explantation infected left knee replacement arthroplasty  History:   Monica Walker is a 52 y.o. female seen  for recheck of her left knee.  She became septic  seeding her lumbar spine, left shoulder and left knee hemiarthroplasty.Knee  components were removed and methylmethacrylate antibiotic spacer was implanted.  She completed 6 weeks IV antibiotic therapy.  She no longer has any pain in shoulder back or knee as she had before.  She underwent serologic testing for 04/20/2023 with a sed rate the upper limits of normal now at 30.  X-rays left knee today 04/26/2023 two views AP and lateral projection shows moderate generalized osteopenia.  Previously seen medial hemiarthroplasty components have been removed any radiopaque spacers he has been placed in the area of the medial compartment.  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  The potential benefits and risks of surgery outlined to include but not limited to bleeding infection, damage to blood vessels nerves, need for further surgery, other risks and complications including even death the patient wished to proceed.  We discussed overnight stay in the hospital.        Past Medical History:   Diagnosis Date    Abscess of right thigh + MRSA 08/20/2021    healed    Adnexal cyst     Degenerative disc disease     Depression 10/29/2021    Hypertension     Hypomagnesemia 1/20/2023    Nicotine  dependence     Osteoarthritis        Past Surgical History:   Procedure Laterality Date    BACK SURGERY      HYSTERECTOMY N/A     INCISION AND DRAINAGE Left 3/13/2023    Procedure: INCISION AND DRAINAGE;  Surgeon: Jacek Noyola MD;  Location: [facility] OR;  Service: Orthopedics;  Laterality: Left;    IRRIGATION AND DEBRIDEMENT OF LOWER EXTREMITY Left 11/22/2022    Procedure: IRRIGATION AND DEBRIDEMENT, LOWER EXTREMITY;  Surgeon: Papa Mendoza MD;  Location: [facility] OR;  Service: Orthopedics;  Laterality: Left;    IRRIGATION AND DEBRIDEMENT OF UPPER EXTREMITY Left 11/22/2022    Procedure: IRRIGATION AND DEBRIDEMENT, UPPER EXTREMITY;  Surgeon: Papa Mendoza MD;  Location: [facility] OR;  Service: Orthopedics;  Laterality: Left;    REVISION OF KNEE ARTHROPLASTY Left 3/13/2023    Procedure: REMOVAL OF PROSTHESIS,METHYLMETHACRYLATE WITH OR WITHOUT INSERTION OF SPACER LEFT KNEE;  Surgeon: Jacek Noyola MD;  Location: [facility] OR;  Service: Orthopedics;  Laterality: Left;    THORACIC LAMINECTOMY WITH FUSION N/A 1/24/2023    Procedure: T9-L2 fusion, T11-T12 laminectomy and corpectomy;  Surgeon: Jimmy Hernandes MD;  Location: [facility] OR;  Service: Neurosurgery;  Laterality: N/A;    TOTAL HIP ARTHROPLASTY Left     TOTAL KNEE ARTHROPLASTY Bilateral        Review of patient's allergies indicates:  No Known Allergies    No current facility-administered medications for this encounter.     Family History       Problem Relation (Age of Onset)    Diabetes Father    Hypertension Father          Tobacco Use    Smoking status: Every Day     Types: Cigarettes    Smokeless tobacco: Current     Types: Snuff   Substance and Sexual Activity    Alcohol use: Never    Drug use: Never    Sexual activity: Not on file     Review of Systems   Unable to perform ROS  Constitutional: Negative.   Objective:     Vital Signs (Most Recent):    Vital Signs (24h Range):              There is no  height or weight on file to calculate BMI.    No intake or output data in the 24 hours ending 23 0616     General    Vitals reviewed.  Constitutional: She is oriented to person, place, and time. She appears well-developed and well-nourished.   HENT:   Head: Normocephalic and atraumatic.   Eyes: EOM are normal. Pupils are equal, round, and reactive to light.   Neck: Neck supple.   Cardiovascular:  Normal rate, regular rhythm and normal heart sounds.            Pulmonary/Chest: Effort normal and breath sounds normal.   Abdominal: Soft. Bowel sounds are normal.   Neurological: She is alert and oriented to person, place, and time.   Psychiatric: She has a normal mood and affect. Her behavior is normal.     General Musculoskeletal Exam   Gait: abnormal       Right Knee Exam   Right knee exam is normal.    Left Knee Exam     Inspection   Scars: present  Swelling: present    Tenderness   The patient tender to palpation of the medial joint line.    Range of Motion   Extension:  abnormal   Flexion:  abnormal     Tests   Stability   Lachman: normal (-1 to 2mm)   PCL-Posterior Drawer: abnormal  MCL - Valgus: normal (0 to 2mm)  LCL - Varus: normal (0 to 2mm)    Other   Sensation: normal    Muscle Strength   Left Lower Extremity   Hip Abduction: 5/5   Quadriceps:  5/5   Hamstrin/5     Vascular Exam       Left Pulses  Dorsalis Pedis:      2+  Posterior Tibial:      2+         Significant Labs: All pertinent labs within the past 24 hours have been reviewed.    Significant Imaging: I have reviewed all pertinent imaging results/findings.    Assessment/Plan:     No notes have been filed under this hospital service.  Service: Orthopedic Surgery      Jacek Noyola MD  Orthopedics  [facility] - Orthopedic Periop Services

## 2023-05-09 NOTE — OP NOTE
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  After adequate level of general anesthesia been achieved (see anesthesia note) the patient's left lower extremity was prepped with Betadine draped sterile fashion.  Left lower extremity was elevated and exsanguinated with an elastic bandage.  Pneumatic tourniquet placed about the left upper thighs inflated to pressure of 300 mm of mercury.  The operation begun by making linear skin incision over the anteromedial aspect of the left knee in line with the previously healed surgical incision.  Incision was carried carefully through subcutaneous layers.  Skin flaps were developed.  The joint was entered through a medial arthrotomy incision.  Methylmethacrylate spacer was identified occupying the upper medial tibial plateau region.  The cement was removed without difficulty.  Thorough debridement of the knee was performed.  The knee was flexed.  Tibia was brought forward with PCL retractor.  The arch bar and tracker were pinned the superior surface of the tibia.  Computer navigation points were registered.  Tibial cut guide was engaged with the arch bar and pinned in place.  Proximal tibial resection was performed with the oscillating saw.  Intramedullary canal was entered with canal Finder.  Proximal tibial intramedullary canal was accessed.  Canal was hand reamed  sequentially to 13 mm diameter.  Reamer was left in place.  Tibial asymmetric cone preparation was then performed to size B. Reamer and preparation guide was removed.  The trial asymmetric cone was physician were good position, alignment stability were confirmed.  Trial component was removed.  Attention was turned to the femur.  Drill hole was made in the femoral notch.  The intramedullary canal was reamed sequentially to a final diameter of 15 mm to 220 mm length.  The distal femoral, chamfer cuts, anterior and posterior femoral cuts and posterior stabilized box cuts were made size 5 femoral component.  Trial components were then implanted and the knee was brought through good stable range of motion.  Trial components were removed.  The joint was irrigated with antibiotic solution using the jet lavage system.  Now the triathlon tritanium tibial asymmetrical porous-coated cone augment was implanted.  A cement spacer was positioned within the intramedullary canal of the tibia.  Methyl methacrylate bone cement was placed in the tibial canal with a cement gun.  The base plate component size 4coated with methylmethacrylate and implanted.  Next a size 5 [place] triathlon total stabilizer femoral component and 15 mm diameter diameter by 150 mm long stem was implanted on the femur with methylmethacrylate cutting the femoral component and not the stem.  Cement was allowed to harden excess was removed.  A triathlon X 3 tibial polyethylene insert size 4 an 11 mm thick tibial insert was implanted.  Tourniquet was let down hemostasis was achieved with Bovie electrocautery.  Wound was again irrigated with antibiotic solution.  Three Arthrex BioComposite suture anchors were placed in the area of the tibial tubercle for reinforcement of the patellar tendon attachment.  Dupuytren placed in the operating separate stab was in the skin.  Medial arthrotomy was approximated with interrupted 1. Vicryl suture.  The subcutaneous tissue was  approximated with 2-0 Vicryl suture.  Skin margins approximated stainless steel staples.  Wounds dressed sterilely and a knee immobilizer was applied.  Patient was awakened taken recovery room in good condition.  Estimated blood loss 150 cc.          Complications: No    Estimated Blood Loss (EBL): 150 mL           Implants:   Implant Name Type Inv. Item Serial No.  Lot No. LRB No. Used Action   GUIDEPIN 3.20MM 4 PAUL SQUARE - [id number] Screw GUIDEPIN 3.20MM 4 PAUL SQUARE  [facility] [id number] Left 1 Implanted and Explanted   GUIDE SCREW 3,20MM 6-PAUL / HEXAGONAL Screw   [facility] ([facility]) [id number] Left 1 Implanted and Explanted   CEMENT BONE ANTIBIO SIMPLEX P - [id number]  CEMENT BONE ANTIBIO SIMPLEX P  [employer] NING. [id number] Left 2 Implanted   TRIATHLON TRITANIUM TIBIAL ASYMMETRIC CONE AUGMENT     [id number] Left 1 Implanted   PLUG BONE - [id number]  PLUG BONE  [employer] NING. [id number] Left 1 Implanted   INSERT TRIATHLON TIB 11MM SZ4 - [id number]  INSERT TRIATHLON TIB 11MM SZ4  [employer]. [id number] Left 1 Implanted   STEM FEM PRESS FLUT TRI 15X150 - [id number]  STEM FEM PRESS FLUT TRI 15X150  [employer]ING. [id number] Left 1 Implanted   TRIATHLON TOTAL STABILIZER FEMORAL COMPONENT    [employer] ([facility]) [id number] Left 1 Implanted   BASEPLATE TRIATHLON TS SZ4 - [id number]  BASEPLATE TRIATHLON TS SZ4  [employer]ING. [id number] Left 1 Implanted   STEM KNEE TRITHLON MERRY 12X50 - [id number]  STEM KNEE TRITHLON MERRY 12X50  [employer] NING. [id number] Left 1 Implanted   ANCHOR CORKSCREW FT 4.43B00MN - [id number]  ANCHOR CORKSCREW FT 4.35N51WM  [employer] [id number] Left 1 Implanted   ANCHOR CORKSCREW FT 4.05N03TD - [id number]  ANCHOR CORKSCREW FT 4.15U21DS  [employer] [id number] Left 1 Implanted   ANCHOR BIOCOMP W/3 SUTURES - [id number]  ANCHOR BIOCOMP W/3 SUTURES  [employer] [id number] Left 1 Implanted       Specimens:   Specimen (24h ago, onward)      None                     Condition: Good    Disposition: PACU - hemodynamically stable.    Attestation: I was present and scrubbed for the entire procedure.

## 2023-05-09 NOTE — PT/OT/SLP EVAL
Physical Therapy Evaluation    Patient Name:  Monica Walker   MRN:  [id number]    Recommendations:     Discharge Recommendations: rehabilitation facility   Discharge Equipment Recommendations: none   Barriers to discharge: None    Assessment:     Monica Walker is a 52 y.o. female admitted with a medical diagnosis of History of removal of joint prosthesis of knee due to infection.  She presents with the following impairments/functional limitations: decreased ROM, gait instability, impaired functional mobility, pain Patient with some a little sedated from sx. Pain well controlled. Able to get up and ambulate in [place]. Order to maintain immobilization at this time. Will clarify to when rom can begin once able to contact orthopedic surgeon..    Rehab Prognosis: Good; patient would benefit from acute skilled PT services to address these deficits and reach maximum level of function.    Recent Surgery: Procedure(s) (LRB):  REVISION, ARTHROPLASTY, KNEE (Left) Day of Surgery    Plan:     During this hospitalization, patient to be seen BID to address the identified rehab impairments via gait training, therapeutic activities, therapeutic exercises and progress toward the following goals:    Plan of Care Expires:  06/09/23    Subjective     Chief Complaint: post op pain  Patient/Family Comments/goals: Patient wants to go to [facility]  Pain/Comfort:  Pain Rating 1: 2/10  Location - Side 1: Left  Pain Addressed 1: Cessation of Activity, Pre-medicate for activity    Patients cultural, spiritual, Uatsdin conflicts given the current situation: no    Living Environment:  Lives with mother  Prior to admission, patients level of function was independent.  Equipment used at home: 3-in-1 commode, walker, rolling.  DME owned (not currently used): rolling walker and bedside commode.  Upon discharge, patient will have assistance from mother.    Objective:     Communicated with nurse prior to session.  Patient found supine with     upon PT entry to room.    General Precautions: Standard, fall  Orthopedic Precautions:LLE weight bearing as tolerated   Braces: Knee immobilizer  Respiratory Status: Room air    Exams:  RLE ROM: WNL  RLE Strength: WNL  LLE ROM: knee immobilized  LLE Strength: 2/5    Functional Mobility:  Bed Mobility:     Supine to Sit: minimum assistance  Sit to Supine: minimum assistance  Transfers:     Sit to Stand:  minimum assistance with rolling walker  Gait: ambulated 60 feet with rolling walker step to gait      AM-PAC 6 CLICK MOBILITY  Total Score:18       Treatment & Education:  Tx plan    Patient left supine with call button in reach.    GOALS:   Multidisciplinary Problems       Physical Therapy Goals          Problem: Physical Therapy    Goal Priority Disciplines Outcome Goal Variances Interventions   Physical Therapy Goal     PT, PT/OT Ongoing, Progressing     Description: Short Term Goals  Independent with HEP  Independent with walkerx 100 feet FWB/WBAT: left lower extremity  0-100 knee flexion to allow normal sit to stand    Long term goals  Needed equipment for home.                            History:     Past Medical History:   Diagnosis Date    Abscess of right thigh + MRSA 08/20/2021    healed    Adnexal cyst     Degenerative disc disease     Depression 10/29/2021    Hypertension     Hypomagnesemia 1/20/2023    Nicotine dependence     Osteoarthritis        Past Surgical History:   Procedure Laterality Date    BACK SURGERY      HYSTERECTOMY N/A     INCISION AND DRAINAGE Left 3/13/2023    Procedure: INCISION AND DRAINAGE;  Surgeon: Jacek Noyola MD;  [facility]: [facility];  Service: Orthopedics;  Laterality: Left;    IRRIGATION AND DEBRIDEMENT OF LOWER EXTREMITY Left 11/22/2022    Procedure: IRRIGATION AND DEBRIDEMENT, LOWER EXTREMITY;  Surgeon: Papa Mendoza MD;  Location: [facility] OR;  Service: Orthopedics;  Laterality: Left;    IRRIGATION AND DEBRIDEMENT OF UPPER EXTREMITY Left  11/22/2022    Procedure: IRRIGATION AND DEBRIDEMENT, UPPER EXTREMITY;  Surgeon: Papa Mendoza MD;  Location: [facility] OR;  Service: Orthopedics;  Laterality: Left;    REVISION OF KNEE ARTHROPLASTY Left 3/13/2023    Procedure: REMOVAL OF PROSTHESIS,METHYLMETHACRYLATE WITH OR WITHOUT INSERTION OF SPACER LEFT KNEE;  Surgeon: Jacek Noyola MD;  Location: [facility] OR;  Service: Orthopedics;  Laterality: Left;    THORACIC LAMINECTOMY WITH FUSION N/A 1/24/2023    Procedure: T9-L2 fusion, T11-T12 laminectomy and corpectomy;  Surgeon: Jimmy Hernandes MD;  Location: [facility] OR;  Service: Neurosurgery;  Laterality: N/A;    TOTAL HIP ARTHROPLASTY Left     TOTAL KNEE ARTHROPLASTY Bilateral        Time Tracking:     PT Received On: 05/09/23  PT Start Time: 1345     PT Stop Time: 1400  PT Total Time (min): 15 min     Billable Minutes: Evaluation 15      05/09/2023   Stable

## 2023-05-09 NOTE — INTERVAL H&P NOTE
The patient has been examined and the H&P has been reviewed:    I concur with the findings and no changes have occurred since H&P was written.    Surgery risks, benefits and alternative options discussed and understood by patient/family.          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

## 2023-05-09 NOTE — PT/OT/SLP EVAL
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  She presents with s/p revision of left knee arthroplasty on 5/9/23. Performance deficits affecting function: impaired self care skills, impaired functional mobility, gait instability, impaired balance, pain.      Rehab Prognosis: Good; patient would benefit from acute skilled OT services to address these deficits and reach maximum level of function.       Plan:     Patient to be seen 5 x/week to address the above listed problems via self-care/home management, therapeutic activities, therapeutic exercises  Plan of Care Expires: 05/30/23  Plan of Care Reviewed with: patient    Subjective     Chief Complaint: s/p revision of left knee arthroplasty   Patient/Family Comments/goals: pt agreeable to OT eval    Occupational Profile:  Living Environment: pt lives with mother in one story home with no steps to enter  Previous level of function: independent with all ADL tasks and utilized RW for functional mobility   Roles and Routines: perform self care  Equipment Used at Home: bedside commode, walker, rolling, crutches, axillary  Assistance upon Discharge: swing bed    Pain/Comfort:  Pain Rating 1: 2/10  Location - Side 1: Left  Location 1: knee  Pain Addressed 1: Pre-medicate for activity    Patients cultural, spiritual, Zoroastrianism conflicts given the current situation: no    Objective:     Communicated with: HAYDEE Beal prior to session.  Patient found supine with blood pressure cuff,  oxygen, PICC line, pulse ox (continuous), SCD upon OT entry to room.    General Precautions: Standard, fall  Orthopedic Precautions: LLE weight bearing as tolerated  Braces: Knee immobilizer  Respiratory Status: Nasal cannula, flow 2 L/min    Occupational Performance:    Bed Mobility:    Patient completed Supine to Sit with minimum assistance  Patient completed Sit to Supine with minimum assistance    Functional Mobility/Transfers:  Patient completed Sit <> Stand Transfer with contact guard assistance  with  rolling walker   Functional Mobility: pt performed functional mobility into hallway and back to bed with RW with CGA    Activities of Daily Living:  Lower Body Dressing: maximal assistance to serenity socks    Cognitive/Visual Perceptual:  Cognitive/Psychosocial Skills:     -       Oriented to: Person, Place, Time, and Situation   -       Follows Commands/attention:Follows multistep  commands  -       Mood/Affect/Coping skills/emotional control: Cooperative    Physical Exam:  Balance:    -       WFL with RW  Upper Extremity Range of Motion:     -       Right Upper Extremity: WFL  -       Left Upper Extremity: WFL  Upper Extremity Strength:    -       Right Upper Extremity: WFL  -       Left Upper Extremity: WFL  Gross motor coordination:   WFL    AMPAC 6 Click ADL:  AMPAC Total Score: 21    Treatment & Education:  Pt educated on OT role/POC.   Importance of OOB activity with staff assistance.  Importance of sitting up in the chair throughout the day as tolerated, especially for meals   Safety during functional t/f and mobility with use of RW  Importance of assisting with self-care activities   All questions/concerns answered within OT scope of practice      Patient left supine with all lines intact, call button in reach, and HAYDEE Beal notified    GOALS:   Multidisciplinary Problems       Occupational Therapy Goals          Problem: Occupational Therapy    Goal Priority Disciplines Outcome Interventions   Occupational  Therapy Goal     OT, PT/OT Ongoing, Progressing    Description: ST.Pt will perform bathing with Mark with setup at EOB  2.Pt will perform UE dressing with Adrianna  3.Pt will perform LE dressing with Mark  4.Pt will transfer bed/chair/bsc with CGA with RW  5.Pt will perform standing task x 2 min with CGA with RW  6.Tolerate 15 min of tx without fatigue.      LTG:   Restore to max I with selfcare and mobility.                           History:     Past Medical History:   Diagnosis Date    Abscess of right thigh + MRSA 2021    healed    Adnexal cyst     Degenerative disc disease     Depression 10/29/2021    Hypertension     Hypomagnesemia 2023    Nicotine dependence     Osteoarthritis          Past Surgical History:   Procedure Laterality Date    BACK SURGERY      HYSTERECTOMY N/A     INCISION AND DRAINAGE Left 3/13/2023    Procedure: INCISION AND DRAINAGE;  Surgeon: Jacek Noyola MD;  Location: [facility] OR;  Service: Orthopedics;  Laterality: Left;    IRRIGATION AND DEBRIDEMENT OF LOWER EXTREMITY Left 2022    Procedure: IRRIGATION AND DEBRIDEMENT, LOWER EXTREMITY;  Surgeon: Papa Mendoza MD;  Location: [facility] OR;  Service: Orthopedics;  Laterality: Left;    IRRIGATION AND DEBRIDEMENT OF UPPER EXTREMITY Left 2022    Procedure: IRRIGATION AND DEBRIDEMENT, UPPER EXTREMITY;  Surgeon: Papa Mendoza MD;  Location: [facility] OR;  Service: Orthopedics;  Laterality: Left;    REVISION OF KNEE ARTHROPLASTY Left 3/13/2023    Procedure: REMOVAL OF PROSTHESIS,METHYLMETHACRYLATE WITH OR WITHOUT INSERTION OF SPACER LEFT KNEE;  Surgeon: Jacek Noyola MD;  Location: [facility] OR;  Service: Orthopedics;  Laterality: Left;    THORACIC LAMINECTOMY WITH FUSION N/A 2023    Procedure: T9-L2 fusion, T11-T12 laminectomy and corpectomy;  Surgeon: Jimmy Hernandes MD;  Location: [facility] OR;  Service: Neurosurgery;  Laterality: N/A;    TOTAL HIP ARTHROPLASTY Left      TOTAL KNEE ARTHROPLASTY Bilateral        Time Tracking:     OT Date of Treatment: 05/09/23  OT Start Time: 1347  OT Stop Time: 1410  OT Total Time (min): 23 min    Billable Minutes:Evaluation OT min complexity eval    5/9/2023

## 2023-05-10 ENCOUNTER — HOSPITAL ENCOUNTER (INPATIENT)
Facility: HOSPITAL | Age: 53
LOS: 11 days | Discharge: HOME HEALTH | DRG: 559 | End: 2023-05-21
Attending: FAMILY MEDICINE | Admitting: FAMILY MEDICINE
Payer: MEDICARE

## 2023-05-10 VITALS
RESPIRATION RATE: 17 BRPM | SYSTOLIC BLOOD PRESSURE: 144 MMHG | OXYGEN SATURATION: 100 % | BODY MASS INDEX: 25.71 KG/M2 | DIASTOLIC BLOOD PRESSURE: 75 MMHG | TEMPERATURE: 98 F | WEIGHT: 160 LBS | HEART RATE: 80 BPM | HEIGHT: 66 IN

## 2023-05-10 DIAGNOSIS — Z96.652 S/P TKR (TOTAL KNEE REPLACEMENT), LEFT: ICD-10-CM

## 2023-05-10 DIAGNOSIS — Z96.652 HISTORY OF TOTAL LEFT KNEE REPLACEMENT: Primary | ICD-10-CM

## 2023-05-10 DIAGNOSIS — R00.0 TACHYCARDIA: ICD-10-CM

## 2023-05-10 DIAGNOSIS — A49.02 MRSA (METHICILLIN RESISTANT STAPHYLOCOCCUS AUREUS): ICD-10-CM

## 2023-05-10 LAB
ALBUMIN SERPL BCP-MCNC: 2.5 G/DL (ref 3.5–5)
ALBUMIN/GLOB SERPL: 0.7 {RATIO}
ALP SERPL-CCNC: 87 U/L (ref 41–108)
ALT SERPL W P-5'-P-CCNC: 16 U/L (ref 13–56)
ANION GAP SERPL CALCULATED.3IONS-SCNC: 13 MMOL/L (ref 7–16)
ANION GAP SERPL CALCULATED.3IONS-SCNC: 14 MMOL/L (ref 7–16)
AST SERPL W P-5'-P-CCNC: 15 U/L (ref 15–37)
BACTERIA #/AREA URNS HPF: ABNORMAL /HPF
BASOPHILS # BLD AUTO: 0.02 K/UL (ref 0–0.2)
BASOPHILS # BLD AUTO: 0.05 K/UL (ref 0–0.2)
BASOPHILS NFR BLD AUTO: 0.3 % (ref 0–1)
BASOPHILS NFR BLD AUTO: 0.6 % (ref 0–1)
BILIRUB SERPL-MCNC: 0.5 MG/DL (ref ?–1.2)
BILIRUB UR QL STRIP: NEGATIVE
BUN SERPL-MCNC: 16 MG/DL (ref 7–18)
BUN SERPL-MCNC: 22 MG/DL (ref 7–18)
BUN/CREAT SERPL: 14 (ref 6–20)
BUN/CREAT SERPL: 19 (ref 6–20)
CALCIUM SERPL-MCNC: 7.8 MG/DL (ref 8.5–10.1)
CALCIUM SERPL-MCNC: 9 MG/DL (ref 8.5–10.1)
CHLORIDE SERPL-SCNC: 101 MMOL/L (ref 98–107)
CHLORIDE SERPL-SCNC: 99 MMOL/L (ref 98–107)
CLARITY UR: CLEAR
CO2 SERPL-SCNC: 25 MMOL/L (ref 21–32)
CO2 SERPL-SCNC: 26 MMOL/L (ref 21–32)
COLOR UR: YELLOW
CREAT SERPL-MCNC: 1.14 MG/DL (ref 0.55–1.02)
CREAT SERPL-MCNC: 1.14 MG/DL (ref 0.55–1.02)
DIFFERENTIAL METHOD BLD: ABNORMAL
DIFFERENTIAL METHOD BLD: ABNORMAL
EGFR (NO RACE VARIABLE) (RUSH/TITUS): 57 ML/MIN/1.73M2
EGFR (NO RACE VARIABLE) (RUSH/TITUS): 57 ML/MIN/1.73M2
EOSINOPHIL # BLD AUTO: 0.05 K/UL (ref 0–0.5)
EOSINOPHIL # BLD AUTO: 0.26 K/UL (ref 0–0.5)
EOSINOPHIL NFR BLD AUTO: 0.6 % (ref 1–4)
EOSINOPHIL NFR BLD AUTO: 4 % (ref 1–4)
ERYTHROCYTE [DISTWIDTH] IN BLOOD BY AUTOMATED COUNT: 17 % (ref 11.5–14.5)
ERYTHROCYTE [DISTWIDTH] IN BLOOD BY AUTOMATED COUNT: 17.2 % (ref 11.5–14.5)
GLOBULIN SER-MCNC: 3.7 G/DL (ref 2–4)
GLUCOSE SERPL-MCNC: 263 MG/DL (ref 74–106)
GLUCOSE SERPL-MCNC: 314 MG/DL (ref 70–105)
GLUCOSE SERPL-MCNC: 344 MG/DL (ref 70–105)
GLUCOSE SERPL-MCNC: 355 MG/DL (ref 74–106)
GLUCOSE UR STRIP-MCNC: >=1000 MG/DL
HCO3 UR-SCNC: 25.1 MMOL/L (ref 21–28)
HCT VFR BLD AUTO: 33.5 % (ref 38–47)
HCT VFR BLD AUTO: 34 % (ref 38–47)
HGB BLD-MCNC: 10.5 G/DL (ref 12–16)
HGB BLD-MCNC: 10.6 G/DL (ref 12–16)
IMM GRANULOCYTES # BLD AUTO: 0.01 K/UL (ref 0–0.04)
IMM GRANULOCYTES # BLD AUTO: 0.02 K/UL (ref 0–0.04)
IMM GRANULOCYTES NFR BLD: 0.2 % (ref 0–0.4)
IMM GRANULOCYTES NFR BLD: 0.3 % (ref 0–0.4)
KETONES UR STRIP-SCNC: NEGATIVE MG/DL
LACTATE SERPL-SCNC: 4.2 MMOL/L (ref 0.4–2)
LEUKOCYTE ESTERASE UR QL STRIP: NEGATIVE
LYMPHOCYTES # BLD AUTO: 0.64 K/UL (ref 1–4.8)
LYMPHOCYTES # BLD AUTO: 0.72 K/UL (ref 1–4.8)
LYMPHOCYTES NFR BLD AUTO: 9.2 % (ref 27–41)
LYMPHOCYTES NFR BLD AUTO: 9.9 % (ref 27–41)
MCH RBC QN AUTO: 26.6 PG (ref 27–31)
MCH RBC QN AUTO: 26.6 PG (ref 27–31)
MCHC RBC AUTO-ENTMCNC: 30.9 G/DL (ref 32–36)
MCHC RBC AUTO-ENTMCNC: 31.6 G/DL (ref 32–36)
MCV RBC AUTO: 84.2 FL (ref 80–96)
MCV RBC AUTO: 86.1 FL (ref 80–96)
MONOCYTES # BLD AUTO: 0.62 K/UL (ref 0–0.8)
MONOCYTES # BLD AUTO: 0.68 K/UL (ref 0–0.8)
MONOCYTES NFR BLD AUTO: 8.7 % (ref 2–6)
MONOCYTES NFR BLD AUTO: 9.6 % (ref 2–6)
MPC BLD CALC-MCNC: 10.5 FL (ref 9.4–12.4)
MPC BLD CALC-MCNC: 10.8 FL (ref 9.4–12.4)
NEUTROPHILS # BLD AUTO: 4.94 K/UL (ref 1.8–7.7)
NEUTROPHILS # BLD AUTO: 6.3 K/UL (ref 1.8–7.7)
NEUTROPHILS NFR BLD AUTO: 76 % (ref 53–65)
NEUTROPHILS NFR BLD AUTO: 80.6 % (ref 53–65)
NITRITE UR QL STRIP: NEGATIVE
NRBC # BLD AUTO: 0 X10E3/UL
NRBC, AUTO (.00): 0 %
NT-PROBNP SERPL-MCNC: 168 PG/ML (ref 1–125)
PCO2 BLDA: 33 MMHG (ref 35–48)
PH SMN: 7.49 [PH] (ref 7.35–7.45)
PH UR STRIP: 5.5 PH UNITS
PLATELET # BLD AUTO: 140 K/UL (ref 150–400)
PLATELET # BLD AUTO: 149 K/UL (ref 150–400)
PO2 BLDA: 63 MMHG (ref 83–108)
POC BASE EXCESS: 2.1 MMOL/L (ref -2–3)
POC SATURATED O2: 94 %
POTASSIUM SERPL-SCNC: 3.9 MMOL/L (ref 3.5–5.1)
POTASSIUM SERPL-SCNC: 4 MMOL/L (ref 3.5–5.1)
PROT SERPL-MCNC: 6.2 G/DL (ref 6.4–8.2)
PROT UR QL STRIP: NEGATIVE
RBC # BLD AUTO: 3.95 M/UL (ref 4.2–5.4)
RBC # BLD AUTO: 3.98 M/UL (ref 4.2–5.4)
RBC # UR STRIP: ABNORMAL /UL
RBC #/AREA URNS HPF: ABNORMAL /HPF
SODIUM SERPL-SCNC: 134 MMOL/L (ref 136–145)
SODIUM SERPL-SCNC: 136 MMOL/L (ref 136–145)
SP GR UR STRIP: 1.02
SQUAMOUS #/AREA URNS LPF: ABNORMAL /LPF
TROPONIN I SERPL HS-MCNC: 10.2 PG/ML
TROPONIN I SERPL HS-MCNC: 8 PG/ML
UROBILINOGEN UR STRIP-ACNC: 0.2 MG/DL
WBC # BLD AUTO: 6.49 K/UL (ref 4.5–11)
WBC # BLD AUTO: 7.82 K/UL (ref 4.5–11)
WBC #/AREA URNS HPF: ABNORMAL /HPF
YEAST #/AREA URNS HPF: ABNORMAL /HPF

## 2023-05-10 PROCEDURE — 36600 WITHDRAWAL OF ARTERIAL BLOOD: CPT

## 2023-05-10 PROCEDURE — 81001 URINALYSIS AUTO W/SCOPE: CPT | Performed by: FAMILY MEDICINE

## 2023-05-10 PROCEDURE — 87077 CULTURE AEROBIC IDENTIFY: CPT | Performed by: EMERGENCY MEDICINE

## 2023-05-10 PROCEDURE — 80053 COMPREHEN METABOLIC PANEL: CPT | Performed by: ORTHOPAEDIC SURGERY

## 2023-05-10 PROCEDURE — 25000003 PHARM REV CODE 250: Performed by: EMERGENCY MEDICINE

## 2023-05-10 PROCEDURE — 99233 SBSQ HOSP IP/OBS HIGH 50: CPT | Mod: ,,, | Performed by: HOSPITALIST

## 2023-05-10 PROCEDURE — 85025 COMPLETE CBC W/AUTO DIFF WBC: CPT | Performed by: FAMILY MEDICINE

## 2023-05-10 PROCEDURE — 25000003 PHARM REV CODE 250: Performed by: FAMILY MEDICINE

## 2023-05-10 PROCEDURE — 11000004 HC SNF PRIVATE

## 2023-05-10 PROCEDURE — 82803 BLOOD GASES ANY COMBINATION: CPT

## 2023-05-10 PROCEDURE — 27000221 HC OXYGEN, UP TO 24 HOURS

## 2023-05-10 PROCEDURE — 82962 GLUCOSE BLOOD TEST: CPT

## 2023-05-10 PROCEDURE — 97530 THERAPEUTIC ACTIVITIES: CPT | Mod: CQ

## 2023-05-10 PROCEDURE — 25000003 PHARM REV CODE 250: Performed by: ORTHOPAEDIC SURGERY

## 2023-05-10 PROCEDURE — 99900035 HC TECH TIME PER 15 MIN (STAT)

## 2023-05-10 PROCEDURE — 99233 PR SUBSEQUENT HOSPITAL CARE,LEVL III: ICD-10-PCS | Mod: ,,, | Performed by: HOSPITALIST

## 2023-05-10 PROCEDURE — 83605 ASSAY OF LACTIC ACID: CPT | Performed by: EMERGENCY MEDICINE

## 2023-05-10 PROCEDURE — 94761 N-INVAS EAR/PLS OXIMETRY MLT: CPT

## 2023-05-10 PROCEDURE — 87186 SC STD MICRODIL/AGAR DIL: CPT | Performed by: EMERGENCY MEDICINE

## 2023-05-10 PROCEDURE — 83880 ASSAY OF NATRIURETIC PEPTIDE: CPT | Performed by: EMERGENCY MEDICINE

## 2023-05-10 PROCEDURE — 63600175 PHARM REV CODE 636 W HCPCS: Performed by: EMERGENCY MEDICINE

## 2023-05-10 PROCEDURE — 97110 THERAPEUTIC EXERCISES: CPT | Mod: CQ

## 2023-05-10 PROCEDURE — 87149 DNA/RNA DIRECT PROBE: CPT | Performed by: EMERGENCY MEDICINE

## 2023-05-10 PROCEDURE — 84484 ASSAY OF TROPONIN QUANT: CPT | Performed by: EMERGENCY MEDICINE

## 2023-05-10 PROCEDURE — 93010 EKG 12-LEAD: ICD-10-PCS | Mod: ,,, | Performed by: INTERNAL MEDICINE

## 2023-05-10 PROCEDURE — 80048 BASIC METABOLIC PNL TOTAL CA: CPT | Mod: XB | Performed by: FAMILY MEDICINE

## 2023-05-10 PROCEDURE — 93010 ELECTROCARDIOGRAM REPORT: CPT | Mod: ,,, | Performed by: INTERNAL MEDICINE

## 2023-05-10 PROCEDURE — 63600175 PHARM REV CODE 636 W HCPCS: Performed by: FAMILY MEDICINE

## 2023-05-10 PROCEDURE — 93005 ELECTROCARDIOGRAM TRACING: CPT

## 2023-05-10 PROCEDURE — 85025 COMPLETE CBC W/AUTO DIFF WBC: CPT | Performed by: ORTHOPAEDIC SURGERY

## 2023-05-10 PROCEDURE — 63600175 PHARM REV CODE 636 W HCPCS: Performed by: ORTHOPAEDIC SURGERY

## 2023-05-10 RX ORDER — HYDROCODONE BITARTRATE AND ACETAMINOPHEN 10; 325 MG/1; MG/1
1 TABLET ORAL EVERY 4 HOURS PRN
Status: DISCONTINUED | OUTPATIENT
Start: 2023-05-10 | End: 2023-05-21 | Stop reason: HOSPADM

## 2023-05-10 RX ORDER — GLUCAGON 1 MG
1 KIT INJECTION
Status: DISCONTINUED | OUTPATIENT
Start: 2023-05-10 | End: 2023-05-21 | Stop reason: HOSPADM

## 2023-05-10 RX ORDER — INSULIN ASPART 100 [IU]/ML
5 INJECTION, SOLUTION INTRAVENOUS; SUBCUTANEOUS
Status: DISCONTINUED | OUTPATIENT
Start: 2023-05-11 | End: 2023-05-21 | Stop reason: HOSPADM

## 2023-05-10 RX ORDER — FAMOTIDINE 20 MG/1
20 TABLET, FILM COATED ORAL 2 TIMES DAILY
Status: DISCONTINUED | OUTPATIENT
Start: 2023-05-10 | End: 2023-05-21 | Stop reason: HOSPADM

## 2023-05-10 RX ORDER — DULOXETIN HYDROCHLORIDE 30 MG/1
60 CAPSULE, DELAYED RELEASE ORAL DAILY
Status: DISCONTINUED | OUTPATIENT
Start: 2023-05-11 | End: 2023-05-21 | Stop reason: HOSPADM

## 2023-05-10 RX ORDER — CALCIUM CARBONATE 200(500)MG
500 TABLET,CHEWABLE ORAL 2 TIMES DAILY PRN
Status: DISCONTINUED | OUTPATIENT
Start: 2023-05-10 | End: 2023-05-21 | Stop reason: HOSPADM

## 2023-05-10 RX ORDER — ACETAMINOPHEN 325 MG/1
650 TABLET ORAL EVERY 6 HOURS PRN
Status: DISCONTINUED | OUTPATIENT
Start: 2023-05-10 | End: 2023-05-21 | Stop reason: HOSPADM

## 2023-05-10 RX ORDER — DEXTROSE 40 %
15 GEL (GRAM) ORAL
Status: DISCONTINUED | OUTPATIENT
Start: 2023-05-10 | End: 2023-05-21 | Stop reason: HOSPADM

## 2023-05-10 RX ORDER — TALC
6 POWDER (GRAM) TOPICAL NIGHTLY PRN
Status: DISCONTINUED | OUTPATIENT
Start: 2023-05-10 | End: 2023-05-21 | Stop reason: HOSPADM

## 2023-05-10 RX ORDER — ONDANSETRON 2 MG/ML
4 INJECTION INTRAMUSCULAR; INTRAVENOUS EVERY 6 HOURS PRN
Status: DISCONTINUED | OUTPATIENT
Start: 2023-05-10 | End: 2023-05-21 | Stop reason: HOSPADM

## 2023-05-10 RX ORDER — ONDANSETRON 2 MG/ML
4 INJECTION INTRAMUSCULAR; INTRAVENOUS EVERY 6 HOURS PRN
Status: DISCONTINUED | OUTPATIENT
Start: 2023-05-10 | End: 2023-05-10

## 2023-05-10 RX ORDER — MUPIROCIN 20 MG/G
OINTMENT TOPICAL 2 TIMES DAILY
Status: DISPENSED | OUTPATIENT
Start: 2023-05-10 | End: 2023-05-15

## 2023-05-10 RX ORDER — HEPARIN SODIUM (PORCINE) LOCK FLUSH IV SOLN 100 UNIT/ML 100 UNIT/ML
100 SOLUTION INTRAVENOUS
Status: DISCONTINUED | OUTPATIENT
Start: 2023-05-10 | End: 2023-05-11

## 2023-05-10 RX ORDER — AMOXICILLIN 250 MG
1 CAPSULE ORAL 2 TIMES DAILY
Status: DISCONTINUED | OUTPATIENT
Start: 2023-05-10 | End: 2023-05-21 | Stop reason: HOSPADM

## 2023-05-10 RX ORDER — LACTOBACILLUS ACIDOPHILUS 500MM CELL
1 CAPSULE ORAL
Status: DISCONTINUED | OUTPATIENT
Start: 2023-05-11 | End: 2023-05-21 | Stop reason: HOSPADM

## 2023-05-10 RX ORDER — HYDROCODONE BITARTRATE AND ACETAMINOPHEN 10; 325 MG/1; MG/1
1 TABLET ORAL EVERY 4 HOURS PRN
Qty: 30 TABLET | Refills: 0 | Status: SHIPPED | OUTPATIENT
Start: 2023-05-10 | End: 2023-05-24

## 2023-05-10 RX ORDER — INSULIN ASPART 100 [IU]/ML
1-10 INJECTION, SOLUTION INTRAVENOUS; SUBCUTANEOUS
Status: DISCONTINUED | OUTPATIENT
Start: 2023-05-10 | End: 2023-05-21 | Stop reason: HOSPADM

## 2023-05-10 RX ORDER — ACETAMINOPHEN 500 MG
1000 TABLET ORAL ONCE
Status: COMPLETED | OUTPATIENT
Start: 2023-05-10 | End: 2023-05-10

## 2023-05-10 RX ORDER — DEXTROSE 40 %
30 GEL (GRAM) ORAL
Status: DISCONTINUED | OUTPATIENT
Start: 2023-05-10 | End: 2023-05-21 | Stop reason: HOSPADM

## 2023-05-10 RX ORDER — MAG HYDROX/ALUMINUM HYD/SIMETH 200-200-20
30 SUSPENSION, ORAL (FINAL DOSE FORM) ORAL ONCE
Status: COMPLETED | OUTPATIENT
Start: 2023-05-10 | End: 2023-05-10

## 2023-05-10 RX ORDER — ONDANSETRON 4 MG/1
4 TABLET, ORALLY DISINTEGRATING ORAL EVERY 6 HOURS PRN
Status: DISCONTINUED | OUTPATIENT
Start: 2023-05-10 | End: 2023-05-21 | Stop reason: HOSPADM

## 2023-05-10 RX ADMIN — APIXABAN 2.5 MG: 2.5 TABLET, FILM COATED ORAL at 09:05

## 2023-05-10 RX ADMIN — FAMOTIDINE 20 MG: 20 TABLET, FILM COATED ORAL at 08:05

## 2023-05-10 RX ADMIN — ONDANSETRON 4 MG: 4 TABLET, ORALLY DISINTEGRATING ORAL at 06:05

## 2023-05-10 RX ADMIN — MORPHINE SULFATE 4 MG: 4 INJECTION, SOLUTION INTRAMUSCULAR; INTRAVENOUS at 12:05

## 2023-05-10 RX ADMIN — HYDROCODONE BITARTRATE AND ACETAMINOPHEN 1 TABLET: 10; 325 TABLET ORAL at 09:05

## 2023-05-10 RX ADMIN — MUPIROCIN 1 G: 20 OINTMENT TOPICAL at 08:05

## 2023-05-10 RX ADMIN — SODIUM CHLORIDE 1000 ML: 9 INJECTION, SOLUTION INTRAVENOUS at 10:05

## 2023-05-10 RX ADMIN — INSULIN ASPART 8 UNITS: 100 INJECTION, SOLUTION INTRAVENOUS; SUBCUTANEOUS at 09:05

## 2023-05-10 RX ADMIN — ALTEPLASE 2 MG: 2.2 INJECTION, POWDER, LYOPHILIZED, FOR SOLUTION INTRAVENOUS at 08:05

## 2023-05-10 RX ADMIN — HYDROCODONE BITARTRATE AND ACETAMINOPHEN 1 TABLET: 10; 325 TABLET ORAL at 11:05

## 2023-05-10 RX ADMIN — ACETAMINOPHEN 1000 MG: 500 TABLET ORAL at 10:05

## 2023-05-10 RX ADMIN — HYDROCODONE BITARTRATE AND ACETAMINOPHEN 1 TABLET: 10; 325 TABLET ORAL at 06:05

## 2023-05-10 RX ADMIN — APIXABAN 2.5 MG: 2.5 TABLET, FILM COATED ORAL at 08:05

## 2023-05-10 RX ADMIN — HYDROCODONE BITARTRATE AND ACETAMINOPHEN 1 TABLET: 10; 325 TABLET ORAL at 02:05

## 2023-05-10 RX ADMIN — SENNOSIDES AND DOCUSATE SODIUM 1 TABLET: 50; 8.6 TABLET ORAL at 08:05

## 2023-05-10 RX ADMIN — ONDANSETRON HYDROCHLORIDE 4 MG: 2 INJECTION, SOLUTION INTRAMUSCULAR; INTRAVENOUS at 10:05

## 2023-05-10 RX ADMIN — ALUMINUM HYDROXIDE, MAGNESIUM HYDROXIDE, AND SIMETHICONE 30 ML: 200; 200; 20 SUSPENSION ORAL at 08:05

## 2023-05-10 RX ADMIN — PIPERACILLIN SODIUM AND TAZOBACTAM SODIUM 4.5 G: 4; .5 INJECTION, POWDER, LYOPHILIZED, FOR SOLUTION INTRAVENOUS at 10:05

## 2023-05-10 NOTE — CONSULTS
[facility]  Hospital Medicine  Consult Note    Patient Name: Monica Walker  MRN: [id number]  Admission Date: 5/9/2023  Hospital Length of Stay: 0 days  Attending Physician: Jacek Noyola MD   Primary Care Provider: Hannah Schulz MD           Patient information was obtained from patient, past medical records and ER records.     Consults  Subjective:     Principal Problem: Infection of prosthetic left knee joint    Chief Complaint:   Chief Complaint   Patient presents with    Knee Pain        HPI: Ms. Monica Walker is a 51yo woman history of DM2, lung abscess, epidural abscess, MRSA bacteremia, septic knee joint, tobacco use, depression, HTN, CKD who presents for worsening knee pain.  She is status post removal of joint prosthesis due to infection.  She states that she has pain and requests additional pain medication.  She is not opening her eyes and is not very interactive during my interview with her.  Her niece is at bedside but she states that she does not know the patient's medical history very well.  She also states that she is not very close to the family.      It was noted that she had a fever tonight.  It is also noted that she has a history of MRSA infection.        Past Medical History:   Diagnosis Date    Abscess of right thigh + MRSA 08/20/2021    healed    Adnexal cyst     Degenerative disc disease     Depression 10/29/2021    Diabetes mellitus     Hypertension     Hypomagnesemia 01/20/2023    Nicotine dependence     Osteoarthritis        Past Surgical History:   Procedure Laterality Date    BACK SURGERY      HYSTERECTOMY N/A     INCISION AND DRAINAGE Left 3/13/2023    Procedure: INCISION AND DRAINAGE;  Surgeon: Jacek Noyola MD;  Location: [facility];  Service: Orthopedics;  Laterality: Left;    IRRIGATION AND DEBRIDEMENT OF LOWER EXTREMITY Left 11/22/2022    Procedure: IRRIGATION AND DEBRIDEMENT, LOWER EXTREMITY;  Surgeon: Papa  "PUSHPA Mendoza MD;  Location: [facility] OR;  Service: Orthopedics;  Laterality: Left;    IRRIGATION AND DEBRIDEMENT OF UPPER EXTREMITY Left 11/22/2022    Procedure: IRRIGATION AND DEBRIDEMENT, UPPER EXTREMITY;  Surgeon: Papa Mendoza MD;  Location: [facility] OR;  Service: Orthopedics;  Laterality: Left;    REVISION OF KNEE ARTHROPLASTY Left 3/13/2023    Procedure: REMOVAL OF PROSTHESIS,METHYLMETHACRYLATE WITH OR WITHOUT INSERTION OF SPACER LEFT KNEE;  Surgeon: Jacek Noyola MD;  Location: [facility] OR;  Service: Orthopedics;  Laterality: Left;    THORACIC LAMINECTOMY WITH FUSION N/A 1/24/2023    Procedure: T9-L2 fusion, T11-T12 laminectomy and corpectomy;  Surgeon: Jimmy Hernandes MD;  Location: [facility] OR;  Service: Neurosurgery;  Laterality: N/A;    TOTAL HIP ARTHROPLASTY Left     TOTAL KNEE ARTHROPLASTY Bilateral        Review of patient's allergies indicates:  No Known Allergies    No current facility-administered medications on file prior to encounter.     Current Outpatient Medications on File Prior to Encounter   Medication Sig    DULoxetine (CYMBALTA) 60 MG capsule Take 1 capsule (60 mg total) by mouth once daily.    BD ULTRA-FINE SHORT PEN NEEDLE 31 gauge x 5/16" Ndle     insulin (LANTUS SOLOSTAR U-100 INSULIN) glargine 100 units/mL SubQ pen Inject 30 Units into the skin once daily. (Patient taking differently: Inject 35 Units into the skin every evening.)    Lactobacillus acidophilus 500 million cell Cap Take 1 capsule by mouth 3 (three) times daily with meals. (Patient not taking: Reported on 4/28/2023)    NOVOLOG FLEXPEN U-100 INSULIN 100 unit/mL (3 mL) InPn pen Inject 5 Units into the skin 3 (three) times daily with meals.    ondansetron 4 mg/2 mL Soln Inject 4 mg into the vein every 8 (eight) hours as needed. (Patient not taking: Reported on 4/28/2023)    promethazine (PHENERGAN) 25 MG tablet Take 1 tablet (25 mg total) by mouth every 6 (six) hours as needed. " (Patient not taking: Reported on 4/28/2023)    [DISCONTINUED] gabapentin (NEURONTIN) 300 MG capsule Take 300 mg by mouth 2 (two) times a day.    [DISCONTINUED] hydrALAZINE (APRESOLINE) 25 MG tablet Take 1 tablet (25 mg total) by mouth every 12 (twelve) hours.    [DISCONTINUED] HYDROcodone-acetaminophen (NORCO) 5-325 mg per tablet Take 1 tablet by mouth every 6 (six) hours as needed for Pain.    [DISCONTINUED] losartan (COZAAR) 25 MG tablet Take 1 tablet (25 mg total) by mouth once daily.     Family History       Problem Relation (Age of Onset)    Diabetes Father    Hypertension Father          Tobacco Use    Smoking status: Every Day     Packs/day: 0.50     Types: Cigarettes    Smokeless tobacco: Current     Types: Snuff   Substance and Sexual Activity    Alcohol use: Never    Drug use: Never    Sexual activity: Not on file     Review of Systems   Constitutional:  Positive for fatigue and fever. Negative for activity change and chills.   HENT:  Negative for congestion and sore throat.    Respiratory:  Negative for chest tightness.    Gastrointestinal:  Negative for abdominal distention, abdominal pain and diarrhea.   Endocrine: Negative for cold intolerance and heat intolerance.   Genitourinary:  Negative for dysuria.   Musculoskeletal:  Positive for joint swelling. Negative for arthralgias and back pain.   Skin:  Negative for color change and rash.   Neurological:  Negative for dizziness, tremors and headaches.   Psychiatric/Behavioral:  Negative for agitation. The patient is not nervous/anxious.    Objective:     Vital Signs (Most Recent):  Temp: (!) 101 °F (38.3 °C) (05/09/23 2013)  Pulse: (!) 114 (05/09/23 2013)  Resp: 15 (05/09/23 2022)  BP: (!) 144/69 (05/09/23 2013)  SpO2: (!) 93 % (05/09/23 2013) Vital Signs (24h Range):  Temp:  [98.3 °F (36.8 °C)-101 °F (38.3 °C)] 101 °F (38.3 °C)  Pulse:  [] 114  Resp:  [13-20] 15  SpO2:  [93 %-100 %] 93 %  BP: (102-144)/(53-74) 144/69     Weight: 72.6 kg  (160 lb)  Body mass index is 25.82 kg/m².     Physical Exam  Constitutional:       Appearance: She is ill-appearing.   HENT:      Head: Normocephalic and atraumatic.      Nose: Nose normal.      Mouth/Throat:      Mouth: Mucous membranes are moist.      Pharynx: Oropharynx is clear.   Eyes:      Extraocular Movements: Extraocular movements intact.      Conjunctiva/sclera: Conjunctivae normal.      Pupils: Pupils are equal, round, and reactive to light.   Cardiovascular:      Rate and Rhythm: Normal rate and regular rhythm.      Pulses: Normal pulses.      Heart sounds: Normal heart sounds.   Pulmonary:      Effort: Pulmonary effort is normal.      Breath sounds: Normal breath sounds.   Abdominal:      General: Abdomen is flat. Bowel sounds are normal.      Palpations: Abdomen is soft.   Musculoskeletal:      Cervical back: Normal range of motion and neck supple.      Comments: Post-surgical changes in left knee.    Skin:     General: Skin is warm and dry.   Neurological:      General: No focal deficit present.      Mental Status: She is alert and oriented to person, place, and time.   Psychiatric:         Mood and Affect: Mood normal.         Behavior: Behavior normal.        Significant Labs: All pertinent labs within the past 24 hours have been reviewed.    Significant Imaging: I have reviewed all pertinent imaging results/findings within the past 24 hours.    Assessment/Plan:     * Infection of prosthetic left knee joint  Prior MRSA infection of left knee joint.  Patient with 101 fever reported today.      Vancomycin and Ancef for antibiotics.  Two prior echos have been negative for vegetation.  Recommend consulting Infectious Disease and will attempt to contact them at [facility] for transfer or consult.      Antibiotics (From admission, onward)    Start     Stop Route Frequency Ordered    05/09/23 2030  vancomycin (VANCOCIN) 1,000 mg in dextrose 5 % (D5W) 250 mL IVPB         05/10 0829 IV Every 12 hours (non-standard  times) 05/09/23 1259    05/09/23 1300  ceFAZolin 2 g in dextrose 5 % in water (D5W) 5 % 50 mL IVPB (MB+)         05/10 0459 IV Every 8 hours (non-standard times) 05/09/23 1259              Tobacco use  Patient counseled extensively on tobacco use.  Nicotine patch offered.      History of multiple infections present  Patient with history of multiple infection including lung abscess, epidural abscess, bacteremia and septic joint.  Patient would benefit from referral to Infectious Disease and/or transfer.      Primary hypertension  Will only add antihypertensives based on patient's clinical course.        Staphylococcal arthritis of left knee        DM2 (diabetes mellitus, type 2)  Patient's FSGs are uncontrolled due to hyperglycemia on current medication regimen.  Last A1c reviewed-   Lab Results   Component Value Date    HGBA1C 7.9 (H) 01/19/2023     Most recent fingerstick glucose reviewed- No results for input(s): POCTGLUCOSE in the last 24 hours.  Current correctional scale  Medium  Maintain anti-hyperglycemic dose as follows-   Antihyperglycemics (From admission, onward)    None        Hold Oral hypoglycemics while patient is in the hospital.    Depression  Patient has recurrent depression which is mild and is currently controlled. Will Continue anti-depressant medications. We will not consult psychiatry at this time. Patient does not display psychosis at this time. Continue to monitor closely and adjust plan of care as needed.          VTE Risk Mitigation (From admission, onward)         Ordered     apixaban tablet 2.5 mg  2 times daily         05/09/23 1259     IP VTE HIGH RISK PATIENT  Once         05/09/23 1259     Place DONOVAN hose  Until discontinued         05/09/23 1259                    Thank you for your consult. I will follow-up with patient. Please contact us if you have any additional questions.    Fermin Parson MD  Department of Hospital Medicine   [facility]

## 2023-05-10 NOTE — ASSESSMENT & PLAN NOTE
Patient with history of multiple infection including lung abscess, epidural abscess, bacteremia and septic joint.  Patient would benefit from referral to Infectious Disease and/or transfer.

## 2023-05-10 NOTE — DISCHARGE SUMMARY
[facility]  Orthopedics  Discharge Summary      Patient Name: Monica Walker  MRN: [id number]  Admission Date: 5/9/2023  Hospital Length of Stay: 1 days  Discharge Date and Time:  05/10/2023 7:35 AM  Attending Physician: Jacek Noyola MD   Discharging Provider: NATALIA Ward  Primary Care Provider: Hannah Schulz MD    HPI:   Chief complaint:  Status post explantation infected left knee replacement arthroplasty  History:   Monica Walker is a 52 y.o. female seen  for recheck of her left knee.  She became septic  seeding her lumbar spine, left shoulder and left knee hemiarthroplasty.Knee  components were removed and methylmethacrylate antibiotic spacer was implanted.  She completed 6 weeks IV antibiotic therapy.  She no longer has any pain in shoulder back or knee as she had before.  She underwent serologic testing for 04/20/2023 with a sed rate the upper limits of normal now at 30.  X-rays left knee today 04/26/2023 two views AP and lateral projection shows moderate generalized osteopenia.  Previously seen medial hemiarthroplasty components have been removed any radiopaque spacers he has been placed in the area of the medial compartment.  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  The potential benefits and risks of surgery outlined to include but not limited to bleeding infection, damage to blood vessels nerves, need for further surgery, other risks and complications including even death the patient wished to proceed.  We discussed overnight stay in the hospital.        Procedure(s) (LRB):  REVISION, ARTHROPLASTY, KNEE (Left)      Hospital Course:  Computer-aided total knee replacement arthroplasty under general anesthesia regional block.  Hospital course was essentially uncomplicated.  T-max during hospital course was 101° F.  She was  treated prophylactically with IV antibiotic therapy include cefazolin and vancomycin.  She would gradual improvement is able to ambulate 60 ft with the aid of a rolling walker.  Drain had total output of 10 mL.  Laboratory data at the time of this dictation showed hemoglobin 11.9 hematocrit 38.5.  She did not require blood transfusion.  [facility] was reviewed.  Patient was found.  Patient will be discharged home today with home physical therapy and family.  Follow-up with orthopedic clinic in 2 weeks.      Goals of Care Treatment Preferences:  Code Status: Full Code      Consults (From admission, onward)        Status Ordering Provider     Inpatient consult to Hospitalist  Once        Provider:  Gio Jordan MD    Acknowledged SAUL AL     IP consult case management/social work  Once        Provider:  (Not yet assigned)    Completed SAUL AL          Significant Diagnostic Studies: Labs: All labs within the past 24 hours have been reviewed    Pending Diagnostic Studies:     None        Final Active Diagnoses:    Diagnosis Date Noted POA    PRINCIPAL PROBLEM:  Infection of prosthetic left knee joint [T84.54XA] 05/09/2023 Not Applicable    Tobacco use [Z72.0] 05/09/2023 Yes    History of multiple infections present [B99.9] 05/09/2023 Yes    History of removal of joint prosthesis of knee due to infection [Z98.890, Z86.19] 04/26/2023 Not Applicable    Primary hypertension [I10] 01/27/2023 Yes    Staphylococcal arthritis of left knee [M00.062] 01/04/2023 Yes    DM2 (diabetes mellitus, type 2) [E11.9] 11/20/2022 Yes    Depression [F32.A] 01/04/2022 Yes      Problems Resolved During this Admission:      Discharged Condition: stable    Disposition: Home-Health Care [facility]    Follow Up:   Follow-up Information     NATALIA Ward Follow up in 2 week(s).    Specialty: Emergency Medicine  Contact information:  [address]  [address]-[facility] [address] [phone]                    "    Patient Instructions:      WALKER FOR HOME USE     Order Specific Question Answer Comments   Type of Walker: Adult (5'4"-6'6")    With wheels? No    Height: 5' 6" (1.676 m)    Weight: 72.6 kg (160 lb)    Length of need (1-99 months): 3    Does patient have medical equipment at home? 3-in-1 commode    Does patient have medical equipment at home? walker, rolling    Please check all that apply: Patient's condition impairs ambulation.    Please check all that apply: Patient is unable to safely ambulate without equipment.      3 IN 1 COMMODE FOR HOME USE     Order Specific Question Answer Comments   Type: Standard    Height: 5' 6" (1.676 m)    Weight: 72.6 kg (160 lb)    Does patient have medical equipment at home? 3-in-1 commode    Does patient have medical equipment at home? walker, rolling    Length of need (1-99 months): 3      Referral to Home health   Referral Priority: Routine Referral Type: Home Health   Referral Reason: Specialty Services Required   Requested Specialty: Home Health Services   Number of Visits Requested: 1     Keep surgical extremity elevated   Order Comments: Elevated at ankle, no pillow under knee.  Wear DONOVAN hose, may remove twice a day for 1 hour then replace. Continue incentive spirometry every 2 hours while awake. Increase fluid intake. Continue Nozin nasal swab to nares twice a day until bottle is empty.     Ice to affected area   Order Comments: using barrier between ice and skin (specify duration&frequency). Apply Cool Jet to operative extremity.     No driving, operating heavy equipment or signing legal documents while taking pain medication     Change dressing (specify)   Order Comments: Swingbed / Home health nurse to remove hemovac on POD #2, then change dressing on post-op day #3.  Clean with chloraprep. Apply Aquacel AG dressing. Repeat dressing change in 7 days.  Notify physician of any wound drainage. DC staples in 2 weeks from surgery and steristrip incision     Call MD for:  " "temperature >100.4     Call MD for:  redness, tenderness, or signs of infection (pain, swelling, redness, odor or green/yellow discharge around incision site)     Activity as tolerated     Weight bearing as tolerated   Order Comments: With walker     Medications:  Reconciled Home Medications:      Medication List      START taking these medications    apixaban 2.5 mg Tab  Commonly known as: ELIQUIS  Take 1 tablet (2.5 mg total) by mouth 2 (two) times daily. for 12 days     HYDROcodone-acetaminophen  mg per tablet  Commonly known as: NORCO  Take 1 tablet by mouth every 4 (four) hours as needed for Pain.        ASK your doctor about these medications    BD ULTRA-FINE SHORT PEN NEEDLE 31 gauge x 5/16" Ndle  Generic drug: pen needle, diabetic     DULoxetine 60 MG capsule  Commonly known as: CYMBALTA  Take 1 capsule (60 mg total) by mouth once daily.     insulin glargine 100 units/mL SubQ pen  Commonly known as: LANTUS SOLOSTAR U-100 INSULIN  Inject 30 Units into the skin once daily.     Lactobacillus acidophilus 500 million cell Cap  Take 1 capsule by mouth 3 (three) times daily with meals.     NovoLOG FlexPen U-100 Insulin 100 unit/mL (3 mL) Inpn pen  Generic drug: insulin aspart U-100  Inject 5 Units into the skin 3 (three) times daily with meals.     ondansetron 4 mg/2 mL Soln  Inject 4 mg into the vein every 8 (eight) hours as needed.     promethazine 25 MG tablet  Commonly known as: PHENERGAN  Take 1 tablet (25 mg total) by mouth every 6 (six) hours as needed.            Garrett Hilliard, NATALIA  Orthopedics  [facility]  "

## 2023-05-10 NOTE — PLAN OF CARE
Problem: Adult Inpatient Plan of Care  Goal: Plan of Care Review  Outcome: Ongoing, Progressing  Flowsheets (Taken 5/10/2023 1818)  Plan of Care Reviewed With: patient  Goal: Optimal Comfort and Wellbeing  Outcome: Ongoing, Progressing  Goal: Readiness for Transition of Care  Outcome: Ongoing, Progressing     Problem: Diabetes Comorbidity  Goal: Blood Glucose Level Within Targeted Range  Outcome: Ongoing, Not Progressing     Problem: Fluid and Electrolyte Imbalance (Acute Kidney Injury/Impairment)  Goal: Fluid and Electrolyte Balance  Outcome: Ongoing, Progressing

## 2023-05-10 NOTE — ASSESSMENT & PLAN NOTE
Prior MRSA infection of left knee joint.  Patient with 101 fever reported today.      Vancomycin and Ancef for antibiotics.  Two prior echos have been negative for vegetation.  Recommend consulting Infectious Disease and will attempt to contact them at [facility] for transfer or consult.      Antibiotics (From admission, onward)    None        5/10: Fever from last night lot thought to be joint infection per Orthopedics but more likely atelectasis.

## 2023-05-10 NOTE — HOSPITAL COURSE
5/10:  Per nursing, patient with an extensive history of frequent hospitalizations for various infectious.  She developed a fever last night and given history of MRSA and she was not on adequate MRSA coverage vancomycin was added.  This was discussed with orthopedics team and patient already has a PICC line.  The fever was thought to be atelectasis rather than a joint infection given that she was recently washed out.  Will defer to Orthopedics.  However, if patient is having infections with this frequency consultation with Infectious Disease is recommended.    Blood cultures were drawn last night and these will need to be followed up in clinic.

## 2023-05-10 NOTE — HOSPITAL COURSE
Computer-aided total knee replacement arthroplasty under general anesthesia regional block.  Hospital course was essentially uncomplicated.  T-max during hospital course was 101° F.  She was treated prophylactically with IV antibiotic therapy include cefazolin and vancomycin.  She would gradual improvement is able to ambulate 60 ft with the aid of a rolling walker.  Drain had total output of 10 mL.  Laboratory data at the time of this dictation showed hemoglobin 11.9 hematocrit 38.5.  She did not require blood transfusion.  [facility] was reviewed.  Patient was found.  Patient will be discharged home today with home physical therapy and family.  Follow-up with orthopedic clinic in 2 weeks.

## 2023-05-10 NOTE — NURSING
Observed pt with d/c orders. Made Barbara MARIE aware that pt had temp of 101 over night. To verify the continuous of d/c. No new orders recv'd and d/c will continue per MD

## 2023-05-10 NOTE — ASSESSMENT & PLAN NOTE
Prior MRSA infection of left knee joint.  Patient with 101 fever reported today.      Vancomycin and Ancef for antibiotics.  Two prior echos have been negative for vegetation.  Recommend consulting Infectious Disease and will attempt to contact them at [facility] for transfer or consult.      Antibiotics (From admission, onward)    Start     Stop Route Frequency Ordered    05/09/23 2030  vancomycin (VANCOCIN) 1,000 mg in dextrose 5 % (D5W) 250 mL IVPB         05/10 0829 IV Every 12 hours (non-standard times) 05/09/23 1259    05/09/23 1300  ceFAZolin 2 g in dextrose 5 % in water (D5W) 5 % 50 mL IVPB (MB+)         05/10 0459 IV Every 8 hours (non-standard times) 05/09/23 1259

## 2023-05-10 NOTE — PT/OT/SLP PROGRESS
Physical Therapy Treatment    Patient Name:  Monica Walker   MRN:  [id number]    Recommendations:     Discharge Recommendations: rehabilitation facility  Discharge Equipment Recommendations: none  Barriers to discharge: None    Assessment:     Monica Walker is a 52 y.o. female admitted with a medical diagnosis of Infection of prosthetic left knee joint.  She presents with the following impairments/functional limitations: decreased ROM, pain, gait instability, impaired functional mobility .    Rehab Prognosis: Fair; patient would benefit from acute skilled PT services to address these deficits and reach maximum level of function.    Recent Surgery: Procedure(s) (LRB):  REVISION, ARTHROPLASTY, KNEE (Left) 1 Day Post-Op    Received Plan of Care per Ace Keenan PT     Plan:     During this hospitalization, patient to be seen BID to address the identified rehab impairments via gait training, therapeutic activities, therapeutic exercises and progress toward the following goals:    Plan of Care Expires:  06/09/23    Subjective     Chief Complaint: pain  Patient/Family Comments/goals: reluctantly agrees to PT tx, but self limiting; refuses to get up to chair; states she is going to swing bed today  Pain/Comfort:  Pain Rating 1: 10/10  Location - Side 1: Left  Location 1: knee  Pain Addressed 1: Reposition, Cessation of Activity  Pain Rating Post-Intervention 1: 10/10      Objective:     Communicated with PERFECTO Stahl prior to session.  Patient found HOB elevated with  hemovac upon PT entry to room.     General Precautions: Standard, fall  Orthopedic Precautions: LLE weight bearing as tolerated  Braces: Knee immobilizer  Respiratory Status: Room air     Functional Mobility:  Bed Mobility:     Scooting: modified independence  Supine to Sit: minimum assistance      AM-PAC 6 CLICK MOBILITY  Turning over in bed (including adjusting bedclothes, sheets and blankets)?: 3  Sitting down on and standing up from a chair with arms  (e.g., wheelchair, bedside commode, etc.): 3  Moving from lying on back to sitting on the side of the bed?: 3  Moving to and from a bed to a chair (including a wheelchair)?: 3  Need to walk in hospital room?: 3  Climbing 3-5 steps with a railing?: 3  Basic Mobility Total Score: 18       Treatment & Education:  Left TKA protocol x 20 repetitions each with assist prn: ankle pumps, quad sets, gluteal sets, hip abduction, straight leg raises, heel slides for knee flexion (limited range of motion); sitting edge of bed for foot slides for flexion x 10-15 repetitions     Patient left sitting edge of bed with all lines intact, call button in reach, and Rn present..    GOALS:   Multidisciplinary Problems       Physical Therapy Goals          Problem: Physical Therapy    Goal Priority Disciplines Outcome Goal Variances Interventions   Physical Therapy Goal     PT, PT/OT Ongoing, Progressing     Description: Short Term Goals  Independent with HEP  Independent with walkerx 100 feet FWB/WBAT: left lower extremity  0-100 knee flexion to allow normal sit to stand    Long term goals  Needed equipment for home.                            Time Tracking:     PT Received On: 05/10/23  PT Start Time: 1140     PT Stop Time: 1203  PT Total Time (min): 23 min     Billable Minutes: Therapeutic Activity 10 and Therapeutic Exercise 13    Treatment Type: Treatment  PT/PTA: PTA     Number of PTA visits since last PT visit: 1     05/10/2023

## 2023-05-10 NOTE — HPI
Ms. Monica Walker is a 53yo woman history of DM2, lung abscess, epidural abscess, MRSA bacteremia, septic knee joint, tobacco use, depression, HTN, CKD who presents for worsening knee pain.  She is status post removal of joint prosthesis due to infection.  She states that she has pain and requests additional pain medication.  She is not opening her eyes and is not very interactive during my interview with her.  Her niece is at bedside but she states that she does not know the patient's medical history very well.  She also states that she is not very close to the family.      It was noted that she had a fever tonight.  It is also noted that she has a history of MRSA infection.

## 2023-05-10 NOTE — SUBJECTIVE & OBJECTIVE
Interval History:     Review of Systems   Constitutional:  Positive for fatigue and fever. Negative for activity change and chills.   HENT:  Negative for congestion and sore throat.    Respiratory:  Negative for chest tightness.    Gastrointestinal:  Negative for abdominal distention, abdominal pain and diarrhea.   Endocrine: Negative for cold intolerance and heat intolerance.   Genitourinary:  Negative for dysuria.   Musculoskeletal:  Positive for joint swelling. Negative for arthralgias and back pain.   Skin:  Negative for color change and rash.   Neurological:  Negative for dizziness, tremors and headaches.   Psychiatric/Behavioral:  Negative for agitation. The patient is not nervous/anxious.    Objective:     Vital Signs (Most Recent):  Temp: 97.9 °F (36.6 °C) (05/10/23 1146)  Pulse: 80 (05/10/23 1146)  Resp: 17 (05/10/23 1407)  BP: (!) 144/75 (05/10/23 1146)  SpO2: 100 % (05/10/23 1146) Vital Signs (24h Range):  Temp:  [97.7 °F (36.5 °C)-101 °F (38.3 °C)] 97.9 °F (36.6 °C)  Pulse:  [] 80  Resp:  [14-19] 17  SpO2:  [93 %-100 %] 100 %  BP: (122-144)/(69-75) 144/75     Weight: 72.6 kg (160 lb)  Body mass index is 25.82 kg/m².    Intake/Output Summary (Last 24 hours) at 5/10/2023 1546  Last data filed at 5/10/2023 0720  Gross per 24 hour   Intake 240 ml   Output 60 ml   Net 180 ml         Physical Exam  Constitutional:       Appearance: She is ill-appearing.   HENT:      Head: Normocephalic and atraumatic.      Nose: Nose normal.      Mouth/Throat:      Mouth: Mucous membranes are moist.      Pharynx: Oropharynx is clear.   Eyes:      Extraocular Movements: Extraocular movements intact.      Conjunctiva/sclera: Conjunctivae normal.      Pupils: Pupils are equal, round, and reactive to light.   Cardiovascular:      Rate and Rhythm: Normal rate and regular rhythm.      Pulses: Normal pulses.      Heart sounds: Normal heart sounds.   Pulmonary:      Effort: Pulmonary effort is normal.      Breath sounds: Normal  breath sounds.   Abdominal:      General: Abdomen is flat. Bowel sounds are normal.      Palpations: Abdomen is soft.   Musculoskeletal:      Cervical back: Normal range of motion and neck supple.      Comments: Post-surgical changes in left knee.    Skin:     General: Skin is warm and dry.   Neurological:      General: No focal deficit present.      Mental Status: She is alert and oriented to person, place, and time.   Psychiatric:         Mood and Affect: Mood normal.         Behavior: Behavior normal.           Significant Labs: All pertinent labs within the past 24 hours have been reviewed.    Significant Imaging: I have reviewed all pertinent imaging results/findings within the past 24 hours.

## 2023-05-10 NOTE — PROGRESS NOTES
[facility] - Short Stay [facility] Medicine  Progress Note    Patient Name: Monica Walker  MRN: [id number]  Patient Class: IP- Surgery Admit   Admission Date: 5/9/2023  Length of Stay: 1 days  Attending Physician: No att. providers found  Primary Care Provider: Hannah Schulz MD        Subjective:     Principal Problem:Infection of prosthetic left knee joint    HPI:  Ms. Monica Walker is a 53yo woman history of DM2, lung abscess, epidural abscess, MRSA bacteremia, septic knee joint, tobacco use, depression, HTN, CKD who presents for worsening knee pain.  She is status post removal of joint prosthesis due to infection.  She states that she has pain and requests additional pain medication.  She is not opening her eyes and is not very interactive during my interview with her.  Her niece is at bedside but she states that she does not know the patient's medical history very well.  She also states that she is not very close to the family.      It was noted that she had a fever tonight.  It is also noted that she has a history of MRSA infection.        Overview/Hospital Course:  5/10:  Per nursing, patient with an extensive history of frequent hospitalizations for various infectious.  She developed a fever last night and given history of MRSA and she was not on adequate MRSA coverage vancomycin was added.  This was discussed with orthopedics team and patient already has a PICC line.  The fever was thought to be atelectasis rather than a joint infection given that she was recently washed out.  Will defer to Orthopedics.  However, if patient is having infections with this frequency consultation with Infectious Disease is recommended.    Blood cultures were drawn last night and these will need to be followed up in clinic.      Interval History:     Review of Systems   Constitutional:  Positive for fatigue and fever. Negative for activity change and chills.   HENT:  Negative for congestion and sore  throat.    Respiratory:  Negative for chest tightness.    Gastrointestinal:  Negative for abdominal distention, abdominal pain and diarrhea.   Endocrine: Negative for cold intolerance and heat intolerance.   Genitourinary:  Negative for dysuria.   Musculoskeletal:  Positive for joint swelling. Negative for arthralgias and back pain.   Skin:  Negative for color change and rash.   Neurological:  Negative for dizziness, tremors and headaches.   Psychiatric/Behavioral:  Negative for agitation. The patient is not nervous/anxious.    Objective:     Vital Signs (Most Recent):  Temp: 97.9 °F (36.6 °C) (05/10/23 1146)  Pulse: 80 (05/10/23 1146)  Resp: 17 (05/10/23 1407)  BP: (!) 144/75 (05/10/23 1146)  SpO2: 100 % (05/10/23 1146) Vital Signs (24h Range):  Temp:  [97.7 °F (36.5 °C)-101 °F (38.3 °C)] 97.9 °F (36.6 °C)  Pulse:  [] 80  Resp:  [14-19] 17  SpO2:  [93 %-100 %] 100 %  BP: (122-144)/(69-75) 144/75     Weight: 72.6 kg (160 lb)  Body mass index is 25.82 kg/m².    Intake/Output Summary (Last 24 hours) at 5/10/2023 1546  Last data filed at 5/10/2023 0720  Gross per 24 hour   Intake 240 ml   Output 60 ml   Net 180 ml         Physical Exam  Constitutional:       Appearance: She is ill-appearing.   HENT:      Head: Normocephalic and atraumatic.      Nose: Nose normal.      Mouth/Throat:      Mouth: Mucous membranes are moist.      Pharynx: Oropharynx is clear.   Eyes:      Extraocular Movements: Extraocular movements intact.      Conjunctiva/sclera: Conjunctivae normal.      Pupils: Pupils are equal, round, and reactive to light.   Cardiovascular:      Rate and Rhythm: Normal rate and regular rhythm.      Pulses: Normal pulses.      Heart sounds: Normal heart sounds.   Pulmonary:      Effort: Pulmonary effort is normal.      Breath sounds: Normal breath sounds.   Abdominal:      General: Abdomen is flat. Bowel sounds are normal.      Palpations: Abdomen is soft.   Musculoskeletal:      Cervical back: Normal range of  motion and neck supple.      Comments: Post-surgical changes in left knee.    Skin:     General: Skin is warm and dry.   Neurological:      General: No focal deficit present.      Mental Status: She is alert and oriented to person, place, and time.   Psychiatric:         Mood and Affect: Mood normal.         Behavior: Behavior normal.           Significant Labs: All pertinent labs within the past 24 hours have been reviewed.    Significant Imaging: I have reviewed all pertinent imaging results/findings within the past 24 hours.      Assessment/Plan:      * Infection of prosthetic left knee joint  Prior MRSA infection of left knee joint.  Patient with 101 fever reported today.      Vancomycin and Ancef for antibiotics.  Two prior echos have been negative for vegetation.  Recommend consulting Infectious Disease and will attempt to contact them at [facility] for transfer or consult.      Antibiotics (From admission, onward)    None        5/10: Fever from last night lot thought to be joint infection per Orthopedics but more likely atelectasis.      Tobacco use  Patient counseled extensively on tobacco use.  Nicotine patch offered.      History of multiple infections present  Patient with history of multiple infection including lung abscess, epidural abscess, bacteremia and septic joint.  Patient would benefit from referral to Infectious Disease and/or transfer.      Primary hypertension  Will only add antihypertensives based on patient's clinical course.        Staphylococcal arthritis of left knee        DM2 (diabetes mellitus, type 2)  Patient's FSGs are uncontrolled due to hyperglycemia on current medication regimen.  Last A1c reviewed-   Lab Results   Component Value Date    HGBA1C 7.9 (H) 01/19/2023     Most recent fingerstick glucose reviewed- No results for input(s): POCTGLUCOSE in the last 24 hours.  Current correctional scale  Medium  Maintain anti-hyperglycemic dose as follows-   Antihyperglycemics (From admission,  onward)    None        Hold Oral hypoglycemics while patient is in the hospital.    Depression  Patient has recurrent depression which is mild and is currently controlled. Will Continue anti-depressant medications. We will not consult psychiatry at this time. Patient does not display psychosis at this time. Continue to monitor closely and adjust plan of care as needed.          VTE Risk Mitigation (From admission, onward)         Ordered     apixaban tablet 2.5 mg  2 times daily         05/09/23 1259     IP VTE HIGH RISK PATIENT  Once         05/10/23 0733     Place DONOVAN hose  Until discontinued         05/10/23 0733     Place DONOVAN hose  Until discontinued         05/09/23 1259                Discharge Planning   SUZANNA: 5/10/2023     Code Status: Prior   Is the patient medically ready for discharge?:     Reason for patient still in hospital (select all that apply): Treatment  Discharge Plan A: Skilled Nursing Facility                  Fermin Parson MD  Department of Hospital Medicine   [facility] - Short Stay Unit

## 2023-05-10 NOTE — PLAN OF CARE
[facility] - Short Stay Unit  Initial Discharge Assessment       Primary Care Provider: Hannah Schulz MD    Admission Diagnosis: History of removal of joint prosthesis of knee due to infection [Z98.890, Z86.19]    Admission Date: 5/9/2023  Expected Discharge Date: 5/10/2023    Discharge Barriers Identified: None    Payor: HU[employer] MANAGED MEDICARE / Plan: [employer] SNP HMO PPO SPECIAL NEEDS / Product Type: Medicare Advantage /     Extended Emergency Contact Information  Primary Emergency Contact: Wilson,April  Mobile Phone: [phone]  Relation: Sister  Preferred language: English   needed? No  Secondary Emergency Contact: DeniseAlejandrina  Home Phone: [phone]  Relation: Mother  Preferred language: English   needed? No    Discharge Plan A: Skilled Nursing Facility  Discharge Plan B: Home with family, Home Health      [facility] [place] [facility] [address] [facility] [address]  [address] [facility] [address]  Phone: [phone] Fax: [phone]    [facility] [address] [place] [address]  [address] [place] [address]  Phone: [phone] Fax: [phone]      Initial Assessment (most recent)       Adult Discharge Assessment - 05/10/23 0949          Discharge Assessment    Assessment Type Discharge Planning Assessment     Source of Information patient     People in Home parent(s)     Do you expect to return to your current living situation? No     Do you have help at home or someone to help you manage your care at home? Yes     Who are your caregiver(s) and their phone number(s)? Alejandrina Walker (mother) [phone]     Prior to hospitilization cognitive status: Unable to Assess     Current cognitive status: Alert/Oriented     Walking or Climbing Stairs ambulation difficulty, requires equipment     Dressing/Bathing bathing difficulty, requires equipment     Home Accessibility wheelchair accessible     Home Layout Able to live on 1st floor      Equipment Currently Used at Home walker, rolling;bedside commode     Readmission within 30 days? Yes     Patient currently being followed by outpatient case management? Unable to determine (comments)     Do you currently have service(s) that help you manage your care at home? No     Do you take prescription medications? Yes     Do you have prescription coverage? Yes     Do you have any problems affording any of your prescribed medications? No     Who is going to help you get home at discharge? Alejandrina Walker (mother) [phone]     How do you get to doctors appointments? family or friend will provide     Are you on dialysis? No     Do you take coumadin? No     Discharge Plan A Skilled Nursing Facility     Discharge Plan B Home with family;Home Health     Discharge Plan discussed with: Patient     Discharge Barriers Identified None                     SW spoke with pt on this morning to complete dcp. Pt lived home with Alejandrina Walker (mother) [phone]. Has rw and bsc. Dcp is to [facility] pending insurance approval/ review. SW received correspondence from NP that if pt does not receive precert on this afternoon, pt will dc home with home health. Ss following.

## 2023-05-10 NOTE — ASSESSMENT & PLAN NOTE
Patient's FSGs are uncontrolled due to hyperglycemia on current medication regimen.  Last A1c reviewed-   Lab Results   Component Value Date    HGBA1C 7.9 (H) 01/19/2023     Most recent fingerstick glucose reviewed- No results for input(s): POCTGLUCOSE in the last 24 hours.  Current correctional scale  Medium  Maintain anti-hyperglycemic dose as follows-   Antihyperglycemics (From admission, onward)    None        Hold Oral hypoglycemics while patient is in the hospital.

## 2023-05-10 NOTE — NURSING
Report was called to HAYDEE Fan at [facility]. Also notified Meng that pt will come with Norco 10mg tablets that have already been filled by [facility].

## 2023-05-10 NOTE — SUBJECTIVE & OBJECTIVE
"Past Medical History:   Diagnosis Date    Abscess of right thigh + MRSA 08/20/2021    healed    Adnexal cyst     Degenerative disc disease     Depression 10/29/2021    Diabetes mellitus     Hypertension     Hypomagnesemia 01/20/2023    Nicotine dependence     Osteoarthritis        Past Surgical History:   Procedure Laterality Date    BACK SURGERY      HYSTERECTOMY N/A     INCISION AND DRAINAGE Left 3/13/2023    Procedure: INCISION AND DRAINAGE;  Surgeon: Jacek Noyola MD;  Location: [facility] OR;  Service: Orthopedics;  Laterality: Left;    IRRIGATION AND DEBRIDEMENT OF LOWER EXTREMITY Left 11/22/2022    Procedure: IRRIGATION AND DEBRIDEMENT, LOWER EXTREMITY;  Surgeon: Papa Mendoza MD;  Location: [facility] OR;  Service: Orthopedics;  Laterality: Left;    IRRIGATION AND DEBRIDEMENT OF UPPER EXTREMITY Left 11/22/2022    Procedure: IRRIGATION AND DEBRIDEMENT, UPPER EXTREMITY;  Surgeon: Papa Mendoza MD;  Location: [facility] OR;  Service: Orthopedics;  Laterality: Left;    REVISION OF KNEE ARTHROPLASTY Left 3/13/2023    Procedure: REMOVAL OF PROSTHESIS,METHYLMETHACRYLATE WITH OR WITHOUT INSERTION OF SPACER LEFT KNEE;  Surgeon: Jacek Noyola MD;  Location: [facility] OR;  Service: Orthopedics;  Laterality: Left;    THORACIC LAMINECTOMY WITH FUSION N/A 1/24/2023    Procedure: T9-L2 fusion, T11-T12 laminectomy and corpectomy;  Surgeon: Jimmy Hernandes MD;  Location: [facility] OR;  Service: Neurosurgery;  Laterality: N/A;    TOTAL HIP ARTHROPLASTY Left     TOTAL KNEE ARTHROPLASTY Bilateral        Review of patient's allergies indicates:  No Known Allergies    No current facility-administered medications on file prior to encounter.     Current Outpatient Medications on File Prior to Encounter   Medication Sig    DULoxetine (CYMBALTA) 60 MG capsule Take 1 capsule (60 mg total) by mouth once daily.    BD ULTRA-FINE SHORT PEN NEEDLE 31 gauge x 5/16" Ndle     insulin (LANTUS SOLOSTAR " U-100 INSULIN) glargine 100 units/mL SubQ pen Inject 30 Units into the skin once daily. (Patient taking differently: Inject 35 Units into the skin every evening.)    Lactobacillus acidophilus 500 million cell Cap Take 1 capsule by mouth 3 (three) times daily with meals. (Patient not taking: Reported on 4/28/2023)    NOVOLOG FLEXPEN U-100 INSULIN 100 unit/mL (3 mL) InPn pen Inject 5 Units into the skin 3 (three) times daily with meals.    ondansetron 4 mg/2 mL Soln Inject 4 mg into the vein every 8 (eight) hours as needed. (Patient not taking: Reported on 4/28/2023)    promethazine (PHENERGAN) 25 MG tablet Take 1 tablet (25 mg total) by mouth every 6 (six) hours as needed. (Patient not taking: Reported on 4/28/2023)    [DISCONTINUED] gabapentin (NEURONTIN) 300 MG capsule Take 300 mg by mouth 2 (two) times a day.    [DISCONTINUED] hydrALAZINE (APRESOLINE) 25 MG tablet Take 1 tablet (25 mg total) by mouth every 12 (twelve) hours.    [DISCONTINUED] HYDROcodone-acetaminophen (NORCO) 5-325 mg per tablet Take 1 tablet by mouth every 6 (six) hours as needed for Pain.    [DISCONTINUED] losartan (COZAAR) 25 MG tablet Take 1 tablet (25 mg total) by mouth once daily.     Family History       Problem Relation (Age of Onset)    Diabetes Father    Hypertension Father          Tobacco Use    Smoking status: Every Day     Packs/day: 0.50     Types: Cigarettes    Smokeless tobacco: Current     Types: Snuff   Substance and Sexual Activity    Alcohol use: Never    Drug use: Never    Sexual activity: Not on file     Review of Systems   Constitutional:  Positive for fatigue and fever. Negative for activity change and chills.   HENT:  Negative for congestion and sore throat.    Respiratory:  Negative for chest tightness.    Gastrointestinal:  Negative for abdominal distention, abdominal pain and diarrhea.   Endocrine: Negative for cold intolerance and heat intolerance.   Genitourinary:  Negative for dysuria.   Musculoskeletal:  Positive  for joint swelling. Negative for arthralgias and back pain.   Skin:  Negative for color change and rash.   Neurological:  Negative for dizziness, tremors and headaches.   Psychiatric/Behavioral:  Negative for agitation. The patient is not nervous/anxious.    Objective:     Vital Signs (Most Recent):  Temp: (!) 101 °F (38.3 °C) (05/09/23 2013)  Pulse: (!) 114 (05/09/23 2013)  Resp: 15 (05/09/23 2022)  BP: (!) 144/69 (05/09/23 2013)  SpO2: (!) 93 % (05/09/23 2013) Vital Signs (24h Range):  Temp:  [98.3 °F (36.8 °C)-101 °F (38.3 °C)] 101 °F (38.3 °C)  Pulse:  [] 114  Resp:  [13-20] 15  SpO2:  [93 %-100 %] 93 %  BP: (102-144)/(53-74) 144/69     Weight: 72.6 kg (160 lb)  Body mass index is 25.82 kg/m².     Physical Exam  Constitutional:       Appearance: She is ill-appearing.   HENT:      Head: Normocephalic and atraumatic.      Nose: Nose normal.      Mouth/Throat:      Mouth: Mucous membranes are moist.      Pharynx: Oropharynx is clear.   Eyes:      Extraocular Movements: Extraocular movements intact.      Conjunctiva/sclera: Conjunctivae normal.      Pupils: Pupils are equal, round, and reactive to light.   Cardiovascular:      Rate and Rhythm: Normal rate and regular rhythm.      Pulses: Normal pulses.      Heart sounds: Normal heart sounds.   Pulmonary:      Effort: Pulmonary effort is normal.      Breath sounds: Normal breath sounds.   Abdominal:      General: Abdomen is flat. Bowel sounds are normal.      Palpations: Abdomen is soft.   Musculoskeletal:      Cervical back: Normal range of motion and neck supple.      Comments: Post-surgical changes in left knee.    Skin:     General: Skin is warm and dry.   Neurological:      General: No focal deficit present.      Mental Status: She is alert and oriented to person, place, and time.   Psychiatric:         Mood and Affect: Mood normal.         Behavior: Behavior normal.        Significant Labs: All pertinent labs within the past 24 hours have been  reviewed.    Significant Imaging: I have reviewed all pertinent imaging results/findings within the past 24 hours.

## 2023-05-10 NOTE — PLAN OF CARE
Pt approved for swb placement at [facility] on this day. Sw to take packet to nurse at this time.

## 2023-05-10 NOTE — PLAN OF CARE
[facility] - Short Stay Unit  Discharge Final Note    Primary Care Provider: Hannah Schulz MD    Expected Discharge Date: 5/10/2023    Final Discharge Note (most recent)       Final Note - 05/10/23 0949          Final Note    Assessment Type Discharge Planning Assessment                     Important Message from Medicare  Important Message from Medicare regarding Discharge Appeal Rights: Given to patient/caregiver, Explained to patient/caregiver, Signed/date by patient/caregiver     Date IMM was signed: 05/10/23  Time IMM was signed: 0940    Contact Info       NATALIA Ward   Specialty: Emergency Medicine    [address]  [address]-[facility] [address]   Phone: [phone]       Next Steps: Follow up in 2 week(s)    Instructions: 2 week follow up, knee infection; May 24, 2023 at 10:15 am          Pt dc to [facility] on this day. No further needs at dc.

## 2023-05-10 NOTE — NURSING
Filled prescriptions (Norco 10mg 30 tabs, Eliquis 2.5mg 24 tabs) taken in sealed pharmacy bag to med room and locked in pt belonging drawer per 1 RN and 1 LPN with pt's permission.

## 2023-05-11 ENCOUNTER — OUTPATIENT CASE MANAGEMENT (OUTPATIENT)
Dept: ADMINISTRATIVE | Facility: OTHER | Age: 53
End: 2023-05-11

## 2023-05-11 PROBLEM — Z96.652 HISTORY OF TOTAL LEFT KNEE REPLACEMENT: Status: ACTIVE | Noted: 2023-05-11

## 2023-05-11 PROBLEM — R79.89 ELEVATED LACTIC ACID LEVEL: Status: ACTIVE | Noted: 2023-05-11

## 2023-05-11 PROBLEM — R68.83 SHAKING CHILLS: Status: ACTIVE | Noted: 2023-05-11

## 2023-05-11 LAB
GLUCOSE SERPL-MCNC: 165 MG/DL (ref 70–105)
GLUCOSE SERPL-MCNC: 203 MG/DL (ref 70–105)
GLUCOSE SERPL-MCNC: 232 MG/DL (ref 70–105)
GLUCOSE SERPL-MCNC: 278 MG/DL (ref 70–105)
LACTATE SERPL-SCNC: 1.2 MMOL/L (ref 0.4–2)
LACTATE SERPL-SCNC: 3.3 MMOL/L (ref 0.4–2)
VERIGENE RESULT: NEGATIVE

## 2023-05-11 PROCEDURE — 99305 PR NURSING FACILITY CARE, INIT, MOD SEVERITY: ICD-10-PCS | Mod: AI,,, | Performed by: FAMILY MEDICINE

## 2023-05-11 PROCEDURE — 25000003 PHARM REV CODE 250: Performed by: EMERGENCY MEDICINE

## 2023-05-11 PROCEDURE — 99305 1ST NF CARE MODERATE MDM 35: CPT | Mod: AI,,, | Performed by: FAMILY MEDICINE

## 2023-05-11 PROCEDURE — 94761 N-INVAS EAR/PLS OXIMETRY MLT: CPT

## 2023-05-11 PROCEDURE — 63600175 PHARM REV CODE 636 W HCPCS: Performed by: FAMILY MEDICINE

## 2023-05-11 PROCEDURE — 82962 GLUCOSE BLOOD TEST: CPT

## 2023-05-11 PROCEDURE — 83605 ASSAY OF LACTIC ACID: CPT | Performed by: EMERGENCY MEDICINE

## 2023-05-11 PROCEDURE — 99308 SBSQ NF CARE LOW MDM 20: CPT | Mod: ,,, | Performed by: EMERGENCY MEDICINE

## 2023-05-11 PROCEDURE — 11000004 HC SNF PRIVATE

## 2023-05-11 PROCEDURE — 27000221 HC OXYGEN, UP TO 24 HOURS

## 2023-05-11 PROCEDURE — 64447 PERIPHERAL BLOCK: ICD-10-PCS | Mod: 59,LT,, | Performed by: ANESTHESIOLOGY

## 2023-05-11 PROCEDURE — 63600175 PHARM REV CODE 636 W HCPCS: Performed by: EMERGENCY MEDICINE

## 2023-05-11 PROCEDURE — 25000003 PHARM REV CODE 250: Performed by: FAMILY MEDICINE

## 2023-05-11 PROCEDURE — 25000003 PHARM REV CODE 250

## 2023-05-11 PROCEDURE — 64447 NJX AA&/STRD FEMORAL NRV IMG: CPT | Mod: 59,LT,, | Performed by: ANESTHESIOLOGY

## 2023-05-11 PROCEDURE — 99308 PR NURSING FAC CARE, SUBSEQ, MINOR COMPLIC: ICD-10-PCS | Mod: ,,, | Performed by: EMERGENCY MEDICINE

## 2023-05-11 RX ORDER — ROPIVACAINE HYDROCHLORIDE 7.5 MG/ML
INJECTION, SOLUTION EPIDURAL; PERINEURAL
Status: DISCONTINUED | OUTPATIENT
Start: 2023-05-09 | End: 2023-05-11

## 2023-05-11 RX ORDER — HEPARIN SODIUM,PORCINE/PF 10 UNIT/ML
50 SYRINGE (ML) INTRAVENOUS DAILY
Status: DISCONTINUED | OUTPATIENT
Start: 2023-05-11 | End: 2023-05-21 | Stop reason: HOSPADM

## 2023-05-11 RX ORDER — SODIUM CHLORIDE 9 MG/ML
INJECTION, SOLUTION INTRAVENOUS CONTINUOUS
Status: DISCONTINUED | OUTPATIENT
Start: 2023-05-11 | End: 2023-05-12

## 2023-05-11 RX ORDER — DEXTROSE MONOHYDRATE 50 MG/ML
INJECTION, SOLUTION INTRAVENOUS
Status: COMPLETED
Start: 2023-05-11 | End: 2023-05-11

## 2023-05-11 RX ORDER — SODIUM CHLORIDE 9 MG/ML
INJECTION, SOLUTION INTRAVENOUS
Status: COMPLETED
Start: 2023-05-11 | End: 2023-05-11

## 2023-05-11 RX ADMIN — SENNOSIDES AND DOCUSATE SODIUM 1 TABLET: 50; 8.6 TABLET ORAL at 08:05

## 2023-05-11 RX ADMIN — MUPIROCIN: 20 OINTMENT TOPICAL at 08:05

## 2023-05-11 RX ADMIN — FAMOTIDINE 20 MG: 20 TABLET, FILM COATED ORAL at 10:05

## 2023-05-11 RX ADMIN — VANCOMYCIN HYDROCHLORIDE 1500 MG: 1.5 INJECTION, POWDER, LYOPHILIZED, FOR SOLUTION INTRAVENOUS at 01:05

## 2023-05-11 RX ADMIN — HYDROCODONE BITARTRATE AND ACETAMINOPHEN 1 TABLET: 10; 325 TABLET ORAL at 10:05

## 2023-05-11 RX ADMIN — ONDANSETRON 4 MG: 4 TABLET, ORALLY DISINTEGRATING ORAL at 08:05

## 2023-05-11 RX ADMIN — PIPERACILLIN AND TAZOBACTAM 4.5 G: 4; .5 INJECTION, POWDER, FOR SOLUTION INTRAVENOUS at 10:05

## 2023-05-11 RX ADMIN — PIPERACILLIN AND TAZOBACTAM 4.5 G: 4; .5 INJECTION, POWDER, FOR SOLUTION INTRAVENOUS at 01:05

## 2023-05-11 RX ADMIN — CALCIUM CARBONATE 500 MG: 500 TABLET, CHEWABLE ORAL at 10:05

## 2023-05-11 RX ADMIN — INSULIN ASPART 6 UNITS: 100 INJECTION, SOLUTION INTRAVENOUS; SUBCUTANEOUS at 06:05

## 2023-05-11 RX ADMIN — INSULIN ASPART 2 UNITS: 100 INJECTION, SOLUTION INTRAVENOUS; SUBCUTANEOUS at 08:05

## 2023-05-11 RX ADMIN — Medication 1 CAPSULE: at 10:05

## 2023-05-11 RX ADMIN — INSULIN ASPART 5 UNITS: 100 INJECTION, SOLUTION INTRAVENOUS; SUBCUTANEOUS at 07:05

## 2023-05-11 RX ADMIN — APIXABAN 2.5 MG: 2.5 TABLET, FILM COATED ORAL at 10:05

## 2023-05-11 RX ADMIN — FAMOTIDINE 20 MG: 20 TABLET, FILM COATED ORAL at 08:05

## 2023-05-11 RX ADMIN — HEPARIN, PORCINE (PF) 10 UNIT/ML INTRAVENOUS SYRINGE 50 UNITS: at 10:05

## 2023-05-11 RX ADMIN — INSULIN ASPART 5 UNITS: 100 INJECTION, SOLUTION INTRAVENOUS; SUBCUTANEOUS at 12:05

## 2023-05-11 RX ADMIN — INSULIN DETEMIR 30 UNITS: 100 INJECTION, SOLUTION SUBCUTANEOUS at 10:05

## 2023-05-11 RX ADMIN — SODIUM CHLORIDE 1000 ML: 9 INJECTION, SOLUTION INTRAVENOUS at 12:05

## 2023-05-11 RX ADMIN — DULOXETINE 60 MG: 30 CAPSULE, DELAYED RELEASE ORAL at 10:05

## 2023-05-11 RX ADMIN — HYDROCODONE BITARTRATE AND ACETAMINOPHEN 1 TABLET: 10; 325 TABLET ORAL at 08:05

## 2023-05-11 RX ADMIN — DEXTROSE MONOHYDRATE 250 ML: 50 INJECTION, SOLUTION INTRAVENOUS at 05:05

## 2023-05-11 RX ADMIN — Medication 1 CAPSULE: at 12:05

## 2023-05-11 RX ADMIN — APIXABAN 2.5 MG: 2.5 TABLET, FILM COATED ORAL at 08:05

## 2023-05-11 RX ADMIN — SODIUM CHLORIDE 1000 ML: 0.9 INJECTION, SOLUTION INTRAVENOUS at 12:05

## 2023-05-11 RX ADMIN — Medication 1 CAPSULE: at 04:05

## 2023-05-11 RX ADMIN — HYDROCODONE BITARTRATE AND ACETAMINOPHEN 1 TABLET: 10; 325 TABLET ORAL at 04:05

## 2023-05-11 RX ADMIN — INSULIN ASPART 5 UNITS: 100 INJECTION, SOLUTION INTRAVENOUS; SUBCUTANEOUS at 04:05

## 2023-05-11 RX ADMIN — SENNOSIDES AND DOCUSATE SODIUM 1 TABLET: 50; 8.6 TABLET ORAL at 10:05

## 2023-05-11 RX ADMIN — INSULIN ASPART 4 UNITS: 100 INJECTION, SOLUTION INTRAVENOUS; SUBCUTANEOUS at 12:05

## 2023-05-11 RX ADMIN — VANCOMYCIN HYDROCHLORIDE 1500 MG: 1.5 INJECTION, POWDER, LYOPHILIZED, FOR SOLUTION INTRAVENOUS at 05:05

## 2023-05-11 RX ADMIN — PIPERACILLIN AND TAZOBACTAM 4.5 G: 4; .5 INJECTION, POWDER, FOR SOLUTION INTRAVENOUS at 05:05

## 2023-05-11 NOTE — H&P
[facility] - Medical Surgical Unit  [facility] Medicine  History & Physical    Patient Name: Monica Walker  MRN: [id number]  Patient Class: IP- Swing  Admission Date: 5/10/2023  Attending Physician: Hannah Schulz, *   Primary Care Provider: Hannah Schulz MD         Patient information was obtained from ER records.     Subjective:     Principal Problem:History of total left knee replacement    Chief Complaint: No chief complaint on file.       HPI: This 52 yr. Old WF has been admitted to [facility] for rehab after undergoing another left knee replacement. Apparently, she had a spell of rigors last night. A septic workup was done and antibiotics started. This AM, she feels badly. She is afebrile. We will continue present treatment.      Past Medical History:   Diagnosis Date    Abscess of right thigh + MRSA 08/20/2021    healed    Adnexal cyst     Degenerative disc disease     Depression 10/29/2021    Diabetes mellitus     Hypertension     Hypomagnesemia 01/20/2023    Nicotine dependence     Osteoarthritis        Past Surgical History:   Procedure Laterality Date    BACK SURGERY      HYSTERECTOMY N/A     INCISION AND DRAINAGE Left 3/13/2023    Procedure: INCISION AND DRAINAGE;  Surgeon: Jacek Noyola MD;  Location: [facility];  Service: Orthopedics;  Laterality: Left;    IRRIGATION AND DEBRIDEMENT OF LOWER EXTREMITY Left 11/22/2022    Procedure: IRRIGATION AND DEBRIDEMENT, LOWER EXTREMITY;  Surgeon: Papa Mendoza MD;  Location: [facility];  Service: Orthopedics;  Laterality: Left;    IRRIGATION AND DEBRIDEMENT OF UPPER EXTREMITY Left 11/22/2022    Procedure: IRRIGATION AND DEBRIDEMENT, UPPER EXTREMITY;  Surgeon: Papa Mendoza MD;  Location: [facility];  Service: Orthopedics;  Laterality: Left;    REVISION OF KNEE ARTHROPLASTY Left 3/13/2023    Procedure: REMOVAL OF PROSTHESIS,METHYLMETHACRYLATE WITH OR WITHOUT INSERTION OF SPACER LEFT  KNEE;  Surgeon: Jacek Noyola MD;  Location: [facility] OR;  Service: Orthopedics;  Laterality: Left;    REVISION OF KNEE ARTHROPLASTY Left 5/9/2023    Procedure: REVISION, ARTHROPLASTY, KNEE;  Surgeon: Jacek Noyola MD;  Location: [facility] ORTHO OR;  Service: Orthopedics;  Laterality: Left;    THORACIC LAMINECTOMY WITH FUSION N/A 1/24/2023    Procedure: T9-L2 fusion, T11-T12 laminectomy and corpectomy;  Surgeon: Jimmy Hernandes MD;  Location: [facility] OR;  Service: Neurosurgery;  Laterality: N/A;    TOTAL HIP ARTHROPLASTY Left     TOTAL KNEE ARTHROPLASTY Bilateral        Review of patient's allergies indicates:  No Known Allergies    Current Facility-Administered Medications on File Prior to Encounter   Medication    [DISCONTINUED] acetaminophen tablet 650 mg    [DISCONTINUED] apixaban tablet 2.5 mg    [DISCONTINUED] bisacodyL suppository 10 mg    [DISCONTINUED] dextrose 10% bolus 125 mL 125 mL    [DISCONTINUED] dextrose 10% bolus 250 mL 250 mL    [DISCONTINUED] dextrose 40 % gel 15,000 mg    [DISCONTINUED] dextrose 40 % gel 30,000 mg    [DISCONTINUED] glucagon (human recombinant) injection 1 mg    [DISCONTINUED] HYDROcodone-acetaminophen  mg per tablet 1 tablet    [DISCONTINUED] insulin aspart U-100 injection 1-10 Units    [DISCONTINUED] lactated ringers infusion    [DISCONTINUED] morphine injection 4 mg    [DISCONTINUED] ondansetron injection 4 mg     Current Outpatient Medications on File Prior to Encounter   Medication Sig    apixaban (ELIQUIS) 2.5 mg Tab Take 1 tablet (2.5 mg total) by mouth 2 (two) times daily. for 12 days    DULoxetine (CYMBALTA) 60 MG capsule Take 1 capsule (60 mg total) by mouth once daily.    HYDROcodone-acetaminophen (NORCO)  mg per tablet Take 1 tablet by mouth every 4 (four) hours as needed for Pain.    insulin (LANTUS SOLOSTAR U-100 INSULIN) glargine 100 units/mL SubQ pen Inject 30 Units into the skin once daily.    Lactobacillus  "acidophilus 500 million cell Cap Take 1 capsule by mouth 3 (three) times daily with meals.    NOVOLOG FLEXPEN U-100 INSULIN 100 unit/mL (3 mL) InPn pen Inject 5 Units into the skin 3 (three) times daily with meals.    BD ULTRA-FINE SHORT PEN NEEDLE 31 gauge x 5/16" Ndle     ondansetron 4 mg/2 mL Soln Inject 4 mg into the vein every 8 (eight) hours as needed.    promethazine (PHENERGAN) 25 MG tablet Take 1 tablet (25 mg total) by mouth every 6 (six) hours as needed. (Patient not taking: Reported on 4/28/2023)     Family History       Problem Relation (Age of Onset)    Diabetes Father    Hypertension Father          Tobacco Use    Smoking status: Every Day     Packs/day: 0.50     Types: Cigarettes    Smokeless tobacco: Current     Types: Snuff   Substance and Sexual Activity    Alcohol use: Never    Drug use: Never    Sexual activity: Not on file     Review of Systems   Constitutional:  Negative for activity change.   HENT:  Negative for congestion.    Eyes:  Negative for visual disturbance.   Respiratory:  Negative for cough.    Cardiovascular:  Negative for chest pain.   Gastrointestinal:  Negative for abdominal distention.   Genitourinary:  Negative for difficulty urinating.   Musculoskeletal:  Positive for arthralgias and joint swelling.   Skin:  Negative for color change.   Neurological:  Negative for dizziness.   Psychiatric/Behavioral:  Negative for behavioral problems.    Objective:     Vital Signs (Most Recent):  Temp: 98.6 °F (37 °C) (05/11/23 0638)  Pulse: 80 (05/11/23 0706)  Resp: 18 (05/11/23 0706)  BP: (!) 136/105 (05/10/23 2045)  SpO2: 99 % (05/11/23 0706) Vital Signs (24h Range):  Temp:  [97.7 °F (36.5 °C)-98.6 °F (37 °C)] 98.6 °F (37 °C)  Pulse:  [] 80  Resp:  [17-26] 18  SpO2:  [94 %-100 %] 99 %  BP: (131-153)/() 136/105     Weight: 72.6 kg (160 lb)  Body mass index is 25.82 kg/m².     Physical Exam  Vitals and nursing note reviewed.   Constitutional:       Appearance: Normal " appearance. She is normal weight.   HENT:      Head: Normocephalic and atraumatic.      Right Ear: Tympanic membrane normal.      Left Ear: Tympanic membrane normal.      Nose: Nose normal.      Mouth/Throat:      Mouth: Mucous membranes are moist.   Eyes:      Extraocular Movements: Extraocular movements intact.      Conjunctiva/sclera: Conjunctivae normal.      Pupils: Pupils are equal, round, and reactive to light.   Cardiovascular:      Rate and Rhythm: Normal rate and regular rhythm.      Pulses: Normal pulses.   Pulmonary:      Effort: Pulmonary effort is normal.      Breath sounds: Normal breath sounds.   Abdominal:      General: Abdomen is flat. Bowel sounds are normal.      Palpations: Abdomen is soft.   Musculoskeletal:         General: Normal range of motion.      Cervical back: Normal range of motion and neck supple.      Comments: Recent left knee replacement   Skin:     General: Skin is warm and dry.   Neurological:      General: No focal deficit present.      Mental Status: She is alert and oriented to person, place, and time.   Psychiatric:         Mood and Affect: Mood normal.            CRANIAL NERVES     CN III, IV, VI   Pupils are equal, round, and reactive to light.     Significant Labs: All pertinent labs within the past 24 hours have been reviewed.    Significant Imaging: I have reviewed all pertinent imaging results/findings within the past 24 hours.    Assessment/Plan:     Shaking chills        Elevated lactic acid level          VTE Risk Mitigation (From admission, onward)         Ordered     apixaban tablet 2.5 mg  2 times daily         05/10/23 1756     heparin lock flush (porcine) injection 100 Units  As needed (PRN)         05/10/23 1756                           Hannah Schulz MD  Department of Hospital Medicine  [facility] - Medical Surgical Unit

## 2023-05-11 NOTE — PLAN OF CARE
Problem: Adult Inpatient Plan of Care  Goal: Plan of Care Review  Outcome: Ongoing, Progressing  Goal: Patient-Specific Goal (Individualized)  Outcome: Ongoing, Progressing     Problem: Fall Injury Risk  Goal: Absence of Fall and Fall-Related Injury  Outcome: Ongoing, Progressing  Intervention: Identify and Manage Contributors  Flowsheets (Taken 5/11/2023 1702)  Self-Care Promotion:   independence encouraged   BADL personal objects within reach   BADL personal routines maintained  Medication Review/Management:   medications reviewed   high-risk medications identified  Intervention: Promote Injury-Free Environment  Flowsheets (Taken 5/11/2023 1702)  Safety Promotion/Fall Prevention:   assistive device/personal item within reach   diversional activities provided   high risk medications identified   medications reviewed   muscle strengthening facilitated   nonskid shoes/socks when out of bed   side rails raised x 3   instructed to call staff for mobility

## 2023-05-11 NOTE — CONSULTS
Pharmacy consulted to dose Vancomycin.     Based on pt wt 72.6 kg and Scr 1.14, the following therapy will be initiated:    Vancomycin 1500 mg IV q 18 hrs.    Trough prior 4th dose. Scheduled for 5/13 AM.    Pharmacy to follow daily and make necessary adjustments.    Thank you.

## 2023-05-11 NOTE — NURSING
"Pt yelling for help from room, pt found to be shaking and c/o "freezing". Pt very anxious and demanding warm blankets and help. Notified Charge RN and Dr Robert that help is needed with this pt. Vs obtained, pt found to be tachycardic, NC O2 applied, Dr Robert to bedside, EKG obtained. Monitoring pt and awaiting further orders  "

## 2023-05-11 NOTE — PLAN OF CARE
Problem: Adult Inpatient Plan of Care  Goal: Plan of Care Review  Outcome: Ongoing, Progressing  Goal: Patient-Specific Goal (Individualized)  Outcome: Ongoing, Progressing  Goal: Absence of Hospital-Acquired Illness or Injury  Outcome: Ongoing, Progressing  Goal: Optimal Comfort and Wellbeing  Outcome: Ongoing, Progressing  Goal: Readiness for Transition of Care  Outcome: Ongoing, Progressing     Problem: Diabetes Comorbidity  Goal: Blood Glucose Level Within Targeted Range  Outcome: Ongoing, Progressing     Problem: Fluid and Electrolyte Imbalance (Acute Kidney Injury/Impairment)  Goal: Fluid and Electrolyte Balance  Outcome: Ongoing, Progressing     Problem: Oral Intake Inadequate (Acute Kidney Injury/Impairment)  Goal: Optimal Nutrition Intake  Outcome: Ongoing, Progressing     Problem: Renal Function Impairment (Acute Kidney Injury/Impairment)  Goal: Effective Renal Function  Outcome: Ongoing, Progressing     Problem: Infection  Goal: Absence of Infection Signs and Symptoms  Outcome: Ongoing, Progressing     Problem: Fall Injury Risk  Goal: Absence of Fall and Fall-Related Injury  Outcome: Ongoing, Progressing     Problem: Skin Injury Risk Increased  Goal: Skin Health and Integrity  Outcome: Ongoing, Progressing

## 2023-05-11 NOTE — NURSING
"Pt vomitted large amount of dark brown liquid  just after receiving po tylenol, and continues to complain of "heartburn"  "

## 2023-05-11 NOTE — ANESTHESIA PROCEDURE NOTES
Peripheral Block    Patient location during procedure: OR   Block not for primary anesthetic.  Reason for block: at surgeon's request and post-op pain management   Post-op Pain Location: left knee   Start time: 5/9/2023 8:35 AM  Timeout: 5/9/2023 8:35 AM   End time: 5/9/2023 8:37 AM    Staffing  Authorizing Provider: Haroldo Aldridge MD  Performing Provider: Haroldo Aldridge MD    Preanesthetic Checklist  Completed: patient identified, IV checked, site marked, risks and benefits discussed, surgical consent, monitors and equipment checked, pre-op evaluation and timeout performed  Peripheral Block  Patient position: supine  Prep: ChloraPrep  Patient monitoring: heart rate, cardiac monitor, continuous pulse ox, continuous capnometry and frequent blood pressure checks  Block type: adductor canal  Laterality: left  Injection technique: single shot  Needle  Needle type: Stimuplex   Needle gauge: 20 G  Needle length: 4 in  Needle localization: ultrasound guidance   -ultrasound image captured on disc.  Assessment  Injection assessment: negative aspiration, negative parasthesia and local visualized surrounding nerve  Paresthesia pain: none  Heart rate change: no  Slow fractionated injection: yes  Pain Tolerance: comfortable throughout block and no complaints  Medications:    Medications: ROPIvacaine (NAROPIN) injection 0.75% - Perineural   30 mL - 5/9/2023 8:36:00 AM

## 2023-05-11 NOTE — CARE UPDATE
Late entry due to computers being down.  Called to see patient last night with chills and shortness of breath, sudden onset.  Review of vital signs indicate patient had spiked a fever to 101 the previous night while still at [facility] in [place].  Currently patient has a normal temperature.    Patient reports feeling short of breath and having shaking chills.  She was admitted earlier this evening for swing bed rehabilitation after total knee replacement left lower extremity 1 day prior.  She had previously had complication of MRSA joint infection.  Patient has a PICC line.  On arrival PICC line was clogged and had to be opened using Alteplase cathflo.  Catheter was open successfully.    Examination:  Heart rate is tachycardic 150s, respiratory rate respiratory rate 26, O2 sat 97%, blood pressure 136/105.  Patient appears anxious and has shaking chills.  Mucosa is moist, lungs clear bilaterally, heart is tachycardic and regular, no murmur.  Abdomen is soft and nontender, extremities show no edema, no calf tenderness.    Review of most recent labs shows borderline anemia, normal white blood cell count.    Added troponin and BNP as well as a chest x-ray.  Troponin was normal, BNP was borderline elevated at 168.  Portable chest x-ray shows clear lung fields.  ABG was also done which showed pH 7.49, pCO2 33, PO2 63, bicarb 25.1.  Lactic acid level was elevated at 4.2, repeat 2 hours later was 3.3, and further repeat another 2 hours later showed lactic returned to normal at 1.2.    Blood cultures ordered.    Assessment and plan:  Patient was started on Zosyn and vancomycin for sepsis/bacteremia.  Coverage for history of MRSA with vancomycin.  Patient was given IV normal saline fluid bolus.    Patient markedly improved, defer further care to admitting physician Dr. Schulz.

## 2023-05-11 NOTE — SUBJECTIVE & OBJECTIVE
Past Medical History:   Diagnosis Date    Abscess of right thigh + MRSA 08/20/2021    healed    Adnexal cyst     Degenerative disc disease     Depression 10/29/2021    Diabetes mellitus     Hypertension     Hypomagnesemia 01/20/2023    Nicotine dependence     Osteoarthritis        Past Surgical History:   Procedure Laterality Date    BACK SURGERY      HYSTERECTOMY N/A     INCISION AND DRAINAGE Left 3/13/2023    Procedure: INCISION AND DRAINAGE;  Surgeon: Jacek Noyola MD;  Location: [facility] OR;  Service: Orthopedics;  Laterality: Left;    IRRIGATION AND DEBRIDEMENT OF LOWER EXTREMITY Left 11/22/2022    Procedure: IRRIGATION AND DEBRIDEMENT, LOWER EXTREMITY;  Surgeon: Papa Mendoza MD;  Location: [facility] OR;  Service: Orthopedics;  Laterality: Left;    IRRIGATION AND DEBRIDEMENT OF UPPER EXTREMITY Left 11/22/2022    Procedure: IRRIGATION AND DEBRIDEMENT, UPPER EXTREMITY;  Surgeon: Papa Mendoza MD;  Location: [facility] OR;  Service: Orthopedics;  Laterality: Left;    REVISION OF KNEE ARTHROPLASTY Left 3/13/2023    Procedure: REMOVAL OF PROSTHESIS,METHYLMETHACRYLATE WITH OR WITHOUT INSERTION OF SPACER LEFT KNEE;  Surgeon: Jacek Noyola MD;  Location: [facility] OR;  Service: Orthopedics;  Laterality: Left;    REVISION OF KNEE ARTHROPLASTY Left 5/9/2023    Procedure: REVISION, ARTHROPLASTY, KNEE;  Surgeon: Jacek Noyola MD;  Location: [facility] OR;  Service: Orthopedics;  Laterality: Left;    THORACIC LAMINECTOMY WITH FUSION N/A 1/24/2023    Procedure: T9-L2 fusion, T11-T12 laminectomy and corpectomy;  Surgeon: Jimmy Hernandes MD;  Location: [email or web address] OR;  Service: Neurosurgery;  Laterality: N/A;    TOTAL HIP ARTHROPLASTY Left     TOTAL KNEE ARTHROPLASTY Bilateral        Review of patient's allergies indicates:  No Known Allergies    Current Facility-Administered Medications on File Prior to Encounter   Medication    [DISCONTINUED] acetaminophen tablet 650 mg     "[DISCONTINUED] apixaban tablet 2.5 mg    [DISCONTINUED] bisacodyL suppository 10 mg    [DISCONTINUED] dextrose 10% bolus 125 mL 125 mL    [DISCONTINUED] dextrose 10% bolus 250 mL 250 mL    [DISCONTINUED] dextrose 40 % gel 15,000 mg    [DISCONTINUED] dextrose 40 % gel 30,000 mg    [DISCONTINUED] glucagon (human recombinant) injection 1 mg    [DISCONTINUED] HYDROcodone-acetaminophen  mg per tablet 1 tablet    [DISCONTINUED] insulin aspart U-100 injection 1-10 Units    [DISCONTINUED] lactated ringers infusion    [DISCONTINUED] morphine injection 4 mg    [DISCONTINUED] ondansetron injection 4 mg     Current Outpatient Medications on File Prior to Encounter   Medication Sig    apixaban (ELIQUIS) 2.5 mg Tab Take 1 tablet (2.5 mg total) by mouth 2 (two) times daily. for 12 days    DULoxetine (CYMBALTA) 60 MG capsule Take 1 capsule (60 mg total) by mouth once daily.    HYDROcodone-acetaminophen (NORCO)  mg per tablet Take 1 tablet by mouth every 4 (four) hours as needed for Pain.    insulin (LANTUS SOLOSTAR U-100 INSULIN) glargine 100 units/mL SubQ pen Inject 30 Units into the skin once daily.    Lactobacillus acidophilus 500 million cell Cap Take 1 capsule by mouth 3 (three) times daily with meals.    NOVOLOG FLEXPEN U-100 INSULIN 100 unit/mL (3 mL) InPn pen Inject 5 Units into the skin 3 (three) times daily with meals.    BD ULTRA-FINE SHORT PEN NEEDLE 31 gauge x 5/16" Ndle     ondansetron 4 mg/2 mL Soln Inject 4 mg into the vein every 8 (eight) hours as needed.    promethazine (PHENERGAN) 25 MG tablet Take 1 tablet (25 mg total) by mouth every 6 (six) hours as needed. (Patient not taking: Reported on 4/28/2023)     Family History       Problem Relation (Age of Onset)    Diabetes Father    Hypertension Father          Tobacco Use    Smoking status: Every Day     Packs/day: 0.50     Types: Cigarettes    Smokeless tobacco: Current     Types: Snuff   Substance and Sexual Activity    Alcohol use: Never    Drug " use: Never    Sexual activity: Not on file     Review of Systems   Constitutional:  Negative for activity change.   HENT:  Negative for congestion.    Eyes:  Negative for visual disturbance.   Respiratory:  Negative for cough.    Cardiovascular:  Negative for chest pain.   Gastrointestinal:  Negative for abdominal distention.   Genitourinary:  Negative for difficulty urinating.   Musculoskeletal:  Positive for arthralgias and joint swelling.   Skin:  Negative for color change.   Neurological:  Negative for dizziness.   Psychiatric/Behavioral:  Negative for behavioral problems.    Objective:     Vital Signs (Most Recent):  Temp: 98.6 °F (37 °C) (05/11/23 0638)  Pulse: 80 (05/11/23 0706)  Resp: 18 (05/11/23 0706)  BP: (!) 136/105 (05/10/23 2045)  SpO2: 99 % (05/11/23 0706) Vital Signs (24h Range):  Temp:  [97.7 °F (36.5 °C)-98.6 °F (37 °C)] 98.6 °F (37 °C)  Pulse:  [] 80  Resp:  [17-26] 18  SpO2:  [94 %-100 %] 99 %  BP: (131-153)/() 136/105     Weight: 72.6 kg (160 lb)  Body mass index is 25.82 kg/m².     Physical Exam  Vitals and nursing note reviewed.   Constitutional:       Appearance: Normal appearance. She is normal weight.   HENT:      Head: Normocephalic and atraumatic.      Right Ear: Tympanic membrane normal.      Left Ear: Tympanic membrane normal.      Nose: Nose normal.      Mouth/Throat:      Mouth: Mucous membranes are moist.   Eyes:      Extraocular Movements: Extraocular movements intact.      Conjunctiva/sclera: Conjunctivae normal.      Pupils: Pupils are equal, round, and reactive to light.   Cardiovascular:      Rate and Rhythm: Normal rate and regular rhythm.      Pulses: Normal pulses.   Pulmonary:      Effort: Pulmonary effort is normal.      Breath sounds: Normal breath sounds.   Abdominal:      General: Abdomen is flat. Bowel sounds are normal.      Palpations: Abdomen is soft.   Musculoskeletal:         General: Normal range of motion.      Cervical back: Normal range of motion  and neck supple.      Comments: Recent left knee replacement   Skin:     General: Skin is warm and dry.   Neurological:      General: No focal deficit present.      Mental Status: She is alert and oriented to person, place, and time.   Psychiatric:         Mood and Affect: Mood normal.            CRANIAL NERVES     CN III, IV, VI   Pupils are equal, round, and reactive to light.     Significant Labs: All pertinent labs within the past 24 hours have been reviewed.    Significant Imaging: I have reviewed all pertinent imaging results/findings within the past 24 hours.

## 2023-05-11 NOTE — PROGRESS NOTES
05/11/23-Discharged to [facility] for rehab on 05/10/23. OPCM Case Closure.

## 2023-05-11 NOTE — ANESTHESIA POSTPROCEDURE EVALUATION
Anesthesia Post Evaluation    Patient: Monica Walker    Procedure(s) Performed: Procedure(s) (LRB):  REVISION, ARTHROPLASTY, KNEE (Left)    Final Anesthesia Type: general      Patient location during evaluation: PACU  Patient participation: Yes- Able to Participate  Level of consciousness: awake and alert  Post-procedure vital signs: reviewed and stable  Pain management: adequate  Airway patency: patent  MARILU mitigation strategies: Multimodal analgesia  PONV status at discharge: No PONV  Anesthetic complications: no      Cardiovascular status: blood pressure returned to baseline  Respiratory status: unassisted  Hydration status: euvolemic  Follow-up not needed.          Vitals Value Taken Time   /63 05/11/23 0829   Temp 36.7 °C (98 °F) 05/11/23 0829   Pulse 74 05/11/23 0829   Resp 18 05/11/23 1021   SpO2 100 % 05/11/23 0829         Event Time   Out of Recovery 12:25:00         Pain/Inder Score: Pain Rating Prior to Med Admin: 9 (5/11/2023 10:21 AM)  Pain Rating Post Med Admin: 3 (5/10/2023  1:10 AM)

## 2023-05-11 NOTE — NURSING
Notified Dr Robert of need for alteplase flush to declot pic line and need for medication for heart burn. Awaiting orders

## 2023-05-12 LAB
GLUCOSE SERPL-MCNC: 137 MG/DL (ref 70–105)
GLUCOSE SERPL-MCNC: 145 MG/DL (ref 70–105)
GLUCOSE SERPL-MCNC: 178 MG/DL (ref 70–105)
GLUCOSE SERPL-MCNC: 85 MG/DL (ref 70–105)

## 2023-05-12 PROCEDURE — 63600175 PHARM REV CODE 636 W HCPCS: Performed by: EMERGENCY MEDICINE

## 2023-05-12 PROCEDURE — 99308 SBSQ NF CARE LOW MDM 20: CPT | Mod: ,,, | Performed by: FAMILY MEDICINE

## 2023-05-12 PROCEDURE — 94761 N-INVAS EAR/PLS OXIMETRY MLT: CPT

## 2023-05-12 PROCEDURE — 63600175 PHARM REV CODE 636 W HCPCS: Performed by: FAMILY MEDICINE

## 2023-05-12 PROCEDURE — 11000004 HC SNF PRIVATE

## 2023-05-12 PROCEDURE — 25000003 PHARM REV CODE 250: Performed by: FAMILY MEDICINE

## 2023-05-12 PROCEDURE — 25000003 PHARM REV CODE 250: Performed by: EMERGENCY MEDICINE

## 2023-05-12 PROCEDURE — 82962 GLUCOSE BLOOD TEST: CPT

## 2023-05-12 PROCEDURE — 27000221 HC OXYGEN, UP TO 24 HOURS

## 2023-05-12 PROCEDURE — 99308 PR NURSING FAC CARE, SUBSEQ, MINOR COMPLIC: ICD-10-PCS | Mod: ,,, | Performed by: FAMILY MEDICINE

## 2023-05-12 PROCEDURE — 96372 THER/PROPH/DIAG INJ SC/IM: CPT

## 2023-05-12 PROCEDURE — 97161 PT EVAL LOW COMPLEX 20 MIN: CPT

## 2023-05-12 PROCEDURE — 97165 OT EVAL LOW COMPLEX 30 MIN: CPT

## 2023-05-12 RX ADMIN — APIXABAN 2.5 MG: 2.5 TABLET, FILM COATED ORAL at 09:05

## 2023-05-12 RX ADMIN — DULOXETINE 60 MG: 30 CAPSULE, DELAYED RELEASE ORAL at 09:05

## 2023-05-12 RX ADMIN — APIXABAN 2.5 MG: 2.5 TABLET, FILM COATED ORAL at 08:05

## 2023-05-12 RX ADMIN — Medication 6 MG: at 08:05

## 2023-05-12 RX ADMIN — SENNOSIDES AND DOCUSATE SODIUM 1 TABLET: 50; 8.6 TABLET ORAL at 09:05

## 2023-05-12 RX ADMIN — FAMOTIDINE 20 MG: 20 TABLET, FILM COATED ORAL at 08:05

## 2023-05-12 RX ADMIN — SODIUM CHLORIDE: 9 INJECTION, SOLUTION INTRAVENOUS at 12:05

## 2023-05-12 RX ADMIN — HEPARIN, PORCINE (PF) 10 UNIT/ML INTRAVENOUS SYRINGE 50 UNITS: at 09:05

## 2023-05-12 RX ADMIN — VANCOMYCIN HYDROCHLORIDE 1500 MG: 1.5 INJECTION, POWDER, LYOPHILIZED, FOR SOLUTION INTRAVENOUS at 01:05

## 2023-05-12 RX ADMIN — ACETAMINOPHEN 650 MG: 325 TABLET ORAL at 08:05

## 2023-05-12 RX ADMIN — MUPIROCIN: 20 OINTMENT TOPICAL at 09:05

## 2023-05-12 RX ADMIN — Medication 1 CAPSULE: at 09:05

## 2023-05-12 RX ADMIN — PIPERACILLIN AND TAZOBACTAM 4.5 G: 4; .5 INJECTION, POWDER, FOR SOLUTION INTRAVENOUS at 11:05

## 2023-05-12 RX ADMIN — HYDROCODONE BITARTRATE AND ACETAMINOPHEN 1 TABLET: 10; 325 TABLET ORAL at 06:05

## 2023-05-12 RX ADMIN — PIPERACILLIN AND TAZOBACTAM 4.5 G: 4; .5 INJECTION, POWDER, FOR SOLUTION INTRAVENOUS at 05:05

## 2023-05-12 RX ADMIN — SENNOSIDES AND DOCUSATE SODIUM 1 TABLET: 50; 8.6 TABLET ORAL at 08:05

## 2023-05-12 RX ADMIN — Medication 1 CAPSULE: at 11:05

## 2023-05-12 RX ADMIN — HYDROCODONE BITARTRATE AND ACETAMINOPHEN 1 TABLET: 10; 325 TABLET ORAL at 11:05

## 2023-05-12 RX ADMIN — FAMOTIDINE 20 MG: 20 TABLET, FILM COATED ORAL at 09:05

## 2023-05-12 RX ADMIN — INSULIN DETEMIR 30 UNITS: 100 INJECTION, SOLUTION SUBCUTANEOUS at 09:05

## 2023-05-12 RX ADMIN — Medication 1 CAPSULE: at 04:05

## 2023-05-12 RX ADMIN — HYDROCODONE BITARTRATE AND ACETAMINOPHEN 1 TABLET: 10; 325 TABLET ORAL at 02:05

## 2023-05-12 RX ADMIN — MUPIROCIN: 20 OINTMENT TOPICAL at 08:05

## 2023-05-12 RX ADMIN — PIPERACILLIN AND TAZOBACTAM 4.5 G: 4; .5 INJECTION, POWDER, FOR SOLUTION INTRAVENOUS at 01:05

## 2023-05-12 NOTE — CONSULTS
"  [facility] Surgical Unit  Adult Nutrition  Consult Note    SUMMARY     Recommendations    Recommendation/Intervention: 1. F/U for intake support  Goals: BG<200mg/dL, EEN >75%  Nutrition Goal Status: new    Assessment and Plan    No new Assessment & Plan notes have been filed under this hospital service since the last note was generated.  Service: Nutrition       Malnutrition Assessment     Teeth (Micronutrient): broken dentition       Energy Intake (Malnutrition): less than or equal to 50% for greater than or equal to 5 days                         Reason for Assessment    Reason For Assessment: consult (swing bed pt)  Diagnosis:  (recent MRSA joint infection and L TKR revision, sepsis)  Relevant Medical History: DM with hx of non compliance, DJD, depression, tobacco abuse  Interdisciplinary Rounds: did not attend  General Information Comments: Pt known to me from previous admits.  She has a hx of noncomplaince with her diet but her intake has been decreased this admit. C/O poor appetite and could not voice any prefs that she wanted to receive. She did not want to drink any Glucerna.  Meal intake ~0-50% since admit.    Nutrition Risk Screen    Nutrition Risk Screen:  (Infection and decreased appetite)    Nutrition/Diet History    Patient Reported Diet/Restrictions/Preferences: general  Spiritual, Cultural Beliefs, Religion Practices, Values that Affect Care: no  Food Allergies: NKFA  Factors Affecting Nutritional Intake: decreased appetite, depression    Anthropometrics    Temp: 98.7 °F (37.1 °C)  Height Method: Stated  Height: 5' 6" (167.6 cm)  Height (inches): 66 in  Weight Method: Standard Scale  Weight: 72.6 kg (160 lb)  Weight (lb): 160 lb  Ideal Body Weight (IBW), Female: 130 lb  % Ideal Body Weight, Female (lb): 123.08 %  BMI (Calculated): 25.8  BMI Grade: 25 - 29.9 - overweight  Weight Loss:  (Her wt has a hx of fluctating.)       Lab/Procedures/Meds    Pertinent Labs Reviewed: " reviewed  Pertinent Labs Comments: Hgb A1c 7.7, crt 1.14, Hgb 10.6, Hct 33.5  Pertinent Medications Reviewed: reviewed  Pertinent Medications Comments: Pepcid, Insulin, Lactobacillus, Zosyn, Vancomycin    Physical Findings/Assessment         Estimated/Assessed Needs    Weight Used For Calorie Calculations: 72.6 kg (160 lb)  Energy Calorie Requirements (kcal): 1841  Energy Need Method: Kcal/kg  Protein Requirements: 73-81  Weight Used For Protein Calculations: 73 kg (160 lb 15 oz)     Estimated Fluid Requirement Method: RDA Method  RDA Method (mL): 1841         Nutrition Prescription Ordered    Current Diet Order: Diabetic    Evaluation of Received Nutrient/Fluid Intake    Energy Calories Required: not meeting needs  Protein Required: not meeting needs  Fluid Required: not meeting needs  Tolerance: tolerating  % Intake of Estimated Energy Needs: 25 - 50 %  % Meal Intake: 25 - 50 %    Nutrition Risk    Level of Risk/Frequency of Follow-up: moderate - high       Monitor and Evaluation    Food and Nutrient Intake: food and beverage intake  Anthropometric Measurements: weight  Biochemical Data, Medical Tests and Procedures: electrolyte and renal panel, glucose/endocrine profile  Nutrition-Focused Physical Findings: overall appearance       Nutrition Follow-Up    RD Follow-up?: Yes

## 2023-05-12 NOTE — NURSING
Received report from off going nurse that patients 1st set of blood cultures grew gram positive cocci in both tubes and waiting sensitivity report, will pass on to oncoming am nurse to  f/u.

## 2023-05-12 NOTE — SUBJECTIVE & OBJECTIVE
Interval History: blood cultures are positive.    Review of Systems  Objective:     Vital Signs (Most Recent):  Temp: 98.7 °F (37.1 °C) (05/12/23 0731)  Pulse: 75 (05/12/23 0731)  Resp: 18 (05/12/23 0731)  BP: (!) 94/51 (05/12/23 0731)  SpO2: 95 % (05/12/23 0731) Vital Signs (24h Range):  Temp:  [98 °F (36.7 °C)-99.3 °F (37.4 °C)] 98.7 °F (37.1 °C)  Pulse:  [74-78] 75  Resp:  [16-20] 18  SpO2:  [95 %-100 %] 95 %  BP: ()/(51-63) 94/51     Weight: 72.6 kg (160 lb)  Body mass index is 25.82 kg/m².    Intake/Output Summary (Last 24 hours) at 5/12/2023 0754  Last data filed at 5/12/2023 0531  Gross per 24 hour   Intake 200 ml   Output 30 ml   Net 170 ml         Physical Exam        Significant Labs: All pertinent labs within the past 24 hours have been reviewed.    Significant Imaging: I have reviewed all pertinent imaging results/findings within the past 24 hours.

## 2023-05-12 NOTE — NURSING
Received call from Satnam in micro due to blood culture bottle grew, gram negative bacilli in the anaerobic bottle.and waiting on sensitive report. Will pass on to am nurse.

## 2023-05-12 NOTE — PLAN OF CARE
Problem: Diabetes Comorbidity  Goal: Blood Glucose Level Within Targeted Range  Outcome: Ongoing, Progressing  Intervention: Monitor and Manage Glycemia  Flowsheets (Taken 5/12/2023 1067)  Glycemic Management: blood glucose monitored

## 2023-05-12 NOTE — PT/OT/SLP EVAL
Occupational Therapy   Evaluation    Name: Monica Walker  MRN: [id number]  Admitting Diagnosis: History of total left knee replacement  Recent Surgery: * No surgery found *      Recommendations:     Discharge Recommendations: home with home health  Discharge Equipment Recommendations:  none  Barriers to discharge:       Assessment:     Monica Walker is a 52 y.o. female with a medical diagnosis of History of total left knee replacement.  Performance deficits affecting function: weakness, impaired endurance, impaired self care skills, impaired functional mobility, impaired balance, decreased lower extremity function, decreased safety awareness.      Rehab Prognosis: Fair; patient would benefit from acute skilled OT services to address these deficits and reach maximum level of function.       Plan:     Patient to be seen 5 x/week to address the above listed problems via self-care/home management, therapeutic exercises, therapeutic activities  Plan of Care Expires:    Plan of Care Reviewed with: patient    Subjective     Chief Complaint: Pain in her back; Patient was unwilling to do any therapy or leave room. She would only stand for eval  Patient/Family Comments/goals: Get stronger and stop hurting    Occupational Profile:  Living Environment: lives with family  Previous level of function: Mod I  Roles and Routines: self-care, home management  Equipment Used at Home: walker, rolling  Assistance upon Discharge: family can help    Pain/Comfort:  Pain Rating 1: 10/10    Patients cultural, spiritual, Uatsdin conflicts given the current situation: no    Objective:     Communicated with: RN prior to session.  Patient found supine with telemetry, PICC line, knee immobilizer upon OT entry to room.    General Precautions: Standard, fall  Orthopedic Precautions: LLE weight bearing as tolerated  Braces:    Respiratory Status: Room air    Occupational Performance:    Bed Mobility:    Patient completed Rolling/Turning to  Right with minimum assistance    Functional Mobility/Transfers:  Patient completed Sit <> Stand Transfer with minimum assistance  with  rolling walker   Functional Mobility: N/A due to pain    Activities of Daily Living:  Feeding:  supervision breakfast    Cognitive/Visual Perceptual:  Cognitive/Psychosocial Skills:     -       Oriented to: Person, Place, Time, and Situation     Physical Exam:  Upper Extremity Range of Motion:     -       Right Upper Extremity: WFL  -       Left Upper Extremity: limited due to pain    AMPAC 6 Click ADL:  AMPA Total Score: 20    Treatment & Education:  Pt educated on OT role/POC.   Importance of OOB activity with staff assistance.  Importance of sitting up in the chair throughout the day as tolerated, especially for meals   Safety during functional t/f and mobility  Importance of assisting with self-care activities       Patient left supine with call button in reach    GOALS:   Multidisciplinary Problems       Occupational Therapy Goals          Problem: Occupational Therapy    Goal Priority Disciplines Outcome Interventions   Occupational Therapy Goal     OT, PT/OT     Description: Description: Grooming Status:   Short Term Goal: Pt will perform grooming with s/u sitting EOB.   Long Term Goal: Pt will perform grooming/oral hygiene standing at sink with Mod I      LE dressing Status:   Short Term Goal: Pt will perform LE dressing with min a.   Long Term Goal: Pt will perform LE dressing with s/u.    Toileting Status:   Short Term Goal: Pt will perform toilet hygiene on BSC with s/u.  Long Term Goal: Pt will perform toilet hygiene on toilet with no AE with mod I.    Commode Transfer:   Short Term Goal: Pt will perform BSC t/f with s/u.  Long Term Goal:  Pt will perform toilet t/f in bathroom with Mod I.     Bathing Status:   Long Term Goal: Pt will perform sponge bath with s/u with no unsafe fatigue.     Strength Status:   Long Term Goal: Pt to perform BUE strengthening with weights  and/or body weight to increase ADL independence and safety    Endurance Status:   Short Term Goal:pt to perform 15 min OT treatment with 5 or greater rest breaks  Long Term Goal: pt to perform 30 min OT treat with 3 or less rest breaks                         History:     Past Medical History:   Diagnosis Date    Abscess of right thigh + MRSA 08/20/2021    healed    Adnexal cyst     Degenerative disc disease     Depression 10/29/2021    Diabetes mellitus     Hypertension     Hypomagnesemia 01/20/2023    Nicotine dependence     Osteoarthritis          Past Surgical History:   Procedure Laterality Date    BACK SURGERY      HYSTERECTOMY N/A     INCISION AND DRAINAGE Left 3/13/2023    Procedure: INCISION AND DRAINAGE;  Surgeon: Jacek Noyola MD;  Location: [facility] OR;  Service: Orthopedics;  Laterality: Left;    IRRIGATION AND DEBRIDEMENT OF LOWER EXTREMITY Left 11/22/2022    Procedure: IRRIGATION AND DEBRIDEMENT, LOWER EXTREMITY;  Surgeon: Papa Mendoza MD;  Location: [facility] OR;  Service: Orthopedics;  Laterality: Left;    IRRIGATION AND DEBRIDEMENT OF UPPER EXTREMITY Left 11/22/2022    Procedure: IRRIGATION AND DEBRIDEMENT, UPPER EXTREMITY;  Surgeon: Papa Mendoza MD;  Location: [facility] OR;  Service: Orthopedics;  Laterality: Left;    REVISION OF KNEE ARTHROPLASTY Left 3/13/2023    Procedure: REMOVAL OF PROSTHESIS,METHYLMETHACRYLATE WITH OR WITHOUT INSERTION OF SPACER LEFT KNEE;  Surgeon: Jacek Noyola MD;  Location: [facility] OR;  Service: Orthopedics;  Laterality: Left;    REVISION OF KNEE ARTHROPLASTY Left 5/9/2023    Procedure: REVISION, ARTHROPLASTY, KNEE;  Surgeon: Jacek Noyola MD;  Location: [facility] OR;  Service: Orthopedics;  Laterality: Left;    THORACIC LAMINECTOMY WITH FUSION N/A 1/24/2023    Procedure: T9-L2 fusion, T11-T12 laminectomy and corpectomy;  Surgeon: Jimmy Hernandes MD;  Location: [facility] OR;  Service: Neurosurgery;  Laterality:  N/A;    TOTAL HIP ARTHROPLASTY Left     TOTAL KNEE ARTHROPLASTY Bilateral        Time Tracking:     OT Date of Treatment: 05/12/23  OT Start Time: 0820  OT Stop Time: 0830  OT Total Time (min): 10 min    Billable Minutes:Evaluation 10 min  Kirti Bolton OTR/L      5/12/2023

## 2023-05-12 NOTE — PLAN OF CARE
Problem: Adult Inpatient Plan of Care  Goal: Plan of Care Review  Outcome: Ongoing, Progressing  Goal: Patient-Specific Goal (Individualized)  Outcome: Ongoing, Progressing  Goal: Absence of Hospital-Acquired Illness or Injury  Outcome: Ongoing, Progressing  Goal: Optimal Comfort and Wellbeing  Outcome: Ongoing, Progressing  Goal: Readiness for Transition of Care  Outcome: Ongoing, Progressing     Problem: Diabetes Comorbidity  Goal: Blood Glucose Level Within Targeted Range  Outcome: Ongoing, Progressing     Problem: Fluid and Electrolyte Imbalance (Acute Kidney Injury/Impairment)  Goal: Fluid and Electrolyte Balance  Outcome: Ongoing, Progressing     Problem: Infection  Goal: Absence of Infection Signs and Symptoms  Outcome: Ongoing, Progressing     Problem: Fall Injury Risk  Goal: Absence of Fall and Fall-Related Injury  Outcome: Ongoing, Progressing     Problem: Skin Injury Risk Increased  Goal: Skin Health and Integrity  Outcome: Ongoing, Progressing

## 2023-05-12 NOTE — PT/OT/SLP EVAL
Physical Therapy Evaluation    Patient Name:  Monica Walker   MRN:  [id number]    Recommendations:     Discharge Recommendations: home with home health   Discharge Equipment Recommendations: none   Barriers to discharge:  infection    Assessment:     Monica Walker is a 52 y.o. female admitted with a medical diagnosis of History of total left knee replacement.  She presents with the following impairments/functional limitations: weakness, impaired endurance, impaired self care skills, impaired functional mobility, gait instability, impaired balance, decreased lower extremity function, decreased safety awareness, pain.    Rehab Prognosis: Good; patient would benefit from acute skilled PT services to address these deficits and reach maximum level of function.    Recent Surgery: * No surgery found *      Plan:     During this hospitalization, patient to be seen 5 x/week to address the identified rehab impairments via gait training, therapeutic activities, therapeutic exercises and progress toward the following goals:    Plan of Care Expires:  06/02/23    Subjective     Chief Complaint: back pain  Patient/Family Comments/goals: return home   Pain/Comfort:  Pain Rating 1: 10/10  Location - Side 1: Left  Location 1: back  Pain Addressed 1: Pre-medicate for activity    Patients cultural, spiritual, Adventist conflicts given the current situation:      Living Environment:  Lives home alone prior to infection but mother lives close by. Has multiple step to enter mobile home   Prior to admission, patients level of function was independent prior to hospitalization but has been hospitalized multiple times.  Equipment used at home: walker, rolling.  DME owned (not currently used): none.  Upon discharge, patient will have assistance from mother.    Objective:     Communicated with RN prior to session.  Patient found supine with telemetry, PICC line, knee immobilizer  upon PT entry to room.    General Precautions: Standard,  fall  Orthopedic Precautions:LLE weight bearing as tolerated   Braces: Knee immobilizer  Respiratory Status: Room air    Exams:  RLE Strength: 4/5  LLE Strength: NT post op and painful, 3/5 at hip     Functional Mobility:  Bed Mobility:     Supine to Sit: contact guard assistance  Sit to Supine: contact guard assistance  Transfers:     Sit to Stand:  contact guard assistance with rolling walker      AM-PAC 6 CLICK MOBILITY  Total Score:17       Treatment & Education:  Evaluation only    Patient left supine with all lines intact and call button in reach.    GOALS:   Multidisciplinary Problems       Physical Therapy Goals          Problem: Physical Therapy    Goal Priority Disciplines Outcome Goal Variances Interventions   Physical Therapy Goal     PT, PT/OT      Description: Short Term Goals  1. Patient will complete 30 reps of B LE exercises with correct form.   2. Patient will complete sit<>stand transfers with SBA.  3. Patient will ambulate 30 feet with RW on level surfaces with CGA.     Long Term Goals   1. Patient will ambulate 50 feet with RW on level and unlevel surfaces with SBA.  2. Patient will complete all functional transfers with MOD I.  4. Patient will negotiate up and down 2 stairs with use of handrail with SBA.                        History:     Past Medical History:   Diagnosis Date    Abscess of right thigh + MRSA 08/20/2021    healed    Adnexal cyst     Degenerative disc disease     Depression 10/29/2021    Diabetes mellitus     Hypertension     Hypomagnesemia 01/20/2023    Nicotine dependence     Osteoarthritis        Past Surgical History:   Procedure Laterality Date    BACK SURGERY      HYSTERECTOMY N/A     INCISION AND DRAINAGE Left 3/13/2023    Procedure: INCISION AND DRAINAGE;  Surgeon: Jacek Noyola MD;  Location: [facility];  Service: Orthopedics;  Laterality: Left;    IRRIGATION AND DEBRIDEMENT OF LOWER EXTREMITY Left 11/22/2022    Procedure: IRRIGATION AND DEBRIDEMENT,  LOWER EXTREMITY;  Surgeon: Papa Mendoza MD;  Location: [facility] OR;  Service: Orthopedics;  Laterality: Left;    IRRIGATION AND DEBRIDEMENT OF UPPER EXTREMITY Left 11/22/2022    Procedure: IRRIGATION AND DEBRIDEMENT, UPPER EXTREMITY;  Surgeon: Papa Mendoza MD;  Location: [facility] OR;  Service: Orthopedics;  Laterality: Left;    REVISION OF KNEE ARTHROPLASTY Left 3/13/2023    Procedure: REMOVAL OF PROSTHESIS,METHYLMETHACRYLATE WITH OR WITHOUT INSERTION OF SPACER LEFT KNEE;  Surgeon: Jacek Noyola MD;  Location: [facility] OR;  Service: Orthopedics;  Laterality: Left;    REVISION OF KNEE ARTHROPLASTY Left 5/9/2023    Procedure: REVISION, ARTHROPLASTY, KNEE;  Surgeon: Jacek Noyola MD;  Location: [facility];  Service: Orthopedics;  Laterality: Left;    THORACIC LAMINECTOMY WITH FUSION N/A 1/24/2023    Procedure: T9-L2 fusion, T11-T12 laminectomy and corpectomy;  Surgeon: Jimmy Hernandes MD;  Location: [facility];  Service: Neurosurgery;  Laterality: N/A;    TOTAL HIP ARTHROPLASTY Left     TOTAL KNEE ARTHROPLASTY Bilateral        Time Tracking:     PT Received On: 05/12/23  PT Start Time: 0820     PT Stop Time: 0830  PT Total Time (min): 10 min     Billable Minutes: Evaluation 10    Kalina Ac PT, DPT   05/12/2023

## 2023-05-12 NOTE — PROGRESS NOTES
[facility]  Hospital Medicine  Progress Note    Patient Name: Monica Walker  MRN: [id number]  Patient Class: IP- Swing   Admission Date: 5/10/2023  Length of Stay: 2 days  Attending Physician: Hannah Schulz, *  Primary Care Provider: Hannah Schulz MD        Subjective:     Principal Problem:History of total left knee replacement        HPI:  This 52 yr. Old WF has been admitted to [facility] for rehab after undergoing another left knee replacement. Apparently, she had a spell of rigors last night. A septic workup was done and antibiotics started. This AM, she feels badly. She is afebrile. We will continue present treatment.      Overview/Hospital Course:  5/12/23 - blood cultures are positive. Will continue present treatment.      Interval History: blood cultures are positive.    Review of Systems  Objective:     Vital Signs (Most Recent):  Temp: 98.7 °F (37.1 °C) (05/12/23 0731)  Pulse: 75 (05/12/23 0731)  Resp: 18 (05/12/23 0731)  BP: (!) 94/51 (05/12/23 0731)  SpO2: 95 % (05/12/23 0731) Vital Signs (24h Range):  Temp:  [98 °F (36.7 °C)-99.3 °F (37.4 °C)] 98.7 °F (37.1 °C)  Pulse:  [74-78] 75  Resp:  [16-20] 18  SpO2:  [95 %-100 %] 95 %  BP: ()/(51-63) 94/51     Weight: 72.6 kg (160 lb)  Body mass index is 25.82 kg/m².    Intake/Output Summary (Last 24 hours) at 5/12/2023 0750  Last data filed at 5/12/2023 0531  Gross per 24 hour   Intake 200 ml   Output 30 ml   Net 170 ml         Physical Exam        Significant Labs: All pertinent labs within the past 24 hours have been reviewed.    Significant Imaging: I have reviewed all pertinent imaging results/findings within the past 24 hours.      Assessment/Plan:      Shaking chills        Elevated lactic acid level          VTE Risk Mitigation (From admission, onward)         Ordered     heparin, porcine (PF) injection 50 Units  Daily         05/11/23 0824     apixaban tablet 2.5 mg  2 times daily          05/10/23 1756                Discharge Planning   SUZANNA:      Code Status: Full Code   Is the patient medically ready for discharge?:     Reason for patient still in hospital (select all that apply): Patient trending condition  Discharge Plan A: Home with family, Home Health                  Hannah Schulz MD  Department of Hospital Medicine   [facility] - [facility]

## 2023-05-12 NOTE — PLAN OF CARE
Description: Grooming Status:   Short Term Goal: Pt will perform grooming with s/u sitting EOB.   Long Term Goal: Pt will perform grooming/oral hygiene standing at sink with Mod I      LE dressing Status:   Short Term Goal: Pt will perform LE dressing with min a.   Long Term Goal: Pt will perform LE dressing with s/u.    Toileting Status:   Short Term Goal: Pt will perform toilet hygiene on BSC with s/u.  Long Term Goal: Pt will perform toilet hygiene on toilet with no AE with mod I.    Commode Transfer:   Short Term Goal: Pt will perform BSC t/f with s/u.  Long Term Goal:  Pt will perform toilet t/f in bathroom with Mod I.     Bathing Status:   Long Term Goal: Pt will perform sponge bath with s/u with no unsafe fatigue.     Strength Status:   Long Term Goal: Pt to perform BUE strengthening with weights and/or body weight to increase ADL independence and safety    Endurance Status:   Short Term Goal:pt to perform 15 min OT treatment with 5 or greater rest breaks  Long Term Goal: pt to perform 30 min OT treat with 3 or less rest breaks

## 2023-05-12 NOTE — HOSPITAL COURSE
5/12/23 - blood cultures are positive. Will continue present treatment.    5/15/23 - pt. Is becoming depressed. Orders revised.    5/19/23 - pt. Feels better today. Will continue present treatment.

## 2023-05-13 LAB
BACTERIA BLD CULT: ABNORMAL
BACTERIA BLD CULT: ABNORMAL
GLUCOSE SERPL-MCNC: 117 MG/DL (ref 70–105)
GLUCOSE SERPL-MCNC: 133 MG/DL (ref 70–105)
GLUCOSE SERPL-MCNC: 148 MG/DL (ref 70–105)
GLUCOSE SERPL-MCNC: 296 MG/DL (ref 70–105)
GRAM STN SPEC: ABNORMAL
VANCOMYCIN TROUGH SERPL-MCNC: 16.3 ΜG/ML (ref 10–20)

## 2023-05-13 PROCEDURE — 25000003 PHARM REV CODE 250: Performed by: EMERGENCY MEDICINE

## 2023-05-13 PROCEDURE — 80202 ASSAY OF VANCOMYCIN: CPT | Performed by: EMERGENCY MEDICINE

## 2023-05-13 PROCEDURE — 82962 GLUCOSE BLOOD TEST: CPT

## 2023-05-13 PROCEDURE — 25000003 PHARM REV CODE 250: Performed by: FAMILY MEDICINE

## 2023-05-13 PROCEDURE — 11000004 HC SNF PRIVATE

## 2023-05-13 PROCEDURE — 27000221 HC OXYGEN, UP TO 24 HOURS

## 2023-05-13 PROCEDURE — 94761 N-INVAS EAR/PLS OXIMETRY MLT: CPT

## 2023-05-13 PROCEDURE — 63600175 PHARM REV CODE 636 W HCPCS: Performed by: FAMILY MEDICINE

## 2023-05-13 PROCEDURE — 63600175 PHARM REV CODE 636 W HCPCS: Performed by: EMERGENCY MEDICINE

## 2023-05-13 RX ADMIN — DULOXETINE 60 MG: 30 CAPSULE, DELAYED RELEASE ORAL at 08:05

## 2023-05-13 RX ADMIN — HEPARIN, PORCINE (PF) 10 UNIT/ML INTRAVENOUS SYRINGE 50 UNITS: at 08:05

## 2023-05-13 RX ADMIN — VANCOMYCIN HYDROCHLORIDE 1500 MG: 1.5 INJECTION, POWDER, LYOPHILIZED, FOR SOLUTION INTRAVENOUS at 11:05

## 2023-05-13 RX ADMIN — HYDROCODONE BITARTRATE AND ACETAMINOPHEN 1 TABLET: 10; 325 TABLET ORAL at 08:05

## 2023-05-13 RX ADMIN — PIPERACILLIN AND TAZOBACTAM 4.5 G: 4; .5 INJECTION, POWDER, FOR SOLUTION INTRAVENOUS at 01:05

## 2023-05-13 RX ADMIN — SENNOSIDES AND DOCUSATE SODIUM 1 TABLET: 50; 8.6 TABLET ORAL at 08:05

## 2023-05-13 RX ADMIN — APIXABAN 2.5 MG: 2.5 TABLET, FILM COATED ORAL at 08:05

## 2023-05-13 RX ADMIN — FAMOTIDINE 20 MG: 20 TABLET, FILM COATED ORAL at 08:05

## 2023-05-13 RX ADMIN — HYDROCODONE BITARTRATE AND ACETAMINOPHEN 1 TABLET: 10; 325 TABLET ORAL at 05:05

## 2023-05-13 RX ADMIN — PIPERACILLIN AND TAZOBACTAM 4.5 G: 4; .5 INJECTION, POWDER, FOR SOLUTION INTRAVENOUS at 10:05

## 2023-05-13 RX ADMIN — VANCOMYCIN HYDROCHLORIDE 1500 MG: 1.5 INJECTION, POWDER, LYOPHILIZED, FOR SOLUTION INTRAVENOUS at 05:05

## 2023-05-13 RX ADMIN — Medication 1 CAPSULE: at 05:05

## 2023-05-13 RX ADMIN — Medication 6 MG: at 08:05

## 2023-05-13 RX ADMIN — Medication 1 CAPSULE: at 12:05

## 2023-05-13 RX ADMIN — PIPERACILLIN AND TAZOBACTAM 4.5 G: 4; .5 INJECTION, POWDER, FOR SOLUTION INTRAVENOUS at 05:05

## 2023-05-13 RX ADMIN — INSULIN ASPART 5 UNITS: 100 INJECTION, SOLUTION INTRAVENOUS; SUBCUTANEOUS at 05:05

## 2023-05-13 RX ADMIN — INSULIN DETEMIR 30 UNITS: 100 INJECTION, SOLUTION SUBCUTANEOUS at 08:05

## 2023-05-13 RX ADMIN — HYDROCODONE BITARTRATE AND ACETAMINOPHEN 1 TABLET: 10; 325 TABLET ORAL at 03:05

## 2023-05-13 RX ADMIN — Medication 1 CAPSULE: at 08:05

## 2023-05-13 RX ADMIN — MUPIROCIN: 20 OINTMENT TOPICAL at 08:05

## 2023-05-13 NOTE — NURSING
At start of shift, patient was sleeping often, very distant. Patient has began to wake up and be more alert, more talkative, her appetite has increased and she has eaten well for lunch and supper. She has denied any chills, SOB, shaking throughout this shift.

## 2023-05-13 NOTE — NURSING
Rec'd call from [facility] stating Blood culture ending in 296 anerobic bottle was positive for gram negative bacilli. Sensitivity to follow.

## 2023-05-13 NOTE — PLAN OF CARE
Problem: Diabetes Comorbidity  Goal: Blood Glucose Level Within Targeted Range  Outcome: Ongoing, Progressing  Intervention: Monitor and Manage Glycemia  Flowsheets (Taken 5/13/2023 9260)  Glycemic Management:   blood glucose monitored   supplemental insulin given   oral hydration promoted

## 2023-05-14 LAB
GLUCOSE SERPL-MCNC: 100 MG/DL (ref 70–105)
GLUCOSE SERPL-MCNC: 101 MG/DL (ref 70–105)
GLUCOSE SERPL-MCNC: 188 MG/DL (ref 70–105)
GLUCOSE SERPL-MCNC: 91 MG/DL (ref 70–105)

## 2023-05-14 PROCEDURE — 25000003 PHARM REV CODE 250: Performed by: FAMILY MEDICINE

## 2023-05-14 PROCEDURE — 25000003 PHARM REV CODE 250: Performed by: EMERGENCY MEDICINE

## 2023-05-14 PROCEDURE — 82962 GLUCOSE BLOOD TEST: CPT

## 2023-05-14 PROCEDURE — 11000004 HC SNF PRIVATE

## 2023-05-14 PROCEDURE — 63600175 PHARM REV CODE 636 W HCPCS: Performed by: EMERGENCY MEDICINE

## 2023-05-14 PROCEDURE — 94761 N-INVAS EAR/PLS OXIMETRY MLT: CPT

## 2023-05-14 PROCEDURE — 27000221 HC OXYGEN, UP TO 24 HOURS

## 2023-05-14 PROCEDURE — 63600175 PHARM REV CODE 636 W HCPCS: Performed by: FAMILY MEDICINE

## 2023-05-14 RX ADMIN — PIPERACILLIN AND TAZOBACTAM 4.5 G: 4; .5 INJECTION, POWDER, FOR SOLUTION INTRAVENOUS at 05:05

## 2023-05-14 RX ADMIN — FAMOTIDINE 20 MG: 20 TABLET, FILM COATED ORAL at 08:05

## 2023-05-14 RX ADMIN — FAMOTIDINE 20 MG: 20 TABLET, FILM COATED ORAL at 09:05

## 2023-05-14 RX ADMIN — APIXABAN 2.5 MG: 2.5 TABLET, FILM COATED ORAL at 09:05

## 2023-05-14 RX ADMIN — PIPERACILLIN AND TAZOBACTAM 4.5 G: 4; .5 INJECTION, POWDER, FOR SOLUTION INTRAVENOUS at 09:05

## 2023-05-14 RX ADMIN — SENNOSIDES AND DOCUSATE SODIUM 1 TABLET: 50; 8.6 TABLET ORAL at 08:05

## 2023-05-14 RX ADMIN — Medication 1 CAPSULE: at 11:05

## 2023-05-14 RX ADMIN — HYDROCODONE BITARTRATE AND ACETAMINOPHEN 1 TABLET: 10; 325 TABLET ORAL at 02:05

## 2023-05-14 RX ADMIN — HYDROCODONE BITARTRATE AND ACETAMINOPHEN 1 TABLET: 10; 325 TABLET ORAL at 10:05

## 2023-05-14 RX ADMIN — HYDROCODONE BITARTRATE AND ACETAMINOPHEN 1 TABLET: 10; 325 TABLET ORAL at 05:05

## 2023-05-14 RX ADMIN — Medication 6 MG: at 08:05

## 2023-05-14 RX ADMIN — INSULIN DETEMIR 30 UNITS: 100 INJECTION, SOLUTION SUBCUTANEOUS at 09:05

## 2023-05-14 RX ADMIN — Medication 1 CAPSULE: at 09:05

## 2023-05-14 RX ADMIN — HYDROCODONE BITARTRATE AND ACETAMINOPHEN 1 TABLET: 10; 325 TABLET ORAL at 08:05

## 2023-05-14 RX ADMIN — SENNOSIDES AND DOCUSATE SODIUM 1 TABLET: 50; 8.6 TABLET ORAL at 09:05

## 2023-05-14 RX ADMIN — APIXABAN 2.5 MG: 2.5 TABLET, FILM COATED ORAL at 08:05

## 2023-05-14 RX ADMIN — MUPIROCIN: 20 OINTMENT TOPICAL at 09:05

## 2023-05-14 RX ADMIN — PIPERACILLIN AND TAZOBACTAM 4.5 G: 4; .5 INJECTION, POWDER, FOR SOLUTION INTRAVENOUS at 01:05

## 2023-05-14 RX ADMIN — VANCOMYCIN HYDROCHLORIDE 1500 MG: 1.5 INJECTION, POWDER, LYOPHILIZED, FOR SOLUTION INTRAVENOUS at 05:05

## 2023-05-14 RX ADMIN — Medication 1 CAPSULE: at 05:05

## 2023-05-14 RX ADMIN — HEPARIN, PORCINE (PF) 10 UNIT/ML INTRAVENOUS SYRINGE 50 UNITS: at 09:05

## 2023-05-14 RX ADMIN — MUPIROCIN: 20 OINTMENT TOPICAL at 08:05

## 2023-05-14 RX ADMIN — DULOXETINE 60 MG: 30 CAPSULE, DELAYED RELEASE ORAL at 09:05

## 2023-05-15 ENCOUNTER — OUTPATIENT CASE MANAGEMENT (OUTPATIENT)
Dept: ADMINISTRATIVE | Facility: OTHER | Age: 53
End: 2023-05-15

## 2023-05-15 PROBLEM — N17.9 AKI (ACUTE KIDNEY INJURY): Status: RESOLVED | Noted: 2023-02-05 | Resolved: 2023-05-15

## 2023-05-15 LAB
ANION GAP SERPL CALCULATED.3IONS-SCNC: 10 MMOL/L (ref 7–16)
ANISOCYTOSIS BLD QL SMEAR: ABNORMAL
BACTERIA BLD CULT: ABNORMAL
BACTERIA BLD CULT: ABNORMAL
BASOPHILS # BLD AUTO: 0.06 K/UL (ref 0–0.2)
BASOPHILS NFR BLD AUTO: 1 % (ref 0–1)
BUN SERPL-MCNC: 5 MG/DL (ref 7–18)
BUN/CREAT SERPL: 7 (ref 6–20)
CALCIUM SERPL-MCNC: 8.7 MG/DL (ref 8.5–10.1)
CHLORIDE SERPL-SCNC: 105 MMOL/L (ref 98–107)
CO2 SERPL-SCNC: 30 MMOL/L (ref 21–32)
CREAT SERPL-MCNC: 0.75 MG/DL (ref 0.55–1.02)
DIFFERENTIAL METHOD BLD: ABNORMAL
EGFR (NO RACE VARIABLE) (RUSH/TITUS): 95 ML/MIN/1.73M2
EOSINOPHIL # BLD AUTO: 0.29 K/UL (ref 0–0.5)
EOSINOPHIL NFR BLD AUTO: 4.9 % (ref 1–4)
EOSINOPHIL NFR BLD MANUAL: 8 % (ref 1–4)
ERYTHROCYTE [DISTWIDTH] IN BLOOD BY AUTOMATED COUNT: 16.8 % (ref 11.5–14.5)
GLUCOSE SERPL-MCNC: 117 MG/DL (ref 70–105)
GLUCOSE SERPL-MCNC: 135 MG/DL (ref 74–106)
GLUCOSE SERPL-MCNC: 147 MG/DL (ref 70–105)
GLUCOSE SERPL-MCNC: 201 MG/DL (ref 70–105)
GLUCOSE SERPL-MCNC: 278 MG/DL (ref 70–105)
GRAM STN SPEC: ABNORMAL
HCT VFR BLD AUTO: 24.7 % (ref 38–47)
HGB BLD-MCNC: 7.7 G/DL (ref 12–16)
HYPOCHROMIA BLD QL SMEAR: ABNORMAL
IMM GRANULOCYTES # BLD AUTO: 0.05 K/UL (ref 0–0.04)
IMM GRANULOCYTES NFR BLD: 0.8 % (ref 0–0.4)
LYMPHOCYTES # BLD AUTO: 2.2 K/UL (ref 1–4.8)
LYMPHOCYTES NFR BLD AUTO: 37.2 % (ref 27–41)
LYMPHOCYTES NFR BLD MANUAL: 35 % (ref 27–41)
MCH RBC QN AUTO: 26.8 PG (ref 27–31)
MCHC RBC AUTO-ENTMCNC: 31.2 G/DL (ref 32–36)
MCV RBC AUTO: 86.1 FL (ref 80–96)
METAMYELOCYTES NFR BLD MANUAL: 1 %
MONOCYTES # BLD AUTO: 0.64 K/UL (ref 0–0.8)
MONOCYTES NFR BLD AUTO: 10.8 % (ref 2–6)
MONOCYTES NFR BLD MANUAL: 8 % (ref 2–6)
MPC BLD CALC-MCNC: 9.4 FL (ref 9.4–12.4)
MYELOCYTES NFR BLD MANUAL: 1 %
NEUTROPHILS # BLD AUTO: 2.67 K/UL (ref 1.8–7.7)
NEUTROPHILS NFR BLD AUTO: 45.3 % (ref 53–65)
NEUTS BAND NFR BLD MANUAL: 5 % (ref 1–5)
NEUTS SEG NFR BLD MANUAL: 42 % (ref 50–62)
NRBC BLD MANUAL-RTO: 1 /100 WBC
PLATELET # BLD AUTO: 226 K/UL (ref 150–400)
PLATELET MORPHOLOGY: NORMAL
POTASSIUM SERPL-SCNC: 3.2 MMOL/L (ref 3.5–5.1)
RBC # BLD AUTO: 2.87 M/UL (ref 4.2–5.4)
REACTIVE LYMPHOCYTES: ABNORMAL
SODIUM SERPL-SCNC: 142 MMOL/L (ref 136–145)
TOXIC GRANULES BLD QL SMEAR: ABNORMAL
WBC # BLD AUTO: 5.91 K/UL (ref 4.5–11)

## 2023-05-15 PROCEDURE — 99308 PR NURSING FAC CARE, SUBSEQ, MINOR COMPLIC: ICD-10-PCS | Mod: ,,, | Performed by: FAMILY MEDICINE

## 2023-05-15 PROCEDURE — 99308 SBSQ NF CARE LOW MDM 20: CPT | Mod: ,,, | Performed by: FAMILY MEDICINE

## 2023-05-15 PROCEDURE — 85025 COMPLETE CBC W/AUTO DIFF WBC: CPT | Performed by: FAMILY MEDICINE

## 2023-05-15 PROCEDURE — 25000003 PHARM REV CODE 250: Performed by: EMERGENCY MEDICINE

## 2023-05-15 PROCEDURE — 82962 GLUCOSE BLOOD TEST: CPT

## 2023-05-15 PROCEDURE — 25000003 PHARM REV CODE 250: Performed by: FAMILY MEDICINE

## 2023-05-15 PROCEDURE — 63600175 PHARM REV CODE 636 W HCPCS: Performed by: FAMILY MEDICINE

## 2023-05-15 PROCEDURE — 27000221 HC OXYGEN, UP TO 24 HOURS

## 2023-05-15 PROCEDURE — 94761 N-INVAS EAR/PLS OXIMETRY MLT: CPT

## 2023-05-15 PROCEDURE — 63600175 PHARM REV CODE 636 W HCPCS: Performed by: EMERGENCY MEDICINE

## 2023-05-15 PROCEDURE — 80048 BASIC METABOLIC PNL TOTAL CA: CPT | Performed by: FAMILY MEDICINE

## 2023-05-15 PROCEDURE — 11000004 HC SNF PRIVATE

## 2023-05-15 PROCEDURE — 99900035 HC TECH TIME PER 15 MIN (STAT)

## 2023-05-15 RX ORDER — KETOROLAC TROMETHAMINE 30 MG/ML
60 INJECTION, SOLUTION INTRAMUSCULAR; INTRAVENOUS ONCE
Status: COMPLETED | OUTPATIENT
Start: 2023-05-15 | End: 2023-05-15

## 2023-05-15 RX ORDER — ARIPIPRAZOLE 5 MG/1
5 TABLET ORAL NIGHTLY
Status: DISCONTINUED | OUTPATIENT
Start: 2023-05-15 | End: 2023-05-21 | Stop reason: HOSPADM

## 2023-05-15 RX ADMIN — SENNOSIDES AND DOCUSATE SODIUM 1 TABLET: 50; 8.6 TABLET ORAL at 08:05

## 2023-05-15 RX ADMIN — Medication 1 CAPSULE: at 08:05

## 2023-05-15 RX ADMIN — ARIPIPRAZOLE 5 MG: 5 TABLET ORAL at 08:05

## 2023-05-15 RX ADMIN — PIPERACILLIN AND TAZOBACTAM 4.5 G: 4; .5 INJECTION, POWDER, FOR SOLUTION INTRAVENOUS at 10:05

## 2023-05-15 RX ADMIN — FAMOTIDINE 20 MG: 20 TABLET, FILM COATED ORAL at 08:05

## 2023-05-15 RX ADMIN — HYDROCODONE BITARTRATE AND ACETAMINOPHEN 1 TABLET: 10; 325 TABLET ORAL at 05:05

## 2023-05-15 RX ADMIN — INSULIN DETEMIR 30 UNITS: 100 INJECTION, SOLUTION SUBCUTANEOUS at 08:05

## 2023-05-15 RX ADMIN — Medication 1 CAPSULE: at 12:05

## 2023-05-15 RX ADMIN — PIPERACILLIN AND TAZOBACTAM 4.5 G: 4; .5 INJECTION, POWDER, FOR SOLUTION INTRAVENOUS at 05:05

## 2023-05-15 RX ADMIN — Medication 6 MG: at 08:05

## 2023-05-15 RX ADMIN — Medication 1 CAPSULE: at 04:05

## 2023-05-15 RX ADMIN — INSULIN ASPART 5 UNITS: 100 INJECTION, SOLUTION INTRAVENOUS; SUBCUTANEOUS at 04:05

## 2023-05-15 RX ADMIN — APIXABAN 2.5 MG: 2.5 TABLET, FILM COATED ORAL at 08:05

## 2023-05-15 RX ADMIN — INSULIN ASPART 6 UNITS: 100 INJECTION, SOLUTION INTRAVENOUS; SUBCUTANEOUS at 04:05

## 2023-05-15 RX ADMIN — MUPIROCIN: 20 OINTMENT TOPICAL at 08:05

## 2023-05-15 RX ADMIN — DULOXETINE 60 MG: 30 CAPSULE, DELAYED RELEASE ORAL at 08:05

## 2023-05-15 RX ADMIN — INSULIN ASPART 2 UNITS: 100 INJECTION, SOLUTION INTRAVENOUS; SUBCUTANEOUS at 05:05

## 2023-05-15 RX ADMIN — KETOROLAC TROMETHAMINE 60 MG: 30 INJECTION, SOLUTION INTRAMUSCULAR at 08:05

## 2023-05-15 RX ADMIN — PIPERACILLIN AND TAZOBACTAM 4.5 G: 4; .5 INJECTION, POWDER, FOR SOLUTION INTRAVENOUS at 01:05

## 2023-05-15 RX ADMIN — HEPARIN, PORCINE (PF) 10 UNIT/ML INTRAVENOUS SYRINGE 50 UNITS: at 10:05

## 2023-05-15 RX ADMIN — HYDROCODONE BITARTRATE AND ACETAMINOPHEN 1 TABLET: 10; 325 TABLET ORAL at 09:05

## 2023-05-15 NOTE — PLAN OF CARE
Problem: Adult Inpatient Plan of Care  Goal: Plan of Care Review  Outcome: Ongoing, Progressing  Goal: Patient-Specific Goal (Individualized)  Outcome: Ongoing, Progressing     Problem: Fall Injury Risk  Goal: Absence of Fall and Fall-Related Injury  Outcome: Ongoing, Progressing  Intervention: Identify and Manage Contributors  Flowsheets (Taken 5/15/2023 1605)  Self-Care Promotion:   independence encouraged   BADL personal objects within reach   BADL personal routines maintained  Medication Review/Management:   medications reviewed   high-risk medications identified  Intervention: Promote Injury-Free Environment  Flowsheets (Taken 5/15/2023 1605)  Safety Promotion/Fall Prevention:   assistive device/personal item within reach   diversional activities provided   high risk medications identified   medications reviewed   muscle strengthening facilitated   nonskid shoes/socks when out of bed   side rails raised x 3   instructed to call staff for mobility

## 2023-05-15 NOTE — NURSING
Patient called out and said that her heart felt like it fluttered or skipped a beat just for a few seconds and then returned to normal. No chest pain/discomfort or SOB reported. Patient put her oxygen on for a while.

## 2023-05-15 NOTE — PROGRESS NOTES
[facility] Hospital Medicine  Progress Note    Patient Name: Monica Walker  MRN: [id number]  Patient Class: IP- Swing   Admission Date: 5/10/2023  Length of Stay: 5 days  Attending Physician: Hannah Schulz, *  Primary Care Provider: Hannah Schulz MD        Subjective:     Principal Problem:History of total left knee replacement    [facility]    Hospital Admission Certification    I certify that skilled nursing facility services are required to be given on an inpatient basis due to the following required skilled nursing and/or skilled rehabilitation services on a continuing basis:    management & evaluation of a patient plan of care that requires skilled nursing or rehabilitation services    I estimate that the additional period of SNF inpatient care will be 7 days.    Plans for post SNF care are: Home Care  ______________________________________________________________________        HPI:  This 52 yr. Old WF has been [facility] after undergoing another left knee replacement. Apparently, she had a spell of rigors last night. A septic workup was done and antibiotics started. This AM, she feels badly. She is afebrile. We will continue present treatment.      Overview/Hospital Course:  5/12/23 - blood cultures are positive. Will continue present treatment.    5/15/23 - pt. Is becoming depressed. Orders revised.      Interval History: pt. Becoming depressed.    Review of Systems  Objective:     Vital Signs (Most Recent):  Temp: 98.5 °F (36.9 °C) (05/15/23 0752)  Pulse: 67 (05/15/23 0752)  Resp: 18 (05/15/23 0752)  BP: 126/66 (05/15/23 0752)  SpO2: (!) 92 % (05/15/23 0752) Vital Signs (24h Range):  Temp:  [98.5 °F (36.9 °C)-98.7 °F (37.1 °C)] 98.5 °F (36.9 °C)  Pulse:  [67-74] 67  Resp:  [16-19] 18  SpO2:  [92 %-99 %] 92 %  BP: (126-134)/(66-68) 126/66     Weight: 72.6 kg (160 lb)  Body mass index is  25.82 kg/m².    Intake/Output Summary (Last 24 hours) at 5/15/2023 0815  Last data filed at 5/14/2023 1554  Gross per 24 hour   Intake 720 ml   Output --   Net 720 ml         Physical Exam        Significant Labs: All pertinent labs within the past 24 hours have been reviewed.    Significant Imaging: I have reviewed all pertinent imaging results/findings within the past 24 hours.      Assessment/Plan:      Shaking chills        Elevated lactic acid level          VTE Risk Mitigation (From admission, onward)         Ordered     heparin, porcine (PF) injection 50 Units  Daily         05/11/23 0824     apixaban tablet 2.5 mg  2 times daily         05/10/23 4882                Discharge Planning   SUZANNA:      Code Status: Full Code   Is the patient medically ready for discharge?:     Reason for patient still in hospital (select all that apply): Patient trending condition  Discharge Plan A: Home with family, Home Health                  Hannah Schulz MD  Department of Hospital Medicine   [facility]

## 2023-05-15 NOTE — SUBJECTIVE & OBJECTIVE
Interval History: pt. Becoming depressed.    Review of Systems  Objective:     Vital Signs (Most Recent):  Temp: 98.5 °F (36.9 °C) (05/15/23 0752)  Pulse: 67 (05/15/23 0752)  Resp: 18 (05/15/23 0752)  BP: 126/66 (05/15/23 0752)  SpO2: (!) 92 % (05/15/23 0752) Vital Signs (24h Range):  Temp:  [98.5 °F (36.9 °C)-98.7 °F (37.1 °C)] 98.5 °F (36.9 °C)  Pulse:  [67-74] 67  Resp:  [16-19] 18  SpO2:  [92 %-99 %] 92 %  BP: (126-134)/(66-68) 126/66     Weight: 72.6 kg (160 lb)  Body mass index is 25.82 kg/m².    Intake/Output Summary (Last 24 hours) at 5/15/2023 0815  Last data filed at 5/14/2023 1554  Gross per 24 hour   Intake 720 ml   Output --   Net 720 ml         Physical Exam        Significant Labs: All pertinent labs within the past 24 hours have been reviewed.    Significant Imaging: I have reviewed all pertinent imaging results/findings within the past 24 hours.

## 2023-05-15 NOTE — NURSING
Updated notes and clinincals faxed to [facility] for approval of additional swing bed days.

## 2023-05-16 LAB
GLUCOSE SERPL-MCNC: 114 MG/DL (ref 70–105)
GLUCOSE SERPL-MCNC: 122 MG/DL (ref 70–105)
GLUCOSE SERPL-MCNC: 128 MG/DL (ref 70–105)
GLUCOSE SERPL-MCNC: 213 MG/DL (ref 70–105)
GLUCOSE SERPL-MCNC: 332 MG/DL (ref 70–105)
GROUP & RH: NORMAL
INDIRECT COOMBS GEL: NEGATIVE
SPECIMEN OUTDATE: NORMAL

## 2023-05-16 PROCEDURE — 25000003 PHARM REV CODE 250: Performed by: FAMILY MEDICINE

## 2023-05-16 PROCEDURE — 86900 BLOOD TYPING SEROLOGIC ABO: CPT

## 2023-05-16 PROCEDURE — 82962 GLUCOSE BLOOD TEST: CPT

## 2023-05-16 PROCEDURE — 99900035 HC TECH TIME PER 15 MIN (STAT)

## 2023-05-16 PROCEDURE — 36430 TRANSFUSION BLD/BLD COMPNT: CPT

## 2023-05-16 PROCEDURE — 86850 RBC ANTIBODY SCREEN: CPT

## 2023-05-16 PROCEDURE — 63600175 PHARM REV CODE 636 W HCPCS: Performed by: EMERGENCY MEDICINE

## 2023-05-16 PROCEDURE — 27000221 HC OXYGEN, UP TO 24 HOURS

## 2023-05-16 PROCEDURE — 63600175 PHARM REV CODE 636 W HCPCS: Performed by: FAMILY MEDICINE

## 2023-05-16 PROCEDURE — 11000004 HC SNF PRIVATE

## 2023-05-16 PROCEDURE — P9016 RBC LEUKOCYTES REDUCED: HCPCS

## 2023-05-16 PROCEDURE — 94761 N-INVAS EAR/PLS OXIMETRY MLT: CPT

## 2023-05-16 PROCEDURE — 86901 BLOOD TYPING SEROLOGIC RH(D): CPT

## 2023-05-16 PROCEDURE — 25000003 PHARM REV CODE 250: Performed by: EMERGENCY MEDICINE

## 2023-05-16 RX ORDER — HYDROCODONE BITARTRATE AND ACETAMINOPHEN 500; 5 MG/1; MG/1
TABLET ORAL
Status: DISCONTINUED | OUTPATIENT
Start: 2023-05-16 | End: 2023-05-21 | Stop reason: HOSPADM

## 2023-05-16 RX ORDER — LACTULOSE 10 G/15ML
20 SOLUTION ORAL 2 TIMES DAILY PRN
Status: DISCONTINUED | OUTPATIENT
Start: 2023-05-16 | End: 2023-05-21 | Stop reason: HOSPADM

## 2023-05-16 RX ADMIN — INSULIN ASPART 5 UNITS: 100 INJECTION, SOLUTION INTRAVENOUS; SUBCUTANEOUS at 06:05

## 2023-05-16 RX ADMIN — SODIUM CHLORIDE: 9 INJECTION, SOLUTION INTRAVENOUS at 05:05

## 2023-05-16 RX ADMIN — HYDROCODONE BITARTRATE AND ACETAMINOPHEN 1 TABLET: 10; 325 TABLET ORAL at 11:05

## 2023-05-16 RX ADMIN — Medication 1 CAPSULE: at 11:05

## 2023-05-16 RX ADMIN — INSULIN ASPART 5 UNITS: 100 INJECTION, SOLUTION INTRAVENOUS; SUBCUTANEOUS at 11:05

## 2023-05-16 RX ADMIN — PIPERACILLIN AND TAZOBACTAM 4.5 G: 4; .5 INJECTION, POWDER, FOR SOLUTION INTRAVENOUS at 02:05

## 2023-05-16 RX ADMIN — APIXABAN 2.5 MG: 2.5 TABLET, FILM COATED ORAL at 08:05

## 2023-05-16 RX ADMIN — SENNOSIDES AND DOCUSATE SODIUM 1 TABLET: 50; 8.6 TABLET ORAL at 08:05

## 2023-05-16 RX ADMIN — ARIPIPRAZOLE 5 MG: 5 TABLET ORAL at 08:05

## 2023-05-16 RX ADMIN — PIPERACILLIN AND TAZOBACTAM 4.5 G: 4; .5 INJECTION, POWDER, FOR SOLUTION INTRAVENOUS at 11:05

## 2023-05-16 RX ADMIN — INSULIN ASPART 5 UNITS: 100 INJECTION, SOLUTION INTRAVENOUS; SUBCUTANEOUS at 04:05

## 2023-05-16 RX ADMIN — HEPARIN, PORCINE (PF) 10 UNIT/ML INTRAVENOUS SYRINGE 50 UNITS: at 08:05

## 2023-05-16 RX ADMIN — FAMOTIDINE 20 MG: 20 TABLET, FILM COATED ORAL at 11:05

## 2023-05-16 RX ADMIN — HYDROCODONE BITARTRATE AND ACETAMINOPHEN 1 TABLET: 10; 325 TABLET ORAL at 03:05

## 2023-05-16 RX ADMIN — SODIUM CHLORIDE: 9 INJECTION, SOLUTION INTRAVENOUS at 11:05

## 2023-05-16 RX ADMIN — PIPERACILLIN AND TAZOBACTAM 4.5 G: 4; .5 INJECTION, POWDER, FOR SOLUTION INTRAVENOUS at 05:05

## 2023-05-16 RX ADMIN — Medication 1 CAPSULE: at 04:05

## 2023-05-16 RX ADMIN — DULOXETINE 60 MG: 30 CAPSULE, DELAYED RELEASE ORAL at 08:05

## 2023-05-16 RX ADMIN — Medication 1 CAPSULE: at 08:05

## 2023-05-16 RX ADMIN — FAMOTIDINE 20 MG: 20 TABLET, FILM COATED ORAL at 08:05

## 2023-05-16 RX ADMIN — INSULIN DETEMIR 30 UNITS: 100 INJECTION, SOLUTION SUBCUTANEOUS at 08:05

## 2023-05-16 NOTE — PLAN OF CARE
Problem: Adult Inpatient Plan of Care  Goal: Plan of Care Review  Outcome: Ongoing, Progressing  Goal: Patient-Specific Goal (Individualized)  Outcome: Ongoing, Progressing     Problem: Fall Injury Risk  Goal: Absence of Fall and Fall-Related Injury  Outcome: Ongoing, Progressing  Intervention: Identify and Manage Contributors  Flowsheets (Taken 5/16/2023 1417)  Self-Care Promotion:   independence encouraged   BADL personal objects within reach   BADL personal routines maintained  Medication Review/Management:   medications reviewed   high-risk medications identified  Intervention: Promote Injury-Free Environment  Flowsheets (Taken 5/16/2023 1417)  Safety Promotion/Fall Prevention:   assistive device/personal item within reach   diversional activities provided   high risk medications identified   medications reviewed   muscle strengthening facilitated   nonskid shoes/socks when out of bed   side rails raised x 3   instructed to call staff for mobility

## 2023-05-16 NOTE — PLAN OF CARE
Problem: Adult Inpatient Plan of Care  Goal: Plan of Care Review  Outcome: Ongoing, Not Progressing  Goal: Patient-Specific Goal (Individualized)  Outcome: Ongoing, Not Progressing     Problem: Diabetes Comorbidity  Goal: Blood Glucose Level Within Targeted Range  Outcome: Ongoing, Not Progressing  Intervention: Monitor and Manage Glycemia  Flowsheets (Taken 5/16/2023 5811)  Glycemic Management:   blood glucose monitored   oral hydration promoted   supplemental insulin given

## 2023-05-17 LAB
GLUCOSE SERPL-MCNC: 111 MG/DL (ref 70–105)
GLUCOSE SERPL-MCNC: 147 MG/DL (ref 70–105)
GLUCOSE SERPL-MCNC: 178 MG/DL (ref 70–105)
GLUCOSE SERPL-MCNC: 273 MG/DL (ref 70–105)
HCT VFR BLD AUTO: 31.1 % (ref 38–47)
HGB BLD-MCNC: 10.2 G/DL (ref 12–16)

## 2023-05-17 PROCEDURE — 94761 N-INVAS EAR/PLS OXIMETRY MLT: CPT

## 2023-05-17 PROCEDURE — 25000003 PHARM REV CODE 250: Performed by: FAMILY MEDICINE

## 2023-05-17 PROCEDURE — 11000004 HC SNF PRIVATE

## 2023-05-17 PROCEDURE — 63600175 PHARM REV CODE 636 W HCPCS: Performed by: FAMILY MEDICINE

## 2023-05-17 PROCEDURE — 82962 GLUCOSE BLOOD TEST: CPT

## 2023-05-17 PROCEDURE — 27000221 HC OXYGEN, UP TO 24 HOURS

## 2023-05-17 PROCEDURE — 85014 HEMATOCRIT: CPT | Performed by: FAMILY MEDICINE

## 2023-05-17 PROCEDURE — 99900035 HC TECH TIME PER 15 MIN (STAT)

## 2023-05-17 PROCEDURE — 25000003 PHARM REV CODE 250: Performed by: EMERGENCY MEDICINE

## 2023-05-17 PROCEDURE — 63600175 PHARM REV CODE 636 W HCPCS: Performed by: EMERGENCY MEDICINE

## 2023-05-17 RX ADMIN — HYDROCODONE BITARTRATE AND ACETAMINOPHEN 1 TABLET: 10; 325 TABLET ORAL at 10:05

## 2023-05-17 RX ADMIN — INSULIN ASPART 5 UNITS: 100 INJECTION, SOLUTION INTRAVENOUS; SUBCUTANEOUS at 04:05

## 2023-05-17 RX ADMIN — Medication 6 MG: at 08:05

## 2023-05-17 RX ADMIN — ARIPIPRAZOLE 5 MG: 5 TABLET ORAL at 08:05

## 2023-05-17 RX ADMIN — FAMOTIDINE 20 MG: 20 TABLET, FILM COATED ORAL at 08:05

## 2023-05-17 RX ADMIN — HYDROCODONE BITARTRATE AND ACETAMINOPHEN 1 TABLET: 10; 325 TABLET ORAL at 08:05

## 2023-05-17 RX ADMIN — PIPERACILLIN AND TAZOBACTAM 4.5 G: 4; .5 INJECTION, POWDER, FOR SOLUTION INTRAVENOUS at 10:05

## 2023-05-17 RX ADMIN — SENNOSIDES AND DOCUSATE SODIUM 1 TABLET: 50; 8.6 TABLET ORAL at 08:05

## 2023-05-17 RX ADMIN — FAMOTIDINE 20 MG: 20 TABLET, FILM COATED ORAL at 09:05

## 2023-05-17 RX ADMIN — PIPERACILLIN AND TAZOBACTAM 4.5 G: 4; .5 INJECTION, POWDER, FOR SOLUTION INTRAVENOUS at 05:05

## 2023-05-17 RX ADMIN — HYDROCODONE BITARTRATE AND ACETAMINOPHEN 1 TABLET: 10; 325 TABLET ORAL at 03:05

## 2023-05-17 RX ADMIN — SENNOSIDES AND DOCUSATE SODIUM 1 TABLET: 50; 8.6 TABLET ORAL at 09:05

## 2023-05-17 RX ADMIN — APIXABAN 2.5 MG: 2.5 TABLET, FILM COATED ORAL at 09:05

## 2023-05-17 RX ADMIN — Medication 1 CAPSULE: at 11:05

## 2023-05-17 RX ADMIN — INSULIN ASPART 6 UNITS: 100 INJECTION, SOLUTION INTRAVENOUS; SUBCUTANEOUS at 05:05

## 2023-05-17 RX ADMIN — APIXABAN 2.5 MG: 2.5 TABLET, FILM COATED ORAL at 08:05

## 2023-05-17 RX ADMIN — Medication 1 CAPSULE: at 04:05

## 2023-05-17 RX ADMIN — Medication 1 CAPSULE: at 09:05

## 2023-05-17 RX ADMIN — HEPARIN, PORCINE (PF) 10 UNIT/ML INTRAVENOUS SYRINGE 50 UNITS: at 09:05

## 2023-05-17 RX ADMIN — INSULIN ASPART 1 UNITS: 100 INJECTION, SOLUTION INTRAVENOUS; SUBCUTANEOUS at 08:05

## 2023-05-17 RX ADMIN — DULOXETINE 60 MG: 30 CAPSULE, DELAYED RELEASE ORAL at 09:05

## 2023-05-17 RX ADMIN — PIPERACILLIN AND TAZOBACTAM 4.5 G: 4; .5 INJECTION, POWDER, FOR SOLUTION INTRAVENOUS at 01:05

## 2023-05-17 NOTE — PLAN OF CARE
Problem: Adult Inpatient Plan of Care  Goal: Plan of Care Review  Outcome: Ongoing, Progressing  Goal: Patient-Specific Goal (Individualized)  Outcome: Ongoing, Progressing  Goal: Absence of Hospital-Acquired Illness or Injury  Outcome: Ongoing, Progressing  Goal: Optimal Comfort and Wellbeing  Outcome: Ongoing, Progressing  Goal: Readiness for Transition of Care  Outcome: Ongoing, Progressing     Problem: Diabetes Comorbidity  Goal: Blood Glucose Level Within Targeted Range  Outcome: Ongoing, Progressing     Problem: Fluid and Electrolyte Imbalance (Acute Kidney Injury/Impairment)  Goal: Fluid and Electrolyte Balance  Outcome: Ongoing, Progressing     Problem: Infection  Goal: Absence of Infection Signs and Symptoms  Outcome: Ongoing, Progressing     Problem: Fall Injury Risk  Goal: Absence of Fall and Fall-Related Injury  Outcome: Ongoing, Progressing

## 2023-05-17 NOTE — NURSING
Unable to obtain occult stool sample due to patient hasn't had a bm, pt last bm was 5/8/23, lactulose offer patient refused. Will continue to monitor.

## 2023-05-17 NOTE — PLAN OF CARE
[facility] - Skilled Nursing Facility  Patient: Monica Walker     Interdisciplinary Team Meeting     Today's Date: 5/17/2023     Estimated D/C Date: tbd       Physician: Dr. schulz    Pharmacy: [facility] Unit Director: BRODY Kelly   : JORGE LUIS Méndez Physical/Occupational Therapy: MIGUEL ANGEL Ac/JAVIER Bolton   Speech Therapy: n/a Activity Therapy: read newspaper/watch tv   Nursing: MONET Guajardo/ KEVIN Miranda      Nurse  New Symptoms/Problems: pt required blood transfusion on 5/16/23  Last BM: 5/8/23 Urine: continent Diarrhea: No   Constipated: No Bladder: continent    Isolation: No     O2: n/a  Rm Air: 97 %   Nutrition: see dietary notes  Speech/Swallowing: n/a  Aspiration Precautions: No  Cognition: alert and oriented    Physical Therapy  Physical Therapy/Gait: contact guard assistance with rolling walker ELOS: tbd   Transfers: contact guard assistance with rolling walker Range of Motion/Restrictions: LLE weight bearing as tolerated      Occupational Therapy  Occupational Therapy: following to assist with adl independence Eating/Grooming: independent   Toileting: min. assist Bathing: min. assist   Dressing (Upper Body): min. assist Dressing (Lower Body): min. assist       Tx Plan/Recommendations reviewed with patient on 5/17/23 at bedside.  Additional family Conference/Training: not at this time  D/C Plan/Recommendations: home with     Pharmacy  Medication Changes (see MD orders in chart): No  MD/NP: Dr. Schulz Labs Reviewed: yes New Lab Orders: no     MD/NP Signature: Time:

## 2023-05-17 NOTE — NURSING
15min check related 2nd unit of prbc infusing. No acute distress noted. Patient denies pain and discomfort. No complaints voiced. Will continue to monitor.

## 2023-05-17 NOTE — NURSING
Pt completed her 1st unit of prbc, no adverse reaction noted. Pt denies pain and discomfort. No acute distress noted. Will continue to monitor.

## 2023-05-17 NOTE — NURSING
2nd unit of prbc's started, vitals signs documented on flow sheet. Patient educated of adverse reaction, understanding voiced. Will continue to monitor.

## 2023-05-17 NOTE — PLAN OF CARE
Problem: Diabetes Comorbidity  Goal: Blood Glucose Level Within Targeted Range  Outcome: Ongoing, Progressing  Intervention: Monitor and Manage Glycemia  Flowsheets (Taken 5/17/2023 1741)  Glycemic Management:   blood glucose monitored   oral hydration promoted   supplemental insulin given     Problem: Adult Inpatient Plan of Care  Goal: Plan of Care Review  Outcome: Ongoing, Not Progressing  Flowsheets (Taken 5/17/2023 1741)  Plan of Care Reviewed With: patient  Goal: Patient-Specific Goal (Individualized)  Outcome: Ongoing, Not Progressing

## 2023-05-18 LAB
ANION GAP SERPL CALCULATED.3IONS-SCNC: 12 MMOL/L (ref 7–16)
BASOPHILS # BLD AUTO: 0.02 K/UL (ref 0–0.2)
BASOPHILS NFR BLD AUTO: 0.3 % (ref 0–1)
BUN SERPL-MCNC: 8 MG/DL (ref 7–18)
BUN/CREAT SERPL: 9 (ref 6–20)
CALCIUM SERPL-MCNC: 8.8 MG/DL (ref 8.5–10.1)
CHLORIDE SERPL-SCNC: 103 MMOL/L (ref 98–107)
CO2 SERPL-SCNC: 28 MMOL/L (ref 21–32)
CREAT SERPL-MCNC: 0.88 MG/DL (ref 0.55–1.02)
DIFFERENTIAL METHOD BLD: ABNORMAL
EGFR (NO RACE VARIABLE) (RUSH/TITUS): 78 ML/MIN/1.73M2
EOSINOPHIL # BLD AUTO: 0.4 K/UL (ref 0–0.5)
EOSINOPHIL NFR BLD AUTO: 5.2 % (ref 1–4)
ERYTHROCYTE [DISTWIDTH] IN BLOOD BY AUTOMATED COUNT: 16.4 % (ref 11.5–14.5)
GLUCOSE SERPL-MCNC: 226 MG/DL (ref 70–105)
GLUCOSE SERPL-MCNC: 227 MG/DL (ref 74–106)
GLUCOSE SERPL-MCNC: 285 MG/DL (ref 70–105)
GLUCOSE SERPL-MCNC: 75 MG/DL (ref 70–105)
HCT VFR BLD AUTO: 32.4 % (ref 38–47)
HGB BLD-MCNC: 10.2 G/DL (ref 12–16)
IMM GRANULOCYTES # BLD AUTO: 0.07 K/UL (ref 0–0.04)
IMM GRANULOCYTES NFR BLD: 0.9 % (ref 0–0.4)
LYMPHOCYTES # BLD AUTO: 2.21 K/UL (ref 1–4.8)
LYMPHOCYTES NFR BLD AUTO: 28.5 % (ref 27–41)
MCH RBC QN AUTO: 26.6 PG (ref 27–31)
MCHC RBC AUTO-ENTMCNC: 31.5 G/DL (ref 32–36)
MCV RBC AUTO: 84.4 FL (ref 80–96)
MONOCYTES # BLD AUTO: 0.89 K/UL (ref 0–0.8)
MONOCYTES NFR BLD AUTO: 11.5 % (ref 2–6)
MPC BLD CALC-MCNC: 9 FL (ref 9.4–12.4)
NEUTROPHILS # BLD AUTO: 4.16 K/UL (ref 1.8–7.7)
NEUTROPHILS NFR BLD AUTO: 53.6 % (ref 53–65)
OCCULT BLOOD: NEGATIVE
PLATELET # BLD AUTO: 391 K/UL (ref 150–400)
POTASSIUM SERPL-SCNC: 3 MMOL/L (ref 3.5–5.1)
RBC # BLD AUTO: 3.84 M/UL (ref 4.2–5.4)
SODIUM SERPL-SCNC: 140 MMOL/L (ref 136–145)
WBC # BLD AUTO: 7.75 K/UL (ref 4.5–11)

## 2023-05-18 PROCEDURE — 25000003 PHARM REV CODE 250: Performed by: EMERGENCY MEDICINE

## 2023-05-18 PROCEDURE — 85025 COMPLETE CBC W/AUTO DIFF WBC: CPT | Performed by: FAMILY MEDICINE

## 2023-05-18 PROCEDURE — 11000004 HC SNF PRIVATE

## 2023-05-18 PROCEDURE — 63600175 PHARM REV CODE 636 W HCPCS: Performed by: FAMILY MEDICINE

## 2023-05-18 PROCEDURE — 97530 THERAPEUTIC ACTIVITIES: CPT

## 2023-05-18 PROCEDURE — 82962 GLUCOSE BLOOD TEST: CPT

## 2023-05-18 PROCEDURE — 94761 N-INVAS EAR/PLS OXIMETRY MLT: CPT

## 2023-05-18 PROCEDURE — 25000003 PHARM REV CODE 250: Performed by: FAMILY MEDICINE

## 2023-05-18 PROCEDURE — 82272 OCCULT BLD FECES 1-3 TESTS: CPT | Performed by: FAMILY MEDICINE

## 2023-05-18 PROCEDURE — 97110 THERAPEUTIC EXERCISES: CPT

## 2023-05-18 PROCEDURE — 63600175 PHARM REV CODE 636 W HCPCS: Performed by: EMERGENCY MEDICINE

## 2023-05-18 PROCEDURE — 80048 BASIC METABOLIC PNL TOTAL CA: CPT | Performed by: FAMILY MEDICINE

## 2023-05-18 PROCEDURE — 27000221 HC OXYGEN, UP TO 24 HOURS

## 2023-05-18 PROCEDURE — 97116 GAIT TRAINING THERAPY: CPT | Mod: CQ

## 2023-05-18 PROCEDURE — 97110 THERAPEUTIC EXERCISES: CPT | Mod: CQ

## 2023-05-18 RX ADMIN — PIPERACILLIN AND TAZOBACTAM 4.5 G: 4; .5 INJECTION, POWDER, FOR SOLUTION INTRAVENOUS at 01:05

## 2023-05-18 RX ADMIN — Medication 6 MG: at 07:05

## 2023-05-18 RX ADMIN — INSULIN ASPART 5 UNITS: 100 INJECTION, SOLUTION INTRAVENOUS; SUBCUTANEOUS at 04:05

## 2023-05-18 RX ADMIN — PIPERACILLIN AND TAZOBACTAM 4.5 G: 4; .5 INJECTION, POWDER, FOR SOLUTION INTRAVENOUS at 10:05

## 2023-05-18 RX ADMIN — Medication 1 CAPSULE: at 04:05

## 2023-05-18 RX ADMIN — HEPARIN, PORCINE (PF) 10 UNIT/ML INTRAVENOUS SYRINGE 50 UNITS: at 08:05

## 2023-05-18 RX ADMIN — PIPERACILLIN AND TAZOBACTAM 4.5 G: 4; .5 INJECTION, POWDER, FOR SOLUTION INTRAVENOUS at 06:05

## 2023-05-18 RX ADMIN — FAMOTIDINE 20 MG: 20 TABLET, FILM COATED ORAL at 08:05

## 2023-05-18 RX ADMIN — Medication 1 CAPSULE: at 08:05

## 2023-05-18 RX ADMIN — DULOXETINE 60 MG: 30 CAPSULE, DELAYED RELEASE ORAL at 08:05

## 2023-05-18 RX ADMIN — INSULIN ASPART 5 UNITS: 100 INJECTION, SOLUTION INTRAVENOUS; SUBCUTANEOUS at 10:05

## 2023-05-18 RX ADMIN — Medication 1 CAPSULE: at 11:05

## 2023-05-18 RX ADMIN — INSULIN ASPART 5 UNITS: 100 INJECTION, SOLUTION INTRAVENOUS; SUBCUTANEOUS at 06:05

## 2023-05-18 RX ADMIN — INSULIN ASPART 4 UNITS: 100 INJECTION, SOLUTION INTRAVENOUS; SUBCUTANEOUS at 06:05

## 2023-05-18 RX ADMIN — ARIPIPRAZOLE 5 MG: 5 TABLET ORAL at 08:05

## 2023-05-18 RX ADMIN — INSULIN ASPART 6 UNITS: 100 INJECTION, SOLUTION INTRAVENOUS; SUBCUTANEOUS at 04:05

## 2023-05-18 RX ADMIN — APIXABAN 2.5 MG: 2.5 TABLET, FILM COATED ORAL at 08:05

## 2023-05-18 RX ADMIN — HYDROCODONE BITARTRATE AND ACETAMINOPHEN 1 TABLET: 10; 325 TABLET ORAL at 03:05

## 2023-05-18 RX ADMIN — INSULIN DETEMIR 30 UNITS: 100 INJECTION, SOLUTION SUBCUTANEOUS at 08:05

## 2023-05-18 RX ADMIN — HYDROCODONE BITARTRATE AND ACETAMINOPHEN 1 TABLET: 10; 325 TABLET ORAL at 07:05

## 2023-05-18 RX ADMIN — SENNOSIDES AND DOCUSATE SODIUM 1 TABLET: 50; 8.6 TABLET ORAL at 08:05

## 2023-05-18 RX ADMIN — HYDROCODONE BITARTRATE AND ACETAMINOPHEN 1 TABLET: 10; 325 TABLET ORAL at 08:05

## 2023-05-18 NOTE — PLAN OF CARE
Problem: Adult Inpatient Plan of Care  Goal: Plan of Care Review  Outcome: Ongoing, Not Progressing  Flowsheets (Taken 5/18/2023 6550)  Plan of Care Reviewed With: patient  Goal: Patient-Specific Goal (Individualized)  Outcome: Ongoing, Not Progressing     Problem: Diabetes Comorbidity  Goal: Blood Glucose Level Within Targeted Range  Outcome: Ongoing, Not Progressing  Intervention: Monitor and Manage Glycemia  Flowsheets (Taken 5/18/2023 6053)  Glycemic Management:   blood glucose monitored   oral hydration promoted   supplemental insulin given

## 2023-05-18 NOTE — PT/OT/SLP PROGRESS
"Physical Therapy  Treatment    Monica Walker   MRN: [id number]   Admitting Diagnosis: History of total left knee replacement    PT Received On: 05/18/23  PT Start Time: 1210     PT Stop Time: 1255    PT Total Time (min): 45 min       Billable Minutes:  Gait Training 8, Therapeutic Activity 5, and Therapeutic Exercise 32    Treatment Type: Treatment  PT/PTA: PTA     Number of PTA visits since last PT visit: 1       General Precautions: Standard, fall  Orthopedic Precautions: LLE weight bearing as tolerated  Braces: Knee immobilizer  Respiratory Status: Room air    Spiritual, Cultural Beliefs, Scientologist Practices, Values that Affect Care: no    Subjective:  Communicated with skilled nursing and patient prior to session.  Patient reports "I'm alright" when asked about pain rating, and is agreeable to physical therapy treatment session.       Pain/Comfort  Pain Rating 1: 0/10  Location - Side 1: Left  Location - Orientation 1: generalized  Location 1: knee    Objective:   Patient found with: PICC line    Functional Mobility:  Bed Mobility:  SBA        Transfers:  Sit<>stand CGA to and from RW        Gait:  Approx. 30' with RW Min assist on level indoor surface with step to gait pattern.  Gait deficits include Left LE:  decrease heel strike upon initial contact, decreased toe off during swing phase, min LE circumduction, inadequate knee flexion          Treatment and Education:    Nustep  X 10 minutes    Ankle pumps  30 x bilateral LE's   Quad sets  30 x bilateral LE's    Glut sets  30 x bilateral LE's    Hip adduction  30 x 5"    Short Arc Quads'  3 x 10 bilateral LE's    Long Arc Quad's  3 x 10 L LE    Hamstring curls  3 x 10 yellow tband    Heelslides seated  30 x 3" (Left knee flexion active range of motion 70 degrees short sitting )              AM-PAC 6 CLICK MOBILITY  How much help from another person does this patient currently need?   1 = Unable, Total/Dependent Assistance  2 = A lot, Maximum/Moderate " Assistance  3 = A little, Minimum/Contact Guard/Supervision  4 = None, Modified [place]/Independent    Turning over in bed (including adjusting bedclothes, sheets and blankets)?: 3  Sitting down on and standing up from a chair with arms (e.g., wheelchair, bedside commode, etc.): 3  Moving from lying on back to sitting on the side of the bed?: 3  Moving to and from a bed to a chair (including a wheelchair)?: 3  Need to walk in hospital room?: 3  Climbing 3-5 steps with a railing?: 2  Basic Mobility Total Score: 17    AM-PAC Raw Score CMS G-Code Modifier Level of Impairment Assistance   6 % Total / Unable   7 - 9 CM 80 - 100% Maximal Assist   10 - 14 CL 60 - 80% Moderate Assist   15 - 19 CK 40 - 60% Moderate Assist   20 - 22 CJ 20 - 40% Minimal Assist   23 CI 1-20% SBA / CGA   24 CH 0% Independent/ Mod I     Patient left in bed with call button in reach.    Assessment:  Monica Walker is a 52 y.o. female with a medical diagnosis of History of total left knee replacement and presents with  weakness, impaired endurance, impaired self care skills, impaired functional mobility, gait instability, impaired balance, decreased lower extremity function, decreased safety awareness, pain.  Patient is motivated to participate with today's physical therapy treatment session.  Patient requires increased time and effort to complete exercises on Left LE, and requires multiple rest breaks throughout treatment session.  No adverse effects noted or reported.     Rehab identified problem list/impairments: weakness, impaired endurance, impaired balance, impaired functional mobility, impaired self care skills, gait instability, decreased lower extremity function, decreased upper extremity function, decreased ROM    Rehab potential is good.    Activity tolerance: Fair    Discharge recommendations: home with home health      Barriers to discharge:      Equipment recommendations: none     GOALS:   Multidisciplinary Problems        Physical Therapy Goals          Problem: Physical Therapy    Goal Priority Disciplines Outcome Goal Variances Interventions   Physical Therapy Goal     PT, PT/OT      Description: Short Term Goals  1. Patient will complete 30 reps of B LE exercises with correct form.   2. Patient will complete sit<>stand transfers with SBA.  3. Patient will ambulate 30 feet with RW on level surfaces with CGA.     Long Term Goals   1. Patient will ambulate 50 feet with RW on level and unlevel surfaces with SBA.  2. Patient will complete all functional transfers with MOD I.  4. Patient will negotiate up and down 2 stairs with use of handrail with SBA.                        PLAN:    Patient to be seen 5 x/week to address the above listed problems via gait training, therapeutic activities, therapeutic exercises  Plan of Care expires: 06/02/23  Plan of Care reviewed with: patient      Continue Plan of Care per PT order to progress patient toward rehab goals as tolerated by patient.      ZEINAB Hardin   05/18/2023

## 2023-05-18 NOTE — PT/OT/SLP PROGRESS
Occupational Therapy   Treatment    Name: Monica Walker  MRN: [id number]  Admitting Diagnosis:  History of total left knee replacement       Recommendations:     Discharge Recommendations: home with home health  Discharge Equipment Recommendations:  none  Barriers to discharge:       Assessment:     Monica Walker is a 52 y.o. female with a medical diagnosis of History of total left knee replacement.  She presents with weakness. Performance deficits affecting function are weakness, impaired endurance, impaired self care skills, impaired functional mobility, impaired balance, decreased lower extremity function, decreased safety awareness.     Rehab Prognosis:  Fair; patient would benefit from acute skilled OT services to address these deficits and reach maximum level of function.       Plan:     Patient to be seen 5 x/week to address the above listed problems via self-care/home management, therapeutic exercises, therapeutic activities  Plan of Care Expires:    Plan of Care Reviewed with: patient    Subjective     Chief Complaint: Pain  Patient/Family Comments/goals: to go home  Pain/Comfort:  Pain and weakness    Objective:     Communicated with: Nurse prior to session.  Patient found supine with   upon OT entry to room.    General Precautions: Standard, fall    Orthopedic Precautions:LLE weight bearing as tolerated  Braces:    Respiratory Status: Room air     Occupational Performance:     Bed Mobility:    Patient completed Rolling/Turning to Left with  supervision  Patient completed Rolling/Turning to Right with supervision  Patient completed Scooting/Bridging with stand by assistance  Patient completed Supine to Sit with minimum assistance  Patient completed Sit to Supine with minimum assistance     Functional Mobility/Transfers:  Patient completed Sit <> Stand Transfer with contact guard assistance  with  rolling walker   Functional Mobility: Patient transferred within room wit no difficulty    Activities of  Daily Living:  Lower Body Dressing: stand by assistance to serenity shoes      [facility] 6 Click ADL:      Treatment & Education:  Pt performed B UE strengthening exercises to include:   Shoulder flexion   Chest press    Elbow flexion   Elbow extension   Pectoral stretches   Bilateral rowing  All exercises performed 3x10 reps using 2# dowel and red theraband.     Patient performed standing activity with clothes pin tree. Patient was able to serenity clothes pins on tree with no difficulty.    Patient left sitting edge of bed with all lines intact and call button in reach    GOALS:   Multidisciplinary Problems       Occupational Therapy Goals          Problem: Occupational Therapy    Goal Priority Disciplines Outcome Interventions   Occupational Therapy Goal     OT, PT/OT     Description: Description: Grooming Status:   Short Term Goal: Pt will perform grooming with s/u sitting EOB.   Long Term Goal: Pt will perform grooming/oral hygiene standing at sink with Mod I      LE dressing Status:   Short Term Goal: Pt will perform LE dressing with min a.   Long Term Goal: Pt will perform LE dressing with s/u.    Toileting Status:   Short Term Goal: Pt will perform toilet hygiene on BSC with s/u.  Long Term Goal: Pt will perform toilet hygiene on toilet with no AE with mod I.    Commode Transfer:   Short Term Goal: Pt will perform BSC t/f with s/u.  Long Term Goal:  Pt will perform toilet t/f in bathroom with Mod I.     Bathing Status:   Long Term Goal: Pt will perform sponge bath with s/u with no unsafe fatigue.     Strength Status:   Long Term Goal: Pt to perform BUE strengthening with weights and/or body weight to increase ADL independence and safety    Endurance Status:   Short Term Goal:pt to perform 15 min OT treatment with 5 or greater rest breaks  Long Term Goal: pt to perform 30 min OT treat with 3 or less rest breaks                         Time Tracking:     OT Date of Treatment: 05/18/23  OT Start Time: 1000  OT Stop Time:  1029  OT Total Time (min): 29 min    Billable Minutes:Therapeutic Activity 14 min  Therapeutic Exercise 15 min             EDITA Santos/ETHAN, CSRS  5/18/2023

## 2023-05-18 NOTE — PLAN OF CARE
Problem: Adult Inpatient Plan of Care  Goal: Optimal Comfort and Wellbeing  Outcome: Ongoing, Progressing     Problem: Diabetes Comorbidity  Goal: Blood Glucose Level Within Targeted Range  Outcome: Ongoing, Progressing     Problem: Fall Injury Risk  Goal: Absence of Fall and Fall-Related Injury  Outcome: Ongoing, Progressing

## 2023-05-19 LAB
GLUCOSE SERPL-MCNC: 150 MG/DL (ref 70–105)
GLUCOSE SERPL-MCNC: 211 MG/DL (ref 70–105)
GLUCOSE SERPL-MCNC: 250 MG/DL (ref 70–105)
GLUCOSE SERPL-MCNC: 340 MG/DL (ref 70–105)

## 2023-05-19 PROCEDURE — 27000221 HC OXYGEN, UP TO 24 HOURS

## 2023-05-19 PROCEDURE — 63600175 PHARM REV CODE 636 W HCPCS: Performed by: FAMILY MEDICINE

## 2023-05-19 PROCEDURE — 97116 GAIT TRAINING THERAPY: CPT

## 2023-05-19 PROCEDURE — 99308 PR NURSING FAC CARE, SUBSEQ, MINOR COMPLIC: ICD-10-PCS | Mod: ,,, | Performed by: FAMILY MEDICINE

## 2023-05-19 PROCEDURE — 63600175 PHARM REV CODE 636 W HCPCS: Performed by: EMERGENCY MEDICINE

## 2023-05-19 PROCEDURE — 99308 SBSQ NF CARE LOW MDM 20: CPT | Mod: ,,, | Performed by: FAMILY MEDICINE

## 2023-05-19 PROCEDURE — 97110 THERAPEUTIC EXERCISES: CPT

## 2023-05-19 PROCEDURE — 94761 N-INVAS EAR/PLS OXIMETRY MLT: CPT

## 2023-05-19 PROCEDURE — 25000003 PHARM REV CODE 250: Performed by: FAMILY MEDICINE

## 2023-05-19 PROCEDURE — 11000004 HC SNF PRIVATE

## 2023-05-19 PROCEDURE — 25000003 PHARM REV CODE 250: Performed by: EMERGENCY MEDICINE

## 2023-05-19 PROCEDURE — 82962 GLUCOSE BLOOD TEST: CPT

## 2023-05-19 RX ADMIN — Medication 1 CAPSULE: at 11:05

## 2023-05-19 RX ADMIN — INSULIN ASPART 4 UNITS: 100 INJECTION, SOLUTION INTRAVENOUS; SUBCUTANEOUS at 05:05

## 2023-05-19 RX ADMIN — HEPARIN, PORCINE (PF) 10 UNIT/ML INTRAVENOUS SYRINGE 50 UNITS: at 09:05

## 2023-05-19 RX ADMIN — FAMOTIDINE 20 MG: 20 TABLET, FILM COATED ORAL at 08:05

## 2023-05-19 RX ADMIN — INSULIN ASPART 8 UNITS: 100 INJECTION, SOLUTION INTRAVENOUS; SUBCUTANEOUS at 10:05

## 2023-05-19 RX ADMIN — INSULIN DETEMIR 30 UNITS: 100 INJECTION, SOLUTION SUBCUTANEOUS at 08:05

## 2023-05-19 RX ADMIN — APIXABAN 2.5 MG: 2.5 TABLET, FILM COATED ORAL at 08:05

## 2023-05-19 RX ADMIN — INSULIN ASPART 2 UNITS: 100 INJECTION, SOLUTION INTRAVENOUS; SUBCUTANEOUS at 08:05

## 2023-05-19 RX ADMIN — ARIPIPRAZOLE 5 MG: 5 TABLET ORAL at 08:05

## 2023-05-19 RX ADMIN — PIPERACILLIN AND TAZOBACTAM 4.5 G: 4; .5 INJECTION, POWDER, FOR SOLUTION INTRAVENOUS at 11:05

## 2023-05-19 RX ADMIN — PIPERACILLIN AND TAZOBACTAM 4.5 G: 4; .5 INJECTION, POWDER, FOR SOLUTION INTRAVENOUS at 06:05

## 2023-05-19 RX ADMIN — SENNOSIDES AND DOCUSATE SODIUM 1 TABLET: 50; 8.6 TABLET ORAL at 08:05

## 2023-05-19 RX ADMIN — DULOXETINE 60 MG: 30 CAPSULE, DELAYED RELEASE ORAL at 08:05

## 2023-05-19 RX ADMIN — Medication 1 CAPSULE: at 08:05

## 2023-05-19 RX ADMIN — PIPERACILLIN AND TAZOBACTAM 4.5 G: 4; .5 INJECTION, POWDER, FOR SOLUTION INTRAVENOUS at 02:05

## 2023-05-19 RX ADMIN — INSULIN ASPART 5 UNITS: 100 INJECTION, SOLUTION INTRAVENOUS; SUBCUTANEOUS at 04:05

## 2023-05-19 RX ADMIN — Medication 6 MG: at 08:05

## 2023-05-19 RX ADMIN — Medication 1 CAPSULE: at 04:05

## 2023-05-19 RX ADMIN — HYDROCODONE BITARTRATE AND ACETAMINOPHEN 1 TABLET: 10; 325 TABLET ORAL at 11:05

## 2023-05-19 RX ADMIN — INSULIN ASPART 5 UNITS: 100 INJECTION, SOLUTION INTRAVENOUS; SUBCUTANEOUS at 10:05

## 2023-05-19 RX ADMIN — INSULIN ASPART 2 UNITS: 100 INJECTION, SOLUTION INTRAVENOUS; SUBCUTANEOUS at 06:05

## 2023-05-19 NOTE — PLAN OF CARE
Problem: Adult Inpatient Plan of Care  Goal: Plan of Care Review  Outcome: Ongoing, Progressing  Goal: Patient-Specific Goal (Individualized)  Outcome: Ongoing, Progressing     Problem: Fall Injury Risk  Goal: Absence of Fall and Fall-Related Injury  Outcome: Ongoing, Progressing  Intervention: Identify and Manage Contributors  Flowsheets (Taken 5/19/2023 1745)  Self-Care Promotion:   independence encouraged   BADL personal objects within reach   BADL personal routines maintained  Medication Review/Management:   medications reviewed   high-risk medications identified  Intervention: Promote Injury-Free Environment  Flowsheets (Taken 5/19/2023 1745)  Safety Promotion/Fall Prevention:   assistive device/personal item within reach   diversional activities provided   high risk medications identified   medications reviewed   muscle strengthening facilitated   nonskid shoes/socks when out of bed   side rails raised x 3   instructed to call staff for mobility

## 2023-05-19 NOTE — PROGRESS NOTES
[facility]  Hospital Medicine  Progress Note    Patient Name: Monica Walker  MRN: [id number]  Patient Class: IP- Swing   Admission Date: 5/10/2023  Length of Stay: 9 days  Attending Physician: Hannah Schulz, *  Primary Care Provider: Hannah Schulz MD        Subjective:     Principal Problem:History of total left knee replacement        HPI:  This 52 yr. Old WF has been admitted to [facility] for rehab after undergoing another left knee replacement. Apparently, she had a spell of rigors last night. A septic workup was done and antibiotics started. This AM, she feels badly. She is afebrile. We will continue present treatment.      Overview/Hospital Course:  5/12/23 - blood cultures are positive. Will continue present treatment.    5/15/23 - pt. Is becoming depressed. Orders revised.    5/19/23 - pt. Feels better today. Will continue present treatment.      Interval History: feels better today. Will continue present treatment.    Review of Systems  Objective:     Vital Signs (Most Recent):  Temp: 98.7 °F (37.1 °C) (05/18/23 1951)  Pulse: 76 (05/18/23 1951)  Resp: 17 (05/18/23 1951)  BP: (!) 158/80 (05/18/23 1951)  SpO2: 96 % (05/18/23 1951) Vital Signs (24h Range):  Temp:  [98.7 °F (37.1 °C)] 98.7 °F (37.1 °C)  Pulse:  [68-76] 76  Resp:  [17-18] 17  SpO2:  [96 %] 96 %  BP: (158)/(80) 158/80     Weight: 72.6 kg (160 lb)  Body mass index is 25.82 kg/m².    Intake/Output Summary (Last 24 hours) at 5/19/2023 0752  Last data filed at 5/18/2023 0935  Gross per 24 hour   Intake 0 ml   Output --   Net 0 ml         Physical Exam        Significant Labs: All pertinent labs within the past 24 hours have been reviewed.    Significant Imaging: I have reviewed all pertinent imaging results/findings within the past 24 hours.      Assessment/Plan:      Shaking chills        Elevated lactic acid level          VTE Risk Mitigation (From admission, onward)         Ordered      heparin, porcine (PF) injection 50 Units  Daily         05/11/23 0824     apixaban tablet 2.5 mg  2 times daily         05/10/23 6764                Discharge Planning   SUZANNA:      Code Status: Full Code   Is the patient medically ready for discharge?:     Reason for patient still in hospital (select all that apply): Patient trending condition  Discharge Plan A: Home with family, Home Health                  Hannah Schulz MD  Department of Hospital Medicine   [facility]

## 2023-05-19 NOTE — PT/OT/SLP PROGRESS
"Physical Therapy  Treatment    Monica Walker   MRN: [id number]   Admitting Diagnosis: History of total left knee replacement    PT Received On: 05/19/23  PT Start Time: 1215     PT Stop Time: 1255    PT Total Time (min): 40 min       Billable Minutes:  Gait Training 8, Therapeutic Activity 0, and Therapeutic Exercise 32    Treatment Type: Treatment  PT/PTA: PT     Number of PTA visits since last PT visit: 0       General Precautions: Standard, fall  Orthopedic Precautions: LLE weight bearing as tolerated  Braces: Knee immobilizer  Respiratory Status: Room air    Spiritual, Cultural Beliefs, Catholic Practices, Values that Affect Care: no    Subjective:  "My back is feeling better today. My knee is about an 8/10, but I just had some pain medicine. I am leaving to go home Sunday."       Pain/Comfort  Pain Rating 1: 8/10  Location - Side 1: Left  Location - Orientation 1: generalized  Location 1: knee  Pain Addressed 1: Other (see comments) (Patient reports that she had just receievd pain medication about 15 minutres prior to treatment)    Objective:   Patient found with: PICC line    Functional Mobility:  Bed Mobility:  SBA        Transfers:  Sit<>stand CGA to and from RW        Gait:  Approx. 90' with RW contact guard assist on level indoor surface with step to gait pattern.  Patient ambulates with decreased step length, decreased theo, increased trunk flexion, decreased L heel strike and toe off, and decreased L knee flexion during swing        Treatment and Education:    Nustep  X 8 minutes    Ankle pumps  30 x bilateral LE's   Quad sets  30 x bilateral LE's    Glut sets  30 x bilateral LE's    Hip adduction  30 x 5"    Short Arc Quads'  3 x 10 LLE's    Long Arc Quad's  2 x 10 2" L LE    Hamstring curls  2 x 10 red tband    Heelslides seated  30 x 3" (Left knee flexion active range of motion 60 degrees short sitting )              AM-PAC 6 CLICK MOBILITY  How much help from another person does this patient " currently need?   1 = Unable, Total/Dependent Assistance  2 = A lot, Maximum/Moderate Assistance  3 = A little, Minimum/Contact Guard/Supervision  4 = None, Modified [place]/Independent    Turning over in bed (including adjusting bedclothes, sheets and blankets)?: 4  Sitting down on and standing up from a chair with arms (e.g., wheelchair, bedside commode, etc.): 4  Moving from lying on back to sitting on the side of the bed?: 4  Moving to and from a bed to a chair (including a wheelchair)?: 3  Need to walk in hospital room?: 3  Climbing 3-5 steps with a railing?: 2  Basic Mobility Total Score: 20    AM-PAC Raw Score CMS G-Code Modifier Level of Impairment Assistance   6 % Total / Unable   7 - 9 CM 80 - 100% Maximal Assist   10 - 14 CL 60 - 80% Moderate Assist   15 - 19 CK 40 - 60% Moderate Assist   20 - 22 CJ 20 - 40% Minimal Assist   23 CI 1-20% SBA / CGA   24 CH 0% Independent/ Mod I     Patient left in mele chair in therapy gym for OT treatment.     Assessment:  Monica Walker is a 52 y.o. female with a medical diagnosis of History of total left knee replacement and presents with  weakness, impaired endurance, impaired self care skills, impaired functional mobility, gait instability, impaired balance, decreased lower extremity function, decreased safety awareness, pain.  Patient required only minimal cues for form and speed with LE exercises due to prior knowledge of tasks from prior PT plan of care. Patient had no reports of increased pain or adverse effects to treatment tasks only L LE fatigue.     Rehab identified problem list/impairments: weakness, impaired endurance, impaired functional mobility, gait instability, impaired balance, decreased lower extremity function, pain    Rehab potential is good.    Activity tolerance: Fair    Discharge recommendations: home with home health      Barriers to discharge:      Equipment recommendations: none     GOALS:   Multidisciplinary Problems        Physical Therapy Goals          Problem: Physical Therapy    Goal Priority Disciplines Outcome Goal Variances Interventions   Physical Therapy Goal     PT, PT/OT      Description: Short Term Goals  1. Patient will complete 30 reps of B LE exercises with correct form.   2. Patient will complete sit<>stand transfers with SBA.  3. Patient will ambulate 30 feet with RW on level surfaces with CGA.     Long Term Goals   1. Patient will ambulate 50 feet with RW on level and unlevel surfaces with SBA.  2. Patient will complete all functional transfers with MOD I.  4. Patient will negotiate up and down 2 stairs with use of handrail with SBA.                        PLAN:    Patient to be seen 5 x/week to address the above listed problems via gait training, therapeutic activities, therapeutic exercises  Plan of Care expires: 06/02/23  Plan of Care reviewed with: patient      Continue Plan of Care per PT order to progress patient toward rehab goals as tolerated by patient.     Damaris Kaplan, PT, DPT      05/19/2023

## 2023-05-19 NOTE — PROGRESS NOTES
No new wt. Pt's intake has improved. She is for D/C tomorrow.  Noted new order for Abilify.  B-340.  No c/o diarrhea.  Hgb 10.2, Hct 32.4, K 3.0  Continue to follow for intake support.

## 2023-05-19 NOTE — SUBJECTIVE & OBJECTIVE
Interval History: feels better today. Will continue present treatment.    Review of Systems  Objective:     Vital Signs (Most Recent):  Temp: 98.7 °F (37.1 °C) (05/18/23 1951)  Pulse: 76 (05/18/23 1951)  Resp: 17 (05/18/23 1951)  BP: (!) 158/80 (05/18/23 1951)  SpO2: 96 % (05/18/23 1951) Vital Signs (24h Range):  Temp:  [98.7 °F (37.1 °C)] 98.7 °F (37.1 °C)  Pulse:  [68-76] 76  Resp:  [17-18] 17  SpO2:  [96 %] 96 %  BP: (158)/(80) 158/80     Weight: 72.6 kg (160 lb)  Body mass index is 25.82 kg/m².    Intake/Output Summary (Last 24 hours) at 5/19/2023 0752  Last data filed at 5/18/2023 0935  Gross per 24 hour   Intake 0 ml   Output --   Net 0 ml         Physical Exam        Significant Labs: All pertinent labs within the past 24 hours have been reviewed.    Significant Imaging: I have reviewed all pertinent imaging results/findings within the past 24 hours.

## 2023-05-19 NOTE — PT/OT/SLP PROGRESS
"Occupational Therapy  Treatment    Monica Walker   MRN: [id number]   Admitting Diagnosis: History of total left knee replacement    OT Date of Treatment: 05/19/23   OT Start Time: 1313  OT Stop Time: 1335  OT Total Time (min): 22 min    Billable Minutes:  Therapeutic Exercise 22 min               General Precautions: Standard, fall  Orthopedic Precautions: LLE weight bearing as tolerated  Braces:    Respiratory Status: Room air         Subjective:  Communicated with PT prior to session.    Pain/Comfort  Pain Rating 1: 8/10    Objective:  Patient found with: PICC line     Functional Mobility:  Bed Mobility:       Transfers:        Functional Ambulation:     Activities of Daily Living:      Therapeutic Activities and Exercises:  Patient completed the following for increased strength to increase I with ADLs: 2# DB- shoulder press, chest press, bicep curls x 40, yellow theraflex x 40 both ways, red theraband- scapular retraction x 40      AM-PAC 6 CLICK ADL   How much help from another person does this patient currently need?   1 = Unable, Total/Dependent Assistance  2 = A lot, Maximum/Moderate Assistance  3 = A little, Minimum/Contact Guard/Supervision  4 = None, Modified [place]/Independent    Putting on and taking off regular lower body clothing? : 3  Bathing (including washing, rinsing, drying)?: 3  Toileting, which includes using toilet, bedpan, or urinal? : 3  Putting on and taking off regular upper body clothing?: 3  Taking care of personal grooming such as brushing teeth?: 4  Eating meals?: 4  Daily Activity Total Score: 20     AM-PAC Raw Score CMS "G-Code Modifier Level of Impairment Assistance   6 % Total / Unable   7 - 8 CM 80 - 100% Maximal Assist   9-13 CL 60 - 80% Moderate Assist   14 - 19 CK 40 - 60% Moderate Assist   20 - 22 CJ 20 - 40% Minimal Assist   23 CI 1-20% SBA / CGA   24 CH 0% Independent/ Mod I       Patient left supine with call button in reach    ASSESSMENT:  Monica Walker is " a 52 y.o. female with a medical diagnosis of History of total left knee replacement and presents with decreased strength decreased endurance, decreased self-care.    Rehab identified problem list/impairments:  weakness, impaired endurance, impaired self care skills, impaired functional mobility, impaired balance, decreased lower extremity function, decreased safety awareness, decreased upper extremity function    Rehab potential is good.    Activity tolerance: Good    Discharge recommendations: home with home health   Barriers to discharge:      Equipment recommendations: none    GOALS:   Multidisciplinary Problems       Occupational Therapy Goals          Problem: Occupational Therapy    Goal Priority Disciplines Outcome Interventions   Occupational Therapy Goal     OT, PT/OT     Description: Description: Grooming Status:   Short Term Goal: Pt will perform grooming with s/u sitting EOB.   Long Term Goal: Pt will perform grooming/oral hygiene standing at sink with Mod I      LE dressing Status:   Short Term Goal: Pt will perform LE dressing with min a.   Long Term Goal: Pt will perform LE dressing with s/u.    Toileting Status:   Short Term Goal: Pt will perform toilet hygiene on BSC with s/u.  Long Term Goal: Pt will perform toilet hygiene on toilet with no AE with mod I.    Commode Transfer:   Short Term Goal: Pt will perform BSC t/f with s/u.  Long Term Goal:  Pt will perform toilet t/f in bathroom with Mod I.     Bathing Status:   Long Term Goal: Pt will perform sponge bath with s/u with no unsafe fatigue.     Strength Status:   Long Term Goal: Pt to perform BUE strengthening with weights and/or body weight to increase ADL independence and safety    Endurance Status:   Short Term Goal:pt to perform 15 min OT treatment with 5 or greater rest breaks  Long Term Goal: pt to perform 30 min OT treat with 3 or less rest breaks                         Plan:  Patient to be seen 5 x/week to address the above listed  problems via therapeutic exercises  Plan of Care expires:    Plan of Care reviewed with: patient       Kirti Che, OTR/L    05/19/2023

## 2023-05-19 NOTE — NURSING
Spoke with pt about d/c plans, pt requested to be d/c'ed tomorrow after finishing antibiotics. Referral faxed to [facility]  per pt request.

## 2023-05-20 LAB
GLUCOSE SERPL-MCNC: 158 MG/DL (ref 70–105)
GLUCOSE SERPL-MCNC: 228 MG/DL (ref 70–105)
GLUCOSE SERPL-MCNC: 315 MG/DL (ref 70–105)
GLUCOSE SERPL-MCNC: 317 MG/DL (ref 70–105)
GLUCOSE SERPL-MCNC: 343 MG/DL (ref 70–105)

## 2023-05-20 PROCEDURE — 82962 GLUCOSE BLOOD TEST: CPT

## 2023-05-20 PROCEDURE — 25000003 PHARM REV CODE 250: Performed by: FAMILY MEDICINE

## 2023-05-20 PROCEDURE — 25000003 PHARM REV CODE 250: Performed by: EMERGENCY MEDICINE

## 2023-05-20 PROCEDURE — 11000004 HC SNF PRIVATE

## 2023-05-20 PROCEDURE — 27201920 HC DRESSING, AQUACEL AG

## 2023-05-20 PROCEDURE — 94761 N-INVAS EAR/PLS OXIMETRY MLT: CPT

## 2023-05-20 PROCEDURE — 63600175 PHARM REV CODE 636 W HCPCS: Performed by: EMERGENCY MEDICINE

## 2023-05-20 PROCEDURE — 63600175 PHARM REV CODE 636 W HCPCS: Performed by: FAMILY MEDICINE

## 2023-05-20 PROCEDURE — 27000221 HC OXYGEN, UP TO 24 HOURS

## 2023-05-20 RX ADMIN — INSULIN ASPART 2 UNITS: 100 INJECTION, SOLUTION INTRAVENOUS; SUBCUTANEOUS at 08:05

## 2023-05-20 RX ADMIN — PIPERACILLIN AND TAZOBACTAM 4.5 G: 4; .5 INJECTION, POWDER, FOR SOLUTION INTRAVENOUS at 01:05

## 2023-05-20 RX ADMIN — APIXABAN 2.5 MG: 2.5 TABLET, FILM COATED ORAL at 08:05

## 2023-05-20 RX ADMIN — Medication 6 MG: at 08:05

## 2023-05-20 RX ADMIN — INSULIN DETEMIR 30 UNITS: 100 INJECTION, SOLUTION SUBCUTANEOUS at 09:05

## 2023-05-20 RX ADMIN — INSULIN ASPART 5 UNITS: 100 INJECTION, SOLUTION INTRAVENOUS; SUBCUTANEOUS at 05:05

## 2023-05-20 RX ADMIN — DULOXETINE 60 MG: 30 CAPSULE, DELAYED RELEASE ORAL at 09:05

## 2023-05-20 RX ADMIN — PIPERACILLIN AND TAZOBACTAM 4.5 G: 4; .5 INJECTION, POWDER, FOR SOLUTION INTRAVENOUS at 05:05

## 2023-05-20 RX ADMIN — HYDROCODONE BITARTRATE AND ACETAMINOPHEN 1 TABLET: 10; 325 TABLET ORAL at 12:05

## 2023-05-20 RX ADMIN — ARIPIPRAZOLE 5 MG: 5 TABLET ORAL at 08:05

## 2023-05-20 RX ADMIN — Medication 1 CAPSULE: at 09:05

## 2023-05-20 RX ADMIN — Medication 1 CAPSULE: at 11:05

## 2023-05-20 RX ADMIN — HEPARIN, PORCINE (PF) 10 UNIT/ML INTRAVENOUS SYRINGE 50 UNITS: at 09:05

## 2023-05-20 RX ADMIN — HYDROCODONE BITARTRATE AND ACETAMINOPHEN 1 TABLET: 10; 325 TABLET ORAL at 09:05

## 2023-05-20 RX ADMIN — Medication 1 CAPSULE: at 05:05

## 2023-05-20 RX ADMIN — INSULIN ASPART 5 UNITS: 100 INJECTION, SOLUTION INTRAVENOUS; SUBCUTANEOUS at 10:05

## 2023-05-20 RX ADMIN — SENNOSIDES AND DOCUSATE SODIUM 1 TABLET: 50; 8.6 TABLET ORAL at 08:05

## 2023-05-20 RX ADMIN — INSULIN ASPART 8 UNITS: 100 INJECTION, SOLUTION INTRAVENOUS; SUBCUTANEOUS at 05:05

## 2023-05-20 RX ADMIN — HYDROCODONE BITARTRATE AND ACETAMINOPHEN 1 TABLET: 10; 325 TABLET ORAL at 08:05

## 2023-05-20 RX ADMIN — INSULIN ASPART 8 UNITS: 100 INJECTION, SOLUTION INTRAVENOUS; SUBCUTANEOUS at 10:05

## 2023-05-20 RX ADMIN — SENNOSIDES AND DOCUSATE SODIUM 1 TABLET: 50; 8.6 TABLET ORAL at 09:05

## 2023-05-20 RX ADMIN — PIPERACILLIN AND TAZOBACTAM 4.5 G: 4; .5 INJECTION, POWDER, FOR SOLUTION INTRAVENOUS at 10:05

## 2023-05-20 RX ADMIN — FAMOTIDINE 20 MG: 20 TABLET, FILM COATED ORAL at 08:05

## 2023-05-20 RX ADMIN — APIXABAN 2.5 MG: 2.5 TABLET, FILM COATED ORAL at 09:05

## 2023-05-20 RX ADMIN — FAMOTIDINE 20 MG: 20 TABLET, FILM COATED ORAL at 09:05

## 2023-05-20 NOTE — PLAN OF CARE
Problem: Adult Inpatient Plan of Care  Goal: Plan of Care Review  Outcome: Ongoing, Progressing  Flowsheets (Taken 5/20/2023 7589)  Plan of Care Reviewed With: patient  Goal: Readiness for Transition of Care  Outcome: Ongoing, Progressing     Problem: Diabetes Comorbidity  Goal: Blood Glucose Level Within Targeted Range  Outcome: Ongoing, Not Progressing     Problem: Oral Intake Inadequate (Acute Kidney Injury/Impairment)  Goal: Optimal Nutrition Intake  Outcome: Ongoing, Progressing

## 2023-05-21 VITALS
DIASTOLIC BLOOD PRESSURE: 90 MMHG | BODY MASS INDEX: 25.71 KG/M2 | HEIGHT: 66 IN | HEART RATE: 74 BPM | RESPIRATION RATE: 20 BRPM | SYSTOLIC BLOOD PRESSURE: 155 MMHG | WEIGHT: 160 LBS | TEMPERATURE: 98 F | OXYGEN SATURATION: 99 %

## 2023-05-21 LAB — GLUCOSE SERPL-MCNC: 232 MG/DL (ref 70–105)

## 2023-05-21 PROCEDURE — 63600175 PHARM REV CODE 636 W HCPCS: Performed by: FAMILY MEDICINE

## 2023-05-21 PROCEDURE — 82962 GLUCOSE BLOOD TEST: CPT

## 2023-05-21 PROCEDURE — 96372 THER/PROPH/DIAG INJ SC/IM: CPT

## 2023-05-21 PROCEDURE — 25000003 PHARM REV CODE 250: Performed by: FAMILY MEDICINE

## 2023-05-21 PROCEDURE — 99315 NF DSCHRG MGMT 30 MIN/LESS: CPT | Mod: ,,, | Performed by: FAMILY MEDICINE

## 2023-05-21 PROCEDURE — 25000003 PHARM REV CODE 250: Performed by: EMERGENCY MEDICINE

## 2023-05-21 PROCEDURE — 99315 PR NURSING FAC DISCHRGE DAY,1-30 MIN: ICD-10-PCS | Mod: ,,, | Performed by: FAMILY MEDICINE

## 2023-05-21 PROCEDURE — 94761 N-INVAS EAR/PLS OXIMETRY MLT: CPT

## 2023-05-21 PROCEDURE — 63600175 PHARM REV CODE 636 W HCPCS: Performed by: EMERGENCY MEDICINE

## 2023-05-21 RX ADMIN — HYDROCODONE BITARTRATE AND ACETAMINOPHEN 1 TABLET: 10; 325 TABLET ORAL at 07:05

## 2023-05-21 RX ADMIN — INSULIN ASPART 4 UNITS: 100 INJECTION, SOLUTION INTRAVENOUS; SUBCUTANEOUS at 06:05

## 2023-05-21 RX ADMIN — Medication 1 CAPSULE: at 07:05

## 2023-05-21 RX ADMIN — SENNOSIDES AND DOCUSATE SODIUM 1 TABLET: 50; 8.6 TABLET ORAL at 07:05

## 2023-05-21 RX ADMIN — HEPARIN, PORCINE (PF) 10 UNIT/ML INTRAVENOUS SYRINGE 50 UNITS: at 07:05

## 2023-05-21 RX ADMIN — FAMOTIDINE 20 MG: 20 TABLET, FILM COATED ORAL at 07:05

## 2023-05-21 RX ADMIN — INSULIN ASPART 5 UNITS: 100 INJECTION, SOLUTION INTRAVENOUS; SUBCUTANEOUS at 06:05

## 2023-05-21 RX ADMIN — DULOXETINE 60 MG: 30 CAPSULE, DELAYED RELEASE ORAL at 07:05

## 2023-05-21 RX ADMIN — INSULIN DETEMIR 30 UNITS: 100 INJECTION, SOLUTION SUBCUTANEOUS at 07:05

## 2023-05-21 RX ADMIN — APIXABAN 2.5 MG: 2.5 TABLET, FILM COATED ORAL at 07:05

## 2023-05-21 NOTE — DISCHARGE INSTRUCTIONS
Discharge home with family. Continue home meds as prescribed. Resume a diabetic diet. Activity as tolerated. New bottle of Norco and remaining bottle of Eliquis returned to patient. [facility] to resume care of PICC lint to MARCELA. Home Health to D/C staples and elliei-strip on 05/23/2023 (two weeks from surgery). Keep f/u appointment as scheduled. Return to ER with any worsening condition.

## 2023-05-21 NOTE — PLAN OF CARE
Problem: Adult Inpatient Plan of Care  Goal: Plan of Care Review  Outcome: Adequate for Care Transition  Goal: Patient-Specific Goal (Individualized)  Outcome: Adequate for Care Transition     Problem: Fall Injury Risk  Goal: Absence of Fall and Fall-Related Injury  Outcome: Adequate for Care Transition  Intervention: Identify and Manage Contributors  Flowsheets (Taken 5/21/2023 0817)  Self-Care Promotion:   independence encouraged   BADL personal objects within reach   BADL personal routines maintained  Medication Review/Management:   medications reviewed   high-risk medications identified  Intervention: Promote Injury-Free Environment  Flowsheets (Taken 5/21/2023 0817)  Safety Promotion/Fall Prevention:   assistive device/personal item within reach   diversional activities provided   high risk medications identified   in recliner, wheels locked   medications reviewed   muscle strengthening facilitated   nonskid shoes/socks when out of bed   side rails raised x 3   instructed to call staff for mobility

## 2023-05-21 NOTE — PLAN OF CARE
Problem: Adult Inpatient Plan of Care  Goal: Plan of Care Review  Outcome: Ongoing, Progressing  Goal: Patient-Specific Goal (Individualized)  Outcome: Ongoing, Progressing  Goal: Absence of Hospital-Acquired Illness or Injury  Outcome: Ongoing, Progressing  Goal: Optimal Comfort and Wellbeing  Outcome: Ongoing, Progressing  Goal: Readiness for Transition of Care  Outcome: Ongoing, Progressing     Problem: Diabetes Comorbidity  Goal: Blood Glucose Level Within Targeted Range  Outcome: Ongoing, Progressing     Problem: Fluid and Electrolyte Imbalance (Acute Kidney Injury/Impairment)  Goal: Fluid and Electrolyte Balance  Outcome: Ongoing, Progressing     Problem: Oral Intake Inadequate (Acute Kidney Injury/Impairment)  Goal: Optimal Nutrition Intake  Outcome: Ongoing, Progressing     Problem: Infection  Goal: Absence of Infection Signs and Symptoms  Outcome: Ongoing, Progressing     Problem: Infection  Goal: Absence of Infection Signs and Symptoms  Outcome: Ongoing, Progressing     Problem: Fall Injury Risk  Goal: Absence of Fall and Fall-Related Injury  Outcome: Ongoing, Progressing     Problem: Skin Injury Risk Increased  Goal: Skin Health and Integrity  Outcome: Ongoing, Progressing

## 2023-05-21 NOTE — NURSING
"(3930) Patient is very impatient and wanting to go home now, as soon as possible, says her mother is on the way to get her. Upon assessment, c/o pain of 8/10 and requested a pain pill. Aquacel AG dressing is c/d/I to L knee.     (1688) Routine meds and PRN pain med given early because patient is ready to go home. MARCELA PICC double lumens flushed with heparin without difficulty. Patient being sent home with double lumen PICC line in place per Dr. Schulz.    (0812) As I'm working on her discharge papers, patient is proceeding to go outside putting her stuff in the car, asking if she can go yet.     (4332) Had to follow patient/family outside, discharge instructions discussed with patient/family, verbalized understanding. Two bottles of home meds returned to patient: Norco 10/325, quantity 30, new bottle; Eliquis 2.5mg remaining 5 tablets returned to patient. Aquacel dressing intact to L knee, staples intact and to be removed by Home Health on the 23rd, MARCELA PICC left in place, dressing c/d/I and to be changed on the 26th. Patient discharged home with family, personal belongings with patient.    NOTE: On Friday (05/19/2023), Dr. Schulz gave verbal orders to place a "Discharge Patient" order in [facility], let her go home on Sunday and she will do the discharge on Monday, leave in place the double lumen PICC to her MARCELA and home health can monitor that.   "

## 2023-05-22 ENCOUNTER — PATIENT OUTREACH (OUTPATIENT)
Dept: ADMINISTRATIVE | Facility: CLINIC | Age: 53
End: 2023-05-22

## 2023-05-22 NOTE — PROGRESS NOTES
C3 nurse attempted to contact Monica Walker  for a TCC post hospital discharge follow up call. The patient is unable to conduct the call @ this time. The patient requested a callback.    The patient does not have a scheduled HOSFU appointment within 5-7 days post hospital discharge date 5/21/23. Message sent to Physician staff to assist with HOSFU appointment scheduling.

## 2023-05-22 NOTE — PROGRESS NOTES
C3 nurse attempted to contact Monica Walker  for a TCC post hospital discharge follow up call. No answer. Left voicemail with callback information. The patient does not have a scheduled HOSFU appointment. Message sent to PCP staff for assistance with scheduling visit with patient.

## 2023-05-22 NOTE — DISCHARGE SUMMARY
[facility] - [facility] Surgical Unit  Hospital Medicine  Discharge Summary      Patient Name: Monica Walker  MRN: [id number]  [facility]: [id number]  Patient Class: IP- Swing  Admission Date: 5/10/2023  Hospital Length of Stay: 11 days  Discharge Date and Time: 5/21/2023  8:30 AM  Attending Physician: No att. providers found   Discharging Provider: Hannah Freeman MD  Primary Care Provider: Hannah Freeman MD    Primary Care Team: Networked reference to record PCT     HPI:   This 52 yr. Old WF has been admitted to [facility] for rehab after undergoing another left knee replacement. Apparently, she had a spell of rigors last night. A septic workup was done and antibiotics started. This AM, she feels badly. She is afebrile. We will continue present treatment.      * No surgery found *      Hospital Course:   5/12/23 - blood cultures are positive. Will continue present treatment.    5/15/23 - pt. Is becoming depressed. Orders revised.    5/19/23 - pt. Feels better today. Will continue present treatment.       Goals of Care Treatment Preferences:  Code Status: Full Code      Consults:   Consults (From admission, onward)        Status Ordering Provider     Pharmacy to dose Vancomycin consult  Once        Provider:  (Not yet assigned)   [facility].    Completed GINA VIERA     Inpatient consult to Registered Dietitian/Nutritionist  Once        Provider:  (Not yet assigned)    Completed HANNAH FREEMAN          No new Assessment & Plan notes have been filed under this hospital service since the last note was generated.  Service: Hospital Medicine    Final Active Diagnoses:    Diagnosis Date Noted POA    PRINCIPAL PROBLEM:  History of total left knee replacement [Z96.652] 05/11/2023 Not Applicable    Elevated lactic acid level [R79.89] 05/11/2023 No    Shaking chills [R68.83] 05/11/2023 No      Problems Resolved During this Admission:  "      Discharged Condition: stable    Disposition: Home or Self Care    Follow Up:   Follow-up Information     Jacek Noyola MD. Go on 5/24/2023.    Specialty: Orthopedic Surgery  Why: Follow up with Garrett Parker NP at 10:15  Contact information:  [address]  [address]  [facility]  [place] [address]  [phone]                       Patient Instructions:   No discharge procedures on file.    Significant Diagnostic Studies: Labs: All labs within the past 24 hours have been reviewed    Pending Diagnostic Studies:     None         Medications:  Reconciled Home Medications:      Medication List      ASK your doctor about these medications    apixaban 2.5 mg Tab  Commonly known as: ELIQUIS  Take 1 tablet (2.5 mg total) by mouth 2 (two) times daily. for 12 days     BD ULTRA-FINE SHORT PEN NEEDLE 31 gauge x 5/16" Ndle  Generic drug: pen needle, diabetic  Notes to patient: Discuss with PCP     DULoxetine 60 MG capsule  Commonly known as: CYMBALTA  Take 1 capsule (60 mg total) by mouth once daily.     HYDROcodone-acetaminophen  mg per tablet  Commonly known as: NORCO  Take 1 tablet by mouth every 4 (four) hours as needed for Pain.     insulin glargine 100 units/mL SubQ pen  Commonly known as: LANTUS SOLOSTAR U-100 INSULIN  Inject 30 Units into the skin once daily.     Lactobacillus acidophilus 500 million cell Cap  Take 1 capsule by mouth 3 (three) times daily with meals.     NovoLOG FlexPen U-100 Insulin 100 unit/mL (3 mL) Inpn pen  Generic drug: insulin aspart U-100  Inject 5 Units into the skin 3 (three) times daily with meals.     ondansetron 4 mg/2 mL Soln  Inject 4 mg into the vein every 8 (eight) hours as needed.     promethazine 25 MG tablet  Commonly known as: PHENERGAN  Take 1 tablet (25 mg total) by mouth every 6 (six) hours as needed.            Indwelling Lines/Drains at time of discharge:   Lines/Drains/Airways     Peripherally Inserted Central Catheter Line  Duration           " PICC Double Lumen 03/22/23 1400 left basilic 60 days                Time spent on the discharge of patient: 30 minutes         Hannah Schulz MD  Department of Hospital Medicine  [facility]

## 2023-05-23 NOTE — PROGRESS NOTES
C3 nurse spoke with patients mother Alejandrina Walker for a TCC post hospital discharge follow up call. The patient reports does not have a scheduled HOSFU appointment. C3 nurse was unable to schedule HOSFU appointment [facility]. Mother advised to contact their PCP to schedule a HOSPFU within 5-7 days.

## 2023-05-24 ENCOUNTER — OFFICE VISIT (OUTPATIENT)
Dept: ORTHOPEDICS | Facility: CLINIC | Age: 53
End: 2023-05-24
Payer: MEDICARE

## 2023-05-24 DIAGNOSIS — Z96.652 STATUS POST TOTAL LEFT KNEE REPLACEMENT: Primary | ICD-10-CM

## 2023-05-24 PROCEDURE — 1160F PR REVIEW ALL MEDS BY PRESCRIBER/CLIN PHARMACIST DOCUMENTED: ICD-10-PCS | Mod: CPTII,,, | Performed by: NURSE PRACTITIONER

## 2023-05-24 PROCEDURE — 3060F PR POS MICROALBUMINURIA RESULT DOCUMENTED/REVIEW: ICD-10-PCS | Mod: CPTII,,, | Performed by: NURSE PRACTITIONER

## 2023-05-24 PROCEDURE — 99213 OFFICE O/P EST LOW 20 MIN: CPT | Mod: PBBFAC | Performed by: NURSE PRACTITIONER

## 2023-05-24 PROCEDURE — 1159F PR MEDICATION LIST DOCUMENTED IN MEDICAL RECORD: ICD-10-PCS | Mod: CPTII,,, | Performed by: NURSE PRACTITIONER

## 2023-05-24 PROCEDURE — 3066F NEPHROPATHY DOC TX: CPT | Mod: CPTII,,, | Performed by: NURSE PRACTITIONER

## 2023-05-24 PROCEDURE — 4010F PR ACE/ARB THEARPY RXD/TAKEN: ICD-10-PCS | Mod: CPTII,,, | Performed by: NURSE PRACTITIONER

## 2023-05-24 PROCEDURE — 3066F PR DOCUMENTATION OF TREATMENT FOR NEPHROPATHY: ICD-10-PCS | Mod: CPTII,,, | Performed by: NURSE PRACTITIONER

## 2023-05-24 PROCEDURE — 4010F ACE/ARB THERAPY RXD/TAKEN: CPT | Mod: CPTII,,, | Performed by: NURSE PRACTITIONER

## 2023-05-24 PROCEDURE — 1159F MED LIST DOCD IN RCRD: CPT | Mod: CPTII,,, | Performed by: NURSE PRACTITIONER

## 2023-05-24 PROCEDURE — 99024 POSTOP FOLLOW-UP VISIT: CPT | Mod: ,,, | Performed by: NURSE PRACTITIONER

## 2023-05-24 PROCEDURE — 3051F HG A1C>EQUAL 7.0%<8.0%: CPT | Mod: CPTII,,, | Performed by: NURSE PRACTITIONER

## 2023-05-24 PROCEDURE — 1160F RVW MEDS BY RX/DR IN RCRD: CPT | Mod: CPTII,,, | Performed by: NURSE PRACTITIONER

## 2023-05-24 PROCEDURE — 3051F PR MOST RECENT HEMOGLOBIN A1C LEVEL 7.0 - < 8.0%: ICD-10-PCS | Mod: CPTII,,, | Performed by: NURSE PRACTITIONER

## 2023-05-24 PROCEDURE — 3060F POS MICROALBUMINURIA REV: CPT | Mod: CPTII,,, | Performed by: NURSE PRACTITIONER

## 2023-05-24 PROCEDURE — 99024 PR POST-OP FOLLOW-UP VISIT: ICD-10-PCS | Mod: ,,, | Performed by: NURSE PRACTITIONER

## 2023-05-24 NOTE — PROGRESS NOTES
52-year-old female presents wheelchair to orthopedic clinic re-evaluation left knee.  She is now 2 weeks following history of hardware removal for joint prosthesis of her left knee due to infection with reimplantation of total knee components on 05/09/2023.  She reports improvement pain symptoms.  Does not require further pain medications time.  She was without DONOVAN hose states that she was not sent home with any DONOVAN hose.  States she is taking Eliquis.  She then reports normal bowel patterns.  She denies fever, chills or any sinus symptom flexion.  Denies weakness dizziness.  Denies chest discomfort shortness of breath.      PE:  She is in wheelchair this time.  Physical exam left knee skin is warm and dry.  Steri-Strips are noted.  She lacks approximately 5° full extension further flexion to approximately 90°.  There was excellent ligamentous balance instability noted clinically.  Surgical sites healing well without sign or symptom flexion.  Edges well approximated.  No drainage is noted.  There is minimal intra-articular effusion appreciated clinically.  There is moderate soft tissue swelling which to be expected.     Impression:  2 weeks following reimplantation total knee components left knee     Plan:  Safety guidelines activity restrictions discussed patient.  Verbalized understanding.  She will purchase DONOVAN hose and wear them for additional 2 weeks.  Once she completes her Eliquis she will begin aspirin 325 mg p.o. daily.  Continue ice elevation rest.  Continue physical therapy efforts restoration of motion.  Return orthopedic clinic in 4 weeks follow-up Dr. Noyola with x-rays of the left knee or sooner as needed.

## 2023-05-24 NOTE — PATIENT INSTRUCTIONS
Safety guidelines activity restrictions discussed patient.  Verbalized understanding.  She will purchase DONOVAN hose and wear them for additional 2 weeks.  Once she completes her Eliquis she will begin aspirin 325 mg p.o. daily.  Continue ice elevation rest.  Continue physical therapy efforts restoration of motion.  Return orthopedic clinic in 4 weeks follow-up Dr. Noyola with x-rays of the left knee or sooner as needed.

## 2023-06-07 ENCOUNTER — HOSPITAL ENCOUNTER (EMERGENCY)
Facility: HOSPITAL | Age: 53
Discharge: HOME | End: 2023-06-07
Attending: EMERGENCY MEDICINE
Payer: MEDICARE

## 2023-06-07 VITALS
WEIGHT: 151.13 LBS | BODY MASS INDEX: 24.29 KG/M2 | RESPIRATION RATE: 17 BRPM | HEART RATE: 90 BPM | DIASTOLIC BLOOD PRESSURE: 66 MMHG | SYSTOLIC BLOOD PRESSURE: 127 MMHG | HEIGHT: 66 IN | OXYGEN SATURATION: 95 % | TEMPERATURE: 98 F

## 2023-06-07 DIAGNOSIS — B95.62 MRSA BACTEREMIA: Primary | ICD-10-CM

## 2023-06-07 DIAGNOSIS — M54.50 ACUTE MIDLINE LOW BACK PAIN WITHOUT SCIATICA: ICD-10-CM

## 2023-06-07 DIAGNOSIS — R78.81 MRSA BACTEREMIA: Primary | ICD-10-CM

## 2023-06-07 LAB — GLUCOSE SERPL-MCNC: 291 MG/DL (ref 70–105)

## 2023-06-07 PROCEDURE — 99284 EMERGENCY DEPT VISIT MOD MDM: CPT | Performed by: EMERGENCY MEDICINE

## 2023-06-07 PROCEDURE — 63600175 PHARM REV CODE 636 W HCPCS: Mod: JZ,JG | Performed by: EMERGENCY MEDICINE

## 2023-06-07 PROCEDURE — 82962 GLUCOSE BLOOD TEST: CPT

## 2023-06-07 PROCEDURE — 99284 EMERGENCY DEPT VISIT MOD MDM: CPT | Mod: 25

## 2023-06-07 PROCEDURE — 96365 THER/PROPH/DIAG IV INF INIT: CPT

## 2023-06-07 PROCEDURE — 25000003 PHARM REV CODE 250: Performed by: EMERGENCY MEDICINE

## 2023-06-07 RX ORDER — HYDROCODONE BITARTRATE AND ACETAMINOPHEN 10; 325 MG/1; MG/1
10 TABLET ORAL
COMMUNITY
Start: 2023-05-10 | End: 2023-12-07

## 2023-06-07 RX ADMIN — DALBAVANCIN 1500 MG: 500 INJECTION, POWDER, FOR SOLUTION INTRAVENOUS at 01:06

## 2023-06-07 NOTE — ED TRIAGE NOTES
PATIENT PRESENTED TO ER WITH MOTHER FOR C/O LOWER BACK PAIN; PATIENT WAS RECENTLY IN HOSPITAL FOR REHAB AFTER LEFT KNEE DUE TO INFECTION WITH REIMPLANTATION; PATIENT WAS SEEN YESTERDAY @ [facility] FOR CHRONIC LEFT SIDED LOW BACK PAIN & WAS SEEN DR. LIN YESTERDAY & SHE RECEIVED TORADOL INJECTION & SCRIPT FOR NORCO 10; PATIENT RECEIVED NORCO 10 AROUND 10 AM THIS MORNING; MOTHER STATES PAIN MEDICATION IS NOT WORKING

## 2023-06-07 NOTE — ED PROVIDER NOTES
Encounter Date: 6/7/2023       History     Chief Complaint   Patient presents with    Back Pain     Patient presents with report of low back pain for the past 3 days.  Patient has a history of MRSA infection, had to have revision of an orthopedic implant due to infection of the joint.  She had been on prolonged antibiotics via PICC line up until recently.  She is very concerned that she is developing another bone infection in her back because she did have diskitis in the past in the thoracic region and she states it feels the same way.    Review of patient's allergies indicates:  No Known Allergies  Past Medical History:   Diagnosis Date    Abscess of right thigh + MRSA 08/20/2021    healed    Adnexal cyst     Degenerative disc disease     Depression 10/29/2021    Diabetes mellitus     Hypertension     Hypomagnesemia 01/20/2023    Nicotine dependence     Osteoarthritis      Past Surgical History:   Procedure Laterality Date    BACK SURGERY      HYSTERECTOMY N/A     INCISION AND DRAINAGE Left 3/13/2023    Procedure: INCISION AND DRAINAGE;  Surgeon: Jacek Noyola MD;  Location: [facility];  Service: Orthopedics;  Laterality: Left;    IRRIGATION AND DEBRIDEMENT OF LOWER EXTREMITY Left 11/22/2022    Procedure: IRRIGATION AND DEBRIDEMENT, LOWER EXTREMITY;  Surgeon: Papa Mendoza MD;  Location: [facility] OR;  Service: Orthopedics;  Laterality: Left;    IRRIGATION AND DEBRIDEMENT OF UPPER EXTREMITY Left 11/22/2022    Procedure: IRRIGATION AND DEBRIDEMENT, UPPER EXTREMITY;  Surgeon: Papa Mendoza MD;  Location: [facility] OR;  Service: Orthopedics;  Laterality: Left;    REVISION OF KNEE ARTHROPLASTY Left 3/13/2023    Procedure: REMOVAL OF PROSTHESIS,METHYLMETHACRYLATE WITH OR WITHOUT INSERTION OF SPACER LEFT KNEE;  Surgeon: Jacek Noyola MD;  Location: [facility] OR;  Service: Orthopedics;  Laterality: Left;    REVISION OF KNEE ARTHROPLASTY Left 5/9/2023    Procedure: REVISION,  ARTHROPLASTY, KNEE;  Surgeon: Jacek Noyola MD;  Location: [facility] OR;  Service: Orthopedics;  Laterality: Left;    THORACIC LAMINECTOMY WITH FUSION N/A 1/24/2023    Procedure: T9-L2 fusion, T11-T12 laminectomy and corpectomy;  Surgeon: Jimmy Hernandes MD;  Location: [facility] OR;  Service: Neurosurgery;  Laterality: N/A;    TOTAL HIP ARTHROPLASTY Left     TOTAL KNEE ARTHROPLASTY Bilateral      Family History   Problem Relation Age of Onset    Hypertension Father     Diabetes Father      Social History     Tobacco Use    Smoking status: Every Day     Packs/day: 0.50     Types: Cigarettes    Smokeless tobacco: Current     Types: Snuff   Substance Use Topics    Alcohol use: Never    Drug use: Never     Review of Systems    Physical Exam     Initial Vitals [06/07/23 1208]   BP Pulse Resp Temp SpO2   (!) 157/75 96 17 97.5 °F (36.4 °C) 96 %      MAP       --         Physical Exam    Medical Screening Exam   See Full Note    ED Course   Procedures  Labs Reviewed   POCT GLUCOSE MONITORING CONTINUOUS - Abnormal; Notable for the following components:       Result Value    POC Glucose 291 (*)     All other components within normal limits          Imaging Results    None          Medications   dalbavancin (DALVANCE) 1,500 mg in dextrose 5 % (D5W) 325 mL infusion (1,500 mg Intravenous New Bag 6/7/23 1356)     Medical Decision Making:   History:   Old Medical Records: I decided to obtain old medical records.  Old Records Summarized: records from previous admission(s) and records from another hospital.       <> Summary of Records: Patient was seen 2 days ago at [facility] in [place] in the emergency department and had an evaluation and was discharged home after being offered Toradol which she declined.  Blood cultures were done and results reviewed, she did have a positive MRSA blood culture at that visit and an [place] [id number].  Lactic acid level was normal.  Initial  Assessment:   Initial assessment is diskitis  Differential Diagnosis:   Differential diagnosis includes diskitis, osteomyelitis, bacteremia  ED Management:  Patient was given a 1 time dose of dalbavancin 1500 mg IV, for treatment of MRSA infection.  We called and made an appointment for her to follow-up with her orthopedic specialist on Monday, June 12, 2023.  Patient was prescribed mupirocin nasal as well.                       Clinical Impression:   Final diagnoses:  [M54.50] Acute midline low back pain without sciatica  [R78.81, B95.62] MRSA bacteremia - Patient was treated with 1500 mg IV dalbavancin.  She was also prescribed  mupirocin nasal (Primary)        ED Disposition Condition    Discharge Stable          ED Prescriptions       Medication Sig Dispense Start Date End Date Auth. Provider    mupirocin 2 % OKit 0.5 application by Nasal route 2 (two) times a day. for 5 days 5 each 6/7/2023 6/12/2023 Jadiel Robert DO          Follow-up Information       Follow up With Specialties Details Why Contact Info    Jacek Noyola MD Orthopedic Surgery Go on 6/12/2023 At 11:00 a.m. for further evaluation and treatment. [address]  [address]  [facility] [place] [address]  [phone]               Jadiel Robert DO  06/07/23 6919

## 2023-06-08 ENCOUNTER — HOSPITAL ENCOUNTER (INPATIENT)
Facility: HOSPITAL | Age: 53
LOS: 13 days | Discharge: HOME HEALTH | DRG: 638 | End: 2023-06-21
Attending: HOSPITALIST | Admitting: HOSPITALIST
Payer: MEDICARE

## 2023-06-08 DIAGNOSIS — M00.062 STAPHYLOCOCCAL ARTHRITIS OF LEFT KNEE: ICD-10-CM

## 2023-06-08 DIAGNOSIS — M54.9 BACK PAIN: ICD-10-CM

## 2023-06-08 DIAGNOSIS — B95.62 MRSA BACTEREMIA: ICD-10-CM

## 2023-06-08 DIAGNOSIS — M46.25 OSTEOMYELITIS OF VERTEBRA OF THORACOLUMBAR REGION: Primary | ICD-10-CM

## 2023-06-08 DIAGNOSIS — R78.81 MRSA BACTEREMIA: ICD-10-CM

## 2023-06-08 DIAGNOSIS — E11.42 TYPE 2 DIABETES MELLITUS WITH DIABETIC POLYNEUROPATHY, WITH LONG-TERM CURRENT USE OF INSULIN: ICD-10-CM

## 2023-06-08 DIAGNOSIS — Z79.4 TYPE 2 DIABETES MELLITUS WITH DIABETIC POLYNEUROPATHY, WITH LONG-TERM CURRENT USE OF INSULIN: ICD-10-CM

## 2023-06-08 DIAGNOSIS — R53.1 GENERALIZED WEAKNESS: ICD-10-CM

## 2023-06-08 DIAGNOSIS — M46.20: ICD-10-CM

## 2023-06-08 DIAGNOSIS — Z96.652 STATUS POST TOTAL LEFT KNEE REPLACEMENT: Primary | ICD-10-CM

## 2023-06-08 LAB
ALBUMIN SERPL BCP-MCNC: 2.2 G/DL (ref 3.5–5)
ALBUMIN/GLOB SERPL: 0.4 {RATIO}
ALP SERPL-CCNC: 115 U/L (ref 46–118)
ALT SERPL W P-5'-P-CCNC: 8 U/L (ref 13–56)
ANION GAP SERPL CALCULATED.3IONS-SCNC: 12 MMOL/L (ref 7–16)
AST SERPL W P-5'-P-CCNC: 3 U/L (ref 15–37)
BACTERIA #/AREA URNS HPF: ABNORMAL /HPF
BASOPHILS # BLD AUTO: 0.06 K/UL (ref 0–0.2)
BASOPHILS NFR BLD AUTO: 0.4 % (ref 0–1)
BILIRUB SERPL-MCNC: 0.5 MG/DL (ref ?–1.2)
BILIRUB UR QL STRIP: NEGATIVE
BUN SERPL-MCNC: 14 MG/DL (ref 7–18)
BUN/CREAT SERPL: 12 (ref 6–20)
CALCIUM SERPL-MCNC: 9.6 MG/DL (ref 8.5–10.1)
CHLORIDE SERPL-SCNC: 95 MMOL/L (ref 98–107)
CLARITY UR: ABNORMAL
CO2 SERPL-SCNC: 29 MMOL/L (ref 21–32)
COLOR UR: YELLOW
CREAT SERPL-MCNC: 1.14 MG/DL (ref 0.55–1.02)
CRP SERPL-MCNC: 51.2 MG/DL (ref 0–0.8)
DIFFERENTIAL METHOD BLD: ABNORMAL
EGFR (NO RACE VARIABLE) (RUSH/TITUS): 57 ML/MIN/1.73M2
EOSINOPHIL # BLD AUTO: 0.32 K/UL (ref 0–0.5)
EOSINOPHIL NFR BLD AUTO: 2.4 % (ref 1–4)
ERYTHROCYTE [DISTWIDTH] IN BLOOD BY AUTOMATED COUNT: 14.9 % (ref 11.5–14.5)
ERYTHROCYTE [SEDIMENTATION RATE] IN BLOOD BY WESTERGREN METHOD: 100 MM/HR (ref 0–30)
GLOBULIN SER-MCNC: 5 G/DL (ref 2–4)
GLUCOSE SERPL-MCNC: 213 MG/DL (ref 74–106)
GLUCOSE UR STRIP-MCNC: 300 MG/DL
HCT VFR BLD AUTO: 37.4 % (ref 38–47)
HGB BLD-MCNC: 11.7 G/DL (ref 12–16)
IMM GRANULOCYTES # BLD AUTO: 0.1 K/UL (ref 0–0.04)
IMM GRANULOCYTES NFR BLD: 0.7 % (ref 0–0.4)
KETONES UR STRIP-SCNC: 40 MG/DL
LACTATE SERPL-SCNC: 1.3 MMOL/L (ref 0.4–2)
LEUKOCYTE ESTERASE UR QL STRIP: NEGATIVE
LYMPHOCYTES # BLD AUTO: 1.34 K/UL (ref 1–4.8)
LYMPHOCYTES NFR BLD AUTO: 9.9 % (ref 27–41)
MCH RBC QN AUTO: 27.3 PG (ref 27–31)
MCHC RBC AUTO-ENTMCNC: 31.3 G/DL (ref 32–36)
MCV RBC AUTO: 87.2 FL (ref 80–96)
MONOCYTES # BLD AUTO: 1.51 K/UL (ref 0–0.8)
MONOCYTES NFR BLD AUTO: 11.2 % (ref 2–6)
MPC BLD CALC-MCNC: 10.8 FL (ref 9.4–12.4)
MUCOUS THREADS #/AREA URNS HPF: ABNORMAL /HPF
NEUTROPHILS # BLD AUTO: 10.17 K/UL (ref 1.8–7.7)
NEUTROPHILS NFR BLD AUTO: 75.4 % (ref 53–65)
NITRITE UR QL STRIP: NEGATIVE
NRBC # BLD AUTO: 0 X10E3/UL
NRBC, AUTO (.00): 0 %
PH UR STRIP: 5.5 PH UNITS
PLATELET # BLD AUTO: 233 K/UL (ref 150–400)
POTASSIUM SERPL-SCNC: 3.4 MMOL/L (ref 3.5–5.1)
PROT SERPL-MCNC: 7.2 G/DL (ref 6.4–8.2)
PROT UR QL STRIP: 50
RBC # BLD AUTO: 4.29 M/UL (ref 4.2–5.4)
RBC # UR STRIP: ABNORMAL /UL
RBC #/AREA URNS HPF: ABNORMAL /HPF
SARS-COV-2 RDRP RESP QL NAA+PROBE: NEGATIVE
SODIUM SERPL-SCNC: 133 MMOL/L (ref 136–145)
SP GR UR STRIP: 1.03
SQUAMOUS #/AREA URNS LPF: ABNORMAL /LPF
TRICHOMONAS #/AREA URNS HPF: ABNORMAL /HPF
UROBILINOGEN UR STRIP-ACNC: NORMAL MG/DL
WBC # BLD AUTO: 13.5 K/UL (ref 4.5–11)
WBC #/AREA URNS HPF: ABNORMAL /HPF
YEAST #/AREA URNS HPF: ABNORMAL /HPF

## 2023-06-08 PROCEDURE — 99285 EMERGENCY DEPT VISIT HI MDM: CPT | Mod: 25

## 2023-06-08 PROCEDURE — 25000003 PHARM REV CODE 250: Performed by: HOSPITALIST

## 2023-06-08 PROCEDURE — 85651 RBC SED RATE NONAUTOMATED: CPT | Performed by: HOSPITALIST

## 2023-06-08 PROCEDURE — 83605 ASSAY OF LACTIC ACID: CPT | Performed by: NURSE PRACTITIONER

## 2023-06-08 PROCEDURE — 99285 EMERGENCY DEPT VISIT HI MDM: CPT | Mod: ,,, | Performed by: NURSE PRACTITIONER

## 2023-06-08 PROCEDURE — 99285 PR EMERGENCY DEPT VISIT,LEVEL V: ICD-10-PCS | Mod: ,,, | Performed by: NURSE PRACTITIONER

## 2023-06-08 PROCEDURE — 86140 C-REACTIVE PROTEIN: CPT | Performed by: HOSPITALIST

## 2023-06-08 PROCEDURE — 87040 BLOOD CULTURE FOR BACTERIA: CPT | Performed by: NURSE PRACTITIONER

## 2023-06-08 PROCEDURE — 25500020 PHARM REV CODE 255: Performed by: HOSPITALIST

## 2023-06-08 PROCEDURE — 11000001 HC ACUTE MED/SURG PRIVATE ROOM

## 2023-06-08 PROCEDURE — 99223 PR INITIAL HOSPITAL CARE,LEVL III: ICD-10-PCS | Mod: $0,AI,, | Performed by: HOSPITALIST

## 2023-06-08 PROCEDURE — 82962 GLUCOSE BLOOD TEST: CPT

## 2023-06-08 PROCEDURE — 81001 URINALYSIS AUTO W/SCOPE: CPT | Performed by: HOSPITALIST

## 2023-06-08 PROCEDURE — 80053 COMPREHEN METABOLIC PANEL: CPT | Performed by: NURSE PRACTITIONER

## 2023-06-08 PROCEDURE — 63600175 PHARM REV CODE 636 W HCPCS: Performed by: HOSPITALIST

## 2023-06-08 PROCEDURE — 99223 1ST HOSP IP/OBS HIGH 75: CPT | Mod: $0,AI,, | Performed by: HOSPITALIST

## 2023-06-08 PROCEDURE — 85025 COMPLETE CBC W/AUTO DIFF WBC: CPT | Performed by: NURSE PRACTITIONER

## 2023-06-08 PROCEDURE — A9577 INJ MULTIHANCE: HCPCS | Performed by: HOSPITALIST

## 2023-06-08 PROCEDURE — 87635 SARS-COV-2 COVID-19 AMP PRB: CPT | Performed by: HOSPITALIST

## 2023-06-08 RX ORDER — SODIUM,POTASSIUM PHOSPHATES 280-250MG
2 POWDER IN PACKET (EA) ORAL
Status: DISCONTINUED | OUTPATIENT
Start: 2023-06-08 | End: 2023-06-21 | Stop reason: HOSPADM

## 2023-06-08 RX ORDER — MUPIROCIN 20 MG/G
OINTMENT TOPICAL 2 TIMES DAILY
Status: DISPENSED | OUTPATIENT
Start: 2023-06-08 | End: 2023-06-13

## 2023-06-08 RX ORDER — LANOLIN ALCOHOL/MO/W.PET/CERES
800 CREAM (GRAM) TOPICAL
Status: DISCONTINUED | OUTPATIENT
Start: 2023-06-08 | End: 2023-06-21 | Stop reason: HOSPADM

## 2023-06-08 RX ORDER — HYDROCODONE BITARTRATE AND ACETAMINOPHEN 10; 325 MG/1; MG/1
1 TABLET ORAL EVERY 6 HOURS PRN
Status: DISCONTINUED | OUTPATIENT
Start: 2023-06-08 | End: 2023-06-10

## 2023-06-08 RX ORDER — ACETAMINOPHEN 325 MG/1
650 TABLET ORAL EVERY 8 HOURS PRN
Status: DISCONTINUED | OUTPATIENT
Start: 2023-06-08 | End: 2023-06-14

## 2023-06-08 RX ORDER — GLUCAGON 1 MG
1 KIT INJECTION
Status: DISCONTINUED | OUTPATIENT
Start: 2023-06-08 | End: 2023-06-21 | Stop reason: HOSPADM

## 2023-06-08 RX ORDER — ACETAMINOPHEN 325 MG/1
650 TABLET ORAL EVERY 4 HOURS PRN
Status: DISCONTINUED | OUTPATIENT
Start: 2023-06-08 | End: 2023-06-14

## 2023-06-08 RX ORDER — DULOXETIN HYDROCHLORIDE 30 MG/1
60 CAPSULE, DELAYED RELEASE ORAL DAILY
Status: DISCONTINUED | OUTPATIENT
Start: 2023-06-09 | End: 2023-06-12

## 2023-06-08 RX ORDER — POLYETHYLENE GLYCOL 3350 17 G/17G
17 POWDER, FOR SOLUTION ORAL DAILY PRN
Status: DISCONTINUED | OUTPATIENT
Start: 2023-06-08 | End: 2023-06-21 | Stop reason: HOSPADM

## 2023-06-08 RX ORDER — SODIUM CHLORIDE 0.9 % (FLUSH) 0.9 %
10 SYRINGE (ML) INJECTION
Status: DISCONTINUED | OUTPATIENT
Start: 2023-06-08 | End: 2023-06-21 | Stop reason: HOSPADM

## 2023-06-08 RX ORDER — ACETAMINOPHEN 500 MG
1000 TABLET ORAL EVERY 8 HOURS PRN
Status: DISCONTINUED | OUTPATIENT
Start: 2023-06-08 | End: 2023-06-21 | Stop reason: HOSPADM

## 2023-06-08 RX ORDER — ENOXAPARIN SODIUM 100 MG/ML
40 INJECTION SUBCUTANEOUS EVERY 24 HOURS
Status: DISCONTINUED | OUTPATIENT
Start: 2023-06-08 | End: 2023-06-21 | Stop reason: HOSPADM

## 2023-06-08 RX ORDER — TALC
6 POWDER (GRAM) TOPICAL NIGHTLY PRN
Status: DISCONTINUED | OUTPATIENT
Start: 2023-06-08 | End: 2023-06-21 | Stop reason: HOSPADM

## 2023-06-08 RX ORDER — INSULIN ASPART 100 [IU]/ML
1-10 INJECTION, SOLUTION INTRAVENOUS; SUBCUTANEOUS
Status: DISCONTINUED | OUTPATIENT
Start: 2023-06-08 | End: 2023-06-21 | Stop reason: HOSPADM

## 2023-06-08 RX ORDER — NALOXONE HCL 0.4 MG/ML
0.02 VIAL (ML) INJECTION
Status: DISCONTINUED | OUTPATIENT
Start: 2023-06-08 | End: 2023-06-21 | Stop reason: HOSPADM

## 2023-06-08 RX ORDER — ONDANSETRON 2 MG/ML
4 INJECTION INTRAMUSCULAR; INTRAVENOUS EVERY 8 HOURS PRN
Status: DISCONTINUED | OUTPATIENT
Start: 2023-06-08 | End: 2023-06-14

## 2023-06-08 RX ORDER — MAG HYDROX/ALUMINUM HYD/SIMETH 200-200-20
30 SUSPENSION, ORAL (FINAL DOSE FORM) ORAL 4 TIMES DAILY PRN
Status: DISCONTINUED | OUTPATIENT
Start: 2023-06-08 | End: 2023-06-21 | Stop reason: HOSPADM

## 2023-06-08 RX ADMIN — ACETAMINOPHEN 1000 MG: 500 TABLET ORAL at 08:06

## 2023-06-08 RX ADMIN — VANCOMYCIN HYDROCHLORIDE 1250 MG: 5 INJECTION, POWDER, LYOPHILIZED, FOR SOLUTION INTRAVENOUS at 08:06

## 2023-06-08 RX ADMIN — MUPIROCIN: 20 OINTMENT TOPICAL at 08:06

## 2023-06-08 RX ADMIN — ALUMINUM HYDROXIDE, MAGNESIUM HYDROXIDE, AND DIMETHICONE 30 ML: 200; 20; 200 SUSPENSION ORAL at 08:06

## 2023-06-08 RX ADMIN — HYDROCODONE BITARTRATE AND ACETAMINOPHEN 1 TABLET: 10; 325 TABLET ORAL at 07:06

## 2023-06-08 RX ADMIN — GADOBENATE DIMEGLUMINE 14 ML: 529 INJECTION, SOLUTION INTRAVENOUS at 06:06

## 2023-06-08 RX ADMIN — CEFTRIAXONE SODIUM 2 G: 2 INJECTION, POWDER, FOR SOLUTION INTRAMUSCULAR; INTRAVENOUS at 07:06

## 2023-06-08 RX ADMIN — ENOXAPARIN SODIUM 40 MG: 40 INJECTION SUBCUTANEOUS at 07:06

## 2023-06-08 NOTE — ED PROVIDER NOTES
Encounter Date: 6/8/2023       History     Chief Complaint   Patient presents with    Back Pain     53 year old female presents to  ED for back pain. She was seen here on Monday and had an ED visit on last night. She had a CT of her back that showed osteomyelitis on 6/5/2023.  Blood cultures were also drawn and they resulted positive for MRSA. She was given IV meds in ED on last night at [place]. She continues to report pain to back. Patient states temp of 103 en route to facility; temps have been  at home. Denies bowel/bladder symptoms. Denies weakness or falls. She had last back surgery in January and has had discitis before with IV antibiotic therapy.     The history is provided by the patient and medical records.   Review of patient's allergies indicates:  No Known Allergies  Past Medical History:   Diagnosis Date    Abscess of right thigh + MRSA 08/20/2021    healed    Adnexal cyst     Degenerative disc disease     Depression 10/29/2021    Diabetes mellitus     Hypertension     Hypomagnesemia 01/20/2023    Nicotine dependence     Osteoarthritis      Past Surgical History:   Procedure Laterality Date    BACK SURGERY      HYSTERECTOMY N/A     INCISION AND DRAINAGE Left 3/13/2023    Procedure: INCISION AND DRAINAGE;  Surgeon: Jacek Noyola MD;  Location: [facility];  Service: Orthopedics;  Laterality: Left;    IRRIGATION AND DEBRIDEMENT OF LOWER EXTREMITY Left 11/22/2022    Procedure: IRRIGATION AND DEBRIDEMENT, LOWER EXTREMITY;  Surgeon: Papa Mendoza MD;  Location: [facility] OR;  Service: Orthopedics;  Laterality: Left;    IRRIGATION AND DEBRIDEMENT OF UPPER EXTREMITY Left 11/22/2022    Procedure: IRRIGATION AND DEBRIDEMENT, UPPER EXTREMITY;  Surgeon: Papa Mendoza MD;  Location: [facility] OR;  Service: Orthopedics;  Laterality: Left;    REVISION OF KNEE ARTHROPLASTY Left 3/13/2023    Procedure: REMOVAL OF PROSTHESIS,METHYLMETHACRYLATE WITH OR WITHOUT INSERTION OF SPACER LEFT  KNEE;  Surgeon: Jacek Nyoola MD;  Location: [facility] OR;  Service: Orthopedics;  Laterality: Left;    REVISION OF KNEE ARTHROPLASTY Left 5/9/2023    Procedure: REVISION, ARTHROPLASTY, KNEE;  Surgeon: Jacek Noyola MD;  Location: [facility] OR;  Service: Orthopedics;  Laterality: Left;    THORACIC LAMINECTOMY WITH FUSION N/A 1/24/2023    Procedure: T9-L2 fusion, T11-T12 laminectomy and corpectomy;  Surgeon: Jimmy Hernandes MD;  Location: [facility] OR;  Service: Neurosurgery;  Laterality: N/A;    TOTAL HIP ARTHROPLASTY Left     TOTAL KNEE ARTHROPLASTY Bilateral      Family History   Problem Relation Age of Onset    Hypertension Father     Diabetes Father      Social History     Tobacco Use    Smoking status: Every Day     Packs/day: 0.50     Types: Cigarettes    Smokeless tobacco: Current     Types: Snuff   Substance Use Topics    Alcohol use: Never    Drug use: Never     Review of Systems   Constitutional:  Positive for chills and fever.   HENT:  Negative for sinus pressure and sinus pain.    Respiratory:  Negative for cough and shortness of breath.    Cardiovascular:  Negative for chest pain and palpitations.   Gastrointestinal:  Negative for diarrhea and nausea.   Genitourinary:  Negative for dysuria and urgency.   Musculoskeletal:  Positive for back pain. Negative for arthralgias and gait problem.   Skin:  Negative for color change and wound.   Neurological:  Negative for dizziness and weakness.   Hematological:  Negative for adenopathy. Does not bruise/bleed easily.   Psychiatric/Behavioral:  Negative for agitation and confusion.    All other systems reviewed and are negative.    Physical Exam     Initial Vitals [06/08/23 1534]   BP Pulse Resp Temp SpO2   (!) 111/53 94 18 99.4 °F (37.4 °C) 96 %      MAP       --         Physical Exam    Nursing note and vitals reviewed.  Constitutional: She appears well-developed and well-nourished.   HENT:   Head: Normocephalic and atraumatic.   Eyes:  EOM are normal. Pupils are equal, round, and reactive to light.   Neck: Neck supple.   Normal range of motion.  Cardiovascular:  Normal rate and regular rhythm.           No murmur heard.  Pulmonary/Chest: No respiratory distress. She has no rhonchi.   Abdominal: Abdomen is soft. She exhibits no distension. There is no abdominal tenderness.   Musculoskeletal:         General: Tenderness present. No edema.      Cervical back: Normal range of motion and neck supple.      Thoracic back: Tenderness present.     Lymphadenopathy:     She has no cervical adenopathy.   Neurological: She is alert and oriented to person, place, and time. No cranial nerve deficit or sensory deficit.   Skin: Skin is warm and dry. Capillary refill takes less than 2 seconds.   Psychiatric: She has a normal mood and affect. Thought content normal.       Medical Screening Exam   See Full Note    ED Course   Procedures  Labs Reviewed   COMPREHENSIVE METABOLIC PANEL - Abnormal; Notable for the following components:       Result Value    Sodium 133 (*)     Potassium 3.4 (*)     Chloride 95 (*)     Glucose 213 (*)     Creatinine 1.14 (*)     Albumin 2.2 (*)     Globulin 5.0 (*)     ALT 8 (*)     AST 3 (*)     eGFR 57 (*)     All other components within normal limits   CBC WITH DIFFERENTIAL - Abnormal; Notable for the following components:    WBC 13.50 (*)     Hemoglobin 11.7 (*)     Hematocrit 37.4 (*)     MCHC 31.3 (*)     RDW 14.9 (*)     Neutrophils % 75.4 (*)     Lymphocytes % 9.9 (*)     Monocytes % 11.2 (*)     Immature Granulocytes % 0.7 (*)     Neutrophils, Abs 10.17 (*)     Monocytes, Absolute 1.51 (*)     Immature Granulocytes, Absolute 0.10 (*)     All other components within normal limits   LACTIC ACID, PLASMA - Normal   CULTURE, BLOOD   CULTURE, BLOOD   CBC W/ AUTO DIFFERENTIAL    Narrative:     The following orders were created for panel order CBC auto differential.  Procedure                               Abnormality         Status                      ---------                               -----------         ------                     CBC with Differential[[id number]]        Abnormal            Final result                 Please view results for these tests on the individual orders.   URINALYSIS, REFLEX TO URINE CULTURE   SARS-COV-2 RNA AMPLIFICATION, QUAL   SEDIMENTATION RATE, AUTOMATED   C-REACTIVE PROTEIN          Imaging Results              MRI Thoracic Spine W WO Cont (In process)                      MRI Lumbar Spine W WO Cont (In process)                      X-Ray Chest AP Portable (Final result)  Result time 06/08/23 16:48:18      Final result by Corey Boateng II, MD (06/08/23 16:48:18)                   Impression:      No evidence of acute cardiopulmonary disease.      Electronically signed by: Corey Boateng  Date:    06/08/2023  Time:    16:48               Narrative:    EXAMINATION:  XR CHEST AP PORTABLE    CLINICAL HISTORY:  Dorsalgia, unspecified    COMPARISON:  10 May 2023    TECHNIQUE:  XR CHEST AP PORTABLE    FINDINGS:  The heart and mediastinum are normal in size and configuration.  The pulmonary vascularity is normal in caliber.  No lung infiltrates, effusions, pneumothorax or other abnormality is demonstrated.                                       Medications   DULoxetine DR capsule 60 mg (has no administration in time range)   HYDROcodone-acetaminophen  mg per tablet 1 tablet (has no administration in time range)   sodium chloride 0.9% flush 10 mL (has no administration in time range)   enoxaparin injection 40 mg (has no administration in time range)   melatonin tablet 6 mg (has no administration in time range)   ondansetron injection 4 mg (has no administration in time range)   polyethylene glycol packet 17 g (has no administration in time range)   acetaminophen tablet 650 mg (has no administration in time range)   aluminum-magnesium hydroxide-simethicone 200-200-20 mg/5 mL suspension 30 mL (has no  administration in time range)   acetaminophen tablet 650 mg (has no administration in time range)   acetaminophen tablet 1,000 mg (has no administration in time range)   naloxone 0.4 mg/mL injection 0.02 mg (has no administration in time range)   potassium bicarbonate disintegrating tablet 50 mEq (has no administration in time range)   potassium bicarbonate disintegrating tablet 35 mEq (has no administration in time range)   potassium bicarbonate disintegrating tablet 60 mEq (has no administration in time range)   magnesium oxide tablet 800 mg (has no administration in time range)   magnesium oxide tablet 800 mg (has no administration in time range)   potassium, sodium phosphates 280-160-250 mg packet 2 packet (has no administration in time range)   potassium, sodium phosphates 280-160-250 mg packet 2 packet (has no administration in time range)   potassium, sodium phosphates 280-160-250 mg packet 2 packet (has no administration in time range)   glucagon (human recombinant) injection 1 mg (has no administration in time range)   dextrose 10% bolus 125 mL 125 mL (has no administration in time range)   dextrose 10% bolus 250 mL 250 mL (has no administration in time range)   insulin aspart U-100 injection 1-10 Units (has no administration in time range)   vancomycin - pharmacy to dose (has no administration in time range)   cefTRIAXone (ROCEPHIN) 2 g in dextrose 5 % in water (D5W) 5 % 100 mL IVPB (MB+) (has no administration in time range)   gadobenate dimeglumine (MULTIHANCE) injection 14 mL (14 mLs Intravenous Given 6/8/23 1817)     Medical Decision Making:   Initial Assessment:   Back pain  Differential Diagnosis:   Discitis  Osteomyelitis   Bacteremia   Clinical Tests:   Lab Tests: Ordered and Reviewed  Radiological Study: Ordered and Reviewed  ED Management:  MDM    Patient presents for emergent evaluation of acute back pain that poses a threat to life and/or bodily function.    In the ED patient found to have  acute back pain.    I ordered labs and personally reviewed them.  Labs significant for sodium 133, potassium 3.4, chloride 95, glucose 213, WBC 13.50, .    I ordered X-rays and personally reviewed them and reviewed the radiologist interpretation.  Xray significant for no acute processes.      Admission MDM  Patient required emergent consultation to Dr. Marina (admitting physician) for admission.                         Clinical Impression:   Final diagnoses:  [M54.9] Back pain  [M46.20] Osteomyelitis of vertebra        ED Disposition Condition    Admit                 NATALIA Marvin  06/08/23 0216

## 2023-06-08 NOTE — ED TRIAGE NOTES
Pt presents to ed per EMS c/o back pain that started on Sunday. Pt states it has not gotten any better.

## 2023-06-09 PROBLEM — E87.6 HYPOKALEMIA: Status: ACTIVE | Noted: 2023-06-09

## 2023-06-09 PROBLEM — M46.25: Status: ACTIVE | Noted: 2023-06-09

## 2023-06-09 LAB
ANION GAP SERPL CALCULATED.3IONS-SCNC: 15 MMOL/L (ref 7–16)
AORTIC ROOT ANNULUS: 2.4 CM
AORTIC VALVE CUSP SEPERATION: 1.73 CM
APTT PPP: 20.7 SECONDS (ref 25.2–37.3)
AV INDEX (PROSTH): 1.33
AV MEAN GRADIENT: 2 MMHG
AV PEAK GRADIENT: 3 MMHG
AV VALVE AREA: 4.19 CM2
AV VELOCITY RATIO: 1.22
BASOPHILS # BLD AUTO: 0.07 K/UL (ref 0–0.2)
BASOPHILS NFR BLD AUTO: 0.5 % (ref 0–1)
BSA FOR ECHO PROCEDURE: 1.78 M2
BUN SERPL-MCNC: 12 MG/DL (ref 7–18)
BUN/CREAT SERPL: 14 (ref 6–20)
CALCIUM SERPL-MCNC: 9.1 MG/DL (ref 8.5–10.1)
CHLORIDE SERPL-SCNC: 92 MMOL/L (ref 98–107)
CO2 SERPL-SCNC: 28 MMOL/L (ref 21–32)
CREAT SERPL-MCNC: 0.85 MG/DL (ref 0.55–1.02)
CV ECHO LV RWT: 0.47 CM
DIFFERENTIAL METHOD BLD: ABNORMAL
DOP CALC AO PEAK VEL: 0.9 M/S
DOP CALC AO VTI: 15 CM
DOP CALC LVOT AREA: 3.1 CM2
DOP CALC LVOT DIAMETER: 2 CM
DOP CALC LVOT PEAK VEL: 1.1 M/S
DOP CALC LVOT STROKE VOLUME: 62.8 CM3
DOP CALCLVOT PEAK VEL VTI: 20 CM
E WAVE DECELERATION TIME: 192 MSEC
ECHO EF ESTIMATED: 55 %
ECHO LV POSTERIOR WALL: 0.94 CM (ref 0.6–1.1)
EGFR (NO RACE VARIABLE) (RUSH/TITUS): 81 ML/MIN/1.73M2
EJECTION FRACTION: 55 %
EOSINOPHIL # BLD AUTO: 0.14 K/UL (ref 0–0.5)
EOSINOPHIL NFR BLD AUTO: 1 % (ref 1–4)
ERYTHROCYTE [DISTWIDTH] IN BLOOD BY AUTOMATED COUNT: 15.1 % (ref 11.5–14.5)
FRACTIONAL SHORTENING: 31 % (ref 28–44)
GLUCOSE SERPL-MCNC: 206 MG/DL (ref 70–105)
GLUCOSE SERPL-MCNC: 315 MG/DL (ref 74–106)
GLUCOSE SERPL-MCNC: 347 MG/DL (ref 70–105)
GRAM STN SPEC: NORMAL
GRAM STN SPEC: NORMAL
HCT VFR BLD AUTO: 35 % (ref 38–47)
HGB BLD-MCNC: 11.2 G/DL (ref 12–16)
IMM GRANULOCYTES # BLD AUTO: 0.1 K/UL (ref 0–0.04)
IMM GRANULOCYTES NFR BLD: 0.7 % (ref 0–0.4)
INR BLD: 1.08
INTERVENTRICULAR SEPTUM: 1.02 CM (ref 0.6–1.1)
IVC OSTIUM: 1 CM
LEFT ATRIUM SIZE: 3.4 CM
LEFT INTERNAL DIMENSION IN SYSTOLE: 2.77 CM (ref 2.1–4)
LEFT VENTRICLE DIASTOLIC VOLUME INDEX: 40.51 ML/M2
LEFT VENTRICLE DIASTOLIC VOLUME: 71.7 ML
LEFT VENTRICLE MASS INDEX: 71 G/M2
LEFT VENTRICLE SYSTOLIC VOLUME INDEX: 16.3 ML/M2
LEFT VENTRICLE SYSTOLIC VOLUME: 28.8 ML
LEFT VENTRICULAR INTERNAL DIMENSION IN DIASTOLE: 4.04 CM (ref 3.5–6)
LEFT VENTRICULAR MASS: 125.45 G
LVOT MG: 2 MMHG
LYMPHOCYTES # BLD AUTO: 0.99 K/UL (ref 1–4.8)
LYMPHOCYTES NFR BLD AUTO: 7.2 % (ref 27–41)
MAGNESIUM SERPL-MCNC: 1.7 MG/DL (ref 1.7–2.3)
MCH RBC QN AUTO: 27.1 PG (ref 27–31)
MCHC RBC AUTO-ENTMCNC: 32 G/DL (ref 32–36)
MCV RBC AUTO: 84.5 FL (ref 80–96)
MONOCYTES # BLD AUTO: 1.76 K/UL (ref 0–0.8)
MONOCYTES NFR BLD AUTO: 12.9 % (ref 2–6)
MPC BLD CALC-MCNC: 10.7 FL (ref 9.4–12.4)
MV PEAK E VEL: 0.73 M/S
NEUTROPHILS # BLD AUTO: 10.61 K/UL (ref 1.8–7.7)
NEUTROPHILS NFR BLD AUTO: 77.7 % (ref 53–65)
NRBC # BLD AUTO: 0 X10E3/UL
NRBC, AUTO (.00): 0 %
PLATELET # BLD AUTO: 223 K/UL (ref 150–400)
POTASSIUM SERPL-SCNC: 2.8 MMOL/L (ref 3.5–5.1)
PROTHROMBIN TIME: 13.6 SECONDS (ref 11.7–14.7)
RA MAJOR: 3.4 CM
RA PRESSURE: 3 MMHG
RBC # BLD AUTO: 4.14 M/UL (ref 4.2–5.4)
RIGHT VENTRICULAR END-DIASTOLIC DIMENSION: 3.7 CM
SODIUM SERPL-SCNC: 132 MMOL/L (ref 136–145)
TRICUSPID ANNULAR PLANE SYSTOLIC EXCURSION: 1.9 CM
WBC # BLD AUTO: 13.67 K/UL (ref 4.5–11)

## 2023-06-09 PROCEDURE — 99232 PR SUBSEQUENT HOSPITAL CARE,LEVL II: ICD-10-PCS | Mod: ,,, | Performed by: FAMILY MEDICINE

## 2023-06-09 PROCEDURE — 87070 CULTURE OTHR SPECIMN AEROBIC: CPT | Performed by: FAMILY MEDICINE

## 2023-06-09 PROCEDURE — 85025 COMPLETE CBC W/AUTO DIFF WBC: CPT | Performed by: HOSPITALIST

## 2023-06-09 PROCEDURE — 63600175 PHARM REV CODE 636 W HCPCS: Performed by: HOSPITALIST

## 2023-06-09 PROCEDURE — 87205 SMEAR GRAM STAIN: CPT | Performed by: FAMILY MEDICINE

## 2023-06-09 PROCEDURE — 11000001 HC ACUTE MED/SURG PRIVATE ROOM

## 2023-06-09 PROCEDURE — 82962 GLUCOSE BLOOD TEST: CPT

## 2023-06-09 PROCEDURE — 25000003 PHARM REV CODE 250: Performed by: HOSPITALIST

## 2023-06-09 PROCEDURE — 99223 1ST HOSP IP/OBS HIGH 75: CPT | Mod: ,,, | Performed by: STUDENT IN AN ORGANIZED HEALTH CARE EDUCATION/TRAINING PROGRAM

## 2023-06-09 PROCEDURE — 99223 PR INITIAL HOSPITAL CARE,LEVL III: ICD-10-PCS | Mod: ,,, | Performed by: STUDENT IN AN ORGANIZED HEALTH CARE EDUCATION/TRAINING PROGRAM

## 2023-06-09 PROCEDURE — 85730 THROMBOPLASTIN TIME PARTIAL: CPT | Performed by: RADIOLOGY

## 2023-06-09 PROCEDURE — 80048 BASIC METABOLIC PNL TOTAL CA: CPT | Performed by: HOSPITALIST

## 2023-06-09 PROCEDURE — 83735 ASSAY OF MAGNESIUM: CPT | Performed by: HOSPITALIST

## 2023-06-09 PROCEDURE — 63600175 PHARM REV CODE 636 W HCPCS: Performed by: RADIOLOGY

## 2023-06-09 PROCEDURE — 99232 SBSQ HOSP IP/OBS MODERATE 35: CPT | Mod: ,,, | Performed by: FAMILY MEDICINE

## 2023-06-09 RX ORDER — FENTANYL CITRATE 50 UG/ML
INJECTION, SOLUTION INTRAMUSCULAR; INTRAVENOUS
Status: COMPLETED | OUTPATIENT
Start: 2023-06-09 | End: 2023-06-09

## 2023-06-09 RX ORDER — MIDAZOLAM HYDROCHLORIDE 1 MG/ML
INJECTION INTRAMUSCULAR; INTRAVENOUS
Status: COMPLETED | OUTPATIENT
Start: 2023-06-09 | End: 2023-06-09

## 2023-06-09 RX ORDER — MORPHINE SULFATE 4 MG/ML
3 INJECTION, SOLUTION INTRAMUSCULAR; INTRAVENOUS EVERY 4 HOURS PRN
Status: DISCONTINUED | OUTPATIENT
Start: 2023-06-09 | End: 2023-06-10

## 2023-06-09 RX ORDER — RIFAMPIN 300 MG/1
600 CAPSULE ORAL DAILY
Status: DISCONTINUED | OUTPATIENT
Start: 2023-06-09 | End: 2023-06-21 | Stop reason: HOSPADM

## 2023-06-09 RX ORDER — SODIUM CHLORIDE AND POTASSIUM CHLORIDE 300; 900 MG/100ML; MG/100ML
INJECTION, SOLUTION INTRAVENOUS CONTINUOUS
Status: DISCONTINUED | OUTPATIENT
Start: 2023-06-09 | End: 2023-06-10

## 2023-06-09 RX ADMIN — RIFAMPIN 600 MG: 300 CAPSULE ORAL at 12:06

## 2023-06-09 RX ADMIN — POTASSIUM CHLORIDE AND SODIUM CHLORIDE: 900; 300 INJECTION, SOLUTION INTRAVENOUS at 06:06

## 2023-06-09 RX ADMIN — FENTANYL CITRATE 50 MCG: 50 INJECTION INTRAMUSCULAR; INTRAVENOUS at 01:06

## 2023-06-09 RX ADMIN — MORPHINE SULFATE 3 MG: 4 INJECTION, SOLUTION INTRAMUSCULAR; INTRAVENOUS at 09:06

## 2023-06-09 RX ADMIN — INSULIN ASPART 8 UNITS: 100 INJECTION, SOLUTION INTRAVENOUS; SUBCUTANEOUS at 04:06

## 2023-06-09 RX ADMIN — DAPTOMYCIN 750 MG: 500 INJECTION, POWDER, LYOPHILIZED, FOR SOLUTION INTRAVENOUS at 07:06

## 2023-06-09 RX ADMIN — MORPHINE SULFATE 3 MG: 4 INJECTION, SOLUTION INTRAMUSCULAR; INTRAVENOUS at 06:06

## 2023-06-09 RX ADMIN — HYDROCODONE BITARTRATE AND ACETAMINOPHEN 1 TABLET: 10; 325 TABLET ORAL at 01:06

## 2023-06-09 RX ADMIN — POTASSIUM BICARBONATE 50 MEQ: 977.5 TABLET, EFFERVESCENT ORAL at 09:06

## 2023-06-09 RX ADMIN — MIDAZOLAM HYDROCHLORIDE 1 MG: 1 INJECTION, SOLUTION INTRAMUSCULAR; INTRAVENOUS at 01:06

## 2023-06-09 RX ADMIN — MUPIROCIN: 20 OINTMENT TOPICAL at 12:06

## 2023-06-09 RX ADMIN — POTASSIUM CHLORIDE AND SODIUM CHLORIDE: 900; 300 INJECTION, SOLUTION INTRAVENOUS at 04:06

## 2023-06-09 RX ADMIN — CEFTRIAXONE SODIUM 2 G: 2 INJECTION, POWDER, FOR SOLUTION INTRAMUSCULAR; INTRAVENOUS at 04:06

## 2023-06-09 RX ADMIN — ONDANSETRON 4 MG: 2 INJECTION INTRAMUSCULAR; INTRAVENOUS at 01:06

## 2023-06-09 RX ADMIN — MORPHINE SULFATE 3 MG: 4 INJECTION, SOLUTION INTRAMUSCULAR; INTRAVENOUS at 05:06

## 2023-06-09 RX ADMIN — ENOXAPARIN SODIUM 40 MG: 40 INJECTION SUBCUTANEOUS at 04:06

## 2023-06-09 RX ADMIN — MORPHINE SULFATE 3 MG: 4 INJECTION, SOLUTION INTRAMUSCULAR; INTRAVENOUS at 12:06

## 2023-06-09 RX ADMIN — DULOXETINE HYDROCHLORIDE 60 MG: 30 CAPSULE, DELAYED RELEASE ORAL at 12:06

## 2023-06-09 NOTE — HPI
Ms. 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  CT scan of lumbar region concerning for vertebral osteomyelitis.  The case was evaluated by Dr. Hernandes and he recommended admission to Medicine he would consult.  He also recommended an MRI.      She was diagnosed with MRSA bacteremia at [facility] 2 days ago and was given Dalvance (dalbavancin).  She was scheduled with outpatient follow-up with Dr. Noyola as she has had septic joint in the past.      The presents now with severe back pain.  She states that her back pain persists.  She reports a temperature of 103° in route to the facility.  She denies any chest pain or other concerns.  She had diskitis or epidural abscess in January and was treated with IV antibiotics.  She has also been recently treated for left knee prosthetic joint infection.        ED course:   Initial Vitals [06/08/23 1534]   BP Pulse Resp Temp SpO2   (!) 111/53 94 18 99.4 °F (37.4 °C) 96 %         Only pain medications given in the emergency department.

## 2023-06-09 NOTE — H&P
[facility]  [facility] Medicine  History & Physical    Patient Name: Monica Walker  MRN: [id number]  Patient Class: IP- Inpatient  Admission Date: 6/8/2023  Attending Physician: Fermin Parson MD   Primary Care Provider: Hannah Schulz MD         Patient information was obtained from patient, past medical records and ER records.     Subjective:     Principal Problem:Osteomyelitis of vertebra of thoracolumbar region    Chief Complaint:   Chief Complaint   Patient presents with    Back Pain        HPI:   Ms. 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  CT scan of lumbar region concerning for vertebral osteomyelitis.  The case was evaluated by Dr. Hernandes and he recommended admission to Medicine he would consult.  He also recommended an MRI.      She was diagnosed with MRSA bacteremia at [facility] 2 days ago and was given Dalvance (dalbavancin).  She was scheduled with outpatient follow-up with Dr. Noyola as she has had septic joint in the past.      The presents now with severe back pain.  She states that her back pain persists.  She reports a temperature of 103° in route to the facility.  She denies any chest pain or other concerns.  She had diskitis or epidural abscess in January and was treated with IV antibiotics.  She has also been recently treated for left knee prosthetic joint infection.        ED course:   Initial Vitals [06/08/23 1534]   BP Pulse Resp Temp SpO2   (!) 111/53 94 18 99.4 °F (37.4 °C) 96 %         Only pain medications given in the emergency department.        Past Medical History:   Diagnosis Date    Abscess of right thigh + MRSA 08/20/2021    healed    Adnexal cyst     Degenerative disc disease     Depression 10/29/2021     "Diabetes mellitus     Hypertension     Hypomagnesemia 01/20/2023    Nicotine dependence     Osteoarthritis        Past Surgical History:   Procedure Laterality Date    BACK SURGERY      HYSTERECTOMY N/A     INCISION AND DRAINAGE Left 3/13/2023    Procedure: INCISION AND DRAINAGE;  Surgeon: Jacek Noyola MD;  Location: [facility] OR;  Service: Orthopedics;  Laterality: Left;    IRRIGATION AND DEBRIDEMENT OF LOWER EXTREMITY Left 11/22/2022    Procedure: IRRIGATION AND DEBRIDEMENT, LOWER EXTREMITY;  Surgeon: Papa Mendoza MD;  Location: [facility] OR;  Service: Orthopedics;  Laterality: Left;    IRRIGATION AND DEBRIDEMENT OF UPPER EXTREMITY Left 11/22/2022    Procedure: IRRIGATION AND DEBRIDEMENT, UPPER EXTREMITY;  Surgeon: Papa Mendoza MD;  Location: [facility] OR;  Service: Orthopedics;  Laterality: Left;    REVISION OF KNEE ARTHROPLASTY Left 3/13/2023    Procedure: REMOVAL OF PROSTHESIS,METHYLMETHACRYLATE WITH OR WITHOUT INSERTION OF SPACER LEFT KNEE;  Surgeon: Jacek Noyola MD;  Location: [facility] OR;  Service: Orthopedics;  Laterality: Left;    REVISION OF KNEE ARTHROPLASTY Left 5/9/2023    Procedure: REVISION, ARTHROPLASTY, KNEE;  Surgeon: Jacek Noyola MD;  Location: [facility] OR;  Service: Orthopedics;  Laterality: Left;    THORACIC LAMINECTOMY WITH FUSION N/A 1/24/2023    Procedure: T9-L2 fusion, T11-T12 laminectomy and corpectomy;  Surgeon: Jimmy Hernandes MD;  Location[facility] OR;  Service: Neurosurgery;  Laterality: N/A;    TOTAL HIP ARTHROPLASTY Left     TOTAL KNEE ARTHROPLASTY Bilateral        Review of patient's allergies indicates:  No Known Allergies    No current facility-administered medications on file prior to encounter.     Current Outpatient Medications on File Prior to Encounter   Medication Sig    BD ULTRA-FINE SHORT PEN NEEDLE 31 gauge x 5/16" Ndle     DULoxetine (CYMBALTA) 60 MG capsule Take 1 capsule (60 mg total) by " mouth once daily.    HYDROcodone-acetaminophen (NORCO)  mg per tablet 10 mg.    insulin (LANTUS SOLOSTAR U-100 INSULIN) glargine 100 units/mL SubQ pen Inject 30 Units into the skin once daily.    mupirocin 2 % OKit 0.5 application by Nasal route 2 (two) times a day. for 5 days    NOVOLOG FLEXPEN U-100 INSULIN 100 unit/mL (3 mL) InPn pen Inject 5 Units into the skin 3 (three) times daily with meals.     Family History       Problem Relation (Age of Onset)    Diabetes Father    Hypertension Father          Tobacco Use    Smoking status: Every Day     Packs/day: 0.50     Types: Cigarettes    Smokeless tobacco: Current     Types: Snuff   Substance and Sexual Activity    Alcohol use: Never    Drug use: Never    Sexual activity: Not on file     Review of Systems   Constitutional:  Negative for activity change, chills, fatigue and fever.   HENT:  Negative for congestion and sore throat.    Respiratory:  Negative for chest tightness.    Gastrointestinal:  Negative for abdominal distention, abdominal pain and diarrhea.   Endocrine: Negative for cold intolerance and heat intolerance.   Genitourinary:  Negative for dysuria.   Musculoskeletal:  Positive for back pain. Negative for arthralgias.   Skin:  Negative for color change and rash.   Neurological:  Negative for dizziness, tremors and headaches.   Psychiatric/Behavioral:  Negative for agitation. The patient is not nervous/anxious.    Objective:     Vital Signs (Most Recent):  Temp: 98.1 °F (36.7 °C) (06/09/23 0405)  Pulse: 93 (06/09/23 0405)  Resp: 18 (06/09/23 0405)  BP: 134/68 (06/09/23 0405)  SpO2: 96 % (06/09/23 0405) Vital Signs (24h Range):  Temp:  [98.1 °F (36.7 °C)-99.4 °F (37.4 °C)] 98.1 °F (36.7 °C)  Pulse:  [] 93  Resp:  [18] 18  SpO2:  [94 %-96 %] 96 %  BP: (111-185)/(53-82) 134/68     Weight: 68 kg (150 lb)  Body mass index is 24.21 kg/m².     Physical Exam  Constitutional:       Appearance: Normal appearance.   HENT:      Head:  Normocephalic and atraumatic.      Nose: Nose normal.      Mouth/Throat:      Mouth: Mucous membranes are moist.      Pharynx: Oropharynx is clear.   Eyes:      Extraocular Movements: Extraocular movements intact.      Conjunctiva/sclera: Conjunctivae normal.      Pupils: Pupils are equal, round, and reactive to light.   Cardiovascular:      Rate and Rhythm: Normal rate and regular rhythm.      Pulses: Normal pulses.      Heart sounds: Normal heart sounds.   Pulmonary:      Effort: Pulmonary effort is normal.      Breath sounds: Normal breath sounds.   Abdominal:      General: Abdomen is flat. Bowel sounds are normal.      Palpations: Abdomen is soft.   Musculoskeletal:         General: Tenderness present. Normal range of motion.      Cervical back: Normal range of motion and neck supple.      Comments: Tenderness at lower thoracic and lumbar region.    Post-surgical changes at left knee.  Swelling.  No tenderness.     Skin:     General: Skin is warm and dry.   Neurological:      General: No focal deficit present.      Mental Status: She is alert and oriented to person, place, and time.   Psychiatric:         Mood and Affect: Mood normal.         Behavior: Behavior normal.            CRANIAL NERVES     CN III, IV, VI   Pupils are equal, round, and reactive to light.     Significant Labs: All pertinent labs within the past 24 hours have been reviewed.    Significant Imaging: I have reviewed all pertinent imaging results/findings within the past 24 hours.    Assessment/Plan:     * Osteomyelitis of vertebra of thoracolumbar region  Patient with severe lower back pain.    Prior history of diskitis or epidural abscess.  CT scan concerning for osteomyelitis.  MRI with concern for osteomyelitis at T11 and T12.    Dr. Hernandes has been consulted.    Patient received a dose of Dalvance on 6/9.    Given failure to respond and multiple prior treatments with vancomycin, Daptomycin has been ordered.  Rifampin has been added to  daptomycin given failure of prior treatments.      May consider further recommendations from Infectious Disease at [facility].      Antibiotics (From admission, onward)    Start     Stop Route Frequency Ordered    06/09/23 0900  rifAMpin capsule 600 mg         -- Oral Daily 06/09/23 0540    06/09/23 0645  DAPTOmycin (CUBICIN) 680 mg in sodium chloride 0.9% SolP 50 mL IVPB         -- IV Every 24 hours (non-standard times) 06/09/23 0540    06/08/23 2100  mupirocin 2 % ointment         06/13 2059 Nasl 2 times daily 06/08/23 1830    06/08/23 1914  vancomycin - pharmacy to dose  (vancomycin IVPB)        See Hyperspace for full Linked Orders Report.    -- IV pharmacy to manage frequency 06/08/23 1815 06/08/23 1830  cefTRIAXone (ROCEPHIN) 2 g in dextrose 5 % in water (D5W) 5 % 100 mL IVPB (MB+)         -- IV Daily 06/08/23 1815              MRSA bacteremia  Multiple episodes of MRSA bacteremia.    The source may be the left knee joint but other sources are also possible.    There is a history of MRSA in left knee joint as well as in the lower vertebrae.    Consulted Cardiology to consider MONICA if TTE is negative.    Given a dose of dalbavancin in the emergency department on 06/07/2023.  Given the repeated episodes I have started patient on daptomycin, rifampin, and ceftriaxone until cultures returned from the vertebral column.          Tobacco use  Patient counseled on tobacco use and offered nicotine patch.      Prior MRSA Septic Joint of Left Knee   History of removal infection of left knee joint with removal of prosthetic.    Consult Dr. Noyola if available given recurrent episodes of bacteremia.    X-ray left knee ordered.  Dr. Noyola ordered a left knee x-ray yesterday as an outpatient and this has not been done yet.        Prior Lung abscesses  Prior small lung abscesses (septic emboli) which were thought to be related to hematogenous spread rather than a primary source of infection.  Consider repeat imaging in the  future based on patient's clinical course.      Primary hypertension  Continue home antihypertensives based on clinical course.      DM2 (diabetes mellitus, type 2)  Patient's FSGs are controlled on current medication regimen.  Last A1c reviewed-   Lab Results   Component Value Date    HGBA1C 7.7 (H) 05/09/2023     Most recent fingerstick glucose reviewed- No results for input(s): POCTGLUCOSE in the last 24 hours.  Current correctional scale  Low  Maintain anti-hyperglycemic dose as follows-   Antihyperglycemics (From admission, onward)    Start     Stop Route Frequency Ordered    06/08/23 1907  insulin aspart U-100 injection 1-10 Units         -- SubQ Before meals & nightly PRN 06/08/23 1815        Hold Oral hypoglycemics while patient is in the hospital.    Depression  Patient has persistent depression which is mild and is currently controlled. Will Continue anti-depressant medications. We will not consult psychiatry at this time. Patient does not display psychosis at this time. Continue to monitor closely and adjust plan of care as needed.          VTE Risk Mitigation (From admission, onward)         Ordered     enoxaparin injection 40 mg  Daily         06/08/23 1815     Place sequential compression device  Until discontinued         06/08/23 1815     IP VTE HIGH RISK PATIENT  Once         06/08/23 1815                           Fermin Parson MD  Department of Hospital Medicine  [facility]

## 2023-06-09 NOTE — CONSULTS
[facility]  Cardiology  Consult Note    Patient Name: Monica Walker  MRN: [id number]  Admission Date: 6/8/2023  Hospital Length of Stay: 1 days  Code Status: Full Code   Attending Provider: Vinay Rosen Jr., MD   Consulting Provider: JHOAN Sparks  Primary Care Physician: Hannah Schulz MD  Principal Problem:Osteomyelitis of vertebra of thoracolumbar region    Patient information was obtained from patient and ER records.     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 Admitted after CT of spine  concerning for vertebral osteomyelitis. MRSA bacteremia this admission with subjective temp of 103. No reported nausea, vomiting, headache, heartburn, diaphoresis, decreased appetite, palpitations, dizziness, chest pain, dyspnea or syncope. Plan for MONICA Monday.            Past Medical History:   Diagnosis Date    Abscess of right thigh + MRSA 08/20/2021    healed    Adnexal cyst     Degenerative disc disease     Depression 10/29/2021    Diabetes mellitus     Hypertension     Hypomagnesemia 01/20/2023    Nicotine dependence     Osteoarthritis        Past Surgical History:   Procedure Laterality Date    BACK SURGERY      HYSTERECTOMY N/A     INCISION AND DRAINAGE Left 3/13/2023    Procedure: INCISION AND DRAINAGE;  Surgeon: Jacek Noyola MD;  Location: [facility];  Service: Orthopedics;  Laterality: Left;    IRRIGATION AND DEBRIDEMENT OF LOWER EXTREMITY Left 11/22/2022    Procedure: IRRIGATION AND DEBRIDEMENT, LOWER EXTREMITY;   "Surgeon: Papa Mendoza MD;  Location: [facility] OR;  Service: Orthopedics;  Laterality: Left;    IRRIGATION AND DEBRIDEMENT OF UPPER EXTREMITY Left 11/22/2022    Procedure: IRRIGATION AND DEBRIDEMENT, UPPER EXTREMITY;  Surgeon: Papa Mendoza MD;  Location: [facility] OR;  Service: Orthopedics;  Laterality: Left;    REVISION OF KNEE ARTHROPLASTY Left 3/13/2023    Procedure: REMOVAL OF PROSTHESIS,METHYLMETHACRYLATE WITH OR WITHOUT INSERTION OF SPACER LEFT KNEE;  Surgeon: Jacek Noyola MD;  Location: [facility] OR;  Service: Orthopedics;  Laterality: Left;    REVISION OF KNEE ARTHROPLASTY Left 5/9/2023    Procedure: REVISION, ARTHROPLASTY, KNEE;  Surgeon: Jacek Noyola MD;  Location: [facility] OR;  Service: Orthopedics;  Laterality: Left;    THORACIC LAMINECTOMY WITH FUSION N/A 1/24/2023    Procedure: T9-L2 fusion, T11-T12 laminectomy and corpectomy;  Surgeon: Jimmy Hernandes MD;  Location: [facility] OR;  Service: Neurosurgery;  Laterality: N/A;    TOTAL HIP ARTHROPLASTY Left     TOTAL KNEE ARTHROPLASTY Bilateral        Review of patient's allergies indicates:  No Known Allergies    No current facility-administered medications on file prior to encounter.     Current Outpatient Medications on File Prior to Encounter   Medication Sig    BD ULTRA-FINE SHORT PEN NEEDLE 31 gauge x 5/16" Ndle     DULoxetine (CYMBALTA) 60 MG capsule Take 1 capsule (60 mg total) by mouth once daily.    HYDROcodone-acetaminophen (NORCO)  mg per tablet 10 mg.    insulin (LANTUS SOLOSTAR U-100 INSULIN) glargine 100 units/mL SubQ pen Inject 30 Units into the skin once daily.    mupirocin 2 % OKit 0.5 application by Nasal route 2 (two) times a day. for 5 days    NOVOLOG FLEXPEN U-100 INSULIN 100 unit/mL (3 mL) InPn pen Inject 5 Units into the skin 3 (three) times daily with meals.     Family History       Problem Relation (Age of Onset)    Diabetes Father    Hypertension Father          Tobacco Use    " Smoking status: Every Day     Packs/day: 0.50     Types: Cigarettes    Smokeless tobacco: Current     Types: Snuff   Substance and Sexual Activity    Alcohol use: Never    Drug use: Never    Sexual activity: Not on file     Review of Systems   Constitutional: Positive for chills and fever.   Cardiovascular:  Negative for chest pain, irregular heartbeat and near-syncope.   Respiratory:  Negative for shortness of breath.    All other systems reviewed and are negative.  Objective:     Vital Signs (Most Recent):  Temp: 98.7 °F (37.1 °C) (06/09/23 0600)  Pulse: 89 (06/09/23 0600)  Resp: 20 (06/09/23 0602)  BP: (!) 106/56 (06/09/23 0600)  SpO2: (!) 92 % (06/09/23 0600) Vital Signs (24h Range):  Temp:  [98.1 °F (36.7 °C)-99.4 °F (37.4 °C)] 98.7 °F (37.1 °C)  Pulse:  [] 89  Resp:  [18-20] 20  SpO2:  [92 %-96 %] 92 %  BP: (106-185)/(53-82) 106/56     Weight: 68 kg (150 lb)  Body mass index is 24.21 kg/m².    SpO2: (!) 92 %         Intake/Output Summary (Last 24 hours) at 6/9/2023 1144  Last data filed at 6/9/2023 0024  Gross per 24 hour   Intake 100 ml   Output 400 ml   Net -300 ml       Lines/Drains/Airways       Peripheral Intravenous Line  Duration                  Peripheral IV - Single Lumen 06/08/23 1523 20 G Right Antecubital <1 day                     Physical Exam  Vitals reviewed.   Constitutional:       General: She is not in acute distress.  HENT:      Head: Normocephalic.   Eyes:      Extraocular Movements: Extraocular movements intact.   Cardiovascular:      Rate and Rhythm: Normal rate and regular rhythm.      Pulses: Normal pulses.      Heart sounds: Normal heart sounds.   Pulmonary:      Effort: Pulmonary effort is normal.      Breath sounds: Normal breath sounds.   Abdominal:      General: There is no distension.      Palpations: Abdomen is soft.      Tenderness: There is no abdominal tenderness.   Musculoskeletal:         General: Normal range of motion.   Skin:     General: Skin is warm and dry.       Capillary Refill: Capillary refill takes less than 2 seconds.   Neurological:      General: No focal deficit present.      Mental Status: She is alert and oriented to person, place, and time.      Cranial Nerves: No cranial nerve deficit.      Sensory: No sensory deficit.   Psychiatric:         Mood and Affect: Mood normal.        Significant Labs: All pertinent lab results from the last 24 hours have been reviewed.    Significant Imaging: Echocardiogram: Transthoracic echo (TTE) complete (Cupid Only):   Results for orders placed or performed during the hospital encounter of 06/08/23   Echo   Result Value Ref Range    BSA 1.78 m2    Right Atrial Pressure (from IVC) 3 mmHg    EF 55 %    Left Ventricular Outflow Tract Mean Gradient 2.00 mmHg    AORTIC VALVE CUSP SEPERATION 1.73 cm    LVIDd 4.04 3.5 - 6.0 cm    IVS 1.02 0.6 - 1.1 cm    Posterior Wall 0.94 0.6 - 1.1 cm    Ao root annulus 2.40 cm    LVIDs 2.77 2.1 - 4.0 cm    FS 31 28 - 44 %    IVC ostium 1.0 cm    LV mass 125.45 g    LA size 3.40 cm    RVDD 3.70 cm    TAPSE 1.90 cm    Left Ventricle Relative Wall Thickness 0.47 cm    AV mean gradient 2 mmHg    AV valve area 4.19 cm2    AV Velocity Ratio 1.22     AV index (prosthetic) 1.33     E wave deceleration time 192.00 msec    LVOT diameter 2.00 cm    LVOT area 3.1 cm2    LVOT peak kingsley 1.1 m/s    LVOT peak VTI 20.00 cm    Ao peak kingsley 0.9 m/s    Ao VTI 15.00 cm    LVOT stroke volume 62.80 cm3    AV peak gradient 3 mmHg    MV Peak E Kingsley 0.73 m/s    LV Systolic Volume 28.80 mL    LV Systolic Volume Index 16.3 mL/m2    LV Diastolic Volume 71.70 mL    LV Diastolic Volume Index 40.51 mL/m2    LV Mass Index 71 g/m2    Echo EF Estimated 55 %    RA Major Axis 3.40 cm     Assessment and Plan:     Hypokalemia  6/9: Potassium 2.8 - replaced with 50 meq po oral x 2 - daily BMP    Tobacco use  - Educated on medication adherence, blood pressure control, stress reduction, cholesterol, smoking cessation, smokeless tobacco use,  weight management, diet, diabetes, physical activity . 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 Admitted after CT of spine  concerning for vertebral osteomyelitis. MRSA bacteremia this admission with subjective temp of 103. Blood cultres + MRSA. Plan for MONICA to r/o endocarditis Monday after eating today        VTE Risk Mitigation (From admission, onward)           Ordered     enoxaparin injection 40 mg  Daily         06/08/23 1815     Place sequential compression device  Until discontinued         06/08/23 1815     IP VTE HIGH RISK PATIENT  Once         06/08/23 1815                    Thank you for your consult. I will follow-up with patient. Please contact us if you have any additional questions.    Syed Marshall, ACNP  Cardiology   [facility]

## 2023-06-09 NOTE — ASSESSMENT & PLAN NOTE
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 Admitted after CT of spine  concerning for vertebral osteomyelitis. MRSA bacteremia this admission with subjective temp of 103. Blood cultres + MRSA. Plan for MONICA to r/o endocarditis Monday after eating today

## 2023-06-09 NOTE — PLAN OF CARE
Problem: Adult Inpatient Plan of Care  Goal: Plan of Care Review  Outcome: Ongoing, Progressing  Goal: Patient-Specific Goal (Individualized)  Outcome: Ongoing, Progressing  Goal: Absence of Hospital-Acquired Illness or Injury  Outcome: Ongoing, Progressing  Goal: Optimal Comfort and Wellbeing  Outcome: Ongoing, Progressing  Goal: Readiness for Transition of Care  Outcome: Ongoing, Progressing     Problem: Diabetes Comorbidity  Goal: Blood Glucose Level Within Targeted Range  Outcome: Ongoing, Progressing     Problem: Pain Acute  Goal: Acceptable Pain Control and Functional Ability  Outcome: Ongoing, Progressing

## 2023-06-09 NOTE — SUBJECTIVE & OBJECTIVE
Past Medical History:   Diagnosis Date    Abscess of right thigh + MRSA 08/20/2021    healed    Adnexal cyst     Degenerative disc disease     Depression 10/29/2021    Diabetes mellitus     Hypertension     Hypomagnesemia 01/20/2023    Nicotine dependence     Osteoarthritis        Past Surgical History:   Procedure Laterality Date    BACK SURGERY      HYSTERECTOMY N/A     INCISION AND DRAINAGE Left 3/13/2023    Procedure: INCISION AND DRAINAGE;  Surgeon: Jacek Noyola MD;  Location: [facility] OR;  Service: Orthopedics;  Laterality: Left;    IRRIGATION AND DEBRIDEMENT OF LOWER EXTREMITY Left 11/22/2022    Procedure: IRRIGATION AND DEBRIDEMENT, LOWER EXTREMITY;  Surgeon: Papa Mendoza MD;  Location: [facility] OR;  Service: Orthopedics;  Laterality: Left;    IRRIGATION AND DEBRIDEMENT OF UPPER EXTREMITY Left 11/22/2022    Procedure: IRRIGATION AND DEBRIDEMENT, UPPER EXTREMITY;  Surgeon: Papa Mendoza MD;  Location: [facility] OR;  Service: Orthopedics;  Laterality: Left;    REVISION OF KNEE ARTHROPLASTY Left 3/13/2023    Procedure: REMOVAL OF PROSTHESIS,METHYLMETHACRYLATE WITH OR WITHOUT INSERTION OF SPACER LEFT KNEE;  Surgeon: Jacek Noyola MD;  Location: [facility] OR;  Service: Orthopedics;  Laterality: Left;    REVISION OF KNEE ARTHROPLASTY Left 5/9/2023    Procedure: REVISION, ARTHROPLASTY, KNEE;  Surgeon: Jacek Noyola MD;  Location: [facility] OR;  Service: Orthopedics;  Laterality: Left;    THORACIC LAMINECTOMY WITH FUSION N/A 1/24/2023    Procedure: T9-L2 fusion, T11-T12 laminectomy and corpectomy;  Surgeon: Jimmy Hernandes MD;  Location: [facility] OR;  Service: Neurosurgery;  Laterality: N/A;    TOTAL HIP ARTHROPLASTY Left     TOTAL KNEE ARTHROPLASTY Bilateral        Review of patient's allergies indicates:  No Known Allergies    No current facility-administered medications on file prior to encounter.     Current Outpatient Medications on File Prior to  Patient stated that she has not had a bowel movement since her surgery and questions about her packing. "Encounter   Medication Sig    BD ULTRA-FINE SHORT PEN NEEDLE 31 gauge x 5/16" Ndle     DULoxetine (CYMBALTA) 60 MG capsule Take 1 capsule (60 mg total) by mouth once daily.    HYDROcodone-acetaminophen (NORCO)  mg per tablet 10 mg.    insulin (LANTUS SOLOSTAR U-100 INSULIN) glargine 100 units/mL SubQ pen Inject 30 Units into the skin once daily.    mupirocin 2 % OKit 0.5 application by Nasal route 2 (two) times a day. for 5 days    NOVOLOG FLEXPEN U-100 INSULIN 100 unit/mL (3 mL) InPn pen Inject 5 Units into the skin 3 (three) times daily with meals.     Family History       Problem Relation (Age of Onset)    Diabetes Father    Hypertension Father          Tobacco Use    Smoking status: Every Day     Packs/day: 0.50     Types: Cigarettes    Smokeless tobacco: Current     Types: Snuff   Substance and Sexual Activity    Alcohol use: Never    Drug use: Never    Sexual activity: Not on file     Review of Systems   Constitutional:  Negative for activity change, chills, fatigue and fever.   HENT:  Negative for congestion and sore throat.    Respiratory:  Negative for chest tightness.    Gastrointestinal:  Negative for abdominal distention, abdominal pain and diarrhea.   Endocrine: Negative for cold intolerance and heat intolerance.   Genitourinary:  Negative for dysuria.   Musculoskeletal:  Positive for back pain. Negative for arthralgias.   Skin:  Negative for color change and rash.   Neurological:  Negative for dizziness, tremors and headaches.   Psychiatric/Behavioral:  Negative for agitation. The patient is not nervous/anxious.    Objective:     Vital Signs (Most Recent):  Temp: 98.1 °F (36.7 °C) (06/09/23 0405)  Pulse: 93 (06/09/23 0405)  Resp: 18 (06/09/23 0405)  BP: 134/68 (06/09/23 0405)  SpO2: 96 % (06/09/23 0405) Vital Signs (24h Range):  Temp:  [98.1 °F (36.7 °C)-99.4 °F (37.4 °C)] 98.1 °F (36.7 °C)  Pulse:  [] 93  Resp:  [18] 18  SpO2:  [94 %-96 %] 96 %  BP: (111-185)/(53-82) 134/68     Weight: 68 " kg (150 lb)  Body mass index is 24.21 kg/m².     Physical Exam  Constitutional:       Appearance: Normal appearance.   HENT:      Head: Normocephalic and atraumatic.      Nose: Nose normal.      Mouth/Throat:      Mouth: Mucous membranes are moist.      Pharynx: Oropharynx is clear.   Eyes:      Extraocular Movements: Extraocular movements intact.      Conjunctiva/sclera: Conjunctivae normal.      Pupils: Pupils are equal, round, and reactive to light.   Cardiovascular:      Rate and Rhythm: Normal rate and regular rhythm.      Pulses: Normal pulses.      Heart sounds: Normal heart sounds.   Pulmonary:      Effort: Pulmonary effort is normal.      Breath sounds: Normal breath sounds.   Abdominal:      General: Abdomen is flat. Bowel sounds are normal.      Palpations: Abdomen is soft.   Musculoskeletal:         General: Tenderness present. Normal range of motion.      Cervical back: Normal range of motion and neck supple.      Comments: Tenderness at lower thoracic and lumbar region.    Post-surgical changes at left knee.  Swelling.  No tenderness.     Skin:     General: Skin is warm and dry.   Neurological:      General: No focal deficit present.      Mental Status: She is alert and oriented to person, place, and time.   Psychiatric:         Mood and Affect: Mood normal.         Behavior: Behavior normal.            CRANIAL NERVES     CN III, IV, VI   Pupils are equal, round, and reactive to light.     Significant Labs: All pertinent labs within the past 24 hours have been reviewed.    Significant Imaging: I have reviewed all pertinent imaging results/findings within the past 24 hours.   No

## 2023-06-09 NOTE — PROGRESS NOTES
Aspiration of postsurgical fluid collection T11/12  Performed by Dr. Jacek Whitfield  Procedure was explained to the patient including risks and possible complications.  Patient agreed to procedure consent is signed.  A formal timeout was called all staff present agreed to patient and procedure.  The mid back was prepped with ChloraPrep and sterile field was established.  1% lidocaine was used as local anesthetic.  Under CT guidance an 18 gauge needle was placed into the paraspinal fluid collection on the left side of T11/12.  6 cc of blood-tinged pus was removed.  This will be sent for laboratory analysis.  The needle was removed and the puncture site was cleaned and bandaged.  The patient tolerated the procedure well there were no immediate postprocedure complications.

## 2023-06-09 NOTE — PROGRESS NOTES
Pharmacy consulted for vancomycin dosing. We will begin vancomycin 1250 mg IV every 18 hours and check a trough 6/11/23 0130. Pharmacy will monitor daily and adjust as necessary.

## 2023-06-09 NOTE — ASSESSMENT & PLAN NOTE
Patient with severe lower back pain.    Prior history of diskitis or epidural abscess.  CT scan concerning for osteomyelitis.  MRI with concern for osteomyelitis at T11 and T12.    Dr. Hernandes has been consulted.    Patient received a dose of Dalvance on 6/9.    Given failure to respond and multiple prior treatments with vancomycin, Daptomycin has been ordered.  Rifampin has been added to daptomycin given failure of prior treatments.      May consider further recommendations from Infectious Disease at [facility].      Antibiotics (From admission, onward)    Start     Stop Route Frequency Ordered    06/09/23 0900  rifAMpin capsule 600 mg         -- Oral Daily 06/09/23 0540    06/09/23 0645  DAPTOmycin (CUBICIN) 680 mg in sodium chloride 0.9% SolP 50 mL IVPB         -- IV Every 24 hours (non-standard times) 06/09/23 0540    06/08/23 2100  mupirocin 2 % ointment         06/13 2059 Nasl 2 times daily 06/08/23 1830    06/08/23 1914  vancomycin - pharmacy to dose  (vancomycin IVPB)        See Hyperspace for full Linked Orders Report.    -- IV pharmacy to manage frequency 06/08/23 1815 06/08/23 1830  cefTRIAXone (ROCEPHIN) 2 g in dextrose 5 % in water (D5W) 5 % 100 mL IVPB (MB+)         -- IV Daily 06/08/23 1815

## 2023-06-09 NOTE — PLAN OF CARE
[facility]  Initial Discharge Assessment       Primary Care Provider: Hannah Schulz MD    Admission Diagnosis: Back pain [M54.9]  Osteomyelitis of vertebra [M46.20]  MRSA bacteremia [R78.81, B95.62]    Admission Date: 6/8/2023  Expected Discharge Date:          Payor: HU[employer] MANAGED MEDICARE / Plan: HU[employer] SNP HMO PPO SPECIAL NEEDS / Product Type: Medicare Advantage /     Extended Emergency Contact Information  Primary Emergency Contact: Wilson,April  Mobile Phone: [phone]  Relation: Sister  Preferred language: English   needed? No  Secondary Emergency Contact: Alejandrina Walker  Home Phone: [phone]  Relation: Mother  Preferred language: English   needed? No    Discharge Plan A: Home with family, Home Health  Discharge Plan B: Other (Swb at [facility])      [facility] - [place], [place] - [address] [address] [address] [facility] [address]  Phone: [phone] Fax: [phone]    [facility] [address] [place] [address]  [address] [place] [id number]  Phone: [phone] Fax: [phone]      Initial Assessment (most recent)       Adult Discharge Assessment - 06/09/23 1500          Discharge Assessment    Assessment Type Discharge Planning Assessment     Source of Information patient;family     People in Home parent(s)     Do you expect to return to your current living situation? Yes     Do you have help at home or someone to help you manage your care at home? Yes     Who are your caregiver(s) and their phone number(s)? Alejandrina Walker- Mother [phone]     Prior to hospitilization cognitive status: Alert/Oriented     Current cognitive status: Alert/Oriented     Home Accessibility wheelchair accessible     Equipment Currently Used at Home none     Readmission within 30 days? Yes     Patient currently being followed by outpatient case management? No     Do you currently have service(s) that help you manage your  care at home? Yes     Name and Contact number of agency [facility]     Is the pt/caregiver preference to resume services with current agency Yes     Do you take prescription medications? Yes     Do you have prescription coverage? Yes     Coverage [employer] Managed Medicare     Do you have any problems affording any of your prescribed medications? No     Is the patient taking medications as prescribed? yes     Who is going to help you get home at discharge? Mother     How do you get to doctors appointments? car, drives self     Do you take coumadin? No     Discharge Plan A Home with family;Home Health     Discharge Plan B Other   Swb at [facility]    DME Needed Upon Discharge  none     Discharge Plan discussed with: Patient;Parent(s)     Name(s) and Number(s) Alejandrina Walker- Mother [phone]                   SW spoke with pt  and motherAlejandrnia at bedside. Pt lives at home with mother, current with [facility] and has no dme but pts mother has a rw and wc, choice obtained. Pt plans to dc back home when medically ready for dc and resume care with [facility] and if if swb is needed pt gave choice for [facility]. I.M. obtained and [facility] questions completed. MICHEAL faxed initial TRUONG referral to [facility]. MICHEAL will cont to follow for dc needs.

## 2023-06-09 NOTE — PLAN OF CARE
Problem: Adult Inpatient Plan of Care  Goal: Plan of Care Review  6/8/2023 2059 by Margo Campoverde RN  Outcome: Ongoing, Progressing  6/8/2023 2058 by Margo Campoverde RN  Outcome: Ongoing, Progressing  Goal: Patient-Specific Goal (Individualized)  6/8/2023 2059 by Margo Campoverde RN  Outcome: Ongoing, Progressing  6/8/2023 2058 by Margo Campoverde RN  Outcome: Ongoing, Progressing  Goal: Absence of Hospital-Acquired Illness or Injury  6/8/2023 2059 by Margo Campoverde RN  Outcome: Ongoing, Progressing  6/8/2023 2058 by Margo Campoverde RN  Outcome: Ongoing, Progressing  Goal: Optimal Comfort and Wellbeing  6/8/2023 2059 by Margo Campoverde RN  Outcome: Ongoing, Progressing  6/8/2023 2058 by Margo Campoverde RN  Outcome: Ongoing, Progressing  Goal: Readiness for Transition of Care  6/8/2023 2059 by Margo Campoverde RN  Outcome: Ongoing, Progressing  6/8/2023 2058 by Margo Campoverde RN  Outcome: Ongoing, Progressing     Problem: Diabetes Comorbidity  Goal: Blood Glucose Level Within Targeted Range  6/8/2023 2059 by Margo Campoverde RN  Outcome: Ongoing, Progressing  6/8/2023 2058 by Margo Campoverde RN  Outcome: Ongoing, Progressing     Problem: Pain Acute  Goal: Acceptable Pain Control and Functional Ability  Outcome: Ongoing, Not Progressing

## 2023-06-09 NOTE — PT/OT/SLP EVAL
Occupational Therapy   Evaluation    Name: Monica Walker  MRN: [id number]  Admitting Diagnosis:  Lung abscess  Recent Surgery: * No surgery found *      Recommendations:     Discharge Recommendations: home with home health, home health PT, LTACH (long-term acute care hospital), nursing facility, skilled, rehabilitation facility  Discharge Equipment Recommendations:  none  Barriers to discharge:       Assessment:     Monica Walker is a 52 y.o. female with a medical diagnosis of Lung abscess.  She presents with alert, but painful. Performance deficits affecting function: weakness, impaired endurance, impaired self care skills, impaired functional mobility, gait instability, decreased lower extremity function, pain, decreased ROM, edema, impaired skin, impaired cardiopulmonary response to activity.      Rehab Prognosis: Good; patient would benefit from acute skilled OT services to address these deficits and reach maximum level of function.       Plan:     Patient to be seen 5 x/week to address the above listed problems via self-care/home management, therapeutic activities, therapeutic exercises  Plan of Care Expires: 12/26/22  Plan of Care Reviewed with: patient, mother    Subjective     Chief Complaint: Pt was treated for pyelonephritis 10 days ago, has abscess, vs cavitary pneumonia to middle lobe of (R) lung and lower lobe of (L) lung. Pt admitted today with redness and (L) knee effusion with painful (L) knee. The knee was aspirated and being tested. Pt has hx of MRSA infections, increasing risk of septic (L) knee. The (L) knee is a TKR from years ago.  Patient/Family Comments/goals: Pt and pt's mother is planning for pt to stay at pt's mother's house at discharge.    Occupational Profile:  Living Environment: Pt lives at home with her son and her son's girlfriend.  Previous level of function: Pt was independent with all ADLs and IADLs.  Roles and Routines: Pt takes care of herself and her home. Pt is a retired  Start omeprazole - take every day 30 minutes before breakfast    Surgery scheduler will call you to schedule EGD and colonoscopy     Dietary Instructions for GERD  (Acid Reflux)       Avoid the following foods and drinks -    Cooked tomatoes and foods with tomato sauce  (pizza, spaghetti, lasagna, etc)  Fried or fatty foods  Chocolate  Peppermint and spearmint  Creamed foods or soups  Foods with heavy gravies  Most “fast” foods    Coffee (regular and decaffeinated)  Tea (regular and decaffeinated) *  Whole milk  Beer, red wine  Carbonated soda / pop    *gentle herbal teas such as “Sleepytime” or chamomile are OK    Some patients also have trouble with:    Citrus fruits and juices (grapefruit, orange, pineapple, etc)  Spicy foods  All alcoholic beverages      Other helpful tips:    Eat several small meals during the day rather than one or two large meals.  Don’t eat within 3 hours of bedtime.    Don’t lie down after eating.  Avoid late night snacks.    The following website has “GERD-safe” foods and recipes:  http://heartburn.Answer.To.com/cs/dietfood/a/heartburnfoods1.htm   dispatcher  Equipment Used at Home:  walker, rolling, bedside commode, shower chair, cane, straight  Assistance upon Discharge: pt will have assistance from her mother    Pain/Comfort:  Pain Rating 1: 4/10 (at rest but up to 8/10 with certain movements)  Location - Orientation 1: lower  Location 1: back ((L) knee, (L) shoulder)  Pain Addressed 1: Pre-medicate for activity, Reposition, Distraction, Cessation of Activity, Nurse notified  Pain Rating Post-Intervention 1: 4/10    Patients cultural, spiritual, Quaker conflicts given the current situation: no    Objective:     Communicated with: HAYDEE Jean Baptiste prior to session.  Patient found HOB elevated with peripheral IV, telemetry upon OT entry to room.    General Precautions: Standard, fall, contact   Orthopedic Precautions:N/A   Braces: N/A  Respiratory Status: Room air    Occupational Performance:    Bed Mobility:    Patient completed Supine to Sit with minimum assistance    Functional Mobility/Transfers:  Patient completed Sit <> Stand Transfer with stand by assistance  with  rolling walker   Patient completed Bed <> Chair Transfer using Step Transfer technique with stand by assistance with rolling walker  Functional Mobility: Pt was able to ambulate with RW , placing little weight through (L) UE and using arms to assist.    Activities of Daily Living:  Upper Body Dressing: minimum assistance with hospital gown     Cognitive/Visual Perceptual:  Cognitive/Psychosocial Skills:     -       Oriented to: Person, Place, and Situation   -       Follows Commands/attention:Follows one-step commands  -       Communication: clear/fluent  -       Safety awareness/insight to disability: intact   -       Mood/Affect/Coping skills/emotional control: Appropriate to situation, Cooperative, and very painful/ anxious    Physical Exam:  Motor Planning:    -       WFL  Dominant hand:    -       right  Upper Extremity Range of Motion:     -       Right Upper Extremity: WNL  -        Left Upper Extremity: WFL  Upper Extremity Strength:    -       Right Upper Extremity: WNL  -       Left Upper Extremity: WFL except shoulder is impaired fairly recent injury   Strength:    -       Right Upper Extremity: WFL  -       Left Upper Extremity: WFL  Gross motor coordination:   WFL    AMPAC 6 Click ADL:  AMPAC Total Score: 20    Treatment & Education:  Pt educated on OT role/POC.   Importance of OOB activity with staff assistance.  Importance of sitting up in the chair throughout the day as tolerated, especially for meals   Safety during functional t/f and mobility with use of RW  Importance of assisting with self-care activities   All questions/concerns answered within OT scope of practice      Patient left up in chair with all lines intact, call button in reach, RN Alba notified, and mother present    GOALS:   Multidisciplinary Problems       Occupational Therapy Goals          Problem: Occupational Therapy    Goal Priority Disciplines Outcome Interventions   Occupational Therapy Goal     OT, PT/OT Ongoing, Progressing    Description: STG:  Pt will perform grooming with access to supplies  Pt will bathe with setup and CGA  Pt will perform UE dressing with setup  Pt will perform LE dressing with CGA and AD if needed  Pt will transfer bed/chair/bsc with RW and SBA  Pt will perform standing task x 2 min with SBA and RW  Pt will tolerate 15 minutes of tx without fatigue      LT.Restore to max I with self care and mobility.                          History:     Past Medical History:   Diagnosis Date    Abscess of right thigh + MRSA 2021    healed    Adnexal cyst     Degenerative disc disease     Depression 10/29/2021    Hypertension     Nicotine dependence     Osteoarthritis          Past Surgical History:   Procedure Laterality Date    BACK SURGERY      HYSTERECTOMY N/A     TOTAL HIP ARTHROPLASTY Left     TOTAL KNEE ARTHROPLASTY Bilateral        Time Tracking:     OT Date of Treatment:  11/20/22  OT Start Time: 1414  OT Stop Time: 1423  OT Total Time (min): 9 min    Billable Minutes:Evaluation low complexity    11/20/2022

## 2023-06-09 NOTE — ASSESSMENT & PLAN NOTE
- Educated on medication adherence, blood pressure control, stress reduction, cholesterol, smoking cessation, smokeless tobacco use, weight management, diet, diabetes, physical activity . Encouraged risk factor modifications

## 2023-06-09 NOTE — CONSULTS
Alisha Walker is a 53-year-old female well known to me having undergone left THR and left knee unicompartmental knee replacement arthroplasty previously.  She developed MRSA bacterial sepsis involving thoracolumbar spine and seated her left knee.  She underwent two-stage revision with reimplantation of components 05/09/2023.  Since that time her left knee postoperative course has been uncomplicated 1 of gradual improvement.  Today she indicates that she is been ambulating without any significant discomfort.  There is not been any recent untoward change with regard to her left knee.  Additionally she underwent surgical intervention for her spine issue performed by Dr. Hernandes.  She does have hardware spanning the thoracolumbar region.  She has however developed severe recurrent low back pain.  She was seen in [facility] recently found to have bacterial sepsis.  She was admitted to the hospital at this time and a MRI shows phlegmon and osteomyelitic changes at the T11-T12 level.  She was scheduled for some surgical intervention under the direction of Dr. Angel Hernandes today.  X-rays left knee yesterday shows metallic Arthrex components in expected position alignment for revision total knee replacement arthroplasty.  There has been some heterotopic ossification but no sign of osteomyelitis or component position change.  Physical exam: Left knee well-healed midline anterior incision.  No erythema or increased heat.  Mild effusion/soft tissue swelling.  No tenderness palpation.  There is evidence of arthrofibrosis with limited knee motion from 0 to 50° of flexion.  Knee ligaments are stable and well balanced.  Patella tracks midline.  Impression:  Persistent infection/osteomyelitis thoracolumbar region.  I see no evidence of recurrent left knee infection at this time.  Of course seeding of her left knee now or in the future could be colitis as tibial components are cemented and not retrievable.  She could  of course undergo irrigation and debridement arthroscopically or open but established infection or osteomyelitis could lead to AKA.

## 2023-06-09 NOTE — ASSESSMENT & PLAN NOTE
Multiple episodes of MRSA bacteremia.    The source may be the left knee joint but other sources are also possible.    There is a history of MRSA in left knee joint as well as in the lower vertebrae.    Consulted Cardiology to consider MONICA if TTE is negative.    Given a dose of dalbavancin in the emergency department on 06/07/2023.  Given the repeated episodes I have started patient on daptomycin, rifampin, and ceftriaxone until cultures returned from the vertebral column.

## 2023-06-09 NOTE — ASSESSMENT & PLAN NOTE
Prior small lung abscesses (septic emboli) which were thought to be related to hematogenous spread rather than a primary source of infection.  Consider repeat imaging in the future based on patient's clinical course.

## 2023-06-09 NOTE — HPI
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 Admitted after CT of spine  concerning for vertebral osteomyelitis. MRSA bacteremia this admission with subjective temp of 103. No reported nausea, vomiting, headache, heartburn, diaphoresis, decreased appetite, palpitations, dizziness, chest pain, dyspnea or syncope. Plan for MONICA Monday.

## 2023-06-09 NOTE — ASSESSMENT & PLAN NOTE
History of removal infection of left knee joint with removal of prosthetic.    Consult Dr. Noyola if available given recurrent episodes of bacteremia.    X-ray left knee ordered.  Dr. Noyloa ordered a left knee x-ray yesterday as an outpatient and this has not been done yet.

## 2023-06-09 NOTE — SUBJECTIVE & OBJECTIVE
Past Medical History:   Diagnosis Date    Abscess of right thigh + MRSA 08/20/2021    healed    Adnexal cyst     Degenerative disc disease     Depression 10/29/2021    Diabetes mellitus     Hypertension     Hypomagnesemia 01/20/2023    Nicotine dependence     Osteoarthritis        Past Surgical History:   Procedure Laterality Date    BACK SURGERY      HYSTERECTOMY N/A     INCISION AND DRAINAGE Left 3/13/2023    Procedure: INCISION AND DRAINAGE;  Surgeon: Jacek Noyola MD;  Location: [facility] OR;  Service: Orthopedics;  Laterality: Left;    IRRIGATION AND DEBRIDEMENT OF LOWER EXTREMITY Left 11/22/2022    Procedure: IRRIGATION AND DEBRIDEMENT, LOWER EXTREMITY;  Surgeon: Papa Mendoza MD;  Location: [facility] OR;  Service: Orthopedics;  Laterality: Left;    IRRIGATION AND DEBRIDEMENT OF UPPER EXTREMITY Left 11/22/2022    Procedure: IRRIGATION AND DEBRIDEMENT, UPPER EXTREMITY;  Surgeon: Papa Mendoza MD;  Location: [facility] OR;  Service: Orthopedics;  Laterality: Left;    REVISION OF KNEE ARTHROPLASTY Left 3/13/2023    Procedure: REMOVAL OF PROSTHESIS,METHYLMETHACRYLATE WITH OR WITHOUT INSERTION OF SPACER LEFT KNEE;  Surgeon: Jacek Noyola MD;  Location: [facility] OR;  Service: Orthopedics;  Laterality: Left;    REVISION OF KNEE ARTHROPLASTY Left 5/9/2023    Procedure: REVISION, ARTHROPLASTY, KNEE;  Surgeon: Jacek Noyola MD;  Location: [facility] OR;  Service: Orthopedics;  Laterality: Left;    THORACIC LAMINECTOMY WITH FUSION N/A 1/24/2023    Procedure: T9-L2 fusion, T11-T12 laminectomy and corpectomy;  Surgeon: Jimmy Hernandes MD;  Location: [facility] OR;  Service: Neurosurgery;  Laterality: N/A;    TOTAL HIP ARTHROPLASTY Left     TOTAL KNEE ARTHROPLASTY Bilateral        Review of patient's allergies indicates:  No Known Allergies    No current facility-administered medications on file prior to encounter.     Current Outpatient Medications on File Prior to  "Encounter   Medication Sig    BD ULTRA-FINE SHORT PEN NEEDLE 31 gauge x 5/16" Ndle     DULoxetine (CYMBALTA) 60 MG capsule Take 1 capsule (60 mg total) by mouth once daily.    HYDROcodone-acetaminophen (NORCO)  mg per tablet 10 mg.    insulin (LANTUS SOLOSTAR U-100 INSULIN) glargine 100 units/mL SubQ pen Inject 30 Units into the skin once daily.    mupirocin 2 % OKit 0.5 application by Nasal route 2 (two) times a day. for 5 days    NOVOLOG FLEXPEN U-100 INSULIN 100 unit/mL (3 mL) InPn pen Inject 5 Units into the skin 3 (three) times daily with meals.     Family History       Problem Relation (Age of Onset)    Diabetes Father    Hypertension Father          Tobacco Use    Smoking status: Every Day     Packs/day: 0.50     Types: Cigarettes    Smokeless tobacco: Current     Types: Snuff   Substance and Sexual Activity    Alcohol use: Never    Drug use: Never    Sexual activity: Not on file     Review of Systems   Constitutional: Positive for chills and fever.   Cardiovascular:  Negative for chest pain, irregular heartbeat and near-syncope.   Respiratory:  Negative for shortness of breath.    All other systems reviewed and are negative.  Objective:     Vital Signs (Most Recent):  Temp: 98.7 °F (37.1 °C) (06/09/23 0600)  Pulse: 89 (06/09/23 0600)  Resp: 20 (06/09/23 0602)  BP: (!) 106/56 (06/09/23 0600)  SpO2: (!) 92 % (06/09/23 0600) Vital Signs (24h Range):  Temp:  [98.1 °F (36.7 °C)-99.4 °F (37.4 °C)] 98.7 °F (37.1 °C)  Pulse:  [] 89  Resp:  [18-20] 20  SpO2:  [92 %-96 %] 92 %  BP: (106-185)/(53-82) 106/56     Weight: 68 kg (150 lb)  Body mass index is 24.21 kg/m².    SpO2: (!) 92 %         Intake/Output Summary (Last 24 hours) at 6/9/2023 1144  Last data filed at 6/9/2023 0024  Gross per 24 hour   Intake 100 ml   Output 400 ml   Net -300 ml       Lines/Drains/Airways       Peripheral Intravenous Line  Duration                  Peripheral IV - Single Lumen 06/08/23 1523 20 G Right Antecubital <1 day      "                Physical Exam  Vitals reviewed.   Constitutional:       General: She is not in acute distress.  HENT:      Head: Normocephalic.   Eyes:      Extraocular Movements: Extraocular movements intact.   Cardiovascular:      Rate and Rhythm: Normal rate and regular rhythm.      Pulses: Normal pulses.      Heart sounds: Normal heart sounds.   Pulmonary:      Effort: Pulmonary effort is normal.      Breath sounds: Normal breath sounds.   Abdominal:      General: There is no distension.      Palpations: Abdomen is soft.      Tenderness: There is no abdominal tenderness.   Musculoskeletal:         General: Normal range of motion.   Skin:     General: Skin is warm and dry.      Capillary Refill: Capillary refill takes less than 2 seconds.   Neurological:      General: No focal deficit present.      Mental Status: She is alert and oriented to person, place, and time.      Cranial Nerves: No cranial nerve deficit.      Sensory: No sensory deficit.   Psychiatric:         Mood and Affect: Mood normal.        Significant Labs: All pertinent lab results from the last 24 hours have been reviewed.    Significant Imaging: Echocardiogram: Transthoracic echo (TTE) complete (Cupid Only):   Results for orders placed or performed during the hospital encounter of 06/08/23   Echo   Result Value Ref Range    BSA 1.78 m2    Right Atrial Pressure (from IVC) 3 mmHg    EF 55 %    Left Ventricular Outflow Tract Mean Gradient 2.00 mmHg    AORTIC VALVE CUSP SEPERATION 1.73 cm    LVIDd 4.04 3.5 - 6.0 cm    IVS 1.02 0.6 - 1.1 cm    Posterior Wall 0.94 0.6 - 1.1 cm    Ao root annulus 2.40 cm    LVIDs 2.77 2.1 - 4.0 cm    FS 31 28 - 44 %    IVC ostium 1.0 cm    LV mass 125.45 g    LA size 3.40 cm    RVDD 3.70 cm    TAPSE 1.90 cm    Left Ventricle Relative Wall Thickness 0.47 cm    AV mean gradient 2 mmHg    AV valve area 4.19 cm2    AV Velocity Ratio 1.22     AV index (prosthetic) 1.33     E wave deceleration time 192.00 msec    LVOT  diameter 2.00 cm    LVOT area 3.1 cm2    LVOT peak kingsley 1.1 m/s    LVOT peak VTI 20.00 cm    Ao peak kingsley 0.9 m/s    Ao VTI 15.00 cm    LVOT stroke volume 62.80 cm3    AV peak gradient 3 mmHg    MV Peak E Kingsley 0.73 m/s    LV Systolic Volume 28.80 mL    LV Systolic Volume Index 16.3 mL/m2    LV Diastolic Volume 71.70 mL    LV Diastolic Volume Index 40.51 mL/m2    LV Mass Index 71 g/m2    Echo EF Estimated 55 %    RA Major Axis 3.40 cm

## 2023-06-09 NOTE — ASSESSMENT & PLAN NOTE
Patient's FSGs are controlled on current medication regimen.  Last A1c reviewed-   Lab Results   Component Value Date    HGBA1C 7.7 (H) 05/09/2023     Most recent fingerstick glucose reviewed- No results for input(s): POCTGLUCOSE in the last 24 hours.  Current correctional scale  Low  Maintain anti-hyperglycemic dose as follows-   Antihyperglycemics (From admission, onward)    Start     Stop Route Frequency Ordered    06/08/23 1907  insulin aspart U-100 injection 1-10 Units         -- SubQ Before meals & nightly PRN 06/08/23 1815        Hold Oral hypoglycemics while patient is in the hospital.

## 2023-06-09 NOTE — CONSULTS
Office: [phone]    Orthopedic Spine Surgery Consult/H&P    ASSESSMENT:  53 y.o. female with prior thoracic ostium diskitis now status post T9-L2 fusion, T11-T12 corpectomy, T11-T12 laminectomy 01/24/2023.  Also left knee septic arthritis status post 2 stage revision with Dr. Noyola 05/09/2023.  Staph bacteremia and persistent vertebral osteomyelitis noted on recent scans    PLAN:  Consult IR for bone biopsy and culture.  Proceed with medical management with long-term IV antibiotics.  Will need Infectious Disease follow-up    HPI:  53 y.o. female with low back pain that has gotten worse over the last several months.  She was admitted with MRSA bacteremia.  Recent CT and MRI lumbar spine show chronic inflammation consistent with persistent osteomyelitis.    Past Medical History:   Diagnosis Date    Abscess of right thigh + MRSA 08/20/2021    healed    Adnexal cyst     Degenerative disc disease     Depression 10/29/2021    Diabetes mellitus     Hypertension     Hypomagnesemia 01/20/2023    Nicotine dependence     Osteoarthritis       Past Surgical History:   Procedure Laterality Date    BACK SURGERY      HYSTERECTOMY N/A     INCISION AND DRAINAGE Left 3/13/2023    Procedure: INCISION AND DRAINAGE;  Surgeon: Jacek Noyola MD;  Location: [facility] OR;  Service: Orthopedics;  Laterality: Left;    IRRIGATION AND DEBRIDEMENT OF LOWER EXTREMITY Left 11/22/2022    Procedure: IRRIGATION AND DEBRIDEMENT, LOWER EXTREMITY;  Surgeon: Papa Mendoza MD;  Location: [facility] OR;  Service: Orthopedics;  Laterality: Left;    IRRIGATION AND DEBRIDEMENT OF UPPER EXTREMITY Left 11/22/2022    Procedure: IRRIGATION AND DEBRIDEMENT, UPPER EXTREMITY;  Surgeon: Papa Mendoza MD;  Location: [facility] OR;  Service: Orthopedics;  Laterality: Left;    REVISION OF KNEE ARTHROPLASTY Left 3/13/2023    Procedure: REMOVAL OF PROSTHESIS,METHYLMETHACRYLATE WITH OR WITHOUT INSERTION OF SPACER LEFT KNEE;  Surgeon:  Jacek Noyola MD;  Location: [facility] OR;  Service: Orthopedics;  Laterality: Left;    REVISION OF KNEE ARTHROPLASTY Left 5/9/2023    Procedure: REVISION, ARTHROPLASTY, KNEE;  Surgeon: Jacek Noyola MD;  Location: [facility] OR;  Service: Orthopedics;  Laterality: Left;    THORACIC LAMINECTOMY WITH FUSION N/A 1/24/2023    Procedure: T9-L2 fusion, T11-T12 laminectomy and corpectomy;  Surgeon: Jimmy Hernandes MD;  Location: [facility] OR;  Service: Neurosurgery;  Laterality: N/A;    TOTAL HIP ARTHROPLASTY Left     TOTAL KNEE ARTHROPLASTY Bilateral       Review of patient's allergies indicates:  No Known Allergies     Current Facility-Administered Medications:     0.9 % NaCl with KCl 40 mEq infusion, , Intravenous, Continuous, Fermin Parson MD, Last Rate: 100 mL/hr at 06/09/23 0605, New Bag at 06/09/23 0605    acetaminophen tablet 1,000 mg, 1,000 mg, Oral, Q8H PRN, Fermin Parson MD, 1,000 mg at 06/08/23 2043    acetaminophen tablet 650 mg, 650 mg, Oral, Q8H PRN, Fermin Parson MD    acetaminophen tablet 650 mg, 650 mg, Oral, Q4H PRN, Fermin Parson MD    aluminum-magnesium hydroxide-simethicone 200-200-20 mg/5 mL suspension 30 mL, 30 mL, Oral, QID PRN, Fermin Parson MD, 30 mL at 06/08/23 2044    cefTRIAXone (ROCEPHIN) 2 g in dextrose 5 % in water (D5W) 5 % 100 mL IVPB (MB+), 2 g, Intravenous, Daily, Fermin Parson MD, Stopped at 06/08/23 1935    DAPTOmycin (CUBICIN) 750 mg in sodium chloride 0.9% SolP 50 mL IVPB, 750 mg, Intravenous, Q24H, Fermin Parson MD    dextrose 10% bolus 125 mL 125 mL, 12.5 g, Intravenous, PRN, Fermin Parson MD    dextrose 10% bolus 250 mL 250 mL, 25 g, Intravenous, PRN, Fermin Parson MD    DULoxetine DR capsule 60 mg, 60 mg, Oral, Daily, Fermin Parson MD, 60 mg at 06/09/23 1219    enoxaparin injection 40 mg, 40 mg, Subcutaneous, Daily, Fermin Parson MD, 40 mg at 06/08/23 1904    glucagon (human  recombinant) injection 1 mg, 1 mg, Intramuscular, PRN, Fermin Parson MD    HYDROcodone-acetaminophen  mg per tablet 1 tablet, 1 tablet, Oral, Q6H PRN, Fermin Parson MD, 1 tablet at 06/09/23 0153    insulin aspart U-100 injection 1-10 Units, 1-10 Units, Subcutaneous, QID (AC + HS) PRN, Fermin Parson MD    magnesium oxide tablet 800 mg, 800 mg, Oral, PRN, Fermin Parson MD    magnesium oxide tablet 800 mg, 800 mg, Oral, PRN, Fermin Parson MD    melatonin tablet 6 mg, 6 mg, Oral, Nightly PRN, Fermin Parson MD    morphine injection 3 mg, 3 mg, Intravenous, Q4H PRN, Fermin Parson MD, 3 mg at 06/09/23 1214    mupirocin 2 % ointment, , Nasal, BID, Fermin Parson MD, Given at 06/09/23 1216    naloxone 0.4 mg/mL injection 0.02 mg, 0.02 mg, Intravenous, PRN, Fermin Parson MD    ondansetron injection 4 mg, 4 mg, Intravenous, Q8H PRN, Fermin Parson MD, 4 mg at 06/09/23 0152    polyethylene glycol packet 17 g, 17 g, Oral, Daily PRN, Fermin Parson MD    potassium bicarbonate disintegrating tablet 35 mEq, 35 mEq, Oral, PRN, Fermin Parson MD    potassium bicarbonate disintegrating tablet 50 mEq, 50 mEq, Oral, PRN, Fermin Parson MD    potassium bicarbonate disintegrating tablet 50 mEq, 50 mEq, Oral, BID, Fermin Parson MD    potassium bicarbonate disintegrating tablet 60 mEq, 60 mEq, Oral, PRN, Fermin Parson MD    potassium, sodium phosphates 280-160-250 mg packet 2 packet, 2 packet, Oral, PRN, Fermin Parson MD    potassium, sodium phosphates 280-160-250 mg packet 2 packet, 2 packet, Oral, PRN, Fermin Parson MD    potassium, sodium phosphates 280-160-250 mg packet 2 packet, 2 packet, Oral, PRN, Fermin Parson MD    rifAMpin capsule 600 mg, 600 mg, Oral, Daily, Fermin Parson MD, 600 mg at 06/09/23 1219    sodium chloride 0.9% flush 10 mL, 10 mL, Intravenous, PRN, Fermin Parson MD    Pharmacy to  dose Vancomycin consult, , , Once **AND** vancomycin - pharmacy to dose, , Intravenous, pharmacy to manage frequency, Fermin Parson MD     Review of systems:  Denies chest pain, nausea,vomiting, abdominal pain, cough, runny nose, eye pain, ear pain, fevers, chills, weight loss, weight gain, dysuria, hematuria, changes in mood    IMAGING:  CT lumbar spine reviewed shows partial visualization of her fusion construct.  Erosive changes noted at the T11-T12 area.    MRI thoracic and lumbar spine reviewed show no significant stenosis.  No epidural abscess noted.  Inflammation seen at the T11-T12 area    Vitals:    06/09/23 1214   BP:    Pulse:    Resp: 20   Temp:         EXAM:  Constitutional  General Appearance:  Healthy appearing, normal body habitus, NAD  Psychiatric   Orientation: Oriented to time, oriented to place, oriented to person  Mood and Affect: Active and alert, normal mood, normal affect  Healed incision  5/5 strength  Sensation intact  2+ pulses

## 2023-06-10 LAB
ANION GAP SERPL CALCULATED.3IONS-SCNC: 11 MMOL/L (ref 7–16)
BASOPHILS # BLD AUTO: 0.03 K/UL (ref 0–0.2)
BASOPHILS NFR BLD AUTO: 0.3 % (ref 0–1)
BUN SERPL-MCNC: 9 MG/DL (ref 7–18)
BUN/CREAT SERPL: 12 (ref 6–20)
CALCIUM SERPL-MCNC: 8.3 MG/DL (ref 8.5–10.1)
CHLORIDE SERPL-SCNC: 96 MMOL/L (ref 98–107)
CO2 SERPL-SCNC: 29 MMOL/L (ref 21–32)
CREAT SERPL-MCNC: 0.76 MG/DL (ref 0.55–1.02)
DIFFERENTIAL METHOD BLD: ABNORMAL
EGFR (NO RACE VARIABLE) (RUSH/TITUS): 93 ML/MIN/1.73M2
EOSINOPHIL # BLD AUTO: 0.24 K/UL (ref 0–0.5)
EOSINOPHIL NFR BLD AUTO: 2.3 % (ref 1–4)
ERYTHROCYTE [DISTWIDTH] IN BLOOD BY AUTOMATED COUNT: 15.4 % (ref 11.5–14.5)
GLUCOSE SERPL-MCNC: 212 MG/DL (ref 70–105)
GLUCOSE SERPL-MCNC: 242 MG/DL (ref 74–106)
GLUCOSE SERPL-MCNC: 263 MG/DL (ref 70–105)
GLUCOSE SERPL-MCNC: 263 MG/DL (ref 70–105)
GLUCOSE SERPL-MCNC: 321 MG/DL (ref 70–105)
HCT VFR BLD AUTO: 31.5 % (ref 38–47)
HGB BLD-MCNC: 9.8 G/DL (ref 12–16)
IMM GRANULOCYTES # BLD AUTO: 0.05 K/UL (ref 0–0.04)
IMM GRANULOCYTES NFR BLD: 0.5 % (ref 0–0.4)
LYMPHOCYTES # BLD AUTO: 1.29 K/UL (ref 1–4.8)
LYMPHOCYTES NFR BLD AUTO: 12.4 % (ref 27–41)
MAGNESIUM SERPL-MCNC: 1.6 MG/DL (ref 1.7–2.3)
MCH RBC QN AUTO: 27.1 PG (ref 27–31)
MCHC RBC AUTO-ENTMCNC: 31.1 G/DL (ref 32–36)
MCV RBC AUTO: 87 FL (ref 80–96)
MONOCYTES # BLD AUTO: 1.22 K/UL (ref 0–0.8)
MONOCYTES NFR BLD AUTO: 11.8 % (ref 2–6)
MPC BLD CALC-MCNC: 10.7 FL (ref 9.4–12.4)
NEUTROPHILS # BLD AUTO: 7.54 K/UL (ref 1.8–7.7)
NEUTROPHILS NFR BLD AUTO: 72.7 % (ref 53–65)
NRBC # BLD AUTO: 0 X10E3/UL
NRBC, AUTO (.00): 0 %
PLATELET # BLD AUTO: 235 K/UL (ref 150–400)
POTASSIUM SERPL-SCNC: 3.9 MMOL/L (ref 3.5–5.1)
RBC # BLD AUTO: 3.62 M/UL (ref 4.2–5.4)
SODIUM SERPL-SCNC: 132 MMOL/L (ref 136–145)
WBC # BLD AUTO: 10.37 K/UL (ref 4.5–11)

## 2023-06-10 PROCEDURE — 25000003 PHARM REV CODE 250: Performed by: HOSPITALIST

## 2023-06-10 PROCEDURE — 99232 PR SUBSEQUENT HOSPITAL CARE,LEVL II: ICD-10-PCS | Mod: ,,, | Performed by: FAMILY MEDICINE

## 2023-06-10 PROCEDURE — 99232 SBSQ HOSP IP/OBS MODERATE 35: CPT | Mod: ,,, | Performed by: FAMILY MEDICINE

## 2023-06-10 PROCEDURE — 11000001 HC ACUTE MED/SURG PRIVATE ROOM

## 2023-06-10 PROCEDURE — 85025 COMPLETE CBC W/AUTO DIFF WBC: CPT | Performed by: HOSPITALIST

## 2023-06-10 PROCEDURE — 83735 ASSAY OF MAGNESIUM: CPT | Performed by: HOSPITALIST

## 2023-06-10 PROCEDURE — 25000003 PHARM REV CODE 250: Performed by: FAMILY MEDICINE

## 2023-06-10 PROCEDURE — 80048 BASIC METABOLIC PNL TOTAL CA: CPT | Performed by: HOSPITALIST

## 2023-06-10 PROCEDURE — 63600175 PHARM REV CODE 636 W HCPCS: Performed by: HOSPITALIST

## 2023-06-10 PROCEDURE — 63600175 PHARM REV CODE 636 W HCPCS: Performed by: FAMILY MEDICINE

## 2023-06-10 RX ORDER — OXYCODONE HYDROCHLORIDE 5 MG/1
10 TABLET ORAL EVERY 4 HOURS PRN
Status: DISCONTINUED | OUTPATIENT
Start: 2023-06-10 | End: 2023-06-12

## 2023-06-10 RX ORDER — FENTANYL 25 UG/1
1 PATCH TRANSDERMAL
Status: DISCONTINUED | OUTPATIENT
Start: 2023-06-10 | End: 2023-06-15

## 2023-06-10 RX ORDER — MAGNESIUM SULFATE HEPTAHYDRATE 40 MG/ML
2 INJECTION, SOLUTION INTRAVENOUS ONCE
Status: COMPLETED | OUTPATIENT
Start: 2023-06-10 | End: 2023-06-10

## 2023-06-10 RX ADMIN — FENTANYL 1 PATCH: 25 PATCH TRANSDERMAL at 12:06

## 2023-06-10 RX ADMIN — INSULIN ASPART 4 UNITS: 100 INJECTION, SOLUTION INTRAVENOUS; SUBCUTANEOUS at 10:06

## 2023-06-10 RX ADMIN — OXYCODONE HYDROCHLORIDE 10 MG: 5 TABLET ORAL at 08:06

## 2023-06-10 RX ADMIN — MUPIROCIN: 20 OINTMENT TOPICAL at 08:06

## 2023-06-10 RX ADMIN — RIFAMPIN 600 MG: 300 CAPSULE ORAL at 09:06

## 2023-06-10 RX ADMIN — HYDROCODONE BITARTRATE AND ACETAMINOPHEN 1 TABLET: 10; 325 TABLET ORAL at 09:06

## 2023-06-10 RX ADMIN — POTASSIUM BICARBONATE 50 MEQ: 977.5 TABLET, EFFERVESCENT ORAL at 09:06

## 2023-06-10 RX ADMIN — POTASSIUM CHLORIDE AND SODIUM CHLORIDE: 900; 300 INJECTION, SOLUTION INTRAVENOUS at 05:06

## 2023-06-10 RX ADMIN — INSULIN DETEMIR 7 UNITS: 100 INJECTION, SOLUTION SUBCUTANEOUS at 08:06

## 2023-06-10 RX ADMIN — ENOXAPARIN SODIUM 40 MG: 40 INJECTION SUBCUTANEOUS at 04:06

## 2023-06-10 RX ADMIN — CEFTRIAXONE SODIUM 2 G: 2 INJECTION, POWDER, FOR SOLUTION INTRAMUSCULAR; INTRAVENOUS at 09:06

## 2023-06-10 RX ADMIN — DULOXETINE HYDROCHLORIDE 60 MG: 30 CAPSULE, DELAYED RELEASE ORAL at 09:06

## 2023-06-10 RX ADMIN — MORPHINE SULFATE 3 MG: 4 INJECTION, SOLUTION INTRAMUSCULAR; INTRAVENOUS at 04:06

## 2023-06-10 RX ADMIN — DAPTOMYCIN 750 MG: 500 INJECTION, POWDER, LYOPHILIZED, FOR SOLUTION INTRAVENOUS at 06:06

## 2023-06-10 RX ADMIN — OXYCODONE HYDROCHLORIDE 10 MG: 5 TABLET ORAL at 04:06

## 2023-06-10 RX ADMIN — MORPHINE SULFATE 3 MG: 4 INJECTION, SOLUTION INTRAMUSCULAR; INTRAVENOUS at 10:06

## 2023-06-10 RX ADMIN — MAGNESIUM SULFATE HEPTAHYDRATE 2 G: 40 INJECTION, SOLUTION INTRAVENOUS at 12:06

## 2023-06-10 NOTE — ASSESSMENT & PLAN NOTE
Patient's FSGs are controlled on current medication regimen.  Last A1c reviewed-   Lab Results   Component Value Date    HGBA1C 7.7 (H) 05/09/2023     Most recent fingerstick glucose reviewed- No results for input(s): POCTGLUCOSE in the last 24 hours.  Current correctional scale  Low  Maintain anti-hyperglycemic dose as follows-   Antihyperglycemics (From admission, onward)    Start     Stop Route Frequency Ordered    06/08/23 1907  insulin aspart U-100 injection 1-10 Units         -- SubQ Before meals & nightly PRN 06/08/23 1815        Hold Oral hypoglycemics while patient is in the hospital.    Add levemir

## 2023-06-10 NOTE — PROGRESS NOTES
[facility]  [facility] Medicine  Progress Note    Patient Name: Monica Walker  MRN: [id number]  Patient Class: IP- Inpatient   Admission Date: 6/8/2023  Length of Stay: 2 days  Attending Physician: Vinay Rosen Jr., MD  Primary Care Provider: Hannah Schulz MD        Subjective:     Principal Problem:Osteomyelitis of vertebra of thoracolumbar region        HPI:  Ms. 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  CT scan of lumbar region concerning for vertebral osteomyelitis.  The case was evaluated by Dr. Hernandes and he recommended admission to Medicine he would consult.  He also recommended an MRI.      She was diagnosed with MRSA bacteremia at [facility] 2 days ago and was given Dalvance (dalbavancin).  She was scheduled with outpatient follow-up with Dr. Noyola as she has had septic joint in the past.      The presents now with severe back pain.  She states that her back pain persists.  She reports a temperature of 103° in route to the facility.  She denies any chest pain or other concerns.  She had diskitis or epidural abscess in January and was treated with IV antibiotics.  She has also been recently treated for left knee prosthetic joint infection.        ED course:   Initial Vitals [06/08/23 1534]   BP Pulse Resp Temp SpO2   (!) 111/53 94 18 99.4 °F (37.4 °C) 96 %         Only pain medications given in the emergency department.        Overview/Hospital Course:  No notes on file    Interval History: Patients main complaint is pain control. Seems over sedated after IV morphine but norco ineffective.  Discussed at length. D/c morphine and norco. Add duragesic 25 and oxy ir 10 q 4hr prn.     Review of Systems  Objective:     Vital Signs (Most  Recent):  Temp: 98.6 °F (37 °C) (06/10/23 1200)  Pulse: 84 (06/10/23 1200)  Resp: 18 (06/10/23 1644)  BP: 136/66 (06/10/23 1200)  SpO2: 98 % (06/10/23 1200) Vital Signs (24h Range):  Temp:  [97.4 °F (36.3 °C)-100 °F (37.8 °C)] 98.6 °F (37 °C)  Pulse:  [] 84  Resp:  [16-18] 18  SpO2:  [93 %-98 %] 98 %  BP: (121-153)/(54-66) 136/66     Weight: 68 kg (150 lb)  Body mass index is 24.21 kg/m².  No intake or output data in the 24 hours ending 06/10/23 1703      Physical Exam  Vitals reviewed.   Constitutional:       General: She is not in acute distress.     Appearance: She is not toxic-appearing.   Cardiovascular:      Rate and Rhythm: Normal rate and regular rhythm.      Heart sounds: Normal heart sounds.   Pulmonary:      Effort: Pulmonary effort is normal. No respiratory distress.      Breath sounds: Normal breath sounds. No wheezing.   Abdominal:      General: Abdomen is flat. There is no distension.      Palpations: Abdomen is soft.      Tenderness: There is no abdominal tenderness.   Skin:     General: Skin is warm and dry.      Coloration: Skin is not jaundiced.   Neurological:      Mental Status: She is alert and oriented to person, place, and time.           Significant Labs: All pertinent labs within the past 24 hours have been reviewed.  BMP:   Recent Labs   Lab 06/10/23  0344   *   *   K 3.9   CL 96*   CO2 29   BUN 9   CREATININE 0.76   CALCIUM 8.3*   MG 1.6*     CBC:   Recent Labs   Lab 06/09/23  0316 06/10/23  0344   WBC 13.67* 10.37   HGB 11.2* 9.8*   HCT 35.0* 31.5*    235       Significant Imaging: I have reviewed all pertinent imaging results/findings within the past 24 hours.      Assessment/Plan:      * Osteomyelitis of vertebra of thoracolumbar region  Patient with severe lower back pain.    Prior history of diskitis or epidural abscess.  CT scan concerning for osteomyelitis.  MRI with concern for osteomyelitis at T11 and T12.    Dr. Hernandes has been consulted.    Patient  received a dose of Dalvance on 6/9.    Given failure to respond and multiple prior treatments with vancomycin, Daptomycin has been ordered.  Rifampin has been added to daptomycin given failure of prior treatments.      May consider further recommendations from Infectious Disease at [facility].      Antibiotics (From admission, onward)    Start     Stop Route Frequency Ordered    06/09/23 0900  rifAMpin capsule 600 mg         -- Oral Daily 06/09/23 0540    06/09/23 0645  DAPTOmycin (CUBICIN) 680 mg in sodium chloride 0.9% SolP 50 mL IVPB         -- IV Every 24 hours (non-standard times) 06/09/23 0540    06/08/23 2100  mupirocin 2 % ointment         06/13 2059 Nasl 2 times daily 06/08/23 1830 06/08/23 1914  vancomycin - pharmacy to dose  (vancomycin IVPB)        See Gloria for full Linked Orders Report.    -- IV pharmacy to manage frequency 06/08/23 1815    06/08/23 1830  cefTRIAXone (ROCEPHIN) 2 g in dextrose 5 % in water (D5W) 5 % 100 mL IVPB (MB+)         -- IV Daily 06/08/23 1815              MRSA bacteremia  Multiple episodes of MRSA bacteremia.    The source may be the left knee joint but other sources are also possible.    There is a history of MRSA in left knee joint as well as in the lower vertebrae.    Consulted Cardiology to consider MONICA if TTE is negative.    Given a dose of dalbavancin in the emergency department on 06/07/2023.  Given the repeated episodes I have started patient on daptomycin, rifampin, and ceftriaxone until cultures returned from the vertebral column.          Hypokalemia    Replete mag and K.     DM2 (diabetes mellitus, type 2)  Patient's FSGs are controlled on current medication regimen.  Last A1c reviewed-   Lab Results   Component Value Date    HGBA1C 7.7 (H) 05/09/2023     Most recent fingerstick glucose reviewed- No results for input(s): POCTGLUCOSE in the last 24 hours.  Current correctional scale  Low  Maintain anti-hyperglycemic dose as follows-   Antihyperglycemics (From  admission, onward)    Start     Stop Route Frequency Ordered    06/08/23 1907  insulin aspart U-100 injection 1-10 Units         -- SubQ Before meals & nightly PRN 06/08/23 1815        Hold Oral hypoglycemics while patient is in the hospital.    Add levemir    Tobacco use  Patient counseled on tobacco use and offered nicotine patch.      Primary hypertension  Continue home antihypertensives based on clinical course.      Prior MRSA Septic Joint of Left Knee   History of removal infection of left knee joint with removal of prosthetic.    Consult Dr. Noyola if available given recurrent episodes of bacteremia.    X-ray left knee ordered.  Dr. Noyola ordered a left knee x-ray yesterday as an outpatient and this has not been done yet.        Prior Lung abscesses  Prior small lung abscesses (septic emboli) which were thought to be related to hematogenous spread rather than a primary source of infection.  Consider repeat imaging in the future based on patient's clinical course.      Depression  Patient has persistent depression which is mild and is currently controlled. Will Continue anti-depressant medications. We will not consult psychiatry at this time. Patient does not display psychosis at this time. Continue to monitor closely and adjust plan of care as needed.          VTE Risk Mitigation (From admission, onward)         Ordered     enoxaparin injection 40 mg  Daily         06/08/23 1815     Place sequential compression device  Until discontinued         06/08/23 1815     IP VTE HIGH RISK PATIENT  Once         06/08/23 1815                Discharge Planning   SUZANNA:      Code Status: Full Code   Is the patient medically ready for discharge?:     Reason for patient still in hospital (select all that apply): Treatment  Discharge Plan A: Home with family, Home Health                  Vinay Rosen Jr, MD  Department of Hospital Medicine   [facility]

## 2023-06-10 NOTE — PROGRESS NOTES
[facility]  [facility] Medicine  Progress Note    Patient Name: Monica Walker  MRN: [id number]  Patient Class: IP- Inpatient   Admission Date: 6/8/2023  Length of Stay: 1 days  Attending Physician: Vinay Rosen Jr., MD  Primary Care Provider: Hannah Schulz MD        Subjective:     Principal Problem:Osteomyelitis of vertebra of thoracolumbar region        HPI:  Ms. 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  CT scan of lumbar region concerning for vertebral osteomyelitis.  The case was evaluated by Dr. Hernandes and he recommended admission to Medicine he would consult.  He also recommended an MRI.      She was diagnosed with MRSA bacteremia at [facility] 2 days ago and was given Dalvance (dalbavancin).  She was scheduled with outpatient follow-up with Dr. Noyola as she has had septic joint in the past.      The presents now with severe back pain.  She states that her back pain persists.  She reports a temperature of 103° in route to the facility.  She denies any chest pain or other concerns.  She had diskitis or epidural abscess in January and was treated with IV antibiotics.  She has also been recently treated for left knee prosthetic joint infection.        ED course:   Initial Vitals [06/08/23 1534]   BP Pulse Resp Temp SpO2   (!) 111/53 94 18 99.4 °F (37.4 °C) 96 %         Only pain medications given in the emergency department.        Overview/Hospital Course:  No notes on file    Interval History: No new complaints. Appreciate ortho and ortho spine consults.     Review of Systems  Objective:     Vital Signs (Most Recent):  Temp: 98.5 °F (36.9 °C) (06/09/23 1956)  Pulse: 91 (06/09/23 1956)  Resp: 18 (06/09/23 1956)  BP: (!) 153/62 (06/09/23  1956)  SpO2: 97 % (06/09/23 1956) Vital Signs (24h Range):  Temp:  [98.1 °F (36.7 °C)-98.9 °F (37.2 °C)] 98.5 °F (36.9 °C)  Pulse:  [] 91  Resp:  [15-25] 18  SpO2:  [91 %-100 %] 97 %  BP: (106-155)/(56-79) 153/62     Weight: 68 kg (150 lb)  Body mass index is 24.21 kg/m².    Intake/Output Summary (Last 24 hours) at 6/9/2023 2127  Last data filed at 6/9/2023 0024  Gross per 24 hour   Intake --   Output 400 ml   Net -400 ml         Physical Exam  Vitals reviewed.   HENT:      Head: Normocephalic and atraumatic.   Eyes:      General: No scleral icterus.  Cardiovascular:      Rate and Rhythm: Normal rate and regular rhythm.      Heart sounds: Normal heart sounds.   Pulmonary:      Effort: Pulmonary effort is normal. No respiratory distress.      Breath sounds: Normal breath sounds.   Abdominal:      General: Abdomen is flat. There is no distension.      Palpations: Abdomen is soft.      Tenderness: There is no abdominal tenderness.   Skin:     General: Skin is warm and dry.   Neurological:      Mental Status: She is alert.           Significant Labs: All pertinent labs within the past 24 hours have been reviewed.  BMP:   Recent Labs   Lab 06/09/23 0316   *   *   K 2.8*   CL 92*   CO2 28   BUN 12   CREATININE 0.85   CALCIUM 9.1   MG 1.7     CBC:   Recent Labs   Lab 06/08/23  1640 06/09/23  0316   WBC 13.50* 13.67*   HGB 11.7* 11.2*   HCT 37.4* 35.0*    223       Significant Imaging: I have reviewed all pertinent imaging results/findings within the past 24 hours.      Assessment/Plan:      * Osteomyelitis of vertebra of thoracolumbar region  Patient with severe lower back pain.    Prior history of diskitis or epidural abscess.  CT scan concerning for osteomyelitis.  MRI with concern for osteomyelitis at T11 and T12.    Dr. Hernandes has been consulted.    Patient received a dose of Dalvance on 6/9.    Given failure to respond and multiple prior treatments with vancomycin, Daptomycin has been  ordered.  Rifampin has been added to daptomycin given failure of prior treatments.      May consider further recommendations from Infectious Disease at [facility].      Antibiotics (From admission, onward)    Start     Stop Route Frequency Ordered    06/09/23 0900  rifAMpin capsule 600 mg         -- Oral Daily 06/09/23 0540    06/09/23 0645  DAPTOmycin (CUBICIN) 680 mg in sodium chloride 0.9% SolP 50 mL IVPB         -- IV Every 24 hours (non-standard times) 06/09/23 0540    06/08/23 2100  mupirocin 2 % ointment         06/13 2059 Nasl 2 times daily 06/08/23 1830 06/08/23 1914  vancomycin - pharmacy to dose  (vancomycin IVPB)        See Hyperspace for full Linked Orders Report.    -- IV pharmacy to manage frequency 06/08/23 1815 06/08/23 1830  cefTRIAXone (ROCEPHIN) 2 g in dextrose 5 % in water (D5W) 5 % 100 mL IVPB (MB+)         -- IV Daily 06/08/23 1815              MRSA bacteremia  Multiple episodes of MRSA bacteremia.    The source may be the left knee joint but other sources are also possible.    There is a history of MRSA in left knee joint as well as in the lower vertebrae.    Consulted Cardiology to consider MONICA if TTE is negative.    Given a dose of dalbavancin in the emergency department on 06/07/2023.  Given the repeated episodes I have started patient on daptomycin, rifampin, and ceftriaxone until cultures returned from the vertebral column.          Tobacco use  Patient counseled on tobacco use and offered nicotine patch.      Primary hypertension  Continue home antihypertensives based on clinical course.      Prior MRSA Septic Joint of Left Knee   History of removal infection of left knee joint with removal of prosthetic.    Consult Dr. Noyola if available given recurrent episodes of bacteremia.    X-ray left knee ordered.  Dr. Noyola ordered a left knee x-ray yesterday as an outpatient and this has not been done yet.        DM2 (diabetes mellitus, type 2)  Patient's FSGs are controlled on current  medication regimen.  Last A1c reviewed-   Lab Results   Component Value Date    HGBA1C 7.7 (H) 05/09/2023     Most recent fingerstick glucose reviewed- No results for input(s): POCTGLUCOSE in the last 24 hours.  Current correctional scale  Low  Maintain anti-hyperglycemic dose as follows-   Antihyperglycemics (From admission, onward)    Start     Stop Route Frequency Ordered    06/08/23 1907  insulin aspart U-100 injection 1-10 Units         -- SubQ Before meals & nightly PRN 06/08/23 1815        Hold Oral hypoglycemics while patient is in the hospital.    Prior Lung abscesses  Prior small lung abscesses (septic emboli) which were thought to be related to hematogenous spread rather than a primary source of infection.  Consider repeat imaging in the future based on patient's clinical course.      Depression  Patient has persistent depression which is mild and is currently controlled. Will Continue anti-depressant medications. We will not consult psychiatry at this time. Patient does not display psychosis at this time. Continue to monitor closely and adjust plan of care as needed.          VTE Risk Mitigation (From admission, onward)         Ordered     enoxaparin injection 40 mg  Daily         06/08/23 1815     Place sequential compression device  Until discontinued         06/08/23 1815     IP VTE HIGH RISK PATIENT  Once         06/08/23 1815                Discharge Planning   SUZANNA:      Code Status: Full Code   Is the patient medically ready for discharge?:     Reason for patient still in hospital (select all that apply): Treatment  Discharge Plan A: Home with family, Home Health                  Vinay Rosen Jr, MD  Department of Hospital Medicine   [facility]

## 2023-06-10 NOTE — SUBJECTIVE & OBJECTIVE
Interval History: Patients main complaint is pain control. Seems over sedated after IV morphine but norco ineffective.  Discussed at length. D/c morphine and norco. Add duragesic 25 and oxy ir 10 q 4hr prn.     Review of Systems  Objective:     Vital Signs (Most Recent):  Temp: 98.6 °F (37 °C) (06/10/23 1200)  Pulse: 84 (06/10/23 1200)  Resp: 18 (06/10/23 1644)  BP: 136/66 (06/10/23 1200)  SpO2: 98 % (06/10/23 1200) Vital Signs (24h Range):  Temp:  [97.4 °F (36.3 °C)-100 °F (37.8 °C)] 98.6 °F (37 °C)  Pulse:  [] 84  Resp:  [16-18] 18  SpO2:  [93 %-98 %] 98 %  BP: (121-153)/(54-66) 136/66     Weight: 68 kg (150 lb)  Body mass index is 24.21 kg/m².  No intake or output data in the 24 hours ending 06/10/23 1703      Physical Exam  Vitals reviewed.   Constitutional:       General: She is not in acute distress.     Appearance: She is not toxic-appearing.   Cardiovascular:      Rate and Rhythm: Normal rate and regular rhythm.      Heart sounds: Normal heart sounds.   Pulmonary:      Effort: Pulmonary effort is normal. No respiratory distress.      Breath sounds: Normal breath sounds. No wheezing.   Abdominal:      General: Abdomen is flat. There is no distension.      Palpations: Abdomen is soft.      Tenderness: There is no abdominal tenderness.   Skin:     General: Skin is warm and dry.      Coloration: Skin is not jaundiced.   Neurological:      Mental Status: She is alert and oriented to person, place, and time.           Significant Labs: All pertinent labs within the past 24 hours have been reviewed.  BMP:   Recent Labs   Lab 06/10/23  0344   *   *   K 3.9   CL 96*   CO2 29   BUN 9   CREATININE 0.76   CALCIUM 8.3*   MG 1.6*     CBC:   Recent Labs   Lab 06/09/23  0316 06/10/23  0344   WBC 13.67* 10.37   HGB 11.2* 9.8*   HCT 35.0* 31.5*    235       Significant Imaging: I have reviewed all pertinent imaging results/findings within the past 24 hours.

## 2023-06-10 NOTE — SUBJECTIVE & OBJECTIVE
Interval History: No new complaints. Appreciate ortho and ortho spine consults.     Review of Systems  Objective:     Vital Signs (Most Recent):  Temp: 98.5 °F (36.9 °C) (06/09/23 1956)  Pulse: 91 (06/09/23 1956)  Resp: 18 (06/09/23 1956)  BP: (!) 153/62 (06/09/23 1956)  SpO2: 97 % (06/09/23 1956) Vital Signs (24h Range):  Temp:  [98.1 °F (36.7 °C)-98.9 °F (37.2 °C)] 98.5 °F (36.9 °C)  Pulse:  [] 91  Resp:  [15-25] 18  SpO2:  [91 %-100 %] 97 %  BP: (106-155)/(56-79) 153/62     Weight: 68 kg (150 lb)  Body mass index is 24.21 kg/m².    Intake/Output Summary (Last 24 hours) at 6/9/2023 2127  Last data filed at 6/9/2023 0024  Gross per 24 hour   Intake --   Output 400 ml   Net -400 ml         Physical Exam  Vitals reviewed.   HENT:      Head: Normocephalic and atraumatic.   Eyes:      General: No scleral icterus.  Cardiovascular:      Rate and Rhythm: Normal rate and regular rhythm.      Heart sounds: Normal heart sounds.   Pulmonary:      Effort: Pulmonary effort is normal. No respiratory distress.      Breath sounds: Normal breath sounds.   Abdominal:      General: Abdomen is flat. There is no distension.      Palpations: Abdomen is soft.      Tenderness: There is no abdominal tenderness.   Skin:     General: Skin is warm and dry.   Neurological:      Mental Status: She is alert.           Significant Labs: All pertinent labs within the past 24 hours have been reviewed.  BMP:   Recent Labs   Lab 06/09/23 0316   *   *   K 2.8*   CL 92*   CO2 28   BUN 12   CREATININE 0.85   CALCIUM 9.1   MG 1.7     CBC:   Recent Labs   Lab 06/08/23  1640 06/09/23 0316   WBC 13.50* 13.67*   HGB 11.7* 11.2*   HCT 37.4* 35.0*    223       Significant Imaging: I have reviewed all pertinent imaging results/findings within the past 24 hours.

## 2023-06-11 LAB
ANION GAP SERPL CALCULATED.3IONS-SCNC: 10 MMOL/L (ref 7–16)
BASOPHILS # BLD AUTO: 0.06 K/UL (ref 0–0.2)
BASOPHILS NFR BLD AUTO: 0.6 % (ref 0–1)
BUN SERPL-MCNC: 5 MG/DL (ref 7–18)
BUN/CREAT SERPL: 7 (ref 6–20)
CALCIUM SERPL-MCNC: 8.8 MG/DL (ref 8.5–10.1)
CHLORIDE SERPL-SCNC: 94 MMOL/L (ref 98–107)
CO2 SERPL-SCNC: 30 MMOL/L (ref 21–32)
CREAT SERPL-MCNC: 0.69 MG/DL (ref 0.55–1.02)
DIFFERENTIAL METHOD BLD: ABNORMAL
EGFR (NO RACE VARIABLE) (RUSH/TITUS): 103 ML/MIN/1.73M2
EOSINOPHIL # BLD AUTO: 0.29 K/UL (ref 0–0.5)
EOSINOPHIL NFR BLD AUTO: 3 % (ref 1–4)
ERYTHROCYTE [DISTWIDTH] IN BLOOD BY AUTOMATED COUNT: 15.3 % (ref 11.5–14.5)
GLUCOSE SERPL-MCNC: 203 MG/DL (ref 70–105)
GLUCOSE SERPL-MCNC: 215 MG/DL (ref 74–106)
GLUCOSE SERPL-MCNC: 219 MG/DL (ref 70–105)
GLUCOSE SERPL-MCNC: 231 MG/DL (ref 70–105)
GLUCOSE SERPL-MCNC: 332 MG/DL (ref 70–105)
HCT VFR BLD AUTO: 32.3 % (ref 38–47)
HGB BLD-MCNC: 10.2 G/DL (ref 12–16)
IMM GRANULOCYTES # BLD AUTO: 0.11 K/UL (ref 0–0.04)
IMM GRANULOCYTES NFR BLD: 1.1 % (ref 0–0.4)
LYMPHOCYTES # BLD AUTO: 1.48 K/UL (ref 1–4.8)
LYMPHOCYTES NFR BLD AUTO: 15.4 % (ref 27–41)
MAGNESIUM SERPL-MCNC: 1.7 MG/DL (ref 1.7–2.3)
MCH RBC QN AUTO: 26.1 PG (ref 27–31)
MCHC RBC AUTO-ENTMCNC: 31.6 G/DL (ref 32–36)
MCV RBC AUTO: 82.6 FL (ref 80–96)
MICROORGANISM SPEC CULT: ABNORMAL
MONOCYTES # BLD AUTO: 1.09 K/UL (ref 0–0.8)
MONOCYTES NFR BLD AUTO: 11.3 % (ref 2–6)
MPC BLD CALC-MCNC: 10.2 FL (ref 9.4–12.4)
NEUTROPHILS # BLD AUTO: 6.59 K/UL (ref 1.8–7.7)
NEUTROPHILS NFR BLD AUTO: 68.6 % (ref 53–65)
NRBC # BLD AUTO: 0 X10E3/UL
NRBC, AUTO (.00): 0 %
PLATELET # BLD AUTO: 247 K/UL (ref 150–400)
POTASSIUM SERPL-SCNC: 3.6 MMOL/L (ref 3.5–5.1)
RBC # BLD AUTO: 3.91 M/UL (ref 4.2–5.4)
SODIUM SERPL-SCNC: 130 MMOL/L (ref 136–145)
WBC # BLD AUTO: 9.62 K/UL (ref 4.5–11)

## 2023-06-11 PROCEDURE — 63600175 PHARM REV CODE 636 W HCPCS: Performed by: FAMILY MEDICINE

## 2023-06-11 PROCEDURE — 99232 SBSQ HOSP IP/OBS MODERATE 35: CPT | Mod: ,,, | Performed by: FAMILY MEDICINE

## 2023-06-11 PROCEDURE — 99232 PR SUBSEQUENT HOSPITAL CARE,LEVL II: ICD-10-PCS | Mod: ,,, | Performed by: FAMILY MEDICINE

## 2023-06-11 PROCEDURE — 63600175 PHARM REV CODE 636 W HCPCS: Performed by: HOSPITALIST

## 2023-06-11 PROCEDURE — 25000003 PHARM REV CODE 250: Performed by: FAMILY MEDICINE

## 2023-06-11 PROCEDURE — 80048 BASIC METABOLIC PNL TOTAL CA: CPT | Performed by: HOSPITALIST

## 2023-06-11 PROCEDURE — 25000003 PHARM REV CODE 250: Performed by: HOSPITALIST

## 2023-06-11 PROCEDURE — 82962 GLUCOSE BLOOD TEST: CPT

## 2023-06-11 PROCEDURE — 83735 ASSAY OF MAGNESIUM: CPT | Performed by: HOSPITALIST

## 2023-06-11 PROCEDURE — 85025 COMPLETE CBC W/AUTO DIFF WBC: CPT | Performed by: HOSPITALIST

## 2023-06-11 PROCEDURE — 11000001 HC ACUTE MED/SURG PRIVATE ROOM

## 2023-06-11 RX ORDER — HYDROXYZINE HYDROCHLORIDE 25 MG/1
25 TABLET, FILM COATED ORAL 3 TIMES DAILY PRN
Status: DISCONTINUED | OUTPATIENT
Start: 2023-06-11 | End: 2023-06-21 | Stop reason: HOSPADM

## 2023-06-11 RX ADMIN — CEFTRIAXONE SODIUM 2 G: 2 INJECTION, POWDER, FOR SOLUTION INTRAMUSCULAR; INTRAVENOUS at 09:06

## 2023-06-11 RX ADMIN — OXYCODONE HYDROCHLORIDE 10 MG: 5 TABLET ORAL at 07:06

## 2023-06-11 RX ADMIN — INSULIN DETEMIR 7 UNITS: 100 INJECTION, SOLUTION SUBCUTANEOUS at 08:06

## 2023-06-11 RX ADMIN — MUPIROCIN: 20 OINTMENT TOPICAL at 08:06

## 2023-06-11 RX ADMIN — INSULIN ASPART 4 UNITS: 100 INJECTION, SOLUTION INTRAVENOUS; SUBCUTANEOUS at 08:06

## 2023-06-11 RX ADMIN — DULOXETINE HYDROCHLORIDE 60 MG: 30 CAPSULE, DELAYED RELEASE ORAL at 09:06

## 2023-06-11 RX ADMIN — INSULIN ASPART 4 UNITS: 100 INJECTION, SOLUTION INTRAVENOUS; SUBCUTANEOUS at 01:06

## 2023-06-11 RX ADMIN — MUPIROCIN: 20 OINTMENT TOPICAL at 09:06

## 2023-06-11 RX ADMIN — OXYCODONE HYDROCHLORIDE 10 MG: 5 TABLET ORAL at 11:06

## 2023-06-11 RX ADMIN — OXYCODONE HYDROCHLORIDE 10 MG: 5 TABLET ORAL at 03:06

## 2023-06-11 RX ADMIN — HYDROXYZINE HYDROCHLORIDE 25 MG: 25 TABLET, FILM COATED ORAL at 08:06

## 2023-06-11 RX ADMIN — ONDANSETRON 4 MG: 2 INJECTION INTRAMUSCULAR; INTRAVENOUS at 01:06

## 2023-06-11 RX ADMIN — ENOXAPARIN SODIUM 40 MG: 40 INJECTION SUBCUTANEOUS at 05:06

## 2023-06-11 RX ADMIN — RIFAMPIN 600 MG: 300 CAPSULE ORAL at 09:06

## 2023-06-11 RX ADMIN — DAPTOMYCIN 750 MG: 500 INJECTION, POWDER, LYOPHILIZED, FOR SOLUTION INTRAVENOUS at 05:06

## 2023-06-11 NOTE — PROGRESS NOTES
[facility] Hospital Medicine  Progress Note    Patient Name: Monica Walker  MRN: [id number]  Patient Class: IP- Inpatient   Admission Date: 6/8/2023  Length of Stay: 3 days  Attending Physician: Vinay Rosen Jr., MD  Primary Care Provider: Hannah Schulz MD        Subjective:     Principal Problem:Osteomyelitis of vertebra of thoracolumbar region        HPI:  Ms. 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  CT scan of lumbar region concerning for vertebral osteomyelitis.  The case was evaluated by Dr. Hernandes and he recommended admission to Medicine he would consult.  He also recommended an MRI.      She was diagnosed with MRSA bacteremia at [facility] 2 days ago and was given Dalvance (dalbavancin).  She was scheduled with outpatient follow-up with Dr. Noyola as she has had septic joint in the past.      The presents now with severe back pain.  She states that her back pain persists.  She reports a temperature of 103° in route to the facility.  She denies any chest pain or other concerns.  She had diskitis or epidural abscess in January and was treated with IV antibiotics.  She has also been recently treated for left knee prosthetic joint infection.        ED course:   Initial Vitals [06/08/23 1534]   BP Pulse Resp Temp SpO2   (!) 111/53 94 18 99.4 °F (37.4 °C) 96 %         Only pain medications given in the emergency department.        Overview/Hospital Course:  No notes on file    Interval History: Pain control not great but perhaps a little better.     Review of Systems  Objective:     Vital Signs (Most Recent):  Temp: 97.4 °F (36.3 °C) (06/11/23 1600)  Pulse: 76 (06/11/23 1600)  Resp: 17 (06/11/23 1600)  BP: (!) 128/58 (06/11/23 1600)  SpO2: 96 %  (06/11/23 1600) Vital Signs (24h Range):  Temp:  [97.4 °F (36.3 °C)-98.5 °F (36.9 °C)] 97.4 °F (36.3 °C)  Pulse:  [76-85] 76  Resp:  [12-18] 17  SpO2:  [95 %-99 %] 96 %  BP: (127-148)/(58-70) 128/58     Weight: 68 kg (150 lb)  Body mass index is 24.21 kg/m².  No intake or output data in the 24 hours ending 06/11/23 1623      Physical Exam  Vitals reviewed.   Constitutional:       General: She is not in acute distress.     Appearance: She is not toxic-appearing.   Cardiovascular:      Rate and Rhythm: Normal rate and regular rhythm.      Heart sounds: Normal heart sounds.   Pulmonary:      Effort: Pulmonary effort is normal. No respiratory distress.      Breath sounds: Normal breath sounds. No wheezing.   Abdominal:      General: Abdomen is flat. There is no distension.      Palpations: Abdomen is soft.      Tenderness: There is no abdominal tenderness.   Skin:     General: Skin is warm and dry.      Coloration: Skin is not jaundiced.   Neurological:      Mental Status: She is alert and oriented to person, place, and time.           Significant Labs: All pertinent labs within the past 24 hours have been reviewed.  BMP:   Recent Labs   Lab 06/11/23  0458   *   *   K 3.6   CL 94*   CO2 30   BUN 5*   CREATININE 0.69   CALCIUM 8.8   MG 1.7     CBC:   Recent Labs   Lab 06/10/23  0344 06/11/23  0458   WBC 10.37 9.62   HGB 9.8* 10.2*   HCT 31.5* 32.3*    247       Significant Imaging: I have reviewed all pertinent imaging results/findings within the past 24 hours.      Assessment/Plan:      * Osteomyelitis of vertebra of thoracolumbar region  Patient with severe lower back pain.    Prior history of diskitis or epidural abscess.  CT scan concerning for osteomyelitis.  MRI with concern for osteomyelitis at T11 and T12.    Dr. Hernandes has been consulted.    Patient received a dose of Dalvance on 6/9.    Given failure to respond and multiple prior treatments with vancomycin, Daptomycin has been ordered.   Rifampin has been added to daptomycin given failure of prior treatments.      May consider further recommendations from Infectious Disease at [facility].      Antibiotics (From admission, onward)    Start     Stop Route Frequency Ordered    06/09/23 0900  rifAMpin capsule 600 mg         -- Oral Daily 06/09/23 0540    06/09/23 0645  DAPTOmycin (CUBICIN) 680 mg in sodium chloride 0.9% SolP 50 mL IVPB         -- IV Every 24 hours (non-standard times) 06/09/23 0540    06/08/23 2100  mupirocin 2 % ointment         06/13 2059 Nasl 2 times daily 06/08/23 1830 06/08/23 1914  vancomycin - pharmacy to dose  (vancomycin IVPB)        See Hyperspace for full Linked Orders Report.    -- IV pharmacy to manage frequency 06/08/23 1815 06/08/23 1830  cefTRIAXone (ROCEPHIN) 2 g in dextrose 5 % in water (D5W) 5 % 100 mL IVPB (MB+)         -- IV Daily 06/08/23 1815              MRSA bacteremia  Multiple episodes of MRSA bacteremia.    The source may be the left knee joint but other sources are also possible.    There is a history of MRSA in left knee joint as well as in the lower vertebrae.    Consulted Cardiology to consider MONICA if TTE is negative.    Given a dose of dalbavancin in the emergency department on 06/07/2023.  Given the repeated episodes I have started patient on daptomycin, rifampin, and ceftriaxone until cultures returned from the vertebral column.    6/11 - MRSA in blood 6/5.  F/u blood cx 6/8 negative to date.  MRSA from back abscess.  Plan continue dapto and rifampin 6-8 weeks.       Hypokalemia    Replete mag and K.     DM2 (diabetes mellitus, type 2)  Patient's FSGs are controlled on current medication regimen.  Last A1c reviewed-   Lab Results   Component Value Date    HGBA1C 7.7 (H) 05/09/2023     Most recent fingerstick glucose reviewed- No results for input(s): POCTGLUCOSE in the last 24 hours.  Current correctional scale  Low  Maintain anti-hyperglycemic dose as follows-   Antihyperglycemics (From admission,  onward)    Start     Stop Route Frequency Ordered    06/08/23 1907  insulin aspart U-100 injection 1-10 Units         -- SubQ Before meals & nightly PRN 06/08/23 1815        Hold Oral hypoglycemics while patient is in the hospital.    Add levemir    Tobacco use  Patient counseled on tobacco use and offered nicotine patch.      Primary hypertension  Continue home antihypertensives based on clinical course.      Prior MRSA Septic Joint of Left Knee   History of removal infection of left knee joint with removal of prosthetic.    Consult Dr. Noyola if available given recurrent episodes of bacteremia.    X-ray left knee ordered.  Dr. Noyola ordered a left knee x-ray yesterday as an outpatient and this has not been done yet.        Prior Lung abscesses  Prior small lung abscesses (septic emboli) which were thought to be related to hematogenous spread rather than a primary source of infection.  Consider repeat imaging in the future based on patient's clinical course.      Depression  Patient has persistent depression which is mild and is currently controlled. Will Continue anti-depressant medications. We will not consult psychiatry at this time. Patient does not display psychosis at this time. Continue to monitor closely and adjust plan of care as needed.          VTE Risk Mitigation (From admission, onward)         Ordered     enoxaparin injection 40 mg  Daily         06/08/23 1815     Place sequential compression device  Until discontinued         06/08/23 1815     IP VTE HIGH RISK PATIENT  Once         06/08/23 1815                Discharge Planning   SUZANNA:      Code Status: Full Code   Is the patient medically ready for discharge?:     Reason for patient still in hospital (select all that apply): Treatment  Discharge Plan A: Home with family, Home Health                  Vinay Rosen Jr, MD  Department of Hospital Medicine   [facility]

## 2023-06-11 NOTE — ASSESSMENT & PLAN NOTE
Multiple episodes of MRSA bacteremia.    The source may be the left knee joint but other sources are also possible.    There is a history of MRSA in left knee joint as well as in the lower vertebrae.    Consulted Cardiology to consider MONICA if TTE is negative.    Given a dose of dalbavancin in the emergency department on 06/07/2023.  Given the repeated episodes I have started patient on daptomycin, rifampin, and ceftriaxone until cultures returned from the vertebral column.    6/11 - MRSA in blood 6/5.  F/u blood cx 6/8 negative to date.  MRSA from back abscess.  Plan continue dapto and rifampin 6-8 weeks.

## 2023-06-11 NOTE — SUBJECTIVE & OBJECTIVE
Interval History: Pain control not great but perhaps a little better.     Review of Systems  Objective:     Vital Signs (Most Recent):  Temp: 97.4 °F (36.3 °C) (06/11/23 1600)  Pulse: 76 (06/11/23 1600)  Resp: 17 (06/11/23 1600)  BP: (!) 128/58 (06/11/23 1600)  SpO2: 96 % (06/11/23 1600) Vital Signs (24h Range):  Temp:  [97.4 °F (36.3 °C)-98.5 °F (36.9 °C)] 97.4 °F (36.3 °C)  Pulse:  [76-85] 76  Resp:  [12-18] 17  SpO2:  [95 %-99 %] 96 %  BP: (127-148)/(58-70) 128/58     Weight: 68 kg (150 lb)  Body mass index is 24.21 kg/m².  No intake or output data in the 24 hours ending 06/11/23 1623      Physical Exam  Vitals reviewed.   Constitutional:       General: She is not in acute distress.     Appearance: She is not toxic-appearing.   Cardiovascular:      Rate and Rhythm: Normal rate and regular rhythm.      Heart sounds: Normal heart sounds.   Pulmonary:      Effort: Pulmonary effort is normal. No respiratory distress.      Breath sounds: Normal breath sounds. No wheezing.   Abdominal:      General: Abdomen is flat. There is no distension.      Palpations: Abdomen is soft.      Tenderness: There is no abdominal tenderness.   Skin:     General: Skin is warm and dry.      Coloration: Skin is not jaundiced.   Neurological:      Mental Status: She is alert and oriented to person, place, and time.           Significant Labs: All pertinent labs within the past 24 hours have been reviewed.  BMP:   Recent Labs   Lab 06/11/23  0458   *   *   K 3.6   CL 94*   CO2 30   BUN 5*   CREATININE 0.69   CALCIUM 8.8   MG 1.7     CBC:   Recent Labs   Lab 06/10/23  0344 06/11/23  0458   WBC 10.37 9.62   HGB 9.8* 10.2*   HCT 31.5* 32.3*    247       Significant Imaging: I have reviewed all pertinent imaging results/findings within the past 24 hours.

## 2023-06-12 LAB
CRP SERPL-MCNC: 25.2 MG/DL (ref 0–0.8)
ERYTHROCYTE [SEDIMENTATION RATE] IN BLOOD BY WESTERGREN METHOD: 105 MM/HR (ref 0–30)
GLUCOSE SERPL-MCNC: 224 MG/DL (ref 70–105)
GLUCOSE SERPL-MCNC: 250 MG/DL (ref 70–105)
GLUCOSE SERPL-MCNC: 335 MG/DL (ref 70–105)
GLUCOSE SERPL-MCNC: 379 MG/DL (ref 70–105)
GLUCOSE SERPL-MCNC: 382 MG/DL (ref 70–105)

## 2023-06-12 PROCEDURE — 82962 GLUCOSE BLOOD TEST: CPT

## 2023-06-12 PROCEDURE — 25000003 PHARM REV CODE 250: Performed by: HOSPITALIST

## 2023-06-12 PROCEDURE — 25000003 PHARM REV CODE 250

## 2023-06-12 PROCEDURE — 86140 C-REACTIVE PROTEIN: CPT

## 2023-06-12 PROCEDURE — 99231 PR SUBSEQUENT HOSPITAL CARE,LEVL I: ICD-10-PCS | Mod: ,,, | Performed by: NURSE PRACTITIONER

## 2023-06-12 PROCEDURE — 63600175 PHARM REV CODE 636 W HCPCS: Performed by: HOSPITALIST

## 2023-06-12 PROCEDURE — 85651 RBC SED RATE NONAUTOMATED: CPT

## 2023-06-12 PROCEDURE — 63600175 PHARM REV CODE 636 W HCPCS

## 2023-06-12 PROCEDURE — 99231 SBSQ HOSP IP/OBS SF/LOW 25: CPT | Mod: ,,, | Performed by: NURSE PRACTITIONER

## 2023-06-12 PROCEDURE — 11000001 HC ACUTE MED/SURG PRIVATE ROOM

## 2023-06-12 PROCEDURE — 25000003 PHARM REV CODE 250: Performed by: FAMILY MEDICINE

## 2023-06-12 RX ORDER — GLUCAGON 1 MG
1 KIT INJECTION
Status: DISCONTINUED | OUTPATIENT
Start: 2023-06-12 | End: 2023-06-12

## 2023-06-12 RX ORDER — BUPROPION HYDROCHLORIDE 150 MG/1
150 TABLET ORAL DAILY
Status: DISCONTINUED | OUTPATIENT
Start: 2023-06-12 | End: 2023-06-12

## 2023-06-12 RX ORDER — OXYCODONE HYDROCHLORIDE 5 MG/1
15 TABLET ORAL EVERY 4 HOURS PRN
Status: DISCONTINUED | OUTPATIENT
Start: 2023-06-12 | End: 2023-06-21 | Stop reason: HOSPADM

## 2023-06-12 RX ORDER — INSULIN ASPART 100 [IU]/ML
0-5 INJECTION, SOLUTION INTRAVENOUS; SUBCUTANEOUS EVERY 6 HOURS PRN
Status: DISCONTINUED | OUTPATIENT
Start: 2023-06-12 | End: 2023-06-12

## 2023-06-12 RX ORDER — BUPROPION HYDROCHLORIDE 100 MG/1
100 TABLET ORAL DAILY
Status: DISCONTINUED | OUTPATIENT
Start: 2023-06-12 | End: 2023-06-21 | Stop reason: HOSPADM

## 2023-06-12 RX ORDER — DULOXETIN HYDROCHLORIDE 30 MG/1
60 CAPSULE, DELAYED RELEASE ORAL 2 TIMES DAILY
Status: DISCONTINUED | OUTPATIENT
Start: 2023-06-12 | End: 2023-06-21 | Stop reason: HOSPADM

## 2023-06-12 RX ADMIN — OXYCODONE HYDROCHLORIDE 15 MG: 5 TABLET ORAL at 01:06

## 2023-06-12 RX ADMIN — Medication 6 MG: at 12:06

## 2023-06-12 RX ADMIN — INSULIN ASPART 8 UNITS: 100 INJECTION, SOLUTION INTRAVENOUS; SUBCUTANEOUS at 05:06

## 2023-06-12 RX ADMIN — MUPIROCIN: 20 OINTMENT TOPICAL at 11:06

## 2023-06-12 RX ADMIN — INSULIN DETEMIR 10 UNITS: 100 INJECTION, SOLUTION SUBCUTANEOUS at 11:06

## 2023-06-12 RX ADMIN — OXYCODONE HYDROCHLORIDE 10 MG: 5 TABLET ORAL at 12:06

## 2023-06-12 RX ADMIN — OXYCODONE HYDROCHLORIDE 10 MG: 5 TABLET ORAL at 08:06

## 2023-06-12 RX ADMIN — OXYCODONE HYDROCHLORIDE 15 MG: 5 TABLET ORAL at 06:06

## 2023-06-12 RX ADMIN — DULOXETINE HYDROCHLORIDE 60 MG: 30 CAPSULE, DELAYED RELEASE ORAL at 08:06

## 2023-06-12 RX ADMIN — DAPTOMYCIN 750 MG: 500 INJECTION, POWDER, LYOPHILIZED, FOR SOLUTION INTRAVENOUS at 06:06

## 2023-06-12 RX ADMIN — CEFTRIAXONE SODIUM 2 G: 2 INJECTION, POWDER, FOR SOLUTION INTRAMUSCULAR; INTRAVENOUS at 08:06

## 2023-06-12 RX ADMIN — INSULIN ASPART 4 UNITS: 100 INJECTION, SOLUTION INTRAVENOUS; SUBCUTANEOUS at 11:06

## 2023-06-12 RX ADMIN — BUPROPION HYDROCHLORIDE 100 MG: 100 TABLET, FILM COATED ORAL at 01:06

## 2023-06-12 RX ADMIN — OXYCODONE HYDROCHLORIDE 15 MG: 5 TABLET ORAL at 10:06

## 2023-06-12 RX ADMIN — RIFAMPIN 600 MG: 300 CAPSULE ORAL at 08:06

## 2023-06-12 RX ADMIN — OXYCODONE HYDROCHLORIDE 10 MG: 5 TABLET ORAL at 04:06

## 2023-06-12 RX ADMIN — MUPIROCIN: 20 OINTMENT TOPICAL at 08:06

## 2023-06-12 RX ADMIN — ENOXAPARIN SODIUM 40 MG: 40 INJECTION SUBCUTANEOUS at 06:06

## 2023-06-12 RX ADMIN — DULOXETINE HYDROCHLORIDE 60 MG: 30 CAPSULE, DELAYED RELEASE ORAL at 10:06

## 2023-06-12 NOTE — ASSESSMENT & PLAN NOTE
History of removal infection of left knee joint with removal of prosthetic.    Consult Dr. Noyola if available given recurrent episodes of bacteremia.    X-ray left knee done 6/9 showed some joint calcifications and loose bodies, no evidence of infection in joint

## 2023-06-12 NOTE — ASSESSMENT & PLAN NOTE
Patient's FSGs are controlled on current medication regimen.  Last A1c reviewed-   Lab Results   Component Value Date    HGBA1C 7.7 (H) 05/09/2023     Most recent fingerstick glucose reviewed- No results for input(s): POCTGLUCOSE in the last 24 hours.  Current correctional scale  Low  Maintain anti-hyperglycemic dose as follows-   Antihyperglycemics (From admission, onward)    Start     Stop Route Frequency Ordered    06/10/23 2100  insulin detemir U-100 injection 7 Units         -- SubQ Nightly 06/10/23 1712    06/08/23 1907  insulin aspart U-100 injection 1-10 Units         -- SubQ Before meals & nightly PRN 06/08/23 1815        Hold Oral hypoglycemics while patient is in the hospital.  Added levemir 7 units qhs yesterday, consider increasing dose depending on BG readings throughout today

## 2023-06-12 NOTE — ASSESSMENT & PLAN NOTE
Patient has persistent depression which is mild and is currently controlled. Will Continue anti-depressant medications. We will not consult psychiatry at this time. Patient does not display psychosis at this time. Continue to monitor closely and adjust plan of care as needed.    Increase cymbalta to 60mg BID  Start Wellbutrin 100mg daily

## 2023-06-12 NOTE — ASSESSMENT & PLAN NOTE
Patient with severe lower back pain.    Prior history of diskitis or epidural abscess.  CT scan concerning for osteomyelitis.  MRI with concern for osteomyelitis at T11 and T12.    Dr. Hernandes has been consulted.    6/13- Plan to transfer patient to speciality once approved for continual IV abx treatment for another 6/8 weeks starting from 6/8 where repeat bcx were drawn  Repeat bcx drawn on 6/8 show ng at 72 hours     Antibiotics (From admission, onward)    Start     Stop Route Frequency Ordered    06/09/23 0900  DAPTOmycin (CUBICIN) 750 mg in sodium chloride 0.9% SolP 50 mL IVPB         -- IV Every 24 hours (non-standard times) 06/09/23 0540    06/09/23 0900  rifAMpin capsule 600 mg         -- Oral Daily 06/09/23 0540    06/08/23 2100  mupirocin 2 % ointment         06/13 2059 Nasl 2 times daily 06/08/23 1830 06/08/23 1830  cefTRIAXone (ROCEPHIN) 2 g in dextrose 5 % in water (D5W) 5 % 100 mL IVPB (MB+)         -- IV Daily 06/08/23 1815

## 2023-06-12 NOTE — ASSESSMENT & PLAN NOTE
Multiple episodes of MRSA bacteremia.    The source may be the left knee joint but other sources are also possible.    There is a history of MRSA in left knee joint as well as in the lower vertebrae.    Patient to get MONICA today    6/12 - MRSA in blood 6/5.  F/u blood cx 6/8 ng at 72 hours.  MRSA from back abscess.  Plan continue dapto and rifampin 6-8 weeks in speciality. Patient still on rocephin as well, consider stopping tommorow.

## 2023-06-12 NOTE — ASSESSMENT & PLAN NOTE
Patient with severe lower back pain.    Prior history of diskitis or epidural abscess.  CT scan concerning for osteomyelitis.  MRI with concern for osteomyelitis at T11 and T12.    Dr. Hernandes has been consulted.    6/12- Plan to transfer patient to speciality tomorrow for continual IV abx treatment for another 6/8 weeks   Repeat bcx drawn on 6/8 show ng at 72 hours     Antibiotics (From admission, onward)    Start     Stop Route Frequency Ordered    06/09/23 0900  DAPTOmycin (CUBICIN) 750 mg in sodium chloride 0.9% SolP 50 mL IVPB         -- IV Every 24 hours (non-standard times) 06/09/23 0540    06/09/23 0900  rifAMpin capsule 600 mg         -- Oral Daily 06/09/23 0540    06/08/23 2100  mupirocin 2 % ointment         06/13 2059 Nasl 2 times daily 06/08/23 1830 06/08/23 1830  cefTRIAXone (ROCEPHIN) 2 g in dextrose 5 % in water (D5W) 5 % 100 mL IVPB (MB+)         -- IV Daily 06/08/23 1815

## 2023-06-12 NOTE — PROGRESS NOTES
[facility]  [facility] Medicine  Progress Note    Patient Name: Monica Walker  MRN: [id number]  Patient Class: IP- Inpatient   Admission Date: 6/8/2023  Length of Stay: 4 days  Attending Physician: Vinay Rosen Jr., MD  Primary Care Provider: Hannah Schulz MD        Subjective:     Principal Problem:Osteomyelitis of vertebra of thoracolumbar region        HPI:  Ms. 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  CT scan of lumbar region concerning for vertebral osteomyelitis.  The case was evaluated by Dr. Hernandes and he recommended admission to Medicine he would consult.  He also recommended an MRI.      She was diagnosed with MRSA bacteremia at [facility] 2 days ago and was given Dalvance (dalbavancin).  She was scheduled with outpatient follow-up with Dr. Noyola as she has had septic joint in the past.      The presents now with severe back pain.  She states that her back pain persists.  She reports a temperature of 103° in route to the facility.  She denies any chest pain or other concerns.  She had diskitis or epidural abscess in January and was treated with IV antibiotics.  She has also been recently treated for left knee prosthetic joint infection.        ED course:   Initial Vitals [06/08/23 1534]   BP Pulse Resp Temp SpO2   (!) 111/53 94 18 99.4 °F (37.4 °C) 96 %         Only pain medications given in the emergency department.        Overview/Hospital Course:  No notes on file    Interval History: Patient complaining of severe back pain this morning. She is due to get MONICA today to exclude Cardiac vegetation secondary to bacteremia. The patient has a depressed affect as well and admits she does feel depressed. Plan to transfer patient to speciality  once insurance approves it to continue IV antibiotics for another 6-8 weeks.     Review of Systems   Constitutional:  Negative for activity change, chills, fatigue and fever.   HENT:  Negative for congestion and sore throat.    Respiratory:  Negative for chest tightness.    Gastrointestinal:  Negative for abdominal distention, abdominal pain and diarrhea.   Endocrine: Negative for cold intolerance and heat intolerance.   Genitourinary:  Negative for dysuria.   Musculoskeletal:  Positive for back pain. Negative for arthralgias.   Skin:  Negative for color change and rash.   Neurological:  Negative for dizziness, tremors and headaches.   Psychiatric/Behavioral:  Positive for dysphoric mood. Negative for agitation. The patient is not nervous/anxious.    Objective:     Vital Signs (Most Recent):  Temp: 98 °F (36.7 °C) (06/12/23 1145)  Pulse: 71 (06/12/23 1145)  Resp: 17 (06/12/23 1145)  BP: 112/62 (06/12/23 1145)  SpO2: 98 % (06/12/23 1145) Vital Signs (24h Range):  Temp:  [97.2 °F (36.2 °C)-99.5 °F (37.5 °C)] 98 °F (36.7 °C)  Pulse:  [71-87] 71  Resp:  [16-18] 17  SpO2:  [93 %-98 %] 98 %  BP: (112-149)/(46-72) 112/62     Weight: 68 kg (150 lb)  Body mass index is 24.21 kg/m².  No intake or output data in the 24 hours ending 06/12/23 1202      Physical Exam  Vitals reviewed.   Constitutional:       General: She is not in acute distress.     Appearance: She is not toxic-appearing.   HENT:      Head: Normocephalic and atraumatic.   Eyes:      Extraocular Movements: Extraocular movements intact.      Conjunctiva/sclera: Conjunctivae normal.      Pupils: Pupils are equal, round, and reactive to light.   Cardiovascular:      Rate and Rhythm: Normal rate and regular rhythm.      Heart sounds: Normal heart sounds.   Pulmonary:      Effort: Pulmonary effort is normal. No respiratory distress.      Breath sounds: Normal breath sounds. No wheezing.   Abdominal:      General: Abdomen is flat. There is no distension.       Palpations: Abdomen is soft.      Tenderness: There is no abdominal tenderness.   Skin:     General: Skin is warm and dry.      Coloration: Skin is not jaundiced.   Neurological:      Mental Status: She is alert and oriented to person, place, and time.           Significant Labs: All pertinent labs within the past 24 hours have been reviewed.    Significant Imaging: I have reviewed all pertinent imaging results/findings within the past 24 hours.      Assessment/Plan:      * Osteomyelitis of vertebra of thoracolumbar region  Patient with severe lower back pain.    Prior history of diskitis or epidural abscess.  CT scan concerning for osteomyelitis.  MRI with concern for osteomyelitis at T11 and T12.    Dr. Hernandes has been consulted.    6/12- Plan to transfer patient to [facility] once approved for continual IV abx treatment for another 6/8 weeks   Repeat bcx drawn on 6/8 show ng at 72 hours     Antibiotics (From admission, onward)      Start     Stop Route Frequency Ordered    06/09/23 0900  DAPTOmycin (CUBICIN) 750 mg in sodium chloride 0.9% SolP 50 mL IVPB         -- IV Every 24 hours (non-standard times) 06/09/23 0540    06/09/23 0900  rifAMpin capsule 600 mg         -- Oral Daily 06/09/23 0540    06/08/23 2100  mupirocin 2 % ointment         06/13 2059 Nasl 2 times daily 06/08/23 1830    06/08/23 1830  cefTRIAXone (ROCEPHIN) 2 g in dextrose 5 % in water (D5W) 5 % 100 mL IVPB (MB+)         -- IV Daily 06/08/23 1815                MRSA bacteremia  Multiple episodes of MRSA bacteremia.    The source may be the left knee joint but other sources are also possible.    There is a history of MRSA in left knee joint as well as in the lower vertebrae.    Patient to get MONICA today    6/12 - MRSA in blood 6/5.  F/u blood cx 6/8 ng at 72 hours.  MRSA from back abscess.  [facility] Patient still on rocephin as well, consider stopping tommorow.         Prior MRSA Septic Joint of Left  Knee   History of removal infection of left knee joint with removal of prosthetic.    Consult Dr. Noyola if available given recurrent episodes of bacteremia.    X-ray left knee done 6/9 showed some joint calcifications and loose bodies, no evidence of infection in joint       Hypokalemia  Resolved     Tobacco use  Patient counseled on tobacco use and offered nicotine patch.      Primary hypertension  Continue home antihypertensives    DM2 (diabetes mellitus, type 2)  Patient's FSGs are controlled on current medication regimen.  Last A1c reviewed-   Lab Results   Component Value Date    HGBA1C 7.7 (H) 05/09/2023     Most recent fingerstick glucose reviewed- No results for input(s): POCTGLUCOSE in the last 24 hours.  Current correctional scale  Low  Maintain anti-hyperglycemic dose as follows-   Antihyperglycemics (From admission, onward)      Start     Stop Route Frequency Ordered    06/10/23 2100  insulin detemir U-100 injection 7 Units         -- SubQ Nightly 06/10/23 1712    06/08/23 1907  insulin aspart U-100 injection 1-10 Units         -- SubQ Before meals & nightly PRN 06/08/23 1815          Hold Oral hypoglycemics while patient is in the hospital.  Added levemir 7 units qhs yesterday, consider increasing dose depending on BG readings throughout today    Prior Lung abscesses  Prior small lung abscesses (septic emboli) which were thought to be related to hematogenous spread rather than a primary source of infection.  Consider repeat imaging in the future based on patient's clinical course.      Depression  Patient has persistent depression which is mild and is currently controlled. Will Continue anti-depressant medications. We will not consult psychiatry at this time. Patient does not display psychosis at this time. Continue to monitor closely and adjust plan of care as needed.    Increase cymbalta to 60mg BID  Start Wellbutrin 100mg daily       VTE Risk Mitigation (From admission, onward)           Ordered      enoxaparin injection 40 mg  Daily         06/08/23 1815     Place sequential compression device  Until discontinued         06/08/23 1815     IP VTE HIGH RISK PATIENT  Once         06/08/23 1815                    Discharge Planning   SUZANNA:      Code Status: Full Code   Is the patient medically ready for discharge?:     Reason for patient still in hospital (select all that apply): Treatment and Pending disposition  Discharge Plan A: Home with family, Home Health                  Meg Hair MD  Department of Hospital Medicine   [facility]

## 2023-06-12 NOTE — PLAN OF CARE
Per MD pt will benefit from LTAC. MICHEAL spoke with pt and received choice for [facility]. If insurance denies or pt does not qualify, pt wants referral sent to [facility]. Referral called to [facility]. SW will cont to follow for dc needs.

## 2023-06-12 NOTE — PROGRESS NOTES
[facility]  Cardiology  Progress Note    Patient Name: Monica Walker  MRN: [id number]  Admission Date: 6/8/2023  Hospital Length of Stay: 4 days  Code Status: Full Code   Attending Physician: Vinay Rosen Jr., MD   Primary Care Physician: Hannah Schulz MD  Expected Discharge Date:   Principal Problem:Osteomyelitis of vertebra of thoracolumbar region    Subjective:     Hospital Course:   No notes on file    Interval History: Patient seen today. MONICA rescheduled for tomorrow morning, 6/13/2023.     Review of Systems   Constitutional: Positive for chills and fever.   Cardiovascular:  Negative for chest pain, irregular heartbeat and near-syncope.   Respiratory:  Negative for shortness of breath.    All other systems reviewed and are negative.  Objective:     Vital Signs (Most Recent):  Temp: 98 °F (36.7 °C) (06/12/23 1145)  Pulse: 71 (06/12/23 1145)  Resp: 18 (06/12/23 1331)  BP: 112/62 (06/12/23 1145)  SpO2: 98 % (06/12/23 1145) Vital Signs (24h Range):  Temp:  [97.2 °F (36.2 °C)-99.5 °F (37.5 °C)] 98 °F (36.7 °C)  Pulse:  [71-87] 71  Resp:  [16-18] 18  SpO2:  [93 %-98 %] 98 %  BP: (112-149)/(46-72) 112/62     Weight: 68 kg (150 lb)  Body mass index is 24.21 kg/m².     SpO2: 98 %       No intake or output data in the 24 hours ending 06/12/23 1623    Lines/Drains/Airways       Peripheral Intravenous Line  Duration                  Peripheral IV - Single Lumen 06/08/23 1523 20 G Right Antecubital 4 days                       Physical Exam  Vitals reviewed.   Constitutional:       General: She is not in acute distress.  Cardiovascular:      Rate and Rhythm: Normal rate and regular rhythm.      Pulses: Normal pulses.      Heart sounds: Normal heart sounds.   Pulmonary:      Effort: Pulmonary effort is normal.      Breath sounds: Normal breath sounds.   Skin:     General: Skin is warm and dry.   Neurological:      Mental Status: She is alert and oriented to person, place, and time.           Significant Labs: ABG: No results for input(s): PH, PCO2, HCO3, POCSATURATED, BE in the last 48 hours., Blood Culture: No results for input(s): LABBLOO in the last 48 hours., BMP:   Recent Labs   Lab 06/11/23  0458   *   *   K 3.6   CL 94*   CO2 30   BUN 5*   CREATININE 0.69   CALCIUM 8.8   MG 1.7   , CMP   Recent Labs   Lab 06/11/23  0458   *   K 3.6   CL 94*   CO2 30   *   BUN 5*   CREATININE 0.69   CALCIUM 8.8   ANIONGAP 10   , CBC   Recent Labs   Lab 06/11/23  0458   WBC 9.62   HGB 10.2*   HCT 32.3*      , Lipid Panel No results for input(s): CHOL, HDL, LDLCALC, TRIG, CHOLHDL in the last 48 hours., and Troponin No results for input(s): TROPONINI in the last 48 hours.    Significant Imaging: Echocardiogram: Transthoracic echo (TTE) complete (Cupid Only):   Results for orders placed or performed during the hospital encounter of 06/08/23   Echo   Result Value Ref Range    BSA 1.78 m2    Right Atrial Pressure (from IVC) 3 mmHg    EF 55 %    Left Ventricular Outflow Tract Mean Gradient 2.00 mmHg    AORTIC VALVE CUSP SEPERATION 1.73 cm    LVIDd 4.04 3.5 - 6.0 cm    IVS 1.02 0.6 - 1.1 cm    Posterior Wall 0.94 0.6 - 1.1 cm    Ao root annulus 2.40 cm    LVIDs 2.77 2.1 - 4.0 cm    FS 31 28 - 44 %    IVC ostium 1.0 cm    LV mass 125.45 g    LA size 3.40 cm    RVDD 3.70 cm    TAPSE 1.90 cm    Left Ventricle Relative Wall Thickness 0.47 cm    AV mean gradient 2 mmHg    AV valve area 4.19 cm2    AV Velocity Ratio 1.22     AV index (prosthetic) 1.33     E wave deceleration time 192.00 msec    LVOT diameter 2.00 cm    LVOT area 3.1 cm2    LVOT peak kingsley 1.1 m/s    LVOT peak VTI 20.00 cm    Ao peak kingsley 0.9 m/s    Ao VTI 15.00 cm    LVOT stroke volume 62.80 cm3    AV peak gradient 3 mmHg    MV Peak E Kingsley 0.73 m/s    LV Systolic Volume 28.80 mL    LV Systolic Volume Index 16.3 mL/m2    LV Diastolic Volume 71.70 mL    LV Diastolic Volume Index 40.51 mL/m2    LV Mass Index 71 g/m2    Echo EF  Estimated 55 %    RA Major Axis 3.40 cm    Narrative    · The left ventricle is normal in size with normal systolic function.  · The estimated ejection fraction is 55%.  · Normal left ventricular diastolic function.  · Atrial fibrillation not observed.  · Normal right ventricular size with normal right ventricular systolic   function.  · Normal central venous pressure (3 mmHg).  · No obvious vegetation noted on aortic or mitral leaflet; cannot exclude   a small vegetation. 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 Admitted after CT of spine  concerning for vertebral osteomyelitis. MRSA bacteremia this admission with subjective temp of 103. No reported nausea, vomiting, headache, heartburn, diaphoresis, decreased appetite, palpitations, dizziness, chest pain, dyspnea or syncope. Plan for MONICA Monday.    Hypokalemia  6/9: Potassium 2.8 - replaced with 50 meq po oral x 2 - daily BMP    Tobacco use  - Educated on medication adherence, blood pressure control, stress reduction, cholesterol, smoking cessation, smokeless tobacco use, weight management, diet, diabetes, physical activity . Encouraged risk factor modifications     MRSA bacteremia  Recurrent MRSA bacteremia  - 6/5 cultures with MRSA, 6/8 with NGTD  - MONICA November 2022 negative for endocarditis.   - However, since then patient has had recurrent bacteremias including Klebsiella bacteremia, MRSA bacteremia.  - TTE did not show any significant valvular disease or vegetations.  Recommend MONICA for further evaluation   - Patient seen in a.m. risks versus benefits discussed with patient and consented for procedure for Friday afternoon.  - When initially  called for procedure, she was in CT for CT- guided aspiration of paraspinal fluid collection.  When staff went to transport patient for her MONICA, informed by RN that she had a fast food lunch brought by her mother despite being NPO. MONICA postponed to Monday 6/12/23.  - NPO after midnight after on Sunday night for MONICA on Monday 6/12/2023:  - MONICA rescheduled for tomorrow morning, 6/13/2023  - NPO after MN tonight  - Patient informed, diet ordered for today        VTE Risk Mitigation (From admission, onward)         Ordered     enoxaparin injection 40 mg  Daily         06/08/23 1815     Place sequential compression device  Until discontinued         06/08/23 1815     IP VTE HIGH RISK PATIENT  Once         06/08/23 1815                NATALIA Patel  Cardiology  [facility]

## 2023-06-12 NOTE — SUBJECTIVE & OBJECTIVE
Interval History: Patient seen today. MONICA rescheduled for tomorrow morning, 6/13/2023.     Review of Systems   Constitutional: Positive for chills and fever.   Cardiovascular:  Negative for chest pain, irregular heartbeat and near-syncope.   Respiratory:  Negative for shortness of breath.    All other systems reviewed and are negative.  Objective:     Vital Signs (Most Recent):  Temp: 98 °F (36.7 °C) (06/12/23 1145)  Pulse: 71 (06/12/23 1145)  Resp: 18 (06/12/23 1331)  BP: 112/62 (06/12/23 1145)  SpO2: 98 % (06/12/23 1145) Vital Signs (24h Range):  Temp:  [97.2 °F (36.2 °C)-99.5 °F (37.5 °C)] 98 °F (36.7 °C)  Pulse:  [71-87] 71  Resp:  [16-18] 18  SpO2:  [93 %-98 %] 98 %  BP: (112-149)/(46-72) 112/62     Weight: 68 kg (150 lb)  Body mass index is 24.21 kg/m².     SpO2: 98 %       No intake or output data in the 24 hours ending 06/12/23 1623    Lines/Drains/Airways       Peripheral Intravenous Line  Duration                  Peripheral IV - Single Lumen 06/08/23 1523 20 G Right Antecubital 4 days                       Physical Exam  Vitals reviewed.   Constitutional:       General: She is not in acute distress.  Cardiovascular:      Rate and Rhythm: Normal rate and regular rhythm.      Pulses: Normal pulses.      Heart sounds: Normal heart sounds.   Pulmonary:      Effort: Pulmonary effort is normal.      Breath sounds: Normal breath sounds.   Skin:     General: Skin is warm and dry.   Neurological:      Mental Status: She is alert and oriented to person, place, and time.          Significant Labs: ABG: No results for input(s): PH, PCO2, HCO3, POCSATURATED, BE in the last 48 hours., Blood Culture: No results for input(s): LABBLOO in the last 48 hours., BMP:   Recent Labs   Lab 06/11/23  0458   *   *   K 3.6   CL 94*   CO2 30   BUN 5*   CREATININE 0.69   CALCIUM 8.8   MG 1.7   , CMP   Recent Labs   Lab 06/11/23  0458   *   K 3.6   CL 94*   CO2 30   *   BUN 5*   CREATININE 0.69   CALCIUM 8.8    ANIONGAP 10   , CBC   Recent Labs   Lab 06/11/23  0458   WBC 9.62   HGB 10.2*   HCT 32.3*      , Lipid Panel No results for input(s): CHOL, HDL, LDLCALC, TRIG, CHOLHDL in the last 48 hours., and Troponin No results for input(s): TROPONINI in the last 48 hours.    Significant Imaging: Echocardiogram: Transthoracic echo (TTE) complete (Cupid Only):   Results for orders placed or performed during the hospital encounter of 06/08/23   Echo   Result Value Ref Range    BSA 1.78 m2    Right Atrial Pressure (from IVC) 3 mmHg    EF 55 %    Left Ventricular Outflow Tract Mean Gradient 2.00 mmHg    AORTIC VALVE CUSP SEPERATION 1.73 cm    LVIDd 4.04 3.5 - 6.0 cm    IVS 1.02 0.6 - 1.1 cm    Posterior Wall 0.94 0.6 - 1.1 cm    Ao root annulus 2.40 cm    LVIDs 2.77 2.1 - 4.0 cm    FS 31 28 - 44 %    IVC ostium 1.0 cm    LV mass 125.45 g    LA size 3.40 cm    RVDD 3.70 cm    TAPSE 1.90 cm    Left Ventricle Relative Wall Thickness 0.47 cm    AV mean gradient 2 mmHg    AV valve area 4.19 cm2    AV Velocity Ratio 1.22     AV index (prosthetic) 1.33     E wave deceleration time 192.00 msec    LVOT diameter 2.00 cm    LVOT area 3.1 cm2    LVOT peak kingsley 1.1 m/s    LVOT peak VTI 20.00 cm    Ao peak kingsley 0.9 m/s    Ao VTI 15.00 cm    LVOT stroke volume 62.80 cm3    AV peak gradient 3 mmHg    MV Peak E Kingsley 0.73 m/s    LV Systolic Volume 28.80 mL    LV Systolic Volume Index 16.3 mL/m2    LV Diastolic Volume 71.70 mL    LV Diastolic Volume Index 40.51 mL/m2    LV Mass Index 71 g/m2    Echo EF Estimated 55 %    RA Major Axis 3.40 cm    Narrative    · The left ventricle is normal in size with normal systolic function.  · The estimated ejection fraction is 55%.  · Normal left ventricular diastolic function.  · Atrial fibrillation not observed.  · Normal right ventricular size with normal right ventricular systolic   function.  · Normal central venous pressure (3 mmHg).  · No obvious vegetation noted on aortic or mitral leaflet; cannot  exclude   a small vegetation. Recommend MONICA if there is clinical suspicion for   infective endocarditits

## 2023-06-12 NOTE — ASSESSMENT & PLAN NOTE
Recurrent MRSA bacteremia  - 6/5 cultures with MRSA, 6/8 with NGTD  - MONICA November 2022 negative for endocarditis.   - However, since then patient has had recurrent bacteremias including Klebsiella bacteremia, MRSA bacteremia.  - TTE did not show any significant valvular disease or vegetations.  Recommend MONICA for further evaluation   - Patient seen in a.m. risks versus benefits discussed with patient and consented for procedure for Friday afternoon.  - When initially called for procedure, she was in CT for CT- guided aspiration of paraspinal fluid collection.  When staff went to transport patient for her MONICA, informed by RN that she had a fast food lunch brought by her mother despite being NPO. MONICA postponed to Monday 6/12/23.  - NPO after midnight after on Sunday night for MONICA on Monday 6/12/2023:  - MONICA rescheduled for tomorrow morning, 6/13/2023  - NPO after MN tonight  - Patient informed, diet ordered for today

## 2023-06-12 NOTE — SUBJECTIVE & OBJECTIVE
Interval History: Patient complaining of severe back pain this morning. She is due to get MONICA today to exclude Cardiac vegetation secondary to bacteremia. The patient has a depressed affect as well and admits she does feel depressed. Plan to transfer patient to speciality tomorrow to continue IV antibiotics for another 6-8 weeks.     Review of Systems   Constitutional:  Negative for activity change, chills, fatigue and fever.   HENT:  Negative for congestion and sore throat.    Respiratory:  Negative for chest tightness.    Gastrointestinal:  Negative for abdominal distention, abdominal pain and diarrhea.   Endocrine: Negative for cold intolerance and heat intolerance.   Genitourinary:  Negative for dysuria.   Musculoskeletal:  Positive for back pain. Negative for arthralgias.   Skin:  Negative for color change and rash.   Neurological:  Negative for dizziness, tremors and headaches.   Psychiatric/Behavioral:  Positive for dysphoric mood. Negative for agitation. The patient is not nervous/anxious.    Objective:     Vital Signs (Most Recent):  Temp: 98 °F (36.7 °C) (06/12/23 1145)  Pulse: 71 (06/12/23 1145)  Resp: 17 (06/12/23 1145)  BP: 112/62 (06/12/23 1145)  SpO2: 98 % (06/12/23 1145) Vital Signs (24h Range):  Temp:  [97.2 °F (36.2 °C)-99.5 °F (37.5 °C)] 98 °F (36.7 °C)  Pulse:  [71-87] 71  Resp:  [16-18] 17  SpO2:  [93 %-98 %] 98 %  BP: (112-149)/(46-72) 112/62     Weight: 68 kg (150 lb)  Body mass index is 24.21 kg/m².  No intake or output data in the 24 hours ending 06/12/23 1202      Physical Exam  Vitals reviewed.   Constitutional:       General: She is not in acute distress.     Appearance: She is not toxic-appearing.   HENT:      Head: Normocephalic and atraumatic.   Eyes:      Extraocular Movements: Extraocular movements intact.      Conjunctiva/sclera: Conjunctivae normal.      Pupils: Pupils are equal, round, and reactive to light.   Cardiovascular:      Rate and Rhythm: Normal rate and regular rhythm.       Heart sounds: Normal heart sounds.   Pulmonary:      Effort: Pulmonary effort is normal. No respiratory distress.      Breath sounds: Normal breath sounds. No wheezing.   Abdominal:      General: Abdomen is flat. There is no distension.      Palpations: Abdomen is soft.      Tenderness: There is no abdominal tenderness.   Skin:     General: Skin is warm and dry.      Coloration: Skin is not jaundiced.   Neurological:      Mental Status: She is alert and oriented to person, place, and time.           Significant Labs: All pertinent labs within the past 24 hours have been reviewed.    Significant Imaging: I have reviewed all pertinent imaging results/findings within the past 24 hours.

## 2023-06-13 PROBLEM — E87.6 HYPOKALEMIA: Status: RESOLVED | Noted: 2023-06-09 | Resolved: 2023-06-13

## 2023-06-13 LAB
ANION GAP SERPL CALCULATED.3IONS-SCNC: 9 MMOL/L (ref 7–16)
BASOPHILS # BLD AUTO: 0.08 K/UL (ref 0–0.2)
BASOPHILS NFR BLD AUTO: 0.7 % (ref 0–1)
BSA FOR ECHO PROCEDURE: 1.78 M2
BUN SERPL-MCNC: 7 MG/DL (ref 7–18)
BUN/CREAT SERPL: 10 (ref 6–20)
CALCIUM SERPL-MCNC: 8.3 MG/DL (ref 8.5–10.1)
CHLORIDE SERPL-SCNC: 95 MMOL/L (ref 98–107)
CO2 SERPL-SCNC: 35 MMOL/L (ref 21–32)
CREAT SERPL-MCNC: 0.68 MG/DL (ref 0.55–1.02)
DIFFERENTIAL METHOD BLD: ABNORMAL
EGFR (NO RACE VARIABLE) (RUSH/TITUS): 103 ML/MIN/1.73M2
EJECTION FRACTION: 60 %
EOSINOPHIL # BLD AUTO: 0.39 K/UL (ref 0–0.5)
EOSINOPHIL NFR BLD AUTO: 3.6 % (ref 1–4)
ERYTHROCYTE [DISTWIDTH] IN BLOOD BY AUTOMATED COUNT: 15 % (ref 11.5–14.5)
GLUCOSE SERPL-MCNC: 190 MG/DL (ref 70–105)
GLUCOSE SERPL-MCNC: 192 MG/DL (ref 74–106)
GLUCOSE SERPL-MCNC: 198 MG/DL (ref 70–105)
GLUCOSE SERPL-MCNC: 213 MG/DL (ref 70–105)
GLUCOSE SERPL-MCNC: 373 MG/DL (ref 70–105)
HCT VFR BLD AUTO: 35.2 % (ref 38–47)
HGB BLD-MCNC: 11.1 G/DL (ref 12–16)
IMM GRANULOCYTES # BLD AUTO: 0.52 K/UL (ref 0–0.04)
IMM GRANULOCYTES NFR BLD: 4.8 % (ref 0–0.4)
LYMPHOCYTES # BLD AUTO: 1.85 K/UL (ref 1–4.8)
LYMPHOCYTES NFR BLD AUTO: 17.1 % (ref 27–41)
MCH RBC QN AUTO: 26.4 PG (ref 27–31)
MCHC RBC AUTO-ENTMCNC: 31.5 G/DL (ref 32–36)
MCV RBC AUTO: 83.6 FL (ref 80–96)
MONOCYTES # BLD AUTO: 1 K/UL (ref 0–0.8)
MONOCYTES NFR BLD AUTO: 9.2 % (ref 2–6)
MPC BLD CALC-MCNC: 9.4 FL (ref 9.4–12.4)
NEUTROPHILS # BLD AUTO: 6.98 K/UL (ref 1.8–7.7)
NEUTROPHILS NFR BLD AUTO: 64.6 % (ref 53–65)
NRBC # BLD AUTO: 0 X10E3/UL
NRBC, AUTO (.00): 0 %
PLATELET # BLD AUTO: 314 K/UL (ref 150–400)
POTASSIUM SERPL-SCNC: 3.7 MMOL/L (ref 3.5–5.1)
RBC # BLD AUTO: 4.21 M/UL (ref 4.2–5.4)
SODIUM SERPL-SCNC: 135 MMOL/L (ref 136–145)
WBC # BLD AUTO: 10.82 K/UL (ref 4.5–11)

## 2023-06-13 PROCEDURE — 99232 PR SUBSEQUENT HOSPITAL CARE,LEVL II: ICD-10-PCS | Mod: ,,, | Performed by: FAMILY MEDICINE

## 2023-06-13 PROCEDURE — 25000003 PHARM REV CODE 250: Performed by: STUDENT IN AN ORGANIZED HEALTH CARE EDUCATION/TRAINING PROGRAM

## 2023-06-13 PROCEDURE — 63600175 PHARM REV CODE 636 W HCPCS: Performed by: HOSPITALIST

## 2023-06-13 PROCEDURE — 82962 GLUCOSE BLOOD TEST: CPT

## 2023-06-13 PROCEDURE — 85025 COMPLETE CBC W/AUTO DIFF WBC: CPT

## 2023-06-13 PROCEDURE — 11000001 HC ACUTE MED/SURG PRIVATE ROOM

## 2023-06-13 PROCEDURE — 99232 SBSQ HOSP IP/OBS MODERATE 35: CPT | Mod: ,,, | Performed by: FAMILY MEDICINE

## 2023-06-13 PROCEDURE — 25000003 PHARM REV CODE 250: Performed by: HOSPITALIST

## 2023-06-13 PROCEDURE — 25000003 PHARM REV CODE 250

## 2023-06-13 PROCEDURE — 63600175 PHARM REV CODE 636 W HCPCS: Performed by: STUDENT IN AN ORGANIZED HEALTH CARE EDUCATION/TRAINING PROGRAM

## 2023-06-13 PROCEDURE — 25000003 PHARM REV CODE 250: Performed by: FAMILY MEDICINE

## 2023-06-13 PROCEDURE — 63600175 PHARM REV CODE 636 W HCPCS

## 2023-06-13 PROCEDURE — 99152 MOD SED SAME PHYS/QHP 5/>YRS: CPT | Performed by: STUDENT IN AN ORGANIZED HEALTH CARE EDUCATION/TRAINING PROGRAM

## 2023-06-13 PROCEDURE — 80048 BASIC METABOLIC PNL TOTAL CA: CPT

## 2023-06-13 RX ORDER — FENTANYL CITRATE 50 UG/ML
INJECTION, SOLUTION INTRAMUSCULAR; INTRAVENOUS
Status: DISCONTINUED | OUTPATIENT
Start: 2023-06-13 | End: 2023-06-13 | Stop reason: HOSPADM

## 2023-06-13 RX ORDER — MIDAZOLAM HYDROCHLORIDE 1 MG/ML
INJECTION INTRAMUSCULAR; INTRAVENOUS
Status: DISCONTINUED | OUTPATIENT
Start: 2023-06-13 | End: 2023-06-13 | Stop reason: HOSPADM

## 2023-06-13 RX ORDER — SODIUM CHLORIDE 9 MG/ML
INJECTION, SOLUTION INTRAVENOUS
Status: DISPENSED
Start: 2023-06-13 | End: 2023-06-13

## 2023-06-13 RX ADMIN — INSULIN ASPART 2 UNITS: 100 INJECTION, SOLUTION INTRAVENOUS; SUBCUTANEOUS at 10:06

## 2023-06-13 RX ADMIN — INSULIN ASPART 2 UNITS: 100 INJECTION, SOLUTION INTRAVENOUS; SUBCUTANEOUS at 07:06

## 2023-06-13 RX ADMIN — OXYCODONE HYDROCHLORIDE 15 MG: 5 TABLET ORAL at 07:06

## 2023-06-13 RX ADMIN — FENTANYL 1 PATCH: 25 PATCH TRANSDERMAL at 12:06

## 2023-06-13 RX ADMIN — DULOXETINE HYDROCHLORIDE 60 MG: 30 CAPSULE, DELAYED RELEASE ORAL at 10:06

## 2023-06-13 RX ADMIN — INSULIN DETEMIR 10 UNITS: 100 INJECTION, SOLUTION SUBCUTANEOUS at 10:06

## 2023-06-13 RX ADMIN — BUPROPION HYDROCHLORIDE 100 MG: 100 TABLET, FILM COATED ORAL at 09:06

## 2023-06-13 RX ADMIN — DULOXETINE HYDROCHLORIDE 60 MG: 30 CAPSULE, DELAYED RELEASE ORAL at 09:06

## 2023-06-13 RX ADMIN — MUPIROCIN: 20 OINTMENT TOPICAL at 09:06

## 2023-06-13 RX ADMIN — INSULIN ASPART 10 UNITS: 100 INJECTION, SOLUTION INTRAVENOUS; SUBCUTANEOUS at 06:06

## 2023-06-13 RX ADMIN — OXYCODONE HYDROCHLORIDE 15 MG: 5 TABLET ORAL at 10:06

## 2023-06-13 RX ADMIN — ENOXAPARIN SODIUM 40 MG: 40 INJECTION SUBCUTANEOUS at 06:06

## 2023-06-13 RX ADMIN — RIFAMPIN 600 MG: 300 CAPSULE ORAL at 09:06

## 2023-06-13 RX ADMIN — CEFTRIAXONE SODIUM 2 G: 2 INJECTION, POWDER, FOR SOLUTION INTRAMUSCULAR; INTRAVENOUS at 09:06

## 2023-06-13 RX ADMIN — DAPTOMYCIN 750 MG: 500 INJECTION, POWDER, LYOPHILIZED, FOR SOLUTION INTRAVENOUS at 07:06

## 2023-06-13 RX ADMIN — OXYCODONE HYDROCHLORIDE 15 MG: 5 TABLET ORAL at 06:06

## 2023-06-13 NOTE — ASSESSMENT & PLAN NOTE
Patient's FSGs are controlled on current medication regimen.  Last A1c reviewed-   Lab Results   Component Value Date    HGBA1C 7.7 (H) 05/09/2023     Most recent fingerstick glucose reviewed- No results for input(s): POCTGLUCOSE in the last 24 hours.  Current correctional scale  Low  Maintain anti-hyperglycemic dose as follows-   Antihyperglycemics (From admission, onward)    Start     Stop Route Frequency Ordered    06/12/23 2100  insulin detemir U-100 injection 10 Units         -- SubQ Nightly 06/12/23 1508    06/08/23 1907  insulin aspart U-100 injection 1-10 Units         -- SubQ Before meals & nightly PRN 06/08/23 1815        Hold Oral hypoglycemics while patient is in the hospital.  Added levemir 7 units qhs yesterday, consider increasing dose depending on BG readings throughout today

## 2023-06-13 NOTE — PROGRESS NOTES
[facility] Medicine  Progress Note    Patient Name: Monica Walker  MRN: [id number]  Patient Class: IP- Inpatient   Admission Date: 6/8/2023  Length of Stay: 5 days  Attending Physician: Vinay Rosen Jr., MD  Primary Care Provider: Hannah Schulz MD        Subjective:     Principal Problem:Osteomyelitis of vertebra of thoracolumbar region        HPI:  Ms. 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  CT scan of lumbar region concerning for vertebral osteomyelitis.  The case was evaluated by Dr. Hernandes and he recommended admission to Medicine he would consult.  He also recommended an MRI.      She was diagnosed with MRSA bacteremia at [facility] 2 days ago and was given Dalvance (dalbavancin).  She was scheduled with outpatient follow-up with Dr. Noyola as she has had septic joint in the past.      The presents now with severe back pain.  She states that her back pain persists.  She reports a temperature of 103° in route to the facility.  She denies any chest pain or other concerns.  She had diskitis or epidural abscess in January and was treated with IV antibiotics.  She has also been recently treated for left knee prosthetic joint infection.        ED course:   Initial Vitals [06/08/23 1534]   BP Pulse Resp Temp SpO2   (!) 111/53 94 18 99.4 °F (37.4 °C) 96 %         Only pain medications given in the emergency department.        Overview/Hospital Course:  No notes on file    Interval History: Patient states that pain is better today. MONICA did not show any vegetations concerning for endocarditis. Patient pending approval for transfer to [facility] for extended IV abx treatment.     Review of Systems   Constitutional:  Negative for activity  change, chills, fatigue and fever.   HENT:  Negative for congestion and sore throat.    Respiratory:  Negative for chest tightness.    Gastrointestinal:  Negative for abdominal distention, abdominal pain and diarrhea.   Endocrine: Negative for cold intolerance and heat intolerance.   Genitourinary:  Negative for dysuria.   Musculoskeletal:  Positive for back pain. Negative for arthralgias.   Skin:  Negative for color change and rash.   Neurological:  Negative for dizziness, tremors and headaches.   Psychiatric/Behavioral:  Negative for agitation. The patient is not nervous/anxious.    Objective:     Vital Signs (Most Recent):  Temp: 98.3 °F (36.8 °C) (06/13/23 0707)  Pulse: 73 (06/13/23 1220)  Resp: 17 (06/13/23 1220)  BP: 131/71 (06/13/23 1220)  SpO2: 95 % (06/13/23 1220) Vital Signs (24h Range):  Temp:  [98 °F (36.7 °C)-99 °F (37.2 °C)] 98.3 °F (36.8 °C)  Pulse:  [72-88] 73  Resp:  [14-18] 17  SpO2:  [93 %-97 %] 95 %  BP: (109-146)/(54-81) 131/71     Weight: 68 kg (150 lb)  Body mass index is 24.21 kg/m².  No intake or output data in the 24 hours ending 06/13/23 1233      Physical Exam  Vitals reviewed.   Constitutional:       General: She is not in acute distress.     Appearance: She is not toxic-appearing.   HENT:      Head: Normocephalic and atraumatic.   Eyes:      Extraocular Movements: Extraocular movements intact.      Conjunctiva/sclera: Conjunctivae normal.      Pupils: Pupils are equal, round, and reactive to light.   Cardiovascular:      Rate and Rhythm: Normal rate and regular rhythm.      Heart sounds: Normal heart sounds.   Pulmonary:      Effort: Pulmonary effort is normal. No respiratory distress.      Breath sounds: Normal breath sounds. No wheezing.   Abdominal:      General: Abdomen is flat. There is no distension.      Palpations: Abdomen is soft.      Tenderness: There is no abdominal tenderness.   Skin:     General: Skin is warm and dry.      Coloration: Skin is not jaundiced.   Neurological:       Mental Status: She is alert and oriented to person, place, and time.           Significant Labs: All pertinent labs within the past 24 hours have been reviewed.    Significant Imaging: I have reviewed all pertinent imaging results/findings within the past 24 hours.      Assessment/Plan:      * Osteomyelitis of vertebra of thoracolumbar region  Patient with severe lower back pain.    Prior history of diskitis or epidural abscess.  CT scan concerning for osteomyelitis.  MRI with concern for osteomyelitis at T11 and T12.    Dr. Hernandes has been consulted.    6/13- Plan to transfer patient to [facility] once approved for continual IV abx treatment for another 6/8 weeks starting from 6/8 where repeat bcx were drawn  Repeat bcx drawn on 6/8 show ng at 72 hours     Antibiotics (From admission, onward)    Start     Stop Route Frequency Ordered    06/09/23 0900  DAPTOmycin (CUBICIN) 750 mg in sodium chloride 0.9% SolP 50 mL IVPB         -- IV Every 24 hours (non-standard times) 06/09/23 0540    06/09/23 0900  rifAMpin capsule 600 mg         -- Oral Daily 06/09/23 0540    06/08/23 2100  mupirocin 2 % ointment         06/13 2059 Nasl 2 times daily 06/08/23 1830    06/08/23 1830  cefTRIAXone (ROCEPHIN) 2 g in dextrose 5 % in water (D5W) 5 % 100 mL IVPB (MB+)         -- IV Daily 06/08/23 1815              MRSA bacteremia  Multiple episodes of MRSA bacteremia.    The source may be the left knee joint but other sources are also possible.    There is a history of MRSA in left knee joint as well as in the lower vertebrae.      6/13 - MRSA in blood 6/5.  F/u blood cx 6/8 ng at 72 hours.  MRSA from back abscess.  Plan continue dapto and rifampin 6-8 weeks in [facility].  MONICA done today which did not show any vegetations concerning for endocarditis.     Prior MRSA Septic Joint of Left Knee   History of removal infection of left knee joint with removal of prosthetic.    Consulted Dr. Noyola, he is aware patient is in hospital and  does not see a need for a procedure as of right now  X-ray left knee done 6/9 showed some joint calcifications and loose bodies, no evidence of infection in joint       Tobacco use  Patient counseled on tobacco use and offered nicotine patch.      Primary hypertension  Continue home antihypertensives    DM2 (diabetes mellitus, type 2)  Patient's FSGs are controlled on current medication regimen.  Last A1c reviewed-   Lab Results   Component Value Date    HGBA1C 7.7 (H) 05/09/2023     Most recent fingerstick glucose reviewed- No results for input(s): POCTGLUCOSE in the last 24 hours.  Current correctional scale  Low  Maintain anti-hyperglycemic dose as follows-   Antihyperglycemics (From admission, onward)    Start     Stop Route Frequency Ordered    06/12/23 2100  insulin detemir U-100 injection 10 Units         -- SubQ Nightly 06/12/23 1508    06/08/23 1907  insulin aspart U-100 injection 1-10 Units         -- SubQ Before meals & nightly PRN 06/08/23 1815        Hold Oral hypoglycemics while patient is in the hospital.  Added levemir 7 units qhs yesterday, consider increasing dose depending on BG readings throughout today    Prior Lung abscesses  Prior small lung abscesses (septic emboli) which were thought to be related to hematogenous spread rather than a primary source of infection.  Consider repeat imaging in the future based on patient's clinical course.      Depression  Patient has persistent depression which is mild and is currently controlled. Will Continue anti-depressant medications. We will not consult psychiatry at this time. Patient does not display psychosis at this time. Continue to monitor closely and adjust plan of care as needed.    cymbalta 60mg BID   Wellbutrin 100mg daily       VTE Risk Mitigation (From admission, onward)         Ordered     enoxaparin injection 40 mg  Daily         06/08/23 1815     Place sequential compression device  Until discontinued         06/08/23 1815     IP VTE HIGH  RISK PATIENT  Once         06/08/23 1815                Discharge Planning   SUZANNA:      Code Status: Full Code   Is the patient medically ready for discharge?:     Reason for patient still in hospital (select all that apply): Treatment and Pending disposition  Discharge Plan A: Home with family, Home Health                  Meg Hair MD  Department of Hospital Medicine   [facility]

## 2023-06-13 NOTE — ASSESSMENT & PLAN NOTE
History of removal infection of left knee joint with removal of prosthetic.    Consulted Dr. Noyola, he is aware patient is in hospital and does not see a need for a procedure as of right now  X-ray left knee done 6/9 showed some joint calcifications and loose bodies, no evidence of infection in joint

## 2023-06-13 NOTE — SUBJECTIVE & OBJECTIVE
Interval History: Patient states that pain is better today. MONICA did not show any vegetations concerning for endocarditis. Patient pending approval for transfer to [facility] for extended IV abx treatment.     Review of Systems   Constitutional:  Negative for activity change, chills, fatigue and fever.   HENT:  Negative for congestion and sore throat.    Respiratory:  Negative for chest tightness.    Gastrointestinal:  Negative for abdominal distention, abdominal pain and diarrhea.   Endocrine: Negative for cold intolerance and heat intolerance.   Genitourinary:  Negative for dysuria.   Musculoskeletal:  Positive for back pain. Negative for arthralgias.   Skin:  Negative for color change and rash.   Neurological:  Negative for dizziness, tremors and headaches.   Psychiatric/Behavioral:  Negative for agitation. The patient is not nervous/anxious.    Objective:     Vital Signs (Most Recent):  Temp: 98.3 °F (36.8 °C) (06/13/23 0707)  Pulse: 73 (06/13/23 1220)  Resp: 17 (06/13/23 1220)  BP: 131/71 (06/13/23 1220)  SpO2: 95 % (06/13/23 1220) Vital Signs (24h Range):  Temp:  [98 °F (36.7 °C)-99 °F (37.2 °C)] 98.3 °F (36.8 °C)  Pulse:  [72-88] 73  Resp:  [14-18] 17  SpO2:  [93 %-97 %] 95 %  BP: (109-146)/(54-81) 131/71     Weight: 68 kg (150 lb)  Body mass index is 24.21 kg/m².  No intake or output data in the 24 hours ending 06/13/23 1233      Physical Exam  Vitals reviewed.   Constitutional:       General: She is not in acute distress.     Appearance: She is not toxic-appearing.   HENT:      Head: Normocephalic and atraumatic.   Eyes:      Extraocular Movements: Extraocular movements intact.      Conjunctiva/sclera: Conjunctivae normal.      Pupils: Pupils are equal, round, and reactive to light.   Cardiovascular:      Rate and Rhythm: Normal rate and regular rhythm.      Heart sounds: Normal heart sounds.   Pulmonary:      Effort: Pulmonary effort is normal. No respiratory distress.      Breath sounds: Normal breath  sounds. No wheezing.   Abdominal:      General: Abdomen is flat. There is no distension.      Palpations: Abdomen is soft.      Tenderness: There is no abdominal tenderness.   Skin:     General: Skin is warm and dry.      Coloration: Skin is not jaundiced.   Neurological:      Mental Status: She is alert and oriented to person, place, and time.           Significant Labs: All pertinent labs within the past 24 hours have been reviewed.    Significant Imaging: I have reviewed all pertinent imaging results/findings within the past 24 hours.

## 2023-06-13 NOTE — ASSESSMENT & PLAN NOTE
Patient has persistent depression which is mild and is currently controlled. Will Continue anti-depressant medications. We will not consult psychiatry at this time. Patient does not display psychosis at this time. Continue to monitor closely and adjust plan of care as needed.    cymbalta 60mg BID   Wellbutrin 100mg daily

## 2023-06-13 NOTE — PLAN OF CARE
During SBIR, pt was in progress with MONICA. Precert has been started for approval for [facility]. SW will cont to follow.

## 2023-06-13 NOTE — ASSESSMENT & PLAN NOTE
Multiple episodes of MRSA bacteremia.    The source may be the left knee joint but other sources are also possible.    There is a history of MRSA in left knee joint as well as in the lower vertebrae.      6/13 - MRSA in blood 6/5.  F/u blood cx 6/8 ng at 72 hours.  MRSA from back abscess.  Plan continue dapto and rifampin 6-8 weeks in special[facility].  MONICA done today which did not show any vegetations concerning for endocarditis.

## 2023-06-14 LAB
ANION GAP SERPL CALCULATED.3IONS-SCNC: 11 MMOL/L (ref 7–16)
BACTERIA BLD CULT: NORMAL
BACTERIA BLD CULT: NORMAL
BASOPHILS # BLD AUTO: 0.09 K/UL (ref 0–0.2)
BASOPHILS NFR BLD AUTO: 0.7 % (ref 0–1)
BUN SERPL-MCNC: 8 MG/DL (ref 7–18)
BUN/CREAT SERPL: 11 (ref 6–20)
CALCIUM SERPL-MCNC: 8.7 MG/DL (ref 8.5–10.1)
CHLORIDE SERPL-SCNC: 96 MMOL/L (ref 98–107)
CO2 SERPL-SCNC: 30 MMOL/L (ref 21–32)
CREAT SERPL-MCNC: 0.75 MG/DL (ref 0.55–1.02)
DIFFERENTIAL METHOD BLD: ABNORMAL
EGFR (NO RACE VARIABLE) (RUSH/TITUS): 94 ML/MIN/1.73M2
EOSINOPHIL # BLD AUTO: 0.42 K/UL (ref 0–0.5)
EOSINOPHIL NFR BLD AUTO: 3.4 % (ref 1–4)
EOSINOPHIL NFR BLD MANUAL: 2 % (ref 1–4)
ERYTHROCYTE [DISTWIDTH] IN BLOOD BY AUTOMATED COUNT: 15 % (ref 11.5–14.5)
GLUCOSE SERPL-MCNC: 161 MG/DL (ref 70–105)
GLUCOSE SERPL-MCNC: 163 MG/DL (ref 74–106)
GLUCOSE SERPL-MCNC: 270 MG/DL (ref 70–105)
GLUCOSE SERPL-MCNC: 294 MG/DL (ref 70–105)
GLUCOSE SERPL-MCNC: 319 MG/DL (ref 70–105)
HCT VFR BLD AUTO: 32.5 % (ref 38–47)
HGB BLD-MCNC: 10.3 G/DL (ref 12–16)
IMM GRANULOCYTES # BLD AUTO: 0.74 K/UL (ref 0–0.04)
IMM GRANULOCYTES NFR BLD: 5.9 % (ref 0–0.4)
LYMPHOCYTES # BLD AUTO: 2.24 K/UL (ref 1–4.8)
LYMPHOCYTES NFR BLD AUTO: 18 % (ref 27–41)
LYMPHOCYTES NFR BLD MANUAL: 21 % (ref 27–41)
MAGNESIUM SERPL-MCNC: 1.8 MG/DL (ref 1.7–2.3)
MCH RBC QN AUTO: 26.4 PG (ref 27–31)
MCHC RBC AUTO-ENTMCNC: 31.7 G/DL (ref 32–36)
MCV RBC AUTO: 83.3 FL (ref 80–96)
METAMYELOCYTES NFR BLD MANUAL: 2 %
MONOCYTES # BLD AUTO: 0.94 K/UL (ref 0–0.8)
MONOCYTES NFR BLD AUTO: 7.6 % (ref 2–6)
MONOCYTES NFR BLD MANUAL: 6 % (ref 2–6)
MPC BLD CALC-MCNC: 9.5 FL (ref 9.4–12.4)
MYELOCYTES NFR BLD MANUAL: 2 %
NEUTROPHILS # BLD AUTO: 8.01 K/UL (ref 1.8–7.7)
NEUTROPHILS NFR BLD AUTO: 64.4 % (ref 53–65)
NEUTS BAND NFR BLD MANUAL: 4 % (ref 1–5)
NEUTS SEG NFR BLD MANUAL: 63 % (ref 50–62)
NRBC # BLD AUTO: 0 X10E3/UL
NRBC, AUTO (.00): 0 %
PLATELET # BLD AUTO: 342 K/UL (ref 150–400)
PLATELET MORPHOLOGY: NORMAL
POTASSIUM SERPL-SCNC: 3.3 MMOL/L (ref 3.5–5.1)
RBC # BLD AUTO: 3.9 M/UL (ref 4.2–5.4)
RBC MORPH BLD: NORMAL
SODIUM SERPL-SCNC: 134 MMOL/L (ref 136–145)
WBC # BLD AUTO: 12.44 K/UL (ref 4.5–11)

## 2023-06-14 PROCEDURE — 63600175 PHARM REV CODE 636 W HCPCS

## 2023-06-14 PROCEDURE — 11000001 HC ACUTE MED/SURG PRIVATE ROOM

## 2023-06-14 PROCEDURE — 63600175 PHARM REV CODE 636 W HCPCS: Performed by: HOSPITALIST

## 2023-06-14 PROCEDURE — 83735 ASSAY OF MAGNESIUM: CPT

## 2023-06-14 PROCEDURE — 80048 BASIC METABOLIC PNL TOTAL CA: CPT

## 2023-06-14 PROCEDURE — 94761 N-INVAS EAR/PLS OXIMETRY MLT: CPT

## 2023-06-14 PROCEDURE — 25000003 PHARM REV CODE 250: Performed by: HOSPITALIST

## 2023-06-14 PROCEDURE — 25000003 PHARM REV CODE 250

## 2023-06-14 PROCEDURE — 82962 GLUCOSE BLOOD TEST: CPT

## 2023-06-14 PROCEDURE — 99232 SBSQ HOSP IP/OBS MODERATE 35: CPT | Mod: GC,,, | Performed by: FAMILY MEDICINE

## 2023-06-14 PROCEDURE — 27000221 HC OXYGEN, UP TO 24 HOURS

## 2023-06-14 PROCEDURE — 99232 PR SUBSEQUENT HOSPITAL CARE,LEVL II: ICD-10-PCS | Mod: GC,,, | Performed by: FAMILY MEDICINE

## 2023-06-14 PROCEDURE — 85025 COMPLETE CBC W/AUTO DIFF WBC: CPT

## 2023-06-14 RX ORDER — BISACODYL 5 MG
10 TABLET, DELAYED RELEASE (ENTERIC COATED) ORAL DAILY PRN
Status: DISCONTINUED | OUTPATIENT
Start: 2023-06-15 | End: 2023-06-21 | Stop reason: HOSPADM

## 2023-06-14 RX ORDER — TRAZODONE HYDROCHLORIDE 50 MG/1
50 TABLET ORAL NIGHTLY PRN
Status: DISCONTINUED | OUTPATIENT
Start: 2023-06-15 | End: 2023-06-21 | Stop reason: HOSPADM

## 2023-06-14 RX ORDER — ACETAMINOPHEN 500 MG
1000 TABLET ORAL EVERY 6 HOURS PRN
Status: DISCONTINUED | OUTPATIENT
Start: 2023-06-15 | End: 2023-06-21 | Stop reason: HOSPADM

## 2023-06-14 RX ORDER — ONDANSETRON 2 MG/ML
8 INJECTION INTRAMUSCULAR; INTRAVENOUS EVERY 6 HOURS PRN
Status: DISCONTINUED | OUTPATIENT
Start: 2023-06-15 | End: 2023-06-21 | Stop reason: HOSPADM

## 2023-06-14 RX ORDER — POTASSIUM CHLORIDE 20 MEQ/1
20 TABLET, EXTENDED RELEASE ORAL 2 TIMES DAILY
Status: COMPLETED | OUTPATIENT
Start: 2023-06-14 | End: 2023-06-14

## 2023-06-14 RX ORDER — GUAIFENESIN/DEXTROMETHORPHAN 100-10MG/5
10 SYRUP ORAL EVERY 6 HOURS PRN
Status: DISCONTINUED | OUTPATIENT
Start: 2023-06-15 | End: 2023-06-21 | Stop reason: HOSPADM

## 2023-06-14 RX ORDER — FENTANYL 12.5 UG/1
1 PATCH TRANSDERMAL
Status: DISCONTINUED | OUTPATIENT
Start: 2023-06-14 | End: 2023-06-14

## 2023-06-14 RX ORDER — SIMETHICONE 80 MG
1 TABLET,CHEWABLE ORAL 3 TIMES DAILY PRN
Status: DISCONTINUED | OUTPATIENT
Start: 2023-06-15 | End: 2023-06-21 | Stop reason: HOSPADM

## 2023-06-14 RX ADMIN — DULOXETINE HYDROCHLORIDE 60 MG: 30 CAPSULE, DELAYED RELEASE ORAL at 08:06

## 2023-06-14 RX ADMIN — ENOXAPARIN SODIUM 40 MG: 40 INJECTION SUBCUTANEOUS at 05:06

## 2023-06-14 RX ADMIN — INSULIN DETEMIR 10 UNITS: 100 INJECTION, SOLUTION SUBCUTANEOUS at 10:06

## 2023-06-14 RX ADMIN — INSULIN ASPART 10 UNITS: 100 INJECTION, SOLUTION INTRAVENOUS; SUBCUTANEOUS at 05:06

## 2023-06-14 RX ADMIN — RIFAMPIN 600 MG: 300 CAPSULE ORAL at 08:06

## 2023-06-14 RX ADMIN — POTASSIUM CHLORIDE 20 MEQ: 1500 TABLET, EXTENDED RELEASE ORAL at 08:06

## 2023-06-14 RX ADMIN — OXYCODONE HYDROCHLORIDE 15 MG: 5 TABLET ORAL at 08:06

## 2023-06-14 RX ADMIN — OXYCODONE HYDROCHLORIDE 15 MG: 5 TABLET ORAL at 02:06

## 2023-06-14 RX ADMIN — OXYCODONE HYDROCHLORIDE 15 MG: 5 TABLET ORAL at 01:06

## 2023-06-14 RX ADMIN — INSULIN ASPART 3 UNITS: 100 INJECTION, SOLUTION INTRAVENOUS; SUBCUTANEOUS at 10:06

## 2023-06-14 RX ADMIN — CEFTRIAXONE SODIUM 2 G: 2 INJECTION, POWDER, FOR SOLUTION INTRAMUSCULAR; INTRAVENOUS at 08:06

## 2023-06-14 RX ADMIN — BUPROPION HYDROCHLORIDE 100 MG: 100 TABLET, FILM COATED ORAL at 08:06

## 2023-06-14 RX ADMIN — DAPTOMYCIN 750 MG: 500 INJECTION, POWDER, LYOPHILIZED, FOR SOLUTION INTRAVENOUS at 05:06

## 2023-06-14 NOTE — ASSESSMENT & PLAN NOTE
Operative Note    Patient: Linda Emanuel 53 year old female    MRN: [id number]    Surgeon(s): Abner Henry MD  Phone Number: [phone]                       Surgeon(s) and Role:     * Abner Henry MD - Primary    Assistant(s):     Pre-Op Diagnosis: Combined forms of age-related cataract of right eye [H25.811]     Post-Op Diagnosis: same     Procedure: Procedure(s):  RIGHT PHACOEMULSIFICATION OF CATARACT WITH IOL INSERTION    Anesthesia Type: MAC                                   Complications: None    Description: see dictation    Findings: see dictation    Specimens Removed: No specimens collected     Estimated Blood Loss: No Value 0     Assistant Tasks: None     Implants:   Implant Name Type Inv. Item Serial No.  Lot No. LRB No. Used Action   IOL ULTRASERT AU00T0 25.0 - [id number] Lens IOL ULTRASERT AU00T0 25.0 [id number] [employer]  Right 1 Implanted       I was present for the key portions of the procedure and was immediately available for the non-key portions    This is Dr. Abner Henry dictating op report on Linda Carlton  Preop diagnosis is nuclear sclerotic cataract right eye  Postop diagnosis the same  Procedures phacoemulsification with intraocular and implant right eye  History please see history and physical for detailed ocular exam  Operative technique    Patient was brought to the holding area and dilated Cyclogyl Mydriacyl Yayo-Synephrine patient was then brought to the operative suite where under IV sedation was given 2% lidocaine 2 cc in a peribulbar injection followed by 4 cc of 1.5 Sensorcaine in a peribulbar injection digital massage applied after good akinesia anesthesia obtained patient was prepped draped usual fashion lid speculum was inserted an anterior chamber paracentesis formed at the 2 o'clock position of the microblade sugarcane was instilled followed by Viscoat a 2.4 keratome blade was used to make a clear corneal incision at the 10 o'clock  Patient has persistent depression which is mild and is currently controlled. Will Continue anti-depressant medications. We will not consult psychiatry at this time. Patient does not display psychosis at this time. Continue to monitor closely and adjust plan of care as needed.    cymbalta 60mg BID  Wellbutrin 100mg daily    position and anterior capsulorrhexis was performed hydrodissection was carried nucleus phacoemulsification was carried on nucleus and a quadratic cracking fashion irrigation-aspiration was used to remove remainder of the cortex the posterior capsule was clear Provisc was used to fill the capsular bag and an Wilmer AU00T0 25.0 diopter lens taken from its package found to be free of defects and with an  was placed into the capsular bag lenses well centered the Provisc was removed under irrigation-aspiration balanced salt was used to reconstitute the anterior chamber and hydrate the wound wound was tested and found to have no leaks small amount of Vigamox was instilled into the anterior chamber Betadine drops were applied TobraDex ointment was applied lid speculums removed that she was applied patient was taken to recovery room in stable condition

## 2023-06-14 NOTE — PLAN OF CARE
Pt was denied for [facility] per Meron with [facility]. [facility] to Peer has to be completed by 10 a.m. on 6/15/23. MD informed. SW will cont to follow for dc needs.     1449: Pt called and wants referral sent to [facility]. SW called referral to Dory at [facility] for swb. SW awaiting insurance approval. SW following.

## 2023-06-14 NOTE — ASSESSMENT & PLAN NOTE
Patient with severe lower back pain.    Prior history of diskitis or epidural abscess.  CT scan concerning for osteomyelitis.  MRI with concern for osteomyelitis at T11 and T12.    Dr. Hernandes has been consulted.    6/14- Pending transfer to speciality once approved for continual IV abx treatment for another 6-8 weeks starting from the 6/8 where repeat bcx were drawn  Repeat bcx drawn on 6/8 show ng at 72 hours     Antibiotics (From admission, onward)    Start     Stop Route Frequency Ordered    06/09/23 0900  DAPTOmycin (CUBICIN) 750 mg in sodium chloride 0.9% SolP 50 mL IVPB         -- IV Every 24 hours (non-standard times) 06/09/23 0540    06/09/23 0900  rifAMpin capsule 600 mg         -- Oral Daily 06/09/23 0540    06/08/23 2100  mupirocin 2 % ointment         06/13 2059 Nasl 2 times daily 06/08/23 1830 06/08/23 1830  cefTRIAXone (ROCEPHIN) 2 g in dextrose 5 % in water (D5W) 5 % 100 mL IVPB (MB+)         -- IV Daily 06/08/23 1815

## 2023-06-14 NOTE — SUBJECTIVE & OBJECTIVE
Interval History: Patient reports improved back pain today and rates it as a 6/10 currently. Her WBC is mildly increased, but clinically she does not report any new constitutional symptoms, and her vitals overnight were wnl.     Review of Systems   Constitutional:  Negative for activity change, chills, fatigue and fever.   HENT:  Negative for congestion and sore throat.    Respiratory:  Negative for chest tightness.    Gastrointestinal:  Negative for abdominal distention, abdominal pain and diarrhea.   Endocrine: Negative for cold intolerance and heat intolerance.   Genitourinary:  Negative for dysuria.   Musculoskeletal:  Positive for back pain. Negative for arthralgias.   Skin:  Negative for color change and rash.   Neurological:  Negative for dizziness, tremors and headaches.   Psychiatric/Behavioral:  Negative for agitation. The patient is not nervous/anxious.    Objective:     Vital Signs (Most Recent):  Temp: 97.9 °F (36.6 °C) (06/14/23 0712)  Pulse: 69 (06/14/23 0712)  Resp: 18 (06/14/23 0844)  BP: (!) 109/59 (06/14/23 0712)  SpO2: (!) 94 % (06/14/23 0712) Vital Signs (24h Range):  Temp:  [97.6 °F (36.4 °C)-98.9 °F (37.2 °C)] 97.9 °F (36.6 °C)  Pulse:  [61-95] 69  Resp:  [14-19] 18  SpO2:  [92 %-100 %] 94 %  BP: (109-141)/(48-81) 109/59     Weight: 68 kg (150 lb)  Body mass index is 24.21 kg/m².  No intake or output data in the 24 hours ending 06/14/23 1046      Physical Exam  Vitals reviewed.   Constitutional:       General: She is not in acute distress.     Appearance: She is not toxic-appearing.   HENT:      Head: Normocephalic and atraumatic.   Eyes:      Extraocular Movements: Extraocular movements intact.      Conjunctiva/sclera: Conjunctivae normal.      Pupils: Pupils are equal, round, and reactive to light.   Cardiovascular:      Rate and Rhythm: Normal rate and regular rhythm.      Heart sounds: Normal heart sounds.   Pulmonary:      Effort: Pulmonary effort is normal. No respiratory distress.       Breath sounds: Normal breath sounds. No wheezing.   Abdominal:      General: Abdomen is flat. There is no distension.      Palpations: Abdomen is soft.      Tenderness: There is no abdominal tenderness.   Musculoskeletal:         General: Tenderness present.   Skin:     General: Skin is warm and dry.      Coloration: Skin is not jaundiced.   Neurological:      Mental Status: She is alert and oriented to person, place, and time.           Significant Labs: All pertinent labs within the past 24 hours have been reviewed.    Significant Imaging: I have reviewed all pertinent imaging results/findings within the past 24 hours.

## 2023-06-14 NOTE — ASSESSMENT & PLAN NOTE
Patient's FSGs are controlled on current medication regimen.  Last A1c reviewed-   Lab Results   Component Value Date    HGBA1C 7.7 (H) 05/09/2023     Most recent fingerstick glucose reviewed- No results for input(s): POCTGLUCOSE in the last 24 hours.  Current correctional scale  Low  Maintain anti-hyperglycemic dose as follows-   Antihyperglycemics (From admission, onward)    Start     Stop Route Frequency Ordered    06/12/23 2100  insulin detemir U-100 injection 10 Units         -- SubQ Nightly 06/12/23 1508    06/08/23 1907  insulin aspart U-100 injection 1-10 Units         -- SubQ Before meals & nightly PRN 06/08/23 1815        Hold Oral hypoglycemics while patient is in the hospital.

## 2023-06-14 NOTE — ASSESSMENT & PLAN NOTE
Multiple episodes of MRSA bacteremia.    The source may be the left knee joint but other sources are also possible.    There is a history of MRSA in left knee joint as well as in the lower vertebrae.      6/14 - MRSA in blood 6/5.  F/u blood cx 6/8 ng at 72 hours.  MRSA from back abscess.  Plan continue dapto and rifampin 6-8 weeks in speciality.  MONICA done today which did not show any vegetations concerning for endocarditis. WBC 12 today but patient clinically stable. Continue to monitor with daily CBC.

## 2023-06-15 LAB
GLUCOSE SERPL-MCNC: 192 MG/DL (ref 70–105)
GLUCOSE SERPL-MCNC: 261 MG/DL (ref 70–105)
GLUCOSE SERPL-MCNC: 292 MG/DL (ref 70–105)
GLUCOSE SERPL-MCNC: 335 MG/DL (ref 70–105)

## 2023-06-15 PROCEDURE — 94761 N-INVAS EAR/PLS OXIMETRY MLT: CPT

## 2023-06-15 PROCEDURE — 11000001 HC ACUTE MED/SURG PRIVATE ROOM

## 2023-06-15 PROCEDURE — 99232 PR SUBSEQUENT HOSPITAL CARE,LEVL II: ICD-10-PCS | Mod: ,,, | Performed by: FAMILY MEDICINE

## 2023-06-15 PROCEDURE — 63600175 PHARM REV CODE 636 W HCPCS

## 2023-06-15 PROCEDURE — 82962 GLUCOSE BLOOD TEST: CPT

## 2023-06-15 PROCEDURE — 25000003 PHARM REV CODE 250: Performed by: FAMILY MEDICINE

## 2023-06-15 PROCEDURE — 63600175 PHARM REV CODE 636 W HCPCS: Performed by: HOSPITALIST

## 2023-06-15 PROCEDURE — 25000003 PHARM REV CODE 250

## 2023-06-15 PROCEDURE — 99232 SBSQ HOSP IP/OBS MODERATE 35: CPT | Mod: ,,, | Performed by: FAMILY MEDICINE

## 2023-06-15 PROCEDURE — 25000003 PHARM REV CODE 250: Performed by: HOSPITALIST

## 2023-06-15 RX ORDER — FENTANYL 25 UG/1
1 PATCH TRANSDERMAL
Status: DISCONTINUED | OUTPATIENT
Start: 2023-06-15 | End: 2023-06-21 | Stop reason: HOSPADM

## 2023-06-15 RX ADMIN — INSULIN ASPART 8 UNITS: 100 INJECTION, SOLUTION INTRAVENOUS; SUBCUTANEOUS at 05:06

## 2023-06-15 RX ADMIN — CEFTRIAXONE SODIUM 2 G: 2 INJECTION, POWDER, FOR SOLUTION INTRAMUSCULAR; INTRAVENOUS at 08:06

## 2023-06-15 RX ADMIN — ENOXAPARIN SODIUM 40 MG: 40 INJECTION SUBCUTANEOUS at 05:06

## 2023-06-15 RX ADMIN — INSULIN ASPART 6 UNITS: 100 INJECTION, SOLUTION INTRAVENOUS; SUBCUTANEOUS at 01:06

## 2023-06-15 RX ADMIN — BUPROPION HYDROCHLORIDE 100 MG: 100 TABLET, FILM COATED ORAL at 08:06

## 2023-06-15 RX ADMIN — INSULIN DETEMIR 10 UNITS: 100 INJECTION, SOLUTION SUBCUTANEOUS at 09:06

## 2023-06-15 RX ADMIN — TRAZODONE HYDROCHLORIDE 50 MG: 50 TABLET ORAL at 12:06

## 2023-06-15 RX ADMIN — OXYCODONE HYDROCHLORIDE 15 MG: 5 TABLET ORAL at 09:06

## 2023-06-15 RX ADMIN — RIFAMPIN 600 MG: 300 CAPSULE ORAL at 08:06

## 2023-06-15 RX ADMIN — DAPTOMYCIN 750 MG: 500 INJECTION, POWDER, LYOPHILIZED, FOR SOLUTION INTRAVENOUS at 05:06

## 2023-06-15 RX ADMIN — INSULIN ASPART 2 UNITS: 100 INJECTION, SOLUTION INTRAVENOUS; SUBCUTANEOUS at 08:06

## 2023-06-15 RX ADMIN — FENTANYL 1 PATCH: 25 PATCH TRANSDERMAL at 12:06

## 2023-06-15 RX ADMIN — DULOXETINE HYDROCHLORIDE 60 MG: 30 CAPSULE, DELAYED RELEASE ORAL at 08:06

## 2023-06-15 NOTE — PLAN OF CARE
Problem: Adult Inpatient Plan of Care  Goal: Plan of Care Review  Outcome: Ongoing, Progressing  Goal: Patient-Specific Goal (Individualized)  Outcome: Ongoing, Progressing  Goal: Absence of Hospital-Acquired Illness or Injury  Outcome: Ongoing, Progressing  Goal: Optimal Comfort and Wellbeing  Outcome: Ongoing, Progressing  Intervention: Monitor Pain and Promote Comfort  Flowsheets (Taken 6/15/2023 1628)  Pain Management Interventions:   relaxation techniques promoted   quiet environment facilitated   pillow support provided   position adjusted   care clustered  Intervention: Provide Person-Centered Care  Flowsheets (Taken 6/15/2023 1628)  Trust Relationship/Rapport:   care explained   reassurance provided   choices provided   thoughts/feelings acknowledged   emotional support provided   empathic listening provided   questions answered   questions encouraged  Goal: Readiness for Transition of Care  Outcome: Ongoing, Progressing     Problem: Diabetes Comorbidity  Goal: Blood Glucose Level Within Targeted Range  Outcome: Ongoing, Progressing  Intervention: Monitor and Manage Glycemia  Flowsheets (Taken 6/15/2023 1628)  Glycemic Management:   blood glucose monitored   supplemental insulin given     Problem: Pain Acute  Goal: Acceptable Pain Control and Functional Ability  Outcome: Ongoing, Progressing

## 2023-06-15 NOTE — PLAN OF CARE
MICHEAL spoke with Dory from [facility] and pt has been denied for [facility] and insurance recommended home health. MD informed, MICHEAL following.     1406: MICHEAL spoke with MD and pt is not ready for dc at this time. MICHEAL will cont to follow.

## 2023-06-15 NOTE — PROGRESS NOTES
"[facility]  Adult Nutrition  First Assessment Note         Reason for Assessment  Reason For Assessment: length of stay   Nutrition Risk Screen: no indicators present    Assessment and Plan  Patient seen for length of stay. She was admitted 6/8 for osteomyelitis of vertebra of thoracolumbar region.     Per MD:  "HPI:  Ms. 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  CT scan of lumbar region concerning for vertebral osteomyelitis.  The case was evaluated by Dr. Hernandes and he recommended admission to Medicine he would consult.  He also recommended an MRI.     She was diagnosed with MRSA bacteremia at [facility] 2 days ago and was given Dalvance (dalbavancin).  She was scheduled with outpatient follow-up with Dr. Noyola as she has had septic joint in the past.     The presents now with severe back pain.  She states that her back pain persists.  She reports a temperature of 103° in route to the facility.  She denies any chest pain or other concerns.  She had diskitis or epidural abscess in January and was treated with IV antibiotics.  She has also been recently treated for left knee prosthetic joint infection.    Overview/Hospital Course:  No notes on file  Interval History: Patient reports improved back pain today and rates it as a 6/10 currently. Her WBC is mildly increased, but clinically she does not report any new constitutional symptoms, and her vitals overnight were wnl. "    Patient is 150 pounds with a BMI Of 24.21. She is within her ideal body weight range. She is on a 2000kcal Consistent Carbohydrate Diet and tolerating well. Recommend continue current diet as able/appropriate and tolerated. Encourage good po intakes.    Last BM 6/15 per " flowsheet.    Medications/labs reviewed. RD following.        Skin Integrity  De Risk Assessment  Sensory Perception: 4-->no impairment  Moisture: 4-->rarely moist  Activity: 4-->walks frequently  Mobility: 3-->slightly limited  Nutrition: 3-->adequate  Friction and Shear: 3-->no apparent problem  De Score: 21      Nutrition Diagnosis    No Nutritional Diagnosis at this time    Interventions/Recommendations (treatment strategy):  Recommend continue current diet as able/appropriate and tolerated. Encourage good po intakes.    Nutrition Diagnosis Status:   New     Nutrition Risk  Level of Risk/Frequency of Follow-up: low    Recent Labs   Lab 06/14/23  0304 06/14/23  0448 06/15/23  0630   *  --   --    POCGLU  --    < > 192*    < > = values in this interval not displayed.     Comments on Glucose: Glucose elevated. PMH DM. Will monitor.     Nutrition Prescription / Recommendations  Recommendation/Intervention: Recommend continue current diet as able/appropriate and tolerated. Encourage good po intakes.  Goals: Weight miantenance, intake % of meals  Nutrition Goal Status: new  Current Diet Order: 2000kcal Consistent Carbohydrate diet  Chewing or Swallowing Difficulty?: No Chewing or swallowing difficulty  Recommended Diet: Consistent Carbohydrate 2000 (75g Carbs)  Recommended Oral Supplement: No Oral Supplements  Is Nutrition Support Recommended: No   Is Education Recommended: No  Monitor and Evaluation  % current Intake: Unknown/ No P.O. intake documented  % intake to meet estimated needs: 75 - 100 %  Food and Nutrient Intake: food and beverage intake  Food and Nutrient Adminstration: diet order  Anthropometric Measurements: weight, weight change  Biochemical Data, Medical Tests and Procedures: electrolyte and renal panel, gastrointestinal profile, glucose/endocrine profile, inflammatory profile, lipid profile     Current Medical Diagnosis and Past Medical History  Diagnosis: other (see  "comments)  Past Medical History:   Diagnosis Date    Abscess of right thigh + MRSA 08/20/2021    healed    Adnexal cyst     Degenerative disc disease     Depression 10/29/2021    Diabetes mellitus     Hypertension     Hypomagnesemia 01/20/2023    Nicotine dependence     Osteoarthritis      Nutrition/Diet History     Lab/Procedures/Meds  Recent Labs   Lab 06/14/23  0304   *   K 3.3*   BUN 8   CREATININE 0.75   CALCIUM 8.7   CL 96*   Note: Na+, K+, Cl- low. Recommend consider replete to WNL as appropriate.     Last A1c:   Lab Results   Component Value Date    HGBA1C 7.7 (H) 05/09/2023     Lab Results   Component Value Date    RBC 3.90 (L) 06/14/2023    HGB 10.3 (L) 06/14/2023    HCT 32.5 (L) 06/14/2023    MCV 83.3 06/14/2023    MCH 26.4 (L) 06/14/2023    MCHC 31.7 (L) 06/14/2023    TIBC 188 (L) 01/19/2023     Pertinent Labs Reviewed: reviewed  Pertinent Labs Comments: Sodium: 134 (L)  Potassium: 3.3 (L)  Chloride: 96 (L)  CO2: 30  Anion Gap: 11  BUN: 8  Creatinine: 0.75  BUN/CREAT RATIO: 11  eGFR: 94  Glucose: 163 (H)  Calcium: 8.7  Magnesium: 1.8  Pertinent Medications Reviewed: reviewed  Pertinent Medications Comments: bupropion, ceftriaxone, daptomycin, duloxetine, enoxaparin, fentanyl, insulin, rifampin  Anthropometrics  Temp: 98.3 °F (36.8 °C)  Height Method: Stated  Height: 5' 6" (167.6 cm)  Height (inches): 66 in  Weight Method: Standard Scale  Weight: 68 kg (150 lb)  Weight (lb): 150 lb  Ideal Body Weight (IBW), Female: 130 lb  % Ideal Body Weight, Female (lb): 115.38 %  BMI (Calculated): 24.2     Estimated/Assessed Needs  RMR ([place]-St. Jeor Equation): 1292.15     Temp: 98.3 °F (36.8 °C)Oral  Weight Used For Calorie Calculations: 68 kg (150 lb)     Energy Calorie Requirements (kcal): 1700-2041kcal (25-30kcal/kg)  Weight Used For Protein Calculations: 68 kg (150 lb)  Protein Requirements: 68-82g (1.0-1.2g/kg)       RDA Method (mL): 1700     Nutrition by Nursing  Diet/Nutrition Received: consistent " carb/diabetic diet           Last Bowel Movement: 06/15/23                Nutrition Follow-Up  RD Follow-up?: Yes      Daxa Sarmiento, MS, RD, LD  Available through Secure Chat

## 2023-06-16 LAB
GLUCOSE SERPL-MCNC: 203 MG/DL (ref 70–105)
GLUCOSE SERPL-MCNC: 219 MG/DL (ref 70–105)
GLUCOSE SERPL-MCNC: 258 MG/DL (ref 70–105)
GLUCOSE SERPL-MCNC: 279 MG/DL (ref 70–105)

## 2023-06-16 PROCEDURE — 99233 SBSQ HOSP IP/OBS HIGH 50: CPT | Mod: ,,, | Performed by: HOSPITALIST

## 2023-06-16 PROCEDURE — 63600175 PHARM REV CODE 636 W HCPCS

## 2023-06-16 PROCEDURE — 25000003 PHARM REV CODE 250: Performed by: HOSPITALIST

## 2023-06-16 PROCEDURE — 99233 PR SUBSEQUENT HOSPITAL CARE,LEVL III: ICD-10-PCS | Mod: ,,, | Performed by: HOSPITALIST

## 2023-06-16 PROCEDURE — 11000001 HC ACUTE MED/SURG PRIVATE ROOM

## 2023-06-16 PROCEDURE — 63600175 PHARM REV CODE 636 W HCPCS: Performed by: HOSPITALIST

## 2023-06-16 PROCEDURE — 25000003 PHARM REV CODE 250

## 2023-06-16 PROCEDURE — 82962 GLUCOSE BLOOD TEST: CPT

## 2023-06-16 RX ADMIN — INSULIN ASPART 4 UNITS: 100 INJECTION, SOLUTION INTRAVENOUS; SUBCUTANEOUS at 12:06

## 2023-06-16 RX ADMIN — DULOXETINE HYDROCHLORIDE 60 MG: 30 CAPSULE, DELAYED RELEASE ORAL at 08:06

## 2023-06-16 RX ADMIN — DULOXETINE HYDROCHLORIDE 60 MG: 30 CAPSULE, DELAYED RELEASE ORAL at 09:06

## 2023-06-16 RX ADMIN — ENOXAPARIN SODIUM 40 MG: 40 INJECTION SUBCUTANEOUS at 04:06

## 2023-06-16 RX ADMIN — OXYCODONE HYDROCHLORIDE 15 MG: 5 TABLET ORAL at 04:06

## 2023-06-16 RX ADMIN — OXYCODONE HYDROCHLORIDE 15 MG: 5 TABLET ORAL at 09:06

## 2023-06-16 RX ADMIN — INSULIN ASPART 3 UNITS: 100 INJECTION, SOLUTION INTRAVENOUS; SUBCUTANEOUS at 08:06

## 2023-06-16 RX ADMIN — BUPROPION HYDROCHLORIDE 100 MG: 100 TABLET, FILM COATED ORAL at 09:06

## 2023-06-16 RX ADMIN — RIFAMPIN 600 MG: 300 CAPSULE ORAL at 09:06

## 2023-06-16 RX ADMIN — INSULIN DETEMIR 10 UNITS: 100 INJECTION, SOLUTION SUBCUTANEOUS at 08:06

## 2023-06-16 RX ADMIN — INSULIN ASPART 4 UNITS: 100 INJECTION, SOLUTION INTRAVENOUS; SUBCUTANEOUS at 05:06

## 2023-06-16 RX ADMIN — INSULIN ASPART 6 UNITS: 100 INJECTION, SOLUTION INTRAVENOUS; SUBCUTANEOUS at 07:06

## 2023-06-16 RX ADMIN — INSULIN DETEMIR 8 UNITS: 100 INJECTION, SOLUTION SUBCUTANEOUS at 09:06

## 2023-06-16 RX ADMIN — DAPTOMYCIN 750 MG: 500 INJECTION, POWDER, LYOPHILIZED, FOR SOLUTION INTRAVENOUS at 05:06

## 2023-06-16 RX ADMIN — CEFTRIAXONE SODIUM 2 G: 2 INJECTION, POWDER, FOR SOLUTION INTRAMUSCULAR; INTRAVENOUS at 09:06

## 2023-06-16 NOTE — SUBJECTIVE & OBJECTIVE
Interval History: No new complaints.  Pain control improved.      Review of Systems  Objective:     Vital Signs (Most Recent):  Temp: 97.8 °F (36.6 °C) (06/15/23 2000)  Pulse: 80 (06/15/23 2000)  Resp: 18 (06/15/23 2000)  BP: 126/65 (06/15/23 2000)  SpO2: 100 % (06/15/23 2000) Vital Signs (24h Range):  Temp:  [97.8 °F (36.6 °C)-98.7 °F (37.1 °C)] 97.8 °F (36.6 °C)  Pulse:  [72-80] 80  Resp:  [18] 18  SpO2:  [95 %-100 %] 100 %  BP: (126-152)/(50-72) 126/65     Weight: 68 kg (150 lb)  Body mass index is 24.21 kg/m².  No intake or output data in the 24 hours ending 06/15/23 2101      Physical Exam  Vitals reviewed.   Constitutional:       General: She is not in acute distress.     Appearance: She is not toxic-appearing.   HENT:      Head: Normocephalic and atraumatic.   Eyes:      Extraocular Movements: Extraocular movements intact.      Conjunctiva/sclera: Conjunctivae normal.      Pupils: Pupils are equal, round, and reactive to light.   Cardiovascular:      Rate and Rhythm: Normal rate and regular rhythm.      Heart sounds: Normal heart sounds.   Pulmonary:      Effort: Pulmonary effort is normal. No respiratory distress.      Breath sounds: Normal breath sounds. No wheezing.   Abdominal:      General: Abdomen is flat. There is no distension.      Palpations: Abdomen is soft.      Tenderness: There is no abdominal tenderness.   Musculoskeletal:         General: Tenderness present.   Skin:     General: Skin is warm and dry.      Coloration: Skin is not jaundiced.   Neurological:      Mental Status: She is alert and oriented to person, place, and time.           Significant Labs: All pertinent labs within the past 24 hours have been reviewed.  BMP:   Recent Labs   Lab 06/14/23  0304   *   *   K 3.3*   CL 96*   CO2 30   BUN 8   CREATININE 0.75   CALCIUM 8.7   MG 1.8     CBC:   Recent Labs   Lab 06/14/23  0304   WBC 12.44*   HGB 10.3*   HCT 32.5*          Significant Imaging: I have reviewed all  pertinent imaging results/findings within the past 24 hours.

## 2023-06-16 NOTE — PROGRESS NOTES
[facility] Hospital Medicine  Progress Note    Patient Name: Monica Walker  MRN: [id number]  Patient Class: IP- Inpatient   Admission Date: 6/8/2023  Length of Stay: 7 days  Attending Physician: Vinay Rosen Jr., MD  Primary Care Provider: Hannah Schulz MD        Subjective:     Principal Problem:Osteomyelitis of vertebra of thoracolumbar region        HPI:  Ms. 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  CT scan of lumbar region concerning for vertebral osteomyelitis.  The case was evaluated by Dr. Hernandes and he recommended admission to Medicine he would consult.  He also recommended an MRI.      She was diagnosed with MRSA bacteremia at [facility] 2 days ago and was given Dalvance (dalbavancin).  She was scheduled with outpatient follow-up with Dr. Noyola as she has had septic joint in the past.      The presents now with severe back pain.  She states that her back pain persists.  She reports a temperature of 103° in route to the facility.  She denies any chest pain or other concerns.  She had diskitis or epidural abscess in January and was treated with IV antibiotics.  She has also been recently treated for left knee prosthetic joint infection.        ED course:   Initial Vitals [06/08/23 1534]   BP Pulse Resp Temp SpO2   (!) 111/53 94 18 99.4 °F (37.4 °C) 96 %         Only pain medications given in the emergency department.        Overview/Hospital Course:  No notes on file    Interval History: No new complaints.  Pain control improved.      Review of Systems  Objective:     Vital Signs (Most Recent):  Temp: 97.8 °F (36.6 °C) (06/15/23 2000)  Pulse: 80 (06/15/23 2000)  Resp: 18 (06/15/23 2000)  BP: 126/65 (06/15/23 2000)  SpO2: 100 % (06/15/23  2000) Vital Signs (24h Range):  Temp:  [97.8 °F (36.6 °C)-98.7 °F (37.1 °C)] 97.8 °F (36.6 °C)  Pulse:  [72-80] 80  Resp:  [18] 18  SpO2:  [95 %-100 %] 100 %  BP: (126-152)/(50-72) 126/65     Weight: 68 kg (150 lb)  Body mass index is 24.21 kg/m².  No intake or output data in the 24 hours ending 06/15/23 2101      Physical Exam  Vitals reviewed.   Constitutional:       General: She is not in acute distress.     Appearance: She is not toxic-appearing.   HENT:      Head: Normocephalic and atraumatic.   Eyes:      Extraocular Movements: Extraocular movements intact.      Conjunctiva/sclera: Conjunctivae normal.      Pupils: Pupils are equal, round, and reactive to light.   Cardiovascular:      Rate and Rhythm: Normal rate and regular rhythm.      Heart sounds: Normal heart sounds.   Pulmonary:      Effort: Pulmonary effort is normal. No respiratory distress.      Breath sounds: Normal breath sounds. No wheezing.   Abdominal:      General: Abdomen is flat. There is no distension.      Palpations: Abdomen is soft.      Tenderness: There is no abdominal tenderness.   Musculoskeletal:         General: Tenderness present.   Skin:     General: Skin is warm and dry.      Coloration: Skin is not jaundiced.   Neurological:      Mental Status: She is alert and oriented to person, place, and time.           Significant Labs: All pertinent labs within the past 24 hours have been reviewed.  BMP:   Recent Labs   Lab 06/14/23  0304   *   *   K 3.3*   CL 96*   CO2 30   BUN 8   CREATININE 0.75   CALCIUM 8.7   MG 1.8     CBC:   Recent Labs   Lab 06/14/23  0304   WBC 12.44*   HGB 10.3*   HCT 32.5*          Significant Imaging: I have reviewed all pertinent imaging results/findings within the past 24 hours.      Assessment/Plan:      * Osteomyelitis of vertebra of thoracolumbar region  Patient with severe lower back pain.    Prior history of diskitis or epidural abscess.  CT scan concerning for osteomyelitis.  MRI with  concern for osteomyelitis at T11 and T12.    Dr. Hernandes has been consulted.    6/14- Pending transfer to speciality once approved for continual IV abx treatment for another 6-8 weeks starting from the 6/8 where repeat bcx were drawn  Repeat bcx drawn on 6/8 show ng at 72 hours     Antibiotics (From admission, onward)    Start     Stop Route Frequency Ordered    06/09/23 0900  DAPTOmycin (CUBICIN) 750 mg in sodium chloride 0.9% SolP 50 mL IVPB         -- IV Every 24 hours (non-standard times) 06/09/23 0540    06/09/23 0900  rifAMpin capsule 600 mg         -- Oral Daily 06/09/23 0540    06/08/23 2100  mupirocin 2 % ointment         06/13 2059 Nasl 2 times daily 06/08/23 1830 06/08/23 1830  cefTRIAXone (ROCEPHIN) 2 g in dextrose 5 % in water (D5W) 5 % 100 mL IVPB (MB+)         -- IV Daily 06/08/23 1815          6/15 - Continuing abx, struggling with insurance over setting for continued care.     MRSA bacteremia  Multiple episodes of MRSA bacteremia.    The source may be the left knee joint but other sources are also possible.    There is a history of MRSA in left knee joint as well as in the lower vertebrae.      6/14 - MRSA in blood 6/5.  F/u blood cx 6/8 ng at 72 hours.  MRSA from back abscess.  Plan continue dapto and rifampin 6-8 weeks in speciality.  MONICA done today which did not show any vegetations concerning for endocarditis. WBC 12 today but patient clinically stable. Continue to monitor with daily CBC.     DM2 (diabetes mellitus, type 2)  Patient's FSGs are controlled on current medication regimen.  Last A1c reviewed-   Lab Results   Component Value Date    HGBA1C 7.7 (H) 05/09/2023     Most recent fingerstick glucose reviewed- No results for input(s): POCTGLUCOSE in the last 24 hours.  Current correctional scale  Low  Maintain anti-hyperglycemic dose as follows-   Antihyperglycemics (From admission, onward)    Start     Stop Route Frequency Ordered    06/12/23 2100  insulin detemir U-100 injection 10 Units          -- SubQ Nightly 06/12/23 1508    06/08/23 1907  insulin aspart U-100 injection 1-10 Units         -- SubQ Before meals & nightly PRN 06/08/23 1815        Hold Oral hypoglycemics while patient is in the hospital.      Tobacco use  Patient counseled on tobacco use and offered nicotine patch.      Primary hypertension  Continue home antihypertensives    Prior MRSA Septic Joint of Left Knee   History of removal infection of left knee joint with removal of prosthetic.    Consulted Dr. Noyola, he is aware patient is in hospital and does not see a need for a procedure as of right now  X-ray left knee done 6/9 showed some joint calcifications and loose bodies, no evidence of infection in joint       Prior Lung abscesses  Prior small lung abscesses (septic emboli) which were thought to be related to hematogenous spread rather than a primary source of infection.  Consider repeat imaging in the future based on patient's clinical course.      Depression  Patient has persistent depression which is mild and is currently controlled. Will Continue anti-depressant medications. We will not consult psychiatry at this time. Patient does not display psychosis at this time. Continue to monitor closely and adjust plan of care as needed.    cymbalta 60mg BID  Wellbutrin 100mg daily       VTE Risk Mitigation (From admission, onward)         Ordered     enoxaparin injection 40 mg  Daily         06/08/23 1815     Place sequential compression device  Until discontinued         06/08/23 1815     IP VTE HIGH RISK PATIENT  Once         06/08/23 1815                Discharge Planning   SUZANNA:      Code Status: Full Code   Is the patient medically ready for discharge?:     Reason for patient still in hospital (select all that apply): Treatment  Discharge Plan A: Home with family, Home Health                  Vinay Rosen Jr, MD  Department of Hospital Medicine   [facility]

## 2023-06-16 NOTE — PLAN OF CARE
MD informed that pts only option for dc would be hh with iv abx. Pt informed that insurance denied for admission to [facility]. Per MD pt is not ready for dc at this time. SW will cont to follow for dc needs.

## 2023-06-16 NOTE — PLAN OF CARE
Problem: Adult Inpatient Plan of Care  Goal: Plan of Care Review  Outcome: Ongoing, Progressing  Goal: Patient-Specific Goal (Individualized)  Outcome: Ongoing, Progressing  Goal: Absence of Hospital-Acquired Illness or Injury  Outcome: Ongoing, Progressing  Goal: Optimal Comfort and Wellbeing  Outcome: Ongoing, Progressing  Intervention: Monitor Pain and Promote Comfort  Flowsheets (Taken 6/16/2023 1741)  Pain Management Interventions:   relaxation techniques promoted   quiet environment facilitated   pillow support provided   position adjusted   medication offered   care clustered  Intervention: Provide Person-Centered Care  Flowsheets (Taken 6/16/2023 1741)  Trust Relationship/Rapport:   care explained   reassurance provided   choices provided   thoughts/feelings acknowledged   emotional support provided   empathic listening provided   questions answered   questions encouraged  Goal: Readiness for Transition of Care  Outcome: Ongoing, Progressing     Problem: Diabetes Comorbidity  Goal: Blood Glucose Level Within Targeted Range  Outcome: Ongoing, Progressing  Intervention: Monitor and Manage Glycemia  Flowsheets (Taken 6/16/2023 1741)  Glycemic Management:   blood glucose monitored   supplemental insulin given     Problem: Pain Acute  Goal: Acceptable Pain Control and Functional Ability  Outcome: Ongoing, Progressing  Intervention: Develop Pain Management Plan  Flowsheets (Taken 6/16/2023 1741)  Pain Management Interventions:   relaxation techniques promoted   quiet environment facilitated   pillow support provided   position adjusted   medication offered   care clustered  Intervention: Optimize Psychosocial Wellbeing  Flowsheets (Taken 6/16/2023 1741)  Diversional Activities:   television   smartphone

## 2023-06-16 NOTE — ASSESSMENT & PLAN NOTE
Patient with severe lower back pain.    Prior history of diskitis or epidural abscess.  CT scan concerning for osteomyelitis.  MRI with concern for osteomyelitis at T11 and T12.    Dr. Hernandes has been consulted.    6/14- Pending transfer to speciality once approved for continual IV abx treatment for another 6-8 weeks starting from the 6/8 where repeat bcx were drawn  Repeat bcx drawn on 6/8 show ng at 72 hours     Antibiotics (From admission, onward)    Start     Stop Route Frequency Ordered    06/09/23 0900  DAPTOmycin (CUBICIN) 750 mg in sodium chloride 0.9% SolP 50 mL IVPB         -- IV Every 24 hours (non-standard times) 06/09/23 0540    06/09/23 0900  rifAMpin capsule 600 mg         -- Oral Daily 06/09/23 0540    06/08/23 2100  mupirocin 2 % ointment         06/13 2059 Nasl 2 times daily 06/08/23 1830 06/08/23 1830  cefTRIAXone (ROCEPHIN) 2 g in dextrose 5 % in water (D5W) 5 % 100 mL IVPB (MB+)         -- IV Daily 06/08/23 1815          6/15 - Continuing abx, struggling with insurance over setting for continued care.

## 2023-06-17 LAB
GLUCOSE SERPL-MCNC: 205 MG/DL (ref 70–105)
GLUCOSE SERPL-MCNC: 219 MG/DL (ref 70–105)
GLUCOSE SERPL-MCNC: 281 MG/DL (ref 70–105)
GLUCOSE SERPL-MCNC: 387 MG/DL (ref 70–105)

## 2023-06-17 PROCEDURE — 25000003 PHARM REV CODE 250: Performed by: HOSPITALIST

## 2023-06-17 PROCEDURE — 99232 PR SUBSEQUENT HOSPITAL CARE,LEVL II: ICD-10-PCS | Mod: ,,, | Performed by: HOSPITALIST

## 2023-06-17 PROCEDURE — 82962 GLUCOSE BLOOD TEST: CPT

## 2023-06-17 PROCEDURE — 63600175 PHARM REV CODE 636 W HCPCS: Performed by: HOSPITALIST

## 2023-06-17 PROCEDURE — 99232 SBSQ HOSP IP/OBS MODERATE 35: CPT | Mod: ,,, | Performed by: HOSPITALIST

## 2023-06-17 PROCEDURE — 25000003 PHARM REV CODE 250

## 2023-06-17 PROCEDURE — 11000001 HC ACUTE MED/SURG PRIVATE ROOM

## 2023-06-17 RX ADMIN — BUPROPION HYDROCHLORIDE 100 MG: 100 TABLET, FILM COATED ORAL at 09:06

## 2023-06-17 RX ADMIN — INSULIN DETEMIR 18 UNITS: 100 INJECTION, SOLUTION SUBCUTANEOUS at 08:06

## 2023-06-17 RX ADMIN — RIFAMPIN 600 MG: 300 CAPSULE ORAL at 09:06

## 2023-06-17 RX ADMIN — DAPTOMYCIN 750 MG: 500 INJECTION, POWDER, LYOPHILIZED, FOR SOLUTION INTRAVENOUS at 06:06

## 2023-06-17 RX ADMIN — DULOXETINE HYDROCHLORIDE 60 MG: 30 CAPSULE, DELAYED RELEASE ORAL at 09:06

## 2023-06-17 RX ADMIN — OXYCODONE HYDROCHLORIDE 15 MG: 5 TABLET ORAL at 04:06

## 2023-06-17 RX ADMIN — INSULIN ASPART 10 UNITS: 100 INJECTION, SOLUTION INTRAVENOUS; SUBCUTANEOUS at 05:06

## 2023-06-17 RX ADMIN — INSULIN ASPART 3 UNITS: 100 INJECTION, SOLUTION INTRAVENOUS; SUBCUTANEOUS at 08:06

## 2023-06-17 RX ADMIN — OXYCODONE HYDROCHLORIDE 15 MG: 5 TABLET ORAL at 08:06

## 2023-06-17 RX ADMIN — ENOXAPARIN SODIUM 40 MG: 40 INJECTION SUBCUTANEOUS at 04:06

## 2023-06-17 RX ADMIN — DULOXETINE HYDROCHLORIDE 60 MG: 30 CAPSULE, DELAYED RELEASE ORAL at 08:06

## 2023-06-17 RX ADMIN — INSULIN ASPART 4 UNITS: 100 INJECTION, SOLUTION INTRAVENOUS; SUBCUTANEOUS at 06:06

## 2023-06-17 NOTE — PROGRESS NOTES
[facility]  [facility] Medicine  Progress Note    Patient Name: Monica Walker  MRN: [id number]  Patient Class: IP- Inpatient   Admission Date: 6/8/2023  Length of Stay: 8 days  Attending Physician: Leon Knox MD  Primary Care Provider: Hannah Schulz MD        Subjective:     Principal Problem:Osteomyelitis of vertebra of thoracolumbar region        HPI:  Ms. 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  CT scan of lumbar region concerning for vertebral osteomyelitis.  The case was evaluated by Dr. Hernandes and he recommended admission to Medicine he would consult.  He also recommended an MRI.      She was diagnosed with MRSA bacteremia at [facility] 2 days ago and was given Dalvance (dalbavancin).  She was scheduled with outpatient follow-up with Dr. Noyola as she has had septic joint in the past.      The presents now with severe back pain.  She states that her back pain persists.  She reports a temperature of 103° in route to the facility.  She denies any chest pain or other concerns.  She had diskitis or epidural abscess in January and was treated with IV antibiotics.  She has also been recently treated for left knee prosthetic joint infection.        ED course:   Initial Vitals [06/08/23 1534]   BP Pulse Resp Temp SpO2   (!) 111/53 94 18 99.4 °F (37.4 °C) 96 %         Only pain medications given in the emergency department.        Overview/Hospital Course:  06/16 Admitted 06/09 with back pain. Treating now for MRSA bacteremia and MRSA and persistent vertebral osteomyelitis. Status post T9-L2 fusion, T11-T12 corpectomy, T11-T12 laminectomy 01/24/2023.  Also left knee septic arthritis status post 2 stage revision with Dr. Noyola 05/09/2023. MONICA no  vegetations 06/14. Plan continue dapto and rifampin 6-8 weeks.   PMHx: HTN and DM T2,       Interval History:     Review of Systems   Constitutional:  Negative for appetite change, fatigue and fever.   HENT:  Negative for congestion, hearing loss and trouble swallowing.    Respiratory:  Negative for chest tightness, shortness of breath and wheezing.    Cardiovascular:  Negative for chest pain and palpitations.   Gastrointestinal:  Negative for abdominal pain, constipation and nausea.   Genitourinary:  Negative for difficulty urinating and dysuria.   Musculoskeletal:  Positive for arthralgias, back pain and gait problem. Negative for neck stiffness.   Skin:  Negative for pallor and rash.   Neurological:  Negative for dizziness, speech difficulty and headaches.   Psychiatric/Behavioral:  Negative for confusion and suicidal ideas.    Objective:     Vital Signs (Most Recent):  Temp: 98.1 °F (36.7 °C) (06/16/23 1955)  Pulse: 78 (06/16/23 1955)  Resp: 16 (06/16/23 1955)  BP: 138/72 (06/16/23 1955)  SpO2: (!) 93 % (06/16/23 1955) Vital Signs (24h Range):  Temp:  [97.1 °F (36.2 °C)-98.9 °F (37.2 °C)] 98.1 °F (36.7 °C)  Pulse:  [71-83] 78  Resp:  [16-18] 16  SpO2:  [93 %-97 %] 93 %  BP: (109-148)/(48-74) 138/72     Weight: 68 kg (150 lb)  Body mass index is 24.21 kg/m².  No intake or output data in the 24 hours ending 06/16/23 2104      Physical Exam  Vitals reviewed.   Constitutional:       General: She is awake. She is not in acute distress.     Appearance: She is well-developed. She is not toxic-appearing.   HENT:      Head: Normocephalic.      Nose: Nose normal.      Mouth/Throat:      Pharynx: Oropharynx is clear.   Eyes:      Extraocular Movements: Extraocular movements intact.      Pupils: Pupils are equal, round, and reactive to light.   Neck:      Thyroid: No thyroid mass.      Vascular: No carotid bruit.   Cardiovascular:      Rate and Rhythm: Normal rate and regular rhythm.      Pulses: Normal pulses.      Heart  sounds: Normal heart sounds. No murmur heard.  Pulmonary:      Effort: Pulmonary effort is normal.      Breath sounds: Normal breath sounds and air entry. No wheezing.   Abdominal:      General: Bowel sounds are normal. There is no distension.      Palpations: Abdomen is soft.      Tenderness: There is no abdominal tenderness.   Musculoskeletal:         General: Normal range of motion.      Cervical back: Neck supple. No rigidity.   Skin:     General: Skin is warm.      Coloration: Skin is not jaundiced.      Findings: No lesion.   Neurological:      General: No focal deficit present.      Mental Status: She is alert and oriented to person, place, and time.      Cranial Nerves: No cranial nerve deficit.   Psychiatric:         Attention and Perception: Attention normal.         Mood and Affect: Mood normal.         Behavior: Behavior normal. Behavior is cooperative.         Thought Content: Thought content normal.         Cognition and Memory: Cognition normal.           Significant Labs: All pertinent labs within the past 24 hours have been reviewed.  BMP: No results for input(s): GLU, NA, K, CL, CO2, BUN, CREATININE, CALCIUM, MG in the last 48 hours.  CBC: No results for input(s): WBC, HGB, HCT, PLT in the last 48 hours.  CMP: No results for input(s): NA, K, CL, CO2, GLU, BUN, CREATININE, CALCIUM, PROT, ALBUMIN, BILITOT, ALKPHOS, AST, ALT, ANIONGAP, EGFRNONAA in the last 48 hours.    Invalid input(s): ESTGFAFRICA    Significant Imaging: I have reviewed all pertinent imaging results/findings within the past 24 hours.      Assessment/Plan:      * Osteomyelitis of vertebra of thoracolumbar region  Patient with severe lower back pain.    Prior history of diskitis or epidural abscess.  CT scan concerning for osteomyelitis.  MRI with concern for osteomyelitis at T11 and T12.    Dr. Hernandes has been consulted.    6/14- Pending transfer to speciality once approved for continual IV abx treatment for another 6-8 weeks starting  from the 6/8 where repeat bcx were drawn  Repeat bcx drawn on 6/8 show ng at 72 hours     Antibiotics (From admission, onward)    Start     Stop Route Frequency Ordered    06/09/23 0900  DAPTOmycin (CUBICIN) 750 mg in sodium chloride 0.9% SolP 50 mL IVPB         -- IV Every 24 hours (non-standard times) 06/09/23 0540    06/09/23 0900  rifAMpin capsule 600 mg         -- Oral Daily 06/09/23 0540    06/08/23 2100  mupirocin 2 % ointment         06/13 2059 Nasl 2 times daily 06/08/23 1830 06/08/23 1830  cefTRIAXone (ROCEPHIN) 2 g in dextrose 5 % in water (D5W) 5 % 100 mL IVPB (MB+)         -- IV Daily 06/08/23 1815          6/15 - Continuing abx, struggling with insurance over setting for continued care.     Tobacco use  Patient counseled on tobacco use and offered nicotine patch.      Primary hypertension  Continue home antihypertensives    Prior MRSA Septic Joint of Left Knee   History of removal infection of left knee joint with removal of prosthetic.    Consulted Dr. Noyola, he is aware patient is in hospital and does not see a need for a procedure as of right now  X-ray left knee done 6/9 showed some joint calcifications and loose bodies, no evidence of infection in joint       MRSA bacteremia  Multiple episodes of MRSA bacteremia.    The source may be the left knee joint but other sources are also possible.    There is a history of MRSA in left knee joint as well as in the lower vertebrae.      6/14 - MRSA in blood 6/5.  F/u blood cx 6/8 ng at 72 hours.  MRSA from back abscess.  Plan continue dapto and rifampin 6-8 weeks in speciality.  MONICA done today which did not show any vegetations concerning for endocarditis. WBC 12 today but patient clinically stable. Continue to monitor with daily CBC.     06/14 8 weeks ATB starting 06/08 until 08/03 maybe last 2 weeks oral    DM2 (diabetes mellitus, type 2)  Patient's FSGs are controlled on current medication regimen.  Last A1c reviewed-   Lab Results   Component  Value Date    HGBA1C 7.7 (H) 05/09/2023     Most recent fingerstick glucose reviewed- No results for input(s): POCTGLUCOSE in the last 24 hours.  Current correctional scale  Low  Maintain anti-hyperglycemic dose as follows-   Antihyperglycemics (From admission, onward)    Start     Stop Route Frequency Ordered    06/12/23 2100  insulin detemir U-100 injection 10 Units         -- SubQ Nightly 06/12/23 1508    06/08/23 1907  insulin aspart U-100 injection 1-10 Units         -- SubQ Before meals & nightly PRN 06/08/23 1815        Hold Oral hypoglycemics while patient is in the hospital.      Prior Lung abscesses  Prior small lung abscesses (septic emboli) which were thought to be related to hematogenous spread rather than a primary source of infection.  Consider repeat imaging in the future based on patient's clinical course.      Depression  Patient has persistent depression which is mild and is currently controlled. Will Continue anti-depressant medications. We will not consult psychiatry at this time. Patient does not display psychosis at this time. Continue to monitor closely and adjust plan of care as needed.    cymbalta 60mg BID  Wellbutrin 100mg daily       VTE Risk Mitigation (From admission, onward)         Ordered     enoxaparin injection 40 mg  Daily         06/08/23 1815     Place sequential compression device  Until discontinued         06/08/23 1815     IP VTE HIGH RISK PATIENT  Once         06/08/23 1815                Discharge Planning   SUZANNA:      Code Status: Full Code   Is the patient medically ready for discharge?:     Reason for patient still in hospital (select all that apply): Laboratory test, Treatment and Imaging  Discharge Plan A: Home with family, Home Health                  Leon Knox MD  Department of Hospital Medicine   [facility]

## 2023-06-17 NOTE — SUBJECTIVE & OBJECTIVE
Interval History:     Review of Systems   Constitutional:  Negative for appetite change, fatigue and fever.   HENT:  Negative for congestion, hearing loss and trouble swallowing.    Respiratory:  Negative for chest tightness, shortness of breath and wheezing.    Cardiovascular:  Negative for chest pain and palpitations.   Gastrointestinal:  Negative for abdominal pain, constipation and nausea.   Genitourinary:  Negative for difficulty urinating and dysuria.   Musculoskeletal:  Positive for arthralgias, back pain and gait problem. Negative for neck stiffness.   Skin:  Negative for pallor and rash.   Neurological:  Negative for dizziness, speech difficulty and headaches.   Psychiatric/Behavioral:  Negative for confusion and suicidal ideas.    Objective:     Vital Signs (Most Recent):  Temp: 98.1 °F (36.7 °C) (06/16/23 1955)  Pulse: 78 (06/16/23 1955)  Resp: 16 (06/16/23 1955)  BP: 138/72 (06/16/23 1955)  SpO2: (!) 93 % (06/16/23 1955) Vital Signs (24h Range):  Temp:  [97.1 °F (36.2 °C)-98.9 °F (37.2 °C)] 98.1 °F (36.7 °C)  Pulse:  [71-83] 78  Resp:  [16-18] 16  SpO2:  [93 %-97 %] 93 %  BP: (109-148)/(48-74) 138/72     Weight: 68 kg (150 lb)  Body mass index is 24.21 kg/m².  No intake or output data in the 24 hours ending 06/16/23 2104      Physical Exam  Vitals reviewed.   Constitutional:       General: She is awake. She is not in acute distress.     Appearance: She is well-developed. She is not toxic-appearing.   HENT:      Head: Normocephalic.      Nose: Nose normal.      Mouth/Throat:      Pharynx: Oropharynx is clear.   Eyes:      Extraocular Movements: Extraocular movements intact.      Pupils: Pupils are equal, round, and reactive to light.   Neck:      Thyroid: No thyroid mass.      Vascular: No carotid bruit.   Cardiovascular:      Rate and Rhythm: Normal rate and regular rhythm.      Pulses: Normal pulses.      Heart sounds: Normal heart sounds. No murmur heard.  Pulmonary:      Effort: Pulmonary effort is  normal.      Breath sounds: Normal breath sounds and air entry. No wheezing.   Abdominal:      General: Bowel sounds are normal. There is no distension.      Palpations: Abdomen is soft.      Tenderness: There is no abdominal tenderness.   Musculoskeletal:         General: Normal range of motion.      Cervical back: Neck supple. No rigidity.   Skin:     General: Skin is warm.      Coloration: Skin is not jaundiced.      Findings: No lesion.   Neurological:      General: No focal deficit present.      Mental Status: She is alert and oriented to person, place, and time.      Cranial Nerves: No cranial nerve deficit.   Psychiatric:         Attention and Perception: Attention normal.         Mood and Affect: Mood normal.         Behavior: Behavior normal. Behavior is cooperative.         Thought Content: Thought content normal.         Cognition and Memory: Cognition normal.           Significant Labs: All pertinent labs within the past 24 hours have been reviewed.  BMP: No results for input(s): GLU, NA, K, CL, CO2, BUN, CREATININE, CALCIUM, MG in the last 48 hours.  CBC: No results for input(s): WBC, HGB, HCT, PLT in the last 48 hours.  CMP: No results for input(s): NA, K, CL, CO2, GLU, BUN, CREATININE, CALCIUM, PROT, ALBUMIN, BILITOT, ALKPHOS, AST, ALT, ANIONGAP, EGFRNONAA in the last 48 hours.    Invalid input(s): ESTGFAFRICA    Significant Imaging: I have reviewed all pertinent imaging results/findings within the past 24 hours.

## 2023-06-17 NOTE — ASSESSMENT & PLAN NOTE
Multiple episodes of MRSA bacteremia.    The source may be the left knee joint but other sources are also possible.    There is a history of MRSA in left knee joint as well as in the lower vertebrae.      6/14 - MRSA in blood 6/5.  F/u blood cx 6/8 ng at 72 hours.  MRSA from back abscess.  Plan continue dapto and rifampin 6-8 weeks in speciality.  MONICA done today which did not show any vegetations concerning for endocarditis. WBC 12 today but patient clinically stable. Continue to monitor with daily CBC.     06/14 8 weeks ATB starting 06/08 until 08/03 maybe last 2 weeks oral

## 2023-06-17 NOTE — HOSPITAL COURSE
06/16 Admitted 06/09 with back pain. Treating now for MRSA bacteremia and MRSA and persistent vertebral osteomyelitis. Status post T9-L2 fusion, T11-T12 corpectomy, T11-T12 laminectomy 01/24/2023.  Also left knee septic arthritis status post 2 stage revision with Dr. Noyola 05/09/2023. MONICA no vegetations 06/14. Plan continue dapto and rifampin 6-8 weeks.   PMHx: HTN and DM T2,   06/17 up in room. First week look at options to complete ATB  06/18 No issues. Look at options to complete ATB. ATB until 08/03. Will need PICC line.  06/19 In good spirits and wants to good home. Looks to have had 10 days IV ATB after 05/10 G- bacteremia. This time MRSA with first negative BC 06/08. Plan 6 weeks Rocephin and 8 weeks coverage MRSA both starting 06/08.   06/20 Getting outpt ATB arranged then home. Talked with ortho. See ID outpt  06/21 has been approved for outpatient antibiotics.  Outpatient appointments made including following up with ID physician in [place].  Will discharge home.  Follow up with primary care provider in 1 week.  Home health to see patient for home IV antibiotic.  PICC line to be removed after antibiotics complete.    Patient counseled on the importance of keeping all hospital follow up appointments. Stressed compliance with medications, therapies, or devices as prescribed in order to provide for the best health outcomes and decrease the risk of reoccurrence or further progression of issues.    Additionally, patient given written literature regarding their current disease processes and home care recommendations.   Patient given opportunity to ask questions and verbalized understanding of all information discussed.  Reinforced patient's primary care provider can be a big assets in monitoring and organizing issues after discharge.

## 2023-06-18 LAB
GLUCOSE SERPL-MCNC: 247 MG/DL (ref 70–105)
GLUCOSE SERPL-MCNC: 251 MG/DL (ref 70–105)
GLUCOSE SERPL-MCNC: 254 MG/DL (ref 70–105)
GLUCOSE SERPL-MCNC: 307 MG/DL (ref 70–105)

## 2023-06-18 PROCEDURE — 63600175 PHARM REV CODE 636 W HCPCS: Performed by: HOSPITALIST

## 2023-06-18 PROCEDURE — 25000003 PHARM REV CODE 250: Performed by: FAMILY MEDICINE

## 2023-06-18 PROCEDURE — 25000003 PHARM REV CODE 250: Performed by: HOSPITALIST

## 2023-06-18 PROCEDURE — 25000003 PHARM REV CODE 250

## 2023-06-18 PROCEDURE — 11000001 HC ACUTE MED/SURG PRIVATE ROOM

## 2023-06-18 PROCEDURE — 99232 PR SUBSEQUENT HOSPITAL CARE,LEVL II: ICD-10-PCS | Mod: ,,, | Performed by: HOSPITALIST

## 2023-06-18 PROCEDURE — 82962 GLUCOSE BLOOD TEST: CPT

## 2023-06-18 PROCEDURE — 99232 SBSQ HOSP IP/OBS MODERATE 35: CPT | Mod: ,,, | Performed by: HOSPITALIST

## 2023-06-18 RX ADMIN — INSULIN ASPART 8 UNITS: 100 INJECTION, SOLUTION INTRAVENOUS; SUBCUTANEOUS at 04:06

## 2023-06-18 RX ADMIN — OXYCODONE HYDROCHLORIDE 15 MG: 5 TABLET ORAL at 07:06

## 2023-06-18 RX ADMIN — INSULIN ASPART 6 UNITS: 100 INJECTION, SOLUTION INTRAVENOUS; SUBCUTANEOUS at 12:06

## 2023-06-18 RX ADMIN — INSULIN ASPART 3 UNITS: 100 INJECTION, SOLUTION INTRAVENOUS; SUBCUTANEOUS at 07:06

## 2023-06-18 RX ADMIN — DULOXETINE HYDROCHLORIDE 60 MG: 30 CAPSULE, DELAYED RELEASE ORAL at 08:06

## 2023-06-18 RX ADMIN — INSULIN ASPART 4 UNITS: 100 INJECTION, SOLUTION INTRAVENOUS; SUBCUTANEOUS at 06:06

## 2023-06-18 RX ADMIN — DAPTOMYCIN 750 MG: 500 INJECTION, POWDER, LYOPHILIZED, FOR SOLUTION INTRAVENOUS at 05:06

## 2023-06-18 RX ADMIN — CEFTRIAXONE SODIUM 2 G: 2 INJECTION, POWDER, FOR SOLUTION INTRAMUSCULAR; INTRAVENOUS at 08:06

## 2023-06-18 RX ADMIN — FENTANYL 1 PATCH: 25 PATCH TRANSDERMAL at 12:06

## 2023-06-18 RX ADMIN — BUPROPION HYDROCHLORIDE 100 MG: 100 TABLET, FILM COATED ORAL at 08:06

## 2023-06-18 RX ADMIN — HUMAN INSULIN 17 UNITS: 100 INJECTION, SUSPENSION SUBCUTANEOUS at 06:06

## 2023-06-18 RX ADMIN — ENOXAPARIN SODIUM 40 MG: 40 INJECTION SUBCUTANEOUS at 04:06

## 2023-06-18 RX ADMIN — RIFAMPIN 600 MG: 300 CAPSULE ORAL at 08:06

## 2023-06-18 RX ADMIN — DULOXETINE HYDROCHLORIDE 60 MG: 30 CAPSULE, DELAYED RELEASE ORAL at 07:06

## 2023-06-18 RX ADMIN — INSULIN DETEMIR 20 UNITS: 100 INJECTION, SOLUTION SUBCUTANEOUS at 07:06

## 2023-06-18 NOTE — PLAN OF CARE
Problem: Adult Inpatient Plan of Care  Goal: Plan of Care Review  Outcome: Ongoing, Progressing  Flowsheets (Taken 6/18/2023 1617)  Plan of Care Reviewed With: patient  Goal: Patient-Specific Goal (Individualized)  Outcome: Ongoing, Progressing  Flowsheets (Taken 6/18/2023 1617)  Anxieties, Fears or Concerns: pain management  Individualized Care Needs: pain management  Goal: Absence of Hospital-Acquired Illness or Injury  Outcome: Ongoing, Progressing  Goal: Optimal Comfort and Wellbeing  Outcome: Ongoing, Progressing  Goal: Readiness for Transition of Care  Outcome: Ongoing, Progressing

## 2023-06-18 NOTE — PLAN OF CARE
Problem: Adult Inpatient Plan of Care  Goal: Plan of Care Review  Outcome: Ongoing, Progressing  Goal: Patient-Specific Goal (Individualized)  Outcome: Ongoing, Progressing  Goal: Absence of Hospital-Acquired Illness or Injury  Outcome: Ongoing, Progressing  Goal: Optimal Comfort and Wellbeing  Outcome: Ongoing, Progressing  Goal: Readiness for Transition of Care  Outcome: Ongoing, Progressing     Problem: Diabetes Comorbidity  Goal: Blood Glucose Level Within Targeted Range  Outcome: Ongoing, Progressing     Problem: Pain Acute  Goal: Acceptable Pain Control and Functional Ability  Outcome: Ongoing, Progressing     Problem: Infection  Goal: Absence of Infection Signs and Symptoms  Outcome: Ongoing, Progressing

## 2023-06-18 NOTE — PROGRESS NOTES
[facility]  [facility] Medicine  Progress Note    Patient Name: Monica Walker  MRN: [id number]  Patient Class: IP- Inpatient   Admission Date: 6/8/2023  Length of Stay: 9 days  Attending Physician: Leon Knox MD  Primary Care Provider: Hannah Schulz MD        Subjective:     Principal Problem:Osteomyelitis of vertebra of thoracolumbar region        HPI:  Ms. 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  CT scan of lumbar region concerning for vertebral osteomyelitis.  The case was evaluated by Dr. Hernandes and he recommended admission to Medicine he would consult.  He also recommended an MRI.      She was diagnosed with MRSA bacteremia at [facility] 2 days ago and was given Dalvance (dalbavancin).  She was scheduled with outpatient follow-up with Dr. Noyola as she has had septic joint in the past.      The presents now with severe back pain.  She states that her back pain persists.  She reports a temperature of 103° in route to the facility.  She denies any chest pain or other concerns.  She had diskitis or epidural abscess in January and was treated with IV antibiotics.  She has also been recently treated for left knee prosthetic joint infection.        ED course:   Initial Vitals [06/08/23 1534]   BP Pulse Resp Temp SpO2   (!) 111/53 94 18 99.4 °F (37.4 °C) 96 %         Only pain medications given in the emergency department.        Overview/Hospital Course:  06/16 Admitted 06/09 with back pain. Treating now for MRSA bacteremia and MRSA and persistent vertebral osteomyelitis. Status post T9-L2 fusion, T11-T12 corpectomy, T11-T12 laminectomy 01/24/2023.  Also left knee septic arthritis status post 2 stage revision with Dr. Noyola 05/09/2023. MONICA no  vegetations 06/14. Plan continue dapto and rifampin 6-8 weeks.   PMHx: HTN and DM T2,   06/17 up in room. First week look at options to complete ATB      Interval History:     Review of Systems   Constitutional:  Negative for appetite change, fatigue and fever.   HENT:  Negative for congestion, hearing loss and trouble swallowing.    Respiratory:  Negative for chest tightness, shortness of breath and wheezing.    Cardiovascular:  Negative for chest pain and palpitations.   Gastrointestinal:  Negative for abdominal pain, constipation and nausea.   Genitourinary:  Negative for difficulty urinating and dysuria.   Musculoskeletal:  Positive for arthralgias, back pain and gait problem. Negative for neck stiffness.   Skin:  Negative for pallor and rash.   Neurological:  Negative for dizziness, speech difficulty and headaches.   Psychiatric/Behavioral:  Negative for confusion and suicidal ideas.    Objective:     Vital Signs (Most Recent):  Temp: 97.7 °F (36.5 °C) (06/17/23 1951)  Pulse: 78 (06/17/23 1951)  Resp: 16 (06/17/23 2059)  BP: 135/69 (06/17/23 1951)  SpO2: 98 % (06/17/23 1951) Vital Signs (24h Range):  Temp:  [97.2 °F (36.2 °C)-98.9 °F (37.2 °C)] 97.7 °F (36.5 °C)  Pulse:  [69-78] 78  Resp:  [16] 16  SpO2:  [94 %-98 %] 98 %  BP: (111-146)/(60-71) 135/69     Weight: 68 kg (150 lb)  Body mass index is 24.21 kg/m².  No intake or output data in the 24 hours ending 06/17/23 0854      Physical Exam  Vitals reviewed.   Constitutional:       General: She is awake. She is not in acute distress.     Appearance: She is well-developed. She is not toxic-appearing.   HENT:      Head: Normocephalic.      Nose: Nose normal.      Mouth/Throat:      Pharynx: Oropharynx is clear.   Eyes:      Extraocular Movements: Extraocular movements intact.      Pupils: Pupils are equal, round, and reactive to light.   Neck:      Thyroid: No thyroid mass.      Vascular: No carotid bruit.   Cardiovascular:      Rate and Rhythm: Normal rate and  regular rhythm.      Pulses: Normal pulses.      Heart sounds: Normal heart sounds. No murmur heard.  Pulmonary:      Effort: Pulmonary effort is normal.      Breath sounds: Normal breath sounds and air entry. No wheezing.   Abdominal:      General: Bowel sounds are normal. There is no distension.      Palpations: Abdomen is soft.      Tenderness: There is no abdominal tenderness.   Musculoskeletal:         General: Normal range of motion.      Cervical back: Neck supple. No rigidity.   Skin:     General: Skin is warm.      Coloration: Skin is not jaundiced.      Findings: No lesion.   Neurological:      General: No focal deficit present.      Mental Status: She is alert and oriented to person, place, and time.      Cranial Nerves: No cranial nerve deficit.   Psychiatric:         Attention and Perception: Attention normal.         Mood and Affect: Mood normal.         Behavior: Behavior normal. Behavior is cooperative.         Thought Content: Thought content normal.         Cognition and Memory: Cognition normal.           Significant Labs: All pertinent labs within the past 24 hours have been reviewed.  BMP: No results for input(s): GLU, NA, K, CL, CO2, BUN, CREATININE, CALCIUM, MG in the last 48 hours.  CBC: No results for input(s): WBC, HGB, HCT, PLT in the last 48 hours.  CMP: No results for input(s): NA, K, CL, CO2, GLU, BUN, CREATININE, CALCIUM, PROT, ALBUMIN, BILITOT, ALKPHOS, AST, ALT, ANIONGAP, EGFRNONAA in the last 48 hours.    Invalid input(s): ESTGFAFRICA    Significant Imaging: I have reviewed all pertinent imaging results/findings within the past 24 hours.      Assessment/Plan:      * Osteomyelitis of vertebra of thoracolumbar region  Patient with severe lower back pain.    Prior history of diskitis or epidural abscess.  CT scan concerning for osteomyelitis.  MRI with concern for osteomyelitis at T11 and T12.    Dr. Hernandes has been consulted.    6/14- Pending transfer to speciality once approved for  continual IV abx treatment for another 6-8 weeks starting from the 6/8 where repeat bcx were drawn  Repeat bcx drawn on 6/8 show ng at 72 hours     Antibiotics (From admission, onward)    Start     Stop Route Frequency Ordered    06/09/23 0900  DAPTOmycin (CUBICIN) 750 mg in sodium chloride 0.9% SolP 50 mL IVPB         -- IV Every 24 hours (non-standard times) 06/09/23 0540    06/09/23 0900  rifAMpin capsule 600 mg         -- Oral Daily 06/09/23 0540    06/08/23 2100  mupirocin 2 % ointment         06/13 2059 Nasl 2 times daily 06/08/23 1830 06/08/23 1830  cefTRIAXone (ROCEPHIN) 2 g in dextrose 5 % in water (D5W) 5 % 100 mL IVPB (MB+)         -- IV Daily 06/08/23 1815          6/15 - Continuing abx, struggling with insurance over setting for continued care.     Tobacco use  Patient counseled on tobacco use and offered nicotine patch.      Primary hypertension  Continue home antihypertensives    Prior MRSA Septic Joint of Left Knee   History of removal infection of left knee joint with removal of prosthetic.    Consulted Dr. Noyola, he is aware patient is in hospital and does not see a need for a procedure as of right now  X-ray left knee done 6/9 showed some joint calcifications and loose bodies, no evidence of infection in joint       MRSA bacteremia  Multiple episodes of MRSA bacteremia.    The source may be the left knee joint but other sources are also possible.    There is a history of MRSA in left knee joint as well as in the lower vertebrae.      6/14 - MRSA in blood 6/5.  F/u blood cx 6/8 ng at 72 hours.  MRSA from back abscess.  Plan continue dapto and rifampin 6-8 weeks in speciality.  MONICA done today which did not show any vegetations concerning for endocarditis. WBC 12 today but patient clinically stable. Continue to monitor with daily CBC.     06/14 8 weeks ATB starting 06/08 until 08/03 maybe last 2 weeks oral    DM2 (diabetes mellitus, type 2)  Patient's FSGs are controlled on current medication  regimen.  Last A1c reviewed-   Lab Results   Component Value Date    HGBA1C 7.7 (H) 05/09/2023     Most recent fingerstick glucose reviewed- No results for input(s): POCTGLUCOSE in the last 24 hours.  Current correctional scale  Low  Maintain anti-hyperglycemic dose as follows-   Antihyperglycemics (From admission, onward)    Start     Stop Route Frequency Ordered    06/12/23 2100  insulin detemir U-100 injection 10 Units         -- SubQ Nightly 06/12/23 1508    06/08/23 1907  insulin aspart U-100 injection 1-10 Units         -- SubQ Before meals & nightly PRN 06/08/23 1815        Hold Oral hypoglycemics while patient is in the hospital.      Prior Lung abscesses  Prior small lung abscesses (septic emboli) which were thought to be related to hematogenous spread rather than a primary source of infection.  Consider repeat imaging in the future based on patient's clinical course.      Depression  Patient has persistent depression which is mild and is currently controlled. Will Continue anti-depressant medications. We will not consult psychiatry at this time. Patient does not display psychosis at this time. Continue to monitor closely and adjust plan of care as needed.    cymbalta 60mg BID  Wellbutrin 100mg daily       VTE Risk Mitigation (From admission, onward)         Ordered     enoxaparin injection 40 mg  Daily         06/08/23 1815     Place sequential compression device  Until discontinued         06/08/23 1815     IP VTE HIGH RISK PATIENT  Once         06/08/23 1815                Discharge Planning   SUZANNA:      Code Status: Full Code   Is the patient medically ready for discharge?:     Reason for patient still in hospital (select all that apply): Laboratory test, Treatment and Imaging  Discharge Plan A: Home with family, Home Health                  Leon Knox MD  Department of Hospital Medicine   [facility]

## 2023-06-18 NOTE — SUBJECTIVE & OBJECTIVE
Interval History:     Review of Systems   Constitutional:  Negative for appetite change, fatigue and fever.   HENT:  Negative for congestion, hearing loss and trouble swallowing.    Respiratory:  Negative for chest tightness, shortness of breath and wheezing.    Cardiovascular:  Negative for chest pain and palpitations.   Gastrointestinal:  Negative for abdominal pain, constipation and nausea.   Genitourinary:  Negative for difficulty urinating and dysuria.   Musculoskeletal:  Positive for arthralgias, back pain and gait problem. Negative for neck stiffness.   Skin:  Negative for pallor and rash.   Neurological:  Negative for dizziness, speech difficulty and headaches.   Psychiatric/Behavioral:  Negative for confusion and suicidal ideas.    Objective:     Vital Signs (Most Recent):  Temp: 97.7 °F (36.5 °C) (06/17/23 1951)  Pulse: 78 (06/17/23 1951)  Resp: 16 (06/17/23 2059)  BP: 135/69 (06/17/23 1951)  SpO2: 98 % (06/17/23 1951) Vital Signs (24h Range):  Temp:  [97.2 °F (36.2 °C)-98.9 °F (37.2 °C)] 97.7 °F (36.5 °C)  Pulse:  [69-78] 78  Resp:  [16] 16  SpO2:  [94 %-98 %] 98 %  BP: (111-146)/(60-71) 135/69     Weight: 68 kg (150 lb)  Body mass index is 24.21 kg/m².  No intake or output data in the 24 hours ending 06/17/23 2315      Physical Exam  Vitals reviewed.   Constitutional:       General: She is awake. She is not in acute distress.     Appearance: She is well-developed. She is not toxic-appearing.   HENT:      Head: Normocephalic.      Nose: Nose normal.      Mouth/Throat:      Pharynx: Oropharynx is clear.   Eyes:      Extraocular Movements: Extraocular movements intact.      Pupils: Pupils are equal, round, and reactive to light.   Neck:      Thyroid: No thyroid mass.      Vascular: No carotid bruit.   Cardiovascular:      Rate and Rhythm: Normal rate and regular rhythm.      Pulses: Normal pulses.      Heart sounds: Normal heart sounds. No murmur heard.  Pulmonary:      Effort: Pulmonary effort is normal.       Breath sounds: Normal breath sounds and air entry. No wheezing.   Abdominal:      General: Bowel sounds are normal. There is no distension.      Palpations: Abdomen is soft.      Tenderness: There is no abdominal tenderness.   Musculoskeletal:         General: Normal range of motion.      Cervical back: Neck supple. No rigidity.   Skin:     General: Skin is warm.      Coloration: Skin is not jaundiced.      Findings: No lesion.   Neurological:      General: No focal deficit present.      Mental Status: She is alert and oriented to person, place, and time.      Cranial Nerves: No cranial nerve deficit.   Psychiatric:         Attention and Perception: Attention normal.         Mood and Affect: Mood normal.         Behavior: Behavior normal. Behavior is cooperative.         Thought Content: Thought content normal.         Cognition and Memory: Cognition normal.           Significant Labs: All pertinent labs within the past 24 hours have been reviewed.  BMP: No results for input(s): GLU, NA, K, CL, CO2, BUN, CREATININE, CALCIUM, MG in the last 48 hours.  CBC: No results for input(s): WBC, HGB, HCT, PLT in the last 48 hours.  CMP: No results for input(s): NA, K, CL, CO2, GLU, BUN, CREATININE, CALCIUM, PROT, ALBUMIN, BILITOT, ALKPHOS, AST, ALT, ANIONGAP, EGFRNONAA in the last 48 hours.    Invalid input(s): ESTGFAFRICA    Significant Imaging: I have reviewed all pertinent imaging results/findings within the past 24 hours.

## 2023-06-19 LAB
ANION GAP SERPL CALCULATED.3IONS-SCNC: 14 MMOL/L (ref 7–16)
BASOPHILS # BLD AUTO: 0.06 K/UL (ref 0–0.2)
BASOPHILS NFR BLD AUTO: 0.5 % (ref 0–1)
BUN SERPL-MCNC: 13 MG/DL (ref 7–18)
BUN/CREAT SERPL: 15 (ref 6–20)
CALCIUM SERPL-MCNC: 8.9 MG/DL (ref 8.5–10.1)
CHLORIDE SERPL-SCNC: 97 MMOL/L (ref 98–107)
CO2 SERPL-SCNC: 29 MMOL/L (ref 21–32)
CREAT SERPL-MCNC: 0.89 MG/DL (ref 0.55–1.02)
CRP SERPL-MCNC: 5.7 MG/DL (ref 0–0.8)
DIFFERENTIAL METHOD BLD: ABNORMAL
EGFR (NO RACE VARIABLE) (RUSH/TITUS): 77 ML/MIN/1.73M2
EOSINOPHIL # BLD AUTO: 0.4 K/UL (ref 0–0.5)
EOSINOPHIL NFR BLD AUTO: 3.6 % (ref 1–4)
ERYTHROCYTE [DISTWIDTH] IN BLOOD BY AUTOMATED COUNT: 14.7 % (ref 11.5–14.5)
ERYTHROCYTE [SEDIMENTATION RATE] IN BLOOD BY WESTERGREN METHOD: 125 MM/HR (ref 0–30)
GLUCOSE SERPL-MCNC: 174 MG/DL (ref 70–105)
GLUCOSE SERPL-MCNC: 209 MG/DL (ref 70–105)
GLUCOSE SERPL-MCNC: 216 MG/DL (ref 70–105)
GLUCOSE SERPL-MCNC: 224 MG/DL (ref 74–106)
GLUCOSE SERPL-MCNC: 287 MG/DL (ref 70–105)
HCT VFR BLD AUTO: 34.3 % (ref 38–47)
HGB BLD-MCNC: 10.8 G/DL (ref 12–16)
IMM GRANULOCYTES # BLD AUTO: 0.21 K/UL (ref 0–0.04)
IMM GRANULOCYTES NFR BLD: 1.9 % (ref 0–0.4)
LYMPHOCYTES # BLD AUTO: 2.7 K/UL (ref 1–4.8)
LYMPHOCYTES NFR BLD AUTO: 24.3 % (ref 27–41)
MAGNESIUM SERPL-MCNC: 1.8 MG/DL (ref 1.7–2.3)
MCH RBC QN AUTO: 26.7 PG (ref 27–31)
MCHC RBC AUTO-ENTMCNC: 31.5 G/DL (ref 32–36)
MCV RBC AUTO: 84.7 FL (ref 80–96)
MONOCYTES # BLD AUTO: 0.78 K/UL (ref 0–0.8)
MONOCYTES NFR BLD AUTO: 7 % (ref 2–6)
MPC BLD CALC-MCNC: 9.3 FL (ref 9.4–12.4)
NEUTROPHILS # BLD AUTO: 6.98 K/UL (ref 1.8–7.7)
NEUTROPHILS NFR BLD AUTO: 62.7 % (ref 53–65)
NRBC # BLD AUTO: 0 X10E3/UL
NRBC, AUTO (.00): 0 %
PLATELET # BLD AUTO: 421 K/UL (ref 150–400)
POTASSIUM SERPL-SCNC: 4 MMOL/L (ref 3.5–5.1)
RBC # BLD AUTO: 4.05 M/UL (ref 4.2–5.4)
SODIUM SERPL-SCNC: 136 MMOL/L (ref 136–145)
WBC # BLD AUTO: 11.13 K/UL (ref 4.5–11)

## 2023-06-19 PROCEDURE — 11000001 HC ACUTE MED/SURG PRIVATE ROOM

## 2023-06-19 PROCEDURE — 25000003 PHARM REV CODE 250

## 2023-06-19 PROCEDURE — 63600175 PHARM REV CODE 636 W HCPCS: Performed by: HOSPITALIST

## 2023-06-19 PROCEDURE — 85025 COMPLETE CBC W/AUTO DIFF WBC: CPT | Performed by: HOSPITALIST

## 2023-06-19 PROCEDURE — 99232 SBSQ HOSP IP/OBS MODERATE 35: CPT | Mod: ,,, | Performed by: HOSPITALIST

## 2023-06-19 PROCEDURE — 82962 GLUCOSE BLOOD TEST: CPT

## 2023-06-19 PROCEDURE — 94761 N-INVAS EAR/PLS OXIMETRY MLT: CPT

## 2023-06-19 PROCEDURE — 25000003 PHARM REV CODE 250: Performed by: HOSPITALIST

## 2023-06-19 PROCEDURE — 86140 C-REACTIVE PROTEIN: CPT

## 2023-06-19 PROCEDURE — 83735 ASSAY OF MAGNESIUM: CPT | Performed by: HOSPITALIST

## 2023-06-19 PROCEDURE — 80048 BASIC METABOLIC PNL TOTAL CA: CPT | Performed by: HOSPITALIST

## 2023-06-19 PROCEDURE — 85651 RBC SED RATE NONAUTOMATED: CPT

## 2023-06-19 PROCEDURE — 99232 PR SUBSEQUENT HOSPITAL CARE,LEVL II: ICD-10-PCS | Mod: ,,, | Performed by: HOSPITALIST

## 2023-06-19 RX ADMIN — RIFAMPIN 600 MG: 300 CAPSULE ORAL at 08:06

## 2023-06-19 RX ADMIN — DULOXETINE HYDROCHLORIDE 60 MG: 30 CAPSULE, DELAYED RELEASE ORAL at 08:06

## 2023-06-19 RX ADMIN — INSULIN ASPART 4 UNITS: 100 INJECTION, SOLUTION INTRAVENOUS; SUBCUTANEOUS at 06:06

## 2023-06-19 RX ADMIN — DAPTOMYCIN 750 MG: 500 INJECTION, POWDER, LYOPHILIZED, FOR SOLUTION INTRAVENOUS at 05:06

## 2023-06-19 RX ADMIN — INSULIN ASPART 4 UNITS: 100 INJECTION, SOLUTION INTRAVENOUS; SUBCUTANEOUS at 12:06

## 2023-06-19 RX ADMIN — INSULIN DETEMIR 25 UNITS: 100 INJECTION, SOLUTION SUBCUTANEOUS at 08:06

## 2023-06-19 RX ADMIN — OXYCODONE HYDROCHLORIDE 15 MG: 5 TABLET ORAL at 08:06

## 2023-06-19 RX ADMIN — BUPROPION HYDROCHLORIDE 100 MG: 100 TABLET, FILM COATED ORAL at 08:06

## 2023-06-19 RX ADMIN — HUMAN INSULIN 25 UNITS: 100 INJECTION, SUSPENSION SUBCUTANEOUS at 06:06

## 2023-06-19 RX ADMIN — ENOXAPARIN SODIUM 40 MG: 40 INJECTION SUBCUTANEOUS at 05:06

## 2023-06-19 RX ADMIN — INSULIN ASPART 2 UNITS: 100 INJECTION, SOLUTION INTRAVENOUS; SUBCUTANEOUS at 05:06

## 2023-06-19 RX ADMIN — CEFTRIAXONE SODIUM 2 G: 2 INJECTION, POWDER, FOR SOLUTION INTRAMUSCULAR; INTRAVENOUS at 08:06

## 2023-06-19 RX ADMIN — INSULIN ASPART 3 UNITS: 100 INJECTION, SOLUTION INTRAVENOUS; SUBCUTANEOUS at 08:06

## 2023-06-19 NOTE — PROGRESS NOTES
[facility]  [facility] Medicine  Progress Note    Patient Name: Monica Walker  MRN: [id number]  Patient Class: IP- Inpatient   Admission Date: 6/8/2023  Length of Stay: 10 days  Attending Physician: Leon Knox MD  Primary Care Provider: Hannah Schulz MD        Subjective:     Principal Problem:Osteomyelitis of vertebra of thoracolumbar region        HPI:  Ms. 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  CT scan of lumbar region concerning for vertebral osteomyelitis.  The case was evaluated by Dr. Hernandes and he recommended admission to Medicine he would consult.  He also recommended an MRI.      She was diagnosed with MRSA bacteremia at [facility] 2 days ago and was given Dalvance (dalbavancin).  She was scheduled with outpatient follow-up with Dr. Noyola as she has had septic joint in the past.      The presents now with severe back pain.  She states that her back pain persists.  She reports a temperature of 103° in route to the facility.  She denies any chest pain or other concerns.  She had diskitis or epidural abscess in January and was treated with IV antibiotics.  She has also been recently treated for left knee prosthetic joint infection.        ED course:   Initial Vitals [06/08/23 1534]   BP Pulse Resp Temp SpO2   (!) 111/53 94 18 99.4 °F (37.4 °C) 96 %         Only pain medications given in the emergency department.        Overview/Hospital Course:  06/16 Admitted 06/09 with back pain. Treating now for MRSA bacteremia and MRSA and persistent vertebral osteomyelitis. Status post T9-L2 fusion, T11-T12 corpectomy, T11-T12 laminectomy 01/24/2023.  Also left knee septic arthritis status post 2 stage revision with Dr. Noyola 05/09/2023. MONICA no  vegetations 06/14. Plan continue dapto and rifampin 6-8 weeks.   PMHx: HTN and DM T2,   06/17 up in room. First week look at options to complete ATB  06/18 No issues. Look at options to complete ATB. ATB until 08/03. Will need PICC line.      Interval History:     Review of Systems   Constitutional:  Negative for appetite change, fatigue and fever.   HENT:  Negative for congestion, hearing loss and trouble swallowing.    Respiratory:  Negative for chest tightness, shortness of breath and wheezing.    Cardiovascular:  Negative for chest pain and palpitations.   Gastrointestinal:  Negative for abdominal pain, constipation and nausea.   Genitourinary:  Negative for difficulty urinating and dysuria.   Musculoskeletal:  Positive for arthralgias, back pain and gait problem. Negative for neck stiffness.   Skin:  Negative for pallor and rash.   Neurological:  Negative for dizziness, speech difficulty and headaches.   Psychiatric/Behavioral:  Negative for confusion and suicidal ideas.    Objective:     Vital Signs (Most Recent):  Temp: 98.2 °F (36.8 °C) (06/18/23 1934)  Pulse: 77 (06/18/23 1934)  Resp: 16 (06/18/23 1938)  BP: (!) 140/64 (06/18/23 1934)  SpO2: 98 % (06/18/23 1934) Vital Signs (24h Range):  Temp:  [97.7 °F (36.5 °C)-99 °F (37.2 °C)] 98.2 °F (36.8 °C)  Pulse:  [66-77] 77  Resp:  [16-18] 16  SpO2:  [93 %-98 %] 98 %  BP: (117-140)/(63-68) 140/64     Weight: 68 kg (150 lb)  Body mass index is 24.21 kg/m².    Intake/Output Summary (Last 24 hours) at 6/18/2023 2234  Last data filed at 6/18/2023 0912  Gross per 24 hour   Intake 900 ml   Output --   Net 900 ml         Physical Exam  Vitals reviewed.   Constitutional:       General: She is awake. She is not in acute distress.     Appearance: She is well-developed. She is not toxic-appearing.   HENT:      Head: Normocephalic.      Nose: Nose normal.      Mouth/Throat:      Pharynx: Oropharynx is clear.   Eyes:      Extraocular Movements: Extraocular movements intact.       Pupils: Pupils are equal, round, and reactive to light.   Neck:      Thyroid: No thyroid mass.      Vascular: No carotid bruit.   Cardiovascular:      Rate and Rhythm: Normal rate and regular rhythm.      Pulses: Normal pulses.      Heart sounds: Normal heart sounds. No murmur heard.  Pulmonary:      Effort: Pulmonary effort is normal.      Breath sounds: Normal breath sounds and air entry. No wheezing.   Abdominal:      General: Bowel sounds are normal. There is no distension.      Palpations: Abdomen is soft.      Tenderness: There is no abdominal tenderness.   Musculoskeletal:         General: Normal range of motion.      Cervical back: Neck supple. No rigidity.   Skin:     General: Skin is warm.      Coloration: Skin is not jaundiced.      Findings: No lesion.   Neurological:      General: No focal deficit present.      Mental Status: She is alert and oriented to person, place, and time.      Cranial Nerves: No cranial nerve deficit.   Psychiatric:         Attention and Perception: Attention normal.         Mood and Affect: Mood normal.         Behavior: Behavior normal. Behavior is cooperative.         Thought Content: Thought content normal.         Cognition and Memory: Cognition normal.           Significant Labs: All pertinent labs within the past 24 hours have been reviewed.  BMP: No results for input(s): GLU, NA, K, CL, CO2, BUN, CREATININE, CALCIUM, MG in the last 48 hours.  CBC: No results for input(s): WBC, HGB, HCT, PLT in the last 48 hours.  CMP: No results for input(s): NA, K, CL, CO2, GLU, BUN, CREATININE, CALCIUM, PROT, ALBUMIN, BILITOT, ALKPHOS, AST, ALT, ANIONGAP, EGFRNONAA in the last 48 hours.    Invalid input(s): ESTGFAFR[place]    Significant Imaging: I have reviewed all pertinent imaging results/findings within the past 24 hours.      Assessment/Plan:      * Osteomyelitis of vertebra of thoracolumbar region  Patient with severe lower back pain.    Prior history of diskitis or epidural  abscess.  CT scan concerning for osteomyelitis.  MRI with concern for osteomyelitis at T11 and T12.    Dr. Hernandes has been consulted.    6/14- Pending transfer to speciality once approved for continual IV abx treatment for another 6-8 weeks starting from the 6/8 where repeat bcx were drawn  Repeat bcx drawn on 6/8 show ng at 72 hours     Antibiotics (From admission, onward)    Start     Stop Route Frequency Ordered    06/09/23 0900  DAPTOmycin (CUBICIN) 750 mg in sodium chloride 0.9% SolP 50 mL IVPB         -- IV Every 24 hours (non-standard times) 06/09/23 0540    06/09/23 0900  rifAMpin capsule 600 mg         -- Oral Daily 06/09/23 0540    06/08/23 2100  mupirocin 2 % ointment         06/13 2059 Nasl 2 times daily 06/08/23 1830 06/08/23 1830  cefTRIAXone (ROCEPHIN) 2 g in dextrose 5 % in water (D5W) 5 % 100 mL IVPB (MB+)         -- IV Daily 06/08/23 1815          6/15 - Continuing abx, struggling with insurance over setting for continued care.     Tobacco use  Patient counseled on tobacco use and offered nicotine patch.      Primary hypertension  Continue home antihypertensives    Prior MRSA Septic Joint of Left Knee   History of removal infection of left knee joint with removal of prosthetic.    Consulted Dr. Noyola, he is aware patient is in hospital and does not see a need for a procedure as of right now  X-ray left knee done 6/9 showed some joint calcifications and loose bodies, no evidence of infection in joint       MRSA bacteremia  Multiple episodes of MRSA bacteremia.    The source may be the left knee joint but other sources are also possible.    There is a history of MRSA in left knee joint as well as in the lower vertebrae.      6/14 - MRSA in blood 6/5.  F/u blood cx 6/8 ng at 72 hours.  MRSA from back abscess.  Plan continue dapto and rifampin 6-8 weeks in speciality.  MONICA done today which did not show any vegetations concerning for endocarditis. WBC 12 today but patient clinically stable.  Continue to monitor with daily CBC.     06/14 8 weeks ATB starting 06/08 until 08/03 maybe last 2 weeks oral    DM2 (diabetes mellitus, type 2)  Patient's FSGs are controlled on current medication regimen.  Last A1c reviewed-   Lab Results   Component Value Date    HGBA1C 7.7 (H) 05/09/2023     Most recent fingerstick glucose reviewed- No results for input(s): POCTGLUCOSE in the last 24 hours.  Current correctional scale  Low  Maintain anti-hyperglycemic dose as follows-   Antihyperglycemics (From admission, onward)    Start     Stop Route Frequency Ordered    06/12/23 2100  insulin detemir U-100 injection 10 Units         -- SubQ Nightly 06/12/23 1508    06/08/23 1907  insulin aspart U-100 injection 1-10 Units         -- SubQ Before meals & nightly PRN 06/08/23 1815        Hold Oral hypoglycemics while patient is in the hospital.      Prior Lung abscesses  Prior small lung abscesses (septic emboli) which were thought to be related to hematogenous spread rather than a primary source of infection.  Consider repeat imaging in the future based on patient's clinical course.      Depression  Patient has persistent depression which is mild and is currently controlled. Will Continue anti-depressant medications. We will not consult psychiatry at this time. Patient does not display psychosis at this time. Continue to monitor closely and adjust plan of care as needed.    cymbalta 60mg BID  Wellbutrin 100mg daily       VTE Risk Mitigation (From admission, onward)         Ordered     enoxaparin injection 40 mg  Daily         06/08/23 1815     Place sequential compression device  Until discontinued         06/08/23 1815     IP VTE HIGH RISK PATIENT  Once         06/08/23 1815                Discharge Planning   SUZANNA:      Code Status: Full Code   Is the patient medically ready for discharge?:     Reason for patient still in hospital (select all that apply): Laboratory test, Treatment, Imaging, Consult recommendations and Pending  disposition  Discharge Plan A: Home with family, Home Health                  Leon Knox MD  Department of Hospital Medicine   [facility]

## 2023-06-19 NOTE — SUBJECTIVE & OBJECTIVE
Interval History:     Review of Systems   Constitutional:  Negative for appetite change, fatigue and fever.   HENT:  Negative for congestion, hearing loss and trouble swallowing.    Respiratory:  Negative for chest tightness, shortness of breath and wheezing.    Cardiovascular:  Negative for chest pain and palpitations.   Gastrointestinal:  Negative for abdominal pain, constipation and nausea.   Genitourinary:  Negative for difficulty urinating and dysuria.   Musculoskeletal:  Positive for arthralgias, back pain and gait problem. Negative for neck stiffness.   Skin:  Negative for pallor and rash.   Neurological:  Negative for dizziness, speech difficulty and headaches.   Psychiatric/Behavioral:  Negative for confusion and suicidal ideas.    Objective:     Vital Signs (Most Recent):  Temp: 98.2 °F (36.8 °C) (06/18/23 1934)  Pulse: 77 (06/18/23 1934)  Resp: 16 (06/18/23 1938)  BP: (!) 140/64 (06/18/23 1934)  SpO2: 98 % (06/18/23 1934) Vital Signs (24h Range):  Temp:  [97.7 °F (36.5 °C)-99 °F (37.2 °C)] 98.2 °F (36.8 °C)  Pulse:  [66-77] 77  Resp:  [16-18] 16  SpO2:  [93 %-98 %] 98 %  BP: (117-140)/(63-68) 140/64     Weight: 68 kg (150 lb)  Body mass index is 24.21 kg/m².    Intake/Output Summary (Last 24 hours) at 6/18/2023 2234  Last data filed at 6/18/2023 0912  Gross per 24 hour   Intake 900 ml   Output --   Net 900 ml         Physical Exam  Vitals reviewed.   Constitutional:       General: She is awake. She is not in acute distress.     Appearance: She is well-developed. She is not toxic-appearing.   HENT:      Head: Normocephalic.      Nose: Nose normal.      Mouth/Throat:      Pharynx: Oropharynx is clear.   Eyes:      Extraocular Movements: Extraocular movements intact.      Pupils: Pupils are equal, round, and reactive to light.   Neck:      Thyroid: No thyroid mass.      Vascular: No carotid bruit.   Cardiovascular:      Rate and Rhythm: Normal rate and regular rhythm.      Pulses: Normal pulses.      Heart  sounds: Normal heart sounds. No murmur heard.  Pulmonary:      Effort: Pulmonary effort is normal.      Breath sounds: Normal breath sounds and air entry. No wheezing.   Abdominal:      General: Bowel sounds are normal. There is no distension.      Palpations: Abdomen is soft.      Tenderness: There is no abdominal tenderness.   Musculoskeletal:         General: Normal range of motion.      Cervical back: Neck supple. No rigidity.   Skin:     General: Skin is warm.      Coloration: Skin is not jaundiced.      Findings: No lesion.   Neurological:      General: No focal deficit present.      Mental Status: She is alert and oriented to person, place, and time.      Cranial Nerves: No cranial nerve deficit.   Psychiatric:         Attention and Perception: Attention normal.         Mood and Affect: Mood normal.         Behavior: Behavior normal. Behavior is cooperative.         Thought Content: Thought content normal.         Cognition and Memory: Cognition normal.           Significant Labs: All pertinent labs within the past 24 hours have been reviewed.  BMP: No results for input(s): GLU, NA, K, CL, CO2, BUN, CREATININE, CALCIUM, MG in the last 48 hours.  CBC: No results for input(s): WBC, HGB, HCT, PLT in the last 48 hours.  CMP: No results for input(s): NA, K, CL, CO2, GLU, BUN, CREATININE, CALCIUM, PROT, ALBUMIN, BILITOT, ALKPHOS, AST, ALT, ANIONGAP, EGFRNONAA in the last 48 hours.    Invalid input(s): ESTGFAFRICA    Significant Imaging: I have reviewed all pertinent imaging results/findings within the past 24 hours.

## 2023-06-19 NOTE — PLAN OF CARE
Ss spoke with kathy with [facility] and pt is not current with them due to ins. Ss checking with mykel with [facility] to see if pt was current. Ss following.      1500 ss spoke with mykel and pt is current with [facility].

## 2023-06-19 NOTE — PROGRESS NOTES
PICC insertion  Performed by Jeff Stoll [facility]  Consent obtained for PICC line insertion.  A formal timeout was called all staff present agreed to patient and procedure.    The left upper extremity was prepped with ChloraPrep and sterile field was established.  1% lidocaine was used as local anesthetic.  Under ultrasound guidance the brachial vein was localized and accessed with a micropuncture needle.  An 018 guidewire was passed through the vein to the level superior vena cava.  A 40 cm PICC line was then placed over the wire with its tip terminating at the atrial caval junction.  Wire was removed both ports were flushed with heparinized saline.  The PICC line was then cleaned and secured.  The patient tolerated the procedure well and there were no immediate postprocedure complications.  Total fluoroscopy time was 18 seconds.

## 2023-06-20 LAB
GLUCOSE SERPL-MCNC: 149 MG/DL (ref 70–105)
GLUCOSE SERPL-MCNC: 196 MG/DL (ref 70–105)
GLUCOSE SERPL-MCNC: 234 MG/DL (ref 70–105)
GLUCOSE SERPL-MCNC: 235 MG/DL (ref 70–105)

## 2023-06-20 PROCEDURE — 25000003 PHARM REV CODE 250

## 2023-06-20 PROCEDURE — 11000001 HC ACUTE MED/SURG PRIVATE ROOM

## 2023-06-20 PROCEDURE — 99232 PR SUBSEQUENT HOSPITAL CARE,LEVL II: ICD-10-PCS | Mod: ,,, | Performed by: HOSPITALIST

## 2023-06-20 PROCEDURE — 82962 GLUCOSE BLOOD TEST: CPT

## 2023-06-20 PROCEDURE — 94761 N-INVAS EAR/PLS OXIMETRY MLT: CPT

## 2023-06-20 PROCEDURE — 63600175 PHARM REV CODE 636 W HCPCS: Performed by: HOSPITALIST

## 2023-06-20 PROCEDURE — 25000003 PHARM REV CODE 250: Performed by: HOSPITALIST

## 2023-06-20 PROCEDURE — 99232 SBSQ HOSP IP/OBS MODERATE 35: CPT | Mod: ,,, | Performed by: HOSPITALIST

## 2023-06-20 RX ADMIN — INSULIN ASPART 4 UNITS: 100 INJECTION, SOLUTION INTRAVENOUS; SUBCUTANEOUS at 06:06

## 2023-06-20 RX ADMIN — OXYCODONE HYDROCHLORIDE 15 MG: 5 TABLET ORAL at 10:06

## 2023-06-20 RX ADMIN — INSULIN DETEMIR 25 UNITS: 100 INJECTION, SOLUTION SUBCUTANEOUS at 08:06

## 2023-06-20 RX ADMIN — ENOXAPARIN SODIUM 40 MG: 40 INJECTION SUBCUTANEOUS at 06:06

## 2023-06-20 RX ADMIN — DULOXETINE HYDROCHLORIDE 60 MG: 30 CAPSULE, DELAYED RELEASE ORAL at 08:06

## 2023-06-20 RX ADMIN — INSULIN ASPART 2 UNITS: 100 INJECTION, SOLUTION INTRAVENOUS; SUBCUTANEOUS at 09:06

## 2023-06-20 RX ADMIN — CEFTRIAXONE SODIUM 2 G: 2 INJECTION, POWDER, FOR SOLUTION INTRAMUSCULAR; INTRAVENOUS at 10:06

## 2023-06-20 RX ADMIN — BUPROPION HYDROCHLORIDE 100 MG: 100 TABLET, FILM COATED ORAL at 10:06

## 2023-06-20 RX ADMIN — RIFAMPIN 600 MG: 300 CAPSULE ORAL at 10:06

## 2023-06-20 RX ADMIN — HUMAN INSULIN 25 UNITS: 100 INJECTION, SUSPENSION SUBCUTANEOUS at 06:06

## 2023-06-20 RX ADMIN — INSULIN ASPART 2 UNITS: 100 INJECTION, SOLUTION INTRAVENOUS; SUBCUTANEOUS at 11:06

## 2023-06-20 RX ADMIN — DAPTOMYCIN 750 MG: 500 INJECTION, POWDER, LYOPHILIZED, FOR SOLUTION INTRAVENOUS at 05:06

## 2023-06-20 RX ADMIN — TRAZODONE HYDROCHLORIDE 50 MG: 50 TABLET ORAL at 08:06

## 2023-06-20 RX ADMIN — DULOXETINE HYDROCHLORIDE 60 MG: 30 CAPSULE, DELAYED RELEASE ORAL at 10:06

## 2023-06-20 NOTE — PLAN OF CARE
Problem: Adult Inpatient Plan of Care  Goal: Plan of Care Review  Outcome: Ongoing, Progressing  Goal: Patient-Specific Goal (Individualized)  Outcome: Ongoing, Progressing  Goal: Absence of Hospital-Acquired Illness or Injury  Outcome: Ongoing, Progressing  Goal: Optimal Comfort and Wellbeing  Outcome: Ongoing, Progressing  Goal: Readiness for Transition of Care  Outcome: Ongoing, Progressing     Problem: Diabetes Comorbidity  Goal: Blood Glucose Level Within Targeted Range  Outcome: Ongoing, Progressing     Problem: Pain Acute  Goal: Acceptable Pain Control and Functional Ability  Outcome: Ongoing, Progressing     Problem: Infection  Goal: Absence of Infection Signs and Symptoms  Outcome: Ongoing, Progressing      Measure In Units Or Cc's?: units

## 2023-06-20 NOTE — PLAN OF CARE
MICHEAL spoke with MD and pt will need iv abx at dc. Once consulted for iv abx, dosage, frequency and length of need, SW will fax to [facility]. SW following.

## 2023-06-20 NOTE — SUBJECTIVE & OBJECTIVE
Interval History:     Review of Systems   Constitutional:  Negative for appetite change, fatigue and fever.   HENT:  Negative for congestion, hearing loss and trouble swallowing.    Respiratory:  Negative for chest tightness, shortness of breath and wheezing.    Cardiovascular:  Negative for chest pain and palpitations.   Gastrointestinal:  Negative for abdominal pain, constipation and nausea.   Genitourinary:  Negative for difficulty urinating and dysuria.   Musculoskeletal:  Positive for arthralgias, back pain and gait problem. Negative for neck stiffness.   Skin:  Negative for pallor and rash.   Neurological:  Negative for dizziness, speech difficulty and headaches.   Psychiatric/Behavioral:  Negative for confusion and suicidal ideas.    Objective:     Vital Signs (Most Recent):  Temp: 97.3 °F (36.3 °C) (06/19/23 1901)  Pulse: 81 (06/19/23 1901)  Resp: 18 (06/19/23 2043)  BP: 134/68 (06/19/23 1901)  SpO2: 98 % (06/19/23 1901) Vital Signs (24h Range):  Temp:  [97.3 °F (36.3 °C)-99 °F (37.2 °C)] 97.3 °F (36.3 °C)  Pulse:  [66-81] 81  Resp:  [16-18] 18  SpO2:  [95 %-98 %] 98 %  BP: (106-134)/(57-68) 134/68     Weight: 68 kg (150 lb)  Body mass index is 24.21 kg/m².  No intake or output data in the 24 hours ending 06/19/23 2229        Physical Exam  Vitals reviewed.   Constitutional:       General: She is awake. She is not in acute distress.     Appearance: She is well-developed. She is not toxic-appearing.   HENT:      Head: Normocephalic.      Nose: Nose normal.      Mouth/Throat:      Pharynx: Oropharynx is clear.   Eyes:      Extraocular Movements: Extraocular movements intact.      Pupils: Pupils are equal, round, and reactive to light.   Neck:      Thyroid: No thyroid mass.      Vascular: No carotid bruit.   Cardiovascular:      Rate and Rhythm: Normal rate and regular rhythm.      Pulses: Normal pulses.      Heart sounds: Normal heart sounds. No murmur heard.  Pulmonary:      Effort: Pulmonary effort is  normal.      Breath sounds: Normal breath sounds and air entry. No wheezing.   Abdominal:      General: Bowel sounds are normal. There is no distension.      Palpations: Abdomen is soft.      Tenderness: There is no abdominal tenderness.   Musculoskeletal:         General: Normal range of motion.      Cervical back: Neck supple. No rigidity.   Skin:     General: Skin is warm.      Coloration: Skin is not jaundiced.      Findings: No lesion.   Neurological:      General: No focal deficit present.      Mental Status: She is alert and oriented to person, place, and time.      Cranial Nerves: No cranial nerve deficit.   Psychiatric:         Attention and Perception: Attention normal.         Mood and Affect: Mood normal.         Behavior: Behavior normal. Behavior is cooperative.         Thought Content: Thought content normal.         Cognition and Memory: Cognition normal.           Significant Labs: All pertinent labs within the past 24 hours have been reviewed.  BMP:   Recent Labs   Lab 06/19/23 0412   *      K 4.0   CL 97*   CO2 29   BUN 13   CREATININE 0.89   CALCIUM 8.9   MG 1.8     CBC:   Recent Labs   Lab 06/19/23 0412   WBC 11.13*   HGB 10.8*   HCT 34.3*   *     CMP:   Recent Labs   Lab 06/19/23 0412      K 4.0   CL 97*   CO2 29   *   BUN 13   CREATININE 0.89   CALCIUM 8.9   ANIONGAP 14       Significant Imaging: I have reviewed all pertinent imaging results/findings within the past 24 hours.

## 2023-06-20 NOTE — PLAN OF CARE
[facility]  Discharge Reassessment    Primary Care Provider: Hannah Schulz MD    Expected Discharge Date:     Reassessment (most recent)       Discharge Reassessment - 06/20/23 1327          Discharge Reassessment    Assessment Type Discharge Planning Reassessment     Discharge Plan discussed with: Patient     Name(s) and Number(s) Alejandrina Tinoco [phone]     Discharge Plan A Home Health;Home with family     Discharge Plan B Home Health;Home with family     DME Needed Upon Discharge  none     Transition of Care Barriers None     Why the patient remains in the hospital Requires continued medical care        Post-Acute Status    Post-Acute Authorization IV Infusion;Home Health     Home Health Status Set-up Complete/Auth obtained     IV Infusion Status Referral(s) sent     Patient choice form signed by patient/caregiver List with quality metrics by geographic area provided;List from CMS Compare     Discharge Delays None known at this time                     SW consulted for iv abx. SW needs dosage and frequency added so that SW can send to [facility]. SW faxed referral to [facility] and will fax updated order when completed. Initial TRUONG referral for [facility] sent. MICHEAL will cont to follow.     1430: MICHEALW faxed updated order to [facility]. SW awaiting insurance approval.

## 2023-06-20 NOTE — PROGRESS NOTES
[facility]  [facility] Medicine  Progress Note    Patient Name: Monica Walker  MRN: [id number]  Patient Class: IP- Inpatient   Admission Date: 6/8/2023  Length of Stay: 11 days  Attending Physician: Leon Knox MD  Primary Care Provider: Hannah Schulz MD        Subjective:     Principal Problem:Osteomyelitis of vertebra of thoracolumbar region        HPI:  Ms. 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  CT scan of lumbar region concerning for vertebral osteomyelitis.  The case was evaluated by Dr. Hernandes and he recommended admission to Medicine he would consult.  He also recommended an MRI.      She was diagnosed with MRSA bacteremia at [facility] 2 days ago and was given Dalvance (dalbavancin).  She was scheduled with outpatient follow-up with Dr. Noyola as she has had septic joint in the past.      The presents now with severe back pain.  She states that her back pain persists.  She reports a temperature of 103° in route to the facility.  She denies any chest pain or other concerns.  She had diskitis or epidural abscess in January and was treated with IV antibiotics.  She has also been recently treated for left knee prosthetic joint infection.        ED course:   Initial Vitals [06/08/23 1534]   BP Pulse Resp Temp SpO2   (!) 111/53 94 18 99.4 °F (37.4 °C) 96 %         Only pain medications given in the emergency department.        Overview/Hospital Course:  06/16 Admitted 06/09 with back pain. Treating now for MRSA bacteremia and MRSA and persistent vertebral osteomyelitis. Status post T9-L2 fusion, T11-T12 corpectomy, T11-T12 laminectomy 01/24/2023.  Also left knee septic arthritis status post 2 stage revision with Dr. Noyola 05/09/2023. MONICA no  vegetations 06/14. Plan continue dapto and rifampin 6-8 weeks.   PMHx: HTN and DM T2,   06/17 up in room. First week look at options to complete ATB  06/18 No issues. Look at options to complete ATB. ATB until 08/03. Will need PICC line.  06/19 In good spirits and wants to good home. Looks to have had 10 days IV ATB after 05/10 G- bacteremia. This time MRSA with first negative BC 06/08. Plan 6 weeks Rocephin and 8 weeks coverage MRSA both starting 06/08.       Interval History:     Review of Systems   Constitutional:  Negative for appetite change, fatigue and fever.   HENT:  Negative for congestion, hearing loss and trouble swallowing.    Respiratory:  Negative for chest tightness, shortness of breath and wheezing.    Cardiovascular:  Negative for chest pain and palpitations.   Gastrointestinal:  Negative for abdominal pain, constipation and nausea.   Genitourinary:  Negative for difficulty urinating and dysuria.   Musculoskeletal:  Positive for arthralgias, back pain and gait problem. Negative for neck stiffness.   Skin:  Negative for pallor and rash.   Neurological:  Negative for dizziness, speech difficulty and headaches.   Psychiatric/Behavioral:  Negative for confusion and suicidal ideas.    Objective:     Vital Signs (Most Recent):  Temp: 97.3 °F (36.3 °C) (06/19/23 1901)  Pulse: 81 (06/19/23 1901)  Resp: 18 (06/19/23 2043)  BP: 134/68 (06/19/23 1901)  SpO2: 98 % (06/19/23 1901) Vital Signs (24h Range):  Temp:  [97.3 °F (36.3 °C)-99 °F (37.2 °C)] 97.3 °F (36.3 °C)  Pulse:  [66-81] 81  Resp:  [16-18] 18  SpO2:  [95 %-98 %] 98 %  BP: (106-134)/(57-68) 134/68     Weight: 68 kg (150 lb)  Body mass index is 24.21 kg/m².  No intake or output data in the 24 hours ending 06/19/23 2229        Physical Exam  Vitals reviewed.   Constitutional:       General: She is awake. She is not in acute distress.     Appearance: She is well-developed. She is not toxic-appearing.   HENT:      Head: Normocephalic.      Nose: Nose  normal.      Mouth/Throat:      Pharynx: Oropharynx is clear.   Eyes:      Extraocular Movements: Extraocular movements intact.      Pupils: Pupils are equal, round, and reactive to light.   Neck:      Thyroid: No thyroid mass.      Vascular: No carotid bruit.   Cardiovascular:      Rate and Rhythm: Normal rate and regular rhythm.      Pulses: Normal pulses.      Heart sounds: Normal heart sounds. No murmur heard.  Pulmonary:      Effort: Pulmonary effort is normal.      Breath sounds: Normal breath sounds and air entry. No wheezing.   Abdominal:      General: Bowel sounds are normal. There is no distension.      Palpations: Abdomen is soft.      Tenderness: There is no abdominal tenderness.   Musculoskeletal:         General: Normal range of motion.      Cervical back: Neck supple. No rigidity.   Skin:     General: Skin is warm.      Coloration: Skin is not jaundiced.      Findings: No lesion.   Neurological:      General: No focal deficit present.      Mental Status: She is alert and oriented to person, place, and time.      Cranial Nerves: No cranial nerve deficit.   Psychiatric:         Attention and Perception: Attention normal.         Mood and Affect: Mood normal.         Behavior: Behavior normal. Behavior is cooperative.         Thought Content: Thought content normal.         Cognition and Memory: Cognition normal.           Significant Labs: All pertinent labs within the past 24 hours have been reviewed.  BMP:   Recent Labs   Lab 06/19/23 0412   *      K 4.0   CL 97*   CO2 29   BUN 13   CREATININE 0.89   CALCIUM 8.9   MG 1.8     CBC:   Recent Labs   Lab 06/19/23 0412   WBC 11.13*   HGB 10.8*   HCT 34.3*   *     CMP:   Recent Labs   Lab 06/19/23 0412      K 4.0   CL 97*   CO2 29   *   BUN 13   CREATININE 0.89   CALCIUM 8.9   ANIONGAP 14       Significant Imaging: I have reviewed all pertinent imaging results/findings within the past 24 hours.      Assessment/Plan:      *  Osteomyelitis of vertebra of thoracolumbar region  Patient with severe lower back pain.    Prior history of diskitis or epidural abscess.  CT scan concerning for osteomyelitis.  MRI with concern for osteomyelitis at T11 and T12.    Dr. Hernandes has been consulted.    6/14- Pending transfer to speciality once approved for continual IV abx treatment for another 6-8 weeks starting from the 6/8 where repeat bcx were drawn  Repeat bcx drawn on 6/8 show ng at 72 hours     Antibiotics (From admission, onward)    Start     Stop Route Frequency Ordered    06/09/23 0900  DAPTOmycin (CUBICIN) 750 mg in sodium chloride 0.9% SolP 50 mL IVPB         -- IV Every 24 hours (non-standard times) 06/09/23 0540    06/09/23 0900  rifAMpin capsule 600 mg         -- Oral Daily 06/09/23 0540    06/08/23 2100  mupirocin 2 % ointment         06/13 2059 Nasl 2 times daily 06/08/23 1830    06/08/23 1830  cefTRIAXone (ROCEPHIN) 2 g in dextrose 5 % in water (D5W) 5 % 100 mL IVPB (MB+)         -- IV Daily 06/08/23 1815          6/15 - Continuing abx, struggling with insurance over setting for continued care.     Tobacco use  Patient counseled on tobacco use and offered nicotine patch.      Primary hypertension  Continue home antihypertensives    Prior MRSA Septic Joint of Left Knee   History of removal infection of left knee joint with removal of prosthetic.    Consulted Dr. Noyola, he is aware patient is in hospital and does not see a need for a procedure as of right now  X-ray left knee done 6/9 showed some joint calcifications and loose bodies, no evidence of infection in joint       MRSA bacteremia  Multiple episodes of MRSA bacteremia.    The source may be the left knee joint but other sources are also possible.    There is a history of MRSA in left knee joint as well as in the lower vertebrae.      6/14 - MRSA in blood 6/5.  F/u blood cx 6/8 ng at 72 hours.  MRSA from back abscess.  Plan continue dapto and rifampin 6-8 weeks in  speciality.  MONICA done today which did not show any vegetations concerning for endocarditis. WBC 12 today but patient clinically stable. Continue to monitor with daily CBC.     06/14 8 weeks ATB starting 06/08 until 08/03 maybe last 2 weeks oral    DM2 (diabetes mellitus, type 2)  Patient's FSGs are controlled on current medication regimen.  Last A1c reviewed-   Lab Results   Component Value Date    HGBA1C 7.7 (H) 05/09/2023     Most recent fingerstick glucose reviewed- No results for input(s): POCTGLUCOSE in the last 24 hours.  Current correctional scale  Low  Maintain anti-hyperglycemic dose as follows-   Antihyperglycemics (From admission, onward)    Start     Stop Route Frequency Ordered    06/12/23 2100  insulin detemir U-100 injection 10 Units         -- SubQ Nightly 06/12/23 1508    06/08/23 1907  insulin aspart U-100 injection 1-10 Units         -- SubQ Before meals & nightly PRN 06/08/23 1815        Hold Oral hypoglycemics while patient is in the hospital.      Prior Lung abscesses  Prior small lung abscesses (septic emboli) which were thought to be related to hematogenous spread rather than a primary source of infection.  Consider repeat imaging in the future based on patient's clinical course.      Depression  Patient has persistent depression which is mild and is currently controlled. Will Continue anti-depressant medications. We will not consult psychiatry at this time. Patient does not display psychosis at this time. Continue to monitor closely and adjust plan of care as needed.    cymbalta 60mg BID  Wellbutrin 100mg daily       VTE Risk Mitigation (From admission, onward)         Ordered     enoxaparin injection 40 mg  Daily         06/08/23 1815     Place sequential compression device  Until discontinued         06/08/23 1815     IP VTE HIGH RISK PATIENT  Once         06/08/23 1815                Discharge Planning   SUZANNA:      Code Status: Full Code   Is the patient medically ready for discharge?:      Reason for patient still in hospital (select all that apply): Laboratory test, Treatment and Imaging  Discharge Plan A: Home with family, Home Health                  Leon Knox MD  Department of Hospital Medicine   [facility]

## 2023-06-21 VITALS
SYSTOLIC BLOOD PRESSURE: 126 MMHG | TEMPERATURE: 98 F | WEIGHT: 150 LBS | HEART RATE: 72 BPM | RESPIRATION RATE: 16 BRPM | HEIGHT: 66 IN | OXYGEN SATURATION: 99 % | BODY MASS INDEX: 24.11 KG/M2 | DIASTOLIC BLOOD PRESSURE: 72 MMHG

## 2023-06-21 PROBLEM — Z09 S/P ORTHOPEDIC SURGERY, FOLLOW-UP EXAM: Status: RESOLVED | Noted: 2022-12-20 | Resolved: 2023-06-21

## 2023-06-21 PROBLEM — R78.81 BACTEREMIA DUE TO KLEBSIELLA PNEUMONIAE: Status: RESOLVED | Noted: 2023-03-15 | Resolved: 2023-06-21

## 2023-06-21 PROBLEM — B96.1 BACTEREMIA DUE TO KLEBSIELLA PNEUMONIAE: Status: RESOLVED | Noted: 2023-03-15 | Resolved: 2023-06-21

## 2023-06-21 PROBLEM — R79.89 ELEVATED LACTIC ACID LEVEL: Status: RESOLVED | Noted: 2023-05-11 | Resolved: 2023-06-21

## 2023-06-21 PROBLEM — G06.2 EPIDURAL ABSCESS: Status: RESOLVED | Noted: 2023-01-19 | Resolved: 2023-06-21

## 2023-06-21 LAB
GLUCOSE SERPL-MCNC: 182 MG/DL (ref 70–105)
GLUCOSE SERPL-MCNC: 337 MG/DL (ref 70–105)

## 2023-06-21 PROCEDURE — 25000003 PHARM REV CODE 250

## 2023-06-21 PROCEDURE — 25000003 PHARM REV CODE 250: Performed by: HOSPITALIST

## 2023-06-21 PROCEDURE — 82962 GLUCOSE BLOOD TEST: CPT

## 2023-06-21 PROCEDURE — 99239 HOSP IP/OBS DSCHRG MGMT >30: CPT | Mod: ,,, | Performed by: HOSPITALIST

## 2023-06-21 PROCEDURE — 1111F DSCHRG MED/CURRENT MED MERGE: CPT | Mod: CPTII,,, | Performed by: HOSPITALIST

## 2023-06-21 PROCEDURE — 99239 PR HOSPITAL DISCHARGE DAY,>30 MIN: ICD-10-PCS | Mod: ,,, | Performed by: HOSPITALIST

## 2023-06-21 PROCEDURE — 1111F PR DISCHARGE MEDS RECONCILED W/ CURRENT OUTPATIENT MED LIST: ICD-10-PCS | Mod: CPTII,,, | Performed by: HOSPITALIST

## 2023-06-21 PROCEDURE — 63600175 PHARM REV CODE 636 W HCPCS: Performed by: HOSPITALIST

## 2023-06-21 PROCEDURE — 25000003 PHARM REV CODE 250: Performed by: FAMILY MEDICINE

## 2023-06-21 RX ORDER — RIFAMPIN 300 MG/1
600 CAPSULE ORAL DAILY
Qty: 60 CAPSULE | Refills: 1 | Status: SHIPPED | OUTPATIENT
Start: 2023-06-22 | End: 2023-08-03

## 2023-06-21 RX ORDER — BUPROPION HYDROCHLORIDE 100 MG/1
100 TABLET ORAL DAILY
Qty: 30 TABLET | Refills: 3 | Status: SHIPPED | OUTPATIENT
Start: 2023-06-22 | End: 2023-12-07

## 2023-06-21 RX ADMIN — BUPROPION HYDROCHLORIDE 100 MG: 100 TABLET, FILM COATED ORAL at 09:06

## 2023-06-21 RX ADMIN — HUMAN INSULIN 25 UNITS: 100 INJECTION, SUSPENSION SUBCUTANEOUS at 06:06

## 2023-06-21 RX ADMIN — CEFTRIAXONE SODIUM 2 G: 2 INJECTION, POWDER, FOR SOLUTION INTRAMUSCULAR; INTRAVENOUS at 09:06

## 2023-06-21 RX ADMIN — DULOXETINE HYDROCHLORIDE 60 MG: 30 CAPSULE, DELAYED RELEASE ORAL at 09:06

## 2023-06-21 RX ADMIN — INSULIN ASPART 2 UNITS: 100 INJECTION, SOLUTION INTRAVENOUS; SUBCUTANEOUS at 06:06

## 2023-06-21 RX ADMIN — FENTANYL 1 PATCH: 25 PATCH TRANSDERMAL at 01:06

## 2023-06-21 RX ADMIN — INSULIN ASPART 8 UNITS: 100 INJECTION, SOLUTION INTRAVENOUS; SUBCUTANEOUS at 11:06

## 2023-06-21 RX ADMIN — RIFAMPIN 600 MG: 300 CAPSULE ORAL at 09:06

## 2023-06-21 RX ADMIN — DAPTOMYCIN 750 MG: 500 INJECTION, POWDER, LYOPHILIZED, FOR SOLUTION INTRAVENOUS at 03:06

## 2023-06-21 NOTE — PLAN OF CARE
MICHEAL spoke with Debora at [facility]  and pts insurance has approved for iv abx. MICHEAL informed pt and MD of approval. MICHEAL informed nurse that pt will need dose of iv abx prior to dc. IV abx to be delivered to pts room. Pt informed not to leave until iv abx has been delivered. Nurse aware, MICHEAL following.

## 2023-06-21 NOTE — ASSESSMENT & PLAN NOTE
Patient's FSGs are controlled on current medication regimen.  Last A1c reviewed-   Lab Results   Component Value Date    HGBA1C 7.7 (H) 05/09/2023     Most recent fingerstick glucose reviewed- No results for input(s): POCTGLUCOSE in the last 24 hours.  Current correctional scale  Low  Maintain anti-hyperglycemic dose as follows-   Antihyperglycemics (From admission, onward)    Start     Stop Route Frequency Ordered    06/19/23 2100  insulin detemir U-100 injection 25 Units         -- SubQ Nightly 06/18/23 2243 06/19/23 0700  insulin NPH-insulin regular (70/30) injection 25 Units         -- SubQ Every morning 06/18/23 2243 06/08/23 1907  insulin aspart U-100 injection 1-10 Units         -- SubQ Before meals & nightly PRN 06/08/23 1815        Hold Oral hypoglycemics while patient is in the hospital.

## 2023-06-21 NOTE — DISCHARGE SUMMARY
[facility]  [facility] Medicine  Discharge Summary      Patient Name: Monica Walker  MRN: [id number]  FAVIAN: [id number]  Patient Class: IP- Inpatient  Admission Date: 6/8/2023  Hospital Length of Stay: 13 days  Discharge Date and Time:  06/21/2023 2:46 PM  Attending Physician: Leon Knox MD   Discharging Provider: Leon Knox MD  Primary Care Provider: Hannah Schulz MD    Primary Care Team: Networked reference to record PCT     HPI:   Ms. 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  CT scan of lumbar region concerning for vertebral osteomyelitis.  The case was evaluated by Dr. Hernandes and he recommended admission to Medicine he would consult.  He also recommended an MRI.      She was diagnosed with MRSA bacteremia at [facility] 2 days ago and was given Dalvance (dalbavancin).  She was scheduled with outpatient follow-up with Dr. Noyola as she has had septic joint in the past.      The presents now with severe back pain.  She states that her back pain persists.  She reports a temperature of 103° in route to the facility.  She denies any chest pain or other concerns.  She had diskitis or epidural abscess in January and was treated with IV antibiotics.  She has also been recently treated for left knee prosthetic joint infection.        ED course:   Initial Vitals [06/08/23 1534]   BP Pulse Resp Temp SpO2   (!) 111/53 94 18 99.4 °F (37.4 °C) 96 %         Only pain medications given in the emergency department.        Procedure(s) (LRB):  Transesophageal echo (MONICA) intra-procedure log documentation (N/A)      Hospital Course:   06/16 Admitted 06/09 with back pain. Treating now for MRSA bacteremia and MRSA and persistent vertebral osteomyelitis. Status post  T9-L2 fusion, T11-T12 corpectomy, T11-T12 laminectomy 01/24/2023.  Also left knee septic arthritis status post 2 stage revision with Dr. Noyola 05/09/2023. MONICA no vegetations 06/14. Plan continue dapto and rifampin 6-8 weeks.   PMHx: HTN and DM T2,   06/17 up in room. First week look at options to complete ATB  06/18 No issues. Look at options to complete ATB. ATB until 08/03. Will need PICC line.  06/19 In good spirits and wants to good home. Looks to have had 10 days IV ATB after 05/10 G- bacteremia. This time MRSA with first negative BC 06/08. Plan 6 weeks Rocephin and 8 weeks coverage MRSA both starting 06/08.   06/20 Getting outpt ATB arranged then home. Talked with ortho. See ID outpt  06/21 has been approved for outpatient antibiotics.  Outpatient appointments made including following up with ID physician in [place].  Will discharge home.  Follow up with primary care provider in 1 week.  Home health to see patient for home IV antibiotic.  PICC line to be removed after antibiotics complete.    Patient counseled on the importance of keeping all hospital follow up appointments. Stressed compliance with medications, therapies, or devices as prescribed in order to provide for the best health outcomes and decrease the risk of reoccurrence or further progression of issues.    Additionally, patient given written literature regarding their current disease processes and home care recommendations.   Patient given opportunity to ask questions and verbalized understanding of all information discussed.  Reinforced patient's primary care provider can be a big assets in monitoring and organizing issues after discharge.         Goals of Care Treatment Preferences:  Code Status: Full Code      Consults:   Consults (From admission, onward)        Status Ordering Provider     Inpatient consult to Social Work  Once        Provider:  (Not yet assigned)    Completed AUGUSTINE PETERSON     Inpatient consult to Social Work  Once         Provider:  (Not yet assigned)    Completed AUGUSTINE PETERSON     Inpatient consult to Orthopedic Surgery  Once        Provider:  Jacek Noyola MD    Acknowledged LIZ MARADIAGA     Inpatient consult to Cardiology  Once        Provider:  Jermain Rushing MD    Completed LIZ MARADIAGA     Inpatient consult to Orthopedic Surgery  Once        Provider:  Jimmy Hernandes MD    Completed LIZ MARADIAGA          Psychiatric  Depression  Patient has persistent depression which is mild and is currently controlled. Will Continue anti-depressant medications. We will not consult psychiatry at this time. Patient does not display psychosis at this time. Continue to monitor closely and adjust plan of care as needed.    cymbalta 60mg BID  Wellbutrin 100mg daily     Pulmonary  Prior Lung abscesses  Prior small lung abscesses (septic emboli) which were thought to be related to hematogenous spread rather than a primary source of infection.  Consider repeat imaging in the future based on patient's clinical course.      Cardiac/Vascular  Primary hypertension  Continue home antihypertensives    ID  * Osteomyelitis of vertebra of thoracolumbar region  Patient with severe lower back pain.    Prior history of diskitis or epidural abscess.  CT scan concerning for osteomyelitis.  MRI with concern for osteomyelitis at T11 and T12.    Dr. Hernandes has been consulted.    6/14- Pending transfer to speciality once approved for continual IV abx treatment for another 6-8 weeks starting from the 6/8 where repeat bcx were drawn  Repeat bcx drawn on 6/8 show ng at 72 hours     Antibiotics (From admission, onward)    Start     Stop Route Frequency Ordered    06/09/23 0900  DAPTOmycin (CUBICIN) 750 mg in sodium chloride 0.9% SolP 50 mL IVPB         -- IV Every 24 hours (non-standard times) 06/09/23 0540    06/09/23 0900  rifAMpin capsule 600 mg         -- Oral Daily 06/09/23 0540    06/08/23 2100  mupirocin 2 % ointment         06/13 2059  Nasl 2 times daily 06/08/23 1830    06/08/23 1830  cefTRIAXone (ROCEPHIN) 2 g in dextrose 5 % in water (D5W) 5 % 100 mL IVPB (MB+)         -- IV Daily 06/08/23 1815          6/15 - Continuing abx, struggling with insurance over setting for continued care.     Prior MRSA Septic Joint of Left Knee   History of removal infection of left knee joint with removal of prosthetic.    Consulted Dr. Noyola, he is aware patient is in hospital and does not see a need for a procedure as of right now  X-ray left knee done 6/9 showed some joint calcifications and loose bodies, no evidence of infection in joint       MRSA bacteremia  Multiple episodes of MRSA bacteremia.    The source may be the left knee joint but other sources are also possible.    There is a history of MRSA in left knee joint as well as in the lower vertebrae.      6/14 - MRSA in blood 6/5.  F/u blood cx 6/8 ng at 72 hours.  MRSA from back abscess.  Plan continue dapto and rifampin 6-8 weeks in speciality.  MONICA done today which did not show any vegetations concerning for endocarditis. WBC 12 today but patient clinically stable. Continue to monitor with daily CBC.     06/14 8 weeks ATB starting 06/08 until 08/03 maybe last 2 weeks oral    Endocrine  DM2 (diabetes mellitus, type 2)  Patient's FSGs are controlled on current medication regimen.  Last A1c reviewed-   Lab Results   Component Value Date    HGBA1C 7.7 (H) 05/09/2023     Most recent fingerstick glucose reviewed- No results for input(s): POCTGLUCOSE in the last 24 hours.  Current correctional scale  Low  Maintain anti-hyperglycemic dose as follows-   Antihyperglycemics (From admission, onward)    Start     Stop Route Frequency Ordered    06/19/23 2100  insulin detemir U-100 injection 25 Units         -- SubQ Nightly 06/18/23 2243 06/19/23 0700  insulin NPH-insulin regular (70/30) injection 25 Units         -- SubQ Every morning 06/18/23 2243 06/08/23 1907  insulin aspart U-100 injection 1-10 Units          -- SubQ Before meals & nightly PRN 06/08/23 1815        Hold Oral hypoglycemics while patient is in the hospital.      Other  Tobacco use  Patient counseled on tobacco use and offered nicotine patch.        Final Active Diagnoses:    Diagnosis Date Noted POA    PRINCIPAL PROBLEM:  Osteomyelitis of vertebra of thoracolumbar region [M46.25] 06/09/2023 Yes    Tobacco use [Z72.0] 05/09/2023 Yes    Primary hypertension [I10] 01/27/2023 Yes    Prior MRSA Septic Joint of Left Knee  [M00.062] 01/04/2023 Yes    MRSA bacteremia [R78.81, B95.62] 11/21/2022 Yes    Prior Lung abscesses [J85.2] 11/20/2022 Yes    DM2 (diabetes mellitus, type 2) [E11.9] 11/20/2022 Yes    Depression [F32.A] 01/04/2022 Yes      Problems Resolved During this Admission:    Diagnosis Date Noted Date Resolved POA    Hypokalemia [E87.6] 06/09/2023 06/13/2023 Yes       Discharged Condition: fair    Disposition:     Follow Up:   Contact information for follow-up providers     Hannah Schulz MD. Schedule an appointment as soon as possible for a visit in 1 week(s).    Specialty: Family Medicine  Contact information:  [facility] [place] [address]  [phone]             Adin Hartman MD Follow up in 40 day(s).    Why: Appointment made for July 31st at 10:00 a.m.  Contact information:  office [phone]    fax [phone]    [facility] [address] [place] [id number]           Jimmy Hernandes MD. Schedule an appointment as soon as possible for a visit in 6 week(s).    Specialties: Orthopedic Surgery, Spine Surgery  Contact information:  [address] [place] [address] [facility]  [facility] [address]  [phone]             Jacek Noyola MD. Schedule an appointment as soon as possible for a visit in 6 week(s).    Specialty: Orthopedic Surgery  Contact information:  [address] [facility] [address]  [facility] [address]  [phone]                   Contact  information for after-discharge care     Home Medical Care     [facility] .    Service: Home Nursing  Contact information:  [address] [id number] [phone]                           Patient Instructions:   No discharge procedures on file.    Significant Diagnostic Studies: Labs: BMP: No results for input(s): GLU, NA, K, CL, CO2, BUN, CREATININE, CALCIUM, MG in the last 48 hours., CMP No results for input(s): NA, K, CL, CO2, GLU, BUN, CREATININE, CALCIUM, PROT, ALBUMIN, BILITOT, ALKPHOS, AST, ALT, ANIONGAP, ESTGFRAFRICA, EGFRNONAA in the last 48 hours. and CBC No results for input(s): WBC, HGB, HCT, PLT in the last 48 hours.     Imaging Results          MRI Lumbar Spine W WO Cont (Final result)  Result time 06/08/23 19:00:18    Final result by Corey Boateng II, MD (06/08/23 19:00:18)                 Impression:      Discitis osteomyelitis at T11-T12 as described above.      Electronically signed by: Corey Boateng  Date:    06/08/2023  Time:    19:00             Narrative:    EXAMINATION:  MRI THORACIC SPINE W WO CONTRAST; MRI LUMBAR SPINE W WO CONTRAST    CLINICAL HISTORY:  Osteomyelitis, thoracic;; Low back pain, infection suspected;    TECHNIQUE:  Axial and sagittal imaging of the spine is performed using T1, T2 , STIR and T2 fat sat sequences without contrast. Post contrast the T1-weighted sequences are performed after administration of 14 cc MultiHance.    COMPARISON:  MRI lumbar spine 10 January 2023, CT 5 June 2023    FINDINGS:  Vertebral bodies are normal in height and alignment.  There is hardware in the thoracolumbar spine T9 through L2 levels.    Fluid and edema present with irregularity of the endplates and loss of disc space at the T11-T12 level.  This area enhances although some of the detail is limited secondary to artifact.  There is some phlegmon in the adjacent soft tissues.  Definite epidural component not identified.    The right side of the T12-L1  disc appears within normal limits.  The left side evaluation is limited secondary to hardware    Spinal cord signal appears within normal limits.    Osseous marrow signal appears within normal limits.    No abnormal enhancement is seen on the post-contrasted images when compared to the pre-contrast study.                               MRI Thoracic Spine W WO Cont (Final result)  Result time 06/08/23 19:00:18    Final result by Corey Boateng II, MD (06/08/23 19:00:18)                 Impression:      Discitis osteomyelitis at T11-T12 as described above.      Electronically signed by: Corey Boateng  Date:    06/08/2023  Time:    19:00             Narrative:    EXAMINATION:  MRI THORACIC SPINE W WO CONTRAST; MRI LUMBAR SPINE W WO CONTRAST    CLINICAL HISTORY:  Osteomyelitis, thoracic;; Low back pain, infection suspected;    TECHNIQUE:  Axial and sagittal imaging of the spine is performed using T1, T2 , STIR and T2 fat sat sequences without contrast. Post contrast the T1-weighted sequences are performed after administration of 14 cc MultiHance.    COMPARISON:  MRI lumbar spine 10 January 2023, CT 5 June 2023    FINDINGS:  Vertebral bodies are normal in height and alignment.  There is hardware in the thoracolumbar spine T9 through L2 levels.    Fluid and edema present with irregularity of the endplates and loss of disc space at the T11-T12 level.  This area enhances although some of the detail is limited secondary to artifact.  There is some phlegmon in the adjacent soft tissues.  Definite epidural component not identified.    The right side of the T12-L1 disc appears within normal limits.  The left side evaluation is limited secondary to hardware    Spinal cord signal appears within normal limits.    Osseous marrow signal appears within normal limits.    No abnormal enhancement is seen on the post-contrasted images when compared to the pre-contrast study.                               X-Ray Chest AP Portable  (Final result)  Result time 06/08/23 16:48:18    Final result by Corey Boateng II, MD (06/08/23 16:48:18)                 Impression:      No evidence of acute cardiopulmonary disease.      Electronically signed by: Corey Boateng  Date:    06/08/2023  Time:    16:48             Narrative:    EXAMINATION:  XR CHEST AP PORTABLE    CLINICAL HISTORY:  Dorsalgia, unspecified    COMPARISON:  10 May 2023    TECHNIQUE:  XR CHEST AP PORTABLE    FINDINGS:  The heart and mediastinum are normal in size and configuration.  The pulmonary vascularity is normal in caliber.  No lung infiltrates, effusions, pneumothorax or other abnormality is demonstrated.                              Intake/Output - Last 3 Shifts       06/19 0700 06/20 0659 06/20 0700 06/21 0659 06/21 0700 06/22 0659    IV Piggyback       Total Intake(mL/kg)       Urine (mL/kg/hr)  4 (0)     Stool  1     Total Output  5     Net  -5            Urine Occurrence 3 x 3 x         Microbiology Results (last 7 days)     ** No results found for the last 168 hours. **            Pending Diagnostic Studies:     None         Medications:  Reconciled Home Medications:      Medication List      START taking these medications    buPROPion 100 MG tablet  Commonly known as: WELLBUTRIN  Take 1 tablet (100 mg total) by mouth once daily.  Start taking on: June 22, 2023     dextrose 5 % in water (D5W) 5 % PgBk 100 mL with cefTRIAXone 2 gram SolR 2 g  Inject 2 g into the vein once daily.  Start taking on: June 22, 2023     insulin NPH-insulin regular (70/30) 100 unit/mL (70-30) injection  Inject 30 Units into the skin every morning.  Start taking on: June 22, 2023     rifAMpin 300 MG capsule  Commonly known as: RIFADIN  Take 2 capsules (600 mg total) by mouth once daily.  Start taking on: June 22, 2023     sodium chloride 0.9% SolP 50 mL with DAPTOmycin 500 mg SolR 750 mg  Inject 750 mg into the vein once daily.        CONTINUE taking these medications    BD ULTRA-FINE SHORT  "PEN NEEDLE 31 gauge x 5/16" Ndle  Generic drug: pen needle, diabetic     DULoxetine 60 MG capsule  Commonly known as: CYMBALTA  Take 1 capsule (60 mg total) by mouth once daily.     HYDROcodone-acetaminophen  mg per tablet  Commonly known as: NORCO  10 mg.     insulin glargine 100 units/mL SubQ pen  Commonly known as: LANTUS SOLOSTAR U-100 INSULIN  Inject 30 Units into the skin once daily.     NovoLOG FlexPen U-100 Insulin 100 unit/mL (3 mL) Inpn pen  Generic drug: insulin aspart U-100  Inject 5 Units into the skin 3 (three) times daily with meals.        STOP taking these medications    mupirocin 2 % Okit            Indwelling Lines/Drains at time of discharge:   Lines/Drains/Airways     Peripherally Inserted Central Catheter Line  Duration           PICC Double Lumen 06/19/23 1325 left brachial 2 days                Time spent on the discharge of patient: more 30 minutes         Leon Knox MD  Department of Hospital Medicine  [facility]  "

## 2023-06-21 NOTE — SUBJECTIVE & OBJECTIVE
Interval History:     Review of Systems   Constitutional:  Negative for appetite change, fatigue and fever.   HENT:  Negative for congestion, hearing loss and trouble swallowing.    Respiratory:  Negative for chest tightness, shortness of breath and wheezing.    Cardiovascular:  Negative for chest pain and palpitations.   Gastrointestinal:  Negative for abdominal pain, constipation and nausea.   Genitourinary:  Negative for difficulty urinating and dysuria.   Musculoskeletal:  Positive for arthralgias, back pain and gait problem. Negative for neck stiffness.   Skin:  Negative for pallor and rash.   Neurological:  Negative for dizziness, speech difficulty and headaches.   Psychiatric/Behavioral:  Negative for confusion and suicidal ideas.    Objective:     Vital Signs (Most Recent):  Temp: 98.7 °F (37.1 °C) (06/20/23 1901)  Pulse: 83 (06/20/23 1901)  Resp: 18 (06/20/23 1901)  BP: 122/67 (06/20/23 1901)  SpO2: 98 % (06/20/23 1901) Vital Signs (24h Range):  Temp:  [97.4 °F (36.3 °C)-98.7 °F (37.1 °C)] 98.7 °F (37.1 °C)  Pulse:  [67-98] 83  Resp:  [16-18] 18  SpO2:  [96 %-98 %] 98 %  BP: ()/(49-75) 122/67     Weight: 68 kg (150 lb)  Body mass index is 24.21 kg/m².    Intake/Output Summary (Last 24 hours) at 6/20/2023 2230  Last data filed at 6/20/2023 1826  Gross per 24 hour   Intake --   Output 5 ml   Net -5 ml           Physical Exam  Vitals reviewed.   Constitutional:       General: She is awake. She is not in acute distress.     Appearance: She is well-developed. She is not toxic-appearing.   HENT:      Head: Normocephalic.      Nose: Nose normal.      Mouth/Throat:      Pharynx: Oropharynx is clear.   Eyes:      Extraocular Movements: Extraocular movements intact.      Pupils: Pupils are equal, round, and reactive to light.   Neck:      Thyroid: No thyroid mass.      Vascular: No carotid bruit.   Cardiovascular:      Rate and Rhythm: Normal rate and regular rhythm.      Pulses: Normal pulses.      Heart  sounds: Normal heart sounds. No murmur heard.  Pulmonary:      Effort: Pulmonary effort is normal.      Breath sounds: Normal breath sounds and air entry. No wheezing.   Abdominal:      General: Bowel sounds are normal. There is no distension.      Palpations: Abdomen is soft.      Tenderness: There is no abdominal tenderness.   Musculoskeletal:         General: Normal range of motion.      Cervical back: Neck supple. No rigidity.   Skin:     General: Skin is warm.      Coloration: Skin is not jaundiced.      Findings: No lesion.   Neurological:      General: No focal deficit present.      Mental Status: She is alert and oriented to person, place, and time.      Cranial Nerves: No cranial nerve deficit.   Psychiatric:         Attention and Perception: Attention normal.         Mood and Affect: Mood normal.         Behavior: Behavior normal. Behavior is cooperative.         Thought Content: Thought content normal.         Cognition and Memory: Cognition normal.           Significant Labs: All pertinent labs within the past 24 hours have been reviewed.  BMP:   Recent Labs   Lab 06/19/23 0412   *      K 4.0   CL 97*   CO2 29   BUN 13   CREATININE 0.89   CALCIUM 8.9   MG 1.8       CBC:   Recent Labs   Lab 06/19/23 0412   WBC 11.13*   HGB 10.8*   HCT 34.3*   *       CMP:   Recent Labs   Lab 06/19/23 0412      K 4.0   CL 97*   CO2 29   *   BUN 13   CREATININE 0.89   CALCIUM 8.9   ANIONGAP 14         Significant Imaging: I have reviewed all pertinent imaging results/findings within the past 24 hours.

## 2023-06-21 NOTE — PROGRESS NOTES
[facility]  [facility] Medicine  Progress Note    Patient Name: Monica Walker  MRN: [id number]  Patient Class: IP- Inpatient   Admission Date: 6/8/2023  Length of Stay: 12 days  Attending Physician: Leon Knox MD  Primary Care Provider: Hannah Schulz MD        Subjective:     Principal Problem:Osteomyelitis of vertebra of thoracolumbar region        HPI:  Ms. 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  CT scan of lumbar region concerning for vertebral osteomyelitis.  The case was evaluated by Dr. Hernandes and he recommended admission to Medicine he would consult.  He also recommended an MRI.      She was diagnosed with MRSA bacteremia at [facility] 2 days ago and was given Dalvance (dalbavancin).  She was scheduled with outpatient follow-up with Dr. Noyola as she has had septic joint in the past.      The presents now with severe back pain.  She states that her back pain persists.  She reports a temperature of 103° in route to the facility.  She denies any chest pain or other concerns.  She had diskitis or epidural abscess in January and was treated with IV antibiotics.  She has also been recently treated for left knee prosthetic joint infection.        ED course:   Initial Vitals [06/08/23 1534]   BP Pulse Resp Temp SpO2   (!) 111/53 94 18 99.4 °F (37.4 °C) 96 %         Only pain medications given in the emergency department.        Overview/Hospital Course:  06/16 Admitted 06/09 with back pain. Treating now for MRSA bacteremia and MRSA and persistent vertebral osteomyelitis. Status post T9-L2 fusion, T11-T12 corpectomy, T11-T12 laminectomy 01/24/2023.  Also left knee septic arthritis status post 2 stage revision with Dr. Noyola 05/09/2023. MONICA no  vegetations 06/14. Plan continue dapto and rifampin 6-8 weeks.   PMHx: HTN and DM T2,   06/17 up in room. First week look at options to complete ATB  06/18 No issues. Look at options to complete ATB. ATB until 08/03. Will need PICC line.  06/19 In good spirits and wants to good home. Looks to have had 10 days IV ATB after 05/10 G- bacteremia. This time MRSA with first negative BC 06/08. Plan 6 weeks Rocephin and 8 weeks coverage MRSA both starting 06/08.   06/20 Getting outpt ATB arranged then home. Talked with ortho. See ID outpt      Interval History:     Review of Systems   Constitutional:  Negative for appetite change, fatigue and fever.   HENT:  Negative for congestion, hearing loss and trouble swallowing.    Respiratory:  Negative for chest tightness, shortness of breath and wheezing.    Cardiovascular:  Negative for chest pain and palpitations.   Gastrointestinal:  Negative for abdominal pain, constipation and nausea.   Genitourinary:  Negative for difficulty urinating and dysuria.   Musculoskeletal:  Positive for arthralgias, back pain and gait problem. Negative for neck stiffness.   Skin:  Negative for pallor and rash.   Neurological:  Negative for dizziness, speech difficulty and headaches.   Psychiatric/Behavioral:  Negative for confusion and suicidal ideas.    Objective:     Vital Signs (Most Recent):  Temp: 98.7 °F (37.1 °C) (06/20/23 1901)  Pulse: 83 (06/20/23 1901)  Resp: 18 (06/20/23 1901)  BP: 122/67 (06/20/23 1901)  SpO2: 98 % (06/20/23 1901) Vital Signs (24h Range):  Temp:  [97.4 °F (36.3 °C)-98.7 °F (37.1 °C)] 98.7 °F (37.1 °C)  Pulse:  [67-98] 83  Resp:  [16-18] 18  SpO2:  [96 %-98 %] 98 %  BP: ()/(49-75) 122/67     Weight: 68 kg (150 lb)  Body mass index is 24.21 kg/m².    Intake/Output Summary (Last 24 hours) at 6/20/2023 2230  Last data filed at 6/20/2023 1826  Gross per 24 hour   Intake --   Output 5 ml   Net -5 ml           Physical Exam  Vitals reviewed.   Constitutional:        General: She is awake. She is not in acute distress.     Appearance: She is well-developed. She is not toxic-appearing.   HENT:      Head: Normocephalic.      Nose: Nose normal.      Mouth/Throat:      Pharynx: Oropharynx is clear.   Eyes:      Extraocular Movements: Extraocular movements intact.      Pupils: Pupils are equal, round, and reactive to light.   Neck:      Thyroid: No thyroid mass.      Vascular: No carotid bruit.   Cardiovascular:      Rate and Rhythm: Normal rate and regular rhythm.      Pulses: Normal pulses.      Heart sounds: Normal heart sounds. No murmur heard.  Pulmonary:      Effort: Pulmonary effort is normal.      Breath sounds: Normal breath sounds and air entry. No wheezing.   Abdominal:      General: Bowel sounds are normal. There is no distension.      Palpations: Abdomen is soft.      Tenderness: There is no abdominal tenderness.   Musculoskeletal:         General: Normal range of motion.      Cervical back: Neck supple. No rigidity.   Skin:     General: Skin is warm.      Coloration: Skin is not jaundiced.      Findings: No lesion.   Neurological:      General: No focal deficit present.      Mental Status: She is alert and oriented to person, place, and time.      Cranial Nerves: No cranial nerve deficit.   Psychiatric:         Attention and Perception: Attention normal.         Mood and Affect: Mood normal.         Behavior: Behavior normal. Behavior is cooperative.         Thought Content: Thought content normal.         Cognition and Memory: Cognition normal.           Significant Labs: All pertinent labs within the past 24 hours have been reviewed.  BMP:   Recent Labs   Lab 06/19/23 0412   *      K 4.0   CL 97*   CO2 29   BUN 13   CREATININE 0.89   CALCIUM 8.9   MG 1.8       CBC:   Recent Labs   Lab 06/19/23 0412   WBC 11.13*   HGB 10.8*   HCT 34.3*   *       CMP:   Recent Labs   Lab 06/19/23 0412      K 4.0   CL 97*   CO2 29   *   BUN 13    CREATININE 0.89   CALCIUM 8.9   ANIONGAP 14         Significant Imaging: I have reviewed all pertinent imaging results/findings within the past 24 hours.      Assessment/Plan:      * Osteomyelitis of vertebra of thoracolumbar region  Patient with severe lower back pain.    Prior history of diskitis or epidural abscess.  CT scan concerning for osteomyelitis.  MRI with concern for osteomyelitis at T11 and T12.    Dr. Hernandes has been consulted.    6/14- Pending transfer to speciality once approved for continual IV abx treatment for another 6-8 weeks starting from the 6/8 where repeat bcx were drawn  Repeat bcx drawn on 6/8 show ng at 72 hours     Antibiotics (From admission, onward)    Start     Stop Route Frequency Ordered    06/09/23 0900  DAPTOmycin (CUBICIN) 750 mg in sodium chloride 0.9% SolP 50 mL IVPB         -- IV Every 24 hours (non-standard times) 06/09/23 0540    06/09/23 0900  rifAMpin capsule 600 mg         -- Oral Daily 06/09/23 0540    06/08/23 2100  mupirocin 2 % ointment         06/13 2059 Nasl 2 times daily 06/08/23 1830    06/08/23 1830  cefTRIAXone (ROCEPHIN) 2 g in dextrose 5 % in water (D5W) 5 % 100 mL IVPB (MB+)         -- IV Daily 06/08/23 1815          6/15 - Continuing abx, struggling with insurance over setting for continued care.     Tobacco use  Patient counseled on tobacco use and offered nicotine patch.      Primary hypertension  Continue home antihypertensives    Prior MRSA Septic Joint of Left Knee   History of removal infection of left knee joint with removal of prosthetic.    Consulted Dr. Noyola, he is aware patient is in hospital and does not see a need for a procedure as of right now  X-ray left knee done 6/9 showed some joint calcifications and loose bodies, no evidence of infection in joint       MRSA bacteremia  Multiple episodes of MRSA bacteremia.    The source may be the left knee joint but other sources are also possible.    There is a history of MRSA in left knee joint  as well as in the lower vertebrae.      6/14 - MRSA in blood 6/5.  F/u blood cx 6/8 ng at 72 hours.  MRSA from back abscess.  Plan continue dapto and rifampin 6-8 weeks in speciality.  MONICA done today which did not show any vegetations concerning for endocarditis. WBC 12 today but patient clinically stable. Continue to monitor with daily CBC.     06/14 8 weeks ATB starting 06/08 until 08/03 maybe last 2 weeks oral    DM2 (diabetes mellitus, type 2)  Patient's FSGs are controlled on current medication regimen.  Last A1c reviewed-   Lab Results   Component Value Date    HGBA1C 7.7 (H) 05/09/2023     Most recent fingerstick glucose reviewed- No results for input(s): POCTGLUCOSE in the last 24 hours.  Current correctional scale  Low  Maintain anti-hyperglycemic dose as follows-   Antihyperglycemics (From admission, onward)    Start     Stop Route Frequency Ordered    06/12/23 2100  insulin detemir U-100 injection 10 Units         -- SubQ Nightly 06/12/23 1508    06/08/23 1907  insulin aspart U-100 injection 1-10 Units         -- SubQ Before meals & nightly PRN 06/08/23 1815        Hold Oral hypoglycemics while patient is in the hospital.      Prior Lung abscesses  Prior small lung abscesses (septic emboli) which were thought to be related to hematogenous spread rather than a primary source of infection.  Consider repeat imaging in the future based on patient's clinical course.      Depression  Patient has persistent depression which is mild and is currently controlled. Will Continue anti-depressant medications. We will not consult psychiatry at this time. Patient does not display psychosis at this time. Continue to monitor closely and adjust plan of care as needed.    cymbalta 60mg BID  Wellbutrin 100mg daily       VTE Risk Mitigation (From admission, onward)         Ordered     enoxaparin injection 40 mg  Daily         06/08/23 1815     Place sequential compression device  Until discontinued         06/08/23 1815     IP  VTE HIGH RISK PATIENT  Once         06/08/23 1812                Discharge Planning   SUZANNA:      Code Status: Full Code   Is the patient medically ready for discharge?:     Reason for patient still in hospital (select all that apply): Laboratory test, Treatment and Imaging  Discharge Plan A: Home Health, Home with family   Discharge Delays: None known at this time              Leon Knox MD  Department of Hospital Medicine   [facility]

## 2023-06-21 NOTE — PLAN OF CARE
[facility]  Discharge Final Note    Primary Care Provider: Hannah Schulz MD    Expected Discharge Date:     Final Discharge Note (most recent)       Final Note - 06/21/23 1532          Final Note    Assessment Type Final Discharge Note     Anticipated Discharge Disposition Home-Health Care Svc   [facility]    What phone number can be called within the next 1-3 days to see how you are doing after discharge? [phone]        Post-Acute Status    Post-Acute Authorization Home Health;IV Infusion     Home Health Status Set-up Complete/Auth obtained     IV Infusion Status Set-up Complete/Auth obtained     Patient choice form signed by patient/caregiver List with quality metrics by geographic area provided;List from CMS Compare     Discharge Delays None known at this time                     Important Message from Medicare  Important Message from Medicare regarding Discharge Appeal Rights: Signed/date by patient/caregiver     Date IMM was signed: 06/20/23  Time IMM was signed: 1322     Follow-up providers       Hannah Schulz MD   Specialty: Family Medicine   Relationship: PCP - General    [address] [place] [address]   Phone: [phone]       Next Steps: Schedule an appointment as soon as possible for a visit in 1 week(s)    Adin Hartman MD    office [phone]    fax [phone]    [address] [place] [address] Dr. Christianson [address] [place] [address]       Next Steps: Follow up in 40 day(s)    Instructions: Appointment made for July 31st at 10:00 a.m.    Jimmy Hernandes MD   Specialty: Orthopedic Surgery, Spine Surgery    [address]  [facility] [address]   Phone: [phone]       Next Steps: Schedule an appointment as soon as possible for a visit in 6 week(s)    Jacek Noyola MD   Specialty: Orthopedic Surgery    [address]  [address]  [facility] [place] [address]   Phone: [phone]        Next Steps: Schedule an appointment as soon as possible for a visit in 6 week(s)              After-discharge care                Dialysis/Infusion       [facility]   Service: Home Infusion and Injection    [address] [place] [id number]   Phone: [phone]                 Home Medical Care       [facility]   Service: Home Nursing    [address] [facility] [address]   Phone: [phone]                             Pt to dc home today. MICHEAL spoke with Debora at [facility] and she could not give a time frame on when iv abx will be delivered but it will be delivered to pts room. MICHEAL notified David at [facility] that pt will dc today. MICHEAL faxed dc orders and summary to [facility]. No other needs.

## 2023-06-22 ENCOUNTER — PATIENT OUTREACH (OUTPATIENT)
Dept: ADMINISTRATIVE | Facility: CLINIC | Age: 53
End: 2023-06-22

## 2023-06-22 NOTE — PROGRESS NOTES
C3 nurse spoke with mother Alejandrina Walker for a TCC post hospital discharge follow up call. The patient reports does not have a scheduled HOSFU appointment. C3 nurse was unable to schedule [facility] appointment [facility]. Mother advised to contact their PCP to schedule a HOSPFU within 5-7 days.

## 2023-06-26 ENCOUNTER — LAB REQUISITION (OUTPATIENT)
Dept: LAB | Facility: HOSPITAL | Age: 53
End: 2023-06-26
Attending: FAMILY MEDICINE
Payer: MEDICARE

## 2023-06-26 DIAGNOSIS — M51.9 UNSPECIFIED THORACIC, THORACOLUMBAR AND LUMBOSACRAL INTERVERTEBRAL DISC DISORDER: ICD-10-CM

## 2023-06-26 LAB
ANION GAP SERPL CALCULATED.3IONS-SCNC: 12 MMOL/L (ref 7–16)
BASOPHILS # BLD AUTO: 0.07 K/UL (ref 0–0.2)
BASOPHILS NFR BLD AUTO: 0.8 % (ref 0–1)
BUN SERPL-MCNC: 16 MG/DL (ref 7–18)
BUN/CREAT SERPL: 16 (ref 6–20)
CALCIUM SERPL-MCNC: 9.3 MG/DL (ref 8.5–10.1)
CHLORIDE SERPL-SCNC: 99 MMOL/L (ref 98–107)
CK SERPL-CCNC: 21 U/L (ref 26–192)
CO2 SERPL-SCNC: 29 MMOL/L (ref 21–32)
CREAT SERPL-MCNC: 0.98 MG/DL (ref 0.55–1.02)
CRP SERPL-MCNC: 5.33 MG/DL (ref 0–0.8)
DIFFERENTIAL METHOD BLD: ABNORMAL
EGFR (NO RACE VARIABLE) (RUSH/TITUS): 68 ML/MIN/1.73M2
EOSINOPHIL # BLD AUTO: 0.43 K/UL (ref 0–0.5)
EOSINOPHIL NFR BLD AUTO: 4.9 % (ref 1–4)
ERYTHROCYTE [DISTWIDTH] IN BLOOD BY AUTOMATED COUNT: 14.9 % (ref 11.5–14.5)
ERYTHROCYTE [SEDIMENTATION RATE] IN BLOOD BY WESTERGREN METHOD: 130 MM/HR (ref 0–30)
GLUCOSE SERPL-MCNC: 255 MG/DL (ref 74–106)
HCT VFR BLD AUTO: 34.4 % (ref 38–47)
HGB BLD-MCNC: 10.9 G/DL (ref 12–16)
IMM GRANULOCYTES # BLD AUTO: 0.01 K/UL (ref 0–0.04)
IMM GRANULOCYTES NFR BLD: 0.1 % (ref 0–0.4)
LYMPHOCYTES # BLD AUTO: 2.42 K/UL (ref 1–4.8)
LYMPHOCYTES NFR BLD AUTO: 27.8 % (ref 27–41)
MCH RBC QN AUTO: 26.3 PG (ref 27–31)
MCHC RBC AUTO-ENTMCNC: 31.7 G/DL (ref 32–36)
MCV RBC AUTO: 83.1 FL (ref 80–96)
MONOCYTES # BLD AUTO: 0.61 K/UL (ref 0–0.8)
MONOCYTES NFR BLD AUTO: 7 % (ref 2–6)
MPC BLD CALC-MCNC: 10.1 FL (ref 9.4–12.4)
NEUTROPHILS # BLD AUTO: 5.17 K/UL (ref 1.8–7.7)
NEUTROPHILS NFR BLD AUTO: 59.4 % (ref 53–65)
PLATELET # BLD AUTO: 430 K/UL (ref 150–400)
POTASSIUM SERPL-SCNC: 3.7 MMOL/L (ref 3.5–5.1)
RBC # BLD AUTO: 4.14 M/UL (ref 4.2–5.4)
SODIUM SERPL-SCNC: 136 MMOL/L (ref 136–145)
WBC # BLD AUTO: 8.71 K/UL (ref 4.5–11)

## 2023-06-26 PROCEDURE — 86140 C-REACTIVE PROTEIN: CPT | Performed by: FAMILY MEDICINE

## 2023-06-26 PROCEDURE — 80048 BASIC METABOLIC PNL TOTAL CA: CPT | Performed by: FAMILY MEDICINE

## 2023-06-26 PROCEDURE — 85025 COMPLETE CBC W/AUTO DIFF WBC: CPT | Performed by: FAMILY MEDICINE

## 2023-06-26 PROCEDURE — 82550 ASSAY OF CK (CPK): CPT | Performed by: FAMILY MEDICINE

## 2023-06-26 PROCEDURE — 85651 RBC SED RATE NONAUTOMATED: CPT | Performed by: FAMILY MEDICINE

## 2023-07-03 ENCOUNTER — DOCUMENT SCAN (OUTPATIENT)
Dept: HOME HEALTH SERVICES | Facility: HOSPITAL | Age: 53
End: 2023-07-03
Payer: MEDICARE

## 2023-07-03 ENCOUNTER — LAB REQUISITION (OUTPATIENT)
Dept: LAB | Facility: HOSPITAL | Age: 53
End: 2023-07-03
Attending: FAMILY MEDICINE
Payer: MEDICARE

## 2023-07-03 DIAGNOSIS — Z79.2 LONG TERM (CURRENT) USE OF ANTIBIOTICS: ICD-10-CM

## 2023-07-03 LAB
ANION GAP SERPL CALCULATED.3IONS-SCNC: 11 MMOL/L (ref 7–16)
BASOPHILS # BLD AUTO: 0.06 K/UL (ref 0–0.2)
BASOPHILS NFR BLD AUTO: 0.8 % (ref 0–1)
BUN SERPL-MCNC: 11 MG/DL (ref 7–18)
BUN/CREAT SERPL: 14 (ref 6–20)
CALCIUM SERPL-MCNC: 9.2 MG/DL (ref 8.5–10.1)
CHLORIDE SERPL-SCNC: 102 MMOL/L (ref 98–107)
CK SERPL-CCNC: 25 U/L (ref 26–192)
CO2 SERPL-SCNC: 29 MMOL/L (ref 21–32)
CREAT SERPL-MCNC: 0.79 MG/DL (ref 0.55–1.02)
CRP SERPL-MCNC: 6.05 MG/DL (ref 0–0.8)
DIFFERENTIAL METHOD BLD: ABNORMAL
EGFR (NO RACE VARIABLE) (RUSH/TITUS): 88 ML/MIN/1.73M2
EOSINOPHIL # BLD AUTO: 0.52 K/UL (ref 0–0.5)
EOSINOPHIL NFR BLD AUTO: 6.6 % (ref 1–4)
ERYTHROCYTE [DISTWIDTH] IN BLOOD BY AUTOMATED COUNT: 14.9 % (ref 11.5–14.5)
ERYTHROCYTE [SEDIMENTATION RATE] IN BLOOD BY WESTERGREN METHOD: 110 MM/HR (ref 0–30)
GLUCOSE SERPL-MCNC: 155 MG/DL (ref 74–106)
HCT VFR BLD AUTO: 34.2 % (ref 38–47)
HGB BLD-MCNC: 10.9 G/DL (ref 12–16)
IMM GRANULOCYTES # BLD AUTO: 0.02 K/UL (ref 0–0.04)
IMM GRANULOCYTES NFR BLD: 0.3 % (ref 0–0.4)
LYMPHOCYTES # BLD AUTO: 2.04 K/UL (ref 1–4.8)
LYMPHOCYTES NFR BLD AUTO: 26.1 % (ref 27–41)
MCH RBC QN AUTO: 26.3 PG (ref 27–31)
MCHC RBC AUTO-ENTMCNC: 31.9 G/DL (ref 32–36)
MCV RBC AUTO: 82.4 FL (ref 80–96)
MONOCYTES # BLD AUTO: 0.59 K/UL (ref 0–0.8)
MONOCYTES NFR BLD AUTO: 7.5 % (ref 2–6)
MPC BLD CALC-MCNC: 9.9 FL (ref 9.4–12.4)
NEUTROPHILS # BLD AUTO: 4.59 K/UL (ref 1.8–7.7)
NEUTROPHILS NFR BLD AUTO: 58.7 % (ref 53–65)
PLATELET # BLD AUTO: 300 K/UL (ref 150–400)
POTASSIUM SERPL-SCNC: 4 MMOL/L (ref 3.5–5.1)
RBC # BLD AUTO: 4.15 M/UL (ref 4.2–5.4)
SODIUM SERPL-SCNC: 138 MMOL/L (ref 136–145)
WBC # BLD AUTO: 7.82 K/UL (ref 4.5–11)

## 2023-07-03 PROCEDURE — 85651 RBC SED RATE NONAUTOMATED: CPT | Performed by: FAMILY MEDICINE

## 2023-07-03 PROCEDURE — 86140 C-REACTIVE PROTEIN: CPT | Performed by: FAMILY MEDICINE

## 2023-07-03 PROCEDURE — 82550 ASSAY OF CK (CPK): CPT | Performed by: FAMILY MEDICINE

## 2023-07-03 PROCEDURE — 85025 COMPLETE CBC W/AUTO DIFF WBC: CPT | Performed by: FAMILY MEDICINE

## 2023-07-03 PROCEDURE — 80048 BASIC METABOLIC PNL TOTAL CA: CPT | Performed by: FAMILY MEDICINE

## 2023-07-10 ENCOUNTER — LAB REQUISITION (OUTPATIENT)
Dept: LAB | Facility: HOSPITAL | Age: 53
End: 2023-07-10
Attending: FAMILY MEDICINE
Payer: MEDICARE

## 2023-07-10 DIAGNOSIS — Z79.2 LONG TERM (CURRENT) USE OF ANTIBIOTICS: ICD-10-CM

## 2023-07-10 LAB
ANION GAP SERPL CALCULATED.3IONS-SCNC: 14 MMOL/L (ref 7–16)
BASOPHILS # BLD AUTO: 0.04 K/UL (ref 0–0.2)
BASOPHILS NFR BLD AUTO: 0.5 % (ref 0–1)
BUN SERPL-MCNC: 21 MG/DL (ref 7–18)
BUN/CREAT SERPL: 24 (ref 6–20)
CALCIUM SERPL-MCNC: 9.4 MG/DL (ref 8.5–10.1)
CHLORIDE SERPL-SCNC: 99 MMOL/L (ref 98–107)
CK SERPL-CCNC: 46 U/L (ref 26–192)
CO2 SERPL-SCNC: 26 MMOL/L (ref 21–32)
CREAT SERPL-MCNC: 0.87 MG/DL (ref 0.55–1.02)
CRP SERPL-MCNC: 5.32 MG/DL (ref 0–0.8)
DIFFERENTIAL METHOD BLD: ABNORMAL
EGFR (NO RACE VARIABLE) (RUSH/TITUS): 79 ML/MIN/1.73M2
EOSINOPHIL # BLD AUTO: 0.66 K/UL (ref 0–0.5)
EOSINOPHIL NFR BLD AUTO: 7.6 % (ref 1–4)
ERYTHROCYTE [DISTWIDTH] IN BLOOD BY AUTOMATED COUNT: 14.6 % (ref 11.5–14.5)
ERYTHROCYTE [SEDIMENTATION RATE] IN BLOOD BY WESTERGREN METHOD: 115 MM/HR (ref 0–30)
GLUCOSE SERPL-MCNC: 224 MG/DL (ref 74–106)
HCT VFR BLD AUTO: 32.3 % (ref 38–47)
HGB BLD-MCNC: 10.2 G/DL (ref 12–16)
IMM GRANULOCYTES # BLD AUTO: 0.01 K/UL (ref 0–0.04)
IMM GRANULOCYTES NFR BLD: 0.1 % (ref 0–0.4)
LYMPHOCYTES # BLD AUTO: 2.13 K/UL (ref 1–4.8)
LYMPHOCYTES NFR BLD AUTO: 24.5 % (ref 27–41)
MCH RBC QN AUTO: 25.7 PG (ref 27–31)
MCHC RBC AUTO-ENTMCNC: 31.6 G/DL (ref 32–36)
MCV RBC AUTO: 81.4 FL (ref 80–96)
MONOCYTES # BLD AUTO: 0.59 K/UL (ref 0–0.8)
MONOCYTES NFR BLD AUTO: 6.8 % (ref 2–6)
MPC BLD CALC-MCNC: 10 FL (ref 9.4–12.4)
NEUTROPHILS # BLD AUTO: 5.25 K/UL (ref 1.8–7.7)
NEUTROPHILS NFR BLD AUTO: 60.5 % (ref 53–65)
PLATELET # BLD AUTO: 254 K/UL (ref 150–400)
POTASSIUM SERPL-SCNC: 3.4 MMOL/L (ref 3.5–5.1)
RBC # BLD AUTO: 3.97 M/UL (ref 4.2–5.4)
SODIUM SERPL-SCNC: 136 MMOL/L (ref 136–145)
WBC # BLD AUTO: 8.68 K/UL (ref 4.5–11)

## 2023-07-10 PROCEDURE — 82550 ASSAY OF CK (CPK): CPT

## 2023-07-10 PROCEDURE — 86140 C-REACTIVE PROTEIN: CPT

## 2023-07-10 PROCEDURE — 80048 BASIC METABOLIC PNL TOTAL CA: CPT

## 2023-07-10 PROCEDURE — 85025 COMPLETE CBC W/AUTO DIFF WBC: CPT

## 2023-07-10 PROCEDURE — 85651 RBC SED RATE NONAUTOMATED: CPT

## 2023-07-24 ENCOUNTER — LAB REQUISITION (OUTPATIENT)
Dept: LAB | Facility: HOSPITAL | Age: 53
End: 2023-07-24
Attending: FAMILY MEDICINE
Payer: MEDICARE

## 2023-07-24 DIAGNOSIS — Z79.2 LONG TERM (CURRENT) USE OF ANTIBIOTICS: ICD-10-CM

## 2023-07-24 LAB
ANION GAP SERPL CALCULATED.3IONS-SCNC: 10 MMOL/L (ref 7–16)
BASOPHILS # BLD AUTO: 0.02 K/UL (ref 0–0.2)
BASOPHILS NFR BLD AUTO: 0.3 % (ref 0–1)
BUN SERPL-MCNC: 12 MG/DL (ref 7–18)
BUN/CREAT SERPL: 14 (ref 6–20)
CALCIUM SERPL-MCNC: 9.1 MG/DL (ref 8.5–10.1)
CHLORIDE SERPL-SCNC: 99 MMOL/L (ref 98–107)
CK SERPL-CCNC: 53 U/L (ref 26–192)
CO2 SERPL-SCNC: 29 MMOL/L (ref 21–32)
CREAT SERPL-MCNC: 0.84 MG/DL (ref 0.55–1.02)
CRP SERPL-MCNC: 4.93 MG/DL (ref 0–0.8)
DIFFERENTIAL METHOD BLD: ABNORMAL
EGFR (NO RACE VARIABLE) (RUSH/TITUS): 82 ML/MIN/1.73M2
EOSINOPHIL # BLD AUTO: 0.44 K/UL (ref 0–0.5)
EOSINOPHIL NFR BLD AUTO: 6.1 % (ref 1–4)
ERYTHROCYTE [DISTWIDTH] IN BLOOD BY AUTOMATED COUNT: 14.9 % (ref 11.5–14.5)
ERYTHROCYTE [SEDIMENTATION RATE] IN BLOOD BY WESTERGREN METHOD: 94 MM/HR (ref 0–30)
GLUCOSE SERPL-MCNC: 252 MG/DL (ref 74–106)
HCT VFR BLD AUTO: 32.7 % (ref 38–47)
HGB BLD-MCNC: 10.3 G/DL (ref 12–16)
IMM GRANULOCYTES # BLD AUTO: 0.01 K/UL (ref 0–0.04)
IMM GRANULOCYTES NFR BLD: 0.1 % (ref 0–0.4)
LYMPHOCYTES # BLD AUTO: 1.98 K/UL (ref 1–4.8)
LYMPHOCYTES NFR BLD AUTO: 27.7 % (ref 27–41)
MCH RBC QN AUTO: 25.4 PG (ref 27–31)
MCHC RBC AUTO-ENTMCNC: 31.5 G/DL (ref 32–36)
MCV RBC AUTO: 80.7 FL (ref 80–96)
MONOCYTES # BLD AUTO: 0.43 K/UL (ref 0–0.8)
MONOCYTES NFR BLD AUTO: 6 % (ref 2–6)
MPC BLD CALC-MCNC: 9.6 FL (ref 9.4–12.4)
NEUTROPHILS # BLD AUTO: 4.28 K/UL (ref 1.8–7.7)
NEUTROPHILS NFR BLD AUTO: 59.8 % (ref 53–65)
PLATELET # BLD AUTO: 370 K/UL (ref 150–400)
POTASSIUM SERPL-SCNC: 3.4 MMOL/L (ref 3.5–5.1)
RBC # BLD AUTO: 4.05 M/UL (ref 4.2–5.4)
SODIUM SERPL-SCNC: 135 MMOL/L (ref 136–145)
WBC # BLD AUTO: 7.16 K/UL (ref 4.5–11)

## 2023-07-24 PROCEDURE — 80048 BASIC METABOLIC PNL TOTAL CA: CPT | Performed by: FAMILY MEDICINE

## 2023-07-24 PROCEDURE — 85651 RBC SED RATE NONAUTOMATED: CPT | Performed by: FAMILY MEDICINE

## 2023-07-24 PROCEDURE — 86140 C-REACTIVE PROTEIN: CPT | Performed by: FAMILY MEDICINE

## 2023-07-24 PROCEDURE — 82550 ASSAY OF CK (CPK): CPT | Performed by: FAMILY MEDICINE

## 2023-07-24 PROCEDURE — 85025 COMPLETE CBC W/AUTO DIFF WBC: CPT | Performed by: FAMILY MEDICINE

## 2023-07-31 ENCOUNTER — LAB REQUISITION (OUTPATIENT)
Dept: LAB | Facility: HOSPITAL | Age: 53
End: 2023-07-31
Attending: FAMILY MEDICINE
Payer: MEDICARE

## 2023-07-31 DIAGNOSIS — Z79.2 LONG TERM (CURRENT) USE OF ANTIBIOTICS: ICD-10-CM

## 2023-07-31 LAB
ANION GAP SERPL CALCULATED.3IONS-SCNC: 11 MMOL/L (ref 7–16)
BASOPHILS # BLD AUTO: 0.02 K/UL (ref 0–0.2)
BASOPHILS NFR BLD AUTO: 0.2 % (ref 0–1)
BUN SERPL-MCNC: 19 MG/DL (ref 7–18)
BUN/CREAT SERPL: 17 (ref 6–20)
CALCIUM SERPL-MCNC: 8.9 MG/DL (ref 8.5–10.1)
CHLORIDE SERPL-SCNC: 98 MMOL/L (ref 98–107)
CK SERPL-CCNC: 39 U/L (ref 26–192)
CO2 SERPL-SCNC: 29 MMOL/L (ref 21–32)
CREAT SERPL-MCNC: 1.14 MG/DL (ref 0.55–1.02)
CRP SERPL-MCNC: 12.4 MG/DL (ref 0–0.8)
DIFFERENTIAL METHOD BLD: ABNORMAL
EGFR (NO RACE VARIABLE) (RUSH/TITUS): 57 ML/MIN/1.73M2
EOSINOPHIL # BLD AUTO: 0.43 K/UL (ref 0–0.5)
EOSINOPHIL NFR BLD AUTO: 5.3 % (ref 1–4)
ERYTHROCYTE [DISTWIDTH] IN BLOOD BY AUTOMATED COUNT: 14.9 % (ref 11.5–14.5)
ERYTHROCYTE [SEDIMENTATION RATE] IN BLOOD BY WESTERGREN METHOD: 101 MM/HR (ref 0–30)
GLUCOSE SERPL-MCNC: 381 MG/DL (ref 74–106)
HCT VFR BLD AUTO: 32.6 % (ref 38–47)
HGB BLD-MCNC: 10.2 G/DL (ref 12–16)
IMM GRANULOCYTES # BLD AUTO: 0.01 K/UL (ref 0–0.04)
IMM GRANULOCYTES NFR BLD: 0.1 % (ref 0–0.4)
LYMPHOCYTES # BLD AUTO: 1.58 K/UL (ref 1–4.8)
LYMPHOCYTES NFR BLD AUTO: 19.3 % (ref 27–41)
MCH RBC QN AUTO: 25 PG (ref 27–31)
MCHC RBC AUTO-ENTMCNC: 31.3 G/DL (ref 32–36)
MCV RBC AUTO: 79.9 FL (ref 80–96)
MONOCYTES # BLD AUTO: 0.61 K/UL (ref 0–0.8)
MONOCYTES NFR BLD AUTO: 7.4 % (ref 2–6)
MPC BLD CALC-MCNC: 10.3 FL (ref 9.4–12.4)
NEUTROPHILS # BLD AUTO: 5.54 K/UL (ref 1.8–7.7)
NEUTROPHILS NFR BLD AUTO: 67.7 % (ref 53–65)
PLATELET # BLD AUTO: 341 K/UL (ref 150–400)
POTASSIUM SERPL-SCNC: 4.1 MMOL/L (ref 3.5–5.1)
RBC # BLD AUTO: 4.08 M/UL (ref 4.2–5.4)
SODIUM SERPL-SCNC: 134 MMOL/L (ref 136–145)
WBC # BLD AUTO: 8.19 K/UL (ref 4.5–11)

## 2023-07-31 PROCEDURE — 82550 ASSAY OF CK (CPK): CPT | Performed by: FAMILY MEDICINE

## 2023-07-31 PROCEDURE — 85025 COMPLETE CBC W/AUTO DIFF WBC: CPT | Performed by: FAMILY MEDICINE

## 2023-07-31 PROCEDURE — 86140 C-REACTIVE PROTEIN: CPT | Performed by: FAMILY MEDICINE

## 2023-07-31 PROCEDURE — 85651 RBC SED RATE NONAUTOMATED: CPT | Performed by: FAMILY MEDICINE

## 2023-07-31 PROCEDURE — 80048 BASIC METABOLIC PNL TOTAL CA: CPT | Performed by: FAMILY MEDICINE

## 2023-08-03 DIAGNOSIS — M46.24 OSTEOMYELITIS OF THORACIC REGION: Primary | ICD-10-CM

## 2023-08-04 ENCOUNTER — HOSPITAL ENCOUNTER (OUTPATIENT)
Dept: RADIOLOGY | Facility: HOSPITAL | Age: 53
Discharge: HOME | End: 2023-08-04
Attending: ORTHOPAEDIC SURGERY
Payer: MEDICARE

## 2023-08-04 ENCOUNTER — OFFICE VISIT (OUTPATIENT)
Dept: SPINE | Facility: CLINIC | Age: 53
End: 2023-08-04
Payer: MEDICARE

## 2023-08-04 DIAGNOSIS — M46.24 OSTEOMYELITIS OF THORACIC REGION: ICD-10-CM

## 2023-08-04 DIAGNOSIS — M46.24 OSTEOMYELITIS OF THORACIC REGION: Primary | ICD-10-CM

## 2023-08-04 DIAGNOSIS — S22.088S OTHER CLOSED FRACTURE OF TWELFTH THORACIC VERTEBRA, SEQUELA: ICD-10-CM

## 2023-08-04 PROCEDURE — 4010F PR ACE/ARB THEARPY RXD/TAKEN: ICD-10-PCS | Mod: CPTII,,, | Performed by: ORTHOPAEDIC SURGERY

## 2023-08-04 PROCEDURE — 3060F PR POS MICROALBUMINURIA RESULT DOCUMENTED/REVIEW: ICD-10-PCS | Mod: CPTII,,, | Performed by: ORTHOPAEDIC SURGERY

## 2023-08-04 PROCEDURE — 72082 XR SCOLIOSIS COMPLETE: ICD-10-PCS | Mod: 26,,, | Performed by: ORTHOPAEDIC SURGERY

## 2023-08-04 PROCEDURE — 99211 OFF/OP EST MAY X REQ PHY/QHP: CPT | Mod: PBBFAC | Performed by: ORTHOPAEDIC SURGERY

## 2023-08-04 PROCEDURE — 3051F PR MOST RECENT HEMOGLOBIN A1C LEVEL 7.0 - < 8.0%: ICD-10-PCS | Mod: CPTII,,, | Performed by: ORTHOPAEDIC SURGERY

## 2023-08-04 PROCEDURE — 3066F PR DOCUMENTATION OF TREATMENT FOR NEPHROPATHY: ICD-10-PCS | Mod: CPTII,,, | Performed by: ORTHOPAEDIC SURGERY

## 2023-08-04 PROCEDURE — 99406 BEHAV CHNG SMOKING 3-10 MIN: CPT | Mod: S$PBB,,, | Performed by: ORTHOPAEDIC SURGERY

## 2023-08-04 PROCEDURE — 3066F NEPHROPATHY DOC TX: CPT | Mod: CPTII,,, | Performed by: ORTHOPAEDIC SURGERY

## 2023-08-04 PROCEDURE — 99406 PR TOBACCO USE CESSATION INTERMEDIATE 3-10 MINUTES: ICD-10-PCS | Mod: S$PBB,,, | Performed by: ORTHOPAEDIC SURGERY

## 2023-08-04 PROCEDURE — 72082 X-RAY EXAM ENTIRE SPI 2/3 VW: CPT | Mod: TC

## 2023-08-04 PROCEDURE — 4010F ACE/ARB THERAPY RXD/TAKEN: CPT | Mod: CPTII,,, | Performed by: ORTHOPAEDIC SURGERY

## 2023-08-04 PROCEDURE — 99214 OFFICE O/P EST MOD 30 MIN: CPT | Mod: 25,S$PBB,, | Performed by: ORTHOPAEDIC SURGERY

## 2023-08-04 PROCEDURE — 3060F POS MICROALBUMINURIA REV: CPT | Mod: CPTII,,, | Performed by: ORTHOPAEDIC SURGERY

## 2023-08-04 PROCEDURE — 3051F HG A1C>EQUAL 7.0%<8.0%: CPT | Mod: CPTII,,, | Performed by: ORTHOPAEDIC SURGERY

## 2023-08-04 PROCEDURE — 72082 X-RAY EXAM ENTIRE SPI 2/3 VW: CPT | Mod: 26,,, | Performed by: ORTHOPAEDIC SURGERY

## 2023-08-04 PROCEDURE — 99214 PR OFFICE/OUTPT VISIT, EST, LEVL IV, 30-39 MIN: ICD-10-PCS | Mod: 25,S$PBB,, | Performed by: ORTHOPAEDIC SURGERY

## 2023-08-04 NOTE — PROGRESS NOTES
MDM/time:  Greater than 30 minutes spent on this encounter including 10 minutes reviewing imaging and notes, 15 minutes with the patient, 5 minutes documentation    ASSESSMENT:  53 y.o. female with T12 osteomyelitis and compression fracture now status post T9-L2 fusion, T11-T12 laminectomy, T12 corpectomy 01/24/2023    PLAN:  She is going to reschedule her appointment with infectious disease, will likely need to continue antibiotics.  Follow-up in 6 months     HPI:  53 y.o. female here for repeat evaluation status post T12 osteomyelitis and compression fracture now status post T9-L2 fusion, T11-T12 laminectomy, T12 corpectomy 01/24/2023.  She was recently readmitted to the hospital with MRSA bacteremia.  She is been on IV antibiotics with a PICC line, she just finished this yesterday.  Labs taken 07/31/2023 reviewed show persistently elevated ESR CRP.  She had an appointment scheduled with Infectious Disease and mobile however was unable to secure transportation and Mrs. Appointment.    IMAGING:  X-ray scoliosis series reviewed show:   On the AP there is normal coronal alignment of the thoracic and lumbar spine.  There are 5 non-rib-bearing lumbar vertebra.  On the lateral view there is decreased thoracic kyphosis and lumbar lordosis.  Thoracic and lumbar spondylosis noted.  Status post T9-L2 fusion, T11-T12 laminectomy with T12 corpectomy.  Prior L4-5 spinous process wiring noted.  There is been left total hip arthroplasty and posterior acetabular fixation.      Past Surgical History:   Procedure Laterality Date    BACK SURGERY      ECHOCARDIOGRAM,TRANSESOPHAGEAL N/A 6/13/2023    Procedure: Transesophageal echo (MONICA) intra-procedure log documentation;  Surgeon: Kaiden Sparks MD;  Location: [facility] CATH LAB;  Service: Cardiology;  Laterality: N/A;    HYSTERECTOMY N/A     INCISION AND DRAINAGE Left 3/13/2023    Procedure: INCISION AND DRAINAGE;  Surgeon: Jacek Noyola MD;  Location: [facility]  "OR;  Service: Orthopedics;  Laterality: Left;    IRRIGATION AND DEBRIDEMENT OF LOWER EXTREMITY Left 11/22/2022    Procedure: IRRIGATION AND DEBRIDEMENT, LOWER EXTREMITY;  Surgeon: Papa Mendoza MD;  Location: [facility] OR;  Service: Orthopedics;  Laterality: Left;    IRRIGATION AND DEBRIDEMENT OF UPPER EXTREMITY Left 11/22/2022    Procedure: IRRIGATION AND DEBRIDEMENT, UPPER EXTREMITY;  Surgeon: Papa Mendoza MD;  Location: [facility] OR;  Service: Orthopedics;  Laterality: Left;    REVISION OF KNEE ARTHROPLASTY Left 3/13/2023    Procedure: REMOVAL OF PROSTHESIS,METHYLMETHACRYLATE WITH OR WITHOUT INSERTION OF SPACER LEFT KNEE;  Surgeon: Jacek Noyola MD;  Location: [facility] OR;  Service: Orthopedics;  Laterality: Left;    REVISION OF KNEE ARTHROPLASTY Left 5/9/2023    Procedure: REVISION, ARTHROPLASTY, KNEE;  Surgeon: Jacek Noyola MD;  Location: [facility] OR;  Service: Orthopedics;  Laterality: Left;    THORACIC LAMINECTOMY WITH FUSION N/A 1/24/2023    Procedure: T9-L2 fusion, T11-T12 laminectomy and corpectomy;  Surgeon: Jimmy Hernandes MD;  Location: [facility] OR;  Service: Neurosurgery;  Laterality: N/A;    TOTAL HIP ARTHROPLASTY Left     TOTAL KNEE ARTHROPLASTY Bilateral      Social History     Tobacco Use    Smoking status: Every Day     Current packs/day: 0.50     Types: Cigarettes    Smokeless tobacco: Current     Types: Snuff   Substance Use Topics    Alcohol use: Never    Drug use: Never      Current Outpatient Medications   Medication Instructions    BD ULTRA-FINE SHORT PEN NEEDLE 31 gauge x 5/16" Ndle No dose, route, or frequency recorded.    buPROPion (WELLBUTRIN) 100 mg, Oral, Daily    DULoxetine (CYMBALTA) 60 mg, Oral, Daily    HYDROcodone-acetaminophen (NORCO)  mg per tablet 10 mg    insulin (LANTUS SOLOSTAR U-100 INSULIN) 30 Units, Subcutaneous, Daily    insulin NPH-insulin regular, 70/30, 100 unit/mL (70-30) injection 30 Units, Subcutaneous, Every " morning    NovoLOG FlexPen U-100 Insulin 5 Units, Subcutaneous, 3 times daily with meals        EXAM:  Constitutional  General Appearance:  There is no height or weight on file to calculate BMI., NAD  Psychiatric   Orientation: Oriented to time, oriented to place, oriented to person  Mood and Affect: Active and alert, normal mood, normal affect  Gait and Station   Appearance:  Unable to ambulate without assistance, unable to tandem gait, unable to walk on toes, unable to walk on heels    LUMBAR  Musculoskeletal System   Hips: Normal appearance, no leg length discrepancy, normal motion; left, normal motion; right    Lumbar Spine                   Inspection:  Normal alignment, normal sagittal balance                  Range of motion: decreased flexion, extension, lateral bending, rotation. Pain with range of motion                  Bony Palpation of the Lumbar Spine:  No tenderness of the spinous process, no tenderness of the sacrum, no tenderness of the coccyx                  Bony Palpation of the Right Hip:  No tenderness of the iliac crest, no tenderness of the sciatic notch, no tenderness of the SI joint                  Bony Palpation of the Left Hip:  No tenderness of the iliac crest, no tenderness of the sciatic notch, no tenderness of the SI joint                  Soft Tissue Palpation on the Right:  No tenderness of the paraspinal region, no tenderness of the iliolumbar region                  Soft Tissue Palpation on the Left:  No tenderness of the paraspinal region, no tenderness of the iliolumbar region    Motor Strength   L1 Right:  Hip flexion iliopsoas 5/5    L1 Left:  Hip flexion iliopsoas 3/5              L2-L4 Right:  Knee extension quadriceps 5/5, tibialis anterior 5/5              L2-L4 Left:  Knee extension quadriceps 3/5, tibialis anterior 3/5   L5 Right:  Extensor hallucis llongus 5/5,    L5 Left:  Extensor hallucis longus 3/5,    S1 Right:  Plantar flexion gastrocnemius 5/5   S1 Left:   Plantar flexion gastrocnemius 3/5    Neurological System   Ankle Reflex Right:  normal   Ankle Reflex Left: normal   Knee Reflex Right:  normal   Knee Reflex Left:  normal   Sensation on the Right:  L2 normal, L3 normal, L4 normal, L5 normal, S1 normal   Sensation on the Left:  L2 normal, L3 normal, L4 decreased, L5 decreased, S1 decreased              Special Test on the Right:  Seated straight leg raising test negative, no clonus of the ankle              Special Test on the Left:  Seated straight leg raising test negative, no clonus of the ankle    Skin   Lumbosacral Spine:  Healed incision    Cardiovascular System   Arterial Pulses Right:  Posterior tibialis normal, dorsalis pedis normal   Arterial Pulses Left:  Posterior tibialis normal, dorsalis pedis normal   Edema Right: None   Edema Left:  None

## 2023-08-04 NOTE — PROGRESS NOTES
AP and lateral views scoliosis series reviewed    On the AP there is normal coronal alignment of the thoracic and lumbar spine.  There are 5 non-rib-bearing lumbar vertebra.  On the lateral view there is decreased thoracic kyphosis and lumbar lordosis.  Thoracic and lumbar spondylosis noted.  Status post T9-L2 fusion, T11-T12 laminectomy with T12 corpectomy.  Prior L4-5 spinous process wiring noted.  There is been left total hip arthroplasty and posterior acetabular fixation.      Impression:  Thoracolumbar spondylosis.  T12 compression deformity now status post corpectomy with T9-L2 fusion, T11-T12 laminectomy.  Prior L4-5 spinous process wiring

## 2023-08-04 NOTE — PROGRESS NOTES
Smoking Cessation counseling    Time spent:  3-10 minutes (42700)    We discussed the importance, over both the short and long-term, of smoking cessation; reviewed the patient's willingness to quit; overall history and current use of tobacco smoking products; that there are a variety of methods to help with smoking diminution and cessation (stopping alone, using nicotine substitution medications/gums, Chantix, other medications, etcetera, which the patient will also discuss with his or her primary care physician); that the patient should set a date for smoking cessation; and the patient will follow up with his or her primary care physician for further support and oversight.    We also discussed that for the patient to have surgery while using nicotine, wound healing may be compromised relative to those who do not smoke; that recovery from anesthesia may sometimes be more difficult; and that quitting may decrease the heart, vascular, pulmonary effects in the short term as well as over the long term.  Smoking cessation may be useful and important for any patient who will have a fusion as part of surgery or have bone or tissue that has regrown heal (bone fusions, wound closures, etc.)  since surgical results with respect to wound healing, susceptibility to recovery from infection, bone fusion, and tissue and blood vessel health may be impaired or less optimal in smokers than nonsmokers.  We talked about the elevated risk of surgical bone fusion failure in the setting of smoking and the potential need for a higher-risk revision surgery should fusion not occur.    I reviewed this with the patient in detail and all questions were answered.  We discussed nature of the patient's condition; real alternatives and realistic options; pros and cons, risks and benefits of all the options, in detail.  I believe the patient understands the above and wishes to proceed as outlined.

## 2023-08-07 ENCOUNTER — LAB REQUISITION (OUTPATIENT)
Dept: LAB | Facility: HOSPITAL | Age: 53
End: 2023-08-07
Attending: FAMILY MEDICINE
Payer: MEDICARE

## 2023-08-07 DIAGNOSIS — Z79.2 LONG TERM (CURRENT) USE OF ANTIBIOTICS: ICD-10-CM

## 2023-08-07 LAB
ANION GAP SERPL CALCULATED.3IONS-SCNC: 14 MMOL/L (ref 7–16)
BASOPHILS # BLD AUTO: 0.03 K/UL (ref 0–0.2)
BASOPHILS NFR BLD AUTO: 0.3 % (ref 0–1)
BUN SERPL-MCNC: 21 MG/DL (ref 7–18)
BUN/CREAT SERPL: 26 (ref 6–20)
CALCIUM SERPL-MCNC: 8.6 MG/DL (ref 8.5–10.1)
CHLORIDE SERPL-SCNC: 98 MMOL/L (ref 98–107)
CK SERPL-CCNC: 29 U/L (ref 26–192)
CO2 SERPL-SCNC: 27 MMOL/L (ref 21–32)
CREAT SERPL-MCNC: 0.82 MG/DL (ref 0.55–1.02)
CRP SERPL-MCNC: 12.9 MG/DL (ref 0–0.8)
DIFFERENTIAL METHOD BLD: ABNORMAL
EGFR (NO RACE VARIABLE) (RUSH/TITUS): 85 ML/MIN/1.73M2
EOSINOPHIL # BLD AUTO: 0.4 K/UL (ref 0–0.5)
EOSINOPHIL NFR BLD AUTO: 4.4 % (ref 1–4)
ERYTHROCYTE [DISTWIDTH] IN BLOOD BY AUTOMATED COUNT: 15.1 % (ref 11.5–14.5)
ERYTHROCYTE [SEDIMENTATION RATE] IN BLOOD BY WESTERGREN METHOD: 92 MM/HR (ref 0–30)
GLUCOSE SERPL-MCNC: 303 MG/DL (ref 74–106)
HCT VFR BLD AUTO: 32.6 % (ref 38–47)
HGB BLD-MCNC: 10.3 G/DL (ref 12–16)
IMM GRANULOCYTES # BLD AUTO: 0.02 K/UL (ref 0–0.04)
IMM GRANULOCYTES NFR BLD: 0.2 % (ref 0–0.4)
LYMPHOCYTES # BLD AUTO: 1.18 K/UL (ref 1–4.8)
LYMPHOCYTES NFR BLD AUTO: 13.1 % (ref 27–41)
MCH RBC QN AUTO: 24.7 PG (ref 27–31)
MCHC RBC AUTO-ENTMCNC: 31.6 G/DL (ref 32–36)
MCV RBC AUTO: 78.2 FL (ref 80–96)
MONOCYTES # BLD AUTO: 0.65 K/UL (ref 0–0.8)
MONOCYTES NFR BLD AUTO: 7.2 % (ref 2–6)
MPC BLD CALC-MCNC: 9.8 FL (ref 9.4–12.4)
NEUTROPHILS # BLD AUTO: 6.76 K/UL (ref 1.8–7.7)
NEUTROPHILS NFR BLD AUTO: 74.8 % (ref 53–65)
PLATELET # BLD AUTO: 428 K/UL (ref 150–400)
POTASSIUM SERPL-SCNC: 4.1 MMOL/L (ref 3.5–5.1)
RBC # BLD AUTO: 4.17 M/UL (ref 4.2–5.4)
SODIUM SERPL-SCNC: 135 MMOL/L (ref 136–145)
WBC # BLD AUTO: 9.04 K/UL (ref 4.5–11)

## 2023-08-07 PROCEDURE — 80048 BASIC METABOLIC PNL TOTAL CA: CPT | Performed by: FAMILY MEDICINE

## 2023-08-07 PROCEDURE — 85651 RBC SED RATE NONAUTOMATED: CPT | Performed by: FAMILY MEDICINE

## 2023-08-07 PROCEDURE — 85025 COMPLETE CBC W/AUTO DIFF WBC: CPT | Performed by: FAMILY MEDICINE

## 2023-08-07 PROCEDURE — 86140 C-REACTIVE PROTEIN: CPT | Performed by: FAMILY MEDICINE

## 2023-08-07 PROCEDURE — 82550 ASSAY OF CK (CPK): CPT | Performed by: FAMILY MEDICINE

## 2023-08-08 ENCOUNTER — TELEPHONE (OUTPATIENT)
Dept: SPINE | Facility: CLINIC | Age: 53
End: 2023-08-08
Payer: MEDICARE

## 2023-08-08 NOTE — TELEPHONE ENCOUNTER
I called Infectious Disease DrUri Hartman ([place]). Patient previously had an appt scheduled to see him and was unable to get transportation to the appt. When I called to reschedule her, I spoke with Joy and I was told that she had not been scheduled there before and that they had no referral information. However patient had their paperwork at her visit with us on Friday that showed that she did have an appt with them last week. They rescheduled the appt for 08/22 @ 10:00. I was told that they could not see her any sooner until her records were reviewed. I called and notified Norma Alex, as patient did not answer the phone.         ADDENDUM:     Received phone call from [facility] health Nurse, Mari ([phone]). Patients antibiotics ended yesterday and she wants to know if we are continuing the antibiotics. I discussed with Dr.S Weaver- CESAR Hernandes continue current antibiotic regimen until patient is seen by Infectious Disease Doctor on 8/22. I called and spoke with Chela at  [facility] and informed her of this also.

## 2023-08-09 ENCOUNTER — TELEPHONE (OUTPATIENT)
Dept: SPINE | Facility: CLINIC | Age: 53
End: 2023-08-09
Payer: MEDICARE

## 2023-08-09 NOTE — TELEPHONE ENCOUNTER
Her appt with Dr. Hartman in [place] has been moved up to 08/15/2023 @ 10:00. I called patient and she is aware.      ----- Message from Maryann Hernandez sent at 8/9/2023 10:11 AM CDT -----   Joy with [facility] [phone]. Calling regarding an appt.

## 2023-08-10 DIAGNOSIS — Z96.652 STATUS POST TOTAL LEFT KNEE REPLACEMENT: Primary | ICD-10-CM

## 2023-08-11 ENCOUNTER — HOSPITAL ENCOUNTER (OUTPATIENT)
Dept: RADIOLOGY | Facility: HOSPITAL | Age: 53
Discharge: HOME | End: 2023-08-11
Attending: ORTHOPAEDIC SURGERY
Payer: MEDICARE

## 2023-08-11 ENCOUNTER — TELEPHONE (OUTPATIENT)
Dept: ORTHOPEDICS | Facility: CLINIC | Age: 53
End: 2023-08-11
Payer: MEDICARE

## 2023-08-11 ENCOUNTER — OFFICE VISIT (OUTPATIENT)
Dept: ORTHOPEDICS | Facility: CLINIC | Age: 53
End: 2023-08-11
Payer: MEDICARE

## 2023-08-11 DIAGNOSIS — M00.9 JOINT INFECTION: ICD-10-CM

## 2023-08-11 DIAGNOSIS — M86.9: ICD-10-CM

## 2023-08-11 DIAGNOSIS — Z96.652 STATUS POST TOTAL LEFT KNEE REPLACEMENT: ICD-10-CM

## 2023-08-11 DIAGNOSIS — Z96.652 STATUS POST TOTAL LEFT KNEE REPLACEMENT: Primary | ICD-10-CM

## 2023-08-11 DIAGNOSIS — A49.02 MRSA (METHICILLIN RESISTANT STAPHYLOCOCCUS AUREUS): ICD-10-CM

## 2023-08-11 LAB
CLARITY FLD: ABNORMAL
COLOR FLD: ABNORMAL
GRANULOCYTES NFR SNV MANUAL: 100 % (ref 0–25)
RBC # SNV MANUAL: ABNORMAL /CUMM
WBC # SNV MANUAL: ABNORMAL /CUMM

## 2023-08-11 PROCEDURE — 1159F MED LIST DOCD IN RCRD: CPT | Mod: CPTII,,, | Performed by: ORTHOPAEDIC SURGERY

## 2023-08-11 PROCEDURE — 99213 OFFICE O/P EST LOW 20 MIN: CPT | Mod: PBBFAC,25 | Performed by: ORTHOPAEDIC SURGERY

## 2023-08-11 PROCEDURE — 3051F HG A1C>EQUAL 7.0%<8.0%: CPT | Mod: CPTII,,, | Performed by: ORTHOPAEDIC SURGERY

## 2023-08-11 PROCEDURE — 1160F RVW MEDS BY RX/DR IN RCRD: CPT | Mod: CPTII,,, | Performed by: ORTHOPAEDIC SURGERY

## 2023-08-11 PROCEDURE — 3066F PR DOCUMENTATION OF TREATMENT FOR NEPHROPATHY: ICD-10-PCS | Mod: CPTII,,, | Performed by: ORTHOPAEDIC SURGERY

## 2023-08-11 PROCEDURE — 73562 XR KNEE AP LAT WITH SUNRISE LEFT: ICD-10-PCS | Mod: 26,LT,, | Performed by: ORTHOPAEDIC SURGERY

## 2023-08-11 PROCEDURE — 87070 CULTURE OTHR SPECIMN AEROBIC: CPT | Performed by: ORTHOPAEDIC SURGERY

## 2023-08-11 PROCEDURE — 20610 DRAIN/INJ JOINT/BURSA W/O US: CPT | Mod: PBBFAC | Performed by: ORTHOPAEDIC SURGERY

## 2023-08-11 PROCEDURE — 1160F PR REVIEW ALL MEDS BY PRESCRIBER/CLIN PHARMACIST DOCUMENTED: ICD-10-PCS | Mod: CPTII,,, | Performed by: ORTHOPAEDIC SURGERY

## 2023-08-11 PROCEDURE — 4010F PR ACE/ARB THEARPY RXD/TAKEN: ICD-10-PCS | Mod: CPTII,,, | Performed by: ORTHOPAEDIC SURGERY

## 2023-08-11 PROCEDURE — 20610 PR DRAIN/INJECT LARGE JOINT/BURSA: ICD-10-PCS | Mod: S$PBB,LT,, | Performed by: ORTHOPAEDIC SURGERY

## 2023-08-11 PROCEDURE — 3060F PR POS MICROALBUMINURIA RESULT DOCUMENTED/REVIEW: ICD-10-PCS | Mod: CPTII,,, | Performed by: ORTHOPAEDIC SURGERY

## 2023-08-11 PROCEDURE — 3051F PR MOST RECENT HEMOGLOBIN A1C LEVEL 7.0 - < 8.0%: ICD-10-PCS | Mod: CPTII,,, | Performed by: ORTHOPAEDIC SURGERY

## 2023-08-11 PROCEDURE — 3060F POS MICROALBUMINURIA REV: CPT | Mod: CPTII,,, | Performed by: ORTHOPAEDIC SURGERY

## 2023-08-11 PROCEDURE — 20610 DRAIN/INJ JOINT/BURSA W/O US: CPT | Mod: S$PBB,LT,, | Performed by: ORTHOPAEDIC SURGERY

## 2023-08-11 PROCEDURE — 99214 OFFICE O/P EST MOD 30 MIN: CPT | Mod: S$PBB,25,, | Performed by: ORTHOPAEDIC SURGERY

## 2023-08-11 PROCEDURE — 73562 X-RAY EXAM OF KNEE 3: CPT | Mod: TC,LT

## 2023-08-11 PROCEDURE — 73562 X-RAY EXAM OF KNEE 3: CPT | Mod: 26,LT,, | Performed by: ORTHOPAEDIC SURGERY

## 2023-08-11 PROCEDURE — 87075 CULTR BACTERIA EXCEPT BLOOD: CPT | Performed by: ORTHOPAEDIC SURGERY

## 2023-08-11 PROCEDURE — 99214 PR OFFICE/OUTPT VISIT, EST, LEVL IV, 30-39 MIN: ICD-10-PCS | Mod: S$PBB,25,, | Performed by: ORTHOPAEDIC SURGERY

## 2023-08-11 PROCEDURE — 4010F ACE/ARB THERAPY RXD/TAKEN: CPT | Mod: CPTII,,, | Performed by: ORTHOPAEDIC SURGERY

## 2023-08-11 PROCEDURE — 89050 BODY FLUID CELL COUNT: CPT | Performed by: ORTHOPAEDIC SURGERY

## 2023-08-11 PROCEDURE — 1159F PR MEDICATION LIST DOCUMENTED IN MEDICAL RECORD: ICD-10-PCS | Mod: CPTII,,, | Performed by: ORTHOPAEDIC SURGERY

## 2023-08-11 PROCEDURE — 3066F NEPHROPATHY DOC TX: CPT | Mod: CPTII,,, | Performed by: ORTHOPAEDIC SURGERY

## 2023-08-11 NOTE — TELEPHONE ENCOUNTER
Lab called with panic cell count on synovial fluid 118,020. I notified Dr Noyola per a phone call. No new orders received.

## 2023-08-11 NOTE — PROGRESS NOTES
"      CLINIC NOTE       Chief Complaint   Patient presents with    Right Knee - Post-op Evaluation        Monica Walker is a 53 y.o. female seen today for recheck of her left knee.  She she underwent revision of a unicompartmental left knee replacement at became infected due to sepsis with MRSA.  She also infected lumbar spine and has had several surgeries under the direction of Dr. Juan Manuel Hernandes.  She reportedly now has vertebral osteomyelitis.  After initial improvement with regard to her left knee she is now developed recurrent pain and swelling as well.  She has an appointment to see an infectious disease specialist in [place] next week.  She is on antibiotic suppressive therapy.    Past Medical History:   Diagnosis Date    Abscess of right thigh + MRSA 08/20/2021    healed    Adnexal cyst     Degenerative disc disease     Depression 10/29/2021    Diabetes mellitus     Hypertension     Hypomagnesemia 01/20/2023    Nicotine dependence     Osteoarthritis      Family History   Problem Relation Age of Onset    Hypertension Father     Diabetes Father      Current Outpatient Medications on File Prior to Visit   Medication Sig Dispense Refill    BD ULTRA-FINE SHORT PEN NEEDLE 31 gauge x 5/16" Ndle       buPROPion (WELLBUTRIN) 100 MG tablet Take 1 tablet (100 mg total) by mouth once daily. (Patient not taking: Reported on 6/22/2023) 30 tablet 3    DULoxetine (CYMBALTA) 60 MG capsule Take 1 capsule (60 mg total) by mouth once daily. 90 capsule 3    HYDROcodone-acetaminophen (NORCO)  mg per tablet 10 mg.      insulin (LANTUS SOLOSTAR U-100 INSULIN) glargine 100 units/mL SubQ pen Inject 30 Units into the skin once daily. 4 each 5    insulin NPH-insulin regular, 70/30, 100 unit/mL (70-30) injection Inject 30 Units into the skin every morning. (Patient not taking: Reported on 6/22/2023) 9 mL 3    NOVOLOG FLEXPEN U-100 INSULIN 100 unit/mL (3 mL) InPn pen Inject 5 Units into the skin 3 (three) times daily with meals. 5 each " 11     No current facility-administered medications on file prior to visit.       ROS     There were no vitals filed for this visit.    Past Surgical History:   Procedure Laterality Date    BACK SURGERY      ECHOCARDIOGRAM,TRANSESOPHAGEAL N/A 6/13/2023    Procedure: Transesophageal echo (MONICA) intra-procedure log documentation;  Surgeon: Kaiden Sparks MD;  Location: [facility] CATH LAB;  Service: Cardiology;  Laterality: N/A;    HYSTERECTOMY N/A     INCISION AND DRAINAGE Left 3/13/2023    Procedure: INCISION AND DRAINAGE;  Surgeon: Jacek Noyola MD;  Location: [facility] ORTHO OR;  Service: Orthopedics;  Laterality: Left;    IRRIGATION AND DEBRIDEMENT OF LOWER EXTREMITY Left 11/22/2022    Procedure: IRRIGATION AND DEBRIDEMENT, LOWER EXTREMITY;  Surgeon: Papa Mendoza MD;  Location: [facility] OR;  Service: Orthopedics;  Laterality: Left;    IRRIGATION AND DEBRIDEMENT OF UPPER EXTREMITY Left 11/22/2022    Procedure: IRRIGATION AND DEBRIDEMENT, UPPER EXTREMITY;  Surgeon: Papa Mendoza MD;  Location: [facility] OR;  Service: Orthopedics;  Laterality: Left;    REVISION OF KNEE ARTHROPLASTY Left 3/13/2023    Procedure: REMOVAL OF PROSTHESIS,METHYLMETHACRYLATE WITH OR WITHOUT INSERTION OF SPACER LEFT KNEE;  Surgeon: Jacek Noyola MD;  Location: [facility] OR;  Service: Orthopedics;  Laterality: Left;    REVISION OF KNEE ARTHROPLASTY Left 5/9/2023    Procedure: REVISION, ARTHROPLASTY, KNEE;  Surgeon: Jacek Noyola MD;  Location: [facility] OR;  Service: Orthopedics;  Laterality: Left;    THORACIC LAMINECTOMY WITH FUSION N/A 1/24/2023    Procedure: T9-L2 fusion, T11-T12 laminectomy and corpectomy;  Surgeon: Jimmy Hernandes MD;  Location: [facility] OR;  Service: Neurosurgery;  Laterality: N/A;    TOTAL HIP ARTHROPLASTY Left     TOTAL KNEE ARTHROPLASTY Bilateral         Review of patient's allergies indicates:  No Known Allergies     Ortho Exam :  Knee shows moderate soft tissue  swelling and effusion.  There is some erythema and some discrete swelling over the lateral aspect of the upper tibial region and joint.    Radiographic Examination:  Left knee 08/11/2023    Technique:  Three views AP lateral and patellar    Findings:  There is evidence of revision type total knee replacement arthroplasty.  The components remain in unchanged position alignment about the distal femur and proximal tibial region.  Patella is not resurfaced.  There is bone resorption involving the anterior aspect of the upper tibial region suggestive of osteomyelitis.    Impression:   See Above    Assessment and Plan  Patient Active Problem List    Diagnosis Date Noted    Osteomyelitis of vertebra of thoracolumbar region 06/09/2023    Shaking chills 05/11/2023    History of total left knee replacement 05/11/2023    Infection of prosthetic left knee joint 05/09/2023    Tobacco use 05/09/2023    History of multiple infections present 05/09/2023    Status post total left knee replacement 04/26/2023    Thrombocytosis 02/05/2023    Left hip pain 01/30/2023    Primary hypertension 01/27/2023    Microcytic anemia 01/19/2023    Prior MRSA Septic Joint of Left Knee  01/04/2023    MRSA bacteremia 11/21/2022    Prior Lung abscesses 11/20/2022    DM2 (diabetes mellitus, type 2) 11/20/2022    Depression 01/04/2022    Arthritis 01/04/2022    Impression:  Probable osteomyelitis-left proximal tibia with revision TKR  Plan:  Left knee was prepped with Betadine an aspiration performed yielding 4 cc of cloudy seropurulent fluid.  Aspirate was sent for aerobic and anaerobic cultures.  Patient advised she is likely facing above knee amputation.  She will report back regarding recommendations and treatment after she sees the Infectious Disease consultant in [facility].      Jacek Noyola M.D.

## 2023-08-13 LAB — BACTERIA SPEC BFLD CULT: ABNORMAL

## 2023-08-14 ENCOUNTER — LAB REQUISITION (OUTPATIENT)
Dept: LAB | Facility: HOSPITAL | Age: 53
End: 2023-08-14
Attending: FAMILY MEDICINE
Payer: MEDICARE

## 2023-08-14 DIAGNOSIS — Z79.2 LONG TERM (CURRENT) USE OF ANTIBIOTICS: ICD-10-CM

## 2023-08-14 LAB
ANION GAP SERPL CALCULATED.3IONS-SCNC: 11 MMOL/L (ref 7–16)
BASOPHILS # BLD AUTO: 0.02 K/UL (ref 0–0.2)
BASOPHILS NFR BLD AUTO: 0.3 % (ref 0–1)
BUN SERPL-MCNC: 13 MG/DL (ref 7–18)
BUN/CREAT SERPL: 15 (ref 6–20)
CALCIUM SERPL-MCNC: 8.7 MG/DL (ref 8.5–10.1)
CHLORIDE SERPL-SCNC: 100 MMOL/L (ref 98–107)
CK SERPL-CCNC: 21 U/L (ref 26–192)
CO2 SERPL-SCNC: 31 MMOL/L (ref 21–32)
CREAT SERPL-MCNC: 0.85 MG/DL (ref 0.55–1.02)
CRP SERPL-MCNC: 11.8 MG/DL (ref 0–0.8)
DIFFERENTIAL METHOD BLD: ABNORMAL
EGFR (NO RACE VARIABLE) (RUSH/TITUS): 81 ML/MIN/1.73M2
EOSINOPHIL # BLD AUTO: 0.26 K/UL (ref 0–0.5)
EOSINOPHIL NFR BLD AUTO: 3.7 % (ref 1–4)
ERYTHROCYTE [DISTWIDTH] IN BLOOD BY AUTOMATED COUNT: 14.9 % (ref 11.5–14.5)
ERYTHROCYTE [SEDIMENTATION RATE] IN BLOOD BY WESTERGREN METHOD: 120 MM/HR (ref 0–30)
GLUCOSE SERPL-MCNC: 224 MG/DL (ref 74–106)
HCT VFR BLD AUTO: 29.3 % (ref 38–47)
HGB BLD-MCNC: 9.2 G/DL (ref 12–16)
IMM GRANULOCYTES # BLD AUTO: 0.02 K/UL (ref 0–0.04)
IMM GRANULOCYTES NFR BLD: 0.3 % (ref 0–0.4)
LYMPHOCYTES # BLD AUTO: 1.59 K/UL (ref 1–4.8)
LYMPHOCYTES NFR BLD AUTO: 22.6 % (ref 27–41)
MCH RBC QN AUTO: 24.7 PG (ref 27–31)
MCHC RBC AUTO-ENTMCNC: 31.4 G/DL (ref 32–36)
MCV RBC AUTO: 78.6 FL (ref 80–96)
MONOCYTES # BLD AUTO: 0.55 K/UL (ref 0–0.8)
MONOCYTES NFR BLD AUTO: 7.8 % (ref 2–6)
MPC BLD CALC-MCNC: 9.2 FL (ref 9.4–12.4)
NEUTROPHILS # BLD AUTO: 4.61 K/UL (ref 1.8–7.7)
NEUTROPHILS NFR BLD AUTO: 65.3 % (ref 53–65)
PLATELET # BLD AUTO: 376 K/UL (ref 150–400)
POTASSIUM SERPL-SCNC: 3.4 MMOL/L (ref 3.5–5.1)
RBC # BLD AUTO: 3.73 M/UL (ref 4.2–5.4)
SODIUM SERPL-SCNC: 139 MMOL/L (ref 136–145)
WBC # BLD AUTO: 7.05 K/UL (ref 4.5–11)

## 2023-08-14 PROCEDURE — 86140 C-REACTIVE PROTEIN: CPT | Performed by: FAMILY MEDICINE

## 2023-08-14 PROCEDURE — 82550 ASSAY OF CK (CPK): CPT | Performed by: FAMILY MEDICINE

## 2023-08-14 PROCEDURE — 85025 COMPLETE CBC W/AUTO DIFF WBC: CPT | Performed by: FAMILY MEDICINE

## 2023-08-14 PROCEDURE — 85651 RBC SED RATE NONAUTOMATED: CPT | Performed by: FAMILY MEDICINE

## 2023-08-14 PROCEDURE — 80048 BASIC METABOLIC PNL TOTAL CA: CPT | Performed by: FAMILY MEDICINE

## 2023-08-15 LAB — BACTERIA SPEC ANAEROBE CULT: NORMAL

## 2023-08-22 ENCOUNTER — PATIENT OUTREACH (OUTPATIENT)
Dept: ADMINISTRATIVE | Facility: CLINIC | Age: 53
End: 2023-08-22

## 2023-08-28 ENCOUNTER — LAB REQUISITION (OUTPATIENT)
Dept: LAB | Facility: HOSPITAL | Age: 53
End: 2023-08-28
Attending: FAMILY MEDICINE
Payer: MEDICARE

## 2023-08-28 DIAGNOSIS — B95.62 METHICILLIN RESISTANT STAPHYLOCOCCUS AUREUS INFECTION AS THE CAUSE OF DISEASES CLASSIFIED ELSEWHERE: ICD-10-CM

## 2023-08-28 LAB
ALBUMIN SERPL BCP-MCNC: 2.4 G/DL (ref 3.5–5)
ALBUMIN/GLOB SERPL: 0.5 {RATIO}
ALP SERPL-CCNC: 125 U/L (ref 46–118)
ALT SERPL W P-5'-P-CCNC: 15 U/L (ref 13–56)
ANION GAP SERPL CALCULATED.3IONS-SCNC: 12 MMOL/L (ref 7–16)
AST SERPL W P-5'-P-CCNC: 13 U/L (ref 15–37)
BASOPHILS # BLD AUTO: 0.04 K/UL (ref 0–0.2)
BASOPHILS NFR BLD AUTO: 0.5 % (ref 0–1)
BILIRUB SERPL-MCNC: 0.2 MG/DL (ref ?–1.2)
BUN SERPL-MCNC: 12 MG/DL (ref 7–18)
BUN/CREAT SERPL: 10 (ref 6–20)
CALCIUM SERPL-MCNC: 8.1 MG/DL (ref 8.5–10.1)
CHLORIDE SERPL-SCNC: 103 MMOL/L (ref 98–107)
CO2 SERPL-SCNC: 27 MMOL/L (ref 21–32)
CREAT SERPL-MCNC: 1.2 MG/DL (ref 0.55–1.02)
CRP SERPL-MCNC: 5.36 MG/DL (ref 0–0.8)
DIFFERENTIAL METHOD BLD: ABNORMAL
EGFR (NO RACE VARIABLE) (RUSH/TITUS): 54 ML/MIN/1.73M2
EOSINOPHIL # BLD AUTO: 0.36 K/UL (ref 0–0.5)
EOSINOPHIL NFR BLD AUTO: 4.4 % (ref 1–4)
ERYTHROCYTE [DISTWIDTH] IN BLOOD BY AUTOMATED COUNT: 16.5 % (ref 11.5–14.5)
ERYTHROCYTE [SEDIMENTATION RATE] IN BLOOD BY WESTERGREN METHOD: 90 MM/HR (ref 0–30)
GLOBULIN SER-MCNC: 4.6 G/DL (ref 2–4)
GLUCOSE SERPL-MCNC: 212 MG/DL (ref 74–106)
HCT VFR BLD AUTO: 27.2 % (ref 38–47)
HGB BLD-MCNC: 8.4 G/DL (ref 12–16)
IMM GRANULOCYTES # BLD AUTO: 0.02 K/UL (ref 0–0.04)
IMM GRANULOCYTES NFR BLD: 0.2 % (ref 0–0.4)
LYMPHOCYTES # BLD AUTO: 1.88 K/UL (ref 1–4.8)
LYMPHOCYTES NFR BLD AUTO: 23.2 % (ref 27–41)
MCH RBC QN AUTO: 24.3 PG (ref 27–31)
MCHC RBC AUTO-ENTMCNC: 30.9 G/DL (ref 32–36)
MCV RBC AUTO: 78.8 FL (ref 80–96)
MONOCYTES # BLD AUTO: 0.62 K/UL (ref 0–0.8)
MONOCYTES NFR BLD AUTO: 7.7 % (ref 2–6)
MPC BLD CALC-MCNC: 9.9 FL (ref 9.4–12.4)
NEUTROPHILS # BLD AUTO: 5.18 K/UL (ref 1.8–7.7)
NEUTROPHILS NFR BLD AUTO: 64 % (ref 53–65)
PLATELET # BLD AUTO: 329 K/UL (ref 150–400)
POTASSIUM SERPL-SCNC: 4.1 MMOL/L (ref 3.5–5.1)
PROT SERPL-MCNC: 7 G/DL (ref 6.4–8.2)
RBC # BLD AUTO: 3.45 M/UL (ref 4.2–5.4)
SODIUM SERPL-SCNC: 138 MMOL/L (ref 136–145)
WBC # BLD AUTO: 8.1 K/UL (ref 4.5–11)

## 2023-08-28 PROCEDURE — 85651 RBC SED RATE NONAUTOMATED: CPT | Performed by: FAMILY MEDICINE

## 2023-08-28 PROCEDURE — 85025 COMPLETE CBC W/AUTO DIFF WBC: CPT | Performed by: FAMILY MEDICINE

## 2023-08-28 PROCEDURE — 80053 COMPREHEN METABOLIC PANEL: CPT | Performed by: FAMILY MEDICINE

## 2023-08-28 PROCEDURE — 86140 C-REACTIVE PROTEIN: CPT | Performed by: FAMILY MEDICINE

## 2023-09-04 ENCOUNTER — LAB REQUISITION (OUTPATIENT)
Dept: LAB | Facility: HOSPITAL | Age: 53
End: 2023-09-04
Attending: FAMILY MEDICINE
Payer: MEDICARE

## 2023-09-04 DIAGNOSIS — Z79.2 LONG TERM (CURRENT) USE OF ANTIBIOTICS: ICD-10-CM

## 2023-09-04 LAB
ALBUMIN SERPL BCP-MCNC: 2.5 G/DL (ref 3.5–5)
ALBUMIN/GLOB SERPL: 0.5 {RATIO}
ALP SERPL-CCNC: 133 U/L (ref 46–118)
ALT SERPL W P-5'-P-CCNC: 12 U/L (ref 13–56)
ANION GAP SERPL CALCULATED.3IONS-SCNC: 10 MMOL/L (ref 7–16)
AST SERPL W P-5'-P-CCNC: 12 U/L (ref 15–37)
BASOPHILS NFR BLD MANUAL: 1 % (ref 0–1)
BILIRUB SERPL-MCNC: 0.2 MG/DL (ref ?–1.2)
BUN SERPL-MCNC: 15 MG/DL (ref 7–18)
BUN/CREAT SERPL: 17 (ref 6–20)
CALCIUM SERPL-MCNC: 8.3 MG/DL (ref 8.5–10.1)
CHLORIDE SERPL-SCNC: 99 MMOL/L (ref 98–107)
CO2 SERPL-SCNC: 29 MMOL/L (ref 21–32)
CREAT SERPL-MCNC: 0.88 MG/DL (ref 0.55–1.02)
EGFR (NO RACE VARIABLE) (RUSH/TITUS): 78 ML/MIN/1.73M2
EOSINOPHIL NFR BLD MANUAL: 4 % (ref 1–4)
ERYTHROCYTE [DISTWIDTH] IN BLOOD BY AUTOMATED COUNT: 17.1 % (ref 11.5–14.5)
ERYTHROCYTE [SEDIMENTATION RATE] IN BLOOD BY WESTERGREN METHOD: 90 MM/HR (ref 0–30)
GLOBULIN SER-MCNC: 4.8 G/DL (ref 2–4)
GLUCOSE SERPL-MCNC: 343 MG/DL (ref 74–106)
HCT VFR BLD AUTO: 29.6 % (ref 38–47)
HGB BLD-MCNC: 9 G/DL (ref 12–16)
LYMPHOCYTES NFR BLD MANUAL: 22 % (ref 27–41)
MCH RBC QN AUTO: 23.6 PG (ref 27–31)
MCHC RBC AUTO-ENTMCNC: 30.4 G/DL (ref 32–36)
MCV RBC AUTO: 77.5 FL (ref 80–96)
MONOCYTES NFR BLD MANUAL: 7 % (ref 2–6)
MPC BLD CALC-MCNC: 10.1 FL (ref 9.4–12.4)
NEUTS BAND NFR BLD MANUAL: 1 % (ref 1–5)
NEUTS SEG NFR BLD MANUAL: 65 % (ref 50–62)
NRBC BLD MANUAL-RTO: ABNORMAL %
PLATELET # BLD AUTO: 319 K/UL (ref 150–400)
PLATELET MORPHOLOGY: ABNORMAL
POTASSIUM SERPL-SCNC: 4 MMOL/L (ref 3.5–5.1)
PROT SERPL-MCNC: 7.3 G/DL (ref 6.4–8.2)
RBC # BLD AUTO: 3.82 M/UL (ref 4.2–5.4)
RBC MORPH BLD: NORMAL
SODIUM SERPL-SCNC: 134 MMOL/L (ref 136–145)
WBC # BLD AUTO: 7.3 K/UL (ref 4.5–11)

## 2023-09-04 PROCEDURE — 85651 RBC SED RATE NONAUTOMATED: CPT | Performed by: FAMILY MEDICINE

## 2023-09-04 PROCEDURE — 86140 C-REACTIVE PROTEIN: CPT | Performed by: FAMILY MEDICINE

## 2023-09-04 PROCEDURE — 80053 COMPREHEN METABOLIC PANEL: CPT | Performed by: FAMILY MEDICINE

## 2023-09-04 PROCEDURE — 85025 COMPLETE CBC W/AUTO DIFF WBC: CPT | Performed by: FAMILY MEDICINE

## 2023-09-05 LAB — CRP SERPL-MCNC: 8.15 MG/DL (ref 0–0.8)

## 2023-09-07 ENCOUNTER — PATIENT OUTREACH (OUTPATIENT)
Dept: ADMINISTRATIVE | Facility: HOSPITAL | Age: 53
End: 2023-09-07

## 2023-09-07 NOTE — PROGRESS NOTES
09/07/2023   --Chart accessed for: human[facility] report  --Care Gaps addressed: all topics  Outreach made to patient via telephone--not available  Care Everywhere updates requested and reviewed.  Media reports reviewed.  [facility] and [facility] reviewed.  Immunization Database (Immprint/MIXX) reviewed. Vaccinations uploaded: none  [facility] abstracted.

## 2023-09-14 ENCOUNTER — LAB REQUISITION (OUTPATIENT)
Dept: LAB | Facility: HOSPITAL | Age: 53
End: 2023-09-14
Attending: FAMILY MEDICINE
Payer: MEDICARE

## 2023-09-14 DIAGNOSIS — B95.62 METHICILLIN RESISTANT STAPHYLOCOCCUS AUREUS INFECTION AS THE CAUSE OF DISEASES CLASSIFIED ELSEWHERE: ICD-10-CM

## 2023-09-14 DIAGNOSIS — L03.116 CELLULITIS OF LEFT LOWER LIMB: ICD-10-CM

## 2023-09-14 DIAGNOSIS — M46.25 OSTEOMYELITIS OF VERTEBRA, THORACOLUMBAR REGION: ICD-10-CM

## 2023-09-14 DIAGNOSIS — T84.54XA INFECTION AND INFLAMMATORY REACTION DUE TO INTERNAL LEFT KNEE PROSTHESIS, INITIAL ENCOUNTER: ICD-10-CM

## 2023-09-14 DIAGNOSIS — B96.89 OTHER SPECIFIED BACTERIAL AGENTS AS THE CAUSE OF DISEASES CLASSIFIED ELSEWHERE: ICD-10-CM

## 2023-09-14 LAB
ALBUMIN SERPL BCP-MCNC: 2.6 G/DL (ref 3.5–5)
ALBUMIN/GLOB SERPL: 0.5 {RATIO}
ALP SERPL-CCNC: 152 U/L (ref 46–118)
ALT SERPL W P-5'-P-CCNC: 12 U/L (ref 13–56)
ANION GAP SERPL CALCULATED.3IONS-SCNC: 13 MMOL/L (ref 7–16)
AST SERPL W P-5'-P-CCNC: 14 U/L (ref 15–37)
BASOPHILS # BLD AUTO: 0.07 K/UL (ref 0–0.2)
BASOPHILS NFR BLD AUTO: 0.8 % (ref 0–1)
BILIRUB SERPL-MCNC: 0.2 MG/DL (ref ?–1.2)
BUN SERPL-MCNC: 10 MG/DL (ref 7–18)
BUN/CREAT SERPL: 14 (ref 6–20)
CALCIUM SERPL-MCNC: 8.9 MG/DL (ref 8.5–10.1)
CHLORIDE SERPL-SCNC: 101 MMOL/L (ref 98–107)
CO2 SERPL-SCNC: 28 MMOL/L (ref 21–32)
CREAT SERPL-MCNC: 0.69 MG/DL (ref 0.55–1.02)
CRP SERPL-MCNC: 2.75 MG/DL (ref 0–0.8)
DIFFERENTIAL METHOD BLD: ABNORMAL
EGFR (NO RACE VARIABLE) (RUSH/TITUS): 103 ML/MIN/1.73M2
EOSINOPHIL # BLD AUTO: 0.45 K/UL (ref 0–0.5)
EOSINOPHIL NFR BLD AUTO: 5 % (ref 1–4)
ERYTHROCYTE [DISTWIDTH] IN BLOOD BY AUTOMATED COUNT: 16.6 % (ref 11.5–14.5)
ERYTHROCYTE [SEDIMENTATION RATE] IN BLOOD BY WESTERGREN METHOD: 63 MM/HR (ref 0–30)
GLOBULIN SER-MCNC: 5 G/DL (ref 2–4)
GLUCOSE SERPL-MCNC: 121 MG/DL (ref 74–106)
HCT VFR BLD AUTO: 32.8 % (ref 38–47)
HGB BLD-MCNC: 10 G/DL (ref 12–16)
IMM GRANULOCYTES # BLD AUTO: 0.04 K/UL (ref 0–0.04)
IMM GRANULOCYTES NFR BLD: 0.4 % (ref 0–0.4)
LYMPHOCYTES # BLD AUTO: 2.44 K/UL (ref 1–4.8)
LYMPHOCYTES NFR BLD AUTO: 27 % (ref 27–41)
MCH RBC QN AUTO: 23.3 PG (ref 27–31)
MCHC RBC AUTO-ENTMCNC: 30.5 G/DL (ref 32–36)
MCV RBC AUTO: 76.5 FL (ref 80–96)
MONOCYTES # BLD AUTO: 0.54 K/UL (ref 0–0.8)
MONOCYTES NFR BLD AUTO: 6 % (ref 2–6)
MPC BLD CALC-MCNC: 10 FL (ref 9.4–12.4)
NEUTROPHILS # BLD AUTO: 5.49 K/UL (ref 1.8–7.7)
NEUTROPHILS NFR BLD AUTO: 60.8 % (ref 53–65)
NRBC # BLD AUTO: 0 X10E3/UL
NRBC, AUTO (.00): 0 %
PLATELET # BLD AUTO: 341 K/UL (ref 150–400)
POTASSIUM SERPL-SCNC: 4.1 MMOL/L (ref 3.5–5.1)
PROT SERPL-MCNC: 7.6 G/DL (ref 6.4–8.2)
RBC # BLD AUTO: 4.29 M/UL (ref 4.2–5.4)
SODIUM SERPL-SCNC: 138 MMOL/L (ref 136–145)
WBC # BLD AUTO: 9.03 K/UL (ref 4.5–11)

## 2023-09-14 PROCEDURE — 85651 RBC SED RATE NONAUTOMATED: CPT | Performed by: FAMILY MEDICINE

## 2023-09-14 PROCEDURE — 80053 COMPREHEN METABOLIC PANEL: CPT | Performed by: FAMILY MEDICINE

## 2023-09-14 PROCEDURE — 86140 C-REACTIVE PROTEIN: CPT | Performed by: FAMILY MEDICINE

## 2023-09-14 PROCEDURE — 85025 COMPLETE CBC W/AUTO DIFF WBC: CPT | Performed by: FAMILY MEDICINE

## 2023-10-02 ENCOUNTER — LAB REQUISITION (OUTPATIENT)
Dept: LAB | Facility: HOSPITAL | Age: 53
End: 2023-10-02
Attending: FAMILY MEDICINE
Payer: MEDICARE

## 2023-10-02 DIAGNOSIS — B96.89 OTHER SPECIFIED BACTERIAL AGENTS AS THE CAUSE OF DISEASES CLASSIFIED ELSEWHERE: ICD-10-CM

## 2023-10-02 DIAGNOSIS — B95.62 METHICILLIN RESISTANT STAPHYLOCOCCUS AUREUS INFECTION AS THE CAUSE OF DISEASES CLASSIFIED ELSEWHERE: ICD-10-CM

## 2023-10-02 DIAGNOSIS — L03.116 CELLULITIS OF LEFT LOWER LIMB: ICD-10-CM

## 2023-10-02 LAB
ALBUMIN SERPL BCP-MCNC: 2.7 G/DL (ref 3.5–5)
ALBUMIN/GLOB SERPL: 0.5 {RATIO}
ALP SERPL-CCNC: 159 U/L (ref 46–118)
ALT SERPL W P-5'-P-CCNC: 15 U/L (ref 13–56)
ANION GAP SERPL CALCULATED.3IONS-SCNC: 13 MMOL/L (ref 7–16)
AST SERPL W P-5'-P-CCNC: 18 U/L (ref 15–37)
BASOPHILS # BLD AUTO: 0.07 K/UL (ref 0–0.2)
BASOPHILS NFR BLD AUTO: 0.8 % (ref 0–1)
BILIRUB SERPL-MCNC: 0.2 MG/DL (ref ?–1.2)
BUN SERPL-MCNC: 19 MG/DL (ref 7–18)
BUN/CREAT SERPL: 22 (ref 6–20)
CALCIUM SERPL-MCNC: 9.1 MG/DL (ref 8.5–10.1)
CHLORIDE SERPL-SCNC: 103 MMOL/L (ref 98–107)
CO2 SERPL-SCNC: 28 MMOL/L (ref 21–32)
CREAT SERPL-MCNC: 0.88 MG/DL (ref 0.55–1.02)
DIFFERENTIAL METHOD BLD: ABNORMAL
EGFR (NO RACE VARIABLE) (RUSH/TITUS): 78 ML/MIN/1.73M2
EOSINOPHIL # BLD AUTO: 0.38 K/UL (ref 0–0.5)
EOSINOPHIL NFR BLD AUTO: 4.3 % (ref 1–4)
ERYTHROCYTE [DISTWIDTH] IN BLOOD BY AUTOMATED COUNT: 16.3 % (ref 11.5–14.5)
ERYTHROCYTE [SEDIMENTATION RATE] IN BLOOD BY WESTERGREN METHOD: 78 MM/HR (ref 0–30)
GLOBULIN SER-MCNC: 5.2 G/DL (ref 2–4)
GLUCOSE SERPL-MCNC: 164 MG/DL (ref 74–106)
HCT VFR BLD AUTO: 33.3 % (ref 38–47)
HGB BLD-MCNC: 10.3 G/DL (ref 12–16)
IMM GRANULOCYTES # BLD AUTO: 0.02 K/UL (ref 0–0.04)
IMM GRANULOCYTES NFR BLD: 0.2 % (ref 0–0.4)
LYMPHOCYTES # BLD AUTO: 2.33 K/UL (ref 1–4.8)
LYMPHOCYTES NFR BLD AUTO: 26.6 % (ref 27–41)
MCH RBC QN AUTO: 23.3 PG (ref 27–31)
MCHC RBC AUTO-ENTMCNC: 30.9 G/DL (ref 32–36)
MCV RBC AUTO: 75.2 FL (ref 80–96)
MONOCYTES # BLD AUTO: 0.66 K/UL (ref 0–0.8)
MONOCYTES NFR BLD AUTO: 7.5 % (ref 2–6)
MPC BLD CALC-MCNC: 10.4 FL (ref 9.4–12.4)
NEUTROPHILS # BLD AUTO: 5.3 K/UL (ref 1.8–7.7)
NEUTROPHILS NFR BLD AUTO: 60.6 % (ref 53–65)
NRBC # BLD AUTO: 0 X10E3/UL
NRBC, AUTO (.00): 0 %
PLATELET # BLD AUTO: 320 K/UL (ref 150–400)
POTASSIUM SERPL-SCNC: 3.6 MMOL/L (ref 3.5–5.1)
PROT SERPL-MCNC: 7.9 G/DL (ref 6.4–8.2)
RBC # BLD AUTO: 4.43 M/UL (ref 4.2–5.4)
SODIUM SERPL-SCNC: 140 MMOL/L (ref 136–145)
WBC # BLD AUTO: 8.76 K/UL (ref 4.5–11)

## 2023-10-02 PROCEDURE — 86140 C-REACTIVE PROTEIN: CPT | Performed by: FAMILY MEDICINE

## 2023-10-02 PROCEDURE — 85651 RBC SED RATE NONAUTOMATED: CPT | Performed by: FAMILY MEDICINE

## 2023-10-02 PROCEDURE — 80053 COMPREHEN METABOLIC PANEL: CPT | Performed by: FAMILY MEDICINE

## 2023-10-02 PROCEDURE — 85025 COMPLETE CBC W/AUTO DIFF WBC: CPT | Performed by: FAMILY MEDICINE

## 2023-10-03 LAB — CRP SERPL-MCNC: 6.51 MG/DL (ref 0–0.8)

## 2023-10-10 ENCOUNTER — LAB REQUISITION (OUTPATIENT)
Dept: LAB | Facility: HOSPITAL | Age: 53
End: 2023-10-10
Attending: FAMILY MEDICINE
Payer: MEDICARE

## 2023-10-10 DIAGNOSIS — B95.62 METHICILLIN RESISTANT STAPHYLOCOCCUS AUREUS INFECTION AS THE CAUSE OF DISEASES CLASSIFIED ELSEWHERE: ICD-10-CM

## 2023-10-10 DIAGNOSIS — M46.25 OSTEOMYELITIS OF VERTEBRA, THORACOLUMBAR REGION: ICD-10-CM

## 2023-10-10 DIAGNOSIS — T84.54XA INFECTION AND INFLAMMATORY REACTION DUE TO INTERNAL LEFT KNEE PROSTHESIS, INITIAL ENCOUNTER: ICD-10-CM

## 2023-10-10 DIAGNOSIS — B96.89 OTHER SPECIFIED BACTERIAL AGENTS AS THE CAUSE OF DISEASES CLASSIFIED ELSEWHERE: ICD-10-CM

## 2023-10-10 LAB
ALBUMIN SERPL BCP-MCNC: 2.5 G/DL (ref 3.5–5)
ALBUMIN/GLOB SERPL: 0.5 {RATIO}
ALP SERPL-CCNC: 137 U/L (ref 46–118)
ALT SERPL W P-5'-P-CCNC: 12 U/L (ref 13–56)
ANION GAP SERPL CALCULATED.3IONS-SCNC: 12 MMOL/L (ref 7–16)
ANISOCYTOSIS BLD QL SMEAR: ABNORMAL
AST SERPL W P-5'-P-CCNC: 11 U/L (ref 15–37)
BASOPHILS # BLD AUTO: 0.03 K/UL (ref 0–0.2)
BASOPHILS NFR BLD AUTO: 0.6 % (ref 0–1)
BILIRUB SERPL-MCNC: 0.3 MG/DL (ref ?–1.2)
BUN SERPL-MCNC: 19 MG/DL (ref 7–18)
BUN/CREAT SERPL: 24 (ref 6–20)
CALCIUM SERPL-MCNC: 8.7 MG/DL (ref 8.5–10.1)
CHLORIDE SERPL-SCNC: 100 MMOL/L (ref 98–107)
CO2 SERPL-SCNC: 29 MMOL/L (ref 21–32)
CREAT SERPL-MCNC: 0.78 MG/DL (ref 0.55–1.02)
CRP SERPL-MCNC: 8.52 MG/DL (ref 0–0.8)
DIFFERENTIAL METHOD BLD: ABNORMAL
EGFR (NO RACE VARIABLE) (RUSH/TITUS): 90 ML/MIN/1.73M2
EOSINOPHIL # BLD AUTO: 0.24 K/UL (ref 0–0.5)
EOSINOPHIL NFR BLD AUTO: 4.9 % (ref 1–4)
ERYTHROCYTE [DISTWIDTH] IN BLOOD BY AUTOMATED COUNT: 16.4 % (ref 11.5–14.5)
ERYTHROCYTE [SEDIMENTATION RATE] IN BLOOD BY WESTERGREN METHOD: 86 MM/HR (ref 0–30)
GLOBULIN SER-MCNC: 4.8 G/DL (ref 2–4)
GLUCOSE SERPL-MCNC: 264 MG/DL (ref 74–106)
HCT VFR BLD AUTO: 31.3 % (ref 38–47)
HGB BLD-MCNC: 10 G/DL (ref 12–16)
IMM GRANULOCYTES # BLD AUTO: 0.01 K/UL (ref 0–0.04)
IMM GRANULOCYTES NFR BLD: 0.2 % (ref 0–0.4)
LYMPHOCYTES # BLD AUTO: 0.97 K/UL (ref 1–4.8)
LYMPHOCYTES NFR BLD AUTO: 19.9 % (ref 27–41)
MCH RBC QN AUTO: 23.4 PG (ref 27–31)
MCHC RBC AUTO-ENTMCNC: 31.9 G/DL (ref 32–36)
MCV RBC AUTO: 73.3 FL (ref 80–96)
MICROCYTES BLD QL SMEAR: ABNORMAL
MONOCYTES # BLD AUTO: 0.47 K/UL (ref 0–0.8)
MONOCYTES NFR BLD AUTO: 9.6 % (ref 2–6)
MPC BLD CALC-MCNC: 10.1 FL (ref 9.4–12.4)
NEUTROPHILS # BLD AUTO: 3.16 K/UL (ref 1.8–7.7)
NEUTROPHILS NFR BLD AUTO: 64.8 % (ref 53–65)
NRBC # BLD AUTO: 0 X10E3/UL
NRBC, AUTO (.00): 0 %
PLATELET # BLD AUTO: 265 K/UL (ref 150–400)
PLATELET MORPHOLOGY: ABNORMAL
POTASSIUM SERPL-SCNC: 3.6 MMOL/L (ref 3.5–5.1)
PROT SERPL-MCNC: 7.3 G/DL (ref 6.4–8.2)
RBC # BLD AUTO: 4.27 M/UL (ref 4.2–5.4)
SODIUM SERPL-SCNC: 137 MMOL/L (ref 136–145)
WBC # BLD AUTO: 4.88 K/UL (ref 4.5–11)

## 2023-10-10 PROCEDURE — 85025 COMPLETE CBC W/AUTO DIFF WBC: CPT | Performed by: FAMILY MEDICINE

## 2023-10-10 PROCEDURE — 85651 RBC SED RATE NONAUTOMATED: CPT | Performed by: FAMILY MEDICINE

## 2023-10-10 PROCEDURE — 86140 C-REACTIVE PROTEIN: CPT | Performed by: FAMILY MEDICINE

## 2023-10-10 PROCEDURE — 80053 COMPREHEN METABOLIC PANEL: CPT | Performed by: FAMILY MEDICINE

## 2023-10-17 ENCOUNTER — LAB REQUISITION (OUTPATIENT)
Dept: LAB | Facility: HOSPITAL | Age: 53
End: 2023-10-17
Attending: FAMILY MEDICINE
Payer: MEDICARE

## 2023-10-17 DIAGNOSIS — T84.54XA INFECTION AND INFLAMMATORY REACTION DUE TO INTERNAL LEFT KNEE PROSTHESIS, INITIAL ENCOUNTER: ICD-10-CM

## 2023-10-17 DIAGNOSIS — B96.89 OTHER SPECIFIED BACTERIAL AGENTS AS THE CAUSE OF DISEASES CLASSIFIED ELSEWHERE: ICD-10-CM

## 2023-10-17 DIAGNOSIS — B95.62 METHICILLIN RESISTANT STAPHYLOCOCCUS AUREUS INFECTION AS THE CAUSE OF DISEASES CLASSIFIED ELSEWHERE: ICD-10-CM

## 2023-10-17 LAB
ALBUMIN SERPL BCP-MCNC: 2.6 G/DL (ref 3.5–5)
ALBUMIN/GLOB SERPL: 0.5 {RATIO}
ALP SERPL-CCNC: 166 U/L (ref 46–118)
ALT SERPL W P-5'-P-CCNC: 14 U/L (ref 13–56)
ANION GAP SERPL CALCULATED.3IONS-SCNC: 9 MMOL/L (ref 7–16)
ANISOCYTOSIS BLD QL SMEAR: ABNORMAL
AST SERPL W P-5'-P-CCNC: 11 U/L (ref 15–37)
BASOPHILS # BLD AUTO: 0.02 K/UL (ref 0–0.2)
BASOPHILS NFR BLD AUTO: 0.3 % (ref 0–1)
BILIRUB SERPL-MCNC: 0.2 MG/DL (ref ?–1.2)
BUN SERPL-MCNC: 14 MG/DL (ref 7–18)
BUN/CREAT SERPL: 17 (ref 6–20)
CALCIUM SERPL-MCNC: 8.8 MG/DL (ref 8.5–10.1)
CHLORIDE SERPL-SCNC: 100 MMOL/L (ref 98–107)
CO2 SERPL-SCNC: 32 MMOL/L (ref 21–32)
CREAT SERPL-MCNC: 0.82 MG/DL (ref 0.55–1.02)
CRP SERPL-MCNC: 4.41 MG/DL (ref 0–0.8)
DIFFERENTIAL METHOD BLD: ABNORMAL
EGFR (NO RACE VARIABLE) (RUSH/TITUS): 85 ML/MIN/1.73M2
EOSINOPHIL # BLD AUTO: 0.3 K/UL (ref 0–0.5)
EOSINOPHIL NFR BLD AUTO: 4.3 % (ref 1–4)
ERYTHROCYTE [DISTWIDTH] IN BLOOD BY AUTOMATED COUNT: 16.1 % (ref 11.5–14.5)
ERYTHROCYTE [SEDIMENTATION RATE] IN BLOOD BY WESTERGREN METHOD: 75 MM/HR (ref 0–30)
GLOBULIN SER-MCNC: 4.9 G/DL (ref 2–4)
GLUCOSE SERPL-MCNC: 226 MG/DL (ref 74–106)
HCT VFR BLD AUTO: 31.9 % (ref 38–47)
HGB BLD-MCNC: 10 G/DL (ref 12–16)
IMM GRANULOCYTES # BLD AUTO: 0.02 K/UL (ref 0–0.04)
IMM GRANULOCYTES NFR BLD: 0.3 % (ref 0–0.4)
LYMPHOCYTES # BLD AUTO: 1.61 K/UL (ref 1–4.8)
LYMPHOCYTES NFR BLD AUTO: 22.9 % (ref 27–41)
MCH RBC QN AUTO: 23.1 PG (ref 27–31)
MCHC RBC AUTO-ENTMCNC: 31.3 G/DL (ref 32–36)
MCV RBC AUTO: 73.7 FL (ref 80–96)
MICROCYTES BLD QL SMEAR: ABNORMAL
MONOCYTES # BLD AUTO: 0.5 K/UL (ref 0–0.8)
MONOCYTES NFR BLD AUTO: 7.1 % (ref 2–6)
MPC BLD CALC-MCNC: 9.5 FL (ref 9.4–12.4)
NEUTROPHILS # BLD AUTO: 4.59 K/UL (ref 1.8–7.7)
NEUTROPHILS NFR BLD AUTO: 65.1 % (ref 53–65)
NRBC # BLD AUTO: 0 X10E3/UL
NRBC, AUTO (.00): 0 %
PLATELET # BLD AUTO: 293 K/UL (ref 150–400)
PLATELET MORPHOLOGY: ABNORMAL
POTASSIUM SERPL-SCNC: 3.3 MMOL/L (ref 3.5–5.1)
PROT SERPL-MCNC: 7.5 G/DL (ref 6.4–8.2)
RBC # BLD AUTO: 4.33 M/UL (ref 4.2–5.4)
SODIUM SERPL-SCNC: 138 MMOL/L (ref 136–145)
WBC # BLD AUTO: 7.04 K/UL (ref 4.5–11)

## 2023-10-17 PROCEDURE — 80053 COMPREHEN METABOLIC PANEL: CPT | Performed by: FAMILY MEDICINE

## 2023-10-17 PROCEDURE — 85025 COMPLETE CBC W/AUTO DIFF WBC: CPT | Performed by: FAMILY MEDICINE

## 2023-10-17 PROCEDURE — 85651 RBC SED RATE NONAUTOMATED: CPT | Performed by: FAMILY MEDICINE

## 2023-10-17 PROCEDURE — 86140 C-REACTIVE PROTEIN: CPT | Performed by: FAMILY MEDICINE

## 2023-10-24 ENCOUNTER — LAB REQUISITION (OUTPATIENT)
Dept: LAB | Facility: HOSPITAL | Age: 53
End: 2023-10-24
Attending: FAMILY MEDICINE
Payer: MEDICARE

## 2023-10-24 DIAGNOSIS — B96.89 OTHER SPECIFIED BACTERIAL AGENTS AS THE CAUSE OF DISEASES CLASSIFIED ELSEWHERE: ICD-10-CM

## 2023-10-24 DIAGNOSIS — B95.62 METHICILLIN RESISTANT STAPHYLOCOCCUS AUREUS INFECTION AS THE CAUSE OF DISEASES CLASSIFIED ELSEWHERE: ICD-10-CM

## 2023-10-24 DIAGNOSIS — T84.54XA INFECTION AND INFLAMMATORY REACTION DUE TO INTERNAL LEFT KNEE PROSTHESIS, INITIAL ENCOUNTER: ICD-10-CM

## 2023-10-24 LAB
ALBUMIN SERPL BCP-MCNC: 2.7 G/DL (ref 3.5–5)
ALBUMIN/GLOB SERPL: 0.5 {RATIO}
ALP SERPL-CCNC: 152 U/L (ref 46–118)
ALT SERPL W P-5'-P-CCNC: 11 U/L (ref 13–56)
ANION GAP SERPL CALCULATED.3IONS-SCNC: 11 MMOL/L (ref 7–16)
ANISOCYTOSIS BLD QL SMEAR: ABNORMAL
AST SERPL W P-5'-P-CCNC: 13 U/L (ref 15–37)
BASOPHILS # BLD AUTO: 0.06 K/UL (ref 0–0.2)
BASOPHILS NFR BLD AUTO: 0.9 % (ref 0–1)
BILIRUB SERPL-MCNC: 0.3 MG/DL (ref ?–1.2)
BUN SERPL-MCNC: 27 MG/DL (ref 7–18)
BUN/CREAT SERPL: 28 (ref 6–20)
CALCIUM SERPL-MCNC: 9 MG/DL (ref 8.5–10.1)
CHLORIDE SERPL-SCNC: 100 MMOL/L (ref 98–107)
CO2 SERPL-SCNC: 31 MMOL/L (ref 21–32)
CREAT SERPL-MCNC: 0.96 MG/DL (ref 0.55–1.02)
CRP SERPL-MCNC: 6.35 MG/DL (ref 0–0.8)
DIFFERENTIAL METHOD BLD: ABNORMAL
EGFR (NO RACE VARIABLE) (RUSH/TITUS): 70 ML/MIN/1.73M2
EOSINOPHIL # BLD AUTO: 0.27 K/UL (ref 0–0.5)
EOSINOPHIL NFR BLD AUTO: 4 % (ref 1–4)
ERYTHROCYTE [DISTWIDTH] IN BLOOD BY AUTOMATED COUNT: 17 % (ref 11.5–14.5)
ERYTHROCYTE [SEDIMENTATION RATE] IN BLOOD BY WESTERGREN METHOD: 80 MM/HR (ref 0–30)
GLOBULIN SER-MCNC: 5 G/DL (ref 2–4)
GLUCOSE SERPL-MCNC: 246 MG/DL (ref 74–106)
HCT VFR BLD AUTO: 34.4 % (ref 38–47)
HGB BLD-MCNC: 10.9 G/DL (ref 12–16)
IMM GRANULOCYTES # BLD AUTO: 0.02 K/UL (ref 0–0.04)
IMM GRANULOCYTES NFR BLD: 0.3 % (ref 0–0.4)
LYMPHOCYTES # BLD AUTO: 2.01 K/UL (ref 1–4.8)
LYMPHOCYTES NFR BLD AUTO: 29.5 % (ref 27–41)
MCH RBC QN AUTO: 23.3 PG (ref 27–31)
MCHC RBC AUTO-ENTMCNC: 31.7 G/DL (ref 32–36)
MCV RBC AUTO: 73.5 FL (ref 80–96)
MICROCYTES BLD QL SMEAR: ABNORMAL
MONOCYTES # BLD AUTO: 0.49 K/UL (ref 0–0.8)
MONOCYTES NFR BLD AUTO: 7.2 % (ref 2–6)
MPC BLD CALC-MCNC: 9.9 FL (ref 9.4–12.4)
NEUTROPHILS # BLD AUTO: 3.97 K/UL (ref 1.8–7.7)
NEUTROPHILS NFR BLD AUTO: 58.1 % (ref 53–65)
NRBC # BLD AUTO: 0 X10E3/UL
NRBC, AUTO (.00): 0 %
PLATELET # BLD AUTO: 359 K/UL (ref 150–400)
PLATELET MORPHOLOGY: ABNORMAL
POTASSIUM SERPL-SCNC: 3.9 MMOL/L (ref 3.5–5.1)
PROT SERPL-MCNC: 7.7 G/DL (ref 6.4–8.2)
RBC # BLD AUTO: 4.68 M/UL (ref 4.2–5.4)
SODIUM SERPL-SCNC: 138 MMOL/L (ref 136–145)
WBC # BLD AUTO: 6.82 K/UL (ref 4.5–11)

## 2023-10-24 PROCEDURE — 85025 COMPLETE CBC W/AUTO DIFF WBC: CPT | Performed by: FAMILY MEDICINE

## 2023-10-24 PROCEDURE — 85651 RBC SED RATE NONAUTOMATED: CPT | Performed by: FAMILY MEDICINE

## 2023-10-24 PROCEDURE — 80053 COMPREHEN METABOLIC PANEL: CPT | Performed by: FAMILY MEDICINE

## 2023-10-24 PROCEDURE — 86140 C-REACTIVE PROTEIN: CPT | Performed by: FAMILY MEDICINE

## 2023-11-06 ENCOUNTER — LAB REQUISITION (OUTPATIENT)
Dept: LAB | Facility: HOSPITAL | Age: 53
End: 2023-11-06
Attending: FAMILY MEDICINE
Payer: MEDICARE

## 2023-11-06 DIAGNOSIS — B95.62 METHICILLIN RESISTANT STAPHYLOCOCCUS AUREUS INFECTION AS THE CAUSE OF DISEASES CLASSIFIED ELSEWHERE: ICD-10-CM

## 2023-11-06 DIAGNOSIS — B96.89 OTHER SPECIFIED BACTERIAL AGENTS AS THE CAUSE OF DISEASES CLASSIFIED ELSEWHERE: ICD-10-CM

## 2023-11-06 DIAGNOSIS — T84.54XA INFECTION AND INFLAMMATORY REACTION DUE TO INTERNAL LEFT KNEE PROSTHESIS, INITIAL ENCOUNTER: ICD-10-CM

## 2023-11-06 LAB
ALBUMIN SERPL BCP-MCNC: 2.8 G/DL (ref 3.5–5)
ALBUMIN/GLOB SERPL: 0.5 {RATIO}
ALP SERPL-CCNC: 185 U/L (ref 46–118)
ALT SERPL W P-5'-P-CCNC: 18 U/L (ref 13–56)
ANION GAP SERPL CALCULATED.3IONS-SCNC: 13 MMOL/L (ref 7–16)
ANISOCYTOSIS BLD QL SMEAR: ABNORMAL
AST SERPL W P-5'-P-CCNC: 17 U/L (ref 15–37)
BASOPHILS # BLD AUTO: 0.08 K/UL (ref 0–0.2)
BASOPHILS NFR BLD AUTO: 1.2 % (ref 0–1)
BILIRUB SERPL-MCNC: 0.3 MG/DL (ref ?–1.2)
BUN SERPL-MCNC: 22 MG/DL (ref 7–18)
BUN/CREAT SERPL: 24 (ref 6–20)
CALCIUM SERPL-MCNC: 9.3 MG/DL (ref 8.5–10.1)
CHLORIDE SERPL-SCNC: 98 MMOL/L (ref 98–107)
CO2 SERPL-SCNC: 28 MMOL/L (ref 21–32)
CREAT SERPL-MCNC: 0.93 MG/DL (ref 0.55–1.02)
CRP SERPL-MCNC: 6.65 MG/DL (ref 0–0.8)
DIFFERENTIAL METHOD BLD: ABNORMAL
EGFR (NO RACE VARIABLE) (RUSH/TITUS): 73 ML/MIN/1.73M2
EOSINOPHIL # BLD AUTO: 0.29 K/UL (ref 0–0.5)
EOSINOPHIL NFR BLD AUTO: 4.4 % (ref 1–4)
ERYTHROCYTE [DISTWIDTH] IN BLOOD BY AUTOMATED COUNT: 16.8 % (ref 11.5–14.5)
ERYTHROCYTE [SEDIMENTATION RATE] IN BLOOD BY WESTERGREN METHOD: 72 MM/HR (ref 0–30)
GLOBULIN SER-MCNC: 5.6 G/DL (ref 2–4)
GLUCOSE SERPL-MCNC: 304 MG/DL (ref 74–106)
HCT VFR BLD AUTO: 33.9 % (ref 38–47)
HGB BLD-MCNC: 10.8 G/DL (ref 12–16)
IMM GRANULOCYTES # BLD AUTO: 0.04 K/UL (ref 0–0.04)
IMM GRANULOCYTES NFR BLD: 0.6 % (ref 0–0.4)
LYMPHOCYTES # BLD AUTO: 1.6 K/UL (ref 1–4.8)
LYMPHOCYTES NFR BLD AUTO: 24.5 % (ref 27–41)
MCH RBC QN AUTO: 23 PG (ref 27–31)
MCHC RBC AUTO-ENTMCNC: 31.9 G/DL (ref 32–36)
MCV RBC AUTO: 72.3 FL (ref 80–96)
MICROCYTES BLD QL SMEAR: ABNORMAL
MONOCYTES # BLD AUTO: 0.52 K/UL (ref 0–0.8)
MONOCYTES NFR BLD AUTO: 8 % (ref 2–6)
MPC BLD CALC-MCNC: 9.8 FL (ref 9.4–12.4)
NEUTROPHILS # BLD AUTO: 3.99 K/UL (ref 1.8–7.7)
NEUTROPHILS NFR BLD AUTO: 61.3 % (ref 53–65)
NRBC # BLD AUTO: 0 X10E3/UL
NRBC, AUTO (.00): 0 %
PLATELET # BLD AUTO: 335 K/UL (ref 150–400)
PLATELET MORPHOLOGY: ABNORMAL
POTASSIUM SERPL-SCNC: 4.6 MMOL/L (ref 3.5–5.1)
PROT SERPL-MCNC: 8.4 G/DL (ref 6.4–8.2)
RBC # BLD AUTO: 4.69 M/UL (ref 4.2–5.4)
SODIUM SERPL-SCNC: 134 MMOL/L (ref 136–145)
WBC # BLD AUTO: 6.52 K/UL (ref 4.5–11)

## 2023-11-06 PROCEDURE — 85651 RBC SED RATE NONAUTOMATED: CPT | Performed by: FAMILY MEDICINE

## 2023-11-06 PROCEDURE — 86140 C-REACTIVE PROTEIN: CPT | Performed by: FAMILY MEDICINE

## 2023-11-06 PROCEDURE — 85025 COMPLETE CBC W/AUTO DIFF WBC: CPT | Performed by: FAMILY MEDICINE

## 2023-11-06 PROCEDURE — 80053 COMPREHEN METABOLIC PANEL: CPT | Performed by: FAMILY MEDICINE

## 2023-11-20 ENCOUNTER — LAB REQUISITION (OUTPATIENT)
Dept: LAB | Facility: HOSPITAL | Age: 53
End: 2023-11-20
Attending: FAMILY MEDICINE
Payer: MEDICARE

## 2023-11-20 DIAGNOSIS — B95.62 METHICILLIN RESISTANT STAPHYLOCOCCUS AUREUS INFECTION AS THE CAUSE OF DISEASES CLASSIFIED ELSEWHERE: ICD-10-CM

## 2023-11-20 DIAGNOSIS — B96.89 OTHER SPECIFIED BACTERIAL AGENTS AS THE CAUSE OF DISEASES CLASSIFIED ELSEWHERE: ICD-10-CM

## 2023-11-20 DIAGNOSIS — T84.54XA INFECTION AND INFLAMMATORY REACTION DUE TO INTERNAL LEFT KNEE PROSTHESIS, INITIAL ENCOUNTER: ICD-10-CM

## 2023-11-20 LAB
ALBUMIN SERPL BCP-MCNC: 2.8 G/DL (ref 3.5–5)
ALBUMIN/GLOB SERPL: 0.5 {RATIO}
ALP SERPL-CCNC: 172 U/L (ref 46–118)
ALT SERPL W P-5'-P-CCNC: 23 U/L (ref 13–56)
ANION GAP SERPL CALCULATED.3IONS-SCNC: 12 MMOL/L (ref 7–16)
ANISOCYTOSIS BLD QL SMEAR: ABNORMAL
AST SERPL W P-5'-P-CCNC: 18 U/L (ref 15–37)
BASOPHILS # BLD AUTO: 0.05 K/UL (ref 0–0.2)
BASOPHILS NFR BLD AUTO: 0.7 % (ref 0–1)
BILIRUB SERPL-MCNC: 0.3 MG/DL (ref ?–1.2)
BUN SERPL-MCNC: 19 MG/DL (ref 7–18)
BUN/CREAT SERPL: 23 (ref 6–20)
CALCIUM SERPL-MCNC: 8.8 MG/DL (ref 8.5–10.1)
CHLORIDE SERPL-SCNC: 99 MMOL/L (ref 98–107)
CO2 SERPL-SCNC: 28 MMOL/L (ref 21–32)
CREAT SERPL-MCNC: 0.83 MG/DL (ref 0.55–1.02)
CRP SERPL-MCNC: 3.74 MG/DL (ref 0–0.8)
DIFFERENTIAL METHOD BLD: ABNORMAL
EGFR (NO RACE VARIABLE) (RUSH/TITUS): 83 ML/MIN/1.73M2
EOSINOPHIL # BLD AUTO: 0.38 K/UL (ref 0–0.5)
EOSINOPHIL NFR BLD AUTO: 5.5 % (ref 1–4)
ERYTHROCYTE [DISTWIDTH] IN BLOOD BY AUTOMATED COUNT: 17.5 % (ref 11.5–14.5)
ERYTHROCYTE [SEDIMENTATION RATE] IN BLOOD BY WESTERGREN METHOD: 73 MM/HR (ref 0–30)
GLOBULIN SER-MCNC: 5.3 G/DL (ref 2–4)
GLUCOSE SERPL-MCNC: 195 MG/DL (ref 74–106)
HCT VFR BLD AUTO: 34.4 % (ref 38–47)
HGB BLD-MCNC: 10.8 G/DL (ref 12–16)
IMM GRANULOCYTES # BLD AUTO: 0.01 K/UL (ref 0–0.04)
IMM GRANULOCYTES NFR BLD: 0.1 % (ref 0–0.4)
LYMPHOCYTES # BLD AUTO: 2.29 K/UL (ref 1–4.8)
LYMPHOCYTES NFR BLD AUTO: 33 % (ref 27–41)
MCH RBC QN AUTO: 23.1 PG (ref 27–31)
MCHC RBC AUTO-ENTMCNC: 31.4 G/DL (ref 32–36)
MCV RBC AUTO: 73.5 FL (ref 80–96)
MICROCYTES BLD QL SMEAR: ABNORMAL
MONOCYTES # BLD AUTO: 0.43 K/UL (ref 0–0.8)
MONOCYTES NFR BLD AUTO: 6.2 % (ref 2–6)
MPC BLD CALC-MCNC: 9.9 FL (ref 9.4–12.4)
NEUTROPHILS # BLD AUTO: 3.77 K/UL (ref 1.8–7.7)
NEUTROPHILS NFR BLD AUTO: 54.5 % (ref 53–65)
NRBC # BLD AUTO: 0 X10E3/UL
NRBC, AUTO (.00): 0 %
PLATELET # BLD AUTO: 334 K/UL (ref 150–400)
PLATELET MORPHOLOGY: ABNORMAL
POTASSIUM SERPL-SCNC: 4.1 MMOL/L (ref 3.5–5.1)
PROT SERPL-MCNC: 8.1 G/DL (ref 6.4–8.2)
RBC # BLD AUTO: 4.68 M/UL (ref 4.2–5.4)
SODIUM SERPL-SCNC: 135 MMOL/L (ref 136–145)
WBC # BLD AUTO: 6.93 K/UL (ref 4.5–11)

## 2023-11-20 PROCEDURE — 86140 C-REACTIVE PROTEIN: CPT | Performed by: FAMILY MEDICINE

## 2023-11-20 PROCEDURE — 80053 COMPREHEN METABOLIC PANEL: CPT | Performed by: FAMILY MEDICINE

## 2023-11-20 PROCEDURE — 85025 COMPLETE CBC W/AUTO DIFF WBC: CPT | Performed by: FAMILY MEDICINE

## 2023-11-20 PROCEDURE — 85651 RBC SED RATE NONAUTOMATED: CPT | Performed by: FAMILY MEDICINE

## 2023-12-07 ENCOUNTER — HOSPITAL ENCOUNTER (INPATIENT)
Facility: HOSPITAL | Age: 53
LOS: 6 days | Discharge: HOME HEALTH | DRG: 481 | End: 2023-12-13
Attending: EMERGENCY MEDICINE | Admitting: INTERNAL MEDICINE
Payer: MEDICARE

## 2023-12-07 DIAGNOSIS — S42.491A CLOSED BICONDYLAR FRACTURE OF DISTAL HUMERUS, RIGHT, INITIAL ENCOUNTER: ICD-10-CM

## 2023-12-07 DIAGNOSIS — Z01.818 PRE-OP EXAM: ICD-10-CM
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
## 2023-12-07 DIAGNOSIS — Z01.818 PRE-OPERATIVE CLEARANCE: ICD-10-CM

## 2023-12-07 DIAGNOSIS — E11.65 TYPE 2 DIABETES MELLITUS WITH HYPERGLYCEMIA, WITH LONG-TERM CURRENT USE OF INSULIN: ICD-10-CM

## 2023-12-07 DIAGNOSIS — R07.9 CHEST PAIN: ICD-10-CM

## 2023-12-07 DIAGNOSIS — S72.451A DISPLACED SUPRACONDYLAR FRACTURE WITHOUT INTRACONDYLAR EXTENSION OF LOWER END OF RIGHT FEMUR, INITIAL ENCOUNTER FOR CLOSED FRACTURE: Primary | ICD-10-CM

## 2023-12-07 DIAGNOSIS — Z79.4 TYPE 2 DIABETES MELLITUS WITH HYPERGLYCEMIA, WITH LONG-TERM CURRENT USE OF INSULIN: ICD-10-CM

## 2023-12-07 LAB
ALBUMIN SERPL BCP-MCNC: 2.9 G/DL (ref 3.5–5)
ALBUMIN/GLOB SERPL: 0.6 {RATIO}
ALP SERPL-CCNC: 155 U/L (ref 46–118)
ALT SERPL W P-5'-P-CCNC: 17 U/L (ref 13–56)
ANION GAP SERPL CALCULATED.3IONS-SCNC: 6 MMOL/L (ref 7–16)
APTT PPP: 27.4 SECONDS (ref 25.2–37.3)
AST SERPL W P-5'-P-CCNC: 8 U/L (ref 15–37)
BASOPHILS # BLD AUTO: 0.07 K/UL (ref 0–0.2)
BASOPHILS NFR BLD AUTO: 0.7 % (ref 0–1)
BILIRUB SERPL-MCNC: 0.2 MG/DL (ref ?–1.2)
BUN SERPL-MCNC: 29 MG/DL (ref 7–18)
BUN/CREAT SERPL: 23 (ref 6–20)
CALCIUM SERPL-MCNC: 9.3 MG/DL (ref 8.5–10.1)
CHLORIDE SERPL-SCNC: 104 MMOL/L (ref 98–107)
CO2 SERPL-SCNC: 30 MMOL/L (ref 21–32)
CREAT SERPL-MCNC: 1.26 MG/DL (ref 0.55–1.02)
DIFFERENTIAL METHOD BLD: ABNORMAL
EGFR (NO RACE VARIABLE) (RUSH/TITUS): 51 ML/MIN/1.73M2
EOSINOPHIL # BLD AUTO: 0.39 K/UL (ref 0–0.5)
EOSINOPHIL NFR BLD AUTO: 3.7 % (ref 1–4)
ERYTHROCYTE [DISTWIDTH] IN BLOOD BY AUTOMATED COUNT: 18.5 % (ref 11.5–14.5)
EST. AVERAGE GLUCOSE BLD GHB EST-MCNC: 209 MG/DL
GLOBULIN SER-MCNC: 4.9 G/DL (ref 2–4)
GLUCOSE SERPL-MCNC: 199 MG/DL (ref 74–106)
GLUCOSE SERPL-MCNC: 219 MG/DL (ref 70–105)
GLUCOSE SERPL-MCNC: 232 MG/DL (ref 70–105)
HBA1C MFR BLD HPLC: 8.9 % (ref 4.5–6.6)
HCT VFR BLD AUTO: 34.7 % (ref 38–47)
HGB BLD-MCNC: 10.9 G/DL (ref 12–16)
IMM GRANULOCYTES # BLD AUTO: 0.05 K/UL (ref 0–0.04)
IMM GRANULOCYTES NFR BLD: 0.5 % (ref 0–0.4)
INR BLD: 0.97
LYMPHOCYTES # BLD AUTO: 2.03 K/UL (ref 1–4.8)
LYMPHOCYTES NFR BLD AUTO: 19.3 % (ref 27–41)
MCH RBC QN AUTO: 23.8 PG (ref 27–31)
MCHC RBC AUTO-ENTMCNC: 31.4 G/DL (ref 32–36)
MCV RBC AUTO: 75.8 FL (ref 80–96)
MONOCYTES # BLD AUTO: 0.65 K/UL (ref 0–0.8)
MONOCYTES NFR BLD AUTO: 6.2 % (ref 2–6)
MPC BLD CALC-MCNC: 10.6 FL (ref 9.4–12.4)
NEUTROPHILS # BLD AUTO: 7.34 K/UL (ref 1.8–7.7)
NEUTROPHILS NFR BLD AUTO: 69.6 % (ref 53–65)
NRBC # BLD AUTO: 0 X10E3/UL
NRBC, AUTO (.00): 0 %
PLATELET # BLD AUTO: 264 K/UL (ref 150–400)
POTASSIUM SERPL-SCNC: 4.4 MMOL/L (ref 3.5–5.1)
PROT SERPL-MCNC: 7.8 G/DL (ref 6.4–8.2)
PROTHROMBIN TIME: 12.8 SECONDS (ref 11.7–14.7)
RBC # BLD AUTO: 4.58 M/UL (ref 4.2–5.4)
SARS-COV-2 RDRP RESP QL NAA+PROBE: NEGATIVE
SODIUM SERPL-SCNC: 136 MMOL/L (ref 136–145)
WBC # BLD AUTO: 10.53 K/UL (ref 4.5–11)

## 2023-12-07 PROCEDURE — 85025 COMPLETE CBC W/AUTO DIFF WBC: CPT | Performed by: EMERGENCY MEDICINE

## 2023-12-07 PROCEDURE — 96375 TX/PRO/DX INJ NEW DRUG ADDON: CPT

## 2023-12-07 PROCEDURE — 99285 EMERGENCY DEPT VISIT HI MDM: CPT | Mod: ,,, | Performed by: EMERGENCY MEDICINE

## 2023-12-07 PROCEDURE — 63600175 PHARM REV CODE 636 W HCPCS: Performed by: INTERNAL MEDICINE

## 2023-12-07 PROCEDURE — 96374 THER/PROPH/DIAG INJ IV PUSH: CPT

## 2023-12-07 PROCEDURE — 99223 PR INITIAL HOSPITAL CARE,LEVL III: ICD-10-PCS | Mod: AI,ICN,, | Performed by: INTERNAL MEDICINE

## 2023-12-07 PROCEDURE — 82962 GLUCOSE BLOOD TEST: CPT

## 2023-12-07 PROCEDURE — 96376 TX/PRO/DX INJ SAME DRUG ADON: CPT

## 2023-12-07 PROCEDURE — 25000003 PHARM REV CODE 250: Performed by: INTERNAL MEDICINE

## 2023-12-07 PROCEDURE — 85730 THROMBOPLASTIN TIME PARTIAL: CPT | Performed by: EMERGENCY MEDICINE

## 2023-12-07 PROCEDURE — 80053 COMPREHEN METABOLIC PANEL: CPT | Performed by: EMERGENCY MEDICINE

## 2023-12-07 PROCEDURE — 11000001 HC ACUTE MED/SURG PRIVATE ROOM

## 2023-12-07 PROCEDURE — 63600175 PHARM REV CODE 636 W HCPCS: Performed by: EMERGENCY MEDICINE

## 2023-12-07 PROCEDURE — 99285 EMERGENCY DEPT VISIT HI MDM: CPT | Mod: 25

## 2023-12-07 PROCEDURE — 99223 1ST HOSP IP/OBS HIGH 75: CPT | Mod: AI,ICN,, | Performed by: INTERNAL MEDICINE

## 2023-12-07 PROCEDURE — 83036 HEMOGLOBIN GLYCOSYLATED A1C: CPT | Performed by: INTERNAL MEDICINE

## 2023-12-07 PROCEDURE — 99285 PR EMERGENCY DEPT VISIT,LEVEL V: ICD-10-PCS | Mod: ,,, | Performed by: EMERGENCY MEDICINE

## 2023-12-07 PROCEDURE — 87635 SARS-COV-2 COVID-19 AMP PRB: CPT | Performed by: INTERNAL MEDICINE

## 2023-12-07 PROCEDURE — 85610 PROTHROMBIN TIME: CPT | Performed by: EMERGENCY MEDICINE

## 2023-12-07 RX ORDER — GLUCAGON 1 MG
1 KIT INJECTION
Status: DISCONTINUED | OUTPATIENT
Start: 2023-12-07 | End: 2023-12-13 | Stop reason: HOSPADM

## 2023-12-07 RX ORDER — MUPIROCIN 20 MG/G
OINTMENT TOPICAL 2 TIMES DAILY
Status: DISCONTINUED | OUTPATIENT
Start: 2023-12-07 | End: 2023-12-12

## 2023-12-07 RX ORDER — ACETAMINOPHEN 325 MG/1
650 TABLET ORAL EVERY 8 HOURS PRN
Status: DISCONTINUED | OUTPATIENT
Start: 2023-12-07 | End: 2023-12-13 | Stop reason: HOSPADM

## 2023-12-07 RX ORDER — POLYETHYLENE GLYCOL 3350 17 G/17G
17 POWDER, FOR SOLUTION ORAL DAILY
Status: DISCONTINUED | OUTPATIENT
Start: 2023-12-08 | End: 2023-12-13 | Stop reason: HOSPADM

## 2023-12-07 RX ORDER — HYDROCODONE BITARTRATE AND ACETAMINOPHEN 10; 325 MG/1; MG/1
1 TABLET ORAL EVERY 6 HOURS PRN
Status: DISCONTINUED | OUTPATIENT
Start: 2023-12-07 | End: 2023-12-13 | Stop reason: HOSPADM

## 2023-12-07 RX ORDER — HYDROMORPHONE HYDROCHLORIDE 2 MG/ML
1 INJECTION, SOLUTION INTRAMUSCULAR; INTRAVENOUS; SUBCUTANEOUS
Status: COMPLETED | OUTPATIENT
Start: 2023-12-07 | End: 2023-12-07

## 2023-12-07 RX ORDER — IBUPROFEN 200 MG
16 TABLET ORAL
Status: DISCONTINUED | OUTPATIENT
Start: 2023-12-07 | End: 2023-12-13 | Stop reason: HOSPADM

## 2023-12-07 RX ORDER — MORPHINE SULFATE 2 MG/ML
2 INJECTION, SOLUTION INTRAMUSCULAR; INTRAVENOUS EVERY 4 HOURS PRN
Status: DISCONTINUED | OUTPATIENT
Start: 2023-12-07 | End: 2023-12-13 | Stop reason: HOSPADM

## 2023-12-07 RX ORDER — CIPROFLOXACIN 500 MG/1
500 TABLET ORAL EVERY 12 HOURS
Status: DISCONTINUED | OUTPATIENT
Start: 2023-12-07 | End: 2023-12-13 | Stop reason: HOSPADM

## 2023-12-07 RX ORDER — AMOXICILLIN 250 MG
1 CAPSULE ORAL 2 TIMES DAILY
Status: DISCONTINUED | OUTPATIENT
Start: 2023-12-07 | End: 2023-12-13 | Stop reason: HOSPADM

## 2023-12-07 RX ORDER — TALC
6 POWDER (GRAM) TOPICAL NIGHTLY PRN
Status: DISCONTINUED | OUTPATIENT
Start: 2023-12-07 | End: 2023-12-13 | Stop reason: HOSPADM

## 2023-12-07 RX ORDER — NALOXONE HCL 0.4 MG/ML
0.02 VIAL (ML) INJECTION
Status: DISCONTINUED | OUTPATIENT
Start: 2023-12-07 | End: 2023-12-13 | Stop reason: HOSPADM

## 2023-12-07 RX ORDER — IBUPROFEN 200 MG
24 TABLET ORAL
Status: DISCONTINUED | OUTPATIENT
Start: 2023-12-07 | End: 2023-12-13 | Stop reason: HOSPADM

## 2023-12-07 RX ORDER — DULOXETIN HYDROCHLORIDE 30 MG/1
60 CAPSULE, DELAYED RELEASE ORAL DAILY
Status: DISCONTINUED | OUTPATIENT
Start: 2023-12-08 | End: 2023-12-13 | Stop reason: HOSPADM

## 2023-12-07 RX ORDER — SIMETHICONE 80 MG
1 TABLET,CHEWABLE ORAL 4 TIMES DAILY PRN
Status: DISCONTINUED | OUTPATIENT
Start: 2023-12-07 | End: 2023-12-13 | Stop reason: HOSPADM

## 2023-12-07 RX ORDER — BISACODYL 10 MG
10 SUPPOSITORY, RECTAL RECTAL DAILY PRN
Status: DISCONTINUED | OUTPATIENT
Start: 2023-12-07 | End: 2023-12-13 | Stop reason: HOSPADM

## 2023-12-07 RX ORDER — ONDANSETRON 2 MG/ML
4 INJECTION INTRAMUSCULAR; INTRAVENOUS
Status: COMPLETED | OUTPATIENT
Start: 2023-12-07 | End: 2023-12-07

## 2023-12-07 RX ORDER — INSULIN ASPART 100 [IU]/ML
0-10 INJECTION, SOLUTION INTRAVENOUS; SUBCUTANEOUS
Status: DISCONTINUED | OUTPATIENT
Start: 2023-12-07 | End: 2023-12-13 | Stop reason: HOSPADM

## 2023-12-07 RX ORDER — MAG HYDROX/ALUMINUM HYD/SIMETH 200-200-20
30 SUSPENSION, ORAL (FINAL DOSE FORM) ORAL 4 TIMES DAILY PRN
Status: DISCONTINUED | OUTPATIENT
Start: 2023-12-07 | End: 2023-12-13 | Stop reason: HOSPADM

## 2023-12-07 RX ORDER — CIPROFLOXACIN 500 MG/1
500 TABLET ORAL EVERY 12 HOURS
Status: ON HOLD | COMMUNITY
Start: 2023-11-21 | End: 2023-12-11 | Stop reason: HOSPADM

## 2023-12-07 RX ORDER — ONDANSETRON 2 MG/ML
4 INJECTION INTRAMUSCULAR; INTRAVENOUS EVERY 8 HOURS PRN
Status: DISCONTINUED | OUTPATIENT
Start: 2023-12-07 | End: 2023-12-13 | Stop reason: HOSPADM

## 2023-12-07 RX ORDER — ACETAMINOPHEN 325 MG/1
650 TABLET ORAL EVERY 4 HOURS PRN
Status: DISCONTINUED | OUTPATIENT
Start: 2023-12-07 | End: 2023-12-13 | Stop reason: HOSPADM

## 2023-12-07 RX ORDER — HEPARIN SODIUM 5000 [USP'U]/ML
5000 INJECTION, SOLUTION INTRAVENOUS; SUBCUTANEOUS EVERY 8 HOURS
Status: DISCONTINUED | OUTPATIENT
Start: 2023-12-07 | End: 2023-12-08

## 2023-12-07 RX ADMIN — HEPARIN SODIUM 5000 UNITS: 5000 INJECTION, SOLUTION INTRAVENOUS; SUBCUTANEOUS at 10:12

## 2023-12-07 RX ADMIN — INSULIN DETEMIR 10 UNITS: 100 INJECTION, SOLUTION SUBCUTANEOUS at 08:12

## 2023-12-07 RX ADMIN — MUPIROCIN: 20 OINTMENT TOPICAL at 08:12

## 2023-12-07 RX ADMIN — CIPROFLOXACIN HYDROCHLORIDE 500 MG: 500 TABLET, FILM COATED ORAL at 08:12

## 2023-12-07 RX ADMIN — HYDROMORPHONE HYDROCHLORIDE 1 MG: 2 INJECTION INTRAMUSCULAR; INTRAVENOUS; SUBCUTANEOUS at 03:12

## 2023-12-07 RX ADMIN — ONDANSETRON 4 MG: 2 INJECTION INTRAMUSCULAR; INTRAVENOUS at 03:12

## 2023-12-07 RX ADMIN — HYDROMORPHONE HYDROCHLORIDE 1 MG: 2 INJECTION INTRAMUSCULAR; INTRAVENOUS; SUBCUTANEOUS at 04:12

## 2023-12-07 RX ADMIN — MORPHINE SULFATE 2 MG: 2 INJECTION, SOLUTION INTRAMUSCULAR; INTRAVENOUS at 08:12

## 2023-12-07 RX ADMIN — SENNOSIDES AND DOCUSATE SODIUM 1 TABLET: 8.6; 5 TABLET ORAL at 08:12

## 2023-12-07 NOTE — H&P
[facility] - Emergency Department  Hospital Medicine  History & Physical    Patient Name: Monica Walker  MRN: [id number]  Patient Class: IP- Inpatient  Admission Date: 12/7/2023  Attending Physician: Cecil Chappell MD   Primary Care Provider: Mari Myers MD         Patient information was obtained from patient, parent, and ER records.     Subjective:     Principal Problem:Closed bicondylar fracture of distal humerus, right, initial encounter    Chief Complaint:   Chief Complaint   Patient presents with    Fall     Patient fell while dragging a deer out of the woods.     Knee Injury     Right knee injury. Hx of osteomyelitis in that same knee        HPI: Patient is a 53-year-old female with past medical history of diabetes mellitus, osteoarthritis, smoking, depression, MRSA bacteremia complicated by vertebral osteomyelitis and left knee infection, who presents to the emergency room with her mother for evaluation of right hip pain after she sustained a fall at home.  Patient is in a lot of pain, therefore most of the history is given by her mother, who tells me that patient was out in the woods deer hunting when she had a mechanical fall resulting in her right hip fracture.  Patient was shouting in pain and therefore the mother went to see her and called for medical assistance.  Currently the patient is saying that her pain is not controlled, tells me that she recently completed her IV antibiotics and is taking oral ciprofloxacin as recommended by her physician.  Otherwise she denies having any chest pain fevers chills nausea vomiting diarrhea shortness of breath dysuria or lightheadedness or syncope.  She does have significant amount of pain at the right hip joint.    Past Medical History:   Diagnosis Date    Abscess of right thigh + MRSA 08/20/2021    healed    Adnexal cyst     Degenerative disc disease     Depression 10/29/2021    Diabetes mellitus     Hypertension     Hypomagnesemia 01/20/2023     Nicotine dependence     Osteoarthritis        Past Surgical History:   Procedure Laterality Date    BACK SURGERY      ECHOCARDIOGRAM,TRANSESOPHAGEAL N/A 6/13/2023    Procedure: Transesophageal echo (MONICA) intra-procedure log documentation;  Surgeon: Kaiden Sparks MD;  Location: [facility] CATH LAB;  Service: Cardiology;  Laterality: N/A;    HYSTERECTOMY N/A     INCISION AND DRAINAGE Left 3/13/2023    Procedure: INCISION AND DRAINAGE;  Surgeon: Jacek Noyola MD;  Location: [facility] OR;  Service: Orthopedics;  Laterality: Left;    IRRIGATION AND DEBRIDEMENT OF LOWER EXTREMITY Left 11/22/2022    Procedure: IRRIGATION AND DEBRIDEMENT, LOWER EXTREMITY;  Surgeon: Papa Mendoza MD;  Location: [facility] OR;  Service: Orthopedics;  Laterality: Left;    IRRIGATION AND DEBRIDEMENT OF UPPER EXTREMITY Left 11/22/2022    Procedure: IRRIGATION AND DEBRIDEMENT, UPPER EXTREMITY;  Surgeon: Papa Mendoza MD;  Location: [facility] OR;  Service: Orthopedics;  Laterality: Left;    REVISION OF KNEE ARTHROPLASTY Left 3/13/2023    Procedure: REMOVAL OF PROSTHESIS,METHYLMETHACRYLATE WITH OR WITHOUT INSERTION OF SPACER LEFT KNEE;  Surgeon: Jacek Noyola MD;  Location: [facility] OR;  Service: Orthopedics;  Laterality: Left;    REVISION OF KNEE ARTHROPLASTY Left 5/9/2023    Procedure: REVISION, ARTHROPLASTY, KNEE;  Surgeon: Jacek Noyola MD;  Location: [facility] OR;  Service: Orthopedics;  Laterality: Left;    THORACIC LAMINECTOMY WITH FUSION N/A 1/24/2023    Procedure: T9-L2 fusion, T11-T12 laminectomy and corpectomy;  Surgeon: Jimmy Hernandes MD;  Location: [facility] OR;  Service: Neurosurgery;  Laterality: N/A;    TOTAL HIP ARTHROPLASTY Left     TOTAL KNEE ARTHROPLASTY Bilateral        Review of patient's allergies indicates:  No Known Allergies    No current facility-administered medications on file prior to encounter.     Current Outpatient Medications on File Prior to Encounter  "  Medication Sig    ciprofloxacin HCl (CIPRO) 500 MG tablet Take 500 mg by mouth every 12 (twelve) hours.    DULoxetine (CYMBALTA) 60 MG capsule Take 1 capsule (60 mg total) by mouth once daily.    insulin (LANTUS SOLOSTAR U-100 INSULIN) glargine 100 units/mL SubQ pen Inject 30 Units into the skin once daily. (Patient taking differently: Inject 35 Units into the skin once daily.)    NOVOLOG FLEXPEN U-100 INSULIN 100 unit/mL (3 mL) InPn pen Inject 5 Units into the skin 3 (three) times daily with meals. (Patient taking differently: Inject 10 Units into the skin 3 (three) times daily with meals.)    BD ULTRA-FINE SHORT PEN NEEDLE 31 gauge x 5/16" Ndle     [DISCONTINUED] buPROPion (WELLBUTRIN) 100 MG tablet Take 1 tablet (100 mg total) by mouth once daily. (Patient not taking: Reported on 6/22/2023)    [DISCONTINUED] HYDROcodone-acetaminophen (NORCO)  mg per tablet 10 mg.    [DISCONTINUED] insulin NPH-insulin regular, 70/30, 100 unit/mL (70-30) injection Inject 30 Units into the skin every morning. (Patient not taking: Reported on 6/22/2023)     Family History       Problem Relation (Age of Onset)    Diabetes Father    Hypertension Father          Tobacco Use    Smoking status: Every Day     Current packs/day: 0.50     Types: Cigarettes    Smokeless tobacco: Current     Types: Snuff   Substance and Sexual Activity    Alcohol use: Never    Drug use: Never    Sexual activity: Not on file     Review of Systems   Constitutional: Negative.  Negative for chills, fatigue and fever.   HENT: Negative.  Negative for congestion and rhinorrhea.    Respiratory:  Negative for apnea, cough, chest tightness and shortness of breath.    Cardiovascular: Negative.  Negative for chest pain and leg swelling.   Gastrointestinal: Negative.  Negative for abdominal pain, diarrhea, nausea and vomiting.   Endocrine: Negative.    Genitourinary: Negative.    Musculoskeletal:  Positive for arthralgias and gait problem.   Skin: Negative.  "   Allergic/Immunologic: Negative.    Hematological: Negative.    Psychiatric/Behavioral: Negative.       Objective:     Vital Signs (Most Recent):  Temp: 97.8 °F (36.6 °C) (12/07/23 1517)  Pulse: 63 (12/07/23 1517)  Resp: 17 (12/07/23 1657)  BP: (!) 165/70 (12/07/23 1517)  SpO2: 96 % (12/07/23 1517) Vital Signs (24h Range):  Temp:  [97.8 °F (36.6 °C)] 97.8 °F (36.6 °C)  Pulse:  [63] 63  Resp:  [17-18] 17  SpO2:  [96 %] 96 %  BP: (165)/(70) 165/70     Weight: 79.4 kg (175 lb)  Body mass index is 28.25 kg/m².     Physical Exam  Constitutional:       General: She is not in acute distress.     Appearance: Normal appearance. She is not ill-appearing.   HENT:      Head: Normocephalic and atraumatic.      Mouth/Throat:      Mouth: Mucous membranes are moist.   Eyes:      General: No scleral icterus.  Cardiovascular:      Rate and Rhythm: Normal rate.      Pulses: Normal pulses.   Pulmonary:      Effort: Pulmonary effort is normal. No respiratory distress.   Abdominal:      General: Abdomen is flat.      Palpations: Abdomen is soft.   Musculoskeletal:         General: Tenderness present.   Skin:     General: Skin is warm and dry.   Neurological:      General: No focal deficit present.                Significant Labs: All pertinent labs within the past 24 hours have been reviewed.    Significant Imaging: I have reviewed all pertinent imaging results/findings within the past 24 hours.  Assessment/Plan:     * Closed bicondylar fracture of distal humerus, right, initial encounter  Ortho consulted, will likely need surgical intervention  Pt has had a tte and rhys in the last 6 months and w/o any over abnormality, she denies any cardiopulmonary symptoms, she is at low to moderate risk of surgery  Pain control  Pt.ot  Dvt ppx      DM2 (diabetes mellitus, type 2)  Patient's FSGs are controlled on current medication regimen.  Last A1c reviewed-   Lab Results   Component Value Date    HGBA1C 7.7 (H) 05/09/2023     Most recent  "fingerstick glucose reviewed- No results for input(s): "POCTGLUCOSE" in the last 24 hours.  Current correctional scale  Medium  Maintain anti-hyperglycemic dose as follows-   Antihyperglycemics (From admission, onward)      Start     Stop Route Frequency Ordered    12/07/23 2100  insulin detemir U-100 injection 10 Units         -- SubQ Nightly 12/07/23 1733    12/07/23 1835  insulin aspart U-100 injection 0-10 Units         -- SubQ Before meals & nightly PRN 12/07/23 1735          Hold Oral hypoglycemics while patient is in the hospital.    Arthritis  Pain control      Depression  Euthymic  Cont home medicine      VTE Risk Mitigation (From admission, onward)           Ordered     heparin (porcine) injection 5,000 Units  Every 8 hours         12/07/23 1733     IP VTE HIGH RISK PATIENT  Once         12/07/23 1733     Place sequential compression device  Until discontinued         12/07/23 1733                                    Rehmat TIARA Chappell MD  Department of Hospital Medicine  [facility] - Emergency Department          "

## 2023-12-07 NOTE — ASSESSMENT & PLAN NOTE
"Patient's FSGs are controlled on current medication regimen.  Last A1c reviewed-   Lab Results   Component Value Date    HGBA1C 7.7 (H) 05/09/2023     Most recent fingerstick glucose reviewed- No results for input(s): "POCTGLUCOSE" in the last 24 hours.  Current correctional scale  Medium  Maintain anti-hyperglycemic dose as follows-   Antihyperglycemics (From admission, onward)      Start     Stop Route Frequency Ordered    12/07/23 2100  insulin detemir U-100 injection 10 Units         -- SubQ Nightly 12/07/23 1733    12/07/23 1835  insulin aspart U-100 injection 0-10 Units         -- SubQ Before meals & nightly PRN 12/07/23 1735          Hold Oral hypoglycemics while patient is in the hospital.  "

## 2023-12-07 NOTE — HPI
53-year-old female with a history of osteomyelitis of her spine she also had an infected partial total knee arthroplasty on the right converted to a total knee arthroplasty on right he sustained a fall today with a supracondylar femur fracture periprosthetic on right.  It is displaced.  Right lower extremity she has a healed scar over the front of her knee.  She moves her toes has sensation to touch has palpable pulses.  Tender over her distal femur.  Pain on attempted motion of the knee.  There is crepitus noted.  X-rays show displaced supracondylar femur fracture on the right  Impression displaced supracondylar femur fracture periprosthetic on the right  Plan intramedullary rodding right femur fracture retrograde

## 2023-12-07 NOTE — SUBJECTIVE & OBJECTIVE
Past Medical History:   Diagnosis Date    Abscess of right thigh + MRSA 08/20/2021    healed    Adnexal cyst     Degenerative disc disease     Depression 10/29/2021    Diabetes mellitus     Hypertension     Hypomagnesemia 01/20/2023    Nicotine dependence     Osteoarthritis        Past Surgical History:   Procedure Laterality Date    BACK SURGERY      ECHOCARDIOGRAM,TRANSESOPHAGEAL N/A 6/13/2023    Procedure: Transesophageal echo (MONICA) intra-procedure log documentation;  Surgeon: Kaiden Sparks MD;  Location: [facility] CATH LAB;  Service: Cardiology;  Laterality: N/A;    HYSTERECTOMY N/A     INCISION AND DRAINAGE Left 3/13/2023    Procedure: INCISION AND DRAINAGE;  Surgeon: Jacek Noyola MD;  Location: [facility] OR;  Service: Orthopedics;  Laterality: Left;    IRRIGATION AND DEBRIDEMENT OF LOWER EXTREMITY Left 11/22/2022    Procedure: IRRIGATION AND DEBRIDEMENT, LOWER EXTREMITY;  Surgeon: Papa Mendoza MD;  Location: [facility] OR;  Service: Orthopedics;  Laterality: Left;    IRRIGATION AND DEBRIDEMENT OF UPPER EXTREMITY Left 11/22/2022    Procedure: IRRIGATION AND DEBRIDEMENT, UPPER EXTREMITY;  Surgeon: Papa Mendoza MD;  Location: [facility] OR;  Service: Orthopedics;  Laterality: Left;    REVISION OF KNEE ARTHROPLASTY Left 3/13/2023    Procedure: REMOVAL OF PROSTHESIS,METHYLMETHACRYLATE WITH OR WITHOUT INSERTION OF SPACER LEFT KNEE;  Surgeon: Jacek Noyola MD;  Location: [facility] OR;  Service: Orthopedics;  Laterality: Left;    REVISION OF KNEE ARTHROPLASTY Left 5/9/2023    Procedure: REVISION, ARTHROPLASTY, KNEE;  Surgeon: Jacek Noyola MD;  Location: [facility] OR;  Service: Orthopedics;  Laterality: Left;    THORACIC LAMINECTOMY WITH FUSION N/A 1/24/2023    Procedure: T9-L2 fusion, T11-T12 laminectomy and corpectomy;  Surgeon: Jimmy Hernandes MD;  Location: [facility] OR;  Service: Neurosurgery;  Laterality: N/A;    TOTAL HIP ARTHROPLASTY Left     TOTAL  "KNEE ARTHROPLASTY Bilateral        Review of patient's allergies indicates:  No Known Allergies    No current facility-administered medications for this encounter.     Current Outpatient Medications   Medication Sig    BD ULTRA-FINE SHORT PEN NEEDLE 31 gauge x 5/16" Ndle     buPROPion (WELLBUTRIN) 100 MG tablet Take 1 tablet (100 mg total) by mouth once daily. (Patient not taking: Reported on 6/22/2023)    DULoxetine (CYMBALTA) 60 MG capsule Take 1 capsule (60 mg total) by mouth once daily.    HYDROcodone-acetaminophen (NORCO)  mg per tablet 10 mg.    insulin (LANTUS SOLOSTAR U-100 INSULIN) glargine 100 units/mL SubQ pen Inject 30 Units into the skin once daily.    insulin NPH-insulin regular, 70/30, 100 unit/mL (70-30) injection Inject 30 Units into the skin every morning. (Patient not taking: Reported on 6/22/2023)    NOVOLOG FLEXPEN U-100 INSULIN 100 unit/mL (3 mL) InPn pen Inject 5 Units into the skin 3 (three) times daily with meals.     Family History       Problem Relation (Age of Onset)    Diabetes Father    Hypertension Father          Tobacco Use    Smoking status: Every Day     Current packs/day: 0.50     Types: Cigarettes    Smokeless tobacco: Current     Types: Snuff   Substance and Sexual Activity    Alcohol use: Never    Drug use: Never    Sexual activity: Not on file     Review of Systems   Musculoskeletal:  Positive for joint pain and joint swelling.     Objective:     Vital Signs (Most Recent):  Temp: 97.8 °F (36.6 °C) (12/07/23 1517)  Pulse: 63 (12/07/23 1517)  Resp: 18 (12/07/23 1517)  BP: (!) 165/70 (12/07/23 1517)  SpO2: 96 % (12/07/23 1517) Vital Signs (24h Range):  Temp:  [97.8 °F (36.6 °C)] 97.8 °F (36.6 °C)  Pulse:  [63] 63  Resp:  [18] 18  SpO2:  [96 %] 96 %  BP: (165)/(70) 165/70     Weight: 79.4 kg (175 lb)  Height: 5' 6" (167.6 cm)  Body mass index is 28.25 kg/m².    No intake or output data in the 24 hours ending 12/07/23 1630     Ortho/SPM Exam     Significant Labs: All " pertinent labs within the past 24 hours have been reviewed.    Significant Imaging: I have reviewed all pertinent imaging results/findings.

## 2023-12-07 NOTE — ED PROVIDER NOTES
Encounter Date: 12/7/2023    SCRIBE #1 NOTE: I, Tina Beltrán, am scribing for, and in the presence of,  Teodoro Carrillo MD. I have scribed the entire note.       History     Chief Complaint   Patient presents with    Fall     Patient fell while dragging a deer out of the woods.     Knee Injury     Right knee injury. Hx of osteomyelitis in that same knee     A 54 y/o female patient presents to the ED via EMS from a Fall. Patient complaints of pain in right knee. Patient tripped over an object and fell directly on her right knee. PMHx, Hypomagnesemia, Abscess of right thigh, Total knee Arthroplasty (Bi-Lateral), Patient has MRSA infection.     The history is provided by the patient. No  was used.     Review of patient's allergies indicates:  No Known Allergies  Past Medical History:   Diagnosis Date    Abscess of right thigh + MRSA 08/20/2021    healed    Adnexal cyst     Degenerative disc disease     Depression 10/29/2021    Diabetes mellitus     Hypertension     Hypomagnesemia 01/20/2023    Nicotine dependence     Osteoarthritis      Past Surgical History:   Procedure Laterality Date    BACK SURGERY      ECHOCARDIOGRAM,TRANSESOPHAGEAL N/A 6/13/2023    Procedure: Transesophageal echo (MONICA) intra-procedure log documentation;  Surgeon: Kaiden Sparks MD;  Location: [facility] CATH LAB;  Service: Cardiology;  Laterality: N/A;    HYSTERECTOMY N/A     INCISION AND DRAINAGE Left 3/13/2023    Procedure: INCISION AND DRAINAGE;  Surgeon: Jacek Noyola MD;  Location: [facility] OR;  Service: Orthopedics;  Laterality: Left;    IRRIGATION AND DEBRIDEMENT OF LOWER EXTREMITY Left 11/22/2022    Procedure: IRRIGATION AND DEBRIDEMENT, LOWER EXTREMITY;  Surgeon: Papa Mendoza MD;  Location: [facility] OR;  Service: Orthopedics;  Laterality: Left;    IRRIGATION AND DEBRIDEMENT OF UPPER EXTREMITY Left 11/22/2022    Procedure: IRRIGATION AND DEBRIDEMENT, UPPER EXTREMITY;  Surgeon: Papa Mendoza  MD;  Location: [facility] OR;  Service: Orthopedics;  Laterality: Left;    RETROGRADE INTRAMEDULLARY RODDING OF DISTAL FEMUR Right 12/8/2023    Procedure: INSERTION, INTRAMEDULLARY LAXMI, FEMUR, DISTAL, RETROGRADE;  Surgeon: Papa Mendoza MD;  Location: [facility] OR;  Service: Orthopedics;  Laterality: Right;    REVISION OF KNEE ARTHROPLASTY Left 3/13/2023    Procedure: REMOVAL OF PROSTHESIS,METHYLMETHACRYLATE WITH OR WITHOUT INSERTION OF SPACER LEFT KNEE;  Surgeon: Jacek Noyola MD;  Location: [facility] OR;  Service: Orthopedics;  Laterality: Left;    REVISION OF KNEE ARTHROPLASTY Left 5/9/2023    Procedure: REVISION, ARTHROPLASTY, KNEE;  Surgeon: Jacek Noyola MD;  Location: [facility] OR;  Service: Orthopedics;  Laterality: Left;    THORACIC LAMINECTOMY WITH FUSION N/A 1/24/2023    Procedure: T9-L2 fusion, T11-T12 laminectomy and corpectomy;  Surgeon: Jimmy Hernandes MD;  Location: [facility] OR;  Service: Neurosurgery;  Laterality: N/A;    TOTAL HIP ARTHROPLASTY Left     TOTAL KNEE ARTHROPLASTY Bilateral      Family History   Problem Relation Age of Onset    Hypertension Father     Diabetes Father      Social History     Tobacco Use    Smoking status: Every Day     Current packs/day: 0.50     Types: Cigarettes    Smokeless tobacco: Current     Types: Snuff   Substance Use Topics    Alcohol use: Never    Drug use: Never     Review of Systems   HENT:  Negative for sore throat.    Eyes:  Negative for pain.   Respiratory:  Negative for shortness of breath.    Cardiovascular:  Negative for chest pain.   Gastrointestinal:  Negative for abdominal pain.   Endocrine: Negative for polyphagia.   Genitourinary:  Negative for flank pain.   Musculoskeletal:         Right knee pain   All other systems reviewed and are negative.      Physical Exam     Initial Vitals [12/07/23 1517]   BP Pulse Resp Temp SpO2   (!) 165/70 63 18 97.8 °F (36.6 °C) 96 %      MAP       --         Physical  Exam    Nursing note and vitals reviewed.  Constitutional: She appears well-developed and well-nourished.   HENT:   Head: Normocephalic and atraumatic.   Eyes: Conjunctivae and EOM are normal. Pupils are equal, round, and reactive to light.   Neck: Neck supple.   Normal range of motion.  Cardiovascular:  Normal rate, regular rhythm and normal heart sounds.           Pulmonary/Chest: Breath sounds normal.   Abdominal: Abdomen is soft. Bowel sounds are normal.   Musculoskeletal:      Cervical back: Normal range of motion and neck supple.      Right knee: Swelling present.        Legs:      Neurological: She is alert and oriented to person, place, and time.   Skin: Skin is warm.   Psychiatric: She has a normal mood and affect. Thought content normal.         ED Course   Procedures  Labs Reviewed   COMPREHENSIVE METABOLIC PANEL - Abnormal; Notable for the following components:       Result Value    Anion Gap 6 (*)     Glucose 199 (*)     BUN 29 (*)     Creatinine 1.26 (*)     BUN/Creatinine Ratio 23 (*)     Albumin 2.9 (*)     Globulin 4.9 (*)     Alk Phos 155 (*)     AST 8 (*)     eGFR 51 (*)     All other components within normal limits   CBC WITH DIFFERENTIAL - Abnormal; Notable for the following components:    Hemoglobin 10.9 (*)     Hematocrit 34.7 (*)     MCV 75.8 (*)     MCH 23.8 (*)     MCHC 31.4 (*)     RDW 18.5 (*)     Neutrophils % 69.6 (*)     Lymphocytes % 19.3 (*)     Monocytes % 6.2 (*)     Immature Granulocytes % 0.5 (*)     Immature Granulocytes, Absolute 0.05 (*)     All other components within normal limits   POCT GLUCOSE MONITORING CONTINUOUS - Abnormal; Notable for the following components:    POC Glucose 219 (*)     All other components within normal limits   APTT - Normal   PROTIME-INR - Normal   SARS-COV-2 RNA AMPLIFICATION, QUAL - Normal    Narrative:     Negative SARS-CoV results should not be used as the sole basis for treatment or patient management decisions; negative results should be  considered in the context of a patient's recent exposures, history and the presene of clinical signs and symptoms consistent with COVID-19.  Negative results should be treated as presumptive and confirmed by molecular assay, if necessary for patient management.   CBC W/ AUTO DIFFERENTIAL    Narrative:     The following orders were created for panel order CBC auto differential.  Procedure                               Abnormality         Status                     ---------                               -----------         ------                     CBC with Differential[[id number]]       Abnormal            Final result                 Please view results for these tests on the individual orders.        ECG Results              EKG 12-LEAD (Final result)  Result time 12/21/23 14:03:27      Final result by Unknown User (12/21/23 14:03:27)                                      Imaging Results              CT Head Without Contrast (Final result)  Result time 12/08/23 07:39:20      Final result by Sky Peres DO (12/08/23 07:39:20)                   Impression:      No convincing imaging evidence of acute intracranial abnormality.    The CT exam was performed using one or more of the following dose    reduction techniques- Automated exposure control, adjustment of the mA    and/or kV according to patient size, and/or use of iterative    reconstructed technique.    Point of Service: [facility]      Electronically signed by: Sky Peres  Date:    12/08/2023  Time:    07:39               Narrative:    EXAMINATION:  CT HEAD WITHOUT CONTRAST    CLINICAL HISTORY:  fall;    COMPARISON:  CT brain January 9, 2023    TECHNIQUE:  Multiple axial tomographic images of the brain were obtained without the use of intravenous contrast.    FINDINGS:  Midline structures are nondisplaced.  No convincing evidence of acute intracranial hemorrhage.  No convincing evidence of hydrocephalus.  Visualized paranasal sinuses and  mastoid air cells are predominantly clear.                                       X-Ray Knee 1 or 2 View Right (Final result)  Result time 12/07/23 15:50:32      Final result by Sky Peres DO (12/07/23 15:50:32)                   Impression:      As above.    Point of Service: [facility]      Electronically signed by: Sky Peres  Date:    12/07/2023  Time:    15:50               Narrative:    EXAMINATION:  XR KNEE 1 OR 2 VIEW RIGHT    CLINICAL HISTORY:  Unspecified fall, initial encounter    COMPARISON:  Right knee x-ray March 6, 2014    TECHNIQUE:  Frontal and lateral views of the right knee.    FINDINGS:  Comminuted displaced fracture of the distal femoral diametaphysis.  Prior right knee arthroplasty.                                       X-Ray Chest AP Portable (Final result)  Result time 12/07/23 16:11:18      Final result by Jacek Whitfield MD (12/07/23 16:11:18)                   Impression:      No acute findings.      Electronically signed by: Jacek Whitfield  Date:    12/07/2023  Time:    16:11               Narrative:    EXAMINATION:  XR CHEST AP PORTABLE    CLINICAL HISTORY:  Encounter for other preprocedural examination    TECHNIQUE:  Single frontal view of the chest was performed.    COMPARISON:  6/8/2023    FINDINGS:  Heart size normal. Lungs clear. No pneumothorax or pleural effusion. Spinal rods again seen.                                       Medications   HYDROmorphone (PF) injection 1 mg (1 mg Intravenous Given 12/7/23 1529)   ondansetron injection 4 mg (4 mg Intravenous Given 12/7/23 1529)   HYDROmorphone (PF) injection 1 mg (1 mg Intravenous Given 12/7/23 1657)   ceFAZolin 2 g in dextrose 5 % in water (D5W) 50 mL IVPB (MB+) (0 g Intravenous Stopped 12/8/23 2340)   vancomycin (VANCOCIN) 1,000 mg in dextrose 5 % (D5W) 250 mL IVPB (0 mg Intravenous Stopped 12/8/23 2245)   insulin detemir U-100 injection 10 Units (10 Units Subcutaneous Given 12/9/23 1059)     Medical Decision  Making  KNEE INJURY    DDX:  FRACTURE VS CONTUSION VS INTERNAL DERANGEMENT    ADMIT.    Amount and/or Complexity of Data Reviewed  Labs: ordered. Decision-making details documented in ED Course.  Radiology: ordered. Decision-making details documented in ED Course.    Risk  Prescription drug management.  Decision regarding hospitalization.              Attending Attestation:           Physician Attestation for Scribe:  Physician Attestation Statement for Scribe #1: I, Teodoro Carrillo MD, reviewed documentation, as scribed by Tina Beltrán in my presence, and it is both accurate and complete.             ED Course as of 01/09/24 1137   u Dec 07, 2023   1603 X-Ray Knee 1 or 2 View Right [CM]   1603 X-Ray Chest AP Portable [CM]      ED Course User Index  [CM] Tina Beltrán                           Clinical Impression:  Final diagnoses:  [W19.XXXA] Fall  [Z01.818] Pre-op exam  [S42.491A] Closed bicondylar fracture of distal humerus, right, initial encounter          ED Disposition Condition    Admit Stable                Teodoro Carrillo MD  01/09/24 1137

## 2023-12-07 NOTE — PHARMACY MED REC
"Admission Medication History     The home medication history was taken by Soco Humphries.    You may go to "Admission" then "Reconcile Home Medications" tabs to review and/or act upon these items.     The home medication list has been updated by the Pharmacy department.   Please read ALL comments highlighted in yellow.   Please address this information as you see fit.    Feel free to contact us if you have any questions or require assistance.  The patient's mother stated that [facility] comes on Mondays.  The patient stated she now takes 35 Units of Lantus form 30 units and 10 Units of Novalog from 5 Units  The following medications were added:  Ciprofloxacin 500 mg      The medications listed below were removed from the home medication list. Please reorder if appropriate:  Patient reports no longer taking the following medication(s):  Bupropion 100 mg  Norco  mg  Insulin NPH-Insulin regular 70/30    Medications listed below were obtained from: Patient/family and Analytic software- [employer]  (Not in a hospital admission)        Current Outpatient Medications on File Prior to Encounter   Medication Sig Dispense Refill Last Dose    ciprofloxacin HCl (CIPRO) 500 MG tablet Take 500 mg by mouth every 12 (twelve) hours.   12/7/2023    DULoxetine (CYMBALTA) 60 MG capsule Take 1 capsule (60 mg total) by mouth once daily. 90 capsule 3 12/7/2023 at AM    insulin (LANTUS SOLOSTAR U-100 INSULIN) glargine 100 units/mL SubQ pen Inject 30 Units into the skin once daily. (Patient taking differently: Inject 35 Units into the skin once daily.) 4 each 5 12/6/2023 at PM    NOVOLOG FLEXPEN U-100 INSULIN 100 unit/mL (3 mL) InPn pen Inject 5 Units into the skin 3 (three) times daily with meals. (Patient taking differently: Inject 10 Units into the skin 3 (three) times daily with meals.) 5 each 11 12/7/2023    BD ULTRA-FINE SHORT PEN NEEDLE 31 gauge x 5/16" Ndle        [DISCONTINUED] buPROPion (WELLBUTRIN) 100 MG tablet Take " 1 tablet (100 mg total) by mouth once daily. (Patient not taking: Reported on 6/22/2023) 30 tablet 3     [DISCONTINUED] HYDROcodone-acetaminophen (NORCO)  mg per tablet 10 mg.       [DISCONTINUED] insulin NPH-insulin regular, 70/30, 100 unit/mL (70-30) injection Inject 30 Units into the skin every morning. (Patient not taking: Reported on 6/22/2023) 9 mL 3        Potential issues to be addressed PRIOR TO DISCHARGE       Soco Humphries  EXT [id number]                 .

## 2023-12-07 NOTE — ASSESSMENT & PLAN NOTE
Ortho consulted, will likely need surgical intervention  Pt has had a tte and rhys in the last 6 months and w/o any over abnormality, she denies any cardiopulmonary symptoms, she is at low to moderate risk of surgery  Pain control  Pt.ot  Dvt ppx

## 2023-12-07 NOTE — CONSULTS
[facility] - Emergency Department  Orthopedics  Consult Note    Patient Name: Monica Walker  MRN: [id number]  Admission Date: 12/7/2023  Hospital Length of Stay: 0 days  Attending Provider: Teodoro Carrillo MD  Primary Care Provider: Mari Myers MD    Patient information was obtained from patient and ER records.     Consults  Subjective:     Principal Problem:<principal problem not specified>    Chief Complaint:   Chief Complaint   Patient presents with    Fall     Patient fell while dragging a deer out of the woods.     Knee Injury     Right knee injury. Hx of osteomyelitis in that same knee        HPI: 53-year-old female with a history of osteomyelitis of her spine she also had an infected partial total knee arthroplasty on the right converted to a total knee arthroplasty on right he sustained a fall today with a supracondylar femur fracture periprosthetic on right.  It is displaced.  Right lower extremity she has a healed scar over the front of her knee.  She moves her toes has sensation to touch has palpable pulses.  Tender over her distal femur.  Pain on attempted motion of the knee.  There is crepitus noted.  X-rays show displaced supracondylar femur fracture on the right  Impression displaced supracondylar femur fracture periprosthetic on the right  Plan intramedullary rodding right femur fracture retrograde    Past Medical History:   Diagnosis Date    Abscess of right thigh + MRSA 08/20/2021    healed    Adnexal cyst     Degenerative disc disease     Depression 10/29/2021    Diabetes mellitus     Hypertension     Hypomagnesemia 01/20/2023    Nicotine dependence     Osteoarthritis        Past Surgical History:   Procedure Laterality Date    BACK SURGERY      ECHOCARDIOGRAM,TRANSESOPHAGEAL N/A 6/13/2023    Procedure: Transesophageal echo (MONICA) intra-procedure log documentation;  Surgeon: Kaiden Sparks MD;  Location: [facility] CATH LAB;  Service: Cardiology;  Laterality: N/A;     "HYSTERECTOMY N/A     INCISION AND DRAINAGE Left 3/13/2023    Procedure: INCISION AND DRAINAGE;  Surgeon: Jacek Noyola MD;  Location: [facility] OR;  Service: Orthopedics;  Laterality: Left;    IRRIGATION AND DEBRIDEMENT OF LOWER EXTREMITY Left 11/22/2022    Procedure: IRRIGATION AND DEBRIDEMENT, LOWER EXTREMITY;  Surgeon: Papa Mendoza MD;  Location: [facility] OR;  Service: Orthopedics;  Laterality: Left;    IRRIGATION AND DEBRIDEMENT OF UPPER EXTREMITY Left 11/22/2022    Procedure: IRRIGATION AND DEBRIDEMENT, UPPER EXTREMITY;  Surgeon: Papa Mendoza MD;  Location: [facility] OR;  Service: Orthopedics;  Laterality: Left;    REVISION OF KNEE ARTHROPLASTY Left 3/13/2023    Procedure: REMOVAL OF PROSTHESIS,METHYLMETHACRYLATE WITH OR WITHOUT INSERTION OF SPACER LEFT KNEE;  Surgeon: Jacek Noyola MD;  Location: [facility] OR;  Service: Orthopedics;  Laterality: Left;    REVISION OF KNEE ARTHROPLASTY Left 5/9/2023    Procedure: REVISION, ARTHROPLASTY, KNEE;  Surgeon: Jacek Noyola MD;  Location: [facility] OR;  Service: Orthopedics;  Laterality: Left;    THORACIC LAMINECTOMY WITH FUSION N/A 1/24/2023    Procedure: T9-L2 fusion, T11-T12 laminectomy and corpectomy;  Surgeon: Jimmy Hernandes MD;  Location: [facility];  Service: Neurosurgery;  Laterality: N/A;    TOTAL HIP ARTHROPLASTY Left     TOTAL KNEE ARTHROPLASTY Bilateral        Review of patient's allergies indicates:  No Known Allergies    No current facility-administered medications for this encounter.     Current Outpatient Medications   Medication Sig    BD ULTRA-FINE SHORT PEN NEEDLE 31 gauge x 5/16" Ndle     buPROPion (WELLBUTRIN) 100 MG tablet Take 1 tablet (100 mg total) by mouth once daily. (Patient not taking: Reported on 6/22/2023)    DULoxetine (CYMBALTA) 60 MG capsule Take 1 capsule (60 mg total) by mouth once daily.    HYDROcodone-acetaminophen (NORCO)  mg per tablet 10 mg.    insulin (LANTUS " "SOLOSTAR U-100 INSULIN) glargine 100 units/mL SubQ pen Inject 30 Units into the skin once daily.    insulin NPH-insulin regular, 70/30, 100 unit/mL (70-30) injection Inject 30 Units into the skin every morning. (Patient not taking: Reported on 6/22/2023)    NOVOLOG FLEXPEN U-100 INSULIN 100 unit/mL (3 mL) InPn pen Inject 5 Units into the skin 3 (three) times daily with meals.     Family History       Problem Relation (Age of Onset)    Diabetes Father    Hypertension Father          Tobacco Use    Smoking status: Every Day     Current packs/day: 0.50     Types: Cigarettes    Smokeless tobacco: Current     Types: Snuff   Substance and Sexual Activity    Alcohol use: Never    Drug use: Never    Sexual activity: Not on file     Review of Systems   Musculoskeletal:  Positive for joint pain and joint swelling.     Objective:     Vital Signs (Most Recent):  Temp: 97.8 °F (36.6 °C) (12/07/23 1517)  Pulse: 63 (12/07/23 1517)  Resp: 18 (12/07/23 1517)  BP: (!) 165/70 (12/07/23 1517)  SpO2: 96 % (12/07/23 1517) Vital Signs (24h Range):  Temp:  [97.8 °F (36.6 °C)] 97.8 °F (36.6 °C)  Pulse:  [63] 63  Resp:  [18] 18  SpO2:  [96 %] 96 %  BP: (165)/(70) 165/70     Weight: 79.4 kg (175 lb)  Height: 5' 6" (167.6 cm)  Body mass index is 28.25 kg/m².    No intake or output data in the 24 hours ending 12/07/23 1630     Ortho/SPM Exam     Significant Labs: All pertinent labs within the past 24 hours have been reviewed.    Significant Imaging: I have reviewed all pertinent imaging results/findings.  Assessment/Plan:     No notes have been filed under this hospital service.  Service: Orthopedic Surgery      Thank you for your consult.  Plan intramedullary rodding of the right femur fracture after medically evaluated.    Papa Mendoza MD  Orthopedics  [facility] - Emergency Department        "

## 2023-12-07 NOTE — SUBJECTIVE & OBJECTIVE
Past Medical History:   Diagnosis Date    Abscess of right thigh + MRSA 08/20/2021    healed    Adnexal cyst     Degenerative disc disease     Depression 10/29/2021    Diabetes mellitus     Hypertension     Hypomagnesemia 01/20/2023    Nicotine dependence     Osteoarthritis        Past Surgical History:   Procedure Laterality Date    BACK SURGERY      ECHOCARDIOGRAM,TRANSESOPHAGEAL N/A 6/13/2023    Procedure: Transesophageal echo (MONICA) intra-procedure log documentation;  Surgeon: Kaiden Sparks MD;  Location: [facility] CATH LAB;  Service: Cardiology;  Laterality: N/A;    HYSTERECTOMY N/A     INCISION AND DRAINAGE Left 3/13/2023    Procedure: INCISION AND DRAINAGE;  Surgeon: Jacek Noyola MD;  Location: [facility] OR;  Service: Orthopedics;  Laterality: Left;    IRRIGATION AND DEBRIDEMENT OF LOWER EXTREMITY Left 11/22/2022    Procedure: IRRIGATION AND DEBRIDEMENT, LOWER EXTREMITY;  Surgeon: Papa Mendoza MD;  Location: [facility] OR;  Service: Orthopedics;  Laterality: Left;    IRRIGATION AND DEBRIDEMENT OF UPPER EXTREMITY Left 11/22/2022    Procedure: IRRIGATION AND DEBRIDEMENT, UPPER EXTREMITY;  Surgeon: Papa Mendoza MD;  Location: [facility] OR;  Service: Orthopedics;  Laterality: Left;    REVISION OF KNEE ARTHROPLASTY Left 3/13/2023    Procedure: REMOVAL OF PROSTHESIS,METHYLMETHACRYLATE WITH OR WITHOUT INSERTION OF SPACER LEFT KNEE;  Surgeon: Jacek Noyola MD;  Location: [facility] OR;  Service: Orthopedics;  Laterality: Left;    REVISION OF KNEE ARTHROPLASTY Left 5/9/2023    Procedure: REVISION, ARTHROPLASTY, KNEE;  Surgeon: Jacek Noyola MD;  Location: [facility] OR;  Service: Orthopedics;  Laterality: Left;    THORACIC LAMINECTOMY WITH FUSION N/A 1/24/2023    Procedure: T9-L2 fusion, T11-T12 laminectomy and corpectomy;  Surgeon: Jimmy Hernandes MD;  Location: [facility] OR;  Service: Neurosurgery;  Laterality: N/A;    TOTAL HIP ARTHROPLASTY Left     TOTAL  "KNEE ARTHROPLASTY Bilateral        Review of patient's allergies indicates:  No Known Allergies    No current facility-administered medications on file prior to encounter.     Current Outpatient Medications on File Prior to Encounter   Medication Sig    ciprofloxacin HCl (CIPRO) 500 MG tablet Take 500 mg by mouth every 12 (twelve) hours.    DULoxetine (CYMBALTA) 60 MG capsule Take 1 capsule (60 mg total) by mouth once daily.    insulin (LANTUS SOLOSTAR U-100 INSULIN) glargine 100 units/mL SubQ pen Inject 30 Units into the skin once daily. (Patient taking differently: Inject 35 Units into the skin once daily.)    NOVOLOG FLEXPEN U-100 INSULIN 100 unit/mL (3 mL) InPn pen Inject 5 Units into the skin 3 (three) times daily with meals. (Patient taking differently: Inject 10 Units into the skin 3 (three) times daily with meals.)    BD ULTRA-FINE SHORT PEN NEEDLE 31 gauge x 5/16" Ndle     [DISCONTINUED] buPROPion (WELLBUTRIN) 100 MG tablet Take 1 tablet (100 mg total) by mouth once daily. (Patient not taking: Reported on 6/22/2023)    [DISCONTINUED] HYDROcodone-acetaminophen (NORCO)  mg per tablet 10 mg.    [DISCONTINUED] insulin NPH-insulin regular, 70/30, 100 unit/mL (70-30) injection Inject 30 Units into the skin every morning. (Patient not taking: Reported on 6/22/2023)     Family History       Problem Relation (Age of Onset)    Diabetes Father    Hypertension Father          Tobacco Use    Smoking status: Every Day     Current packs/day: 0.50     Types: Cigarettes    Smokeless tobacco: Current     Types: Snuff   Substance and Sexual Activity    Alcohol use: Never    Drug use: Never    Sexual activity: Not on file     Review of Systems   Constitutional: Negative.  Negative for chills, fatigue and fever.   HENT: Negative.  Negative for congestion and rhinorrhea.    Respiratory:  Negative for apnea, cough, chest tightness and shortness of breath.    Cardiovascular: Negative.  Negative for chest pain and leg " swelling.   Gastrointestinal: Negative.  Negative for abdominal pain, diarrhea, nausea and vomiting.   Endocrine: Negative.    Genitourinary: Negative.    Musculoskeletal:  Positive for arthralgias and gait problem.   Skin: Negative.    Allergic/Immunologic: Negative.    Hematological: Negative.    Psychiatric/Behavioral: Negative.       Objective:     Vital Signs (Most Recent):  Temp: 97.8 °F (36.6 °C) (12/07/23 1517)  Pulse: 63 (12/07/23 1517)  Resp: 17 (12/07/23 1657)  BP: (!) 165/70 (12/07/23 1517)  SpO2: 96 % (12/07/23 1517) Vital Signs (24h Range):  Temp:  [97.8 °F (36.6 °C)] 97.8 °F (36.6 °C)  Pulse:  [63] 63  Resp:  [17-18] 17  SpO2:  [96 %] 96 %  BP: (165)/(70) 165/70     Weight: 79.4 kg (175 lb)  Body mass index is 28.25 kg/m².     Physical Exam  Constitutional:       General: She is not in acute distress.     Appearance: Normal appearance. She is not ill-appearing.   HENT:      Head: Normocephalic and atraumatic.      Mouth/Throat:      Mouth: Mucous membranes are moist.   Eyes:      General: No scleral icterus.  Cardiovascular:      Rate and Rhythm: Normal rate.      Pulses: Normal pulses.   Pulmonary:      Effort: Pulmonary effort is normal. No respiratory distress.   Abdominal:      General: Abdomen is flat.      Palpations: Abdomen is soft.   Musculoskeletal:         General: Tenderness present.   Skin:     General: Skin is warm and dry.   Neurological:      General: No focal deficit present.                Significant Labs: All pertinent labs within the past 24 hours have been reviewed.    Significant Imaging: I have reviewed all pertinent imaging results/findings within the past 24 hours.

## 2023-12-07 NOTE — HPI
Patient is a 53-year-old female with past medical history of diabetes mellitus, osteoarthritis, smoking, depression, MRSA bacteremia complicated by vertebral osteomyelitis and left knee infection, who presents to the emergency room with her mother for evaluation of right hip pain after she sustained a fall at home.  Patient is in a lot of pain, therefore most of the history is given by her mother, who tells me that patient was out in the woods [place] [facility] when she had a mechanical fall resulting in her right hip fracture.  Patient was shouting in pain and therefore the mother went to see her and called for medical assistance.  Currently the patient is saying that her pain is not controlled, tells me that she recently completed her IV antibiotics and is taking oral ciprofloxacin as recommended by her physician.  Otherwise she denies having any chest pain fevers chills nausea vomiting diarrhea shortness of breath dysuria or lightheadedness or syncope.  She does have significant amount of pain at the right hip joint.

## 2023-12-08 ENCOUNTER — ANESTHESIA EVENT (OUTPATIENT)
Dept: SURGERY | Facility: HOSPITAL | Age: 53
DRG: 481 | End: 2023-12-08
Payer: MEDICARE

## 2023-12-08 ENCOUNTER — ANESTHESIA (OUTPATIENT)
Dept: SURGERY | Facility: HOSPITAL | Age: 53
DRG: 481 | End: 2023-12-08
Payer: MEDICARE

## 2023-12-08 LAB
ALBUMIN SERPL BCP-MCNC: 2.6 G/DL (ref 3.5–5)
ALBUMIN/GLOB SERPL: 0.6 {RATIO}
ALP SERPL-CCNC: 141 U/L (ref 46–118)
ALT SERPL W P-5'-P-CCNC: 14 U/L (ref 13–56)
ANION GAP SERPL CALCULATED.3IONS-SCNC: 9 MMOL/L (ref 7–16)
ANION GAP SERPL CALCULATED.3IONS-SCNC: 9 MMOL/L (ref 7–16)
AST SERPL W P-5'-P-CCNC: 7 U/L (ref 15–37)
BASOPHILS # BLD AUTO: 0.06 K/UL (ref 0–0.2)
BASOPHILS # BLD AUTO: 0.08 K/UL (ref 0–0.2)
BASOPHILS NFR BLD AUTO: 0.7 % (ref 0–1)
BASOPHILS NFR BLD AUTO: 0.7 % (ref 0–1)
BILIRUB SERPL-MCNC: 0.5 MG/DL (ref ?–1.2)
BUN SERPL-MCNC: 27 MG/DL (ref 7–18)
BUN SERPL-MCNC: 30 MG/DL (ref 7–18)
BUN/CREAT SERPL: 24 (ref 6–20)
BUN/CREAT SERPL: 30 (ref 6–20)
CALCIUM SERPL-MCNC: 8.5 MG/DL (ref 8.5–10.1)
CALCIUM SERPL-MCNC: 8.5 MG/DL (ref 8.5–10.1)
CHLORIDE SERPL-SCNC: 106 MMOL/L (ref 98–107)
CHLORIDE SERPL-SCNC: 108 MMOL/L (ref 98–107)
CO2 SERPL-SCNC: 25 MMOL/L (ref 21–32)
CO2 SERPL-SCNC: 26 MMOL/L (ref 21–32)
CREAT SERPL-MCNC: 1.01 MG/DL (ref 0.55–1.02)
CREAT SERPL-MCNC: 1.11 MG/DL (ref 0.55–1.02)
DIFFERENTIAL METHOD BLD: ABNORMAL
DIFFERENTIAL METHOD BLD: ABNORMAL
EGFR (NO RACE VARIABLE) (RUSH/TITUS): 59 ML/MIN/1.73M2
EGFR (NO RACE VARIABLE) (RUSH/TITUS): 66 ML/MIN/1.73M2
EOSINOPHIL # BLD AUTO: 0.35 K/UL (ref 0–0.5)
EOSINOPHIL # BLD AUTO: 0.41 K/UL (ref 0–0.5)
EOSINOPHIL NFR BLD AUTO: 3.6 % (ref 1–4)
EOSINOPHIL NFR BLD AUTO: 4.1 % (ref 1–4)
ERYTHROCYTE [DISTWIDTH] IN BLOOD BY AUTOMATED COUNT: 18.4 % (ref 11.5–14.5)
ERYTHROCYTE [DISTWIDTH] IN BLOOD BY AUTOMATED COUNT: 18.6 % (ref 11.5–14.5)
GLOBULIN SER-MCNC: 4.5 G/DL (ref 2–4)
GLUCOSE SERPL-MCNC: 167 MG/DL (ref 70–105)
GLUCOSE SERPL-MCNC: 179 MG/DL (ref 74–106)
GLUCOSE SERPL-MCNC: 182 MG/DL (ref 74–106)
GLUCOSE SERPL-MCNC: 204 MG/DL (ref 70–105)
GLUCOSE SERPL-MCNC: 250 MG/DL (ref 70–105)
GLUCOSE SERPL-MCNC: 399 MG/DL (ref 70–105)
HCT VFR BLD AUTO: 31 % (ref 38–47)
HCT VFR BLD AUTO: 31.6 % (ref 38–47)
HGB BLD-MCNC: 9.7 G/DL (ref 12–16)
HGB BLD-MCNC: 9.7 G/DL (ref 12–16)
IMM GRANULOCYTES # BLD AUTO: 0.03 K/UL (ref 0–0.04)
IMM GRANULOCYTES # BLD AUTO: 0.09 K/UL (ref 0–0.04)
IMM GRANULOCYTES NFR BLD: 0.3 % (ref 0–0.4)
IMM GRANULOCYTES NFR BLD: 0.8 % (ref 0–0.4)
INDIRECT COOMBS: NORMAL
LYMPHOCYTES # BLD AUTO: 2.06 K/UL (ref 1–4.8)
LYMPHOCYTES # BLD AUTO: 2.07 K/UL (ref 1–4.8)
LYMPHOCYTES NFR BLD AUTO: 17.9 % (ref 27–41)
LYMPHOCYTES NFR BLD AUTO: 24 % (ref 27–41)
MAGNESIUM SERPL-MCNC: 1.7 MG/DL (ref 1.7–2.3)
MCH RBC QN AUTO: 23.6 PG (ref 27–31)
MCH RBC QN AUTO: 23.7 PG (ref 27–31)
MCHC RBC AUTO-ENTMCNC: 30.7 G/DL (ref 32–36)
MCHC RBC AUTO-ENTMCNC: 31.3 G/DL (ref 32–36)
MCV RBC AUTO: 75.8 FL (ref 80–96)
MCV RBC AUTO: 76.9 FL (ref 80–96)
MONOCYTES # BLD AUTO: 0.64 K/UL (ref 0–0.8)
MONOCYTES # BLD AUTO: 0.84 K/UL (ref 0–0.8)
MONOCYTES NFR BLD AUTO: 7.3 % (ref 2–6)
MONOCYTES NFR BLD AUTO: 7.4 % (ref 2–6)
MPC BLD CALC-MCNC: 10.8 FL (ref 9.4–12.4)
MPC BLD CALC-MCNC: 10.8 FL (ref 9.4–12.4)
NEUTROPHILS # BLD AUTO: 5.48 K/UL (ref 1.8–7.7)
NEUTROPHILS # BLD AUTO: 8.02 K/UL (ref 1.8–7.7)
NEUTROPHILS NFR BLD AUTO: 63.5 % (ref 53–65)
NEUTROPHILS NFR BLD AUTO: 69.7 % (ref 53–65)
NRBC # BLD AUTO: 0 X10E3/UL
NRBC # BLD AUTO: 0 X10E3/UL
NRBC, AUTO (.00): 0 %
NRBC, AUTO (.00): 0 %
PHOSPHATE SERPL-MCNC: 3.3 MG/DL (ref 2.5–4.5)
PLATELET # BLD AUTO: 242 K/UL (ref 150–400)
PLATELET # BLD AUTO: 243 K/UL (ref 150–400)
POTASSIUM SERPL-SCNC: 4 MMOL/L (ref 3.5–5.1)
POTASSIUM SERPL-SCNC: 4.4 MMOL/L (ref 3.5–5.1)
PROT SERPL-MCNC: 7.1 G/DL (ref 6.4–8.2)
RBC # BLD AUTO: 4.09 M/UL (ref 4.2–5.4)
RBC # BLD AUTO: 4.11 M/UL (ref 4.2–5.4)
RH BLD: NORMAL
SODIUM SERPL-SCNC: 137 MMOL/L (ref 136–145)
SODIUM SERPL-SCNC: 138 MMOL/L (ref 136–145)
SPECIMEN OUTDATE: NORMAL
WBC # BLD AUTO: 11.5 K/UL (ref 4.5–11)
WBC # BLD AUTO: 8.63 K/UL (ref 4.5–11)

## 2023-12-08 PROCEDURE — 83735 ASSAY OF MAGNESIUM: CPT | Performed by: INTERNAL MEDICINE

## 2023-12-08 PROCEDURE — 63600175 PHARM REV CODE 636 W HCPCS: Performed by: INTERNAL MEDICINE

## 2023-12-08 PROCEDURE — 93010 EKG 12-LEAD: ICD-10-PCS | Mod: ,,, | Performed by: HOSPITALIST

## 2023-12-08 PROCEDURE — 99232 PR SUBSEQUENT HOSPITAL CARE,LEVL II: ICD-10-PCS | Mod: ,,, | Performed by: INTERNAL MEDICINE

## 2023-12-08 PROCEDURE — 27201423 OPTIME MED/SURG SUP & DEVICES STERILE SUPPLY: Performed by: ORTHOPAEDIC SURGERY

## 2023-12-08 PROCEDURE — 63600175 PHARM REV CODE 636 W HCPCS: Performed by: NURSE ANESTHETIST, CERTIFIED REGISTERED

## 2023-12-08 PROCEDURE — 37000008 HC ANESTHESIA 1ST 15 MINUTES: Performed by: ORTHOPAEDIC SURGERY

## 2023-12-08 PROCEDURE — 27000221 HC OXYGEN, UP TO 24 HOURS

## 2023-12-08 PROCEDURE — 25000003 PHARM REV CODE 250: Performed by: NURSE ANESTHETIST, CERTIFIED REGISTERED

## 2023-12-08 PROCEDURE — D9220A PRA ANESTHESIA: ICD-10-PCS | Mod: ANES,,, | Performed by: ANESTHESIOLOGY

## 2023-12-08 PROCEDURE — 99900035 HC TECH TIME PER 15 MIN (STAT)

## 2023-12-08 PROCEDURE — C1713 ANCHOR/SCREW BN/BN,TIS/BN: HCPCS | Performed by: ORTHOPAEDIC SURGERY

## 2023-12-08 PROCEDURE — 94761 N-INVAS EAR/PLS OXIMETRY MLT: CPT

## 2023-12-08 PROCEDURE — 63600175 PHARM REV CODE 636 W HCPCS: Performed by: ORTHOPAEDIC SURGERY

## 2023-12-08 PROCEDURE — 93010 ELECTROCARDIOGRAM REPORT: CPT | Mod: ,,, | Performed by: HOSPITALIST

## 2023-12-08 PROCEDURE — 80053 COMPREHEN METABOLIC PANEL: CPT | Performed by: INTERNAL MEDICINE

## 2023-12-08 PROCEDURE — 99900031 HC PATIENT EDUCATION (STAT)

## 2023-12-08 PROCEDURE — 11000001 HC ACUTE MED/SURG PRIVATE ROOM

## 2023-12-08 PROCEDURE — 36000711: Performed by: ORTHOPAEDIC SURGERY

## 2023-12-08 PROCEDURE — 71000033 HC RECOVERY, INTIAL HOUR: Performed by: ORTHOPAEDIC SURGERY

## 2023-12-08 PROCEDURE — 80048 BASIC METABOLIC PNL TOTAL CA: CPT | Mod: XB | Performed by: ORTHOPAEDIC SURGERY

## 2023-12-08 PROCEDURE — 37000009 HC ANESTHESIA EA ADD 15 MINS: Performed by: ORTHOPAEDIC SURGERY

## 2023-12-08 PROCEDURE — 36000710: Performed by: ORTHOPAEDIC SURGERY

## 2023-12-08 PROCEDURE — 99232 SBSQ HOSP IP/OBS MODERATE 35: CPT | Mod: ,,, | Performed by: INTERNAL MEDICINE

## 2023-12-08 PROCEDURE — 82962 GLUCOSE BLOOD TEST: CPT

## 2023-12-08 PROCEDURE — 25000003 PHARM REV CODE 250: Performed by: ORTHOPAEDIC SURGERY

## 2023-12-08 PROCEDURE — D9220A PRA ANESTHESIA: Mod: CRNA,,, | Performed by: NURSE ANESTHETIST, CERTIFIED REGISTERED

## 2023-12-08 PROCEDURE — 25000003 PHARM REV CODE 250: Performed by: INTERNAL MEDICINE

## 2023-12-08 PROCEDURE — 84100 ASSAY OF PHOSPHORUS: CPT | Performed by: INTERNAL MEDICINE

## 2023-12-08 PROCEDURE — D9220A PRA ANESTHESIA: Mod: ANES,,, | Performed by: ANESTHESIOLOGY

## 2023-12-08 PROCEDURE — 86901 BLOOD TYPING SEROLOGIC RH(D): CPT | Performed by: ORTHOPAEDIC SURGERY

## 2023-12-08 PROCEDURE — 93005 ELECTROCARDIOGRAM TRACING: CPT

## 2023-12-08 PROCEDURE — 27511 TREATMENT OF THIGH FRACTURE: CPT | Mod: RT,,, | Performed by: ORTHOPAEDIC SURGERY

## 2023-12-08 PROCEDURE — 27511 PR OPEN TX FEMORAL SUPRACONDYLAR FRACTURE W/O EXTENSION: ICD-10-PCS | Mod: RT,,, | Performed by: ORTHOPAEDIC SURGERY

## 2023-12-08 PROCEDURE — D9220A PRA ANESTHESIA: ICD-10-PCS | Mod: CRNA,,, | Performed by: NURSE ANESTHETIST, CERTIFIED REGISTERED

## 2023-12-08 PROCEDURE — C1769 GUIDE WIRE: HCPCS | Performed by: ORTHOPAEDIC SURGERY

## 2023-12-08 PROCEDURE — 85025 COMPLETE CBC W/AUTO DIFF WBC: CPT | Performed by: ORTHOPAEDIC SURGERY

## 2023-12-08 PROCEDURE — 85025 COMPLETE CBC W/AUTO DIFF WBC: CPT | Performed by: INTERNAL MEDICINE

## 2023-12-08 DEVICE — LOCKING SCREW, FULLY THREADED: Type: IMPLANTABLE DEVICE | Site: LEG | Status: FUNCTIONAL

## 2023-12-08 DEVICE — SUPRACONDYLAR NAIL
Type: IMPLANTABLE DEVICE | Site: LEG | Status: FUNCTIONAL
Brand: T2

## 2023-12-08 DEVICE — CONDYLE SCREW: Type: IMPLANTABLE DEVICE | Site: LEG | Status: FUNCTIONAL

## 2023-12-08 DEVICE — CONDYLE SCREW NUT: Type: IMPLANTABLE DEVICE | Site: LEG | Status: FUNCTIONAL

## 2023-12-08 RX ORDER — CEFAZOLIN SODIUM 1 G/3ML
INJECTION, POWDER, FOR SOLUTION INTRAMUSCULAR; INTRAVENOUS
Status: DISCONTINUED | OUTPATIENT
Start: 2023-12-08 | End: 2023-12-08

## 2023-12-08 RX ORDER — ONDANSETRON 2 MG/ML
4 INJECTION INTRAMUSCULAR; INTRAVENOUS EVERY 8 HOURS PRN
Status: DISCONTINUED | OUTPATIENT
Start: 2023-12-08 | End: 2023-12-13 | Stop reason: HOSPADM

## 2023-12-08 RX ORDER — MORPHINE SULFATE 10 MG/ML
4 INJECTION INTRAMUSCULAR; INTRAVENOUS; SUBCUTANEOUS EVERY 5 MIN PRN
Status: DISCONTINUED | OUTPATIENT
Start: 2023-12-08 | End: 2023-12-08 | Stop reason: HOSPADM

## 2023-12-08 RX ORDER — HYDRALAZINE HYDROCHLORIDE 20 MG/ML
5 INJECTION INTRAMUSCULAR; INTRAVENOUS EVERY 6 HOURS PRN
Status: DISCONTINUED | OUTPATIENT
Start: 2023-12-08 | End: 2023-12-13 | Stop reason: HOSPADM

## 2023-12-08 RX ORDER — ONDANSETRON 2 MG/ML
4 INJECTION INTRAMUSCULAR; INTRAVENOUS DAILY PRN
Status: DISCONTINUED | OUTPATIENT
Start: 2023-12-08 | End: 2023-12-08 | Stop reason: HOSPADM

## 2023-12-08 RX ORDER — HYDROCODONE BITARTRATE AND ACETAMINOPHEN 5; 325 MG/1; MG/1
1 TABLET ORAL EVERY 4 HOURS PRN
Status: DISCONTINUED | OUTPATIENT
Start: 2023-12-08 | End: 2023-12-13 | Stop reason: HOSPADM

## 2023-12-08 RX ORDER — DIPHENHYDRAMINE HYDROCHLORIDE 50 MG/ML
25 INJECTION INTRAMUSCULAR; INTRAVENOUS EVERY 6 HOURS PRN
Status: DISCONTINUED | OUTPATIENT
Start: 2023-12-08 | End: 2023-12-08 | Stop reason: HOSPADM

## 2023-12-08 RX ORDER — DEXAMETHASONE SODIUM PHOSPHATE 4 MG/ML
INJECTION, SOLUTION INTRA-ARTICULAR; INTRALESIONAL; INTRAMUSCULAR; INTRAVENOUS; SOFT TISSUE
Status: DISCONTINUED | OUTPATIENT
Start: 2023-12-08 | End: 2023-12-08

## 2023-12-08 RX ORDER — HYDROMORPHONE HYDROCHLORIDE 2 MG/ML
0.5 INJECTION, SOLUTION INTRAMUSCULAR; INTRAVENOUS; SUBCUTANEOUS EVERY 5 MIN PRN
Status: DISCONTINUED | OUTPATIENT
Start: 2023-12-08 | End: 2023-12-08 | Stop reason: HOSPADM

## 2023-12-08 RX ORDER — IPRATROPIUM BROMIDE AND ALBUTEROL SULFATE 2.5; .5 MG/3ML; MG/3ML
3 SOLUTION RESPIRATORY (INHALATION) ONCE AS NEEDED
Status: DISCONTINUED | OUTPATIENT
Start: 2023-12-08 | End: 2023-12-08 | Stop reason: HOSPADM

## 2023-12-08 RX ORDER — ONDANSETRON 2 MG/ML
INJECTION INTRAMUSCULAR; INTRAVENOUS
Status: DISCONTINUED | OUTPATIENT
Start: 2023-12-08 | End: 2023-12-08

## 2023-12-08 RX ORDER — DOCUSATE SODIUM 100 MG/1
100 CAPSULE, LIQUID FILLED ORAL EVERY 12 HOURS
Status: DISCONTINUED | OUTPATIENT
Start: 2023-12-08 | End: 2023-12-13 | Stop reason: HOSPADM

## 2023-12-08 RX ORDER — FENTANYL CITRATE 50 UG/ML
INJECTION, SOLUTION INTRAMUSCULAR; INTRAVENOUS
Status: DISCONTINUED | OUTPATIENT
Start: 2023-12-08 | End: 2023-12-08

## 2023-12-08 RX ORDER — LIDOCAINE HYDROCHLORIDE 40 MG/ML
SOLUTION TOPICAL
Status: DISCONTINUED | OUTPATIENT
Start: 2023-12-08 | End: 2023-12-08

## 2023-12-08 RX ORDER — MIDAZOLAM HYDROCHLORIDE 1 MG/ML
INJECTION INTRAMUSCULAR; INTRAVENOUS
Status: DISCONTINUED | OUTPATIENT
Start: 2023-12-08 | End: 2023-12-08

## 2023-12-08 RX ORDER — MEPERIDINE HYDROCHLORIDE 25 MG/ML
25 INJECTION INTRAMUSCULAR; INTRAVENOUS; SUBCUTANEOUS ONCE AS NEEDED
Status: DISCONTINUED | OUTPATIENT
Start: 2023-12-08 | End: 2023-12-08 | Stop reason: HOSPADM

## 2023-12-08 RX ORDER — LIDOCAINE HYDROCHLORIDE 20 MG/ML
INJECTION, SOLUTION EPIDURAL; INFILTRATION; INTRACAUDAL; PERINEURAL
Status: DISCONTINUED | OUTPATIENT
Start: 2023-12-08 | End: 2023-12-08

## 2023-12-08 RX ORDER — MORPHINE SULFATE 4 MG/ML
4 INJECTION, SOLUTION INTRAMUSCULAR; INTRAVENOUS EVERY 4 HOURS PRN
Status: DISCONTINUED | OUTPATIENT
Start: 2023-12-08 | End: 2023-12-13 | Stop reason: HOSPADM

## 2023-12-08 RX ORDER — BISACODYL 10 MG
10 SUPPOSITORY, RECTAL RECTAL DAILY PRN
Status: DISCONTINUED | OUTPATIENT
Start: 2023-12-08 | End: 2023-12-13 | Stop reason: HOSPADM

## 2023-12-08 RX ORDER — SODIUM CHLORIDE 9 MG/ML
INJECTION, SOLUTION INTRAVENOUS CONTINUOUS
Status: DISCONTINUED | OUTPATIENT
Start: 2023-12-08 | End: 2023-12-10

## 2023-12-08 RX ORDER — MEPERIDINE HYDROCHLORIDE 25 MG/ML
INJECTION INTRAMUSCULAR; INTRAVENOUS; SUBCUTANEOUS
Status: DISCONTINUED | OUTPATIENT
Start: 2023-12-08 | End: 2023-12-08

## 2023-12-08 RX ORDER — ROCURONIUM BROMIDE 10 MG/ML
INJECTION, SOLUTION INTRAVENOUS
Status: DISCONTINUED | OUTPATIENT
Start: 2023-12-08 | End: 2023-12-08

## 2023-12-08 RX ORDER — PROPOFOL 10 MG/ML
VIAL (ML) INTRAVENOUS
Status: DISCONTINUED | OUTPATIENT
Start: 2023-12-08 | End: 2023-12-08

## 2023-12-08 RX ADMIN — SENNOSIDES AND DOCUSATE SODIUM 1 TABLET: 8.6; 5 TABLET ORAL at 09:12

## 2023-12-08 RX ADMIN — DOCUSATE SODIUM 100 MG: 100 CAPSULE, LIQUID FILLED ORAL at 09:12

## 2023-12-08 RX ADMIN — FENTANYL CITRATE 100 MCG: 50 INJECTION INTRAMUSCULAR; INTRAVENOUS at 08:12

## 2023-12-08 RX ADMIN — CEFAZOLIN 2 G: 1 INJECTION, POWDER, FOR SOLUTION INTRAMUSCULAR; INTRAVENOUS; PARENTERAL at 08:12

## 2023-12-08 RX ADMIN — INSULIN DETEMIR 10 UNITS: 100 INJECTION, SOLUTION SUBCUTANEOUS at 09:12

## 2023-12-08 RX ADMIN — ONDANSETRON 4 MG: 2 INJECTION INTRAMUSCULAR; INTRAVENOUS at 08:12

## 2023-12-08 RX ADMIN — ROCURONIUM BROMIDE 40 MG: 10 INJECTION, SOLUTION INTRAVENOUS at 08:12

## 2023-12-08 RX ADMIN — MORPHINE SULFATE 4 MG: 4 INJECTION, SOLUTION INTRAMUSCULAR; INTRAVENOUS at 03:12

## 2023-12-08 RX ADMIN — MORPHINE SULFATE 2 MG: 2 INJECTION, SOLUTION INTRAMUSCULAR; INTRAVENOUS at 01:12

## 2023-12-08 RX ADMIN — LIDOCAINE HYDROCHLORIDE 120 MG: 40 SOLUTION TOPICAL at 08:12

## 2023-12-08 RX ADMIN — LIDOCAINE HYDROCHLORIDE 80 MG: 20 INJECTION, SOLUTION INTRAVENOUS at 08:12

## 2023-12-08 RX ADMIN — PROPOFOL 100 MG: 10 INJECTION, EMULSION INTRAVENOUS at 08:12

## 2023-12-08 RX ADMIN — INSULIN ASPART 4 UNITS: 100 INJECTION, SOLUTION INTRAVENOUS; SUBCUTANEOUS at 11:12

## 2023-12-08 RX ADMIN — APIXABAN 2.5 MG: 2.5 TABLET, FILM COATED ORAL at 09:12

## 2023-12-08 RX ADMIN — INSULIN ASPART 5 UNITS: 100 INJECTION, SOLUTION INTRAVENOUS; SUBCUTANEOUS at 09:12

## 2023-12-08 RX ADMIN — MUPIROCIN: 20 OINTMENT TOPICAL at 04:12

## 2023-12-08 RX ADMIN — CEFAZOLIN 2 G: 2 INJECTION, POWDER, FOR SOLUTION INTRAMUSCULAR; INTRAVENOUS at 04:12

## 2023-12-08 RX ADMIN — CIPROFLOXACIN HYDROCHLORIDE 500 MG: 500 TABLET, FILM COATED ORAL at 09:12

## 2023-12-08 RX ADMIN — MEPERIDINE HYDROCHLORIDE 25 MG: 25 INJECTION INTRAMUSCULAR; INTRAVENOUS; SUBCUTANEOUS at 09:12

## 2023-12-08 RX ADMIN — CEFAZOLIN 2 G: 2 INJECTION, POWDER, FOR SOLUTION INTRAMUSCULAR; INTRAVENOUS at 11:12

## 2023-12-08 RX ADMIN — SODIUM CHLORIDE: 9 INJECTION, SOLUTION INTRAVENOUS at 09:12

## 2023-12-08 RX ADMIN — VANCOMYCIN HYDROCHLORIDE 1000 MG: 1 INJECTION, POWDER, LYOPHILIZED, FOR SOLUTION INTRAVENOUS at 09:12

## 2023-12-08 RX ADMIN — HEPARIN SODIUM 5000 UNITS: 5000 INJECTION, SOLUTION INTRAVENOUS; SUBCUTANEOUS at 06:12

## 2023-12-08 RX ADMIN — MIDAZOLAM 2 MG: 1 INJECTION INTRAMUSCULAR; INTRAVENOUS at 07:12

## 2023-12-08 RX ADMIN — MUPIROCIN: 20 OINTMENT TOPICAL at 09:12

## 2023-12-08 RX ADMIN — HYDROCODONE BITARTRATE AND ACETAMINOPHEN 1 TABLET: 10; 325 TABLET ORAL at 09:12

## 2023-12-08 RX ADMIN — MORPHINE SULFATE 4 MG: 4 INJECTION, SOLUTION INTRAMUSCULAR; INTRAVENOUS at 11:12

## 2023-12-08 RX ADMIN — SUGAMMADEX 200 MG: 100 INJECTION, SOLUTION INTRAVENOUS at 09:12

## 2023-12-08 RX ADMIN — VANCOMYCIN HYDROCHLORIDE 1500 MG: 1 INJECTION, POWDER, LYOPHILIZED, FOR SOLUTION INTRAVENOUS at 08:12

## 2023-12-08 RX ADMIN — DEXAMETHASONE SODIUM PHOSPHATE 8 MG: 4 INJECTION, SOLUTION INTRA-ARTICULAR; INTRALESIONAL; INTRAMUSCULAR; INTRAVENOUS; SOFT TISSUE at 08:12

## 2023-12-08 RX ADMIN — MORPHINE SULFATE 2 MG: 2 INJECTION, SOLUTION INTRAMUSCULAR; INTRAVENOUS at 06:12

## 2023-12-08 NOTE — SUBJECTIVE & OBJECTIVE
Interval History:     NAEO  Surgery today    Review of Systems   Constitutional: Negative.  Negative for chills, fatigue and fever.   HENT: Negative.  Negative for congestion and rhinorrhea.    Respiratory:  Negative for apnea, cough, chest tightness and shortness of breath.    Cardiovascular: Negative.  Negative for chest pain and leg swelling.   Gastrointestinal: Negative.  Negative for abdominal pain, diarrhea, nausea and vomiting.   Endocrine: Negative.    Genitourinary: Negative.    Musculoskeletal:  Positive for arthralgias and gait problem.   Skin: Negative.    Allergic/Immunologic: Negative.    Hematological: Negative.    Psychiatric/Behavioral: Negative.       Objective:     Vital Signs (Most Recent):  Temp: 98.2 °F (36.8 °C) (12/08/23 1100)  Pulse: 82 (12/08/23 1105)  Resp: 16 (12/08/23 1035)  BP: (!) 160/69 (12/08/23 1105)  SpO2: 97 % (12/08/23 1105) Vital Signs (24h Range):  Temp:  [97.6 °F (36.4 °C)-98.5 °F (36.9 °C)] 98.2 °F (36.8 °C)  Pulse:  [63-84] 82  Resp:  [15-20] 16  SpO2:  [88 %-100 %] 97 %  BP: (120-165)/(61-76) 160/69     Weight: 79.4 kg (175 lb)  Body mass index is 28.25 kg/m².    Intake/Output Summary (Last 24 hours) at 12/8/2023 1118  Last data filed at 12/8/2023 0931  Gross per 24 hour   Intake 500 ml   Output 75 ml   Net 425 ml         Physical Exam  Constitutional:       General: She is not in acute distress.     Appearance: Normal appearance. She is not ill-appearing.   HENT:      Head: Normocephalic and atraumatic.      Mouth/Throat:      Mouth: Mucous membranes are moist.   Eyes:      General: No scleral icterus.  Cardiovascular:      Rate and Rhythm: Normal rate.      Pulses: Normal pulses.   Pulmonary:      Effort: Pulmonary effort is normal. No respiratory distress.   Abdominal:      General: Abdomen is flat.      Palpations: Abdomen is soft.   Musculoskeletal:         General: Tenderness present.   Skin:     General: Skin is warm and dry.   Neurological:      General: No focal  deficit present.             Significant Labs: All pertinent labs within the past 24 hours have been reviewed.    Significant Imaging: I have reviewed all pertinent imaging results/findings within the past 24 hours.

## 2023-12-08 NOTE — ANESTHESIA PROCEDURE NOTES
Intubation    Date/Time: 12/8/2023 8:09 AM    Performed by: Adams Anne CRNA  Authorized by: Simón Mancera DO    Intubation:     Induction:  Intravenous    Intubated:  Postinduction    Mask Ventilation:  Easy mask    Attempts:  1    Attempted By:  CRNA    Method of Intubation:  Direct    Blade:  Celso 4    Difficult Airway Encountered?: No      Complications:  None    Airway Device:  Oral endotracheal tube    Airway Device Size:  7.5    Style/Cuff Inflation:  Cuffed    Inflation Amount (mL):  10    Tube secured:  8    Secured at:  The lips    Placement Verified By:  Capnometry    Complicating Factors:  None    Findings Post-Intubation:  BS equal bilateral and atraumatic/condition of teeth unchanged

## 2023-12-08 NOTE — PROGRESS NOTES
[facility]  [facility] Medicine  Progress Note    Patient Name: Monica Walker  MRN: [id number]  Patient Class: IP- Inpatient   Admission Date: 12/7/2023  Length of Stay: 1 days  Attending Physician: Cecil Chappell MD  Primary Care Provider: Mari Myers MD        Subjective:     Principal Problem:Closed bicondylar fracture of distal humerus, right, initial encounter        HPI:  Patient is a 53-year-old female with past medical history of diabetes mellitus, osteoarthritis, smoking, depression, MRSA bacteremia complicated by vertebral osteomyelitis and left knee infection, who presents to the emergency room with her mother for evaluation of right hip pain after she sustained a fall at home.  Patient is in a lot of pain, therefore most of the history is given by her mother, who tells me that patient was out in the woods deer hunting when she had a mechanical fall resulting in her right hip fracture.  Patient was shouting in pain and therefore the mother went to see her and called for medical assistance.  Currently the patient is saying that her pain is not controlled, tells me that she recently completed her IV antibiotics and is taking oral ciprofloxacin as recommended by her physician.  Otherwise she denies having any chest pain fevers chills nausea vomiting diarrhea shortness of breath dysuria or lightheadedness or syncope.  She does have significant amount of pain at the right hip joint.    Overview/Hospital Course:  No notes on file    Interval History:     NAEO  Surgery today    Review of Systems   Constitutional: Negative.  Negative for chills, fatigue and fever.   HENT: Negative.  Negative for congestion and rhinorrhea.    Respiratory:  Negative for apnea, cough, chest tightness and shortness of breath.    Cardiovascular: Negative.  Negative for chest pain and leg swelling.   Gastrointestinal: Negative.  Negative for abdominal pain, diarrhea, nausea and vomiting.   Endocrine:  Negative.    Genitourinary: Negative.    Musculoskeletal:  Positive for arthralgias and gait problem.   Skin: Negative.    Allergic/Immunologic: Negative.    Hematological: Negative.    Psychiatric/Behavioral: Negative.       Objective:     Vital Signs (Most Recent):  Temp: 98.2 °F (36.8 °C) (12/08/23 1100)  Pulse: 82 (12/08/23 1105)  Resp: 16 (12/08/23 1035)  BP: (!) 160/69 (12/08/23 1105)  SpO2: 97 % (12/08/23 1105) Vital Signs (24h Range):  Temp:  [97.6 °F (36.4 °C)-98.5 °F (36.9 °C)] 98.2 °F (36.8 °C)  Pulse:  [63-84] 82  Resp:  [15-20] 16  SpO2:  [88 %-100 %] 97 %  BP: (120-165)/(61-76) 160/69     Weight: 79.4 kg (175 lb)  Body mass index is 28.25 kg/m².    Intake/Output Summary (Last 24 hours) at 12/8/2023 1118  Last data filed at 12/8/2023 0931  Gross per 24 hour   Intake 500 ml   Output 75 ml   Net 425 ml         Physical Exam  Constitutional:       General: She is not in acute distress.     Appearance: Normal appearance. She is not ill-appearing.   HENT:      Head: Normocephalic and atraumatic.      Mouth/Throat:      Mouth: Mucous membranes are moist.   Eyes:      General: No scleral icterus.  Cardiovascular:      Rate and Rhythm: Normal rate.      Pulses: Normal pulses.   Pulmonary:      Effort: Pulmonary effort is normal. No respiratory distress.   Abdominal:      General: Abdomen is flat.      Palpations: Abdomen is soft.   Musculoskeletal:         General: Tenderness present.   Skin:     General: Skin is warm and dry.   Neurological:      General: No focal deficit present.             Significant Labs: All pertinent labs within the past 24 hours have been reviewed.    Significant Imaging: I have reviewed all pertinent imaging results/findings within the past 24 hours.    Assessment/Plan:      * Closed bicondylar fracture of distal humerus, right, initial encounter  Ortho consulted, will likely need surgical intervention  Pt has had a tte and rhys in the last 6 months and w/o any over abnormality, she  "denies any cardiopulmonary symptoms, she is at low to moderate risk of surgery  Pain control  Pt.ot  Dvt ppx      DM2 (diabetes mellitus, type 2)  Patient's FSGs are controlled on current medication regimen.  Last A1c reviewed-   Lab Results   Component Value Date    HGBA1C 7.7 (H) 05/09/2023     Most recent fingerstick glucose reviewed- No results for input(s): "POCTGLUCOSE" in the last 24 hours.  Current correctional scale  Medium  Maintain anti-hyperglycemic dose as follows-   Antihyperglycemics (From admission, onward)      Start     Stop Route Frequency Ordered    12/07/23 2100  insulin detemir U-100 injection 10 Units         -- SubQ Nightly 12/07/23 1733    12/07/23 1835  insulin aspart U-100 injection 0-10 Units         -- SubQ Before meals & nightly PRN 12/07/23 1735          Hold Oral hypoglycemics while patient is in the hospital.    Arthritis  Pain control      Depression  Euthymic  Cont home medicine      VTE Risk Mitigation (From admission, onward)           Ordered     heparin (porcine) injection 5,000 Units  Every 8 hours         12/07/23 1733     IP VTE HIGH RISK PATIENT  Once         12/07/23 1733     Place sequential compression device  Until discontinued         12/07/23 1733                    Discharge Planning   SUZANNA:      Code Status: Full Code   Is the patient medically ready for discharge?:     Reason for patient still in hospital (select all that apply): Treatment                     Rehmat TIARA Chappell MD  Department of Hospital Medicine   [facility]    "

## 2023-12-08 NOTE — NURSING
At 0735 surgery RN times two are here to get pt for surgery, one jose earring is removed from left ear, a black neckless with brown stones are removed, a pair of glasses and a black cell phone is placed in a ziplock bag and her name is placed on it and left at bedside in room. HR monitor is removed from pt.

## 2023-12-08 NOTE — ANESTHESIA POSTPROCEDURE EVALUATION
Anesthesia Post Evaluation    Patient: Monica Walker    Procedure(s) Performed: Procedure(s) (LRB):  INSERTION, INTRAMEDULLARY LAXMI, FEMUR, DISTAL, RETROGRADE (Right)    Final Anesthesia Type: general      Patient location during evaluation: PACU  Patient participation: Yes- Able to Participate  Level of consciousness: awake and alert and oriented  Post-procedure vital signs: reviewed and stable  Pain management: adequate  Airway patency: patent  MARILU mitigation strategies: Multimodal analgesia  PONV status at discharge: No PONV  Anesthetic complications: no      Cardiovascular status: hemodynamically stable  Respiratory status: unassisted and spontaneous ventilation  Hydration status: euvolemic  Follow-up not needed.              Vitals Value Taken Time   /69 12/08/23 0956   Temp 97.7 12/08/23 0956   Pulse 74 12/08/23 0956   Resp 17 12/08/23 0956   SpO2 97 % 12/08/23 0956   Vitals shown include unvalidated device data.      No case tracking events are documented in the log.      Pain/Inder Score: Pain Rating Prior to Med Admin: 10 (12/8/2023  6:02 AM)  Pain Rating Post Med Admin: 2 (12/8/2023  7:05 AM)

## 2023-12-08 NOTE — OP NOTE
[facility]  General Surgery  Operative Note    SUMMARY     Date of Procedure: 12/8/2023     Procedure: Procedure(s) (LRB):  INSERTION, INTRAMEDULLARY LAXMI, FEMUR, DISTAL, RETROGRADE (Right)       Surgeon(s) and Role:     * Papa Mendoza MD - Primary    Assisting Surgeon: None    Pre-Operative Diagnosis: Displaced supracondylar fracture without intracondylar extension of lower end of right femur, initial encounter for closed fracture [S72.451A]    Post-Operative Diagnosis: Post-Op Diagnosis Codes:     * Displaced supracondylar fracture without intracondylar extension of lower end of right femur, initial encounter for closed fracture [S72.451A]    Anesthesia: Choice    Operative Findings (including complications, if any):  After risks and benefits of procedure explained at length the patient stating she understands risks benefits written informed consent was obtained patient was taken operating room placed in supine position on operative table anesthesia induced per Anesthesia.  At this time right lower extremities prepped draped sterile fashion.  A sterile triangle was placed underneath the femur and a fracture reduced under fluoroscopic vision.  At this time a 4 cm incision was made over the patella through the previously made skin incision.  Incision was opened with a 15. Blade hemostasis maintained Bovie electrocautery hemostat was used to open the central portion of the patellar tendon.  The guide pin for the [place] titanium retrograde nail was then placed into the center of the notch the total knee implant was noted to be intact.  At this time pin was placed under fluoroscopic vision in his center of the notch using an opening awl the distal femur was opened for 12 mm.  At this time the short guide laxmi was removed long guide laxmi placed across fracture site measured to be 360 mm nail.  At this time with the fracture reduced sequential reaming with flexible reamers was used to  11.5 mm and a 10 x 360 nail was then inserted retrograde through the knee into the femur.  It was locked distally with a locking bolt and 2 oblique screws through the guide stab incisions made on the skin using the guide and trocar is a the screws were inserted correct screw lengths confirmed under fluoroscopic vision.  The insertion device was then removed leg placed into full extension in the proximal locking screw was placed freehand technique under fluoroscopic vision from anterior to posterior the screw length was confirmed to be correct 2 at this time all wounds were irrigated out copious amounts normal saline deep tissues closed 2-0 Vicryl subcuticular 2-0 Vicryl followed by skin staples sterile occlusive dressing placed knee immobilizer placed patient was awakened taken recovery room in good condition all counts correct no complications    Description of Technical Procedures:     Significant Surgical Tasks Conducted by the Assistant(s), if Applicable:     Estimated Blood Loss (EBL): 75 mL           Implants:   Implant Name Type Inv. Item Serial No.  Lot No. LRB No. Used Action   KWIRE CONDYLE SCREW 1.0W302VI - [id number]  KWIRE CONDYLE SCREW 1.4P251SY  [employer] NING. [id number] Right 1 Implanted and Explanted   WIRE RANDY T2 [id number] SS - [id number]  WIRE RANDY T2 [id number] SS  [employer] NING. [id number] Right 1 Implanted and Explanted   GUIDE WIRE 3.3C9125FS BALL TIP - [id number]  GUIDE WIRE 3.7B9090RM BALL TIP  [employer] NING. [id number] Right 1 Implanted and Explanted   SCREW T2 CONDYLE 5X85MM - [id number]  SCREW T2 CONDYLE 5X85MM  [employer] NING. [id number] Right 1 Implanted   NUT T2 CONDYLE - [id number]  NUT T2 CONDYLE  [employer] NING. [id number] Right 1 Implanted   NAIL T2 SUPRACONDYLAR [id number] - [id number]  NAIL T2 SUPRACONDYLAR [id number]  [employer] NING. [id number]  Right 1 Implanted   SCREW LOCKING T2 F/T 5X80MM - [id number]  SCREW LOCKING T2 F/T 5X80MM  [employer] NING. [id number] Right 1 Implanted   SCREW FT LOCKING 5.0X75MM - [id number]  SCREW FT LOCKING 5.0X75MM  [employer] NING. [id number] Right 1 Implanted   SCREW LOCKING 5 X 42.5 - [id number]  SCREW LOCKING 5 X 42.5  [employer] NING. [id number] Right 1 Implanted       Specimens:   Specimen (24h ago, onward)      None                    Condition: Good    Disposition: PACU - hemodynamically stable.    Attestation: I was present and scrubbed for the entire procedure.

## 2023-12-08 NOTE — PLAN OF CARE
[facility]  Initial Discharge Assessment       Primary Care Provider: Mari Myers MD    Admission Diagnosis: Fall [W19.XXXA]  Chest pain [R07.9]  Pre-op exam [Z01.818]  Closed bicondylar fracture of distal humerus, right, initial encounter [S42.587J]  Displaced supracondylar fracture without intracondylar extension of lower end of right femur, initial encounter for closed fracture [S72.030A]    Admission Date: 12/7/2023  Expected Discharge Date:     Transition of Care Barriers: None    Payor: [employer] MANAGED MEDICARE / Plan: [email or web address] HMO PPO SPECIAL NEEDS / Product Type: Medicare Advantage /     Extended Emergency Contact Information  Primary Emergency Contact: Wilson,April  Mobile Phone: [phone]  Relation: Sister  Preferred language: English   needed? No  Secondary Emergency Contact: Alejandrina Walker  Home Phone: [phone]  Relation: Mother  Preferred language: English   needed? No    Discharge Plan A: Home with family, Home Health  Discharge Plan B: Home with family, Home Health      [facility] [place] [address] [address]  Phone: [phone] Fax: [phone]    [facility] [address] [place] [address]  [address] [place] [address]  Phone: [phone] Fax: [phone]    [facility] [address] [facility]  [address] [facility] [address]  Phone: [phone] Fax: [phone]      Initial Assessment (most recent)       Adult Discharge Assessment - 12/08/23 1511          Discharge Assessment    Assessment Type Discharge Planning Assessment     Source of Information patient     People in Home parent(s)     Do you expect to return to your current living situation? Yes     Do you have help at home or someone to help you manage your care at home? Yes     Who are your caregiver(s) and their phone number(s)? Alejandrina Walker- Mother  [phone]     Prior to hospitilization cognitive status: Unable to Assess     Current cognitive status: Alert/Oriented     Walking or Climbing Stairs Difficulty no     Dressing/Bathing Difficulty no     Home Accessibility wheelchair accessible     Equipment Currently Used at Home walker, rolling;3-in-1 commode     Readmission within 30 days? No     Do you currently have service(s) that help you manage your care at home? Yes     Name and Contact number of agency [facility]     Is the pt/caregiver preference to resume services with current agency Yes     Do you take prescription medications? Yes     Do you have prescription coverage? Yes     Coverage [employer] Managed Medicare     Do you have any problems affording any of your prescribed medications? No     Is the patient taking medications as prescribed? yes     Who is going to help you get home at discharge? Mother     How do you get to doctors appointments? car, drives self;family or friend will provide     Are you on dialysis? No     Do you take coumadin? No     Discharge Plan A Home with family;Home Health     Discharge Plan B Home with family;Home Health     DME Needed Upon Discharge  none     Discharge Plan discussed with: Patient     Transition of Care Barriers None        Physical Activity    On average, how many days per week do you engage in moderate to strenuous exercise (like a brisk walk)? 0 days     On average, how many minutes do you engage in exercise at this level? 0 min        Financial Resource Strain    How hard is it for you to pay for the very basics like food, housing, medical care, and heating? Not hard at all        Housing Stability    In the last 12 months, was there a time when you were not able to pay the mortgage or rent on time? No     In the last 12 months, was there a time when you did not have a steady place to sleep or slept in a shelter (including now)? No        Transportation Needs    In the past 12 months, has lack of  transportation kept you from medical appointments or from getting medications? No     In the past 12 months, has lack of transportation kept you from meetings, work, or from getting things needed for daily living? No        Food Insecurity    Within the past 12 months, you worried that your food would run out before you got the money to buy more. Never true     Within the past 12 months, the food you bought just didn't last and you didn't have money to get more. Never true        Stress    Do you feel stress - tense, restless, nervous, or anxious, or unable to sleep at night because your mind is troubled all the time - these days? Only a little        Social Connections    In a typical week, how many times do you talk on the phone with family, friends, or neighbors? More than three times a week     How often do you get together with friends or relatives? More than three times a week     How often do you attend Faith or Zoroastrianism services? Never     Do you belong to any clubs or organizations such as Faith groups, unions, fraternal or athletic groups, or school groups? No     How often do you attend meetings of the clubs or organizations you belong to? Never     Are you , , , , never , or living with a partner? Never         Alcohol Use    Q1: How often do you have a drink containing alcohol? Never     Q2: How many drinks containing alcohol do you have on a typical day when you are drinking? Patient does not drink     Q3: How often do you have six or more drinks on one occasion? Never                   SW consulted for hh/swb. MICHEAL spoke with pt at bedside and pt lives at home with mother and uses a rw and bsc. Pt is current with [facility] and plans to return home at dc and resume care with hh, choice obtained verbally. Pt too drowsy to sign. SW faxed initial TRU[facility] referral to Doctor kinetics. SDOH questions completed. SW will cont to follow for dc needs.

## 2023-12-08 NOTE — TRANSFER OF CARE
"Anesthesia Transfer of Care Note    Patient: Monica Walker    Procedure(s) Performed: Procedure(s) (LRB):  INSERTION, INTRAMEDULLARY LAXMI, FEMUR, DISTAL, RETROGRADE (Right)    Patient location: PACU    Anesthesia Type: general    Transport from OR: Transported from OR on room air with adequate spontaneous ventilation    Post pain: adequate analgesia    Post assessment: no apparent anesthetic complications and tolerated procedure well    Post vital signs: stable    Level of consciousness: responds to stimulation    Nausea/Vomiting: no nausea/vomiting    Complications: none    Transfer of care protocol was followedComments: Report Given to PACU rn VSS      Last vitals: Visit Vitals  BP (!) 141/72 (BP Location: Right arm, Patient Position: Lying)   Pulse 77   Temp 36.4 °C (97.6 °F) (Oral)   Resp 17   Ht 5' 6" (1.676 m)   Wt 79.4 kg (175 lb)   SpO2 95%   Breastfeeding No   BMI 28.25 kg/m²     "

## 2023-12-08 NOTE — CARE UPDATE
Patient for intramedullary rodding right femur today.  She will be typed and crossed.  No new complaints.

## 2023-12-08 NOTE — PT/OT/SLP PROGRESS
Occupational Therapy      Patient Name:  Monica Walker   MRN:  [id number]    Patient not seen today secondary to Therapist assessment (pt gone for IM logan for femur fx this AM). Will follow-up 12/9/23.    12/8/2023

## 2023-12-08 NOTE — ANESTHESIA PREPROCEDURE EVALUATION
12/08/2023  Monica Walker is a 53 y.o., female.      Pre-op Assessment    I have reviewed the Patient Summary Reports.     I have reviewed the Nursing Notes. I have reviewed the NPO Status.   I have reviewed the Medications.     Review of Systems  Anesthesia Hx:  No problems with previous Anesthesia             Denies Family Hx of Anesthesia complications.    Denies Personal Hx of Anesthesia complications.                    Social:  Smoker, No Alcohol Use   Tobacco Use:  of cigarette  Illicit Drug Use: Types of drugs include Methamphetamines  Hematology/Oncology:       -- Anemia:                                  Cardiovascular:  Exercise tolerance: poor   Hypertension              ECG has been reviewed.                          Pulmonary:      Shortness of breath                  Musculoskeletal:  Arthritis   Right femur fracture            Endocrine:  Diabetes, poorly controlled           Dermatological:  Chronic MRSA with h/o osteomyelitis   Psych:  Psychiatric History  depression                Physical Exam  General: Well nourished, Cooperative and Alert    Airway:  Mallampati: II   Mouth Opening: Normal  TM Distance: Normal  Tongue: Normal  Neck ROM: Normal ROM    Dental:  Dentures    Chest/Lungs:  Clear to auscultation, Normal Respiratory Rate    Heart:  Rate: Normal  Rhythm: Regular Rhythm        Chemistry        Component Value Date/Time     12/08/2023 0410    K 4.0 12/08/2023 0410     12/08/2023 0410    CO2 26 12/08/2023 0410    BUN 30 (H) 12/08/2023 0410    CREATININE 1.01 12/08/2023 0410     (H) 12/08/2023 0410        Component Value Date/Time    CALCIUM 8.5 12/08/2023 0410    ALKPHOS 141 (H) 12/08/2023 0410    AST 7 (L) 12/08/2023 0410    ALT 14 12/08/2023 0410    BILITOT 0.5 12/08/2023 0410    EGFRNONAA 60 08/07/2022 1547        Lab Results   Component Value Date    WBC  8.63 12/08/2023    RBC 4.09 (L) 12/08/2023    HGB 9.7 (L) 12/08/2023    MCV 75.8 (L) 12/08/2023    MCH 23.7 (L) 12/08/2023    MCHC 31.3 (L) 12/08/2023    RDW 18.4 (H) 12/08/2023     12/08/2023    MPV 10.8 12/08/2023    LYMPH 24.0 (L) 12/08/2023    LYMPH 2.07 12/08/2023    MONO 7.4 (H) 12/08/2023    EOS 0.35 12/08/2023    BASO 0.06 12/08/2023     Results for orders placed or performed during the hospital encounter of 05/10/23   EKG 12-lead    Collection Time: 05/10/23  8:59 PM    Narrative    Test Reason : R00.0,    Vent. Rate : 152 BPM     Atrial Rate : 152 BPM     P-R Int : 128 ms          QRS Dur : 070 ms      QT Int : 334 ms       P-R-T Axes : 058 072 063 degrees     QTc Int : 531 ms    Sinus tachycardia  Nonspecific ST and T wave abnormality  Abnormal ECG  When compared with ECG of 10-MAY-2023 20:58,  No significant change was found  Confirmed by Fadi Gonzalez DO ([id number]) on 5/12/2023 8:25:17 PM    Referred By: ELISEO FREEMAN           Confirmed By:Fadi Gonzalez DO     TTE 11/21/22  Summary    The left ventricle is normal in size with normal systolic function.  The estimated ejection fraction is 55%.  Normal right ventricular size with normal right ventricular systolic function.  Mild mitral regurgitation.  Normal central venous pressure (3 mmHg).  No obvious vegetation noted on aortic or mitral leaflet; cannot exclude a small vegetation. Consider MONICA if clinical suspicion remains high for endocarditits       06/2023  MONICA  Summary    The left ventricle is normal in size with normal systolic function.  The estimated ejection fraction is 60%.  Normal right ventricular size with normal right ventricular systolic function.  No thrombus is present in the appendage.  Mild mitral regurgitation.  No vegetation present.  No tricupsid valve vegetation present.    Anesthesia Plan  Type of Anesthesia, risks & benefits discussed:    Anesthesia Type: Gen ETT  Intra-op Monitoring Plan: Standard ASA  Monitors  Post Op Pain Control Plan: multimodal analgesia and peripheral nerve block  Induction:  IV  Airway Plan: Direct, Post-Induction  Informed Consent: Informed consent signed with the Patient and all parties understand the risks and agree with anesthesia plan.  All questions answered.   ASA Score: 3  Day of Surgery Review of History & Physical: H&P Update referred to the surgeon/provider.I have interviewed and examined the patient. I have reviewed the patient's H&P dated: There are no significant changes.   Anesthesia Plan Notes:   ASA 3, GETA    Ready For Surgery From Anesthesia Perspective.     .

## 2023-12-09 LAB
ALBUMIN SERPL BCP-MCNC: 2.5 G/DL (ref 3.5–5)
ALBUMIN/GLOB SERPL: 0.6 {RATIO}
ALP SERPL-CCNC: 119 U/L (ref 46–118)
ALT SERPL W P-5'-P-CCNC: 13 U/L (ref 13–56)
ANION GAP SERPL CALCULATED.3IONS-SCNC: 9 MMOL/L (ref 7–16)
AST SERPL W P-5'-P-CCNC: 7 U/L (ref 15–37)
BASOPHILS # BLD AUTO: 0.03 K/UL (ref 0–0.2)
BASOPHILS NFR BLD AUTO: 0.3 % (ref 0–1)
BILIRUB SERPL-MCNC: 0.3 MG/DL (ref ?–1.2)
BUN SERPL-MCNC: 24 MG/DL (ref 7–18)
BUN/CREAT SERPL: 25 (ref 6–20)
CALCIUM SERPL-MCNC: 8.4 MG/DL (ref 8.5–10.1)
CHLORIDE SERPL-SCNC: 104 MMOL/L (ref 98–107)
CO2 SERPL-SCNC: 26 MMOL/L (ref 21–32)
CREAT SERPL-MCNC: 0.95 MG/DL (ref 0.55–1.02)
DIFFERENTIAL METHOD BLD: ABNORMAL
EGFR (NO RACE VARIABLE) (RUSH/TITUS): 71 ML/MIN/1.73M2
EOSINOPHIL # BLD AUTO: 0.04 K/UL (ref 0–0.5)
EOSINOPHIL NFR BLD AUTO: 0.3 % (ref 1–4)
ERYTHROCYTE [DISTWIDTH] IN BLOOD BY AUTOMATED COUNT: 18.4 % (ref 11.5–14.5)
GLOBULIN SER-MCNC: 4.2 G/DL (ref 2–4)
GLUCOSE SERPL-MCNC: 224 MG/DL (ref 70–105)
GLUCOSE SERPL-MCNC: 288 MG/DL (ref 74–106)
GLUCOSE SERPL-MCNC: 366 MG/DL (ref 70–105)
HCT VFR BLD AUTO: 27.5 % (ref 38–47)
HGB BLD-MCNC: 8.6 G/DL (ref 12–16)
IMM GRANULOCYTES # BLD AUTO: 0.05 K/UL (ref 0–0.04)
IMM GRANULOCYTES NFR BLD: 0.4 % (ref 0–0.4)
LYMPHOCYTES # BLD AUTO: 1.81 K/UL (ref 1–4.8)
LYMPHOCYTES NFR BLD AUTO: 15.2 % (ref 27–41)
MAGNESIUM SERPL-MCNC: 2.1 MG/DL (ref 1.7–2.3)
MCH RBC QN AUTO: 23.9 PG (ref 27–31)
MCHC RBC AUTO-ENTMCNC: 31.3 G/DL (ref 32–36)
MCV RBC AUTO: 76.4 FL (ref 80–96)
MONOCYTES # BLD AUTO: 0.89 K/UL (ref 0–0.8)
MONOCYTES NFR BLD AUTO: 7.5 % (ref 2–6)
MPC BLD CALC-MCNC: 11 FL (ref 9.4–12.4)
NEUTROPHILS # BLD AUTO: 9.08 K/UL (ref 1.8–7.7)
NEUTROPHILS NFR BLD AUTO: 76.3 % (ref 53–65)
NRBC # BLD AUTO: 0 X10E3/UL
NRBC, AUTO (.00): 0 %
PHOSPHATE SERPL-MCNC: 3.2 MG/DL (ref 2.5–4.5)
PLATELET # BLD AUTO: 222 K/UL (ref 150–400)
POTASSIUM SERPL-SCNC: 4.3 MMOL/L (ref 3.5–5.1)
PROT SERPL-MCNC: 6.7 G/DL (ref 6.4–8.2)
RBC # BLD AUTO: 3.6 M/UL (ref 4.2–5.4)
SODIUM SERPL-SCNC: 135 MMOL/L (ref 136–145)
WBC # BLD AUTO: 11.9 K/UL (ref 4.5–11)

## 2023-12-09 PROCEDURE — 85025 COMPLETE CBC W/AUTO DIFF WBC: CPT | Performed by: INTERNAL MEDICINE

## 2023-12-09 PROCEDURE — 63600175 PHARM REV CODE 636 W HCPCS: Performed by: INTERNAL MEDICINE

## 2023-12-09 PROCEDURE — 63600175 PHARM REV CODE 636 W HCPCS: Performed by: ORTHOPAEDIC SURGERY

## 2023-12-09 PROCEDURE — 80053 COMPREHEN METABOLIC PANEL: CPT | Performed by: INTERNAL MEDICINE

## 2023-12-09 PROCEDURE — 99900035 HC TECH TIME PER 15 MIN (STAT)

## 2023-12-09 PROCEDURE — 25000003 PHARM REV CODE 250: Performed by: ORTHOPAEDIC SURGERY

## 2023-12-09 PROCEDURE — 99232 SBSQ HOSP IP/OBS MODERATE 35: CPT | Mod: ,,, | Performed by: INTERNAL MEDICINE

## 2023-12-09 PROCEDURE — 11000001 HC ACUTE MED/SURG PRIVATE ROOM

## 2023-12-09 PROCEDURE — 27000221 HC OXYGEN, UP TO 24 HOURS

## 2023-12-09 PROCEDURE — 97166 OT EVAL MOD COMPLEX 45 MIN: CPT

## 2023-12-09 PROCEDURE — 84100 ASSAY OF PHOSPHORUS: CPT | Performed by: INTERNAL MEDICINE

## 2023-12-09 PROCEDURE — 99232 PR SUBSEQUENT HOSPITAL CARE,LEVL II: ICD-10-PCS | Mod: ,,, | Performed by: INTERNAL MEDICINE

## 2023-12-09 PROCEDURE — 82962 GLUCOSE BLOOD TEST: CPT

## 2023-12-09 PROCEDURE — 83735 ASSAY OF MAGNESIUM: CPT | Performed by: INTERNAL MEDICINE

## 2023-12-09 PROCEDURE — 97162 PT EVAL MOD COMPLEX 30 MIN: CPT

## 2023-12-09 PROCEDURE — 25000003 PHARM REV CODE 250: Performed by: INTERNAL MEDICINE

## 2023-12-09 PROCEDURE — 94761 N-INVAS EAR/PLS OXIMETRY MLT: CPT

## 2023-12-09 RX ADMIN — INSULIN ASPART 4 UNITS: 100 INJECTION, SOLUTION INTRAVENOUS; SUBCUTANEOUS at 03:12

## 2023-12-09 RX ADMIN — Medication 6 MG: at 10:12

## 2023-12-09 RX ADMIN — SENNOSIDES AND DOCUSATE SODIUM 1 TABLET: 8.6; 5 TABLET ORAL at 10:12

## 2023-12-09 RX ADMIN — MORPHINE SULFATE 4 MG: 4 INJECTION, SOLUTION INTRAMUSCULAR; INTRAVENOUS at 12:12

## 2023-12-09 RX ADMIN — APIXABAN 2.5 MG: 2.5 TABLET, FILM COATED ORAL at 10:12

## 2023-12-09 RX ADMIN — MUPIROCIN: 20 OINTMENT TOPICAL at 11:12

## 2023-12-09 RX ADMIN — MORPHINE SULFATE 4 MG: 4 INJECTION, SOLUTION INTRAMUSCULAR; INTRAVENOUS at 11:12

## 2023-12-09 RX ADMIN — DULOXETINE HYDROCHLORIDE 60 MG: 30 CAPSULE, DELAYED RELEASE ORAL at 10:12

## 2023-12-09 RX ADMIN — CIPROFLOXACIN HYDROCHLORIDE 500 MG: 500 TABLET, FILM COATED ORAL at 10:12

## 2023-12-09 RX ADMIN — DOCUSATE SODIUM 100 MG: 100 CAPSULE, LIQUID FILLED ORAL at 10:12

## 2023-12-09 RX ADMIN — MORPHINE SULFATE 4 MG: 4 INJECTION, SOLUTION INTRAMUSCULAR; INTRAVENOUS at 07:12

## 2023-12-09 RX ADMIN — POLYETHYLENE GLYCOL 3350 17 G: 17 POWDER, FOR SOLUTION ORAL at 10:12

## 2023-12-09 RX ADMIN — INSULIN DETEMIR 15 UNITS: 100 INJECTION, SOLUTION SUBCUTANEOUS at 10:12

## 2023-12-09 RX ADMIN — SODIUM CHLORIDE: 9 INJECTION, SOLUTION INTRAVENOUS at 10:12

## 2023-12-09 RX ADMIN — MUPIROCIN: 20 OINTMENT TOPICAL at 10:12

## 2023-12-09 RX ADMIN — INSULIN ASPART 3 UNITS: 100 INJECTION, SOLUTION INTRAVENOUS; SUBCUTANEOUS at 10:12

## 2023-12-09 RX ADMIN — INSULIN ASPART 10 UNITS: 100 INJECTION, SOLUTION INTRAVENOUS; SUBCUTANEOUS at 10:12

## 2023-12-09 RX ADMIN — INSULIN DETEMIR 10 UNITS: 100 INJECTION, SOLUTION SUBCUTANEOUS at 10:12

## 2023-12-09 NOTE — PT/OT/SLP EVAL
Occupational Therapy   Evaluation    Name: Moniac Walker  MRN: [id number]  Admitting Diagnosis: Closed bicondylar fracture of distal humerus, right, initial encounter  Recent Surgery: Procedure(s) (LRB):  INSERTION, INTRAMEDULLARY LAXMI, FEMUR, DISTAL, RETROGRADE (Right) 1 Day Post-Op    Recommendations:     Discharge Recommendations: Moderate Intensity Therapy  Discharge Equipment Recommendations:   (to be determined)  Barriers to discharge:  None    Assessment:     Monica Walker is a 53 y.o. female with a medical diagnosis of Closed bicondylar fracture of distal humerus, right, initial encounter.  She presents with complaint of (R) LE pain.Pt agreed to OT evaluation Performance deficits affecting function: weakness, impaired endurance, impaired self care skills, impaired functional mobility, gait instability, impaired balance, decreased lower extremity function, pain, orthopedic precautions.      Rehab Prognosis: Good; patient would benefit from acute skilled OT services to address these deficits and reach maximum level of function.       Plan:     Patient to be seen   to address the above listed problems via self-care/home management, therapeutic activities, therapeutic exercises  Plan of Care Expires:    Plan of Care Reviewed with: patient    Subjective     Chief Complaint: IM Rodding (R) Femur  Patient/Family Comments/goals: To return home    Occupational Profile:  Living Environment: Pt lives at home with mother in 1 story home with a ramp.  Previous level of function: I with self care prior  Roles and Routines: I with daily activities  Equipment Used at Home: bedside commode, grab bar, shower chair, walker, rolling (bedpan)  Assistance upon Discharge: Mother.    Pain/Comfort:  Pain Rating 1: 9/10  Location - Side 1: Right  Location 1: leg  Pain Addressed 1: Reposition, Distraction  Pain Rating Post-Intervention 1: 9/10    Patients cultural, spiritual, Mandaen conflicts given the current situation:  no    Objective:     Communicated with: HAYDEE Anthony prior to session.  Patient found HOB elevated with cryotherapy, knee immobilizer, peripheral IV upon OT entry to room.    General Precautions: Standard, fall  Orthopedic Precautions: RLE toe touch weight bearing  Braces: Knee immobilizer  Respiratory Status: Room air    Occupational Performance:    Bed Mobility:    Patient completed Rolling/Turning to Right with minimum assistance and with (R) LE management.  Patient completed Supine to Sit with minimum assistance and with (R) LE management.    Functional Mobility/Transfers:  Patient completed Sit <> Stand Transfer with contact guard assistance  with  with gait belt to stand to RW.    Patient completed Bed <> Chair Transfer using Step Transfer technique with contact guard assistance with rolling walker and adhering to TTWB on (R) with cueing.  Functional Mobility: CGA with RW and TTWB on (R)    Activities of Daily Living:  Upper Body Dressing: minimum assistance      Cognitive/Visual Perceptual:  Cognitive/Psychosocial Skills:     -       Oriented to: Person, Time, and Situation   -       Follows Commands/attention:Follows one-step commands  -       Communication: clear/fluent  Visual/Perceptual:      -wears glasses, hearing  wfl    Physical Exam:  Balance:    -       (S) with EOB sitting, CGA with mobility.  Skin integrity: Visible skin intact  Edema:  None noted  Sensation:    -       Intact  Motor Planning:    -       WFL  Dominant hand:    -       Right  Upper Extremity Range of Motion:     -       Right Upper Extremity: WFL  -       Left Upper Extremity: WFL  Upper Extremity Strength:    -       Right Upper Extremity: WFL  -       Left Upper Extremity: WFL   Strength:    -       Right Upper Extremity: WFL  -       Left Upper Extremity: WFL    AMPAC 6 Click ADL:  AMPAC Total Score: 18    Treatment & Education:  OT evaluation completed. See eval for details. Pt present with decline in status due to (R)  Femur fx. Pt lives at home and reports being (I) with ADLs and mobility prior. Tx plan to focus on increasing (I) with self care with adaptive equipment assessment and training as needed.  Pt educated on OT role/POC.   Importance of OOB activity.  Importance of sitting up in the chair.  Safety during functional t/f and mobility with use of RW  Importance of assisting with self-care activities   All questions/concerns answered within OT scope of practice     Patient left up in chair with all lines intact, call button in reach, and nurse notified    GOALS:   Multidisciplinary Problems       Occupational Therapy Goals          Problem: Occupational Therapy    Goal Priority Disciplines Outcome Interventions   Occupational Therapy Goal     OT, PT/OT Ongoing, Progressing    Description: STG:  Pt will perform grooming (I)  Pt will bathe with setup  Pt will perform UE dressing with (I)  Pt will perform LE dressing with (I)/Mod I  Pt will transfer bed/chair/bsc with SBA with RW adhering to TTWB on (R)  Pt will perform standing task x 1 min with CGA adhering to TTWB on (R)                                                                                                                                                                                                                     Pt will tolerate 15 minutes of tx without fatigue      LT.Restore to max I with self care and mobility.                          History:     Past Medical History:   Diagnosis Date    Abscess of right thigh + MRSA 2021    healed    Adnexal cyst     Degenerative disc disease     Depression 10/29/2021    Diabetes mellitus     Hypertension     Hypomagnesemia 2023    Nicotine dependence     Osteoarthritis          Past Surgical History:   Procedure Laterality Date    BACK SURGERY      ECHOCARDIOGRAM,TRANSESOPHAGEAL N/A 2023    Procedure: Transesophageal echo (MONICA) intra-procedure log documentation;  Surgeon: Kaiden Sparks MD;   Location: [facility] CATH LAB;  Service: Cardiology;  Laterality: N/A;    HYSTERECTOMY N/A     INCISION AND DRAINAGE Left 3/13/2023    Procedure: INCISION AND DRAINAGE;  Surgeon: Jacek Noyola MD;  Location: [facility] OR;  Service: Orthopedics;  Laterality: Left;    IRRIGATION AND DEBRIDEMENT OF LOWER EXTREMITY Left 11/22/2022    Procedure: IRRIGATION AND DEBRIDEMENT, LOWER EXTREMITY;  Surgeon: Papa Mendoza MD;  Location: [facility] OR;  Service: Orthopedics;  Laterality: Left;    IRRIGATION AND DEBRIDEMENT OF UPPER EXTREMITY Left 11/22/2022    Procedure: IRRIGATION AND DEBRIDEMENT, UPPER EXTREMITY;  Surgeon: Papa Mendoza MD;  Location: [facility] OR;  Service: Orthopedics;  Laterality: Left;    REVISION OF KNEE ARTHROPLASTY Left 3/13/2023    Procedure: REMOVAL OF PROSTHESIS,METHYLMETHACRYLATE WITH OR WITHOUT INSERTION OF SPACER LEFT KNEE;  Surgeon: Jacek Noyola MD;  Location: [facility] OR;  Service: Orthopedics;  Laterality: Left;    REVISION OF KNEE ARTHROPLASTY Left 5/9/2023    Procedure: REVISION, ARTHROPLASTY, KNEE;  Surgeon: Jacek Noyola MD;  Location: [facility] OR;  Service: Orthopedics;  Laterality: Left;    THORACIC LAMINECTOMY WITH FUSION N/A 1/24/2023    Procedure: T9-L2 fusion, T11-T12 laminectomy and corpectomy;  Surgeon: Jimmy Hernandes MD;  Location: [facility] OR;  Service: Neurosurgery;  Laterality: N/A;    TOTAL HIP ARTHROPLASTY Left     TOTAL KNEE ARTHROPLASTY Bilateral        Time Tracking:     OT Date of Treatment: 12/09/23  OT Start Time: 0944  OT Stop Time: 1002  OT Total Time (min): 18 min    Billable Minutes:Evaluation 18    12/9/2023

## 2023-12-09 NOTE — PLAN OF CARE
Problem: Occupational Therapy  Goal: Occupational Therapy Goal  Description: STG:  Pt will perform grooming (I)  Pt will bathe with setup  Pt will perform UE dressing with (I)  Pt will perform LE dressing with (I)/Mod I  Pt will transfer bed/chair/bsc with SBA with RW adhering to TTWB on (R)  Pt will perform standing task x 1 min with CGA adhering to TTWB on (R)                                                                                                                                                                                                                     Pt will tolerate 15 minutes of tx without fatigue      LT.Restore to max I with self care and mobility.     Outcome: Ongoing, Progressing

## 2023-12-09 NOTE — CARE UPDATE
Patient awake alert without any new complaints.  Right lower extremity she moves her toes dorsiflexion plantar flexion.  Has sensation to touch.  Palpable pulses in right lower extremity.  When patient is stable with crutches she can be discharged home.  Toe-touch weight-bearing right lower extremity.  Immobilizer other than bathing.  No bending of the knee.  Toe-touch weight-bearing only.  She needs to be on Eliquis for DVT prophylaxis 2.5 mg p.o. b.i.d. for 4 weeks.  Follow-up in 2 weeks with Dr. Mendoza.

## 2023-12-09 NOTE — PLAN OF CARE
Problem: Physical Therapy  Goal: Physical Therapy Goal  Description: Short Term Goals to be met by: 23    Patient will increase functional independence with mobility by performin. Supine to sit with Modified [place]  2. Sit to stand transfer with Modified [place]  3. Bed to chair transfer with Modified [place] using Rolling Walker,  TTWB R LE  4. Gait  x 100 feet with Modified [place] using Rolling Walker,  TTWB R LE.   5. Lower extremity exercise program x30 reps per handout, with assistance as needed  6. Verbalize/demo 3/3 hip precautions    Long Term Goals to be met by: 23     1. Pt with regain full independent functional mobility to return to prior activities of daily living   Outcome: Ongoing, Progressing    PT eval completed. Please see eval note for details.

## 2023-12-09 NOTE — PT/OT/SLP EVAL
"Physical Therapy Evaluation     Patient Name: Monica Walker   MRN: [id number]  Recent Surgery: Procedure(s) (LRB):  INSERTION, INTRAMEDULLARY LAXMI, FEMUR, DISTAL, RETROGRADE (Right) 1 Day Post-Op    Recommendations:     Discharge Recommendations: Moderate Intensity Therapy   Discharge Equipment Recommendations: none   Barriers to discharge: Increased level of assist and Ongoing medical treatment    Assessment:     Monica Walker is a 53 y.o. female admitted with a medical diagnosis of  intramedullary rodding right femur s/p supracondylar femur fracture periprosthetic on right. She presents with the following impairments/functional limitations: weakness, impaired endurance, impaired functional mobility, gait instability, impaired balance, decreased lower extremity function, pain, decreased ROM, orthopedic precautions. Pt has had multiple hospitalizations this year related to spinal surgery with infection, knee replacement with infection with spacer placed and then joint revision. Pt currently has R knee immobilizer on and is able to maintain TTWB RLE.     Rehab Prognosis: Good; patient would benefit from acute PT services to address these deficits and reach maximum level of function.    Plan:     During this hospitalization, patient to be seen daily to address the above listed problems via gait training, therapeutic activities, therapeutic exercises    Plan of Care Expires: 01/09/24    Subjective     Chief Complaint: supracondylar femur fracture periprosthetic on right.   Patient Comments/Goals: agreeable to eval. "I want to do it"  Pain/Comfort:  Pain Rating 1: 9/10  Location - Side 1: Right  Location 1: leg  Pain Addressed 1: Reposition, Distraction, Nurse notified  Pain Rating Post-Intervention 1: 9/10    Social History:  Living Environment: Patient lives with their mother and sister in a single story home with ramped  Prior Level of Function: Prior to admission, patient was independent  Equipment Used at Home: " bedside commode, shower chair, grab bar, walker, rolling, wheelchair  DME owned (not currently used): none  Assistance Upon Discharge: family and facility staff    Objective:     Communicated with RN prior to session. Patient found supine with peripheral IV, knee immobilizer, cryotherapy upon PT entry to room.    General Precautions: Standard, fall   Orthopedic Precautions: RLE toe touch weight bearing   Braces: Knee immobilizer    Respiratory Status: Room air    Exams:  RLE ROM: WFL except knee immobilized  RLE Strength: Deficits: 2+/5  LLE ROM: WFL  LLE Strength: WFL  Cognitive: Patient is oriented to Person, Place, Time, Situation  Sensation:    -       Intact    Functional Mobility:  Gait belt applied - Yes  Bed Mobility  Supine to Sit: contact guard assistance and minimum assistance for LE management  Transfers  Sit to Stand: contact guard assistance with rolling walker and with cues for hand placement and foot placement  Gait  Patient ambulated 40ft with rolling walker and contact guard assistance. Patient required cues for sequencing, upright posture, and weight bearing status to increase independence and safety. Patient required cues ~ 25% of the time.  Balance  Sitting: GOOD-: Incosistently Maintains balance through MODERATE excursions of active trunk movement,     Standing: FAIR: Needs CONTACT GUARD during gait      Therapeutic Activities and Exercises:  Patient educated on role of acute care PT and PT POC, safety while in hospital including calling nurse for mobility, and call light usage.  Educated about weightbearing precautions and provided cuing for adherence as appropriate during session.  Educated about importance of OOB mobility and remaining up in chair most of the day.      AM-PAC 6 CLICK MOBILITY  Total Score:17    Patient left up in chair with all lines intact, call button in reach, and RN notified.    GOALS:   Multidisciplinary Problems       Physical Therapy Goals          Problem: Physical  Therapy    Goal Priority Disciplines Outcome Goal Variances Interventions   Physical Therapy Goal     PT, PT/OT Ongoing, Progressing     Description: Short Term Goals to be met by: 23    Patient will increase functional independence with mobility by performin. Supine to sit with Modified [place]  2. Sit to stand transfer with Modified [place]  3. Bed to chair transfer with Modified [place] using Rolling Walker,  TTWB R LE  4. Gait  x 100 feet with Modified [place] using Rolling Walker,  TTWB R LE.   5. Lower extremity exercise program x30 reps per handout, with assistance as needed  6. Verbalize/demo 3/3 hip precautions    Long Term Goals to be met by: 23     1. Pt with regain full independent functional mobility to return to prior activities of daily living                        History:     Past Medical History:   Diagnosis Date    Abscess of right thigh + MRSA 2021    healed    Adnexal cyst     Degenerative disc disease     Depression 10/29/2021    Diabetes mellitus     Hypertension     Hypomagnesemia 2023    Nicotine dependence     Osteoarthritis        Past Surgical History:   Procedure Laterality Date    BACK SURGERY      ECHOCARDIOGRAM,TRANSESOPHAGEAL N/A 2023    Procedure: Transesophageal echo (MONICA) intra-procedure log documentation;  Surgeon: Kaiden Sparks MD;  Location: [facility] CATH LAB;  Service: Cardiology;  Laterality: N/A;    HYSTERECTOMY N/A     INCISION AND DRAINAGE Left 3/13/2023    Procedure: INCISION AND DRAINAGE;  Surgeon: Jacek Noyola MD;  Location: [facility] OR;  Service: Orthopedics;  Laterality: Left;    IRRIGATION AND DEBRIDEMENT OF LOWER EXTREMITY Left 2022    Procedure: IRRIGATION AND DEBRIDEMENT, LOWER EXTREMITY;  Surgeon: Papa Mendoza MD;  Location: [facility] OR;  Service: Orthopedics;  Laterality: Left;    IRRIGATION AND DEBRIDEMENT OF UPPER EXTREMITY Left 2022    Procedure: IRRIGATION AND  DEBRIDEMENT, UPPER EXTREMITY;  Surgeon: Papa Mendoza MD;  Location: [facility] OR;  Service: Orthopedics;  Laterality: Left;    REVISION OF KNEE ARTHROPLASTY Left 3/13/2023    Procedure: REMOVAL OF PROSTHESIS,METHYLMETHACRYLATE WITH OR WITHOUT INSERTION OF SPACER LEFT KNEE;  Surgeon: Jacek Noyola MD;  Location: [facility] OR;  Service: Orthopedics;  Laterality: Left;    REVISION OF KNEE ARTHROPLASTY Left 5/9/2023    Procedure: REVISION, ARTHROPLASTY, KNEE;  Surgeon: Jacek Noyola MD;  Location: [facility] OR;  Service: Orthopedics;  Laterality: Left;    THORACIC LAMINECTOMY WITH FUSION N/A 1/24/2023    Procedure: T9-L2 fusion, T11-T12 laminectomy and corpectomy;  Surgeon: Jimmy Hernandes MD;  Location: [facility] OR;  Service: Neurosurgery;  Laterality: N/A;    TOTAL HIP ARTHROPLASTY Left     TOTAL KNEE ARTHROPLASTY Bilateral        Time Tracking:     PT Received On: 12/09/23  PT Start Time: 0945  PT Stop Time: 1002  PT Total Time (min): 17 min     Billable Minutes: Evaluation moderate complexity    12/9/2023

## 2023-12-09 NOTE — PROGRESS NOTES
[facility]  [facility] Medicine  Progress Note    Patient Name: Monica Walker  MRN: [id number]  Patient Class: IP- Inpatient   Admission Date: 12/7/2023  Length of Stay: 2 days  Attending Physician: Cecil Chappell MD  Primary Care Provider: Mari Myers MD        Subjective:     Principal Problem:Closed bicondylar fracture of distal humerus, right, initial encounter        HPI:  Patient is a 53-year-old female with past medical history of diabetes mellitus, osteoarthritis, smoking, depression, MRSA bacteremia complicated by vertebral osteomyelitis and left knee infection, who presents to the emergency room with her mother for evaluation of right hip pain after she sustained a fall at home.  Patient is in a lot of pain, therefore most of the history is given by her mother, who tells me that patient was out in the woods deer hunting when she had a mechanical fall resulting in her right hip fracture.  Patient was shouting in pain and therefore the mother went to see her and called for medical assistance.  Currently the patient is saying that her pain is not controlled, tells me that she recently completed her IV antibiotics and is taking oral ciprofloxacin as recommended by her physician.  Otherwise she denies having any chest pain fevers chills nausea vomiting diarrhea shortness of breath dysuria or lightheadedness or syncope.  She does have significant amount of pain at the right hip joint.    Overview/Hospital Course:  No notes on file    Interval History:     NAEO  Pt wanting to go home, however mother can not do that since she says she has no help. Sugars running high, will treat w/ sliding scale and increase long acting.     Review of Systems   Constitutional: Negative.  Negative for chills, fatigue and fever.   HENT: Negative.  Negative for congestion and rhinorrhea.    Respiratory:  Negative for apnea, cough, chest tightness and shortness of breath.    Cardiovascular: Negative.   Negative for chest pain and leg swelling.   Gastrointestinal: Negative.  Negative for abdominal pain, diarrhea, nausea and vomiting.   Endocrine: Negative.    Genitourinary: Negative.    Musculoskeletal:  Positive for arthralgias and gait problem.   Skin: Negative.    Allergic/Immunologic: Negative.    Hematological: Negative.    Psychiatric/Behavioral: Negative.       Objective:     Vital Signs (Most Recent):  Temp: 97.2 °F (36.2 °C) (12/09/23 0600)  Pulse: 76 (12/09/23 0600)  Resp: 20 (12/09/23 0600)  BP: (!) 143/62 (12/09/23 0600)  SpO2: (!) 94 % (12/09/23 0600) Vital Signs (24h Range):  Temp:  [97.2 °F (36.2 °C)-98.5 °F (36.9 °C)] 97.2 °F (36.2 °C)  Pulse:  [70-94] 76  Resp:  [15-20] 20  SpO2:  [88 %-100 %] 94 %  BP: (122-183)/(62-86) 143/62     Weight: 79.4 kg (175 lb)  Body mass index is 28.25 kg/m².    Intake/Output Summary (Last 24 hours) at 12/9/2023 0937  Last data filed at 12/8/2023 1848  Gross per 24 hour   Intake --   Output 1400 ml   Net -1400 ml           Physical Exam  Constitutional:       General: She is not in acute distress.     Appearance: Normal appearance. She is not ill-appearing.   HENT:      Head: Normocephalic and atraumatic.      Mouth/Throat:      Mouth: Mucous membranes are moist.   Eyes:      General: No scleral icterus.  Cardiovascular:      Rate and Rhythm: Normal rate.      Pulses: Normal pulses.   Pulmonary:      Effort: Pulmonary effort is normal. No respiratory distress.   Abdominal:      General: Abdomen is flat.      Palpations: Abdomen is soft.   Musculoskeletal:         General: Tenderness present.   Skin:     General: Skin is warm and dry.   Neurological:      General: No focal deficit present.             Significant Labs: All pertinent labs within the past 24 hours have been reviewed.    Significant Imaging: I have reviewed all pertinent imaging results/findings within the past 24 hours.    Assessment/Plan:      * Closed bicondylar fracture of distal humerus, right,  "initial encounter  Ortho consulted, will likely need surgical intervention  Pt has had a tte and rhys in the last 6 months and w/o any over abnormality, she denies any cardiopulmonary symptoms, she is at low to moderate risk of surgery  Pain control  Pt.ot  Dvt ppx      DM2 (diabetes mellitus, type 2)  Patient's FSGs are controlled on current medication regimen.  Last A1c reviewed-   Lab Results   Component Value Date    HGBA1C 7.7 (H) 05/09/2023     Most recent fingerstick glucose reviewed- No results for input(s): "POCTGLUCOSE" in the last 24 hours.  Current correctional scale  Medium  Maintain anti-hyperglycemic dose as follows-   Antihyperglycemics (From admission, onward)      Start     Stop Route Frequency Ordered    12/07/23 2100  insulin detemir U-100 injection 10 Units         -- SubQ Nightly 12/07/23 1733    12/07/23 1835  insulin aspart U-100 injection 0-10 Units         -- SubQ Before meals & nightly PRN 12/07/23 1735          Hold Oral hypoglycemics while patient is in the hospital.    Arthritis  Pain control      Depression  Euthymic  Cont home medicine      VTE Risk Mitigation (From admission, onward)           Ordered     apixaban tablet 2.5 mg  2 times daily         12/08/23 1126     IP VTE HIGH RISK PATIENT  Once         12/08/23 1122     Place DONOVAN hose  Until discontinued         12/08/23 1122     Place sequential compression device  Until discontinued         12/07/23 1733                    Discharge Planning   SUZANNA: 12/9/2023     Code Status: Full Code   Is the patient medically ready for discharge?:     Reason for patient still in hospital (select all that apply): Treatment  Discharge Plan A: Home with family, Home Health                  Rehmat TIARA Chappell MD  Department of Hospital Medicine   [facility]    "

## 2023-12-09 NOTE — SUBJECTIVE & OBJECTIVE
Interval History:     RAJIV  Pt wanting to go home, however mother can not do that since she says she has no help. Sugars running high, will treat w/ sliding scale and increase long acting.     Review of Systems   Constitutional: Negative.  Negative for chills, fatigue and fever.   HENT: Negative.  Negative for congestion and rhinorrhea.    Respiratory:  Negative for apnea, cough, chest tightness and shortness of breath.    Cardiovascular: Negative.  Negative for chest pain and leg swelling.   Gastrointestinal: Negative.  Negative for abdominal pain, diarrhea, nausea and vomiting.   Endocrine: Negative.    Genitourinary: Negative.    Musculoskeletal:  Positive for arthralgias and gait problem.   Skin: Negative.    Allergic/Immunologic: Negative.    Hematological: Negative.    Psychiatric/Behavioral: Negative.       Objective:     Vital Signs (Most Recent):  Temp: 97.2 °F (36.2 °C) (12/09/23 0600)  Pulse: 76 (12/09/23 0600)  Resp: 20 (12/09/23 0600)  BP: (!) 143/62 (12/09/23 0600)  SpO2: (!) 94 % (12/09/23 0600) Vital Signs (24h Range):  Temp:  [97.2 °F (36.2 °C)-98.5 °F (36.9 °C)] 97.2 °F (36.2 °C)  Pulse:  [70-94] 76  Resp:  [15-20] 20  SpO2:  [88 %-100 %] 94 %  BP: (122-183)/(62-86) 143/62     Weight: 79.4 kg (175 lb)  Body mass index is 28.25 kg/m².    Intake/Output Summary (Last 24 hours) at 12/9/2023 0937  Last data filed at 12/8/2023 1848  Gross per 24 hour   Intake --   Output 1400 ml   Net -1400 ml           Physical Exam  Constitutional:       General: She is not in acute distress.     Appearance: Normal appearance. She is not ill-appearing.   HENT:      Head: Normocephalic and atraumatic.      Mouth/Throat:      Mouth: Mucous membranes are moist.   Eyes:      General: No scleral icterus.  Cardiovascular:      Rate and Rhythm: Normal rate.      Pulses: Normal pulses.   Pulmonary:      Effort: Pulmonary effort is normal. No respiratory distress.   Abdominal:      General: Abdomen is flat.      Palpations:  Abdomen is soft.   Musculoskeletal:         General: Tenderness present.   Skin:     General: Skin is warm and dry.   Neurological:      General: No focal deficit present.             Significant Labs: All pertinent labs within the past 24 hours have been reviewed.    Significant Imaging: I have reviewed all pertinent imaging results/findings within the past 24 hours.

## 2023-12-10 LAB
ALBUMIN SERPL BCP-MCNC: 2.3 G/DL (ref 3.5–5)
ALBUMIN/GLOB SERPL: 0.7 {RATIO}
ALP SERPL-CCNC: 101 U/L (ref 46–118)
ALT SERPL W P-5'-P-CCNC: 8 U/L (ref 13–56)
ANION GAP SERPL CALCULATED.3IONS-SCNC: 9 MMOL/L (ref 7–16)
AST SERPL W P-5'-P-CCNC: 5 U/L (ref 15–37)
BASOPHILS # BLD AUTO: 0.06 K/UL (ref 0–0.2)
BASOPHILS NFR BLD AUTO: 0.7 % (ref 0–1)
BILIRUB SERPL-MCNC: 0.3 MG/DL (ref ?–1.2)
BUN SERPL-MCNC: 19 MG/DL (ref 7–18)
BUN/CREAT SERPL: 28 (ref 6–20)
CALCIUM SERPL-MCNC: 8 MG/DL (ref 8.5–10.1)
CHLORIDE SERPL-SCNC: 107 MMOL/L (ref 98–107)
CO2 SERPL-SCNC: 26 MMOL/L (ref 21–32)
CREAT SERPL-MCNC: 0.69 MG/DL (ref 0.55–1.02)
DIFFERENTIAL METHOD BLD: ABNORMAL
EGFR (NO RACE VARIABLE) (RUSH/TITUS): 103 ML/MIN/1.73M2
EOSINOPHIL # BLD AUTO: 0.39 K/UL (ref 0–0.5)
EOSINOPHIL NFR BLD AUTO: 4.5 % (ref 1–4)
ERYTHROCYTE [DISTWIDTH] IN BLOOD BY AUTOMATED COUNT: 18.7 % (ref 11.5–14.5)
GLOBULIN SER-MCNC: 3.5 G/DL (ref 2–4)
GLUCOSE SERPL-MCNC: 207 MG/DL (ref 74–106)
GLUCOSE SERPL-MCNC: 258 MG/DL (ref 70–105)
GLUCOSE SERPL-MCNC: 265 MG/DL (ref 70–105)
GLUCOSE SERPL-MCNC: 348 MG/DL (ref 70–105)
GLUCOSE SERPL-MCNC: 386 MG/DL (ref 70–105)
HCT VFR BLD AUTO: 25.2 % (ref 38–47)
HGB BLD-MCNC: 7.8 G/DL (ref 12–16)
IMM GRANULOCYTES # BLD AUTO: 0.03 K/UL (ref 0–0.04)
IMM GRANULOCYTES NFR BLD: 0.3 % (ref 0–0.4)
LYMPHOCYTES # BLD AUTO: 2.5 K/UL (ref 1–4.8)
LYMPHOCYTES NFR BLD AUTO: 28.5 % (ref 27–41)
MCH RBC QN AUTO: 23.9 PG (ref 27–31)
MCHC RBC AUTO-ENTMCNC: 31 G/DL (ref 32–36)
MCV RBC AUTO: 77.3 FL (ref 80–96)
MONOCYTES # BLD AUTO: 0.76 K/UL (ref 0–0.8)
MONOCYTES NFR BLD AUTO: 8.7 % (ref 2–6)
MPC BLD CALC-MCNC: 10.9 FL (ref 9.4–12.4)
NEUTROPHILS # BLD AUTO: 5.02 K/UL (ref 1.8–7.7)
NEUTROPHILS NFR BLD AUTO: 57.3 % (ref 53–65)
NRBC # BLD AUTO: 0 X10E3/UL
NRBC, AUTO (.00): 0 %
PLATELET # BLD AUTO: 196 K/UL (ref 150–400)
POTASSIUM SERPL-SCNC: 3.9 MMOL/L (ref 3.5–5.1)
PROT SERPL-MCNC: 5.8 G/DL (ref 6.4–8.2)
RBC # BLD AUTO: 3.26 M/UL (ref 4.2–5.4)
SODIUM SERPL-SCNC: 138 MMOL/L (ref 136–145)
WBC # BLD AUTO: 8.76 K/UL (ref 4.5–11)

## 2023-12-10 PROCEDURE — 63600175 PHARM REV CODE 636 W HCPCS: Performed by: ORTHOPAEDIC SURGERY

## 2023-12-10 PROCEDURE — 25000003 PHARM REV CODE 250: Performed by: ORTHOPAEDIC SURGERY

## 2023-12-10 PROCEDURE — 99232 SBSQ HOSP IP/OBS MODERATE 35: CPT | Mod: ,,, | Performed by: INTERNAL MEDICINE

## 2023-12-10 PROCEDURE — 85025 COMPLETE CBC W/AUTO DIFF WBC: CPT | Performed by: INTERNAL MEDICINE

## 2023-12-10 PROCEDURE — 80053 COMPREHEN METABOLIC PANEL: CPT | Performed by: INTERNAL MEDICINE

## 2023-12-10 PROCEDURE — 63600175 PHARM REV CODE 636 W HCPCS: Performed by: INTERNAL MEDICINE

## 2023-12-10 PROCEDURE — 82962 GLUCOSE BLOOD TEST: CPT

## 2023-12-10 PROCEDURE — 97110 THERAPEUTIC EXERCISES: CPT

## 2023-12-10 PROCEDURE — 25000003 PHARM REV CODE 250: Performed by: INTERNAL MEDICINE

## 2023-12-10 PROCEDURE — 11000001 HC ACUTE MED/SURG PRIVATE ROOM

## 2023-12-10 PROCEDURE — 99232 PR SUBSEQUENT HOSPITAL CARE,LEVL II: ICD-10-PCS | Mod: ,,, | Performed by: INTERNAL MEDICINE

## 2023-12-10 PROCEDURE — 94761 N-INVAS EAR/PLS OXIMETRY MLT: CPT

## 2023-12-10 RX ADMIN — MORPHINE SULFATE 4 MG: 4 INJECTION, SOLUTION INTRAMUSCULAR; INTRAVENOUS at 05:12

## 2023-12-10 RX ADMIN — DULOXETINE HYDROCHLORIDE 60 MG: 30 CAPSULE, DELAYED RELEASE ORAL at 09:12

## 2023-12-10 RX ADMIN — APIXABAN 2.5 MG: 2.5 TABLET, FILM COATED ORAL at 09:12

## 2023-12-10 RX ADMIN — DOCUSATE SODIUM 100 MG: 100 CAPSULE, LIQUID FILLED ORAL at 09:12

## 2023-12-10 RX ADMIN — SENNOSIDES AND DOCUSATE SODIUM 1 TABLET: 8.6; 5 TABLET ORAL at 09:12

## 2023-12-10 RX ADMIN — INSULIN DETEMIR 20 UNITS: 100 INJECTION, SOLUTION SUBCUTANEOUS at 09:12

## 2023-12-10 RX ADMIN — INSULIN ASPART 8 UNITS: 100 INJECTION, SOLUTION INTRAVENOUS; SUBCUTANEOUS at 09:12

## 2023-12-10 RX ADMIN — SODIUM CHLORIDE: 9 INJECTION, SOLUTION INTRAVENOUS at 06:12

## 2023-12-10 RX ADMIN — CIPROFLOXACIN HYDROCHLORIDE 500 MG: 500 TABLET, FILM COATED ORAL at 09:12

## 2023-12-10 RX ADMIN — MUPIROCIN: 20 OINTMENT TOPICAL at 09:12

## 2023-12-10 RX ADMIN — MORPHINE SULFATE 4 MG: 4 INJECTION, SOLUTION INTRAMUSCULAR; INTRAVENOUS at 09:12

## 2023-12-10 RX ADMIN — MORPHINE SULFATE 4 MG: 4 INJECTION, SOLUTION INTRAMUSCULAR; INTRAVENOUS at 11:12

## 2023-12-10 RX ADMIN — POLYETHYLENE GLYCOL 3350 17 G: 17 POWDER, FOR SOLUTION ORAL at 09:12

## 2023-12-10 RX ADMIN — Medication 6 MG: at 09:12

## 2023-12-10 RX ADMIN — MORPHINE SULFATE 4 MG: 4 INJECTION, SOLUTION INTRAMUSCULAR; INTRAVENOUS at 06:12

## 2023-12-10 NOTE — CARE UPDATE
Patient awake alert without any new complaints.  Right lower extremity neurovascularly unchanged distally.  Continue PT. we will change dressing tomorrow.  DC home when patient stable with therapy.

## 2023-12-10 NOTE — PLAN OF CARE
MICHEAL consulted for GABE.  Spoke with pt who gave Choice for [facility]. A voicemail message was left for GABE Connors coordinator at [facility] informing of referral.  Packet started and left in office.  SS following.

## 2023-12-10 NOTE — PT/OT/SLP PROGRESS
Physical Therapy Treatment    Patient Name:  Monica Walker   MRN:  [id number]    Recommendations:     Discharge Recommendations: Moderate Intensity Therapy  Discharge Equipment Recommendations: none  Barriers to discharge:  ongoing medical care    Assessment:     Monica Walker is a 53 y.o. female admitted with a medical diagnosis of Closed bicondylar fracture of distal humerus, right, initial encounter.  She presents with the following impairments/functional limitations: weakness, impaired endurance, impaired functional mobility, gait instability, impaired balance, decreased lower extremity function, pain, decreased ROM, orthopedic precautions.   Pt reports having a bad night and day while experiencing severe pain. Pt requested to have pain medication switched to Percocet, JENNIFER Tipton RN notified. Pt declined all attempts for out of bed activity d/t severity of pain.     Rehab Prognosis: Good; patient would benefit from acute skilled PT services to address these deficits and reach maximum level of function.    Recent Surgery: Procedure(s) (LRB):  INSERTION, INTRAMEDULLARY LAXMI, FEMUR, DISTAL, RETROGRADE (Right) 2 Days Post-Op    Plan:     During this hospitalization, patient to be seen daily to address the identified rehab impairments via gait training, therapeutic activities, therapeutic exercises and progress toward the following goals:    Plan of Care Expires:  01/09/24    Subjective     Chief Complaint: R femur fracture  Patient/Family Comments/goals: agreeable to supine ex only  Pain/Comfort: 10/10 RLE         Objective:     Communicated with JENNIFER Tipton RN prior to session.  Patient found supine with peripheral IV, knee immobilizer, cryotherapy upon PT entry to room.     General Precautions: Standard, fall  Orthopedic Precautions: RLE toe touch weight bearing  Braces: Knee immobilizer  Respiratory Status: Room air     Functional Mobility:  Bed Mobility:     Rolling Left:  contact guard assistance and minimum  assistance  Rolling Right: contact guard assistance  Bridging: contact guard assistance      AM-PAC 6 CLICK MOBILITY          Treatment & Education:  Bilateral lower extremity exercise x 15 reps: ankle pumps, Quad sets, glut sets, hip abduction/adduction, straight leg raises, and bridging with active assist ROM, verbal cues for sequencing and safety, and tactile cues     Patient left supine with all lines intact, call button in reach, and RN present..    GOALS:   Multidisciplinary Problems       Physical Therapy Goals          Problem: Physical Therapy    Goal Priority Disciplines Outcome Goal Variances Interventions   Physical Therapy Goal     PT, PT/OT Ongoing, Progressing     Description: Short Term Goals to be met by: 23    Patient will increase functional independence with mobility by performin. Supine to sit with Modified [place]  2. Sit to stand transfer with Modified [place]  3. Bed to chair transfer with Modified [place] using Rolling Walker,  TTWB R LE  4. Gait  x 100 feet with Modified [place] using Rolling Walker,  TTWB R LE.   5. Lower extremity exercise program x30 reps per handout, with assistance as needed  6. Verbalize/demo 3/3 hip precautions    Long Term Goals to be met by: 23     1. Pt with regain full independent functional mobility to return to prior activities of daily living                        Time Tracking:     PT Received On: 12/10/23  PT Start Time: 1525     PT Stop Time: 1540  PT Total Time (min): 15 min     Billable Minutes: Therapeutic Exercise 9    Treatment Type: Evaluation  PT/PTA: PT           12/10/2023

## 2023-12-10 NOTE — PT/OT/SLP PROGRESS
Occupational Therapy   Treatment    Name: Monica Walker  MRN: [id number]  Admitting Diagnosis:  Closed bicondylar fracture of distal humerus, right, initial encounter  2 Days Post-Op    Recommendations:     Discharge Recommendations: Moderate Intensity Therapy  Discharge Equipment Recommendations:   (To be determined)  Barriers to discharge:  None    Assessment:     Monica Walker is a 53 y.o. female with a medical diagnosis of Closed bicondylar fracture of distal humerus, right, initial encounter.  She presents with complaint of (R) Leg pain. Pt agreed to UE exercises. Performance deficits affecting function are weakness, impaired endurance, pain, decreased upper extremity function.     Rehab Prognosis:  Good; patient would benefit from acute skilled OT services to address these deficits and reach maximum level of function.       Plan:     Patient to be seen 5 x/week to address the above listed problems via self-care/home management, therapeutic activities, therapeutic exercises  Plan of Care Expires:  1/13/24  Plan of Care Reviewed with: patient    Subjective     Chief Complaint: Insertion Intramedullary Jose Femur Retrograde (R)  Patient/Family Comments/goals:   Pain/Comfort:  Pain Rating 1: 8/10  Location - Side 1: Right  Location 1: leg  Pain Addressed 1: Reposition, Distraction  Pain Rating Post-Intervention 1: 8/10    Objective:     Communicated with: HAYDEE Tipton prior to session.  Patient found HOB elevated with knee immobilizer, peripheral IV upon OT entry to room.    General Precautions: Standard, fall    Orthopedic Precautions:RLE toe touch weight bearing  Braces: Knee immobilizer  Respiratory Status: Room air     Occupational Performance:     Bed Mobility:         Functional Mobility/Transfers:    Functional Mobility:     Activities of Daily Living:  Lower Body Dressing: Pt donned (L) sock with sockaid with min a ( min a also required for setup.        [facility] 6 Click ADL:      Treatment &  Education:  Pt performed UE strengthening exercises.   Red theraband x 3 sets of 10 reps (R) shoulder flexion/extension and (B) shoulder adduction/abduction (L) shoulder NT due to pain (pt has a rotator cuff injury).  Red theraband x 3 sets of 10 reps (B) elbow flexion and extension. 2 lb hand wt x 3 set of 10 reps (R) elbow flexion/extension and (B) wrist flexion/extension. Hand exerciser x 2 sets of 25 reps (B) x 3 bands.    Pt participated well with exercises. Plan is to continue tx     Patient left HOB elevated with all lines intact and call button in reach    GOALS:   Multidisciplinary Problems       Occupational Therapy Goals          Problem: Occupational Therapy    Goal Priority Disciplines Outcome Interventions   Occupational Therapy Goal     OT, PT/OT Ongoing, Progressing    Description: STG:  Pt will perform grooming (I)  Pt will bathe with setup  Pt will perform UE dressing with (I)  Pt will perform LE dressing with (I)/Mod I  Pt will transfer bed/chair/bsc with SBA with RW adhering to TTWB on (R)  Pt will perform standing task x 1 min with CGA adhering to TTWB on (R)                                                                                                                                                                                                                     Pt will tolerate 15 minutes of tx without fatigue      LT.Restore to max I with self care and mobility.                          Time Tracking:     OT Date of Treatment: 12/10/23  OT Start Time:   OT Stop Time:   OT Total Time (min): 25 min    Billable Minutes:Therapeutic Exercise 20    OT/TRISH: OT          12/10/2023

## 2023-12-10 NOTE — PROGRESS NOTES
[facility]  [facility] Medicine  Progress Note    Patient Name: Monica Walker  MRN: [id number]  Patient Class: IP- Inpatient   Admission Date: 12/7/2023  Length of Stay: 3 days  Attending Physician: Cecil Chappell MD  Primary Care Provider: Mari Myers MD        Subjective:     Principal Problem:Closed bicondylar fracture of distal humerus, right, initial encounter        HPI:  Patient is a 53-year-old female with past medical history of diabetes mellitus, osteoarthritis, smoking, depression, MRSA bacteremia complicated by vertebral osteomyelitis and left knee infection, who presents to the emergency room with her mother for evaluation of right hip pain after she sustained a fall at home.  Patient is in a lot of pain, therefore most of the history is given by her mother, who tells me that patient was out in the woods deer hunting when she had a mechanical fall resulting in her right hip fracture.  Patient was shouting in pain and therefore the mother went to see her and called for medical assistance.  Currently the patient is saying that her pain is not controlled, tells me that she recently completed her IV antibiotics and is taking oral ciprofloxacin as recommended by her physician.  Otherwise she denies having any chest pain fevers chills nausea vomiting diarrhea shortness of breath dysuria or lightheadedness or syncope.  She does have significant amount of pain at the right hip joint.    Overview/Hospital Course:  No notes on file    Interval History:     NAEO  Pt resting well, pt now agreeble to go to [facility].     Review of Systems   Constitutional: Negative.  Negative for chills, fatigue and fever.   HENT: Negative.  Negative for congestion and rhinorrhea.    Respiratory:  Negative for apnea, cough, chest tightness and shortness of breath.    Cardiovascular: Negative.  Negative for chest pain and leg swelling.   Gastrointestinal: Negative.  Negative for abdominal pain, diarrhea,  nausea and vomiting.   Endocrine: Negative.    Genitourinary: Negative.    Musculoskeletal:  Positive for arthralgias and gait problem.   Skin: Negative.    Allergic/Immunologic: Negative.    Hematological: Negative.    Psychiatric/Behavioral: Negative.       Objective:     Vital Signs (Most Recent):  Temp: 98 °F (36.7 °C) (12/10/23 0601)  Pulse: 71 (12/10/23 0601)  Resp: (!) 22 (12/10/23 0645)  BP: (!) 136/52 (12/10/23 0601)  SpO2: 95 % (12/10/23 0601) Vital Signs (24h Range):  Temp:  [98 °F (36.7 °C)-98.4 °F (36.9 °C)] 98 °F (36.7 °C)  Pulse:  [64-82] 71  Resp:  [19-22] 22  SpO2:  [90 %-95 %] 95 %  BP: (124-145)/(49-67) 136/52     Weight: 79.4 kg (175 lb)  Body mass index is 28.25 kg/m².    Intake/Output Summary (Last 24 hours) at 12/10/2023 0833  Last data filed at 12/9/2023 1900  Gross per 24 hour   Intake --   Output 600 ml   Net -600 ml           Physical Exam  Constitutional:       General: She is not in acute distress.     Appearance: Normal appearance. She is not ill-appearing.   HENT:      Head: Normocephalic and atraumatic.      Mouth/Throat:      Mouth: Mucous membranes are moist.   Eyes:      General: No scleral icterus.  Cardiovascular:      Rate and Rhythm: Normal rate.      Pulses: Normal pulses.   Pulmonary:      Effort: Pulmonary effort is normal. No respiratory distress.   Abdominal:      General: Abdomen is flat.      Palpations: Abdomen is soft.   Musculoskeletal:         General: Tenderness present.   Skin:     General: Skin is warm and dry.   Neurological:      General: No focal deficit present.             Significant Labs: All pertinent labs within the past 24 hours have been reviewed.    Significant Imaging: I have reviewed all pertinent imaging results/findings within the past 24 hours.    Assessment/Plan:      * Closed bicondylar fracture of distal humerus, right, initial encounter  Ortho consulted, will likely need surgical intervention  Pt has had a tte and rhys in the last 6 months and  "w/o any over abnormality, she denies any cardiopulmonary symptoms, she is at low to moderate risk of surgery  Pain control  Pt.ot  Dvt ppx      DM2 (diabetes mellitus, type 2)  Patient's FSGs are controlled on current medication regimen.  Last A1c reviewed-   Lab Results   Component Value Date    HGBA1C 7.7 (H) 05/09/2023     Most recent fingerstick glucose reviewed- No results for input(s): "POCTGLUCOSE" in the last 24 hours.  Current correctional scale  Medium  Maintain anti-hyperglycemic dose as follows-   Antihyperglycemics (From admission, onward)      Start     Stop Route Frequency Ordered    12/07/23 2100  insulin detemir U-100 injection 10 Units         -- SubQ Nightly 12/07/23 1733    12/07/23 1835  insulin aspart U-100 injection 0-10 Units         -- SubQ Before meals & nightly PRN 12/07/23 1735          Hold Oral hypoglycemics while patient is in the hospital.    Arthritis  Pain control      Depression  Euthymic  Cont home medicine      VTE Risk Mitigation (From admission, onward)           Ordered     apixaban tablet 2.5 mg  2 times daily         12/08/23 1126     IP VTE HIGH RISK PATIENT  Once         12/08/23 1122     Place DONOVAN hose  Until discontinued         12/08/23 1122     Place sequential compression device  Until discontinued         12/07/23 1733                    Discharge Planning   SUZANNA: 12/9/2023     Code Status: Full Code   Is the patient medically ready for discharge?:     Reason for patient still in hospital (select all that apply): Treatment  Discharge Plan A: Home with family, Home Health                  Rehmat U MD Ta  Department of Hospital Medicine   [facility]    "

## 2023-12-10 NOTE — SUBJECTIVE & OBJECTIVE
Interval History:     NAEO  Pt resting well, pt now agreeble to go to [facility].     Review of Systems   Constitutional: Negative.  Negative for chills, fatigue and fever.   HENT: Negative.  Negative for congestion and rhinorrhea.    Respiratory:  Negative for apnea, cough, chest tightness and shortness of breath.    Cardiovascular: Negative.  Negative for chest pain and leg swelling.   Gastrointestinal: Negative.  Negative for abdominal pain, diarrhea, nausea and vomiting.   Endocrine: Negative.    Genitourinary: Negative.    Musculoskeletal:  Positive for arthralgias and gait problem.   Skin: Negative.    Allergic/Immunologic: Negative.    Hematological: Negative.    Psychiatric/Behavioral: Negative.       Objective:     Vital Signs (Most Recent):  Temp: 98 °F (36.7 °C) (12/10/23 0601)  Pulse: 71 (12/10/23 0601)  Resp: (!) 22 (12/10/23 0645)  BP: (!) 136/52 (12/10/23 0601)  SpO2: 95 % (12/10/23 0601) Vital Signs (24h Range):  Temp:  [98 °F (36.7 °C)-98.4 °F (36.9 °C)] 98 °F (36.7 °C)  Pulse:  [64-82] 71  Resp:  [19-22] 22  SpO2:  [90 %-95 %] 95 %  BP: (124-145)/(49-67) 136/52     Weight: 79.4 kg (175 lb)  Body mass index is 28.25 kg/m².    Intake/Output Summary (Last 24 hours) at 12/10/2023 0833  Last data filed at 12/9/2023 1900  Gross per 24 hour   Intake --   Output 600 ml   Net -600 ml           Physical Exam  Constitutional:       General: She is not in acute distress.     Appearance: Normal appearance. She is not ill-appearing.   HENT:      Head: Normocephalic and atraumatic.      Mouth/Throat:      Mouth: Mucous membranes are moist.   Eyes:      General: No scleral icterus.  Cardiovascular:      Rate and Rhythm: Normal rate.      Pulses: Normal pulses.   Pulmonary:      Effort: Pulmonary effort is normal. No respiratory distress.   Abdominal:      General: Abdomen is flat.      Palpations: Abdomen is soft.   Musculoskeletal:         General: Tenderness present.   Skin:     General: Skin is warm and dry.    Neurological:      General: No focal deficit present.             Significant Labs: All pertinent labs within the past 24 hours have been reviewed.    Significant Imaging: I have reviewed all pertinent imaging results/findings within the past 24 hours.

## 2023-12-10 NOTE — PLAN OF CARE
Problem: Adult Inpatient Plan of Care  Goal: Absence of Hospital-Acquired Illness or Injury  Outcome: Ongoing, Progressing     Problem: Adult Inpatient Plan of Care  Goal: Optimal Comfort and Wellbeing  Outcome: Ongoing, Progressing     Problem: Infection  Goal: Absence of Infection Signs and Symptoms  Outcome: Ongoing, Progressing     Problem: Fall Injury Risk  Goal: Absence of Fall and Fall-Related Injury  Outcome: Ongoing, Progressing

## 2023-12-11 PROBLEM — J85.2 LUNG ABSCESS: Status: RESOLVED | Noted: 2022-11-20 | Resolved: 2023-12-11

## 2023-12-11 PROBLEM — T84.54XA INFECTION OF PROSTHETIC LEFT KNEE JOINT: Status: RESOLVED | Noted: 2023-05-09 | Resolved: 2023-12-11

## 2023-12-11 PROBLEM — M25.552 LEFT HIP PAIN: Status: RESOLVED | Noted: 2023-01-30 | Resolved: 2023-12-11

## 2023-12-11 PROBLEM — D50.9 MICROCYTIC ANEMIA: Status: RESOLVED | Noted: 2023-01-19 | Resolved: 2023-12-11

## 2023-12-11 PROBLEM — Z72.0 TOBACCO USE: Status: RESOLVED | Noted: 2023-05-09 | Resolved: 2023-12-11

## 2023-12-11 PROBLEM — B99.9: Status: RESOLVED | Noted: 2023-05-09 | Resolved: 2023-12-11

## 2023-12-11 PROBLEM — D75.839 THROMBOCYTOSIS: Status: RESOLVED | Noted: 2023-02-05 | Resolved: 2023-12-11

## 2023-12-11 PROBLEM — M00.062 STAPHYLOCOCCAL ARTHRITIS OF LEFT KNEE: Status: RESOLVED | Noted: 2023-01-04 | Resolved: 2023-12-11

## 2023-12-11 PROBLEM — M86.9 OSTEOMYELITIS OF LEG: Status: RESOLVED | Noted: 2023-08-11 | Resolved: 2023-12-11

## 2023-12-11 PROBLEM — R68.83 SHAKING CHILLS: Status: RESOLVED | Noted: 2023-05-11 | Resolved: 2023-12-11

## 2023-12-11 PROBLEM — Z96.652 HISTORY OF TOTAL LEFT KNEE REPLACEMENT: Status: RESOLVED | Noted: 2023-05-11 | Resolved: 2023-12-11

## 2023-12-11 PROBLEM — M46.25: Status: RESOLVED | Noted: 2023-06-09 | Resolved: 2023-12-11

## 2023-12-11 PROBLEM — Z96.652 STATUS POST TOTAL LEFT KNEE REPLACEMENT: Status: RESOLVED | Noted: 2023-04-26 | Resolved: 2023-12-11

## 2023-12-11 PROBLEM — A49.02 MRSA (METHICILLIN RESISTANT STAPHYLOCOCCUS AUREUS): Status: RESOLVED | Noted: 2023-08-11 | Resolved: 2023-12-11

## 2023-12-11 PROBLEM — I10 PRIMARY HYPERTENSION: Status: RESOLVED | Noted: 2023-01-27 | Resolved: 2023-12-11

## 2023-12-11 LAB
ALBUMIN SERPL BCP-MCNC: 2.4 G/DL (ref 3.5–5)
ALBUMIN/GLOB SERPL: 0.6 {RATIO}
ALP SERPL-CCNC: 111 U/L (ref 46–118)
ALT SERPL W P-5'-P-CCNC: 9 U/L (ref 13–56)
ANION GAP SERPL CALCULATED.3IONS-SCNC: 9 MMOL/L (ref 7–16)
AST SERPL W P-5'-P-CCNC: 6 U/L (ref 15–37)
BASOPHILS # BLD AUTO: 0.06 K/UL (ref 0–0.2)
BASOPHILS NFR BLD AUTO: 0.7 % (ref 0–1)
BILIRUB SERPL-MCNC: 0.4 MG/DL (ref ?–1.2)
BUN SERPL-MCNC: 16 MG/DL (ref 7–18)
BUN/CREAT SERPL: 23 (ref 6–20)
CALCIUM SERPL-MCNC: 8.4 MG/DL (ref 8.5–10.1)
CHLORIDE SERPL-SCNC: 104 MMOL/L (ref 98–107)
CO2 SERPL-SCNC: 27 MMOL/L (ref 21–32)
CREAT SERPL-MCNC: 0.7 MG/DL (ref 0.55–1.02)
DIFFERENTIAL METHOD BLD: ABNORMAL
EGFR (NO RACE VARIABLE) (RUSH/TITUS): 102 ML/MIN/1.73M2
EOSINOPHIL # BLD AUTO: 0.41 K/UL (ref 0–0.5)
EOSINOPHIL NFR BLD AUTO: 4.6 % (ref 1–4)
ERYTHROCYTE [DISTWIDTH] IN BLOOD BY AUTOMATED COUNT: 18.5 % (ref 11.5–14.5)
GLOBULIN SER-MCNC: 3.8 G/DL (ref 2–4)
GLUCOSE SERPL-MCNC: 236 MG/DL (ref 70–105)
GLUCOSE SERPL-MCNC: 242 MG/DL (ref 74–106)
GLUCOSE SERPL-MCNC: 280 MG/DL (ref 70–105)
GLUCOSE SERPL-MCNC: 417 MG/DL (ref 70–105)
GLUCOSE SERPL-MCNC: 457 MG/DL (ref 70–105)
HCT VFR BLD AUTO: 25.7 % (ref 38–47)
HGB BLD-MCNC: 7.9 G/DL (ref 12–16)
IMM GRANULOCYTES # BLD AUTO: 0.04 K/UL (ref 0–0.04)
IMM GRANULOCYTES NFR BLD: 0.4 % (ref 0–0.4)
LYMPHOCYTES # BLD AUTO: 2.45 K/UL (ref 1–4.8)
LYMPHOCYTES NFR BLD AUTO: 27.4 % (ref 27–41)
MCH RBC QN AUTO: 23.6 PG (ref 27–31)
MCHC RBC AUTO-ENTMCNC: 30.7 G/DL (ref 32–36)
MCV RBC AUTO: 76.7 FL (ref 80–96)
MONOCYTES # BLD AUTO: 0.82 K/UL (ref 0–0.8)
MONOCYTES NFR BLD AUTO: 9.2 % (ref 2–6)
MPC BLD CALC-MCNC: 10.9 FL (ref 9.4–12.4)
NEUTROPHILS # BLD AUTO: 5.15 K/UL (ref 1.8–7.7)
NEUTROPHILS NFR BLD AUTO: 57.7 % (ref 53–65)
NRBC # BLD AUTO: 0 X10E3/UL
NRBC, AUTO (.00): 0 %
PLATELET # BLD AUTO: 227 K/UL (ref 150–400)
POTASSIUM SERPL-SCNC: 4.1 MMOL/L (ref 3.5–5.1)
PROT SERPL-MCNC: 6.2 G/DL (ref 6.4–8.2)
RBC # BLD AUTO: 3.35 M/UL (ref 4.2–5.4)
SODIUM SERPL-SCNC: 136 MMOL/L (ref 136–145)
WBC # BLD AUTO: 8.93 K/UL (ref 4.5–11)

## 2023-12-11 PROCEDURE — 85025 COMPLETE CBC W/AUTO DIFF WBC: CPT | Performed by: INTERNAL MEDICINE

## 2023-12-11 PROCEDURE — 97535 SELF CARE MNGMENT TRAINING: CPT

## 2023-12-11 PROCEDURE — 1111F DSCHRG MED/CURRENT MED MERGE: CPT | Mod: CPTII,,, | Performed by: HOSPITALIST

## 2023-12-11 PROCEDURE — 99232 SBSQ HOSP IP/OBS MODERATE 35: CPT | Mod: ,,, | Performed by: HOSPITALIST

## 2023-12-11 PROCEDURE — 63600175 PHARM REV CODE 636 W HCPCS: Performed by: INTERNAL MEDICINE

## 2023-12-11 PROCEDURE — 11000001 HC ACUTE MED/SURG PRIVATE ROOM

## 2023-12-11 PROCEDURE — 97110 THERAPEUTIC EXERCISES: CPT

## 2023-12-11 PROCEDURE — 99232 PR SUBSEQUENT HOSPITAL CARE,LEVL II: ICD-10-PCS | Mod: ,,, | Performed by: HOSPITALIST

## 2023-12-11 PROCEDURE — 25000003 PHARM REV CODE 250: Performed by: INTERNAL MEDICINE

## 2023-12-11 PROCEDURE — 1111F PR DISCHARGE MEDS RECONCILED W/ CURRENT OUTPATIENT MED LIST: ICD-10-PCS | Mod: CPTII,,, | Performed by: HOSPITALIST

## 2023-12-11 PROCEDURE — 25000003 PHARM REV CODE 250: Performed by: ORTHOPAEDIC SURGERY

## 2023-12-11 PROCEDURE — 63600175 PHARM REV CODE 636 W HCPCS: Performed by: ORTHOPAEDIC SURGERY

## 2023-12-11 PROCEDURE — 99900035 HC TECH TIME PER 15 MIN (STAT)

## 2023-12-11 PROCEDURE — 80053 COMPREHEN METABOLIC PANEL: CPT | Performed by: INTERNAL MEDICINE

## 2023-12-11 PROCEDURE — 97116 GAIT TRAINING THERAPY: CPT

## 2023-12-11 PROCEDURE — 82962 GLUCOSE BLOOD TEST: CPT

## 2023-12-11 RX ORDER — HYDROCODONE BITARTRATE AND ACETAMINOPHEN 10; 325 MG/1; MG/1
1 TABLET ORAL EVERY 8 HOURS PRN
Qty: 21 TABLET | Refills: 0 | Status: SHIPPED | OUTPATIENT
Start: 2023-12-11 | End: 2023-12-18

## 2023-12-11 RX ORDER — INSULIN GLARGINE 100 [IU]/ML
35 INJECTION, SOLUTION SUBCUTANEOUS DAILY
Qty: 10.5 ML | Refills: 0 | Status: SHIPPED | OUTPATIENT
Start: 2023-12-11 | End: 2024-01-10

## 2023-12-11 RX ORDER — INSULIN ASPART 100 [IU]/ML
10 INJECTION, SOLUTION INTRAVENOUS; SUBCUTANEOUS
Qty: 9 ML | Refills: 0 | Status: SHIPPED | OUTPATIENT
Start: 2023-12-11 | End: 2024-01-10

## 2023-12-11 RX ADMIN — SENNOSIDES AND DOCUSATE SODIUM 1 TABLET: 8.6; 5 TABLET ORAL at 09:12

## 2023-12-11 RX ADMIN — DOCUSATE SODIUM 100 MG: 100 CAPSULE, LIQUID FILLED ORAL at 09:12

## 2023-12-11 RX ADMIN — CIPROFLOXACIN HYDROCHLORIDE 500 MG: 500 TABLET, FILM COATED ORAL at 09:12

## 2023-12-11 RX ADMIN — POLYETHYLENE GLYCOL 3350 17 G: 17 POWDER, FOR SOLUTION ORAL at 09:12

## 2023-12-11 RX ADMIN — INSULIN ASPART 4 UNITS: 100 INJECTION, SOLUTION INTRAVENOUS; SUBCUTANEOUS at 09:12

## 2023-12-11 RX ADMIN — INSULIN DETEMIR 20 UNITS: 100 INJECTION, SOLUTION SUBCUTANEOUS at 09:12

## 2023-12-11 RX ADMIN — MUPIROCIN: 20 OINTMENT TOPICAL at 09:12

## 2023-12-11 RX ADMIN — INSULIN ASPART 10 UNITS: 100 INJECTION, SOLUTION INTRAVENOUS; SUBCUTANEOUS at 07:12

## 2023-12-11 RX ADMIN — APIXABAN 2.5 MG: 2.5 TABLET, FILM COATED ORAL at 09:12

## 2023-12-11 RX ADMIN — DULOXETINE HYDROCHLORIDE 60 MG: 30 CAPSULE, DELAYED RELEASE ORAL at 09:12

## 2023-12-11 RX ADMIN — MORPHINE SULFATE 4 MG: 4 INJECTION, SOLUTION INTRAMUSCULAR; INTRAVENOUS at 09:12

## 2023-12-11 RX ADMIN — Medication 6 MG: at 09:12

## 2023-12-11 RX ADMIN — MORPHINE SULFATE 4 MG: 4 INJECTION, SOLUTION INTRAMUSCULAR; INTRAVENOUS at 07:12

## 2023-12-11 NOTE — PT/OT/SLP PROGRESS
Occupational Therapy   Treatment    Name: Monica Walker  MRN: [id number]  Admitting Diagnosis:  Closed bicondylar fracture of distal humerus, right, initial encounter  3 Days Post-Op    Recommendations:     Discharge Recommendations: Moderate Intensity Therapy  Discharge Equipment Recommendations:   (pt reports having a reacher, shoe horn. Recommend a sockaid)  Barriers to discharge:  None    Assessment:     Monica Walker is a 53 y.o. female with a medical diagnosis of Closed bicondylar fracture of distal humerus, right, initial encounter.  She presents with complaint of (R) LE pain. Pt agreed to OT treatment focusing on ADL retraining and adaptive equipment assessment. Performance deficits affecting function are weakness, impaired endurance, impaired self care skills.     Rehab Prognosis:  Good; patient would benefit from acute skilled OT services to address these deficits and reach maximum level of function.       Plan:     Patient to be seen 5 x/week to address the above listed problems via self-care/home management, therapeutic activities, therapeutic exercises  Plan of Care Expires:  1/20/23  Plan of Care Reviewed with: patient    Subjective     Chief Complaint: Insertion Intramedullary Jose Femur Retrograde (R)  Patient/Family Comments/goals: To return home  Pain/Comfort:  Pain Rating 1: 7/10  Location - Side 1: Right  Location 1: leg  Pain Addressed 1: Reposition, Distraction, Nurse notified  Pain Rating Post-Intervention 1: 7/10    Objective:     Communicated with: HAYDEE Garzon prior to session.  Patient found HOB elevated with knee immobilizer, peripheral IV upon OT entry to room.    General Precautions: Standard, fall    Orthopedic Precautions:RLE toe touch weight bearing  Braces: Knee immobilizer  Respiratory Status: Room air     Occupational Performance:     Bed Mobility:    Patient completed Rolling/Turning to Right with minimum assistance and with (R) LE management  Patient completed Supine to  Sit with minimum assistance     Functional Mobility/Transfers:  Patient completed Sit <> Stand Transfer with contact guard assistance and minimum assistance  with  gait belt to stand to RW   Patient completed Bed <> Chair Transfer using Step Transfer technique with contact guard assistance with rolling walker and adhering to TTWB  Functional Mobility: CGA with RW adhering to TTWB on (R)    Activities of Daily Living:  Grooming: independence    Bathing: independence UE anterior bathing, max a with back, setup for perineum/buttock bathing. I with bathing (L) leg and (D) with foot. (R) LE is immobilized and dressed.  Upper Body Dressing: independence donning gown  Lower Body Dressing: pt donned (L) sock Mod I with sock aid with cueing for setup. Pt unable to attempt donning (R) sock with sockaid due to pain.         [facility] [address] 6 Click ADL:      Treatment & Education:  Pt participated well with tx. ADL retraining initiated in bed and completed sitting EOB. Pt educated regarding adaptive devices for LE dressing. Pt state she has other items in hip kit except sock aid. Pt plan is to purchase at d/c, but state family will assist as needed.    Patient left up in chair with all lines intact, call button in reach, and nurse notified    GOALS:   Multidisciplinary Problems       Occupational Therapy Goals          Problem: Occupational Therapy    Goal Priority Disciplines Outcome Interventions   Occupational Therapy Goal     OT, PT/OT Ongoing, Progressing    Description: STG:  Pt will perform grooming (I)  Pt will bathe with setup  Pt will perform UE dressing with (I)  Pt will perform LE dressing with (I)/Mod I  Pt will transfer bed/chair/bsc with SBA with RW adhering to TTWB on (R)  Pt will perform standing task x 1 min with CGA adhering to TTWB on (R)                                                                                                                                                                                                                      Pt will tolerate 15 minutes of tx without fatigue      LT.Restore to max I with self care and mobility.                          Time Tracking:     OT Date of Treatment: 23  OT Start Time: 856  OT Stop Time: 949  OT Total Time (min): 53 min    Billable Minutes:Self Care/Home Management 45    OT/TRISH: OT          2023

## 2023-12-11 NOTE — PLAN OF CARE
SW spoke with Dory at [facility], pt is not able to be accepted at this time. Pt informed, SW offered [facility] in [place]. Per pt, she will dc home and resume care with [facility]. MD informed, MICHEAL following.

## 2023-12-11 NOTE — PLAN OF CARE
Problem: Adult Inpatient Plan of Care  Goal: Plan of Care Review  Outcome: Adequate for Care Transition  Goal: Patient-Specific Goal (Individualized)  Outcome: Adequate for Care Transition  Goal: Absence of Hospital-Acquired Illness or Injury  Outcome: Adequate for Care Transition  Goal: Optimal Comfort and Wellbeing  Outcome: Adequate for Care Transition  Goal: Readiness for Transition of Care  Outcome: Adequate for Care Transition     Problem: Diabetes Comorbidity  Goal: Blood Glucose Level Within Targeted Range  Outcome: Adequate for Care Transition     Problem: Infection  Goal: Absence of Infection Signs and Symptoms  Outcome: Adequate for Care Transition     Problem: Skin Injury Risk Increased  Goal: Skin Health and Integrity  Outcome: Adequate for Care Transition     Problem: Fall Injury Risk  Goal: Absence of Fall and Fall-Related Injury  Outcome: Adequate for Care Transition

## 2023-12-11 NOTE — NURSING
Discharge instructions reviewed with patient and mother; and copy given to patient. Patient and mother voiced understanding regarding:meds, appt., signs and symptoms to report to physician.     patient has the following for discharge:2 anthony hoses, incentive spirometer,nozin, rolling walker, toilet riser at home; dressing change, discharge meds, icepacks, immobilizer.

## 2023-12-11 NOTE — NURSING
At 1447 I called pt's mother on home phone that is listed in the computer, and there is no answer I left a message, I spoke with the pt while doing dressing change and she said that she spoke with her mother today and she was good with her coming home today. Will try to call later.
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
## 2023-12-11 NOTE — PLAN OF CARE
[facility]  Discharge Final Note    Primary Care Provider: Mari Myers MD    Expected Discharge Date: 12/11/2023    Final Discharge Note (most recent)       Final Note - 12/11/23 1344          Final Note    Assessment Type Final Discharge Note     Anticipated Discharge Disposition Home-Health Care [facility]     What phone number can be called within the next 1-3 days to see how you are doing after discharge? [phone]        Post-Acute Status    Post-Acute Authorization Home Health     Home Health Status Set-up Complete/Auth obtained     Patient choice form signed by patient/caregiver List with quality metrics by geographic area provided;List from CMS Compare     Discharge Delays None known at this time                     Important Message from Medicare  Important Message from Medicare regarding Discharge Appeal Rights: Explained to patient/caregiver, Signed/date by patient/caregiver     Date IMM was signed: 12/11/23  Time IMM was signed: 0945     Follow-up providers       Mari Myers MD   Specialty: Family Medicine   Relationship: PCP - General    [id number]  [facility]   [place] [address]   Phone: [phone]       Next Steps: Follow up in 2 week(s)    Instructions: Please follow up on December 19 at 1:30    Garrett Hilliard FNP   Specialty: Emergency Medicine    [address]  [address] [address]   Phone: [phone]       Next Steps: Follow up in 2 week(s)    Instructions: Please follow up on December 26 at 8:15              After-discharge care                Home Medical Care       [facility]   Service: Home Rehabilitation    [address] [facility] [address]   Phone: [phone]                             Pt to dc home today and resume hh with [facility]. SW faxed dc orders to [facility]. No other needs.

## 2023-12-11 NOTE — HOSPITAL COURSE
53 year old female with an extensive PMH presents s/p fall.  She was seen by Orthopedic Surgery who took the patient to surgery for a comminuted displaced fracture of the distal femoral diametaphysis found on X-ray.  CT brain negative s/p fall.  Patient denies chest pain, shortness of breath, nausea, vomiting, or diarrhea and is stable for discharge.    Per Ortho recs: Eliquis for DVT prophylaxis 2.5 mg p.o. b.i.d. for 4 weeks.  Follow-up in 2 weeks with Dr. Mendoza.

## 2023-12-11 NOTE — NURSING
Spoke with SS and said pt. Was not accecpted at [facility]. Said patient will DC home
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
## 2023-12-11 NOTE — NURSING
At 1400 Pt's sister Norma Sanchez has called saying the mother of the pt is 78 years old and can't take care of pt at home, she asked how well did the pt move, I informed her that she moves with one person assist, she continued to tell me that the mother can't take care of pt at home, I took her name and number and thereafter asked Dr. Reddy to call her.

## 2023-12-11 NOTE — DISCHARGE SUMMARY
[facility]  [facility] Medicine  Discharge Summary      Patient Name: Monica Walker  MRN: [id number]  [facility]: [id number]  Patient Class: IP- Inpatient  Admission Date: 12/7/2023  Hospital Length of Stay: 4 days  Discharge Date and Time:  12/11/2023 10:25 AM  Attending Physician: Joy Ferguson DO   Discharging Provider: Joy Ferguson DO  Primary Care Provider: Mari Myers MD        HPI:   Patient is a 53-year-old female with past medical history of diabetes mellitus, osteoarthritis, smoking, depression, MRSA bacteremia complicated by vertebral osteomyelitis and left knee infection, who presents to the emergency room with her mother for evaluation of right hip pain after she sustained a fall at home.  Patient is in a lot of pain, therefore most of the history is given by her mother, who tells me that patient was out in the woods deer hunting when she had a mechanical fall resulting in her right hip fracture.  Patient was shouting in pain and therefore the mother went to see her and called for medical assistance.  Currently the patient is saying that her pain is not controlled, tells me that she recently completed her IV antibiotics and is taking oral ciprofloxacin as recommended by her physician.  Otherwise she denies having any chest pain fevers chills nausea vomiting diarrhea shortness of breath dysuria or lightheadedness or syncope.  She does have significant amount of pain at the right hip joint.      Hospital Course:   53 year old female with a PMH of MRSA bacteremia, DM, and depression who presents s/p fall.  She was seen by Orthopedic Surgery who took the patient to surgery for a comminuted displaced fracture of the distal femoral diametaphysis found on X-ray.  CT brain negative s/p fall.  She is currently waiting on swing bed placement.  Patient denies chest pain, shortness of breath, nausea, vomiting, or diarrhea and is stable for discharge.    Per Ortho recs:  Eliquis for DVT prophylaxis 2.5 mg p.o. b.i.d. for 4 weeks. Follow-up in 2 weeks with Dr. Mendoza.       Vitals:    12/11/23 0250 12/11/23 0400 12/11/23 0600 12/11/23 0924   BP: (!) 162/65 (!) 162/65 138/60    Pulse: 83 83 66    Resp: (!) 22 (!) 22 (!) 22 20   Temp: 98.2 °F (36.8 °C) 98.2 °F (36.8 °C) 98.3 °F (36.8 °C)    TempSrc: Temporal      SpO2: 95% 95% (!) 93%    Weight: 79.4 kg (175 lb 0.7 oz)      Height:           PHYSICAL EXAM:    GEN: NAD; alert and oriented x 3  CVS: regular rate and rhythm; no murmurs  RESP: clear to auscultation bilaterally; no rhonchi, rales, or wheezes noted  GI: soft, non-tender, non-distended; + bowel sounds  EXTR: no clubbing, cyanosis, or edema on the left; s/p surgery on the right; has brace on       Final Active Diagnoses:    Diagnosis Date Noted POA    PRINCIPAL PROBLEM:  Closed bicondylar fracture of distal humerus, right, initial encounter [S42.491A] 12/07/2023 Unknown    DM2 (diabetes mellitus, type 2) [E11.9] 11/20/2022 Yes    Depression [F32.A] 01/04/2022 Yes    Arthritis [M19.90] 01/04/2022 Yes        Discharged Condition: stable    Disposition: Swing bed / rehab    Follow Up:   Contact information for follow-up providers       Papa Mendoza MD Follow up in 2 week(s).    Specialty: Orthopedic Surgery  Contact information:  [address]   [address]  Papa Mendoza Md, Orthopedic & [facility] Medicine, [facility] [address]  [phone]               Mari Myers MD Follow up in 2 week(s).    Specialty: Family Medicine  Contact information:  [address]  [facility]   [place] [address] [phone]                       Contact information for after-discharge care       Home Medical Care       [facility] .    Service: Home Rehabilitation  Contact information:  [address] [facility] [address]  [phone]                                      Medications:  Reconciled Home Medications:      Medication List        START taking these  "medications      apixaban 2.5 mg Tab  Commonly known as: ELIQUIS  Take 1 tablet (2.5 mg total) by mouth 2 (two) times daily.     HYDROcodone-acetaminophen  mg per tablet  Commonly known as: NORCO  Take 1 tablet by mouth every 8 (eight) hours as needed for Pain.            CONTINUE taking these medications      BD ULTRA-FINE SHORT PEN NEEDLE 31 gauge x 5/16" Ndle  Generic drug: pen needle, diabetic     DULoxetine 60 MG capsule  Commonly known as: CYMBALTA  Take 1 capsule (60 mg total) by mouth once daily.     insulin glargine 100 units/mL SubQ pen  Commonly known as: LANTUS SOLOSTAR U-100 INSULIN  Inject 35 Units into the skin once daily.     NovoLOG Flexpen U-100 Insulin 100 unit/mL (3 mL) Inpn pen  Generic drug: insulin aspart U-100  Inject 10 Units into the skin 3 (three) times daily with meals.            STOP taking these medications      ciprofloxacin HCl 500 MG tablet  Commonly known as: CIPRO                Time spent on the discharge of patient: 45 minutes     All of the information has been discussed with the patient and/or family who acknowledged verbal understanding.      Patient's family refused to pick the patient up from the hospital today; will refer patient to rehab in [place] Ferguson DO  Department of Hospital Medicine  [facility]  "

## 2023-12-11 NOTE — DISCHARGE INSTRUCTIONS
*Keep dressing dry and intact; do not remove dressing, Notify Dr Mendoza if you have any issues or concerns.  *Continue white stockings remove 2 times a day for 1 hour and replace  *Wear IMMOBILIZER to Right leg at all time, except bathing, NO Bending RIGHT knee.  *Toe-touch weight bearing to right leg with walker/crutches.  *Continue icepack to right  leg; Elevate left leg on pillow when sitting or lying down.   *Notify physicians office if any problems or any of the following:  *Notify your healthcare provider if you experience any of the following: temperature >100.4  * Notify your healthcare provider if you experience any of the following: redness, tenderness.    *Notify your healthcare provider if you experience any of the following: difficulty breathing or     increased cough.  *Notify your physician if you experience any persistent nausea, vomiting, or diarrhea or headache  *Notify your physician if you experience any of the following:severe uncontrolled pain;worsening rash, increased confusion or weakness; dizziness, lightheadedness or visual disturbances    *Continue incentive spirometry every 2 hours while awake.   No driving, No Operating heavy equipment, no signing any legal documents.   No tub bath or shower, you may sponge bath with antibacterial soap.

## 2023-12-12 PROBLEM — M19.90 ARTHRITIS: Status: RESOLVED | Noted: 2022-01-04 | Resolved: 2023-12-12

## 2023-12-12 LAB
ALBUMIN SERPL BCP-MCNC: 2.3 G/DL (ref 3.5–5)
ALBUMIN/GLOB SERPL: 0.5 {RATIO}
ALP SERPL-CCNC: 107 U/L (ref 46–118)
ALT SERPL W P-5'-P-CCNC: 11 U/L (ref 13–56)
ANION GAP SERPL CALCULATED.3IONS-SCNC: 12 MMOL/L (ref 7–16)
AST SERPL W P-5'-P-CCNC: 10 U/L (ref 15–37)
BILIRUB SERPL-MCNC: 0.5 MG/DL (ref ?–1.2)
BUN SERPL-MCNC: 18 MG/DL (ref 7–18)
BUN/CREAT SERPL: 24 (ref 6–20)
CALCIUM SERPL-MCNC: 8.7 MG/DL (ref 8.5–10.1)
CHLORIDE SERPL-SCNC: 103 MMOL/L (ref 98–107)
CO2 SERPL-SCNC: 25 MMOL/L (ref 21–32)
CREAT SERPL-MCNC: 0.74 MG/DL (ref 0.55–1.02)
EGFR (NO RACE VARIABLE) (RUSH/TITUS): 96 ML/MIN/1.73M2
GLOBULIN SER-MCNC: 4.5 G/DL (ref 2–4)
GLUCOSE SERPL-MCNC: 208 MG/DL (ref 70–105)
GLUCOSE SERPL-MCNC: 269 MG/DL (ref 70–105)
GLUCOSE SERPL-MCNC: 271 MG/DL (ref 74–106)
GLUCOSE SERPL-MCNC: 313 MG/DL (ref 70–105)
GLUCOSE SERPL-MCNC: 339 MG/DL (ref 70–105)
GLUCOSE SERPL-MCNC: 351 MG/DL (ref 70–105)
POTASSIUM SERPL-SCNC: 4 MMOL/L (ref 3.5–5.1)
PROT SERPL-MCNC: 6.8 G/DL (ref 6.4–8.2)
SODIUM SERPL-SCNC: 136 MMOL/L (ref 136–145)

## 2023-12-12 PROCEDURE — 25000003 PHARM REV CODE 250: Performed by: INTERNAL MEDICINE

## 2023-12-12 PROCEDURE — 99232 SBSQ HOSP IP/OBS MODERATE 35: CPT | Mod: ,,, | Performed by: HOSPITALIST

## 2023-12-12 PROCEDURE — 82962 GLUCOSE BLOOD TEST: CPT

## 2023-12-12 PROCEDURE — 63600175 PHARM REV CODE 636 W HCPCS: Performed by: HOSPITALIST

## 2023-12-12 PROCEDURE — 97110 THERAPEUTIC EXERCISES: CPT

## 2023-12-12 PROCEDURE — 97116 GAIT TRAINING THERAPY: CPT

## 2023-12-12 PROCEDURE — 99900035 HC TECH TIME PER 15 MIN (STAT)

## 2023-12-12 PROCEDURE — 99232 PR SUBSEQUENT HOSPITAL CARE,LEVL II: ICD-10-PCS | Mod: ,,, | Performed by: HOSPITALIST

## 2023-12-12 PROCEDURE — 11000001 HC ACUTE MED/SURG PRIVATE ROOM

## 2023-12-12 PROCEDURE — 25000003 PHARM REV CODE 250: Performed by: ORTHOPAEDIC SURGERY

## 2023-12-12 PROCEDURE — 80053 COMPREHEN METABOLIC PANEL: CPT | Performed by: INTERNAL MEDICINE

## 2023-12-12 PROCEDURE — 94761 N-INVAS EAR/PLS OXIMETRY MLT: CPT

## 2023-12-12 PROCEDURE — 63600175 PHARM REV CODE 636 W HCPCS: Performed by: INTERNAL MEDICINE

## 2023-12-12 RX ADMIN — DULOXETINE HYDROCHLORIDE 60 MG: 30 CAPSULE, DELAYED RELEASE ORAL at 09:12

## 2023-12-12 RX ADMIN — INSULIN DETEMIR 24 UNITS: 100 INJECTION, SOLUTION SUBCUTANEOUS at 09:12

## 2023-12-12 RX ADMIN — DOCUSATE SODIUM 100 MG: 100 CAPSULE, LIQUID FILLED ORAL at 09:12

## 2023-12-12 RX ADMIN — MUPIROCIN: 20 OINTMENT TOPICAL at 09:12

## 2023-12-12 RX ADMIN — SENNOSIDES AND DOCUSATE SODIUM 1 TABLET: 8.6; 5 TABLET ORAL at 09:12

## 2023-12-12 RX ADMIN — HYDROCODONE BITARTRATE AND ACETAMINOPHEN 1 TABLET: 10; 325 TABLET ORAL at 09:12

## 2023-12-12 RX ADMIN — HYDROCODONE BITARTRATE AND ACETAMINOPHEN 1 TABLET: 10; 325 TABLET ORAL at 06:12

## 2023-12-12 RX ADMIN — Medication 6 MG: at 09:12

## 2023-12-12 RX ADMIN — CIPROFLOXACIN HYDROCHLORIDE 500 MG: 500 TABLET, FILM COATED ORAL at 09:12

## 2023-12-12 RX ADMIN — INSULIN ASPART 6 UNITS: 100 INJECTION, SOLUTION INTRAVENOUS; SUBCUTANEOUS at 06:12

## 2023-12-12 RX ADMIN — INSULIN ASPART 5 UNITS: 100 INJECTION, SOLUTION INTRAVENOUS; SUBCUTANEOUS at 09:12

## 2023-12-12 RX ADMIN — APIXABAN 2.5 MG: 2.5 TABLET, FILM COATED ORAL at 09:12

## 2023-12-12 RX ADMIN — HYDROCODONE BITARTRATE AND ACETAMINOPHEN 1 TABLET: 10; 325 TABLET ORAL at 03:12

## 2023-12-12 NOTE — PT/OT/SLP PROGRESS
Occupational Therapy   Treatment    Name: Monica Walker  MRN: [id number]  Admitting Diagnosis:  Closed bicondylar fracture of distal humerus, right, initial encounter  4 Days Post-Op    Recommendations:     Discharge Recommendations: Moderate Intensity Therapy  Discharge Equipment Recommendations:   (to be determined)  Barriers to discharge:  None    Assessment:     Monica Walker is a 53 y.o. female with a medical diagnosis of Closed bicondylar fracture of distal humerus, right, initial encounter.  She presents with complaint of (R) LE pain. Pt agreed to exercises in bed, but declined to sit in chair due to discomfort of immobilizer. Performance deficits affecting function are weakness, impaired endurance.     Rehab Prognosis:  Good; patient would benefit from acute skilled OT services to address these deficits and reach maximum level of function.       Plan:     Patient to be seen 5 x/week to address the above listed problems via self-care/home management, therapeutic activities, therapeutic exercises  Plan of Care Expires:    Plan of Care Reviewed with: patient    Subjective     Chief Complaint: Insertion Intramedullary Jose femur,Retrograde (IR)  Patient/Family Comments/goals: Swing bed  Pain/Comfort:  Pain Rating 1: 8/10  Location - Side 1: Right  Location 1: leg  Pain Addressed 1: Nurse notified, Distraction  Pain Rating Post-Intervention 1: 8/10    Objective:     Communicated with: HAYDEE Baird prior to session.  Patient found HOB elevated with knee immobilizer upon OT entry to room.    General Precautions: Standard, fall    Orthopedic Precautions:RLE TTWB  Braces: Knee immobilizer  Respiratory Status: Room air     Occupational Performance:     Bed Mobility:         Functional Mobility/Transfers:    Functional Mobility:     Activities of Daily Living:        [facility] 6 Click ADL:      Treatment & Education:  Pt performed UE strengthening exercises.  2 lb hand wt 2 x sets of 15 reps  (R) shoulder  flexion/extension and adduction/abduction( L) NT due to rotator cuff injury)  (B) elbow flexion/extension  (B) wrist flexion/extension  Hand exerciser x 2 sets of 25 reps x 3 bands  Red theraband x 2 sets of 15 reps (R) shoulder adduction/abduction and (B) elbow flexion and extension.  Pt participated well with exercises without complaint.    Patient left HOB elevated with all lines intact and call button in reach    GOALS:   Multidisciplinary Problems       Occupational Therapy Goals          Problem: Occupational Therapy    Goal Priority Disciplines Outcome Interventions   Occupational Therapy Goal     OT, PT/OT Ongoing, Progressing    Description: STG:  Pt will perform grooming (I)  Pt will bathe with setup  Pt will perform UE dressing with (I)  Pt will perform LE dressing with (I)/Mod I  Pt will transfer bed/chair/bsc with SBA with RW adhering to TTWB on (R)  Pt will perform standing task x 1 min with CGA adhering to TTWB on (R)                                                                                                                                                                                                                     Pt will tolerate 15 minutes of tx without fatigue      LT.Restore to max I with self care and mobility.                          Time Tracking:     OT Date of Treatment: 23  OT Start Time:   OT Stop Time: 1440  OT Total Time (min): 19 min    Billable Minutes:Therapeutic Exercise 17    OT/TRISH: OT          2023

## 2023-12-12 NOTE — CARE UPDATE
Patient awake alert without any new complaints.  Wounds show no signs of infection.  Patient decided him between home in rehab.  Hopefully rehab placement soon.  Neurovascularly unchanged distally.

## 2023-12-12 NOTE — PT/OT/SLP PROGRESS
Physical Therapy Treatment    Patient Name:  Monica Walker   MRN:  [id number]    Recommendations:     Discharge Recommendations: Moderate Intensity Therapy  Discharge Equipment Recommendations: none  Barriers to discharge: None    Assessment:     Monica Walker is a 53 y.o. female admitted with a medical diagnosis of Closed bicondylar fracture of distal humerus, right, initial encounter.  She presents with the following impairments/functional limitations: weakness, impaired endurance, impaired functional mobility, gait instability, impaired balance, decreased lower extremity function, pain, decreased ROM, orthopedic precautions Patient needs encouragment for oob activity. Plan is for home today.    Rehab Prognosis: Good; patient would benefit from acute skilled PT services to address these deficits and reach maximum level of function.    Recent Surgery: Procedure(s) (LRB):  INSERTION, INTRAMEDULLARY LAXMI, FEMUR, DISTAL, RETROGRADE (Right) 3 Days Post-Op    Plan:     During this hospitalization, patient to be seen daily to address the identified rehab impairments via gait training, therapeutic activities, therapeutic exercises and progress toward the following goals:    Plan of Care Expires:  01/09/24    Subjective     Chief Complaint: post op pain  Patient/Family Comments/goals: Patient states she is going home today  Pain/Comfort:  Pain Rating 1: 6/10  Location - Side 1: Right  Location 1: leg  Pain Addressed 1: Cessation of Activity, Pre-medicate for activity      Objective:     Communicated with nurse prior to session.  Patient found supine with   upon PT entry to room.     General Precautions: Standard, fall  Orthopedic Precautions: RLE toe touch weight bearing  Braces: Knee immobilizer  Respiratory Status: Room air     Functional Mobility:  Bed Mobility:     Supine to Sit: minimum assistance  Sit to Supine: minimum assistance  Transfers:     Sit to Stand:  minimum assistance with rolling walker  Gait:  ambulated 20 feet ttwb right le with walker      AM-PAC 6 CLICK MOBILITY  Turning over in bed (including adjusting bedclothes, sheets and blankets)?: 3  Sitting down on and standing up from a chair with arms (e.g., wheelchair, bedside commode, etc.): 3  Moving from lying on back to sitting on the side of the bed?: 3  Moving to and from a bed to a chair (including a wheelchair)?: 3  Need to walk in hospital room?: 3  Climbing 3-5 steps with a railing?: 3  Basic Mobility Total Score: 18       Treatment & Education:  RLE: aps, hs, slr, qs 3x10    Patient left up in chair with call button in reach..    GOALS:   Multidisciplinary Problems       Physical Therapy Goals          Problem: Physical Therapy    Goal Priority Disciplines Outcome Goal Variances Interventions   Physical Therapy Goal     PT, PT/OT Ongoing, Progressing     Description: Short Term Goals to be met by: 23    Patient will increase functional independence with mobility by performin. Supine to sit with Modified [place]  2. Sit to stand transfer with Modified [place]  3. Bed to chair transfer with Modified [place] using Rolling Walker,  TTWB R LE  4. Gait  x 100 feet with Modified [place] using Rolling Walker,  TTWB R LE.   5. Lower extremity exercise program x30 reps per handout, with assistance as needed  6. Verbalize/demo 3/3 hip precautions    Long Term Goals to be met by: 23     1. Pt with regain full independent functional mobility to return to prior activities of daily living                        Time Tracking:     PT Received On: 23  PT Start Time: 0900     PT Stop Time: 09  PT Total Time (min): 25 min     Billable Minutes: Gait Training 10 and Therapeutic Exercise 15    Treatment Type: Treatment  PT/PTA: PT           2023

## 2023-12-12 NOTE — PROGRESS NOTES
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  Patient is in a lot of pain, therefore most of the history is given by her mother, who tells me that patient was out in the woods deer hunting when she had a mechanical fall resulting in her right hip fracture.  Patient was shouting in pain and therefore the mother went to see her and called for medical assistance.  Currently the patient is saying that her pain is not controlled, tells me that she recently completed her IV antibiotics and is taking oral ciprofloxacin as recommended by her physician.  Otherwise she denies having any chest pain fevers chills nausea vomiting diarrhea shortness of breath dysuria or lightheadedness or syncope.  She does have significant amount of pain at the right hip joint.    Overview/Hospital Course:  53 year old female with an extensive PMH presents s/p fall.  She was seen by Orthopedic Surgery who took the patient to surgery for a comminuted displaced fracture of the distal femoral diametaphysis found on X-ray.  CT brain negative s/p fall.  She is currently waiting on swing bed placement.  Patient denies chest pain, shortness of breath, nausea, vomiting, or diarrhea and is stable for discharge.    Per Ortho recs: Eliquis for DVT prophylaxis 2.5 mg p.o. b.i.d.  for 4 weeks.  Follow-up in 2 weeks with Dr. Mendoza.      Vitals:    12/11/23 2200 12/12/23 0200 12/12/23 0600 12/12/23 0620   BP: (!) 117/55 (!) 104/59 (!) 141/61    Pulse: 67 67 64    Resp: 18 18 18 18   Temp: 97.3 °F (36.3 °C) 97.3 °F (36.3 °C) 98.7 °F (37.1 °C)    TempSrc:       SpO2: 97% (!) 92% (!) 90%    Weight:       Height:             Assessment/Plan:      * Closed bicondylar fracture of distal humerus, right, initial encounter  She was seen by Orthopedic Surgery who took the patient to surgery for a comminuted displaced fracture of the distal femoral diametaphysis found on X-ray.   Awaits placement    DM2 (diabetes mellitus, type 2)  BS elevated; will increase Insulin to 24 units QPM    Depression  Continue current meds            Joy Ferguson DO  Department of Hospital Medicine   [facility]

## 2023-12-12 NOTE — PT/OT/SLP PROGRESS
Physical Therapy Treatment    Patient Name:  Monica Walker   MRN:  [id number]    Recommendations:     Discharge Recommendations: Moderate Intensity Therapy  Discharge Equipment Recommendations: none  Barriers to discharge: None    Assessment:     Monica Walker is a 53 y.o. female admitted with a medical diagnosis of Closed bicondylar fracture of distal humerus, right, initial encounter.  She presents with the following impairments/functional limitations: weakness, impaired endurance, impaired functional mobility, gait instability, impaired balance, decreased lower extremity function, pain, decreased ROM, orthopedic precautions Patient requested crutches for home. Has walker already. Plan is for home today    Rehab Prognosis: Good; patient would benefit from acute skilled PT services to address these deficits and reach maximum level of function.    Recent Surgery: Procedure(s) (LRB):  INSERTION, INTRAMEDULLARY LAXMI, FEMUR, DISTAL, RETROGRADE (Right) 3 Days Post-Op    Plan:     During this hospitalization, patient to be seen daily to address the identified rehab impairments via gait training, therapeutic activities, therapeutic exercises and progress toward the following goals:    Plan of Care Expires:  01/09/24    Subjective     Chief Complaint: post op pain  Patient/Family Comments/goals: Patient request crutches for gait. Plan is for home today  Pain/Comfort:  Pain Rating 1: 6/10  Location - Side 1: Right  Location 1: leg  Pain Addressed 1: Cessation of Activity, Pre-medicate for activity      Objective:     Communicated with nurse prior to session.  Patient found supine with   upon PT entry to room.     General Precautions: Standard, fall  Orthopedic Precautions: RLE toe touch weight bearing  Braces: Knee immobilizer  Respiratory Status: Room air     Functional Mobility:  Sit to stand min assist  Sit to supine min assist  Ambulated 20 feet with crutches cga, step to gait      AM-PAC 6 CLICK MOBILITY  Turning  over in bed (including adjusting bedclothes, sheets and blankets)?: 3  Sitting down on and standing up from a chair with arms (e.g., wheelchair, bedside commode, etc.): 3  Moving from lying on back to sitting on the side of the bed?: 3  Moving to and from a bed to a chair (including a wheelchair)?: 3  Need to walk in hospital room?: 3  Climbing 3-5 steps with a railing?: 3  Basic Mobility Total Score: 18       Treatment & Education:  RLE: aps, hs, slr, qs 3x10    Patient left up in chair with call button in reach..    GOALS:   Multidisciplinary Problems       Physical Therapy Goals          Problem: Physical Therapy    Goal Priority Disciplines Outcome Goal Variances Interventions   Physical Therapy Goal     PT, PT/OT Ongoing, Progressing     Description: Short Term Goals to be met by: 23    Patient will increase functional independence with mobility by performin. Supine to sit with Modified [place]  2. Sit to stand transfer with Modified [place]  3. Bed to chair transfer with Modified [place] using Rolling Walker,  TTWB R LE  4. Gait  x 100 feet with Modified [place] using Rolling Walker,  TTWB R LE.   5. Lower extremity exercise program x30 reps per handout, with assistance as needed  6. Verbalize/demo 3/3 hip precautions    Long Term Goals to be met by: 23     1. Pt with regain full independent functional mobility to return to prior activities of daily living                        Time Tracking:     PT Received On: 23  PT Start Time: 1330     PT Stop Time: 1355  PT Total Time (min): 25 min     Billable Minutes: Gait Training 10 and Therapeutic Exercise 15    Treatment Type: Treatment  PT/PTA: PT           2023

## 2023-12-12 NOTE — PLAN OF CARE
SW reconsulted for swb. SW spoke with pt at bedside and pt wants to speak with mother re: dc. SW called pts mother and transferred pts mother into pts room. SW gave pt choice for swbs in [place]. Pt declined, SW will follow up with pt after lunch. SW following.     1530: SW spoke with pt and received choice for [facility], referral faxed. SWS following.

## 2023-12-12 NOTE — ASSESSMENT & PLAN NOTE
She was seen by Orthopedic Surgery who took the patient to surgery for a comminuted displaced fracture of the distal femoral diametaphysis found on X-ray.   Awaits placement

## 2023-12-13 VITALS
TEMPERATURE: 98 F | DIASTOLIC BLOOD PRESSURE: 63 MMHG | WEIGHT: 175.06 LBS | OXYGEN SATURATION: 91 % | SYSTOLIC BLOOD PRESSURE: 117 MMHG | HEART RATE: 86 BPM | RESPIRATION RATE: 16 BRPM | BODY MASS INDEX: 28.14 KG/M2 | HEIGHT: 66 IN

## 2023-12-13 LAB
GLUCOSE SERPL-MCNC: 251 MG/DL (ref 70–105)
GLUCOSE SERPL-MCNC: 380 MG/DL (ref 70–105)

## 2023-12-13 PROCEDURE — 97110 THERAPEUTIC EXERCISES: CPT

## 2023-12-13 PROCEDURE — 1111F PR DISCHARGE MEDS RECONCILED W/ CURRENT OUTPATIENT MED LIST: ICD-10-PCS | Mod: CPTII,,, | Performed by: HOSPITALIST

## 2023-12-13 PROCEDURE — 1111F DSCHRG MED/CURRENT MED MERGE: CPT | Mod: CPTII,,, | Performed by: HOSPITALIST

## 2023-12-13 PROCEDURE — 82962 GLUCOSE BLOOD TEST: CPT

## 2023-12-13 PROCEDURE — 97116 GAIT TRAINING THERAPY: CPT

## 2023-12-13 PROCEDURE — 99239 HOSP IP/OBS DSCHRG MGMT >30: CPT | Mod: ,,, | Performed by: HOSPITALIST

## 2023-12-13 PROCEDURE — 99900035 HC TECH TIME PER 15 MIN (STAT)

## 2023-12-13 PROCEDURE — 94761 N-INVAS EAR/PLS OXIMETRY MLT: CPT

## 2023-12-13 PROCEDURE — 99239 PR HOSPITAL DISCHARGE DAY,>30 MIN: ICD-10-PCS | Mod: ,,, | Performed by: HOSPITALIST

## 2023-12-13 PROCEDURE — 25000003 PHARM REV CODE 250: Performed by: HOSPITALIST

## 2023-12-13 PROCEDURE — 25000003 PHARM REV CODE 250: Performed by: INTERNAL MEDICINE

## 2023-12-13 PROCEDURE — 63600175 PHARM REV CODE 636 W HCPCS: Performed by: INTERNAL MEDICINE

## 2023-12-13 RX ORDER — MUPIROCIN 20 MG/G
OINTMENT TOPICAL 2 TIMES DAILY
Status: DISCONTINUED | OUTPATIENT
Start: 2023-12-13 | End: 2023-12-13 | Stop reason: HOSPADM

## 2023-12-13 RX ADMIN — INSULIN ASPART 6 UNITS: 100 INJECTION, SOLUTION INTRAVENOUS; SUBCUTANEOUS at 10:12

## 2023-12-13 RX ADMIN — MUPIROCIN: 20 OINTMENT TOPICAL at 10:12

## 2023-12-13 RX ADMIN — APIXABAN 2.5 MG: 2.5 TABLET, FILM COATED ORAL at 10:12

## 2023-12-13 RX ADMIN — HYDROCODONE BITARTRATE AND ACETAMINOPHEN 1 TABLET: 10; 325 TABLET ORAL at 03:12

## 2023-12-13 RX ADMIN — DULOXETINE HYDROCHLORIDE 60 MG: 30 CAPSULE, DELAYED RELEASE ORAL at 10:12

## 2023-12-13 RX ADMIN — SENNOSIDES AND DOCUSATE SODIUM 1 TABLET: 8.6; 5 TABLET ORAL at 10:12

## 2023-12-13 RX ADMIN — CIPROFLOXACIN HYDROCHLORIDE 500 MG: 500 TABLET, FILM COATED ORAL at 10:12

## 2023-12-13 RX ADMIN — HYDROCODONE BITARTRATE AND ACETAMINOPHEN 1 TABLET: 10; 325 TABLET ORAL at 10:12

## 2023-12-13 NOTE — PT/OT/SLP PROGRESS
Physical Therapy Treatment    Patient Name:  Monica Walker   MRN:  [id number]    Recommendations:     Discharge Recommendations: Low Intensity Therapy  Discharge Equipment Recommendations: none  Barriers to discharge: None    Assessment:     Monica Walker is a 53 y.o. female admitted with a medical diagnosis of Closed bicondylar fracture of distal humerus, right, initial encounter.  She presents with the following impairments/functional limitations: weakness, impaired endurance, impaired functional mobility, gait instability, impaired balance, decreased lower extremity function, pain, decreased ROM, orthopedic precautions Plan is for home today.. Needs more practice with hhpt on crutches. .    Rehab Prognosis: Good; patient would benefit from acute skilled PT services to address these deficits and reach maximum level of function.    Recent Surgery: Procedure(s) (LRB):  INSERTION, INTRAMEDULLARY LAXMI, FEMUR, DISTAL, RETROGRADE (Right) 5 Days Post-Op    Plan:     During this hospitalization, patient to be seen daily to address the identified rehab impairments via gait training, therapeutic activities, therapeutic exercises and progress toward the following goals:    Plan of Care Expires:  01/09/24    Subjective     Chief Complaint: post op pain  Patient/Family Comments/goals: plan is home today  Pain/Comfort:  Pain Rating 1: 4/10  Location - Side 1: Left  Location 1: knee  Pain Addressed 1: Pre-medicate for activity, Cessation of Activity      Objective:     Communicated with nurse prior to session.  Patient found supine with   upon PT entry to room.     General Precautions: Standard, fall  Orthopedic Precautions: RLE toe touch weight bearing  Braces: Knee immobilizer  Respiratory Status: Room air     Functional Mobility:  Bed Mobility:     Supine to Sit: contact guard assistance  Sit to Supine: contact guard assistance  Transfers:     Sit to Stand:  contact guard assistance with axillary crutches  Gait: ambulated  30 feet with crutches ttwb min assist, cues for sequencing      AM-PAC 6 CLICK MOBILITY  Turning over in bed (including adjusting bedclothes, sheets and blankets)?: 3  Sitting down on and standing up from a chair with arms (e.g., wheelchair, bedside commode, etc.): 3  Moving from lying on back to sitting on the side of the bed?: 3  Moving to and from a bed to a chair (including a wheelchair)?: 3  Need to walk in hospital room?: 3  Climbing 3-5 steps with a railing?: 3  Basic Mobility Total Score: 18       Treatment & Education:  LLE: aps, qs, slr 3x10    Patient left sitting edge of bed with all lines intact..    GOALS:   Multidisciplinary Problems       Physical Therapy Goals          Problem: Physical Therapy    Goal Priority Disciplines Outcome Goal Variances Interventions   Physical Therapy Goal     PT, PT/OT Ongoing, Progressing     Description: Short Term Goals to be met by: 23    Patient will increase functional independence with mobility by performin. Supine to sit with Modified [place]  2. Sit to stand transfer with Modified [place]  3. Bed to chair transfer with Modified [place] using Rolling Walker,  TTWB R LE  4. Gait  x 100 feet with Modified [place] using Rolling Walker,  TTWB R LE.   5. Lower extremity exercise program x30 reps per handout, with assistance as needed  6. Verbalize/demo 3/3 hip precautions    Long Term Goals to be met by: 23     1. Pt with regain full independent functional mobility to return to prior activities of daily living                        Time Tracking:     PT Received On: 23  PT Start Time: 0940     PT Stop Time: 1005  PT Total Time (min): 25 min     Billable Minutes: Gait Training 10 and Therapeutic Exercise 15    Treatment Type: Evaluation  PT/PTA: PT     Number of PTA visits since last PT visit: 0     2023

## 2023-12-13 NOTE — PLAN OF CARE
Problem: Adult Inpatient Plan of Care  Goal: Plan of Care Review  Outcome: Ongoing, Progressing     Problem: Diabetes Comorbidity  Goal: Blood Glucose Level Within Targeted Range  Outcome: Ongoing, Progressing  Intervention: Monitor and Manage Glycemia  Flowsheets (Taken 12/13/2023 0544)  Glycemic Management: blood glucose monitored     Problem: Skin Injury Risk Increased  Goal: Skin Health and Integrity  Outcome: Ongoing, Progressing  Intervention: Optimize Skin Protection  Flowsheets (Taken 12/13/2023 0544)  Pressure Reduction Techniques: frequent weight shift encouraged  Skin Protection: adhesive use limited  Head of Bed (HOB) Positioning: HOB at 30 degrees     Problem: Fall Injury Risk  Goal: Absence of Fall and Fall-Related Injury  Outcome: Ongoing, Progressing  Intervention: Identify and Manage Contributors  Flowsheets (Taken 12/13/2023 0544)  Self-Care Promotion:   independence encouraged   BADL personal objects within reach  Medication Review/Management: medications reviewed

## 2023-12-13 NOTE — PT/OT/SLP PROGRESS
Physical Therapy Treatment    Patient Name:  Monica Walker   MRN:  [id number]    Recommendations:     Discharge Recommendations: Moderate Intensity Therapy  Discharge Equipment Recommendations: none  Barriers to discharge: None    Assessment:     Monica Walker is a 53 y.o. female admitted with a medical diagnosis of Closed bicondylar fracture of distal humerus, right, initial encounter.  She presents with the following impairments/functional limitations: weakness, impaired endurance, impaired functional mobility, gait instability, impaired balance, decreased lower extremity function, pain, decreased ROM, orthopedic precautions Patient with some dc issues due to family not agreeing to bring her back home at this time. Will continue to progress mobility as able.   Rehab Prognosis: Good; patient would benefit from acute skilled PT services to address these deficits and reach maximum level of function.    Recent Surgery: Procedure(s) (LRB):  INSERTION, INTRAMEDULLARY LAXMI, FEMUR, DISTAL, RETROGRADE (Right) 4 Days Post-Op    Plan:     During this hospitalization, patient to be seen daily to address the identified rehab impairments via gait training, therapeutic activities, therapeutic exercises and progress toward the following goals:    Plan of Care Expires:  01/09/24    Subjective     Chief Complaint: post op pain  Patient/Family Comments/goals: Patient unsure if she will return home at this time. Possible swingbed.   Pain/Comfort:  Pain Rating 1: 4/10  Location - Side 1: Left  Location 1: leg  Pain Addressed 1: Cessation of Activity, Pre-medicate for activity      Objective:     Communicated with nurse prior to session.  Patient found supine with   upon PT entry to room.     General Precautions: Standard, fall  Orthopedic Precautions: RLE toe touch weight bearing  Braces: Knee immobilizer  Respiratory Status: Room air     Functional Mobility:  Sit to stand min assist  Sit to supine min assist  Ambulated 20 feet  with crutches cga, step to gait      AM-PAC 6 CLICK MOBILITY  Turning over in bed (including adjusting bedclothes, sheets and blankets)?: 3  Sitting down on and standing up from a chair with arms (e.g., wheelchair, bedside commode, etc.): 3  Moving from lying on back to sitting on the side of the bed?: 3  Moving to and from a bed to a chair (including a wheelchair)?: 3  Need to walk in hospital room?: 3  Climbing 3-5 steps with a railing?: 3  Basic Mobility Total Score: 18       Treatment & Education:  RLE: aps, hs, slr, qs 3x10    Patient left up in chair with call button in reach..    GOALS:   Multidisciplinary Problems       Physical Therapy Goals          Problem: Physical Therapy    Goal Priority Disciplines Outcome Goal Variances Interventions   Physical Therapy Goal     PT, PT/OT Ongoing, Progressing     Description: Short Term Goals to be met by: 23    Patient will increase functional independence with mobility by performin. Supine to sit with Modified Car[place]  2. Sit to stand transfer with Modified Car[place]  3. Bed to chair transfer with Modified Carver using Rolling Walker,  TTWB R LE  4. Gait  x 100 feet with Modified Carver using Rolling Walker,  TTWB R LE.   5. Lower extremity exercise program x30 reps per handout, with assistance as needed  6. Verbalize/demo 3/3 hip precautions    Long Term Goals to be met by: 23     1. Pt with regain full independent functional mobility to return to prior activities of daily living                        Time Tracking:     PT Received On: 23  PT Start Time: 1300     PT Stop Time: 1325  PT Total Time (min): 25 min     Billable Minutes: Gait Training 10 and Therapeutic Exercise 15    Treatment Type: Treatment  PT/PTA: PT     Number of PTA visits since last PT visit: 0     2023

## 2023-12-13 NOTE — DISCHARGE SUMMARY
[facility] Hospital Medicine  Discharge Summary      Patient Name: Monica Walker  MRN: [id number]  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  Patient is in a lot of pain, therefore most of the history is given by her mother, who tells me that patient was out in the woods deer hunting when she had a mechanical fall resulting in her right hip fracture.  Patient was shouting in pain and therefore the mother went to see her and called for medical assistance.  Currently the patient is saying that her pain is not controlled, tells me that she recently completed her IV antibiotics and is taking oral ciprofloxacin as recommended by her physician.  Otherwise she denies having any chest pain fevers chills nausea vomiting diarrhea shortness of breath dysuria or lightheadedness or syncope.  She does have significant amount of pain at the right hip joint.          Hospital Course:   53 year old female with an extensive PMH presents s/p fall.  She was seen by Orthopedic Surgery who took the patient to surgery for a comminuted displaced fracture of the distal femoral diametaphysis found on X-ray.  CT brain negative s/p fall.  Patient denies chest pain, shortness of breath, nausea, vomiting, or diarrhea and is stable for discharge.    Per Ortho recs: Eliquis for DVT prophylaxis 2.5 mg p.o. b.i.d. for 4 weeks.  Follow-up in 2 weeks  with Dr. Mendoza.       Vitals:    12/13/23 0353 12/13/23 0600 12/13/23 1000 12/13/23 1018   BP:  (!) 131/51 117/63    Pulse:  65 86    Resp: 17 18 18 16   Temp:  98 °F (36.7 °C) 98 °F (36.7 °C)    TempSrc:  Oral Oral    SpO2:  (!) 93% (!) 91%    Weight:       Height:             Final Active Diagnoses:    Diagnosis Date Noted POA    PRINCIPAL PROBLEM:  Closed bicondylar fracture of distal humerus, right, initial encounter [S42.491A] 12/07/2023 Yes    DM2 (diabetes mellitus, type 2) [E11.9] 11/20/2022 Yes    Depression [F32.A] 01/04/2022 Yes        Discharged Condition: stable    Disposition: Home or Self Care    Follow Up:   Contact information for follow-up providers       Mari Myers MD Follow up in 2 week(s).    Specialty: Family Medicine  Why: Please follow up on December 19 at 1:30  Contact information:  [address]  [place] [facility] [address]  [phone]               Garrett Hilliard FNP Follow up in 2 week(s).    Specialty: Emergency Medicine  Why: Please follow up on December 26 at 8:15  Contact information:  [address]  [address]  [place] [address]  [phone]               Papa Mendoza MD Follow up in 2 week(s).    Specialty: Orthopedic Surgery  Contact information:  [address]   [address]  [place] Mendoza [place], Orthopedic & Sports Medicine, [facility] [address]  [phone]                       Contact information for after-discharge care       Home Medical Care       [facility] .    Service: Home Rehabilitation  Contact information:  [address] [facility] [address]  [phone]                                      Medications:  Reconciled Home Medications:      Medication List        START taking these medications      apixaban 2.5 mg Tab  Commonly known as: ELIQUIS  Take 1 tablet (2.5 mg total) by mouth 2 (two) times daily.     HYDROcodone-acetaminophen  mg per tablet  Commonly known as: NORCO  Take 1 tablet by mouth every 8 (eight) hours as  "needed for Pain.            CONTINUE taking these medications      BD ULTRA-FINE SHORT PEN NEEDLE 31 gauge x 5/16" Ndle  Generic drug: pen needle, diabetic     DULoxetine 60 MG capsule  Commonly known as: CYMBALTA  Take 1 capsule (60 mg total) by mouth once daily.     insulin glargine 100 units/mL SubQ pen  Commonly known as: LANTUS SOLOSTAR U-100 INSULIN  Inject 35 Units into the skin once daily.     NovoLOG Flexpen U-100 Insulin 100 unit/mL (3 mL) Inpn pen  Generic drug: insulin aspart U-100  Inject 10 Units into the skin 3 (three) times daily with meals.            STOP taking these medications      ciprofloxacin HCl 500 MG tablet  Commonly known as: CIPRO                Time spent on the discharge of patient: 45 minutes     All of the information has been discussed with the patient and/or family who acknowledged verbal understanding.        Joy Ferguson DO  Department of Hospital Medicine  [facility]  "

## 2023-12-13 NOTE — PLAN OF CARE
Problem: Diabetes Comorbidity  Goal: Blood Glucose Level Within Targeted Range  Outcome: Ongoing, Progressing     Problem: Infection  Goal: Absence of Infection Signs and Symptoms  Outcome: Ongoing, Progressing

## 2023-12-13 NOTE — PT/OT/SLP PROGRESS
Physical Therapy Treatment    Patient Name:  Monica Walker   MRN:  [id number]    Recommendations:     Discharge Recommendations: Moderate Intensity Therapy  Discharge Equipment Recommendations: none  Barriers to discharge: None    Assessment:     Monica Walker is a 53 y.o. female admitted with a medical diagnosis of Closed bicondylar fracture of distal humerus, right, initial encounter.  She presents with the following impairments/functional limitations: weakness, impaired endurance, impaired functional mobility, gait instability, impaired balance, decreased lower extremity function, pain, decreased ROM, orthopedic precautions Patient with some dc issues due to family not agreeing to bring her back home at this time. Will continue to progress mobility as able.   Rehab Prognosis: Good; patient would benefit from acute skilled PT services to address these deficits and reach maximum level of function.    Recent Surgery: Procedure(s) (LRB):  INSERTION, INTRAMEDULLARY LAXMI, FEMUR, DISTAL, RETROGRADE (Right) 4 Days Post-Op    Plan:     During this hospitalization, patient to be seen daily to address the identified rehab impairments via gait training, therapeutic activities, therapeutic exercises and progress toward the following goals:    Plan of Care Expires:  01/09/24    Subjective     Chief Complaint: post op pain  Patient/Family Comments/goals: Patient unsure if she will return home at this time. Possible swingbed.   Pain/Comfort:  Pain Rating 1: 4/10  Pain Addressed 1: Cessation of Activity, Pre-medicate for activity      Objective:     Communicated with nurse prior to session.  Patient found supine with   upon PT entry to room.     General Precautions: Standard, fall  Orthopedic Precautions: RLE toe touch weight bearing  Braces: Knee immobilizer  Respiratory Status: Room air     Functional Mobility:  Sit to stand min assist  Sit to supine min assist  Ambulated 20 feet with crutches cga, step to gait      AM-PAC 6  CLICK MOBILITY  Turning over in bed (including adjusting bedclothes, sheets and blankets)?: 3  Sitting down on and standing up from a chair with arms (e.g., wheelchair, bedside commode, etc.): 3  Moving from lying on back to sitting on the side of the bed?: 3  Moving to and from a bed to a chair (including a wheelchair)?: 3  Need to walk in hospital room?: 3  Climbing 3-5 steps with a railing?: 3  Basic Mobility Total Score: 18       Treatment & Education:  RLE: aps, hs, slr, qs 3x10    Patient left up in chair with call button in reach..    GOALS:   Multidisciplinary Problems       Physical Therapy Goals          Problem: Physical Therapy    Goal Priority Disciplines Outcome Goal Variances Interventions   Physical Therapy Goal     PT, PT/OT Ongoing, Progressing     Description: Short Term Goals to be met by: 23    Patient will increase functional independence with mobility by performin. Supine to sit with Modified [place]  2. Sit to stand transfer with Modified [place]  3. Bed to chair transfer with Modified [place] using Rolling Walker,  TTWB R LE  4. Gait  x 100 feet with Modified [place] using Rolling Walker,  TTWB R LE.   5. Lower extremity exercise program x30 reps per handout, with assistance as needed  6. Verbalize/demo 3/3 hip precautions    Long Term Goals to be met by: 23     1. Pt with regain full independent functional mobility to return to prior activities of daily living                        Time Tracking:     PT Received On: 23  PT Start Time: 0900     PT Stop Time: 0925  PT Total Time (min): 25 min     Billable Minutes: Gait Training 10 and Therapeutic Exercise 15    Treatment Type: Treatment  PT/PTA: PT     Number of PTA visits since last PT visit: 0     2023

## 2023-12-13 NOTE — PT/OT/SLP PROGRESS
Physical Therapy Treatment    Patient Name:  Monica Walker   MRN:  [id number]    Recommendations:     Discharge Recommendations: Moderate Intensity Therapy  Discharge Equipment Recommendations: none  Barriers to discharge: None    Assessment:     Monica Walker is a 53 y.o. female admitted with a medical diagnosis of Closed bicondylar fracture of distal humerus, right, initial encounter.  She presents with the following impairments/functional limitations: weakness, impaired endurance, impaired functional mobility, gait instability, impaired balance, decreased lower extremity function, pain, decreased ROM, orthopedic precautions Patient with tachacardia still. Complains of some chest pain. Cardiac testing negative so far. Patient cleared for diet. .    Rehab Prognosis: Good; patient would benefit from acute skilled PT services to address these deficits and reach maximum level of function.    Recent Surgery: Procedure(s) (LRB):  INSERTION, INTRAMEDULLARY LAXMI, FEMUR, DISTAL, RETROGRADE (Right) 4 Days Post-Op    Plan:     During this hospitalization, patient to be seen daily to address the identified rehab impairments via gait training, therapeutic activities, therapeutic exercises and progress toward the following goals:    Plan of Care Expires:  01/09/24    Subjective     Chief Complaint: chest pain, sob,   Patient/Family Comments/goals: Patient ready for return to bed. Agreeable to exercise  Pain/Comfort:  Pain Rating 1: 4/10  Location - Side 1: Left  Location 1: hip  Pain Addressed 1: Cessation of Activity, Pre-medicate for activity      Objective:     Communicated with nurse prior to session.  Patient found supine with   upon PT entry to room.     General Precautions: Standard, fall  Orthopedic Precautions: RLE toe touch weight bearing  Braces: Knee immobilizer  Respiratory Status: Nasal cannula, flow 4 L/min     Functional Mobility:  Bed Mobility:     Supine to Sit: moderate assistance  Sit to Supine: moderate  assistance  Transfers:     Bed to Chair: moderate assistance with  rolling walker  using  Stand Pivot      AM-PAC 6 CLICK MOBILITY  Turning over in bed (including adjusting bedclothes, sheets and blankets)?: 2  Sitting down on and standing up from a chair with arms (e.g., wheelchair, bedside commode, etc.): 2  Moving from lying on back to sitting on the side of the bed?: 2  Moving to and from a bed to a chair (including a wheelchair)?: 2  Need to walk in hospital room?: 2  Climbing 3-5 steps with a railing?: 1  Basic Mobility Total Score: 11       Treatment & Education:  Left LE: aps, hs, abd-add, saq, qs 3x10    Patient left HOB elevated with all lines intact..    GOALS:   Multidisciplinary Problems       Physical Therapy Goals          Problem: Physical Therapy    Goal Priority Disciplines Outcome Goal Variances Interventions   Physical Therapy Goal     PT, PT/OT Ongoing, Progressing     Description: Short Term Goals to be met by: 23    Patient will increase functional independence with mobility by performin. Supine to sit with Modified [place]  2. Sit to stand transfer with Modified [place]  3. Bed to chair transfer with Modified [place] using Rolling Walker,  TTWB R LE  4. Gait  x 100 feet with Modified [place] using Rolling Walker,  TTWB R LE.   5. Lower extremity exercise program x30 reps per handout, with assistance as needed  6. Verbalize/demo 3/3 hip precautions    Long Term Goals to be met by: 23     1. Pt with regain full independent functional mobility to return to prior activities of daily living                        Time Tracking:     PT Received On: 23  PT Start Time: 1400     PT Stop Time: 1425  PT Total Time (min): 25 min     Billable Minutes: Therapeutic Activity 10 and Therapeutic Exercise 15    Treatment Type: Treatment  PT/PTA: PT     Number of PTA visits since last PT visit: 0     2023

## 2023-12-13 NOTE — NURSING
Discharge instructions reviewed and handed to patient along with medication instructions to follow in the home. Pt has medications from pharmacy in hand at this time.  Nad observed. Pt voiced back understanding of instructions.  Pt taken via WC to vehicle with sister as  via transport at this time.

## 2023-12-13 NOTE — PLAN OF CARE
[facility]  Discharge Final Note    Primary Care Provider: Mari Myers MD    Expected Discharge Date: 12/13/2023    Final Discharge Note (most recent)       Final Note - 12/13/23 1124          Final Note    Assessment Type Final Discharge Note     Anticipated Discharge Disposition Home-Health Care [facility]     What phone number can be called within the next 1-3 days to see how you are doing after discharge? [phone]        Post-Acute Status    Post-Acute Authorization Home Health;HME     HME Status Set-up Complete/Auth obtained     Home Health Status Set-up Complete/Auth obtained     Patient choice form signed by patient/caregiver List with quality metrics by geographic area provided;List from CMS Compare     Discharge Delays None known at this time                     Important Message from Medicare  Important Message from Medicare regarding Discharge Appeal Rights: Explained to patient/caregiver, Signed/date by patient/caregiver     Date IMM was signed: 12/13/23  Time IMM was signed: 1010     Follow-up providers       Mari Myers MD   Specialty: Family Medicine   Relationship: PCP - General    [address]  [place] [facility]  [place] [address]   Phone: [phone]       Next Steps: Follow up in 2 week(s)    Instructions: Please follow up on December 19 at 1:30    Garrett Hilliard, NATALIA   Specialty: Emergency Medicine    [address]  [address]  [facility] [address]   Phone: [phone]       Next Steps: Follow up in 2 week(s)    Instructions: Please follow up on December 26 at 8:15    Papa Mendoza MD   Specialty: Orthopedic Surgery    [address]  [address]  [place]za [place], Orthopedic & Sports Medicine, [facility] [address]   Phone: [phone]       Next Steps: Follow up in 2 week(s)              After-discharge care                Durable Medical Equipment       [facility]   Service: Durable Medical Equipment    [address]  [facility] [address]   Phone: [phone]                  Home Medical Care       [facility]   Service: Home Rehabilitation    [address] [facility] [place] [address]   Phone: [phone]                             Pt to dc home today. Pt no longer wants to dc to [facility]. Pt will dc home with [facility]. SW spoke with pts mother and she is ok with pt discharging home. SW informed nurse and Physician. SW consulted for rw. Referral faxed to [facility] and rw to be delivered to pts room. Discharge orders faxed to [facility]. No other needs.

## 2023-12-14 ENCOUNTER — PATIENT OUTREACH (OUTPATIENT)
Dept: ADMINISTRATIVE | Facility: CLINIC | Age: 53
End: 2023-12-14

## 2023-12-14 NOTE — PROGRESS NOTES
C3 nurse spoke with Monica Walker  for a TCC post hospital discharge follow up call. The patient has a scheduled HOSFU appointment with Mari Myers MD  on 12/19/2023 @ 130. Unable to route to provider due to being a [facility].

## 2023-12-19 ENCOUNTER — TELEPHONE (OUTPATIENT)
Dept: ORTHOPEDICS | Facility: CLINIC | Age: 53
End: 2023-12-19
Payer: MEDICARE

## 2023-12-19 NOTE — TELEPHONE ENCOUNTER
----- Message from Maryann Hernandez sent at 12/19/2023  9:01 AM CST -----  Alan Hickman with [employer] [facility] is wanting to know weight bearing status and progress. Call back # [phone] or office # [phone]-debra

## 2023-12-21 NOTE — TELEPHONE ENCOUNTER
Called Alan and let him know that patient was 25% weight bearing and that Dr. Mnedoza stated he could do some stretching exercise but not to go past 30 degree.  He voiced understanding.

## 2023-12-26 DIAGNOSIS — Z46.4 ORTHODONTICS AFTERCARE: Primary | ICD-10-CM

## 2024-01-29 ENCOUNTER — OFFICE VISIT (OUTPATIENT)
Dept: ORTHOPEDICS | Facility: CLINIC | Age: 54
End: 2024-01-29
Payer: MEDICARE

## 2024-01-29 ENCOUNTER — HOSPITAL ENCOUNTER (OUTPATIENT)
Dept: RADIOLOGY | Facility: HOSPITAL | Age: 54
Discharge: HOME | End: 2024-01-29
Attending: NURSE PRACTITIONER
Payer: MEDICARE

## 2024-01-29 VITALS
HEIGHT: 66 IN | HEART RATE: 76 BPM | WEIGHT: 175 LBS | BODY MASS INDEX: 28.12 KG/M2 | RESPIRATION RATE: 16 BRPM | OXYGEN SATURATION: 98 %

## 2024-01-29 DIAGNOSIS — Z09 S/P ORTHOPEDIC SURGERY, FOLLOW-UP EXAM: Primary | ICD-10-CM

## 2024-01-29 DIAGNOSIS — S72.451A DISPLACED SUPRACONDYLAR FRACTURE WITHOUT INTRACONDYLAR EXTENSION OF LOWER END OF RIGHT FEMUR, INITIAL ENCOUNTER FOR CLOSED FRACTURE: ICD-10-CM

## 2024-01-29 DIAGNOSIS — S72.451A DISPLACED SUPRACONDYLAR FRACTURE WITHOUT INTRACONDYLAR EXTENSION OF LOWER END OF RIGHT FEMUR, INITIAL ENCOUNTER FOR CLOSED FRACTURE: Primary | ICD-10-CM

## 2024-01-29 PROCEDURE — 73552 X-RAY EXAM OF FEMUR 2/>: CPT | Mod: 26,RT,, | Performed by: RADIOLOGY

## 2024-01-29 PROCEDURE — 1160F RVW MEDS BY RX/DR IN RCRD: CPT | Mod: CPTII,,, | Performed by: NURSE PRACTITIONER

## 2024-01-29 PROCEDURE — 99024 POSTOP FOLLOW-UP VISIT: CPT | Mod: ,,, | Performed by: NURSE PRACTITIONER

## 2024-01-29 PROCEDURE — 99214 OFFICE O/P EST MOD 30 MIN: CPT | Mod: PBBFAC | Performed by: NURSE PRACTITIONER

## 2024-01-29 PROCEDURE — 73552 X-RAY EXAM OF FEMUR 2/>: CPT | Mod: TC,RT

## 2024-01-29 PROCEDURE — 1159F MED LIST DOCD IN RCRD: CPT | Mod: CPTII,,, | Performed by: NURSE PRACTITIONER

## 2024-01-29 NOTE — PATIENT INSTRUCTIONS
I have discussed this patient by telephone with Dr. Mendoza.  We will allow assisted/active range of motion of her right knee.  Remain toe-touch weight-bearing only with crutches right lower extremity.  Follow-up with Dr. Mendoza in 3 weeks with repeat x-rays of the right knee or sooner as needed.

## 2024-01-29 NOTE — PROGRESS NOTES
53-year-old female presents in wheelchair follow-up of intramedullary nailing of a distal supracondylar fracture of the right femur.  This is superimposed on previous TKR.  She reports resolution of pain symptoms.  States she was undergoing formal physical therapy.  States she was been toe-touch weight-bearing only.    X-ray: X-rays today of the right femur AP, lateral oblique view shows intramedullary nail span fracture site.  There proper sterile interlocking screw.  There is distal barbell screws noted.  Adequate fracture alignment is noted.  Previous TKR noted.      PE:  Physical exam she is in wheelchair this time.  Leg lengths appear equal.  Range of motion right knee 0° extension with further flexion to a proximally 90°.  Well-healed anterior midline surgical scars noted.  There is mild soft tissue swelling which to be expected.  There has no intra-articular effusion appreciated clinically.    Impression:  7-1/2 weeks following retrograde nailing of a distal femur fracture superimposed on previous TKR-right    Plan:  I have discussed this patient by telephone with Dr. Mendoza.  We will allow assisted/active range of motion of her right knee.  Remain toe-touch weight-bearing only with crutches right lower extremity.  Follow-up with Dr. Mendoza in 3 weeks with repeat x-rays of the right knee or sooner as needed.

## 2024-02-02 DIAGNOSIS — M41.9 SCOLIOSIS OF THORACOLUMBAR SPINE, UNSPECIFIED SCOLIOSIS TYPE: Primary | ICD-10-CM

## 2024-02-23 DIAGNOSIS — Z47.89 ENCOUNTER FOR ORTHOPEDIC FOLLOW-UP CARE: Primary | ICD-10-CM

## 2024-02-26 ENCOUNTER — OFFICE VISIT (OUTPATIENT)
Dept: ORTHOPEDICS | Facility: CLINIC | Age: 54
End: 2024-02-26
Payer: MEDICARE

## 2024-02-26 ENCOUNTER — HOSPITAL ENCOUNTER (OUTPATIENT)
Dept: RADIOLOGY | Facility: HOSPITAL | Age: 54
Discharge: HOME | End: 2024-02-26
Attending: ORTHOPAEDIC SURGERY
Payer: MEDICARE

## 2024-02-26 VITALS — WEIGHT: 175 LBS | HEIGHT: 66 IN | BODY MASS INDEX: 28.12 KG/M2

## 2024-02-26 DIAGNOSIS — Z09 S/P ORTHOPEDIC SURGERY, FOLLOW-UP EXAM: Primary | ICD-10-CM

## 2024-02-26 DIAGNOSIS — Z47.89 ENCOUNTER FOR ORTHOPEDIC FOLLOW-UP CARE: ICD-10-CM

## 2024-02-26 PROCEDURE — 1159F MED LIST DOCD IN RCRD: CPT | Mod: CPTII,,, | Performed by: ORTHOPAEDIC SURGERY

## 2024-02-26 PROCEDURE — 73552 X-RAY EXAM OF FEMUR 2/>: CPT | Mod: TC,RT

## 2024-02-26 PROCEDURE — 99213 OFFICE O/P EST LOW 20 MIN: CPT | Mod: PBBFAC,25 | Performed by: ORTHOPAEDIC SURGERY

## 2024-02-26 PROCEDURE — 73552 X-RAY EXAM OF FEMUR 2/>: CPT | Mod: 26,RT,, | Performed by: ORTHOPAEDIC SURGERY

## 2024-02-26 PROCEDURE — 99024 POSTOP FOLLOW-UP VISIT: CPT | Mod: ,,, | Performed by: ORTHOPAEDIC SURGERY

## 2024-02-26 NOTE — PROGRESS NOTES
Radiology Interpretation        Patient Name: Monica Walker  Date: 2/26/2024  YOB: 1970  MRN# [id number]        ORDERING DIAGNOSIS:    Encounter Diagnosis   Name Primary?    S/P orthopedic surgery, follow-up exam Yes        Two views AP lateral right femur skeletally mature individual there is total knee arthroplasty in place on the knee supracondylar femur fracture with hardware in place there is some healing callus present no shift alignment from previous use no loosening of the hardware impression healing fracture supracondylar femur total knee in place intramedullary logan in place no loosening of the hardware               Papa Mendoza MD

## 2024-02-26 NOTE — PROGRESS NOTES
Patient is here for follow-up of her right supracondylar femur fracture.  She has been putting some weight on it.  She is 2 months out.  Her x-rays show some callus starting to form.  Let her start to put weight on her leg.  I will follow back up in 4 weeks with films.  Neurovascularly intact distally.

## 2024-03-21 DIAGNOSIS — Z09 S/P ORTHOPEDIC SURGERY, FOLLOW-UP EXAM: Primary | ICD-10-CM

## 2024-04-29 PROBLEM — Z09 S/P ORTHOPEDIC SURGERY, FOLLOW-UP EXAM: Status: RESOLVED | Noted: 2022-12-20 | Resolved: 2024-04-29

## 2024-11-13 DIAGNOSIS — Z12.11 ENCOUNTER FOR SCREENING COLONOSCOPY: Primary | ICD-10-CM

## 2025-01-15 NOTE — SUBJECTIVE & OBJECTIVE
Interval History:  Patient was seen resting in bed. No overnight events. Patient NPO. Still in moderate back pain. IR biopsy planned today. Will follow recommendations of Dr. Hernandes    Review of Systems   Constitutional:  Negative for chills, diaphoresis, fatigue and fever.   HENT:  Negative for congestion, rhinorrhea, sinus pressure, sinus pain and sore throat.    Respiratory:  Negative for cough, chest tightness, shortness of breath and wheezing.    Cardiovascular:  Negative for chest pain, palpitations and leg swelling.   Gastrointestinal:  Negative for abdominal distention, abdominal pain, constipation, diarrhea, nausea and vomiting.   Musculoskeletal:  Positive for back pain. Negative for arthralgias and myalgias.   Skin:  Negative for rash and wound.   Neurological:  Positive for weakness. Negative for dizziness, syncope, light-headedness, numbness and headaches.   All other systems reviewed and are negative.  Objective:     Vital Signs (Most Recent):  Temp: 98.3 °F (36.8 °C) (01/20/23 0716)  Pulse: 90 (01/20/23 1046)  Resp: (!) 22 (01/20/23 1126)  BP: 137/77 (01/20/23 1046)  SpO2: (!) 93 % (01/20/23 1046)   Vital Signs (24h Range):  Temp:  [97.6 °F (36.4 °C)-99.8 °F (37.7 °C)] 98.3 °F (36.8 °C)  Pulse:  [84-95] 90  Resp:  [15-25] 22  SpO2:  [93 %-99 %] 93 %  BP: (120-147)/(69-84) 137/77     Weight: 62.2 kg (137 lb 3.2 oz)  Body mass index is 22.14 kg/m².  No intake or output data in the 24 hours ending 01/20/23 1157   Physical Exam  Vitals reviewed.   Constitutional:       General: She is awake.      Appearance: Normal appearance. She is well-developed, well-groomed and normal weight.   HENT:      Head: Normocephalic and atraumatic.      Right Ear: External ear normal.      Left Ear: External ear normal.      Mouth/Throat:      Mouth: Mucous membranes are moist.      Pharynx: Oropharynx is clear.   Eyes:      Extraocular Movements: Extraocular movements intact.      Pupils: Pupils are equal, round, and  reactive to light.   Cardiovascular:      Rate and Rhythm: Regular rhythm.      Pulses: Normal pulses.      Heart sounds: Normal heart sounds.   Pulmonary:      Effort: Pulmonary effort is normal.      Breath sounds: Normal breath sounds.   Abdominal:      General: Bowel sounds are normal.      Palpations: Abdomen is soft.   Musculoskeletal:      Cervical back: Neck supple.      Thoracic back: Tenderness present.      Comments: tenderness to palpation from T6 to sacral area   Skin:            Comments: Stage 1 pressure wound over sacrum   Neurological:      Mental Status: She is alert.   Psychiatric:         Behavior: Behavior is cooperative.       Significant Labs: All pertinent labs within the past 24 hours have been reviewed.    Significant Imaging: I have reviewed all pertinent imaging results/findings within the past 24 hours.   n/a

## 2025-02-25 NOTE — PLAN OF CARE
Chart reviewed. Pt post op day 1. Knee immobilizer in place. Receiving iv abx therapy per pharmacy dosing. Ss following.    0924 MICHEAL spoke with MD on this morning. Pt is candidate for SH[facility]. MICHEAL spoke with pt and pt's daughter. Choice obtained for SHM, when bed is available.   Spontaneous, unlabored and symmetrical

## 2025-03-05 DIAGNOSIS — Z12.31 SCREENING MAMMOGRAM FOR BREAST CANCER: Primary | ICD-10-CM

## 2025-07-14 DIAGNOSIS — Z12.31 VISIT FOR SCREENING MAMMOGRAM: Primary | ICD-10-CM

## (undated) DEVICE — SUT PDS II 1 CT VIL MONO 36

## (undated) DEVICE — DRESSING AQUACEL HEEL FOAM 5X8

## (undated) DEVICE — BOWL MIXING BONE CEMENT

## (undated) DEVICE — GLOVE BIOGEL SKINSENSE PI 7.5

## (undated) DEVICE — SET CYSTO IRR DRP CHMBR 84IN

## (undated) DEVICE — DRESSING AQUACEL FOAM RECT 6X6

## (undated) DEVICE — PENCIL EDGE SMOKE ROCKER

## (undated) DEVICE — GLOVE 7.0 PROTEXIS PI BLUE

## (undated) DEVICE — CDS KNEE TOTAL

## (undated) DEVICE — SOL NACL IRR 1000ML BTL

## (undated) DEVICE — BLADE SURG #15 CARBON STEEL

## (undated) DEVICE — SYS CLSR DERMABOND PRINEO 22CM

## (undated) DEVICE — TUBE SUCTION MEDI-VAC STERILE

## (undated) DEVICE — PENCIL ELECTROSURG HOLST W/BLD

## (undated) DEVICE — K-WIRE, STERILE
Type: IMPLANTABLE DEVICE | Site: LEG | Status: NON-FUNCTIONAL
Removed: 2023-12-08

## (undated) DEVICE — STAPLER SKIN WIDE

## (undated) DEVICE — SPACESUIT TOGA T5 ZIPPER PEEL

## (undated) DEVICE — BLADE SURG CARBON STEEL #10

## (undated) DEVICE — GLOVE 7.5 PROTEXIS PI BLUE

## (undated) DEVICE — GLOVE PROTEXIS PI SYN SURG 7

## (undated) DEVICE — KIT EVACUATOR 3 SPR  DRN 400CC

## (undated) DEVICE — APPLICATOR CHLORAPREP ORN 26ML

## (undated) DEVICE — BLADE PERFORMANCE SAG 21X90MM

## (undated) DEVICE — GAUZE SPONGE 4X4 12PLY

## (undated) DEVICE — FOAM POSITIONER ARM SURGICAL

## (undated) DEVICE — GLOVE BIOGEL SKINSENSE PI 7.0

## (undated) DEVICE — HEADREST SOFT TOUCH

## (undated) DEVICE — TOWEL OR DISP STRL BLUE 4/PK

## (undated) DEVICE — FORCEP BAYONET BIPOLAR 10.5 DISP

## (undated) DEVICE — KIT IRR SUCTION HND PIECE

## (undated) DEVICE — SYR ONLY LUER LOCK 20CC

## (undated) DEVICE — SOL NACL IRR 3000ML

## (undated) DEVICE — MATRIX FLOSEAL HEMOSTATIC 10ML

## (undated) DEVICE — SUT STRATAFIX 1 PDS CT-1

## (undated) DEVICE — SYR 10CC LUER LOCK

## (undated) DEVICE — TOURNIQUET SB QC SP 34X4IN

## (undated) DEVICE — CUTTER MENISCUS AGGRESSIVE 4.0

## (undated) DEVICE — GLOVE PROTEXIS PI SYN SURG 6.0

## (undated) DEVICE — SUT VICRYL 2-0 36 CT-1

## (undated) DEVICE — BNDG COFLEX FOAM LF2 ST 4X5YD

## (undated) DEVICE — GOWN X-LG STERILE BACK

## (undated) DEVICE — DRAPE THREE-QTR REINF 53X77IN

## (undated) DEVICE — DRAPE INCISE IOBAN 2 23X23IN

## (undated) DEVICE — TUBING CROSSFLOW INFLOW CASS

## (undated) DEVICE — Device

## (undated) DEVICE — BANDAGE SURE-WRAP 6INX5YD

## (undated) DEVICE — COMPR KNEE STRAIGHT STAY 20IN

## (undated) DEVICE — GLOVE BIOGEL SKINSENSE PI 8.0

## (undated) DEVICE — DRAPE INCISE IOBAN 2 13X13IN

## (undated) DEVICE — BUR BONE CUT MICRO TPS 3X3.8MM

## (undated) DEVICE — SKIN STAPLER PMR35

## (undated) DEVICE — SYR IRRIGATION BULB STER 60ML

## (undated) DEVICE — SPONGE COTTON TRAY 4X4IN

## (undated) DEVICE — GLOVE 6.5 PROTEXIS PI BLUE

## (undated) DEVICE — GLOVE 8 PROTEXIS PI BLUE

## (undated) DEVICE — CORD BIPOLAR 12 FOOT

## (undated) DEVICE — GOWN TOGA SYS PEELWY ZIP 2 XL

## (undated) DEVICE — GUIDE WIRE, BALL-TIPPED, STERILE
Type: IMPLANTABLE DEVICE | Site: LEG | Status: NON-FUNCTIONAL
Removed: 2023-12-08

## (undated) DEVICE — TOWER MIX CEMENT BONE SMARTMIX

## (undated) DEVICE — GUIDEPIN 3.20MM 4 KANT SQUARE
Type: IMPLANTABLE DEVICE | Site: KNEE | Status: NON-FUNCTIONAL
Removed: 2023-05-09

## (undated) DEVICE — GAUZE SPONGE XRAY 4X4

## (undated) DEVICE — CANNULA OBT CONICAL TIP 5.5MM

## (undated) DEVICE — BATTERY INSTRUMENT

## (undated) DEVICE — SUTURE STRATAFIX CP-2 24CM X 24CX

## (undated) DEVICE — DRESSING TRANS 4X4 TEGADERM

## (undated) DEVICE — SUT STRATAFIX PDO 2-0 SH

## (undated) DEVICE — SUT 2-0 VICRYL / CT-1

## (undated) DEVICE — SWAB AEROBIC CULTURETTE

## (undated) DEVICE — GLOVE 6.0 PROTEXIS PI BLUE

## (undated) DEVICE — DRAPE EXTREMITY STD 89X128IN

## (undated) DEVICE — COVER TABLE 77 X 96

## (undated) DEVICE — SUT ETHILON 4-0 PS2 18 BLK

## (undated) DEVICE — BIT DRILL 4.2MM X 130MM

## (undated) DEVICE — DRAPE PLASTIC U 60X72

## (undated) DEVICE — SUT VICRYL 1 CT-1 27 UNDIE

## (undated) DEVICE — BIT DRILL AO STERILE 5.0X340MM

## (undated) DEVICE — SLING ARM LARGE FOAM STRAP

## (undated) DEVICE — GOWN POLY REINF BRTH SLV XL

## (undated) DEVICE — GLOVE PROTEXIS PI SYN SURG 6.5

## (undated) DEVICE — SUT MONO 2-0 CT-1 UNDYED

## (undated) DEVICE — GLOVE PROTEXIS PI SYN SURG 7.5

## (undated) DEVICE — CANISTER SUCTION JUMBO 12L

## (undated) DEVICE — GOWN NONREINF SET-IN SLV 2XL

## (undated) DEVICE — SOL IRRI STRL WATER 1000ML

## (undated) DEVICE — MARKER SKIN RULER AND LABEL

## (undated) DEVICE — SYR ONLY LEUR TIP 30CC

## (undated) DEVICE — DRAPE C-ARMOR EQUIPMENT COVER

## (undated) DEVICE — COLLECTOR SPECIMEN ANAEROBIC

## (undated) DEVICE — SUT VICRYL CTD 1 27IN CP

## (undated) DEVICE — KIT TOTAL KNEE RUSH

## (undated) DEVICE — COVER MAYO STAND 23X54IN

## (undated) DEVICE — DRESSING AQUACEL SACRAL LARGE

## (undated) DEVICE — NDL QUINCKE SPINAL 18G 3.5IN

## (undated) DEVICE — STRIP MEDI WND CLSR 1/2X4IN

## (undated) DEVICE — GLOVE 8.5 PROTEXIS PI BLUE

## (undated) DEVICE — DECANTER FLUID TRNSF WHITE 9IN

## (undated) DEVICE — PADDING CAST SYN STRL 4INX4YD

## (undated) DEVICE — HANDLE MEDI-VAC YANKAUER STR

## (undated) DEVICE — COUNTER NDL FOAM MAGNET 40/70

## (undated) DEVICE — BLADE RECIP DOUBLE SIDED

## (undated) DEVICE — BLADE SURG CARBON STEEL SZ11

## (undated) DEVICE — NDL QUINCKE SPINAL 20G 3.5IN

## (undated) DEVICE — PACK UNIV PROCEDURE

## (undated) DEVICE — BANDAGE ACE DOUBLE STER 6IN

## (undated) DEVICE — BAG RECTANGLE RBBRBND 30X36IN

## (undated) DEVICE — SPONGE COTTON WOVEN 4X4IN

## (undated) DEVICE — REAMER MOD BIXCUT 8X48MM STER.

## (undated) DEVICE — DRESSING SURGICAL 1X1

## (undated) DEVICE — HANDLE MEDIVAC SUC YANK BLBOUS

## (undated) DEVICE — IMPLANTABLE DEVICE
Type: IMPLANTABLE DEVICE | Site: KNEE | Status: NON-FUNCTIONAL
Removed: 2023-05-09

## (undated) DEVICE — BIT DRILL 4.2 SHORT

## (undated) DEVICE — HANDPIECE ARGYLE YANKAUER 18FR

## (undated) DEVICE — SOCKINETTE DOUBLE PLY 4X48IN

## (undated) DEVICE — PAD ABDOMINAL 8X7.5 STERILE

## (undated) DEVICE — DISH PETRI MED 3.5IN

## (undated) DEVICE — GLOVE PROTEXIS PI SYN SURG 8.5

## (undated) DEVICE — PACK ECLIPSE BASIC III SURG

## (undated) DEVICE — DRESSING SURGICAL 1/2X1/2

## (undated) DEVICE — PAD CAST SPECIALIST STRL 3

## (undated) DEVICE — BANDAGE PRCAR COMPR 11YDX6IN

## (undated) DEVICE — CANISTER SUCTION MEDI-VAC 12L

## (undated) DEVICE — TOWEL OR BLUE STRL 16X26 8/PK

## (undated) DEVICE — SUT 2-0 12-18IN SILK

## (undated) DEVICE — CONTAINER SPECIMEN 4.5OZ